# Patient Record
Sex: MALE | Race: OTHER | HISPANIC OR LATINO | URBAN - METROPOLITAN AREA
[De-identification: names, ages, dates, MRNs, and addresses within clinical notes are randomized per-mention and may not be internally consistent; named-entity substitution may affect disease eponyms.]

---

## 2019-06-01 ENCOUNTER — INPATIENT (INPATIENT)
Facility: HOSPITAL | Age: 74
LOS: 2 days | Discharge: HOME | DRG: 641 | End: 2019-06-04
Attending: INTERNAL MEDICINE | Admitting: INTERNAL MEDICINE
Payer: MEDICARE

## 2019-06-01 VITALS
HEART RATE: 42 BPM | DIASTOLIC BLOOD PRESSURE: 63 MMHG | RESPIRATION RATE: 18 BRPM | OXYGEN SATURATION: 100 % | SYSTOLIC BLOOD PRESSURE: 117 MMHG | WEIGHT: 179.9 LBS | TEMPERATURE: 98 F

## 2019-06-01 DIAGNOSIS — E87.5 HYPERKALEMIA: ICD-10-CM

## 2019-06-01 DIAGNOSIS — I10 ESSENTIAL (PRIMARY) HYPERTENSION: ICD-10-CM

## 2019-06-01 DIAGNOSIS — N18.3 CHRONIC KIDNEY DISEASE, STAGE 3 (MODERATE): ICD-10-CM

## 2019-06-01 DIAGNOSIS — R00.1 BRADYCARDIA, UNSPECIFIED: ICD-10-CM

## 2019-06-01 DIAGNOSIS — Z29.9 ENCOUNTER FOR PROPHYLACTIC MEASURES, UNSPECIFIED: ICD-10-CM

## 2019-06-01 DIAGNOSIS — E11.9 TYPE 2 DIABETES MELLITUS WITHOUT COMPLICATIONS: ICD-10-CM

## 2019-06-01 LAB
ALBUMIN SERPL ELPH-MCNC: 3.4 G/DL — LOW (ref 3.5–5)
ALP SERPL-CCNC: 74 U/L — SIGNIFICANT CHANGE UP (ref 40–120)
ALT FLD-CCNC: 23 U/L DA — SIGNIFICANT CHANGE UP (ref 10–60)
ANION GAP SERPL CALC-SCNC: 6 MMOL/L — SIGNIFICANT CHANGE UP (ref 5–17)
ANION GAP SERPL CALC-SCNC: 9 MMOL/L — SIGNIFICANT CHANGE UP (ref 5–17)
APPEARANCE UR: CLEAR — SIGNIFICANT CHANGE UP
APTT BLD: 25.7 SEC — LOW (ref 27.5–36.3)
AST SERPL-CCNC: 27 U/L — SIGNIFICANT CHANGE UP (ref 10–40)
BACTERIA # UR AUTO: ABNORMAL /HPF
BASOPHILS # BLD AUTO: 0.03 K/UL — SIGNIFICANT CHANGE UP (ref 0–0.2)
BASOPHILS NFR BLD AUTO: 0.5 % — SIGNIFICANT CHANGE UP (ref 0–2)
BILIRUB SERPL-MCNC: 0.6 MG/DL — SIGNIFICANT CHANGE UP (ref 0.2–1.2)
BILIRUB UR-MCNC: ABNORMAL
BUN SERPL-MCNC: 39 MG/DL — HIGH (ref 7–18)
BUN SERPL-MCNC: 42 MG/DL — HIGH (ref 7–18)
CALCIUM SERPL-MCNC: 8.7 MG/DL — SIGNIFICANT CHANGE UP (ref 8.4–10.5)
CALCIUM SERPL-MCNC: 9 MG/DL — SIGNIFICANT CHANGE UP (ref 8.4–10.5)
CHLORIDE SERPL-SCNC: 106 MMOL/L — SIGNIFICANT CHANGE UP (ref 96–108)
CHLORIDE SERPL-SCNC: 107 MMOL/L — SIGNIFICANT CHANGE UP (ref 96–108)
CHLORIDE UR-SCNC: 48 MMOL/L — LOW (ref 55–125)
CO2 SERPL-SCNC: 21 MMOL/L — LOW (ref 22–31)
CO2 SERPL-SCNC: 22 MMOL/L — SIGNIFICANT CHANGE UP (ref 22–31)
COLOR SPEC: YELLOW — SIGNIFICANT CHANGE UP
CREAT ?TM UR-MCNC: 240 MG/DL — SIGNIFICANT CHANGE UP
CREAT SERPL-MCNC: 2.91 MG/DL — HIGH (ref 0.5–1.3)
CREAT SERPL-MCNC: 3.1 MG/DL — HIGH (ref 0.5–1.3)
DIFF PNL FLD: NEGATIVE — SIGNIFICANT CHANGE UP
EOSINOPHIL # BLD AUTO: 0.03 K/UL — SIGNIFICANT CHANGE UP (ref 0–0.5)
EOSINOPHIL NFR BLD AUTO: 0.5 % — SIGNIFICANT CHANGE UP (ref 0–6)
EPI CELLS # UR: ABNORMAL /HPF
GLUCOSE SERPL-MCNC: 128 MG/DL — HIGH (ref 70–99)
GLUCOSE SERPL-MCNC: 233 MG/DL — HIGH (ref 70–99)
GLUCOSE UR QL: NEGATIVE — SIGNIFICANT CHANGE UP
HCT VFR BLD CALC: 30.2 % — LOW (ref 39–50)
HGB BLD-MCNC: 9.7 G/DL — LOW (ref 13–17)
IMM GRANULOCYTES NFR BLD AUTO: 0.2 % — SIGNIFICANT CHANGE UP (ref 0–1.5)
INR BLD: 1.29 RATIO — HIGH (ref 0.88–1.16)
KETONES UR-MCNC: NEGATIVE — SIGNIFICANT CHANGE UP
LACTATE SERPL-SCNC: 2.2 MMOL/L — HIGH (ref 0.7–2)
LEUKOCYTE ESTERASE UR-ACNC: ABNORMAL
LIDOCAIN IGE QN: 258 U/L — SIGNIFICANT CHANGE UP (ref 73–393)
LYMPHOCYTES # BLD AUTO: 1.02 K/UL — SIGNIFICANT CHANGE UP (ref 1–3.3)
LYMPHOCYTES # BLD AUTO: 18.2 % — SIGNIFICANT CHANGE UP (ref 13–44)
MAGNESIUM SERPL-MCNC: 2.1 MG/DL — SIGNIFICANT CHANGE UP (ref 1.6–2.6)
MCHC RBC-ENTMCNC: 32.1 GM/DL — SIGNIFICANT CHANGE UP (ref 32–36)
MCHC RBC-ENTMCNC: 32.9 PG — SIGNIFICANT CHANGE UP (ref 27–34)
MCV RBC AUTO: 102.4 FL — HIGH (ref 80–100)
MONOCYTES # BLD AUTO: 0.68 K/UL — SIGNIFICANT CHANGE UP (ref 0–0.9)
MONOCYTES NFR BLD AUTO: 12.2 % — SIGNIFICANT CHANGE UP (ref 2–14)
NEUTROPHILS # BLD AUTO: 3.82 K/UL — SIGNIFICANT CHANGE UP (ref 1.8–7.4)
NEUTROPHILS NFR BLD AUTO: 68.4 % — SIGNIFICANT CHANGE UP (ref 43–77)
NITRITE UR-MCNC: NEGATIVE — SIGNIFICANT CHANGE UP
NRBC # BLD: 0 /100 WBCS — SIGNIFICANT CHANGE UP (ref 0–0)
PH UR: 7 — SIGNIFICANT CHANGE UP (ref 5–8)
PLATELET # BLD AUTO: 184 K/UL — SIGNIFICANT CHANGE UP (ref 150–400)
POTASSIUM SERPL-MCNC: 6.3 MMOL/L — CRITICAL HIGH (ref 3.5–5.3)
POTASSIUM SERPL-MCNC: 7.4 MMOL/L — CRITICAL HIGH (ref 3.5–5.3)
POTASSIUM SERPL-SCNC: 6.3 MMOL/L — CRITICAL HIGH (ref 3.5–5.3)
POTASSIUM SERPL-SCNC: 7.4 MMOL/L — CRITICAL HIGH (ref 3.5–5.3)
POTASSIUM UR-SCNC: 117 MMOL/L — SIGNIFICANT CHANGE UP (ref 25–125)
PROT ?TM UR-MCNC: 23 MG/DL — HIGH (ref 0–12)
PROT SERPL-MCNC: 7.8 G/DL — SIGNIFICANT CHANGE UP (ref 6–8.3)
PROT UR-MCNC: 15
PROTHROM AB SERPL-ACNC: 14.4 SEC — HIGH (ref 10–12.9)
RBC # BLD: 2.95 M/UL — LOW (ref 4.2–5.8)
RBC # FLD: 14 % — SIGNIFICANT CHANGE UP (ref 10.3–14.5)
RBC CASTS # UR COMP ASSIST: SIGNIFICANT CHANGE UP /HPF (ref 0–2)
SODIUM SERPL-SCNC: 135 MMOL/L — SIGNIFICANT CHANGE UP (ref 135–145)
SODIUM SERPL-SCNC: 136 MMOL/L — SIGNIFICANT CHANGE UP (ref 135–145)
SODIUM UR-SCNC: 19 MMOL/L — LOW (ref 40–220)
SP GR SPEC: 1 — LOW (ref 1.01–1.02)
TROPONIN I SERPL-MCNC: <0.015 NG/ML — SIGNIFICANT CHANGE UP (ref 0–0.04)
UROBILINOGEN FLD QL: 1
WBC # BLD: 5.59 K/UL — SIGNIFICANT CHANGE UP (ref 3.8–10.5)
WBC # FLD AUTO: 5.59 K/UL — SIGNIFICANT CHANGE UP (ref 3.8–10.5)
WBC UR QL: ABNORMAL /HPF (ref 0–5)

## 2019-06-01 PROCEDURE — 71045 X-RAY EXAM CHEST 1 VIEW: CPT | Mod: 26

## 2019-06-01 PROCEDURE — 99291 CRITICAL CARE FIRST HOUR: CPT

## 2019-06-01 RX ORDER — SODIUM POLYSTYRENE SULFONATE 4.1 MEQ/G
30 POWDER, FOR SUSPENSION ORAL ONCE
Refills: 0 | Status: COMPLETED | OUTPATIENT
Start: 2019-06-01 | End: 2019-06-01

## 2019-06-01 RX ORDER — AMLODIPINE BESYLATE 2.5 MG/1
0 TABLET ORAL
Qty: 30 | Refills: 0 | DISCHARGE

## 2019-06-01 RX ORDER — ATORVASTATIN CALCIUM 80 MG/1
20 TABLET, FILM COATED ORAL AT BEDTIME
Refills: 0 | Status: DISCONTINUED | OUTPATIENT
Start: 2019-06-01 | End: 2019-06-04

## 2019-06-01 RX ORDER — ONDANSETRON 8 MG/1
4 TABLET, FILM COATED ORAL ONCE
Refills: 0 | Status: COMPLETED | OUTPATIENT
Start: 2019-06-01 | End: 2019-06-01

## 2019-06-01 RX ORDER — INSULIN HUMAN 100 [IU]/ML
10 INJECTION, SOLUTION SUBCUTANEOUS ONCE
Refills: 0 | Status: COMPLETED | OUTPATIENT
Start: 2019-06-01 | End: 2019-06-01

## 2019-06-01 RX ORDER — PANTOPRAZOLE SODIUM 20 MG/1
40 TABLET, DELAYED RELEASE ORAL DAILY
Refills: 0 | Status: DISCONTINUED | OUTPATIENT
Start: 2019-06-01 | End: 2019-06-04

## 2019-06-01 RX ORDER — CALCIUM GLUCONATE 100 MG/ML
1 VIAL (ML) INTRAVENOUS ONCE
Refills: 0 | Status: COMPLETED | OUTPATIENT
Start: 2019-06-01 | End: 2019-06-01

## 2019-06-01 RX ORDER — LISINOPRIL 2.5 MG/1
0 TABLET ORAL
Qty: 30 | Refills: 0 | DISCHARGE

## 2019-06-01 RX ORDER — SODIUM CHLORIDE 9 MG/ML
1000 INJECTION INTRAMUSCULAR; INTRAVENOUS; SUBCUTANEOUS ONCE
Refills: 0 | Status: COMPLETED | OUTPATIENT
Start: 2019-06-01 | End: 2019-06-01

## 2019-06-01 RX ORDER — INSULIN LISPRO 100/ML
VIAL (ML) SUBCUTANEOUS AT BEDTIME
Refills: 0 | Status: DISCONTINUED | OUTPATIENT
Start: 2019-06-01 | End: 2019-06-04

## 2019-06-01 RX ORDER — DEXTROSE 50 % IN WATER 50 %
25 SYRINGE (ML) INTRAVENOUS ONCE
Refills: 0 | Status: COMPLETED | OUTPATIENT
Start: 2019-06-01 | End: 2019-06-01

## 2019-06-01 RX ORDER — INSULIN LISPRO 100/ML
VIAL (ML) SUBCUTANEOUS
Refills: 0 | Status: DISCONTINUED | OUTPATIENT
Start: 2019-06-01 | End: 2019-06-04

## 2019-06-01 RX ORDER — CHLORHEXIDINE GLUCONATE 213 G/1000ML
1 SOLUTION TOPICAL
Refills: 0 | Status: DISCONTINUED | OUTPATIENT
Start: 2019-06-01 | End: 2019-06-04

## 2019-06-01 RX ORDER — ALLOPURINOL 300 MG
0 TABLET ORAL
Qty: 30 | Refills: 0 | DISCHARGE

## 2019-06-01 RX ORDER — FOLIC ACID 0.8 MG
1 TABLET ORAL DAILY
Refills: 0 | Status: DISCONTINUED | OUTPATIENT
Start: 2019-06-01 | End: 2019-06-04

## 2019-06-01 RX ORDER — GLUCAGON INJECTION, SOLUTION 0.5 MG/.1ML
5 INJECTION, SOLUTION SUBCUTANEOUS ONCE
Refills: 0 | Status: COMPLETED | OUTPATIENT
Start: 2019-06-01 | End: 2019-06-01

## 2019-06-01 RX ORDER — SODIUM BICARBONATE 1 MEQ/ML
50 SYRINGE (ML) INTRAVENOUS ONCE
Refills: 0 | Status: COMPLETED | OUTPATIENT
Start: 2019-06-01 | End: 2019-06-01

## 2019-06-01 RX ORDER — FUROSEMIDE 40 MG
0 TABLET ORAL
Qty: 30 | Refills: 0 | DISCHARGE

## 2019-06-01 RX ORDER — METFORMIN HYDROCHLORIDE 850 MG/1
0 TABLET ORAL
Qty: 60 | Refills: 0 | DISCHARGE

## 2019-06-01 RX ORDER — SODIUM POLYSTYRENE SULFONATE 4.1 MEQ/G
15 POWDER, FOR SUSPENSION ORAL ONCE
Refills: 0 | Status: COMPLETED | OUTPATIENT
Start: 2019-06-01 | End: 2019-06-01

## 2019-06-01 RX ORDER — HEPARIN SODIUM 5000 [USP'U]/ML
5000 INJECTION INTRAVENOUS; SUBCUTANEOUS EVERY 8 HOURS
Refills: 0 | Status: DISCONTINUED | OUTPATIENT
Start: 2019-06-01 | End: 2019-06-02

## 2019-06-01 RX ORDER — AMLODIPINE BESYLATE 2.5 MG/1
10 TABLET ORAL DAILY
Refills: 0 | Status: DISCONTINUED | OUTPATIENT
Start: 2019-06-01 | End: 2019-06-04

## 2019-06-01 RX ADMIN — HEPARIN SODIUM 5000 UNIT(S): 5000 INJECTION INTRAVENOUS; SUBCUTANEOUS at 22:34

## 2019-06-01 RX ADMIN — ATORVASTATIN CALCIUM 20 MILLIGRAM(S): 80 TABLET, FILM COATED ORAL at 22:26

## 2019-06-01 RX ADMIN — Medication 25 GRAM(S): at 22:09

## 2019-06-01 RX ADMIN — ONDANSETRON 4 MILLIGRAM(S): 8 TABLET, FILM COATED ORAL at 17:21

## 2019-06-01 RX ADMIN — INSULIN HUMAN 10 UNIT(S): 100 INJECTION, SOLUTION SUBCUTANEOUS at 22:23

## 2019-06-01 RX ADMIN — SODIUM POLYSTYRENE SULFONATE 15 GRAM(S): 4.1 POWDER, FOR SUSPENSION ORAL at 22:08

## 2019-06-01 RX ADMIN — Medication 25 GRAM(S): at 18:32

## 2019-06-01 RX ADMIN — Medication 600 GRAM(S): at 18:23

## 2019-06-01 RX ADMIN — SODIUM CHLORIDE 1000 MILLILITER(S): 9 INJECTION INTRAMUSCULAR; INTRAVENOUS; SUBCUTANEOUS at 17:21

## 2019-06-01 RX ADMIN — GLUCAGON INJECTION, SOLUTION 5 MILLIGRAM(S): 0.5 INJECTION, SOLUTION SUBCUTANEOUS at 17:38

## 2019-06-01 RX ADMIN — INSULIN HUMAN 10 UNIT(S): 100 INJECTION, SOLUTION SUBCUTANEOUS at 18:33

## 2019-06-01 RX ADMIN — SODIUM POLYSTYRENE SULFONATE 30 GRAM(S): 4.1 POWDER, FOR SUSPENSION ORAL at 18:23

## 2019-06-01 RX ADMIN — Medication 600 GRAM(S): at 22:08

## 2019-06-01 RX ADMIN — Medication 50 MILLIEQUIVALENT(S): at 18:32

## 2019-06-01 NOTE — H&P ADULT - PROBLEM SELECTOR PLAN 4
-BP currently stable, will hold home med Losartan/chlorthalidone/atenolol but will continue amlodipine.  -Monitor BP.

## 2019-06-01 NOTE — H&P ADULT - NSHPROSALLOTHERNEGRD_GEN_ALL_CORE
Group Topic:  Behavioral Activation Group    Date: April 10  Start Time:  3:00 PM  End Time:  4:00 PM    Focus: Goals  Number in attendance: 8      Attendance: Present  Patient Response: Appropriate feedback, Attentive and Interactive     Pt’s goals are to stabilize on medication, fill up pill boxes, take dog for a walk, and buy fruits/vegetables.    Diana Talavera, LPC     All other review of systems negative, except as noted in HPI

## 2019-06-01 NOTE — H&P ADULT - PROBLEM SELECTOR PLAN 2
-No peak T wave on the EKG, will repeat BMP.  -Will monitor w/ telemetry in the ICU with repeat BMP.

## 2019-06-01 NOTE — ED PROVIDER NOTE - NS_ATTENDINGSCRIBE_ED_ALL_ED
None I personally performed the service described in the documentation recorded by the scribe in my presence, and it accurately and completely records my words and actions.

## 2019-06-01 NOTE — H&P ADULT - NSHPPHYSICALEXAM_GEN_ALL_CORE
PHYSICAL EXAM:  GENERAL: NAD, well-groomed, well-developed  HEAD:  Atraumatic, Normocephalic  EYES: EOMI, PERRLA, conjunctiva and sclera clear  ENMT: No tonsillar erythema, exudates, or enlargement; Moist mucous membranes, Good dentition, No lesions  NECK: Supple, No JVD, Normal thyroid  NERVOUS SYSTEM:  Alert & Oriented X3, Good concentration; Motor Strength 5/5 B/L upper and lower extremities  CHEST/LUNG: Clear to percussion bilaterally; No rales, rhonchi, wheezing, or rubs  HEART: Regular rate and rhythm; No murmurs, rubs, or gallops  ABDOMEN: Soft, Nontender, Nondistended; Bowel sounds present  EXTREMITIES:  2+ Peripheral Pulses bilaterally, No clubbing, cyanosis, or edema  LYMPH: No lymphadenopathy noted  SKIN: No rashes or lesions    T(C): 36.4 (06-01-19 @ 19:33), Max: 36.6 (06-01-19 @ 14:50)  HR: 69 (06-01-19 @ 19:33) (42 - 69)  BP: 132/76 (06-01-19 @ 19:33) (116/73 - 132/76)  RR: 19 (06-01-19 @ 19:33) (18 - 19)  SpO2: 98% (06-01-19 @ 19:33) (98% - 100%)  Wt(kg): --  I&O's Summary

## 2019-06-01 NOTE — ED ADULT NURSE NOTE - OBJECTIVE STATEMENT
pt is here for weakness.  As per family member, weakness since night, denied chest pain or sob, denied N/V/D or fever, pt calm at this time with family member.

## 2019-06-01 NOTE — H&P ADULT - ATTENDING COMMENTS
Pt seen and examined within 15 minutes of consultation. I agree with the history, physical, assessment and plan of the MICU resident with my additions below    Symptomatic Bradyrhythmia likely from hyperkalemia with possible underlying conduction abnormalities  Hyperkalemia secondary to NELLY and dual ARB/ACE-I use  NELLY on CKD    Hemodynamically stable  Admit to ICU  Pt received calcium in ED  Insulin and D50  Kayexalate   Dopamine if needed/Trancutaneous Pacing/Atropine    30 minutes of critical care time spent evaluating and managing the patient

## 2019-06-01 NOTE — H&P ADULT - PROBLEM SELECTOR PLAN 3
-Unknown baseline, according to March discharge note from [place], Cr was 1.5 upon discharge.   -Currently Cr 3.10, likely from decreased renal perfusion related to bradycardia.  -NELLY on CKD vs worsening CKD.  -Will check renal sono and hold Colcrys, chlorthalidone, and Losartan.  -Hold metformin as EGFR is 19.  -Avoid nephrotoxins, monitor renal function. -Unknown baseline, according to March discharge note from [place], Cr was 1.5 upon discharge.   -Currently Cr 3.10, likely from decreased renal perfusion related to bradycardia.  -NELLY on CKD vs worsening CKD.  -Will check renal sono and hold Colcrys, chlorthalidone, and Losartan.  -Check urine lytes.  -Hold metformin as EGFR is 19.  -Avoid nephrotoxins, monitor renal function.

## 2019-06-01 NOTE — ED ADULT NURSE REASSESSMENT NOTE - NS ED NURSE REASSESS COMMENT FT1
received pt awake alert oriented x3 not in distress,with saline lock intact ,nmo redness,no swelling noted,hooked to cardiac monitor.with son at bedside.

## 2019-06-01 NOTE — H&P ADULT - PROBLEM SELECTOR PLAN 6
-IMPROVE VTE Individual Risk Assessment          RISK                                                          Points  [  ] Previous VTE                                                3  [  ] Thrombophilia                                             2  [  ] Lower limb paralysis                                   2        (unable to hold up >15 seconds)    [  ] Current Cancer                                            2         (within 6 months)  [x  ] Immobilization > 24 hrs                             1  [  ] ICU/CCU stay > 24 hours                           1  [x  ] Age > 60                                                       1  IMPROVE VTE Score ___2______  -Heparin subcu for DVT PPx with vendyne boots.

## 2019-06-01 NOTE — H&P ADULT - ASSESSMENT
Patient is a 73y old  Male who presents with a chief complaint of sweating and weakness. Is admitted to ICU for symptomatic bradycardia likely due to severe hyperkalemia.

## 2019-06-01 NOTE — H&P ADULT - HISTORY OF PRESENT ILLNESS
73 years old male with a significant PMH of DM, HTN, CKD3(unclear baseline Cr, in March it was around 1.5), HFrEF(50%), symptomatic bradycardia(does not have PPM), HLD, gout, and no significant PSH presented to ED as he has felt weak and been sweating profusely since this morning. Pt has significant h/o recent admission to [facility] 2 times in March and May(5/17-5/21) for symptomatic bradycardia. On first admission, patient needed transvenous temporary pacemaker which was complicated by blood stream infection, required ICU stay. After discharge from the first admission, due to med rec issue, pt has taken lisinopril and losartan both. On second admission, patient was found to have hyperkalemia and junctional bradycardia. That time pt only had temporary transcutaneous pacemaker, and bradycardia resolved ''on its own' so he was told that he did not require PPM. Pt did not follow up with any cardiologist since last year. Since the last discharge, patient has been only on Losartan. This morning, patient had profuse sweating and dizziness, and could not even walk due to exertional dyspnea. Visited ED for further evaluation.    In ED, patient was found to have bradycardia of 32-38, labs significant for K+ of 7.4(not hemolyzed), BUN/Cr  42/3.10, lactate 2.2, H/H 9.7/30.2 macrocytic. EKG junctional zaida at 39BPM with out STT change. BP stable. Pt was given hyperkalemia cocktail in ED which improved HR to 69. Was consulted to ICU for symptomatic bradycardia and hyperkalemia.

## 2019-06-01 NOTE — PATIENT PROFILE ADULT - NSPROGENANESREACTION_GEN_A_NUR
What Type Of Note Output Would You Prefer (Optional)?: Standard Output
Hpi Title: Evaluation of Skin Lesions
no previous reaction

## 2019-06-01 NOTE — ED PROVIDER NOTE - OBJECTIVE STATEMENT
72 y/o M patient with a significant PMHx of DM, HTN, low HR and no significant PSHx presents to the ED with c/o weakness since last night. Pt's son states that the patient has been having a low HR that is recurring since March. Patient's son reports that in March, the hospital gave him a temporary pacemaker, but he went back to normal and they took it off,. Pt's son states that he was recently admitted to the hospital 10 days ago for similar symptoms. Patient reports that every time he would go to the bathroom, he would began to sweat since last night. NKDA.

## 2019-06-01 NOTE — H&P ADULT - PROBLEM SELECTOR PLAN 1
-DDx includes primary arrhythmia vs electrolyte derangement. After calcium glucose, bicarb, and insulin+dextrose, HR improved to 65's.  -Will monitor w/ telemetry in the ICU with repeat BMP.  -Transcutaneous temporary pacemaker standby.  -Hold atenolol.  -Check TTE and cardio consult.

## 2019-06-01 NOTE — H&P ADULT - NSICDXPASTMEDICALHX_GEN_ALL_CORE_FT
PAST MEDICAL HISTORY:  Diabetes mellitus     DM (diabetes mellitus)     HTN (hypertension)     Hypertension

## 2019-06-02 LAB
ANION GAP SERPL CALC-SCNC: 7 MMOL/L — SIGNIFICANT CHANGE UP (ref 5–17)
ANION GAP SERPL CALC-SCNC: 8 MMOL/L — SIGNIFICANT CHANGE UP (ref 5–17)
ANION GAP SERPL CALC-SCNC: 9 MMOL/L — SIGNIFICANT CHANGE UP (ref 5–17)
BUN SERPL-MCNC: 29 MG/DL — HIGH (ref 7–18)
BUN SERPL-MCNC: 30 MG/DL — HIGH (ref 7–18)
BUN SERPL-MCNC: 37 MG/DL — HIGH (ref 7–18)
CALCIUM SERPL-MCNC: 8.8 MG/DL — SIGNIFICANT CHANGE UP (ref 8.4–10.5)
CALCIUM SERPL-MCNC: 8.9 MG/DL — SIGNIFICANT CHANGE UP (ref 8.4–10.5)
CALCIUM SERPL-MCNC: 9.1 MG/DL — SIGNIFICANT CHANGE UP (ref 8.4–10.5)
CHLORIDE SERPL-SCNC: 106 MMOL/L — SIGNIFICANT CHANGE UP (ref 96–108)
CHLORIDE SERPL-SCNC: 107 MMOL/L — SIGNIFICANT CHANGE UP (ref 96–108)
CHLORIDE SERPL-SCNC: 108 MMOL/L — SIGNIFICANT CHANGE UP (ref 96–108)
CHOLEST SERPL-MCNC: 107 MG/DL — SIGNIFICANT CHANGE UP (ref 10–199)
CK MB BLD-MCNC: 2.4 % — SIGNIFICANT CHANGE UP (ref 0–3.5)
CK MB CFR SERPL CALC: 1.1 NG/ML — SIGNIFICANT CHANGE UP (ref 0–3.6)
CK SERPL-CCNC: 46 U/L — SIGNIFICANT CHANGE UP (ref 35–232)
CO2 SERPL-SCNC: 22 MMOL/L — SIGNIFICANT CHANGE UP (ref 22–31)
CO2 SERPL-SCNC: 23 MMOL/L — SIGNIFICANT CHANGE UP (ref 22–31)
CO2 SERPL-SCNC: 24 MMOL/L — SIGNIFICANT CHANGE UP (ref 22–31)
CREAT SERPL-MCNC: 1.96 MG/DL — HIGH (ref 0.5–1.3)
CREAT SERPL-MCNC: 1.98 MG/DL — HIGH (ref 0.5–1.3)
CREAT SERPL-MCNC: 2.44 MG/DL — HIGH (ref 0.5–1.3)
GLUCOSE SERPL-MCNC: 123 MG/DL — HIGH (ref 70–99)
GLUCOSE SERPL-MCNC: 127 MG/DL — HIGH (ref 70–99)
GLUCOSE SERPL-MCNC: 96 MG/DL — SIGNIFICANT CHANGE UP (ref 70–99)
HBA1C BLD-MCNC: 6.3 % — HIGH (ref 4–5.6)
HCV AB S/CO SERPL IA: 0.19 S/CO — SIGNIFICANT CHANGE UP (ref 0–0.99)
HCV AB SERPL-IMP: SIGNIFICANT CHANGE UP
HDLC SERPL-MCNC: 47 MG/DL — SIGNIFICANT CHANGE UP
LACTATE SERPL-SCNC: 1.6 MMOL/L — SIGNIFICANT CHANGE UP (ref 0.7–2)
LIPID PNL WITH DIRECT LDL SERPL: 40 MG/DL — SIGNIFICANT CHANGE UP
MAGNESIUM SERPL-MCNC: 1.8 MG/DL — SIGNIFICANT CHANGE UP (ref 1.6–2.6)
MAGNESIUM SERPL-MCNC: 1.9 MG/DL — SIGNIFICANT CHANGE UP (ref 1.6–2.6)
OSMOLALITY UR: 488 MOSM/KG — SIGNIFICANT CHANGE UP (ref 50–1200)
PHOSPHATE SERPL-MCNC: 4.9 MG/DL — HIGH (ref 2.5–4.5)
PHOSPHATE SERPL-MCNC: 5 MG/DL — HIGH (ref 2.5–4.5)
POTASSIUM SERPL-MCNC: 4.2 MMOL/L — SIGNIFICANT CHANGE UP (ref 3.5–5.3)
POTASSIUM SERPL-MCNC: 4.2 MMOL/L — SIGNIFICANT CHANGE UP (ref 3.5–5.3)
POTASSIUM SERPL-MCNC: 4.8 MMOL/L — SIGNIFICANT CHANGE UP (ref 3.5–5.3)
POTASSIUM SERPL-SCNC: 4.2 MMOL/L — SIGNIFICANT CHANGE UP (ref 3.5–5.3)
POTASSIUM SERPL-SCNC: 4.2 MMOL/L — SIGNIFICANT CHANGE UP (ref 3.5–5.3)
POTASSIUM SERPL-SCNC: 4.8 MMOL/L — SIGNIFICANT CHANGE UP (ref 3.5–5.3)
SODIUM SERPL-SCNC: 137 MMOL/L — SIGNIFICANT CHANGE UP (ref 135–145)
SODIUM SERPL-SCNC: 138 MMOL/L — SIGNIFICANT CHANGE UP (ref 135–145)
SODIUM SERPL-SCNC: 139 MMOL/L — SIGNIFICANT CHANGE UP (ref 135–145)
TOTAL CHOLESTEROL/HDL RATIO MEASUREMENT: 2.3 RATIO — LOW (ref 3.4–9.6)
TRIGL SERPL-MCNC: 99 MG/DL — SIGNIFICANT CHANGE UP (ref 10–149)
TROPONIN I SERPL-MCNC: <0.015 NG/ML — SIGNIFICANT CHANGE UP (ref 0–0.04)

## 2019-06-02 RX ORDER — DEXTROSE 50 % IN WATER 50 %
25 SYRINGE (ML) INTRAVENOUS ONCE
Refills: 0 | Status: COMPLETED | OUTPATIENT
Start: 2019-06-02 | End: 2019-06-02

## 2019-06-02 RX ORDER — HEPARIN SODIUM 5000 [USP'U]/ML
5000 INJECTION INTRAVENOUS; SUBCUTANEOUS EVERY 12 HOURS
Refills: 0 | Status: DISCONTINUED | OUTPATIENT
Start: 2019-06-02 | End: 2019-06-04

## 2019-06-02 RX ADMIN — AMLODIPINE BESYLATE 10 MILLIGRAM(S): 2.5 TABLET ORAL at 05:43

## 2019-06-02 RX ADMIN — PANTOPRAZOLE SODIUM 40 MILLIGRAM(S): 20 TABLET, DELAYED RELEASE ORAL at 11:24

## 2019-06-02 RX ADMIN — Medication 1 MILLIGRAM(S): at 11:25

## 2019-06-02 RX ADMIN — CHLORHEXIDINE GLUCONATE 1 APPLICATION(S): 213 SOLUTION TOPICAL at 17:38

## 2019-06-02 RX ADMIN — HEPARIN SODIUM 5000 UNIT(S): 5000 INJECTION INTRAVENOUS; SUBCUTANEOUS at 05:43

## 2019-06-02 RX ADMIN — HEPARIN SODIUM 5000 UNIT(S): 5000 INJECTION INTRAVENOUS; SUBCUTANEOUS at 17:38

## 2019-06-02 RX ADMIN — ATORVASTATIN CALCIUM 20 MILLIGRAM(S): 80 TABLET, FILM COATED ORAL at 21:07

## 2019-06-02 RX ADMIN — CHLORHEXIDINE GLUCONATE 1 APPLICATION(S): 213 SOLUTION TOPICAL at 05:43

## 2019-06-02 RX ADMIN — Medication 25 GRAM(S): at 02:57

## 2019-06-02 NOTE — PROGRESS NOTE ADULT - PROBLEM SELECTOR PLAN 2
-No peak T wave on the EKG, will repeat BMP.  -Will monitor w/ telemetry in the ICU with repeat BMP. -No peak T wave on the EKG, will repeat BMP.  -monitor w/ telemetry in the ICU with repeat BMP.  K 7.4?6.3?4.8 -No peak T wave on the EKG, will repeat BMP.  -monitor w/ telemetry in the ICU with repeat BMP.  K 7.4?6.3?4.8  -will follow q6 for now

## 2019-06-02 NOTE — CONSULT NOTE ADULT - SUBJECTIVE AND OBJECTIVE BOX
CHIEF COMPLAINT:Patient is a 73y old  Male who presents with a chief complaint of Bradycardia and hyperkalemia.      HPI:  73 years old male with a significant PMH of DM, HTN, CKD3(unclear baseline Cr, in March it was around 1.5), HFrEF(50%), symptomatic bradycardia(does not have PPM), HLD, gout, and no significant PSH presented to ED as he has felt weak and been sweating profusely since this morning. Pt has significant h/o recent admission to [facility] 2 times in March and May(5/17-5/21) for symptomatic bradycardia. On first admission, patient needed transvenous temporary pacemaker which was complicated by blood stream infection, required ICU stay. After discharge from the first admission, due to med rec issue, pt has taken lisinopril and losartan both. On second admission, patient was found to have hyperkalemia and junctional bradycardia. That time pt only had temporary transcutaneous pacemaker, and bradycardia resolved ''on its own' so he was told that he did not require PPM. Pt did not follow up with any cardiologist since last year. Since the last discharge, patient has been only on Losartan. This morning, patient had profuse sweating and dizziness, and could not even walk due to exertional dyspnea. Visited ED for further evaluation.    In ED, patient was found to have bradycardia of 32-38, labs significant for K+ of 7.4(not hemolyzed), BUN/Cr  42/3.10, lactate 2.2, H/H 9.7/30.2 macrocytic. EKG junctional zaida at 39BPM with out STT change. BP stable. Pt was given hyperkalemia cocktail in ED which improved HR to 69. Was consulted to ICU for symptomatic bradycardia and hyperkalemia. (01 Jun 2019 19:25)      PAST MEDICAL & SURGICAL HISTORY:  CRI  Hypertension  HTN (hypertension)  DM (diabetes mellitus)  Gout        MEDICATIONS  (STANDING):  amLODIPine   Tablet 10 milliGRAM(s) Oral daily  atorvastatin 20 milliGRAM(s) Oral at bedtime  chlorhexidine 4% Liquid 1 Application(s) Topical two times a day  folic acid 1 milliGRAM(s) Oral daily  heparin  Injectable 5000 Unit(s) SubCutaneous every 12 hours  insulin lispro (HumaLOG) corrective regimen sliding scale   SubCutaneous three times a day before meals  insulin lispro (HumaLOG) corrective regimen sliding scale   SubCutaneous at bedtime  pantoprazole  Injectable 40 milliGRAM(s) IV Push daily    MEDICATIONS  (PRN):      FAMILY HISTORY:  No pertinent family history in first degree relatives      SOCIAL HISTORY:    [x ] Non-smoker    [x ] Alcohol-denies    Allergies    No Known Allergies    Intolerances    	    REVIEW OF SYSTEMS:  CONSTITUTIONAL: No fever, weight loss, or fatigue  EYES: No eye pain, visual disturbances, or discharge  ENT:  No difficulty hearing, tinnitus, vertigo; No sinus or throat pain  NECK: No pain or stiffness  RESPIRATORY: No cough, wheezing, chills or hemoptysis; No Shortness of Breath  CARDIOVASCULAR: No chest pain, palpitations, passing out, dizziness, or leg swelling  GASTROINTESTINAL: No abdominal or epigastric pain. No nausea, vomiting, or hematemesis; No diarrhea or constipation. No melena or hematochezia.  GENITOURINARY: No dysuria, frequency, hematuria, or incontinence  NEUROLOGICAL: No headaches, memory loss, loss of strength, numbness, or tremors  SKIN: No itching, burning, rashes, or lesions   LYMPH Nodes: No enlarged glands  ENDOCRINE: No heat or cold intolerance; No hair loss  MUSCULOSKELETAL: No joint pain or swelling; No muscle, back, or extremity pain  PSYCHIATRIC: No depression, anxiety, mood swings, or difficulty sleeping  HEME/LYMPH: No easy bruising, or bleeding gums  ALLERGY AND IMMUNOLOGIC: No hives or eczema	      PHYSICAL EXAM:  T(C): 35.6 (06-02-19 @ 04:00), Max: 36.6 (06-01-19 @ 14:50)  HR: 65 (06-02-19 @ 11:00) (42 - 74)  BP: 116/58 (06-02-19 @ 11:00) (102/63 - 137/66)  RR: 15 (06-02-19 @ 11:00) (15 - 55)  SpO2: 100% (06-02-19 @ 11:00) (92% - 100%)    I&O's Summary    01 Jun 2019 07:01  -  02 Jun 2019 07:00  --------------------------------------------------------  IN: 320 mL / OUT: 2100 mL / NET: -1780 mL    02 Jun 2019 07:01  -  02 Jun 2019 11:31  --------------------------------------------------------  IN: 0 mL / OUT: 250 mL / NET: -250 mL        Appearance: Normal	  HEENT:   Normal oral mucosa, PERRL, EOMI	  Lymphatic: No lymphadenopathy  Cardiovascular: Normal S1 S2, No JVD, No murmurs, No edema  Respiratory: Lungs clear to auscultation	  Psychiatry: A & O x 3, Mood & affect appropriate  Gastrointestinal:  Soft, Non-tender, + BS	  Skin: No rashes, No ecchymoses, No cyanosis	  Neurologic: Non-focal  Extremities: Normal range of motion, No clubbing, cyanosis or edema  Vascular: Peripheral pulses palpable 2+ bilaterally    TELEMETRY: nsr	    ECG:  	 afib with slow ventricular response  Septal infarct , age undetermined      	  LABS:	 	    CARDIAC MARKERS:  CARDIAC MARKERS ( 02 Jun 2019 06:14 )  <0.015 ng/mL / x     / 46 U/L / x     / 1.1 ng/mL  CARDIAC MARKERS ( 01 Jun 2019 19:36 )  <0.015 ng/mL / x     / x     / x     / x      CARDIAC MARKERS ( 01 Jun 2019 17:05 )  <0.015 ng/mL / x     / x     / x     / x                             9.7    5.59  )-----------( 184      ( 01 Jun 2019 17:05 )             30.2     06-02    138  |  108  |  32<H>  ----------------------------<  71  4.7   |  17<L>  |  2.25<H>    Ca    9.5      02 Jun 2019 06:14  Phos  5.6     06-02  Mg     see note     06-02    TPro  7.9  /  Alb  3.5  /  TBili  1.0  /  DBili  x   /  AST  33  /  ALT  32  /  AlkPhos  78  06-02        TSH: Thyroid Stimulating Hormone, Serum: 2.35 uU/mL (06-02 @ 06:14)

## 2019-06-02 NOTE — CONSULT NOTE ADULT - SUBJECTIVE AND OBJECTIVE BOX
PEMA VENCES  Patient is a 73y old  Male who presents with a chief complaint of Bradycardia and hyperkalemia (2019 12:17)    HPI:  73 years old male with a significant PMH of DM, HTN, CKD3(unclear baseline Cr, in March it was around 1.5), HFrEF(50%), symptomatic bradycardia(does not have PPM), HLD, gout, and no significant PSH presented to ED as he has felt weak and been sweating profusely since this morning.   He was noted to be Bradycardic and hyperkalemic. He was started on an ARB two weeks prior.  He stated that he has no CKD but chart review shows CKD.    PAST MEDICAL & SURGICAL HISTORY:  Diabetes mellitus  Hypertension  HTN (hypertension)  DM (diabetes mellitus)  No significant past surgical history    MEDICATIONS  (STANDING):  amLODIPine   Tablet 10 milliGRAM(s) Oral daily  atorvastatin 20 milliGRAM(s) Oral at bedtime  chlorhexidine 4% Liquid 1 Application(s) Topical two times a day  folic acid 1 milliGRAM(s) Oral daily  heparin  Injectable 5000 Unit(s) SubCutaneous every 12 hours  insulin lispro (HumaLOG) corrective regimen sliding scale   SubCutaneous three times a day before meals  insulin lispro (HumaLOG) corrective regimen sliding scale   SubCutaneous at bedtime  pantoprazole  Injectable 40 milliGRAM(s) IV Push daily    Allergies    No Known Allergies    Intolerances      FAMILY HISTORY:  No pertinent family history in first degree relatives      REVIEW OF SYSTEMS    General:  No fever, chills or night sweats.    Ophthalmologic: No changes in vision.  	  ENMT: No difficulty swallowing. 	    Respiratory and Thorax: Dyspnea on exertion.  	  Cardiovascular: No chest pains, tiredness or palpitations.	    Gastrointestinal: No dyspepsia, constipation or diarrhea.	    Genitourinary:	 No dysuria, hematuria or frequency.    Musculoskeletal: No joint pains or swelling. No muscle pains.    Neurological:	No weakness or numbness. No seizures.    Hematology/Lymphatics: No heat or cold intolerance.    Endocrine: No polyuria or polydipsia.	      Vital Signs Last 24 Hrs  T(C): 35.7 (2019 15:25), Max: 36.5 (2019 20:06)  T(F): 96.2 (2019 15:25), Max: 97.7 (2019 20:06)  HR: 66 (2019 15:00) (42 - 74)  BP: 120/70 (2019 15:00) (102/63 - 137/66)  BP(mean): 82 (2019 15:00) (70 - 96)  RR: 21 (2019 15:00) (15 - 55)  SpO2: 100% (2019 15:00) (92% - 100%)    PHYSICAL EXAMINATION:  Constitutional:  He appears comfortable and not distressed. Not diaphoretic.    Neck:  The thyroid is normal. Trachea is midline.     Respiratory: The lungs are clear to auscultation. No dullness and expansion is normal.    Cardiovascular: S1 and S2 are normal. No mummurs, rubs or gallops are present.    Gastrointestinal: The abdomen is soft. No tenderness is present. No masses are present. Bowel sounds are normal.    Genitourinary: The bladder is not distended. No CVA tenderness is present.    Extremities: No edema is noted. No deformities are present.    Neurological: Cognition is normal. Tone, power and sensation are normal.     Skin: No lesions are seen  or palpated.    Lymph Nodes: No lymphadenopathy is present.    Psychiatric: Mood is appropriate. No hallucinations or flight of ideas are noted.                            9.7    5.59  )-----------( 184      ( 2019 17:05 )             30.2     06-02    137  |  106  |  30<H>  ----------------------------<  123<H>  4.2   |  23  |  1.98<H>    Ca    8.9      2019 11:51  Phos  5.0     06-02  Mg     1.8     06-02    TPro  7.9  /  Alb  3.5  /  TBili  1.0  /  DBili  x   /  AST  33  /  ALT  32  /  AlkPhos  78  06-02    LIVER FUNCTIONS - ( 2019 06:14 )  Alb: 3.5 g/dL / Pro: 7.9 g/dL / ALK PHOS: 78 U/L / ALT: 32 U/L DA / AST: 33 U/L / GGT: x           Urinalysis Basic - ( 2019 18:31 )    Color: Yellow / Appearance: Clear / S.005 / pH: x  Gluc: x / Ketone: Negative  / Bili: Small / Urobili: 1   Blood: x / Protein: 15 / Nitrite: Negative   Leuk Esterase: Trace / RBC: 0-2 /HPF / WBC 6-10 /HPF   Sq Epi: x / Non Sq Epi: Occasional /HPF / Bacteria: Few /HPF    Total Protein, Random Urine (19 @ 19:36)    Total Protein, Random Urine: 23 mg/dL    Creatinine, Random Urine (19 @ 19:36)    Creatinine, Random Urine: 240: Reference Ranges have NOT been established for random urine analytes due  to variability in fluid intake and concentration. mg/dL    Sodium, Random Urine (19 @ 19:36)    Sodium, Random Urine: 19: Reference Ranges have NOT been established for random urine analytes due  to variability in fluid intake and concentration. mmol/L

## 2019-06-02 NOTE — CONSULT NOTE ADULT - SUBJECTIVE AND OBJECTIVE BOX
Patient is a 73y old  Male who presents with a chief complaint of Bradycardia and hyperkalemia (02 Jun 2019 11:30)    pt seen in icu [ x ], reg med floor [   ], bed [ x ], chair at bedside [   ], a+o x3 [ x ], lethargic [  ],  nad [ x ]    smalls [  ], ngt [  ], peg [  ], et tube [  ], cent line [  ], picc line [  ]        Allergies    No Known Allergies        Vitals    T(F): 96.8 (06-02-19 @ 11:00), Max: 97.8 (06-01-19 @ 14:50)  HR: 65 (06-02-19 @ 11:00) (42 - 74)  BP: 116/58 (06-02-19 @ 11:00) (102/63 - 137/66)  RR: 15 (06-02-19 @ 11:00) (15 - 55)  SpO2: 100% (06-02-19 @ 11:00) (92% - 100%)  Wt(kg): --  CAPILLARY BLOOD GLUCOSE      POCT Blood Glucose.: 129 mg/dL (02 Jun 2019 10:28)      Labs                          9.7    5.59  )-----------( 184      ( 01 Jun 2019 17:05 )             30.2       06-02    137  |  106  |  30<H>  ----------------------------<  123<H>  4.2   |  23  |  1.98<H>    Ca    8.9      02 Jun 2019 11:51  Phos  5.0     06-02  Mg     1.8     06-02    TPro  7.9  /  Alb  3.5  /  TBili  1.0  /  DBili  x   /  AST  33  /  ALT  32  /  AlkPhos  78  06-02      CARDIAC MARKERS ( 02 Jun 2019 06:14 )  <0.015 ng/mL / x     / 46 U/L / x     / 1.1 ng/mL  CARDIAC MARKERS ( 01 Jun 2019 19:36 )  <0.015 ng/mL / x     / x     / x     / x      CARDIAC MARKERS ( 01 Jun 2019 17:05 )  <0.015 ng/mL / x     / x     / x     / x                Radiology Results      Meds    MEDICATIONS  (STANDING):  amLODIPine   Tablet 10 milliGRAM(s) Oral daily  atorvastatin 20 milliGRAM(s) Oral at bedtime  chlorhexidine 4% Liquid 1 Application(s) Topical two times a day  folic acid 1 milliGRAM(s) Oral daily  heparin  Injectable 5000 Unit(s) SubCutaneous every 12 hours  insulin lispro (HumaLOG) corrective regimen sliding scale   SubCutaneous three times a day before meals  insulin lispro (HumaLOG) corrective regimen sliding scale   SubCutaneous at bedtime  pantoprazole  Injectable 40 milliGRAM(s) IV Push daily      MEDICATIONS  (PRN):      Physical Exam    Neuro :  no focal deficits  Respiratory: CTA B/L  CV: RRR, S1S2, no murmurs,   Abdominal: Soft, NT, ND +BS,  Extremities: No edema, + peripheral pulses    ASSESSMENT    Hyperkalemia  Diabetes mellitus  Hypertension  HTN (hypertension)  DM (diabetes mellitus)  No pertinent past medical history  No significant past surgical history      PLAN Patient is a 73y old  Male who presents with a chief complaint of Bradycardia and hyperkalemia (02 Jun 2019 11:30)    History of Present Illness:   73 years old male with a significant PMH of DM, HTN, CKD3(unclear baseline Cr, in March it was around 1.5), HFrEF(50%), symptomatic bradycardia(does not have PPM), HLD, gout, and no significant PSH presented to ED as he has felt weak and been sweating profusely since this morning. Pt has significant h/o recent admission to [facility] 2 times in March and May(5/17-5/21) for symptomatic bradycardia. On first admission, patient needed transvenous temporary pacemaker which was complicated by blood stream infection, required ICU stay. After discharge from the first admission, due to med rec issue, pt has taken lisinopril and losartan both. On second admission, patient was found to have hyperkalemia and junctional bradycardia. That time pt only had temporary transcutaneous pacemaker, and bradycardia resolved ''on its own' so he was told that he did not require PPM. Pt did not follow up with any cardiologist since last year. Since the last discharge, patient has been only on Losartan. This morning, patient had profuse sweating and dizziness, and could not even walk due to exertional dyspnea. Visited ED for further evaluation.    pt seen in icu [ x ], reg med floor [   ], bed [ x ], chair at bedside [   ], a+o x3 [ x ], lethargic [  ],  nad [ x ]          Allergies    No Known Allergies        Vitals    T(F): 96.8 (06-02-19 @ 11:00), Max: 97.8 (06-01-19 @ 14:50)  HR: 65 (06-02-19 @ 11:00) (42 - 74)  BP: 116/58 (06-02-19 @ 11:00) (102/63 - 137/66)  RR: 15 (06-02-19 @ 11:00) (15 - 55)  SpO2: 100% (06-02-19 @ 11:00) (92% - 100%)  Wt(kg): --  CAPILLARY BLOOD GLUCOSE      POCT Blood Glucose.: 129 mg/dL (02 Jun 2019 10:28)      Labs                          9.7    5.59  )-----------( 184      ( 01 Jun 2019 17:05 )             30.2       06-02    137  |  106  |  30<H>  ----------------------------<  123<H>  4.2   |  23  |  1.98<H>    Ca    8.9      02 Jun 2019 11:51  Phos  5.0     06-02  Mg     1.8     06-02    TPro  7.9  /  Alb  3.5  /  TBili  1.0  /  DBili  x   /  AST  33  /  ALT  32  /  AlkPhos  78  06-02      CARDIAC MARKERS ( 02 Jun 2019 06:14 )  <0.015 ng/mL / x     / 46 U/L / x     / 1.1 ng/mL  CARDIAC MARKERS ( 01 Jun 2019 19:36 )  <0.015 ng/mL / x     / x     / x     / x      CARDIAC MARKERS ( 01 Jun 2019 17:05 )  <0.015 ng/mL / x     / x     / x     / x                Radiology Results      Meds    MEDICATIONS  (STANDING):  amLODIPine   Tablet 10 milliGRAM(s) Oral daily  atorvastatin 20 milliGRAM(s) Oral at bedtime  chlorhexidine 4% Liquid 1 Application(s) Topical two times a day  folic acid 1 milliGRAM(s) Oral daily  heparin  Injectable 5000 Unit(s) SubCutaneous every 12 hours  insulin lispro (HumaLOG) corrective regimen sliding scale   SubCutaneous three times a day before meals  insulin lispro (HumaLOG) corrective regimen sliding scale   SubCutaneous at bedtime  pantoprazole  Injectable 40 milliGRAM(s) IV Push daily      MEDICATIONS  (PRN):      Physical Exam    Neuro :  no focal deficits  Respiratory: CTA B/L  CV: RRR, S1S2, no murmurs,   Abdominal: Soft, NT, ND +BS,  Extremities: No edema, + peripheral pulses    ASSESSMENT    bradycardia   abril on ckd   Hyperkalemia resolved   h/o Diabetes mellitus  Hypertension        PLAN    tele monitoring  cardio cons noted   f/u echo   hold bb  f/u urine lytes Avoid nephrotoxins, monitor renal function.   renal cons  f/u Ha1c  hss  cont current meds  cont as per icu team

## 2019-06-02 NOTE — PROGRESS NOTE ADULT - PROBLEM SELECTOR PLAN 1
-DDx includes primary arrhythmia vs electrolyte derangement. After calcium glucose, bicarb, and insulin+dextrose, HR improved to 65's.  -Will monitor w/ telemetry in the ICU with repeat BMP.  -Transcutaneous temporary pacemaker standby.  -Hold atenolol.  -Check TTE and cardio consult. -DDx includes primary arrhythmia vs electrolyte derangement. After calcium glucose, bicarb, and insulin+dextrose, HR improved to 65's.  -monitor w/ telemetry in the ICU with repeat BMP.  -Transcutaneous temporary pacemaker standby.  -Hold atenolol.  -Check TTE and cardio consult. -DDx includes primary arrhythmia vs electrolyte derangement. After calcium glucose, bicarb, and insulin+dextrose, HR improved to 65's.  -monitor w/ telemetry in the ICU with repeat BMP.  -Transcutaneous temporary pacemaker standby.  -Hold atenolol.  -Check TTE   -Cardio: Dr. Jorge

## 2019-06-02 NOTE — PROGRESS NOTE ADULT - SUBJECTIVE AND OBJECTIVE BOX
INTERVAL HPI/OVERNIGHT EVENTS:       Antimicrobial:    Cardiovascular:  amLODIPine   Tablet 10 milliGRAM(s) Oral daily    Pulmonary:    Hematalogic:  heparin  Injectable 5000 Unit(s) SubCutaneous every 8 hours    Other:  atorvastatin 20 milliGRAM(s) Oral at bedtime  chlorhexidine 4% Liquid 1 Application(s) Topical two times a day  folic acid 1 milliGRAM(s) Oral daily  insulin lispro (HumaLOG) corrective regimen sliding scale   SubCutaneous three times a day before meals  insulin lispro (HumaLOG) corrective regimen sliding scale   SubCutaneous at bedtime  pantoprazole  Injectable 40 milliGRAM(s) IV Push daily      Drug Dosing Weight    Weight (kg): 81.6 (2019 14:50)    CENTRAL LINE: [ ] YES [ ] NO  LOCATION:   DATE INSERTED:    LOYD: [ ] YES [ ] NO    DATE INSERTED:    A-LINE:  [ ] YES [ ] NO  LOCATION:   DATE INSERTED:    PMH/Social Hx/Fam Hx -reviewed admission note, no change since admission  PAST MEDICAL & SURGICAL HISTORY:  Diabetes mellitus  Hypertension  HTN (hypertension)  DM (diabetes mellitus)  No significant past surgical history      T(C): 35.6 (19 @ 04:00), Max: 36.6 (19 @ 14:50)  HR: 64 (19 @ 10:00)  BP: 106/63 (19 @ 10:00)  BP(mean): 73 (19 @ 10:00)  ABP: --  ABP(mean): --  RR: 16 (19 @ 10:00)  SpO2: 98% (19 @ 10:00)  Wt(kg): --           @ 07:01  -   @ 07:00  --------------------------------------------------------  IN: 320 mL / OUT: 2100 mL / NET: -1780 mL            PHYSICAL EXAM:    GENERAL: No signs of distress, comfortable  HEAD: Atraumatic, Normocephalic  EYES: EOMI, PERRLA  ENMT: No erythema, exudates, or enlargement, Moist mucous membranes  NECK: Supple, normal appearance, No JVD; [  ] central line (if applicable)  CHEST/LUNG: No chest deformity, fair bilateral air entry; No rales, rhonchi, wheezing; crackles  HEART: Regular rate and rhythm; No murmurs, rubs, or gallops;   ABDOMEN: Soft, Nontender, Nondistended; Bowel sounds present  EXTREMITIES:  + Peripheral Pulses, No clubbing, cyanosis, or edema  NERVOUS SYSTEM: awake and alert x 3, follows commands, upper and lower extremities  LYMPH: No lymphadenopathy noted  SKIN: No rashes or lesions; good turgor, warm, dry      LABS:  CBC Full  -  ( 2019 17:05 )  WBC Count : 5.59 K/uL  RBC Count : 2.95 M/uL  Hemoglobin : 9.7 g/dL  Hematocrit : 30.2 %  Platelet Count - Automated : 184 K/uL  Mean Cell Volume : 102.4 fl  Mean Cell Hemoglobin : 32.9 pg  Mean Cell Hemoglobin Concentration : 32.1 gm/dL  Auto Neutrophil # : 3.82 K/uL  Auto Lymphocyte # : 1.02 K/uL  Auto Monocyte # : 0.68 K/uL  Auto Eosinophil # : 0.03 K/uL  Auto Basophil # : 0.03 K/uL  Auto Neutrophil % : 68.4 %  Auto Lymphocyte % : 18.2 %  Auto Monocyte % : 12.2 %  Auto Eosinophil % : 0.5 %  Auto Basophil % : 0.5 %    06-    138  |  108  |  32<H>  ----------------------------<  71  4.7   |  17<L>  |  2.25<H>    Ca    9.5      2019 06:14  Phos  5.6     06-  Mg     see note     -    TPro  7.9  /  Alb  3.5  /  TBili  1.0  /  DBili  x   /  AST  33  /  ALT  32  /  AlkPhos  78  06-02    PT/INR - ( 2019 17:05 )   PT: 14.4 sec;   INR: 1.29 ratio         PTT - ( 2019 17:05 )  PTT:25.7 sec  Urinalysis Basic - ( 2019 18:31 )    Color: Yellow / Appearance: Clear / S.005 / pH: x  Gluc: x / Ketone: Negative  / Bili: Small / Urobili: 1   Blood: x / Protein: 15 / Nitrite: Negative   Leuk Esterase: Trace / RBC: 0-2 /HPF / WBC 6-10 /HPF   Sq Epi: x / Non Sq Epi: Occasional /HPF / Bacteria: Few /HPF          RADIOLOGY & ADDITIONAL STUDIES REVIEWED     CXR:< from: Xray Chest 1 View-PORTABLE IMMEDIATE (19 @ 18:13) >    EXAM:  XR CHEST PORTABLE IMMED 1V                            PROCEDURE DATE:  2019          INTERPRETATION:  AP semierect chest on 2019 at 6:10 PM. Patient   has weakness, low blood pressure, and abnormal calcium level.    Heart is magnified by technique. The lung fields and pleural surfaces are   unremarkable.    Chest is similar to April 15, 2016.    IMPRESSION: Clear lungs.                  TANISHA VELEZ M.D., ATTENDING RADIOLOGIST  This document has been electronically signed. 2019  8:50AM                  IMPRESSION:  PAST MEDICAL & SURGICAL HISTORY:  Diabetes mellitus  Hypertension  HTN (hypertension)  DM (diabetes mellitus)  No significant past surgical history   p/w          IMP: This is a 73 years old male with a significant PMH of DM, HTN, CKD3(unclear baseline Cr, in March it was around 1.5), HFrEF(50%), symptomatic bradycardia(does not have PPM), HLD, gout, and no significant PSH presented to ED as he has felt weak and been sweating profusely since this morning. Pt has significant h/o recent admission to [facility] 2 times in March and May(-) for symptomatic bradycardia. On first admission, patient needed transvenous temporary pacemaker which was complicated by blood stream infection, required ICU stay. After discharge from the first admission, due to med rec issue, pt has taken lisinopril and losartan both. On second admission, patient was found to have hyperkalemia and junctional bradycardia. That time pt only had temporary transcutaneous pacemaker, and bradycardia resolved ''on its own' so he was told that he did not require PPM. Pt did not follow up with any cardiologist since last year. Since the last discharge, patient has been only on Losartan. This morning, patient had profuse sweating and dizziness, and could not even walk due to exertional dyspnea. Visited ED for further evaluation.    In ED, patient was found to have bradycardia of 32-38, labs significant for K+ of 7.4(not hemolyzed), BUN/Cr  42/3.10, lactate 2.2, H/H 9.7/30.2 macrocytic. EKG junctional zaida at 39BPM with out STT change. BP stable. Pt was given hyperkalemia cocktail in ED which improved HR to 69. Was consulted to ICU for symptomatic bradycardia and hyperkalemia.        Plan:      CNS: no issue    PULMONARY: O2 supp    CARDIAC: monitor hemodynamic     GI: feed      RENAL: continue cocktail and  renal eval    SKIN: no issue     MUSCULOSKELETAL: boots    ID:  no issue    HEME: monitor    DVT and GI Prophylaxis    GOALS OF CARE DISCUSSION WITH PATIENT/FAMILY/PROXY/ icu team on rounds    CRITICAL CARE TIME SPENT: 37minutes

## 2019-06-02 NOTE — CONSULT NOTE ADULT - ASSESSMENT
73 years old male with a significant PMH of DM, HTN, CKD3(unclear baseline Cr, in March it was around 1.5), HFrEF(50%), symptomatic bradycardia(does not have PPM), HLD, gout, and no significant PSH presented to ED as he has felt weak and been sweating profusely since this morning, bradycardia and hyperkalemia with NELLY on CRI.  1.ICU monitoring.  2.NELLY on CRI-Renal, IVF.  3.DM-Insulin.  4.Echocardiogram.  5.PPI.  6.HTN-Norvasc.  7.Please obtain records from [facility].  8.GI and DVT prophylaxis.
1.	Acute Kidney Injury, with bland urine and low UPC. Possible due to the RAAS blockade.  2.	CKD. He has UPC < 0.2, so Diabetic nephropathy, though possible, is less likely. Chronic Tubulo-interstitial nephritis is possible.  3.	Hyperkalemia due to RAAS blockade.  4.	Bradycardia, possible due to Hyperkalemia.  5.	Gout, no flares. On Allopurinol at home.    PLAN:  1.	Repeat UA.  2.	Hold RAAS blockade.  3.	Renal SONO.  4.	Low K diet.  5.	If Hyperkalemia recurs after holding RAAS blockade, send Urine and serum osmolality, Urine Lytes and BMP simultanously for TTKG calculation.  6.	Once creatinine is at baseline, Allopurinol can be restarted.    Keenan Hargrove MD    [phone]

## 2019-06-02 NOTE — PROGRESS NOTE ADULT - SUBJECTIVE AND OBJECTIVE BOX
INTERVAL HPI/OVERNIGHT EVENTS: ***    PRESSORS: [ ] YES [ ] NO  WHICH:    ANTIBIOTICS:                  DATE STARTED:  ANTIBIOTICS:                  DATE STARTED:  ANTIBIOTICS:                  DATE STARTED:    Antimicrobial:    Cardiovascular:  amLODIPine   Tablet 10 milliGRAM(s) Oral daily    Pulmonary:    Hematalogic:  heparin  Injectable 5000 Unit(s) SubCutaneous every 8 hours    Other:  atorvastatin 20 milliGRAM(s) Oral at bedtime  chlorhexidine 4% Liquid 1 Application(s) Topical two times a day  folic acid 1 milliGRAM(s) Oral daily  insulin lispro (HumaLOG) corrective regimen sliding scale   SubCutaneous three times a day before meals  insulin lispro (HumaLOG) corrective regimen sliding scale   SubCutaneous at bedtime  pantoprazole  Injectable 40 milliGRAM(s) IV Push daily    amLODIPine   Tablet 10 milliGRAM(s) Oral daily  atorvastatin 20 milliGRAM(s) Oral at bedtime  chlorhexidine 4% Liquid 1 Application(s) Topical two times a day  folic acid 1 milliGRAM(s) Oral daily  heparin  Injectable 5000 Unit(s) SubCutaneous every 8 hours  insulin lispro (HumaLOG) corrective regimen sliding scale   SubCutaneous three times a day before meals  insulin lispro (HumaLOG) corrective regimen sliding scale   SubCutaneous at bedtime  pantoprazole  Injectable 40 milliGRAM(s) IV Push daily    Drug Dosing Weight    Weight (kg): 81.6 (2019 14:50)    CENTRAL LINE: [ ] YES [ ] NO  LOCATION:   DATE INSERTED:  REMOVE: [ ] YES [ ] NO  EXPLAIN:    LOYD: [ ] YES [ ] NO    DATE INSERTED:  REMOVE:  [ ] YES [ ] NO  EXPLAIN:    A-LINE:  [ ] YES [ ] NO  LOCATION:   DATE INSERTED:  REMOVE:  [ ] YES [ ] NO  EXPLAIN:    PMH -reviewed admission note, no change since admission  PAST MEDICAL & SURGICAL HISTORY:  Diabetes mellitus  Hypertension  HTN (hypertension)  DM (diabetes mellitus)  No significant past surgical history      ICU Vital Signs Last 24 Hrs  T(C): 35.8 (2019 23:29), Max: 36.6 (2019 14:50)  T(F): 96.5 (2019 23:29), Max: 97.8 (2019 14:50)  HR: 65 (2019 00:00) (42 - 74)  BP: 125/63 (2019 00:00) (116/73 - 137/66)  BP(mean): 79 (2019 00:00) (76 - 88)  ABP: --  ABP(mean): --  RR: 19 (2019 00:00) (18 - 24)  SpO2: 99% (2019 00:00) (96% - 100%)                    PHYSICAL EXAM:    GENERAL: [ ]NAD, [ ]well-groomed, [ ]well-developed  HEAD:  [ ]Atraumatic, [ ]Normocephalic  EYES: [ ]EOMI, [ ]PERRLA, [ ]conjunctiva and sclera clear  ENMT: [ ]No tonsillar erythema, exudates, or enlargement; [ ]Moist mucous membranes, [ ]Good dentition, [ ]No lesions  NECK: [ ]Supple, normal appearance, [ ]No JVD; [ ]Normal thyroid; [ ]Trachea midline  NERVOUS SYSTEM:  [ ]Alert & Oriented X3, [ ]Good concentration; [ ]Motor Strength 5/5 B/L upper and lower extremities; [ ]DTRs 2+ intact and symmetric  CHEST/LUNG: [ ]No chest deformity; [ ]Normal percussion bilaterally; [ ]No rales, rhonchi, wheezing   HEART: [ ]Regular rate and rhythm; [ ]No murmurs, rubs, or gallops  ABDOMEN: [ ]Soft, Nontender, Nondistended; [ ]Bowel sounds present  EXTREMITIES:  [ ]2+ Peripheral Pulses, [ ]No clubbing, cyanosis, or edema  LYMPH: [ ]No lymphadenopathy noted  SKIN: [ ]No rashes or lesions; [ ]Good capillary refill      LABS:  CBC Full  -  ( 2019 17:05 )  WBC Count : 5.59 K/uL  RBC Count : 2.95 M/uL  Hemoglobin : 9.7 g/dL  Hematocrit : 30.2 %  Platelet Count - Automated : 184 K/uL  Mean Cell Volume : 102.4 fl  Mean Cell Hemoglobin : 32.9 pg  Mean Cell Hemoglobin Concentration : 32.1 gm/dL  Auto Neutrophil # : 3.82 K/uL  Auto Lymphocyte # : 1.02 K/uL  Auto Monocyte # : 0.68 K/uL  Auto Eosinophil # : 0.03 K/uL  Auto Basophil # : 0.03 K/uL  Auto Neutrophil % : 68.4 %  Auto Lymphocyte % : 18.2 %  Auto Monocyte % : 12.2 %  Auto Eosinophil % : 0.5 %  Auto Basophil % : 0.5 %    06-01    136  |  106  |  39<H>  ----------------------------<  233<H>  6.3<HH>   |  21<L>  |  2.91<H>    Ca    8.7      2019 19:36  Phos  5.0     06-  Mg     1.9         TPro  7.8  /  Alb  3.4<L>  /  TBili  0.6  /  DBili  x   /  AST  27  /  ALT  23  /  AlkPhos  74  -    PT/INR - ( 2019 17:05 )   PT: 14.4 sec;   INR: 1.29 ratio         PTT - ( 2019 17:05 )  PTT:25.7 sec  Urinalysis Basic - ( 2019 18:31 )    Color: Yellow / Appearance: Clear / S.005 / pH: x  Gluc: x / Ketone: Negative  / Bili: Small / Urobili: 1   Blood: x / Protein: 15 / Nitrite: Negative   Leuk Esterase: Trace / RBC: 0-2 /HPF / WBC 6-10 /HPF   Sq Epi: x / Non Sq Epi: Occasional /HPF / Bacteria: Few /HPF          RADIOLOGY & ADDITIONAL STUDIES REVIEWED:  ***    [ ]GOALS OF CARE DISCUSSION WITH PATIENT/FAMILY/PROXY:    CRITICAL CARE TIME SPENT: 35 minutes INTERVAL HPI/OVERNIGHT EVENTS: No acute events reported overnight.    Pt is seen at the bedside. Pt is found resting comfortably in bed in no acute distress  K trended down to 4.8  PRESSORS: [ ] YES [X] NO  WHICH:    ANTIBIOTICS:                  DATE STARTED:  ANTIBIOTICS:                  DATE STARTED:  ANTIBIOTICS:                  DATE STARTED:    Antimicrobial:    Cardiovascular:  amLODIPine   Tablet 10 milliGRAM(s) Oral daily    Pulmonary:    Hematalogic:  heparin  Injectable 5000 Unit(s) SubCutaneous every 8 hours    Other:  atorvastatin 20 milliGRAM(s) Oral at bedtime  chlorhexidine 4% Liquid 1 Application(s) Topical two times a day  folic acid 1 milliGRAM(s) Oral daily  insulin lispro (HumaLOG) corrective regimen sliding scale   SubCutaneous three times a day before meals  insulin lispro (HumaLOG) corrective regimen sliding scale   SubCutaneous at bedtime  pantoprazole  Injectable 40 milliGRAM(s) IV Push daily    amLODIPine   Tablet 10 milliGRAM(s) Oral daily  atorvastatin 20 milliGRAM(s) Oral at bedtime  chlorhexidine 4% Liquid 1 Application(s) Topical two times a day  folic acid 1 milliGRAM(s) Oral daily  heparin  Injectable 5000 Unit(s) SubCutaneous every 8 hours  insulin lispro (HumaLOG) corrective regimen sliding scale   SubCutaneous three times a day before meals  insulin lispro (HumaLOG) corrective regimen sliding scale   SubCutaneous at bedtime  pantoprazole  Injectable 40 milliGRAM(s) IV Push daily    Drug Dosing Weight    Weight (kg): 81.6 (2019 14:50)    CENTRAL LINE: [ ] YES [X] NO  LOCATION:   DATE INSERTED:  REMOVE: [ ] YES [ ] NO  EXPLAIN:    LOYD: [ ] YES [X] NO    DATE INSERTED:  REMOVE:  [ ] YES [ ] NO  EXPLAIN:    A-LINE:  [ ] YES [X] NO  LOCATION:   DATE INSERTED:  REMOVE:  [ ] YES [ ] NO  EXPLAIN:    PMH -reviewed admission note, no change since admission  PAST MEDICAL & SURGICAL HISTORY:  Diabetes mellitus  Hypertension  HTN (hypertension)  DM (diabetes mellitus)  No significant past surgical history      ICU Vital Signs Last 24 Hrs  T(C): 35.8 (2019 23:29), Max: 36.6 (2019 14:50)  T(F): 96.5 (2019 23:29), Max: 97.8 (2019 14:50)  HR: 65 (2019 00:00) (42 - 74)  BP: 125/63 (2019 00:00) (116/73 - 137/66)  BP(mean): 79 (2019 00:00) (76 - 88)  ABP: --  ABP(mean): --  RR: 19 (2019 00:00) (18 - 24)  SpO2: 99% (2019 00:00) (96% - 100%)                    PHYSICAL EXAM:    	GENERAL: NAD, well-groomed, well-developed  	HEAD:  Atraumatic, Normocephalic  	EYES: EOMI, PERRLA, conjunctiva and sclera clear  	ENMT: No tonsillar erythema, exudates, or enlargement; Moist mucous membranes, Good dentition, No lesions  	NECK: Supple, No JVD, Normal thyroid  	NERVOUS SYSTEM:  Alert & Oriented X3, Good concentration; Motor Strength 5/5 B/L upper and lower extremities  	CHEST/LUNG: Clear to percussion bilaterally; No rales, rhonchi, wheezing, or rubs  	HEART: Regular rate and rhythm; No murmurs, rubs, or gallops  	ABDOMEN: Soft, Nontender, Nondistended; Bowel sounds present  	EXTREMITIES:  2+ Peripheral Pulses bilaterally, No clubbing, cyanosis, or edema  	LYMPH: No lymphadenopathy noted  	SKIN: No rashes or lesions      LABS:  CBC Full  -  ( 2019 17:05 )  WBC Count : 5.59 K/uL  RBC Count : 2.95 M/uL  Hemoglobin : 9.7 g/dL  Hematocrit : 30.2 %  Platelet Count - Automated : 184 K/uL  Mean Cell Volume : 102.4 fl  Mean Cell Hemoglobin : 32.9 pg  Mean Cell Hemoglobin Concentration : 32.1 gm/dL  Auto Neutrophil # : 3.82 K/uL  Auto Lymphocyte # : 1.02 K/uL  Auto Monocyte # : 0.68 K/uL  Auto Eosinophil # : 0.03 K/uL  Auto Basophil # : 0.03 K/uL  Auto Neutrophil % : 68.4 %  Auto Lymphocyte % : 18.2 %  Auto Monocyte % : 12.2 %  Auto Eosinophil % : 0.5 %  Auto Basophil % : 0.5 %        136  |  106  |  39<H>  ----------------------------<  233<H>  6.3<HH>   |  21<L>  |  2.91<H>    Ca    8.7      2019 19:36  Phos  5.0     06-  Mg     1.9     06-    TPro  7.8  /  Alb  3.4<L>  /  TBili  0.6  /  DBili  x   /  AST  27  /  ALT  23  /  AlkPhos  74  06-    PT/INR - ( 2019 17:05 )   PT: 14.4 sec;   INR: 1.29 ratio         PTT - ( 2019 17:05 )  PTT:25.7 sec  Urinalysis Basic - ( 2019 18:31 )    Color: Yellow / Appearance: Clear / S.005 / pH: x  Gluc: x / Ketone: Negative  / Bili: Small / Urobili: 1   Blood: x / Protein: 15 / Nitrite: Negative   Leuk Esterase: Trace / RBC: 0-2 /HPF / WBC 6-10 /HPF   Sq Epi: x / Non Sq Epi: Occasional /HPF / Bacteria: Few /HPF          RADIOLOGY & ADDITIONAL STUDIES REVIEWED:  ***    [ ]GOALS OF CARE DISCUSSION WITH PATIENT/FAMILY/PROXY:    CRITICAL CARE TIME SPENT: 35 minutes INTERVAL HPI/OVERNIGHT EVENTS: No acute events reported overnight.    Pt is seen at the bedside. Pt is found resting comfortably in bed in no acute distress  K trended down to 4.8.  No new events reported   PRESSORS: [ ] YES [X] NO  WHICH:      Cardiovascular:  amLODIPine   Tablet 10 milliGRAM(s) Oral daily    Pulmonary:    Hematalogic:  heparin  Injectable 5000 Unit(s) SubCutaneous every 8 hours    Other:  atorvastatin 20 milliGRAM(s) Oral at bedtime  chlorhexidine 4% Liquid 1 Application(s) Topical two times a day  folic acid 1 milliGRAM(s) Oral daily  insulin lispro (HumaLOG) corrective regimen sliding scale   SubCutaneous three times a day before meals  insulin lispro (HumaLOG) corrective regimen sliding scale   SubCutaneous at bedtime  pantoprazole  Injectable 40 milliGRAM(s) IV Push daily    amLODIPine   Tablet 10 milliGRAM(s) Oral daily  atorvastatin 20 milliGRAM(s) Oral at bedtime  chlorhexidine 4% Liquid 1 Application(s) Topical two times a day  folic acid 1 milliGRAM(s) Oral daily  heparin  Injectable 5000 Unit(s) SubCutaneous every 8 hours  insulin lispro (HumaLOG) corrective regimen sliding scale   SubCutaneous three times a day before meals  insulin lispro (HumaLOG) corrective regimen sliding scale   SubCutaneous at bedtime  pantoprazole  Injectable 40 milliGRAM(s) IV Push daily    Drug Dosing Weight    Weight (kg): 81.6 (2019 14:50)    CENTRAL LINE: [ ] YES [X] NO  LOCATION:   DATE INSERTED:  REMOVE: [ ] YES [ ] NO  EXPLAIN:    LOYD: [ ] YES [X] NO    DATE INSERTED:  REMOVE:  [ ] YES [ ] NO  EXPLAIN:    A-LINE:  [ ] YES [X] NO  LOCATION:   DATE INSERTED:  REMOVE:  [ ] YES [ ] NO  EXPLAIN:    PMH -reviewed admission note, no change since admission  PAST MEDICAL & SURGICAL HISTORY:  Diabetes mellitus  Hypertension  HTN (hypertension)  DM (diabetes mellitus)  No significant past surgical history      ICU Vital Signs Last 24 Hrs  T(C): 35.6 (2019 04:00), Max: 36.6 (2019 14:50)  T(F): 96 (2019 04:00), Max: 97.8 (2019 14:50)  HR: 64 (2019 10:00) (42 - 74)  BP: 106/63 (2019 10:00) (102/63 - 137/66)  BP(mean): 73 (2019 10:00) (70 - 96)  RR: 16 (2019 10:00) (15 - 55)  SpO2: 98% (2019 10:00) (92% - 100%)        PHYSICAL EXAM:    	GENERAL: NAD, well-groomed, well-developed  	HEAD:  Atraumatic, Normocephalic  	EYES: EOMI, PERRLA, conjunctiva and sclera clear  	ENMT: No tonsillar erythema, exudates, or enlargement; Moist mucous membranes, Good dentition, No lesions  	NECK: Supple, No JVD, Normal thyroid  	NERVOUS SYSTEM:  Alert & Oriented X3, Good concentration; Motor Strength 5/5 B/L upper and lower extremities  	CHEST/LUNG: Clear to percussion bilaterally; No rales, rhonchi, wheezing, or rubs  	HEART: Regular rate and rhythm; No murmurs, rubs, or gallops  	ABDOMEN: Soft, Nontender, Nondistended; Bowel sounds present  	EXTREMITIES:  2+ Peripheral Pulses bilaterally, No clubbing, cyanosis, or edema  	LYMPH: No lymphadenopathy noted  	SKIN: No rashes or lesions      LABS:                        9.7    5.59  )-----------( 184      ( 2019 17:05 )             30.2     06-02    138  |  108  |  32<H>  ----------------------------<  71  4.7   |  17<L>  |  2.25<H>    Ca    9.5      2019 06:14  Phos  5.6     06-02  Mg     see note         TPro  7.9  /  Alb  3.5  /  TBili  1.0  /  DBili  x   /  AST  33  /  ALT  32  /  AlkPhos  78  06-02      PT/INR - ( 2019 17:05 )   PT: 14.4 sec;   INR: 1.29 ratio         PTT - ( 2019 17:05 )  PTT:25.7 sec  Urinalysis Basic - ( 2019 18:31 )    Color: Yellow / Appearance: Clear / S.005 / pH: x  Gluc: x / Ketone: Negative  / Bili: Small / Urobili: 1   Blood: x / Protein: 15 / Nitrite: Negative   Leuk Esterase: Trace / RBC: 0-2 /HPF / WBC 6-10 /HPF   Sq Epi: x / Non Sq Epi: Occasional /HPF / Bacteria: Few /HPF          RADIOLOGY & ADDITIONAL STUDIES REVIEWED:  ***    [ ]GOALS OF CARE DISCUSSION WITH PATIENT/FAMILY/PROXY:    CRITICAL CARE TIME SPENT: 35 minutes

## 2019-06-02 NOTE — PROGRESS NOTE ADULT - PROBLEM SELECTOR PLAN 3
-Unknown baseline, according to March discharge note from [place], Cr was 1.5 upon discharge.   -Currently Cr 3.10, likely from decreased renal perfusion related to bradycardia.  -NELLY on CKD vs worsening CKD.  -Will check renal sono and hold Colcrys, chlorthalidone, and Losartan.  -Check urine lytes.  -Hold metformin as EGFR is 19.  -Avoid nephrotoxins, monitor renal function.

## 2019-06-03 LAB
ALBUMIN SERPL ELPH-MCNC: 3.2 G/DL — LOW (ref 3.5–5)
ALP SERPL-CCNC: 78 U/L — SIGNIFICANT CHANGE UP (ref 40–120)
ALT FLD-CCNC: 30 U/L DA — SIGNIFICANT CHANGE UP (ref 10–60)
ANION GAP SERPL CALC-SCNC: 9 MMOL/L — SIGNIFICANT CHANGE UP (ref 5–17)
AST SERPL-CCNC: 32 U/L — SIGNIFICANT CHANGE UP (ref 10–40)
BASOPHILS # BLD AUTO: 0.02 K/UL — SIGNIFICANT CHANGE UP (ref 0–0.2)
BASOPHILS NFR BLD AUTO: 0.6 % — SIGNIFICANT CHANGE UP (ref 0–2)
BILIRUB SERPL-MCNC: 0.8 MG/DL — SIGNIFICANT CHANGE UP (ref 0.2–1.2)
BUN SERPL-MCNC: 24 MG/DL — HIGH (ref 7–18)
CALCIUM SERPL-MCNC: 9.1 MG/DL — SIGNIFICANT CHANGE UP (ref 8.4–10.5)
CHLORIDE SERPL-SCNC: 106 MMOL/L — SIGNIFICANT CHANGE UP (ref 96–108)
CHOLEST SERPL-MCNC: 116 MG/DL — SIGNIFICANT CHANGE UP (ref 10–199)
CO2 SERPL-SCNC: 22 MMOL/L — SIGNIFICANT CHANGE UP (ref 22–31)
CREAT SERPL-MCNC: 1.64 MG/DL — HIGH (ref 0.5–1.3)
EOSINOPHIL # BLD AUTO: 0.39 K/UL — SIGNIFICANT CHANGE UP (ref 0–0.5)
EOSINOPHIL NFR BLD AUTO: 10.8 % — HIGH (ref 0–6)
GLUCOSE SERPL-MCNC: 106 MG/DL — HIGH (ref 70–99)
HBA1C BLD-MCNC: 6.2 % — HIGH (ref 4–5.6)
HCT VFR BLD CALC: 32.9 % — LOW (ref 39–50)
HDLC SERPL-MCNC: 48 MG/DL — SIGNIFICANT CHANGE UP
HGB BLD-MCNC: 11 G/DL — LOW (ref 13–17)
IMM GRANULOCYTES NFR BLD AUTO: 0 % — SIGNIFICANT CHANGE UP (ref 0–1.5)
LIPID PNL WITH DIRECT LDL SERPL: 50 MG/DL — SIGNIFICANT CHANGE UP
LYMPHOCYTES # BLD AUTO: 0.72 K/UL — LOW (ref 1–3.3)
LYMPHOCYTES # BLD AUTO: 20 % — SIGNIFICANT CHANGE UP (ref 13–44)
MCHC RBC-ENTMCNC: 33.2 PG — SIGNIFICANT CHANGE UP (ref 27–34)
MCHC RBC-ENTMCNC: 33.4 GM/DL — SIGNIFICANT CHANGE UP (ref 32–36)
MCV RBC AUTO: 99.4 FL — SIGNIFICANT CHANGE UP (ref 80–100)
MONOCYTES # BLD AUTO: 0.53 K/UL — SIGNIFICANT CHANGE UP (ref 0–0.9)
MONOCYTES NFR BLD AUTO: 14.7 % — HIGH (ref 2–14)
NEUTROPHILS # BLD AUTO: 1.94 K/UL — SIGNIFICANT CHANGE UP (ref 1.8–7.4)
NEUTROPHILS NFR BLD AUTO: 53.9 % — SIGNIFICANT CHANGE UP (ref 43–77)
NRBC # BLD: 0 /100 WBCS — SIGNIFICANT CHANGE UP (ref 0–0)
PLATELET # BLD AUTO: 197 K/UL — SIGNIFICANT CHANGE UP (ref 150–400)
POTASSIUM SERPL-MCNC: 3.8 MMOL/L — SIGNIFICANT CHANGE UP (ref 3.5–5.3)
POTASSIUM SERPL-SCNC: 3.8 MMOL/L — SIGNIFICANT CHANGE UP (ref 3.5–5.3)
PROT SERPL-MCNC: 7.6 G/DL — SIGNIFICANT CHANGE UP (ref 6–8.3)
RBC # BLD: 3.31 M/UL — LOW (ref 4.2–5.8)
RBC # FLD: 13.5 % — SIGNIFICANT CHANGE UP (ref 10.3–14.5)
SODIUM SERPL-SCNC: 137 MMOL/L — SIGNIFICANT CHANGE UP (ref 135–145)
TOTAL CHOLESTEROL/HDL RATIO MEASUREMENT: 2.4 RATIO — LOW (ref 3.4–9.6)
TRIGL SERPL-MCNC: 90 MG/DL — SIGNIFICANT CHANGE UP (ref 10–149)
WBC # BLD: 3.6 K/UL — LOW (ref 3.8–10.5)
WBC # FLD AUTO: 3.6 K/UL — LOW (ref 3.8–10.5)

## 2019-06-03 PROCEDURE — 76775 US EXAM ABDO BACK WALL LIM: CPT | Mod: 26

## 2019-06-03 PROCEDURE — 76770 US EXAM ABDO BACK WALL COMP: CPT | Mod: 26

## 2019-06-03 RX ADMIN — ATORVASTATIN CALCIUM 20 MILLIGRAM(S): 80 TABLET, FILM COATED ORAL at 21:24

## 2019-06-03 RX ADMIN — CHLORHEXIDINE GLUCONATE 1 APPLICATION(S): 213 SOLUTION TOPICAL at 17:36

## 2019-06-03 RX ADMIN — CHLORHEXIDINE GLUCONATE 1 APPLICATION(S): 213 SOLUTION TOPICAL at 05:53

## 2019-06-03 RX ADMIN — PANTOPRAZOLE SODIUM 40 MILLIGRAM(S): 20 TABLET, DELAYED RELEASE ORAL at 11:22

## 2019-06-03 RX ADMIN — HEPARIN SODIUM 5000 UNIT(S): 5000 INJECTION INTRAVENOUS; SUBCUTANEOUS at 17:36

## 2019-06-03 RX ADMIN — AMLODIPINE BESYLATE 10 MILLIGRAM(S): 2.5 TABLET ORAL at 05:53

## 2019-06-03 RX ADMIN — HEPARIN SODIUM 5000 UNIT(S): 5000 INJECTION INTRAVENOUS; SUBCUTANEOUS at 05:53

## 2019-06-03 RX ADMIN — Medication 1 MILLIGRAM(S): at 11:24

## 2019-06-03 NOTE — PHYSICAL THERAPY INITIAL EVALUATION ADULT - DISCHARGE DISPOSITION, PT EVAL
will optimize mobility and function while admitted; attempt to complete 6MWT/home/no skilled PT needs

## 2019-06-03 NOTE — PROGRESS NOTE ADULT - PROBLEM SELECTOR PLAN 3
-Unknown baseline, according to March discharge note from [place], Cr was 1.5 upon discharge.   -Significantly improved to 1.6 now  -Avoid nephrotoxic agents

## 2019-06-03 NOTE — CHART NOTE - NSCHARTNOTEFT_GEN_A_CORE
HPI:  73 years old male with a significant PMH of DM, HTN, CKD3(unclear baseline Cr, in March it was around 1.5), HFrEF(50%), symptomatic bradycardia(does not have PPM), HLD, gout, and no significant PSH presented to ED as he has felt weak and been sweating profusely since this morning. Pt has significant h/o recent admission to [facility] 2 times in March and May(5/17-5/21) for symptomatic bradycardia. On first admission, patient needed transvenous temporary pacemaker which was complicated by blood stream infection, required ICU stay. After discharge from the first admission, due to med rec issue, pt has taken lisinopril and losartan both. On second admission, patient was found to have hyperkalemia and junctional bradycardia. That time pt only had temporary transcutaneous pacemaker, and bradycardia resolved ''on its own' so he was told that he did not require PPM. Pt did not follow up with any cardiologist since last year. Since the last discharge, patient has been only on Losartan. This morning, patient had profuse sweating and dizziness, and could not even walk due to exertional dyspnea. Visited ED for further evaluation.    In ED, patient was found to have bradycardia of 32-38, labs significant for K+ of 7.4(not hemolyzed), BUN/Cr  42/3.10, lactate 2.2, H/H 9.7/30.2 macrocytic. EKG junctional zaida at 39BPM with out STT change. BP stable. Pt was given hyperkalemia cocktail in ED which improved HR to 69. Was consulted to ICU for symptomatic bradycardia and hyperkalemia. (01 Jun 2019 19:25) Pt is a 73 yr old male who was admitted to ICU for symptomatic bradycardia, hyperkalemia and NELLY on CKD. In ED, patient was found to have bradycardia of 32-38, labs significant for K+ of 7.4(not hemolyzed), BUN/Cr  42/3.10, lactate 2.2, H/H 9.7/30.2 macrocytic. EKG junctional zaida at 39BPM with out STT change. BP stable. Pt was given hyperkalemia cocktail in ED which improved HR to 69. Was consulted to ICU for symptomatic bradycardia and hyperkalemia. Pt had 2 previous episodes of symptomatic bradycardia, on his last admission he was discharged on losartan and was prescribed lisinopril by his pcp.    -->ICU COURSE:  Pt ACE/ARB were discontinued, pt remained stable during his ICU course, kidney function and hyperkalemia improved, Echo and ultrasound renal were done and were negative for any structural problem causing worsening renal function, echo showed normal EF and grade 1 diastolic dysfunction.    -->Pt is stable for downgrade as discussed with ICU Attending, Dr Hutton informed. Pt on amlodipine for BP control. Would avoid ace/arb on discharge to prevent hyperkalemia.    Follow Up:  bmp for serum potassium and renal function Pt is a 73 yr old male who was admitted to ICU for symptomatic bradycardia, hyperkalemia and NELLY on CKD. In ED, patient was found to have bradycardia of 32-38, labs significant for K+ of 7.4(not hemolyzed), BUN/Cr  42/3.10, lactate 2.2, H/H 9.7/30.2 macrocytic. EKG junctional zaida at 39BPM with out STT change. BP stable. Pt was given hyperkalemia cocktail in ED which improved HR to 69. Was consulted to ICU for symptomatic bradycardia and hyperkalemia. Pt had 2 previous episodes of symptomatic bradycardia, on his last admission he was discharged on losartan and was prescribed lisinopril by his pcp.    -->ICU COURSE:  Pt ACE/ARB were discontinued, pt remained stable during his ICU course, kidney function and hyperkalemia improved, Echo and ultrasound renal were done and were negative for any structural problem causing worsening renal function, echo showed normal EF and grade 1 diastolic dysfunction.    -->Pt is stable for downgrade as discussed with ICU Attending, Dr Hutton informed. Pt on amlodipine for BP control. Would avoid ace/arb on discharge to prevent hyperkalemia.    Follow Up:  bmp for serum potassium and renal function.

## 2019-06-03 NOTE — PROGRESS NOTE ADULT - SUBJECTIVE AND OBJECTIVE BOX
CHIEF COMPLAINT:Patient is a 73y old  Male who presents with a chief complaint of Bradycardia and hyperkalemia. Pt appears comfortable.    	  REVIEW OF SYSTEMS:  CONSTITUTIONAL: No fever, weight loss, or fatigue  EYES: No eye pain, visual disturbances, or discharge  ENT:  No difficulty hearing, tinnitus, vertigo; No sinus or throat pain  NECK: No pain or stiffness  RESPIRATORY: No cough, wheezing, chills or hemoptysis; No Shortness of Breath  CARDIOVASCULAR: No chest pain, palpitations, passing out, dizziness, or leg swelling  GASTROINTESTINAL: No abdominal or epigastric pain. No nausea, vomiting, or hematemesis; No diarrhea or constipation. No melena or hematochezia.  GENITOURINARY: No dysuria, frequency, hematuria, or incontinence  NEUROLOGICAL: No headaches, memory loss, loss of strength, numbness, or tremors  SKIN: No itching, burning, rashes, or lesions   LYMPH Nodes: No enlarged glands  ENDOCRINE: No heat or cold intolerance; No hair loss  MUSCULOSKELETAL: No joint pain or swelling; No muscle, back, or extremity pain  PSYCHIATRIC: No depression, anxiety, mood swings, or difficulty sleeping  HEME/LYMPH: No easy bruising, or bleeding gums  ALLERGY AND IMMUNOLOGIC: No hives or eczema	    PHYSICAL EXAM:  T(C): 36.3 (06-03-19 @ 08:00), Max: 36.3 (06-03-19 @ 08:00)  HR: 84 (06-03-19 @ 12:35) (65 - 84)  BP: 125/74 (06-03-19 @ 12:35) (111/67 - 141/81)  RR: 30 (06-03-19 @ 12:35) (11 - 32)  SpO2: 100% (06-03-19 @ 12:35) (95% - 100%)    I&O's Summary    02 Jun 2019 07:01  -  03 Jun 2019 07:00  --------------------------------------------------------  IN: 417 mL / OUT: 2225 mL / NET: -1808 mL    03 Jun 2019 07:01  -  03 Jun 2019 13:13  --------------------------------------------------------  IN: 410 mL / OUT: 200 mL / NET: 210 mL        Appearance: Normal	  HEENT:   Normal oral mucosa, PERRL, EOMI	  Lymphatic: No lymphadenopathy  Cardiovascular: Normal S1 S2, No JVD, No murmurs, No edema  Respiratory: Lungs clear to auscultation	  Psychiatry: A & O x 3, Mood & affect appropriate  Gastrointestinal:  Soft, Non-tender, + BS	  Skin: No rashes, No ecchymoses, No cyanosis	  Neurologic: Non-focal  Extremities: Normal range of motion, No clubbing, cyanosis or edema  Vascular: Peripheral pulses palpable 2+ bilaterally    MEDICATIONS  (STANDING):  amLODIPine   Tablet 10 milliGRAM(s) Oral daily  atorvastatin 20 milliGRAM(s) Oral at bedtime  chlorhexidine 4% Liquid 1 Application(s) Topical two times a day  folic acid 1 milliGRAM(s) Oral daily  heparin  Injectable 5000 Unit(s) SubCutaneous every 12 hours  insulin lispro (HumaLOG) corrective regimen sliding scale   SubCutaneous three times a day before meals  insulin lispro (HumaLOG) corrective regimen sliding scale   SubCutaneous at bedtime  pantoprazole  Injectable 40 milliGRAM(s) IV Push daily        	  LABS:	 	    CARDIAC MARKERS:  CARDIAC MARKERS ( 02 Jun 2019 06:14 )  <0.015 ng/mL / x     / 46 U/L / x     / 1.1 ng/mL  CARDIAC MARKERS ( 01 Jun 2019 19:36 )  <0.015 ng/mL / x     / x     / x     / x      CARDIAC MARKERS ( 01 Jun 2019 17:05 )  <0.015 ng/mL / x     / x     / x     / x                              11.0   3.60  )-----------( 197      ( 03 Jun 2019 06:12 )             32.9     06-03    137  |  106  |  24<H>  ----------------------------<  106<H>  3.8   |  22  |  1.64<H>    Ca    9.1      03 Jun 2019 06:12  Phos  5.0     06-02  Mg     1.8     06-02    TPro  7.6  /  Alb  3.2<L>  /  TBili  0.8  /  DBili  x   /  AST  32  /  ALT  30  /  AlkPhos  78  06-03      Lipid Profile: Cholesterol 116  LDL 50  HDL 48  TG 90  Cholesterol 107  LDL 40  HDL 47  TG 99  Cholesterol see note  LDL see note  HDL See Note  TG see note    HgA1c: Hemoglobin A1C, Whole Blood: 6.2 % (06-03 @ 10:21)  Hemoglobin A1C, Whole Blood: 6.3 % (06-02 @ 13:15)    TSH: Thyroid Stimulating Hormone, Serum: 2.35 uU/mL (06-02 @ 06:14)      	  OBSERVATIONS:  Mitral Valve: Normal mitral valve. Trace mitral  regurgitation.  Aortic Root: Aortic Root: 3.0 cm.    Aortic Valve: Normal trileaflet aortic valve. Mild aortic  regurgitation.  Left Atrium: LA volume index = 15 cc/m2.  Left Ventricle: Normal Left Ventricular Systolic Function,  (EF = 55%) Normal left ventricular internal dimensions and  wall thicknesses. Grade I diastolic dysfunction (Impaired  relaxation).  Right Heart: Normal right atrium. Normal right ventricular  size and systolic function (TAPSE 2.4 cm). There is mild  tricuspid regurgitation. There is mild pulmonic  regurgitation.  Pericardium/PleuraNormal pericardium with no pericardial  effusion.

## 2019-06-03 NOTE — PROGRESS NOTE ADULT - SUBJECTIVE AND OBJECTIVE BOX
Patient is a 73y old  Male who presents with a chief complaint of Bradycardia and hyperkalemia (02 Jun 2019 15:58)    pt seen in icu [  ], reg med floor [   ], bed [  ], chair at bedside [   ], a+o x3 [  ], lethargic [  ],  nad [  ]    smalls [  ], ngt [  ], peg [  ], et tube [  ], cent line [  ], picc line [  ]        Allergies    No Known Allergies        Vitals    T(F): 97.4 (06-03-19 @ 08:00), Max: 97.4 (06-03-19 @ 08:00)  HR: 77 (06-03-19 @ 08:00) (64 - 77)  BP: 118/66 (06-03-19 @ 08:00) (106/63 - 141/81)  RR: 18 (06-03-19 @ 08:00) (11 - 26)  SpO2: 95% (06-03-19 @ 08:00) (95% - 100%)  Wt(kg): --  CAPILLARY BLOOD GLUCOSE      POCT Blood Glucose.: 94 mg/dL (03 Jun 2019 05:45)      Labs                          11.0   3.60  )-----------( 197      ( 03 Jun 2019 06:12 )             32.9       06-03    137  |  106  |  24<H>  ----------------------------<  106<H>  3.8   |  22  |  1.64<H>    Ca    9.1      03 Jun 2019 06:12  Phos  5.0     06-02  Mg     1.8     06-02    TPro  7.6  /  Alb  3.2<L>  /  TBili  0.8  /  DBili  x   /  AST  32  /  ALT  30  /  AlkPhos  78  06-03    Hemoglobin A1C, Whole Blood: 6.3: Method: Immunoassay       Reference Range                4.0-5.6%       High risk (prediabetic)        5.7-6.4%       Diabetic, diagnostic             >=6.5%       ADA diabetic treatment goal       <7.0%  The Hemoglobin A1c testing is NGSP-certified.Reference ranges are based  upon the 2010 recommendations of  the American Diabetes Association.  Interpretation may vary for children  and adolescents. % (06.02.19 @ 13:15)          CARDIAC MARKERS ( 02 Jun 2019 06:14 )  <0.015 ng/mL / x     / 46 U/L / x     / 1.1 ng/mL  CARDIAC MARKERS ( 01 Jun 2019 19:36 )  <0.015 ng/mL / x     / x     / x     / x      CARDIAC MARKERS ( 01 Jun 2019 17:05 )  <0.015 ng/mL / x     / x     / x     / x                Radiology Results      Meds    MEDICATIONS  (STANDING):  amLODIPine   Tablet 10 milliGRAM(s) Oral daily  atorvastatin 20 milliGRAM(s) Oral at bedtime  chlorhexidine 4% Liquid 1 Application(s) Topical two times a day  folic acid 1 milliGRAM(s) Oral daily  heparin  Injectable 5000 Unit(s) SubCutaneous every 12 hours  insulin lispro (HumaLOG) corrective regimen sliding scale   SubCutaneous three times a day before meals  insulin lispro (HumaLOG) corrective regimen sliding scale   SubCutaneous at bedtime  pantoprazole  Injectable 40 milliGRAM(s) IV Push daily      MEDICATIONS  (PRN):      Physical Exam      Neuro :  no focal deficits  Respiratory: CTA B/L  CV: RRR, S1S2, no murmurs,   Abdominal: Soft, NT, ND +BS,  Extremities: No edema, + peripheral pulses    ASSESSMENT    bradycardia   abril on ckd   Hyperkalemia resolved   h/o Diabetes mellitus  Hypertension        PLAN    cardio f/u  f/u echo   cont norvasc  Losartan/chlorthalidone/atenolol  f/u urine lytes Avoid nephrotoxins, monitor renal function.   renal f/u   Once creatinine is at baseline, Allopurinol can be restarted  Hbga1c 6.3  metformin held 2nd to renal failure  hss  cont current meds  cont as per icu team Patient is a 73y old  Male who presents with a chief complaint of Bradycardia and hyperkalemia (02 Jun 2019 15:58)    pt seen in icu [ x ], reg med floor [   ], bed [ x ], chair at bedside [   ], a+o x3 [x ], lethargic [  ],  nad [x  ]    pt states feeling better        Allergies    No Known Allergies        Vitals    T(F): 97.4 (06-03-19 @ 08:00), Max: 97.4 (06-03-19 @ 08:00)  HR: 77 (06-03-19 @ 08:00) (64 - 77)  BP: 118/66 (06-03-19 @ 08:00) (106/63 - 141/81)  RR: 18 (06-03-19 @ 08:00) (11 - 26)  SpO2: 95% (06-03-19 @ 08:00) (95% - 100%)  Wt(kg): --  CAPILLARY BLOOD GLUCOSE      POCT Blood Glucose.: 94 mg/dL (03 Jun 2019 05:45)      Labs                          11.0   3.60  )-----------( 197      ( 03 Jun 2019 06:12 )             32.9       06-03    137  |  106  |  24<H>  ----------------------------<  106<H>  3.8   |  22  |  1.64<H>    Ca    9.1      03 Jun 2019 06:12  Phos  5.0     06-02  Mg     1.8     06-02    TPro  7.6  /  Alb  3.2<L>  /  TBili  0.8  /  DBili  x   /  AST  32  /  ALT  30  /  AlkPhos  78  06-03    Hemoglobin A1C, Whole Blood: 6.3: Method: Immunoassay       Reference Range                4.0-5.6%       High risk (prediabetic)        5.7-6.4%       Diabetic, diagnostic             >=6.5%       ADA diabetic treatment goal       <7.0%  The Hemoglobin A1c testing is NGSP-certified.Reference ranges are based  upon the 2010 recommendations of  the American Diabetes Association.  Interpretation may vary for children  and adolescents. % (06.02.19 @ 13:15)          CARDIAC MARKERS ( 02 Jun 2019 06:14 )  <0.015 ng/mL / x     / 46 U/L / x     / 1.1 ng/mL  CARDIAC MARKERS ( 01 Jun 2019 19:36 )  <0.015 ng/mL / x     / x     / x     / x      CARDIAC MARKERS ( 01 Jun 2019 17:05 )  <0.015 ng/mL / x     / x     / x     / x                Radiology Results      Meds    MEDICATIONS  (STANDING):  amLODIPine   Tablet 10 milliGRAM(s) Oral daily  atorvastatin 20 milliGRAM(s) Oral at bedtime  chlorhexidine 4% Liquid 1 Application(s) Topical two times a day  folic acid 1 milliGRAM(s) Oral daily  heparin  Injectable 5000 Unit(s) SubCutaneous every 12 hours  insulin lispro (HumaLOG) corrective regimen sliding scale   SubCutaneous three times a day before meals  insulin lispro (HumaLOG) corrective regimen sliding scale   SubCutaneous at bedtime  pantoprazole  Injectable 40 milliGRAM(s) IV Push daily      MEDICATIONS  (PRN):      Physical Exam      Neuro :  no focal deficits  Respiratory: CTA B/L  CV: RRR, S1S2, no murmurs,   Abdominal: Soft, NT, ND +BS,  Extremities: No edema, + peripheral pulses    ASSESSMENT    bradycardia possibly 2nd to hyperkalemia and betablocker  abril on ckd   Hyperkalemia resolved   h/o Diabetes mellitus  Hypertension  ckd 3        PLAN    cardio f/u  f/u echo   cont norvasc  herat rate wnl  Losartan/chlorthalidone/atenolol  f/u urine lytes Avoid nephrotoxins, monitor renal function.   renal f/u   Once creatinine is at baseline, Allopurinol can be restarted  Hbga1c 6.3  metformin held 2nd to renal failure  hss  cont current meds  cont as per icu team

## 2019-06-03 NOTE — PROGRESS NOTE ADULT - SUBJECTIVE AND OBJECTIVE BOX
INTERVAL HPI/OVERNIGHT EVENTS:       Antimicrobial:    Cardiovascular:  amLODIPine   Tablet 10 milliGRAM(s) Oral daily    Pulmonary:    Hematalogic:  heparin  Injectable 5000 Unit(s) SubCutaneous every 12 hours    Other:  atorvastatin 20 milliGRAM(s) Oral at bedtime  chlorhexidine 4% Liquid 1 Application(s) Topical two times a day  folic acid 1 milliGRAM(s) Oral daily  insulin lispro (HumaLOG) corrective regimen sliding scale   SubCutaneous three times a day before meals  insulin lispro (HumaLOG) corrective regimen sliding scale   SubCutaneous at bedtime  pantoprazole  Injectable 40 milliGRAM(s) IV Push daily      Drug Dosing Weight    Weight (kg): 81.6 (2019 14:50)    CENTRAL LINE: [ ] YES [ ] NO  LOCATION:   DATE INSERTED:    LOYD: [ ] YES [ ] NO    DATE INSERTED:    A-LINE:  [ ] YES [ ] NO  LOCATION:   DATE INSERTED:    PMH/Social Hx/Fam Hx -reviewed admission note, no change since admission  PAST MEDICAL & SURGICAL HISTORY:  Diabetes mellitus  Hypertension  HTN (hypertension)  DM (diabetes mellitus)  No significant past surgical history      T(C): 36.3 (19 @ 08:00), Max: 36.3 (19 @ 08:00)  HR: 71 (19 @ 12:00)  BP: 132/80 (19 @ 12:00)  BP(mean): 93 (19 @ 12:00)  ABP: --  ABP(mean): --  RR: 18 (19 @ 12:00)  SpO2: 100% (19 @ 12:00)  Wt(kg): --           @ 07:01  -   @ 07:00  --------------------------------------------------------  IN: 417 mL / OUT: 2225 mL / NET: -1808 mL            PHYSICAL EXAM:    GENERAL: No signs of distress, comfortable  HEAD: Atraumatic, Normocephalic  EYES: EOMI, PERRLA  ENMT: No erythema, exudates, or enlargement, Moist mucous membranes  NECK: Supple, normal appearance, No JVD; [  ] central line (if applicable)  CHEST/LUNG: No chest deformity, fair bilateral air entry; No rales, rhonchi, wheezing; crackles  HEART: Regular rate and rhythm; No murmurs, rubs, or gallops;   ABDOMEN: Soft, Nontender, Nondistended; Bowel sounds present  EXTREMITIES:  + Peripheral Pulses, No clubbing, cyanosis, or edema  NERVOUS SYSTEM: awake and alert x 3, follows commands, upper and lower extremities  LYMPH: No lymphadenopathy noted  SKIN: No rashes or lesions; good turgor, warm, dry      LABS:  CBC Full  -  ( 2019 06:12 )  WBC Count : 3.60 K/uL  RBC Count : 3.31 M/uL  Hemoglobin : 11.0 g/dL  Hematocrit : 32.9 %  Platelet Count - Automated : 197 K/uL  Mean Cell Volume : 99.4 fl  Mean Cell Hemoglobin : 33.2 pg  Mean Cell Hemoglobin Concentration : 33.4 gm/dL  Auto Neutrophil # : 1.94 K/uL  Auto Lymphocyte # : 0.72 K/uL  Auto Monocyte # : 0.53 K/uL  Auto Eosinophil # : 0.39 K/uL  Auto Basophil # : 0.02 K/uL  Auto Neutrophil % : 53.9 %  Auto Lymphocyte % : 20.0 %  Auto Monocyte % : 14.7 %  Auto Eosinophil % : 10.8 %  Auto Basophil % : 0.6 %    06-    137  |  106  |  24<H>  ----------------------------<  106<H>  3.8   |  22  |  1.64<H>    Ca    9.1      2019 06:12  Phos  5.0     06-02  Mg     1.8     06-02    TPro  7.6  /  Alb  3.2<L>  /  TBili  0.8  /  DBili  x   /  AST  32  /  ALT  30  /  AlkPhos  78  06-03    PT/INR - ( 2019 17:05 )   PT: 14.4 sec;   INR: 1.29 ratio         PTT - ( 2019 17:05 )  PTT:25.7 sec  Urinalysis Basic - ( 2019 18:31 )    Color: Yellow / Appearance: Clear / S.005 / pH: x  Gluc: x / Ketone: Negative  / Bili: Small / Urobili: 1   Blood: x / Protein: 15 / Nitrite: Negative   Leuk Esterase: Trace / RBC: 0-2 /HPF / WBC 6-10 /HPF   Sq Epi: x / Non Sq Epi: Occasional /HPF / Bacteria: Few /HPF          RADIOLOGY & ADDITIONAL STUDIES REVIEWED         IMPRESSION:  PAST MEDICAL & SURGICAL HISTORY:  Diabetes mellitus  Hypertension  HTN (hypertension)  DM (diabetes mellitus)  No significant past surgical history   p/w          IMP: This is a 73 years old male with a significant PMH of DM, HTN, CKD3(unclear baseline Cr, in March it was around 1.5), HFrEF(50%), symptomatic bradycardia(does not have PPM), HLD, gout, and no significant PSH presented to ED as he has felt weak and been sweating profusely since this morning. Pt has significant h/o recent admission to [facility] 2 times in March and May(-) for symptomatic bradycardia. On first admission, patient needed transvenous temporary pacemaker which was complicated by blood stream infection, required ICU stay. After discharge from the first admission, due to med rec issue, pt has taken lisinopril and losartan both. On second admission, patient was found to have hyperkalemia and junctional bradycardia. That time pt only had temporary transcutaneous pacemaker, and bradycardia resolved ''on its own' so he was told that he did not require PPM. Pt did not follow up with any cardiologist since last year. Since the last discharge, patient has been only on Losartan. This morning, patient had profuse sweating and dizziness, and could not even walk due to exertional dyspnea. Visited ED for further evaluation.    In ED, patient was found to have bradycardia of 32-38, labs significant for K+ of 7.4(not hemolyzed), BUN/Cr  42/3.10, lactate 2.2, H/H 9.7/30.2 macrocytic. EKG junctional zaida at 39BPM with out STT change. BP stable. Pt was given hyperkalemia cocktail in ED which improved HR to 69.   Hyperkalemia resolved and bradycardia improved        Plan:      CNS: no issue    PULMONARY: O2 supp    CARDIAC: monitor hemodynamic     GI: feed      RENAL: continue cocktail and  renal eval . No ACE-    SKIN: no issue     MUSCULOSKELETAL: boots    ID:  no issue    HEME: monitor    DVT and GI Prophylaxis    GOALS OF CARE DISCUSSION WITH PATIENT/FAMILY/PROXY/ icu team on rounds

## 2019-06-03 NOTE — PROGRESS NOTE ADULT - SUBJECTIVE AND OBJECTIVE BOX
INTERVAL HPI/OVERNIGHT EVENTS: Pt clinically improved and stable, no events reported overnight. Pt stable fo r downgrade as discussed with ICU attending    PRESSORS: [ ] YES [ x] NO  WHICH:    Antimicrobial:    Cardiovascular:  amLODIPine   Tablet 10 milliGRAM(s) Oral daily    Pulmonary:    Hematalogic:  heparin  Injectable 5000 Unit(s) SubCutaneous every 12 hours    Other:  atorvastatin 20 milliGRAM(s) Oral at bedtime  chlorhexidine 4% Liquid 1 Application(s) Topical two times a day  folic acid 1 milliGRAM(s) Oral daily  insulin lispro (HumaLOG) corrective regimen sliding scale   SubCutaneous three times a day before meals  insulin lispro (HumaLOG) corrective regimen sliding scale   SubCutaneous at bedtime  pantoprazole  Injectable 40 milliGRAM(s) IV Push daily    amLODIPine   Tablet 10 milliGRAM(s) Oral daily  atorvastatin 20 milliGRAM(s) Oral at bedtime  chlorhexidine 4% Liquid 1 Application(s) Topical two times a day  folic acid 1 milliGRAM(s) Oral daily  heparin  Injectable 5000 Unit(s) SubCutaneous every 12 hours  insulin lispro (HumaLOG) corrective regimen sliding scale   SubCutaneous three times a day before meals  insulin lispro (HumaLOG) corrective regimen sliding scale   SubCutaneous at bedtime  pantoprazole  Injectable 40 milliGRAM(s) IV Push daily    Drug Dosing Weight    Weight (kg): 81.6 (2019 14:50)    CENTRAL LINE: [ ] YES [ ] NO  LOCATION:   DATE INSERTED:  REMOVE: [ ] YES [ ] NO  EXPLAIN:    LOYD: [ ] YES [ ] NO    DATE INSERTED:  REMOVE:  [ ] YES [ ] NO  EXPLAIN:    A-LINE:  [ ] YES [ ] NO  LOCATION:   DATE INSERTED:  REMOVE:  [ ] YES [ ] NO  EXPLAIN:    PMH -reviewed admission note, no change since admission  Heart faliure: acute [ ] chronic [ ] acute or chronic [ ] diastolic [ ] systolic [ ] combied systolic and diastolic[ ]  NLELY: ATN[ ] renal medullary necrosis [ ] CKD I [ ]CKDII [ ]CKD III [ ]CKD IV [ ]CKD V [ ]Other pathological lesions [ ]  Abdominal Nutrition Status: malnutrition [ ] cachexia [ ] morbid obesity/BMI=40 [ ] Supplement ordered [___________]     ICU Vital Signs Last 24 Hrs  T(C): 36.3 (2019 08:00), Max: 36.3 (2019 08:00)  T(F): 97.4 (2019 08:00), Max: 97.4 (2019 08:00)  HR: 71 (2019 10:00) (65 - 77)  BP: 128/72 (2019 10:00) (111/67 - 141/81)  BP(mean): 85 (2019 10:00) (73 - 96)  ABP: --  ABP(mean): --  RR: 32 (2019 10:00) (11 - 32)  SpO2: 98% (2019 10:00) (95% - 100%)            - @ 07:01  -  06-03 @ 07:00  --------------------------------------------------------  IN: 417 mL / OUT: 2225 mL / NET: -1808 mL            PHYSICAL EXAM:    GENERAL: NAD, well-groomed, well-developed  HEAD:  Atraumatic, Normocephalic  EYES: EOMI, PERRLA, conjunctiva and sclera clear  ENMT: No tonsillar erythema, exudates, or enlargement; Moist mucous membranes, Good dentition, No lesions  NECK: Supple, normal appearance, No JVD; Normal thyroid; Trachea midline  NERVOUS SYSTEM:  Alert & Oriented X3, Good concentration; Motor Strength 5/5 B/L upper and lower extremities; DTRs 2+ intact and symmetric  CHEST/LUNG: No chest deformity; Normal percussion bilaterally; No rales, rhonchi, wheezing   HEART: Regular rate and rhythm; No murmurs, rubs, or gallops  ABDOMEN: Soft, Nontender, Nondistended; Bowel sounds present  EXTREMITIES:  2+ Peripheral Pulses, No clubbing, cyanosis, or edema  LYMPH: No lymphadenopathy noted  SKIN: No rashes or lesions; Good capillary refill      LABS:  CBC Full  -  ( 2019 06:12 )  WBC Count : 3.60 K/uL  RBC Count : 3.31 M/uL  Hemoglobin : 11.0 g/dL  Hematocrit : 32.9 %  Platelet Count - Automated : 197 K/uL  Mean Cell Volume : 99.4 fl  Mean Cell Hemoglobin : 33.2 pg  Mean Cell Hemoglobin Concentration : 33.4 gm/dL  Auto Neutrophil # : 1.94 K/uL  Auto Lymphocyte # : 0.72 K/uL  Auto Monocyte # : 0.53 K/uL  Auto Eosinophil # : 0.39 K/uL  Auto Basophil # : 0.02 K/uL  Auto Neutrophil % : 53.9 %  Auto Lymphocyte % : 20.0 %  Auto Monocyte % : 14.7 %  Auto Eosinophil % : 10.8 %  Auto Basophil % : 0.6 %    06    137  |  106  |  24<H>  ----------------------------<  106<H>  3.8   |  22  |  1.64<H>    Ca    9.1      2019 06:12  Phos  5.0     06-02  Mg     1.8     -    TPro  7.6  /  Alb  3.2<L>  /  TBili  0.8  /  DBili  x   /  AST  32  /  ALT  30  /  AlkPhos  78  06-03    PT/INR - ( 2019 17:05 )   PT: 14.4 sec;   INR: 1.29 ratio         PTT - ( 2019 17:05 )  PTT:25.7 sec  Urinalysis Basic - ( 2019 18:31 )    Color: Yellow / Appearance: Clear / S.005 / pH: x  Gluc: x / Ketone: Negative  / Bili: Small / Urobili: 1   Blood: x / Protein: 15 / Nitrite: Negative   Leuk Esterase: Trace / RBC: 0-2 /HPF / WBC 6-10 /HPF   Sq Epi: x / Non Sq Epi: Occasional /HPF / Bacteria: Few /HPF          RADIOLOGY & ADDITIONAL STUDIES REVIEWED:  ***    [ ]GOALS OF CARE DISCUSSION WITH PATIENT/FAMILY/PROXY:    CRITICAL CARE TIME SPENT: 35 minutes

## 2019-06-04 ENCOUNTER — TRANSCRIPTION ENCOUNTER (OUTPATIENT)
Age: 74
End: 2019-06-04

## 2019-06-04 VITALS
RESPIRATION RATE: 23 BRPM | SYSTOLIC BLOOD PRESSURE: 120 MMHG | DIASTOLIC BLOOD PRESSURE: 74 MMHG | HEART RATE: 85 BPM | OXYGEN SATURATION: 100 %

## 2019-06-04 LAB
ANION GAP SERPL CALC-SCNC: 7 MMOL/L — SIGNIFICANT CHANGE UP (ref 5–17)
BUN SERPL-MCNC: 25 MG/DL — HIGH (ref 7–18)
CALCIUM SERPL-MCNC: 9 MG/DL — SIGNIFICANT CHANGE UP (ref 8.4–10.5)
CHLORIDE SERPL-SCNC: 107 MMOL/L — SIGNIFICANT CHANGE UP (ref 96–108)
CO2 SERPL-SCNC: 22 MMOL/L — SIGNIFICANT CHANGE UP (ref 22–31)
CREAT SERPL-MCNC: 1.57 MG/DL — HIGH (ref 0.5–1.3)
GLUCOSE SERPL-MCNC: 108 MG/DL — HIGH (ref 70–99)
HCT VFR BLD CALC: 33.3 % — LOW (ref 39–50)
HGB BLD-MCNC: 11 G/DL — LOW (ref 13–17)
MAGNESIUM SERPL-MCNC: 1.6 MG/DL — SIGNIFICANT CHANGE UP (ref 1.6–2.6)
MCHC RBC-ENTMCNC: 32.6 PG — SIGNIFICANT CHANGE UP (ref 27–34)
MCHC RBC-ENTMCNC: 33 GM/DL — SIGNIFICANT CHANGE UP (ref 32–36)
MCV RBC AUTO: 98.8 FL — SIGNIFICANT CHANGE UP (ref 80–100)
NRBC # BLD: 0 /100 WBCS — SIGNIFICANT CHANGE UP (ref 0–0)
PHOSPHATE SERPL-MCNC: 4 MG/DL — SIGNIFICANT CHANGE UP (ref 2.5–4.5)
PLATELET # BLD AUTO: 221 K/UL — SIGNIFICANT CHANGE UP (ref 150–400)
POTASSIUM SERPL-MCNC: 4 MMOL/L — SIGNIFICANT CHANGE UP (ref 3.5–5.3)
POTASSIUM SERPL-SCNC: 4 MMOL/L — SIGNIFICANT CHANGE UP (ref 3.5–5.3)
RBC # BLD: 3.37 M/UL — LOW (ref 4.2–5.8)
RBC # FLD: 13.4 % — SIGNIFICANT CHANGE UP (ref 10.3–14.5)
SODIUM SERPL-SCNC: 136 MMOL/L — SIGNIFICANT CHANGE UP (ref 135–145)
WBC # BLD: 3.51 K/UL — LOW (ref 3.8–10.5)
WBC # FLD AUTO: 3.51 K/UL — LOW (ref 3.8–10.5)

## 2019-06-04 PROCEDURE — 84133 ASSAY OF URINE POTASSIUM: CPT

## 2019-06-04 PROCEDURE — 84443 ASSAY THYROID STIM HORMONE: CPT

## 2019-06-04 PROCEDURE — 84156 ASSAY OF PROTEIN URINE: CPT

## 2019-06-04 PROCEDURE — 86803 HEPATITIS C AB TEST: CPT

## 2019-06-04 PROCEDURE — 81001 URINALYSIS AUTO W/SCOPE: CPT

## 2019-06-04 PROCEDURE — 82553 CREATINE MB FRACTION: CPT

## 2019-06-04 PROCEDURE — 93005 ELECTROCARDIOGRAM TRACING: CPT

## 2019-06-04 PROCEDURE — 82962 GLUCOSE BLOOD TEST: CPT

## 2019-06-04 PROCEDURE — 83605 ASSAY OF LACTIC ACID: CPT

## 2019-06-04 PROCEDURE — 83935 ASSAY OF URINE OSMOLALITY: CPT

## 2019-06-04 PROCEDURE — 83690 ASSAY OF LIPASE: CPT

## 2019-06-04 PROCEDURE — 84300 ASSAY OF URINE SODIUM: CPT

## 2019-06-04 PROCEDURE — 80053 COMPREHEN METABOLIC PANEL: CPT

## 2019-06-04 PROCEDURE — 71045 X-RAY EXAM CHEST 1 VIEW: CPT

## 2019-06-04 PROCEDURE — 83735 ASSAY OF MAGNESIUM: CPT

## 2019-06-04 PROCEDURE — 80048 BASIC METABOLIC PNL TOTAL CA: CPT

## 2019-06-04 PROCEDURE — 96374 THER/PROPH/DIAG INJ IV PUSH: CPT

## 2019-06-04 PROCEDURE — 96375 TX/PRO/DX INJ NEW DRUG ADDON: CPT

## 2019-06-04 PROCEDURE — 78452 HT MUSCLE IMAGE SPECT MULT: CPT

## 2019-06-04 PROCEDURE — 93017 CV STRESS TEST TRACING ONLY: CPT

## 2019-06-04 PROCEDURE — 97161 PT EVAL LOW COMPLEX 20 MIN: CPT

## 2019-06-04 PROCEDURE — 96372 THER/PROPH/DIAG INJ SC/IM: CPT | Mod: XU

## 2019-06-04 PROCEDURE — 84484 ASSAY OF TROPONIN QUANT: CPT

## 2019-06-04 PROCEDURE — 80061 LIPID PANEL: CPT

## 2019-06-04 PROCEDURE — 76770 US EXAM ABDO BACK WALL COMP: CPT

## 2019-06-04 PROCEDURE — 99291 CRITICAL CARE FIRST HOUR: CPT | Mod: 25

## 2019-06-04 PROCEDURE — 85730 THROMBOPLASTIN TIME PARTIAL: CPT

## 2019-06-04 PROCEDURE — 84100 ASSAY OF PHOSPHORUS: CPT

## 2019-06-04 PROCEDURE — 76775 US EXAM ABDO BACK WALL LIM: CPT

## 2019-06-04 PROCEDURE — 82550 ASSAY OF CK (CPK): CPT

## 2019-06-04 PROCEDURE — 85027 COMPLETE CBC AUTOMATED: CPT

## 2019-06-04 PROCEDURE — 36415 COLL VENOUS BLD VENIPUNCTURE: CPT

## 2019-06-04 PROCEDURE — 93306 TTE W/DOPPLER COMPLETE: CPT

## 2019-06-04 PROCEDURE — 82570 ASSAY OF URINE CREATININE: CPT

## 2019-06-04 PROCEDURE — A9502: CPT

## 2019-06-04 PROCEDURE — 82436 ASSAY OF URINE CHLORIDE: CPT

## 2019-06-04 PROCEDURE — 85610 PROTHROMBIN TIME: CPT

## 2019-06-04 PROCEDURE — 83036 HEMOGLOBIN GLYCOSYLATED A1C: CPT

## 2019-06-04 RX ORDER — LOSARTAN POTASSIUM 100 MG/1
0 TABLET, FILM COATED ORAL
Qty: 30 | Refills: 0 | DISCHARGE

## 2019-06-04 RX ORDER — AMLODIPINE BESYLATE 2.5 MG/1
1 TABLET ORAL
Qty: 30 | Refills: 0
Start: 2019-06-04 | End: 2019-07-03

## 2019-06-04 RX ORDER — ATENOLOL AND CHLORTHALIDONE 50; 25 MG/1; MG/1
0 TABLET ORAL
Qty: 30 | Refills: 0 | DISCHARGE

## 2019-06-04 RX ORDER — SITAGLIPTIN 50 MG/1
1 TABLET, FILM COATED ORAL
Qty: 30 | Refills: 0
Start: 2019-06-04 | End: 2019-07-03

## 2019-06-04 RX ADMIN — Medication 1 MILLIGRAM(S): at 13:00

## 2019-06-04 RX ADMIN — PANTOPRAZOLE SODIUM 40 MILLIGRAM(S): 20 TABLET, DELAYED RELEASE ORAL at 12:59

## 2019-06-04 RX ADMIN — HEPARIN SODIUM 5000 UNIT(S): 5000 INJECTION INTRAVENOUS; SUBCUTANEOUS at 05:53

## 2019-06-04 RX ADMIN — AMLODIPINE BESYLATE 10 MILLIGRAM(S): 2.5 TABLET ORAL at 05:52

## 2019-06-04 RX ADMIN — CHLORHEXIDINE GLUCONATE 1 APPLICATION(S): 213 SOLUTION TOPICAL at 05:53

## 2019-06-04 NOTE — PROGRESS NOTE ADULT - PROBLEM SELECTOR PLAN 1
No further episodes of bradycardia  Will avoid bb/acr/arb   c/w amlodipine for bp control  Pt for stress test today

## 2019-06-04 NOTE — PROGRESS NOTE ADULT - REASON FOR ADMISSION
Bradycardia and hyperkalemia

## 2019-06-04 NOTE — PROGRESS NOTE ADULT - ASSESSMENT
73 years old male with a significant PMH of DM, HTN, CKD3(unclear baseline Cr, in March it was around 1.5), HFrEF(50%), symptomatic bradycardia(does not have PPM), HLD, gout, and no significant PSH presented to ED as he has felt weak and been sweating profusely since this morning, bradycardia and hyperkalemia with NELLY on CRI.  1.Transfer to floor.  2.NELLY on CRI-Renal, IVF.  3.DM-Insulin.  4.Stress test in am.  5.PPI.  6.HTN-Norvasc.  7.Please obtain records from [facility].  8.GI and DVT prophylaxis.
1.	Acute Kidney Injury, etiology? with bland urine and low UPC. Possible due to the RAAS blockade with hemodynamic changes. Renal US is normal. FENa<1  2.	CKD. He has UPC < 0.2, so Diabetic nephropathy, though possible, is less likely. Chronic Tubulo-interstitial nephritis is possible.  3.	Hyperkalemia due to RAAS blockade.  4.	Bradycardia, possible due to Hyperkalemia.  5.	Gout, no flares. On Allopurinol at home.    PLAN:  1.	Monitor renal function and electrolyte  2.	Continue to hold RAAS blockade at present  3.	Once creatinine is at baseline, Allopurinol can be restarted.
1.	NELLY, improved.  2.	CKD, stable. possible related to CTIN due to Gout and HTN as the UPC is < 0.2.  3.	Bradycardia, likely due to NELLY.  4.	HTN.  5.	Hyperkalemia.    PLAN:   1.	Follow up BMP.  2.	No RAAS blockade.  3.	No NSAIDs.  4.	Will follow with you.
73 years old male with a significant PMH of DM, HTN, CKD3(unclear baseline Cr, in March it was around 1.5), HFrEF(50%), symptomatic bradycardia(does not have PPM), HLD, gout, and no significant PSH presented to ED as he has felt weak and been sweating profusely since this morning, bradycardia and hyperkalemia with NELLY on CRI.  1.NELLY on CRI-Renal f/u.  2.DM-Insulin.  3.No ACE/ARB due to hyperkalemia.  4.Stress test today.  5.PPI.  6.HTN-Norvasc.  7.Please obtain records from [facility].  8.GI and DVT prophylaxis.
Patient is a 73y old  Male who presents with a chief complaint of sweating and weakness. Is admitted to ICU for symptomatic bradycardia likely due to severe hyperkalemia.

## 2019-06-04 NOTE — PROGRESS NOTE ADULT - SUBJECTIVE AND OBJECTIVE BOX
Patient is a 73y old  Male who presents with a chief complaint of Bradycardia and hyperkalemia (04 Jun 2019 10:31)    pt seen in icu [ x ], reg med floor [   ], bed [ x ], chair at bedside [   ], a+o x3 [x ], lethargic [  ],  nad [x  ]        Allergies    No Known Allergies        Vitals    T(F): 96.8 (06-04-19 @ 08:00), Max: 97.6 (06-03-19 @ 12:00)  HR: 76 (06-04-19 @ 08:00) (65 - 84)  BP: 125/74 (06-04-19 @ 08:00) (107/63 - 155/79)  RR: 19 (06-04-19 @ 08:00) (12 - 34)  SpO2: 100% (06-04-19 @ 08:00) (94% - 100%)  Wt(kg): --  CAPILLARY BLOOD GLUCOSE      POCT Blood Glucose.: 94 mg/dL (04 Jun 2019 05:50)      Labs                          11.0   3.51  )-----------( 221      ( 04 Jun 2019 06:22 )             33.3       06-04    136  |  107  |  25<H>  ----------------------------<  108<H>  4.0   |  22  |  1.57<H>    Ca    9.0      04 Jun 2019 06:22  Phos  4.0     06-04  Mg     1.6     06-04    TPro  7.6  /  Alb  3.2<L>  /  TBili  0.8  /  DBili  x   /  AST  32  /  ALT  30  /  AlkPhos  78  06-03      < from: Transthoracic Echocardiogram (06.02.19 @ 06:43) >  CONCLUSIONS:  1. Normal mitral valve. Trace mitral regurgitation.  2. Normal trileaflet aortic valve. Mild aortic  regurgitation.  3. Aortic Root: 3.0 cm.    4. LA volume index = 15 cc/m2.  5. Normal left ventricular internal dimensions and wall  thicknesses.  6. Normal Left Ventricular Systolic Function,  (EF = 55%)  7. Grade I diastolic dysfunction (Impaired relaxation).  8. Normal right atrium.  9. Normal right ventricular size and systolic function  (TAPSE 2.4 cm).  10. There is mild tricuspid regurgitation.  11. There is mild pulmonic regurgitation.  12. Normal pericardium with no pericardial effusion.    < end of copied text >                  Radiology Results      Meds    MEDICATIONS  (STANDING):  amLODIPine   Tablet 10 milliGRAM(s) Oral daily  atorvastatin 20 milliGRAM(s) Oral at bedtime  chlorhexidine 4% Liquid 1 Application(s) Topical two times a day  folic acid 1 milliGRAM(s) Oral daily  heparin  Injectable 5000 Unit(s) SubCutaneous every 12 hours  insulin lispro (HumaLOG) corrective regimen sliding scale   SubCutaneous three times a day before meals  insulin lispro (HumaLOG) corrective regimen sliding scale   SubCutaneous at bedtime  pantoprazole  Injectable 40 milliGRAM(s) IV Push daily      MEDICATIONS  (PRN):      Physical Exam      Neuro :  no focal deficits  Respiratory: CTA B/L  CV: RRR, S1S2, no murmurs,   Abdominal: Soft, NT, ND +BS,  Extremities: No edema, + peripheral pulses    ASSESSMENT    bradycardia possibly 2nd to hyperkalemia and betablocker  abril on ckd   Hyperkalemia due to RAAS blockade resolved   h/o Diabetes mellitus  Hypertension  CKD3(unclear baseline Cr, in March it was around 1.5)        PLAN    cardio f/u noted  echo with EF = 55%, Grade I diastolic dysfunction noted above  cont norvasc  heart rate wnl  No ACE/ARB due to hyperkalemia.  f/u Stress test today.  Avoid nephrotoxins, monitor renal function.   renal f/u   Once creatinine is at baseline, Allopurinol can be restarted  serum creatinine near baseline  Hbga1c 6.3  metformin held 2nd to renal failure  hss  cont current meds  cont as per icu team

## 2019-06-04 NOTE — DISCHARGE NOTE PROVIDER - CARE PROVIDER_API CALL
Todd Hutton)  Internal Medicine  [address] [place] [address]  Phone: [phone]  Fax: [phone]  Follow Up Time:

## 2019-06-04 NOTE — PROGRESS NOTE ADULT - SUBJECTIVE AND OBJECTIVE BOX
PEMA VENCES  73y  Patient is a 73y old  Male who presents with a chief complaint of Bradycardia and hyperkalemia (2019 11:00)    HPI:  Seen and examined. He feels better. Chart review from [facility] showed that he was admitted with K of 8 and Bradycardia and had a TPM placed. He was on Hyzaar which was held and his creatinine was reduced to 1.5 on discharge.  HEALTH ISSUES - PROBLEM Dx:  Need for prophylactic measure: Need for prophylactic measure  Diabetes mellitus: Diabetes mellitus  Hypertension: Hypertension  Stage 3 chronic kidney disease: Stage 3 chronic kidney disease  Hyperkalemia: Hyperkalemia  Junctional bradycardia: Junctional bradycardia        MEDICATIONS  (STANDING):  amLODIPine   Tablet 10 milliGRAM(s) Oral daily  atorvastatin 20 milliGRAM(s) Oral at bedtime  chlorhexidine 4% Liquid 1 Application(s) Topical two times a day  folic acid 1 milliGRAM(s) Oral daily  heparin  Injectable 5000 Unit(s) SubCutaneous every 12 hours  insulin lispro (HumaLOG) corrective regimen sliding scale   SubCutaneous three times a day before meals  insulin lispro (HumaLOG) corrective regimen sliding scale   SubCutaneous at bedtime  pantoprazole  Injectable 40 milliGRAM(s) IV Push daily    MEDICATIONS  (PRN):    Vital Signs Last 24 Hrs  T(C): 36 (2019 08:00), Max: 36.3 (2019 15:19)  T(F): 96.8 (2019 08:00), Max: 97.4 (2019 15:19)  HR: 106 (2019 12:00) (65 - 106)  BP: 144/96 (2019 12:00) (107/63 - 155/79)  BP(mean): 109 (2019 12:00) (74 - 109)  RR: 17 (2019 12:00) (12 - 34)  SpO2: 100% (2019 12:00) (94% - 100%)  Daily     Daily Weight in k.5 (2019 07:00)    PHYSICAL EXAM:  Constitutional:  He appears comfortable and not distressed. Not diaphoretic.    Neck:  The thyroid is normal. Trachea is midline.     Respiratory: The lungs are clear to auscultation. No dullness and expansion is normal.    Cardiovascular: S1 and S2 are normal. No mummurs, rubs or gallops are present.    Gastrointestinal: The abdomen is soft. No tenderness is present. No masses are present. Bowel sounds are normal.    Genitourinary: The bladder is not distended. No CVA tenderness is present.    Extremities: No edema is noted. No deformities are present.    Neurological: Cognition is normal. Tone, power and sensation are normal. Gait is steady.    Skin: No leasions are seen  or palpated.    Lymph Nodes: No lymphadenopathy is present.    Psychiatric: Mood is appropriate. No hallucinations or flight of ideas are noted.                              11.0   3.51  )-----------( 221      ( 2019 06:22 )             33.3     06-04    136  |  107  |  25<H>  ----------------------------<  108<H>  4.0   |  22  |  1.57<H>    Ca    9.0      2019 06:22  Phos  4.0     06-04  Mg     1.6     06-04    TPro  7.6  /  Alb  3.2<L>  /  TBili  0.8  /  DBili  x   /  AST  32  /  ALT  30  /  AlkPhos  78  06-03    < from: US Renal (19 @ 12:38) >  Renal ultrasound is performed without a previous examination for   comparison. The right kidney measures 10.4 cm in length and the left   kidney measures 10.6 cm in length. The renal sizes are within normal   limits bilaterally. No evidence for hydronephrosis, calculus, cyst or   solid mass in either kidney.      < end of copied text >

## 2019-06-04 NOTE — PROGRESS NOTE ADULT - SUBJECTIVE AND OBJECTIVE BOX
INTERVAL HPI/OVERNIGHT EVENTS: ***    PRESSORS: [ ] YES [ ] NO  WHICH:    ANTIBIOTICS:                  DATE STARTED:  ANTIBIOTICS:                  DATE STARTED:  ANTIBIOTICS:                  DATE STARTED:    Antimicrobial:    Cardiovascular:  amLODIPine   Tablet 10 milliGRAM(s) Oral daily    Pulmonary:    Hematalogic:  heparin  Injectable 5000 Unit(s) SubCutaneous every 12 hours    Other:  atorvastatin 20 milliGRAM(s) Oral at bedtime  chlorhexidine 4% Liquid 1 Application(s) Topical two times a day  folic acid 1 milliGRAM(s) Oral daily  insulin lispro (HumaLOG) corrective regimen sliding scale   SubCutaneous three times a day before meals  insulin lispro (HumaLOG) corrective regimen sliding scale   SubCutaneous at bedtime  pantoprazole  Injectable 40 milliGRAM(s) IV Push daily    amLODIPine   Tablet 10 milliGRAM(s) Oral daily  atorvastatin 20 milliGRAM(s) Oral at bedtime  chlorhexidine 4% Liquid 1 Application(s) Topical two times a day  folic acid 1 milliGRAM(s) Oral daily  heparin  Injectable 5000 Unit(s) SubCutaneous every 12 hours  insulin lispro (HumaLOG) corrective regimen sliding scale   SubCutaneous three times a day before meals  insulin lispro (HumaLOG) corrective regimen sliding scale   SubCutaneous at bedtime  pantoprazole  Injectable 40 milliGRAM(s) IV Push daily    Drug Dosing Weight    Weight (kg): 81.6 (01 Jun 2019 14:50)    CENTRAL LINE: [ ] YES [ ] NO  LOCATION:   DATE INSERTED:  REMOVE: [ ] YES [ ] NO  EXPLAIN:    LOYD: [ ] YES [ ] NO    DATE INSERTED:  REMOVE:  [ ] YES [ ] NO  EXPLAIN:    A-LINE:  [ ] YES [ ] NO  LOCATION:   DATE INSERTED:  REMOVE:  [ ] YES [ ] NO  EXPLAIN:    PMH -reviewed admission note, no change since admission  Heart faliure: acute [ ] chronic [ ] acute or chronic [ ] diastolic [ ] systolic [ ] combied systolic and diastolic[ ]  NELLY: ATN[ ] renal medullary necrosis [ ] CKD I [ ]CKDII [ ]CKD III [ ]CKD IV [ ]CKD V [ ]Other pathological lesions [ ]  Abdominal Nutrition Status: malnutrition [ ] cachexia [ ] morbid obesity/BMI=40 [ ] Supplement ordered [___________]     ICU Vital Signs Last 24 Hrs  T(C): 36.2 (04 Jun 2019 05:00), Max: 36.4 (03 Jun 2019 12:00)  T(F): 97.1 (04 Jun 2019 05:00), Max: 97.6 (03 Jun 2019 12:00)  HR: 77 (04 Jun 2019 06:00) (65 - 84)  BP: 136/95 (04 Jun 2019 06:00) (107/63 - 155/79)  BP(mean): 105 (04 Jun 2019 06:00) (69 - 105)  ABP: --  ABP(mean): --  RR: 32 (04 Jun 2019 06:00) (12 - 34)  SpO2: 100% (04 Jun 2019 06:00) (94% - 100%)            06-03 @ 07:01  -  06-04 @ 07:00  --------------------------------------------------------  IN: 960 mL / OUT: 1140 mL / NET: -180 mL            PHYSICAL EXAM:    GENERAL: NAD, well-groomed, well-developed  HEAD:  Atraumatic, Normocephalic  EYES: EOMI, PERRLA, conjunctiva and sclera clear  ENMT: No tonsillar erythema, exudates, or enlargement; Moist mucous membranes, Good dentition, No lesions  NECK: Supple, normal appearance, No JVD; Normal thyroid; Trachea midline  NERVOUS SYSTEM:  Alert & Oriented X3, Good concentration; Motor Strength 5/5 B/L upper and lower extremities; DTRs 2+ intact and symmetric  CHEST/LUNG: No chest deformity; Normal percussion bilaterally; No rales, rhonchi, wheezing   HEART: Regular rate and rhythm; No murmurs, rubs, or gallops  ABDOMEN: Soft, Nontender, Nondistended; Bowel sounds present  EXTREMITIES:  2+ Peripheral Pulses, No clubbing, cyanosis, or edema  LYMPH: No lymphadenopathy noted  SKIN: No rashes or lesions; Good capillary refill      LABS:  CBC Full  -  ( 04 Jun 2019 06:22 )  WBC Count : 3.51 K/uL  RBC Count : 3.37 M/uL  Hemoglobin : 11.0 g/dL  Hematocrit : 33.3 %  Platelet Count - Automated : 221 K/uL  Mean Cell Volume : 98.8 fl  Mean Cell Hemoglobin : 32.6 pg  Mean Cell Hemoglobin Concentration : 33.0 gm/dL  Auto Neutrophil # : x  Auto Lymphocyte # : x  Auto Monocyte # : x  Auto Eosinophil # : x  Auto Basophil # : x  Auto Neutrophil % : x  Auto Lymphocyte % : x  Auto Monocyte % : x  Auto Eosinophil % : x  Auto Basophil % : x    06-04    136  |  107  |  25<H>  ----------------------------<  108<H>  4.0   |  22  |  1.57<H>    Ca    9.0      04 Jun 2019 06:22  Phos  4.0     06-04  Mg     1.6     06-04    TPro  7.6  /  Alb  3.2<L>  /  TBili  0.8  /  DBili  x   /  AST  32  /  ALT  30  /  AlkPhos  78  06-03            RADIOLOGY & ADDITIONAL STUDIES REVIEWED:  ***    [ ]GOALS OF CARE DISCUSSION WITH PATIENT/FAMILY/PROXY:    CRITICAL CARE TIME SPENT: 35 minutes INTERVAL HPI/OVERNIGHT EVENTS: No overnight events reported, pt remains stable. Pt stable for downgrade as discussed with ICU attending    PRESSORS: [ ] YES [x ] NO  WHICH:    ANTIBIOTICS:                  DATE STARTED:  ANTIBIOTICS:                  DATE STARTED:  ANTIBIOTICS:                  DATE STARTED:    Antimicrobial:    Cardiovascular:  amLODIPine   Tablet 10 milliGRAM(s) Oral daily    Pulmonary:    Hematalogic:  heparin  Injectable 5000 Unit(s) SubCutaneous every 12 hours    Other:  atorvastatin 20 milliGRAM(s) Oral at bedtime  chlorhexidine 4% Liquid 1 Application(s) Topical two times a day  folic acid 1 milliGRAM(s) Oral daily  insulin lispro (HumaLOG) corrective regimen sliding scale   SubCutaneous three times a day before meals  insulin lispro (HumaLOG) corrective regimen sliding scale   SubCutaneous at bedtime  pantoprazole  Injectable 40 milliGRAM(s) IV Push daily    amLODIPine   Tablet 10 milliGRAM(s) Oral daily  atorvastatin 20 milliGRAM(s) Oral at bedtime  chlorhexidine 4% Liquid 1 Application(s) Topical two times a day  folic acid 1 milliGRAM(s) Oral daily  heparin  Injectable 5000 Unit(s) SubCutaneous every 12 hours  insulin lispro (HumaLOG) corrective regimen sliding scale   SubCutaneous three times a day before meals  insulin lispro (HumaLOG) corrective regimen sliding scale   SubCutaneous at bedtime  pantoprazole  Injectable 40 milliGRAM(s) IV Push daily    Drug Dosing Weight    Weight (kg): 81.6 (01 Jun 2019 14:50)    CENTRAL LINE: [ ] YES [ ] NO  LOCATION:   DATE INSERTED:  REMOVE: [ ] YES [ ] NO  EXPLAIN:    LOYD: [ ] YES [ ] NO    DATE INSERTED:  REMOVE:  [ ] YES [ ] NO  EXPLAIN:    A-LINE:  [ ] YES [ ] NO  LOCATION:   DATE INSERTED:  REMOVE:  [ ] YES [ ] NO  EXPLAIN:    PMH -reviewed admission note, no change since admission  Heart faliure: acute [ ] chronic [ ] acute or chronic [ ] diastolic [ ] systolic [ ] combied systolic and diastolic[ ]  NELLY: ATN[ ] renal medullary necrosis [ ] CKD I [ ]CKDII [ ]CKD III [ ]CKD IV [ ]CKD V [ ]Other pathological lesions [ ]  Abdominal Nutrition Status: malnutrition [ ] cachexia [ ] morbid obesity/BMI=40 [ ] Supplement ordered [___________]     ICU Vital Signs Last 24 Hrs  T(C): 36.2 (04 Jun 2019 05:00), Max: 36.4 (03 Jun 2019 12:00)  T(F): 97.1 (04 Jun 2019 05:00), Max: 97.6 (03 Jun 2019 12:00)  HR: 77 (04 Jun 2019 06:00) (65 - 84)  BP: 136/95 (04 Jun 2019 06:00) (107/63 - 155/79)  BP(mean): 105 (04 Jun 2019 06:00) (69 - 105)  ABP: --  ABP(mean): --  RR: 32 (04 Jun 2019 06:00) (12 - 34)  SpO2: 100% (04 Jun 2019 06:00) (94% - 100%)            06-03 @ 07:01  -  06-04 @ 07:00  --------------------------------------------------------  IN: 960 mL / OUT: 1140 mL / NET: -180 mL            PHYSICAL EXAM:    GENERAL: NAD, well-groomed, well-developed  HEAD:  Atraumatic, Normocephalic  EYES: EOMI, PERRLA, conjunctiva and sclera clear  ENMT: No tonsillar erythema, exudates, or enlargement; Moist mucous membranes, Good dentition, No lesions  NECK: Supple, normal appearance, No JVD; Normal thyroid; Trachea midline  NERVOUS SYSTEM:  Alert & Oriented X3, Good concentration; Motor Strength 5/5 B/L upper and lower extremities; DTRs 2+ intact and symmetric  CHEST/LUNG: No chest deformity; Normal percussion bilaterally; No rales, rhonchi, wheezing   HEART: Regular rate and rhythm; No murmurs, rubs, or gallops  ABDOMEN: Soft, Nontender, Nondistended; Bowel sounds present  EXTREMITIES:  2+ Peripheral Pulses, No clubbing, cyanosis, or edema  LYMPH: No lymphadenopathy noted  SKIN: No rashes or lesions; Good capillary refill      LABS:  CBC Full  -  ( 04 Jun 2019 06:22 )  WBC Count : 3.51 K/uL  RBC Count : 3.37 M/uL  Hemoglobin : 11.0 g/dL  Hematocrit : 33.3 %  Platelet Count - Automated : 221 K/uL  Mean Cell Volume : 98.8 fl  Mean Cell Hemoglobin : 32.6 pg  Mean Cell Hemoglobin Concentration : 33.0 gm/dL  Auto Neutrophil # : x  Auto Lymphocyte # : x  Auto Monocyte # : x  Auto Eosinophil # : x  Auto Basophil # : x  Auto Neutrophil % : x  Auto Lymphocyte % : x  Auto Monocyte % : x  Auto Eosinophil % : x  Auto Basophil % : x    06-04    136  |  107  |  25<H>  ----------------------------<  108<H>  4.0   |  22  |  1.57<H>    Ca    9.0      04 Jun 2019 06:22  Phos  4.0     06-04  Mg     1.6     06-04    TPro  7.6  /  Alb  3.2<L>  /  TBili  0.8  /  DBili  x   /  AST  32  /  ALT  30  /  AlkPhos  78  06-03            RADIOLOGY & ADDITIONAL STUDIES REVIEWED:  ***    [ ]GOALS OF CARE DISCUSSION WITH PATIENT/FAMILY/PROXY:    CRITICAL CARE TIME SPENT: 35 minutes

## 2019-06-04 NOTE — PROGRESS NOTE ADULT - SUBJECTIVE AND OBJECTIVE BOX
INTERVAL HPI/OVERNIGHT EVENTS:       Antimicrobial:    Cardiovascular:  amLODIPine   Tablet 10 milliGRAM(s) Oral daily    Pulmonary:    Hematalogic:  heparin  Injectable 5000 Unit(s) SubCutaneous every 12 hours    Other:  atorvastatin 20 milliGRAM(s) Oral at bedtime  chlorhexidine 4% Liquid 1 Application(s) Topical two times a day  folic acid 1 milliGRAM(s) Oral daily  insulin lispro (HumaLOG) corrective regimen sliding scale   SubCutaneous three times a day before meals  insulin lispro (HumaLOG) corrective regimen sliding scale   SubCutaneous at bedtime  pantoprazole  Injectable 40 milliGRAM(s) IV Push daily      Drug Dosing Weight    Weight (kg): 81.6 (01 Jun 2019 14:50)    CENTRAL LINE: [ ] YES [ ] NO  LOCATION:   DATE INSERTED:    LOYD: [ ] YES [ ] NO    DATE INSERTED:    A-LINE:  [ ] YES [ ] NO  LOCATION:   DATE INSERTED:    PMH/Social Hx/Fam Hx -reviewed admission note, no change since admission  PAST MEDICAL & SURGICAL HISTORY:  Diabetes mellitus  Hypertension  HTN (hypertension)  DM (diabetes mellitus)  No significant past surgical history      T(C): 36 (06-04-19 @ 08:00), Max: 36.4 (06-03-19 @ 12:00)  HR: 76 (06-04-19 @ 08:00)  BP: 125/74 (06-04-19 @ 08:00)  BP(mean): 86 (06-04-19 @ 08:00)  ABP: --  ABP(mean): --  RR: 19 (06-04-19 @ 08:00)  SpO2: 100% (06-04-19 @ 08:00)  Wt(kg): --          06-03 @ 07:01  -  06-04 @ 07:00  --------------------------------------------------------  IN: 960 mL / OUT: 1290 mL / NET: -330 mL            PHYSICAL EXAM:    GENERAL: No signs of distress, comfortable  HEAD: Atraumatic, Normocephalic  EYES: EOMI, PERRLA  ENMT: No erythema, exudates, or enlargement, Moist mucous membranes  NECK: Supple, normal appearance, No JVD; [  ] central line (if applicable)  CHEST/LUNG: No chest deformity, fair bilateral air entry; No rales, rhonchi, wheezing; crackles  HEART: Regular rate and rhythm; No murmurs, rubs, or gallops;   ABDOMEN: Soft, Nontender, Nondistended; Bowel sounds present  EXTREMITIES:  + Peripheral Pulses, No clubbing, cyanosis, or edema  NERVOUS SYSTEM: awake and alert x 3, follows commands, upper and lower extremities  LYMPH: No lymphadenopathy noted  SKIN: No rashes or lesions; good turgor, warm, dry      LABS:  CBC Full  -  ( 04 Jun 2019 06:22 )  WBC Count : 3.51 K/uL  RBC Count : 3.37 M/uL  Hemoglobin : 11.0 g/dL  Hematocrit : 33.3 %  Platelet Count - Automated : 221 K/uL  Mean Cell Volume : 98.8 fl  Mean Cell Hemoglobin : 32.6 pg  Mean Cell Hemoglobin Concentration : 33.0 gm/dL  Auto Neutrophil # : x  Auto Lymphocyte # : x  Auto Monocyte # : x  Auto Eosinophil # : x  Auto Basophil # : x  Auto Neutrophil % : x  Auto Lymphocyte % : x  Auto Monocyte % : x  Auto Eosinophil % : x  Auto Basophil % : x    06-04    136  |  107  |  25<H>  ----------------------------<  108<H>  4.0   |  22  |  1.57<H>    Ca    9.0      04 Jun 2019 06:22  Phos  4.0     06-04  Mg     1.6     06-04    TPro  7.6  /  Alb  3.2<L>  /  TBili  0.8  /  DBili  x   /  AST  32  /  ALT  30  /  AlkPhos  78  06-03            RADIOLOGY & ADDITIONAL STUDIES REVIEWED         IMPRESSION:  PAST MEDICAL & SURGICAL HISTORY:  Diabetes mellitus  Hypertension  HTN (hypertension)  DM (diabetes mellitus)  No significant past surgical history   p/w       IMP: This is a 73 years old male with a significant PMH of DM, HTN, CKD3(unclear baseline Cr, in March it was around 1.5), HFrEF(50%), symptomatic bradycardia(does not have PPM), HLD, gout, and no significant PSH presented to ED as he has felt weak and been sweating profusely since this morning. Pt has significant h/o recent admission to [facility] 2 times in March and May(5/17-5/21) for symptomatic bradycardia. On first admission, patient needed transvenous temporary pacemaker which was complicated by blood stream infection, required ICU stay. After discharge from the first admission, due to med rec issue, pt has taken lisinopril and losartan both. On second admission, patient was found to have hyperkalemia and junctional bradycardia. That time pt only had temporary transcutaneous pacemaker, and bradycardia resolved ''on its own' so he was told that he did not require PPM. Pt did not follow up with any cardiologist since last year. Since the last discharge, patient has been only on Losartan. This morning, patient had profuse sweating and dizziness, and could not even walk due to exertional dyspnea. Visited ED for further evaluation.    In ED, patient was found to have bradycardia of 32-38, labs significant for K+ of 7.4(not hemolyzed), BUN/Cr  42/3.10, lactate 2.2, H/H 9.7/30.2 macrocytic. EKG junctional zaida at 39BPM with out STT change. BP stable. Pt was given hyperkalemia cocktail in ED which improved HR to 69.   Hyperkalemia resolved and bradycardia improved. D/c home pending results of stress test. Cards and renal f/u noted        Plan:      CNS: no issue    PULMONARY: O2 supp    CARDIAC: monitor hemodynamic  f/u stress test    GI: feed      RENAL: continue cocktail and  renal eval . No ACE-, ARB    SKIN: no issue     MUSCULOSKELETAL: boots    ID:  no issue    HEME: monitor    DVT and GI Prophylaxis    GOALS OF CARE DISCUSSION WITH PATIENT/FAMILY/PROXY/ icu team on rounds/ dr pineda

## 2019-06-04 NOTE — PROGRESS NOTE ADULT - SUBJECTIVE AND OBJECTIVE BOX
CHIEF COMPLAINT: Patient is a 73y old  Male who presents with a chief complaint of Bradycardia and hyperkalemia. Pt appears comfortable.    	  REVIEW OF SYSTEMS:  CONSTITUTIONAL: No fever, weight loss, or fatigue  EYES: No eye pain, visual disturbances, or discharge  ENT:  No difficulty hearing, tinnitus, vertigo; No sinus or throat pain  NECK: No pain or stiffness  RESPIRATORY: No cough, wheezing, chills or hemoptysis; No Shortness of Breath  CARDIOVASCULAR: No chest pain, palpitations, passing out, dizziness, or leg swelling  GASTROINTESTINAL: No abdominal or epigastric pain. No nausea, vomiting, or hematemesis; No diarrhea or constipation. No melena or hematochezia.  GENITOURINARY: No dysuria, frequency, hematuria, or incontinence  NEUROLOGICAL: No headaches, memory loss, loss of strength, numbness, or tremors  SKIN: No itching, burning, rashes, or lesions   LYMPH Nodes: No enlarged glands  ENDOCRINE: No heat or cold intolerance; No hair loss  MUSCULOSKELETAL: No joint pain or swelling; No muscle, back, or extremity pain  PSYCHIATRIC: No depression, anxiety, mood swings, or difficulty sleeping  HEME/LYMPH: No easy bruising, or bleeding gums  ALLERGY AND IMMUNOLOGIC: No hives or eczema	      PHYSICAL EXAM:  T(C): 36 (06-04-19 @ 08:00), Max: 36.4 (06-03-19 @ 12:00)  HR: 76 (06-04-19 @ 08:00) (65 - 84)  BP: 125/74 (06-04-19 @ 08:00) (107/63 - 155/79)  RR: 19 (06-04-19 @ 08:00) (12 - 34)  SpO2: 100% (06-04-19 @ 08:00) (94% - 100%)    I&O's Summary    03 Jun 2019 07:01  -  04 Jun 2019 07:00  --------------------------------------------------------  IN: 960 mL / OUT: 1290 mL / NET: -330 mL        Appearance: Normal	  HEENT:   Normal oral mucosa, PERRL, EOMI	  Lymphatic: No lymphadenopathy  Cardiovascular: Normal S1 S2, No JVD, No murmurs, No edema  Respiratory: Lungs clear to auscultation	  Psychiatry: A & O x 3, Mood & affect appropriate  Gastrointestinal:  Soft, Non-tender, + BS	  Skin: No rashes, No ecchymoses, No cyanosis	  Neurologic: Non-focal  Extremities: Normal range of motion, No clubbing, cyanosis or edema  Vascular: Peripheral pulses palpable 2+ bilaterally    MEDICATIONS  (STANDING):  amLODIPine   Tablet 10 milliGRAM(s) Oral daily  atorvastatin 20 milliGRAM(s) Oral at bedtime  chlorhexidine 4% Liquid 1 Application(s) Topical two times a day  folic acid 1 milliGRAM(s) Oral daily  heparin  Injectable 5000 Unit(s) SubCutaneous every 12 hours  insulin lispro (HumaLOG) corrective regimen sliding scale   SubCutaneous three times a day before meals  insulin lispro (HumaLOG) corrective regimen sliding scale   SubCutaneous at bedtime  pantoprazole  Injectable 40 milliGRAM(s) IV Push daily      	  LABS:	 	                       11.0   3.51  )-----------( 221      ( 04 Jun 2019 06:22 )             33.3     06-04    136  |  107  |  25<H>  ----------------------------<  108<H>  4.0   |  22  |  1.57<H>    Ca    9.0      04 Jun 2019 06:22  Phos  4.0     06-04  Mg     1.6     06-04    TPro  7.6  /  Alb  3.2<L>  /  TBili  0.8  /  DBili  x   /  AST  32  /  ALT  30  /  AlkPhos  78  06-03       Lipid Profile: Cholesterol 116  LDL 50  HDL 48  TG 90  Cholesterol 107  LDL 40  HDL 47  TG 99    HgA1c: Hemoglobin A1C, Whole Blood: 6.2 % (06-03 @ 10:21)  Hemoglobin A1C, Whole Blood: 6.3 % (06-02 @ 13:15)    TSH: Thyroid Stimulating Hormone, Serum: 2.35 uU/mL (06-02 @ 06:14)      	EXAM:  US KIDNEY(S)                            PROCEDURE DATE:  06/03/2019          INTERPRETATION:  Renal ultrasound    Indication: Acute renal failure.    Renal ultrasound is performed without a previous examination for   comparison. The right kidney measures 10.4 cm in length and the left   kidney measures 10.6 cm in length. The renal sizes are within normal   limits bilaterally. No evidence for hydronephrosis, calculus, cyst or   solid mass in either kidney.    The IVC, aorta and urinary bladder appear grossly unremarkable.    Impression: Unremarkable renal ultrasound.

## 2019-06-04 NOTE — PROGRESS NOTE ADULT - PROBLEM SELECTOR PLAN 3
-Unknown baseline, according to March discharge note from [facility], Cr was 1.5 upon discharge.   -Significantly improved to 1.5 now  -Avoid nephrotoxic agents

## 2019-06-04 NOTE — PROGRESS NOTE ADULT - PROVIDER SPECIALTY LIST ADULT
Cardiology
Critical Care
Internal Medicine
Nephrology
Nephrology
Critical Care
Cardiology

## 2019-06-04 NOTE — DISCHARGE NOTE PROVIDER - HOSPITAL COURSE
HPI:    73 years old male with a significant PMH of DM, HTN, CKD3(unclear baseline Cr, in March it was around 1.5), HFrEF(50%), symptomatic bradycardia(does not have PPM), HLD, gout, and no significant PSH presented to ED as he has felt weak and been sweating profusely since this morning. Pt has significant h/o recent admission to [facility] 2 times in March and May(5/17-5/21) for symptomatic bradycardia. On first admission, patient needed transvenous temporary pacemaker which was complicated by blood stream infection, required ICU stay. After discharge from the first admission, due to med rec issue, pt has taken lisinopril and losartan both. On second admission, patient was found to have hyperkalemia and junctional bradycardia. That time pt only had temporary transcutaneous pacemaker, and bradycardia resolved ''on its own' so he was told that he did not require PPM. Pt did not follow up with any cardiologist since last year. Since the last discharge, patient has been only on Losartan. This morning, patient had profuse sweating and dizziness, and could not even walk due to exertional dyspnea. Visited ED for further evaluation.        In ED, patient was found to have bradycardia of 32-38, labs significant for K+ of 7.4(not hemolyzed), BUN/Cr  42/3.10, lactate 2.2, H/H 9.7/30.2 macrocytic. EKG junctional zaida at 39BPM with out STT change. BP stable. Pt was given hyperkalemia cocktail in ED which improved HR to 69. Was consulted to ICU for symptomatic bradycardia and hyperkalemia. (01 Jun 2019 19:25) Pt is a 73 years old male with a significant PMH of DM, HTN, CKD3(unclear baseline Cr, in March it was around 1.5), HFrEF(50%), symptomatic bradycardia(does not have PPM), HLD, gout, and no significant PSH presented to ED as he has felt weak and been sweating profusely since this morning. Pt has significant h/o recent admission to [facility] 2 times in March and May(5/17-5/21) for symptomatic bradycardia. On first admission, patient needed transvenous temporary pacemaker which was complicated by blood stream infection, required ICU stay. After discharge from the first admission, due to med rec issue, pt has taken lisinopril and losartan both. On second admission, patient was found to have hyperkalemia and junctional bradycardia. That time pt only had temporary transcutaneous pacemaker, and bradycardia resolved ''on its own' so he was told that he did not require PPM. Pt did not follow up with any cardiologist since last year. Since the last discharge, patient has been only on Losartan. In ED, patient was found to have bradycardia of 32-38, labs significant for K+ of 7.4(not hemolyzed), BUN/Cr  42/3.10, lactate 2.2, H/H 9.7/30.2 macrocytic. EKG junctional zaida at 39BPM with out STT change. BP stable. Pt was given hyperkalemia cocktail in ED which improved HR to 69. Was consulted to ICU for symptomatic bradycardia and hyperkalemia and pt was admitted to ICU, Pt ace/arb were discontinued and pt renal function and potasium continued to improve. Pt underwent renal ultrasound that was negative for any structural abnormality, stress test was done as well that was negative for ischemia. Pt echo was done that showed normal ejection fraction. Pt is being discharged home discussed with attending onc ase Pt is a 73 years old male with a significant PMH of DM, HTN, CKD3(unclear baseline Cr, in March it was around 1.5), HFrEF(50%), symptomatic bradycardia(does not have PPM), HLD, gout, and no significant PSH presented to ED as he has felt weak and been sweating profusely since this morning. Pt has significant h/o recent admission to [facility] 2 times in March and May(5/17-5/21) for symptomatic bradycardia. On first admission, patient needed transvenous temporary pacemaker which was complicated by blood stream infection, required ICU stay. After discharge from the first admission, due to med rec issue, pt has taken lisinopril and losartan both. On second admission, patient was found to have hyperkalemia and junctional bradycardia. That time pt only had temporary transcutaneous pacemaker, and bradycardia resolved ''on its own' so he was told that he did not require PPM. Pt did not follow up with any cardiologist since last year. Since the last discharge, patient has been only on Losartan. In ED, patient was found to have bradycardia of 32-38, labs significant for K+ of 7.4(not hemolyzed), BUN/Cr  42/3.10, lactate 2.2, H/H 9.7/30.2 macrocytic. EKG junctional zaida at 39BPM with out STT change. BP stable. Pt was given hyperkalemia cocktail in ED which improved HR to 69. Was consulted to ICU for symptomatic bradycardia and hyperkalemia and pt was admitted to ICU, Pt ace/arb were discontinued and pt renal function and potasium continued to improve. Pt underwent renal ultrasound that was negative for any structural abnormality, stress test was done as well that was negative for ischemia. Pt echo was done that showed normal ejection fraction. Pt is being discharged home discussed with attending onc ase    for a full account of hospital course please refer to actual medical records for this is a brief summery

## 2019-06-04 NOTE — DISCHARGE NOTE NURSING/CASE MANAGEMENT/SOCIAL WORK - NSDCDPATPORTLINK_GEN_ALL_CORE
You can access the [facility] Portal, offered by [facility], by registering with the following website: [email or web address]

## 2019-06-04 NOTE — DISCHARGE NOTE PROVIDER - NSDCCPCAREPLAN_GEN_ALL_CORE_FT
PRINCIPAL DISCHARGE DIAGNOSIS  Diagnosis: Hyperkalemia  Assessment and Plan of Treatment:       SECONDARY DISCHARGE DIAGNOSES  Diagnosis: Diabetes mellitus  Assessment and Plan of Treatment: Diabetes mellitus    Diagnosis: Stage 3 chronic kidney disease  Assessment and Plan of Treatment: Stage 3 chronic kidney disease    Diagnosis: Hypertension  Assessment and Plan of Treatment: Hypertension PRINCIPAL DISCHARGE DIAGNOSIS  Diagnosis: Hyperkalemia  Assessment and Plan of Treatment: You came in with very high potassium levels, likely due to multiple BP medication, you also had decreased renal function on your admission. You were given treatment for your high potassium , your medications that can cause high potassium were discontinued. Your levels are in normal range now, you are not advised to take losartan/lisinopril any more and only take amlodipine for your bloos pressure. Please follow up with your pcp in 1 week of discharge for repeat blood tests      SECONDARY DISCHARGE DIAGNOSES  Diagnosis: Gout  Assessment and Plan of Treatment: You can continue alupurinol. Take colchine no more than twice weekly    Diagnosis: Symptomatic bradycardia  Assessment and Plan of Treatment: You came in with very low heart rate that improved with improved potasisum levels. You are advised not to take atenolol any more that can also lead to low heart rate.    Diagnosis: Diabetes mellitus  Assessment and Plan of Treatment: You had decreased renal function which is now improving, your blood sugar control is acceptable. For now we are changing your diabetic medicaton from metformin to Javunia 25mg once daily. Please contiue to monitor your blood glucose levels at home and follow up with your pcp in 1 week of discharge    Diagnosis: Stage 3 chronic kidney disease  Assessment and Plan of Treatment: Please avoid nephrotoxic medication at home including Nsaids thta can worsen kidney funciton. YOu ultrasound of kidney was negative for any structural defects. Please follow up with your pcp to get repeat renal function testing done to make sure its going in right direction. You creatinine on admission was around 3, now it has improved to about 1.5    Diagnosis: Hypertension  Assessment and Plan of Treatment: Please continue with amlodipine for now. You are advised not to take atenolol, losartan, lisinopril as they can all lead to high potassium levels and slow heart rate

## 2019-06-04 NOTE — PROGRESS NOTE ADULT - ATTENDING COMMENTS
Please refer to my note from today.

## 2021-06-02 ENCOUNTER — INPATIENT (INPATIENT)
Facility: HOSPITAL | Age: 76
LOS: 7 days | Discharge: ANOTHER HOSPITAL | DRG: 291 | End: 2021-06-10
Attending: INTERNAL MEDICINE | Admitting: INTERNAL MEDICINE
Payer: MEDICARE

## 2021-06-02 VITALS
TEMPERATURE: 97 F | SYSTOLIC BLOOD PRESSURE: 139 MMHG | OXYGEN SATURATION: 95 % | DIASTOLIC BLOOD PRESSURE: 74 MMHG | HEART RATE: 63 BPM | HEIGHT: 63 IN | RESPIRATION RATE: 22 BRPM | WEIGHT: 182.98 LBS

## 2021-06-02 DIAGNOSIS — R06.00 DYSPNEA, UNSPECIFIED: ICD-10-CM

## 2021-06-02 DIAGNOSIS — R79.89 OTHER SPECIFIED ABNORMAL FINDINGS OF BLOOD CHEMISTRY: ICD-10-CM

## 2021-06-02 DIAGNOSIS — D64.9 ANEMIA, UNSPECIFIED: ICD-10-CM

## 2021-06-02 DIAGNOSIS — I10 ESSENTIAL (PRIMARY) HYPERTENSION: ICD-10-CM

## 2021-06-02 DIAGNOSIS — Z71.89 OTHER SPECIFIED COUNSELING: ICD-10-CM

## 2021-06-02 DIAGNOSIS — Z29.9 ENCOUNTER FOR PROPHYLACTIC MEASURES, UNSPECIFIED: ICD-10-CM

## 2021-06-02 DIAGNOSIS — E11.9 TYPE 2 DIABETES MELLITUS WITHOUT COMPLICATIONS: ICD-10-CM

## 2021-06-02 DIAGNOSIS — R77.8 OTHER SPECIFIED ABNORMALITIES OF PLASMA PROTEINS: ICD-10-CM

## 2021-06-02 DIAGNOSIS — M10.9 GOUT, UNSPECIFIED: ICD-10-CM

## 2021-06-02 DIAGNOSIS — N18.9 CHRONIC KIDNEY DISEASE, UNSPECIFIED: ICD-10-CM

## 2021-06-02 LAB
ALBUMIN SERPL ELPH-MCNC: 2.9 G/DL — LOW (ref 3.5–5)
ALP SERPL-CCNC: 106 U/L — SIGNIFICANT CHANGE UP (ref 40–120)
ALT FLD-CCNC: 43 U/L DA — SIGNIFICANT CHANGE UP (ref 10–60)
ANION GAP SERPL CALC-SCNC: 5 MMOL/L — SIGNIFICANT CHANGE UP (ref 5–17)
APTT BLD: 132.1 SEC — CRITICAL HIGH (ref 27.5–35.5)
AST SERPL-CCNC: 100 U/L — HIGH (ref 10–40)
BASOPHILS # BLD AUTO: 0.02 K/UL — SIGNIFICANT CHANGE UP (ref 0–0.2)
BASOPHILS NFR BLD AUTO: 0.6 % — SIGNIFICANT CHANGE UP (ref 0–2)
BILIRUB SERPL-MCNC: 0.5 MG/DL — SIGNIFICANT CHANGE UP (ref 0.2–1.2)
BUN SERPL-MCNC: 27 MG/DL — HIGH (ref 7–18)
CALCIUM SERPL-MCNC: 9.3 MG/DL — SIGNIFICANT CHANGE UP (ref 8.4–10.5)
CHLORIDE SERPL-SCNC: 108 MMOL/L — SIGNIFICANT CHANGE UP (ref 96–108)
CO2 SERPL-SCNC: 23 MMOL/L — SIGNIFICANT CHANGE UP (ref 22–31)
CREAT ?TM UR-MCNC: <13 MG/DL — SIGNIFICANT CHANGE UP
CREAT SERPL-MCNC: 2.06 MG/DL — HIGH (ref 0.5–1.3)
D DIMER BLD IA.RAPID-MCNC: 1119 NG/ML DDU — HIGH
EOSINOPHIL # BLD AUTO: 0.41 K/UL — SIGNIFICANT CHANGE UP (ref 0–0.5)
EOSINOPHIL NFR BLD AUTO: 12.7 % — HIGH (ref 0–6)
GLUCOSE BLDC GLUCOMTR-MCNC: 87 MG/DL — SIGNIFICANT CHANGE UP (ref 70–99)
GLUCOSE SERPL-MCNC: 105 MG/DL — HIGH (ref 70–99)
HCT VFR BLD CALC: 37.1 % — LOW (ref 39–50)
HCT VFR BLD CALC: 39 % — SIGNIFICANT CHANGE UP (ref 39–50)
HGB BLD-MCNC: 12.7 G/DL — LOW (ref 13–17)
HGB BLD-MCNC: 13.3 G/DL — SIGNIFICANT CHANGE UP (ref 13–17)
IMM GRANULOCYTES NFR BLD AUTO: 0 % — SIGNIFICANT CHANGE UP (ref 0–1.5)
LYMPHOCYTES # BLD AUTO: 0.37 K/UL — LOW (ref 1–3.3)
LYMPHOCYTES # BLD AUTO: 11.5 % — LOW (ref 13–44)
MAGNESIUM SERPL-MCNC: 2.1 MG/DL — SIGNIFICANT CHANGE UP (ref 1.6–2.6)
MCHC RBC-ENTMCNC: 33.2 PG — SIGNIFICANT CHANGE UP (ref 27–34)
MCHC RBC-ENTMCNC: 33.4 PG — SIGNIFICANT CHANGE UP (ref 27–34)
MCHC RBC-ENTMCNC: 34.1 GM/DL — SIGNIFICANT CHANGE UP (ref 32–36)
MCHC RBC-ENTMCNC: 34.2 GM/DL — SIGNIFICANT CHANGE UP (ref 32–36)
MCV RBC AUTO: 97.3 FL — SIGNIFICANT CHANGE UP (ref 80–100)
MCV RBC AUTO: 97.6 FL — SIGNIFICANT CHANGE UP (ref 80–100)
MONOCYTES # BLD AUTO: 0.5 K/UL — SIGNIFICANT CHANGE UP (ref 0–0.9)
MONOCYTES NFR BLD AUTO: 15.5 % — HIGH (ref 2–14)
NEUTROPHILS # BLD AUTO: 1.93 K/UL — SIGNIFICANT CHANGE UP (ref 1.8–7.4)
NEUTROPHILS NFR BLD AUTO: 59.7 % — SIGNIFICANT CHANGE UP (ref 43–77)
NRBC # BLD: 0 /100 WBCS — SIGNIFICANT CHANGE UP (ref 0–0)
NRBC # BLD: 0 /100 WBCS — SIGNIFICANT CHANGE UP (ref 0–0)
NT-PROBNP SERPL-SCNC: 4037 PG/ML — HIGH (ref 0–450)
PLATELET # BLD AUTO: 152 K/UL — SIGNIFICANT CHANGE UP (ref 150–400)
PLATELET # BLD AUTO: 160 K/UL — SIGNIFICANT CHANGE UP (ref 150–400)
POTASSIUM SERPL-MCNC: 4.6 MMOL/L — SIGNIFICANT CHANGE UP (ref 3.5–5.3)
POTASSIUM SERPL-SCNC: 4.6 MMOL/L — SIGNIFICANT CHANGE UP (ref 3.5–5.3)
PROT SERPL-MCNC: 7.6 G/DL — SIGNIFICANT CHANGE UP (ref 6–8.3)
RBC # BLD: 3.8 M/UL — LOW (ref 4.2–5.8)
RBC # BLD: 4.01 M/UL — LOW (ref 4.2–5.8)
RBC # FLD: 14.2 % — SIGNIFICANT CHANGE UP (ref 10.3–14.5)
RBC # FLD: 14.3 % — SIGNIFICANT CHANGE UP (ref 10.3–14.5)
SARS-COV-2 RNA SPEC QL NAA+PROBE: SIGNIFICANT CHANGE UP
SODIUM SERPL-SCNC: 136 MMOL/L — SIGNIFICANT CHANGE UP (ref 135–145)
SODIUM UR-SCNC: 125 MMOL/L — SIGNIFICANT CHANGE UP
TROPONIN I SERPL-MCNC: 0.04 NG/ML — SIGNIFICANT CHANGE UP (ref 0–0.04)
TROPONIN I SERPL-MCNC: 0.04 NG/ML — SIGNIFICANT CHANGE UP (ref 0–0.04)
WBC # BLD: 3.23 K/UL — LOW (ref 3.8–10.5)
WBC # BLD: 3.41 K/UL — LOW (ref 3.8–10.5)
WBC # FLD AUTO: 3.23 K/UL — LOW (ref 3.8–10.5)
WBC # FLD AUTO: 3.41 K/UL — LOW (ref 3.8–10.5)

## 2021-06-02 PROCEDURE — 99284 EMERGENCY DEPT VISIT MOD MDM: CPT

## 2021-06-02 PROCEDURE — 93970 EXTREMITY STUDY: CPT | Mod: 26

## 2021-06-02 PROCEDURE — 71046 X-RAY EXAM CHEST 2 VIEWS: CPT | Mod: 26

## 2021-06-02 RX ORDER — ROSUVASTATIN CALCIUM 5 MG/1
0 TABLET ORAL
Qty: 30 | Refills: 0 | DISCHARGE

## 2021-06-02 RX ORDER — ALLOPURINOL 300 MG
1 TABLET ORAL
Qty: 0 | Refills: 0 | DISCHARGE

## 2021-06-02 RX ORDER — COLCHICINE 0.6 MG
0 TABLET ORAL
Qty: 30 | Refills: 0 | DISCHARGE

## 2021-06-02 RX ORDER — ACETAMINOPHEN 500 MG
650 TABLET ORAL EVERY 6 HOURS
Refills: 0 | Status: DISCONTINUED | OUTPATIENT
Start: 2021-06-02 | End: 2021-06-10

## 2021-06-02 RX ORDER — FUROSEMIDE 40 MG
80 TABLET ORAL ONCE
Refills: 0 | Status: COMPLETED | OUTPATIENT
Start: 2021-06-02 | End: 2021-06-02

## 2021-06-02 RX ORDER — SENNA PLUS 8.6 MG/1
2 TABLET ORAL AT BEDTIME
Refills: 0 | Status: DISCONTINUED | OUTPATIENT
Start: 2021-06-02 | End: 2021-06-10

## 2021-06-02 RX ORDER — DORZOLAMIDE HYDROCHLORIDE TIMOLOL MALEATE 20; 5 MG/ML; MG/ML
0 SOLUTION/ DROPS OPHTHALMIC
Qty: 10 | Refills: 0 | DISCHARGE

## 2021-06-02 RX ORDER — BRIMONIDINE TARTRATE, TIMOLOL MALEATE 2; 5 MG/ML; MG/ML
1 SOLUTION/ DROPS OPHTHALMIC
Qty: 0 | Refills: 0 | DISCHARGE

## 2021-06-02 RX ORDER — ALLOPURINOL 300 MG
300 TABLET ORAL AT BEDTIME
Refills: 0 | Status: DISCONTINUED | OUTPATIENT
Start: 2021-06-02 | End: 2021-06-03

## 2021-06-02 RX ORDER — HEPARIN SODIUM 5000 [USP'U]/ML
3000 INJECTION INTRAVENOUS; SUBCUTANEOUS EVERY 6 HOURS
Refills: 0 | Status: DISCONTINUED | OUTPATIENT
Start: 2021-06-02 | End: 2021-06-04

## 2021-06-02 RX ORDER — HEPARIN SODIUM 5000 [USP'U]/ML
6500 INJECTION INTRAVENOUS; SUBCUTANEOUS EVERY 6 HOURS
Refills: 0 | Status: DISCONTINUED | OUTPATIENT
Start: 2021-06-02 | End: 2021-06-04

## 2021-06-02 RX ORDER — ATORVASTATIN CALCIUM 80 MG/1
20 TABLET, FILM COATED ORAL AT BEDTIME
Refills: 0 | Status: DISCONTINUED | OUTPATIENT
Start: 2021-06-02 | End: 2021-06-10

## 2021-06-02 RX ORDER — INSULIN LISPRO 100/ML
VIAL (ML) SUBCUTANEOUS
Refills: 0 | Status: DISCONTINUED | OUTPATIENT
Start: 2021-06-02 | End: 2021-06-10

## 2021-06-02 RX ORDER — COLCHICINE 0.6 MG
0.6 TABLET ORAL DAILY
Refills: 0 | Status: DISCONTINUED | OUTPATIENT
Start: 2021-06-02 | End: 2021-06-10

## 2021-06-02 RX ORDER — AMLODIPINE BESYLATE 2.5 MG/1
10 TABLET ORAL DAILY
Refills: 0 | Status: DISCONTINUED | OUTPATIENT
Start: 2021-06-02 | End: 2021-06-04

## 2021-06-02 RX ORDER — HEPARIN SODIUM 5000 [USP'U]/ML
INJECTION INTRAVENOUS; SUBCUTANEOUS
Qty: 25000 | Refills: 0 | Status: DISCONTINUED | OUTPATIENT
Start: 2021-06-02 | End: 2021-06-04

## 2021-06-02 RX ORDER — ONDANSETRON 8 MG/1
4 TABLET, FILM COATED ORAL EVERY 6 HOURS
Refills: 0 | Status: DISCONTINUED | OUTPATIENT
Start: 2021-06-02 | End: 2021-06-10

## 2021-06-02 RX ORDER — FUROSEMIDE 40 MG
40 TABLET ORAL DAILY
Refills: 0 | Status: DISCONTINUED | OUTPATIENT
Start: 2021-06-02 | End: 2021-06-06

## 2021-06-02 RX ORDER — POLYETHYLENE GLYCOL 3350 17 G/17G
17 POWDER, FOR SOLUTION ORAL DAILY
Refills: 0 | Status: DISCONTINUED | OUTPATIENT
Start: 2021-06-02 | End: 2021-06-10

## 2021-06-02 RX ORDER — FOLIC ACID 0.8 MG
1 TABLET ORAL DAILY
Refills: 0 | Status: DISCONTINUED | OUTPATIENT
Start: 2021-06-02 | End: 2021-06-10

## 2021-06-02 RX ORDER — HEPARIN SODIUM 5000 [USP'U]/ML
6500 INJECTION INTRAVENOUS; SUBCUTANEOUS ONCE
Refills: 0 | Status: COMPLETED | OUTPATIENT
Start: 2021-06-02 | End: 2021-06-02

## 2021-06-02 RX ADMIN — HEPARIN SODIUM 5000 UNIT(S): 5000 INJECTION INTRAVENOUS; SUBCUTANEOUS at 17:17

## 2021-06-02 RX ADMIN — HEPARIN SODIUM 1500 UNIT(S)/HR: 5000 INJECTION INTRAVENOUS; SUBCUTANEOUS at 17:17

## 2021-06-02 RX ADMIN — Medication 300 MILLIGRAM(S): at 22:08

## 2021-06-02 RX ADMIN — Medication 80 MILLIGRAM(S): at 15:04

## 2021-06-02 RX ADMIN — HEPARIN SODIUM 0 UNIT(S)/HR: 5000 INJECTION INTRAVENOUS; SUBCUTANEOUS at 23:42

## 2021-06-02 RX ADMIN — SENNA PLUS 2 TABLET(S): 8.6 TABLET ORAL at 22:08

## 2021-06-02 RX ADMIN — ATORVASTATIN CALCIUM 20 MILLIGRAM(S): 80 TABLET, FILM COATED ORAL at 22:08

## 2021-06-02 NOTE — H&P ADULT - HISTORY OF PRESENT ILLNESS
Patient is a 75 year old male from home AAO x3, with PMH of DM, HTN, CKD, HLD, gout and glaucoma, who presented to the ED due to worsening SOB.  Patient is a 75 year old male from home AAO x3, with PMH of DM, HTN, CKD, HLD, gout and glaucoma, who presented to the ED due to worsening SOB. Patient states that for the past 3 weeks, he gets short of breath mainly on exertion. Son, Bruce at bedside assisting with providing history. Son states that the patient went on a walk yesterday and had to stop about 3 times due to shortness of breath. Patient also having swollen legs. Patient and son deny knowing of any history of CHF. Denies being on any diuretics. Patient sleeps with only one pillow at night.

## 2021-06-02 NOTE — ED PROVIDER NOTE - CLINICAL SUMMARY MEDICAL DECISION MAKING FREE TEXT BOX
75 yr old male with hx of DM, HTN, CKD3(unclear baseline Cr, in March it was around 1.5), HFrEF(50%), symptomatic bradycardia(does not have PPM), HLD, gout, and no significant PSH presented to ED sob with exertion, right sided chest pain, leg weakness and fatigue x 1 day. no fever, Pfizer 2nd dose mid april. no sick contact, no syncope, no n/v.    likely CHF exacerbation r/o pna vs acs vs renal impairment. labs, cxr, ekg, cardiac monitor, covid, lasix, admit

## 2021-06-02 NOTE — H&P ADULT - ATTENDING COMMENTS
Patient is a 75 year old male from home AAO x3, with PMH of DM, HTN, CKD, HLD, gout and glaucoma, who presented to the ED due to worsening SOB. Patient states that for the past 3 weeks, he gets short of breath mainly on exertion. Son, Bruce at bedside assisting with providing history. Son states that the patient went on a walk yesterday and had to stop about 3 times due to shortness of breath. Patient also having swollen legs. Patient and son deny knowing of any history of CHF. Denies being on any diuretics. Patient sleeps with only one pillow at night.       assessment   --- worsening sob with activity, r/o chf exacerb, r/o acs, r/o pe, anemia h/o DM, HTN, CKD, HLD, gout, glaucoma    plan  --  adm to tele, acs protocol, coreg, aspirin, statin, lasix, bronchodilators prn, lispro ss, heparin drip as per hospital normogram, cont preadmit home meds, gi prophylaxis  cbc, bmp, mg, phos, lipid, tsh, ce q8 x3, hgba1c, stool occult blood, anemia panel    vq scan  echo    cardio cons  pulm cons Patient is a 75 year old male from home AAO x3, with PMH of DM, HTN, CKD, HLD, gout and glaucoma, who presented to the ED due to worsening SOB. Patient states that for the past 3 weeks, he gets short of breath mainly on exertion. Son, Bruce at bedside assisting with providing history. Son states that the patient went on a walk yesterday and had to stop about 3 times due to shortness of breath. Patient also having swollen legs. Patient and son deny knowing of any history of CHF. Denies being on any diuretics. Patient sleeps with only one pillow at night.       assessment   --- worsening sob with activity, r/o chf exacerb, r/o acs, r/o pe, pulm edema, anemia h/o DM, HTN, CKD, HLD, gout, glaucoma    plan  --  adm to tele, acs protocol, coreg, aspirin, statin, lasix, bronchodilators prn, lispro ss    vq scan  echo    cardio cons  pulm cons

## 2021-06-02 NOTE — H&P ADULT - PROBLEM SELECTOR PLAN 7
patient on januvia at home  will start SSI and hold PO meds while in hospital  f/u A1c  monitor BS and adjust insulin as needed

## 2021-06-02 NOTE — H&P ADULT - PROBLEM SELECTOR PLAN 3
noted to have mild elevation in troponin at 0.043  likely due to demand ischemia   will trend trops  monitor on tele for now

## 2021-06-02 NOTE — H&P ADULT - ASSESSMENT
Patient is a 75 year old male from home AAO x3, with PMH of DM, HTN, CKD, HLD, gout and glaucoma, who presented to the ED due to worsening SOB.  Patient is a 75 year old male from home AAO x3, with PMH of DM, HTN, CKD, HLD, gout and glaucoma, who presented to the ED due to worsening SOB.     Hgb of 12.7. Creatinine of 2.06. D-dimer of 1119. Troponin of 0.043. COVID negative. EKG showing LBBB. CXR showing new small left pleural effusion. New mild opacities right lung base. US doppler of BLE negative for DVT. Given dose of lasix 80mg IV in ED.

## 2021-06-02 NOTE — H&P ADULT - PROBLEM SELECTOR PLAN 5
PT  CAN  SCHEDULE PHYSICAl FOR NEXt week or  Following  Week I amd in the office only  Twice a week, I  Spoke to pt she  Feels  Well  And recently  Was tested  negative noted to have creatinine of 2.06   baseline appears to be closer to 1.5-1.6 based on prior labs   avoid fluids due to concern for CHF   f/u urine lytes  monitor BMP daily

## 2021-06-02 NOTE — ED PROVIDER NOTE - PROGRESS NOTE DETAILS
Melissa: s/o to Dr Keene to f/u labs and cxr then admit to dr pineda for presume CHF exacerbation patient signed out to me by Dr. Torres pending labs. d-dimer elevated. spoke with Dr. Hutton and agree to start anticoagulation as patient cannot get CTA 2/2 elevated Cr. will get heparin after INR results. Will Keene

## 2021-06-02 NOTE — ED PROVIDER NOTE - OBJECTIVE STATEMENT
75 yr old male with hx of DM, HTN, CKD3(unclear baseline Cr, in March it was around 1.5), HFrEF(50%), symptomatic bradycardia(does not have PPM), HLD, gout, and no significant PSH presented to ED sob with exertion, right sided chest pain, leg weakness and fatigue x 1 day. no fever, Pfizer 2nd dose mid april. no sick contact, no syncope, no n/v.

## 2021-06-02 NOTE — H&P ADULT - PROBLEM SELECTOR PLAN 4
noted to have Hgb of 12.9 on admission   no signs of bleeding noted   Hgb is higher compared to prior labs  f/u anemia panel  monitor CBC daily

## 2021-06-02 NOTE — ED PROVIDER NOTE - CONSTITUTIONAL, MLM
awake, alert, oriented to person, place, time/situation and in mild apparent distress. sob normal...

## 2021-06-02 NOTE — H&P ADULT - PROBLEM SELECTOR PLAN 1
patient presented to the ED due to worsening SOB mainly on exertion   likely secondary to fluid overload from CHF vs PE   CXR showing small left pleural effusion and mild opacities in right lung base   given dose of lasix 80mg IV in ED   d-dimer of 1119   unable to do CTA chest due to CKD   empirically start heparin drip  continue lasix IV daily   strict I/O; daily weight; fluid restrictions  monitor on tele for now  cardio, Dr. Jorge consulted   f/u ECHO  f/u V/Q scan to r/o PE

## 2021-06-02 NOTE — ED ADULT NURSE NOTE - NSIMPLEMENTINTERV_GEN_ALL_ED
Implemented All Fall Risk Interventions:  [place] to call system. Call bell, personal items and telephone within reach. Instruct patient to call for assistance. Room bathroom lighting operational. Non-slip footwear when patient is off stretcher. Physically safe environment: no spills, clutter or unnecessary equipment. Stretcher in lowest position, wheels locked, appropriate side rails in place. Provide visual cue, wrist band, yellow gown, etc. Monitor gait and stability. Monitor for mental status changes and reorient to person, place, and time. Review medications for side effects contributing to fall risk. Reinforce activity limits and safety measures with patient and family.

## 2021-06-02 NOTE — PHARMACOTHERAPY INTERVENTION NOTE - COMMENTS
Patient's home pharmacy ([facility], [place] [phone]) was contacted and the Outside Medication Record was updated based on the pharmacy prescription history.

## 2021-06-02 NOTE — H&P ADULT - PROBLEM SELECTOR PLAN 2
noted to have elevated d-dimer of 1119  concern for PE  unable to do CTA chest due to CKD   US doppler negative for DVT   will start heparin drip for now  f/u V/Q scan

## 2021-06-03 LAB
24R-OH-CALCIDIOL SERPL-MCNC: 9 NG/ML — LOW (ref 30–80)
A1C WITH ESTIMATED AVERAGE GLUCOSE RESULT: 5.3 % — SIGNIFICANT CHANGE UP (ref 4–5.6)
ANION GAP SERPL CALC-SCNC: 14 MMOL/L — SIGNIFICANT CHANGE UP (ref 5–17)
APTT BLD: 54.9 SEC — HIGH (ref 27.5–35.5)
APTT BLD: 58.3 SEC — HIGH (ref 27.5–35.5)
BUN SERPL-MCNC: 25 MG/DL — HIGH (ref 7–18)
CALCIUM SERPL-MCNC: 9.2 MG/DL — SIGNIFICANT CHANGE UP (ref 8.4–10.5)
CHLORIDE SERPL-SCNC: 104 MMOL/L — SIGNIFICANT CHANGE UP (ref 96–108)
CHOLEST SERPL-MCNC: 162 MG/DL — SIGNIFICANT CHANGE UP
CO2 SERPL-SCNC: 20 MMOL/L — LOW (ref 22–31)
COVID-19 SPIKE DOMAIN AB INTERP: POSITIVE
COVID-19 SPIKE DOMAIN ANTIBODY RESULT: >250 U/ML — HIGH
CREAT SERPL-MCNC: 2.07 MG/DL — HIGH (ref 0.5–1.3)
ESTIMATED AVERAGE GLUCOSE: 105 MG/DL — SIGNIFICANT CHANGE UP (ref 68–114)
FERRITIN SERPL-MCNC: 70 NG/ML — SIGNIFICANT CHANGE UP (ref 30–400)
FOLATE SERPL-MCNC: >20 NG/ML — SIGNIFICANT CHANGE UP
GLUCOSE BLDC GLUCOMTR-MCNC: 109 MG/DL — HIGH (ref 70–99)
GLUCOSE BLDC GLUCOMTR-MCNC: 116 MG/DL — HIGH (ref 70–99)
GLUCOSE BLDC GLUCOMTR-MCNC: 78 MG/DL — SIGNIFICANT CHANGE UP (ref 70–99)
GLUCOSE SERPL-MCNC: 75 MG/DL — SIGNIFICANT CHANGE UP (ref 70–99)
HCT VFR BLD CALC: 40.7 % — SIGNIFICANT CHANGE UP (ref 39–50)
HDLC SERPL-MCNC: 114 MG/DL — SIGNIFICANT CHANGE UP
HGB BLD-MCNC: 13.8 G/DL — SIGNIFICANT CHANGE UP (ref 13–17)
INR BLD: 1.2 RATIO — HIGH (ref 0.88–1.16)
IRON SATN MFR SERPL: 164 UG/DL — SIGNIFICANT CHANGE UP (ref 65–170)
IRON SATN MFR SERPL: 49 % — SIGNIFICANT CHANGE UP (ref 20–55)
LIPID PNL WITH DIRECT LDL SERPL: 35 MG/DL — SIGNIFICANT CHANGE UP
MAGNESIUM SERPL-MCNC: 2 MG/DL — SIGNIFICANT CHANGE UP (ref 1.6–2.6)
MCHC RBC-ENTMCNC: 32.9 PG — SIGNIFICANT CHANGE UP (ref 27–34)
MCHC RBC-ENTMCNC: 33.9 GM/DL — SIGNIFICANT CHANGE UP (ref 32–36)
MCV RBC AUTO: 96.9 FL — SIGNIFICANT CHANGE UP (ref 80–100)
NON HDL CHOLESTEROL: 48 MG/DL — SIGNIFICANT CHANGE UP
NRBC # BLD: 0 /100 WBCS — SIGNIFICANT CHANGE UP (ref 0–0)
PHOSPHATE SERPL-MCNC: 4 MG/DL — SIGNIFICANT CHANGE UP (ref 2.5–4.5)
PLATELET # BLD AUTO: 157 K/UL — SIGNIFICANT CHANGE UP (ref 150–400)
POTASSIUM SERPL-MCNC: 3.9 MMOL/L — SIGNIFICANT CHANGE UP (ref 3.5–5.3)
POTASSIUM SERPL-SCNC: 3.9 MMOL/L — SIGNIFICANT CHANGE UP (ref 3.5–5.3)
PROCALCITONIN SERPL-MCNC: 0.3 NG/ML — HIGH (ref 0.02–0.1)
PROTHROM AB SERPL-ACNC: 14.2 SEC — HIGH (ref 10.6–13.6)
RBC # BLD: 4.2 M/UL — SIGNIFICANT CHANGE UP (ref 4.2–5.8)
RBC # FLD: 14.2 % — SIGNIFICANT CHANGE UP (ref 10.3–14.5)
SARS-COV-2 IGG+IGM SERPL QL IA: >250 U/ML — HIGH
SARS-COV-2 IGG+IGM SERPL QL IA: POSITIVE
SODIUM SERPL-SCNC: 138 MMOL/L — SIGNIFICANT CHANGE UP (ref 135–145)
TIBC SERPL-MCNC: 334 UG/DL — SIGNIFICANT CHANGE UP (ref 250–450)
TRIGL SERPL-MCNC: 67 MG/DL — SIGNIFICANT CHANGE UP
TROPONIN I SERPL-MCNC: 0.04 NG/ML — SIGNIFICANT CHANGE UP (ref 0–0.04)
UIBC SERPL-MCNC: 170 UG/DL — SIGNIFICANT CHANGE UP (ref 110–370)
VIT B12 SERPL-MCNC: 296 PG/ML — SIGNIFICANT CHANGE UP (ref 232–1245)
WBC # BLD: 2.84 K/UL — LOW (ref 3.8–10.5)
WBC # FLD AUTO: 2.84 K/UL — LOW (ref 3.8–10.5)

## 2021-06-03 PROCEDURE — 93306 TTE W/DOPPLER COMPLETE: CPT | Mod: 26

## 2021-06-03 PROCEDURE — 78580 LUNG PERFUSION IMAGING: CPT | Mod: 26

## 2021-06-03 RX ORDER — ERGOCALCIFEROL 1.25 MG/1
50000 CAPSULE ORAL
Refills: 0 | Status: DISCONTINUED | OUTPATIENT
Start: 2021-06-03 | End: 2021-06-10

## 2021-06-03 RX ORDER — ALLOPURINOL 300 MG
100 TABLET ORAL DAILY
Refills: 0 | Status: DISCONTINUED | OUTPATIENT
Start: 2021-06-03 | End: 2021-06-10

## 2021-06-03 RX ORDER — TIMOLOL 0.5 %
1 DROPS OPHTHALMIC (EYE)
Refills: 0 | Status: DISCONTINUED | OUTPATIENT
Start: 2021-06-03 | End: 2021-06-10

## 2021-06-03 RX ADMIN — HEPARIN SODIUM 1200 UNIT(S)/HR: 5000 INJECTION INTRAVENOUS; SUBCUTANEOUS at 08:10

## 2021-06-03 RX ADMIN — Medication 40 MILLIGRAM(S): at 05:45

## 2021-06-03 RX ADMIN — ATORVASTATIN CALCIUM 20 MILLIGRAM(S): 80 TABLET, FILM COATED ORAL at 21:09

## 2021-06-03 RX ADMIN — AMLODIPINE BESYLATE 10 MILLIGRAM(S): 2.5 TABLET ORAL at 05:45

## 2021-06-03 RX ADMIN — HEPARIN SODIUM 1400 UNIT(S)/HR: 5000 INJECTION INTRAVENOUS; SUBCUTANEOUS at 19:03

## 2021-06-03 RX ADMIN — Medication 0.6 MILLIGRAM(S): at 12:44

## 2021-06-03 RX ADMIN — Medication 1 MILLIGRAM(S): at 12:44

## 2021-06-03 RX ADMIN — Medication 100 MILLIGRAM(S): at 12:44

## 2021-06-03 RX ADMIN — HEPARIN SODIUM 1200 UNIT(S)/HR: 5000 INJECTION INTRAVENOUS; SUBCUTANEOUS at 00:52

## 2021-06-03 RX ADMIN — SENNA PLUS 2 TABLET(S): 8.6 TABLET ORAL at 21:09

## 2021-06-03 NOTE — PROGRESS NOTE ADULT - SUBJECTIVE AND OBJECTIVE BOX
PGY-1 Progress Note discussed with attending    PAGER #: [[phone]] TILL 5:00 PM  PLEASE CONTACT ON CALL TEAM:  - On Call Team (Please refer to Darrick) FROM 5:00 PM - 8:30PM  - Nightfloat Team FROM 8:30 -7:30 AM    CHIEF COMPLAINT & BRIEF HOSPITAL COURSE:    INTERVAL HPI/OVERNIGHT EVENTS:   MEDICATIONS  (STANDING):  allopurinol 100 milliGRAM(s) Oral daily  amLODIPine   Tablet 10 milliGRAM(s) Oral daily  atorvastatin 20 milliGRAM(s) Oral at bedtime  colchicine 0.6 milliGRAM(s) Oral daily  folic acid 1 milliGRAM(s) Oral daily  furosemide   Injectable 40 milliGRAM(s) IV Push daily  heparin  Infusion.  Unit(s)/Hr (15 mL/Hr) IV Continuous <Continuous>  insulin lispro (ADMELOG) corrective regimen sliding scale   SubCutaneous Before meals and at bedtime  senna 2 Tablet(s) Oral at bedtime    MEDICATIONS  (PRN):  acetaminophen   Tablet .. 650 milliGRAM(s) Oral every 6 hours PRN Temp greater or equal to 38C (100.4F), Moderate Pain (4 - 6)  heparin   Injectable 6500 Unit(s) IV Push every 6 hours PRN For aPTT less than 40  heparin   Injectable 3000 Unit(s) IV Push every 6 hours PRN For aPTT between 40 - 57  ondansetron Injectable 4 milliGRAM(s) IV Push every 6 hours PRN Nausea and/or Vomiting  polyethylene glycol 3350 17 Gram(s) Oral daily PRN Constipation      REVIEW OF SYSTEMS:  CONSTITUTIONAL: No fever, weight loss, or fatigue  RESPIRATORY: No cough, wheezing, chills or hemoptysis; No shortness of breath  CARDIOVASCULAR: No chest pain, palpitations, dizziness, or leg swelling  GASTROINTESTINAL: No abdominal pain. No nausea, vomiting, or hematemesis; No diarrhea or constipation. No melena or hematochezia.  GENITOURINARY: No dysuria or hematuria, urinary frequency  NEUROLOGICAL: No headaches, memory loss, loss of strength, numbness, or tremors  SKIN: No itching, burning, rashes, or lesions     Vital Signs Last 24 Hrs  T(C): 36.8 (03 Jun 2021 15:36), Max: 36.8 (03 Jun 2021 13:51)  T(F): 98.3 (03 Jun 2021 15:36), Max: 98.3 (03 Jun 2021 13:51)  HR: 64 (03 Jun 2021 15:36) (64 - 84)  BP: 125/70 (03 Jun 2021 15:36) (124/76 - 153/85)  BP(mean): --  RR: 17 (03 Jun 2021 15:36) (17 - 19)  SpO2: 98% (03 Jun 2021 15:36) (94% - 100%)    PHYSICAL EXAMINATION:  GENERAL: NAD, well built  HEAD:  Atraumatic, Normocephalic  EYES:  conjunctiva and sclera clear  NECK: Supple, No JVD, Normal thyroid  CHEST/LUNG: Clear to auscultation. Clear to percussion bilaterally; No rales, rhonchi, wheezing, or rubs  HEART: Regular rate and rhythm; No murmurs, rubs, or gallops  ABDOMEN: Soft, Nontender, Nondistended; Bowel sounds present  NERVOUS SYSTEM:  Alert & Oriented X3,    EXTREMITIES:  2+ Peripheral Pulses, No clubbing, cyanosis, or edema  SKIN: warm dry                          13.8   2.84  )-----------( 157      ( 03 Jun 2021 07:14 )             40.7     06-03    138  |  104  |  25<H>  ----------------------------<  75  3.9   |  20<L>  |  2.07<H>    Ca    9.2      03 Jun 2021 07:14  Phos  4.0     06-03  Mg     2.0     06-03    TPro  7.6  /  Alb  2.9<L>  /  TBili  0.5  /  DBili  x   /  AST  100<H>  /  ALT  43  /  AlkPhos  106  06-02    LIVER FUNCTIONS - ( 02 Jun 2021 15:01 )  Alb: 2.9 g/dL / Pro: 7.6 g/dL / ALK PHOS: 106 U/L / ALT: 43 U/L DA / AST: 100 U/L / GGT: x           CARDIAC MARKERS ( 03 Jun 2021 07:14 )  0.041 ng/mL / x     / x     / x     / x      CARDIAC MARKERS ( 02 Jun 2021 23:05 )  0.042 ng/mL / x     / x     / x     / x      CARDIAC MARKERS ( 02 Jun 2021 15:01 )  0.043 ng/mL / x     / x     / x     / x          PT/INR - ( 03 Jun 2021 07:14 )   PT: 14.2 sec;   INR: 1.20 ratio         PTT - ( 03 Jun 2021 07:14 )  PTT:58.3 sec    CAPILLARY BLOOD GLUCOSE      RADIOLOGY & ADDITIONAL TESTS:                   PGY-1 Progress Note discussed with attending    PAGER #: [[phone]] TILL 5:00 PM  PLEASE CONTACT ON CALL TEAM:  - On Call Team (Please refer to Darrick) FROM 5:00 PM - 8:30PM  - Nightfloat Team FROM 8:30 -7:30 AM    INTERVAL HPI/OVERNIGHT EVENTS: No acute overnight events, pt comfortable using nasal cannula. SOB likely secondary to CHF, await echo report. Low probability of PE according to V/Q scan    MEDICATIONS  (STANDING):  allopurinol 100 milliGRAM(s) Oral daily  amLODIPine   Tablet 10 milliGRAM(s) Oral daily  atorvastatin 20 milliGRAM(s) Oral at bedtime  colchicine 0.6 milliGRAM(s) Oral daily  folic acid 1 milliGRAM(s) Oral daily  furosemide   Injectable 40 milliGRAM(s) IV Push daily  heparin  Infusion.  Unit(s)/Hr (15 mL/Hr) IV Continuous <Continuous>  insulin lispro (ADMELOG) corrective regimen sliding scale   SubCutaneous Before meals and at bedtime  senna 2 Tablet(s) Oral at bedtime    MEDICATIONS  (PRN):  acetaminophen   Tablet .. 650 milliGRAM(s) Oral every 6 hours PRN Temp greater or equal to 38C (100.4F), Moderate Pain (4 - 6)  heparin   Injectable 6500 Unit(s) IV Push every 6 hours PRN For aPTT less than 40  heparin   Injectable 3000 Unit(s) IV Push every 6 hours PRN For aPTT between 40 - 57  ondansetron Injectable 4 milliGRAM(s) IV Push every 6 hours PRN Nausea and/or Vomiting  polyethylene glycol 3350 17 Gram(s) Oral daily PRN Constipation      REVIEW OF SYSTEMS:  CONSTITUTIONAL: No fever, weight loss, or fatigue  RESPIRATORY: No cough, wheezing, chills or hemoptysis; + shortness of breath  CARDIOVASCULAR: No chest pain, palpitations, dizziness, or leg swelling  GASTROINTESTINAL: No abdominal pain. No nausea, vomiting, or hematemesis; No diarrhea or constipation. No melena or hematochezia.  GENITOURINARY: No dysuria or hematuria, urinary frequency  NEUROLOGICAL: No headaches, memory loss, loss of strength, numbness, or tremors  SKIN: No itching, burning, rashes, or lesions     Vital Signs Last 24 Hrs  T(C): 36.8 (03 Jun 2021 15:36), Max: 36.8 (03 Jun 2021 13:51)  T(F): 98.3 (03 Jun 2021 15:36), Max: 98.3 (03 Jun 2021 13:51)  HR: 64 (03 Jun 2021 15:36) (64 - 84)  BP: 125/70 (03 Jun 2021 15:36) (124/76 - 153/85)  BP(mean): --  RR: 17 (03 Jun 2021 15:36) (17 - 19)  SpO2: 98% (03 Jun 2021 15:36) (94% - 100%)    PHYSICAL EXAMINATION:  GENERAL: Elderly man, sitting upright using nasal cannula  HEAD:  Atraumatic, Normocephalic  EYES:  conjunctiva and sclera clear  NECK: Supple, No JVD, Normal thyroid  CHEST/LUNG: basilar crackles  HEART: Regular rate and rhythm; No murmurs, rubs, or gallops  ABDOMEN: Soft, Nontender, Nondistended; Bowel sounds present  NERVOUS SYSTEM:  Alert & Oriented X3,    EXTREMITIES:  2+ Peripheral Pulses, No clubbing, cyanosis, or edema  SKIN: warm dry                          13.8   2.84  )-----------( 157      ( 03 Jun 2021 07:14 )             40.7     06-03    138  |  104  |  25<H>  ----------------------------<  75  3.9   |  20<L>  |  2.07<H>    Ca    9.2      03 Jun 2021 07:14  Phos  4.0     06-03  Mg     2.0     06-03    TPro  7.6  /  Alb  2.9<L>  /  TBili  0.5  /  DBili  x   /  AST  100<H>  /  ALT  43  /  AlkPhos  106  06-02    LIVER FUNCTIONS - ( 02 Jun 2021 15:01 )  Alb: 2.9 g/dL / Pro: 7.6 g/dL / ALK PHOS: 106 U/L / ALT: 43 U/L DA / AST: 100 U/L / GGT: x           CARDIAC MARKERS ( 03 Jun 2021 07:14 )  0.041 ng/mL / x     / x     / x     / x      CARDIAC MARKERS ( 02 Jun 2021 23:05 )  0.042 ng/mL / x     / x     / x     / x      CARDIAC MARKERS ( 02 Jun 2021 15:01 )  0.043 ng/mL / x     / x     / x     / x          PT/INR - ( 03 Jun 2021 07:14 )   PT: 14.2 sec;   INR: 1.20 ratio         PTT - ( 03 Jun 2021 07:14 )  PTT:58.3 sec    CAPILLARY BLOOD GLUCOSE      RADIOLOGY & ADDITIONAL TESTS:

## 2021-06-03 NOTE — PROGRESS NOTE ADULT - SUBJECTIVE AND OBJECTIVE BOX
Patient is a 75y old  Male who presents with a chief complaint of worsening SOB (02 Jun 2021 17:39)    pt seen in icu [  ], reg med floor [   ], bed [  ], chair at bedside [   ], a+o x3 [  ], lethargic [  ],  nad [  ]    smalls [  ], ngt [  ], peg [  ], et tube [  ], cent line [  ], picc line [  ]        Allergies    No Known Allergies        Vitals    T(F): 97.3 (06-03-21 @ 04:41), Max: 97.4 (06-02-21 @ 14:11)  HR: 71 (06-03-21 @ 04:41) (63 - 84)  BP: 131/84 (06-03-21 @ 04:41) (131/84 - 153/85)  RR: 18 (06-03-21 @ 04:41) (18 - 22)  SpO2: 100% (06-03-21 @ 04:41) (94% - 100%)  Wt(kg): --  CAPILLARY BLOOD GLUCOSE      POCT Blood Glucose.: 87 mg/dL (02 Jun 2021 21:23)      Labs                          13.3   3.41  )-----------( 160      ( 02 Jun 2021 23:03 )             39.0       06-02    136  |  108  |  27<H>  ----------------------------<  105<H>  4.6   |  23  |  2.06<H>    Ca    9.3      02 Jun 2021 15:01  Mg     2.1     06-02    TPro  7.6  /  Alb  2.9<L>  /  TBili  0.5  /  DBili  x   /  AST  100<H>  /  ALT  43  /  AlkPhos  106  06-02      CARDIAC MARKERS ( 02 Jun 2021 23:05 )  0.042 ng/mL / x     / x     / x     / x      CARDIAC MARKERS ( 02 Jun 2021 15:01 )  0.043 ng/mL / x     / x     / x     / x                Radiology Results      Meds    MEDICATIONS  (STANDING):  allopurinol 300 milliGRAM(s) Oral at bedtime  amLODIPine   Tablet 10 milliGRAM(s) Oral daily  atorvastatin 20 milliGRAM(s) Oral at bedtime  colchicine 0.6 milliGRAM(s) Oral daily  folic acid 1 milliGRAM(s) Oral daily  furosemide   Injectable 40 milliGRAM(s) IV Push daily  heparin  Infusion.  Unit(s)/Hr (15 mL/Hr) IV Continuous <Continuous>  insulin lispro (ADMELOG) corrective regimen sliding scale   SubCutaneous Before meals and at bedtime  senna 2 Tablet(s) Oral at bedtime      MEDICATIONS  (PRN):  acetaminophen   Tablet .. 650 milliGRAM(s) Oral every 6 hours PRN Temp greater or equal to 38C (100.4F), Moderate Pain (4 - 6)  heparin   Injectable 6500 Unit(s) IV Push every 6 hours PRN For aPTT less than 40  heparin   Injectable 3000 Unit(s) IV Push every 6 hours PRN For aPTT between 40 - 57  ondansetron Injectable 4 milliGRAM(s) IV Push every 6 hours PRN Nausea and/or Vomiting  polyethylene glycol 3350 17 Gram(s) Oral daily PRN Constipation      Physical Exam    Neuro :  no focal deficits  Respiratory: CTA B/L  CV: RRR, S1S2, no murmurs,   Abdominal: Soft, NT, ND +BS,  Extremities: No edema, + peripheral pulses    ASSESSMENT    Dyspnea    No pertinent past medical history    DM (diabetes mellitus)    HTN (hypertension)    Hypertension    Diabetes mellitus    Chronic kidney disease    HLD (hyperlipidemia)    Glaucoma    Gout    No significant past surgical history        PLAN     Patient is a 75y old  Male who presents with a chief complaint of worsening SOB (02 Jun 2021 17:39)    pt seen in tele [x  ], reg med floor [   ], bed [ x ], chair at bedside [   ], a+o x3 [ x ], lethargic [  ],  nad [  x]      Allergies    No Known Allergies        Vitals    T(F): 97.3 (06-03-21 @ 04:41), Max: 97.4 (06-02-21 @ 14:11)  HR: 71 (06-03-21 @ 04:41) (63 - 84)  BP: 131/84 (06-03-21 @ 04:41) (131/84 - 153/85)  RR: 18 (06-03-21 @ 04:41) (18 - 22)  SpO2: 100% (06-03-21 @ 04:41) (94% - 100%)  Wt(kg): --  CAPILLARY BLOOD GLUCOSE      POCT Blood Glucose.: 87 mg/dL (02 Jun 2021 21:23)      Labs                          13.3   3.41  )-----------( 160      ( 02 Jun 2021 23:03 )             39.0       06-02    136  |  108  |  27<H>  ----------------------------<  105<H>  4.6   |  23  |  2.06<H>    Ca    9.3      02 Jun 2021 15:01  Mg     2.1     06-02    TPro  7.6  /  Alb  2.9<L>  /  TBili  0.5  /  DBili  x   /  AST  100<H>  /  ALT  43  /  AlkPhos  106  06-02      CARDIAC MARKERS ( 02 Jun 2021 23:05 )  0.042 ng/mL / x     / x     / x     / x      CARDIAC MARKERS ( 02 Jun 2021 15:01 )  0.043 ng/mL / x     / x     / x     / x                Radiology Results      Meds    MEDICATIONS  (STANDING):  allopurinol 300 milliGRAM(s) Oral at bedtime  amLODIPine   Tablet 10 milliGRAM(s) Oral daily  atorvastatin 20 milliGRAM(s) Oral at bedtime  colchicine 0.6 milliGRAM(s) Oral daily  folic acid 1 milliGRAM(s) Oral daily  furosemide   Injectable 40 milliGRAM(s) IV Push daily  heparin  Infusion.  Unit(s)/Hr (15 mL/Hr) IV Continuous <Continuous>  insulin lispro (ADMELOG) corrective regimen sliding scale   SubCutaneous Before meals and at bedtime  senna 2 Tablet(s) Oral at bedtime      MEDICATIONS  (PRN):  acetaminophen   Tablet .. 650 milliGRAM(s) Oral every 6 hours PRN Temp greater or equal to 38C (100.4F), Moderate Pain (4 - 6)  heparin   Injectable 6500 Unit(s) IV Push every 6 hours PRN For aPTT less than 40  heparin   Injectable 3000 Unit(s) IV Push every 6 hours PRN For aPTT between 40 - 57  ondansetron Injectable 4 milliGRAM(s) IV Push every 6 hours PRN Nausea and/or Vomiting  polyethylene glycol 3350 17 Gram(s) Oral daily PRN Constipation      Physical Exam    Neuro :  no focal deficits  Respiratory: CTA B/L  CV: RRR, S1S2, no murmurs,   Abdominal: Soft, NT, ND +BS,  Extremities: No edema, + peripheral pulses    ASSESSMENT    worsening sob with activity,   r/o chf exacerb,   r/o acs,   r/o pe,   pulm edema,   anemia   h/o DM,   HTN,   CKD 3,   HLD,   gout,   glaucoma   HFrEF    PLAN      cont tele,   acs protocol,   coreg,   aspirin, statin,   lasix,   cardio cons   ce x 2 noted above  f/u echo  f/u vq scan  cardio cons   bronchodilators prn,   f/u [id number]  cont lispro ss

## 2021-06-03 NOTE — PROGRESS NOTE ADULT - ASSESSMENT
Patient is a 75 year old male from home AAO x3, with PMH of DM, HTN, CKD, HLD, Gout and Glaucoma, who presented to the ED due to worsening SOB, likely secondary to CHF

## 2021-06-03 NOTE — CONSULT NOTE ADULT - SUBJECTIVE AND OBJECTIVE BOX
PULMONARY CONSULT NOTE      PEMA VENCES  [id number]    Patient is a 75y old  Male who presents with a chief complaint of worsening SOB (03 Jun 2021 09:33)    History of Present Illness:  Reason for Admission: worsening SOB  History of Present Illness:   Patient is a 75 year old male from home AAO x3, with PMH of DM, HTN, CKD, HLD, gout and glaucoma, who presented to the ED due to worsening SOB. Patient states that for the past 3 weeks, he gets short of breath mainly on exertion. Son, Bruce at bedside assisting with providing history. Son states that the patient went on a walk yesterday and had to stop about 3 times due to shortness of breath. Patient also having swollen legs. Patient and son deny knowing of any history of CHF. Denies being on any diuretics. Patient sleeps with only one pillow at night.     Review of Systems:      HISTORY OF PRESENT ILLNESS: As above. Awake, alert, comfortable in bed in NAD    MEDICATIONS  (STANDING):  allopurinol 100 milliGRAM(s) Oral daily  amLODIPine   Tablet 10 milliGRAM(s) Oral daily  atorvastatin 20 milliGRAM(s) Oral at bedtime  colchicine 0.6 milliGRAM(s) Oral daily  folic acid 1 milliGRAM(s) Oral daily  furosemide   Injectable 40 milliGRAM(s) IV Push daily  heparin  Infusion.  Unit(s)/Hr (15 mL/Hr) IV Continuous <Continuous>  insulin lispro (ADMELOG) corrective regimen sliding scale   SubCutaneous Before meals and at bedtime  senna 2 Tablet(s) Oral at bedtime      MEDICATIONS  (PRN):  acetaminophen   Tablet .. 650 milliGRAM(s) Oral every 6 hours PRN Temp greater or equal to 38C (100.4F), Moderate Pain (4 - 6)  heparin   Injectable 6500 Unit(s) IV Push every 6 hours PRN For aPTT less than 40  heparin   Injectable 3000 Unit(s) IV Push every 6 hours PRN For aPTT between 40 - 57  ondansetron Injectable 4 milliGRAM(s) IV Push every 6 hours PRN Nausea and/or Vomiting  polyethylene glycol 3350 17 Gram(s) Oral daily PRN Constipation      Allergies    No Known Allergies    Intolerances        PAST MEDICAL & SURGICAL HISTORY:  DM (diabetes mellitus)    HTN (hypertension)    Chronic kidney disease    HLD (hyperlipidemia)    Glaucoma    Gout    No significant past surgical history        FAMILY HISTORY:      SOCIAL HISTORY  Smoking History:     REVIEW OF SYSTEMS:    CONSTITUTIONAL:  No fevers, chills, sweats    HEENT:  Eyes:  No diplopia or blurred vision. ENT:  No earache, sore throat or runny nose.    CARDIOVASCULAR:  No pressure, squeezing, tightness, or heaviness about the chest; no palpitations.    RESPIRATORY:  Per HPI    GASTROINTESTINAL:  No abdominal pain, nausea, vomiting or diarrhea.    GENITOURINARY:  No dysuria, frequency or urgency.    NEUROLOGIC:  No paresthesias, fasciculations, seizures or weakness.    PSYCHIATRIC:  No disorder of thought or mood.    Vital Signs Last 24 Hrs  T(C): 36.7 (03 Jun 2021 07:53), Max: 36.7 (03 Jun 2021 07:53)  T(F): 98.1 (03 Jun 2021 07:53), Max: 98.1 (03 Jun 2021 07:53)  HR: 67 (03 Jun 2021 07:53) (63 - 84)  BP: 124/76 (03 Jun 2021 07:53) (124/76 - 153/85)  BP(mean): --  RR: 18 (03 Jun 2021 07:53) (18 - 22)  SpO2: 99% (03 Jun 2021 07:53) (94% - 100%)  I&O's Detail    02 Jun 2021 07:01  -  03 Jun 2021 07:00  --------------------------------------------------------  IN:    Heparin Infusion: 126 mL    Oral Fluid: 250 mL  Total IN: 376 mL    OUT:    Voided (mL): 1900 mL  Total OUT: 1900 mL    Total NET: -1524 mL          PHYSICAL EXAMINATION:    GENERAL: The patient is a well-developed, well-nourished _____in no apparent distress.     HEENT: Head is normocephalic and atraumatic. Extraocular muscles are intact. Mucous membranes are moist.     NECK: Supple.     LUNGS: Clear to auscultation without wheezing, rales, or rhonchi. Respirations unlabored    HEART: Regular rate and rhythm without murmur.    ABDOMEN: Soft, nontender, and nondistended.  No hepatosplenomegaly is noted.    EXTREMITIES: Without any cyanosis, clubbing, rash, lesions or edema.    NEUROLOGIC: Grossly intact.      LABS:                        13.8   2.84  )-----------( 157      ( 03 Jun 2021 07:14 )             40.7     06-03    138  |  104  |  25<H>  ----------------------------<  75  3.9   |  20<L>  |  2.07<H>    Ca    9.2      03 Jun 2021 07:14  Phos  4.0     06-03  Mg     2.0     06-03    TPro  7.6  /  Alb  2.9<L>  /  TBili  0.5  /  DBili  x   /  AST  100<H>  /  ALT  43  /  AlkPhos  106  06-02    PT/INR - ( 03 Jun 2021 07:14 )   PT: 14.2 sec;   INR: 1.20 ratio         PTT - ( 03 Jun 2021 07:14 )  PTT:58.3 sec      CARDIAC MARKERS ( 03 Jun 2021 07:14 )  0.041 ng/mL / x     / x     / x     / x      CARDIAC MARKERS ( 02 Jun 2021 23:05 )  0.042 ng/mL / x     / x     / x     / x      CARDIAC MARKERS ( 02 Jun 2021 15:01 )  0.043 ng/mL / x     / x     / x     / x          D-Dimer Assay, Quantitative: 1119 ng/mL DDU (06-02-21 @ 15:01)    Serum Pro-Brain Natriuretic Peptide: 4037 pg/mL (06-02-21 @ 15:01)          MICROBIOLOGY:    RADIOLOGY & ADDITIONAL STUDIES:    CXR:  < from: Xray Chest 2 Views PA/Lat (06.02.21 @ 15:26) >  FINDINGS/  IMPRESSION:  New smallleft pleural effusion. New mild opacities right lung base. Follow-up recommended.    Heart size within normal limits.      < end of copied text >    Ct scan chest:    ekg;    echo:

## 2021-06-03 NOTE — CONSULT NOTE ADULT - SUBJECTIVE AND OBJECTIVE BOX
CHIEF COMPLAINT:Patient is a 75y old  Male who presents with a chief complaint of worsening SOB .      HPI:  Patient is a 75 year old male from home AAO x3, with PMH of DM, HTN, CKD, HLD, gout and glaucoma, who presented to the ED due to worsening SOB. Patient states that for the past 3 weeks, he gets short of breath mainly on exertion. Son, Bruce at bedside assisting with providing history. Son states that the patient went on a walk yesterday and had to stop about 3 times due to shortness of breath. Patient also having swollen legs. Patient and son deny knowing of any history of CHF. Denies being on any diuretics. Patient sleeps with only one pillow at night.  (02 Jun 2021 17:39)      PAST MEDICAL & SURGICAL HISTORY:  DM (diabetes mellitus)    HTN (hypertension)    Chronic kidney disease    HLD (hyperlipidemia)    Glaucoma    Gout    No significant past surgical history        MEDICATIONS  (STANDING):  allopurinol 300 milliGRAM(s) Oral at bedtime  amLODIPine   Tablet 10 milliGRAM(s) Oral daily  atorvastatin 20 milliGRAM(s) Oral at bedtime  colchicine 0.6 milliGRAM(s) Oral daily  folic acid 1 milliGRAM(s) Oral daily  furosemide   Injectable 40 milliGRAM(s) IV Push daily  heparin  Infusion.  Unit(s)/Hr (15 mL/Hr) IV Continuous <Continuous>  insulin lispro (ADMELOG) corrective regimen sliding scale   SubCutaneous Before meals and at bedtime  senna 2 Tablet(s) Oral at bedtime    MEDICATIONS  (PRN):  acetaminophen   Tablet .. 650 milliGRAM(s) Oral every 6 hours PRN Temp greater or equal to 38C (100.4F), Moderate Pain (4 - 6)  heparin   Injectable 6500 Unit(s) IV Push every 6 hours PRN For aPTT less than 40  heparin   Injectable 3000 Unit(s) IV Push every 6 hours PRN For aPTT between 40 - 57  ondansetron Injectable 4 milliGRAM(s) IV Push every 6 hours PRN Nausea and/or Vomiting  polyethylene glycol 3350 17 Gram(s) Oral daily PRN Constipation      FAMILY HISTORY:No hx of CAD      SOCIAL HISTORY:    [x ] Non-smoker    [x ] Alcohol-denies    Allergies    No Known Allergies    Intolerances    	    REVIEW OF SYSTEMS:  CONSTITUTIONAL: No fever, weight loss, or fatigue  EYES: No eye pain, visual disturbances, or discharge  ENT:  No difficulty hearing, tinnitus, vertigo; No sinus or throat pain  NECK: No pain or stiffness  RESPIRATORY: No cough, wheezing, chills or hemoptysis; + Shortness of Breath  CARDIOVASCULAR: No chest pain, palpitations, passing out, dizziness, or leg swelling  GASTROINTESTINAL: No abdominal or epigastric pain. No nausea, vomiting, or hematemesis; No diarrhea or constipation. No melena or hematochezia.  GENITOURINARY: No dysuria, frequency, hematuria, or incontinence  NEUROLOGICAL: No headaches, memory loss, loss of strength, numbness, or tremors  SKIN: No itching, burning, rashes, or lesions   LYMPH Nodes: No enlarged glands  ENDOCRINE: No heat or cold intolerance; No hair loss  MUSCULOSKELETAL: No joint pain or swelling; No muscle, back, or extremity pain  PSYCHIATRIC: No depression, anxiety, mood swings, or difficulty sleeping  HEME/LYMPH: No easy bruising, or bleeding gums  ALLERGY AND IMMUNOLOGIC: No hives or eczema	      PHYSICAL EXAM:  T(C): 36.7 (06-03-21 @ 07:53), Max: 36.7 (06-03-21 @ 07:53)  HR: 67 (06-03-21 @ 07:53) (63 - 84)  BP: 124/76 (06-03-21 @ 07:53) (124/76 - 153/85)  RR: 18 (06-03-21 @ 07:53) (18 - 22)  SpO2: 99% (06-03-21 @ 07:53) (94% - 100%)  Wt(kg): --  I&O's Summary    02 Jun 2021 07:01  -  03 Jun 2021 07:00  --------------------------------------------------------  IN: 376 mL / OUT: 1900 mL / NET: -1524 mL        Appearance: Normal	  HEENT:   Normal oral mucosa, PERRL, EOMI	  Lymphatic: No lymphadenopathy  Cardiovascular: Normal S1 S2, No JVD, No murmurs, No edema  Respiratory:Dec BS LT base	  Psychiatry: A & O x 3, Mood & affect appropriate  Gastrointestinal:  Soft, Non-tender, + BS	  Skin: No rashes, No ecchymoses, No cyanosis	  Neurologic: Non-focal  Extremities: Normal range of motion, No clubbing, cyanosis or edema  Vascular: Peripheral pulses palpable 2+ bilaterally    	    ECG: nsr with lbbb 	    LABS:	 	      CARDIAC MARKERS ( 03 Jun 2021 07:14 )  0.041 ng/mL / x     / x     / x     / x      CARDIAC MARKERS ( 02 Jun 2021 23:05 )  0.042 ng/mL / x     / x     / x     / x      CARDIAC MARKERS ( 02 Jun 2021 15:01 )  0.043 ng/mL / x     / x     / x     / x                             13.8   2.84  )-----------( 157      ( 03 Jun 2021 07:14 )             40.7     06-03    138  |  104  |  25<H>  ----------------------------<  75  3.9   |  20<L>  |  2.07<H>    Ca    9.2      03 Jun 2021 07:14  Phos  4.0     06-03  Mg     2.0     06-03    TPro  7.6  /  Alb  2.9<L>  /  TBili  0.5  /  DBili  x   /  AST  100<H>  /  ALT  43  /  AlkPhos  106  06-02    proBNP: Serum Pro-Brain Natriuretic Peptide: 4037 pg/mL (06-02 @ 15:01)    Lipid Profile: Cholesterol 162    TG 67    ad< from: US Duplex Venous Lower Ext Complete, Bilateral (06.02.21 @ 16:54) >  EXAM:  US DPLX LWR EXT VEINS COMPL BI                            PROCEDURE DATE:  06/02/2021          INTERPRETATION:  CLINICAL INFORMATION: Bilateral lower extremity swelling, shortness of breath. Evaluate for lower extremity DVT.    COMPARISON: None available.    TECHNIQUE: Duplex sonography of the BILATERAL LOWER extremity veins with color and spectral Doppler, with and without compression.    FINDINGS:    RIGHT:  Normal compressibility of the RIGHT common femoral, femoral and popliteal veins.  Doppler examination shows normal spontaneous and phasic flow.  No RIGHT calf vein thrombosis is detected.    LEFT:  Normal compressibility of the LEFT common femoral, femoral and popliteal veins.  Doppler examination shows normal spontaneous and phasic flow.  No LEFT calf vein thrombosis is detected.    IMPRESSION:  No evidence of deep venous thrombosis in either lower extremity.                OSCAR SRIVASTAVA MD; Attending Radiologist  This document has been electronically signed. Jun 2 2021  4:58PM      EXAM:  XR CHEST PA LAT 2V                            PROCEDURE DATE:  06/02/2021          INTERPRETATION:  CLINICAL STATEMENT: Chest pain.    TECHNIQUE: PA and lateral views of the chest.    COMPARISON: 6/1/2019    FINDINGS/  IMPRESSION:  New smallleft pleural effusion. New mild opacities right lung base. Follow-up recommended.    Heart size within normal limits.              DOE DEWITT MD; Attending Radiologist  This document has been electronically signed. Jun 2 2021  5:32PM        < from: Transthoracic Echocardiogram (06.02.19 @ 06:43) >  OBSERVATIONS:  Mitral Valve: Normal mitral valve. Trace mitral  regurgitation.  Aortic Root: Aortic Root: 3.0 cm.    Aortic Valve: Normal trileaflet aortic valve. Mild aortic  regurgitation.  Left Atrium: LA volume index = 15 cc/m2.  Left Ventricle: Normal Left Ventricular Systolic Function,  (EF = 55%) Normal left ventricular internal dimensions and  wall thicknesses. Grade I diastolic dysfunction (Impaired  relaxation).  Right Heart: Normal right atrium. Normal right ventricular  size and systolic function (TAPSE 2.4 cm). There is mild  tricuspid regurgitation. There is mild pulmonic  regurgitation.  Pericardium/PleuraNormal pericardium with no pericardial  effusion.    < from: Nuclear Stress Test-Pharmacologic (06.04.19 @ 06:56) >  IMPRESSIONS:Normal Study  * Negative ECG evidence of ischemia after IV of Lexiscan.  * Review of raw data shows: The study is of good technical  quality.  * The left ventricle was normal in size. Normal myocardial  perfusion scan, with no evidence of infarction or  inducible ischemia.  * Post-stress resting myocardial perfusion gated SPECT  imaging was performed (LVEF > 70%)      PT/INR - ( 03 Jun 2021 07:14 )   PT: 14.2 sec;   INR: 1.20 ratio         PTT - ( 03 Jun 2021 07:14 )  PTT:58.3 sec

## 2021-06-04 DIAGNOSIS — I27.20 PULMONARY HYPERTENSION, UNSPECIFIED: ICD-10-CM

## 2021-06-04 LAB
ANION GAP SERPL CALC-SCNC: 10 MMOL/L — SIGNIFICANT CHANGE UP (ref 5–17)
APTT BLD: 73.6 SEC — HIGH (ref 27.5–35.5)
APTT BLD: 83.8 SEC — HIGH (ref 27.5–35.5)
BUN SERPL-MCNC: 29 MG/DL — HIGH (ref 7–18)
CALCIUM SERPL-MCNC: 8.6 MG/DL — SIGNIFICANT CHANGE UP (ref 8.4–10.5)
CHLORIDE SERPL-SCNC: 105 MMOL/L — SIGNIFICANT CHANGE UP (ref 96–108)
CO2 SERPL-SCNC: 23 MMOL/L — SIGNIFICANT CHANGE UP (ref 22–31)
CREAT SERPL-MCNC: 2.37 MG/DL — HIGH (ref 0.5–1.3)
GLUCOSE BLDC GLUCOMTR-MCNC: 110 MG/DL — HIGH (ref 70–99)
GLUCOSE BLDC GLUCOMTR-MCNC: 167 MG/DL — HIGH (ref 70–99)
GLUCOSE BLDC GLUCOMTR-MCNC: 86 MG/DL — SIGNIFICANT CHANGE UP (ref 70–99)
GLUCOSE BLDC GLUCOMTR-MCNC: 98 MG/DL — SIGNIFICANT CHANGE UP (ref 70–99)
GLUCOSE SERPL-MCNC: 96 MG/DL — SIGNIFICANT CHANGE UP (ref 70–99)
HCT VFR BLD CALC: 35.5 % — LOW (ref 39–50)
HGB BLD-MCNC: 11.9 G/DL — LOW (ref 13–17)
MCHC RBC-ENTMCNC: 32.7 PG — SIGNIFICANT CHANGE UP (ref 27–34)
MCHC RBC-ENTMCNC: 33.5 GM/DL — SIGNIFICANT CHANGE UP (ref 32–36)
MCV RBC AUTO: 97.5 FL — SIGNIFICANT CHANGE UP (ref 80–100)
NRBC # BLD: 0 /100 WBCS — SIGNIFICANT CHANGE UP (ref 0–0)
PLATELET # BLD AUTO: 134 K/UL — LOW (ref 150–400)
POTASSIUM SERPL-MCNC: 3.6 MMOL/L — SIGNIFICANT CHANGE UP (ref 3.5–5.3)
POTASSIUM SERPL-SCNC: 3.6 MMOL/L — SIGNIFICANT CHANGE UP (ref 3.5–5.3)
PROT ?TM UR-MCNC: 79 MG/DL — HIGH (ref 0–12)
RBC # BLD: 3.64 M/UL — LOW (ref 4.2–5.8)
RBC # FLD: 14.5 % — SIGNIFICANT CHANGE UP (ref 10.3–14.5)
SODIUM SERPL-SCNC: 138 MMOL/L — SIGNIFICANT CHANGE UP (ref 135–145)
WBC # BLD: 2.64 K/UL — LOW (ref 3.8–10.5)
WBC # FLD AUTO: 2.64 K/UL — LOW (ref 3.8–10.5)

## 2021-06-04 RX ORDER — PANTOPRAZOLE SODIUM 20 MG/1
40 TABLET, DELAYED RELEASE ORAL
Refills: 0 | Status: DISCONTINUED | OUTPATIENT
Start: 2021-06-04 | End: 2021-06-10

## 2021-06-04 RX ORDER — ASPIRIN/CALCIUM CARB/MAGNESIUM 324 MG
81 TABLET ORAL DAILY
Refills: 0 | Status: DISCONTINUED | OUTPATIENT
Start: 2021-06-04 | End: 2021-06-10

## 2021-06-04 RX ORDER — HYDRALAZINE HCL 50 MG
10 TABLET ORAL THREE TIMES A DAY
Refills: 0 | Status: DISCONTINUED | OUTPATIENT
Start: 2021-06-04 | End: 2021-06-10

## 2021-06-04 RX ORDER — ISOSORBIDE DINITRATE 5 MG/1
10 TABLET ORAL THREE TIMES A DAY
Refills: 0 | Status: DISCONTINUED | OUTPATIENT
Start: 2021-06-04 | End: 2021-06-10

## 2021-06-04 RX ORDER — HEPARIN SODIUM 5000 [USP'U]/ML
5000 INJECTION INTRAVENOUS; SUBCUTANEOUS EVERY 12 HOURS
Refills: 0 | Status: DISCONTINUED | OUTPATIENT
Start: 2021-06-04 | End: 2021-06-10

## 2021-06-04 RX ORDER — CARVEDILOL PHOSPHATE 80 MG/1
3.12 CAPSULE, EXTENDED RELEASE ORAL EVERY 12 HOURS
Refills: 0 | Status: DISCONTINUED | OUTPATIENT
Start: 2021-06-04 | End: 2021-06-06

## 2021-06-04 RX ORDER — PREGABALIN 225 MG/1
1000 CAPSULE ORAL DAILY
Refills: 0 | Status: DISCONTINUED | OUTPATIENT
Start: 2021-06-04 | End: 2021-06-10

## 2021-06-04 RX ADMIN — Medication 40 MILLIGRAM(S): at 05:20

## 2021-06-04 RX ADMIN — Medication 1 DROP(S): at 05:20

## 2021-06-04 RX ADMIN — Medication 1: at 21:51

## 2021-06-04 RX ADMIN — Medication 100 MILLIGRAM(S): at 12:26

## 2021-06-04 RX ADMIN — ERGOCALCIFEROL 50000 UNIT(S): 1.25 CAPSULE ORAL at 12:27

## 2021-06-04 RX ADMIN — ISOSORBIDE DINITRATE 10 MILLIGRAM(S): 5 TABLET ORAL at 12:26

## 2021-06-04 RX ADMIN — Medication 10 MILLIGRAM(S): at 16:52

## 2021-06-04 RX ADMIN — Medication 1 MILLIGRAM(S): at 12:26

## 2021-06-04 RX ADMIN — ATORVASTATIN CALCIUM 20 MILLIGRAM(S): 80 TABLET, FILM COATED ORAL at 21:19

## 2021-06-04 RX ADMIN — Medication 10 MILLIGRAM(S): at 21:19

## 2021-06-04 RX ADMIN — PREGABALIN 1000 MICROGRAM(S): 225 CAPSULE ORAL at 12:27

## 2021-06-04 RX ADMIN — Medication 81 MILLIGRAM(S): at 12:26

## 2021-06-04 RX ADMIN — Medication 1 DROP(S): at 20:41

## 2021-06-04 RX ADMIN — HEPARIN SODIUM 5000 UNIT(S): 5000 INJECTION INTRAVENOUS; SUBCUTANEOUS at 17:31

## 2021-06-04 RX ADMIN — CARVEDILOL PHOSPHATE 3.12 MILLIGRAM(S): 80 CAPSULE, EXTENDED RELEASE ORAL at 17:30

## 2021-06-04 RX ADMIN — HEPARIN SODIUM 1400 UNIT(S)/HR: 5000 INJECTION INTRAVENOUS; SUBCUTANEOUS at 01:29

## 2021-06-04 RX ADMIN — Medication 0.6 MILLIGRAM(S): at 12:26

## 2021-06-04 RX ADMIN — ISOSORBIDE DINITRATE 10 MILLIGRAM(S): 5 TABLET ORAL at 16:53

## 2021-06-04 RX ADMIN — SENNA PLUS 2 TABLET(S): 8.6 TABLET ORAL at 21:19

## 2021-06-04 RX ADMIN — AMLODIPINE BESYLATE 10 MILLIGRAM(S): 2.5 TABLET ORAL at 05:20

## 2021-06-04 NOTE — PHYSICAL THERAPY INITIAL EVALUATION ADULT - DISCHARGE DISPOSITION, PT EVAL
Home with home PT pending improvement in functional status to baseline -- indep and safe/home w/ assist/home w/ home PT

## 2021-06-04 NOTE — PROGRESS NOTE ADULT - SUBJECTIVE AND OBJECTIVE BOX
Patient is a 75y old  Male who presents with a chief complaint of worsening SOB (03 Jun 2021 14:08)      pt seen in tele [x  ], reg med floor [   ], bed [ x ], chair at bedside [   ], a+o x3 [ x ], lethargic [  ],  nad [  x]      Allergies    No Known Allergies        Vitals    T(F): 97.9 (06-04-21 @ 04:44), Max: 98.5 (06-03-21 @ 19:15)  HR: 77 (06-04-21 @ 04:44) (64 - 77)  BP: 141/78 (06-04-21 @ 04:44) (124/76 - 141/78)  RR: 18 (06-04-21 @ 04:44) (17 - 18)  SpO2: 100% (06-04-21 @ 04:44) (96% - 100%)  Wt(kg): --  CAPILLARY BLOOD GLUCOSE      POCT Blood Glucose.: 116 mg/dL (03 Jun 2021 21:13)      Labs                          13.8   2.84  )-----------( 157      ( 03 Jun 2021 07:14 )             40.7       06-03    138  |  104  |  25<H>  ----------------------------<  75  3.9   |  20<L>  |  2.07<H>    Ca    9.2      03 Jun 2021 07:14  Phos  4.0     06-03  Mg     2.0     06-03    TPro  7.6  /  Alb  2.9<L>  /  TBili  0.5  /  DBili  x   /  AST  100<H>  /  ALT  43  /  AlkPhos  106  06-02      CARDIAC MARKERS ( 03 Jun 2021 07:14 )  0.041 ng/mL / x     / x     / x     / x      CARDIAC MARKERS ( 02 Jun 2021 23:05 )  0.042 ng/mL / x     / x     / x     / x      CARDIAC MARKERS ( 02 Jun 2021 15:01 )  0.043 ng/mL / x     / x     / x     / x                Radiology Results      Meds    MEDICATIONS  (STANDING):  allopurinol 100 milliGRAM(s) Oral daily  amLODIPine   Tablet 10 milliGRAM(s) Oral daily  atorvastatin 20 milliGRAM(s) Oral at bedtime  colchicine 0.6 milliGRAM(s) Oral daily  ergocalciferol 25686 Unit(s) Oral <User Schedule>  folic acid 1 milliGRAM(s) Oral daily  furosemide   Injectable 40 milliGRAM(s) IV Push daily  heparin  Infusion.  Unit(s)/Hr (15 mL/Hr) IV Continuous <Continuous>  insulin lispro (ADMELOG) corrective regimen sliding scale   SubCutaneous Before meals and at bedtime  senna 2 Tablet(s) Oral at bedtime  timolol 0.5% Solution 1 Drop(s) Both EYES two times a day      MEDICATIONS  (PRN):  acetaminophen   Tablet .. 650 milliGRAM(s) Oral every 6 hours PRN Temp greater or equal to 38C (100.4F), Moderate Pain (4 - 6)  heparin   Injectable 6500 Unit(s) IV Push every 6 hours PRN For aPTT less than 40  heparin   Injectable 3000 Unit(s) IV Push every 6 hours PRN For aPTT between 40 - 57  ondansetron Injectable 4 milliGRAM(s) IV Push every 6 hours PRN Nausea and/or Vomiting  polyethylene glycol 3350 17 Gram(s) Oral daily PRN Constipation      Physical Exam    Neuro :  no focal deficits  Respiratory: CTA B/L  CV: RRR, S1S2, no murmurs,   Abdominal: Soft, NT, ND +BS,  Extremities: No edema, + peripheral pulses    ASSESSMENT    worsening sob with activity,   r/o chf exacerb,   r/o acs,   r/o pe,   pulm edema,   anemia   h/o DM,   HTN,   CKD 3,   HLD,   gout,   glaucoma   HFrEF    PLAN      cont tele,   acs protocol,   coreg,   aspirin, statin,   lasix,   cardio cons   ce x 2 noted above  f/u echo  f/u vq scan  cardio cons   bronchodilators prn,   f/u hgba1c  cont lispro ss         Patient is a 75y old  Male who presents with a chief complaint of worsening SOB (03 Jun 2021 14:08)      pt seen in tele [x  ], reg med floor [   ], bed [ x ], chair at bedside [   ], a+o x3 [ x ], lethargic [  ],  nad [  x]      Allergies    No Known Allergies        Vitals    T(F): 97.9 (06-04-21 @ 04:44), Max: 98.5 (06-03-21 @ 19:15)  HR: 77 (06-04-21 @ 04:44) (64 - 77)  BP: 141/78 (06-04-21 @ 04:44) (124/76 - 141/78)  RR: 18 (06-04-21 @ 04:44) (17 - 18)  SpO2: 100% (06-04-21 @ 04:44) (96% - 100%)  Wt(kg): --  CAPILLARY BLOOD GLUCOSE      POCT Blood Glucose.: 116 mg/dL (03 Jun 2021 21:13)      Labs                          13.8   2.84  )-----------( 157      ( 03 Jun 2021 07:14 )             40.7       06-03    138  |  104  |  25<H>  ----------------------------<  75  3.9   |  20<L>  |  2.07<H>    Ca    9.2      03 Jun 2021 07:14  Phos  4.0     06-03  Mg     2.0     06-03    TPro  7.6  /  Alb  2.9<L>  /  TBili  0.5  /  DBili  x   /  AST  100<H>  /  ALT  43  /  AlkPhos  106  06-02     A1C with Estimated Average Glucose (06.03.21 @ 10:32)   A1C with Estimated Average Glucose Result: 5.3:      CARDIAC MARKERS ( 03 Jun 2021 07:14 )  0.041 ng/mL / x     / x     / x     / x      CARDIAC MARKERS ( 02 Jun 2021 23:05 )  0.042 ng/mL / x     / x     / x     / x      CARDIAC MARKERS ( 02 Jun 2021 15:01 )  0.043 ng/mL / x     / x     / x     / x        < from: Transthoracic Echocardiogram (06.03.21 @ 07:06) >  CONCLUSIONS:  1. Normal mitral valve. Mild to moderate mitral  regurgitation.  2. Calcified trileaflet aortic valve with normal opening.  No aortic stenosis. Mild to moderate aortic regurgitation.  3. Normal aortic root.  4. Normal left atrium.  5. Normalleft ventricular internal dimensions and wall  thicknesses.  6. Mild to moderate global left ventricular systolic  dysfunction (EF 40-45% by visual estimation). Paradoxical  septal motion is seen, consistent with conduction delay.  Not all LV wall segments were seen.  7. Grade II diastolic dysfunction.  8. Off axis images preclude accurate assessment of right  ventricular size. Normal RV systolic function (TAPSE 2.5  cm).  9. RV systolic pressure is mildly increased at  36 mm Hg.  10. Normal tricuspid valve. Trace tricuspid regurgitation.  11. Normal pulmonic valve. Trace pulmonic insufficiency is  noted.  12. No pericardial effusion.  13. Left pleural effusion.      < end of copied text >        Radiology Results    < from: NM Pulmonary Perfusion Scan (06.03.21 @ 12:43) >  IMPRESSION: Very low probability of pulmonary embolus.    < end of copied text >        Meds    MEDICATIONS  (STANDING):  allopurinol 100 milliGRAM(s) Oral daily  amLODIPine   Tablet 10 milliGRAM(s) Oral daily  atorvastatin 20 milliGRAM(s) Oral at bedtime  colchicine 0.6 milliGRAM(s) Oral daily  ergocalciferol 49748 Unit(s) Oral <User Schedule>  folic acid 1 milliGRAM(s) Oral daily  furosemide   Injectable 40 milliGRAM(s) IV Push daily  heparin  Infusion.  Unit(s)/Hr (15 mL/Hr) IV Continuous <Continuous>  insulin lispro (ADMELOG) corrective regimen sliding scale   SubCutaneous Before meals and at bedtime  senna 2 Tablet(s) Oral at bedtime  timolol 0.5% Solution 1 Drop(s) Both EYES two times a day      MEDICATIONS  (PRN):  acetaminophen   Tablet .. 650 milliGRAM(s) Oral every 6 hours PRN Temp greater or equal to 38C (100.4F), Moderate Pain (4 - 6)  heparin   Injectable 6500 Unit(s) IV Push every 6 hours PRN For aPTT less than 40  heparin   Injectable 3000 Unit(s) IV Push every 6 hours PRN For aPTT between 40 - 57  ondansetron Injectable 4 milliGRAM(s) IV Push every 6 hours PRN Nausea and/or Vomiting  polyethylene glycol 3350 17 Gram(s) Oral daily PRN Constipation      Physical Exam    Neuro :  no focal deficits  Respiratory: CTA B/L  CV: RRR, S1S2, no murmurs,   Abdominal: Soft, NT, ND +BS,  Extremities: No edema, + peripheral pulses    ASSESSMENT    worsening sob with activity,   r/o chf exacerb,   r/o acs,   r/o pe,   pulm edema,   anemia   h/o DM,   HTN,   CKD 3,   HLD,   gout,   glaucoma   HFrEF    PLAN      cont tele,   acs protocol,   coreg,   aspirin, statin,   lasix,   cardio f/u   ce x 3 noted above  echo with EF 40-45%. Paradoxical septal motion is seen, consistent with conduction delay. Grade II diastolic dysfunction. RV systolic pressure is mildly increased at  36 mm Hg.  No pericardial effusion. Left pleural effusion noted above.  vq scan with low prob pe noted above  d/c hep drip  cont hep sq  bronchodilators prn,   hgba1c 5.3 noted above  cont lispro ss  cont current meds       Patient is a 75y old  Male who presents with a chief complaint of worsening SOB (03 Jun 2021 14:08)      pt seen in tele [x  ], reg med floor [   ], bed [ x ], chair at bedside [   ], a+o x3 [ x ], lethargic [  ],  nad [  x]      Allergies    No Known Allergies        Vitals    T(F): 97.9 (06-04-21 @ 04:44), Max: 98.5 (06-03-21 @ 19:15)  HR: 77 (06-04-21 @ 04:44) (64 - 77)  BP: 141/78 (06-04-21 @ 04:44) (124/76 - 141/78)  RR: 18 (06-04-21 @ 04:44) (17 - 18)  SpO2: 100% (06-04-21 @ 04:44) (96% - 100%)  Wt(kg): --  CAPILLARY BLOOD GLUCOSE      POCT Blood Glucose.: 116 mg/dL (03 Jun 2021 21:13)      Labs                          13.8   2.84  )-----------( 157      ( 03 Jun 2021 07:14 )             40.7       06-03    138  |  104  |  25<H>  ----------------------------<  75  3.9   |  20<L>  |  2.07<H>    Ca    9.2      03 Jun 2021 07:14  Phos  4.0     06-03  Mg     2.0     06-03    TPro  7.6  /  Alb  2.9<L>  /  TBili  0.5  /  DBili  x   /  AST  100<H>  /  ALT  43  /  AlkPhos  106  06-02     A1C with Estimated Average Glucose (06.03.21 @ 10:32)   A1C with Estimated Average Glucose Result: 5.3:      CARDIAC MARKERS ( 03 Jun 2021 07:14 )  0.041 ng/mL / x     / x     / x     / x      CARDIAC MARKERS ( 02 Jun 2021 23:05 )  0.042 ng/mL / x     / x     / x     / x      CARDIAC MARKERS ( 02 Jun 2021 15:01 )  0.043 ng/mL / x     / x     / x     / x        < from: Transthoracic Echocardiogram (06.03.21 @ 07:06) >  CONCLUSIONS:  1. Normal mitral valve. Mild to moderate mitral  regurgitation.  2. Calcified trileaflet aortic valve with normal opening.  No aortic stenosis. Mild to moderate aortic regurgitation.  3. Normal aortic root.  4. Normal left atrium.  5. Normalleft ventricular internal dimensions and wall  thicknesses.  6. Mild to moderate global left ventricular systolic  dysfunction (EF 40-45% by visual estimation). Paradoxical  septal motion is seen, consistent with conduction delay.  Not all LV wall segments were seen.  7. Grade II diastolic dysfunction.  8. Off axis images preclude accurate assessment of right  ventricular size. Normal RV systolic function (TAPSE 2.5  cm).  9. RV systolic pressure is mildly increased at  36 mm Hg.  10. Normal tricuspid valve. Trace tricuspid regurgitation.  11. Normal pulmonic valve. Trace pulmonic insufficiency is  noted.  12. No pericardial effusion.  13. Left pleural effusion.      < end of copied text >        Radiology Results    < from: NM Pulmonary Perfusion Scan (06.03.21 @ 12:43) >  IMPRESSION: Very low probability of pulmonary embolus.    < end of copied text >        Meds    MEDICATIONS  (STANDING):  allopurinol 100 milliGRAM(s) Oral daily  amLODIPine   Tablet 10 milliGRAM(s) Oral daily  atorvastatin 20 milliGRAM(s) Oral at bedtime  colchicine 0.6 milliGRAM(s) Oral daily  ergocalciferol 96853 Unit(s) Oral <User Schedule>  folic acid 1 milliGRAM(s) Oral daily  furosemide   Injectable 40 milliGRAM(s) IV Push daily  heparin  Infusion.  Unit(s)/Hr (15 mL/Hr) IV Continuous <Continuous>  insulin lispro (ADMELOG) corrective regimen sliding scale   SubCutaneous Before meals and at bedtime  senna 2 Tablet(s) Oral at bedtime  timolol 0.5% Solution 1 Drop(s) Both EYES two times a day      MEDICATIONS  (PRN):  acetaminophen   Tablet .. 650 milliGRAM(s) Oral every 6 hours PRN Temp greater or equal to 38C (100.4F), Moderate Pain (4 - 6)  heparin   Injectable 6500 Unit(s) IV Push every 6 hours PRN For aPTT less than 40  heparin   Injectable 3000 Unit(s) IV Push every 6 hours PRN For aPTT between 40 - 57  ondansetron Injectable 4 milliGRAM(s) IV Push every 6 hours PRN Nausea and/or Vomiting  polyethylene glycol 3350 17 Gram(s) Oral daily PRN Constipation      Physical Exam    Neuro :  no focal deficits  Respiratory: CTA B/L  CV: RRR, S1S2, no murmurs,   Abdominal: Soft, NT, ND +BS,  Extremities: No edema, + peripheral pulses    ASSESSMENT    worsening sob with activity,   r/o chf exacerb,   r/o acs,   r/o pe,   pulm edema,   anemia   h/o DM,   HTN,   CKD 3,   HLD,   gout,   glaucoma   HFrEF    PLAN      cont tele,   acs protocol,   coreg,   aspirin, statin,   lasix,   cardio f/u   ce x 3 noted above  echo with EF 40-45%. Paradoxical septal motion is seen, consistent with conduction delay. Grade II diastolic dysfunction. RV systolic pressure is mildly increased at  36 mm Hg.  No pericardial effusion. Left pleural effusion noted above.  vq scan with low prob pe noted above  d/c hep drip  cont hep sq  bronchodilators prn,   hgba1c 5.3 noted above  cont lispro ss   renal cons  cont current meds

## 2021-06-04 NOTE — PHYSICAL THERAPY INITIAL EVALUATION ADULT - DIAGNOSIS, PT EVAL
PT evaluation demonstrates no focal deficits but general weakness bilat LE > bilat UE and decreased balance with subsequent decreased functional status

## 2021-06-04 NOTE — PROGRESS NOTE ADULT - ASSESSMENT
75 year old male from home AAO x3, with PMH of DM, HTN, CKD, HLD, gout and glaucoma, who presented to the ED due to worsening SOB,acute sytolic HF.  1.Tele monitoring.  2.D/C heparin drip.  3.IV lasix.  4.CRI-Renal eval,check SPEP.  5.DM-Insulin.  6.HTN and CHF-add low dose coreg,isordil and hydralazine,d/c norvasc  7.GI and DVT prophylaxis.  8.Stress test when stable.

## 2021-06-04 NOTE — PROGRESS NOTE ADULT - SUBJECTIVE AND OBJECTIVE BOX
Patient is a 75y old  Male who presents with a chief complaint of worsening SOB (04 Jun 2021 10:29)  patient is awake, alert, laying in bed in NAD  INTERVAL HPI/OVERNIGHT EVENTS:      VITAL SIGNS:  T(F): 97.9 (06-04-21 @ 07:14)  HR: 64 (06-04-21 @ 07:14)  BP: 131/72 (06-04-21 @ 07:14)  RR: 18 (06-04-21 @ 07:14)  SpO2: 100% (06-04-21 @ 07:14)  Wt(kg): --  I&O's Detail    03 Jun 2021 07:01  -  04 Jun 2021 07:00  --------------------------------------------------------  IN:    Oral Fluid: 50 mL  Total IN: 50 mL    OUT:  Total OUT: 0 mL    Total NET: 50 mL              REVIEW OF SYSTEMS:    CONSTITUTIONAL:  No fevers, chills, sweats    HEENT:  Eyes:  No diplopia or blurred vision. ENT:  No earache, sore throat or runny nose.    CARDIOVASCULAR:  No pressure, squeezing, tightness, or heaviness about the chest; no palpitations.    RESPIRATORY:  Per HPI    GASTROINTESTINAL:  No abdominal pain, nausea, vomiting or diarrhea.    GENITOURINARY:  No dysuria, frequency or urgency.    NEUROLOGIC:  No paresthesias, fasciculations, seizures or weakness.    PSYCHIATRIC:  No disorder of thought or mood.      PHYSICAL EXAM:    Constitutional: Well developed and nourished  Eyes:Perrla  ENMT: normal  Neck:supple  Respiratory: good air entry  Cardiovascular: S1 S2 regular  Gastrointestinal: Soft, Non tender  Extremities: No edema  Vascular:normal  Neurological:Awake, alert,Ox3  Musculoskeletal:Normal      MEDICATIONS  (STANDING):  allopurinol 100 milliGRAM(s) Oral daily  aspirin  chewable 81 milliGRAM(s) Oral daily  atorvastatin 20 milliGRAM(s) Oral at bedtime  carvedilol 3.125 milliGRAM(s) Oral every 12 hours  colchicine 0.6 milliGRAM(s) Oral daily  cyanocobalamin 1000 MICROGram(s) Oral daily  ergocalciferol 54356 Unit(s) Oral <User Schedule>  folic acid 1 milliGRAM(s) Oral daily  furosemide   Injectable 40 milliGRAM(s) IV Push daily  heparin   Injectable 5000 Unit(s) SubCutaneous every 12 hours  hydrALAZINE 10 milliGRAM(s) Oral three times a day  insulin lispro (ADMELOG) corrective regimen sliding scale   SubCutaneous Before meals and at bedtime  isosorbide   dinitrate Tablet (ISORDIL) 10 milliGRAM(s) Oral three times a day  pantoprazole    Tablet 40 milliGRAM(s) Oral before breakfast  senna 2 Tablet(s) Oral at bedtime  timolol 0.5% Solution 1 Drop(s) Both EYES two times a day    MEDICATIONS  (PRN):  acetaminophen   Tablet .. 650 milliGRAM(s) Oral every 6 hours PRN Temp greater or equal to 38C (100.4F), Moderate Pain (4 - 6)  ondansetron Injectable 4 milliGRAM(s) IV Push every 6 hours PRN Nausea and/or Vomiting  polyethylene glycol 3350 17 Gram(s) Oral daily PRN Constipation      Allergies    No Known Allergies    Intolerances        LABS:                        11.9   2.64  )-----------( 134      ( 04 Jun 2021 07:47 )             35.5     06-04    138  |  105  |  29<H>  ----------------------------<  96  3.6   |  23  |  2.37<H>    Ca    8.6      04 Jun 2021 07:47  Phos  4.0     06-03  Mg     2.0     06-03    TPro  7.6  /  Alb  2.9<L>  /  TBili  0.5  /  DBili  x   /  AST  100<H>  /  ALT  43  /  AlkPhos  106  06-02    PT/INR - ( 03 Jun 2021 07:14 )   PT: 14.2 sec;   INR: 1.20 ratio         PTT - ( 04 Jun 2021 07:47 )  PTT:83.8 sec      CARDIAC MARKERS ( 03 Jun 2021 07:14 )  0.041 ng/mL / x     / x     / x     / x      CARDIAC MARKERS ( 02 Jun 2021 23:05 )  0.042 ng/mL / x     / x     / x     / x      CARDIAC MARKERS ( 02 Jun 2021 15:01 )  0.043 ng/mL / x     / x     / x     / x          CAPILLARY BLOOD GLUCOSE      POCT Blood Glucose.: 86 mg/dL (04 Jun 2021 07:52)  POCT Blood Glucose.: 116 mg/dL (03 Jun 2021 21:13)  POCT Blood Glucose.: 109 mg/dL (03 Jun 2021 16:31)    pro-bnp 4037 06-02 @ 15:01     d-dimer 1119  06-02 @ 15:01      RADIOLOGY & ADDITIONAL TESTS:    CXR:  < from: Xray Chest 2 Views PA/Lat (06.02.21 @ 15:26) >  FINDINGS/  IMPRESSION:  New smallleft pleural effusion. New mild opacities right lung base. Follow-up recommended.    Heart size within normal limits.    < end of copied text >    Ct scan chest:    ekg;    echo:< from: Transthoracic Echocardiogram (06.03.21 @ 07:06) >  CONCLUSIONS:  1. Normal mitral valve. Mild to moderate mitral  regurgitation.  2. Calcified trileaflet aortic valve with normal opening.  No aortic stenosis. Mild to moderate aortic regurgitation.  3. Normal aortic root.  4. Normal left atrium.  5. Normalleft ventricular internal dimensions and wall  thicknesses.  6. Mild to moderate global left ventricular systolic  dysfunction (EF 40-45% by visual estimation). Paradoxical  septal motion is seen, consistent with conduction delay.  Not all LV wall segments were seen.  7. Grade II diastolic dysfunction.  8. Off axis images preclude accurate assessment of right  ventricular size. Normal RV systolic function (TAPSE 2.5  cm).  9. RV systolic pressure is mildly increased at  36 mm Hg.  10. Normal tricuspid valve. Trace tricuspid regurgitation.  11. Normal pulmonic valve. Trace pulmonic insufficiency is  noted.  12. No pericardial effusion.  13. Left pleural effusion.      < end of copied text >

## 2021-06-04 NOTE — PHYSICAL THERAPY INITIAL EVALUATION ADULT - LIGHT TOUCH SENSATION, RUE, REHAB EVAL
Post-Care Instructions: I reviewed with the patient in detail post-care instructions. Patient is to wear sunprotection, and avoid picking at any of the treated lesions. Pt may apply Vaseline to crusted or scabbing areas. Detail Level: Detailed Render Note In Bullet Format When Appropriate: No Consent: The patient's consent was obtained including but not limited to risks of crusting, scabbing, blistering, scarring, darker or lighter pigmentary change, recurrence, incomplete removal and infection. Duration Of Freeze Thaw-Cycle (Seconds): 0 within normal limits

## 2021-06-04 NOTE — CONSULT NOTE ADULT - SUBJECTIVE AND OBJECTIVE BOX
Chief complain/HPI  75 years old with history of CKD admitted for SOB  Creatinine 2.07-2.37.            PAST MEDICAL & SURGICAL HISTORY:  DM (diabetes mellitus)    HTN (hypertension)    Chronic kidney disease    HLD (hyperlipidemia)    Glaucoma    Gout    No significant past surgical history        Home Medications Reviewed    Hospital Medications:   MEDICATIONS  (STANDING):  allopurinol 100 milliGRAM(s) Oral daily  aspirin  chewable 81 milliGRAM(s) Oral daily  atorvastatin 20 milliGRAM(s) Oral at bedtime  carvedilol 3.125 milliGRAM(s) Oral every 12 hours  colchicine 0.6 milliGRAM(s) Oral daily  cyanocobalamin 1000 MICROGram(s) Oral daily  ergocalciferol 52755 Unit(s) Oral <User Schedule>  folic acid 1 milliGRAM(s) Oral daily  furosemide   Injectable 40 milliGRAM(s) IV Push daily  heparin   Injectable 5000 Unit(s) SubCutaneous every 12 hours  hydrALAZINE 10 milliGRAM(s) Oral three times a day  insulin lispro (ADMELOG) corrective regimen sliding scale   SubCutaneous Before meals and at bedtime  isosorbide   dinitrate Tablet (ISORDIL) 10 milliGRAM(s) Oral three times a day  pantoprazole    Tablet 40 milliGRAM(s) Oral before breakfast  senna 2 Tablet(s) Oral at bedtime  timolol 0.5% Solution 1 Drop(s) Both EYES two times a day    MEDICATIONS  (PRN):  acetaminophen   Tablet .. 650 milliGRAM(s) Oral every 6 hours PRN Temp greater or equal to 38C (100.4F), Moderate Pain (4 - 6)  ondansetron Injectable 4 milliGRAM(s) IV Push every 6 hours PRN Nausea and/or Vomiting  polyethylene glycol 3350 17 Gram(s) Oral daily PRN Constipation      Allergies    No Known Allergies    Intolerances                              11.9   2.64  )-----------( 134      ( 04 Jun 2021 07:47 )             35.5     06-04    138  |  105  |  29<H>  ----------------------------<  96  3.6   |  23  |  2.37<H>    Ca    8.6      04 Jun 2021 07:47  Phos  4.0     06-03  Mg     2.0     06-03    TPro  7.6  /  Alb  2.9<L>  /  TBili  0.5  /  DBili  x   /  AST  100<H>  /  ALT  43  /  AlkPhos  106  06-02    PT/INR - ( 03 Jun 2021 07:14 )   PT: 14.2 sec;   INR: 1.20 ratio         PTT - ( 04 Jun 2021 07:47 )  PTT:83.8 sec    Sodium, Random Urine: 125 mmol/L (06-02 @ 19:20)  Creatinine, Random Urine: <13 mg/dL (06-02 @ 19:20)        RADIOLOGY & ADDITIONAL STUDIES:  < from: Xray Chest 2 Views PA/Lat (06.02.21 @ 15:26) >  IMPRESSION:  New smallleft pleural effusion. New mild opacities right lung base. Follow-up recommended.    Heart size within normal limits.    < end of copied text >    SOCIAL HISTORY: Denies ETOh,Smoking,     FAMILY HISTORY:      REVIEW OF SYSTEMS:  CONSTITUTIONAL: No malaise, No fatigue, No fevers or chills, well developed, no diaphoresis  EYES/ENT: No visual changes;  No vertigo or throat pain   NECK: No pain or stiffness  RESPIRATORY: No cough, wheezing, hemoptysis; No shortness of breath  CARDIOVASCULAR: No chest pain or palpitations. No edema  GASTROINTESTINAL: No abdominal or epigastric pain. No nausea, vomiting, or hematemesis; No diarrhea or constipation. No melena or hematochezia.  GENITOURINARY: No dysuria, frequency, foamy urine, urinary urgency, incontinence or hematuria  NEUROLOGICAL: No numbness or weakness, No tremor  SKIN: No itching, burning, rashes, or lesions   VASCULAR: No claudication  Musculoskeletal: no arthralgia, no myalgia  All other review of systems is negative unless indicated above.    VITALS:  Vital Signs Last 24 Hrs  T(C): 36.6 (04 Jun 2021 07:14), Max: 36.9 (03 Jun 2021 19:15)  T(F): 97.9 (04 Jun 2021 07:14), Max: 98.5 (03 Jun 2021 19:15)  HR: 64 (04 Jun 2021 07:14) (64 - 77)  BP: 131/72 (04 Jun 2021 07:14) (125/70 - 141/78)  BP(mean): --  RR: 18 (04 Jun 2021 07:14) (17 - 18)  SpO2: 100% (04 Jun 2021 07:14) (96% - 100%)    06-03 @ 07:01  -  06-04 @ 07:00  --------------------------------------------------------  IN: 50 mL / OUT: 0 mL / NET: 50 mL          PHYSICAL EXAM:  Constitutional: NAD  HEENT: anicteric sclera, oropharynx clear, MMM  Neck: No JVD  Respiratory: good air entrance B/L, no wheezes, rales or rhonchi  Cardiovascular: S1, S2, RRR, no pericardial rub, no murmur  Gastrointestinal: BS+, soft, no tenderness, no distension, no bruit  Pelvis: bladder non-distended, no CVA tenderness  Extremities: No cyanosis or clubbing. No peripheral edema  Neurological: A/O x 3, no focal deficits  Psychiatric: Normal mood, normal affect  : No CVA tenderness. No smalls.   Skin: No rashes  Vascular: all pulses present  Access:                     Chief complain/HPI  75 years old with history of CKD admitted for SOB. Patient was  treated with diuretics and sob improved. He said that SOB started several days ago  He is not aware of renal disease, 2 years ago creatinine was 1.64. hISTORY OF DIABETIC RETINOPATHY BUT NO NEUROPATHY  2019 US < from: US Renal (06.03.19 @ 12:38) >  Renal ultrasound is performed without a previous examination for   comparison. The right kidney measures 10.4 cm in length and the left   kidney measures 10.6 cm in length. The renal sizes are within normal   limits bilaterally. No evidence for hydronephrosis, calculus, cyst or   solid mass in either kidney.    The IVC, aorta and urinary bladder appear grossly unremarkable.    Impression: Unremarkable renal ultrasound.      < end of copied text >          Derived Variables:  LVMI: 115 g/m2  RWT: 0.36  Ejection Fraction Visual Estimate: 40-45 %  Peak Velocity (m/sec): AoV=2.2  ------------------------------------------------------------------------  OBSERVATIONS:  Mitral Valve: Normal mitral valve. Mild to moderate mitral  regurgitation.  Aortic Root: Normal aortic root.  Aortic Valve: Calcified trileaflet aortic valve with normal  opening. No aortic stenosis. Mild to moderate aortic  regurgitation.  Left Atrium: Normal left atrium.  LA volume index = 25  cc/m2.  Left Ventricle: Mild to moderate global left ventricular  systolic dysfunction (EF 40-45% by visual estimation).  Paradoxical septal motion is seen, consistent with  conduction delay. Not all LV wall segments were seen.  Normal left ventricular internal dimensions and wall  thicknesses. A false tendon is noted. This is a normal  variant.  Grade II diastolic dysfunction.  Right Heart: Right atrium not well visualized. Off axis  images preclude accurate assessment of right ventricular  size. Normal RV systolic function (TAPSE 2.5 cm). Normal  tricuspid valve. Trace tricuspid regurgitation. Normal  pulmonic valve. Trace pulmonic insufficiency is noted.  Pericardium/PleuraNo pericardial effusion. Left pleural  effusion.  Hemodynamic: RA Pressure is 8 mm Hg. RV systolic pressure  is mildly increased at  36 mm Hg.      INTERPRETATION:  CLINICAL STATEMENT: Chest pain.    TECHNIQUE: PA and lateral views of the chest.    COMPARISON: 6/1/2019    FINDINGS/  IMPRESSION:  New smallleft pleural effusion. New mild opacities right lung base. Follow-up recommended.    Heart size within normal limits.      Creatinine 2.07-2.37.    Comprehensive Metabolic Panel (06.02.21 @ 15:01)   Sodium, Serum: 136 mmol/L   Potassium, Serum: 4.6 mmol/L   Chloride, Serum: 108 mmol/L   Carbon Dioxide, Serum: 23 mmol/L   Anion Gap, Serum: 5 mmol/L   Blood Urea Nitrogen, Serum: 27 mg/dL   Creatinine, Serum: 2.06 mg/dL   Glucose, Serum: 105 mg/dL   Calcium, Total Serum: 9.3 mg/dL   Protein Total, Serum: 7.6 g/dL   Albumin, Serum: 2.9 g/dL   Bilirubin Total, Serum: 0.5 mg/dL   Alkaline Phosphatase, Serum: 106 U/L   Aspartate Aminotransferase (AST/SGOT): 100 U/L   Alanine Aminotransferase (ALT/SGPT): 43 U/L DA   eGFR if Non : 31: Interpretative comment   The units for eGFR are mL/min/1.73M2 (normalized body surface area). The   eGFR is calculated from a serum creatinine using the CKD-EPI equation. Comprehensive Metabolic Panel (06.02.21 @ 15:01)   Sodium, Serum: 136 mmol/L   Potassium, Serum: 4.6 mmol/L   Chloride, Serum: 108 mmol/L   Carbon Dioxide, Serum: 23 mmol/L   Anion Gap, Serum: 5 mmol/L   Blood Urea Nitrogen, Serum: 27 mg/dL   Creatinine, Serum: 2.06 mg/dL   Glucose, Serum: 105 mg/dL   Calcium, Total Serum: 9.3 mg/dL   Protein Total, Serum: 7.6 g/dL   Albumin, Serum: 2.9 g/dL   Bilirubin Total, Serum: 0.5 mg/dL   Alkaline Phosphatase, Serum: 106 U/L   Aspartate Aminotransferase (AST/SGOT): 100 U/L   Alanine Aminotransferase (ALT/SGPT): 43 U/L DA   eGFR if Non : 31: Interpretative comment   The units for eGFR are mL/min/1.73M2 (normalized body surface area). The   eGFR is calculated from a serum creatinine using the CKD-EPI equation.               PAST MEDICAL & SURGICAL HISTORY:  DM (diabetes mellitus)    HTN (hypertension)    Chronic kidney disease    HLD (hyperlipidemia)    Glaucoma    Gout    No significant past surgical history        Home Medications Reviewed    Hospital Medications:   MEDICATIONS  (STANDING):  allopurinol 100 milliGRAM(s) Oral daily  aspirin  chewable 81 milliGRAM(s) Oral daily  atorvastatin 20 milliGRAM(s) Oral at bedtime  carvedilol 3.125 milliGRAM(s) Oral every 12 hours  colchicine 0.6 milliGRAM(s) Oral daily  cyanocobalamin 1000 MICROGram(s) Oral daily  ergocalciferol 96360 Unit(s) Oral <User Schedule>  folic acid 1 milliGRAM(s) Oral daily  furosemide   Injectable 40 milliGRAM(s) IV Push daily  heparin   Injectable 5000 Unit(s) SubCutaneous every 12 hours  hydrALAZINE 10 milliGRAM(s) Oral three times a day  insulin lispro (ADMELOG) corrective regimen sliding scale   SubCutaneous Before meals and at bedtime  isosorbide   dinitrate Tablet (ISORDIL) 10 milliGRAM(s) Oral three times a day  pantoprazole    Tablet 40 milliGRAM(s) Oral before breakfast  senna 2 Tablet(s) Oral at bedtime  timolol 0.5% Solution 1 Drop(s) Both EYES two times a day    MEDICATIONS  (PRN):  acetaminophen   Tablet .. 650 milliGRAM(s) Oral every 6 hours PRN Temp greater or equal to 38C (100.4F), Moderate Pain (4 - 6)  ondansetron Injectable 4 milliGRAM(s) IV Push every 6 hours PRN Nausea and/or Vomiting  polyethylene glycol 3350 17 Gram(s) Oral daily PRN Constipation      Allergies    No Known Allergies    Intolerances                              11.9   2.64  )-----------( 134      ( 04 Jun 2021 07:47 )             35.5     06-04    138  |  105  |  29<H>  ----------------------------<  96  3.6   |  23  |  2.37<H>    Ca    8.6      04 Jun 2021 07:47  Phos  4.0     06-03  Mg     2.0     06-03    TPro  7.6  /  Alb  2.9<L>  /  TBili  0.5  /  DBili  x   /  AST  100<H>  /  ALT  43  /  AlkPhos  106  06-02    PT/INR - ( 03 Jun 2021 07:14 )   PT: 14.2 sec;   INR: 1.20 ratio         PTT - ( 04 Jun 2021 07:47 )  PTT:83.8 sec    Sodium, Random Urine: 125 mmol/L (06-02 @ 19:20)  Creatinine, Random Urine: <13 mg/dL (06-02 @ 19:20)  Total Protein, Random Urine: 79 mg/dL (06.04.21 @ 16:16)   Total Protein, Random Urine: 23 mg/dL (06.01.19 @ 19:36)       RADIOLOGY & ADDITIONAL STUDIES:  < from: Xray Chest 2 Views PA/Lat (06.02.21 @ 15:26) >  IMPRESSION:  New smallleft pleural effusion. New mild opacities right lung base. Follow-up recommended.    Heart size within normal limits.    < end of copied text >    SOCIAL HISTORY: Denies ETOh,Smoking,     FAMILY HISTORY:      REVIEW OF SYSTEMS:  CONSTITUTIONAL: No malaise, No fatigue, No fevers or chills.  EYES/ENT: No visual changes;  No vertigo or throat pain   NECK: No pain or stiffness  RESPIRATORY: No cough, wheezing, hemoptysis; No shortness of breath  CARDIOVASCULAR: No chest pain or palpitations. No edema  GASTROINTESTINAL: No abdominal or epigastric pain. No nausea, vomiting, or hematemesis; No diarrhea or constipation. No melena or hematochezia.  GENITOURINARY: No dysuria, frequency,  incontinence or hematuria      VITALS:  Vital Signs Last 24 Hrs  T(C): 36.6 (04 Jun 2021 07:14), Max: 36.9 (03 Jun 2021 19:15)  T(F): 97.9 (04 Jun 2021 07:14), Max: 98.5 (03 Jun 2021 19:15)  HR: 64 (04 Jun 2021 07:14) (64 - 77)  BP: 131/72 (04 Jun 2021 07:14) (125/70 - 141/78)  BP(mean): --  RR: 18 (04 Jun 2021 07:14) (17 - 18)  SpO2: 100% (04 Jun 2021 07:14) (96% - 100%)    06-03 @ 07:01  -  06-04 @ 07:00  --------------------------------------------------------  IN: 50 mL / OUT: 0 mL / NET: 50 mL          PHYSICAL EXAM:  Constitutional: NAD    Neck: No JVD, no carotids bruit.  Respiratory: good air entrance B/L, no wheezes, rales or rhonchi  Cardiovascular: S1, S2, RRR, no pericardial rub, no murmur  Gastrointestinal: BS+, soft, no tenderness, no distension, no bruit  Pelvis: bladder non-distended, no CVA tenderness  Extremities: No cyanosis or clubbing. No peripheral edema  Neurological: A/O x 3, no focal deficits

## 2021-06-04 NOTE — PHYSICAL THERAPY INITIAL EVALUATION ADULT - GAIT DEVIATIONS NOTED, PT EVAL
decreased alexandru/increased time in double stance/decreased step length/decreased weight-shifting ability

## 2021-06-04 NOTE — PROGRESS NOTE ADULT - SUBJECTIVE AND OBJECTIVE BOX
CHIEF COMPLAINT:Patient is a 75y old  Male who presents with a chief complaint of worsening SOB.Pt feels less short of breath.    	  REVIEW OF SYSTEMS:  CONSTITUTIONAL: No fever, weight loss, or fatigue  EYES: No eye pain, visual disturbances, or discharge  ENT:  No difficulty hearing, tinnitus, vertigo; No sinus or throat pain  NECK: No pain or stiffness  RESPIRATORY: No cough, wheezing, chills or hemoptysis; + Shortness of Breath  CARDIOVASCULAR: No chest pain, palpitations, passing out, dizziness, or leg swelling  GASTROINTESTINAL: No abdominal or epigastric pain. No nausea, vomiting, or hematemesis; No diarrhea or constipation. No melena or hematochezia.  GENITOURINARY: No dysuria, frequency, hematuria, or incontinence  NEUROLOGICAL: No headaches, memory loss, loss of strength, numbness, or tremors  SKIN: No itching, burning, rashes, or lesions   LYMPH Nodes: No enlarged glands  ENDOCRINE: No heat or cold intolerance; No hair loss  MUSCULOSKELETAL: No joint pain or swelling; No muscle, back, or extremity pain  PSYCHIATRIC: No depression, anxiety, mood swings, or difficulty sleeping  HEME/LYMPH: No easy bruising, or bleeding gums  ALLERGY AND IMMUNOLOGIC: No hives or eczema	        PHYSICAL EXAM:  T(C): 36.6 (06-04-21 @ 07:14), Max: 36.9 (06-03-21 @ 19:15)  HR: 64 (06-04-21 @ 07:14) (64 - 77)  BP: 131/72 (06-04-21 @ 07:14) (125/70 - 141/78)  RR: 18 (06-04-21 @ 07:14) (17 - 18)  SpO2: 100% (06-04-21 @ 07:14) (96% - 100%)  Wt(kg): --  I&O's Summary    03 Jun 2021 07:01  -  04 Jun 2021 07:00  --------------------------------------------------------  IN: 50 mL / OUT: 0 mL / NET: 50 mL        Appearance: Normal	  HEENT:   Normal oral mucosa, PERRL, EOMI	  Lymphatic: No lymphadenopathy  Cardiovascular: Normal S1 S2, No JVD, No murmurs, No edema  Respiratory: Dec BS at bases  Psychiatry: A & O x 3, Mood & affect appropriate  Gastrointestinal:  Soft, Non-tender, + BS	  Skin: No rashes, No ecchymoses, No cyanosis	  Neurologic: Non-focal  Extremities: Normal range of motion, No clubbing, cyanosis or edema  Vascular: Peripheral pulses palpable 2+ bilaterally    MEDICATIONS  (STANDING):  allopurinol 100 milliGRAM(s) Oral daily  aspirin  chewable 81 milliGRAM(s) Oral daily  atorvastatin 20 milliGRAM(s) Oral at bedtime  carvedilol 3.125 milliGRAM(s) Oral every 12 hours  colchicine 0.6 milliGRAM(s) Oral daily  cyanocobalamin 1000 MICROGram(s) Oral daily  ergocalciferol 85624 Unit(s) Oral <User Schedule>  folic acid 1 milliGRAM(s) Oral daily  furosemide   Injectable 40 milliGRAM(s) IV Push daily  hydrALAZINE 10 milliGRAM(s) Oral three times a day  insulin lispro (ADMELOG) corrective regimen sliding scale   SubCutaneous Before meals and at bedtime  isosorbide   dinitrate Tablet (ISORDIL) 10 milliGRAM(s) Oral three times a day  pantoprazole    Tablet 40 milliGRAM(s) Oral before breakfast  senna 2 Tablet(s) Oral at bedtime  timolol 0.5% Solution 1 Drop(s) Both EYES two times a day      TELEMETRY: 	nsr,pvc's    	  	  LABS:	 	    CARDIAC MARKERS ( 03 Jun 2021 07:14 )  0.041 ng/mL / x     / x     / x     / x      CARDIAC MARKERS ( 02 Jun 2021 23:05 )  0.042 ng/mL / x     / x     / x     / x      CARDIAC MARKERS ( 02 Jun 2021 15:01 )  0.043 ng/mL / x     / x     / x     / x                                    11.9   2.64  )-----------( 134      ( 04 Jun 2021 07:47 )             35.5     06-04    138  |  105  |  29<H>  ----------------------------<  96  3.6   |  23  |  2.37<H>    Ca    8.6      04 Jun 2021 07:47  Phos  4.0     06-03  Mg     2.0     06-03    TPro  7.6  /  Alb  2.9<L>  /  TBili  0.5  /  DBili  x   /  AST  100<H>  /  ALT  43  /  AlkPhos  106  06-02    proBNP: Serum Pro-Brain Natriuretic Peptide: 4037 pg/mL (06-02 @ 15:01)    Lipid Profile: Cholesterol 162  LDL --    TG 67      	    Transthoracic Echocardiogram (06.03.21 @ 07:06) >  OBSERVATIONS:  Mitral Valve: Normal mitral valve. Mild to moderate mitral  regurgitation.  Aortic Root: Normal aortic root.  Aortic Valve: Calcified trileaflet aortic valve with normal  opening. No aortic stenosis. Mild to moderate aortic  regurgitation.  Left Atrium: Normal left atrium.  LA volume index = 25  cc/m2.  Left Ventricle: Mild to moderate global left ventricular  systolic dysfunction (EF 40-45% by visual estimation).  Paradoxical septal motion is seen, consistent with  conduction delay. Not all LV wall segments were seen.  Normal left ventricular internal dimensions and wall  thicknesses. A false tendon is noted. This is a normal  variant.  Grade II diastolic dysfunction.  Right Heart: Right atrium not well visualized. Off axis  images preclude accurate assessment of right ventricular  size. Normal RV systolic function (TAPSE 2.5 cm). Normal  tricuspid valve. Trace tricuspid regurgitation. Normal  pulmonic valve. Trace pulmonic insufficiency is noted.  Pericardium/PleuraNo pericardial effusion. Left pleural  effusion.  Hemodynamic: RA Pressure is 8 mm Hg. RV systolic pressure  is mildly increased at  36 mm Hg.        doppler-no dvt.      EXAM:  NM PULM PERFUSION IMG                            PROCEDURE DATE:  06/03/2021          INTERPRETATION:  CLINICAL INFORMATION: 75 year old male with chronic kidney disease, hypertension, with dyspnea; referred to evaluate for pulmonary embolism.    RADIOPHARMACEUTICAL:  3.1 mCi Tc-99m-MAA, I.V.    TECHNIQUE:  Perfusion images of the lungs were obtained following administration of Tc-99m-MAA. Images were obtained in the anterior, posterior, both lateral, and all 4 oblique projections. The study was interpreted in conjunction with a chest radiograph of 6/2/2021.    COMPARISON: No prior lung perfusion scan.    FINDINGS: There is heterogeneous distribution of radiopharmaceutical in both lungs. There are no segmental perfusion defects in either lung.    IMPRESSION: Very low probability of pulmonary embolus.

## 2021-06-04 NOTE — PROGRESS NOTE ADULT - SUBJECTIVE AND OBJECTIVE BOX
PGY-1 Progress Note discussed with attending    PAGER #: [phone]] TILL 5:00 PM  PLEASE CONTACT ON CALL TEAM:  - On Call Team (Please refer to Darrick) FROM 5:00 PM - 8:30PM  - Nightfloat Team FROM 8:30 -7:30 AM      INTERVAL HPI/OVERNIGHT EVENTS: No acute overnight events, pt's Echo shows reduced EF 40-45%, pt planned for Stress test when stable    MEDICATIONS  (STANDING):  allopurinol 100 milliGRAM(s) Oral daily  aspirin  chewable 81 milliGRAM(s) Oral daily  atorvastatin 20 milliGRAM(s) Oral at bedtime  carvedilol 3.125 milliGRAM(s) Oral every 12 hours  colchicine 0.6 milliGRAM(s) Oral daily  cyanocobalamin 1000 MICROGram(s) Oral daily  ergocalciferol 94403 Unit(s) Oral <User Schedule>  folic acid 1 milliGRAM(s) Oral daily  furosemide   Injectable 40 milliGRAM(s) IV Push daily  heparin   Injectable 5000 Unit(s) SubCutaneous every 12 hours  hydrALAZINE 10 milliGRAM(s) Oral three times a day  insulin lispro (ADMELOG) corrective regimen sliding scale   SubCutaneous Before meals and at bedtime  isosorbide   dinitrate Tablet (ISORDIL) 10 milliGRAM(s) Oral three times a day  pantoprazole    Tablet 40 milliGRAM(s) Oral before breakfast  senna 2 Tablet(s) Oral at bedtime  timolol 0.5% Solution 1 Drop(s) Both EYES two times a day    MEDICATIONS  (PRN):  acetaminophen   Tablet .. 650 milliGRAM(s) Oral every 6 hours PRN Temp greater or equal to 38C (100.4F), Moderate Pain (4 - 6)  ondansetron Injectable 4 milliGRAM(s) IV Push every 6 hours PRN Nausea and/or Vomiting  polyethylene glycol 3350 17 Gram(s) Oral daily PRN Constipation      REVIEW OF SYSTEMS:  CONSTITUTIONAL: No fever, weight loss, or fatigue  RESPIRATORY: No cough, wheezing, chills or hemoptysis; +shortness of breath  CARDIOVASCULAR: No chest pain, palpitations, dizziness, or +leg swelling  GASTROINTESTINAL: No abdominal pain. No nausea, vomiting, or hematemesis; No diarrhea or constipation. No melena or hematochezia.  GENITOURINARY: No dysuria or hematuria, urinary frequency  NEUROLOGICAL: No headaches, memory loss, loss of strength, numbness, or tremors  SKIN: No itching, burning, rashes, or lesions     Vital Signs Last 24 Hrs  T(C): 36.8 (04 Jun 2021 16:05), Max: 36.9 (03 Jun 2021 19:15)  T(F): 98.2 (04 Jun 2021 16:05), Max: 98.5 (03 Jun 2021 19:15)  HR: 65 (04 Jun 2021 16:05) (64 - 77)  BP: 116/70 (04 Jun 2021 16:05) (116/70 - 141/78)  BP(mean): --  RR: 18 (04 Jun 2021 16:05) (17 - 18)  SpO2: 100% (04 Jun 2021 16:05) (100% - 100%)    PHYSICAL EXAMINATION:  GENERAL: Elderly male patient resting in bed  HEAD:  Atraumatic, Normocephalic  EYES:  conjunctiva and sclera clear  NECK: Supple, No JVD, Normal thyroid  CHEST/LUNG: bilateral crackles  HEART: Regular rate and rhythm; No murmurs, rubs, or gallops  ABDOMEN: Soft, Nontender, Nondistended; Bowel sounds present  NERVOUS SYSTEM:  Alert & Oriented X3,    EXTREMITIES:  2+ Peripheral Pulses, No clubbing, cyanosis, or edema  SKIN: warm dry                          11.9   2.64  )-----------( 134      ( 04 Jun 2021 07:47 )             35.5     06-04    138  |  105  |  29<H>  ----------------------------<  96  3.6   |  23  |  2.37<H>    Ca    8.6      04 Jun 2021 07:47  Phos  4.0     06-03  Mg     2.0     06-03        CARDIAC MARKERS ( 03 Jun 2021 07:14 )  0.041 ng/mL / x     / x     / x     / x      CARDIAC MARKERS ( 02 Jun 2021 23:05 )  0.042 ng/mL / x     / x     / x     / x          PT/INR - ( 03 Jun 2021 07:14 )   PT: 14.2 sec;   INR: 1.20 ratio         PTT - ( 04 Jun 2021 07:47 )  PTT:83.8 sec    CAPILLARY BLOOD GLUCOSE      RADIOLOGY & ADDITIONAL TESTS:

## 2021-06-05 LAB
ANION GAP SERPL CALC-SCNC: 11 MMOL/L — SIGNIFICANT CHANGE UP (ref 5–17)
APPEARANCE UR: CLEAR — SIGNIFICANT CHANGE UP
BILIRUB UR-MCNC: NEGATIVE — SIGNIFICANT CHANGE UP
BUN SERPL-MCNC: 32 MG/DL — HIGH (ref 7–18)
CALCIUM SERPL-MCNC: 8.7 MG/DL — SIGNIFICANT CHANGE UP (ref 8.4–10.5)
CHLORIDE SERPL-SCNC: 105 MMOL/L — SIGNIFICANT CHANGE UP (ref 96–108)
CO2 SERPL-SCNC: 23 MMOL/L — SIGNIFICANT CHANGE UP (ref 22–31)
COLOR SPEC: YELLOW — SIGNIFICANT CHANGE UP
CREAT ?TM UR-MCNC: 23 MG/DL — SIGNIFICANT CHANGE UP
CREAT SERPL-MCNC: 2.46 MG/DL — HIGH (ref 0.5–1.3)
DIFF PNL FLD: ABNORMAL
EPI CELLS # UR: SIGNIFICANT CHANGE UP /HPF
GLUCOSE BLDC GLUCOMTR-MCNC: 117 MG/DL — HIGH (ref 70–99)
GLUCOSE BLDC GLUCOMTR-MCNC: 128 MG/DL — HIGH (ref 70–99)
GLUCOSE BLDC GLUCOMTR-MCNC: 84 MG/DL — SIGNIFICANT CHANGE UP (ref 70–99)
GLUCOSE BLDC GLUCOMTR-MCNC: 86 MG/DL — SIGNIFICANT CHANGE UP (ref 70–99)
GLUCOSE SERPL-MCNC: 85 MG/DL — SIGNIFICANT CHANGE UP (ref 70–99)
GLUCOSE UR QL: NEGATIVE — SIGNIFICANT CHANGE UP
HCT VFR BLD CALC: 34.3 % — LOW (ref 39–50)
HGB BLD-MCNC: 11.5 G/DL — LOW (ref 13–17)
KETONES UR-MCNC: NEGATIVE — SIGNIFICANT CHANGE UP
LEUKOCYTE ESTERASE UR-ACNC: NEGATIVE — SIGNIFICANT CHANGE UP
MCHC RBC-ENTMCNC: 33 PG — SIGNIFICANT CHANGE UP (ref 27–34)
MCHC RBC-ENTMCNC: 33.5 GM/DL — SIGNIFICANT CHANGE UP (ref 32–36)
MCV RBC AUTO: 98.3 FL — SIGNIFICANT CHANGE UP (ref 80–100)
MICROALBUMIN UR-MCNC: 22.8 MG/DL — SIGNIFICANT CHANGE UP
MICROALBUMIN/CREAT UR-RTO: 987 MG/G — HIGH (ref 0–30)
NITRITE UR-MCNC: NEGATIVE — SIGNIFICANT CHANGE UP
NRBC # BLD: 0 /100 WBCS — SIGNIFICANT CHANGE UP (ref 0–0)
PH UR: 6 — SIGNIFICANT CHANGE UP (ref 5–8)
PLATELET # BLD AUTO: 126 K/UL — LOW (ref 150–400)
POTASSIUM SERPL-MCNC: 4 MMOL/L — SIGNIFICANT CHANGE UP (ref 3.5–5.3)
POTASSIUM SERPL-SCNC: 4 MMOL/L — SIGNIFICANT CHANGE UP (ref 3.5–5.3)
PROT UR-MCNC: 30 MG/DL
RBC # BLD: 3.49 M/UL — LOW (ref 4.2–5.8)
RBC # FLD: 14.8 % — HIGH (ref 10.3–14.5)
RBC CASTS # UR COMP ASSIST: SIGNIFICANT CHANGE UP /HPF (ref 0–2)
SODIUM SERPL-SCNC: 139 MMOL/L — SIGNIFICANT CHANGE UP (ref 135–145)
SP GR SPEC: 1 — LOW (ref 1.01–1.02)
UROBILINOGEN FLD QL: NEGATIVE — SIGNIFICANT CHANGE UP
WBC # BLD: 2.35 K/UL — LOW (ref 3.8–10.5)
WBC # FLD AUTO: 2.35 K/UL — LOW (ref 3.8–10.5)
WBC UR QL: SIGNIFICANT CHANGE UP /HPF (ref 0–5)

## 2021-06-05 RX ORDER — MAGNESIUM SULFATE 500 MG/ML
1 VIAL (ML) INJECTION ONCE
Refills: 0 | Status: COMPLETED | OUTPATIENT
Start: 2021-06-05 | End: 2021-06-05

## 2021-06-05 RX ADMIN — HEPARIN SODIUM 5000 UNIT(S): 5000 INJECTION INTRAVENOUS; SUBCUTANEOUS at 06:17

## 2021-06-05 RX ADMIN — HEPARIN SODIUM 5000 UNIT(S): 5000 INJECTION INTRAVENOUS; SUBCUTANEOUS at 17:51

## 2021-06-05 RX ADMIN — Medication 10 MILLIGRAM(S): at 22:06

## 2021-06-05 RX ADMIN — SENNA PLUS 2 TABLET(S): 8.6 TABLET ORAL at 22:06

## 2021-06-05 RX ADMIN — Medication 1 DROP(S): at 06:17

## 2021-06-05 RX ADMIN — Medication 10 MILLIGRAM(S): at 06:17

## 2021-06-05 RX ADMIN — ATORVASTATIN CALCIUM 20 MILLIGRAM(S): 80 TABLET, FILM COATED ORAL at 22:06

## 2021-06-05 RX ADMIN — Medication 0.6 MILLIGRAM(S): at 12:27

## 2021-06-05 RX ADMIN — ISOSORBIDE DINITRATE 10 MILLIGRAM(S): 5 TABLET ORAL at 17:50

## 2021-06-05 RX ADMIN — Medication 40 MILLIGRAM(S): at 06:17

## 2021-06-05 RX ADMIN — CARVEDILOL PHOSPHATE 3.12 MILLIGRAM(S): 80 CAPSULE, EXTENDED RELEASE ORAL at 17:50

## 2021-06-05 RX ADMIN — ISOSORBIDE DINITRATE 10 MILLIGRAM(S): 5 TABLET ORAL at 06:17

## 2021-06-05 RX ADMIN — PREGABALIN 1000 MICROGRAM(S): 225 CAPSULE ORAL at 12:28

## 2021-06-05 RX ADMIN — Medication 1 MILLIGRAM(S): at 12:27

## 2021-06-05 RX ADMIN — Medication 100 GRAM(S): at 14:15

## 2021-06-05 RX ADMIN — Medication 1 DROP(S): at 17:51

## 2021-06-05 RX ADMIN — PANTOPRAZOLE SODIUM 40 MILLIGRAM(S): 20 TABLET, DELAYED RELEASE ORAL at 06:18

## 2021-06-05 RX ADMIN — Medication 100 MILLIGRAM(S): at 12:27

## 2021-06-05 RX ADMIN — Medication 10 MILLIGRAM(S): at 14:15

## 2021-06-05 RX ADMIN — Medication 81 MILLIGRAM(S): at 12:27

## 2021-06-05 RX ADMIN — CARVEDILOL PHOSPHATE 3.12 MILLIGRAM(S): 80 CAPSULE, EXTENDED RELEASE ORAL at 06:17

## 2021-06-05 RX ADMIN — ISOSORBIDE DINITRATE 10 MILLIGRAM(S): 5 TABLET ORAL at 12:27

## 2021-06-05 NOTE — PROGRESS NOTE ADULT - ASSESSMENT
75 year old male from home AAO x3, with PMH of DM, HTN, CKD, HLD, gout and glaucoma, who presented to the ED due to worsening SOB,acute sytolic HF,NSVT.  1.Tele monitoring.  2.Stress test-r/o ischemia.  3.IV lasix.  4.CRI-Renal f/u,check SPEP.  5.DM-Insulin.  6.HTN and CHF-add low dose coreg,isordil and hydralazine.  7.GI and DVT prophylaxis.

## 2021-06-05 NOTE — PROGRESS NOTE ADULT - SUBJECTIVE AND OBJECTIVE BOX
Patient is a 75y old  Male who presents with a chief complaint of worsening SOB (2021 10:07)  Patient is awake, alert, comfortable in bed in NAD    INTERVAL HPI/OVERNIGHT EVENTS:      VITAL SIGNS:  T(F): 98.1 (21 @ 07:31)  HR: 68 (21 @ 07:31)  BP: 117/65 (21 @ 07:31)  RR: 17 (21 @ 07:31)  SpO2: 96% (21 @ 07:31)  Wt(kg): --  I&O's Detail    2021 07:01  -  2021 07:00  --------------------------------------------------------  IN:  Total IN: 0 mL    OUT:    Stool (mL): 400 mL    Voided (mL): 200 mL  Total OUT: 600 mL    Total NET: -600 mL              REVIEW OF SYSTEMS:    CONSTITUTIONAL:  No fevers, chills, sweats    HEENT:  Eyes:  No diplopia or blurred vision. ENT:  No earache, sore throat or runny nose.    CARDIOVASCULAR:  No pressure, squeezing, tightness, or heaviness about the chest; no palpitations.    RESPIRATORY:  Per HPI    GASTROINTESTINAL:  No abdominal pain, nausea, vomiting or diarrhea.    GENITOURINARY:  No dysuria, frequency or urgency.    NEUROLOGIC:  No paresthesias, fasciculations, seizures or weakness.    PSYCHIATRIC:  No disorder of thought or mood.      PHYSICAL EXAM:    Constitutional: Well developed and nourished  Eyes:Perrla  ENMT: normal  Neck:supple  Respiratory: good air entry  Cardiovascular: S1 S2 regular  Gastrointestinal: Soft, Non tender  Extremities: No edema  Vascular:normal  Neurological:Awake, alert,Ox3  Musculoskeletal:Normal      MEDICATIONS  (STANDING):  allopurinol 100 milliGRAM(s) Oral daily  aspirin  chewable 81 milliGRAM(s) Oral daily  atorvastatin 20 milliGRAM(s) Oral at bedtime  carvedilol 3.125 milliGRAM(s) Oral every 12 hours  colchicine 0.6 milliGRAM(s) Oral daily  cyanocobalamin 1000 MICROGram(s) Oral daily  ergocalciferol 48284 Unit(s) Oral <User Schedule>  folic acid 1 milliGRAM(s) Oral daily  furosemide   Injectable 40 milliGRAM(s) IV Push daily  heparin   Injectable 5000 Unit(s) SubCutaneous every 12 hours  hydrALAZINE 10 milliGRAM(s) Oral three times a day  insulin lispro (ADMELOG) corrective regimen sliding scale   SubCutaneous Before meals and at bedtime  isosorbide   dinitrate Tablet (ISORDIL) 10 milliGRAM(s) Oral three times a day  magnesium sulfate  IVPB 1 Gram(s) IV Intermittent once  pantoprazole    Tablet 40 milliGRAM(s) Oral before breakfast  senna 2 Tablet(s) Oral at bedtime  timolol 0.5% Solution 1 Drop(s) Both EYES two times a day    MEDICATIONS  (PRN):  acetaminophen   Tablet .. 650 milliGRAM(s) Oral every 6 hours PRN Temp greater or equal to 38C (100.4F), Moderate Pain (4 - 6)  ondansetron Injectable 4 milliGRAM(s) IV Push every 6 hours PRN Nausea and/or Vomiting  polyethylene glycol 3350 17 Gram(s) Oral daily PRN Constipation      Allergies    No Known Allergies    Intolerances        LABS:                        11.5   2.35  )-----------( 126      ( 2021 07:32 )             34.3     06-05    139  |  105  |  32<H>  ----------------------------<  85  4.0   |  23  |  2.46<H>    Ca    8.7      2021 07:32      PTT - ( 2021 07:47 )  PTT:83.8 sec  Urinalysis Basic - ( 2021 09:24 )    Color: Yellow / Appearance: Clear / S.005 / pH: x  Gluc: x / Ketone: Negative  / Bili: Negative / Urobili: Negative   Blood: x / Protein: 30 mg/dL / Nitrite: Negative   Leuk Esterase: Negative / RBC: 0-2 /HPF / WBC 0-2 /HPF   Sq Epi: x / Non Sq Epi: Few /HPF / Bacteria: x            CAPILLARY BLOOD GLUCOSE      POCT Blood Glucose.: 84 mg/dL (2021 07:41)  POCT Blood Glucose.: 167 mg/dL (2021 21:32)  POCT Blood Glucose.: 110 mg/dL (2021 16:46)  POCT Blood Glucose.: 98 mg/dL (2021 11:31)    pro-bnp 4037  @ 15:01     d-dimer 1119   @ 15:01      RADIOLOGY & ADDITIONAL TESTS:    CXR:    Ct scan chest:  < from: Nuclear Stress Test-Pharmacologic (19 @ 06:56) >  IMPRESSIONS:Normal Study  * Negative ECG evidence of ischemia after IV of Lexiscan.  * Review of raw data shows: The study is of good technical  quality.  * The left ventricle was normal in size. Normal myocardial  perfusion scan, with no evidence of infarction or  inducible ischemia.  * Post-stress resting myocardial perfusion gated SPECT  imaging was performed (LVEF > 70%)      < end of copied text >    ekg;    echo:

## 2021-06-05 NOTE — PROGRESS NOTE ADULT - SUBJECTIVE AND OBJECTIVE BOX
CHIEF COMPLAINT:Patient is a 75y old  Male who presents with a chief complaint of worsening SOB.Pt reports feeling better.    	  REVIEW OF SYSTEMS:  CONSTITUTIONAL: No fever, weight loss, or fatigue  EYES: No eye pain, visual disturbances, or discharge  ENT:  No difficulty hearing, tinnitus, vertigo; No sinus or throat pain  NECK: No pain or stiffness  RESPIRATORY: No cough, wheezing, chills or hemoptysis; No Shortness of Breath  CARDIOVASCULAR: No chest pain, palpitations, passing out, dizziness, or leg swelling  GASTROINTESTINAL: No abdominal or epigastric pain. No nausea, vomiting, or hematemesis; No diarrhea or constipation. No melena or hematochezia.  GENITOURINARY: No dysuria, frequency, hematuria, or incontinence  NEUROLOGICAL: No headaches, memory loss, loss of strength, numbness, or tremors  SKIN: No itching, burning, rashes, or lesions   LYMPH Nodes: No enlarged glands  ENDOCRINE: No heat or cold intolerance; No hair loss  MUSCULOSKELETAL: No joint pain or swelling; No muscle, back, or extremity pain  PSYCHIATRIC: No depression, anxiety, mood swings, or difficulty sleeping  HEME/LYMPH: No easy bruising, or bleeding gums  ALLERGY AND IMMUNOLOGIC: No hives or eczema	    PHYSICAL EXAM:  T(C): 36.7 (06-05-21 @ 07:31), Max: 36.8 (06-04-21 @ 16:05)  HR: 68 (06-05-21 @ 07:31) (64 - 79)  BP: 117/65 (06-05-21 @ 07:31) (116/70 - 135/83)  RR: 17 (06-05-21 @ 07:31) (17 - 19)  SpO2: 96% (06-05-21 @ 07:31) (96% - 100%)  Wt(kg): --  I&O's Summary    04 Jun 2021 07:01  -  05 Jun 2021 07:00  --------------------------------------------------------  IN: 0 mL / OUT: 600 mL / NET: -600 mL    TELE-nsr,NSVT,pvc's    Appearance: Normal	  HEENT:   Normal oral mucosa, PERRL, EOMI	  Lymphatic: No lymphadenopathy  Cardiovascular: Normal S1 S2, No JVD, No murmurs, No edema  Respiratory: Dec BS at bases	  Psychiatry: A & O x 3, Mood & affect appropriate  Gastrointestinal:  Soft, Non-tender, + BS	  Skin: No rashes, No ecchymoses, No cyanosis	  Neurologic: Non-focal  Extremities: Normal range of motion, No clubbing, cyanosis or edema  Vascular: Peripheral pulses palpable 2+ bilaterally    MEDICATIONS  (STANDING):  allopurinol 100 milliGRAM(s) Oral daily  aspirin  chewable 81 milliGRAM(s) Oral daily  atorvastatin 20 milliGRAM(s) Oral at bedtime  carvedilol 3.125 milliGRAM(s) Oral every 12 hours  colchicine 0.6 milliGRAM(s) Oral daily  cyanocobalamin 1000 MICROGram(s) Oral daily  ergocalciferol 68331 Unit(s) Oral <User Schedule>  folic acid 1 milliGRAM(s) Oral daily  furosemide   Injectable 40 milliGRAM(s) IV Push daily  heparin   Injectable 5000 Unit(s) SubCutaneous every 12 hours  hydrALAZINE 10 milliGRAM(s) Oral three times a day  insulin lispro (ADMELOG) corrective regimen sliding scale   SubCutaneous Before meals and at bedtime  isosorbide   dinitrate Tablet (ISORDIL) 10 milliGRAM(s) Oral three times a day  pantoprazole    Tablet 40 milliGRAM(s) Oral before breakfast  senna 2 Tablet(s) Oral at bedtime  timolol 0.5% Solution 1 Drop(s) Both EYES two times a day        	  LABS:	 	                          11.5   2.35  )-----------( 126      ( 05 Jun 2021 07:32 )             34.3     06-05    139  |  105  |  32<H>  ----------------------------<  85  4.0   |  23  |  2.46<H>    Ca    8.7      05 Jun 2021 07:32      proBNP: Serum Pro-Brain Natriuretic Peptide: 4037 pg/mL (06-02 @ 15:01)    Lipid Profile: Cholesterol 162  LDL --    TG 67

## 2021-06-05 NOTE — PROGRESS NOTE ADULT - SUBJECTIVE AND OBJECTIVE BOX
Patient is a 75y old  Male who presents with a chief complaint of worsening SOB (04 Jun 2021 16:40)    pt seen in tele [x  ], reg med floor [   ], bed [ x ], chair at bedside [   ], a+o x3 [ x ], lethargic [  ],    nad [  x]      Allergies    No Known Allergies        Vitals    T(F): 98.1 (06-05-21 @ 04:53), Max: 98.2 (06-04-21 @ 16:05)  HR: 73 (06-05-21 @ 04:53) (64 - 79)  BP: 135/83 (06-05-21 @ 04:53) (116/70 - 135/83)  RR: 18 (06-05-21 @ 04:53) (18 - 19)  SpO2: 97% (06-05-21 @ 04:53) (97% - 100%)  Wt(kg): --  CAPILLARY BLOOD GLUCOSE      POCT Blood Glucose.: 167 mg/dL (04 Jun 2021 21:32)      Labs                          11.9   2.64  )-----------( 134      ( 04 Jun 2021 07:47 )             35.5       06-04    138  |  105  |  29<H>  ----------------------------<  96  3.6   |  23  |  2.37<H>    Ca    8.6      04 Jun 2021 07:47  Phos  4.0     06-03  Mg     2.0     06-03        CARDIAC MARKERS ( 03 Jun 2021 07:14 )  0.041 ng/mL / x     / x     / x     / x                Radiology Results      Meds    MEDICATIONS  (STANDING):  allopurinol 100 milliGRAM(s) Oral daily  aspirin  chewable 81 milliGRAM(s) Oral daily  atorvastatin 20 milliGRAM(s) Oral at bedtime  carvedilol 3.125 milliGRAM(s) Oral every 12 hours  colchicine 0.6 milliGRAM(s) Oral daily  cyanocobalamin 1000 MICROGram(s) Oral daily  ergocalciferol 97013 Unit(s) Oral <User Schedule>  folic acid 1 milliGRAM(s) Oral daily  furosemide   Injectable 40 milliGRAM(s) IV Push daily  heparin   Injectable 5000 Unit(s) SubCutaneous every 12 hours  hydrALAZINE 10 milliGRAM(s) Oral three times a day  insulin lispro (ADMELOG) corrective regimen sliding scale   SubCutaneous Before meals and at bedtime  isosorbide   dinitrate Tablet (ISORDIL) 10 milliGRAM(s) Oral three times a day  pantoprazole    Tablet 40 milliGRAM(s) Oral before breakfast  senna 2 Tablet(s) Oral at bedtime  timolol 0.5% Solution 1 Drop(s) Both EYES two times a day      MEDICATIONS  (PRN):  acetaminophen   Tablet .. 650 milliGRAM(s) Oral every 6 hours PRN Temp greater or equal to 38C (100.4F), Moderate Pain (4 - 6)  ondansetron Injectable 4 milliGRAM(s) IV Push every 6 hours PRN Nausea and/or Vomiting  polyethylene glycol 3350 17 Gram(s) Oral daily PRN Constipation      Physical Exam    Neuro :  no focal deficits  Respiratory: CTA B/L  CV: RRR, S1S2, no murmurs,   Abdominal: Soft, NT, ND +BS,  Extremities: No edema, + peripheral pulses    ASSESSMENT    worsening sob with activity,   r/o chf exacerb,   r/o acs,   r/o pe,   pulm edema,   anemia   h/o DM,   HTN,   CKD 3,   HLD,   gout,   glaucoma   HFrEF    PLAN      cont tele,   acs protocol,   coreg,   aspirin, statin,   lasix,   cardio f/u   ce x 3 noted above  echo with EF 40-45%. Paradoxical septal motion is seen, consistent with conduction delay. Grade II diastolic dysfunction. RV systolic pressure is mildly increased at  36 mm Hg.  No pericardial effusion. Left pleural effusion noted above.  vq scan with low prob pe noted above  d/c hep drip  cont hep sq  bronchodilators prn,   hgba1c 5.3 noted above  cont lispro ss   renal cons  cont current meds     Patient is a 75y old  Male who presents with a chief complaint of worsening SOB (04 Jun 2021 16:40)    pt seen in tele [x  ], reg med floor [   ], bed [ x ], chair at bedside [   ], a+o x3 [ x ], lethargic [  ],    nad [  x]      Allergies    No Known Allergies        Vitals    T(F): 98.1 (06-05-21 @ 04:53), Max: 98.2 (06-04-21 @ 16:05)  HR: 73 (06-05-21 @ 04:53) (64 - 79)  BP: 135/83 (06-05-21 @ 04:53) (116/70 - 135/83)  RR: 18 (06-05-21 @ 04:53) (18 - 19)  SpO2: 97% (06-05-21 @ 04:53) (97% - 100%)  Wt(kg): --  CAPILLARY BLOOD GLUCOSE      POCT Blood Glucose.: 167 mg/dL (04 Jun 2021 21:32)      Labs                          11.9   2.64  )-----------( 134      ( 04 Jun 2021 07:47 )             35.5       06-04    138  |  105  |  29<H>  ----------------------------<  96  3.6   |  23  |  2.37<H>    Ca    8.6      04 Jun 2021 07:47  Phos  4.0     06-03  Mg     2.0     06-03        CARDIAC MARKERS ( 03 Jun 2021 07:14 )  0.041 ng/mL / x     / x     / x     / x                Radiology Results      Meds    MEDICATIONS  (STANDING):  allopurinol 100 milliGRAM(s) Oral daily  aspirin  chewable 81 milliGRAM(s) Oral daily  atorvastatin 20 milliGRAM(s) Oral at bedtime  carvedilol 3.125 milliGRAM(s) Oral every 12 hours  colchicine 0.6 milliGRAM(s) Oral daily  cyanocobalamin 1000 MICROGram(s) Oral daily  ergocalciferol 61726 Unit(s) Oral <User Schedule>  folic acid 1 milliGRAM(s) Oral daily  furosemide   Injectable 40 milliGRAM(s) IV Push daily  heparin   Injectable 5000 Unit(s) SubCutaneous every 12 hours  hydrALAZINE 10 milliGRAM(s) Oral three times a day  insulin lispro (ADMELOG) corrective regimen sliding scale   SubCutaneous Before meals and at bedtime  isosorbide   dinitrate Tablet (ISORDIL) 10 milliGRAM(s) Oral three times a day  pantoprazole    Tablet 40 milliGRAM(s) Oral before breakfast  senna 2 Tablet(s) Oral at bedtime  timolol 0.5% Solution 1 Drop(s) Both EYES two times a day      MEDICATIONS  (PRN):  acetaminophen   Tablet .. 650 milliGRAM(s) Oral every 6 hours PRN Temp greater or equal to 38C (100.4F), Moderate Pain (4 - 6)  ondansetron Injectable 4 milliGRAM(s) IV Push every 6 hours PRN Nausea and/or Vomiting  polyethylene glycol 3350 17 Gram(s) Oral daily PRN Constipation      Physical Exam    Neuro :  no focal deficits  Respiratory: CTA B/L  CV: RRR, S1S2, no murmurs,   Abdominal: Soft, NT, ND +BS,  Extremities: No edema, + peripheral pulses    ASSESSMENT    worsening sob with activity,   r/o chf exacerb,   r/o acs,   r/o pe,   pulm edema,   anemia   h/o DM,   HTN,   CKD 3,   HLD,   gout,   glaucoma   HFrEF    PLAN      cont tele,   acs protocol,   coreg,   aspirin, statin,   lasix,   cardio f/u   ce x 3 noted above  echo with EF 40-45%. Paradoxical septal motion is seen, consistent with conduction delay. Grade II diastolic dysfunction. RV systolic pressure is mildly increased at  36 mm Hg.  No pericardial effusion. Left pleural effusion noted   isordil and hydralazine added,  norvasc d/c'd  Stress test when stable.  vq scan with low prob pe noted    hep drip d/c'd  cont hep sq  bronchodilators prn,   hgba1c 5.3 noted above  cont lispro ss   renal f/u   Obtain UA and repeat protein, microalbumin creatinine ratio  Renal US  Uric acid level  cont current meds

## 2021-06-06 LAB
ANION GAP SERPL CALC-SCNC: 5 MMOL/L — SIGNIFICANT CHANGE UP (ref 5–17)
BUN SERPL-MCNC: 36 MG/DL — HIGH (ref 7–18)
CALCIUM SERPL-MCNC: 9.1 MG/DL — SIGNIFICANT CHANGE UP (ref 8.4–10.5)
CHLORIDE SERPL-SCNC: 103 MMOL/L — SIGNIFICANT CHANGE UP (ref 96–108)
CO2 SERPL-SCNC: 28 MMOL/L — SIGNIFICANT CHANGE UP (ref 22–31)
CREAT SERPL-MCNC: 2.58 MG/DL — HIGH (ref 0.5–1.3)
GLUCOSE BLDC GLUCOMTR-MCNC: 113 MG/DL — HIGH (ref 70–99)
GLUCOSE BLDC GLUCOMTR-MCNC: 116 MG/DL — HIGH (ref 70–99)
GLUCOSE BLDC GLUCOMTR-MCNC: 139 MG/DL — HIGH (ref 70–99)
GLUCOSE BLDC GLUCOMTR-MCNC: 97 MG/DL — SIGNIFICANT CHANGE UP (ref 70–99)
GLUCOSE SERPL-MCNC: 105 MG/DL — HIGH (ref 70–99)
HCT VFR BLD CALC: 35 % — LOW (ref 39–50)
HGB BLD-MCNC: 11.8 G/DL — LOW (ref 13–17)
MAGNESIUM SERPL-MCNC: 2.2 MG/DL — SIGNIFICANT CHANGE UP (ref 1.6–2.6)
MCHC RBC-ENTMCNC: 33.1 PG — SIGNIFICANT CHANGE UP (ref 27–34)
MCHC RBC-ENTMCNC: 33.7 GM/DL — SIGNIFICANT CHANGE UP (ref 32–36)
MCV RBC AUTO: 98 FL — SIGNIFICANT CHANGE UP (ref 80–100)
NRBC # BLD: 0 /100 WBCS — SIGNIFICANT CHANGE UP (ref 0–0)
PLATELET # BLD AUTO: 139 K/UL — LOW (ref 150–400)
POTASSIUM SERPL-MCNC: 4.1 MMOL/L — SIGNIFICANT CHANGE UP (ref 3.5–5.3)
POTASSIUM SERPL-SCNC: 4.1 MMOL/L — SIGNIFICANT CHANGE UP (ref 3.5–5.3)
RBC # BLD: 3.57 M/UL — LOW (ref 4.2–5.8)
RBC # FLD: 14.6 % — HIGH (ref 10.3–14.5)
SODIUM SERPL-SCNC: 136 MMOL/L — SIGNIFICANT CHANGE UP (ref 135–145)
URATE SERPL-MCNC: 4.3 MG/DL — SIGNIFICANT CHANGE UP (ref 3.4–8.8)
WBC # BLD: 2.53 K/UL — LOW (ref 3.8–10.5)
WBC # FLD AUTO: 2.53 K/UL — LOW (ref 3.8–10.5)

## 2021-06-06 RX ORDER — CARVEDILOL PHOSPHATE 80 MG/1
6.25 CAPSULE, EXTENDED RELEASE ORAL EVERY 12 HOURS
Refills: 0 | Status: DISCONTINUED | OUTPATIENT
Start: 2021-06-06 | End: 2021-06-10

## 2021-06-06 RX ADMIN — ATORVASTATIN CALCIUM 20 MILLIGRAM(S): 80 TABLET, FILM COATED ORAL at 21:59

## 2021-06-06 RX ADMIN — Medication 1 DROP(S): at 05:28

## 2021-06-06 RX ADMIN — Medication 1 DROP(S): at 17:50

## 2021-06-06 RX ADMIN — HEPARIN SODIUM 5000 UNIT(S): 5000 INJECTION INTRAVENOUS; SUBCUTANEOUS at 05:29

## 2021-06-06 RX ADMIN — Medication 40 MILLIGRAM(S): at 05:28

## 2021-06-06 RX ADMIN — ISOSORBIDE DINITRATE 10 MILLIGRAM(S): 5 TABLET ORAL at 05:28

## 2021-06-06 RX ADMIN — Medication 10 MILLIGRAM(S): at 16:54

## 2021-06-06 RX ADMIN — Medication 10 MILLIGRAM(S): at 05:28

## 2021-06-06 RX ADMIN — CARVEDILOL PHOSPHATE 6.25 MILLIGRAM(S): 80 CAPSULE, EXTENDED RELEASE ORAL at 17:50

## 2021-06-06 RX ADMIN — ISOSORBIDE DINITRATE 10 MILLIGRAM(S): 5 TABLET ORAL at 12:01

## 2021-06-06 RX ADMIN — SENNA PLUS 2 TABLET(S): 8.6 TABLET ORAL at 21:59

## 2021-06-06 RX ADMIN — PREGABALIN 1000 MICROGRAM(S): 225 CAPSULE ORAL at 12:01

## 2021-06-06 RX ADMIN — Medication 100 MILLIGRAM(S): at 12:01

## 2021-06-06 RX ADMIN — CARVEDILOL PHOSPHATE 3.12 MILLIGRAM(S): 80 CAPSULE, EXTENDED RELEASE ORAL at 05:28

## 2021-06-06 RX ADMIN — ISOSORBIDE DINITRATE 10 MILLIGRAM(S): 5 TABLET ORAL at 16:55

## 2021-06-06 RX ADMIN — Medication 10 MILLIGRAM(S): at 21:59

## 2021-06-06 RX ADMIN — Medication 81 MILLIGRAM(S): at 12:01

## 2021-06-06 RX ADMIN — PANTOPRAZOLE SODIUM 40 MILLIGRAM(S): 20 TABLET, DELAYED RELEASE ORAL at 05:28

## 2021-06-06 RX ADMIN — Medication 1 MILLIGRAM(S): at 12:01

## 2021-06-06 RX ADMIN — HEPARIN SODIUM 5000 UNIT(S): 5000 INJECTION INTRAVENOUS; SUBCUTANEOUS at 17:50

## 2021-06-06 RX ADMIN — Medication 0.6 MILLIGRAM(S): at 12:01

## 2021-06-06 NOTE — PROGRESS NOTE ADULT - SUBJECTIVE AND OBJECTIVE BOX
CHIEF COMPLAINT:Patient is a 75y old  Male who presents with a chief complaint of worsening SOB.Pt appears comfortable.    	  REVIEW OF SYSTEMS:  CONSTITUTIONAL: No fever, weight loss, or fatigue  EYES: No eye pain, visual disturbances, or discharge  ENT:  No difficulty hearing, tinnitus, vertigo; No sinus or throat pain  NECK: No pain or stiffness  RESPIRATORY: No cough, wheezing, chills or hemoptysis; No Shortness of Breath  CARDIOVASCULAR: No chest pain, palpitations, passing out, dizziness, or leg swelling  GASTROINTESTINAL: No abdominal or epigastric pain. No nausea, vomiting, or hematemesis; No diarrhea or constipation. No melena or hematochezia.  GENITOURINARY: No dysuria, frequency, hematuria, or incontinence  NEUROLOGICAL: No headaches, memory loss, loss of strength, numbness, or tremors  SKIN: No itching, burning, rashes, or lesions   LYMPH Nodes: No enlarged glands  ENDOCRINE: No heat or cold intolerance; No hair loss  MUSCULOSKELETAL: No joint pain or swelling; No muscle, back, or extremity pain  PSYCHIATRIC: No depression, anxiety, mood swings, or difficulty sleeping  HEME/LYMPH: No easy bruising, or bleeding gums  ALLERGY AND IMMUNOLOGIC: No hives or eczema	      PHYSICAL EXAM:  T(C): 36.8 (06-06-21 @ 07:12), Max: 37.3 (06-05-21 @ 23:20)  HR: 63 (06-06-21 @ 07:12) (63 - 69)  BP: 120/71 (06-06-21 @ 07:12) (119/77 - 134/77)  RR: 18 (06-06-21 @ 07:12) (16 - 18)  SpO2: 96% (06-06-21 @ 07:12) (96% - 100%)  Wt(kg): --  I&O's Summary    05 Jun 2021 07:01  -  06 Jun 2021 07:00  --------------------------------------------------------  IN: 100 mL / OUT: 600 mL / NET: -500 mL    06 Jun 2021 07:01  -  06 Jun 2021 10:23  --------------------------------------------------------  IN: 0 mL / OUT: 400 mL / NET: -400 mL        Appearance: Normal	  HEENT:   Normal oral mucosa, PERRL, EOMI	  Lymphatic: No lymphadenopathy  Cardiovascular: Normal S1 S2, No JVD, No murmurs, No edema  Respiratory: Dec BS at bases  Psychiatry: A & O x 3, Mood & affect appropriate  Gastrointestinal:  Soft, Non-tender, + BS	  Skin: No rashes, No ecchymoses, No cyanosis	  Neurologic: Non-focal  Extremities: Normal range of motion, No clubbing, cyanosis or edema  Vascular: Peripheral pulses palpable 2+ bilaterally    MEDICATIONS  (STANDING):  allopurinol 100 milliGRAM(s) Oral daily  aspirin  chewable 81 milliGRAM(s) Oral daily  atorvastatin 20 milliGRAM(s) Oral at bedtime  carvedilol 6.25 milliGRAM(s) Oral every 12 hours  colchicine 0.6 milliGRAM(s) Oral daily  cyanocobalamin 1000 MICROGram(s) Oral daily  ergocalciferol 75867 Unit(s) Oral <User Schedule>  folic acid 1 milliGRAM(s) Oral daily  heparin   Injectable 5000 Unit(s) SubCutaneous every 12 hours  hydrALAZINE 10 milliGRAM(s) Oral three times a day  insulin lispro (ADMELOG) corrective regimen sliding scale   SubCutaneous Before meals and at bedtime  isosorbide   dinitrate Tablet (ISORDIL) 10 milliGRAM(s) Oral three times a day  pantoprazole    Tablet 40 milliGRAM(s) Oral before breakfast  senna 2 Tablet(s) Oral at bedtime  timolol 0.5% Solution 1 Drop(s) Both EYES two times a day      TELEMETRY: 	nsr    	  	  LABS:	 	                              11.8   2.53  )-----------( 139      ( 06 Jun 2021 07:38 )             35.0     06-06    136  |  103  |  36<H>  ----------------------------<  105<H>  4.1   |  28  |  2.58<H>    Ca    9.1      06 Jun 2021 07:38  Mg     2.2     06-06      proBNP: Serum Pro-Brain Natriuretic Peptide: 4037 pg/mL (06-02 @ 15:01)    Lipid Profile: Cholesterol 162  LDL --    TG 67

## 2021-06-06 NOTE — PROGRESS NOTE ADULT - SUBJECTIVE AND OBJECTIVE BOX
Problem List:  CKD stage 3 DKD  Diabetic retinoapthy  Hypertension  CHF on admission  HFrEF 40-45%  Left Ventricle: Mild to moderate global left ventricular  systolic dysfunction (EF 40-45% by visual estimation).  Paradoxical septal motion is seen, consistent with  conduction delay. Not all LV wall segments were seen.  Normal left ventricular internal dimensions and wall  thicknesses. A false tendon is noted. This is a normal  variant.  Grade II diastolic dysfunction.  Right Heart: Right atrium not well visualized. Off axis  images preclude accurate assessment of right ventricular  size. Normal RV systolic function (TAPSE 2.5 cm). Normal  tricuspid valve. Trace tricuspid regurgitation. Normal  pulmonic valve. Trace pulmonic insufficiency is noted.  Pericardium/PleuraNo pericardial effusion. Left pleural  effusion.  Hemodynamic: RA Pressure is 8 mm Hg. RV systolic pressure  is mildly increased at  36 mm Hg.  ------------------------------------------------------------------------      PAST MEDICAL & SURGICAL HISTORY:  DM (diabetes mellitus)    HTN (hypertension)    Chronic kidney disease    HLD (hyperlipidemia)    Glaucoma    Gout    No significant past surgical history        No Known Allergies      MEDICATIONS  (STANDING):  allopurinol 100 milliGRAM(s) Oral daily  aspirin  chewable 81 milliGRAM(s) Oral daily  atorvastatin 20 milliGRAM(s) Oral at bedtime  carvedilol 3.125 milliGRAM(s) Oral every 12 hours  colchicine 0.6 milliGRAM(s) Oral daily  cyanocobalamin 1000 MICROGram(s) Oral daily  ergocalciferol 21999 Unit(s) Oral <User Schedule>  folic acid 1 milliGRAM(s) Oral daily  furosemide   Injectable 40 milliGRAM(s) IV Push daily  heparin   Injectable 5000 Unit(s) SubCutaneous every 12 hours  hydrALAZINE 10 milliGRAM(s) Oral three times a day  insulin lispro (ADMELOG) corrective regimen sliding scale   SubCutaneous Before meals and at bedtime  isosorbide   dinitrate Tablet (ISORDIL) 10 milliGRAM(s) Oral three times a day  pantoprazole    Tablet 40 milliGRAM(s) Oral before breakfast  senna 2 Tablet(s) Oral at bedtime  timolol 0.5% Solution 1 Drop(s) Both EYES two times a day    MEDICATIONS  (PRN):  acetaminophen   Tablet .. 650 milliGRAM(s) Oral every 6 hours PRN Temp greater or equal to 38C (100.4F), Moderate Pain (4 - 6)  ondansetron Injectable 4 milliGRAM(s) IV Push every 6 hours PRN Nausea and/or Vomiting  polyethylene glycol 3350 17 Gram(s) Oral daily PRN Constipation                            11.8   2.53  )-----------( 139      ( 06 Jun 2021 07:38 )             35.0     06-06    136  |  103  |  36<H>  ----------------------------<  105<H>  4.1   |  28  |  2.58<H>    Ca    9.1      06 Jun 2021 07:38  Mg     2.2     06-06          Creatinine, Random Urine: 23 mg/dL (06-05 @ 15:36)  Sodium, Random Urine: 125 mmol/L (06-02 @ 19:20)  Creatinine, Random Urine: <13 mg/dL (06-02 @ 19:20)      REVIEW OF SYSTEMS:Feels well s  RESPIRATORY: No cough, wheezing, hemoptysis; No shortness of breath  CARDIOVASCULAR: No chest pain or palpitations. No Edema  GASTROINTESTINAL: No abdominal or epigastric pain. No nausea, vomiting. No diarrhea or constipation. No melena.  GENITOURINARY: No dysuria, frequency, foamy urine, urinary urgency, incontinence or hematuria  NEUROLOGICAL: No numbness or weakness, no tremor , no dizziness.   Muscle skeletal : no joint pain and no swelling of joints and limbs.  SKIN: No itching, burning, rashes.        VITALS:  T(F): 98.2 (06-06-21 @ 07:12), Max: 99.2 (06-05-21 @ 23:20)  HR: 63 (06-06-21 @ 07:12)  BP: 120/71 (06-06-21 @ 07:12)  RR: 18 (06-06-21 @ 07:12)  SpO2: 96% (06-06-21 @ 07:12)  Wt(kg): --    06-05 @ 07:01  -  06-06 @ 07:00  --------------------------------------------------------  IN: 100 mL / OUT: 600 mL / NET: -500 mL        PHYSICAL EXAM:  Constitutional: well developed, no diaphoresis, no distress.  Neck: No JVD, no carotid bruit.  Respiratory: Good air entrance B/L, no wheezes, rales or rhonchi  Cardiovascular: S1, S2, RRR, no pericardial rub, no murmur  Abdomen: BS+, soft, no tenderness, no bruit  Pelvis: bladder nondistended  Extremities: No cyanosis or clubbing. No peripheral edema.   Pulses: All present  Neurological: A/O x 3, no focal deficits

## 2021-06-06 NOTE — PROGRESS NOTE ADULT - ASSESSMENT
CKD -STAGE 3a IN 2019 - DKD  Decreased EGFR from 36 to 27 since admission , most likely side effect of diuretics  Proteinuria ratio 1.2 with albuminuria ratio 0.958.    CHF improved  BP stable    Diuretics as per cardiology, if agree to change to PO  Follow up stress test r/o cardiac ischemia.    Avoid NSAID and ACE I at this time.    Follow US

## 2021-06-06 NOTE — PROGRESS NOTE ADULT - ASSESSMENT
75 year old male from home AAO x3, with PMH of DM, HTN, CKD, HLD, gout and glaucoma, who presented to the ED due to worsening SOB,acute sytolic HF,NSVT.  1.Tele monitoring.  2.Stress test-r/o ischemia.  3.Change IV lasix to demadex 10mg qd.  4.CRI-Renal f/u,check SPEP.  5.DM-Insulin.  6.HTN and CHF-inc coreg 6.25mg bid,cont isordil and hydralazine.  7.GI and DVT prophylaxis.

## 2021-06-06 NOTE — PROGRESS NOTE ADULT - SUBJECTIVE AND OBJECTIVE BOX
Patient is a 75y old  Male who presents with a chief complaint of worsening SOB (05 Jun 2021 11:14)    pt seen in tele [x  ], reg med floor [   ], bed [ x ], chair at bedside [   ], a+o x3 [ x ], lethargic [  ],    nad [  x]        Allergies    No Known Allergies        Vitals    T(F): 98.2 (06-06-21 @ 05:06), Max: 99.2 (06-05-21 @ 23:20)  HR: 64 (06-06-21 @ 05:06) (64 - 69)  BP: 134/77 (06-06-21 @ 05:06) (117/65 - 134/77)  RR: 16 (06-06-21 @ 05:06) (16 - 18)  SpO2: 96% (06-06-21 @ 05:06) (96% - 100%)  Wt(kg): --  CAPILLARY BLOOD GLUCOSE      POCT Blood Glucose.: 128 mg/dL (05 Jun 2021 21:03)      Labs                          11.5   2.35  )-----------( 126      ( 05 Jun 2021 07:32 )             34.3       06-05    139  |  105  |  32<H>  ----------------------------<  85  4.0   |  23  |  2.46<H>    Ca    8.7      05 Jun 2021 07:32                  Radiology Results      Meds    MEDICATIONS  (STANDING):  allopurinol 100 milliGRAM(s) Oral daily  aspirin  chewable 81 milliGRAM(s) Oral daily  atorvastatin 20 milliGRAM(s) Oral at bedtime  carvedilol 3.125 milliGRAM(s) Oral every 12 hours  colchicine 0.6 milliGRAM(s) Oral daily  cyanocobalamin 1000 MICROGram(s) Oral daily  ergocalciferol 37337 Unit(s) Oral <User Schedule>  folic acid 1 milliGRAM(s) Oral daily  furosemide   Injectable 40 milliGRAM(s) IV Push daily  heparin   Injectable 5000 Unit(s) SubCutaneous every 12 hours  hydrALAZINE 10 milliGRAM(s) Oral three times a day  insulin lispro (ADMELOG) corrective regimen sliding scale   SubCutaneous Before meals and at bedtime  isosorbide   dinitrate Tablet (ISORDIL) 10 milliGRAM(s) Oral three times a day  pantoprazole    Tablet 40 milliGRAM(s) Oral before breakfast  senna 2 Tablet(s) Oral at bedtime  timolol 0.5% Solution 1 Drop(s) Both EYES two times a day      MEDICATIONS  (PRN):  acetaminophen   Tablet .. 650 milliGRAM(s) Oral every 6 hours PRN Temp greater or equal to 38C (100.4F), Moderate Pain (4 - 6)  ondansetron Injectable 4 milliGRAM(s) IV Push every 6 hours PRN Nausea and/or Vomiting  polyethylene glycol 3350 17 Gram(s) Oral daily PRN Constipation      Physical Exam    Neuro :  no focal deficits  Respiratory: CTA B/L  CV: RRR, S1S2, no murmurs,   Abdominal: Soft, NT, ND +BS,  Extremities: No edema, + peripheral pulses    ASSESSMENT    worsening sob with activity,   r/o chf exacerb,   r/o acs,   r/o pe,   pulm edema,   anemia   h/o DM,   HTN,   CKD 3,   HLD,   gout,   glaucoma   HFrEF    PLAN      cont tele,   acs protocol,   coreg,   aspirin, statin,   lasix,   cardio f/u   ce x 3 noted above  echo with EF 40-45%. Paradoxical septal motion is seen, consistent with conduction delay. Grade II diastolic dysfunction. RV systolic pressure is mildly increased at  36 mm Hg.  No pericardial effusion. Left pleural effusion noted   isordil and hydralazine added,  norvasc d/c'd  Stress test when stable.  vq scan with low prob pe noted    hep drip d/c'd  cont hep sq  bronchodilators prn,   hgba1c 5.3 noted above  cont lispro ss   renal f/u   Obtain UA and repeat protein, microalbumin creatinine ratio  Renal US  Uric acid level  cont current meds         Patient is a 75y old  Male who presents with a chief complaint of worsening SOB (05 Jun 2021 11:14)    pt seen in tele [x  ], reg med floor [   ], bed [ x ], chair at bedside [   ], a+o x3 [ x ], lethargic [  ],    nad [  x]        Allergies    No Known Allergies        Vitals    T(F): 98.2 (06-06-21 @ 05:06), Max: 99.2 (06-05-21 @ 23:20)  HR: 64 (06-06-21 @ 05:06) (64 - 69)  BP: 134/77 (06-06-21 @ 05:06) (117/65 - 134/77)  RR: 16 (06-06-21 @ 05:06) (16 - 18)  SpO2: 96% (06-06-21 @ 05:06) (96% - 100%)  Wt(kg): --  CAPILLARY BLOOD GLUCOSE      POCT Blood Glucose.: 128 mg/dL (05 Jun 2021 21:03)      Labs                          11.5   2.35  )-----------( 126      ( 05 Jun 2021 07:32 )             34.3       06-05    139  |  105  |  32<H>  ----------------------------<  85  4.0   |  23  |  2.46<H>    Ca    8.7      05 Jun 2021 07:32                  Radiology Results      Meds    MEDICATIONS  (STANDING):  allopurinol 100 milliGRAM(s) Oral daily  aspirin  chewable 81 milliGRAM(s) Oral daily  atorvastatin 20 milliGRAM(s) Oral at bedtime  carvedilol 3.125 milliGRAM(s) Oral every 12 hours  colchicine 0.6 milliGRAM(s) Oral daily  cyanocobalamin 1000 MICROGram(s) Oral daily  ergocalciferol 44817 Unit(s) Oral <User Schedule>  folic acid 1 milliGRAM(s) Oral daily  furosemide   Injectable 40 milliGRAM(s) IV Push daily  heparin   Injectable 5000 Unit(s) SubCutaneous every 12 hours  hydrALAZINE 10 milliGRAM(s) Oral three times a day  insulin lispro (ADMELOG) corrective regimen sliding scale   SubCutaneous Before meals and at bedtime  isosorbide   dinitrate Tablet (ISORDIL) 10 milliGRAM(s) Oral three times a day  pantoprazole    Tablet 40 milliGRAM(s) Oral before breakfast  senna 2 Tablet(s) Oral at bedtime  timolol 0.5% Solution 1 Drop(s) Both EYES two times a day      MEDICATIONS  (PRN):  acetaminophen   Tablet .. 650 milliGRAM(s) Oral every 6 hours PRN Temp greater or equal to 38C (100.4F), Moderate Pain (4 - 6)  ondansetron Injectable 4 milliGRAM(s) IV Push every 6 hours PRN Nausea and/or Vomiting  polyethylene glycol 3350 17 Gram(s) Oral daily PRN Constipation      Physical Exam    Neuro :  no focal deficits  Respiratory: CTA B/L  CV: RRR, S1S2, no murmurs,   Abdominal: Soft, NT, ND +BS,  Extremities: No edema, + peripheral pulses    ASSESSMENT    worsening sob with activity,   r/o chf exacerb,   r/o acs,   r/o pe,   pulm edema,   anemia   h/o DM,   HTN,   CKD 3,   HLD,   gout,   glaucoma   HFrEF    PLAN      cont tele,   acs protocol,   coreg,   aspirin, statin,   lasix,   cardio f/u   ce x 3 noted above  echo with EF 40-45%. Paradoxical septal motion is seen, consistent with conduction delay. Grade II diastolic dysfunction. RV systolic pressure is mildly increased at  36 mm Hg.  No pericardial effusion. Left pleural effusion noted   isordil and hydralazine added,  norvasc d/c'd  f/u Stress test in am  vq scan with low prob pe noted    hep drip d/c'd  cont hep sq   pulm f/u  bronchodilators prn,   pft outpt  hgba1c 5.3 noted    cont lispro ss   renal f/u   Obtain UA and repeat protein, microalbumin creatinine ratio  Renal US  Uric acid level  cont current meds

## 2021-06-06 NOTE — PROGRESS NOTE ADULT - SUBJECTIVE AND OBJECTIVE BOX
Pt is awake, alert, lying in bed in NAD. Pending Stress test.     INTERVAL HPI/OVERNIGHT EVENTS:      VITAL SIGNS:  T(F): 98.2 (21 @ 07:12)  HR: 63 (21 @ 07:12)  BP: 120/71 (21 @ 07:12)  RR: 18 (21 @ 07:12)  SpO2: 96% (21 @ 07:12)  Wt(kg): --  I&O's Detail    2021 07:  -  2021 07:00  --------------------------------------------------------  IN:    IV PiggyBack: 100 mL  Total IN: 100 mL    OUT:    Voided (mL): 600 mL  Total OUT: 600 mL    Total NET: -500 mL      2021 07:01  -  2021 10:55  --------------------------------------------------------  IN:  Total IN: 0 mL    OUT:    Voided (mL): 400 mL  Total OUT: 400 mL    Total NET: -400 mL              REVIEW OF SYSTEMS:    CONSTITUTIONAL:  No fevers, chills, sweats    HEENT:  Eyes:  No diplopia or blurred vision. ENT:  No earache, sore throat or runny nose.    CARDIOVASCULAR:  No pressure, squeezing, tightness, or heaviness about the chest; no palpitations.    RESPIRATORY:  Per HPI    GASTROINTESTINAL:  No abdominal pain, nausea, vomiting or diarrhea.    GENITOURINARY:  No dysuria, frequency or urgency.    NEUROLOGIC:  No paresthesias, fasciculations, seizures or weakness.    PSYCHIATRIC:  No disorder of thought or mood.      PHYSICAL EXAM:    Constitutional: Well developed and nourished  Eyes:Perrla  ENMT: normal  Neck:supple  Respiratory: good air entry  Cardiovascular: S1 S2 regular  Gastrointestinal: Soft, Non tender  Extremities: No edema  Vascular:normal  Neurological:Awake, alert,Ox3  Musculoskeletal:Normal      MEDICATIONS  (STANDING):  allopurinol 100 milliGRAM(s) Oral daily  aspirin  chewable 81 milliGRAM(s) Oral daily  atorvastatin 20 milliGRAM(s) Oral at bedtime  carvedilol 6.25 milliGRAM(s) Oral every 12 hours  colchicine 0.6 milliGRAM(s) Oral daily  cyanocobalamin 1000 MICROGram(s) Oral daily  ergocalciferol 83382 Unit(s) Oral <User Schedule>  folic acid 1 milliGRAM(s) Oral daily  heparin   Injectable 5000 Unit(s) SubCutaneous every 12 hours  hydrALAZINE 10 milliGRAM(s) Oral three times a day  insulin lispro (ADMELOG) corrective regimen sliding scale   SubCutaneous Before meals and at bedtime  isosorbide   dinitrate Tablet (ISORDIL) 10 milliGRAM(s) Oral three times a day  pantoprazole    Tablet 40 milliGRAM(s) Oral before breakfast  senna 2 Tablet(s) Oral at bedtime  timolol 0.5% Solution 1 Drop(s) Both EYES two times a day    MEDICATIONS  (PRN):  acetaminophen   Tablet .. 650 milliGRAM(s) Oral every 6 hours PRN Temp greater or equal to 38C (100.4F), Moderate Pain (4 - 6)  ondansetron Injectable 4 milliGRAM(s) IV Push every 6 hours PRN Nausea and/or Vomiting  polyethylene glycol 3350 17 Gram(s) Oral daily PRN Constipation      Allergies    No Known Allergies    Intolerances        LABS:                        11.8   2.53  )-----------( 139      ( 2021 07:38 )             35.0     06-06    136  |  103  |  36<H>  ----------------------------<  105<H>  4.1   |  28  |  2.58<H>    Ca    9.1      2021 07:38  Mg     2.2     06-        Urinalysis Basic - ( 2021 09:24 )    Color: Yellow / Appearance: Clear / S.005 / pH: x  Gluc: x / Ketone: Negative  / Bili: Negative / Urobili: Negative   Blood: x / Protein: 30 mg/dL / Nitrite: Negative   Leuk Esterase: Negative / RBC: 0-2 /HPF / WBC 0-2 /HPF   Sq Epi: x / Non Sq Epi: Few /HPF / Bacteria: x            CAPILLARY BLOOD GLUCOSE      POCT Blood Glucose.: 97 mg/dL (2021 07:45)  POCT Blood Glucose.: 128 mg/dL (2021 21:03)  POCT Blood Glucose.: 117 mg/dL (2021 16:49)  POCT Blood Glucose.: 86 mg/dL (2021 11:39)    pro-bnp 4037 - @ 15:01     d-dimer 1119  - @ 15:01      RADIOLOGY & ADDITIONAL TESTS:    CXR:    Ct scan chest:    ekg;    echo:

## 2021-06-06 NOTE — PROGRESS NOTE ADULT - SUBJECTIVE AND OBJECTIVE BOX
PGY-1 Progress Note discussed with attending    PAGER #: [[phone]] TILL 5:00 PM  PLEASE CONTACT ON CALL TEAM:  - On Call Team (Please refer to Darrick) FROM 5:00 PM - 8:30PM  - Nightfloat Team FROM 8:30 -7:30 AM    INTERVAL HPI/OVERNIGHT EVENTS: No acute overnight events, pt scheduled for Stress test and USG Renal (creatinine uptrending)     MEDICATIONS  (STANDING):  allopurinol 100 milliGRAM(s) Oral daily  aspirin  chewable 81 milliGRAM(s) Oral daily  atorvastatin 20 milliGRAM(s) Oral at bedtime  carvedilol 3.125 milliGRAM(s) Oral every 12 hours  colchicine 0.6 milliGRAM(s) Oral daily  cyanocobalamin 1000 MICROGram(s) Oral daily  ergocalciferol 97364 Unit(s) Oral <User Schedule>  folic acid 1 milliGRAM(s) Oral daily  furosemide   Injectable 40 milliGRAM(s) IV Push daily  heparin   Injectable 5000 Unit(s) SubCutaneous every 12 hours  hydrALAZINE 10 milliGRAM(s) Oral three times a day  insulin lispro (ADMELOG) corrective regimen sliding scale   SubCutaneous Before meals and at bedtime  isosorbide   dinitrate Tablet (ISORDIL) 10 milliGRAM(s) Oral three times a day  pantoprazole    Tablet 40 milliGRAM(s) Oral before breakfast  senna 2 Tablet(s) Oral at bedtime  timolol 0.5% Solution 1 Drop(s) Both EYES two times a day    MEDICATIONS  (PRN):  acetaminophen   Tablet .. 650 milliGRAM(s) Oral every 6 hours PRN Temp greater or equal to 38C (100.4F), Moderate Pain (4 - 6)  ondansetron Injectable 4 milliGRAM(s) IV Push every 6 hours PRN Nausea and/or Vomiting  polyethylene glycol 3350 17 Gram(s) Oral daily PRN Constipation      REVIEW OF SYSTEMS:  CONSTITUTIONAL: No fever, weight loss, or fatigue  RESPIRATORY: No cough, wheezing, chills or hemoptysis; No shortness of breath  CARDIOVASCULAR: No chest pain, palpitations, dizziness, or leg swelling  GASTROINTESTINAL: No abdominal pain. No nausea, vomiting, or hematemesis; No diarrhea or constipation. No melena or hematochezia.  GENITOURINARY: No dysuria or hematuria, urinary frequency  NEUROLOGICAL: No headaches, memory loss, loss of strength, numbness, or tremors  SKIN: No itching, burning, rashes, or lesions     Vital Signs Last 24 Hrs  T(C): 36.8 (06 Jun 2021 07:12), Max: 37.3 (05 Jun 2021 23:20)  T(F): 98.2 (06 Jun 2021 07:12), Max: 99.2 (05 Jun 2021 23:20)  HR: 63 (06 Jun 2021 07:12) (63 - 69)  BP: 120/71 (06 Jun 2021 07:12) (119/77 - 134/77)  BP(mean): --  RR: 18 (06 Jun 2021 07:12) (16 - 18)  SpO2: 96% (06 Jun 2021 07:12) (96% - 100%)    PHYSICAL EXAMINATION:  GENERAL: Pt appears comfortable, saturating well on RA  HEAD:  Atraumatic, Normocephalic  EYES:  conjunctiva and sclera clear  NECK: Supple, No JVD, Normal thyroid  CHEST/LUNG: Clear to auscultation. Clear to percussion bilaterally; No rales, rhonchi, wheezing, or rubs  HEART: Regular rate and rhythm; No murmurs, rubs, or gallops  ABDOMEN: Soft, Nontender, Nondistended; Bowel sounds present  NERVOUS SYSTEM:  Alert & Oriented X3,    EXTREMITIES:  2+ Peripheral Pulses, No clubbing, cyanosis, or edema  SKIN: warm dry                          11.8   2.53  )-----------( 139      ( 06 Jun 2021 07:38 )             35.0     06-06    136  |  103  |  36<H>  ----------------------------<  105<H>  4.1   |  28  |  2.58<H>    Ca    9.1      06 Jun 2021 07:38  Mg     2.2     06-06                CAPILLARY BLOOD GLUCOSE      RADIOLOGY & ADDITIONAL TESTS:

## 2021-06-07 LAB
% ALBUMIN: 43.3 % — SIGNIFICANT CHANGE UP
% ALPHA 1: 5 % — SIGNIFICANT CHANGE UP
% ALPHA 2: 14.4 % — SIGNIFICANT CHANGE UP
% BETA: 14.7 % — SIGNIFICANT CHANGE UP
% GAMMA: 22.6 % — SIGNIFICANT CHANGE UP
% M SPIKE: 7.7 % — SIGNIFICANT CHANGE UP
ALBUMIN SERPL ELPH-MCNC: 2.6 G/DL — LOW (ref 3.6–5.5)
ALBUMIN/GLOB SERPL ELPH: 0.7 RATIO — SIGNIFICANT CHANGE UP
ALPHA1 GLOB SERPL ELPH-MCNC: 0.3 G/DL — SIGNIFICANT CHANGE UP (ref 0.1–0.4)
ALPHA2 GLOB SERPL ELPH-MCNC: 0.9 G/DL — SIGNIFICANT CHANGE UP (ref 0.5–1)
ANION GAP SERPL CALC-SCNC: 12 MMOL/L — SIGNIFICANT CHANGE UP (ref 5–17)
B-GLOBULIN SERPL ELPH-MCNC: 0.9 G/DL — SIGNIFICANT CHANGE UP (ref 0.5–1)
BUN SERPL-MCNC: 40 MG/DL — HIGH (ref 7–18)
CALCIUM SERPL-MCNC: 9.2 MG/DL — SIGNIFICANT CHANGE UP (ref 8.4–10.5)
CHLORIDE SERPL-SCNC: 103 MMOL/L — SIGNIFICANT CHANGE UP (ref 96–108)
CO2 SERPL-SCNC: 24 MMOL/L — SIGNIFICANT CHANGE UP (ref 22–31)
CREAT SERPL-MCNC: 2.84 MG/DL — HIGH (ref 0.5–1.3)
GAMMA GLOBULIN: 1.4 G/DL — SIGNIFICANT CHANGE UP (ref 0.6–1.6)
GLUCOSE BLDC GLUCOMTR-MCNC: 113 MG/DL — HIGH (ref 70–99)
GLUCOSE BLDC GLUCOMTR-MCNC: 115 MG/DL — HIGH (ref 70–99)
GLUCOSE BLDC GLUCOMTR-MCNC: 132 MG/DL — HIGH (ref 70–99)
GLUCOSE BLDC GLUCOMTR-MCNC: 134 MG/DL — HIGH (ref 70–99)
GLUCOSE SERPL-MCNC: 106 MG/DL — HIGH (ref 70–99)
HCT VFR BLD CALC: 34 % — LOW (ref 39–50)
HGB BLD-MCNC: 11.3 G/DL — LOW (ref 13–17)
M-SPIKE: 0.5 G/DL — HIGH (ref 0–0)
MCHC RBC-ENTMCNC: 32.7 PG — SIGNIFICANT CHANGE UP (ref 27–34)
MCHC RBC-ENTMCNC: 33.2 GM/DL — SIGNIFICANT CHANGE UP (ref 32–36)
MCV RBC AUTO: 98.3 FL — SIGNIFICANT CHANGE UP (ref 80–100)
NRBC # BLD: 0 /100 WBCS — SIGNIFICANT CHANGE UP (ref 0–0)
PLATELET # BLD AUTO: 139 K/UL — LOW (ref 150–400)
POTASSIUM SERPL-MCNC: 4.2 MMOL/L — SIGNIFICANT CHANGE UP (ref 3.5–5.3)
POTASSIUM SERPL-SCNC: 4.2 MMOL/L — SIGNIFICANT CHANGE UP (ref 3.5–5.3)
PROT PATTERN SERPL ELPH-IMP: SIGNIFICANT CHANGE UP
PROT SERPL-MCNC: 6.1 G/DL — SIGNIFICANT CHANGE UP (ref 6–8.3)
PROT SERPL-MCNC: 6.1 G/DL — SIGNIFICANT CHANGE UP (ref 6–8.3)
RBC # BLD: 3.46 M/UL — LOW (ref 4.2–5.8)
RBC # FLD: 14.7 % — HIGH (ref 10.3–14.5)
SODIUM SERPL-SCNC: 139 MMOL/L — SIGNIFICANT CHANGE UP (ref 135–145)
WBC # BLD: 2.64 K/UL — LOW (ref 3.8–10.5)
WBC # FLD AUTO: 2.64 K/UL — LOW (ref 3.8–10.5)

## 2021-06-07 PROCEDURE — 76775 US EXAM ABDO BACK WALL LIM: CPT | Mod: 26

## 2021-06-07 RX ADMIN — Medication 1 DROP(S): at 05:41

## 2021-06-07 RX ADMIN — ISOSORBIDE DINITRATE 10 MILLIGRAM(S): 5 TABLET ORAL at 05:40

## 2021-06-07 RX ADMIN — Medication 10 MILLIGRAM(S): at 05:41

## 2021-06-07 RX ADMIN — HEPARIN SODIUM 5000 UNIT(S): 5000 INJECTION INTRAVENOUS; SUBCUTANEOUS at 17:37

## 2021-06-07 RX ADMIN — Medication 81 MILLIGRAM(S): at 12:11

## 2021-06-07 RX ADMIN — HEPARIN SODIUM 5000 UNIT(S): 5000 INJECTION INTRAVENOUS; SUBCUTANEOUS at 05:40

## 2021-06-07 RX ADMIN — ATORVASTATIN CALCIUM 20 MILLIGRAM(S): 80 TABLET, FILM COATED ORAL at 22:24

## 2021-06-07 RX ADMIN — PANTOPRAZOLE SODIUM 40 MILLIGRAM(S): 20 TABLET, DELAYED RELEASE ORAL at 05:48

## 2021-06-07 RX ADMIN — Medication 0.6 MILLIGRAM(S): at 12:11

## 2021-06-07 RX ADMIN — CARVEDILOL PHOSPHATE 6.25 MILLIGRAM(S): 80 CAPSULE, EXTENDED RELEASE ORAL at 17:37

## 2021-06-07 RX ADMIN — Medication 1 DROP(S): at 17:37

## 2021-06-07 RX ADMIN — PREGABALIN 1000 MICROGRAM(S): 225 CAPSULE ORAL at 12:11

## 2021-06-07 RX ADMIN — SENNA PLUS 2 TABLET(S): 8.6 TABLET ORAL at 22:23

## 2021-06-07 RX ADMIN — ISOSORBIDE DINITRATE 10 MILLIGRAM(S): 5 TABLET ORAL at 17:36

## 2021-06-07 RX ADMIN — CARVEDILOL PHOSPHATE 6.25 MILLIGRAM(S): 80 CAPSULE, EXTENDED RELEASE ORAL at 05:40

## 2021-06-07 RX ADMIN — Medication 1 MILLIGRAM(S): at 12:11

## 2021-06-07 RX ADMIN — Medication 10 MILLIGRAM(S): at 14:53

## 2021-06-07 RX ADMIN — Medication 10 MILLIGRAM(S): at 05:40

## 2021-06-07 RX ADMIN — Medication 10 MILLIGRAM(S): at 22:23

## 2021-06-07 RX ADMIN — Medication 100 MILLIGRAM(S): at 12:11

## 2021-06-07 RX ADMIN — ISOSORBIDE DINITRATE 10 MILLIGRAM(S): 5 TABLET ORAL at 12:11

## 2021-06-07 NOTE — PROGRESS NOTE ADULT - SUBJECTIVE AND OBJECTIVE BOX
PGY-1 Progress Note discussed with attending    PAGER #: [[phone]] TILL 5:00 PM  PLEASE CONTACT ON CALL TEAM:  - On Call Team (Please refer to Darrick) FROM 5:00 PM - 8:30PM  - Nightfloat Team FROM 8:30 -7:30 AM    CHIEF COMPLAINT & BRIEF HOSPITAL COURSE:     INTERVAL HPI/OVERNIGHT EVENTS:     MEDICATIONS  (STANDING):  allopurinol 100 milliGRAM(s) Oral daily  aspirin  chewable 81 milliGRAM(s) Oral daily  atorvastatin 20 milliGRAM(s) Oral at bedtime  carvedilol 6.25 milliGRAM(s) Oral every 12 hours  colchicine 0.6 milliGRAM(s) Oral daily  cyanocobalamin 1000 MICROGram(s) Oral daily  ergocalciferol 99759 Unit(s) Oral <User Schedule>  folic acid 1 milliGRAM(s) Oral daily  heparin   Injectable 5000 Unit(s) SubCutaneous every 12 hours  hydrALAZINE 10 milliGRAM(s) Oral three times a day  insulin lispro (ADMELOG) corrective regimen sliding scale   SubCutaneous Before meals and at bedtime  isosorbide   dinitrate Tablet (ISORDIL) 10 milliGRAM(s) Oral three times a day  pantoprazole    Tablet 40 milliGRAM(s) Oral before breakfast  senna 2 Tablet(s) Oral at bedtime  timolol 0.5% Solution 1 Drop(s) Both EYES two times a day  torsemide 10 milliGRAM(s) Oral daily    MEDICATIONS  (PRN):  acetaminophen   Tablet .. 650 milliGRAM(s) Oral every 6 hours PRN Temp greater or equal to 38C (100.4F), Moderate Pain (4 - 6)  ondansetron Injectable 4 milliGRAM(s) IV Push every 6 hours PRN Nausea and/or Vomiting  polyethylene glycol 3350 17 Gram(s) Oral daily PRN Constipation      REVIEW OF SYSTEMS:  CONSTITUTIONAL: No fever, weight loss, or fatigue  RESPIRATORY: No cough, wheezing, chills or hemoptysis; No shortness of breath  CARDIOVASCULAR: No chest pain, palpitations, dizziness, or leg swelling  GASTROINTESTINAL: No abdominal pain. No nausea, vomiting, or hematemesis; No diarrhea or constipation. No melena or hematochezia.  GENITOURINARY: No dysuria or hematuria, urinary frequency  NEUROLOGICAL: No headaches, memory loss, loss of strength, numbness, or tremors  SKIN: No itching, burning, rashes, or lesions     Vital Signs Last 24 Hrs  T(C): 36.3 (07 Jun 2021 11:18), Max: 37.3 (07 Jun 2021 05:08)  T(F): 97.4 (07 Jun 2021 11:18), Max: 99.1 (07 Jun 2021 05:08)  HR: 69 (07 Jun 2021 11:18) (63 - 71)  BP: 139/80 (07 Jun 2021 11:18) (108/75 - 139/80)  BP(mean): 79 (07 Jun 2021 05:08) (79 - 79)  RR: 16 (07 Jun 2021 11:18) (16 - 17)  SpO2: 100% (07 Jun 2021 11:18) (97% - 100%)    PHYSICAL EXAMINATION:  GENERAL: NAD, well built  HEAD:  Atraumatic, Normocephalic  EYES:  conjunctiva and sclera clear  NECK: Supple, No JVD, Normal thyroid  CHEST/LUNG: Clear to auscultation. Clear to percussion bilaterally; No rales, rhonchi, wheezing, or rubs  HEART: Regular rate and rhythm; No murmurs, rubs, or gallops  ABDOMEN: Soft, Nontender, Nondistended; Bowel sounds present  NERVOUS SYSTEM:  Alert & Oriented X3,    EXTREMITIES:  2+ Peripheral Pulses, No clubbing, cyanosis, or edema  SKIN: warm dry                          11.3   2.64  )-----------( 139      ( 07 Jun 2021 06:16 )             34.0     06-07    139  |  103  |  40<H>  ----------------------------<  106<H>  4.2   |  24  |  2.84<H>    Ca    9.2      07 Jun 2021 06:16  Mg     2.2     06-06                CAPILLARY BLOOD GLUCOSE      RADIOLOGY & ADDITIONAL TESTS:                   PGY-1 Progress Note discussed with attending    PAGER #: [[phone]] TILL 5:00 PM  PLEASE CONTACT ON CALL TEAM:  - On Call Team (Please refer to Darrick) FROM 5:00 PM - 8:30PM  - Nightfloat Team FROM 8:30 -7:30 AM    INTERVAL HPI/OVERNIGHT EVENTS: Pt planned for cath after renal function stabilizes due to high suspicion of multivessel disease    MEDICATIONS  (STANDING):  allopurinol 100 milliGRAM(s) Oral daily  aspirin  chewable 81 milliGRAM(s) Oral daily  atorvastatin 20 milliGRAM(s) Oral at bedtime  carvedilol 6.25 milliGRAM(s) Oral every 12 hours  colchicine 0.6 milliGRAM(s) Oral daily  cyanocobalamin 1000 MICROGram(s) Oral daily  ergocalciferol 55389 Unit(s) Oral <User Schedule>  folic acid 1 milliGRAM(s) Oral daily  heparin   Injectable 5000 Unit(s) SubCutaneous every 12 hours  hydrALAZINE 10 milliGRAM(s) Oral three times a day  insulin lispro (ADMELOG) corrective regimen sliding scale   SubCutaneous Before meals and at bedtime  isosorbide   dinitrate Tablet (ISORDIL) 10 milliGRAM(s) Oral three times a day  pantoprazole    Tablet 40 milliGRAM(s) Oral before breakfast  senna 2 Tablet(s) Oral at bedtime  timolol 0.5% Solution 1 Drop(s) Both EYES two times a day  torsemide 10 milliGRAM(s) Oral daily    MEDICATIONS  (PRN):  acetaminophen   Tablet .. 650 milliGRAM(s) Oral every 6 hours PRN Temp greater or equal to 38C (100.4F), Moderate Pain (4 - 6)  ondansetron Injectable 4 milliGRAM(s) IV Push every 6 hours PRN Nausea and/or Vomiting  polyethylene glycol 3350 17 Gram(s) Oral daily PRN Constipation      REVIEW OF SYSTEMS:  CONSTITUTIONAL: No fever, weight loss, or fatigue  RESPIRATORY: No cough, wheezing, chills or hemoptysis; No shortness of breath  CARDIOVASCULAR: No chest pain, palpitations, dizziness, or leg swelling  GASTROINTESTINAL: No abdominal pain. No nausea, vomiting, or hematemesis; No diarrhea or constipation. No melena or hematochezia.  GENITOURINARY: No dysuria or hematuria, urinary frequency  NEUROLOGICAL: No headaches, memory loss, loss of strength, numbness, or tremors  SKIN: No itching, burning, rashes, or lesions     Vital Signs Last 24 Hrs  T(C): 36.3 (07 Jun 2021 11:18), Max: 37.3 (07 Jun 2021 05:08)  T(F): 97.4 (07 Jun 2021 11:18), Max: 99.1 (07 Jun 2021 05:08)  HR: 69 (07 Jun 2021 11:18) (63 - 71)  BP: 139/80 (07 Jun 2021 11:18) (108/75 - 139/80)  BP(mean): 79 (07 Jun 2021 05:08) (79 - 79)  RR: 16 (07 Jun 2021 11:18) (16 - 17)  SpO2: 100% (07 Jun 2021 11:18) (97% - 100%)    PHYSICAL EXAMINATION:  GENERAL: NAD, well built  HEAD:  Atraumatic, Normocephalic  EYES:  conjunctiva and sclera clear  NECK: Supple, No JVD, Normal thyroid  CHEST/LUNG: Clear to auscultation. Clear to percussion bilaterally; No rales, rhonchi, wheezing, or rubs  HEART: Regular rate and rhythm; No murmurs, rubs, or gallops  ABDOMEN: Soft, Nontender, Nondistended; Bowel sounds present  NERVOUS SYSTEM:  Alert & Oriented X3,    EXTREMITIES:  2+ Peripheral Pulses, No clubbing, cyanosis, or edema  SKIN: warm dry                          11.3   2.64  )-----------( 139      ( 07 Jun 2021 06:16 )             34.0     06-07    139  |  103  |  40<H>  ----------------------------<  106<H>  4.2   |  24  |  2.84<H>    Ca    9.2      07 Jun 2021 06:16  Mg     2.2     06-06                CAPILLARY BLOOD GLUCOSE      RADIOLOGY & ADDITIONAL TESTS:

## 2021-06-07 NOTE — PROGRESS NOTE ADULT - ASSESSMENT
75 year old male from home AAO x3, with PMH of DM, HTN, CKD, HLD, gout and glaucoma, who presented to the ED due to worsening SOB,acute sytolic HF,NSVT.  1.Tele monitoring.  2.Stress test-r/o ischemia.  3.Acute systolic HF-cont demadex 10mg qd.  4.CRI-Renal f/u,check SPEP.  5.DM-Insulin.  6.HTN and CHF-inc coreg 6.25mg bid,cont isordil and hydralazine.  7.GI and DVT prophylaxis.

## 2021-06-07 NOTE — PROGRESS NOTE ADULT - SUBJECTIVE AND OBJECTIVE BOX
Patient is a 75y old  Male who presents with a chief complaint of worsening SOB (07 Jun 2021 08:34)  Awake, alert, comfortable out of bed in NAD. Having stress test    INTERVAL HPI/OVERNIGHT EVENTS:      VITAL SIGNS:  T(F): 98.7 (06-07-21 @ 07:16)  HR: 63 (06-07-21 @ 07:16)  BP: 111/65 (06-07-21 @ 07:16)  RR: 17 (06-07-21 @ 07:16)  SpO2: 97% (06-07-21 @ 07:16)  Wt(kg): --  I&O's Detail    06 Jun 2021 07:01  -  07 Jun 2021 07:00  --------------------------------------------------------  IN:  Total IN: 0 mL    OUT:    Voided (mL): 825 mL  Total OUT: 825 mL    Total NET: -825 mL      07 Jun 2021 07:01  -  07 Jun 2021 10:39  --------------------------------------------------------  IN:  Total IN: 0 mL    OUT:    Voided (mL): 200 mL  Total OUT: 200 mL    Total NET: -200 mL              REVIEW OF SYSTEMS:    CONSTITUTIONAL:  No fevers, chills, sweats    HEENT:  Eyes:  No diplopia or blurred vision. ENT:  No earache, sore throat or runny nose.    CARDIOVASCULAR:  No pressure, squeezing, tightness, or heaviness about the chest; no palpitations.    RESPIRATORY:  Per HPI    GASTROINTESTINAL:  No abdominal pain, nausea, vomiting or diarrhea.    GENITOURINARY:  No dysuria, frequency or urgency.    NEUROLOGIC:  No paresthesias, fasciculations, seizures or weakness.    PSYCHIATRIC:  No disorder of thought or mood.      PHYSICAL EXAM:    Constitutional: Well developed and nourished  Eyes:Perrla  ENMT: normal  Neck:supple  Respiratory: good air entry  Cardiovascular: S1 S2 regular  Gastrointestinal: Soft, Non tender  Extremities: No edema  Vascular:normal  Neurological:Awake, alert,Ox3  Musculoskeletal:Normal      MEDICATIONS  (STANDING):  allopurinol 100 milliGRAM(s) Oral daily  aspirin  chewable 81 milliGRAM(s) Oral daily  atorvastatin 20 milliGRAM(s) Oral at bedtime  carvedilol 6.25 milliGRAM(s) Oral every 12 hours  colchicine 0.6 milliGRAM(s) Oral daily  cyanocobalamin 1000 MICROGram(s) Oral daily  ergocalciferol 43178 Unit(s) Oral <User Schedule>  folic acid 1 milliGRAM(s) Oral daily  heparin   Injectable 5000 Unit(s) SubCutaneous every 12 hours  hydrALAZINE 10 milliGRAM(s) Oral three times a day  insulin lispro (ADMELOG) corrective regimen sliding scale   SubCutaneous Before meals and at bedtime  isosorbide   dinitrate Tablet (ISORDIL) 10 milliGRAM(s) Oral three times a day  pantoprazole    Tablet 40 milliGRAM(s) Oral before breakfast  senna 2 Tablet(s) Oral at bedtime  timolol 0.5% Solution 1 Drop(s) Both EYES two times a day  torsemide 10 milliGRAM(s) Oral daily    MEDICATIONS  (PRN):  acetaminophen   Tablet .. 650 milliGRAM(s) Oral every 6 hours PRN Temp greater or equal to 38C (100.4F), Moderate Pain (4 - 6)  ondansetron Injectable 4 milliGRAM(s) IV Push every 6 hours PRN Nausea and/or Vomiting  polyethylene glycol 3350 17 Gram(s) Oral daily PRN Constipation      Allergies    No Known Allergies    Intolerances        LABS:                        11.3   2.64  )-----------( 139      ( 07 Jun 2021 06:16 )             34.0     06-07    139  |  103  |  40<H>  ----------------------------<  106<H>  4.2   |  24  |  2.84<H>    Ca    9.2      07 Jun 2021 06:16  Mg     2.2     06-06                CAPILLARY BLOOD GLUCOSE      POCT Blood Glucose.: 115 mg/dL (07 Jun 2021 07:39)  POCT Blood Glucose.: 116 mg/dL (06 Jun 2021 21:10)  POCT Blood Glucose.: 113 mg/dL (06 Jun 2021 16:47)  POCT Blood Glucose.: 139 mg/dL (06 Jun 2021 11:47)    pro-bnp 4037 06-02 @ 15:01     d-dimer 1119  06-02 @ 15:01      RADIOLOGY & ADDITIONAL TESTS:    CXR:    Ct scan chest:    ekg;    echo:

## 2021-06-07 NOTE — PROGRESS NOTE ADULT - SUBJECTIVE AND OBJECTIVE BOX
CHIEF COMPLAINT:Patient is a 75y old  Male who presents with a chief complaint of worsening SOB .Pt appears comforatbel    	  REVIEW OF SYSTEMS:  CONSTITUTIONAL: No fever, weight loss, or fatigue  EYES: No eye pain, visual disturbances, or discharge  ENT:  No difficulty hearing, tinnitus, vertigo; No sinus or throat pain  NECK: No pain or stiffness  RESPIRATORY: No cough, wheezing, chills or hemoptysis; No Shortness of Breath  CARDIOVASCULAR: No chest pain, palpitations, passing out, dizziness, or leg swelling  GASTROINTESTINAL: No abdominal or epigastric pain. No nausea, vomiting, or hematemesis; No diarrhea or constipation. No melena or hematochezia.  GENITOURINARY: No dysuria, frequency, hematuria, or incontinence  NEUROLOGICAL: No headaches, memory loss, loss of strength, numbness, or tremors  SKIN: No itching, burning, rashes, or lesions   LYMPH Nodes: No enlarged glands  ENDOCRINE: No heat or cold intolerance; No hair loss  MUSCULOSKELETAL: No joint pain or swelling; No muscle, back, or extremity pain  PSYCHIATRIC: No depression, anxiety, mood swings, or difficulty sleeping  HEME/LYMPH: No easy bruising, or bleeding gums  ALLERGY AND IMMUNOLOGIC: No hives or eczema	      PHYSICAL EXAM:  T(C): 37.1 (06-07-21 @ 07:16), Max: 37.3 (06-07-21 @ 05:08)  HR: 63 (06-07-21 @ 07:16) (63 - 71)  BP: 111/65 (06-07-21 @ 07:16) (108/75 - 134/-)  RR: 17 (06-07-21 @ 07:16) (17 - 17)  SpO2: 97% (06-07-21 @ 07:16) (97% - 100%)  Wt(kg): --  I&O's Summary    06 Jun 2021 07:01  -  07 Jun 2021 07:00  --------------------------------------------------------  IN: 0 mL / OUT: 825 mL / NET: -825 mL        Appearance: Normal	  HEENT:   Normal oral mucosa, PERRL, EOMI	  Lymphatic: No lymphadenopathy  Cardiovascular: Normal S1 S2, No JVD, No murmurs, No edema  Respiratory: Dec BS at bases  Psychiatry: A & O x 3, Mood & affect appropriate  Gastrointestinal:  Soft, Non-tender, + BS	  Skin: No rashes, No ecchymoses, No cyanosis	  Neurologic: Non-focal  Extremities: Normal range of motion, No clubbing, cyanosis or edema  Vascular: Peripheral pulses palpable 2+ bilaterally    MEDICATIONS  (STANDING):  allopurinol 100 milliGRAM(s) Oral daily  aspirin  chewable 81 milliGRAM(s) Oral daily  atorvastatin 20 milliGRAM(s) Oral at bedtime  carvedilol 6.25 milliGRAM(s) Oral every 12 hours  colchicine 0.6 milliGRAM(s) Oral daily  cyanocobalamin 1000 MICROGram(s) Oral daily  ergocalciferol 69514 Unit(s) Oral <User Schedule>  folic acid 1 milliGRAM(s) Oral daily  heparin   Injectable 5000 Unit(s) SubCutaneous every 12 hours  hydrALAZINE 10 milliGRAM(s) Oral three times a day  insulin lispro (ADMELOG) corrective regimen sliding scale   SubCutaneous Before meals and at bedtime  isosorbide   dinitrate Tablet (ISORDIL) 10 milliGRAM(s) Oral three times a day  pantoprazole    Tablet 40 milliGRAM(s) Oral before breakfast  senna 2 Tablet(s) Oral at bedtime  timolol 0.5% Solution 1 Drop(s) Both EYES two times a day  torsemide 10 milliGRAM(s) Oral daily      TELEMETRY: 	nsr,[facility]'s    	  	  LABS:	 	                        11.3   2.64  )-----------( 139      ( 07 Jun 2021 06:16 )             34.0     06-07    139  |  103  |  40<H>  ----------------------------<  106<H>  4.2   |  24  |  2.84<H>    Ca    9.2      07 Jun 2021 06:16  Mg     2.2     06-06      proBNP: Serum Pro-Brain Natriuretic Peptide: 4037 pg/mL (06-02 @ 15:01)    Lipid Profile: Cholesterol 162  LDL --    TG 67

## 2021-06-07 NOTE — PROGRESS NOTE ADULT - SUBJECTIVE AND OBJECTIVE BOX
Patient is a 75y old  Male who presents with a chief complaint of worsening SOB (06 Jun 2021 10:54)    pt seen in tele [x  ], reg med floor [   ], bed [ x ], chair at bedside [   ], a+o x3 [ x ], lethargic [  ],    nad [  x]        Allergies    No Known Allergies        Vitals    T(F): 99.1 (06-07-21 @ 05:08), Max: 99.1 (06-07-21 @ 05:08)  HR: 71 (06-07-21 @ 05:08) (63 - 71)  BP: 134/- (06-07-21 @ 05:08) (108/75 - 134/-)  RR: 17 (06-07-21 @ 05:08) (17 - 18)  SpO2: 98% (06-07-21 @ 05:08) (96% - 100%)  Wt(kg): --  CAPILLARY BLOOD GLUCOSE      POCT Blood Glucose.: 116 mg/dL (06 Jun 2021 21:10)      Labs                          11.3   2.64  )-----------( 139      ( 07 Jun 2021 06:16 )             34.0       06-06    136  |  103  |  36<H>  ----------------------------<  105<H>  4.1   |  28  |  2.58<H>    Ca    9.1      06 Jun 2021 07:38  Mg     2.2     06-06                  Radiology Results      Meds    MEDICATIONS  (STANDING):  allopurinol 100 milliGRAM(s) Oral daily  aspirin  chewable 81 milliGRAM(s) Oral daily  atorvastatin 20 milliGRAM(s) Oral at bedtime  carvedilol 6.25 milliGRAM(s) Oral every 12 hours  colchicine 0.6 milliGRAM(s) Oral daily  cyanocobalamin 1000 MICROGram(s) Oral daily  ergocalciferol 03779 Unit(s) Oral <User Schedule>  folic acid 1 milliGRAM(s) Oral daily  heparin   Injectable 5000 Unit(s) SubCutaneous every 12 hours  hydrALAZINE 10 milliGRAM(s) Oral three times a day  insulin lispro (ADMELOG) corrective regimen sliding scale   SubCutaneous Before meals and at bedtime  isosorbide   dinitrate Tablet (ISORDIL) 10 milliGRAM(s) Oral three times a day  pantoprazole    Tablet 40 milliGRAM(s) Oral before breakfast  senna 2 Tablet(s) Oral at bedtime  timolol 0.5% Solution 1 Drop(s) Both EYES two times a day  torsemide 10 milliGRAM(s) Oral daily      MEDICATIONS  (PRN):  acetaminophen   Tablet .. 650 milliGRAM(s) Oral every 6 hours PRN Temp greater or equal to 38C (100.4F), Moderate Pain (4 - 6)  ondansetron Injectable 4 milliGRAM(s) IV Push every 6 hours PRN Nausea and/or Vomiting  polyethylene glycol 3350 17 Gram(s) Oral daily PRN Constipation      Physical Exam    Neuro :  no focal deficits  Respiratory: CTA B/L  CV: RRR, S1S2, no murmurs,   Abdominal: Soft, NT, ND +BS,  Extremities: No edema, + peripheral pulses    ASSESSMENT    worsening sob with activity,   r/o chf exacerb,   r/o acs,   r/o pe,   pulm edema,   anemia   h/o DM,   HTN,   CKD 3,   HLD,   gout,   glaucoma   HFrEF    PLAN      cont tele,   acs protocol,   coreg,   aspirin, statin,   lasix,   cardio f/u   ce x 3 noted above  echo with EF 40-45%. Paradoxical septal motion is seen, consistent with conduction delay. Grade II diastolic dysfunction. RV systolic pressure is mildly increased at  36 mm Hg.  No pericardial effusion. Left pleural effusion noted   isordil and hydralazine added,  norvasc d/c'd  f/u Stress test in am  vq scan with low prob pe noted    hep drip d/c'd  cont hep sq   pulm f/u  bronchodilators prn,   pft outpt  hgba1c 5.3 noted    cont lispro ss   renal f/u   Obtain UA and repeat protein, microalbumin creatinine ratio  Renal US  Uric acid level  cont current meds             Patient is a 75y old  Male who presents with a chief complaint of worsening SOB (06 Jun 2021 10:54)    pt seen in tele [x  ], reg med floor [   ], bed [ x ], chair at bedside [   ], a+o x3 [ x ], lethargic [  ],    nad [  x]        Allergies    No Known Allergies        Vitals    T(F): 99.1 (06-07-21 @ 05:08), Max: 99.1 (06-07-21 @ 05:08)  HR: 71 (06-07-21 @ 05:08) (63 - 71)  BP: 134/- (06-07-21 @ 05:08) (108/75 - 134/-)  RR: 17 (06-07-21 @ 05:08) (17 - 18)  SpO2: 98% (06-07-21 @ 05:08) (96% - 100%)  Wt(kg): --  CAPILLARY BLOOD GLUCOSE      POCT Blood Glucose.: 116 mg/dL (06 Jun 2021 21:10)      Labs                          11.3   2.64  )-----------( 139      ( 07 Jun 2021 06:16 )             34.0       06-06    136  |  103  |  36<H>  ----------------------------<  105<H>  4.1   |  28  |  2.58<H>    Ca    9.1      06 Jun 2021 07:38  Mg     2.2     06-06        Radiology Results      < from: US Renal (06.03.19 @ 12:38) >  Renal ultrasound is performed without a previous examination for   comparison. The right kidney measures 10.4 cm in length and the left   kidney measures 10.6 cm in length. The renal sizes are within normal   limits bilaterally. No evidence for hydronephrosis, calculus, cyst or   solid mass in either kidney.    The IVC, aorta and urinary bladder appear grossly unremarkable.    Impression: Unremarkable renal ultrasound.    < end of copied text >      Meds    MEDICATIONS  (STANDING):  allopurinol 100 milliGRAM(s) Oral daily  aspirin  chewable 81 milliGRAM(s) Oral daily  atorvastatin 20 milliGRAM(s) Oral at bedtime  carvedilol 6.25 milliGRAM(s) Oral every 12 hours  colchicine 0.6 milliGRAM(s) Oral daily  cyanocobalamin 1000 MICROGram(s) Oral daily  ergocalciferol 36610 Unit(s) Oral <User Schedule>  folic acid 1 milliGRAM(s) Oral daily  heparin   Injectable 5000 Unit(s) SubCutaneous every 12 hours  hydrALAZINE 10 milliGRAM(s) Oral three times a day  insulin lispro (ADMELOG) corrective regimen sliding scale   SubCutaneous Before meals and at bedtime  isosorbide   dinitrate Tablet (ISORDIL) 10 milliGRAM(s) Oral three times a day  pantoprazole    Tablet 40 milliGRAM(s) Oral before breakfast  senna 2 Tablet(s) Oral at bedtime  timolol 0.5% Solution 1 Drop(s) Both EYES two times a day  torsemide 10 milliGRAM(s) Oral daily      MEDICATIONS  (PRN):  acetaminophen   Tablet .. 650 milliGRAM(s) Oral every 6 hours PRN Temp greater or equal to 38C (100.4F), Moderate Pain (4 - 6)  ondansetron Injectable 4 milliGRAM(s) IV Push every 6 hours PRN Nausea and/or Vomiting  polyethylene glycol 3350 17 Gram(s) Oral daily PRN Constipation      Physical Exam    Neuro :  no focal deficits  Respiratory: CTA B/L  CV: RRR, S1S2, no murmurs,   Abdominal: Soft, NT, ND +BS,  Extremities: No edema, + peripheral pulses    ASSESSMENT    worsening sob with activity,   r/o chf exacerb,   r/o acs,   pulm edema,   anemia   h/o DM,   HTN,   CKD 3,   HLD,   gout,   glaucoma   HFrEF    PLAN      cont tele,   acs protocol,   coreg,   aspirin, statin,   lasix changed to torsemide,   cardio f/u   ce x 3 noted above  echo with EF 40-45%. Paradoxical septal motion is seen, consistent with conduction delay. Grade II diastolic dysfunction. RV systolic pressure is mildly increased at  36 mm Hg.  No pericardial effusion. Left pleural effusion noted   isordil and hydralazine added,  norvasc d/c'd  f/u Stress test    vq scan with low prob pe noted    hep drip d/c'd  cont hep sq   pulm f/u  bronchodilators prn,   pft outpt  hgba1c 5.3 noted    cont lispro ss   renal f/u   Decreased EGFR from 36 to 27 since admission , most likely side effect of diuretics  Proteinuria ratio 1.2 with albuminuria ratio 0.958.  Renal US with Unremarkable renal ultrasound noted above.  Uric acid level  cont current meds

## 2021-06-08 LAB
ANION GAP SERPL CALC-SCNC: 10 MMOL/L — SIGNIFICANT CHANGE UP (ref 5–17)
BUN SERPL-MCNC: 49 MG/DL — HIGH (ref 7–18)
CALCIUM SERPL-MCNC: 9.2 MG/DL — SIGNIFICANT CHANGE UP (ref 8.4–10.5)
CHLORIDE SERPL-SCNC: 105 MMOL/L — SIGNIFICANT CHANGE UP (ref 96–108)
CO2 SERPL-SCNC: 23 MMOL/L — SIGNIFICANT CHANGE UP (ref 22–31)
CREAT SERPL-MCNC: 2.64 MG/DL — HIGH (ref 0.5–1.3)
GLUCOSE BLDC GLUCOMTR-MCNC: 110 MG/DL — HIGH (ref 70–99)
GLUCOSE BLDC GLUCOMTR-MCNC: 116 MG/DL — HIGH (ref 70–99)
GLUCOSE BLDC GLUCOMTR-MCNC: 120 MG/DL — HIGH (ref 70–99)
GLUCOSE BLDC GLUCOMTR-MCNC: 127 MG/DL — HIGH (ref 70–99)
GLUCOSE SERPL-MCNC: 114 MG/DL — HIGH (ref 70–99)
HCT VFR BLD CALC: 33.5 % — LOW (ref 39–50)
HGB BLD-MCNC: 11.3 G/DL — LOW (ref 13–17)
MCHC RBC-ENTMCNC: 33.5 PG — SIGNIFICANT CHANGE UP (ref 27–34)
MCHC RBC-ENTMCNC: 33.7 GM/DL — SIGNIFICANT CHANGE UP (ref 32–36)
MCV RBC AUTO: 99.4 FL — SIGNIFICANT CHANGE UP (ref 80–100)
NRBC # BLD: 0 /100 WBCS — SIGNIFICANT CHANGE UP (ref 0–0)
PLATELET # BLD AUTO: 126 K/UL — LOW (ref 150–400)
POTASSIUM SERPL-MCNC: 4.1 MMOL/L — SIGNIFICANT CHANGE UP (ref 3.5–5.3)
POTASSIUM SERPL-SCNC: 4.1 MMOL/L — SIGNIFICANT CHANGE UP (ref 3.5–5.3)
RBC # BLD: 3.37 M/UL — LOW (ref 4.2–5.8)
RBC # FLD: 15 % — HIGH (ref 10.3–14.5)
SODIUM SERPL-SCNC: 138 MMOL/L — SIGNIFICANT CHANGE UP (ref 135–145)
WBC # BLD: 2.36 K/UL — LOW (ref 3.8–10.5)
WBC # FLD AUTO: 2.36 K/UL — LOW (ref 3.8–10.5)

## 2021-06-08 PROCEDURE — 71045 X-RAY EXAM CHEST 1 VIEW: CPT | Mod: 26

## 2021-06-08 RX ADMIN — HEPARIN SODIUM 5000 UNIT(S): 5000 INJECTION INTRAVENOUS; SUBCUTANEOUS at 17:09

## 2021-06-08 RX ADMIN — PANTOPRAZOLE SODIUM 40 MILLIGRAM(S): 20 TABLET, DELAYED RELEASE ORAL at 06:48

## 2021-06-08 RX ADMIN — Medication 10 MILLIGRAM(S): at 05:41

## 2021-06-08 RX ADMIN — PREGABALIN 1000 MICROGRAM(S): 225 CAPSULE ORAL at 11:35

## 2021-06-08 RX ADMIN — Medication 1 DROP(S): at 17:09

## 2021-06-08 RX ADMIN — CARVEDILOL PHOSPHATE 6.25 MILLIGRAM(S): 80 CAPSULE, EXTENDED RELEASE ORAL at 05:41

## 2021-06-08 RX ADMIN — Medication 10 MILLIGRAM(S): at 13:04

## 2021-06-08 RX ADMIN — ISOSORBIDE DINITRATE 10 MILLIGRAM(S): 5 TABLET ORAL at 11:35

## 2021-06-08 RX ADMIN — ATORVASTATIN CALCIUM 20 MILLIGRAM(S): 80 TABLET, FILM COATED ORAL at 21:21

## 2021-06-08 RX ADMIN — Medication 0.6 MILLIGRAM(S): at 11:35

## 2021-06-08 RX ADMIN — HEPARIN SODIUM 5000 UNIT(S): 5000 INJECTION INTRAVENOUS; SUBCUTANEOUS at 05:41

## 2021-06-08 RX ADMIN — Medication 81 MILLIGRAM(S): at 11:35

## 2021-06-08 RX ADMIN — Medication 1 DROP(S): at 05:41

## 2021-06-08 RX ADMIN — CARVEDILOL PHOSPHATE 6.25 MILLIGRAM(S): 80 CAPSULE, EXTENDED RELEASE ORAL at 18:10

## 2021-06-08 RX ADMIN — Medication 10 MILLIGRAM(S): at 21:21

## 2021-06-08 RX ADMIN — ISOSORBIDE DINITRATE 10 MILLIGRAM(S): 5 TABLET ORAL at 17:07

## 2021-06-08 RX ADMIN — ISOSORBIDE DINITRATE 10 MILLIGRAM(S): 5 TABLET ORAL at 05:41

## 2021-06-08 RX ADMIN — Medication 1 MILLIGRAM(S): at 11:35

## 2021-06-08 RX ADMIN — SENNA PLUS 2 TABLET(S): 8.6 TABLET ORAL at 21:21

## 2021-06-08 RX ADMIN — Medication 100 MILLIGRAM(S): at 11:35

## 2021-06-08 NOTE — PROGRESS NOTE ADULT - SUBJECTIVE AND OBJECTIVE BOX
Problem List:  CKD, DKD proteinuria  NELLY and CHF on admission  Reduced EF 31%.    PAST MEDICAL & SURGICAL HISTORY:  DM (diabetes mellitus)    HTN (hypertension)    Chronic kidney disease    HLD (hyperlipidemia)    Glaucoma    Gout    No significant past surgical history        No Known Allergies      MEDICATIONS  (STANDING):  allopurinol 100 milliGRAM(s) Oral daily  aspirin  chewable 81 milliGRAM(s) Oral daily  atorvastatin 20 milliGRAM(s) Oral at bedtime  carvedilol 6.25 milliGRAM(s) Oral every 12 hours  colchicine 0.6 milliGRAM(s) Oral daily  cyanocobalamin 1000 MICROGram(s) Oral daily  ergocalciferol 93171 Unit(s) Oral <User Schedule>  folic acid 1 milliGRAM(s) Oral daily  heparin   Injectable 5000 Unit(s) SubCutaneous every 12 hours  hydrALAZINE 10 milliGRAM(s) Oral three times a day  insulin lispro (ADMELOG) corrective regimen sliding scale   SubCutaneous Before meals and at bedtime  isosorbide   dinitrate Tablet (ISORDIL) 10 milliGRAM(s) Oral three times a day  pantoprazole    Tablet 40 milliGRAM(s) Oral before breakfast  senna 2 Tablet(s) Oral at bedtime  timolol 0.5% Solution 1 Drop(s) Both EYES two times a day    MEDICATIONS  (PRN):  acetaminophen   Tablet .. 650 milliGRAM(s) Oral every 6 hours PRN Temp greater or equal to 38C (100.4F), Moderate Pain (4 - 6)  ondansetron Injectable 4 milliGRAM(s) IV Push every 6 hours PRN Nausea and/or Vomiting  polyethylene glycol 3350 17 Gram(s) Oral daily PRN Constipation                            11.3   2.36  )-----------( 126      ( 08 Jun 2021 07:54 )             33.5     06-08    138  |  105  |  49<H>  ----------------------------<  114<H>  4.1   |  23  |  2.64<H>    Ca    9.2      08 Jun 2021 09:31    NTERPRETATION:  CLINICAL INFORMATION: Chronic renal insufficiency.    COMPARISON: 6/3/2019.    TECHNIQUE: Sonography of the kidneys and bladder.    FINDINGS: Increased echogenicity of the bilateral renal parenchyma suggests chronic medical renal disease.    Right kidney: 9.5 cm. No renal mass, hydronephrosis or calculi.    Left kidney: 10.2 cm. No hydronephrosis or calculi. A 1.4 cm cyst is noted within the lower pole aspect of the left kidney.    Urinary bladder: Minimally distended, precluding assessment.    IMPRESSION: No hydronephrosis. Increased echogenicity of the bilateral renal parenchyma suggests chronic medical renal disease.      IMPRESSIONS:Abnormal Study  * Non-diagnostic EKG portion of Lexiscan stress test.  * Review of raw data shows: Diaphragmatic artifact.  * The left ventricle was mildly dilated LV at baseline.  There are small, severe defects in distal inferior &  inferoapical walls that are fixed consistent with  diaphragmatic attenuation artifact.  * Dilated LV with global hypokinesis EF=31%.      Creatinine, Random Urine: 23 mg/dL (06-05 @ 15:36)  Sodium, Random Urine: 125 mmol/L (06-02 @ 19:20)  Creatinine, Random Urine: <13 mg/dL (06-02 @ 19:20)      REVIEW OF SYSTEMS:  General: no fever no chills, no weight loss.    RESPIRATORY: No cough, wheezing, hemoptysis; No shortness of breath  CARDIOVASCULAR: No chest pain or palpitations. No Edema  GASTROINTESTINAL: No abdominal or epigastric pain. No nausea, vomiting. No diarrhea or constipation. No melena.  GENITOURINARY: No dysuria, frequency, foamy urine, urinary urgency, incontinence or hematuria          VITALS:  T(F): 97.2 (06-08-21 @ 11:12), Max: 98.5 (06-08-21 @ 05:09)  HR: 65 (06-08-21 @ 11:12)  BP: 120/68 (06-08-21 @ 11:12)  RR: 17 (06-08-21 @ 11:12)  SpO2: 97% (06-08-21 @ 11:12)  Wt(kg): --    06-07 @ 07:01  -  06-08 @ 07:00  --------------------------------------------------------  IN: 0 mL / OUT: 200 mL / NET: -200 mL    06-08 @ 07:01 - 06-08 @ 15:39  --------------------------------------------------------  IN: 0 mL / OUT: 250 mL / NET: -250 mL        PHYSICAL EXAM:  Constitutional: well developed, no diaphoresis, no distress.  Neck: No JVD, no carotid bruit.  Respiratory:  air entrance B/L, no wheezes, rales or rhonchi  Cardiovascular: S1, S2, RRR, no pericardial rub, no murmur  Abdomen: BS+, soft, no tenderness, no bruit  Pelvis: bladder nondistended  Extremities: No cyanosis or clubbing. No peripheral edema.

## 2021-06-08 NOTE — PROGRESS NOTE ADULT - SUBJECTIVE AND OBJECTIVE BOX
Patient is a 75y old  Male who presents with a chief complaint of worsening SOB (08 Jun 2021 08:53)  Awake, alert, comfortable in bed in NAD    INTERVAL HPI/OVERNIGHT EVENTS:      VITAL SIGNS:  T(F): 97.6 (06-08-21 @ 07:48)  HR: 59 (06-08-21 @ 07:48)  BP: 117/67 (06-08-21 @ 07:48)  RR: 17 (06-08-21 @ 07:48)  SpO2: 98% (06-08-21 @ 07:48)  Wt(kg): --  I&O's Detail    07 Jun 2021 07:01  -  08 Jun 2021 07:00  --------------------------------------------------------  IN:  Total IN: 0 mL    OUT:    Voided (mL): 200 mL  Total OUT: 200 mL    Total NET: -200 mL              REVIEW OF SYSTEMS:    CONSTITUTIONAL:  No fevers, chills, sweats    HEENT:  Eyes:  No diplopia or blurred vision. ENT:  No earache, sore throat or runny nose.    CARDIOVASCULAR:  No pressure, squeezing, tightness, or heaviness about the chest; no palpitations.    RESPIRATORY:  Per HPI    GASTROINTESTINAL:  No abdominal pain, nausea, vomiting or diarrhea.    GENITOURINARY:  No dysuria, frequency or urgency.    NEUROLOGIC:  No paresthesias, fasciculations, seizures or weakness.    PSYCHIATRIC:  No disorder of thought or mood.      PHYSICAL EXAM:    Constitutional: Well developed and nourished  Eyes:Perrla  ENMT: normal  Neck:supple  Respiratory: good air entry  Cardiovascular: S1 S2 regular  Gastrointestinal: Soft, Non tender  Extremities: No edema  Vascular:normal  Neurological:Awake, alert,Ox3  Musculoskeletal:Normal      MEDICATIONS  (STANDING):  allopurinol 100 milliGRAM(s) Oral daily  aspirin  chewable 81 milliGRAM(s) Oral daily  atorvastatin 20 milliGRAM(s) Oral at bedtime  carvedilol 6.25 milliGRAM(s) Oral every 12 hours  colchicine 0.6 milliGRAM(s) Oral daily  cyanocobalamin 1000 MICROGram(s) Oral daily  ergocalciferol 68649 Unit(s) Oral <User Schedule>  folic acid 1 milliGRAM(s) Oral daily  heparin   Injectable 5000 Unit(s) SubCutaneous every 12 hours  hydrALAZINE 10 milliGRAM(s) Oral three times a day  insulin lispro (ADMELOG) corrective regimen sliding scale   SubCutaneous Before meals and at bedtime  isosorbide   dinitrate Tablet (ISORDIL) 10 milliGRAM(s) Oral three times a day  pantoprazole    Tablet 40 milliGRAM(s) Oral before breakfast  senna 2 Tablet(s) Oral at bedtime  timolol 0.5% Solution 1 Drop(s) Both EYES two times a day    MEDICATIONS  (PRN):  acetaminophen   Tablet .. 650 milliGRAM(s) Oral every 6 hours PRN Temp greater or equal to 38C (100.4F), Moderate Pain (4 - 6)  ondansetron Injectable 4 milliGRAM(s) IV Push every 6 hours PRN Nausea and/or Vomiting  polyethylene glycol 3350 17 Gram(s) Oral daily PRN Constipation      Allergies    No Known Allergies    Intolerances        LABS:                        11.3   2.36  )-----------( 126      ( 08 Jun 2021 07:54 )             33.5     06-08    138  |  105  |  49<H>  ----------------------------<  114<H>  4.1   |  23  |  2.64<H>    Ca    9.2      08 Jun 2021 09:31                CAPILLARY BLOOD GLUCOSE      POCT Blood Glucose.: 110 mg/dL (08 Jun 2021 07:36)  POCT Blood Glucose.: 113 mg/dL (07 Jun 2021 21:09)  POCT Blood Glucose.: 134 mg/dL (07 Jun 2021 16:43)  POCT Blood Glucose.: 132 mg/dL (07 Jun 2021 11:39)    pro-bnp 4037 06-02 @ 15:01     d-dimer 1119  06-02 @ 15:01      RADIOLOGY & ADDITIONAL TESTS:    CXR:    Ct scan chest:    ekg;  < from: Nuclear Stress Test-Pharmacologic (06.07.21 @ 07:32) >    IMPRESSIONS:Abnormal Study  * Non-diagnostic EKG portion of Lexiscan stress test.  * Review of raw data shows: Diaphragmatic artifact.  * The left ventricle was mildly dilated LV at baseline.  There are small, severe defects in distal inferior &  inferoapical walls that are fixed consistent with  diaphragmatic attenuation artifact.  * Dilated LV with global hypokinesis EF=31%.      < end of copied text >    echo:

## 2021-06-08 NOTE — PROGRESS NOTE ADULT - SUBJECTIVE AND OBJECTIVE BOX
Patient is a 75y old  Male who presents with a chief complaint of worsening SOB (07 Jun 2021 15:13)    pt seen in tele [x  ], reg med floor [   ], bed [ x ], chair at bedside [   ], a+o x3 [ x ], lethargic [  ],    nad [  x]      Allergies    No Known Allergies        Vitals    T(F): 98.5 (06-08-21 @ 05:09), Max: 98.7 (06-07-21 @ 07:16)  HR: 64 (06-08-21 @ 05:09) (63 - 72)  BP: 136/73 (06-08-21 @ 05:09) (111/65 - 141/84)  RR: 17 (06-08-21 @ 05:09) (16 - 17)  SpO2: 96% (06-08-21 @ 05:09) (96% - 100%)  Wt(kg): --  CAPILLARY BLOOD GLUCOSE      POCT Blood Glucose.: 113 mg/dL (07 Jun 2021 21:09)      Labs                          11.3   2.64  )-----------( 139      ( 07 Jun 2021 06:16 )             34.0       06-07    139  |  103  |  40<H>  ----------------------------<  106<H>  4.2   |  24  |  2.84<H>    Ca    9.2      07 Jun 2021 06:16  Mg     2.2     06-06                  Radiology Results      Meds    MEDICATIONS  (STANDING):  allopurinol 100 milliGRAM(s) Oral daily  aspirin  chewable 81 milliGRAM(s) Oral daily  atorvastatin 20 milliGRAM(s) Oral at bedtime  carvedilol 6.25 milliGRAM(s) Oral every 12 hours  colchicine 0.6 milliGRAM(s) Oral daily  cyanocobalamin 1000 MICROGram(s) Oral daily  ergocalciferol 95804 Unit(s) Oral <User Schedule>  folic acid 1 milliGRAM(s) Oral daily  heparin   Injectable 5000 Unit(s) SubCutaneous every 12 hours  hydrALAZINE 10 milliGRAM(s) Oral three times a day  insulin lispro (ADMELOG) corrective regimen sliding scale   SubCutaneous Before meals and at bedtime  isosorbide   dinitrate Tablet (ISORDIL) 10 milliGRAM(s) Oral three times a day  pantoprazole    Tablet 40 milliGRAM(s) Oral before breakfast  senna 2 Tablet(s) Oral at bedtime  timolol 0.5% Solution 1 Drop(s) Both EYES two times a day  torsemide 10 milliGRAM(s) Oral daily      MEDICATIONS  (PRN):  acetaminophen   Tablet .. 650 milliGRAM(s) Oral every 6 hours PRN Temp greater or equal to 38C (100.4F), Moderate Pain (4 - 6)  ondansetron Injectable 4 milliGRAM(s) IV Push every 6 hours PRN Nausea and/or Vomiting  polyethylene glycol 3350 17 Gram(s) Oral daily PRN Constipation      Physical Exam    Neuro :  no focal deficits  Respiratory: CTA B/L  CV: RRR, S1S2, no murmurs,   Abdominal: Soft, NT, ND +BS,  Extremities: No edema, + peripheral pulses    ASSESSMENT    worsening sob with activity,   r/o chf exacerb,   r/o acs,   pulm edema,   anemia   h/o DM,   HTN,   CKD 3,   HLD,   gout,   glaucoma   HFrEF    PLAN      cont tele,   acs protocol,   coreg,   aspirin, statin,   lasix changed to torsemide,   cardio f/u   ce x 3 noted above  echo with EF 40-45%. Paradoxical septal motion is seen, consistent with conduction delay. Grade II diastolic dysfunction. RV systolic pressure is mildly increased at  36 mm Hg.  No pericardial effusion. Left pleural effusion noted   isordil and hydralazine added,  norvasc d/c'd  f/u Stress test    vq scan with low prob pe noted    hep drip d/c'd  cont hep sq   pulm f/u  bronchodilators prn,   pft outpt  hgba1c 5.3 noted    cont lispro ss   renal f/u   Decreased EGFR from 36 to 27 since admission , most likely side effect of diuretics  Proteinuria ratio 1.2 with albuminuria ratio 0.958.  Renal US with Unremarkable renal ultrasound noted above.  Uric acid level  cont current meds         Patient is a 75y old  Male who presents with a chief complaint of worsening SOB (07 Jun 2021 15:13)    pt seen in tele [x  ], reg med floor [   ], bed [ x ], chair at bedside [   ], a+o x3 [ x ], lethargic [  ],    nad [  x]      Allergies    No Known Allergies        Vitals    T(F): 98.5 (06-08-21 @ 05:09), Max: 98.7 (06-07-21 @ 07:16)  HR: 64 (06-08-21 @ 05:09) (63 - 72)  BP: 136/73 (06-08-21 @ 05:09) (111/65 - 141/84)  RR: 17 (06-08-21 @ 05:09) (16 - 17)  SpO2: 96% (06-08-21 @ 05:09) (96% - 100%)  Wt(kg): --  CAPILLARY BLOOD GLUCOSE      POCT Blood Glucose.: 113 mg/dL (07 Jun 2021 21:09)      Labs                          11.3   2.64  )-----------( 139      ( 07 Jun 2021 06:16 )             34.0       06-07    139  |  103  |  40<H>  ----------------------------<  106<H>  4.2   |  24  |  2.84<H>    Ca    9.2      07 Jun 2021 06:16  Mg     2.2     06-06            < from: Nuclear Stress Test-Pharmacologic (06.07.21 @ 07:32) >  IMPRESSIONS:Abnormal Study  * Non-diagnostic EKG portion of Lexiscan stress test.  * Review of raw data shows: Diaphragmatic artifact.  * The left ventricle was mildly dilated LV at baseline.  There are small, severe defects in distal inferior &  inferoapical walls that are fixed consistent with  diaphragmatic attenuation artifact.  * Dilated LV with global hypokinesis EF=31%.    < end of copied text >          Radiology Results      Meds    MEDICATIONS  (STANDING):  allopurinol 100 milliGRAM(s) Oral daily  aspirin  chewable 81 milliGRAM(s) Oral daily  atorvastatin 20 milliGRAM(s) Oral at bedtime  carvedilol 6.25 milliGRAM(s) Oral every 12 hours  colchicine 0.6 milliGRAM(s) Oral daily  cyanocobalamin 1000 MICROGram(s) Oral daily  ergocalciferol 88174 Unit(s) Oral <User Schedule>  folic acid 1 milliGRAM(s) Oral daily  heparin   Injectable 5000 Unit(s) SubCutaneous every 12 hours  hydrALAZINE 10 milliGRAM(s) Oral three times a day  insulin lispro (ADMELOG) corrective regimen sliding scale   SubCutaneous Before meals and at bedtime  isosorbide   dinitrate Tablet (ISORDIL) 10 milliGRAM(s) Oral three times a day  pantoprazole    Tablet 40 milliGRAM(s) Oral before breakfast  senna 2 Tablet(s) Oral at bedtime  timolol 0.5% Solution 1 Drop(s) Both EYES two times a day  torsemide 10 milliGRAM(s) Oral daily      MEDICATIONS  (PRN):  acetaminophen   Tablet .. 650 milliGRAM(s) Oral every 6 hours PRN Temp greater or equal to 38C (100.4F), Moderate Pain (4 - 6)  ondansetron Injectable 4 milliGRAM(s) IV Push every 6 hours PRN Nausea and/or Vomiting  polyethylene glycol 3350 17 Gram(s) Oral daily PRN Constipation      Physical Exam    Neuro :  no focal deficits  Respiratory: CTA B/L  CV: RRR, S1S2, no murmurs,   Abdominal: Soft, NT, ND +BS,  Extremities: No edema, + peripheral pulses    ASSESSMENT    worsening sob with activity,   r/o chf exacerb,   r/o acs,   pulm edema,   anemia   h/o DM,   HTN,   CKD 3,   HLD,   gout,   glaucoma   HFrEF    PLAN      cont tele,   acs protocol,   coreg,   aspirin, statin,   lasix changed to torsemide,   torsemide d/c'd 2nd to incr serum creat  cardio f/u   ce x 3 noted above  echo with EF 40-45%. Paradoxical septal motion is seen, consistent with conduction delay. Grade II diastolic dysfunction. RV systolic pressure is mildly increased at  36 mm Hg.  No pericardial effusion. Left pleural effusion noted   cont isordil and hydralazine    norvasc d/c'd  Stress test with Abnormal Study,  Dilated LV with global hypokinesis EF=31%. noted above  pt for cath when cr stable.  vq scan with low prob pe noted    hep drip d/c'd  cont hep sq   pulm f/u  bronchodilators prn,   pft outpt  hgba1c 5.3 noted    cont lispro ss   renal f/u   Decreased EGFR from 36 to 27 since admission , most likely side effect of diuretics  Proteinuria ratio 1.2 with albuminuria ratio 0.958.  Renal US with Unremarkable renal ultrasound noted above.  Uric acid level  cont current meds

## 2021-06-08 NOTE — PROGRESS NOTE ADULT - SUBJECTIVE AND OBJECTIVE BOX
PGY-1 Progress Note discussed with attending    PAGER #: [[phone]] TILL 5:00 PM  PLEASE CONTACT ON CALL TEAM:  - On Call Team (Please refer to Darrick) FROM 5:00 PM - 8:30PM  - Nightfloat Team FROM 8:30 -7:30 AM    CHIEF COMPLAINT & BRIEF HOSPITAL COURSE: Patient is a 75 year old male from home AAO x3, with PMH of DM, HTN, CKD, HLD, gout and glaucoma, who presented to the ED due to worsening SOB, and swelling of the legs for which     INTERVAL HPI/OVERNIGHT EVENTS:     MEDICATIONS  (STANDING):  allopurinol 100 milliGRAM(s) Oral daily  aspirin  chewable 81 milliGRAM(s) Oral daily  atorvastatin 20 milliGRAM(s) Oral at bedtime  carvedilol 6.25 milliGRAM(s) Oral every 12 hours  colchicine 0.6 milliGRAM(s) Oral daily  cyanocobalamin 1000 MICROGram(s) Oral daily  ergocalciferol 75266 Unit(s) Oral <User Schedule>  folic acid 1 milliGRAM(s) Oral daily  heparin   Injectable 5000 Unit(s) SubCutaneous every 12 hours  hydrALAZINE 10 milliGRAM(s) Oral three times a day  insulin lispro (ADMELOG) corrective regimen sliding scale   SubCutaneous Before meals and at bedtime  isosorbide   dinitrate Tablet (ISORDIL) 10 milliGRAM(s) Oral three times a day  pantoprazole    Tablet 40 milliGRAM(s) Oral before breakfast  senna 2 Tablet(s) Oral at bedtime  timolol 0.5% Solution 1 Drop(s) Both EYES two times a day    MEDICATIONS  (PRN):  acetaminophen   Tablet .. 650 milliGRAM(s) Oral every 6 hours PRN Temp greater or equal to 38C (100.4F), Moderate Pain (4 - 6)  ondansetron Injectable 4 milliGRAM(s) IV Push every 6 hours PRN Nausea and/or Vomiting  polyethylene glycol 3350 17 Gram(s) Oral daily PRN Constipation      REVIEW OF SYSTEMS:  CONSTITUTIONAL: No fever, weight loss, or fatigue  RESPIRATORY: No cough, wheezing, chills or hemoptysis; No shortness of breath  CARDIOVASCULAR: No chest pain, palpitations, dizziness, or leg swelling  GASTROINTESTINAL: No abdominal pain. No nausea, vomiting, or hematemesis; No diarrhea or constipation. No melena or hematochezia.  GENITOURINARY: No dysuria or hematuria, urinary frequency  NEUROLOGICAL: No headaches, memory loss, loss of strength, numbness, or tremors  SKIN: No itching, burning, rashes, or lesions     Vital Signs Last 24 Hrs  T(C): 36.2 (08 Jun 2021 11:12), Max: 36.9 (08 Jun 2021 05:09)  T(F): 97.2 (08 Jun 2021 11:12), Max: 98.5 (08 Jun 2021 05:09)  HR: 65 (08 Jun 2021 11:12) (59 - 72)  BP: 120/68 (08 Jun 2021 11:12) (117/67 - 141/84)  BP(mean): --  RR: 17 (08 Jun 2021 11:12) (17 - 17)  SpO2: 97% (08 Jun 2021 11:12) (96% - 99%)    PHYSICAL EXAMINATION:  GENERAL: NAD, well built  HEAD:  Atraumatic, Normocephalic  EYES:  conjunctiva and sclera clear  NECK: Supple, No JVD, Normal thyroid  CHEST/LUNG: Clear to auscultation. Clear to percussion bilaterally; No rales, rhonchi, wheezing, or rubs  HEART: Regular rate and rhythm; No murmurs, rubs, or gallops  ABDOMEN: Soft, Nontender, Nondistended; Bowel sounds present  NERVOUS SYSTEM:  Alert & Oriented X3,    EXTREMITIES:  2+ Peripheral Pulses, No clubbing, cyanosis, or edema  SKIN: warm dry                          11.3   2.36  )-----------( 126      ( 08 Jun 2021 07:54 )             33.5     06-08    138  |  105  |  49<H>  ----------------------------<  114<H>  4.1   |  23  |  2.64<H>    Ca    9.2      08 Jun 2021 09:31                CAPILLARY BLOOD GLUCOSE      RADIOLOGY & ADDITIONAL TESTS:                   PGY-1 Progress Note discussed with attending    PAGER #: [[phone]] TILL 5:00 PM  PLEASE CONTACT ON CALL TEAM:  - On Call Team (Please refer to Darrick) FROM 5:00 PM - 8:30PM  - Nightfloat Team FROM 8:30 -7:30 AM    CHIEF COMPLAINT & BRIEF HOSPITAL COURSE: Patient is a 75 year old male from home AAO x3, with PMH of DM, HTN, CKD, HLD, gout and glaucoma, who presented to the ED due to worsening SOB, and swelling of the legs for which cardiological evaluation was done- Echocardiogram showed HFreEF started on re-modeling agents, Nuclear stress test shows high suspicion of multivessel disease- pt will require cath likely Thursday or Friday once renal function stabilizes.     INTERVAL HPI/OVERNIGHT EVENTS: No acute overnight events, planned for Cath likely on Thursday or Friday once renal function stabilizes. Demadex d/c'ed, continuing on Coreg 6.25mg BID, continue Isordil and Hydralazine    MEDICATIONS  (STANDING):  allopurinol 100 milliGRAM(s) Oral daily  aspirin  chewable 81 milliGRAM(s) Oral daily  atorvastatin 20 milliGRAM(s) Oral at bedtime  carvedilol 6.25 milliGRAM(s) Oral every 12 hours  colchicine 0.6 milliGRAM(s) Oral daily  cyanocobalamin 1000 MICROGram(s) Oral daily  ergocalciferol 27007 Unit(s) Oral <User Schedule>  folic acid 1 milliGRAM(s) Oral daily  heparin   Injectable 5000 Unit(s) SubCutaneous every 12 hours  hydrALAZINE 10 milliGRAM(s) Oral three times a day  insulin lispro (ADMELOG) corrective regimen sliding scale   SubCutaneous Before meals and at bedtime  isosorbide   dinitrate Tablet (ISORDIL) 10 milliGRAM(s) Oral three times a day  pantoprazole    Tablet 40 milliGRAM(s) Oral before breakfast  senna 2 Tablet(s) Oral at bedtime  timolol 0.5% Solution 1 Drop(s) Both EYES two times a day    MEDICATIONS  (PRN):  acetaminophen   Tablet .. 650 milliGRAM(s) Oral every 6 hours PRN Temp greater or equal to 38C (100.4F), Moderate Pain (4 - 6)  ondansetron Injectable 4 milliGRAM(s) IV Push every 6 hours PRN Nausea and/or Vomiting  polyethylene glycol 3350 17 Gram(s) Oral daily PRN Constipation      REVIEW OF SYSTEMS:  CONSTITUTIONAL: No fever, weight loss, or fatigue  RESPIRATORY: No cough, wheezing, chills or hemoptysis; No shortness of breath  CARDIOVASCULAR: No chest pain, palpitations, dizziness, or leg swelling  GASTROINTESTINAL: No abdominal pain. No nausea, vomiting, or hematemesis; No diarrhea or constipation. No melena or hematochezia.  GENITOURINARY: No dysuria or hematuria, urinary frequency  NEUROLOGICAL: No headaches, memory loss, loss of strength, numbness, or tremors  SKIN: No itching, burning, rashes, or lesions     Vital Signs Last 24 Hrs  T(C): 36.2 (08 Jun 2021 11:12), Max: 36.9 (08 Jun 2021 05:09)  T(F): 97.2 (08 Jun 2021 11:12), Max: 98.5 (08 Jun 2021 05:09)  HR: 65 (08 Jun 2021 11:12) (59 - 72)  BP: 120/68 (08 Jun 2021 11:12) (117/67 - 141/84)  BP(mean): --  RR: 17 (08 Jun 2021 11:12) (17 - 17)  SpO2: 97% (08 Jun 2021 11:12) (96% - 99%)    PHYSICAL EXAMINATION:  GENERAL: NAD, well built  HEAD:  Atraumatic, Normocephalic  EYES:  conjunctiva and sclera clear  NECK: Supple, No JVD, Normal thyroid  CHEST/LUNG: Clear to auscultation. Clear to percussion bilaterally; No rales, rhonchi, wheezing, or rubs  HEART: Regular rate and rhythm; No murmurs, rubs, or gallops  ABDOMEN: Soft, Nontender, Nondistended; Bowel sounds present  NERVOUS SYSTEM:  Alert & Oriented X3,    EXTREMITIES:  2+ Peripheral Pulses, No clubbing, cyanosis, or edema  SKIN: warm dry                          11.3   2.36  )-----------( 126      ( 08 Jun 2021 07:54 )             33.5     06-08    138  |  105  |  49<H>  ----------------------------<  114<H>  4.1   |  23  |  2.64<H>    Ca    9.2      08 Jun 2021 09:31                CAPILLARY BLOOD GLUCOSE      RADIOLOGY & ADDITIONAL TESTS:

## 2021-06-08 NOTE — PROGRESS NOTE ADULT - SUBJECTIVE AND OBJECTIVE BOX
CHIEF COMPLAINT:Patient is a 75y old  Male who presents with a chief complaint of worsening SOB.Pt appears comfortable.    	  REVIEW OF SYSTEMS:  CONSTITUTIONAL: No fever, weight loss, or fatigue  EYES: No eye pain, visual disturbances, or discharge  ENT:  No difficulty hearing, tinnitus, vertigo; No sinus or throat pain  NECK: No pain or stiffness  RESPIRATORY: No cough, wheezing, chills or hemoptysis; No Shortness of Breath  CARDIOVASCULAR: No chest pain, palpitations, passing out, dizziness, or leg swelling  GASTROINTESTINAL: No abdominal or epigastric pain. No nausea, vomiting, or hematemesis; No diarrhea or constipation. No melena or hematochezia.  GENITOURINARY: No dysuria, frequency, hematuria, or incontinence  NEUROLOGICAL: No headaches, memory loss, loss of strength, numbness, or tremors  SKIN: No itching, burning, rashes, or lesions   LYMPH Nodes: No enlarged glands  ENDOCRINE: No heat or cold intolerance; No hair loss  MUSCULOSKELETAL: No joint pain or swelling; No muscle, back, or extremity pain  PSYCHIATRIC: No depression, anxiety, mood swings, or difficulty sleeping  HEME/LYMPH: No easy bruising, or bleeding gums  ALLERGY AND IMMUNOLOGIC: No hives or eczema	        PHYSICAL EXAM:  T(C): 36.4 (06-08-21 @ 07:48), Max: 36.9 (06-08-21 @ 05:09)  HR: 59 (06-08-21 @ 07:48) (59 - 72)  BP: 117/67 (06-08-21 @ 07:48) (117/67 - 141/84)  RR: 17 (06-08-21 @ 07:48) (16 - 17)  SpO2: 98% (06-08-21 @ 07:48) (96% - 100%)  Wt(kg): --  I&O's Summary    07 Jun 2021 07:01  -  08 Jun 2021 07:00  --------------------------------------------------------  IN: 0 mL / OUT: 200 mL / NET: -200 mL        Appearance: Normal	  HEENT:   Normal oral mucosa, PERRL, EOMI	  Lymphatic: No lymphadenopathy  Cardiovascular: Normal S1 S2, No JVD, No murmurs, No edema  Respiratory: Dec BS at bases  Psychiatry: A & O x 3, Mood & affect appropriate  Gastrointestinal:  Soft, Non-tender, + BS	  Skin: No rashes, No ecchymoses, No cyanosis	  Neurologic: Non-focal  Extremities: Normal range of motion, No clubbing, cyanosis or edema  Vascular: Peripheral pulses palpable 2+ bilaterally    MEDICATIONS  (STANDING):  allopurinol 100 milliGRAM(s) Oral daily  aspirin  chewable 81 milliGRAM(s) Oral daily  atorvastatin 20 milliGRAM(s) Oral at bedtime  carvedilol 6.25 milliGRAM(s) Oral every 12 hours  colchicine 0.6 milliGRAM(s) Oral daily  cyanocobalamin 1000 MICROGram(s) Oral daily  ergocalciferol 82280 Unit(s) Oral <User Schedule>  folic acid 1 milliGRAM(s) Oral daily  heparin   Injectable 5000 Unit(s) SubCutaneous every 12 hours  hydrALAZINE 10 milliGRAM(s) Oral three times a day  insulin lispro (ADMELOG) corrective regimen sliding scale   SubCutaneous Before meals and at bedtime  isosorbide   dinitrate Tablet (ISORDIL) 10 milliGRAM(s) Oral three times a day  pantoprazole    Tablet 40 milliGRAM(s) Oral before breakfast  senna 2 Tablet(s) Oral at bedtime  timolol 0.5% Solution 1 Drop(s) Both EYES two times a day      TELEMETRY: nsr,[facility]'s	    	  	  LABS:	 	                       11.3   2.36  )-----------( 126      ( 08 Jun 2021 07:54 )             33.5     06-07    139  |  103  |  40<H>  ----------------------------<  106<H>  4.2   |  24  |  2.84<H>    Ca    9.2      07 Jun 2021 06:16      proBNP: Serum Pro-Brain Natriuretic Peptide: 4037 pg/mL (06-02 @ 15:01)    Lipid Profile: Cholesterol 162    TG 67    sres< from: Nuclear Stress Test-Pharmacologic (06.07.21 @ 07:32) >  IMPRESSIONS:Abnormal Study  * Non-diagnostic EKG portion of Lexiscan stress test.  * Review of raw data shows: Diaphragmatic artifact.  * The left ventricle was mildly dilated LV at baseline.  There are small, severe defects in distal inferior &  inferoapical walls that are fixed consistent with  diaphragmatic attenuation artifact.  * Dilated LV with global hypokinesis EF=31%.    ------------------------------------------------------------------------      ------------------------------------------------------------------------    Confirmed on  6/7/2021 - 11:39:27 at [place] by  Yaritza Jorge MD

## 2021-06-08 NOTE — PROGRESS NOTE ADULT - ASSESSMENT
75 year old male from home AAO x3, with PMH of DM, HTN, CKD, HLD, gout and glaucoma, who presented to the ED due to worsening SOB,acute sytolic HF,NSVT.  1.Tele monitoring.  2.Stress test may be false neg, pt in 2019-nl fx now severe LV dysfunction, maybe multivessel disease,cath when cr stable.  3.Acute systolic HF-d/c demadex 10mg qd.  4.CRI-Renal f/u,check SPEP.  5.DM-Insulin.  6.HTN and CHF- coreg 6.25mg bid,cont isordil and hydralazine.  7.GI and DVT prophylaxis.  8.CXR ordered.

## 2021-06-08 NOTE — PROGRESS NOTE ADULT - ASSESSMENT
DKD and possible hypertension  Deterioration in renal function in the last few years  Reduced EF , in need for cardiac angiogram , as per cardiology    NELLY due to diuretics. Admission creatinine 2.06 , urea 27  Hold diuretics, continue fluid restriction.  Follow BUN/Creatinine .    Xr chest improved CHF and pleural effusion.

## 2021-06-09 LAB
ANION GAP SERPL CALC-SCNC: 10 MMOL/L — SIGNIFICANT CHANGE UP (ref 5–17)
BUN SERPL-MCNC: 57 MG/DL — HIGH (ref 7–18)
CALCIUM SERPL-MCNC: 9.5 MG/DL — SIGNIFICANT CHANGE UP (ref 8.4–10.5)
CHLORIDE SERPL-SCNC: 104 MMOL/L — SIGNIFICANT CHANGE UP (ref 96–108)
CO2 SERPL-SCNC: 24 MMOL/L — SIGNIFICANT CHANGE UP (ref 22–31)
CREAT SERPL-MCNC: 2.55 MG/DL — HIGH (ref 0.5–1.3)
GLUCOSE BLDC GLUCOMTR-MCNC: 116 MG/DL — HIGH (ref 70–99)
GLUCOSE BLDC GLUCOMTR-MCNC: 124 MG/DL — HIGH (ref 70–99)
GLUCOSE BLDC GLUCOMTR-MCNC: 135 MG/DL — HIGH (ref 70–99)
GLUCOSE SERPL-MCNC: 106 MG/DL — HIGH (ref 70–99)
HCT VFR BLD CALC: 34.5 % — LOW (ref 39–50)
HGB BLD-MCNC: 11.4 G/DL — LOW (ref 13–17)
M PROTEIN 24H UR ELPH-MRATE: PRESENT
MAGNESIUM SERPL-MCNC: 2.2 MG/DL — SIGNIFICANT CHANGE UP (ref 1.6–2.6)
MCHC RBC-ENTMCNC: 32.9 PG — SIGNIFICANT CHANGE UP (ref 27–34)
MCHC RBC-ENTMCNC: 33 GM/DL — SIGNIFICANT CHANGE UP (ref 32–36)
MCV RBC AUTO: 99.4 FL — SIGNIFICANT CHANGE UP (ref 80–100)
NRBC # BLD: 0 /100 WBCS — SIGNIFICANT CHANGE UP (ref 0–0)
PHOSPHATE SERPL-MCNC: 4 MG/DL — SIGNIFICANT CHANGE UP (ref 2.5–4.5)
PLATELET # BLD AUTO: 138 K/UL — LOW (ref 150–400)
POTASSIUM SERPL-MCNC: 4.2 MMOL/L — SIGNIFICANT CHANGE UP (ref 3.5–5.3)
POTASSIUM SERPL-SCNC: 4.2 MMOL/L — SIGNIFICANT CHANGE UP (ref 3.5–5.3)
RBC # BLD: 3.47 M/UL — LOW (ref 4.2–5.8)
RBC # FLD: 14.8 % — HIGH (ref 10.3–14.5)
SODIUM SERPL-SCNC: 138 MMOL/L — SIGNIFICANT CHANGE UP (ref 135–145)
WBC # BLD: 2.43 K/UL — LOW (ref 3.8–10.5)
WBC # FLD AUTO: 2.43 K/UL — LOW (ref 3.8–10.5)

## 2021-06-09 RX ORDER — ACETYLCYSTEINE 200 MG/ML
1200 VIAL (ML) MISCELLANEOUS
Refills: 0 | Status: DISCONTINUED | OUTPATIENT
Start: 2021-06-09 | End: 2021-06-10

## 2021-06-09 RX ADMIN — Medication 10 MILLIGRAM(S): at 22:02

## 2021-06-09 RX ADMIN — PANTOPRAZOLE SODIUM 40 MILLIGRAM(S): 20 TABLET, DELAYED RELEASE ORAL at 05:47

## 2021-06-09 RX ADMIN — Medication 81 MILLIGRAM(S): at 11:32

## 2021-06-09 RX ADMIN — ISOSORBIDE DINITRATE 10 MILLIGRAM(S): 5 TABLET ORAL at 05:47

## 2021-06-09 RX ADMIN — PREGABALIN 1000 MICROGRAM(S): 225 CAPSULE ORAL at 11:32

## 2021-06-09 RX ADMIN — Medication 10 MILLIGRAM(S): at 05:47

## 2021-06-09 RX ADMIN — Medication 0.6 MILLIGRAM(S): at 11:32

## 2021-06-09 RX ADMIN — HEPARIN SODIUM 5000 UNIT(S): 5000 INJECTION INTRAVENOUS; SUBCUTANEOUS at 05:46

## 2021-06-09 RX ADMIN — Medication 10 MILLIGRAM(S): at 14:30

## 2021-06-09 RX ADMIN — ISOSORBIDE DINITRATE 10 MILLIGRAM(S): 5 TABLET ORAL at 17:47

## 2021-06-09 RX ADMIN — CARVEDILOL PHOSPHATE 6.25 MILLIGRAM(S): 80 CAPSULE, EXTENDED RELEASE ORAL at 05:47

## 2021-06-09 RX ADMIN — SENNA PLUS 2 TABLET(S): 8.6 TABLET ORAL at 22:03

## 2021-06-09 RX ADMIN — Medication 1200 MILLIGRAM(S): at 15:14

## 2021-06-09 RX ADMIN — Medication 1 DROP(S): at 05:48

## 2021-06-09 RX ADMIN — HEPARIN SODIUM 5000 UNIT(S): 5000 INJECTION INTRAVENOUS; SUBCUTANEOUS at 17:48

## 2021-06-09 RX ADMIN — Medication 100 MILLIGRAM(S): at 11:32

## 2021-06-09 RX ADMIN — Medication 1 DROP(S): at 17:48

## 2021-06-09 RX ADMIN — ISOSORBIDE DINITRATE 10 MILLIGRAM(S): 5 TABLET ORAL at 11:33

## 2021-06-09 RX ADMIN — CARVEDILOL PHOSPHATE 6.25 MILLIGRAM(S): 80 CAPSULE, EXTENDED RELEASE ORAL at 17:47

## 2021-06-09 RX ADMIN — ATORVASTATIN CALCIUM 20 MILLIGRAM(S): 80 TABLET, FILM COATED ORAL at 22:02

## 2021-06-09 RX ADMIN — Medication 1 MILLIGRAM(S): at 11:33

## 2021-06-09 NOTE — PROGRESS NOTE ADULT - PROBLEM SELECTOR PLAN 10
discussed GOC with patient and son, Bruce at bedside  patient is FULL CODE

## 2021-06-09 NOTE — PROGRESS NOTE ADULT - PROBLEM SELECTOR PLAN 9
Heparin SQ for DVT ppx
heparin drip for AC

## 2021-06-09 NOTE — DIETITIAN INITIAL EVALUATION ADULT. - OTHER INFO
Pt lives home PTA, alert, oriented, vision impaired, speaks [place]iny[place] and fluent English, able to communicate well; appetite good, denied recent wt changes, denied GI distress, chewing or swallowing problem at present, no specific food choices reported, tolerating 90% of meals at time per flow sheet; pending cardiac catheterization per team, pt not a good candidate for diet education at present

## 2021-06-09 NOTE — PROGRESS NOTE ADULT - ASSESSMENT
DKD and possible hypertension, CKD stage 3 B on admission  NELLY cardiorenal syndrome  CHF, HFrEF 31% await cardiac act tomorrow.  Off diuretics at present , xr chest CHF improved.    Follow up with cardiac act in am  No iv fluids due to cardiomyopathy.  Follow need for diuretics after cardiac cat

## 2021-06-09 NOTE — PROGRESS NOTE ADULT - SUBJECTIVE AND OBJECTIVE BOX
CHIEF COMPLAINT:Patient is a 75y old  Male who presents with a chief complaint of worsening SOB .Pt appears comfortab;e.    	  REVIEW OF SYSTEMS:  CONSTITUTIONAL: No fever, weight loss, or fatigue  EYES: No eye pain, visual disturbances, or discharge  ENT:  No difficulty hearing, tinnitus, vertigo; No sinus or throat pain  NECK: No pain or stiffness  RESPIRATORY: No cough, wheezing, chills or hemoptysis; No Shortness of Breath  CARDIOVASCULAR: No chest pain, palpitations, passing out, dizziness, or leg swelling  GASTROINTESTINAL: No abdominal or epigastric pain. No nausea, vomiting, or hematemesis; No diarrhea or constipation. No melena or hematochezia.  GENITOURINARY: No dysuria, frequency, hematuria, or incontinence  NEUROLOGICAL: No headaches, memory loss, loss of strength, numbness, or tremors  SKIN: No itching, burning, rashes, or lesions   LYMPH Nodes: No enlarged glands  ENDOCRINE: No heat or cold intolerance; No hair loss  MUSCULOSKELETAL: No joint pain or swelling; No muscle, back, or extremity pain  PSYCHIATRIC: No depression, anxiety, mood swings, or difficulty sleeping  HEME/LYMPH: No easy bruising, or bleeding gums  ALLERGY AND IMMUNOLOGIC: No hives or eczema	        PHYSICAL EXAM:  T(C): 36.8 (06-09-21 @ 07:22), Max: 36.8 (06-09-21 @ 07:22)  HR: 65 (06-09-21 @ 07:22) (61 - 68)  BP: 111/67 (06-09-21 @ 07:22) (111/67 - 140/79)  RR: 17 (06-09-21 @ 07:22) (17 - 19)  SpO2: 98% (06-09-21 @ 07:22) (97% - 99%)  Wt(kg): --  I&O's Summary    08 Jun 2021 07:01  -  09 Jun 2021 07:00  --------------------------------------------------------  IN: 50 mL / OUT: 1150 mL / NET: -1100 mL        Appearance: Normal	  HEENT:   Normal oral mucosa, PERRL, EOMI	  Lymphatic: No lymphadenopathy  Cardiovascular: Normal S1 S2, No JVD, No murmurs, No edema  Respiratory: Lungs clear to auscultation	  Psychiatry: A & O x 3, Mood & affect appropriate  Gastrointestinal:  Soft, Non-tender, + BS	  Skin: No rashes, No ecchymoses, No cyanosis	  Neurologic: Non-focal  Extremities: Normal range of motion, No clubbing, cyanosis or edema  Vascular: Peripheral pulses palpable 2+ bilaterally    MEDICATIONS  (STANDING):  allopurinol 100 milliGRAM(s) Oral daily  aspirin  chewable 81 milliGRAM(s) Oral daily  atorvastatin 20 milliGRAM(s) Oral at bedtime  carvedilol 6.25 milliGRAM(s) Oral every 12 hours  colchicine 0.6 milliGRAM(s) Oral daily  cyanocobalamin 1000 MICROGram(s) Oral daily  ergocalciferol 00572 Unit(s) Oral <User Schedule>  folic acid 1 milliGRAM(s) Oral daily  heparin   Injectable 5000 Unit(s) SubCutaneous every 12 hours  hydrALAZINE 10 milliGRAM(s) Oral three times a day  insulin lispro (ADMELOG) corrective regimen sliding scale   SubCutaneous Before meals and at bedtime  isosorbide   dinitrate Tablet (ISORDIL) 10 milliGRAM(s) Oral three times a day  pantoprazole    Tablet 40 milliGRAM(s) Oral before breakfast  senna 2 Tablet(s) Oral at bedtime  timolol 0.5% Solution 1 Drop(s) Both EYES two times a day      [facility]	    	  	  LABS:	 	                        11.4   2.43  )-----------( 138      ( 09 Jun 2021 08:38 )             34.5     06-08    138  |  105  |  49<H>  ----------------------------<  114<H>  4.1   |  23  |  2.64<H>    Ca    9.2      08 Jun 2021 09:31      proBNP: Serum Pro-Brain Natriuretic Peptide: 4037 pg/mL (06-02 @ 15:01)    Lipid Profile: Cholesterol 162    TG 67        	  r< from: Xray Chest 1 View- PORTABLE-Routine (Xray Chest 1 View- PORTABLE-Routine .) (06.08.21 @ 09:35) >  EXAM:  XR CHEST PORTABLE ROUTINE 1V                            PROCEDURE DATE:  06/08/2021          INTERPRETATION:  INDICATION: chf    PRIORS: 6/2/2021.    VIEWS: Portable AP radiography of the chest performed.    FINDINGS: Since prior evaluation there is interval decrease in bilateral lung parenchymal airspace opacities with residual bilateral pleural effusion suggesting interval improvement in CHF. Heart size appears within normal limits. No superior mediastinal widening identified. There isno evidence for pneumothorax. No mediastinal shift. No acute osseous fractures. Degenerative changes thoracic spine.    IMPRESSION: Decrease in CHF.

## 2021-06-09 NOTE — DIETITIAN INITIAL EVALUATION ADULT. - PERTINENT LABORATORY DATA
06-09 Na138 mmol/L Glu 106 mg/dL<H> K+ 4.2 mmol/L Cr  2.55 mg/dL<H> BUN 57 mg/dL<H>   06-09 Phos 4.0 mg/dL   06-04 Alb 2.6 g/dL<L>       06-03 Chol 162 mg/dL LDL --     mg/dL Trig 67 mg/dL  06-03-21 @ 10:32 HgbA1C 5.3 [4.0 - 5.6]

## 2021-06-09 NOTE — PROGRESS NOTE ADULT - ASSESSMENT
75 year old male from home AAO x3, with PMH of DM, HTN, CKD, HLD, gout and glaucoma, who presented to the ED due to worsening SOB,acute sytolic HF,NSVT.  1.Tele monitoring.  2.Stress test may be false neg, pt in 2019-nl fx now severe LV dysfunction, maybe multivessel disease,cath when cr stable.  3.Acute systolic HF-off demadex 10mg qd for cath.  4.CRI-Renal f/u,check SPEP.  5.DM-Insulin.  6.HTN and CHF- coreg 6.25mg bid,cont isordil and hydralazine.  7.GI and DVT prophylaxis.

## 2021-06-09 NOTE — PROGRESS NOTE ADULT - SUBJECTIVE AND OBJECTIVE BOX
PGY-1 Progress Note discussed with attending    PAGER #: [[phone]] TILL 5:00 PM  PLEASE CONTACT ON CALL TEAM:  - On Call Team (Please refer to Darrick) FROM 5:00 PM - 8:30PM  - Nightfloat Team FROM 8:30 -7:30 AM    CHIEF COMPLAINT & BRIEF HOSPITAL COURSE: Patient is a 75 year old male from home AAO x3, with PMH of DM, HTN, CKD, HLD, gout and glaucoma, who presented to the ED due to worsening SOB, and swelling of the legs for which cardiological evaluation was done- Echocardiogram showed HFreEF started on re-modeling agents, Nuclear stress test shows high suspicion of multivessel disease- pt will require cath likely Thursday or Friday once renal function stabilizes.    INTERVAL HPI/OVERNIGHT EVENTS: No acute overnight events, creatinine improving, started on Mucomyst x 2 days    MEDICATIONS  (STANDING):  acetylcysteine  Oral Solution 1200 milliGRAM(s) Oral two times a day  allopurinol 100 milliGRAM(s) Oral daily  aspirin  chewable 81 milliGRAM(s) Oral daily  atorvastatin 20 milliGRAM(s) Oral at bedtime  carvedilol 6.25 milliGRAM(s) Oral every 12 hours  colchicine 0.6 milliGRAM(s) Oral daily  cyanocobalamin 1000 MICROGram(s) Oral daily  ergocalciferol 83137 Unit(s) Oral <User Schedule>  folic acid 1 milliGRAM(s) Oral daily  heparin   Injectable 5000 Unit(s) SubCutaneous every 12 hours  hydrALAZINE 10 milliGRAM(s) Oral three times a day  insulin lispro (ADMELOG) corrective regimen sliding scale   SubCutaneous Before meals and at bedtime  isosorbide   dinitrate Tablet (ISORDIL) 10 milliGRAM(s) Oral three times a day  pantoprazole    Tablet 40 milliGRAM(s) Oral before breakfast  senna 2 Tablet(s) Oral at bedtime  timolol 0.5% Solution 1 Drop(s) Both EYES two times a day    MEDICATIONS  (PRN):  acetaminophen   Tablet .. 650 milliGRAM(s) Oral every 6 hours PRN Temp greater or equal to 38C (100.4F), Moderate Pain (4 - 6)  ondansetron Injectable 4 milliGRAM(s) IV Push every 6 hours PRN Nausea and/or Vomiting  polyethylene glycol 3350 17 Gram(s) Oral daily PRN Constipation      REVIEW OF SYSTEMS:  CONSTITUTIONAL: No fever, weight loss, or fatigue  RESPIRATORY: No cough, wheezing, chills or hemoptysis; No shortness of breath  CARDIOVASCULAR: No chest pain, palpitations, dizziness, or leg swelling  GASTROINTESTINAL: No abdominal pain. No nausea, vomiting, or hematemesis; No diarrhea or constipation. No melena or hematochezia.  GENITOURINARY: No dysuria or hematuria, urinary frequency  NEUROLOGICAL: No headaches, memory loss, loss of strength, numbness, or tremors  SKIN: No itching, burning, rashes, or lesions     Vital Signs Last 24 Hrs  T(C): 36.9 (09 Jun 2021 11:26), Max: 36.9 (09 Jun 2021 11:26)  T(F): 98.4 (09 Jun 2021 11:26), Max: 98.4 (09 Jun 2021 11:26)  HR: 61 (09 Jun 2021 11:26) (61 - 68)  BP: 142/87 (09 Jun 2021 11:26) (111/67 - 142/87)  BP(mean): --  RR: 17 (09 Jun 2021 11:26) (17 - 19)  SpO2: 98% (09 Jun 2021 11:26) (98% - 99%)    PHYSICAL EXAMINATION:  GENERAL: NAD, well built  HEAD:  Atraumatic, Normocephalic  EYES:  conjunctiva and sclera clear  NECK: Supple, No JVD, Normal thyroid  CHEST/LUNG: Clear to auscultation. Clear to percussion bilaterally; No rales, rhonchi, wheezing, or rubs  HEART: Regular rate and rhythm; No murmurs, rubs, or gallops  ABDOMEN: Soft, Nontender, Nondistended; Bowel sounds present  NERVOUS SYSTEM:  Alert & Oriented X3,    EXTREMITIES:  2+ Peripheral Pulses, No clubbing, cyanosis, or edema  SKIN: warm dry                          11.4   2.43  )-----------( 138      ( 09 Jun 2021 08:38 )             34.5     06-09    138  |  104  |  57<H>  ----------------------------<  106<H>  4.2   |  24  |  2.55<H>    Ca    9.5      09 Jun 2021 08:38  Phos  4.0     06-09  Mg     2.2     06-09                CAPILLARY BLOOD GLUCOSE      RADIOLOGY & ADDITIONAL TESTS:

## 2021-06-09 NOTE — DIETITIAN INITIAL EVALUATION ADULT. - PERTINENT MEDS FT
MEDICATIONS  (STANDING):  acetylcysteine  Oral Solution 1200 milliGRAM(s) Oral two times a day  allopurinol 100 milliGRAM(s) Oral daily  aspirin  chewable 81 milliGRAM(s) Oral daily  atorvastatin 20 milliGRAM(s) Oral at bedtime  carvedilol 6.25 milliGRAM(s) Oral every 12 hours  colchicine 0.6 milliGRAM(s) Oral daily  cyanocobalamin 1000 MICROGram(s) Oral daily  ergocalciferol 72074 Unit(s) Oral <User Schedule>  folic acid 1 milliGRAM(s) Oral daily  heparin   Injectable 5000 Unit(s) SubCutaneous every 12 hours  hydrALAZINE 10 milliGRAM(s) Oral three times a day  insulin lispro (ADMELOG) corrective regimen sliding scale   SubCutaneous Before meals and at bedtime  isosorbide   dinitrate Tablet (ISORDIL) 10 milliGRAM(s) Oral three times a day  pantoprazole    Tablet 40 milliGRAM(s) Oral before breakfast  senna 2 Tablet(s) Oral at bedtime  timolol 0.5% Solution 1 Drop(s) Both EYES two times a day

## 2021-06-09 NOTE — PROGRESS NOTE ADULT - SUBJECTIVE AND OBJECTIVE BOX
Problem List:  CKD stage 3  NELLY - cardiorenal syndrome      PAST MEDICAL & SURGICAL HISTORY:  DM (diabetes mellitus)    HTN (hypertension)    Chronic kidney disease    HLD (hyperlipidemia)    Glaucoma    Gout    No significant past surgical history        No Known Allergies      MEDICATIONS  (STANDING):  acetylcysteine  Oral Solution 1200 milliGRAM(s) Oral two times a day  allopurinol 100 milliGRAM(s) Oral daily  aspirin  chewable 81 milliGRAM(s) Oral daily  atorvastatin 20 milliGRAM(s) Oral at bedtime  carvedilol 6.25 milliGRAM(s) Oral every 12 hours  colchicine 0.6 milliGRAM(s) Oral daily  cyanocobalamin 1000 MICROGram(s) Oral daily  ergocalciferol 45495 Unit(s) Oral <User Schedule>  folic acid 1 milliGRAM(s) Oral daily  heparin   Injectable 5000 Unit(s) SubCutaneous every 12 hours  hydrALAZINE 10 milliGRAM(s) Oral three times a day  insulin lispro (ADMELOG) corrective regimen sliding scale   SubCutaneous Before meals and at bedtime  isosorbide   dinitrate Tablet (ISORDIL) 10 milliGRAM(s) Oral three times a day  pantoprazole    Tablet 40 milliGRAM(s) Oral before breakfast  senna 2 Tablet(s) Oral at bedtime  timolol 0.5% Solution 1 Drop(s) Both EYES two times a day    MEDICATIONS  (PRN):  acetaminophen   Tablet .. 650 milliGRAM(s) Oral every 6 hours PRN Temp greater or equal to 38C (100.4F), Moderate Pain (4 - 6)  ondansetron Injectable 4 milliGRAM(s) IV Push every 6 hours PRN Nausea and/or Vomiting  polyethylene glycol 3350 17 Gram(s) Oral daily PRN Constipation    max creat 2.84                        11.4   2.43  )-----------( 138      ( 09 Jun 2021 08:38 )             34.5     06-09    138  |  104  |  57<H>  ----------------------------<  106<H>  4.2   |  24  |  2.55<H>    Ca    9.5      09 Jun 2021 08:38  Phos  4.0     06-09  Mg     2.2     06-09          Creatinine, Random Urine: 23 mg/dL (06-05 @ 15:36)  Sodium, Random Urine: 125 mmol/L (06-02 @ 19:20)  Creatinine, Random Urine: <13 mg/dL (06-02 @ 19:20)      REVIEW OF SYSTEMS:  General: no fever no chills, no weight loss.    RESPIRATORY: No cough, wheezing, hemoptysis; No shortness of breath  CARDIOVASCULAR: No chest pain or palpitations. No Edema  GASTROINTESTINAL: No abdominal or epigastric pain. No nausea, vomiting. No diarrhea or constipation. No melena.  GENITOURINARY: No dysuria, frequency, foamy urine, urinary urgency, incontinence or hematuria  NEUROLOGICAL: No numbness or weakness, no tremor , no dizziness.           VITALS:  T(F): 97.4 (06-09-21 @ 15:33), Max: 98.4 (06-09-21 @ 11:26)  HR: 57 (06-09-21 @ 15:33)  BP: 123/69 (06-09-21 @ 15:33)  RR: 18 (06-09-21 @ 15:33)  SpO2: 98% (06-09-21 @ 15:33)  Wt(kg): --    06-08 @ 07:01  -  06-09 @ 07:00  --------------------------------------------------------  IN: 50 mL / OUT: 1150 mL / NET: -1100 mL        PHYSICAL EXAM:  Constitutional: well developed, no diaphoresis, no distress.  Neck: No JVD, no carotid bruit, supple, no adenopathy  Respiratory: Good air entrance B/L, no wheezes, rales or rhonchi  Cardiovascular: S1, S2, RRR, no pericardial rub, no murmur  Abdomen: BS+, soft, no tenderness, no bruit  Pelvis: bladder nondistended  Extremities: No cyanosis or clubbing. No peripheral edema.

## 2021-06-09 NOTE — PROGRESS NOTE ADULT - SUBJECTIVE AND OBJECTIVE BOX
Patient is a 75y old  Male who presents with a chief complaint of worsening SOB (09 Jun 2021 08:42)  Awake, alert, comfortable in bed in NAD. No cough or sob    INTERVAL HPI/OVERNIGHT EVENTS:      VITAL SIGNS:  T(F): 98.2 (06-09-21 @ 07:22)  HR: 65 (06-09-21 @ 07:22)  BP: 111/67 (06-09-21 @ 07:22)  RR: 17 (06-09-21 @ 07:22)  SpO2: 98% (06-09-21 @ 07:22)  Wt(kg): --  I&O's Detail    08 Jun 2021 07:01  -  09 Jun 2021 07:00  --------------------------------------------------------  IN:    Oral Fluid: 50 mL  Total IN: 50 mL    OUT:    Stool (mL): 700 mL    Voided (mL): 450 mL  Total OUT: 1150 mL    Total NET: -1100 mL              REVIEW OF SYSTEMS:    CONSTITUTIONAL:  No fevers, chills, sweats    HEENT:  Eyes:  No diplopia or blurred vision. ENT:  No earache, sore throat or runny nose.    CARDIOVASCULAR:  No pressure, squeezing, tightness, or heaviness about the chest; no palpitations.    RESPIRATORY:  Per HPI    GASTROINTESTINAL:  No abdominal pain, nausea, vomiting or diarrhea.    GENITOURINARY:  No dysuria, frequency or urgency.    NEUROLOGIC:  No paresthesias, fasciculations, seizures or weakness.    PSYCHIATRIC:  No disorder of thought or mood.      PHYSICAL EXAM:    Constitutional: Well developed and nourished  Eyes:Perrla  ENMT: normal  Neck:supple  Respiratory: good air entry  Cardiovascular: S1 S2 regular  Gastrointestinal: Soft, Non tender  Extremities: No edema  Vascular:normal  Neurological:Awake, alert,Ox3  Musculoskeletal:Normal      MEDICATIONS  (STANDING):  allopurinol 100 milliGRAM(s) Oral daily  aspirin  chewable 81 milliGRAM(s) Oral daily  atorvastatin 20 milliGRAM(s) Oral at bedtime  carvedilol 6.25 milliGRAM(s) Oral every 12 hours  colchicine 0.6 milliGRAM(s) Oral daily  cyanocobalamin 1000 MICROGram(s) Oral daily  ergocalciferol 84936 Unit(s) Oral <User Schedule>  folic acid 1 milliGRAM(s) Oral daily  heparin   Injectable 5000 Unit(s) SubCutaneous every 12 hours  hydrALAZINE 10 milliGRAM(s) Oral three times a day  insulin lispro (ADMELOG) corrective regimen sliding scale   SubCutaneous Before meals and at bedtime  isosorbide   dinitrate Tablet (ISORDIL) 10 milliGRAM(s) Oral three times a day  pantoprazole    Tablet 40 milliGRAM(s) Oral before breakfast  senna 2 Tablet(s) Oral at bedtime  timolol 0.5% Solution 1 Drop(s) Both EYES two times a day    MEDICATIONS  (PRN):  acetaminophen   Tablet .. 650 milliGRAM(s) Oral every 6 hours PRN Temp greater or equal to 38C (100.4F), Moderate Pain (4 - 6)  ondansetron Injectable 4 milliGRAM(s) IV Push every 6 hours PRN Nausea and/or Vomiting  polyethylene glycol 3350 17 Gram(s) Oral daily PRN Constipation      Allergies    No Known Allergies    Intolerances        LABS:                        11.4   2.43  )-----------( 138      ( 09 Jun 2021 08:38 )             34.5     06-09    138  |  104  |  57<H>  ----------------------------<  106<H>  4.2   |  24  |  2.55<H>    Ca    9.5      09 Jun 2021 08:38  Phos  4.0     06-09  Mg     2.2     06-09                CAPILLARY BLOOD GLUCOSE      POCT Blood Glucose.: 116 mg/dL (09 Jun 2021 07:46)  POCT Blood Glucose.: 116 mg/dL (08 Jun 2021 20:57)  POCT Blood Glucose.: 120 mg/dL (08 Jun 2021 16:42)  POCT Blood Glucose.: 127 mg/dL (08 Jun 2021 11:37)    pro-bnp 4037 06-02 @ 15:01     d-dimer 1119  06-02 @ 15:01      RADIOLOGY & ADDITIONAL TESTS:    CXR:    Ct scan chest:    ekg;    echo:

## 2021-06-10 ENCOUNTER — TRANSCRIPTION ENCOUNTER (OUTPATIENT)
Age: 76
End: 2021-06-10

## 2021-06-10 ENCOUNTER — INPATIENT (INPATIENT)
Facility: HOSPITAL | Age: 76
LOS: 3 days | Discharge: HOME | DRG: 286 | End: 2021-06-14
Attending: INTERNAL MEDICINE | Admitting: INTERNAL MEDICINE
Payer: COMMERCIAL

## 2021-06-10 VITALS
SYSTOLIC BLOOD PRESSURE: 128 MMHG | HEART RATE: 62 BPM | DIASTOLIC BLOOD PRESSURE: 87 MMHG | RESPIRATION RATE: 16 BRPM | TEMPERATURE: 98 F | OXYGEN SATURATION: 99 %

## 2021-06-10 VITALS
OXYGEN SATURATION: 98 % | HEIGHT: 63 IN | TEMPERATURE: 98 F | DIASTOLIC BLOOD PRESSURE: 68 MMHG | RESPIRATION RATE: 18 BRPM | HEART RATE: 65 BPM | SYSTOLIC BLOOD PRESSURE: 120 MMHG | WEIGHT: 179.9 LBS

## 2021-06-10 DIAGNOSIS — I50.33 ACUTE ON CHRONIC DIASTOLIC (CONGESTIVE) HEART FAILURE: ICD-10-CM

## 2021-06-10 PROBLEM — H40.9 UNSPECIFIED GLAUCOMA: Chronic | Status: ACTIVE | Noted: 2021-06-02

## 2021-06-10 PROBLEM — M10.9 GOUT, UNSPECIFIED: Chronic | Status: ACTIVE | Noted: 2021-06-02

## 2021-06-10 PROBLEM — E78.5 HYPERLIPIDEMIA, UNSPECIFIED: Chronic | Status: ACTIVE | Noted: 2021-06-02

## 2021-06-10 PROBLEM — N18.9 CHRONIC KIDNEY DISEASE, UNSPECIFIED: Chronic | Status: ACTIVE | Noted: 2021-06-02

## 2021-06-10 LAB
ANION GAP SERPL CALC-SCNC: 6 MMOL/L — SIGNIFICANT CHANGE UP (ref 5–17)
BUN SERPL-MCNC: 59 MG/DL — HIGH (ref 7–18)
CALCIUM SERPL-MCNC: 9.4 MG/DL — SIGNIFICANT CHANGE UP (ref 8.4–10.5)
CHLORIDE SERPL-SCNC: 104 MMOL/L — SIGNIFICANT CHANGE UP (ref 96–108)
CO2 SERPL-SCNC: 27 MMOL/L — SIGNIFICANT CHANGE UP (ref 22–31)
CREAT SERPL-MCNC: 2.59 MG/DL — HIGH (ref 0.5–1.3)
GLUCOSE BLDC GLUCOMTR-MCNC: 102 MG/DL — HIGH (ref 70–99)
GLUCOSE BLDC GLUCOMTR-MCNC: 108 MG/DL — HIGH (ref 70–99)
GLUCOSE BLDC GLUCOMTR-MCNC: 110 MG/DL — HIGH (ref 70–99)
GLUCOSE BLDC GLUCOMTR-MCNC: 152 MG/DL — HIGH (ref 70–99)
GLUCOSE SERPL-MCNC: 99 MG/DL — SIGNIFICANT CHANGE UP (ref 70–99)
HCT VFR BLD CALC: 34 % — LOW (ref 39–50)
HGB BLD-MCNC: 11.3 G/DL — LOW (ref 13–17)
INTERPRETATION SERPL IFE-IMP: SIGNIFICANT CHANGE UP
MAGNESIUM SERPL-MCNC: 2.2 MG/DL — SIGNIFICANT CHANGE UP (ref 1.6–2.6)
MCHC RBC-ENTMCNC: 33.2 GM/DL — SIGNIFICANT CHANGE UP (ref 32–36)
MCHC RBC-ENTMCNC: 33.2 PG — SIGNIFICANT CHANGE UP (ref 27–34)
MCV RBC AUTO: 100 FL — SIGNIFICANT CHANGE UP (ref 80–100)
NRBC # BLD: 0 /100 WBCS — SIGNIFICANT CHANGE UP (ref 0–0)
PHOSPHATE SERPL-MCNC: 4 MG/DL — SIGNIFICANT CHANGE UP (ref 2.5–4.5)
PLATELET # BLD AUTO: 130 K/UL — LOW (ref 150–400)
POTASSIUM SERPL-MCNC: 4.1 MMOL/L — SIGNIFICANT CHANGE UP (ref 3.5–5.3)
POTASSIUM SERPL-SCNC: 4.1 MMOL/L — SIGNIFICANT CHANGE UP (ref 3.5–5.3)
RBC # BLD: 3.4 M/UL — LOW (ref 4.2–5.8)
RBC # FLD: 14.8 % — HIGH (ref 10.3–14.5)
SODIUM SERPL-SCNC: 137 MMOL/L — SIGNIFICANT CHANGE UP (ref 135–145)
WBC # BLD: 2.73 K/UL — LOW (ref 3.8–10.5)
WBC # FLD AUTO: 2.73 K/UL — LOW (ref 3.8–10.5)

## 2021-06-10 PROCEDURE — 97162 PT EVAL MOD COMPLEX 30 MIN: CPT

## 2021-06-10 PROCEDURE — 93010 ELECTROCARDIOGRAM REPORT: CPT

## 2021-06-10 PROCEDURE — 78580 LUNG PERFUSION IMAGING: CPT

## 2021-06-10 PROCEDURE — 84300 ASSAY OF URINE SODIUM: CPT

## 2021-06-10 PROCEDURE — 84550 ASSAY OF BLOOD/URIC ACID: CPT

## 2021-06-10 PROCEDURE — 85379 FIBRIN DEGRADATION QUANT: CPT

## 2021-06-10 PROCEDURE — 83036 HEMOGLOBIN GLYCOSYLATED A1C: CPT

## 2021-06-10 PROCEDURE — 85027 COMPLETE CBC AUTOMATED: CPT

## 2021-06-10 PROCEDURE — 86769 SARS-COV-2 COVID-19 ANTIBODY: CPT

## 2021-06-10 PROCEDURE — 93017 CV STRESS TEST TRACING ONLY: CPT

## 2021-06-10 PROCEDURE — 82746 ASSAY OF FOLIC ACID SERUM: CPT

## 2021-06-10 PROCEDURE — 93571 IV DOP VEL&/PRESS C FLO 1ST: CPT | Mod: 26,LD

## 2021-06-10 PROCEDURE — 82728 ASSAY OF FERRITIN: CPT

## 2021-06-10 PROCEDURE — 84156 ASSAY OF PROTEIN URINE: CPT

## 2021-06-10 PROCEDURE — 83735 ASSAY OF MAGNESIUM: CPT

## 2021-06-10 PROCEDURE — 82570 ASSAY OF URINE CREATININE: CPT

## 2021-06-10 PROCEDURE — 83540 ASSAY OF IRON: CPT

## 2021-06-10 PROCEDURE — 84100 ASSAY OF PHOSPHORUS: CPT

## 2021-06-10 PROCEDURE — 93460 R&L HRT ART/VENTRICLE ANGIO: CPT | Mod: 26

## 2021-06-10 PROCEDURE — 84155 ASSAY OF PROTEIN SERUM: CPT

## 2021-06-10 PROCEDURE — 96374 THER/PROPH/DIAG INJ IV PUSH: CPT

## 2021-06-10 PROCEDURE — 93005 ELECTROCARDIOGRAM TRACING: CPT

## 2021-06-10 PROCEDURE — 81001 URINALYSIS AUTO W/SCOPE: CPT

## 2021-06-10 PROCEDURE — 83880 ASSAY OF NATRIURETIC PEPTIDE: CPT

## 2021-06-10 PROCEDURE — 83521 IG LIGHT CHAINS FREE EACH: CPT

## 2021-06-10 PROCEDURE — 82962 GLUCOSE BLOOD TEST: CPT

## 2021-06-10 PROCEDURE — 80048 BASIC METABOLIC PNL TOTAL CA: CPT

## 2021-06-10 PROCEDURE — 87635 SARS-COV-2 COVID-19 AMP PRB: CPT

## 2021-06-10 PROCEDURE — 86325 OTHER IMMUNOELECTROPHORESIS: CPT

## 2021-06-10 PROCEDURE — 99223 1ST HOSP IP/OBS HIGH 75: CPT

## 2021-06-10 PROCEDURE — 85025 COMPLETE CBC W/AUTO DIFF WBC: CPT

## 2021-06-10 PROCEDURE — 76775 US EXAM ABDO BACK WALL LIM: CPT

## 2021-06-10 PROCEDURE — 85730 THROMBOPLASTIN TIME PARTIAL: CPT

## 2021-06-10 PROCEDURE — 93306 TTE W/DOPPLER COMPLETE: CPT

## 2021-06-10 PROCEDURE — 86334 IMMUNOFIX E-PHORESIS SERUM: CPT

## 2021-06-10 PROCEDURE — 93970 EXTREMITY STUDY: CPT

## 2021-06-10 PROCEDURE — 84145 PROCALCITONIN (PCT): CPT

## 2021-06-10 PROCEDURE — 71045 X-RAY EXAM CHEST 1 VIEW: CPT

## 2021-06-10 PROCEDURE — 84484 ASSAY OF TROPONIN QUANT: CPT

## 2021-06-10 PROCEDURE — 84165 PROTEIN E-PHORESIS SERUM: CPT

## 2021-06-10 PROCEDURE — 80061 LIPID PANEL: CPT

## 2021-06-10 PROCEDURE — 82306 VITAMIN D 25 HYDROXY: CPT

## 2021-06-10 PROCEDURE — 80053 COMPREHEN METABOLIC PANEL: CPT

## 2021-06-10 PROCEDURE — 71046 X-RAY EXAM CHEST 2 VIEWS: CPT

## 2021-06-10 PROCEDURE — 83550 IRON BINDING TEST: CPT

## 2021-06-10 PROCEDURE — 85610 PROTHROMBIN TIME: CPT

## 2021-06-10 PROCEDURE — A9540: CPT

## 2021-06-10 PROCEDURE — 82043 UR ALBUMIN QUANTITATIVE: CPT

## 2021-06-10 PROCEDURE — 78452 HT MUSCLE IMAGE SPECT MULT: CPT

## 2021-06-10 PROCEDURE — 36415 COLL VENOUS BLD VENIPUNCTURE: CPT

## 2021-06-10 PROCEDURE — 99285 EMERGENCY DEPT VISIT HI MDM: CPT | Mod: 25

## 2021-06-10 PROCEDURE — 82607 VITAMIN B-12: CPT

## 2021-06-10 PROCEDURE — A9502: CPT

## 2021-06-10 PROCEDURE — 97116 GAIT TRAINING THERAPY: CPT

## 2021-06-10 RX ORDER — INSULIN LISPRO 100/ML
0 VIAL (ML) SUBCUTANEOUS
Qty: 0 | Refills: 0 | DISCHARGE
Start: 2021-06-10

## 2021-06-10 RX ORDER — ROSUVASTATIN CALCIUM 5 MG/1
1 TABLET ORAL
Qty: 0 | Refills: 0 | DISCHARGE

## 2021-06-10 RX ORDER — TIMOLOL 0.5 %
1 DROPS OPHTHALMIC (EYE)
Refills: 0 | Status: DISCONTINUED | OUTPATIENT
Start: 2021-06-10 | End: 2021-06-14

## 2021-06-10 RX ORDER — TIMOLOL 0.5 %
1 DROPS OPHTHALMIC (EYE)
Qty: 0 | Refills: 0 | DISCHARGE
Start: 2021-06-10

## 2021-06-10 RX ORDER — COLCHICINE 0.6 MG
0.6 TABLET ORAL DAILY
Refills: 0 | Status: DISCONTINUED | OUTPATIENT
Start: 2021-06-10 | End: 2021-06-14

## 2021-06-10 RX ORDER — ALLOPURINOL 300 MG
1 TABLET ORAL
Qty: 0 | Refills: 0 | DISCHARGE

## 2021-06-10 RX ORDER — ATORVASTATIN CALCIUM 80 MG/1
20 TABLET, FILM COATED ORAL AT BEDTIME
Refills: 0 | Status: DISCONTINUED | OUTPATIENT
Start: 2021-06-10 | End: 2021-06-14

## 2021-06-10 RX ORDER — ACETAMINOPHEN 500 MG
2 TABLET ORAL
Qty: 0 | Refills: 0 | DISCHARGE
Start: 2021-06-10

## 2021-06-10 RX ORDER — CARVEDILOL PHOSPHATE 80 MG/1
1 CAPSULE, EXTENDED RELEASE ORAL
Qty: 0 | Refills: 0 | DISCHARGE
Start: 2021-06-10

## 2021-06-10 RX ORDER — FOLIC ACID 0.8 MG
1 TABLET ORAL DAILY
Refills: 0 | Status: DISCONTINUED | OUTPATIENT
Start: 2021-06-10 | End: 2021-06-14

## 2021-06-10 RX ORDER — ONDANSETRON 8 MG/1
0 TABLET, FILM COATED ORAL
Qty: 0 | Refills: 0 | DISCHARGE
Start: 2021-06-10

## 2021-06-10 RX ORDER — PANTOPRAZOLE SODIUM 20 MG/1
40 TABLET, DELAYED RELEASE ORAL
Refills: 0 | Status: DISCONTINUED | OUTPATIENT
Start: 2021-06-10 | End: 2021-06-14

## 2021-06-10 RX ORDER — SENNA PLUS 8.6 MG/1
2 TABLET ORAL
Qty: 0 | Refills: 0 | DISCHARGE
Start: 2021-06-10

## 2021-06-10 RX ORDER — SENNA PLUS 8.6 MG/1
2 TABLET ORAL AT BEDTIME
Refills: 0 | Status: DISCONTINUED | OUTPATIENT
Start: 2021-06-10 | End: 2021-06-14

## 2021-06-10 RX ORDER — PANTOPRAZOLE SODIUM 20 MG/1
1 TABLET, DELAYED RELEASE ORAL
Qty: 30 | Refills: 0
Start: 2021-06-10 | End: 2021-07-09

## 2021-06-10 RX ORDER — POLYETHYLENE GLYCOL 3350 17 G/17G
17 POWDER, FOR SOLUTION ORAL
Qty: 0 | Refills: 0 | DISCHARGE
Start: 2021-06-10

## 2021-06-10 RX ORDER — HEPARIN SODIUM 5000 [USP'U]/ML
0 INJECTION INTRAVENOUS; SUBCUTANEOUS
Qty: 0 | Refills: 0 | DISCHARGE
Start: 2021-06-10

## 2021-06-10 RX ORDER — PANTOPRAZOLE SODIUM 20 MG/1
1 TABLET, DELAYED RELEASE ORAL
Qty: 0 | Refills: 0 | DISCHARGE
Start: 2021-06-10

## 2021-06-10 RX ORDER — ISOSORBIDE DINITRATE 5 MG/1
10 TABLET ORAL THREE TIMES A DAY
Refills: 0 | Status: DISCONTINUED | OUTPATIENT
Start: 2021-06-10 | End: 2021-06-14

## 2021-06-10 RX ORDER — HYDRALAZINE HCL 50 MG
10 TABLET ORAL THREE TIMES A DAY
Refills: 0 | Status: DISCONTINUED | OUTPATIENT
Start: 2021-06-10 | End: 2021-06-14

## 2021-06-10 RX ORDER — ACETYLCYSTEINE 200 MG/ML
0 VIAL (ML) MISCELLANEOUS
Qty: 0 | Refills: 0 | DISCHARGE
Start: 2021-06-10

## 2021-06-10 RX ORDER — ASPIRIN/CALCIUM CARB/MAGNESIUM 324 MG
1 TABLET ORAL
Qty: 0 | Refills: 0 | DISCHARGE
Start: 2021-06-10

## 2021-06-10 RX ORDER — HYDRALAZINE HCL 50 MG
1 TABLET ORAL
Qty: 0 | Refills: 0 | DISCHARGE
Start: 2021-06-10

## 2021-06-10 RX ORDER — ASPIRIN/CALCIUM CARB/MAGNESIUM 324 MG
81 TABLET ORAL DAILY
Refills: 0 | Status: DISCONTINUED | OUTPATIENT
Start: 2021-06-10 | End: 2021-06-14

## 2021-06-10 RX ORDER — ATORVASTATIN CALCIUM 80 MG/1
1 TABLET, FILM COATED ORAL
Qty: 0 | Refills: 0 | DISCHARGE
Start: 2021-06-10

## 2021-06-10 RX ORDER — ACETYLCYSTEINE 200 MG/ML
2 VIAL (ML) MISCELLANEOUS
Qty: 0 | Refills: 0 | DISCHARGE

## 2021-06-10 RX ORDER — PREGABALIN 225 MG/1
1 CAPSULE ORAL
Qty: 0 | Refills: 0 | DISCHARGE
Start: 2021-06-10

## 2021-06-10 RX ORDER — PREGABALIN 225 MG/1
1000 CAPSULE ORAL DAILY
Refills: 0 | Status: DISCONTINUED | OUTPATIENT
Start: 2021-06-10 | End: 2021-06-14

## 2021-06-10 RX ORDER — ISOSORBIDE DINITRATE 5 MG/1
1 TABLET ORAL
Qty: 0 | Refills: 0 | DISCHARGE
Start: 2021-06-10

## 2021-06-10 RX ORDER — CARVEDILOL PHOSPHATE 80 MG/1
6.25 CAPSULE, EXTENDED RELEASE ORAL EVERY 12 HOURS
Refills: 0 | Status: DISCONTINUED | OUTPATIENT
Start: 2021-06-10 | End: 2021-06-14

## 2021-06-10 RX ADMIN — Medication 10 MILLIGRAM(S): at 21:22

## 2021-06-10 RX ADMIN — Medication 100 MILLIGRAM(S): at 11:19

## 2021-06-10 RX ADMIN — Medication 1200 MILLIGRAM(S): at 06:12

## 2021-06-10 RX ADMIN — CARVEDILOL PHOSPHATE 6.25 MILLIGRAM(S): 80 CAPSULE, EXTENDED RELEASE ORAL at 19:02

## 2021-06-10 RX ADMIN — PANTOPRAZOLE SODIUM 40 MILLIGRAM(S): 20 TABLET, DELAYED RELEASE ORAL at 05:49

## 2021-06-10 RX ADMIN — ATORVASTATIN CALCIUM 20 MILLIGRAM(S): 80 TABLET, FILM COATED ORAL at 21:22

## 2021-06-10 RX ADMIN — Medication 1 DROP(S): at 19:02

## 2021-06-10 RX ADMIN — Medication 10 MILLIGRAM(S): at 05:49

## 2021-06-10 RX ADMIN — ISOSORBIDE DINITRATE 10 MILLIGRAM(S): 5 TABLET ORAL at 19:02

## 2021-06-10 RX ADMIN — HEPARIN SODIUM 5000 UNIT(S): 5000 INJECTION INTRAVENOUS; SUBCUTANEOUS at 05:49

## 2021-06-10 RX ADMIN — CARVEDILOL PHOSPHATE 6.25 MILLIGRAM(S): 80 CAPSULE, EXTENDED RELEASE ORAL at 05:49

## 2021-06-10 RX ADMIN — Medication 1 MILLIGRAM(S): at 11:19

## 2021-06-10 RX ADMIN — ISOSORBIDE DINITRATE 10 MILLIGRAM(S): 5 TABLET ORAL at 05:49

## 2021-06-10 RX ADMIN — Medication 1 DROP(S): at 05:49

## 2021-06-10 RX ADMIN — Medication 81 MILLIGRAM(S): at 11:19

## 2021-06-10 RX ADMIN — SENNA PLUS 2 TABLET(S): 8.6 TABLET ORAL at 21:21

## 2021-06-10 RX ADMIN — Medication 0.6 MILLIGRAM(S): at 11:19

## 2021-06-10 RX ADMIN — PREGABALIN 1000 MICROGRAM(S): 225 CAPSULE ORAL at 11:19

## 2021-06-10 RX ADMIN — ISOSORBIDE DINITRATE 10 MILLIGRAM(S): 5 TABLET ORAL at 11:19

## 2021-06-10 NOTE — PROGRESS NOTE ADULT - PROBLEM SELECTOR PLAN 5
monitor BP  cont meds
noted to have creatinine of 2.06   baseline appears to be closer to 1.5-1.6 based on prior labs   avoid fluids due to concern for CHF   f/u urine lytes  monitor BMP daily  Dr. Art following
monitor BP  cont meds
noted to have creatinine of 2.06   baseline appears to be closer to 1.5-1.6 based on prior labs   avoid fluids due to concern for CH  monitor BMP daily  Dr. Art following
monitor BP  cont meds
monitor BP  cont meds
noted to have creatinine of 2.06   baseline appears to be closer to 1.5-1.6 based on prior labs   avoid fluids due to concern for CHF   f/u urine lytes  monitor BMP daily  Dr. Art following
monitor BP  cont meds
noted to have creatinine of 2.06   baseline appears to be closer to 1.5-1.6 based on prior labs   avoid fluids due to concern for CHF   f/u urine lytes  monitor BMP daily
noted to have creatinine of 2.06   baseline appears to be closer to 1.5-1.6 based on prior labs   avoid fluids due to concern for CH  monitor BMP daily  Dr. Art following
noted to have creatinine of 2.06   baseline appears to be closer to 1.5-1.6 based on prior labs   avoid fluids due to concern for CHF   f/u urine lytes  monitor BMP daily  Dr. Art following

## 2021-06-10 NOTE — PROGRESS NOTE ADULT - PROBLEM SELECTOR PROBLEM 4
Chronic kidney disease
Chronic kidney disease
Anemia
Chronic kidney disease
Chronic kidney disease
Anemia
Chronic kidney disease
Chronic kidney disease
Anemia
Chronic kidney disease
Anemia

## 2021-06-10 NOTE — PROGRESS NOTE ADULT - PROBLEM SELECTOR PLAN 7
cont meds  monitor UA level
patient on januvia at home  will start SSI and hold PO meds while in hospital  A1c 5.3  monitor BS and adjust insulin as needed
cont meds  monitor UA level
patient on januvia at home  will start SSI and hold PO meds while in hospital  A1c 5.3  monitor BS and adjust insulin as needed
cont meds  monitor UA level
patient on januvia at home  will start SSI and hold PO meds while in hospital  A1c 5.3  monitor BS and adjust insulin as needed

## 2021-06-10 NOTE — DISCHARGE NOTE PROVIDER - NSDCCPCAREPLAN_GEN_ALL_CORE_FT
PRINCIPAL DISCHARGE DIAGNOSIS  Diagnosis: Chest pain  Assessment and Plan of Treatment: No heavy lifting, strenuous activity, bending, straining, or unnecessary stair climbing for 2 weeks. No driving for 2 days. You may shower 24 hours following the procedure but avoid baths/swimming for 1 week. Check your groin site for bleeding and/or swelling daily following procedure and call your doctor immediately if it occurs or if you experience increased pain at the site. Follow up with your cardiologist in 1-2 weeks. You may call [facility] if you have any questions/concerns regarding your procedure [phone].      SECONDARY DISCHARGE DIAGNOSES  Diagnosis: HTN (hypertension)  Assessment and Plan of Treatment: Continue with your blood pressure medications; eat a heart healthy diet with low salt diet; exercise regularly (consult with your physician or cardiologist first); maintain a heart healthy weight; if you smoke - quit (A resource to help you stop smoking is the [facility] for Tobacco Control – phone number [phone].); include healthy ways to manage stress. Continue to follow with your primary care physician or cardiologist.    Diagnosis: HLD (hyperlipidemia)  Assessment and Plan of Treatment: Continue with your cholesterol medications. Eat a heart healthy diet that is low in saturated fats and salt, and includes whole grains, fruits, vegetables and lean protein; exercise regularly (consult with your physician or cardiologist first); maintain a heart healthy weight; if you smoke - quit (A resource to help you stop smoking is the [facility] for Tobacco Control – phone number [phone].). Continue to follow with your primary physician or cardiologist.     PRINCIPAL DISCHARGE DIAGNOSIS  Diagnosis: Acute on chronic systolic congestive heart failure  Assessment and Plan of Treatment: Weigh yourself daily.  If you gain 3lbs in 3 days, or 5lbs in a week call your Health Care Provider.  Do not eat or drink foods containing more than 2000mg of salt (sodium) in your diet every day.  Call your Health Care Provider if you have any swelling or increased swelling in your feet, ankles, and/or stomach.  Take all of your medication as directed.  If you become dizzy call your Health Care Provider.  Follow up with Dr. Cui to schedule Cardiac MRI as outpatient ***         SECONDARY DISCHARGE DIAGNOSES  Diagnosis: HTN (hypertension)  Assessment and Plan of Treatment: Continue with your blood pressure medications; eat a heart healthy diet with low salt diet; exercise regularly (consult with your physician or cardiologist first); maintain a heart healthy weight; if you smoke - quit (A resource to help you stop smoking is the [facility] [employer] [facility] – phone number [phone].); include healthy ways to manage stress. Continue to follow with your primary care physician or cardiologist.    Diagnosis: HLD (hyperlipidemia)  Assessment and Plan of Treatment: Continue with your cholesterol medications. Eat a heart healthy diet that is low in saturated fats and salt, and includes whole grains, fruits, vegetables and lean protein; exercise regularly (consult with your physician or cardiologist first); maintain a heart healthy weight; if you smoke - quit (A resource to help you stop smoking is the [facility] [employer] for Tobacco Control – phone number [phone].). Continue to follow with your primary physician or cardiologist.    Diagnosis: CAD (coronary artery disease)  Assessment and Plan of Treatment: You underwent cardiac catheterization. Monitor site of procedure for any redness, swelling, bleeding and notify your doctor. no heavy lifting over 5 lbs for 1 week, no strenuous activity for 3 weeks, if area becomes red looks bruised or swollen call MD or come to emergency department.  Monitor site of procedure and notify your doctor for any redness/swelling/drainage.  You may shower but no baths or swimming for one week or until skin is healed.      Diagnosis: Anemia  Assessment and Plan of Treatment: Follow up with PMD for management and repeat blood work     PRINCIPAL DISCHARGE DIAGNOSIS  Diagnosis: Acute on chronic systolic congestive heart failure  Assessment and Plan of Treatment: Weigh yourself daily.  If you gain 3lbs in 3 days, or 5lbs in a week call your Health Care Provider.  Do not eat or drink foods containing more than 2000mg of salt (sodium) in your diet every day.  Call your Health Care Provider if you have any swelling or increased swelling in your feet, ankles, and/or stomach.  Take all of your medication as directed.  If you become dizzy call your Health Care Provider.  Follow up with Dr. Cui to schedule Cardiac MRI as outpatient ***         SECONDARY DISCHARGE DIAGNOSES  Diagnosis: HTN (hypertension)  Assessment and Plan of Treatment: Continue with your blood pressure medications; eat a heart healthy diet with low salt diet; exercise regularly (consult with your physician or cardiologist first); maintain a heart healthy weight; if you smoke - quit (A resource to help you stop smoking is the [facility] [employer] [facility] – phone number [phone].); include healthy ways to manage stress. Continue to follow with your primary care physician or cardiologist.    Diagnosis: CKD (chronic kidney disease)  Assessment and Plan of Treatment: Avoid taking (NSAIDs) - (ex: Ibuprofen, Advil, Celebrex, Naprosyn)  Avoid taking any nephrotoxic agents (can harm kidneys) - Intravenous contrast for diagnostic testing, combination cold medications.  Have all medications adjusted for your renal function by your Health Care Provider.  Blood pressure control is important.  Take all medication as prescribed.  Follow up with Dr. Dolan this week for repeat Lab work to check kidneys ** you must call to make an appointment **       Diagnosis: HLD (hyperlipidemia)  Assessment and Plan of Treatment: Continue with your cholesterol medications. Eat a heart healthy diet that is low in saturated fats and salt, and includes whole grains, fruits, vegetables and lean protein; exercise regularly (consult with your physician or cardiologist first); maintain a heart healthy weight; if you smoke - quit (A resource to help you stop smoking is the [facility] [employer] for Tobacco Control – phone number [phone].). Continue to follow with your primary physician or cardiologist.    Diagnosis: CAD (coronary artery disease)  Assessment and Plan of Treatment: You underwent cardiac catheterization. Monitor site of procedure for any redness, swelling, bleeding and notify your doctor. no heavy lifting over 5 lbs for 1 week, no strenuous activity for 3 weeks, if area becomes red looks bruised or swollen call MD or come to emergency department.  Monitor site of procedure and notify your doctor for any redness/swelling/drainage.  You may shower but no baths or swimming for one week or until skin is healed.      Diagnosis: Anemia  Assessment and Plan of Treatment: Follow up with PMD for management and repeat blood work     PRINCIPAL DISCHARGE DIAGNOSIS  Diagnosis: Acute on chronic systolic congestive heart failure  Assessment and Plan of Treatment: Weigh yourself daily.  If you gain 3lbs in 3 days, or 5lbs in a week call your Health Care Provider.  Do not eat or drink foods containing more than 2000mg of salt (sodium) in your diet every day.  Call your Health Care Provider if you have any swelling or increased swelling in your feet, ankles, and/or stomach.  Take all of your medication as directed.  If you become dizzy call your Health Care Provider.  Follow up with Dr. Cui to review results of Cardiac MRI  *** make an appointment to be seen within 1 week         SECONDARY DISCHARGE DIAGNOSES  Diagnosis: HTN (hypertension)  Assessment and Plan of Treatment: Continue with your blood pressure medications; eat a heart healthy diet with low salt diet; exercise regularly (consult with your physician or cardiologist first); maintain a heart healthy weight; if you smoke - quit (A resource to help you stop smoking is the [employer] – phone number [phone].); include healthy ways to manage stress. Continue to follow with your primary care physician or cardiologist.    Diagnosis: CKD (chronic kidney disease)  Assessment and Plan of Treatment: Avoid taking (NSAIDs) - (ex: Ibuprofen, Advil, Celebrex, Naprosyn)  Avoid taking any nephrotoxic agents (can harm kidneys) - Intravenous contrast for diagnostic testing, combination cold medications.  Have all medications adjusted for your renal function by your Health Care Provider.  Blood pressure control is important.  Take all medication as prescribed.  Follow up with Dr. Art this week for repeat Lab work to check kidneys ** you must call to make an appointment **       Diagnosis: HLD (hyperlipidemia)  Assessment and Plan of Treatment: Continue with your cholesterol medications. Eat a heart healthy diet that is low in saturated fats and salt, and includes whole grains, fruits, vegetables and lean protein; exercise regularly (consult with your physician or cardiologist first); maintain a heart healthy weight; if you smoke - quit (A resource to help you stop smoking is the [facility] [employer] for Tobacco Control – phone number [phone].). Continue to follow with your primary physician or cardiologist.    Diagnosis: CAD (coronary artery disease)  Assessment and Plan of Treatment: You underwent cardiac catheterization. Monitor site of procedure for any redness, swelling, bleeding and notify your doctor. no heavy lifting over 5 lbs for 1 week, no strenuous activity for 3 weeks, if area becomes red looks bruised or swollen call MD or come to emergency department.  Monitor site of procedure and notify your doctor for any redness/swelling/drainage.  You may shower but no baths or swimming for one week or until skin is healed.      Diagnosis: Anemia  Assessment and Plan of Treatment: Follow up with PMD for management and repeat blood work

## 2021-06-10 NOTE — PROGRESS NOTE ADULT - ASSESSMENT
75 year old male from home AAO x3, with PMH of DM, HTN, CKD, HLD, gout and glaucoma, who presented to the ED due to worsening SOB,acute sytolic HF,NSVT.  1.Tele monitoring.  2.Stress test may be false neg, pt in 2019-nl fx now severe LV dysfunction, maybe multivessel disease,Cath today.  3.Acute systolic HF-off demadex 10mg qd for cath.  4.CRI-Renal f/u,+SPEP-Heme eval.  5.DM-Insulin.  6.HTN and CHF- coreg 6.25mg bid,cont isordil and hydralazine.  7.GI and DVT prophylaxis.     75 year old male from home AAO x3, with PMH of DM, HTN, CKD, HLD, gout and glaucoma, who presented to the ED due to worsening SOB,acute sytolic HF,NSVT.  1.Tele monitoring.  2.Stress test may be false neg, pt in 2019-nl fx now severe LV dysfunction, maybe multivessel disease,Cath today.  3.Acute systolic HF-off demadex 10mg qd for cath.  4.CRI-Renal f/u,+SPEP and bence ventura in urine-Heme eval.  5.DM-Insulin.  6.HTN and CHF- coreg 6.25mg bid,cont isordil and hydralazine.  7.GI and DVT prophylaxis.

## 2021-06-10 NOTE — CONSULT NOTE ADULT - SUBJECTIVE AND OBJECTIVE BOX
Patient is a 75y old  Male who presents with a chief complaint of worsening SOB (10 Matthew 2021 08:49)      HPI:  Patient is a 75 year old male from home AAO x3, with PMH of DM, HTN, CKD, HLD, gout and glaucoma, who presented to the ED due to worsening SOB. Patient states that for the past 3 weeks, he gets short of breath mainly on exertion. Son, Bruce at bedside assisting with providing history. Son states that the patient went on a walk yesterday and had to stop about 3 times due to shortness of breath. Patient also having swollen legs. Patient and son deny knowing of any history of CHF. Denies being on any diuretics. Patient sleeps with only one pillow at night.  (02 Jun 2021 17:39)Echo showed reduction of EF.  SPEP showed m spike.       ROS:  Negative except for:    PAST MEDICAL & SURGICAL HISTORY:  DM (diabetes mellitus)    HTN (hypertension)    Chronic kidney disease    HLD (hyperlipidemia)    Glaucoma    Gout    No significant past surgical history        SOCIAL HISTORY:    FAMILY HISTORY:      MEDICATIONS  (STANDING):  acetylcysteine  Oral Solution 1200 milliGRAM(s) Oral two times a day  allopurinol 100 milliGRAM(s) Oral daily  aspirin  chewable 81 milliGRAM(s) Oral daily  atorvastatin 20 milliGRAM(s) Oral at bedtime  carvedilol 6.25 milliGRAM(s) Oral every 12 hours  colchicine 0.6 milliGRAM(s) Oral daily  cyanocobalamin 1000 MICROGram(s) Oral daily  ergocalciferol 25520 Unit(s) Oral <User Schedule>  folic acid 1 milliGRAM(s) Oral daily  heparin   Injectable 5000 Unit(s) SubCutaneous every 12 hours  hydrALAZINE 10 milliGRAM(s) Oral three times a day  insulin lispro (ADMELOG) corrective regimen sliding scale   SubCutaneous Before meals and at bedtime  isosorbide   dinitrate Tablet (ISORDIL) 10 milliGRAM(s) Oral three times a day  pantoprazole    Tablet 40 milliGRAM(s) Oral before breakfast  senna 2 Tablet(s) Oral at bedtime  timolol 0.5% Solution 1 Drop(s) Both EYES two times a day    MEDICATIONS  (PRN):  acetaminophen   Tablet .. 650 milliGRAM(s) Oral every 6 hours PRN Temp greater or equal to 38C (100.4F), Moderate Pain (4 - 6)  ondansetron Injectable 4 milliGRAM(s) IV Push every 6 hours PRN Nausea and/or Vomiting  polyethylene glycol 3350 17 Gram(s) Oral daily PRN Constipation      Allergies    No Known Allergies    Intolerances        Vital Signs Last 24 Hrs  T(C): 36.6 (10 Matthew 2021 07:38), Max: 36.9 (09 Jun 2021 11:26)  T(F): 97.9 (10 Matthew 2021 07:38), Max: 98.4 (09 Jun 2021 11:26)  HR: 62 (10 Matthew 2021 07:38) (57 - 65)  BP: 129/77 (10 Matthew 2021 07:38) (123/69 - 142/87)  BP(mean): --  RR: 16 (10 Matthew 2021 07:38) (16 - 18)  SpO2: 97% (10 Matthew 2021 07:38) (96% - 100%)    PHYSICAL EXAM  General: adult in NAD  HEENT: clear oropharynx, anicteric sclera, pink conjunctiva  Neck: supple  CV: normal S1/S2 with no murmur rubs or gallops  Lungs: positive air movement b/l ant lungs,clear to auscultation, no wheezes, no rales  Abdomen: soft non-tender non-distended, no hepatosplenomegaly  Ext: no clubbing cyanosis or edema  Skin: no rashes and no petechiae  Neuro: alert and oriented X 4, no focal deficits      LABS:                          11.3   2.73  )-----------( 130      ( 10 Matthew 2021 07:28 )             34.0         Mean Cell Volume : 100.0 fl  Mean Cell Hemoglobin : 33.2 pg  Mean Cell Hemoglobin Concentration : 33.2 gm/dL  Auto Neutrophil # : x  Auto Lymphocyte # : x  Auto Monocyte # : x  Auto Eosinophil # : x  Auto Basophil # : x  Auto Neutrophil % : x  Auto Lymphocyte % : x  Auto Monocyte % : x  Auto Eosinophil % : x  Auto Basophil % : x      Serial CBC's  06-10 @ 07:28  Hct-34.0 / Hgb-11.3 / Plat-130 / RBC-3.40 / WBC-2.73  Serial CBC's  06-09 @ 08:38  Hct-34.5 / Hgb-11.4 / Plat-138 / RBC-3.47 / WBC-2.43  Serial CBC's  06-08 @ 07:54  Hct-33.5 / Hgb-11.3 / Plat-126 / RBC-3.37 / WBC-2.36  Serial CBC's  06-07 @ 06:16  Hct-34.0 / Hgb-11.3 / Plat-139 / RBC-3.46 / WBC-2.64      06-10    137  |  104  |  59<H>  ----------------------------<  99  4.1   |  27  |  2.59<H>    Ca    9.4      10 Matthew 2021 07:28  Phos  4.0     06-10  Mg     2.2     06-10            Ferritin, Serum: 70 ng/mL (06-03 @ 10:26)  Folate, Serum: >20.0 ng/mL (06-03 @ 10:26)  Vitamin B12, Serum: 296 pg/mL (06-03 @ 10:26)              BLOOD SMEAR INTERPRETATION:       RADIOLOGY & ADDITIONAL STUDIES:  < from: Transthoracic Echocardiogram (06.03.21 @ 07:06) >  CONCLUSIONS:  1. Normal mitral valve. Mild to moderate mitral  regurgitation.  2. Calcified trileaflet aortic valve with normal opening.  No aortic stenosis. Mild to moderate aortic regurgitation.  3. Normal aortic root.  4. Normal left atrium.  5. Normalleft ventricular internal dimensions and wall  thicknesses.  6. Mild to moderate global left ventricular systolic  dysfunction (EF 40-45% by visual estimation). Paradoxical  septal motion is seen, consistent with conduction delay.  Not all LV wall segments were seen.  7. Grade II diastolic dysfunction.  8. Off axis images preclude accurate assessment of right  ventricular size. Normal RV systolic function (TAPSE 2.5  cm).  9. RV systolic pressure is mildly increased at  36 mm Hg.  10. Normal tricuspid valve. Trace tricuspid regurgitation.  11. Normal pulmonic valve. Trace pulmonic insufficiency is  noted.  12. No pericardial effusion.  13. Left pleural effusion.    *** Compared with echocardiogram report of 6/2/2019, there  has been a decrease in LV systolic function.    < end of copied text >  < from: Xray Chest 1 View-PORTABLE IMMEDIATE (Xray Chest 1 View-PORTABLE IMMEDIATE .) (06.01.19 @ 18:13) >  INTERPRETATION:  AP semierect chest on June 1, 2019 at 6:10 PM. Patient   has weakness, low blood pressure, and abnormal calcium level.    Heart is magnified by technique. The lung fields and pleural surfaces are   unremarkable.    Chest is similar to April 15, 2016.    IMPRESSION: Clear lungs.    < end of copied text >

## 2021-06-10 NOTE — H&P CARDIOLOGY - PMH
Chronic kidney disease    DM (diabetes mellitus)    Glaucoma    Gout    HLD (hyperlipidemia)    HTN (hypertension)

## 2021-06-10 NOTE — PROGRESS NOTE ADULT - PROBLEM SELECTOR PROBLEM 3
Pulmonary HTN
Elevated troponin
Elevated troponin
Pulmonary HTN
Elevated troponin

## 2021-06-10 NOTE — PROGRESS NOTE ADULT - PROBLEM SELECTOR PROBLEM 2
[FreeTextEntry1] : 8 year old female with Vaginitis\par \par Remember to wipe front to back and to discard the toilet paper with each each use.\par May use sitz baths once a day to alleviate the redness.\par Use nystatin and Bactroban to the affected area 3 times a day x7 days. \par Follow up if symptoms worsen and do not go away\par \par UA showed trace blood and will send out urine culture
Elevated troponin
Elevated d-dimer
Elevated troponin
Elevated d-dimer
Elevated troponin
Elevated d-dimer
Elevated troponin
Elevated troponin
Elevated d-dimer

## 2021-06-10 NOTE — PROGRESS NOTE ADULT - PROBLEM SELECTOR PLAN 6
Accuchecks with reg insulin coverage  HBA1C
continue home med of amlodipine with HOLD parameters  monitor BP and adjust meds as needed
Accuchecks with reg insulin coverage  HBA1C
continue home med of amlodipine with HOLD parameters  monitor BP and adjust meds as needed
Accuchecks with reg insulin coverage  HBA1C
Accuchecks with reg insulin coverage  HBA1C
D/c'ed amlodipine, c/w Coreg, Isordil, Hydralazine  monitor BP and adjust meds as needed
continue home med of amlodipine with HOLD parameters  monitor BP and adjust meds as needed
Accuchecks with reg insulin coverage  HBA1C
continue home med of amlodipine with HOLD parameters  monitor BP and adjust meds as needed
D/c'ed amlodipine, c/w Coreg, Isordil, Hydralazine  monitor BP and adjust meds as needed

## 2021-06-10 NOTE — PROGRESS NOTE ADULT - SUBJECTIVE AND OBJECTIVE BOX
Patient is a 75y old  Male who presents with a chief complaint of worsening SOB (09 Jun 2021 16:31)    pt seen in tele [x  ], reg med floor [   ], bed [ x ], chair at bedside [   ], a+o x3 [ x ], lethargic [  ],    nad [  x]      Allergies    No Known Allergies        Vitals    T(F): 97.5 (06-10-21 @ 04:33), Max: 98.4 (06-09-21 @ 11:26)  HR: 65 (06-10-21 @ 04:33) (57 - 65)  BP: 138/76 (06-10-21 @ 04:33) (111/67 - 142/87)  RR: 18 (06-10-21 @ 04:33) (17 - 18)  SpO2: 96% (06-10-21 @ 04:33) (96% - 100%)  Wt(kg): --  CAPILLARY BLOOD GLUCOSE      POCT Blood Glucose.: 135 mg/dL (09 Jun 2021 22:30)      Labs                          11.4   2.43  )-----------( 138      ( 09 Jun 2021 08:38 )             34.5       06-09    138  |  104  |  57<H>  ----------------------------<  106<H>  4.2   |  24  |  2.55<H>    Ca    9.5      09 Jun 2021 08:38  Phos  4.0     06-09  Mg     2.2     06-09                  Radiology Results      Meds    MEDICATIONS  (STANDING):  acetylcysteine  Oral Solution 1200 milliGRAM(s) Oral two times a day  allopurinol 100 milliGRAM(s) Oral daily  aspirin  chewable 81 milliGRAM(s) Oral daily  atorvastatin 20 milliGRAM(s) Oral at bedtime  carvedilol 6.25 milliGRAM(s) Oral every 12 hours  colchicine 0.6 milliGRAM(s) Oral daily  cyanocobalamin 1000 MICROGram(s) Oral daily  ergocalciferol 16757 Unit(s) Oral <User Schedule>  folic acid 1 milliGRAM(s) Oral daily  heparin   Injectable 5000 Unit(s) SubCutaneous every 12 hours  hydrALAZINE 10 milliGRAM(s) Oral three times a day  insulin lispro (ADMELOG) corrective regimen sliding scale   SubCutaneous Before meals and at bedtime  isosorbide   dinitrate Tablet (ISORDIL) 10 milliGRAM(s) Oral three times a day  pantoprazole    Tablet 40 milliGRAM(s) Oral before breakfast  senna 2 Tablet(s) Oral at bedtime  timolol 0.5% Solution 1 Drop(s) Both EYES two times a day      MEDICATIONS  (PRN):  acetaminophen   Tablet .. 650 milliGRAM(s) Oral every 6 hours PRN Temp greater or equal to 38C (100.4F), Moderate Pain (4 - 6)  ondansetron Injectable 4 milliGRAM(s) IV Push every 6 hours PRN Nausea and/or Vomiting  polyethylene glycol 3350 17 Gram(s) Oral daily PRN Constipation      Physical Exam    Neuro :  no focal deficits  Respiratory: CTA B/L  CV: RRR, S1S2, no murmurs,   Abdominal: Soft, NT, ND +BS,  Extremities: No edema, + peripheral pulses    ASSESSMENT    worsening sob with activity,   systolic chf exacerb,   acs r/o,   pulm edema,   anemia   h/o DM,   HTN,   CKD 3,   HLD,   gout,   glaucoma   HFrEF    PLAN      cont tele,   acs protocol,   coreg,   aspirin, statin,   cont to hold diuretics  cardio f/u   ce x 3 noted above  echo with EF 40-45%. Paradoxical septal motion is seen, consistent with conduction delay. Grade II diastolic dysfunction. RV systolic pressure is mildly increased at  36 mm Hg.  No pericardial effusion. Left pleural effusion noted   cont isordil and hydralazine    norvasc d/c'd  Stress test with Abnormal Study,  Dilated LV with global hypokinesis EF=31%. noted   pt for cath when cr stable.  vq scan with low prob pe noted    hep drip d/c'd  cont hep sq   pulm f/u  bronchodilators prn,   pft outpt  hgba1c 5.3 noted    cont lispro ss   renal f/u   Decreased EGFR from 36 to 27 since admission , most likely side effect of diuretics  Proteinuria ratio 1.2 with albuminuria ratio 0.958.  Renal US with Unremarkable renal ultrasound noted    Hold diuretics, continue fluid restriction.  cont current meds               Patient is a 75y old  Male who presents with a chief complaint of worsening SOB (09 Jun 2021 16:31)    pt seen in tele [x  ], reg med floor [   ], bed [ x ], chair at bedside [   ], a+o x3 [ x ], lethargic [  ],    nad [  x]      Allergies    No Known Allergies        Vitals    T(F): 97.5 (06-10-21 @ 04:33), Max: 98.4 (06-09-21 @ 11:26)  HR: 65 (06-10-21 @ 04:33) (57 - 65)  BP: 138/76 (06-10-21 @ 04:33) (111/67 - 142/87)  RR: 18 (06-10-21 @ 04:33) (17 - 18)  SpO2: 96% (06-10-21 @ 04:33) (96% - 100%)  Wt(kg): --  CAPILLARY BLOOD GLUCOSE      POCT Blood Glucose.: 135 mg/dL (09 Jun 2021 22:30)      Labs                          11.4   2.43  )-----------( 138      ( 09 Jun 2021 08:38 )             34.5       06-09    138  |  104  |  57<H>  ----------------------------<  106<H>  4.2   |  24  |  2.55<H>    Ca    9.5      09 Jun 2021 08:38  Phos  4.0     06-09  Mg     2.2     06-09      Protein Electrophoresis, Serum (06.04.21 @ 16:24)   Protein Total, Serum: 6.1 g/dL   Total Protein, Serum: 6.1 g/dL   Albumin, Serum: 2.6 g/dL   Alpha 1: 0.3 g/dL   Alpha 2: 0.9 g/dL   Beta Globulin: 0.9 g/dL   Gamma Globulin: 1.4 g/dL   M-Brigido: 0.5 g/dL   % Albumin: 43.3 %   % Alpha 1: 5.0 %   % Alpha 2: 14.4 %   % Beta: 14.7 %   % Gamma: 22.6 %   % M Brigido: 7.7 %   Albumin/Globulin Ratio: 0.7 Ratio   Serum Protein Electrophoresis Interp: Gamma Migrating Paraprotein Identified             Radiology Results      Meds    MEDICATIONS  (STANDING):  acetylcysteine  Oral Solution 1200 milliGRAM(s) Oral two times a day  allopurinol 100 milliGRAM(s) Oral daily  aspirin  chewable 81 milliGRAM(s) Oral daily  atorvastatin 20 milliGRAM(s) Oral at bedtime  carvedilol 6.25 milliGRAM(s) Oral every 12 hours  colchicine 0.6 milliGRAM(s) Oral daily  cyanocobalamin 1000 MICROGram(s) Oral daily  ergocalciferol 40841 Unit(s) Oral <User Schedule>  folic acid 1 milliGRAM(s) Oral daily  heparin   Injectable 5000 Unit(s) SubCutaneous every 12 hours  hydrALAZINE 10 milliGRAM(s) Oral three times a day  insulin lispro (ADMELOG) corrective regimen sliding scale   SubCutaneous Before meals and at bedtime  isosorbide   dinitrate Tablet (ISORDIL) 10 milliGRAM(s) Oral three times a day  pantoprazole    Tablet 40 milliGRAM(s) Oral before breakfast  senna 2 Tablet(s) Oral at bedtime  timolol 0.5% Solution 1 Drop(s) Both EYES two times a day      MEDICATIONS  (PRN):  acetaminophen   Tablet .. 650 milliGRAM(s) Oral every 6 hours PRN Temp greater or equal to 38C (100.4F), Moderate Pain (4 - 6)  ondansetron Injectable 4 milliGRAM(s) IV Push every 6 hours PRN Nausea and/or Vomiting  polyethylene glycol 3350 17 Gram(s) Oral daily PRN Constipation      Physical Exam    Neuro :  no focal deficits  Respiratory: CTA B/L  CV: RRR, S1S2, no murmurs,   Abdominal: Soft, NT, ND +BS,  Extremities: No edema, + peripheral pulses    ASSESSMENT    worsening sob with activity,   systolic chf exacerb,   acs r/o,   pulm edema,   anemia   h/o DM,   HTN,   CKD 3,   HLD,   gout,   glaucoma   HFrEF    PLAN      cont tele,   acs protocol,   coreg,   aspirin, statin,   cont to hold diuretics  cardio f/u   ce x 3 noted above  echo with EF 40-45%. Paradoxical septal motion is seen, consistent with conduction delay. Grade II diastolic dysfunction. RV systolic pressure is mildly increased at  36 mm Hg.  No pericardial effusion. Left pleural effusion noted   cont isordil and hydralazine    norvasc d/c'd  Stress test with Abnormal Study,  Dilated LV with global hypokinesis EF=31%. noted   pt for cath today   vq scan with low prob pe noted    hep drip d/c'd  cont hep sq   pulm f/u  bronchodilators prn,   pft outpt  hgba1c 5.3 noted    cont lispro ss   renal f/u   Decreased EGFR from 36 to 27 since admission , most likely side effect of diuretics  Proteinuria ratio 1.2 with albuminuria ratio 0.958.  Renal US with Unremarkable renal ultrasound noted    Hold diuretics, continue fluid restriction.   spep with m spike noted above  heme-onc cons  cont current meds

## 2021-06-10 NOTE — DISCHARGE NOTE PROVIDER - NSDCMRMEDTOKEN_GEN_ALL_CORE_FT
allopurinol 300 mg oral tablet: 1 tab(s) orally once a day (at bedtime)  amLODIPine 10 mg oral tablet: 1 tab(s) orally once a day  colchicine 0.6 mg oral tablet: 1 tab(s) orally once a day  Combigan 0.2%-0.5% ophthalmic solution: 1 drop(s) to each affected eye 2 times a day  folic acid 1 mg oral tablet: 1 tab(s) orally once a day  Januvia 25 mg oral tablet: 1 tab(s) orally once a day   rosuvastatin 5 mg oral tablet: 1 tab(s) orally once a day   acetaminophen 325 mg oral tablet: 2 tab(s) orally every 6 hours, As needed, Temp greater or equal to 38C (100.4F), Moderate Pain (4 - 6)  acetylcysteine:   allopurinol 300 mg oral tablet: 1 tab(s) orally once a day (at bedtime)  aspirin 81 mg oral tablet, chewable: 1 tab(s) orally once a day  atorvastatin 20 mg oral tablet: 1 tab(s) orally once a day (at bedtime)  carvedilol 6.25 mg oral tablet: 1 tab(s) orally every 12 hours  colchicine 0.6 mg oral tablet: 1 tab(s) orally once a day  Combigan 0.2%-0.5% ophthalmic solution: 1 drop(s) to each affected eye 2 times a day  cyanocobalamin 1000 mcg oral tablet: 1 tab(s) orally once a day  folic acid 1 mg oral tablet: 1 tab(s) orally once a day  heparin:   hydrALAZINE 10 mg oral tablet: 1 tab(s) orally 3 times a day  insulin lispro 100 units/mL injectable solution:  injectable   isosorbide dinitrate 10 mg oral tablet: 1 tab(s) orally 3 times a day  ondansetron 2 mg/mL injectable solution:  injectable   pantoprazole 40 mg oral delayed release tablet: 1 tab(s) orally once a day (before a meal)  polyethylene glycol 3350 oral powder for reconstitution: 17 gram(s) orally once a day, As needed, Constipation  senna oral tablet: 2 tab(s) orally once a day (at bedtime)  timolol maleate 0.5% ophthalmic solution: 1 drop(s) to each affected eye 2 times a day

## 2021-06-10 NOTE — PROGRESS NOTE ADULT - PROBLEM SELECTOR PLAN 1
likely secondary to CHF  R/o underlying bronchospasm  lasix changed to demadex.   Bronchodilators prn  Pfts as OP
patient presented to the ED due to worsening SOB mainly on exertion   likely secondary to fluid overload from CHF   CXR showing small left pleural effusion and mild opacities in right lung base   d-dimer of 1119, V/Q scan shows low probability of PE  D/c heparin drip, heparin SQ for DVT ppx  C/w Lasix IV daily due to suspicion of CHF  F/u Echo  Dr. Jorge following
patient presented to the ED due to worsening SOB mainly on exertion   likely secondary to fluid overload from CHF   CXR showing small left pleural effusion and mild opacities in right lung base   d-dimer of 1119, V/Q scan shows low probability of PE  D/c heparin drip, heparin SQ for DVT ppx  NST high suspicion for multivessel disease, planned for cath after renal function stabilizes  D/c'ed on Lasix/Demadex, continue with Coreg, Isordil, and Hydralazine  Dr. Jorge following
likely secondary to CHF  R/o underlying bronchospasm  lasix changed to demadex.   Bronchodilators prn  Pfts as OP
likely secondary to CHF  R/o underlying bronchospasm  Demadex DC by cardio  Bronchodilators prn  Pfts as OP
likely secondary to CHF  R/o underlying bronchospasm  lasix  Bronchdilators prn  Pfts as OP
likely secondary to CHF  R/o underlying bronchospasm  Demadex DC by cardio  Bronchodilators prn  Pfts as OP
likely secondary to CHF  R/o underlying bronchospasm  Demadex DC by cardio  Bronchodilators prn  Pfts as OP
patient presented to the ED due to worsening SOB mainly on exertion   likely secondary to fluid overload from CHF   CXR showing small left pleural effusion and mild opacities in right lung base   d-dimer of 1119, V/Q scan shows low probability of PE  D/c heparin drip, heparin SQ for DVT ppx  C/w Lasix IV daily due to suspicion of CHF  F/u Echo  Dr. Jorge following
likely secondary to CHF  R/o underlying bronchospasm  lasix  Bronchodilators prn  Pfts as OP
patient presented to the ED due to worsening SOB mainly on exertion   likely secondary to fluid overload from CHF   CXR showing small left pleural effusion and mild opacities in right lung base   d-dimer of 1119, V/Q scan shows low probability of PE  D/c heparin drip, heparin SQ for DVT ppx  C/w Lasix IV daily due to suspicion of CHF  NST high suspicion for multivessel disease, planned for cath after renal function stabilizes  Dr. Jorge following
patient presented to the ED due to worsening SOB mainly on exertion   likely secondary to fluid overload from CHF   CXR showing small left pleural effusion and mild opacities in right lung base   d-dimer of 1119, V/Q scan shows low probability of PE  D/c heparin drip, heparin SQ for DVT ppx  NST high suspicion for multivessel disease, planned for cath after renal function stabilizes (improving), now on Mucomyst x 2 days  D/c'ed on Lasix/Demadex, continue with Coreg, Isordil, and Hydralazine  Dr. Jorge following
patient presented to the ED due to worsening SOB mainly on exertion   likely secondary to fluid overload from CHF   CXR showing small left pleural effusion and mild opacities in right lung base   d-dimer of 1119, V/Q scan shows low probability of PE  C/w heparin drip for AC, no lovenox due to CKD  C/w Lasix IV daily due to suspicion of CHF  F/u Echo  Dr. Jorge following

## 2021-06-10 NOTE — DISCHARGE NOTE PROVIDER - NSDCCPCAREPLAN_GEN_ALL_CORE_FT
PRINCIPAL DISCHARGE DIAGNOSIS  Diagnosis: Acute systolic congestive heart failure  Assessment and Plan of Treatment:       SECONDARY DISCHARGE DIAGNOSES  Diagnosis: Chronic kidney disease  Assessment and Plan of Treatment:     Diagnosis: HTN (hypertension)  Assessment and Plan of Treatment:     Diagnosis: DM (diabetes mellitus)  Assessment and Plan of Treatment:     Diagnosis: Elevated d-dimer  Assessment and Plan of Treatment:      PRINCIPAL DISCHARGE DIAGNOSIS  Diagnosis: Acute systolic congestive heart failure  Assessment and Plan of Treatment: You came to the hospital with difficulty breathing and lower extremity swelling and diagnosed to have Heart Failure. Echocardiogram (ultrasound of the heart) showed reduced heart pump function. Nuclear stress test which evaluates the blood supply to the heart was suspicious for multivessel disease in the heart hence necessitating an Angiogram procedure. Congestive heart failure is a chronic condition in which the heart has trouble pumping blood. In some cases of heart failure, fluid may back up into your lungs or you may have swelling (edema) in your lower legs. There are many causes of heart failure including high blood pressure,. Symptoms include shortness of breath with activity or when lying flat, cough, swelling of the legs, fatigue, or increased urination during the night.   Treatment is aimed at managing the symptoms of heart failure and may include lifestyle changes, medications, or surgical procedures. Take medicines only as directed by your health care provider and do not stop unless instructed to do so. Eat heart-healthy foods with low or no trans/saturated fats, cholesterol and salt. Weigh yourself every day for early recognition of fluid accumulation.  You are being transferred to [facility] for further management.      SECONDARY DISCHARGE DIAGNOSES  Diagnosis: Chronic kidney disease  Assessment and Plan of Treatment: You have history of Chronic kidney disease, and your baseline creatinine appears to be around 2. You were being given MUCOMYST in order to reduce the risk of kidney damage while you are being scheduled for an Angiogram.    Diagnosis: HTN (hypertension)  Assessment and Plan of Treatment: You have history of elevated blood pressure levels, for which you are advised to follow a healthy diet including a low salt diet, and exercise as tolerated. You are recommended to continue on medications as per your medication reconciliation (DISCONTINUE Amlodipine, Continue with COREG, ISORDIL, HYDRALAZINE) upon transfer to [facility].      Diagnosis: DM (diabetes mellitus)  Assessment and Plan of Treatment: You have history of Diabetes, however, your Hemoglobin A1c which measures how well-controlled your blood sugars are over the last 3 months was 5.3. This reflects very well-controlled blood sugar levels OFF medications.    Diagnosis: Elevated d-dimer  Assessment and Plan of Treatment: You were found to have elevated D-dimer levels, which necessitated further evaluation with Ventilation Perfusion Scan which showed low probability of the clot in the lungs.     PRINCIPAL DISCHARGE DIAGNOSIS  Diagnosis: Acute systolic congestive heart failure  Assessment and Plan of Treatment: You came to the hospital with difficulty breathing and lower extremity swelling and diagnosed to have Heart Failure. Echocardiogram (ultrasound of the heart) showed reduced heart pump function. Nuclear stress test which evaluates the blood supply to the heart was suspicious for multivessel disease in the heart hence necessitating an Angiogram procedure. Congestive heart failure is a chronic condition in which the heart has trouble pumping blood. In some cases of heart failure, fluid may back up into your lungs or you may have swelling (edema) in your lower legs. There are many causes of heart failure including high blood pressure,. Symptoms include shortness of breath with activity or when lying flat, cough, swelling of the legs, fatigue, or increased urination during the night.   Treatment is aimed at managing the symptoms of heart failure and may include lifestyle changes, medications, or surgical procedures. Take medicines only as directed by your health care provider and do not stop unless instructed to do so. Eat heart-healthy foods with low or no trans/saturated fats, cholesterol and salt. Weigh yourself every day for early recognition of fluid accumulation.  You are being transferred to [facility] for further management.      SECONDARY DISCHARGE DIAGNOSES  Diagnosis: Chronic kidney disease  Assessment and Plan of Treatment: You have history of Chronic kidney disease, and your baseline creatinine appears to be around 2. You were being given MUCOMYST in order to reduce the risk of kidney damage while you are being scheduled for an Angiogram.    Diagnosis: HTN (hypertension)  Assessment and Plan of Treatment: You have history of elevated blood pressure levels, for which you are advised to follow a healthy diet including a low salt diet, and exercise as tolerated. You are recommended to continue on medications as per your medication reconciliation (DISCONTINUE Amlodipine, Continue with COREG, ISORDIL, HYDRALAZINE) upon transfer to [facility].      Diagnosis: DM (diabetes mellitus)  Assessment and Plan of Treatment: You have history of Diabetes, however, your Hemoglobin A1c which measures how well-controlled your blood sugars are over the last 3 months was 5.3. This reflects very well-controlled blood sugar levels OFF medications.    Diagnosis: Elevated d-dimer  Assessment and Plan of Treatment: You were found to have elevated D-dimer levels, which necessitated further evaluation with Ventilation Perfusion Scan which showed low probability of the clot in the lungs.    Diagnosis: Bence Rios protein  Assessment and Plan of Treatment: Your urine sample was positive for Bence Rios protein, for which you should undergo further evaluation with a Hematology specialist, Dr. Nathan. You are recommended to follow up outpatient with Dr. Nathan upon discharge.

## 2021-06-10 NOTE — DISCHARGE NOTE PROVIDER - CARE PROVIDER_API CALL
[facility]  INTERNAL MEDICINE  [address] [place] [address]  Phone: [phone]  Fax: [phone]  Follow Up Time:    Yaritza Jorge  INTERNAL MEDICINE  [address] [place] [address]  Phone: [phone]  Fax: [phone]  Follow Up Time:     Mili Nathan  INTERNAL MEDICINE  [address] [place] [address]  Phone: [phone]  Fax: [phone]  Follow Up Time:

## 2021-06-10 NOTE — PROGRESS NOTE ADULT - SUBJECTIVE AND OBJECTIVE BOX
CHIEF COMPLAINT:Patient is a 75y old  Male who presents with a chief complaint of worsening SOB.Pt appears comfortable.    	  REVIEW OF SYSTEMS:  CONSTITUTIONAL: No fever, weight loss, or fatigue  EYES: No eye pain, visual disturbances, or discharge  ENT:  No difficulty hearing, tinnitus, vertigo; No sinus or throat pain  NECK: No pain or stiffness  RESPIRATORY: No cough, wheezing, chills or hemoptysis; No Shortness of Breath  CARDIOVASCULAR: No chest pain, palpitations, passing out, dizziness, or leg swelling  GASTROINTESTINAL: No abdominal or epigastric pain. No nausea, vomiting, or hematemesis; No diarrhea or constipation. No melena or hematochezia.  GENITOURINARY: No dysuria, frequency, hematuria, or incontinence  NEUROLOGICAL: No headaches, memory loss, loss of strength, numbness, or tremors  SKIN: No itching, burning, rashes, or lesions   LYMPH Nodes: No enlarged glands  ENDOCRINE: No heat or cold intolerance; No hair loss  MUSCULOSKELETAL: No joint pain or swelling; No muscle, back, or extremity pain  PSYCHIATRIC: No depression, anxiety, mood swings, or difficulty sleeping  HEME/LYMPH: No easy bruising, or bleeding gums  ALLERGY AND IMMUNOLOGIC: No hives or eczema	        PHYSICAL EXAM:  T(C): 36.6 (06-10-21 @ 07:38), Max: 36.9 (06-09-21 @ 11:26)  HR: 62 (06-10-21 @ 07:38) (57 - 65)  BP: 129/77 (06-10-21 @ 07:38) (123/69 - 142/87)  RR: 16 (06-10-21 @ 07:38) (16 - 18)  SpO2: 97% (06-10-21 @ 07:38) (96% - 100%)  Wt(kg): --  I&O's Summary    09 Jun 2021 07:01  -  10 Matthew 2021 07:00  --------------------------------------------------------  IN: 0 mL / OUT: 800 mL / NET: -800 mL        Appearance: Normal	  HEENT:   Normal oral mucosa, PERRL, EOMI	  Lymphatic: No lymphadenopathy  Cardiovascular: Normal S1 S2, No JVD, No murmurs, No edema  Respiratory: Dec BS at bases  Psychiatry: A & O x 3, Mood & affect appropriate  Gastrointestinal:  Soft, Non-tender, + BS	  Skin: No rashes, No ecchymoses, No cyanosis	  Neurologic: Non-focal  Extremities: Normal range of motion, No clubbing, cyanosis or edema  Vascular: Peripheral pulses palpable 2+ bilaterally    MEDICATIONS  (STANDING):  acetylcysteine  Oral Solution 1200 milliGRAM(s) Oral two times a day  allopurinol 100 milliGRAM(s) Oral daily  aspirin  chewable 81 milliGRAM(s) Oral daily  atorvastatin 20 milliGRAM(s) Oral at bedtime  carvedilol 6.25 milliGRAM(s) Oral every 12 hours  colchicine 0.6 milliGRAM(s) Oral daily  cyanocobalamin 1000 MICROGram(s) Oral daily  ergocalciferol 35703 Unit(s) Oral <User Schedule>  folic acid 1 milliGRAM(s) Oral daily  heparin   Injectable 5000 Unit(s) SubCutaneous every 12 hours  hydrALAZINE 10 milliGRAM(s) Oral three times a day  insulin lispro (ADMELOG) corrective regimen sliding scale   SubCutaneous Before meals and at bedtime  isosorbide   dinitrate Tablet (ISORDIL) 10 milliGRAM(s) Oral three times a day  pantoprazole    Tablet 40 milliGRAM(s) Oral before breakfast  senna 2 Tablet(s) Oral at bedtime  timolol 0.5% Solution 1 Drop(s) Both EYES two times a day      TELEMETRY: 	    ECG:  	  RADIOLOGY:  OTHER: 	  	  LABS:	 	    CARDIAC MARKERS:                                11.3   2.73  )-----------( 130      ( 10 Matthew 2021 07:28 )             34.0     06-10    137  |  104  |  59<H>  ----------------------------<  99  4.1   |  27  |  2.59<H>    Ca    9.4      10 Matthew 2021 07:28  Phos  4.0     06-10  Mg     2.2     06-10      proBNP: Serum Pro-Brain Natriuretic Peptide: 4037 pg/mL (06-02 @ 15:01)    Lipid Profile: Cholesterol 162    TG 67        	  Protein Electrophoresis, Serum (06.04.21 @ 16:24)   Protein Total, Serum: 6.1 g/dL   Total Protein, Serum: 6.1 g/dL   Albumin, Serum: 2.6 g/dL   Alpha 1: 0.3 g/dL   Alpha 2: 0.9 g/dL   Beta Globulin: 0.9 g/dL   Gamma Globulin: 1.4 g/dL   M-Brigido: 0.5 g/dL   % Albumin: 43.3 %   % Alpha 1: 5.0 %   % Alpha 2: 14.4 %   % Beta: 14.7 %   % Gamma: 22.6 %   % M Brigido: 7.7 %   Albumin/Globulin Ratio: 0.7 Ratio   Serum Protein Electrophoresis Interp: Gamma Migrating Paraprotein Identified          CHIEF COMPLAINT:Patient is a 75y old  Male who presents with a chief complaint of worsening SOB.Pt appears comfortable.    	  REVIEW OF SYSTEMS:  CONSTITUTIONAL: No fever, weight loss, or fatigue  EYES: No eye pain, visual disturbances, or discharge  ENT:  No difficulty hearing, tinnitus, vertigo; No sinus or throat pain  NECK: No pain or stiffness  RESPIRATORY: No cough, wheezing, chills or hemoptysis; No Shortness of Breath  CARDIOVASCULAR: No chest pain, palpitations, passing out, dizziness, or leg swelling  GASTROINTESTINAL: No abdominal or epigastric pain. No nausea, vomiting, or hematemesis; No diarrhea or constipation. No melena or hematochezia.  GENITOURINARY: No dysuria, frequency, hematuria, or incontinence  NEUROLOGICAL: No headaches, memory loss, loss of strength, numbness, or tremors  SKIN: No itching, burning, rashes, or lesions   LYMPH Nodes: No enlarged glands  ENDOCRINE: No heat or cold intolerance; No hair loss  MUSCULOSKELETAL: No joint pain or swelling; No muscle, back, or extremity pain  PSYCHIATRIC: No depression, anxiety, mood swings, or difficulty sleeping  HEME/LYMPH: No easy bruising, or bleeding gums  ALLERGY AND IMMUNOLOGIC: No hives or eczema	        PHYSICAL EXAM:  T(C): 36.6 (06-10-21 @ 07:38), Max: 36.9 (06-09-21 @ 11:26)  HR: 62 (06-10-21 @ 07:38) (57 - 65)  BP: 129/77 (06-10-21 @ 07:38) (123/69 - 142/87)  RR: 16 (06-10-21 @ 07:38) (16 - 18)  SpO2: 97% (06-10-21 @ 07:38) (96% - 100%)  Wt(kg): --  I&O's Summary    09 Jun 2021 07:01  -  10 Matthew 2021 07:00  --------------------------------------------------------  IN: 0 mL / OUT: 800 mL / NET: -800 mL        Appearance: Normal	  HEENT:   Normal oral mucosa, PERRL, EOMI	  Lymphatic: No lymphadenopathy  Cardiovascular: Normal S1 S2, No JVD, No murmurs, No edema  Respiratory: Dec BS at bases  Psychiatry: A & O x 3, Mood & affect appropriate  Gastrointestinal:  Soft, Non-tender, + BS	  Skin: No rashes, No ecchymoses, No cyanosis	  Neurologic: Non-focal  Extremities: Normal range of motion, No clubbing, cyanosis or edema  Vascular: Peripheral pulses palpable 2+ bilaterally    MEDICATIONS  (STANDING):  acetylcysteine  Oral Solution 1200 milliGRAM(s) Oral two times a day  allopurinol 100 milliGRAM(s) Oral daily  aspirin  chewable 81 milliGRAM(s) Oral daily  atorvastatin 20 milliGRAM(s) Oral at bedtime  carvedilol 6.25 milliGRAM(s) Oral every 12 hours  colchicine 0.6 milliGRAM(s) Oral daily  cyanocobalamin 1000 MICROGram(s) Oral daily  ergocalciferol 56246 Unit(s) Oral <User Schedule>  folic acid 1 milliGRAM(s) Oral daily  heparin   Injectable 5000 Unit(s) SubCutaneous every 12 hours  hydrALAZINE 10 milliGRAM(s) Oral three times a day  insulin lispro (ADMELOG) corrective regimen sliding scale   SubCutaneous Before meals and at bedtime  isosorbide   dinitrate Tablet (ISORDIL) 10 milliGRAM(s) Oral three times a day  pantoprazole    Tablet 40 milliGRAM(s) Oral before breakfast  senna 2 Tablet(s) Oral at bedtime  timolol 0.5% Solution 1 Drop(s) Both EYES two times a day      TELEMETRY: 	    ECG:  	  RADIOLOGY:  OTHER: 	  	  LABS:	 	    CARDIAC MARKERS:                                11.3   2.73  )-----------( 130      ( 10 Matthew 2021 07:28 )             34.0     06-10    137  |  104  |  59<H>  ----------------------------<  99  4.1   |  27  |  2.59<H>    Ca    9.4      10 Matthew 2021 07:28  Phos  4.0     06-10  Mg     2.2     06-10      proBNP: Serum Pro-Brain Natriuretic Peptide: 4037 pg/mL (06-02 @ 15:01)    Lipid Profile: Cholesterol 162    TG 67        	  Protein Electrophoresis, Serum (06.04.21 @ 16:24)   Protein Total, Serum: 6.1 g/dL   Total Protein, Serum: 6.1 g/dL   Albumin, Serum: 2.6 g/dL   Alpha 1: 0.3 g/dL   Alpha 2: 0.9 g/dL   Beta Globulin: 0.9 g/dL   Gamma Globulin: 1.4 g/dL   M-Brigido: 0.5 g/dL   % Albumin: 43.3 %   % Alpha 1: 5.0 %   % Alpha 2: 14.4 %   % Beta: 14.7 %   % Gamma: 22.6 %   % M Brigido: 7.7 %   Albumin/Globulin Ratio: 0.7 Ratio   Serum Protein Electrophoresis Interp: Gamma Migrating Paraprotein Identified     Bence Rios/Protein, Urine: Present: Bence Rios protein Kappa type (06.04.21 @ 16:16)

## 2021-06-10 NOTE — DISCHARGE NOTE PROVIDER - NSDCCPTREATMENT_GEN_ALL_CORE_FT
PRINCIPAL PROCEDURE  Procedure: Left heart cardiac cath  Findings and Treatment: diagnostic, no intervention

## 2021-06-10 NOTE — PROGRESS NOTE ADULT - PROBLEM SELECTOR PROBLEM 5
HTN (hypertension)
Chronic kidney disease
HTN (hypertension)
Chronic kidney disease
Chronic kidney disease
HTN (hypertension)
Chronic kidney disease

## 2021-06-10 NOTE — DISCHARGE NOTE PROVIDER - NSDCMRMEDTOKEN_GEN_ALL_CORE_FT
allopurinol 300 mg oral tablet: 1 tab(s) orally once a day (at bedtime)  home  carvedilol 6.25 mg oral tablet: 1 tab(s) orally every 12 hours  hosp  colchicine 0.6 mg oral tablet: 1 tab(s) orally once a day  hosp/home  Combigan 0.2%-0.5% ophthalmic solution: 1 drop(s) to each affected eye 2 times a day  home  Crestor 5 mg oral tablet: 1 tab(s) orally once a day home  cyanocobalamin 1000 mcg oral tablet: 1 tab(s) orally once a day  hosp  folic acid 1 mg oral tablet: 1 tab(s) orally once a day  home/hosp  hydrALAZINE 10 mg oral tablet: 1 tab(s) orally 3 times a day  hosp  isosorbide dinitrate 10 mg oral tablet: 1 tab(s) orally 3 times a day  hosp  Januvia 25 mg oral tablet: 1 tab(s) orally once a day  home  Norvasc 10 mg oral tablet: 1 tab(s) orally once a day  home  pantoprazole 40 mg oral delayed release tablet: 1 tab(s) orally once a day  timolol maleate 0.5% ophthalmic solution: 1 drop(s) to each affected eye 2 times a day  hosp   allopurinol 300 mg oral tablet: 1 tab(s) orally once a day (at bedtime)  home  carvedilol 6.25 mg oral tablet: 1 tab(s) orally every 12 hours  hosp  colchicine 0.6 mg oral tablet: 1 tab(s) orally once a day  hosp/home  Combigan 0.2%-0.5% ophthalmic solution: 1 drop(s) to each affected eye 2 times a day  home  Crestor 5 mg oral tablet: 1 tab(s) orally once a day home  cyanocobalamin 1000 mcg oral tablet: 1 tab(s) orally once a day  folic acid 1 mg oral tablet: 1 tab(s) orally once a day  home/hosp  hydrALAZINE 10 mg oral tablet: 1 tab(s) orally 3 times a day  hosp  isosorbide dinitrate 10 mg oral tablet: 1 tab(s) orally 3 times a day  hosp  Januvia 25 mg oral tablet: 1 tab(s) orally once a day  home  Lasix 20 mg oral tablet: 1 tab(s) orally once a day   Norvasc 10 mg oral tablet: 1 tab(s) orally once a day  home  pantoprazole 40 mg oral delayed release tablet: 1 tab(s) orally once a day  Senna 8.6 mg oral tablet: 1 tab(s) orally once a day (at bedtime)  timolol maleate 0.5% ophthalmic solution: 1 drop(s) to each affected eye 2 times a day  hosp   allopurinol 300 mg oral tablet: 1 tab(s) orally once a day (at bedtime)  home  carvedilol 6.25 mg oral tablet: 1 tab(s) orally every 12 hours  hosp  colchicine 0.6 mg oral tablet: 1 tab(s) orally once a day  hosp/home  Combigan 0.2%-0.5% ophthalmic solution: 1 drop(s) to each affected eye 2 times a day  home  Crestor 5 mg oral tablet: 1 tab(s) orally once a day home  cyanocobalamin 1000 mcg oral tablet: 1 tab(s) orally once a day  folic acid 1 mg oral tablet: 1 tab(s) orally once a day  home/hosp  hydrALAZINE 10 mg oral tablet: 1 tab(s) orally 3 times a day  hosp  isosorbide dinitrate 10 mg oral tablet: 1 tab(s) orally 3 times a day  hosp  Januvia 25 mg oral tablet: 1 tab(s) orally once a day  home  Lasix 20 mg oral tablet: 1 tab(s) orally once a day   pantoprazole 40 mg oral delayed release tablet: 1 tab(s) orally once a day  Senna 8.6 mg oral tablet: 1 tab(s) orally once a day (at bedtime)  timolol maleate 0.5% ophthalmic solution: 1 drop(s) to each affected eye 2 times a day  hosp   allopurinol 300 mg oral tablet: 1 tab(s) orally once a day (at bedtime)  home  Aspirin Low Dose 81 mg oral tablet, chewable: 1 tab(s) orally once a day  carvedilol 6.25 mg oral tablet: 1 tab(s) orally every 12 hours  hosp  colchicine 0.6 mg oral tablet: 1 tab(s) orally once a day  hosp/home  Combigan 0.2%-0.5% ophthalmic solution: 1 drop(s) to each affected eye 2 times a day  home  Crestor 5 mg oral tablet: 1 tab(s) orally once a day home  cyanocobalamin 1000 mcg oral tablet: 1 tab(s) orally once a day  folic acid 1 mg oral tablet: 1 tab(s) orally once a day  home/hosp  hydrALAZINE 10 mg oral tablet: 1 tab(s) orally 3 times a day  hosp  isosorbide dinitrate 10 mg oral tablet: 1 tab(s) orally 3 times a day  hosp  Januvia 25 mg oral tablet: 1 tab(s) orally once a day  home  Lasix 20 mg oral tablet: 1 tab(s) orally once a day   pantoprazole 40 mg oral delayed release tablet: 1 tab(s) orally once a day  Senna 8.6 mg oral tablet: 1 tab(s) orally once a day (at bedtime)  timolol maleate 0.5% ophthalmic solution: 1 drop(s) to each affected eye 2 times a day  hosp

## 2021-06-10 NOTE — CONSULT NOTE ADULT - SUBJECTIVE AND OBJECTIVE BOX
PATIENT SEEN AND EXAMINED ON :-06/10/2021  DATE OF SERVICE:    06/10/2021         Interim events noted,Labs ,Radiological studies and Cardiology tests reviewed .    History of Present Illness      75 year old [place] speaking male (translaor ID [id number]) PMH of DM (a1C 5.3 Jun 2021), HTN, CKD III (baseline 1.5), HFrEF (40-45% june 2021), HLD, gout and glaucoma with left eye blindness presented to [facility] ED on 6/2 c/o exertional SOB 3 weeks PTA, found in, acute sytolic HF with runs of NSVT. CXR showing small left pleural effusion and mild opacities in right lung base- treated with IV lasix. Seen by renal- Renal US: Unremarkable renal ultrasound, now holding diuretic with fluid restrictions  Negative VQ scan. Trop x3: negative.  ECG: bradycardia with new LBBB. TTE with EF 40-45%. Nuclear stress test was completed on 6/7/21 with abnormal results (see Below). Transferred to [facility] for cardiac angiogram.  OF note: Heme/ onc following patient at [facility]. M spike, 0.5 gm      NUCLEAR FINDINGS:  Review of raw data shows: Diaphragmatic artifact.  The left ventricle was mildly dilated LV at baseline.  There are small, severe defects in distal inferior &  inferoapical walls that are fixed consistent with  diaphragmatic attenuation artifact.              Past Medical History:  Chronic kidney disease    DM (diabetes mellitus)    Glaucoma    Gout    HLD (hyperlipidemia)    HTN (hypertension).    Past Surgical History:  No significant past surgical history.      Tobacco Usage:  · Tobacco Usage: Former smoker        Home Medications:   * Patient Currently Takes Medications as of 10-Matthew-2021 13:43 documented in Structured Notes  · 	cyanocobalamin 1000 mcg oral tablet: Last Dose Taken:  , 1 tab(s) orally once a day  	hosp  · 	hydrALAZINE 10 mg oral tablet: Last Dose Taken:  , 1 tab(s) orally 3 times a day  	hosp  · 	pantoprazole 40 mg oral delayed release tablet: Last Dose Taken:  , 1 tab(s) orally once a day (before a meal)  	hosp  · 	timolol maleate 0.5% ophthalmic solution: Last Dose Taken:  , 1 drop(s) to each affected eye 2 times a day  	hosp  · 	senna oral tablet: Last Dose Taken:  , 2 tab(s) orally once a day (at bedtime)  	hosp  · 	carvedilol 6.25 mg oral tablet: Last Dose Taken:  , 1 tab(s) orally every 12 hours  	hosp  · 	aspirin 81 mg oral tablet, chewable: Last Dose Taken:  , 1 tab(s) orally once a day  	hosp  · 	atorvastatin 20 mg oral tablet: Last Dose Taken:  , 1 tab(s) orally once a day (at bedtime)  	hosp  · 	isosorbide dinitrate 10 mg oral tablet: Last Dose Taken:  , 1 tab(s) orally 3 times a day  	hosp  · 	colchicine 0.6 mg oral tablet: Last Dose Taken:  , 1 tab(s) orally once a day  	hosp/home  · 	Combigan 0.2%-0.5% ophthalmic solution: Last Dose Taken:  , 1 drop(s) to each affected eye 2 times a day  	home  · 	allopurinol 300 mg oral tablet: Last Dose Taken:  , 1 tab(s) orally once a day (at bedtime)  	home  · 	folic acid 1 mg oral tablet: Last Dose Taken:  , 1 tab(s) orally once a day  	home/hosp  · 	allopurinol 100 mg oral tablet: Last Dose Taken:  , 1 tab(s) orally 2 times a day  	hosp  · 	acetylcysteine 600 mg oral capsule: Last Dose Taken:  , 2 cap(s) orally 2 times a day  	needs to more dose  	hosp  · 	Crestor 5 mg oral tablet: Last Dose Taken:  , 1 tab(s) orally once a day home  · 	Januvia 25 mg oral tablet: 1 tab(s) orally once a day  	home  · 	Norvasc 10 mg oral tablet: 1 tab(s) orally once a day  	home    Review of Systems:   Respiratory / Cardiology / Neurology:  · Respiratory and Thorax  negative    · Cardiovascular  negative    · Neurological  negative      Vital Signs/Physical Exam:   Height/Weight:  ·    used    · Weight (kg)  81.6 kg    · Weight  (lbs)  179.8 Pound(s)    · Height in feet  5 Feet    · Height (in)  3 Inch(s)    · Height (cm)  160.02 Centimeter(s)    · BMI (kg/m2)  31.86    · BSA (m2)  1.84 Meter Squared      T/HR/RR/BP:  · Temp (F)  98.1 Degrees F    · Temp (C)  36.7 Degrees C    · Heart Rate  60 /min    · Respiration Rate (breaths/min)  12 /min    · BP Systolic  120 mm Hg    · BP Diastolic  68 mm Hg    · Blood Pressure - Method  electronic    · BP Noninvasive Mean  85 mm Hg    · SpO2 (%)  98 %      PHYSICAL EXAM:  Appearance: Normal	  HEENT:   Normal oral mucosa, PERRL, EOMI	  Lymphatic: No lymphadenopathy  Cardiovascular: Normal S1 S2, No JVD, No murmurs, No edema  Respiratory: Dec BS at bases  Psychiatry: A & O x 3, Mood & affect appropriate  Gastrointestinal:  Soft, Non-tender, + BS	  Skin: No rashes, No ecchymoses, No cyanosis	  Neurologic: Non-focal  Extremities: Normal range of motion, No clubbing, cyanosis or edema  Vascular: Peripheral pulses palpable 2+ bilaterally      LABS:   11.3   2.73  )-----------( 130      ( 10 Matthew 2021 07:28 )             34.0     06-10    137  |  104  |  59<H>  ----------------------------<  99  4.1   |  27  |  2.59<H>    Ca    9.4      10 Matthew 2021 07:28  Phos  4.0     06-10  Mg     2.2     06-10      proBNP: Serum Pro-Brain Natriuretic Peptide: 4037 pg/mL (06-02 @ 15:01)    Lipid Profile: Cholesterol 162    TG 67        	  Protein Electrophoresis, Serum (06.04.21 @ 16:24)   Protein Total, Serum: 6.1 g/dL   Total Protein, Serum: 6.1 g/dL   Albumin, Serum: 2.6 g/dL   Alpha 1: 0.3 g/dL   Alpha 2: 0.9 g/dL   Beta Globulin: 0.9 g/dL   Gamma Globulin: 1.4 g/dL   M-Brigido: 0.5 g/dL   % Albumin: 43.3 %   % Alpha 1: 5.0 %   % Alpha 2: 14.4 %   % Beta: 14.7 %   % Gamma: 22.6 %   · Assessment      Patient is a 75 year old male from home AAO x3, with PMH of DM, HTN, CKD, HLD, Gout and Glaucoma, who presented to the ED due to worsening SOB, likely secondary to CHF    Problem/Plan - 1:  ·  Problem: Dyspnea on exertion.  Plan: patient presented to the ED due to worsening SOB mainly on exertion   likely secondary to fluid overload from CHF   CXR showing small left pleural effusion and mild opacities in right lung base   d-dimer of 1119, V/Q scan shows low probability of PE  D/c heparin drip, heparin SQ for DVT ppx  NST high suspicion for multivessel disease, planned for cath after today Dr Low  D/c'ed on Lasix/Demadex, continue with Coreg, Isordil, and Hydralazine       Problem/Plan - 2:  ·  Problem: Elevated d-dimer.  Plan: noted to have elevated d-dimer of 1119  unable to do CTA chest due to CKD   US doppler negative for DVT   V/Q low probability of pulm embolus  D/c heparin drip, heparin SQ for DVT ppx.     Problem/Plan - 3:  ·  Problem: Elevated troponin.  Plan: noted to have mild elevation in troponin at 0.043  likely due to demand ischemia   borderline trops, downtrended    Problem/Plan - 4:  ·  Problem: Anemia.  Plan: noted to have Hgb of 12.9 on admission   no signs of bleeding noted   Hgb is higher compared to prior labs  Iron panel normal  monitor CBC daily.     Problem/Plan - 5:  ·  Problem: Chronic kidney disease.  Plan: noted to have creatinine of 2.06   baseline appears to be closer to 1.5-1.6 based on prior labs   avoid fluids due to concern for CH  monitor BMP daily       Problem/Plan - 6:  Problem: HTN (hypertension). Plan: D/c'ed amlodipine, c/w Coreg, Isordil, Hydralazine  monitor BP and adjust meds as needed.    Problem/Plan - 7:  ·  Problem: DM (diabetes mellitus).  Plan: patient on januvia at home  will start SSI and hold PO meds while in hospital  A1c 5.3  monitor BS and adjust insulin as needed.     Problem/Plan - 8:  ·  Problem: Gout.  Plan: continue home med of allopurinol and colchicine.     Problem/Plan - 9:  ·  Problem: Need for prophylactic measure.  Plan: Heparin SQ for DVT ppx.     Problem/Plan - 10:  Problem: Goals of care, counseling/discussion. Plan; discussed GOC with patient and son, Bruce at bedside  patient is FULL CODE.

## 2021-06-10 NOTE — DISCHARGE NOTE PROVIDER - HOSPITAL COURSE
HPI:  75 year old Maltese speaking male (translaor ID [id number]) PMH of DM (a1C 5.3 Jun 2021), HTN, CKD III (baseline 1.5), HFrEF (40-45% june 2021), HLD, gout and glaucoma with left eye blindness presented to [facility] ED on 6/2 c/o exertional SOB 3 weeks PTA, found in, acute sytolic HF with runs of NSVT. CXR showing small left pleural effusion and mild opacities in right lung base- treated with IV lasix. Seen by renal- Renal US: Unremarkable renal ultrasound, now holding diuretic with fluid restrictions  Negative VQ scan. Trop x3: negative.  ECG: bradycardia with new LBBB. TTE with EF 40-45%. Nuclear stress test was completed on 6/7/21 with abnormal results (see Below). Transferred to [facility] for cardiac angiogram for further ischemic evaluation. Upon arrival patient appears comfortable denies any reportable symptom.     OF note: Heme/ onc following patient at [facility]. M spike, 0.5 gm    6/10 diagnostic cardiac cath, medical management. Right groin site without swelling, bleeding. HPI:  75 year old Belarusian speaking male (translaor ID [id number]) PMH of DM (a1C 5.3 Jun 2021), HTN, CKD III (baseline 1.5), HFrEF (40-45% june 2021), HLD, gout and glaucoma with left eye blindness presented to [facility] ED on 6/2 c/o exertional SOB 3 weeks PTA, found in, acute sytolic HF with runs of NSVT. CXR showing small left pleural effusion and mild opacities in right lung base- treated with IV lasix. Seen by renal- Renal US: Unremarkable renal ultrasound, now holding diuretic with fluid restrictions  Negative VQ scan. Trop x3: negative.  ECG: bradycardia with new LBBB. TTE with EF 40-45%. Nuclear stress test was completed on 6/7/21 with abnormal results (see Below). Transferred to [facility] for cardiac angiogram for further ischemic evaluation. Upon arrival patient appears comfortable denies any reportable symptom.     OF note: Heme/onc following patient at [facility]. M spike, 0.5 gm    6/10 diagnostic cardiac cath, medical management. Right groin site without swelling, bleeding. HPI:  75 year old Romansh speaking male (translaor ID [id number]) PMH of DM (a1C 5.3 Jun 2021), HTN, CKD III (baseline 1.5), HFrEF (40-45% june 2021), HLD, gout and glaucoma with left eye blindness presented to [facility] ED on 6/2 c/o exertional SOB 3 weeks PTA, found in, acute sytolic HF with runs of NSVT. CXR showing small left pleural effusion and mild opacities in right lung base- treated with IV lasix. Seen by renal- Renal US: Unremarkable renal ultrasound, now holding diuretic with fluid restrictions  Negative VQ scan. Trop x3: negative.  ECG: bradycardia with new LBBB. TTE with EF 40-45%. Nuclear stress test was completed on 6/7/21 with abnormal results (see Below). Transferred to [facility] for cardiac angiogram for further ischemic evaluation. Upon arrival patient appears comfortable denies any reportable symptom.     OF note: Heme/onc following patient at [facility]. M spike, 0.5 gm    6/10 diagnostic cardiac cath, medical management. Right groin site without swelling, bleeding.  Outpt cardiac MRI  Discharge home with 20mg of lasix daily HPI:  75 year old Thai speaking male (translaor ID [id number]) PMH of DM (a1C 5.3 Jun 2021), HTN, CKD III (baseline 1.5), HFrEF (40-45% june 2021), HLD, gout and glaucoma with left eye blindness [facility] c/o exertional SOB 3 weeks PTA, found in, acute sytolic HF with runs of NSVT. CXR showing small left pleural effusion and mild opacities in right lung base- treated with IV lasix. Seen by renal- Renal US: Unremarkable renal ultrasound, now holding diuretic with fluid restrictions  Negative VQ scan. Trop x3: negative.  ECG: bradycardia with new LBBB. TTE with EF 40-45%. Nuclear stress test was completed on 6/7/21 with abnormal results (see Below). Transferred to [facility] for cardiac angiogram for further ischemic evaluation. Upon arrival patient appears comfortable denies any reportable symptom.     OF note: [facility] M spike, 0.5 gm  cardiac MRI- follow up with Dr. Cui for results   CKD- follow up with Dr. Art this week for repeat labs   Discharge home with 20mg of lasix daily

## 2021-06-10 NOTE — DISCHARGE NOTE NURSING/CASE MANAGEMENT/SOCIAL WORK - PATIENT PORTAL LINK FT
You can access the FollowMyHealth Patient Portal offered by [facility] by registering at the following website: [email or web address]. By joining [employer]’s FollowMyHealth portal, you will also be able to view your health information using other applications (apps) compatible with our system.

## 2021-06-10 NOTE — DISCHARGE NOTE PROVIDER - PROVIDER TOKENS
PROVIDER:[TOKEN:[[id number]]] PROVIDER:[TOKEN:[[id number]]],PROVIDER:[TOKEN:[[id number]]]

## 2021-06-10 NOTE — PROGRESS NOTE ADULT - SUBJECTIVE AND OBJECTIVE BOX
Pt is awake, alert, lying in bed in NAD. For Cath today.     INTERVAL HPI/OVERNIGHT EVENTS:      VITAL SIGNS:  T(F): 97.9 (06-10-21 @ 07:38)  HR: 62 (06-10-21 @ 07:38)  BP: 129/77 (06-10-21 @ 07:38)  RR: 16 (06-10-21 @ 07:38)  SpO2: 97% (06-10-21 @ 07:38)  Wt(kg): --  I&O's Detail    09 Jun 2021 07:01  -  10 Matthew 2021 07:00  --------------------------------------------------------  IN:  Total IN: 0 mL    OUT:    Voided (mL): 800 mL  Total OUT: 800 mL    Total NET: -800 mL              REVIEW OF SYSTEMS:    CONSTITUTIONAL:  No fevers, chills, sweats    HEENT:  Eyes:  No diplopia or blurred vision. ENT:  No earache, sore throat or runny nose.    CARDIOVASCULAR:  No pressure, squeezing, tightness, or heaviness about the chest; no palpitations.    RESPIRATORY:  Per HPI    GASTROINTESTINAL:  No abdominal pain, nausea, vomiting or diarrhea.    GENITOURINARY:  No dysuria, frequency or urgency.    NEUROLOGIC:  No paresthesias, fasciculations, seizures or weakness.    PSYCHIATRIC:  No disorder of thought or mood.      PHYSICAL EXAM:    Constitutional: Well developed and nourished  Eyes:Perrla  ENMT: normal  Neck:supple  Respiratory: good air entry  Cardiovascular: S1 S2 regular  Gastrointestinal: Soft, Non tender  Extremities: No edema  Vascular:normal  Neurological:Awake, alert,Ox3  Musculoskeletal:Normal      MEDICATIONS  (STANDING):  acetylcysteine  Oral Solution 1200 milliGRAM(s) Oral two times a day  allopurinol 100 milliGRAM(s) Oral daily  aspirin  chewable 81 milliGRAM(s) Oral daily  atorvastatin 20 milliGRAM(s) Oral at bedtime  carvedilol 6.25 milliGRAM(s) Oral every 12 hours  colchicine 0.6 milliGRAM(s) Oral daily  cyanocobalamin 1000 MICROGram(s) Oral daily  ergocalciferol 82666 Unit(s) Oral <User Schedule>  folic acid 1 milliGRAM(s) Oral daily  heparin   Injectable 5000 Unit(s) SubCutaneous every 12 hours  hydrALAZINE 10 milliGRAM(s) Oral three times a day  insulin lispro (ADMELOG) corrective regimen sliding scale   SubCutaneous Before meals and at bedtime  isosorbide   dinitrate Tablet (ISORDIL) 10 milliGRAM(s) Oral three times a day  pantoprazole    Tablet 40 milliGRAM(s) Oral before breakfast  senna 2 Tablet(s) Oral at bedtime  timolol 0.5% Solution 1 Drop(s) Both EYES two times a day    MEDICATIONS  (PRN):  acetaminophen   Tablet .. 650 milliGRAM(s) Oral every 6 hours PRN Temp greater or equal to 38C (100.4F), Moderate Pain (4 - 6)  ondansetron Injectable 4 milliGRAM(s) IV Push every 6 hours PRN Nausea and/or Vomiting  polyethylene glycol 3350 17 Gram(s) Oral daily PRN Constipation      Allergies    No Known Allergies    Intolerances        LABS:                        11.3   2.73  )-----------( 130      ( 10 Matthew 2021 07:28 )             34.0     06-10    137  |  104  |  59<H>  ----------------------------<  99  4.1   |  27  |  2.59<H>    Ca    9.4      10 Matthew 2021 07:28  Phos  4.0     06-10  Mg     2.2     06-10        Bence Rios/Protein, Urine (06.04.21 @ 16:16)   Bence Rios/Protein, Urine: Present: Bence Rios protein Kappa type Protein Electrophoresis, Serum (06.04.21 @ 16:24)   Protein Total, Serum: 6.1 g/dL   Total Protein, Serum: 6.1 g/dL   Albumin, Serum: 2.6 g/dL   Alpha 1: 0.3 g/dL   Alpha 2: 0.9 g/dL   Beta Globulin: 0.9 g/dL   Gamma Globulin: 1.4 g/dL   M-Brigido: 0.5 g/dL   % Albumin: 43.3 %   % Alpha 1: 5.0 %   % Alpha 2: 14.4 %   % Beta: 14.7 %   % Gamma: 22.6 %   % M Brigido: 7.7 %   Albumin/Globulin Ratio: 0.7 Ratio   Serum Protein Electrophoresis Interp: Gamma Migrating Paraprotein Identified         CAPILLARY BLOOD GLUCOSE      POCT Blood Glucose.: 102 mg/dL (10 Matthew 2021 07:45)  POCT Blood Glucose.: 135 mg/dL (09 Jun 2021 22:30)  POCT Blood Glucose.: 124 mg/dL (09 Jun 2021 11:37)        RADIOLOGY & ADDITIONAL TESTS:    CXR:    Ct scan chest:    ekg;    echo:

## 2021-06-10 NOTE — CONSULT NOTE ADULT - TIME BILLING
- Review of records, telemetry, vital signs and daily labs.   - General and cardiovascular physical examination.  - Generation of cardiovascular treatment plan.  - Coordination of care.      Patient was seen and examined by me on 06/10/2021,interim events noted,labs and radiology studies reviewed.  Vamsi Cui MD,FACC.  [address] [place], [place] [id number].  [phone]

## 2021-06-10 NOTE — PROGRESS NOTE ADULT - REASON FOR ADMISSION
worsening SOB

## 2021-06-10 NOTE — DISCHARGE NOTE PROVIDER - CARE PROVIDER_API CALL
Kareem Jorgeideh  INTERNAL MEDICINE  [address] [place] [address]  Phone: [phone]  Fax: [phone]  Follow Up Time: 2 weeks   Yaritza Jorge  INTERNAL MEDICINE  [address] [place] [address]  Phone: [phone]  Fax: [phone]  Follow Up Time: 2 weeks    Vamsi Cui)  Cardiology  [address] [place] [address]  Phone: [phone]  Fax: [phone]  Follow Up Time:    Yaritza Jorge  INTERNAL MEDICINE  [address] [place] [address]  Phone: [phone]  Fax: [phone]  Follow Up Time: 2 weeks    Vamsi Cui)  Cardiology  [address] [place] [address]  Phone: [phone]  Fax: [phone]  Follow Up Time:     Fabian Art  INTERNAL MEDICINE  [address] [place] [address]  Phone: [phone]  Fax: [phone]  Follow Up Time:

## 2021-06-10 NOTE — PROGRESS NOTE ADULT - PROBLEM SELECTOR PLAN 4
Avoid nephrotoxic drugs  monitor BMP  Renal follow up
Avoid nephrotoxic drugs  Gentle hydration  + BJ protein; SPEP noted.   monitor BMP  Renal follow up  Heme/onc F/U
Avoid nephrotoxic drugs  monitor BMP  Renal follow up
noted to have Hgb of 12.9 on admission   no signs of bleeding noted   Hgb is higher compared to prior labs  Iron panel normal  monitor CBC daily
noted to have Hgb of 12.9 on admission   no signs of bleeding noted   Hgb is higher compared to prior labs  Iron panel normal  monitor CBC daily
Avoid nephrotoxic drugs  Gentle hydration  monitor BMP  Renal follow up
noted to have Hgb of 12.9 on admission   no signs of bleeding noted   Hgb is higher compared to prior labs  Iron panel normal  monitor CBC daily
Avoid nephrotoxic drugs  monitor BMP  Renal follow up
noted to have Hgb of 12.9 on admission   no signs of bleeding noted   Hgb is higher compared to prior labs  Iron panel normal  monitor CBC daily

## 2021-06-10 NOTE — DISCHARGE NOTE PROVIDER - HOSPITAL COURSE
Patient is a 75 year old male from home AAO x3, with PMH of DM, HTN, CKD, HLD, gout and glaucoma, who presented to the ED due to worsening SOB, and swelling of the legs for which cardiological evaluation was done- Echocardiogram showed HFreEF EF 40-45%. Paradoxical septal motion is seen, consistent with conduction delay. Pt was started on re-modeling agents, Nuclear stress test shows high suspicion of multivessel disease- pt being transferred to [facility] for cardiac cath.

## 2021-06-10 NOTE — PROGRESS NOTE ADULT - NSICDXPILOT_GEN_ALL_CORE
[place]

## 2021-06-10 NOTE — PROGRESS NOTE ADULT - PROBLEM SELECTOR PROBLEM 7
DM (diabetes mellitus)
Gout
Gout
DM (diabetes mellitus)
DM (diabetes mellitus)
Gout
DM (diabetes mellitus)
DM (diabetes mellitus)
Gout
DM (diabetes mellitus)

## 2021-06-10 NOTE — PROGRESS NOTE ADULT - PROBLEM SELECTOR PLAN 3
noted to have mild elevation in troponin at 0.043  likely due to demand ischemia   borderline trops, downtrended  Dr. Jorge consulted
oxygen supp  CCB  Bronchodilators
noted to have mild elevation in troponin at 0.043  likely due to demand ischemia   borderline trops, downtrended  Plan for stress test when patient is more stable  Dr. Jorge consulted
oxygen supp  CCB  Bronchodilators
noted to have mild elevation in troponin at 0.043  likely due to demand ischemia   borderline trops, downtrended  Plan for stress test when patient is more stable  Dr. Jorge consulted
oxygen supp  CCB  Bronchodilators
oxygen supp  CCB  Bronchodilators
noted to have mild elevation in troponin at 0.043  likely due to demand ischemia   borderline trops, downtrended  Plan for stress test when patient is more stable  Dr. Jorge consulted
oxygen supp  CCB  Bronchodilators
noted to have mild elevation in troponin at 0.043  likely due to demand ischemia   borderline trops, downtrended  Plan for stress test when patient is more stable  Dr. Jorge consulted
noted to have mild elevation in troponin at 0.043  likely due to demand ischemia   borderline trops, downtrended  Plan for stress test when patient is more stable  Dr. Jorge consulted

## 2021-06-10 NOTE — DISCHARGE NOTE PROVIDER - NSDCPNSUBOBJ_GEN_ALL_CORE
Patient is a 75y old  Male who presents with a chief complaint of Abnormal stress test (10 Matthew 2021 19:37)          Allergies    No Known Allergies    Intolerances        Medications:  aspirin  chewable 81 milliGRAM(s) Oral daily  atorvastatin 20 milliGRAM(s) Oral at bedtime  carvedilol 6.25 milliGRAM(s) Oral every 12 hours  colchicine 0.6 milliGRAM(s) Oral daily  cyanocobalamin 1000 MICROGram(s) Oral daily  folic acid 1 milliGRAM(s) Oral daily  hydrALAZINE 10 milliGRAM(s) Oral three times a day  isosorbide   dinitrate Tablet (ISORDIL) 10 milliGRAM(s) Oral three times a day  pantoprazole    Tablet 40 milliGRAM(s) Oral before breakfast  senna 2 Tablet(s) Oral at bedtime  timolol 0.5% Solution 1 Drop(s) Both EYES two times a day      Vitals:  T(C): 36.7 (06-10-21 @ 12:47), Max: 36.7 (06-10-21 @ 12:19)  HR: 56 (06-10-21 @ 21:25) (56 - 65)  BP: 120/73 (06-10-21 @ 21:25) (118/72 - 156/84)  BP(mean): 95 (06-10-21 @ 18:55) (85 - 102)  RR: 17 (06-10-21 @ 21:25) (12 - 18)  SpO2: 99% (06-10-21 @ 21:25) (93% - 99%)  Wt(kg): --  Daily Height in cm: 160.02 (10 Matthew 2021 12:47)    Daily Weight in k.6 (10 Matthew 2021 12:47)  I&O's Summary        Physical Exam:  Appearance: Normal  Eyes: PERRL, EOMI  HENT: Normal oral [place], NC/AT  Cardiovascular: S1S2, RRR, No M/R/G, no JVD, No Lower extremity edema  Procedural Access Site: No hematoma, Non-tender to palpation, 2+ pulse, No bruit, No Ecchymosis  Respiratory: Clear to auscultation bilaterally  Gastrointestinal: Soft, Non tender, Normal Bowel Sounds  Musculoskeletal: No clubbing, No joint deformity   Neurologic: Non-focal  Lymphatic: No lymphadenopathy  Psychiatry: AAOx3, Mood & affect appropriate  Skin: No rashes, No ecchymoses, No cyanosis    06-10    137  |  104  |  59<H>  ----------------------------<  99  4.1   |  27  |  2.59<H>    Ca    9.4      10 Matthew 2021 07:28  Phos  4.0     06-10  Mg     2.2     0610              Lipid panel   Hgb A1c                         11.6   2.63  )-----------( 143      ( 2021 01:53 )             35.8         ECG: SR 1st degree 65 bpm    Cath: diagnostic, no intervention    HTN: Continue antihypertensive medications with hold parameters     HLD: continue statin, confirm lipid panel results     Imaging:    Interpretation of Telemetry:

## 2021-06-10 NOTE — PROGRESS NOTE ADULT - PROBLEM SELECTOR PROBLEM 1
Dyspnea on exertion

## 2021-06-10 NOTE — DISCHARGE NOTE PROVIDER - PROVIDER TOKENS
PROVIDER:[TOKEN:[[id number]],FOLLOWUP:[2 weeks]] PROVIDER:[TOKEN:[[id number]],FOLLOWUP:[2 weeks]],PROVIDER:[TOKEN:[[id number]]] PROVIDER:[TOKEN:[[id number]],FOLLOWUP:[2 weeks]],PROVIDER:[TOKEN:[[id number]]],PROVIDER:[TOKEN:[[id number]]]

## 2021-06-10 NOTE — PROGRESS NOTE ADULT - PROBLEM SELECTOR PLAN 2
ACS ruled out  ASA , BB, Statin  Cardio follow up  Tele monitoring
ACS ruled out  ASA , BB, Statin  Cardio follow up  Tele monitoring  Stress test noted  May need cath as per cardio
ACS ruled out  ASA , BB, Statin  Cardio follow up  Tele monitoring  Stress test.
ACS ruled out  ASA , BB, Statin  Cardio follow up  Tele monitoring  Stress test noted  May need cath as per cardio
noted to have elevated d-dimer of 1119  unable to do CTA chest due to CKD   US doppler negative for DVT   V/Q low probability of pulm embolus  D/c heparin drip, heparin SQ for DVT ppx
ACS ruled out  ASA , BB, Statin  Cardio follow up  Tele monitoring  Stress test noted  Cardiac cath today.
ACS ruled out  ASA , BB, Statin  Cardio follow up  Tele monitoring  Stress test.
noted to have elevated d-dimer of 1119  unable to do CTA chest due to CKD   US doppler negative for DVT   V/Q low probability of pulm embolus  D/c heparin drip, heparin SQ for DVT ppx
R/o ACS protocol  ASA , BB, Statin  Cardio follow up  Tele monitoring
noted to have elevated d-dimer of 1119  unable to do CTA chest due to CKD   US doppler negative for DVT   V/Q low probability of pulm embolus
noted to have elevated d-dimer of 1119  unable to do CTA chest due to CKD   US doppler negative for DVT   V/Q low probability of pulm embolus  D/c heparin drip, heparin SQ for DVT ppx

## 2021-06-10 NOTE — PROGRESS NOTE ADULT - PROVIDER SPECIALTY LIST ADULT
Cardiology
Internal Medicine
Pulmonology
Cardiology
Internal Medicine
Cardiology
Internal Medicine
Cardiology
Internal Medicine
Internal Medicine
Nephrology
Cardiology
Pulmonology
Internal Medicine
Internal Medicine
Pulmonology
Pulmonology

## 2021-06-10 NOTE — H&P CARDIOLOGY - BSA (M2)
Patient received cytarabine 1780mg ivpb in sodium chloride 250ml infuse over 6 hours at 41.7.  Chemo checked by 2 certified nurses. chmo precautions maintained.   No signs/symptoms of reaction noted.   1.84

## 2021-06-11 LAB
ANION GAP SERPL CALC-SCNC: 13 MMOL/L — SIGNIFICANT CHANGE UP (ref 5–17)
BUN SERPL-MCNC: 57 MG/DL — HIGH (ref 7–23)
CALCIUM SERPL-MCNC: 10 MG/DL — SIGNIFICANT CHANGE UP (ref 8.4–10.5)
CHLORIDE SERPL-SCNC: 103 MMOL/L — SIGNIFICANT CHANGE UP (ref 96–108)
CO2 SERPL-SCNC: 20 MMOL/L — LOW (ref 22–31)
CREAT SERPL-MCNC: 2.73 MG/DL — HIGH (ref 0.5–1.3)
GLUCOSE BLDC GLUCOMTR-MCNC: 108 MG/DL — HIGH (ref 70–99)
GLUCOSE BLDC GLUCOMTR-MCNC: 110 MG/DL — HIGH (ref 70–99)
GLUCOSE BLDC GLUCOMTR-MCNC: 120 MG/DL — HIGH (ref 70–99)
GLUCOSE BLDC GLUCOMTR-MCNC: 132 MG/DL — HIGH (ref 70–99)
GLUCOSE SERPL-MCNC: 125 MG/DL — HIGH (ref 70–99)
HCT VFR BLD CALC: 35.8 % — LOW (ref 39–50)
HGB BLD-MCNC: 11.6 G/DL — LOW (ref 13–17)
KAPPA LC SER QL IFE: 18.69 MG/DL — HIGH (ref 0.33–1.94)
KAPPA/LAMBDA FREE LIGHT CHAIN RATIO, SERUM: 2.58 RATIO — HIGH (ref 0.26–1.65)
LAMBDA LC SER QL IFE: 7.25 MG/DL — HIGH (ref 0.57–2.63)
MCHC RBC-ENTMCNC: 32.4 GM/DL — SIGNIFICANT CHANGE UP (ref 32–36)
MCHC RBC-ENTMCNC: 32.6 PG — SIGNIFICANT CHANGE UP (ref 27–34)
MCV RBC AUTO: 100.6 FL — HIGH (ref 80–100)
NRBC # BLD: 0 /100 WBCS — SIGNIFICANT CHANGE UP (ref 0–0)
PLATELET # BLD AUTO: 143 K/UL — LOW (ref 150–400)
POTASSIUM SERPL-MCNC: 4.9 MMOL/L — SIGNIFICANT CHANGE UP (ref 3.5–5.3)
POTASSIUM SERPL-SCNC: 4.9 MMOL/L — SIGNIFICANT CHANGE UP (ref 3.5–5.3)
RBC # BLD: 3.56 M/UL — LOW (ref 4.2–5.8)
RBC # FLD: 14.9 % — HIGH (ref 10.3–14.5)
SODIUM SERPL-SCNC: 136 MMOL/L — SIGNIFICANT CHANGE UP (ref 135–145)
WBC # BLD: 2.63 K/UL — LOW (ref 3.8–10.5)
WBC # FLD AUTO: 2.63 K/UL — LOW (ref 3.8–10.5)

## 2021-06-11 RX ORDER — DEXTROSE 50 % IN WATER 50 %
25 SYRINGE (ML) INTRAVENOUS ONCE
Refills: 0 | Status: DISCONTINUED | OUTPATIENT
Start: 2021-06-11 | End: 2021-06-14

## 2021-06-11 RX ORDER — GLUCAGON INJECTION, SOLUTION 0.5 MG/.1ML
1 INJECTION, SOLUTION SUBCUTANEOUS ONCE
Refills: 0 | Status: DISCONTINUED | OUTPATIENT
Start: 2021-06-11 | End: 2021-06-14

## 2021-06-11 RX ORDER — INSULIN LISPRO 100/ML
VIAL (ML) SUBCUTANEOUS AT BEDTIME
Refills: 0 | Status: DISCONTINUED | OUTPATIENT
Start: 2021-06-11 | End: 2021-06-14

## 2021-06-11 RX ORDER — DEXTROSE 50 % IN WATER 50 %
12.5 SYRINGE (ML) INTRAVENOUS ONCE
Refills: 0 | Status: DISCONTINUED | OUTPATIENT
Start: 2021-06-11 | End: 2021-06-14

## 2021-06-11 RX ORDER — INSULIN LISPRO 100/ML
VIAL (ML) SUBCUTANEOUS
Refills: 0 | Status: DISCONTINUED | OUTPATIENT
Start: 2021-06-11 | End: 2021-06-14

## 2021-06-11 RX ORDER — SODIUM CHLORIDE 9 MG/ML
1000 INJECTION, SOLUTION INTRAVENOUS
Refills: 0 | Status: DISCONTINUED | OUTPATIENT
Start: 2021-06-11 | End: 2021-06-14

## 2021-06-11 RX ORDER — DEXTROSE 50 % IN WATER 50 %
15 SYRINGE (ML) INTRAVENOUS ONCE
Refills: 0 | Status: DISCONTINUED | OUTPATIENT
Start: 2021-06-11 | End: 2021-06-14

## 2021-06-11 RX ADMIN — Medication 81 MILLIGRAM(S): at 05:49

## 2021-06-11 RX ADMIN — Medication 1 MILLIGRAM(S): at 11:49

## 2021-06-11 RX ADMIN — Medication 0.6 MILLIGRAM(S): at 05:49

## 2021-06-11 RX ADMIN — CARVEDILOL PHOSPHATE 6.25 MILLIGRAM(S): 80 CAPSULE, EXTENDED RELEASE ORAL at 05:50

## 2021-06-11 RX ADMIN — ISOSORBIDE DINITRATE 10 MILLIGRAM(S): 5 TABLET ORAL at 11:49

## 2021-06-11 RX ADMIN — CARVEDILOL PHOSPHATE 6.25 MILLIGRAM(S): 80 CAPSULE, EXTENDED RELEASE ORAL at 18:47

## 2021-06-11 RX ADMIN — ATORVASTATIN CALCIUM 20 MILLIGRAM(S): 80 TABLET, FILM COATED ORAL at 21:44

## 2021-06-11 RX ADMIN — Medication 10 MILLIGRAM(S): at 21:44

## 2021-06-11 RX ADMIN — PANTOPRAZOLE SODIUM 40 MILLIGRAM(S): 20 TABLET, DELAYED RELEASE ORAL at 05:50

## 2021-06-11 RX ADMIN — ISOSORBIDE DINITRATE 10 MILLIGRAM(S): 5 TABLET ORAL at 05:50

## 2021-06-11 RX ADMIN — ISOSORBIDE DINITRATE 10 MILLIGRAM(S): 5 TABLET ORAL at 21:44

## 2021-06-11 RX ADMIN — PREGABALIN 1000 MICROGRAM(S): 225 CAPSULE ORAL at 11:48

## 2021-06-11 RX ADMIN — Medication 1 DROP(S): at 18:47

## 2021-06-11 RX ADMIN — Medication 10 MILLIGRAM(S): at 05:50

## 2021-06-11 RX ADMIN — Medication 1 DROP(S): at 06:40

## 2021-06-11 RX ADMIN — Medication 10 MILLIGRAM(S): at 13:28

## 2021-06-11 RX ADMIN — SENNA PLUS 2 TABLET(S): 8.6 TABLET ORAL at 21:44

## 2021-06-11 NOTE — PROGRESS NOTE ADULT - ASSESSMENT
75 year old male from home AAO x3, with PMH of T2DM, HTN, CKD, HLD, Gout and Glaucoma, who presented to the ED due to worsening SOB, likely secondary to CHF    Problem/Plan - 1:  ·  Problem:Acute Systolic Heart Failure .  Plan: patient presented to the ED due to worsening SOB mainly on exertion   likely secondary to fluid overload from CHF   CXR showing small left pleural effusion and mild opacities in right lung base   d-dimer of 1119, V/Q scan shows low probability of PE  NST high suspicion for multivessel disease,   Cath EF 31% out of proportion to extent of CAD  MRI Cardiac-r/o Infiltrative Cardiomyopathy        Problem/Plan - 4:  ·  Problem: Anemia.  Plan: noted to have Hgb of 12.9 on admission   no signs of bleeding noted   Hgb is higher compared to prior labs  Iron panel normal  monitor CBC daily.     Problem/Plan - 5:  ·  Problem: Chronic kidney disease.  Plan: noted to have creatinine of 2.06   baseline appears to be closer to 1.5-1.6 based on prior labs  Trend creatinine     Problem/Plan - 6:  Problem: HTN (hypertension). Plan: D/c'ed amlodipine, c/w Coreg, Isordil, Hydralazine  monitor BP and adjust meds as needed.    Problem/Plan - 7:  ·  Problem: DM (diabetes mellitus).  Plan: patient on Januvia at home  will start SSI and hold PO meds while in hospital  A1c 5.3  monitor BS and adjust insulin as needed.     Problem/Plan - 8:  ·  Problem: Gout.  Plan: continue home med of allopurinol and colchicine.     Problem/Plan - 9:  ·  Problem: Need for prophylactic measure.  Plan: Heparin SQ for DVT ppx.     Problem/Plan - 10:  Problem: Goals of care, counseling/discussion. Plan; discussed GOC with patient and sonBruce at bedside  patient is FULL CODE.

## 2021-06-12 LAB
ALBUMIN SERPL ELPH-MCNC: 3.6 G/DL — SIGNIFICANT CHANGE UP (ref 3.3–5)
ALP SERPL-CCNC: 86 U/L — SIGNIFICANT CHANGE UP (ref 40–120)
ALT FLD-CCNC: 41 U/L — SIGNIFICANT CHANGE UP (ref 10–45)
ANION GAP SERPL CALC-SCNC: 13 MMOL/L — SIGNIFICANT CHANGE UP (ref 5–17)
AST SERPL-CCNC: 45 U/L — HIGH (ref 10–40)
BILIRUB SERPL-MCNC: 0.5 MG/DL — SIGNIFICANT CHANGE UP (ref 0.2–1.2)
BUN SERPL-MCNC: 62 MG/DL — HIGH (ref 7–23)
CALCIUM SERPL-MCNC: 9.8 MG/DL — SIGNIFICANT CHANGE UP (ref 8.4–10.5)
CHLORIDE SERPL-SCNC: 102 MMOL/L — SIGNIFICANT CHANGE UP (ref 96–108)
CO2 SERPL-SCNC: 19 MMOL/L — LOW (ref 22–31)
CREAT SERPL-MCNC: 2.63 MG/DL — HIGH (ref 0.5–1.3)
GLUCOSE BLDC GLUCOMTR-MCNC: 101 MG/DL — HIGH (ref 70–99)
GLUCOSE BLDC GLUCOMTR-MCNC: 119 MG/DL — HIGH (ref 70–99)
GLUCOSE BLDC GLUCOMTR-MCNC: 122 MG/DL — HIGH (ref 70–99)
GLUCOSE BLDC GLUCOMTR-MCNC: 133 MG/DL — HIGH (ref 70–99)
GLUCOSE SERPL-MCNC: 150 MG/DL — HIGH (ref 70–99)
HCT VFR BLD CALC: 33.8 % — LOW (ref 39–50)
HGB BLD-MCNC: 11.1 G/DL — LOW (ref 13–17)
MCHC RBC-ENTMCNC: 32.8 GM/DL — SIGNIFICANT CHANGE UP (ref 32–36)
MCHC RBC-ENTMCNC: 32.9 PG — SIGNIFICANT CHANGE UP (ref 27–34)
MCV RBC AUTO: 100.3 FL — HIGH (ref 80–100)
NRBC # BLD: 0 /100 WBCS — SIGNIFICANT CHANGE UP (ref 0–0)
PLATELET # BLD AUTO: 142 K/UL — LOW (ref 150–400)
POTASSIUM SERPL-MCNC: 4.4 MMOL/L — SIGNIFICANT CHANGE UP (ref 3.5–5.3)
POTASSIUM SERPL-SCNC: 4.4 MMOL/L — SIGNIFICANT CHANGE UP (ref 3.5–5.3)
PROT SERPL-MCNC: 7.5 G/DL — SIGNIFICANT CHANGE UP (ref 6–8.3)
RBC # BLD: 3.37 M/UL — LOW (ref 4.2–5.8)
RBC # FLD: 14.8 % — HIGH (ref 10.3–14.5)
SODIUM SERPL-SCNC: 134 MMOL/L — LOW (ref 135–145)
WBC # BLD: 2.94 K/UL — LOW (ref 3.8–10.5)
WBC # FLD AUTO: 2.94 K/UL — LOW (ref 3.8–10.5)

## 2021-06-12 RX ADMIN — ISOSORBIDE DINITRATE 10 MILLIGRAM(S): 5 TABLET ORAL at 21:31

## 2021-06-12 RX ADMIN — CARVEDILOL PHOSPHATE 6.25 MILLIGRAM(S): 80 CAPSULE, EXTENDED RELEASE ORAL at 17:23

## 2021-06-12 RX ADMIN — SENNA PLUS 2 TABLET(S): 8.6 TABLET ORAL at 21:31

## 2021-06-12 RX ADMIN — PREGABALIN 1000 MICROGRAM(S): 225 CAPSULE ORAL at 12:34

## 2021-06-12 RX ADMIN — Medication 10 MILLIGRAM(S): at 13:59

## 2021-06-12 RX ADMIN — CARVEDILOL PHOSPHATE 6.25 MILLIGRAM(S): 80 CAPSULE, EXTENDED RELEASE ORAL at 05:47

## 2021-06-12 RX ADMIN — Medication 81 MILLIGRAM(S): at 12:33

## 2021-06-12 RX ADMIN — ISOSORBIDE DINITRATE 10 MILLIGRAM(S): 5 TABLET ORAL at 12:34

## 2021-06-12 RX ADMIN — Medication 1 DROP(S): at 06:45

## 2021-06-12 RX ADMIN — Medication 1 DROP(S): at 17:23

## 2021-06-12 RX ADMIN — PANTOPRAZOLE SODIUM 40 MILLIGRAM(S): 20 TABLET, DELAYED RELEASE ORAL at 05:47

## 2021-06-12 RX ADMIN — ISOSORBIDE DINITRATE 10 MILLIGRAM(S): 5 TABLET ORAL at 05:47

## 2021-06-12 RX ADMIN — ATORVASTATIN CALCIUM 20 MILLIGRAM(S): 80 TABLET, FILM COATED ORAL at 21:31

## 2021-06-12 RX ADMIN — Medication 0.6 MILLIGRAM(S): at 12:34

## 2021-06-12 RX ADMIN — Medication 1 MILLIGRAM(S): at 12:33

## 2021-06-12 RX ADMIN — Medication 10 MILLIGRAM(S): at 21:31

## 2021-06-12 RX ADMIN — Medication 10 MILLIGRAM(S): at 05:47

## 2021-06-13 LAB
ANION GAP SERPL CALC-SCNC: 15 MMOL/L — SIGNIFICANT CHANGE UP (ref 5–17)
BUN SERPL-MCNC: 66 MG/DL — HIGH (ref 7–23)
CALCIUM SERPL-MCNC: 10.4 MG/DL — SIGNIFICANT CHANGE UP (ref 8.4–10.5)
CHLORIDE SERPL-SCNC: 105 MMOL/L — SIGNIFICANT CHANGE UP (ref 96–108)
CO2 SERPL-SCNC: 17 MMOL/L — LOW (ref 22–31)
CREAT SERPL-MCNC: 2.93 MG/DL — HIGH (ref 0.5–1.3)
GLUCOSE BLDC GLUCOMTR-MCNC: 105 MG/DL — HIGH (ref 70–99)
GLUCOSE BLDC GLUCOMTR-MCNC: 109 MG/DL — HIGH (ref 70–99)
GLUCOSE BLDC GLUCOMTR-MCNC: 148 MG/DL — HIGH (ref 70–99)
GLUCOSE BLDC GLUCOMTR-MCNC: 152 MG/DL — HIGH (ref 70–99)
GLUCOSE SERPL-MCNC: 110 MG/DL — HIGH (ref 70–99)
POTASSIUM SERPL-MCNC: 4.6 MMOL/L — SIGNIFICANT CHANGE UP (ref 3.5–5.3)
POTASSIUM SERPL-SCNC: 4.6 MMOL/L — SIGNIFICANT CHANGE UP (ref 3.5–5.3)
SODIUM SERPL-SCNC: 137 MMOL/L — SIGNIFICANT CHANGE UP (ref 135–145)

## 2021-06-13 RX ORDER — FUROSEMIDE 40 MG
1 TABLET ORAL
Qty: 30 | Refills: 0
Start: 2021-06-13 | End: 2021-07-12

## 2021-06-13 RX ORDER — FUROSEMIDE 40 MG
1 TABLET ORAL
Qty: 30 | Refills: 0
Start: 2021-06-13 | End: 2021-08-04

## 2021-06-13 RX ORDER — PREGABALIN 225 MG/1
1 CAPSULE ORAL
Qty: 30 | Refills: 0
Start: 2021-06-13 | End: 2021-07-12

## 2021-06-13 RX ADMIN — SENNA PLUS 2 TABLET(S): 8.6 TABLET ORAL at 22:00

## 2021-06-13 RX ADMIN — Medication 81 MILLIGRAM(S): at 12:00

## 2021-06-13 RX ADMIN — ISOSORBIDE DINITRATE 10 MILLIGRAM(S): 5 TABLET ORAL at 05:12

## 2021-06-13 RX ADMIN — Medication 10 MILLIGRAM(S): at 05:12

## 2021-06-13 RX ADMIN — Medication 0.6 MILLIGRAM(S): at 12:00

## 2021-06-13 RX ADMIN — CARVEDILOL PHOSPHATE 6.25 MILLIGRAM(S): 80 CAPSULE, EXTENDED RELEASE ORAL at 17:06

## 2021-06-13 RX ADMIN — CARVEDILOL PHOSPHATE 6.25 MILLIGRAM(S): 80 CAPSULE, EXTENDED RELEASE ORAL at 05:12

## 2021-06-13 RX ADMIN — Medication 10 MILLIGRAM(S): at 22:00

## 2021-06-13 RX ADMIN — PREGABALIN 1000 MICROGRAM(S): 225 CAPSULE ORAL at 12:01

## 2021-06-13 RX ADMIN — PANTOPRAZOLE SODIUM 40 MILLIGRAM(S): 20 TABLET, DELAYED RELEASE ORAL at 05:12

## 2021-06-13 RX ADMIN — ISOSORBIDE DINITRATE 10 MILLIGRAM(S): 5 TABLET ORAL at 22:00

## 2021-06-13 RX ADMIN — ISOSORBIDE DINITRATE 10 MILLIGRAM(S): 5 TABLET ORAL at 12:00

## 2021-06-13 RX ADMIN — Medication 1 MILLIGRAM(S): at 12:00

## 2021-06-13 RX ADMIN — ATORVASTATIN CALCIUM 20 MILLIGRAM(S): 80 TABLET, FILM COATED ORAL at 21:59

## 2021-06-13 RX ADMIN — Medication 10 MILLIGRAM(S): at 13:06

## 2021-06-13 RX ADMIN — Medication 1 DROP(S): at 05:12

## 2021-06-13 RX ADMIN — Medication 1 DROP(S): at 17:08

## 2021-06-14 ENCOUNTER — TRANSCRIPTION ENCOUNTER (OUTPATIENT)
Age: 76
End: 2021-06-14

## 2021-06-14 VITALS
DIASTOLIC BLOOD PRESSURE: 74 MMHG | TEMPERATURE: 98 F | HEART RATE: 72 BPM | OXYGEN SATURATION: 99 % | SYSTOLIC BLOOD PRESSURE: 140 MMHG | RESPIRATION RATE: 18 BRPM

## 2021-06-14 LAB
GLUCOSE BLDC GLUCOMTR-MCNC: 114 MG/DL — HIGH (ref 70–99)
GLUCOSE BLDC GLUCOMTR-MCNC: 121 MG/DL — HIGH (ref 70–99)

## 2021-06-14 PROCEDURE — 93005 ELECTROCARDIOGRAM TRACING: CPT

## 2021-06-14 PROCEDURE — 85027 COMPLETE CBC AUTOMATED: CPT

## 2021-06-14 PROCEDURE — 93571 IV DOP VEL&/PRESS C FLO 1ST: CPT | Mod: LD

## 2021-06-14 PROCEDURE — A9585: CPT

## 2021-06-14 PROCEDURE — 80053 COMPREHEN METABOLIC PANEL: CPT

## 2021-06-14 PROCEDURE — 80048 BASIC METABOLIC PNL TOTAL CA: CPT

## 2021-06-14 PROCEDURE — 93460 R&L HRT ART/VENTRICLE ANGIO: CPT

## 2021-06-14 PROCEDURE — 82962 GLUCOSE BLOOD TEST: CPT

## 2021-06-14 PROCEDURE — 75561 CARDIAC MRI FOR MORPH W/DYE: CPT | Mod: 26

## 2021-06-14 PROCEDURE — C1887: CPT

## 2021-06-14 PROCEDURE — C1769: CPT

## 2021-06-14 PROCEDURE — 75561 CARDIAC MRI FOR MORPH W/DYE: CPT

## 2021-06-14 PROCEDURE — C1894: CPT

## 2021-06-14 RX ORDER — ASPIRIN/CALCIUM CARB/MAGNESIUM 324 MG
1 TABLET ORAL
Qty: 30 | Refills: 0
Start: 2021-06-14 | End: 2021-07-13

## 2021-06-14 RX ORDER — AMLODIPINE BESYLATE 2.5 MG/1
1 TABLET ORAL
Qty: 0 | Refills: 0 | DISCHARGE

## 2021-06-14 RX ADMIN — Medication 0.6 MILLIGRAM(S): at 11:37

## 2021-06-14 RX ADMIN — ISOSORBIDE DINITRATE 10 MILLIGRAM(S): 5 TABLET ORAL at 05:11

## 2021-06-14 RX ADMIN — PREGABALIN 1000 MICROGRAM(S): 225 CAPSULE ORAL at 11:37

## 2021-06-14 RX ADMIN — Medication 10 MILLIGRAM(S): at 05:11

## 2021-06-14 RX ADMIN — Medication 1 DROP(S): at 05:11

## 2021-06-14 RX ADMIN — Medication 1 MILLIGRAM(S): at 11:37

## 2021-06-14 RX ADMIN — CARVEDILOL PHOSPHATE 6.25 MILLIGRAM(S): 80 CAPSULE, EXTENDED RELEASE ORAL at 05:11

## 2021-06-14 RX ADMIN — PANTOPRAZOLE SODIUM 40 MILLIGRAM(S): 20 TABLET, DELAYED RELEASE ORAL at 05:11

## 2021-06-14 RX ADMIN — Medication 81 MILLIGRAM(S): at 11:36

## 2021-06-14 RX ADMIN — ISOSORBIDE DINITRATE 10 MILLIGRAM(S): 5 TABLET ORAL at 11:36

## 2021-06-14 NOTE — PROGRESS NOTE ADULT - SUBJECTIVE AND OBJECTIVE BOX
DATE OF SERVICE:  06/11/2021  Patient was seen and examined on  06/11/2021   .Interim events noted.Consultant notes ,Labs,Telemetry reviewed by me    PRESENTING CC:Dyspnea    HPI and HOSPITAL COURSE: HPI:  75 year old Malay speaking male (translaor ID [id number]) PMH of T2DM (a1C 5.3 Jun 2021), HTN, CKD III (baseline 1.5), HFrEF (40-45% june 2021), HLD, gout and glaucoma with left eye blindness presented to [facility] ED on 6/2 c/o exertional SOB 3 weeks PTA, found in, acute sytolic HF with runs of NSVT. CXR showing small left pleural effusion and mild opacities in right lung base- treated with IV lasix. Seen by renal- Renal US: Unremarkable renal ultrasound, now holding diuretic with fluid restrictions  Negative VQ scan. Trop x3: negative.  ECG: bradycardia with new LBBB. TTE with EF 40-45%. Nuclear stress test was completed on 6/7/21 with abnormal results (see Below). Transferred to [facility] for cardiac angiogram for further ischemic evaluation. OF note: Heme/ onc following patient at [facility]. M spike, 0.5 gm      NUCLEAR FINDINGS:  Review of raw data shows: Diaphragmatic artifact.  The left ventricle was mildly dilated LV at baseline.  There are small, severe defects in distal inferior & inferoapical walls that are fixed consistent with diaphragmatic attenuation artifact.      INTERIM EVENTS:Awake alert had cardiac cath-Severe stenosis of small to medium size D-1,Moderate stenosis proximal LAD, IFR negative,LVEF 31% is out of proportion to the CAD.LVEDP-12      PMH -reviewed admission note, no change since admission  Heart Failure: Acute [x ] Chronic [ ] Acute on Chronic [ ] Diastolic [ ] Systolic [x ] Combined Systolic and Diastolic[ ]  NELLY[ ]  ATN[ ]  CKD I [ ] CKDII [ ] CKD III [x ] CKD IV [ ] CKD V [ ] ESRD[ ]  HTN[x ] CVA[ ] DM[x ] COPD[ ] COVID[ ] AF[ ]  PPM[ ] ICD[ ]    MEDICATIONS  (STANDING):  aspirin  chewable 81 milliGRAM(s) Oral daily  atorvastatin 20 milliGRAM(s) Oral at bedtime  carvedilol 6.25 milliGRAM(s) Oral every 12 hours  colchicine 0.6 milliGRAM(s) Oral daily  cyanocobalamin 1000 MICROGram(s) Oral daily  folic acid 1 milliGRAM(s) Oral daily  hydrALAZINE 10 milliGRAM(s) Oral three times a day  isosorbide   dinitrate Tablet (ISORDIL) 10 milliGRAM(s) Oral three times a day  pantoprazole    Tablet 40 milliGRAM(s) Oral before breakfast  senna 2 Tablet(s) Oral at bedtime  timolol 0.5% Solution 1 Drop(s) Both EYES two times a day              REVIEW OF SYSTEMS:  Constitutional: [ ] fever, [ ]weight loss,  [x ]fatigue  Eyes: [ ] visual changes[x]Left eye blind  Respiratory: [ ]shortness of breath;  [ ] cough, [ ]wheezing, [ ]chills, [ ]hemoptysis  Cardiovascular: [ ] chest pain, [ ]palpitations, [ ]dizziness,  [ ]leg swelling[ ]orthopnea[ ]PND  Gastrointestinal: [ ] abdominal pain, [ ]nausea, [ ]vomiting,  [ ]diarrhea [ ]Constipation [ ]Melena  Genitourinary: [ ] dysuria, [ ] hematuria [ ]Morales  Neurologic: [ ] headaches [ ] tremors[ ]weakness [ ]Paralysis Right[ ] Left[ ]  Skin: [ ] itching, [ ]burning, [ ] rashes  Endocrine: [ ] heat or cold intolerance  Musculoskeletal: [ ] joint pain or swelling; [ ] muscle, back, or extremity pain  Psychiatric: [ ] depression, [ ]anxiety, [ ]mood swings, or [ ]difficulty sleeping  Hematologic: [ ] easy bruising, [ ] bleeding gums    [x] All remaining systems negative except as per above.   [ ]Unable to obtain.    Vital Signs Last 24 Hrs  T(C): 36.7 (11 Jun 2021 04:42), Max: 36.7 (10 Matthew 2021 12:19)  T(F): 98 (11 Jun 2021 04:42), Max: 98.1 (10 Matthew 2021 12:19)  HR: 58 (11 Jun 2021 04:42) (56 - 65)  BP: 134/80 (11 Jun 2021 04:42) (118/72 - 156/84)  BP(mean): 95 (10 Matthew 2021 18:55) (85 - 102)  RR: 17 (11 Jun 2021 04:42) (12 - 18)  SpO2: 97% (11 Jun 2021 04:42) (93% - 99%)  I&O's Summary    10 Matthew 2021 07:01  -  11 Jun 2021 06:32  --------------------------------------------------------  IN: 0 mL / OUT: 400 mL / NET: -400 mL        PHYSICAL EXAM:  General: No acute distress BMI-25  HEENT:L eye blindNeck: Supple, [ ] JVD  Lungs: Equal air entry bilaterally; [ ] rales [ ] wheezing [ ] rhonchi  Heart: Regular rate and rhythm; [x ] murmur  2/6 [x ] systolic [ ] diastolic [ ] radiation[ ] rubs [ ]  gallops  Abdomen: Nontender, bowel sounds present  Extremities: No clubbing, cyanosis, [ ] edema [ ]Pulses  equal and intact  Nervous system:  Alert & Oriented X3, no focal deficits  Psychiatric: Normal affect  Skin: No rashes or lesions    LABS:  06-11    136  |  103  |  57<H>  ----------------------------<  125<H>  4.9   |  20<L>  |  2.73<H>    Ca    10.0      11 Jun 2021 01:53  Phos  4.0     06-10  Mg     2.2     06-10      Creatinine Trend: 2.73<--, 2.59<--, 2.55<--, 2.64<--, 2.84<--, 2.58<--                        11.6   2.63  )-----------( 143      ( 11 Jun 2021 01:53 )             35.8                 RADIOLOGY:    ECG [my interpretation]:    TELEMETRY:Reviewed monitor tracings-    ECHO:Study Date: 6/3/2021  CONCLUSIONS:  1. Normal mitral valve. Mild to moderate mitral regurgitation.  2. Calcified trileaflet aortic valve with normal opening.  No aortic stenosis. Mild to moderate aortic regurgitation.  3. Normal aortic root.  4. Normal left atrium.  5. Normalleft ventricular internal dimensions and wall thicknesses.  6. Mild to moderate global left ventricular systolic dysfunction (EF 40-45% by visual estimation). Paradoxical septal motion is seen, consistent with conduction delay.  Not all LV wall segments were seen.  7. Grade II diastolic dysfunction.  8. Off axis images preclude accurate assessment of right ventricular size. Normal RV systolic function (TAPSE 2.5 cm).  9. RV systolic pressure is mildly increased at  36 mm Hg.  10. Normal tricuspid valve. Trace tricuspid regurgitation.  11. Normal pulmonic valve. Trace pulmonic insufficiency is noted.  12. No pericardial effusion.  13. Left pleural effusion.    *** Compared with echocardiogram report of 6/2/2019, there has been a decrease in LV systolic function.      CATHETERIZATION:  Study date: 06/10/2021  DIAGNOSTIC IMPRESSIONS: Severe stenosis of small to medium size D-1  Moderate stenosis proximal LAD, IFR negative  LVEF 31% is out of proportion to the CAD    DIAGNOSTIC RECOMMENDATIONS: Medical therapy for CAD  Optimize HF therapy  PCI to D-1 only if patient has angina        IMPRESSION AND PLAN:      
DATE OF SERVICE:  06/12/2021  Patient was seen and examined on 06/12/2021    .Interim events noted.Consultant notes ,Labs,Telemetry reviewed by me    PRESENTING CC:Dyspnea    HPI and HOSPITAL COURSE: HPI:  75 year old Yi speaking male (translaor ID [id number]) PMH of T2DM (a1C 5.3 Jun 2021), HTN, CKD III (baseline 1.5), HFrEF (40-45% june 2021), HLD, gout and glaucoma with left eye blindness presented to [facility] ED on 6/2 c/o exertional SOB 3 weeks PTA, found in, acute sytolic HF with runs of NSVT. CXR showing small left pleural effusion and mild opacities in right lung base- treated with IV lasix. Seen by renal- Renal US: Unremarkable renal ultrasound, now holding diuretic with fluid restrictions  Negative VQ scan. Trop x3: negative.  ECG: bradycardia with new LBBB. TTE with EF 40-45%. Nuclear stress test was completed on 6/7/21 with abnormal results (see Below). Transferred to [facility] for cardiac angiogram       OF note: Heme/ onc following patient at [facility]. M spike, 0.5 gm      INTERIM EVENTS:      PMH -reviewed admission note, no change since admission  Heart Failure: Acute [ ] Chronic [ ] Acute on Chronic [x ] Diastolic [ ] Systolic [ ] Combined Systolic and Diastolic[x ]  NELLY[ ]  ATN[ ]  CKD I [ ] CKDII [ ] CKD III [x ] CKD IV [ ] CKD V [ ] ESRD[ ]  HTN[x ] CVA[ ] DM[x ] COPD[ ] COVID[ ] AF[ ]  PPM[ ] ICD[ ]    MEDICATIONS  (STANDING):  aspirin  chewable 81 milliGRAM(s) Oral daily  atorvastatin 20 milliGRAM(s) Oral at bedtime  carvedilol 6.25 milliGRAM(s) Oral every 12 hours  colchicine 0.6 milliGRAM(s) Oral daily  cyanocobalamin 1000 MICROGram(s) Oral daily  folic acid 1 milliGRAM(s) Oral daily  glucagon  Injectable 1 milliGRAM(s) IntraMuscular once  hydrALAZINE 10 milliGRAM(s) Oral three times a day  insulin lispro (ADMELOG) corrective regimen sliding scale   SubCutaneous three times a day before meals  insulin lispro (ADMELOG) corrective regimen sliding scale   SubCutaneous at bedtime  isosorbide   dinitrate Tablet (ISORDIL) 10 milliGRAM(s) Oral three times a day  pantoprazole    Tablet 40 milliGRAM(s) Oral before breakfast  senna 2 Tablet(s) Oral at bedtime  timolol 0.5% Solution 1 Drop(s) Both EYES two times a day    REVIEW OF SYSTEMS:  Constitutional: [ ] fever, [ ]weight loss,  [ ]fatigue  Eyes: [ ] visual changes  Respiratory: [ ]shortness of breath;  [ ] cough, [ ]wheezing, [ ]chills, [ ]hemoptysis  Cardiovascular: [ ] chest pain, [ ]palpitations, [ ]dizziness,  [ ]leg swelling[ ]orthopnea[ ]PND  Gastrointestinal: [ ] abdominal pain, [ ]nausea, [ ]vomiting,  [ ]diarrhea [ ]Constipation [ ]Melena  Genitourinary: [ ] dysuria, [ ] hematuria [ ]Morales  Neurologic: [ ] headaches [ ] tremors[ ]weakness [ ]Paralysis Right[ ] Left[ ]  Skin: [ ] itching, [ ]burning, [ ] rashes  Endocrine: [ ] heat or cold intolerance  Musculoskeletal: [ ] joint pain or swelling; [ ] muscle, back, or extremity pain  Psychiatric: [ ] depression, [ ]anxiety, [ ]mood swings, or [ ]difficulty sleeping  Hematologic: [ ] easy bruising, [ ] bleeding gums    [x] All remaining systems negative except as per above.   [ ]Unable to obtain.    Vital Signs Last 24 Hrs  T(C): 36.8 (12 Jun 2021 04:54), Max: 36.8 (11 Jun 2021 19:58)  T(F): 98.3 (12 Jun 2021 04:54), Max: 98.3 (11 Jun 2021 19:58)  HR: 64 (12 Jun 2021 04:54) (60 - 73)  BP: 148/78 (12 Jun 2021 04:54) (113/72 - 153/83)  BP(mean): 81 (11 Jun 2021 13:30) (81 - 95)  RR: 18 (12 Jun 2021 04:54) (18 - 18)  SpO2: 98% (12 Jun 2021 04:54) (97% - 99%)  I&O's Summary    11 Jun 2021 07:01  -  12 Jun 2021 07:00  --------------------------------------------------------  IN: 240 mL / OUT: 500 mL / NET: -260 mL    12 Jun 2021 07:01  -  12 Jun 2021 09:18  --------------------------------------------------------  IN: 290 mL / OUT: 0 mL / NET: 290 mL        PHYSICAL EXAM:  General: No acute distress BMI-26  HEENT: EOMI, PERRL  Neck: Supple, [ ] JVD  Lungs: Equal air entry bilaterally; [ ] rales [ ] wheezing [ ] rhonchi  Heart: Regular rate and rhythm; [x ] murmur   2/6 [x] systolic [ ] diastolic [ ] radiation[ ] rubs [ ]  gallops  Abdomen: Nontender, bowel sounds present  Extremities: No clubbing, cyanosis, [ ] edema [ ]Pulses  equal and intact  Nervous system:  Alert & Oriented X3, no focal deficits  Psychiatric: Normal affect  Skin: No rashes or lesions    LABS:  06-11    136  |  103  |  57<H>  ----------------------------<  125<H>  4.9   |  20<L>  |  2.73<H>    Ca    10.0      11 Jun 2021 01:53      Creatinine Trend: 2.73<--, 2.59<--, 2.55<--, 2.64<--, 2.84<--, 2.58<--                        11.6   2.63  )-----------( 143      ( 11 Jun 2021 01:53 )             35.8             ECHO:Study Date: 6/3/2021  CONCLUSIONS:  1. Normal mitral valve. Mild to moderate mitral regurgitation.  2. Calcified trileaflet aortic valve with normal opening.  No aortic stenosis. Mild to moderate aortic regurgitation.  3. Normal aortic root.  4. Normal left atrium.  5. Normalleft ventricular internal dimensions and wall thicknesses.  6. Mild to moderate global left ventricular systolic dysfunction (EF 40-45% by visual estimation). Paradoxical septal motion is seen, consistent with conduction delay.  Not all LV wall segments were seen.  7. Grade II diastolic dysfunction.  8. Off axis images preclude accurate assessment of right ventricular size. Normal RV systolic function (TAPSE 2.5 cm).  9. RV systolic pressure is mildly increased at  36 mm Hg.  10. Normal tricuspid valve. Trace tricuspid regurgitation.  11. Normal pulmonic valve. Trace pulmonic insufficiency is noted.  12. No pericardial effusion.  13. Left pleural effusion.    *** Compared with echocardiogram report of 6/2/2019, there has been a decrease in LV systolic function.    CATHETERIZATION:  Study date: 06/10/2021  DIAGNOSTIC IMPRESSIONS: Severe stenosis of small to medium size D-1  Moderate stenosis proximal LAD, IFR negative  LVEF 31% is out of proportion to the CAD    DIAGNOSTIC RECOMMENDATIONS: Medical therapy for CAD  Optimize HF therapy  PCI to D-1 only if patient has angina      IMPRESSION AND PLAN:  Problem/Plan - 1:  ·  Problem:Acute Systolic Heart Failure .  Plan: patient presented to the ED due to worsening SOB mainly on exertion   likely secondary to fluid overload from CHF   CXR showing small left pleural effusion and mild opacities in right lung base   d-dimer of 1119, V/Q scan shows low probability of PE  NST high suspicion for multivessel disease,   Cath EF 31% out of proportion to extent of CAD  MRI Cardiac-r/o Infiltrative Cardiomyopathy        Problem/Plan - 4:  ·  Problem: Anemia.  Plan: noted to have Hgb of 12.9 on admission   no signs of bleeding noted   Hgb is higher compared to prior labs  Iron panel normal  monitor CBC daily.     Problem/Plan - 5:  ·  Problem:  Acute on Chronic kidney disease.  Plan: noted to have creatinine of 2.06   baseline appears to be closer to 1.5-1.6 based on prior labs  Trend creatinine     Problem/Plan - 6:  Problem: HTN (hypertension). Plan: D/c'ed amlodipine, c/w Coreg, Isordil, Hydralazine  monitor BP and adjust meds as needed.        Problem/Plan - 7:  ·  Problem: DM (diabetes mellitus).  Plan: patient on Januvia at home  will start SSI and hold PO meds while in hospital  A1c 5.3  monitor BS and adjust insulin as needed.     Problem/Plan - 8:  ·  Problem: Gout.  Plan: continue home med of allopurinol and colchicine.     Problem/Plan - 9:  ·  Problem: Need for prophylactic measure.  Plan: Heparin SQ for DVT ppx.     Problem/Plan - 10:  Problem: Goals of care, counse    
DATE OF SERVICE:06/13/2021    Patient was seen and examined on 06/13/2021  .Interim events noted.Consultant notes ,Labs,Telemetry reviewed by me    PRESENTING CC:Dyspnea    HPI and HOSPITAL COURSE: HPI:  75 year old Frisian speaking male  PMH of T2DM (a1C 5.3 Jun 2021), HTN, CKD III (baseline 1.5), HFrEF (40-45% june 2021), HLD, gout and glaucoma with left eye blindness presented to [facility] ED on 6/2 c/o exertional SOB 3 weeks PTA, found in, acute sytolic HF with runs of NSVT. CXR showing small left pleural effusion and mild opacities in right lung base- treated with IV lasix. Seen by renal- Renal US: Unremarkable renal ultrasound, now holding diuretic with fluid restrictions  Negative VQ scan. Trop x3: negative.  ECG: bradycardia with new LBBB. TTE with EF 40-45%. Nuclear stress test was completed on 6/7/21 with abnormal results (see Below). Transferred to [facility] for cardiac angiogram for further ischemic evaluation.  Had cardiac cath-Severe stenosis of small to medium size D-1,Moderate stenosis proximal LAD, IFR negative,LVEF 31% is out of proportion to the CAD.LVEDP-12      INTERIM EVENTS:Awake alert no dyspnea no overnight events awaiting Cardiac MRI      PMH -reviewed admission note, no change since admission  Heart Failure: Acute [x ] Chronic [ ] Acute on Chronic [ ] Diastolic [ ] Systolic [ ] Combined Systolic and Diastolic[x ]  NELLY[ ]  ATN[ ]  CKD I [ ] CKDII [ ] CKD III [x ] CKD IV [ ] CKD V [ ] ESRD[ ]  HTN[x ] CVA[ ] DM[x ] COPD[ ] COVID[ ] AF[ ]  PPM[ ] ICD[ ]    MEDICATIONS  (STANDING):  aspirin  chewable 81 milliGRAM(s) Oral daily  atorvastatin 20 milliGRAM(s) Oral at bedtime  carvedilol 6.25 milliGRAM(s) Oral every 12 hours  colchicine 0.6 milliGRAM(s) Oral daily  cyanocobalamin 1000 MICROGram(s) Oral daily  folic acid 1 milliGRAM(s) Oral daily  glucagon  Injectable 1 milliGRAM(s) IntraMuscular once  hydrALAZINE 10 milliGRAM(s) Oral three times a day  insulin lispro (ADMELOG) corrective regimen sliding scale   SubCutaneous three times a day before meals  insulin lispro (ADMELOG) corrective regimen sliding scale   SubCutaneous at bedtime  isosorbide   dinitrate Tablet (ISORDIL) 10 milliGRAM(s) Oral three times a day  pantoprazole    Tablet 40 milliGRAM(s) Oral before breakfast  senna 2 Tablet(s) Oral at bedtime  timolol 0.5% Solution 1 Drop(s) Both EYES two times a day              REVIEW OF SYSTEMS:  Constitutional: [ ] fever, [ ]weight loss,  [ ]fatigue  Eyes: [ ] visual changes  Respiratory: [ ]shortness of breath;  [ ] cough, [ ]wheezing, [ ]chills, [ ]hemoptysis  Cardiovascular: [ ] chest pain, [ ]palpitations, [ ]dizziness,  [ ]leg swelling[ ]orthopnea[ ]PND  Gastrointestinal: [ ] abdominal pain, [ ]nausea, [ ]vomiting,  [ ]diarrhea [ ]Constipation [ ]Melena  Genitourinary: [ ] dysuria, [ ] hematuria [ ]Morales  Neurologic: [ ] headaches [ ] tremors[ ]weakness [ ]Paralysis Right[ ] Left[ ]  Skin: [ ] itching, [ ]burning, [ ] rashes  Endocrine: [ ] heat or cold intolerance  Musculoskeletal: [ ] joint pain or swelling; [ ] muscle, back, or extremity pain  Psychiatric: [ ] depression, [ ]anxiety, [ ]mood swings, or [ ]difficulty sleeping  Hematologic: [ ] easy bruising, [ ] bleeding gums    [x] All remaining systems negative except as per above.   [ ]Unable to obtain.    Vital Signs Last 24 Hrs  T(C): 36.9 (13 Jun 2021 04:52), Max: 36.9 (13 Jun 2021 04:52)  T(F): 98.4 (13 Jun 2021 04:52), Max: 98.4 (13 Jun 2021 04:52)  HR: 65 (13 Jun 2021 04:52) (59 - 69)  BP: 144/81 (13 Jun 2021 04:52) (119/69 - 144/81)  RR: 18 (13 Jun 2021 04:52) (18 - 18)  SpO2: 96% (13 Jun 2021 04:52) (96% - 98%)  I&O's Summary    12 Jun 2021 07:01  -  13 Jun 2021 07:00  --------------------------------------------------------  IN: 900 mL / OUT: 550 mL / NET: 350 mL        PHYSICAL EXAM:  General: No acute distress BMI-26  HEENT: EOMI, PERRL  Neck: Supple, [ ] JVD  Lungs: Equal air entry bilaterally; [ ] rales [ ] wheezing [ ] rhonchi  Heart: Regular rate and rhythm; [x ] murmur   2/6 [x ] systolic [ ] diastolic [ ] radiation[ ] rubs [ ]  gallops  Abdomen: Nontender, bowel sounds present  Extremities: No clubbing, cyanosis, [ ] edema [ ]Pulses  equal and intact  Nervous system:  Alert & Oriented X3, no focal deficits  Psychiatric: Normal affect  Skin: No rashes or lesions    LABS:  06-12    134<L>  |  102  |  62<H>  ----------------------------<  150<H>  4.4   |  19<L>  |  2.63<H>    Ca    9.8      12 Jun 2021 10:31    TPro  7.5  /  Alb  3.6  /  TBili  0.5  /  DBili  x   /  AST  45<H>  /  ALT  41  /  AlkPhos  86  06-12    Creatinine Trend: 2.63<--, 2.73<--, 2.59<--, 2.55<--, 2.64<--, 2.84<--                        11.1   2.94  )-----------( 142      ( 12 Jun 2021 10:31 )             33.8         ECHO:Study Date: 6/3/2021  CONCLUSIONS:  1. Normal mitral valve. Mild to moderate mitral regurgitation.  2. Calcified trileaflet aortic valve with normal opening.  No aortic stenosis. Mild to moderate aortic regurgitation.  3. Normal aortic root.  4. Normal left atrium.  5. Normalleft ventricular internal dimensions and wall thicknesses.  6. Mild to moderate global left ventricular systolic dysfunction (EF 40-45% by visual estimation). Paradoxical septal motion is seen, consistent with conduction delay.  Not all LV wall segments were seen.  7. Grade II diastolic dysfunction.  8. Off axis images preclude accurate assessment of right ventricular size. Normal RV systolic function (TAPSE 2.5 cm).  9. RV systolic pressure is mildly increased at  36 mm Hg.  10. Normal tricuspid valve. Trace tricuspid regurgitation.  11. Normal pulmonic valve. Trace pulmonic insufficiency is noted.  12. No pericardial effusion.  13. Left pleural effusion.    *** Compared with echocardiogram report of 6/2/2019, there has been a decrease in LV systolic function.      CATHETERIZATION:  Study date: 06/10/2021  DIAGNOSTIC IMPRESSIONS: Severe stenosis of small to medium size D-1  Moderate stenosis proximal LAD, IFR negative  LVEF 31% is out of proportion to the CAD    DIAGNOSTIC RECOMMENDATIONS: Medical therapy for CAD  Optimize HF therapy  PCI to D-1 only if patient has angina      IMPRESSION AND PLAN:  Problem/Plan - 1:  ·  Problem:Acute Systolic Heart Failure .  Plan: patient presented to the ED due to worsening SOB mainly on exertion   likely secondary to fluid overload from CHF   CXR showing small left pleural effusion and mild opacities in right lung base   d-dimer of 1119, V/Q scan shows low probability of PE  NST high suspicion for multivessel disease,   Cath EF 31% out of proportion to extent of CAD  MRI Cardiac-r/o Infiltrative Cardiomyopathy        Problem/Plan - 4:  ·  Problem: Anemia.  Plan: noted to have Hgb of 12.9 on admission   no signs of bleeding noted   Hgb is higher compared to prior labs  Iron panel normal  monitor CBC daily.     Problem/Plan - 5:  ·  Problem:  Acute on Chronic kidney disease.  Plan: noted to have creatinine of 2.06   baseline appears to be closer to 1.5-1.6 based on prior labs  Trend creatinine   Creatinine at around baseline and stable      Problem/Plan - 6:  Problem: HTN (hypertension). Plan: D/c'ed amlodipine, c/w Coreg, Isordil, Hydralazine  monitor BP and adjust meds as needed.        Problem/Plan - 7:  ·  Problem: DM (diabetes mellitus).  Plan: patient on Januvia at home  will start SSI and hold PO meds while in hospital  A1c 5.3  monitor BS and adjust insulin as needed.     Problem/Plan - 8:  ·  Problem: Gout.  Plan: continue home med of allopurinol and colchicine.     Problem/Plan - 9:  ·  Problem: Need for prophylactic measure.  Plan: Heparin SQ for DVT ppx.     Problem/Plan - 10:  Problem: Goals of care, counselled        
DATE OF SERVICE:  06/14/2021  Patient was seen and examined on  06/14/2021   .Interim events noted.Consultant notes ,Labs,Telemetry reviewed by me    PRESENTING CC:Dyspnea    HPI and HOSPITAL COURSE: 75 year old Kyrgyz speaking male  PMH of T2DM (a1C 5.3 Jun 2021), HTN, CKD III (baseline 1.5), HFrEF (40-45% june 2021), HLD, gout and glaucoma with left eye blindness presented to [facility] ED on 6/2 c/o exertional SOB 3 weeks PTA, found in, acute sytolic HF with runs of NSVT. CXR showing small left pleural effusion and mild opacities in right lung base- treated with IV lasix. Seen by renal- Renal US: Unremarkable renal ultrasound, now holding diuretic with fluid restrictions  Negative VQ scan. Trop x3: negative.  ECG: bradycardia with new LBBB. TTE with EF 40-45%. Nuclear stress test was completed on 6/7/21 with abnormal results (see Below). Transferred to [facility] for cardiac angiogram for further ischemic evaluation.    INTERIM EVENTS:Had cardiac cath-Severe stenosis of small to medium size D-1,Moderate stenosis proximal LAD, IFR negative,LVEF 31% is out of proportion to the CAD.LVEDP-12  Dyspnea has improved MRI-Cardiac-The LV is dilated with decreased systolic function; LVEF: 29%..Linear mid wall Late Gadolinium Enhancement within basilar interventricular septum suggestive of idiopathic dilated cardiomyopathy. No evidence of infiltrative cardiomyopathy.      [facility] -reviewed admission note, no change since admission  Heart Failure: Acute [x ] Chronic [ ] Acute on Chronic [ ] Diastolic [ ] Systolic [ ] Combined Systolic and Diastolic[x ]  NELLY[ ]  ATN[ ]  CKD I [ ] CKDII [ ] CKD III [x ] CKD IV [ ] CKD V [ ] ESRD[ ]  HTN[x ] CVA[ ] DM[x ] COPD[ ] COVID[ ] AF[ ]  PPM[ ] ICD[ ]    MEDICATIONS  (STANDING):  aspirin  chewable 81 milliGRAM(s) Oral daily  atorvastatin 20 milliGRAM(s) Oral at bedtime  carvedilol 6.25 milliGRAM(s) Oral every 12 hours  colchicine 0.6 milliGRAM(s) Oral daily  cyanocobalamin 1000 MICROGram(s) Oral daily  folic acid 1 milliGRAM(s) Oral daily  glucagon  Injectable 1 milliGRAM(s) IntraMuscular once  hydrALAZINE 10 milliGRAM(s) Oral three times a day  insulin lispro (ADMELOG) corrective regimen sliding scale   SubCutaneous three times a day before meals  insulin lispro (ADMELOG) corrective regimen sliding scale   SubCutaneous at bedtime  isosorbide   dinitrate Tablet (ISORDIL) 10 milliGRAM(s) Oral three times a day  pantoprazole    Tablet 40 milliGRAM(s) Oral before breakfast  senna 2 Tablet(s) Oral at bedtime  timolol 0.5% Solution 1 Drop(s) Both EYES two times a day            REVIEW OF SYSTEMS:  Constitutional: [ ] fever, [ ]weight loss,  [x ]fatigue  Eyes: [ ] visual changes  Respiratory: [ ]shortness of breath;  [ ] cough, [ ]wheezing, [ ]chills, [ ]hemoptysis  Cardiovascular: [ ] chest pain, [ ]palpitations, [ ]dizziness,  [ ]leg swelling[ ]orthopnea[ ]PND  Gastrointestinal: [ ] abdominal pain, [ ]nausea, [ ]vomiting,  [ ]diarrhea [ ]Constipation [ ]Melena  Genitourinary: [ ] dysuria, [ ] hematuria [ ]Morales  Neurologic: [ ] headaches [ ] tremors[ ]weakness [ ]Paralysis Right[ ] Left[ ]  Skin: [ ] itching, [ ]burning, [ ] rashes  Endocrine: [ ] heat or cold intolerance  Musculoskeletal: [ ] joint pain or swelling; [ ] muscle, back, or extremity pain  Psychiatric: [ ] depression, [ ]anxiety, [ ]mood swings, or [ ]difficulty sleeping  Hematologic: [ ] easy bruising, [ ] bleeding gums    [x] All remaining systems negative except as per above.   [ ]Unable to obtain.    Vital Signs Last 24 Hrs  T(C): 36.4 (14 Jun 2021 12:12), Max: 36.4 (14 Jun 2021 12:12)  T(F): 97.6 (14 Jun 2021 12:12), Max: 97.6 (14 Jun 2021 12:12)  HR: 72 (14 Jun 2021 12:12) (68 - 72)  BP: 140/74 (14 Jun 2021 12:12) (127/65 - 140/74)  RR: 18 (14 Jun 2021 12:12) (16 - 18)  SpO2: 99% (14 Jun 2021 12:12) (96% - 99%)  I&O's Summary    13 Jun 2021 07:01  -  14 Jun 2021 07:00  --------------------------------------------------------  IN: 850 mL / OUT: 1000 mL / NET: -150 mL    14 Jun 2021 07:01  -  15 Jun 2021 06:46  --------------------------------------------------------  IN: 240 mL / OUT: 0 mL / NET: 240 mL        PHYSICAL EXAM:  General: No acute distress BMI-26  HEENT: EOMI, PERRL  Neck: Supple, [ ] JVD  Lungs: Equal air entry bilaterally; [ ] rales [ ] wheezing [ ] rhonchi  Heart: Regular rate and rhythm; [x ] murmur   2/6 [ x] systolic [ ] diastolic [ ] radiation[ ] rubs [ ]  gallops  Abdomen: Nontender, bowel sounds present  Extremities: No clubbing, cyanosis, [ ] edema [ ]Pulses  equal and intact  Nervous system:  Alert & Oriented X3, no focal deficits  Psychiatric: Normal affect  Skin: No rashes or lesions    LABS:  06-13    137  |  105  |  66<H>  ----------------------------<  110<H>  4.6   |  17<L>  |  2.93<H>    Ca    10.4      13 Jun 2021 06:51      Creatinine Trend: 2.93<--, 2.63<--, 2.73<--, 2.59<--, 2.55<--, 2.64<--      ECHO:Study Date: 6/3/2021  CONCLUSIONS:  1. Normal mitral valve. Mild to moderate mitral regurgitation.  2. Calcified trileaflet aortic valve with normal opening.  No aortic stenosis. Mild to moderate aortic regurgitation.  3. Normal aortic root.  4. Normal left atrium.  5. Normalleft ventricular internal dimensions and wall thicknesses.  6. Mild to moderate global left ventricular systolic dysfunction (EF 40-45% by visual estimation). Paradoxical septal motion is seen, consistent with conduction delay.  Not all LV wall segments were seen.  7. Grade II diastolic dysfunction.  8. Off axis images preclude accurate assessment of right ventricular size. Normal RV systolic function (TAPSE 2.5 cm).  9. RV systolic pressure is mildly increased at  36 mm Hg.  10. Normal tricuspid valve. Trace tricuspid regurgitation.  11. Normal pulmonic valve. Trace pulmonic insufficiency is noted.  12. No pericardial effusion.  13. Left pleural effusion.    *** Compared with echocardiogram report of 6/2/2019, there has been a decrease in LV systolic function.      CATHETERIZATION:  Study date: 06/10/2021  DIAGNOSTIC IMPRESSIONS: Severe stenosis of small to medium size D-1  Moderate stenosis proximal LAD, IFR negative  LVEF 31% is out of proportion to the CAD    DIAGNOSTIC RECOMMENDATIONS: Medical therapy for CAD  Optimize HF therapy  PCI to D-1 only if patient has angina      IMPRESSION AND PLAN:  Problem/Plan - 1:  ·  Problem:Acute Systolic Heart Failure .  Plan: patient presented to the ED due to worsening SOB mainly on exertion   likely secondary to fluid overload from CHF   CXR showing small left pleural effusion and mild opacities in right lung base   d-dimer of 1119, V/Q scan shows low probability of PE  NST high suspicion for multivessel disease,   Cath EF 31% out of proportion to extent of CAD  MRI Cardiac-No evidence for  Infiltrative Cardiomyopathy  Discharge on HF meds  No ACSE/ARB 2/2 CKD        Problem/Plan - 4:  ·  Problem: Anemia.  Plan: noted to have Hgb of 12.9 on admission   no signs of bleeding noted   Hgb is higher compared to prior labs  Iron panel normal  monitor CBC daily.     Problem/Plan - 5:  ·  Problem:  Acute on Chronic kidney disease.  Plan: noted to have creatinine of 2.06   baseline appears to be closer to 1.5-1.6 based on prior labs  Trend creatinine   Creatinine at around baseline and stable will follow as outpatient      Problem/Plan - 6:  Problem: HTN (hypertension). Plan: D/c'ed amlodipine, c/w Coreg, Isordil, Hydralazine  monitor BP and adjust meds as needed.        Problem/Plan - 7:  ·  Problem: DM (diabetes mellitus).  Plan: patient on Januvia at home  will start SSI and hold PO meds while in hospital  A1c 5.3  monitor BS and adjust insulin as needed.     Problem/Plan - 8:  ·  Problem: Gout.  Plan: continue home med of allopurinol and colchicine.     Problem/Plan - 9:  ·  Problem: Need for prophylactic measure.  Plan: Heparin SQ for DVT ppx.     Problem/Plan - 10:  Problem: Goals of care, counselled

## 2021-06-14 NOTE — DISCHARGE NOTE NURSING/CASE MANAGEMENT/SOCIAL WORK - PATIENT PORTAL LINK FT
You can access the FollowMyHealth Patient Portal offered by [facility] by registering at the following website: [email or web address]. By joining [employer]’s FollowMyHealth portal, you will also be able to view your health information using other applications (apps) compatible with our system.

## 2021-06-14 NOTE — CHART NOTE - NSCHARTNOTEFT_GEN_A_CORE
Pt status post cardiac MRI. Reviewed results regarding reported EF 29% with Dr. Cui. Pt to be discharged, follow up with Dr. Cui in the office this week. Continue current medications. Pt without infiltrative CM. Noted CKD, reviewed with Dr. Cui. NO need for repeat lab work. Pt to go home today , follow up with Dr. Art in the office for management of CKD. Discussed with patient and son. Discharge home.

## 2021-06-14 NOTE — PHYSICAL THERAPY INITIAL EVALUATION ADULT - PERTINENT HX OF CURRENT PROBLEM, REHAB EVAL
75 year old male from home AAO x3, with PMH of T2DM, HTN, CKD, HLD, Gout and Glaucoma, who presented to the ED due to worsening SOB, likely secondary to CHF Dx: Acute Systolic HF, Anemia, Chronic Kidney Disease, HTN, DM, Gout,

## 2021-06-14 NOTE — PROGRESS NOTE ADULT - TIME BILLING
- Review of records, telemetry, vital signs and daily labs.   - General and cardiovascular physical examination.  - Generation of cardiovascular treatment plan.  - Coordination of care.      Patient was seen and examined by me on 06/14/2021,interim events noted,labs and radiology studies reviewed.  Vamsi Cui MD,FACC.  [address] [place], [place] [id number].  [phone]
- Review of records, telemetry, vital signs and daily labs.   - General and cardiovascular physical examination.  - Generation of cardiovascular treatment plan.  - Coordination of care.      Patient was seen and examined by me on 06/11/2021,interim events noted,labs and radiology studies reviewed.  Vamsi Cui MD,FACC.  [address] [place], [place] [id number].  [phone]
- Review of records, telemetry, vital signs and daily labs.   - General and cardiovascular physical examination.  - Generation of cardiovascular treatment plan.  - Coordination of care.      Patient was seen and examined by me on 06/12/2021,interim events noted,labs and radiology studies reviewed.  Vamsi Cui MD,FACC.  [address] [place], [place] [id number].  [phone]
- Review of records, telemetry, vital signs and daily labs.   - General and cardiovascular physical examination.  - Generation of cardiovascular treatment plan.  - Coordination of care.      Patient was seen and examined by me on 06/13/2021,interim events noted,labs and radiology studies reviewed.  Vamsi Cui MD,FACC.  [address] [place], [place] [id number].  [phone]

## 2021-06-15 NOTE — ED ADULT TRIAGE NOTE - WEIGHT IN KG
83
You can access the FollowMyHealth Patient Portal offered by [facility] by registering at the following website: [email or web address]. By joining [employer]’s FollowMyHealth portal, you will also be able to view your health information using other applications (apps) compatible with our system.

## 2021-07-06 RX ORDER — CARVEDILOL PHOSPHATE 80 MG/1
1 CAPSULE, EXTENDED RELEASE ORAL
Qty: 60 | Refills: 5
Start: 2021-07-06 | End: 2022-01-01

## 2021-07-06 RX ORDER — FUROSEMIDE 40 MG
1 TABLET ORAL
Qty: 30 | Refills: 5
Start: 2021-07-06 | End: 2022-01-01

## 2021-07-06 RX ORDER — ROSUVASTATIN CALCIUM 5 MG/1
1 TABLET ORAL
Qty: 0 | Refills: 0 | DISCHARGE

## 2021-08-30 NOTE — ED PROVIDER NOTE - NEUROLOGICAL, MLM
Hospitalist Progress Note    NAME: Sriram Rater   :  1969   MRN:  [id number]       Assessment / Plan:  Acute hypoxic respiratory failure, secondary to COVID-19 PNA POA   Continue droplet plus isolation and breathing treatments. Continue Decadron 6 mg daily  Patient is outside window for Actemra as per hospital criteria  Continue all other supportive cares including oxygen. Chest x-ray showed bilateral interstitial airspace infiltrates which is consistent with Covid pneumonia. Continue Mucinex  Cont Vitamin C   Continue zinc  Pepcid 20 mg daily. Currently on 2 L oxygen via nasal cannula not in respiratory distress. Patient reported that she does not feel comfortable going home today as she said she does not have any help and she would like her tomorrow  Oxygen challenge test, discussed with  to discharge on home oxygen    Hypokalemia  corrected      Code Status: Full  Surrogate Decision Maker: Spouse  DVT Prophylaxis: Lovenox  GI Prophylaxis: not indicated  Baseline: Independent    Anticipated discharge tomorrow  Clinical patient, patient seen for consult oncology disease and apnea but home and she will be discharged with oxygen      Body mass index is 36.28 kg/m².: 30.0 - 39.9 Obese         Subjective:     Patient was seen and examined. No acute events overnight. Discussed with RN overnight events. All patient's questions were answered. \"doing well but I am really very concerning going on oxygen\"    Review of Systems:  Symptom Y/N Comments  Symptom Y/N Comments   Fever/Chills n   Chest Pain n    Poor Appetite    Edema     Cough n   Abdominal Pain n    Sputum    Joint Pain     SOB/PATEL n   Pruritis/Rash     Nausea/vomit n   Tolerating PT/OT     Diarrhea    Tolerating Diet y    Constipation    Other       Could NOT obtain due to:              Objective:     VITALS:   Last 24hrs VS reviewed since prior progress note.  Most recent are:  Patient Vitals for the past 24 hrs: Temp Pulse Resp BP SpO2   08/30/21 1208     91 %   08/30/21 1051 98.3 °F (36.8 °C) 76 20 106/61 95 %   08/30/21 0830     93 %   08/30/21 0806 97.7 °F (36.5 °C) 70 20 112/69 93 %   08/30/21 0130 98 °F (36.7 °C) 67 20 123/69 93 %   08/30/21 0000  60 21 (!) 143/75 96 %   08/29/21 2200  73 27 120/69 96 %   08/29/21 2044 98.4 °F (36.9 °C) 80 18 108/65 92 %   08/29/21 1924 98.3 °F (36.8 °C) 88 24 115/61 93 %   08/29/21 1720  84 13  93 %   08/29/21 1654  87 22 113/67 92 %   08/29/21 1518     93 %   08/29/21 1400  76 27 102/70 91 %       Intake/Output Summary (Last 24 hours) at 8/30/2021 1327  Last data filed at 8/30/2021 1052  Gross per 24 hour   Intake    Output 425 ml   Net -425 ml        PHYSICAL EXAM:  General: WD, WN. Alert, cooperative, no acute distress    EENT:  EOMI. Anicteric sclerae. MMM  Resp:  Decreased air entry in the lower lung fields bilateral posterior. No wheezing no crackles CV:  Regular  rhythm,  No edema  GI:  Soft, Non distended, Non tender.  +Bowel sounds  Neurologic:  Alert and oriented X 3, normal speech,   Psych:   Good insight. Not anxious nor agitated  Skin:  No rashes. No jaundice    Reviewed most current lab test results and cultures  YES  Reviewed most current radiology test results   YES  Review and summation of old records today    NO  Reviewed patient's current orders and MAR    YES  PMH/SH reviewed - no change compared to H&P  ________________________________________________________________________  Care Plan discussed with:    Comments   Patient y    Family      RN y    Care Manager     Consultant                        Multidiciplinary team rounds were held today with , nursing, pharmacist and clinical coordinator. Patient's plan of care was discussed; medications were reviewed and discharge planning was addressed.      ________________________________________________________________________  Total NON critical care TIME: 35   Minutes    Total CRITICAL CARE TIME Spent:   Minutes non procedure based      Comments   >50% of visit spent in counseling and coordination of care     ________________________________________________________________________  Viji Brower MD     Procedures: see electronic medical records for all procedures/Xrays and details which were not copied into this note but were reviewed prior to creation of Plan. LABS:  I reviewed today's most current labs and imaging studies.   Pertinent labs include:  Recent Labs     08/30/21  0449 08/28/21  0314   WBC 9.3 4.6   HGB 11.5 11.6   HCT 35.5 35.1    245     Recent Labs     08/30/21 0449 08/28/21  0314    137   K 4.0 4.0    105   CO2 27 24   GLU 95 131*   BUN 15 9   CREA 0.68 0.62   CA 8.9 8.2*   MG  --  2.0   PHOS  --  1.7*   ALB 2.6* 2.6*   TBILI 0.4 0.4   ALT 39 27       Signed: Viji Brower MD Alert and oriented, no focal deficits, no motor or sensory deficits.

## 2021-08-31 NOTE — ED ADULT TRIAGE NOTE - MODE OF ARRIVAL
[id number] [facility] [address] [id number]  Dept: [phone]  Loc: [phone]     Dawson Graham is a 29 y.o. male who presents today for:  Chief Complaint   Patient presents with    1 Month Follow-Up     chest pain - off and on    Referral - General     Hard of hearing    Other     night terrors       Goals    None         HPI:     HPI  29-year-old male here for follow-up on chest pain. Plans for spinal fusion in November at [facility]. V's. Feels good about everything that is going to happen. He is also trying to get permanent handicap tag as well as disability. Chest pain  Family history of early CAD  - Still having the sharp, radiating chest pain, but is less often than previously. He is still waking up at night with it. States he wakes up sweating, drenched and has some chest pain, dyspnea occasionally. Episodes last 15-45 minutes. Also has some chest pain when playing with the kids. Still needs stress test, but unable to do the exercise stress due to back pain and upcoming surgery. He did not see cardiology yet.      Tobacco use disorder  - Refilled Chantix, but they did not have it at the pharmacy. He started vaping and has now made a pack of cigarettes last 4-5 days (previously was 1/2 PPD). Open to hypnotherapy if continues to have trouble obtaining medications.      Generalized anxiety disorder / Borderline personality disorder / Recurrent major depressive disorder / Bipolar disorder / PTSD  - Follows with Dr. Logan Christianson. Having bad night terrors - worse than normal. Currently on Prazosin and Trazodone - but does not feel like they are as effective. Anxiety is getting pretty bad - Lexapro was just adjusted. Has appointment with Dr. Lowell Hidalgo 9/16/21. Wife thinks he's been doing pretty well with the depression, because he's been on a high the last two weeks.  He gets really into making TikToks and then focuses on that over other things and can sometimes make rash decisions. Hearing   Left ear issues, since he was 16 - did previously have frequent ear infections and had tubes. He does not feel like the ear is plugged or sound is muffled, but just diminished. Does endorses some tinnitus and occasional pain. His right ear is very sensitive to sound and he gets easily upset by it.        Current Outpatient Medications   Medication Sig Dispense Refill    escitalopram (LEXAPRO) 20 MG tablet Take 1.5 tablets by mouth daily 45 tablet 2    traZODone (DESYREL) 150 MG tablet Take 1 tablet by mouth nightly 30 tablet 2    prazosin (MINIPRESS) 2 MG capsule Take 1 capsule by mouth nightly Along with 1 mg cap for total dose 3 mg at night 30 capsule 2    lamoTRIgine (LAMICTAL) 200 MG tablet Take 1 tablet by mouth 2 times daily 60 tablet 2    prazosin (MINIPRESS) 1 MG capsule Take 1 capsule by mouth nightly Along with 2 mg cap for total dose 3 mg at night. 30 capsule 2    rOPINIRole (REQUIP) 1 MG tablet Take 1 tablet by mouth nightly 90 tablet 3    montelukast (SINGULAIR) 10 MG tablet Take 1 tablet by mouth daily 30 tablet 3    loratadine (CLARITIN) 10 MG tablet Take 1 tablet by mouth daily 30 tablet 3    azelastine (OPTIVAR) 0.05 % ophthalmic solution Place 1 drop into both eyes 2 times daily as needed      fluticasone (FLONASE) 50 MCG/ACT nasal spray 2 sprays by Each Nostril route daily Start with one spray daily for one week and increase to two sprays daily as needed 1 Bottle 3    fluticasone-salmeterol (ADVAIR DISKUS) 100-50 MCG/DOSE diskus inhaler Inhale 1 puff into the lungs every 12 hours 60 each 3    albuterol sulfate HFA (VENTOLIN HFA) 108 (90 Base) MCG/ACT inhaler Inhale 2 puffs into the lungs every 6 hours as needed for Wheezing 1 Inhaler 0    pantoprazole (PROTONIX) 20 MG tablet Take 1 tablet by mouth daily 30 tablet 3     No current facility-administered medications for this visit.           Food Insecurity: No Food Insecurity    Worried About Running Out of Food in the Last Year: Never true    Ran Out of Food in the Last Year: Never true       Health Maintenance   Topic Date Due    Hepatitis C screen  Never done    COVID-19 Vaccine (1) Never done    HIV screen  Never done    DTaP/Tdap/Td vaccine (1 - Tdap) Never done    Pneumococcal 0-64 years Vaccine (1 of 2 - PPSV23) 02/04/2022 (Originally 5/5/1995)    Flu vaccine (1) 09/01/2021    Hepatitis A vaccine  Aged Out    Hepatitis B vaccine  Aged Out    Hib vaccine  Aged Out    Meningococcal (ACWY) vaccine  Aged Out    Varicella vaccine  Discontinued       ROS:      Review of Systems   Constitutional: Positive for diaphoresis. Negative for chills, fatigue and fever. HENT: Positive for ear pain, hearing loss and tinnitus. Negative for congestion and rhinorrhea. Left ear   Eyes: Negative for visual disturbance. Respiratory: Positive for shortness of breath. Negative for cough. Cardiovascular: Positive for chest pain. Negative for palpitations. Gastrointestinal: Negative for abdominal pain, constipation, diarrhea, nausea and vomiting. Endocrine: Positive for heat intolerance. Genitourinary: Negative for dysuria. Musculoskeletal: Positive for back pain. Negative for arthralgias. Chronic back pain   Skin: Negative for rash. Neurological: Negative for weakness, light-headedness and headaches. Psychiatric/Behavioral: Positive for dysphoric mood and sleep disturbance. Negative for suicidal ideas. The patient is nervous/anxious. Objective:     Vitals:    08/31/21 1048   BP: 136/80   Site: Left Upper Arm   Position: Sitting   Cuff Size: Medium Adult   Pulse: 78   Resp: 16   Temp: 97.2 °F (36.2 °C)   TempSrc: Skin   SpO2: 98%   Weight: 223 lb 9.6 oz (101.4 kg)   Height: 5' 10\" (1.778 m)       Body mass index is 32.08 kg/m².     Wt Readings from Last 3 Encounters:   08/31/21 223 lb 9.6 oz (101.4 kg)   07/28/21 222 lb (100.7 kg)   07/27/21 222 lb (100.7 kg)     BP Readings from Last 3 Encounters:   08/31/21 136/80   07/28/21 126/83   07/27/21 100/64       Physical Exam  Vitals reviewed. Constitutional:       General: He is not in acute distress. Appearance: Normal appearance. HENT:      Head: Normocephalic and atraumatic. Right Ear: External ear normal.      Left Ear: External ear normal.      Nose: Nose normal.      Mouth/Throat:      Mouth: Mucous membranes are moist.   Eyes:      Conjunctiva/sclera: Conjunctivae normal.   Cardiovascular:      Rate and Rhythm: Normal rate and regular rhythm. Pulses: Normal pulses. Heart sounds: Normal heart sounds. No murmur heard. Pulmonary:      Effort: Pulmonary effort is normal. No respiratory distress. Breath sounds: Normal breath sounds. Abdominal:      General: Abdomen is flat. Bowel sounds are normal. There is no distension. Palpations: Abdomen is soft. Tenderness: There is no abdominal tenderness. Musculoskeletal:         General: Normal range of motion. Cervical back: Normal range of motion. Right lower leg: No edema. Left lower leg: No edema. Skin:     General: Skin is warm and dry. Capillary Refill: Capillary refill takes less than 2 seconds. Neurological:      General: No focal deficit present. Mental Status: He is alert and oriented to person, place, and time. Psychiatric:         Mood and Affect: Mood normal. Affect is flat. Speech: Speech normal.         Behavior: Behavior normal.         Thought Content: Thought content normal.         Cognition and Memory: Cognition normal.         Judgment: Judgment normal.         Assessment / Plan:     1. Chest pain, unspecified type  - Still continues to have the chest pain. Did not see cardiology. Encouraged to re-establish with cardiology so he can get stress testing. EKG done in clinic.   - TSH; Future  - T4, Free; Future  - EKG 12 Lead    2.  Mixed hyperlipidemia  - Lipid at 1:14 PM Walk in [employer]

## 2021-11-29 NOTE — PHYSICAL THERAPY INITIAL EVALUATION ADULT - THERAPY FREQUENCY, PT EVAL
Writer updated MD r/t  increased in SOB without activity, notable increased edema to BUE/BLE, weight increase 7# since yesterday, and  crackles heard through all fields. Patient encouraged to deep breathe and cough. MD also notified that RT placed CPAP setting at 10 d/t increased work of breathing. New orders received to discontinue IVF and to give Lasix now. Patient updated on new orders and agreed.    3-5x/week

## 2022-01-28 NOTE — PROGRESS NOTE ADULT - PROBLEM SELECTOR PROBLEM 1
Detail Level: Detailed Add 96315 Cpt? (Important Note: In 2017 The Use Of 10797 Is Being Tracked By Cms To Determine Future Global Period Reimbursement For Global Periods): yes Junctional bradycardia

## 2022-05-10 ENCOUNTER — INPATIENT (INPATIENT)
Facility: HOSPITAL | Age: 77
LOS: 13 days | Discharge: NURSING, REHAB OR LONG-TERM CARE | DRG: 896 | End: 2022-05-24
Attending: INTERNAL MEDICINE | Admitting: INTERNAL MEDICINE
Payer: MEDICARE

## 2022-05-10 VITALS
RESPIRATION RATE: 18 BRPM | HEIGHT: 63 IN | SYSTOLIC BLOOD PRESSURE: 131 MMHG | OXYGEN SATURATION: 98 % | TEMPERATURE: 98 F | DIASTOLIC BLOOD PRESSURE: 74 MMHG | HEART RATE: 48 BPM | WEIGHT: 200.62 LBS

## 2022-05-10 DIAGNOSIS — G93.40 ENCEPHALOPATHY, UNSPECIFIED: ICD-10-CM

## 2022-05-10 DIAGNOSIS — E78.5 HYPERLIPIDEMIA, UNSPECIFIED: ICD-10-CM

## 2022-05-10 DIAGNOSIS — N18.9 CHRONIC KIDNEY DISEASE, UNSPECIFIED: ICD-10-CM

## 2022-05-10 DIAGNOSIS — F10.239 ALCOHOL DEPENDENCE WITH WITHDRAWAL, UNSPECIFIED: ICD-10-CM

## 2022-05-10 DIAGNOSIS — L03.90 CELLULITIS, UNSPECIFIED: ICD-10-CM

## 2022-05-10 DIAGNOSIS — Z29.9 ENCOUNTER FOR PROPHYLACTIC MEASURES, UNSPECIFIED: ICD-10-CM

## 2022-05-10 DIAGNOSIS — E11.9 TYPE 2 DIABETES MELLITUS WITHOUT COMPLICATIONS: ICD-10-CM

## 2022-05-10 DIAGNOSIS — I10 ESSENTIAL (PRIMARY) HYPERTENSION: ICD-10-CM

## 2022-05-10 LAB
ALBUMIN SERPL ELPH-MCNC: 3 G/DL — LOW (ref 3.5–5)
ALP SERPL-CCNC: 82 U/L — SIGNIFICANT CHANGE UP (ref 40–120)
ALT FLD-CCNC: 60 U/L DA — SIGNIFICANT CHANGE UP (ref 10–60)
ANION GAP SERPL CALC-SCNC: 2 MMOL/L — LOW (ref 5–17)
ANION GAP SERPL CALC-SCNC: 7 MMOL/L — SIGNIFICANT CHANGE UP (ref 5–17)
AST SERPL-CCNC: 121 U/L — HIGH (ref 10–40)
BASOPHILS # BLD AUTO: 0.01 K/UL — SIGNIFICANT CHANGE UP (ref 0–0.2)
BASOPHILS NFR BLD AUTO: 0.3 % — SIGNIFICANT CHANGE UP (ref 0–2)
BILIRUB SERPL-MCNC: 0.4 MG/DL — SIGNIFICANT CHANGE UP (ref 0.2–1.2)
BUN SERPL-MCNC: 64 MG/DL — HIGH (ref 7–18)
BUN SERPL-MCNC: 65 MG/DL — HIGH (ref 7–18)
CALCIUM SERPL-MCNC: 9.5 MG/DL — SIGNIFICANT CHANGE UP (ref 8.4–10.5)
CALCIUM SERPL-MCNC: 9.6 MG/DL — SIGNIFICANT CHANGE UP (ref 8.4–10.5)
CHLORIDE SERPL-SCNC: 106 MMOL/L — SIGNIFICANT CHANGE UP (ref 96–108)
CHLORIDE SERPL-SCNC: 107 MMOL/L — SIGNIFICANT CHANGE UP (ref 96–108)
CO2 SERPL-SCNC: 25 MMOL/L — SIGNIFICANT CHANGE UP (ref 22–31)
CO2 SERPL-SCNC: 29 MMOL/L — SIGNIFICANT CHANGE UP (ref 22–31)
CREAT SERPL-MCNC: 3.3 MG/DL — HIGH (ref 0.5–1.3)
CREAT SERPL-MCNC: 3.31 MG/DL — HIGH (ref 0.5–1.3)
EGFR: 19 ML/MIN/1.73M2 — LOW
EGFR: 19 ML/MIN/1.73M2 — LOW
EOSINOPHIL # BLD AUTO: 0.57 K/UL — HIGH (ref 0–0.5)
EOSINOPHIL NFR BLD AUTO: 15.3 % — HIGH (ref 0–6)
ETHANOL SERPL-MCNC: <3 MG/DL — SIGNIFICANT CHANGE UP (ref 0–10)
GLUCOSE BLDC GLUCOMTR-MCNC: 104 MG/DL — HIGH (ref 70–99)
GLUCOSE BLDC GLUCOMTR-MCNC: 113 MG/DL — HIGH (ref 70–99)
GLUCOSE BLDC GLUCOMTR-MCNC: 226 MG/DL — HIGH (ref 70–99)
GLUCOSE BLDC GLUCOMTR-MCNC: 54 MG/DL — CRITICAL LOW (ref 70–99)
GLUCOSE BLDC GLUCOMTR-MCNC: 62 MG/DL — LOW (ref 70–99)
GLUCOSE SERPL-MCNC: 107 MG/DL — HIGH (ref 70–99)
GLUCOSE SERPL-MCNC: 97 MG/DL — SIGNIFICANT CHANGE UP (ref 70–99)
HCT VFR BLD CALC: 30.8 % — LOW (ref 39–50)
HGB BLD-MCNC: 10.3 G/DL — LOW (ref 13–17)
IMM GRANULOCYTES NFR BLD AUTO: 0.5 % — SIGNIFICANT CHANGE UP (ref 0–1.5)
LYMPHOCYTES # BLD AUTO: 0.17 K/UL — LOW (ref 1–3.3)
LYMPHOCYTES # BLD AUTO: 4.6 % — LOW (ref 13–44)
MCHC RBC-ENTMCNC: 33.4 GM/DL — SIGNIFICANT CHANGE UP (ref 32–36)
MCHC RBC-ENTMCNC: 33.9 PG — SIGNIFICANT CHANGE UP (ref 27–34)
MCV RBC AUTO: 101.3 FL — HIGH (ref 80–100)
MONOCYTES # BLD AUTO: 0.46 K/UL — SIGNIFICANT CHANGE UP (ref 0–0.9)
MONOCYTES NFR BLD AUTO: 12.3 % — SIGNIFICANT CHANGE UP (ref 2–14)
NEUTROPHILS # BLD AUTO: 2.5 K/UL — SIGNIFICANT CHANGE UP (ref 1.8–7.4)
NEUTROPHILS NFR BLD AUTO: 67 % — SIGNIFICANT CHANGE UP (ref 43–77)
NRBC # BLD: 0 /100 WBCS — SIGNIFICANT CHANGE UP (ref 0–0)
NT-PROBNP SERPL-SCNC: 1088 PG/ML — HIGH (ref 0–450)
PLATELET # BLD AUTO: 107 K/UL — LOW (ref 150–400)
POTASSIUM SERPL-MCNC: 5.2 MMOL/L — SIGNIFICANT CHANGE UP (ref 3.5–5.3)
POTASSIUM SERPL-MCNC: 5.8 MMOL/L — HIGH (ref 3.5–5.3)
POTASSIUM SERPL-SCNC: 5.2 MMOL/L — SIGNIFICANT CHANGE UP (ref 3.5–5.3)
POTASSIUM SERPL-SCNC: 5.8 MMOL/L — HIGH (ref 3.5–5.3)
PROT SERPL-MCNC: 7.1 G/DL — SIGNIFICANT CHANGE UP (ref 6–8.3)
RBC # BLD: 3.04 M/UL — LOW (ref 4.2–5.8)
RBC # FLD: 13.8 % — SIGNIFICANT CHANGE UP (ref 10.3–14.5)
SARS-COV-2 RNA SPEC QL NAA+PROBE: SIGNIFICANT CHANGE UP
SODIUM SERPL-SCNC: 138 MMOL/L — SIGNIFICANT CHANGE UP (ref 135–145)
SODIUM SERPL-SCNC: 138 MMOL/L — SIGNIFICANT CHANGE UP (ref 135–145)
TROPONIN I, HIGH SENSITIVITY RESULT: 25.8 NG/L — SIGNIFICANT CHANGE UP
WBC # BLD: 3.73 K/UL — LOW (ref 3.8–10.5)
WBC # FLD AUTO: 3.73 K/UL — LOW (ref 3.8–10.5)

## 2022-05-10 PROCEDURE — 71045 X-RAY EXAM CHEST 1 VIEW: CPT | Mod: 26

## 2022-05-10 PROCEDURE — 70450 CT HEAD/BRAIN W/O DYE: CPT | Mod: 26

## 2022-05-10 PROCEDURE — 99285 EMERGENCY DEPT VISIT HI MDM: CPT

## 2022-05-10 RX ORDER — GLUCAGON INJECTION, SOLUTION 0.5 MG/.1ML
1 INJECTION, SOLUTION SUBCUTANEOUS ONCE
Refills: 0 | Status: DISCONTINUED | OUTPATIENT
Start: 2022-05-10 | End: 2022-05-24

## 2022-05-10 RX ORDER — ALLOPURINOL 300 MG
1 TABLET ORAL
Qty: 0 | Refills: 0 | DISCHARGE

## 2022-05-10 RX ORDER — SENNA PLUS 8.6 MG/1
1 TABLET ORAL
Qty: 0 | Refills: 0 | DISCHARGE

## 2022-05-10 RX ORDER — HEPARIN SODIUM 5000 [USP'U]/ML
5000 INJECTION INTRAVENOUS; SUBCUTANEOUS EVERY 8 HOURS
Refills: 0 | Status: DISCONTINUED | OUTPATIENT
Start: 2022-05-10 | End: 2022-05-16

## 2022-05-10 RX ORDER — DEXTROSE 50 % IN WATER 50 %
12.5 SYRINGE (ML) INTRAVENOUS ONCE
Refills: 0 | Status: DISCONTINUED | OUTPATIENT
Start: 2022-05-10 | End: 2022-05-24

## 2022-05-10 RX ORDER — THIAMINE MONONITRATE (VIT B1) 100 MG
100 TABLET ORAL DAILY
Refills: 0 | Status: COMPLETED | OUTPATIENT
Start: 2022-05-11 | End: 2022-05-15

## 2022-05-10 RX ORDER — TIMOLOL 0.5 %
1 DROPS OPHTHALMIC (EYE)
Refills: 0 | Status: DISCONTINUED | OUTPATIENT
Start: 2022-05-10 | End: 2022-05-24

## 2022-05-10 RX ORDER — ERGOCALCIFEROL 1.25 MG/1
50000 CAPSULE ORAL
Refills: 0 | Status: DISCONTINUED | OUTPATIENT
Start: 2022-05-10 | End: 2022-05-24

## 2022-05-10 RX ORDER — DEXTROSE 50 % IN WATER 50 %
25 SYRINGE (ML) INTRAVENOUS ONCE
Refills: 0 | Status: DISCONTINUED | OUTPATIENT
Start: 2022-05-10 | End: 2022-05-24

## 2022-05-10 RX ORDER — THIAMINE MONONITRATE (VIT B1) 100 MG
500 TABLET ORAL ONCE
Refills: 0 | Status: COMPLETED | OUTPATIENT
Start: 2022-05-10 | End: 2022-05-10

## 2022-05-10 RX ORDER — BRIMONIDINE TARTRATE 2 MG/MG
1 SOLUTION/ DROPS OPHTHALMIC DAILY
Refills: 0 | Status: DISCONTINUED | OUTPATIENT
Start: 2022-05-10 | End: 2022-05-24

## 2022-05-10 RX ORDER — COLCHICINE 0.6 MG
1 TABLET ORAL
Qty: 0 | Refills: 0 | DISCHARGE

## 2022-05-10 RX ORDER — INSULIN LISPRO 100/ML
VIAL (ML) SUBCUTANEOUS
Refills: 0 | Status: DISCONTINUED | OUTPATIENT
Start: 2022-05-10 | End: 2022-05-24

## 2022-05-10 RX ORDER — FOLIC ACID 0.8 MG
1 TABLET ORAL DAILY
Refills: 0 | Status: DISCONTINUED | OUTPATIENT
Start: 2022-05-10 | End: 2022-05-24

## 2022-05-10 RX ORDER — DEXTROSE 50 % IN WATER 50 %
50 SYRINGE (ML) INTRAVENOUS ONCE
Refills: 0 | Status: COMPLETED | OUTPATIENT
Start: 2022-05-10 | End: 2022-05-10

## 2022-05-10 RX ORDER — SODIUM CHLORIDE 9 MG/ML
1000 INJECTION, SOLUTION INTRAVENOUS
Refills: 0 | Status: DISCONTINUED | OUTPATIENT
Start: 2022-05-10 | End: 2022-05-24

## 2022-05-10 RX ORDER — SODIUM CHLORIDE 9 MG/ML
1000 INJECTION, SOLUTION INTRAVENOUS
Refills: 0 | Status: DISCONTINUED | OUTPATIENT
Start: 2022-05-10 | End: 2022-05-19

## 2022-05-10 RX ORDER — FUROSEMIDE 40 MG
20 TABLET ORAL DAILY
Refills: 0 | Status: DISCONTINUED | OUTPATIENT
Start: 2022-05-10 | End: 2022-05-13

## 2022-05-10 RX ORDER — SODIUM ZIRCONIUM CYCLOSILICATE 10 G/10G
10 POWDER, FOR SUSPENSION ORAL ONCE
Refills: 0 | Status: COMPLETED | OUTPATIENT
Start: 2022-05-10 | End: 2022-05-10

## 2022-05-10 RX ORDER — DEXTROSE 50 % IN WATER 50 %
15 SYRINGE (ML) INTRAVENOUS ONCE
Refills: 0 | Status: DISCONTINUED | OUTPATIENT
Start: 2022-05-10 | End: 2022-05-24

## 2022-05-10 RX ORDER — INSULIN HUMAN 100 [IU]/ML
5 INJECTION, SOLUTION SUBCUTANEOUS ONCE
Refills: 0 | Status: COMPLETED | OUTPATIENT
Start: 2022-05-10 | End: 2022-05-10

## 2022-05-10 RX ADMIN — Medication 2 MILLIGRAM(S): at 15:03

## 2022-05-10 RX ADMIN — Medication 1 DROP(S): at 19:41

## 2022-05-10 RX ADMIN — Medication 50 MILLILITER(S): at 13:03

## 2022-05-10 RX ADMIN — Medication 105 MILLIGRAM(S): at 15:02

## 2022-05-10 RX ADMIN — INSULIN HUMAN 5 UNIT(S): 100 INJECTION, SOLUTION SUBCUTANEOUS at 11:19

## 2022-05-10 RX ADMIN — Medication 110 MILLIGRAM(S): at 13:30

## 2022-05-10 RX ADMIN — HEPARIN SODIUM 5000 UNIT(S): 5000 INJECTION INTRAVENOUS; SUBCUTANEOUS at 13:32

## 2022-05-10 RX ADMIN — Medication 2 MILLIGRAM(S): at 21:39

## 2022-05-10 RX ADMIN — SODIUM ZIRCONIUM CYCLOSILICATE 10 GRAM(S): 10 POWDER, FOR SUSPENSION ORAL at 12:44

## 2022-05-10 RX ADMIN — Medication 50 MILLILITER(S): at 11:18

## 2022-05-10 RX ADMIN — HEPARIN SODIUM 5000 UNIT(S): 5000 INJECTION INTRAVENOUS; SUBCUTANEOUS at 21:39

## 2022-05-10 RX ADMIN — Medication 2 MILLIGRAM(S): at 10:12

## 2022-05-10 RX ADMIN — Medication 2 MILLIGRAM(S): at 19:53

## 2022-05-10 NOTE — H&P ADULT - ATTENDING COMMENTS
pt seen in bed, vitals stable except for , physical exam reveals lungs cta b/l, heart s1s2, abd soft nd nt bs+, ext no edema. labs and diagnostic test result reviewed.    assessment  --  , h/o     plan  --  admit to , cont preadmit home meds, gi and dvt prophylaxis  cbc, bmp, mg, phos, lipids, tsh, bld cx, ua, ucx, 76yoM, AAOx3, ambulates independently, leaves alone at home, w/ PMH of HLD, DM, CKD, presents to the ED with weakness and tremulousness. Patient is currently a very poor historian. Patient's son, Mr. Dre Ojeda, contacted, but he is unable to provide much information. He request to call, his brother, Mr. Bruce Ojeda ([phone]), but he is unable to be reached over the phone. Thus, most of the information obtained from the ED chart review. According to the ED attending, patient is being admitted for alcohol withdrawal. Patient admits to drinking 2 glasses of Whisky at night, and last drink was last night. He denies fever, chill, n/v, chest pain, SOB, dizziness, vision changes, abdominal pain, urinary or bowel movement changes.    assessment  --  etoh withdrawal, metabolic encephalopathy, right le cellulitis, psoriasis, acute on ckd, anemia, h/o HLD, DM, CKD    plan  --  admit to med, librium protocol, ativan as per ciwa protocol, doxycycline, hold norvasc 2nd to lower extrem edema, cont preadmit home meds, gi and dvt prophylaxis    cbc, bmp, mg, phos, lipids, tsh, ammonia, iron studies, stool occult blood, bld cx, ua, ucx,      f/u echo    renal cons  gi cons  wound care

## 2022-05-10 NOTE — CONSULT NOTE ADULT - ASSESSMENT
1. Anemia (etiology multifactorial)  2. No evidence of acute GI bleeding  3. R/o chronic GI bleeding  4. ETOH abuse  5. Alcohol withdrawal    Suggestions:    1. Monitor H/H  2. Transfuse PRBC as needed  3. Protonix 40mg po daily  4. Avoid NSAID  5. Check stool for occult blood  6. Withdrawal precaution  7. EGD and colonoscopy  8. DVT prophylaxis

## 2022-05-10 NOTE — PATIENT PROFILE ADULT - FALL HARM RISK - HARM RISK INTERVENTIONS
Assistance with ambulation/Assistance OOB with selected safe patient handling equipment/Communicate Risk of Fall with Harm to all staff/Discuss with provider need for PT consult/Monitor for mental status changes/Monitor gait and stability/Provide patient with walking aids - walker, cane, crutches/Reinforce activity limits and safety measures with patient and family/Tailored Fall Risk Interventions/Toileting schedule using arm’s reach rule for commode and bathroom/Use of alarms - bed, chair and/or voice tab/Visual Cue: Yellow wristband and red socks/Bed in lowest position, wheels locked, appropriate side rails in place/Call bell, personal items and telephone in reach/Instruct patient to call for assistance before getting out of bed or chair/Non-slip footwear when patient is out of bed/[place] to call system/Physically safe environment - no spills, clutter or unnecessary equipment/Purposeful Proactive Rounding/Room/bathroom lighting operational, light cord in reach

## 2022-05-10 NOTE — H&P ADULT - PROBLEM SELECTOR PLAN 3
- h/o HLD, on rosuvastatin  - c/w - h/o HLD, on rosuvastatin  - c/w rosuvastatin - h/o HTN, on labetalol and amlodipine at home  - will hold both meds given bradycardia  - monitor BP for now  - resume anti-BP meds as indicated

## 2022-05-10 NOTE — ED ADULT NURSE NOTE - NSIMPLEMENTINTERV_GEN_ALL_ED
Implemented All Fall with Harm Risk Interventions:  [place] to call system. Call bell, personal items and telephone within reach. Instruct patient to call for assistance. Room bathroom lighting operational. Non-slip footwear when patient is off stretcher. Physically safe environment: no spills, clutter or unnecessary equipment. Stretcher in lowest position, wheels locked, appropriate side rails in place. Provide visual cue, wrist band, yellow gown, etc. Monitor gait and stability. Monitor for mental status changes and reorient to person, place, and time. Review medications for side effects contributing to fall risk. Reinforce activity limits and safety measures with patient and family. Provide visual clues: red socks.

## 2022-05-10 NOTE — CONSULT NOTE ADULT - SUBJECTIVE AND OBJECTIVE BOX
[  ] STAT REQUEST              [ X ] ROUTINE REQUEST    Patient is a 76 year old male with ETOH abuse and anemia. GI consulted to evaluate.    HPI:  Patient is a 76 year old male AAOx3, ambulates independently, leaves alone at home, with past medical history significant for HLD, DM, CKD, admitted with alcohol withdrowal found to have anemia. Patient admits to drinking 2 glasses of Whisky at night, and last drink was last night. He also c/o intermittent burning epigastric radiating to his chest associated with dyspepsia. Patient denies nausea, vomiting, hematemesis, hematochezia, melena, fever, chills, chest pain, SOB, cough, hematuria, dysuria or diarrhea.         PAIN MANAGEMENT:  Pain Scale:                2-3 /10  Pain Location:  Epigastric abdominal pain      Prior Colonoscopy:  No prior colonoscopy      PAST MEDICAL HISTORY  DM (diabetes mellitus)  HTN (hypertension)   Chronic kidney disease  HLD (hyperlipidemia)  Glaucoma  Gout        PAST SURGICAL HISTORY  No significant past surgical history        Allergies    No Known Allergies    Intolerances  None      HOME MEDICATIONS    MEDICATIONS  (STANDING):  brimonidine 0.2% Ophthalmic Solution 1 Drop(s) Both EYES daily  dextrose 5%. 1000 milliLiter(s) (50 mL/Hr) IV Continuous <Continuous>  dextrose 5%. 1000 milliLiter(s) (100 mL/Hr) IV Continuous <Continuous>  dextrose 50% Injectable 25 Gram(s) IV Push once  dextrose 50% Injectable 12.5 Gram(s) IV Push once  dextrose 50% Injectable 25 Gram(s) IV Push once  doxycycline IVPB      ergocalciferol 93611 Unit(s) Oral <User Schedule>  folic acid 1 milliGRAM(s) Oral daily  furosemide    Tablet 20 milliGRAM(s) Oral daily  glucagon  Injectable 1 milliGRAM(s) IntraMuscular once  heparin   Injectable 5000 Unit(s) SubCutaneous every 8 hours  insulin lispro (ADMELOG) corrective regimen sliding scale   SubCutaneous Before meals and at bedtime  LORazepam   Injectable 2 milliGRAM(s) IV Push every 4 hours  LORazepam   Injectable   IV Push   multivitamin 1 Tablet(s) Oral daily  timolol 0.5% Solution 1 Drop(s) Both EYES two times a day    MEDICATIONS  (PRN):  dextrose Oral Gel 15 Gram(s) Oral once PRN Blood Glucose LESS THAN 70 milliGRAM(s)/deciliter  LORazepam   Injectable 2 milliGRAM(s) IV Push every 2 hours PRN Symptom-triggered: 2 point increase in CIWA -Ar score and a total score of 7 or LESS  LORazepam   Injectable 2 milliGRAM(s) IV Push every 1 hour PRN Symptom-triggered: each CIWA -Ar score 8 or GREATER      SOCIAL HISTORY  Advanced Directives:       [ X ] Full Code       [  ] DNR  Marital Status:         [  ] M      [ X ] S      [  ] D       [  ] W  Children:       [ X ] Yes      [  ] No  Occupation:        [  ] Employed       [ X ] Unemployed       [  ] Retired  Diet:       [ X ] Regular       [  ] PEG feeding          [  ] NG tube feeding  Drug Use:           [ X ] Patient denied          [  ] Yes  Alcohol:           [X  ] No             [  ] Yes (socially)         [  ] Yes (chronic)  Tobacco:           [  ] Yes           [ X ] No      FAMILY HISTORY  [X  ] Heart Disease            [ X ] Diabetes             [ X ] HTN             [  ] Colon Cancer             [  ] Stomach Cancer              [  ] Pancreatic Cancer      VITAL SIGNS  Vital Signs Last 24 Hrs  T(C): 36.9 (10 May 2022 12:13), Max: 36.9 (10 May 2022 12:13)  T(F): 98.4 (10 May 2022 12:13), Max: 98.4 (10 May 2022 12:13)  HR: 57 (10 May 2022 12:13) (48 - 57)  BP: 140/74 (10 May 2022 12:13) (131/74 - 140/74)   RR: 18 (10 May 2022 12:13) (18 - 18)  SpO2: 100% (10 May 2022 12:13) (98% - 100%)   Daily Height in cm: 160.02 (10 May 2022 08:27)            CBC Full  -  ( 10 May 2022 09:36 )  WBC Count : 3.73 K/uL  RBC Count : 3.04 M/uL  Hemoglobin : 10.3 g/dL  Hematocrit : 30.8 %  Platelet Count - Automated : 107 K/uL  Mean Cell Volume : 101.3 fl  Mean Cell Hemoglobin : 33.9 pg  Mean Cell Hemoglobin Concentration : 33.4 gm/dL  Auto Neutrophil # : 2.50 K/uL  Auto Lymphocyte # : 0.17 K/uL  Auto Monocyte # : 0.46 K/uL  Auto Eosinophil # : 0.57 K/uL  Auto Basophil # : 0.01 K/uL  Auto Neutrophil % : 67.0 %  Auto Lymphocyte % : 4.6 %  Auto Monocyte % : 12.3 %  Auto Eosinophil % : 15.3 %  Auto Basophil % : 0.3 %      05-10    138  |  106  |  64<H>  ----------------------------<  107<H>  5.8<H>   |  25  |  3.30<H>    Ca    9.6      10 May 2022 09:36    TPro  7.1  /  Alb  3.0<L>  /  TBili  0.4  /  DBili  x   /  AST  121<H>  /  ALT  60  /  AlkPhos  82  05-10     Iron with Total Binding Capacity (06.03.21 @ 07:14)   Iron - Total Binding Capacity.: 334 ug/dL   % Saturation, Iron: 49 %   Iron Total, Serum: 164 ug/dL   Unsaturated Iron Binding Capacity: 170 ug/dL     Urinalysis + Microscopic Examination (06.05.21 @ 09:24)   Urine Appearance: Clear   Urobilinogen: Negative   Specific Gravity: 1.005   Protein, Urine: 30 mg/dL   pH Urine: 6.0   Leukocyte Esterase Concentration: Negative   Nitrite: Negative   Ketone - Urine: Negative   Bilirubin: Negative   Color: Yellow   Glucose Qualitative, Urine: Negative   Blood, Urine: Trace   Red Blood Cell - Urine: 0-2 /HPF   White Blood Cell - Urine: 0-2 /HPF   Epithelial Cells: Few /HPF      ECG  Ventricular Rate 48 BPM    Atrial Rate 48 BPM    P-R Interval 248 ms    QRS Duration 126 ms    Q-T Interval 516 ms    QTC Calculation(Bazett) 460 ms    P Axis 47 degrees    R Axis -28 degrees    T Axis 80 degrees    Diagnosis Line Sinus bradycardiawith 1st degree A-V block  Left bundle branch block  Abnormal ECG         RADIOLOGY/IMAGING                  ACC: [id number] EXAM:  CT BRAIN                          PROCEDURE DATE:  05/10/2022          INTERPRETATION:  CT head without IV contrast    CLINICAL INFORMATION: tremors, alcohol dependence    TECHNIQUE: Contiguous axial 5 mm sections were obtainedthrough the head.   Sagittal and coronal 2-D reformatted images were also obtained.   This   scan was performed using automatic exposure control (radiation dose   reduction software) to obtain a diagnostic image quality scan with   patient dose as low as reasonably achievable.    FINDINGS:   CT dated 04/20/2016 available for review.    The brain demonstrates moderate periventricular white matter ischemia.     No acute cerebral cortical infarct is seen.  No intracranial hemorrhage   is found.  No mass effect is found in the brain.    The ventricles, sulci and basal cisterns show moderate bitemporal lobe   and bifrontal lobe atrophy.    The orbits are unremarkable.  The paranasal sinuses are significant for   mucosal thickening in the RIGHT maxillary and RIGHT ethmoid and frontal   and RIGHT frontal sinuses.  The nasal cavity appears intact.  The   nasopharynx is symmetric.  The central skull base, petrous temporal bones   and calvarium remain intact.      IMPRESSION:   Moderate periventricular white matter ischemia. Moderate   bitemporal lobe and bifrontal lobe atrophy.   Mucosal thickening in the   RIGHT maxillary and RIGHT ethmoid and frontal and RIGHT frontal sinuses.        ACC: [id number] EXAM:  XR CHEST PORTABLE URGENT 1V                          PROCEDURE DATE:  05/10/2022          INTERPRETATION:  AP erect chest on May 10, 2022 at 9:11 AM. Patient has   chest pain.    Heart magnified by technique.    On June 8, 2021there were mild basilar effusions with adjacent   atelectases.    Presently lungs are clear.    IMPRESSION: Clear lungs at this time.

## 2022-05-10 NOTE — ED PROVIDER NOTE - CONSTITUTIONAL, MLM
normal... Coarse tremulousness. Well appearing, awake, alert, oriented to person, place, time/situation and in no apparent distress.

## 2022-05-10 NOTE — ED PROVIDER NOTE - NSICDXPASTMEDICALHX_GEN_ALL_CORE_FT
PAST MEDICAL HISTORY:  Chronic kidney disease     DM (diabetes mellitus)     Glaucoma     Gout     HLD (hyperlipidemia)     HTN (hypertension)

## 2022-05-10 NOTE — H&P ADULT - PROBLEM SELECTOR PLAN 5
- h/o CKD, on lasix  - ddx: CKD progression vs NELLY on CKD  - Cr 3.3 (last Cr 2.9 in 06/2021)  - K 5.8  - EKG sinus zaida w/ 1st degree block, LBBB (known since June2021)  - s/p hyperkalemia cocktail  - c/w lasix - h/o HLD, on rosuvastatin  - c/w rosuvastatin

## 2022-05-10 NOTE — ED PROVIDER NOTE - OBJECTIVE STATEMENT
Patient declined  and requested translation be done by his son.   76 year old male with past medical history of hyperlipidemia, diabetes mellitus, and CKD brought to the ED by his son for weakness, fatigue, and tremulousness. Per the patient's son, he drinks scotch daily a few drinks per day, but states that he has never experienced withdrawal. The patient denies any falls, injuries, recent hospitalizations, and ay additional symptoms including chest pain and shortness of breath. NKDA.

## 2022-05-10 NOTE — H&P ADULT - PROBLEM SELECTOR PLAN 1
- p/w generalized weakness and tremulousness  - last drink last night, unable to confirm the exact timing; reports 2 glasses of Whisky, but likely more according to son  - MANOJ 6 according to ED attending, MANOJ 4 on PE  - s/p ativan 2mg in ED  - c/w CIWA protocol, thiamine, FA, MV  - CIWA protocol  - f/u [facility]

## 2022-05-10 NOTE — H&P ADULT - HISTORY OF PRESENT ILLNESS
Patient is a 76yoM, AAOx3, ambulates independently, leaves alone at home, w/ PMH of HLD, DM, CKD, presents to the ED with weakness and tremulousness. Patient is currently a very poor historian. Patient's son, Mr. Dre Ojeda, contacted, but he is unable to provide much information. He request to call, his brother, Mr. Bruce Ojeda ([phone]), but he is unable to be reached over the phone. Thus, most of the information obtained from the ED chart review. According to the ED attending, patient is being admitted for alcohol withdrawal. Patient admits to drinking 2 glasses of Whisky at night, and last drink was last night. He denies fever, chill, n/v, chest pain, SOB, dizziness, vision changes, abdominal pain, urinary or bowel movement changes.

## 2022-05-10 NOTE — H&P ADULT - ASSESSMENT
Patient is a 76yoM, AAOx3, ambulates independently, leaves alone at home, w/ PMH of HLD, DM, CKD, presents to the ED with weakness and tremulousness. Admitted for alcohol withdrawal Patient is a 76yoM, AAOx3, ambulates independently, leaves alone at home, w/ PMH of HLD, DM, CKD, presents to the ED with weakness and tremulousness. Admitted for alcohol withdrawal.    Primary team to obtain a collateral information via Son, Mr. Bruce Ojeda if his reachable.

## 2022-05-10 NOTE — H&P ADULT - PROBLEM SELECTOR PLAN 7
- h/o CKD, on lasix  - ddx: CKD progression vs NELLY on CKD  - Cr 3.3 (last Cr 2.9 in 06/2021)  - K 5.8  - EKG sinus zaida w/ 1st degree block, LBBB (known since June2021)  - s/p hyperkalemia cocktail  - c/w lasix

## 2022-05-10 NOTE — PATIENT PROFILE ADULT - VISION (WITH CORRECTIVE LENSES IF THE PATIENT USUALLY WEARS THEM):
Severely impaired: cannot locate objects without hearing or touching them or patient nonresponsive. blind in the left eye. Right eye also impaired/Severely impaired: cannot locate objects without hearing or touching them or patient nonresponsive.

## 2022-05-10 NOTE — PATIENT PROFILE ADULT - NSPROPTRIGHTREPNAME_GEN_A__NUR
Bruce Madera Bruce Madera and Dre Ojeda. For phone calls, mark call Dre as Bruce has difficulty  hearing

## 2022-05-10 NOTE — ED ADULT NURSE NOTE - OBJECTIVE STATEMENT
pt is here for weakness.  As per family member, weakness in both legs and tremors, drinking alcohol almost everyday, rash and itching to all his body, denies chest pain or palpitation,

## 2022-05-10 NOTE — CONSULT NOTE ADULT - NEGATIVE ENMT SYMPTOMS
no hearing difficulty/no ear pain/no tinnitus/no vertigo/no nasal discharge/no gum bleeding/no dry mouth/no throat pain/no dysphagia

## 2022-05-10 NOTE — H&P ADULT - NEUROLOGICAL DETAILS
responds to pain/responds to verbal commands/sensation intact/cranial nerves intact/normal strength/disoriented

## 2022-05-11 LAB
A1C WITH ESTIMATED AVERAGE GLUCOSE RESULT: 6 % — HIGH (ref 4–5.6)
ALBUMIN SERPL ELPH-MCNC: 2.7 G/DL — LOW (ref 3.5–5)
ALP SERPL-CCNC: 89 U/L — SIGNIFICANT CHANGE UP (ref 40–120)
ALT FLD-CCNC: 66 U/L DA — HIGH (ref 10–60)
ANION GAP SERPL CALC-SCNC: 8 MMOL/L — SIGNIFICANT CHANGE UP (ref 5–17)
AST SERPL-CCNC: 132 U/L — HIGH (ref 10–40)
BASOPHILS # BLD AUTO: 0.01 K/UL — SIGNIFICANT CHANGE UP (ref 0–0.2)
BASOPHILS NFR BLD AUTO: 0.4 % — SIGNIFICANT CHANGE UP (ref 0–2)
BILIRUB SERPL-MCNC: 0.3 MG/DL — SIGNIFICANT CHANGE UP (ref 0.2–1.2)
BUN SERPL-MCNC: 65 MG/DL — HIGH (ref 7–18)
CALCIUM SERPL-MCNC: 9.5 MG/DL — SIGNIFICANT CHANGE UP (ref 8.4–10.5)
CHLORIDE SERPL-SCNC: 107 MMOL/L — SIGNIFICANT CHANGE UP (ref 96–108)
CO2 SERPL-SCNC: 24 MMOL/L — SIGNIFICANT CHANGE UP (ref 22–31)
CREAT SERPL-MCNC: 3.19 MG/DL — HIGH (ref 0.5–1.3)
EGFR: 20 ML/MIN/1.73M2 — LOW
EOSINOPHIL # BLD AUTO: 0.65 K/UL — HIGH (ref 0–0.5)
EOSINOPHIL NFR BLD AUTO: 23.9 % — HIGH (ref 0–6)
ESTIMATED AVERAGE GLUCOSE: 126 MG/DL — HIGH (ref 68–114)
GLUCOSE BLDC GLUCOMTR-MCNC: 106 MG/DL — HIGH (ref 70–99)
GLUCOSE BLDC GLUCOMTR-MCNC: 110 MG/DL — HIGH (ref 70–99)
GLUCOSE BLDC GLUCOMTR-MCNC: 132 MG/DL — HIGH (ref 70–99)
GLUCOSE BLDC GLUCOMTR-MCNC: 149 MG/DL — HIGH (ref 70–99)
GLUCOSE SERPL-MCNC: 117 MG/DL — HIGH (ref 70–99)
HCT VFR BLD CALC: 29.2 % — LOW (ref 39–50)
HGB BLD-MCNC: 9.9 G/DL — LOW (ref 13–17)
IMM GRANULOCYTES NFR BLD AUTO: 0.7 % — SIGNIFICANT CHANGE UP (ref 0–1.5)
LYMPHOCYTES # BLD AUTO: 0.15 K/UL — LOW (ref 1–3.3)
LYMPHOCYTES # BLD AUTO: 5.5 % — LOW (ref 13–44)
MAGNESIUM SERPL-MCNC: 2.1 MG/DL — SIGNIFICANT CHANGE UP (ref 1.6–2.6)
MCHC RBC-ENTMCNC: 33.9 GM/DL — SIGNIFICANT CHANGE UP (ref 32–36)
MCHC RBC-ENTMCNC: 34 PG — SIGNIFICANT CHANGE UP (ref 27–34)
MCV RBC AUTO: 100.3 FL — HIGH (ref 80–100)
MONOCYTES # BLD AUTO: 0.41 K/UL — SIGNIFICANT CHANGE UP (ref 0–0.9)
MONOCYTES NFR BLD AUTO: 15.1 % — HIGH (ref 2–14)
MRSA PCR RESULT.: SIGNIFICANT CHANGE UP
NEUTROPHILS # BLD AUTO: 1.48 K/UL — LOW (ref 1.8–7.4)
NEUTROPHILS NFR BLD AUTO: 54.4 % — SIGNIFICANT CHANGE UP (ref 43–77)
NRBC # BLD: 0 /100 WBCS — SIGNIFICANT CHANGE UP (ref 0–0)
PHOSPHATE SERPL-MCNC: 4 MG/DL — SIGNIFICANT CHANGE UP (ref 2.5–4.5)
PLATELET # BLD AUTO: 99 K/UL — LOW (ref 150–400)
POTASSIUM SERPL-MCNC: 5.3 MMOL/L — SIGNIFICANT CHANGE UP (ref 3.5–5.3)
POTASSIUM SERPL-SCNC: 5.3 MMOL/L — SIGNIFICANT CHANGE UP (ref 3.5–5.3)
PROT SERPL-MCNC: 6.8 G/DL — SIGNIFICANT CHANGE UP (ref 6–8.3)
RBC # BLD: 2.91 M/UL — LOW (ref 4.2–5.8)
RBC # FLD: 13.9 % — SIGNIFICANT CHANGE UP (ref 10.3–14.5)
S AUREUS DNA NOSE QL NAA+PROBE: DETECTED
SODIUM SERPL-SCNC: 139 MMOL/L — SIGNIFICANT CHANGE UP (ref 135–145)
WBC # BLD: 2.72 K/UL — LOW (ref 3.8–10.5)
WBC # FLD AUTO: 2.72 K/UL — LOW (ref 3.8–10.5)

## 2022-05-11 RX ADMIN — BRIMONIDINE TARTRATE 1 DROP(S): 2 SOLUTION/ DROPS OPHTHALMIC at 12:15

## 2022-05-11 RX ADMIN — Medication 1 MILLIGRAM(S): at 12:06

## 2022-05-11 RX ADMIN — Medication 1.5 MILLIGRAM(S): at 10:07

## 2022-05-11 RX ADMIN — HEPARIN SODIUM 5000 UNIT(S): 5000 INJECTION INTRAVENOUS; SUBCUTANEOUS at 05:31

## 2022-05-11 RX ADMIN — HEPARIN SODIUM 5000 UNIT(S): 5000 INJECTION INTRAVENOUS; SUBCUTANEOUS at 14:00

## 2022-05-11 RX ADMIN — Medication 110 MILLIGRAM(S): at 17:48

## 2022-05-11 RX ADMIN — Medication 1.5 MILLIGRAM(S): at 21:06

## 2022-05-11 RX ADMIN — Medication 1.5 MILLIGRAM(S): at 14:00

## 2022-05-11 RX ADMIN — Medication 1 TABLET(S): at 12:06

## 2022-05-11 RX ADMIN — Medication 2 MILLIGRAM(S): at 01:12

## 2022-05-11 RX ADMIN — Medication 1 DROP(S): at 17:49

## 2022-05-11 RX ADMIN — Medication 2 MILLIGRAM(S): at 05:31

## 2022-05-11 RX ADMIN — Medication 1 DROP(S): at 05:23

## 2022-05-11 RX ADMIN — Medication 1.5 MILLIGRAM(S): at 17:48

## 2022-05-11 RX ADMIN — Medication 20 MILLIGRAM(S): at 05:23

## 2022-05-11 RX ADMIN — Medication 110 MILLIGRAM(S): at 05:23

## 2022-05-11 RX ADMIN — Medication 100 MILLIGRAM(S): at 12:06

## 2022-05-11 RX ADMIN — Medication 2 MILLIGRAM(S): at 12:12

## 2022-05-11 RX ADMIN — HEPARIN SODIUM 5000 UNIT(S): 5000 INJECTION INTRAVENOUS; SUBCUTANEOUS at 21:09

## 2022-05-11 NOTE — PROGRESS NOTE ADULT - ASSESSMENT
Patient is a 76yoM, AAOx3, ambulates independently, leaves alone at home, w/ PMH of HLD, DM, CKD, presents to the ED with weakness and tremulousness. Admitted for alcohol withdrawal.     Case discussed w/ pt's son Dre Ojeda at bedside who reports that pt lives alone and has been having about two drinks per day.  No prior Etoh related hospitalization as per son.  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  Plan of care discussed w/ . Dre Ojeda

## 2022-05-11 NOTE — CONSULT NOTE ADULT - ASSESSMENT
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  Hyperkalemia.  HF rEF 40-45%.    Continue ETOG intoxication protocol  Follow MS  Off IV hydration.  Follow 2 gm K diet  Follow daily renal function magnesium and po4.

## 2022-05-11 NOTE — PROGRESS NOTE ADULT - SUBJECTIVE AND OBJECTIVE BOX
[   ] ICU                                          [   ] CCU                                      [ X  ] Medical Floor    Patient is a 76 year old male with ETOH abuse and anemia. GI consulted to evaluate.    HPI:  Patient is a 76 year old male AAOx3, ambulates independently, leaves alone at home, with past medical history significant for HLD, DM, CKD, admitted with alcohol withdrowal found to have anemia. Patient admits to drinking 2 glasses of Whisky at night, and last drink was last night. He also c/o intermittent burning epigastric radiating to his chest associated with dyspepsia. Patient denies nausea, vomiting, hematemesis, hematochezia, melena, fever, chills, chest pain, SOB, cough, hematuria, dysuria or diarrhea.         PAIN MANAGEMENT:  Pain Scale:                2-3 /10  Pain Location:  Epigastric abdominal pain      Prior Colonoscopy:  No prior colonoscopy      PAST MEDICAL HISTORY  DM (diabetes mellitus)  HTN (hypertension)   Chronic kidney disease  HLD (hyperlipidemia)  Glaucoma  Gout        PAST SURGICAL HISTORY  No significant past surgical history        Allergies    No Known Allergies    Intolerances  None      HOME MEDICATIONS    MEDICATIONS  (STANDING):  brimonidine 0.2% Ophthalmic Solution 1 Drop(s) Both EYES daily  dextrose 5%. 1000 milliLiter(s) (50 mL/Hr) IV Continuous <Continuous>  dextrose 5%. 1000 milliLiter(s) (100 mL/Hr) IV Continuous <Continuous>  dextrose 50% Injectable 25 Gram(s) IV Push once  dextrose 50% Injectable 12.5 Gram(s) IV Push once  dextrose 50% Injectable 25 Gram(s) IV Push once  doxycycline IVPB      ergocalciferol 14067 Unit(s) Oral <User Schedule>  folic acid 1 milliGRAM(s) Oral daily  furosemide    Tablet 20 milliGRAM(s) Oral daily  glucagon  Injectable 1 milliGRAM(s) IntraMuscular once  heparin   Injectable 5000 Unit(s) SubCutaneous every 8 hours  insulin lispro (ADMELOG) corrective regimen sliding scale   SubCutaneous Before meals and at bedtime  LORazepam   Injectable 2 milliGRAM(s) IV Push every 4 hours  LORazepam   Injectable   IV Push   multivitamin 1 Tablet(s) Oral daily  timolol 0.5% Solution 1 Drop(s) Both EYES two times a day    MEDICATIONS  (PRN):  dextrose Oral Gel 15 Gram(s) Oral once PRN Blood Glucose LESS THAN 70 milliGRAM(s)/deciliter  LORazepam   Injectable 2 milliGRAM(s) IV Push every 2 hours PRN Symptom-triggered: 2 point increase in CIWA -Ar score and a total score of 7 or LESS  LORazepam   Injectable 2 milliGRAM(s) IV Push every 1 hour PRN Symptom-triggered: each CIWA -Ar score 8 or GREATER      SOCIAL HISTORY  Advanced Directives:       [ X ] Full Code       [  ] DNR  Marital Status:         [  ] M      [ X ] S      [  ] D       [  ] W  Children:       [ X ] Yes      [  ] No  Occupation:        [  ] Employed       [ X ] Unemployed       [  ] Retired  Diet:       [ X ] Regular       [  ] PEG feeding          [  ] NG tube feeding  Drug Use:           [ X ] Patient denied          [  ] Yes  Alcohol:           [X  ] No             [  ] Yes (socially)         [  ] Yes (chronic)  Tobacco:           [  ] Yes           [ X ] No      FAMILY HISTORY  [X  ] Heart Disease            [ X ] Diabetes             [ X ] HTN             [  ] Colon Cancer             [  ] Stomach Cancer              [  ] Pancreatic Cancer      VITALS  Vital Signs Last 24 Hrs  T(C): 36.4 (11 May 2022 11:59), Max: 36.4 (10 May 2022 20:40)  T(F): 97.6 (11 May 2022 11:59), Max: 97.6 (11 May 2022 05:14)  HR: 58 (11 May 2022 11:59) (52 - 64)  BP: 131/58 (11 May 2022 11:59) (130/81 - 162/82)   RR: 14 (11 May 2022 11:59) (14 - 18)  SpO2: 100% (11 May 2022 11:59) (99% - 100%)       MEDICATIONS  (STANDING):  brimonidine 0.2% Ophthalmic Solution 1 Drop(s) Both EYES daily  dextrose 5%. 1000 milliLiter(s) (50 mL/Hr) IV Continuous <Continuous>  dextrose 5%. 1000 milliLiter(s) (100 mL/Hr) IV Continuous <Continuous>  dextrose 50% Injectable 25 Gram(s) IV Push once  dextrose 50% Injectable 12.5 Gram(s) IV Push once  dextrose 50% Injectable 25 Gram(s) IV Push once  doxycycline IVPB 100 milliGRAM(s) IV Intermittent every 12 hours  doxycycline IVPB      ergocalciferol 45584 Unit(s) Oral <User Schedule>  folic acid 1 milliGRAM(s) Oral daily  furosemide    Tablet 20 milliGRAM(s) Oral daily  glucagon  Injectable 1 milliGRAM(s) IntraMuscular once  heparin   Injectable 5000 Unit(s) SubCutaneous every 8 hours  insulin lispro (ADMELOG) corrective regimen sliding scale   SubCutaneous Before meals and at bedtime  LORazepam   Injectable 1.5 milliGRAM(s) IV Push every 4 hours  LORazepam   Injectable   IV Push   multivitamin 1 Tablet(s) Oral daily  thiamine 100 milliGRAM(s) Oral daily  timolol 0.5% Solution 1 Drop(s) Both EYES two times a day    MEDICATIONS  (PRN):  dextrose Oral Gel 15 Gram(s) Oral once PRN Blood Glucose LESS THAN 70 milliGRAM(s)/deciliter  LORazepam   Injectable 2 milliGRAM(s) IV Push every 2 hours PRN Symptom-triggered: 2 point increase in CIWA -Ar score and a total score of 7 or LESS  LORazepam   Injectable 2 milliGRAM(s) IV Push every 1 hour PRN Symptom-triggered: each CIWA -Ar score 8 or GREATER                            9.9    2.72  )-----------( 99       ( 11 May 2022 08:08 )             29.2       05-11    139  |  107  |  65<H>  ----------------------------<  117<H>  5.3   |  24  |  3.19<H>    Ca    9.5      11 May 2022 08:08  Phos  4.0     05-11  Mg     2.1     05-11    TPro  6.8  /  Alb  2.7<L>  /  TBili  0.3  /  DBili  x   /  AST  132<H>  /  ALT  66<H>  /  AlkPhos  89  05-11

## 2022-05-11 NOTE — PROGRESS NOTE ADULT - SUBJECTIVE AND OBJECTIVE BOX
NP Note discussed with Primary Attending    Patient is a 76y old  Male who presents with a chief complaint of alcohol withdrawal (11 May 2022 16:55)      INTERVAL HPI/OVERNIGHT EVENTS: no new complaints    MEDICATIONS  (STANDING):  brimonidine 0.2% Ophthalmic Solution 1 Drop(s) Both EYES daily  dextrose 5%. 1000 milliLiter(s) (50 mL/Hr) IV Continuous <Continuous>  dextrose 5%. 1000 milliLiter(s) (100 mL/Hr) IV Continuous <Continuous>  dextrose 50% Injectable 25 Gram(s) IV Push once  dextrose 50% Injectable 12.5 Gram(s) IV Push once  dextrose 50% Injectable 25 Gram(s) IV Push once  doxycycline IVPB 100 milliGRAM(s) IV Intermittent every 12 hours  doxycycline IVPB      ergocalciferol 57965 Unit(s) Oral <User Schedule>  folic acid 1 milliGRAM(s) Oral daily  furosemide    Tablet 20 milliGRAM(s) Oral daily  glucagon  Injectable 1 milliGRAM(s) IntraMuscular once  heparin   Injectable 5000 Unit(s) SubCutaneous every 8 hours  insulin lispro (ADMELOG) corrective regimen sliding scale   SubCutaneous Before meals and at bedtime  LORazepam   Injectable 1.5 milliGRAM(s) IV Push every 4 hours  LORazepam   Injectable   IV Push   multivitamin 1 Tablet(s) Oral daily  thiamine 100 milliGRAM(s) Oral daily  timolol 0.5% Solution 1 Drop(s) Both EYES two times a day    MEDICATIONS  (PRN):  dextrose Oral Gel 15 Gram(s) Oral once PRN Blood Glucose LESS THAN 70 milliGRAM(s)/deciliter  LORazepam   Injectable 2 milliGRAM(s) IV Push every 2 hours PRN Symptom-triggered: 2 point increase in CIWA -Ar score and a total score of 7 or LESS  LORazepam   Injectable 2 milliGRAM(s) IV Push every 1 hour PRN Symptom-triggered: each CIWA -Ar score 8 or GREATER      __________________________________________________  REVIEW OF SYSTEMS:    CONSTITUTIONAL: No fever,   EYES: no acute visual disturbances  NECK: No pain or stiffness  RESPIRATORY: No cough; No shortness of breath  CARDIOVASCULAR: No chest pain, no palpitations  GASTROINTESTINAL: No pain. No nausea or vomiting; No diarrhea   NEUROLOGICAL: No headache or numbness, no tremors  MUSCULOSKELETAL: No joint pain, no muscle pain  GENITOURINARY: no dysuria, no frequency, no hesitancy  PSYCHIATRY: no depression , no anxiety  ALL OTHER  ROS negative        Vital Signs Last 24 Hrs  T(C): 36.4 (11 May 2022 11:59), Max: 36.4 (10 May 2022 20:40)  T(F): 97.6 (11 May 2022 11:59), Max: 97.6 (11 May 2022 05:14)  HR: 58 (11 May 2022 11:59) (52 - 64)  BP: 131/58 (11 May 2022 11:59) (130/81 - 162/82)  BP(mean): --  RR: 14 (11 May 2022 11:59) (14 - 18)  SpO2: 100% (11 May 2022 11:59) (99% - 100%)    ________________________________________________  PHYSICAL EXAM:  GENERAL: NAD  HEENT: Normocephalic;  conjunctivae and sclerae clear; moist mucous membranes;   NECK : supple  CHEST/LUNG: Clear to auscultation bilaterally with good air entry   HEART: S1 S2  regular; no murmurs, gallops or rubs  ABDOMEN: Soft, Nontender, Nondistended; Bowel sounds present  EXTREMITIES: no cyanosis; no edema; no calf tenderness  SKIN: warm and dry; no rash  NERVOUS SYSTEM:  Awake and alert; Oriented  to place, person and time ; no new deficits    _________________________________________________  LABS:                        9.9    2.72  )-----------( 99       ( 11 May 2022 08:08 )             29.2     05-11    139  |  107  |  65<H>  ----------------------------<  117<H>  5.3   |  24  |  3.19<H>    Ca    9.5      11 May 2022 08:08  Phos  4.0     05-11  Mg     2.1     05-11    TPro  6.8  /  Alb  2.7<L>  /  TBili  0.3  /  DBili  x   /  AST  132<H>  /  ALT  66<H>  /  AlkPhos  89  05-11        CAPILLARY BLOOD GLUCOSE      POCT Blood Glucose.: 106 mg/dL (11 May 2022 17:01)  POCT Blood Glucose.: 149 mg/dL (11 May 2022 11:30)  POCT Blood Glucose.: 110 mg/dL (11 May 2022 08:32)  POCT Blood Glucose.: 104 mg/dL (10 May 2022 20:47)        RADIOLOGY & ADDITIONAL TESTS:    Imaging  Reviewed:  YES/NO    Consultant(s) Notes Reviewed:   YES/ No      Plan of care was discussed with patient and /or primary care giver; all questions and concerns were addressed  NP Note discussed with Primary Attending    Patient is a 76y old  Male who presents with a chief complaint of alcohol withdrawal (11 May 2022 09:30AM)      INTERVAL HPI/OVERNIGHT EVENTS: Notable tremors on exam. Denies acute complaints.      MEDICATIONS  (STANDING):  brimonidine 0.2% Ophthalmic Solution 1 Drop(s) Both EYES daily  dextrose 5%. 1000 milliLiter(s) (50 mL/Hr) IV Continuous <Continuous>  dextrose 5%. 1000 milliLiter(s) (100 mL/Hr) IV Continuous <Continuous>  dextrose 50% Injectable 25 Gram(s) IV Push once  dextrose 50% Injectable 12.5 Gram(s) IV Push once  dextrose 50% Injectable 25 Gram(s) IV Push once  doxycycline IVPB 100 milliGRAM(s) IV Intermittent every 12 hours  doxycycline IVPB      ergocalciferol 86854 Unit(s) Oral <User Schedule>  folic acid 1 milliGRAM(s) Oral daily  furosemide    Tablet 20 milliGRAM(s) Oral daily  glucagon  Injectable 1 milliGRAM(s) IntraMuscular once  heparin   Injectable 5000 Unit(s) SubCutaneous every 8 hours  insulin lispro (ADMELOG) corrective regimen sliding scale   SubCutaneous Before meals and at bedtime  LORazepam   Injectable 1.5 milliGRAM(s) IV Push every 4 hours  LORazepam   Injectable   IV Push   multivitamin 1 Tablet(s) Oral daily  thiamine 100 milliGRAM(s) Oral daily  timolol 0.5% Solution 1 Drop(s) Both EYES two times a day    MEDICATIONS  (PRN):  dextrose Oral Gel 15 Gram(s) Oral once PRN Blood Glucose LESS THAN 70 milliGRAM(s)/deciliter  LORazepam   Injectable 2 milliGRAM(s) IV Push every 2 hours PRN Symptom-triggered: 2 point increase in CIWA -Ar score and a total score of 7 or LESS  LORazepam   Injectable 2 milliGRAM(s) IV Push every 1 hour PRN Symptom-triggered: each CIWA -Ar score 8 or GREATER      __________________________________________________  REVIEW OF SYSTEMS:    CONSTITUTIONAL: No fever,   EYES: no acute visual disturbances  NECK: No pain or stiffness  RESPIRATORY: No cough; No shortness of breath  CARDIOVASCULAR: No chest pain, no palpitations  GASTROINTESTINAL: No pain. No nausea or vomiting; No diarrhea   NEUROLOGICAL: No headache or numbness, no tremors  MUSCULOSKELETAL: No joint pain, no muscle pain  GENITOURINARY: no dysuria, no frequency, no hesitancy  PSYCHIATRY: no depression , no anxiety  ALL OTHER  ROS negative        Vital Signs Last 24 Hrs  T(C): 36.4 (11 May 2022 11:59), Max: 36.4 (10 May 2022 20:40)  T(F): 97.6 (11 May 2022 11:59), Max: 97.6 (11 May 2022 05:14)  HR: 58 (11 May 2022 11:59) (52 - 64)  BP: 131/58 (11 May 2022 11:59) (130/81 - 162/82)  BP(mean): --  RR: 14 (11 May 2022 11:59) (14 - 18)  SpO2: 100% (11 May 2022 11:59) (99% - 100%)    ________________________________________________  PHYSICAL EXAM:  GENERAL: NAD  HEENT: Normocephalic;  conjunctivae and sclerae clear; moist mucous membranes;   NECK : supple  CHEST/LUNG: Clear to auscultation bilaterally with good air entry   HEART: S1 S2  regular; no murmurs, gallops or rubs  ABDOMEN: Soft, Nontender, Nondistended; Bowel sounds present  EXTREMITIES: no cyanosis; no edema; no calf tenderness  SKIN: + skin changes consistent w/ psoriasis, b/l lower extremities and rt elbow mild erythematous w/o tend, rt elbow w/ FROM, warm and dry; no rash  NERVOUS SYSTEM:  Awake and alert; Oriented  to place, person and time ; no new deficits, able to answer all questions in [place].    _________________________________________________  LABS:                        9.9    2.72  )-----------( 99       ( 11 May 2022 08:08 )             29.2     05-11    139  |  107  |  65<H>  ----------------------------<  117<H>  5.3   |  24  |  3.19<H>    Ca    9.5      11 May 2022 08:08  Phos  4.0     05-11  Mg     2.1     05-11    TPro  6.8  /  Alb  2.7<L>  /  TBili  0.3  /  DBili  x   /  AST  132<H>  /  ALT  66<H>  /  AlkPhos  89  05-11        CAPILLARY BLOOD GLUCOSE      POCT Blood Glucose.: 106 mg/dL (11 May 2022 17:01)  POCT Blood Glucose.: 149 mg/dL (11 May 2022 11:30)  POCT Blood Glucose.: 110 mg/dL (11 May 2022 08:32)  POCT Blood Glucose.: 104 mg/dL (10 May 2022 20:47)        RADIOLOGY & ADDITIONAL TESTS:    Imaging  Reviewed:  YES/NO    Consultant(s) Notes Reviewed:   YES/ No      Plan of care was discussed with patient and /or primary care giver; all questions and concerns were addressed

## 2022-05-11 NOTE — CONSULT NOTE ADULT - SUBJECTIVE AND OBJECTIVE BOX
Chief complain/HPI  History of CKD/DKD stage 4  Patient was admitted for ETOG intoxication.      PAST MEDICAL & SURGICAL HISTORY:  DM (diabetes mellitus)      HTN (hypertension)      Chronic kidney disease      HLD (hyperlipidemia)      Glaucoma      Gout      No significant past surgical history          Home Medications Reviewed  Home Medications:   * Patient Currently Takes Medications as of 10-May-2022 12:24 documented in Structured Notes  · 	furosemide 20 mg oral tablet: Last Dose Taken:  , 1 tab(s) orally once a day   · 	timolol maleate 0.5% ophthalmic solution: Last Dose Taken:  , 1 drop(s) to each affected eye 2 times a day  	hosp  · 	Combigan 0.2%-0.5% ophthalmic solution: Last Dose Taken:  , 1 drop(s) to each affected eye 2 times a day  	home  · 	folic acid 1 mg oral tablet: Last Dose Taken:  , 1 tab(s) orally once a day  	home/hosp  · 	Januvia 25 mg oral tablet: Last Dose Taken:  , 1 tab(s) orally once a day  	home  · 	allopurinol 100 mg oral tablet: Last Dose Taken:  , 1 tab(s) orally 2 times a day  · 	amLODIPine 5 mg oral tablet: Last Dose Taken:  , 1 tab(s) orally once a day  · 	Vitamin D3 1250 mcg (50,000 intl units) oral capsule: Last Dose Taken:  , 1 cap(s) orally once a week  · 	labetalol 100 mg oral tablet: Last Dose Taken:  , 1 tab(s) orally 2 times a day  Hospital Medications:   MEDICATIONS  (STANDING):  brimonidine 0.2% Ophthalmic Solution 1 Drop(s) Both EYES daily  dextrose 5%. 1000 milliLiter(s) (50 mL/Hr) IV Continuous <Continuous>  dextrose 5%. 1000 milliLiter(s) (100 mL/Hr) IV Continuous <Continuous>  dextrose 50% Injectable 25 Gram(s) IV Push once  dextrose 50% Injectable 12.5 Gram(s) IV Push once  dextrose 50% Injectable 25 Gram(s) IV Push once  doxycycline IVPB 100 milliGRAM(s) IV Intermittent every 12 hours  doxycycline IVPB      ergocalciferol 63136 Unit(s) Oral <User Schedule>  folic acid 1 milliGRAM(s) Oral daily  furosemide    Tablet 20 milliGRAM(s) Oral daily  glucagon  Injectable 1 milliGRAM(s) IntraMuscular once  heparin   Injectable 5000 Unit(s) SubCutaneous every 8 hours  insulin lispro (ADMELOG) corrective regimen sliding scale   SubCutaneous Before meals and at bedtime  LORazepam   Injectable 1.5 milliGRAM(s) IV Push every 4 hours  LORazepam   Injectable   IV Push   multivitamin 1 Tablet(s) Oral daily  thiamine 100 milliGRAM(s) Oral daily  timolol 0.5% Solution 1 Drop(s) Both EYES two times a day    MEDICATIONS  (PRN):  dextrose Oral Gel 15 Gram(s) Oral once PRN Blood Glucose LESS THAN 70 milliGRAM(s)/deciliter  LORazepam   Injectable 2 milliGRAM(s) IV Push every 2 hours PRN Symptom-triggered: 2 point increase in CIWA -Ar score and a total score of 7 or LESS  LORazepam   Injectable 2 milliGRAM(s) IV Push every 1 hour PRN Symptom-triggered: each CIWA -Ar score 8 or GREATER      Allergies    No Known Allergies    Intolerances      < from: Xray Chest 1 View- PORTABLE-Urgent (05.10.22 @ 09:20) >  INTERPRETATION:  AP erect chest on May 10, 2022 at 9:11 AM. Patient has   chest pain.    Heart magnified by technique.    On June 8, 2021there were mild basilar effusions with adjacent   atelectases.    Presently lungs are clear.    IMPRESSION: Clear lungs at this time.    --- End of Report ---    < end of copied text >      Comprehensive Metabolic Panel (05.10.22 @ 09:36)   Sodium, Serum: 138 mmol/L   Potassium, Serum: 5.8 mmol/L   Chloride, Serum: 106 mmol/L   Carbon Dioxide, Serum: 25 mmol/L   Anion Gap, Serum: 7 mmol/L   Blood Urea Nitrogen, Serum: 64 mg/dL   Creatinine, Serum: 3.30 mg/dL   Glucose, Serum: 107 mg/dL   Calcium, Total Serum: 9.6 mg/dL   Protein Total, Serum: 7.1 g/dL   Albumin, Serum: 3.0 g/dL   Bilirubin Total, Serum: 0.4 mg/dL   Alkaline Phosphatase, Serum: 82 U/L   Aspartate Aminotransferase (AST/SGOT): 121 U/L   Alanine Aminotransferase (ALT/SGPT): 60 U/L DA   eGFR: 19: The estimated glomerular filtration rate (eGFR) is calculated using the   2021 CKD-EPI creatinine equation, which does not have a coefficient for   race and is validated in individuals 18 years of age and older (N Engl J   Med 2021; 385:5045-8967). Creatinine-based eGFR may be inaccurate in   various situations including but not limited to extremes of muscle mass,   altered dietary protein intake, or medications that affect renal tubular   creatinine secretion. mL/min/1.73m2                         9.9    2.72  )-----------( 99       ( 11 May 2022 08:08 )             29.2     05-11    139  |  107  |  65<H>  ----------------------------<  117<H>  5.3   |  24  |  3.19<H>    Ca    9.5      11 May 2022 08:08  Phos  4.0     05-11  Mg     2.1     05-11    TPro  6.8  /  Alb  2.7<L>  /  TBili  0.3  /  DBili  x   /  AST  132<H>  /  ALT  66<H>  /  AlkPhos  89  05-11      1. Normal mitral valve. Mild to moderate mitral  regurgitation.  2. Calcified trileaflet aortic valve with normal opening.  No aortic stenosis. Mild to moderate aortic regurgitation.  3. Normal aortic root.  4. Normal left atrium.  5. Normalleft ventricular internal dimensions and wall  thicknesses.  6. Mild to moderate global left ventricular systolic  dysfunction (EF 40-45% by visual estimation). Paradoxical  septal motion is seen, consistent with conduction delay.  Not all LV wall segments were seen.  7. Grade II diastolic dysfunction.  8. Off axis images preclude accurate assessment of right  ventricular size. Normal RV systolic function (TAPSE 2.5  cm).  9. RV systolic pressure is mildly increased at  36 mm Hg.  10. Normal tricuspid valve. Trace tricuspid regurgitation.  11. Normal pulmonic valve. Trace pulmonic insufficiency is  noted.  12. No pericardial effusion.  13. Left pleural effusion.    *** Compared with echocardiogram report of 6/2/2019, there  has been a decrease in LV systolic function.            SOCIAL HISTORY: Denies ETOh,Smoking,     FAMILY HISTORY:      REVIEW OF SYSTEMS:  patient is confused    VITALS:  Vital Signs Last 24 Hrs  T(C): 36.4 (11 May 2022 11:59), Max: 36.4 (10 May 2022 16:11)  T(F): 97.6 (11 May 2022 11:59), Max: 97.6 (11 May 2022 05:14)  HR: 58 (11 May 2022 11:59) (50 - 64)  BP: 131/58 (11 May 2022 11:59) (130/81 - 162/82)  BP(mean): --  RR: 14 (11 May 2022 11:59) (14 - 18)  SpO2: 100% (11 May 2022 11:59) (99% - 100%)        PHYSICAL EXAM:  Constitutional: NAD  HEENT: anicteric sclera, oropharynx clear, MMM  Neck: No JVD  Respiratory: good air entrance B/L, no wheezes, rales or rhonchi  Cardiovascular: S1, S2, RRR, no pericardial rub, no murmur  Gastrointestinal: BS+, soft, no tenderness, no distension, no bruit  Pelvis: bladder non-distended, no CVA tenderness  Extremities: No cyanosis or clubbing. No peripheral edema  Neurological: confused, not answer to questions  Tremor of upper extremities and labial area.  Trace lower extremities edema.

## 2022-05-11 NOTE — PROGRESS NOTE ADULT - PROBLEM SELECTOR PLAN 3
- h/o HTN, on labetalol and amlodipine at home  - continue to hold both meds given bradycardia  - monitor BP for now  - resume anti-BP meds as indicated

## 2022-05-11 NOTE — PROGRESS NOTE ADULT - PROBLEM SELECTOR PLAN 2
- Pt is A&O x three but unable to provide details, reports feeling fine at time of eval  - likely due to ativan  - CTH report noted, no acute hemorrhage

## 2022-05-11 NOTE — PROGRESS NOTE ADULT - PROBLEM SELECTOR PLAN 1
- p/w generalized weakness and tremulousness  - last drink last night, unable to confirm the exact timing; reports 2 glasses of Whisky, but likely more according to son  - MANOJ 6 according to ED attending, MANOJ 4 on PE  - s/p ativan 2mg in ED  - c/w CIWA protocol, thiamine, FA, MV  - CIWA protocol  - CTH- periventricular white matter ischemia, bitemporal and bifrontal atrophy

## 2022-05-12 LAB
ALBUMIN SERPL ELPH-MCNC: 2.6 G/DL — LOW (ref 3.5–5)
ALP SERPL-CCNC: 84 U/L — SIGNIFICANT CHANGE UP (ref 40–120)
ALT FLD-CCNC: 61 U/L DA — HIGH (ref 10–60)
ANION GAP SERPL CALC-SCNC: 5 MMOL/L — SIGNIFICANT CHANGE UP (ref 5–17)
APPEARANCE UR: CLEAR — SIGNIFICANT CHANGE UP
AST SERPL-CCNC: 98 U/L — HIGH (ref 10–40)
BACTERIA # UR AUTO: NEGATIVE /HPF — SIGNIFICANT CHANGE UP
BILIRUB SERPL-MCNC: 0.3 MG/DL — SIGNIFICANT CHANGE UP (ref 0.2–1.2)
BILIRUB UR-MCNC: NEGATIVE — SIGNIFICANT CHANGE UP
BUN SERPL-MCNC: 67 MG/DL — HIGH (ref 7–18)
CALCIUM SERPL-MCNC: 9.2 MG/DL — SIGNIFICANT CHANGE UP (ref 8.4–10.5)
CHLORIDE SERPL-SCNC: 111 MMOL/L — HIGH (ref 96–108)
CO2 SERPL-SCNC: 25 MMOL/L — SIGNIFICANT CHANGE UP (ref 22–31)
COLOR SPEC: YELLOW — SIGNIFICANT CHANGE UP
CREAT SERPL-MCNC: 3.37 MG/DL — HIGH (ref 0.5–1.3)
DIFF PNL FLD: NEGATIVE — SIGNIFICANT CHANGE UP
EGFR: 18 ML/MIN/1.73M2 — LOW
EPI CELLS # UR: ABNORMAL /HPF
GLUCOSE BLDC GLUCOMTR-MCNC: 103 MG/DL — HIGH (ref 70–99)
GLUCOSE BLDC GLUCOMTR-MCNC: 123 MG/DL — HIGH (ref 70–99)
GLUCOSE BLDC GLUCOMTR-MCNC: 90 MG/DL — SIGNIFICANT CHANGE UP (ref 70–99)
GLUCOSE SERPL-MCNC: 109 MG/DL — HIGH (ref 70–99)
GLUCOSE UR QL: NEGATIVE — SIGNIFICANT CHANGE UP
HCT VFR BLD CALC: 30.4 % — LOW (ref 39–50)
HGB BLD-MCNC: 10.4 G/DL — LOW (ref 13–17)
KETONES UR-MCNC: NEGATIVE — SIGNIFICANT CHANGE UP
LEUKOCYTE ESTERASE UR-ACNC: NEGATIVE — SIGNIFICANT CHANGE UP
MAGNESIUM SERPL-MCNC: 1.9 MG/DL — SIGNIFICANT CHANGE UP (ref 1.6–2.6)
MCHC RBC-ENTMCNC: 34.2 GM/DL — SIGNIFICANT CHANGE UP (ref 32–36)
MCHC RBC-ENTMCNC: 34.2 PG — HIGH (ref 27–34)
MCV RBC AUTO: 100 FL — SIGNIFICANT CHANGE UP (ref 80–100)
NITRITE UR-MCNC: NEGATIVE — SIGNIFICANT CHANGE UP
NRBC # BLD: 0 /100 WBCS — SIGNIFICANT CHANGE UP (ref 0–0)
PH UR: 6 — SIGNIFICANT CHANGE UP (ref 5–8)
PHOSPHATE SERPL-MCNC: 3.6 MG/DL — SIGNIFICANT CHANGE UP (ref 2.5–4.5)
PLATELET # BLD AUTO: 89 K/UL — LOW (ref 150–400)
POTASSIUM SERPL-MCNC: 5.1 MMOL/L — SIGNIFICANT CHANGE UP (ref 3.5–5.3)
POTASSIUM SERPL-SCNC: 5.1 MMOL/L — SIGNIFICANT CHANGE UP (ref 3.5–5.3)
PROT SERPL-MCNC: 6.9 G/DL — SIGNIFICANT CHANGE UP (ref 6–8.3)
PROT UR-MCNC: 100
RBC # BLD: 3.04 M/UL — LOW (ref 4.2–5.8)
RBC # FLD: 14.2 % — SIGNIFICANT CHANGE UP (ref 10.3–14.5)
RBC CASTS # UR COMP ASSIST: NEGATIVE /HPF — SIGNIFICANT CHANGE UP (ref 0–2)
SODIUM SERPL-SCNC: 141 MMOL/L — SIGNIFICANT CHANGE UP (ref 135–145)
SP GR SPEC: 1.01 — SIGNIFICANT CHANGE UP (ref 1.01–1.02)
UROBILINOGEN FLD QL: NEGATIVE — SIGNIFICANT CHANGE UP
WBC # BLD: 3.1 K/UL — LOW (ref 3.8–10.5)
WBC # FLD AUTO: 3.1 K/UL — LOW (ref 3.8–10.5)
WBC UR QL: SIGNIFICANT CHANGE UP /HPF (ref 0–5)

## 2022-05-12 PROCEDURE — 99221 1ST HOSP IP/OBS SF/LOW 40: CPT

## 2022-05-12 RX ORDER — CHLORHEXIDINE GLUCONATE 213 G/1000ML
1 SOLUTION TOPICAL
Refills: 0 | Status: DISCONTINUED | OUTPATIENT
Start: 2022-05-12 | End: 2022-05-24

## 2022-05-12 RX ORDER — MUPIROCIN 20 MG/G
1 OINTMENT TOPICAL
Refills: 0 | Status: COMPLETED | OUTPATIENT
Start: 2022-05-12 | End: 2022-05-17

## 2022-05-12 RX ORDER — SODIUM CHLORIDE 9 MG/ML
1000 INJECTION INTRAMUSCULAR; INTRAVENOUS; SUBCUTANEOUS
Refills: 0 | Status: DISCONTINUED | OUTPATIENT
Start: 2022-05-12 | End: 2022-05-12

## 2022-05-12 RX ORDER — SODIUM CHLORIDE 9 MG/ML
1000 INJECTION INTRAMUSCULAR; INTRAVENOUS; SUBCUTANEOUS
Refills: 0 | Status: DISCONTINUED | OUTPATIENT
Start: 2022-05-12 | End: 2022-05-13

## 2022-05-12 RX ADMIN — Medication 1.5 MILLIGRAM(S): at 02:11

## 2022-05-12 RX ADMIN — BRIMONIDINE TARTRATE 1 DROP(S): 2 SOLUTION/ DROPS OPHTHALMIC at 12:10

## 2022-05-12 RX ADMIN — HEPARIN SODIUM 5000 UNIT(S): 5000 INJECTION INTRAVENOUS; SUBCUTANEOUS at 16:53

## 2022-05-12 RX ADMIN — HEPARIN SODIUM 5000 UNIT(S): 5000 INJECTION INTRAVENOUS; SUBCUTANEOUS at 21:50

## 2022-05-12 RX ADMIN — Medication 1 DROP(S): at 16:53

## 2022-05-12 RX ADMIN — Medication 1 TABLET(S): at 12:10

## 2022-05-12 RX ADMIN — Medication 1 MILLIGRAM(S): at 16:56

## 2022-05-12 RX ADMIN — Medication 110 MILLIGRAM(S): at 16:53

## 2022-05-12 RX ADMIN — HEPARIN SODIUM 5000 UNIT(S): 5000 INJECTION INTRAVENOUS; SUBCUTANEOUS at 05:34

## 2022-05-12 RX ADMIN — Medication 1.5 MILLIGRAM(S): at 05:33

## 2022-05-12 RX ADMIN — Medication 1 MILLIGRAM(S): at 09:31

## 2022-05-12 RX ADMIN — SODIUM CHLORIDE 80 MILLILITER(S): 9 INJECTION INTRAMUSCULAR; INTRAVENOUS; SUBCUTANEOUS at 23:03

## 2022-05-12 RX ADMIN — SODIUM CHLORIDE 80 MILLILITER(S): 9 INJECTION INTRAMUSCULAR; INTRAVENOUS; SUBCUTANEOUS at 10:20

## 2022-05-12 RX ADMIN — CHLORHEXIDINE GLUCONATE 1 APPLICATION(S): 213 SOLUTION TOPICAL at 09:27

## 2022-05-12 RX ADMIN — MUPIROCIN 1 APPLICATION(S): 20 OINTMENT TOPICAL at 16:57

## 2022-05-12 RX ADMIN — Medication 100 MILLIGRAM(S): at 12:10

## 2022-05-12 RX ADMIN — SODIUM CHLORIDE 70 MILLILITER(S): 9 INJECTION INTRAMUSCULAR; INTRAVENOUS; SUBCUTANEOUS at 09:27

## 2022-05-12 RX ADMIN — Medication 110 MILLIGRAM(S): at 05:28

## 2022-05-12 RX ADMIN — Medication 20 MILLIGRAM(S): at 05:34

## 2022-05-12 RX ADMIN — Medication 1 MILLIGRAM(S): at 21:49

## 2022-05-12 RX ADMIN — Medication 1 MILLIGRAM(S): at 12:10

## 2022-05-12 RX ADMIN — Medication 1 DROP(S): at 05:28

## 2022-05-12 RX ADMIN — Medication 2 MILLIGRAM(S): at 20:17

## 2022-05-12 RX ADMIN — Medication 1 APPLICATION(S): at 18:29

## 2022-05-12 NOTE — PROGRESS NOTE ADULT - ASSESSMENT
1. Anemia  2. No evidence of acute GI bleeding  3. R/o chronic GI bleeding  4. ETOH abuse  5. Thrombocytopenia  6. Alcoholic liver disease  7. Alcohol withdrawal    Suggestions:    1. Monitor H/H  2. Transfuse PRBC as needed  3. Protonix 40mg po daily  4. Avoid NSAID  5. Check stool for occult blood  6. Withdrawal precaution  7. EGD and colonoscopy  8. Follow up LFT's  9. Follow up platelets  10. DVT prophylaxis

## 2022-05-12 NOTE — PROGRESS NOTE ADULT - ASSESSMENT
Patient is a 76yoM, AAOx3, ambulates independently, leaves alone at home, w/ PMH of HLD, DM, CKD, presents to the ED with weakness and tremulousness. Admitted for alcohol withdrawal.     Case discussed w/ pt's son Dre Ojeda at bedside who reports that pt lives alone and he drinks about 50 years every night and now has been having about two drinks per day.  No prior Etoh related hospitalization as per son.  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 son reports pt is more awake today, no behavioral issues noted, continue taper dose of ativan. Neuro consulted for b; upper arm tremor

## 2022-05-12 NOTE — PROGRESS NOTE ADULT - SUBJECTIVE AND OBJECTIVE BOX
NP Note discussed with  Primary Attending    Patient is a 76y old  Male who presents with a chief complaint of alcohol withdrawal (12 May 2022 15:08)      INTERVAL HPI/OVERNIGHT EVENTS: no new complaints    MEDICATIONS  (STANDING):  brimonidine 0.2% Ophthalmic Solution 1 Drop(s) Both EYES daily  chlorhexidine 2% Cloths 1 Application(s) Topical <User Schedule>  clobetasol 0.05% Ointment 1 Application(s) Topical two times a day  dextrose 5%. 1000 milliLiter(s) (50 mL/Hr) IV Continuous <Continuous>  dextrose 5%. 1000 milliLiter(s) (100 mL/Hr) IV Continuous <Continuous>  dextrose 50% Injectable 25 Gram(s) IV Push once  dextrose 50% Injectable 12.5 Gram(s) IV Push once  dextrose 50% Injectable 25 Gram(s) IV Push once  doxycycline IVPB 100 milliGRAM(s) IV Intermittent every 12 hours  doxycycline IVPB      ergocalciferol 38878 Unit(s) Oral <User Schedule>  folic acid 1 milliGRAM(s) Oral daily  furosemide    Tablet 20 milliGRAM(s) Oral daily  glucagon  Injectable 1 milliGRAM(s) IntraMuscular once  heparin   Injectable 5000 Unit(s) SubCutaneous every 8 hours  insulin lispro (ADMELOG) corrective regimen sliding scale   SubCutaneous Before meals and at bedtime  LORazepam   Injectable 1 milliGRAM(s) IV Push every 4 hours  LORazepam   Injectable   IV Push   multivitamin 1 Tablet(s) Oral daily  mupirocin 2% Ointment 1 Application(s) Both Nostrils two times a day  Nephro-marcella 1 Tablet(s) Oral daily  sodium chloride 0.9%. 1000 milliLiter(s) (80 mL/Hr) IV Continuous <Continuous>  thiamine 100 milliGRAM(s) Oral daily  timolol 0.5% Solution 1 Drop(s) Both EYES two times a day    MEDICATIONS  (PRN):  dextrose Oral Gel 15 Gram(s) Oral once PRN Blood Glucose LESS THAN 70 milliGRAM(s)/deciliter  LORazepam   Injectable 2 milliGRAM(s) IV Push every 2 hours PRN Symptom-triggered: 2 point increase in CIWA -Ar score and a total score of 7 or LESS  LORazepam   Injectable 2 milliGRAM(s) IV Push every 1 hour PRN Symptom-triggered: each CIWA -Ar score 8 or GREATER      __________________________________________________  REVIEW OF SYSTEMS:    CONSTITUTIONAL: No fever,   EYES: no acute visual disturbances  NECK: No pain or stiffness  RESPIRATORY: No cough; No shortness of breath  CARDIOVASCULAR: No chest pain, no palpitations  GASTROINTESTINAL: No pain. No nausea or vomiting; No diarrhea   NEUROLOGICAL: No headache or numbness, no tremors  MUSCULOSKELETAL: No joint pain, no muscle pain  GENITOURINARY: no dysuria, no frequency, no hesitancy  PSYCHIATRY: no depression , no anxiety  ALL OTHER  ROS negative        Vital Signs Last 24 Hrs  T(C): 36.3 (12 May 2022 12:22), Max: 36.3 (11 May 2022 20:40)  T(F): 97.4 (12 May 2022 12:22), Max: 97.4 (12 May 2022 12:22)  HR: 86 (12 May 2022 12:22) (62 - 86)  BP: 145/69 (12 May 2022 12:22) (127/58 - 145/69)  BP(mean): --  RR: 16 (12 May 2022 12:22) (16 - 20)  SpO2: 100% (12 May 2022 12:22) (94% - 100%)    ________________________________________________  PHYSICAL EXAM:  GENERAL: NAD  HEENT: Normocephalic;  conjunctivae and sclerae clear; moist mucous membranes;   NECK : supple  CHEST/LUNG: Clear to auscultation bilaterally with good air entry   HEART: S1 S2  regular; no murmurs, gallops or rubs  ABDOMEN: Soft, Nontender, Nondistended; Bowel sounds present  EXTREMITIES: no cyanosis; no edema; no calf tenderness + tremor   SKIN: warm and dry; no rash  NERVOUS SYSTEM:  Awake and alert; Oriented  to place, person  ; no new deficits    _________________________________________________  LABS:                        10.4   3.10  )-----------( 89       ( 12 May 2022 07:57 )             30.4         141  |  111<H>  |  67<H>  ----------------------------<  109<H>  5.1   |  25  |  3.37<H>    Ca    9.2      12 May 2022 07:57  Phos  3.6       Mg     1.9         TPro  6.9  /  Alb  2.6<L>  /  TBili  0.3  /  DBili  x   /  AST  98<H>  /  ALT  61<H>  /  AlkPhos  84        Urinalysis Basic - ( 12 May 2022 12:13 )    Color: Yellow / Appearance: Clear / S.010 / pH: x  Gluc: x / Ketone: Negative  / Bili: Negative / Urobili: Negative   Blood: x / Protein: 100 / Nitrite: Negative   Leuk Esterase: Negative / RBC: Negative /HPF / WBC 0-2 /HPF   Sq Epi: x / Non Sq Epi: Occasional /HPF / Bacteria: Negative /HPF      CAPILLARY BLOOD GLUCOSE      POCT Blood Glucose.: 90 mg/dL (12 May 2022 11:36)  POCT Blood Glucose.: 103 mg/dL (12 May 2022 07:45)  POCT Blood Glucose.: 132 mg/dL (11 May 2022 21:53)  POCT Blood Glucose.: 106 mg/dL (11 May 2022 17:01)        RADIOLOGY & ADDITIONAL TESTS:  < from: CT Head No Cont (05.10.22 @ 14:32) >    ACC: [id number] EXAM:  CT BRAIN                          PROCEDURE DATE:  05/10/2022          INTERPRETATION:  CT head without IV contrast    CLINICAL INFORMATION: tremors, alcohol dependence    TECHNIQUE: Contiguous axial 5 mm sections were obtainedthrough the head.   Sagittal and coronal 2-D reformatted images were also obtained.   This   scan was performed using automatic exposure control (radiation dose   reduction software) to obtain a diagnostic image quality scan with   patient dose as low as reasonably achievable.    FINDINGS:   CT dated 2016 available for review.    The brain demonstrates moderate periventricular white matter ischemia.     No acute cerebral cortical infarct is seen.  No intracranial hemorrhage   is found.  No mass effect is found in the brain.    The ventricles, sulci and basal cisterns show moderate bitemporal lobe   and bifrontal lobe atrophy.    The orbits are unremarkable.  The paranasal sinuses are significant for   mucosal thickening in the RIGHT maxillary and RIGHT ethmoid and frontal   and RIGHT frontal sinuses.  The nasal cavity appears intact.  The   nasopharynx is symmetric.  The central skull base, petrous temporal bones   and calvarium remain intact.      IMPRESSION:   Moderate periventricular white matter ischemia. Moderate   bitemporal lobe and bifrontal lobe atrophy.   Mucosal thickening in the   RIGHT maxillary and RIGHT ethmoid and frontal and RIGHT frontal sinuses.    --- End of Report ---            KAMILLE CAMPBELL MD; Attending Radiologist  This document has been electronically signed. May 10 2022  2:42PM    < end of copied text >  < from: Xray Chest 1 View- PORTABLE-Urgent (05.10.22 @ 09:20) >    ACC: [id number] EXAM:  XR CHEST PORTABLE URGENT 1V                          PROCEDURE DATE:  05/10/2022          INTERPRETATION:  AP erect chest on May 10, 2022 at 9:11 AM. Patient has   chest pain.    Heart magnified by technique.    On 2021there were mild basilar effusions with adjacent   atelectases.    Presently lungs are clear.    IMPRESSION: Clear lungs at this time.    --- End of Report ---            TANISHA VELEZ MD; Attending Radiologist  This document has been electronically signed. May 10 2022  9:47AM    < end of copied text >    Imaging Personally Reviewed:  YES    Consultant(s) Notes Reviewed:   YES    Care Discussed with Consultants : neuro, GI nephro     Plan of care was discussed with patient and /or primary care giver; all questions and concerns were addressed and care was aligned with patient's wishes.

## 2022-05-12 NOTE — PROGRESS NOTE ADULT - SUBJECTIVE AND OBJECTIVE BOX
`Patient is a 76y old  Male who presents with a chief complaint of alcohol withdrawal (12 May 2022 09:08)    pt seen in icu [  ], reg med floor [ x  ], bed [ x ], chair at bedside [   ], a+o x3 [x  ], lethargic [  ],  nad [ x ]        Allergies    No Known Allergies        Vitals    T(F): 95 (05-12-22 @ 09:50), Max: 97.7 (05-11-22 @ 12:13)  HR: 78 (05-12-22 @ 09:50) (56 - 78)  BP: 127/58 (05-12-22 @ 09:50) (127/58 - 142/74)  RR: 16 (05-12-22 @ 09:50) (14 - 20)  SpO2: 94% (05-12-22 @ 09:50) (94% - 100%)  Wt(kg): --  CAPILLARY BLOOD GLUCOSE      POCT Blood Glucose.: 103 mg/dL (12 May 2022 07:45)      Labs                          10.4   3.10  )-----------( 89       ( 12 May 2022 07:57 )             30.4       05-12    141  |  111<H>  |  67<H>  ----------------------------<  109<H>  5.1   |  25  |  3.37<H>    Ca    9.2      12 May 2022 07:57  Phos  3.6     05-12  Mg     1.9     05-12    TPro  6.9  /  Alb  2.6<L>  /  TBili  0.3  /  DBili  x   /  AST  98<H>  /  ALT  61<H>  /  AlkPhos  84  05-12          Troponin I, High Sensitivity Result: 25.8 ng/L (05-10-22 @ 09:36)    < from: Transthoracic Echocardiogram (06.03.21 @ 07:06) >  CONCLUSIONS:  1. Normal mitral valve. Mild to moderate mitral  regurgitation.  2. Calcified trileaflet aortic valve with normal opening.  No aortic stenosis. Mild to moderate aortic regurgitation.  3. Normal aortic root.  4. Normal left atrium.  5. Normalleft ventricular internal dimensions and wall  thicknesses.  6. Mild to moderate global left ventricular systolic  dysfunction (EF 40-45% by visual estimation). Paradoxical  septal motion is seen, consistent with conduction delay.  Not all LV wall segments were seen.  7. Grade II diastolic dysfunction.  8. Off axis images preclude accurate assessment of right  ventricular size. Normal RV systolic function (TAPSE 2.5  cm).  9. RV systolic pressure is mildly increased at  36 mm Hg.  10. Normal tricuspid valve. Trace tricuspid regurgitation.  11. Normal pulmonic valve. Trace pulmonic insufficiency is  noted.  12. No pericardial effusion.  13. Left pleural effusion    < end of copied text >      Radiology Results        Meds    MEDICATIONS  (STANDING):  brimonidine 0.2% Ophthalmic Solution 1 Drop(s) Both EYES daily  chlorhexidine 2% Cloths 1 Application(s) Topical <User Schedule>  clobetasol 0.05% Ointment 1 Application(s) Topical two times a day  dextrose 5%. 1000 milliLiter(s) (50 mL/Hr) IV Continuous <Continuous>  dextrose 5%. 1000 milliLiter(s) (100 mL/Hr) IV Continuous <Continuous>  dextrose 50% Injectable 25 Gram(s) IV Push once  dextrose 50% Injectable 12.5 Gram(s) IV Push once  dextrose 50% Injectable 25 Gram(s) IV Push once  doxycycline IVPB 100 milliGRAM(s) IV Intermittent every 12 hours  doxycycline IVPB      ergocalciferol 00560 Unit(s) Oral <User Schedule>  folic acid 1 milliGRAM(s) Oral daily  furosemide    Tablet 20 milliGRAM(s) Oral daily  glucagon  Injectable 1 milliGRAM(s) IntraMuscular once  heparin   Injectable 5000 Unit(s) SubCutaneous every 8 hours  insulin lispro (ADMELOG) corrective regimen sliding scale   SubCutaneous Before meals and at bedtime  LORazepam   Injectable 1 milliGRAM(s) IV Push every 4 hours  LORazepam   Injectable   IV Push   multivitamin 1 Tablet(s) Oral daily  mupirocin 2% Ointment 1 Application(s) Both Nostrils two times a day  Nephro-marcella 1 Tablet(s) Oral daily  sodium chloride 0.9%. 1000 milliLiter(s) (80 mL/Hr) IV Continuous <Continuous>  thiamine 100 milliGRAM(s) Oral daily  timolol 0.5% Solution 1 Drop(s) Both EYES two times a day      MEDICATIONS  (PRN):  dextrose Oral Gel 15 Gram(s) Oral once PRN Blood Glucose LESS THAN 70 milliGRAM(s)/deciliter  LORazepam   Injectable 2 milliGRAM(s) IV Push every 2 hours PRN Symptom-triggered: 2 point increase in CIWA -Ar score and a total score of 7 or LESS  LORazepam   Injectable 2 milliGRAM(s) IV Push every 1 hour PRN Symptom-triggered: each CIWA -Ar score 8 or GREATER      Physical Exam    Neuro :  no focal deficits  Respiratory: CTA B/L  CV: RRR, S1S2, no murmurs,   Abdominal: Soft, NT, ND +BS,  Extremities: No edema, + peripheral pulses, rifgt distal leg erythema and excoriation resolved   skin: diffused scaly rash       ASSESSMENT    etoh withdrawal,   metabolic encephalopathy,   right le cellulitis,   psoriasis,   acute on ckd,   anemia,   r/o parkinsons disease  h/o HLD,   DM,   chronic HF rEF  CKD  Alcohol dependence with withdrawal  Glaucoma  Gout    PLAN    ativan as per ciwa protocol,   multivitamin, folate, thiamine   f/u ammonia lev   bld cx,   doxycycline to complete 7 days,   hold norvasc 2nd to lower extrem edema,   f/u echo   wound care eval   f/u stool occult blood  gi f/u   No evidence of acute GI bleeding  Monitor H/H  transfuse PRBC as needed  Protonix 40mg po daily  Avoid NSAID  Check stool for occult blood  f/u EGD and colonoscopy   f/u abd us   renal f/u  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  Deterioration in renal function, possible chronic versus acute due to dehydration  d/c lasix   Suggest IV fluid 1/2 NS rate 80 ml per hour next 24 hours  Agree with Vitamin D start Nephro vitamin. Follow PTH level  Follow 2 gm K diet  Follow daily renal function magnesium and po4.  lispro ss   neuro cons to eval for parkinsons vs intentional tremors   add clobetasol 0.05% bid for psoriasis  cont current meds

## 2022-05-12 NOTE — CHART NOTE - NSCHARTNOTEFT_GEN_A_CORE
Please see the immediately preceding Neurology Consult Note.      Somehow a search for "Neurology" does not bring that note up.

## 2022-05-12 NOTE — CONSULT NOTE ADULT - ASSESSMENT
Mild distal symmetric bilateral UE tremulousness in the setting of EtOH withdrawal and multiple acute medical problems as noted above.      No exam findings diagnostic for an extrapyramidal disorder.    In any case, Dx and consideration of potential management of any (possible suspected) extrapyramidal disorder cannot be made in an acute setting such as this one.  When the Pt has FULLY recovered from his acute medical problems, if there is a desire for neurologic evaluation, then please schedule a routine out-Pt visit.                                                           IMPORTANT  -  PLEASE NOTE:                              I am a neurohospitalist. I do not see patients outside of the hospital.        Patients requiring neurological follow-up after discharge may contact one of the following offices.     [facility]  [address] [place] [address].  [place] [address]  [phone]    [facility]  [address].  [place], [place]  [phone]      Jostin Gotti M.D.   - Department of Neurology  [employer] at [facility]   Mild distal symmetric bilateral UE tremulousness in the setting of EtOH withdrawal and multiple acute medical problems as noted above.      No exam findings diagnostic for an extrapyramidal disorder.    In any case, Dx and consideration of potential management of any (possible suspected) extrapyramidal disorder cannot be made in an acute setting such as this one.  When the Pt has FULLY recovered from his acute medical problems, if there is a desire for neurologic evaluation, then please schedule a routine out-Pt visit.                                                           IMPORTANT  -  PLEASE NOTE:                              I am a neurohospitalist. I do not see patients outside of the hospital.        Patients requiring neurological follow-up after discharge may contact one of the following offices.     [facility]  [address] [place] [address].  [place] [address]  [phone]    [facility]  [address].  [place], [place]  [phone]      Jostin Gotti M.D.   - Department of Neurology  [employer] at [facility]   Mild distal symmetric bilateral UE tremulousness in the setting of EtOH withdrawal and multiple acute medical problems as noted above.      No exam findings diagnostic for an extrapyramidal disorder.    In any case, Dx and consideration of potential management of any (possible suspected) extrapyramidal disorder cannot be made in an acute setting such as this one.  When the Pt has FULLY recovered from his acute medical problems, if there is a desire for neurologic evaluation, then please schedule a routine out-Pt visit.                                                           IMPORTANT  -  PLEASE NOTE:                              I am a neurohospitalist. I do not see patients outside of the hospital.        Patients requiring neurological follow-up after discharge may contact one of the following offices.     [facility]  [address] [place] [address].  [place] [address]  [phone]    [facility]  [address].  [place], [place]  [phone]      Jostin Gotti M.D.   - Department of Neurology  [employer] at [facility]   Mild distal symmetric bilateral UE tremulousness in the setting of, and entirely consistent with, EtOH withdrawal, chronic EtOH abuse, and multiple acute medical problems as noted above.      No exam findings diagnostic for an extrapyramidal disorder.    In any case, Dx and consideration of potential management of any (possible suspected) extrapyramidal disorder cannot be made in an acute setting such as this one.  When the Pt has FULLY recovered from his acute medical problems, if there is a desire for neurologic evaluation, then please schedule a routine out-Pt visit.                                                           IMPORTANT  -  PLEASE NOTE:                              I am a neurohospitalist. I do not see patients outside of the hospital.        Patients requiring neurological follow-up after discharge may contact one of the following offices.     [facility]  [address] [place] [address]  [phone]    [place] [address].  [place], [place]  [phone]      Jostin Gotti M.D.   - Department of Neurology  [employer] at [facility]   Mild distal symmetric bilateral UE tremulousness in the setting of, and entirely consistent with, EtOH withdrawal, chronic EtOH abuse, and multiple acute medical problems as noted above.      No exam findings diagnostic for an extrapyramidal disorder.    In any case, Dx and consideration of potential management of any (possible suspected) extrapyramidal disorder cannot be made in an acute setting such as this one.  When the Pt has FULLY recovered from his acute medical problems, if there is a desire for neurologic evaluation, then please schedule a routine out-Pt visit.                                                           IMPORTANT  -  PLEASE NOTE:                              I am a neurohospitalist. I do not see patients outside of the hospital.        Patients requiring neurological follow-up after discharge may contact one of the following offices.     [facility]  [address] [place] [address]  [phone]    [place] [address].  [place], [place]  [phone]      Jostin Gotti M.D.   - Department of Neurology  [employer] at \[facility]\""/[facility]   Mild distal symmetric bilateral UE tremulousness in the setting of, and entirely consistent with, EtOH withdrawal, chronic EtOH abuse, and multiple acute medical problems as noted above.      No exam findings diagnostic for an extrapyramidal disorder.    In any case, Dx and consideration of potential management of any (possible suspected) extrapyramidal disorder cannot be made in an acute setting such as this one.  When the Pt has FULLY recovered from his acute medical problems, if there is a desire for neurologic evaluation, then please schedule a routine out-Pt visit.                                                           IMPORTANT  -  PLEASE NOTE:                              I am a neurohospitalist. I do not see patients outside of the hospital.        Patients requiring neurological follow-up after discharge may contact one of the following offices.     [facility]  [address] [place] [address]  [phone]    [place] [address].  [place], [place]  [phone]      Jostin Gotti M.D.   - Department of Neurology  [employer] at [facility]

## 2022-05-12 NOTE — PROGRESS NOTE ADULT - PROBLEM SELECTOR PLAN 1
p/w generalized weakness and tremulousness  reports 2 glasses of Whisky, but likely more according to son  KISHOREWA 6 according to ED attending, MANOJ 4 on PE  s/p ativan 2mg in ED  c/w CIWA protocol, thiamine, FA, MV  CTH- periventricular white matter ischemia, bitemporal and bifrontal atrophy  more alert and awake, follows commands

## 2022-05-12 NOTE — PROGRESS NOTE ADULT - ASSESSMENT
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  Deterioration in renal function, possible chronic versus acute due to dehydration  Diffuse Psoriasis lesions .  No CHF.    Suggest IV fluid 1/2 NS rate 80 ml per hour next 24 hours  Agree with Vitamin D start Nephro vitamin. Follow PTH level.   Follow US of Abdomen.

## 2022-05-12 NOTE — PROGRESS NOTE ADULT - SUBJECTIVE AND OBJECTIVE BOX
[   ] ICU                                          [   ] CCU                                      [ X  ] Medical Floor    Patient is a 76 year old male with ETOH abuse and anemia. GI consulted to evaluate.     Patient is a 76 year old male AAOx3, ambulates independently, leaves alone at home, with past medical history significant for HLD, DM, CKD, admitted with alcohol withdrowal found to have anemia. Patient admits to drinking 2 glasses of Whisky at night, and last drink was last night. He also c/o intermittent burning epigastric radiating to his chest associated with dyspepsia. Patient denies nausea, vomiting, hematemesis, hematochezia, melena, fever, chills, chest pain, SOB, cough, hematuria, dysuria or diarrhea.       Patient appears comfortable. No new complaints reported, No abdominal pain, N/V, hematemesis, hematochezia, melena, fever, chills, chest pain, SOB, cough or diarrhea reported.      PAIN MANAGEMENT:  Pain Scale:                2-3 /10  Pain Location:  Epigastric abdominal pain      Prior Colonoscopy:  No prior colonoscopy      PAST MEDICAL HISTORY  DM (diabetes mellitus)  HTN (hypertension)   Chronic kidney disease  HLD (hyperlipidemia)  Glaucoma  Gout        PAST SURGICAL HISTORY  No significant past surgical history        Allergies    No Known Allergies    Intolerances  None      HOME MEDICATIONS    MEDICATIONS  (STANDING):  brimonidine 0.2% Ophthalmic Solution 1 Drop(s) Both EYES daily  dextrose 5%. 1000 milliLiter(s) (50 mL/Hr) IV Continuous <Continuous>  dextrose 5%. 1000 milliLiter(s) (100 mL/Hr) IV Continuous <Continuous>  dextrose 50% Injectable 25 Gram(s) IV Push once  dextrose 50% Injectable 12.5 Gram(s) IV Push once  dextrose 50% Injectable 25 Gram(s) IV Push once  doxycycline IVPB      ergocalciferol 90918 Unit(s) Oral <User Schedule>  folic acid 1 milliGRAM(s) Oral daily  furosemide    Tablet 20 milliGRAM(s) Oral daily  glucagon  Injectable 1 milliGRAM(s) IntraMuscular once  heparin   Injectable 5000 Unit(s) SubCutaneous every 8 hours  insulin lispro (ADMELOG) corrective regimen sliding scale   SubCutaneous Before meals and at bedtime  LORazepam   Injectable 2 milliGRAM(s) IV Push every 4 hours  LORazepam   Injectable   IV Push   multivitamin 1 Tablet(s) Oral daily  timolol 0.5% Solution 1 Drop(s) Both EYES two times a day    MEDICATIONS  (PRN):  dextrose Oral Gel 15 Gram(s) Oral once PRN Blood Glucose LESS THAN 70 milliGRAM(s)/deciliter  LORazepam   Injectable 2 milliGRAM(s) IV Push every 2 hours PRN Symptom-triggered: 2 point increase in CIWA -Ar score and a total score of 7 or LESS  LORazepam   Injectable 2 milliGRAM(s) IV Push every 1 hour PRN Symptom-triggered: each CIWA -Ar score 8 or GREATER      SOCIAL HISTORY  Advanced Directives:       [ X ] Full Code       [  ] DNR  Marital Status:         [  ] M      [ X ] S      [  ] D       [  ] W  Children:       [ X ] Yes      [  ] No  Occupation:        [  ] Employed       [ X ] Unemployed       [  ] Retired  Diet:       [ X ] Regular       [  ] PEG feeding          [  ] NG tube feeding  Drug Use:           [ X ] Patient denied          [  ] Yes  Alcohol:           [X  ] No             [  ] Yes (socially)         [  ] Yes (chronic)  Tobacco:           [  ] Yes           [ X ] No      FAMILY HISTORY  [X  ] Heart Disease            [ X ] Diabetes             [ X ] HTN             [  ] Colon Cancer             [  ] Stomach Cancer              [  ] Pancreatic Cancer      VITALS  Vital Signs Last 24 Hrs  T(C): 36.3 (12 May 2022 12:22), Max: 36.3 (11 May 2022 20:40)  T(F): 97.4 (12 May 2022 12:22), Max: 97.4 (12 May 2022 12:22)  HR: 86 (12 May 2022 12:22) (62 - 86)  BP: 145/69 (12 May 2022 12:22) (127/58 - 145/69)   RR: 16 (12 May 2022 12:22) (16 - 20)  SpO2: 100% (12 May 2022 12:22) (94% - 100%)       MEDICATIONS  (STANDING):  brimonidine 0.2% Ophthalmic Solution 1 Drop(s) Both EYES daily  chlorhexidine 2% Cloths 1 Application(s) Topical <User Schedule>  clobetasol 0.05% Ointment 1 Application(s) Topical two times a day  dextrose 5%. 1000 milliLiter(s) (50 mL/Hr) IV Continuous <Continuous>  dextrose 5%. 1000 milliLiter(s) (100 mL/Hr) IV Continuous <Continuous>  dextrose 50% Injectable 25 Gram(s) IV Push once  dextrose 50% Injectable 12.5 Gram(s) IV Push once  dextrose 50% Injectable 25 Gram(s) IV Push once  doxycycline IVPB 100 milliGRAM(s) IV Intermittent every 12 hours  doxycycline IVPB      ergocalciferol [id number] Unit(s) Oral <User Schedule>  folic acid 1 milliGRAM(s) Oral daily  furosemide    Tablet 20 milliGRAM(s) Oral daily  glucagon  Injectable 1 milliGRAM(s) IntraMuscular once  heparin   Injectable 5000 Unit(s) SubCutaneous every 8 hours  insulin lispro (ADMELOG) corrective regimen sliding scale   SubCutaneous Before meals and at bedtime  LORazepam   Injectable 1 milliGRAM(s) IV Push every 4 hours  LORazepam   Injectable   IV Push   multivitamin 1 Tablet(s) Oral daily  mupirocin 2% Ointment 1 Application(s) Both Nostrils two times a day  Nephro-marcella 1 Tablet(s) Oral daily  sodium chloride 0.9%. 1000 milliLiter(s) (80 mL/Hr) IV Continuous <Continuous>  thiamine 100 milliGRAM(s) Oral daily  timolol 0.5% Solution 1 Drop(s) Both EYES two times a day    MEDICATIONS  (PRN):  dextrose Oral Gel 15 Gram(s) Oral once PRN Blood Glucose LESS THAN 70 milliGRAM(s)/deciliter  LORazepam   Injectable 2 milliGRAM(s) IV Push every 2 hours PRN Symptom-triggered: 2 point increase in CIWA -Ar score and a total score of 7 or LESS  LORazepam   Injectable 2 milliGRAM(s) IV Push every 1 hour PRN Symptom-triggered: each CIWA -Ar score 8 or GREATER                            10.4   3.10  )-----------( 89       ( 12 May 2022 07:57 )             30.4       05-12    141  |  111<H>  |  67<H>  ----------------------------<  109<H>  5.1   |  25  |  3.37<H>    Ca    9.2      12 May 2022 07:57  Phos  3.6     05-12  Mg     1.9     05-12    TPro  6.9  /  Alb  2.6<L>  /  TBili  0.3  /  DBili  x   /  AST  98<H>  /  ALT  61<H>  /  AlkPhos  84  05-12

## 2022-05-12 NOTE — PROGRESS NOTE ADULT - PROBLEM SELECTOR PLAN 3
h/o HTN, on labetalol and amlodipine at home  continue to hold both meds given bradycardia  monitor BP for now  resume anti-BP meds as indicated

## 2022-05-12 NOTE — PROGRESS NOTE ADULT - PROBLEM SELECTOR PLAN 7
h/o CKD, on lasix  ddx: CKD progression vs NELLY on CKD  Cr 3.3 (last Cr 2.9 in 06/2021)  EKG sinus zaida w/ 1st degree block, LBBB (known since June2021)  c/w lasix

## 2022-05-12 NOTE — PROGRESS NOTE ADULT - PROBLEM SELECTOR PLAN 2
Pt is A&O x2-3, reports feeling fine at time of eval  likely due to ativan  CTH report noted, no acute hemorrhage

## 2022-05-12 NOTE — PROGRESS NOTE ADULT - SUBJECTIVE AND OBJECTIVE BOX
Problem List:  History of CKD/DKD stage 4  Patient was admitted for ETOH intoxication and confusion.  ETOH withdrawal    Home Medications:   * Patient Currently Takes Medications as of 10-May-2022 12:24 documented in Structured Notes  · 	furosemide 20 mg oral tablet: Last Dose Taken:  , 1 tab(s) orally once a day   · 	timolol maleate 0.5% ophthalmic solution: Last Dose Taken:  , 1 drop(s) to each affected eye 2 times a day  	hosp  · 	Combigan 0.2%-0.5% ophthalmic solution: Last Dose Taken:  , 1 drop(s) to each affected eye 2 times a day  	home  · 	folic acid 1 mg oral tablet: Last Dose Taken:  , 1 tab(s) orally once a day  	home/hosp  · 	Januvia 25 mg oral tablet: Last Dose Taken:  , 1 tab(s) orally once a day  	home  · 	allopurinol 100 mg oral tablet: Last Dose Taken:  , 1 tab(s) orally 2 times a day  · 	amLODIPine 5 mg oral tablet: Last Dose Taken:  , 1 tab(s) orally once a day  · 	Vitamin D3 1250 mcg (50,000 intl units) oral capsule: Last Dose Taken:  , 1 cap(s) orally once a week  labetalol 100 mg oral tablet: Last Dose Taken:  , 1 tab(s) orally 2 times a day    PAST MEDICAL & SURGICAL HISTORY:  DM (diabetes mellitus)      HTN (hypertension)      Chronic kidney disease      HLD (hyperlipidemia)      Glaucoma      Gout      No significant past surgical history          No Known Allergies      MEDICATIONS  (STANDING):  brimonidine 0.2% Ophthalmic Solution 1 Drop(s) Both EYES daily  chlorhexidine 2% Cloths 1 Application(s) Topical <User Schedule>  dextrose 5%. 1000 milliLiter(s) (50 mL/Hr) IV Continuous <Continuous>  dextrose 5%. 1000 milliLiter(s) (100 mL/Hr) IV Continuous <Continuous>  dextrose 50% Injectable 25 Gram(s) IV Push once  dextrose 50% Injectable 12.5 Gram(s) IV Push once  dextrose 50% Injectable 25 Gram(s) IV Push once  doxycycline IVPB 100 milliGRAM(s) IV Intermittent every 12 hours  doxycycline IVPB      ergocalciferol 26953 Unit(s) Oral <User Schedule>  folic acid 1 milliGRAM(s) Oral daily  furosemide    Tablet 20 milliGRAM(s) Oral daily  glucagon  Injectable 1 milliGRAM(s) IntraMuscular once  heparin   Injectable 5000 Unit(s) SubCutaneous every 8 hours  insulin lispro (ADMELOG) corrective regimen sliding scale   SubCutaneous Before meals and at bedtime  LORazepam   Injectable 1 milliGRAM(s) IV Push every 4 hours  LORazepam   Injectable   IV Push   multivitamin 1 Tablet(s) Oral daily  mupirocin 2% Ointment 1 Application(s) Both Nostrils two times a day  sodium chloride 0.9%. 1000 milliLiter(s) (70 mL/Hr) IV Continuous <Continuous>  thiamine 100 milliGRAM(s) Oral daily  timolol 0.5% Solution 1 Drop(s) Both EYES two times a day    MEDICATIONS  (PRN):  dextrose Oral Gel 15 Gram(s) Oral once PRN Blood Glucose LESS THAN 70 milliGRAM(s)/deciliter  LORazepam   Injectable 2 milliGRAM(s) IV Push every 2 hours PRN Symptom-triggered: 2 point increase in CIWA -Ar score and a total score of 7 or LESS  LORazepam   Injectable 2 milliGRAM(s) IV Push every 1 hour PRN Symptom-triggered: each CIWA -Ar score 8 or GREATER                            10.4   3.10  )-----------( x        ( 12 May 2022 07:57 )             30.4     05-12    141  |  111<H>  |  67<H>  ----------------------------<  109<H>  5.1   |  25  |  3.37<H>    Ca    9.2      12 May 2022 07:57  Phos  3.6     05-12  Mg     1.9     05-12    TPro  6.9  /  Alb  2.6<L>  /  TBili  0.3  /  DBili  x   /  AST  98<H>  /  ALT  61<H>  /  AlkPhos  84  05-12    Urine not available  Vitamin D 9.0     < from: US Renal (06.07.21 @ 12:03) >  INTERPRETATION:  CLINICAL INFORMATION: Chronic renal insufficiency.    COMPARISON: 6/3/2019.    TECHNIQUE: Sonography of the kidneys and bladder.    FINDINGS: Increased echogenicity of the bilateral renal parenchyma suggests chronic medical renal disease.    Right kidney: 9.5 cm. No renal mass, hydronephrosis or calculi.    Left kidney: 10.2 cm. No hydronephrosis or calculi. A 1.4 cm cyst is noted within the lower pole aspect of the left kidney.    Urinary bladder: Minimally distended, precluding assessment.    IMPRESSION: No hydronephrosis. Increased echogenicity of the bilateral renal parenchyma suggests chronic medical renal disease.      < end of copied text >      REVIEW OF SYSTEMS:  confused        VITALS:  T(F): 94.3 (05-12-22 @ 05:20), Max: 97.7 (05-11-22 @ 12:13)  HR: 71 (05-12-22 @ 05:20)  BP: 142/74 (05-12-22 @ 05:20)  RR: 18 (05-12-22 @ 05:20)  SpO2: 100% (05-12-22 @ 05:20)  Wt(kg): --      PHYSICAL EXAM:  Constitutional: well developed, no diaphoresis, no distress.  Neck: No JVD, no carotid bruit, supple, no adenopathy  Respiratory:  air entrance B/L, no wheezes, rales or rhonchi  Cardiovascular: S1, S2, RRR, no pericardial rub, no murmur  Abdomen: BS+, soft, no tenderness, no bruit  Pelvis: bladder nondistended  Extremities: No cyanosis or clubbing. No peripheral edema.   Confused   Tremor of upper extremities  Skin : patches of skin areas with scaly dark  hyperpigmented areas.       alert and awake/follows commands

## 2022-05-13 DIAGNOSIS — J69.0 PNEUMONITIS DUE TO INHALATION OF FOOD AND VOMIT: ICD-10-CM

## 2022-05-13 LAB
ALBUMIN SERPL ELPH-MCNC: 2.6 G/DL — LOW (ref 3.5–5)
ALP SERPL-CCNC: 110 U/L — SIGNIFICANT CHANGE UP (ref 40–120)
ALT FLD-CCNC: 62 U/L DA — HIGH (ref 10–60)
ANION GAP SERPL CALC-SCNC: 8 MMOL/L — SIGNIFICANT CHANGE UP (ref 5–17)
AST SERPL-CCNC: 99 U/L — HIGH (ref 10–40)
BILIRUB SERPL-MCNC: 0.4 MG/DL — SIGNIFICANT CHANGE UP (ref 0.2–1.2)
BUN SERPL-MCNC: 61 MG/DL — HIGH (ref 7–18)
CALCIUM SERPL-MCNC: 9.5 MG/DL — SIGNIFICANT CHANGE UP (ref 8.4–10.5)
CALCIUM SERPL-MCNC: 9.7 MG/DL — SIGNIFICANT CHANGE UP (ref 8.4–10.5)
CHLORIDE SERPL-SCNC: 114 MMOL/L — HIGH (ref 96–108)
CO2 SERPL-SCNC: 21 MMOL/L — LOW (ref 22–31)
CREAT SERPL-MCNC: 3.19 MG/DL — HIGH (ref 0.5–1.3)
EGFR: 20 ML/MIN/1.73M2 — LOW
GLUCOSE BLDC GLUCOMTR-MCNC: 111 MG/DL — HIGH (ref 70–99)
GLUCOSE BLDC GLUCOMTR-MCNC: 113 MG/DL — HIGH (ref 70–99)
GLUCOSE BLDC GLUCOMTR-MCNC: 125 MG/DL — HIGH (ref 70–99)
GLUCOSE BLDC GLUCOMTR-MCNC: 130 MG/DL — HIGH (ref 70–99)
GLUCOSE SERPL-MCNC: 110 MG/DL — HIGH (ref 70–99)
HAV IGM SER-ACNC: SIGNIFICANT CHANGE UP
HBV CORE IGM SER-ACNC: SIGNIFICANT CHANGE UP
HBV SURFACE AG SER-ACNC: SIGNIFICANT CHANGE UP
HCT VFR BLD CALC: 31.1 % — LOW (ref 39–50)
HCV AB S/CO SERPL IA: 0.23 S/CO — SIGNIFICANT CHANGE UP (ref 0–0.99)
HCV AB SERPL-IMP: SIGNIFICANT CHANGE UP
HGB BLD-MCNC: 10.5 G/DL — LOW (ref 13–17)
MCHC RBC-ENTMCNC: 33.8 GM/DL — SIGNIFICANT CHANGE UP (ref 32–36)
MCHC RBC-ENTMCNC: 34.2 PG — HIGH (ref 27–34)
MCV RBC AUTO: 101.3 FL — HIGH (ref 80–100)
NRBC # BLD: 0 /100 WBCS — SIGNIFICANT CHANGE UP (ref 0–0)
PLATELET # BLD AUTO: 101 K/UL — LOW (ref 150–400)
POTASSIUM SERPL-MCNC: 4.9 MMOL/L — SIGNIFICANT CHANGE UP (ref 3.5–5.3)
POTASSIUM SERPL-SCNC: 4.9 MMOL/L — SIGNIFICANT CHANGE UP (ref 3.5–5.3)
PROT SERPL-MCNC: 7.2 G/DL — SIGNIFICANT CHANGE UP (ref 6–8.3)
PTH-INTACT FLD-MCNC: 123 PG/ML — HIGH (ref 15–65)
RBC # BLD: 3.07 M/UL — LOW (ref 4.2–5.8)
RBC # FLD: 14.7 % — HIGH (ref 10.3–14.5)
SODIUM SERPL-SCNC: 143 MMOL/L — SIGNIFICANT CHANGE UP (ref 135–145)
WBC # BLD: 3.55 K/UL — LOW (ref 3.8–10.5)
WBC # FLD AUTO: 3.55 K/UL — LOW (ref 3.8–10.5)

## 2022-05-13 PROCEDURE — 76700 US EXAM ABDOM COMPLETE: CPT | Mod: 26

## 2022-05-13 PROCEDURE — 71045 X-RAY EXAM CHEST 1 VIEW: CPT | Mod: 26

## 2022-05-13 RX ORDER — FUROSEMIDE 40 MG
40 TABLET ORAL ONCE
Refills: 0 | Status: COMPLETED | OUTPATIENT
Start: 2022-05-13 | End: 2022-05-13

## 2022-05-13 RX ORDER — SODIUM CHLORIDE 9 MG/ML
1000 INJECTION, SOLUTION INTRAVENOUS
Refills: 0 | Status: DISCONTINUED | OUTPATIENT
Start: 2022-05-13 | End: 2022-05-16

## 2022-05-13 RX ORDER — METRONIDAZOLE 500 MG
TABLET ORAL
Refills: 0 | Status: DISCONTINUED | OUTPATIENT
Start: 2022-05-13 | End: 2022-05-21

## 2022-05-13 RX ORDER — METRONIDAZOLE 500 MG
500 TABLET ORAL ONCE
Refills: 0 | Status: COMPLETED | OUTPATIENT
Start: 2022-05-13 | End: 2022-05-13

## 2022-05-13 RX ORDER — CEFEPIME 1 G/1
INJECTION, POWDER, FOR SOLUTION INTRAMUSCULAR; INTRAVENOUS
Refills: 0 | Status: DISCONTINUED | OUTPATIENT
Start: 2022-05-13 | End: 2022-05-13

## 2022-05-13 RX ORDER — CEFEPIME 1 G/1
1000 INJECTION, POWDER, FOR SOLUTION INTRAMUSCULAR; INTRAVENOUS EVERY 24 HOURS
Refills: 0 | Status: DISCONTINUED | OUTPATIENT
Start: 2022-05-13 | End: 2022-05-21

## 2022-05-13 RX ORDER — METRONIDAZOLE 500 MG
500 TABLET ORAL EVERY 8 HOURS
Refills: 0 | Status: DISCONTINUED | OUTPATIENT
Start: 2022-05-14 | End: 2022-05-21

## 2022-05-13 RX ORDER — ACETAMINOPHEN 500 MG
1000 TABLET ORAL ONCE
Refills: 0 | Status: DISCONTINUED | OUTPATIENT
Start: 2022-05-13 | End: 2022-05-13

## 2022-05-13 RX ORDER — CEFEPIME 1 G/1
1000 INJECTION, POWDER, FOR SOLUTION INTRAMUSCULAR; INTRAVENOUS ONCE
Refills: 0 | Status: COMPLETED | OUTPATIENT
Start: 2022-05-13 | End: 2022-05-13

## 2022-05-13 RX ADMIN — HEPARIN SODIUM 5000 UNIT(S): 5000 INJECTION INTRAVENOUS; SUBCUTANEOUS at 15:15

## 2022-05-13 RX ADMIN — CEFEPIME 100 MILLIGRAM(S): 1 INJECTION, POWDER, FOR SOLUTION INTRAMUSCULAR; INTRAVENOUS at 18:33

## 2022-05-13 RX ADMIN — MUPIROCIN 1 APPLICATION(S): 20 OINTMENT TOPICAL at 18:01

## 2022-05-13 RX ADMIN — Medication 100 MILLIGRAM(S): at 20:25

## 2022-05-13 RX ADMIN — Medication 20 MILLIGRAM(S): at 06:05

## 2022-05-13 RX ADMIN — Medication 1 DROP(S): at 06:06

## 2022-05-13 RX ADMIN — SODIUM CHLORIDE 80 MILLILITER(S): 9 INJECTION INTRAMUSCULAR; INTRAVENOUS; SUBCUTANEOUS at 06:08

## 2022-05-13 RX ADMIN — Medication 0.5 MILLIGRAM(S): at 22:06

## 2022-05-13 RX ADMIN — Medication 110 MILLIGRAM(S): at 18:02

## 2022-05-13 RX ADMIN — Medication 110 MILLIGRAM(S): at 06:04

## 2022-05-13 RX ADMIN — SODIUM CHLORIDE 80 MILLILITER(S): 9 INJECTION, SOLUTION INTRAVENOUS at 12:17

## 2022-05-13 RX ADMIN — Medication 2 MILLIGRAM(S): at 13:53

## 2022-05-13 RX ADMIN — CHLORHEXIDINE GLUCONATE 1 APPLICATION(S): 213 SOLUTION TOPICAL at 06:05

## 2022-05-13 RX ADMIN — Medication 0.5 MILLIGRAM(S): at 10:09

## 2022-05-13 RX ADMIN — Medication 1 MILLIGRAM(S): at 06:06

## 2022-05-13 RX ADMIN — Medication 1 APPLICATION(S): at 18:02

## 2022-05-13 RX ADMIN — HEPARIN SODIUM 5000 UNIT(S): 5000 INJECTION INTRAVENOUS; SUBCUTANEOUS at 06:05

## 2022-05-13 RX ADMIN — Medication 1 MILLIGRAM(S): at 10:12

## 2022-05-13 RX ADMIN — Medication 1 DROP(S): at 18:02

## 2022-05-13 RX ADMIN — BRIMONIDINE TARTRATE 1 DROP(S): 2 SOLUTION/ DROPS OPHTHALMIC at 10:11

## 2022-05-13 RX ADMIN — Medication 1 TABLET(S): at 10:12

## 2022-05-13 RX ADMIN — Medication 1 APPLICATION(S): at 06:08

## 2022-05-13 RX ADMIN — Medication 1 TABLET(S): at 10:11

## 2022-05-13 RX ADMIN — SODIUM CHLORIDE 80 MILLILITER(S): 9 INJECTION, SOLUTION INTRAVENOUS at 18:03

## 2022-05-13 RX ADMIN — MUPIROCIN 1 APPLICATION(S): 20 OINTMENT TOPICAL at 06:06

## 2022-05-13 RX ADMIN — Medication 1 MILLIGRAM(S): at 01:17

## 2022-05-13 RX ADMIN — HEPARIN SODIUM 5000 UNIT(S): 5000 INJECTION INTRAVENOUS; SUBCUTANEOUS at 22:38

## 2022-05-13 RX ADMIN — Medication 100 MILLIGRAM(S): at 10:11

## 2022-05-13 RX ADMIN — Medication 40 MILLIGRAM(S): at 21:43

## 2022-05-13 RX ADMIN — Medication 40 MILLIGRAM(S): at 15:19

## 2022-05-13 NOTE — SWALLOW BEDSIDE ASSESSMENT ADULT - SWALLOW EVAL: DIAGNOSIS
Pt p/w s/s of oropharyngeal dysphagia c/b impaired reception, impaired bolus formation, increased oral transit time, impaired A-P transport. No overt s/s aspiration on trials. Pt requires feeding assistance during meals.

## 2022-05-13 NOTE — PROGRESS NOTE ADULT - SUBJECTIVE AND OBJECTIVE BOX
[   ] ICU                                          [   ] CCU                                      [  X ] Medical Floor    Patient is a 76 year old male with ETOH abuse and anemia. GI consulted to evaluate.     Patient is a 76 year old male AAOx3, ambulates independently, leaves alone at home, with past medical history significant for HLD, DM, CKD, admitted with alcohol withdrowal found to have anemia. Patient admits to drinking 2 glasses of Whisky at night, and last drink was last night. He also c/o intermittent burning epigastric radiating to his chest associated with dyspepsia. Patient denies nausea, vomiting, hematemesis, hematochezia, melena, fever, chills, chest pain, SOB, cough, hematuria, dysuria or diarrhea.       Patient appears comfortable. No new complaints reported, No abdominal pain, N/V, hematemesis, hematochezia, melena, fever, chills, chest pain, SOB, cough or diarrhea reported.      PAIN MANAGEMENT:  Pain Scale:                2-3 /10  Pain Location:  Epigastric abdominal pain      Prior Colonoscopy:  No prior colonoscopy      PAST MEDICAL HISTORY  DM (diabetes mellitus)  HTN (hypertension)   Chronic kidney disease  HLD (hyperlipidemia)  Glaucoma  Gout        PAST SURGICAL HISTORY  No significant past surgical history        Allergies    No Known Allergies    Intolerances  None      HOME MEDICATIONS    MEDICATIONS  (STANDING):  brimonidine 0.2% Ophthalmic Solution 1 Drop(s) Both EYES daily  dextrose 5%. 1000 milliLiter(s) (50 mL/Hr) IV Continuous <Continuous>  dextrose 5%. 1000 milliLiter(s) (100 mL/Hr) IV Continuous <Continuous>  dextrose 50% Injectable 25 Gram(s) IV Push once  dextrose 50% Injectable 12.5 Gram(s) IV Push once  dextrose 50% Injectable 25 Gram(s) IV Push once  doxycycline IVPB      ergocalciferol [id number] Unit(s) Oral <User Schedule>  folic acid 1 milliGRAM(s) Oral daily  furosemide    Tablet 20 milliGRAM(s) Oral daily  glucagon  Injectable 1 milliGRAM(s) IntraMuscular once  heparin   Injectable 5000 Unit(s) SubCutaneous every 8 hours  insulin lispro (ADMELOG) corrective regimen sliding scale   SubCutaneous Before meals and at bedtime  LORazepam   Injectable 2 milliGRAM(s) IV Push every 4 hours  LORazepam   Injectable   IV Push   multivitamin 1 Tablet(s) Oral daily  timolol 0.5% Solution 1 Drop(s) Both EYES two times a day    MEDICATIONS  (PRN):  dextrose Oral Gel 15 Gram(s) Oral once PRN Blood Glucose LESS THAN 70 milliGRAM(s)/deciliter  LORazepam   Injectable 2 milliGRAM(s) IV Push every 2 hours PRN Symptom-triggered: 2 point increase in CIWA -Ar score and a total score of 7 or LESS  LORazepam   Injectable 2 milliGRAM(s) IV Push every 1 hour PRN Symptom-triggered: each CIWA -Ar score 8 or GREATER      SOCIAL HISTORY  Advanced Directives:       [ X ] Full Code       [  ] DNR  Marital Status:         [  ] M      [ X ] S      [  ] D       [  ] W  Children:       [ X ] Yes      [  ] No  Occupation:        [  ] Employed       [ X ] Unemployed       [  ] Retired  Diet:       [ X ] Regular       [  ] PEG feeding          [  ] NG tube feeding  Drug Use:           [ X ] Patient denied          [  ] Yes  Alcohol:           [X  ] No             [  ] Yes (socially)         [  ] Yes (chronic)  Tobacco:           [  ] Yes           [ X ] No      FAMILY HISTORY  [X  ] Heart Disease            [ X ] Diabetes             [ X ] HTN             [  ] Colon Cancer             [  ] Stomach Cancer              [  ] Pancreatic Cancer        VITALS  Vital Signs Last 24 Hrs  T(C): 35.6 (13 May 2022 14:35), Max: 36.8 (12 May 2022 21:18)  T(F): 96 (13 May 2022 14:35), Max: 98.3 (12 May 2022 21:18)  HR: 91 (13 May 2022 15:20) (86 - 91)  BP: 155/86 (13 May 2022 15:20) (144/90 - 169/96)   RR: 19 (13 May 2022 15:20) (18 - 20)  SpO2: 100% (13 May 2022 15:20) (89% - 100%)       MEDICATIONS  (STANDING):  brimonidine 0.2% Ophthalmic Solution 1 Drop(s) Both EYES daily  chlorhexidine 2% Cloths 1 Application(s) Topical <User Schedule>  clobetasol 0.05% Ointment 1 Application(s) Topical two times a day  dextrose 5%. 1000 milliLiter(s) (50 mL/Hr) IV Continuous <Continuous>  dextrose 5%. 1000 milliLiter(s) (100 mL/Hr) IV Continuous <Continuous>  dextrose 50% Injectable 25 Gram(s) IV Push once  dextrose 50% Injectable 12.5 Gram(s) IV Push once  dextrose 50% Injectable 25 Gram(s) IV Push once  doxycycline IVPB 100 milliGRAM(s) IV Intermittent every 12 hours  doxycycline IVPB      ergocalciferol [id number] Unit(s) Oral <User Schedule>  folic acid 1 milliGRAM(s) Oral daily  glucagon  Injectable 1 milliGRAM(s) IntraMuscular once  heparin   Injectable 5000 Unit(s) SubCutaneous every 8 hours  insulin lispro (ADMELOG) corrective regimen sliding scale   SubCutaneous Before meals and at bedtime  LORazepam   Injectable 0.5 milliGRAM(s) IV Push every 4 hours  LORazepam   Injectable   IV Push   multivitamin 1 Tablet(s) Oral daily  mupirocin 2% Ointment 1 Application(s) Both Nostrils two times a day  Nephro-marcella 1 Tablet(s) Oral daily  sodium chloride 0.45%. 1000 milliLiter(s) (80 mL/Hr) IV Continuous <Continuous>  thiamine 100 milliGRAM(s) Oral daily  timolol 0.5% Solution 1 Drop(s) Both EYES two times a day    MEDICATIONS  (PRN):  dextrose Oral Gel 15 Gram(s) Oral once PRN Blood Glucose LESS THAN 70 milliGRAM(s)/deciliter  LORazepam   Injectable 2 milliGRAM(s) IV Push every 2 hours PRN Symptom-triggered: 2 point increase in CIWA -Ar score and a total score of 7 or LESS  LORazepam   Injectable 2 milliGRAM(s) IV Push every 1 hour PRN Symptom-triggered: each CIWA -Ar score 8 or GREATER                            10.5   3.55  )-----------( 101      ( 13 May 2022 09:06 )             31.1       05-13    143  |  114<H>  |  61<H>  ----------------------------<  110<H>  4.9   |  21<L>  |  3.19<H>    Ca    9.7      13 May 2022 09:06  Phos  3.6     05-12  Mg     1.9     05-12    TPro  7.2  /  Alb  2.6<L>  /  TBili  0.4  /  DBili  x   /  AST  99<H>  /  ALT  62<H>  /  AlkPhos  110  05-13

## 2022-05-13 NOTE — PROGRESS NOTE ADULT - SUBJECTIVE AND OBJECTIVE BOX
NP Note discussed with  Primary Attending    Patient is a 76y old  Male who presents with a chief complaint of alcohol withdrawal (13 May 2022 12:13)      INTERVAL HPI/OVERNIGHT EVENTS: no new complaints    MEDICATIONS  (STANDING):  brimonidine 0.2% Ophthalmic Solution 1 Drop(s) Both EYES daily  chlorhexidine 2% Cloths 1 Application(s) Topical <User Schedule>  clobetasol 0.05% Ointment 1 Application(s) Topical two times a day  dextrose 5%. 1000 milliLiter(s) (50 mL/Hr) IV Continuous <Continuous>  dextrose 5%. 1000 milliLiter(s) (100 mL/Hr) IV Continuous <Continuous>  dextrose 50% Injectable 25 Gram(s) IV Push once  dextrose 50% Injectable 12.5 Gram(s) IV Push once  dextrose 50% Injectable 25 Gram(s) IV Push once  doxycycline IVPB 100 milliGRAM(s) IV Intermittent every 12 hours  doxycycline IVPB      ergocalciferol 00777 Unit(s) Oral <User Schedule>  folic acid 1 milliGRAM(s) Oral daily  furosemide   Injectable 40 milliGRAM(s) IV Push once  glucagon  Injectable 1 milliGRAM(s) IntraMuscular once  heparin   Injectable 5000 Unit(s) SubCutaneous every 8 hours  insulin lispro (ADMELOG) corrective regimen sliding scale   SubCutaneous Before meals and at bedtime  LORazepam   Injectable 0.5 milliGRAM(s) IV Push every 4 hours  LORazepam   Injectable   IV Push   multivitamin 1 Tablet(s) Oral daily  mupirocin 2% Ointment 1 Application(s) Both Nostrils two times a day  Nephro-marcella 1 Tablet(s) Oral daily  sodium chloride 0.45%. 1000 milliLiter(s) (80 mL/Hr) IV Continuous <Continuous>  thiamine 100 milliGRAM(s) Oral daily  timolol 0.5% Solution 1 Drop(s) Both EYES two times a day    MEDICATIONS  (PRN):  dextrose Oral Gel 15 Gram(s) Oral once PRN Blood Glucose LESS THAN 70 milliGRAM(s)/deciliter  LORazepam   Injectable 2 milliGRAM(s) IV Push every 2 hours PRN Symptom-triggered: 2 point increase in CIWA -Ar score and a total score of 7 or LESS  LORazepam   Injectable 2 milliGRAM(s) IV Push every 1 hour PRN Symptom-triggered: each CIWA -Ar score 8 or GREATER      __________________________________________________  REVIEW OF SYSTEMS:    CONSTITUTIONAL: No fever,   EYES: no acute visual disturbances  NECK: No pain or stiffness  RESPIRATORY: No cough; No shortness of breath  CARDIOVASCULAR: No chest pain, no palpitations  GASTROINTESTINAL: No pain. No nausea or vomiting; No diarrhea   NEUROLOGICAL: No headache or numbness, no tremors  MUSCULOSKELETAL: No joint pain, no muscle pain  GENITOURINARY: no dysuria, no frequency, no hesitancy  PSYCHIATRY: no depression , no anxiety  ALL OTHER  ROS negative        Vital Signs Last 24 Hrs  T(C): 35.6 (13 May 2022 14:35), Max: 36.8 (12 May 2022 21:18)  T(F): 96 (13 May 2022 14:35), Max: 98.3 (12 May 2022 21:18)  HR: 90 (13 May 2022 14:35) (86 - 90)  BP: 155/89 (13 May 2022 14:35) (144/90 - 169/96)  BP(mean): --  RR: 18 (13 May 2022 14:35) (18 - 20)  SpO2: 89% (13 May 2022 14:35) (89% - 100%)    ________________________________________________  PHYSICAL EXAM:  GENERAL: NAD  HEENT: Normocephalic;  conjunctivae and sclerae clear; moist mucous membranes;   NECK : supple  CHEST/LUNG: Clear to auscultation bilaterally with good air entry   HEART: S1 S2  regular; no murmurs, gallops or rubs  ABDOMEN: Soft, Nontender, Nondistended; Bowel sounds present  EXTREMITIES: no cyanosis; no edema; no calf tenderness  SKIN: warm and dry; no rash  NERVOUS SYSTEM:  Awake and alert; Oriented  to place, person and time ; no new deficits    _________________________________________________  LABS:                        10.5   3.55  )-----------( 101      ( 13 May 2022 09:06 )             31.1     05-13    143  |  114<H>  |  61<H>  ----------------------------<  110<H>  4.9   |  21<L>  |  3.19<H>    Ca    9.7      13 May 2022 09:06  Phos  3.6       Mg     1.9         TPro  7.2  /  Alb  2.6<L>  /  TBili  0.4  /  DBili  x   /  AST  99<H>  /  ALT  62<H>  /  AlkPhos  110        Urinalysis Basic - ( 12 May 2022 12:13 )    Color: Yellow / Appearance: Clear / S.010 / pH: x  Gluc: x / Ketone: Negative  / Bili: Negative / Urobili: Negative   Blood: x / Protein: 100 / Nitrite: Negative   Leuk Esterase: Negative / RBC: Negative /HPF / WBC 0-2 /HPF   Sq Epi: x / Non Sq Epi: Occasional /HPF / Bacteria: Negative /HPF      CAPILLARY BLOOD GLUCOSE      POCT Blood Glucose.: 130 mg/dL (13 May 2022 11:58)  POCT Blood Glucose.: 113 mg/dL (13 May 2022 08:17)  POCT Blood Glucose.: 123 mg/dL (12 May 2022 21:33)        RADIOLOGY & ADDITIONAL TESTS:    Imaging  Reviewed:  YES/NO    Consultant(s) Notes Reviewed:   YES/ No      Plan of care was discussed with patient and /or primary care giver; all questions and concerns were addressed  NP Note discussed with Primary Attending-     Patient is a 77 y/o Male who presents with a chief complaint of alcohol withdrawal (13 May 2022 12:13)      INTERVAL HPI/OVERNIGHT EVENTS: rhonchus breath sounds mid afternoon.       MEDICATIONS  (STANDING):  brimonidine 0.2% Ophthalmic Solution 1 Drop(s) Both EYES daily  chlorhexidine 2% Cloths 1 Application(s) Topical <User Schedule>  clobetasol 0.05% Ointment 1 Application(s) Topical two times a day  dextrose 5%. 1000 milliLiter(s) (50 mL/Hr) IV Continuous <Continuous>  dextrose 5%. 1000 milliLiter(s) (100 mL/Hr) IV Continuous <Continuous>  dextrose 50% Injectable 25 Gram(s) IV Push once  dextrose 50% Injectable 12.5 Gram(s) IV Push once  dextrose 50% Injectable 25 Gram(s) IV Push once  doxycycline IVPB 100 milliGRAM(s) IV Intermittent every 12 hours  doxycycline IVPB      ergocalciferol 85518 Unit(s) Oral <User Schedule>  folic acid 1 milliGRAM(s) Oral daily  furosemide   Injectable 40 milliGRAM(s) IV Push once  glucagon  Injectable 1 milliGRAM(s) IntraMuscular once  heparin   Injectable 5000 Unit(s) SubCutaneous every 8 hours  insulin lispro (ADMELOG) corrective regimen sliding scale   SubCutaneous Before meals and at bedtime  LORazepam   Injectable 0.5 milliGRAM(s) IV Push every 4 hours  LORazepam   Injectable   IV Push   multivitamin 1 Tablet(s) Oral daily  mupirocin 2% Ointment 1 Application(s) Both Nostrils two times a day  Nephro-marcella 1 Tablet(s) Oral daily  sodium chloride 0.45%. 1000 milliLiter(s) (80 mL/Hr) IV Continuous <Continuous>  thiamine 100 milliGRAM(s) Oral daily  timolol 0.5% Solution 1 Drop(s) Both EYES two times a day    MEDICATIONS  (PRN):  dextrose Oral Gel 15 Gram(s) Oral once PRN Blood Glucose LESS THAN 70 milliGRAM(s)/deciliter  LORazepam   Injectable 2 milliGRAM(s) IV Push every 2 hours PRN Symptom-triggered: 2 point increase in CIWA -Ar score and a total score of 7 or LESS  LORazepam   Injectable 2 milliGRAM(s) IV Push every 1 hour PRN Symptom-triggered: each CIWA -Ar score 8 or GREATER      __________________________________________________  REVIEW OF SYSTEMS:    CONSTITUTIONAL: No fever,   EYES: no acute visual disturbances  NECK: No pain or stiffness  RESPIRATORY: No cough; No shortness of breath  CARDIOVASCULAR: No chest pain, no palpitations  GASTROINTESTINAL: No pain. No nausea or vomiting; No diarrhea   NEUROLOGICAL: No headache or numbness, no tremors  MUSCULOSKELETAL: No joint pain, no muscle pain  GENITOURINARY: no dysuria, no frequency, no hesitancy  PSYCHIATRY: no depression , no anxiety  ALL OTHER  ROS negative        Vital Signs Last 24 Hrs  T(C): 35.6 (13 May 2022 14:35), Max: 36.8 (12 May 2022 21:18)  T(F): 96 (13 May 2022 14:35), Max: 98.3 (12 May 2022 21:18)  HR: 90 (13 May 2022 14:35) (86 - 90)  BP: 155/89 (13 May 2022 14:35) (144/90 - 169/96)  BP(mean): --  RR: 18 (13 May 2022 14:35) (18 - 20)  SpO2: 89% (13 May 2022 14:35) (89% - 100%)    ________________________________________________  PHYSICAL EXAM:  GENERAL: NAD,   HEENT: Normocephalic;  conjunctivae and sclerae clear; moist mucous membranes;   NECK : supple  CHEST/LUNG: b/l lungs field w/ rales R>L   HEART: S1 S2  regular; no murmurs, gallops or rubs  ABDOMEN: Soft, Nontender, Nondistended; Bowel sounds present  EXTREMITIES: no cyanosis; no edema; no calf tenderness  SKIN: diffuse skin changes consisted w/ psoriasis, warm and dry; no rash  NERVOUS SYSTEM:  Awake, alert and oriented to self,   _________________________________________________  LABS:                        10.5   3.55  )-----------( 101      ( 13 May 2022 09:06 )             31.1     05-13    143  |  114<H>  |  61<H>  ----------------------------<  110<H>  4.9   |  21<L>  |  3.19<H>    Ca    9.7      13 May 2022 09:06  Phos  3.6     05-12  Mg     1.9         TPro  7.2  /  Alb  2.6<L>  /  TBili  0.4  /  DBili  x   /  AST  99<H>  /  ALT  62<H>  /  AlkPhos  110        Urinalysis Basic - ( 12 May 2022 12:13 )    Color: Yellow / Appearance: Clear / S.010 / pH: x  Gluc: x / Ketone: Negative  / Bili: Negative / Urobili: Negative   Blood: x / Protein: 100 / Nitrite: Negative   Leuk Esterase: Negative / RBC: Negative /HPF / WBC 0-2 /HPF   Sq Epi: x / Non Sq Epi: Occasional /HPF / Bacteria: Negative /HPF      CAPILLARY BLOOD GLUCOSE      POCT Blood Glucose.: 130 mg/dL (13 May 2022 11:58)  POCT Blood Glucose.: 113 mg/dL (13 May 2022 08:17)  POCT Blood Glucose.: 123 mg/dL (12 May 2022 21:33)        RADIOLOGY & ADDITIONAL TESTS:    Imaging  Reviewed:  YES/NO    Consultant(s) Notes Reviewed:   YES/ No      Plan of care was discussed with patient and /or primary care giver; all questions and concerns were addressed

## 2022-05-13 NOTE — SWALLOW BEDSIDE ASSESSMENT ADULT - SLP PERTINENT HISTORY OF CURRENT PROBLEM
Patient is a 76yoM, AAOx3, ambulates independently, leaves alone at home, w/ PMH of HLD, DM, CKD, presents to the ED with weakness and tremulousness. Patient is currently a very poor historian. Patient's son, Mr. Dre Ojeda, contacted, but he is unable to provide much information. He request to call, his brother, Mr. Bruce Ojeda ([phone]), but he is unable to be reached over the phone. Thus, most of the information obtained from the ED chart review. According to the ED attending, patient is being admitted for alcohol withdrawal. Patient admits to drinking 2 glasses of Whisky at night, and last drink was last night. He denies fever, chill, n/v, chest pain, SOB, dizziness, vision changes, abdominal pain, urinary or bowel movement changes.

## 2022-05-13 NOTE — SWALLOW BEDSIDE ASSESSMENT ADULT - CONSISTENCIES ADMINISTERED
applesauce, x4 tsp/pureed ice chips, x2 applesauce+cassy crackers, x3 tsp/minced & moist water, 3oz/thin liquid cassy cracker/regular solid

## 2022-05-13 NOTE — SWALLOW BEDSIDE ASSESSMENT ADULT - COMMENTS
Pt AA+Ox1, confused, partially responsive to queries and simple commands, HOB elevated to 90°. Evaluation conducted in Arabic.

## 2022-05-13 NOTE — SWALLOW BEDSIDE ASSESSMENT ADULT - SWALLOW EVAL: CRITERIA FOR SKILLED INTERVENTION MET
not appropriate for swallowing intervention demonstrates skilled criteria for swallowing intervention

## 2022-05-13 NOTE — PROGRESS NOTE ADULT - SUBJECTIVE AND OBJECTIVE BOX
Patient is a 76y old  Male who presents with a chief complaint of alcohol withdrawal (12 May 2022 16:06)    pt seen in icu [  ], reg med floor [   ], bed [  ], chair at bedside [   ], a+o x3 [  ], lethargic [  ],  nad [  ]    smalls [  ], ngt [  ], peg [  ], et tube [  ], cent line [  ], picc line [  ]        Allergies    No Known Allergies        Vitals    T(F): 97 (05-13-22 @ 05:22), Max: 98.3 (05-12-22 @ 21:18)  HR: 87 (05-13-22 @ 05:22) (78 - 90)  BP: 152/86 (05-13-22 @ 05:22) (127/58 - 152/86)  RR: 19 (05-13-22 @ 05:22) (16 - 20)  SpO2: 95% (05-13-22 @ 05:22) (94% - 100%)  Wt(kg): --  CAPILLARY BLOOD GLUCOSE      POCT Blood Glucose.: 113 mg/dL (13 May 2022 08:17)      Labs                          10.5   3.55  )-----------( 101      ( 13 May 2022 09:06 )             31.1       05-13    143  |  114<H>  |  61<H>  ----------------------------<  110<H>  4.9   |  21<L>  |  3.19<H>    Ca    9.7      13 May 2022 09:06  Phos  3.6     05-12  Mg     1.9     05-12    TPro  7.2  /  Alb  2.6<L>  /  TBili  0.4  /  DBili  x   /  AST  99<H>  /  ALT  62<H>  /  AlkPhos  110  05-13                Radiology Results      Meds    MEDICATIONS  (STANDING):  brimonidine 0.2% Ophthalmic Solution 1 Drop(s) Both EYES daily  chlorhexidine 2% Cloths 1 Application(s) Topical <User Schedule>  clobetasol 0.05% Ointment 1 Application(s) Topical two times a day  dextrose 5%. 1000 milliLiter(s) (50 mL/Hr) IV Continuous <Continuous>  dextrose 5%. 1000 milliLiter(s) (100 mL/Hr) IV Continuous <Continuous>  dextrose 50% Injectable 25 Gram(s) IV Push once  dextrose 50% Injectable 12.5 Gram(s) IV Push once  dextrose 50% Injectable 25 Gram(s) IV Push once  doxycycline IVPB 100 milliGRAM(s) IV Intermittent every 12 hours  doxycycline IVPB      ergocalciferol 80059 Unit(s) Oral <User Schedule>  folic acid 1 milliGRAM(s) Oral daily  furosemide    Tablet 20 milliGRAM(s) Oral daily  glucagon  Injectable 1 milliGRAM(s) IntraMuscular once  heparin   Injectable 5000 Unit(s) SubCutaneous every 8 hours  insulin lispro (ADMELOG) corrective regimen sliding scale   SubCutaneous Before meals and at bedtime  LORazepam   Injectable 0.5 milliGRAM(s) IV Push every 4 hours  LORazepam   Injectable   IV Push   multivitamin 1 Tablet(s) Oral daily  mupirocin 2% Ointment 1 Application(s) Both Nostrils two times a day  Nephro-marcella 1 Tablet(s) Oral daily  sodium chloride 0.9%. 1000 milliLiter(s) (80 mL/Hr) IV Continuous <Continuous>  thiamine 100 milliGRAM(s) Oral daily  timolol 0.5% Solution 1 Drop(s) Both EYES two times a day      MEDICATIONS  (PRN):  dextrose Oral Gel 15 Gram(s) Oral once PRN Blood Glucose LESS THAN 70 milliGRAM(s)/deciliter  LORazepam   Injectable 2 milliGRAM(s) IV Push every 2 hours PRN Symptom-triggered: 2 point increase in CIWA -Ar score and a total score of 7 or LESS  LORazepam   Injectable 2 milliGRAM(s) IV Push every 1 hour PRN Symptom-triggered: each CIWA -Ar score 8 or GREATER      Physical Exam    Neuro :  no focal deficits  Respiratory: CTA B/L  CV: RRR, S1S2, no murmurs,   Abdominal: Soft, NT, ND +BS,  Extremities: No edema, + peripheral pulses    ASSESSMENT    Alcohol dependence with withdrawal    No pertinent past medical history    DM (diabetes mellitus)    HTN (hypertension)    Hypertension    Diabetes mellitus    Chronic kidney disease    HLD (hyperlipidemia)    Glaucoma    Gout    No significant past surgical history        PLAN     Patient is a 76y old  Male who presents with a chief complaint of alcohol withdrawal (12 May 2022 16:06)    pt seen in icu [  ], reg med floor [ x  ], bed [ x ], chair at bedside [   ], a+o x3 [x  ], lethargic [  ],  nad [ x ]      Allergies    No Known Allergies        Vitals    T(F): 97 (05-13-22 @ 05:22), Max: 98.3 (05-12-22 @ 21:18)  HR: 87 (05-13-22 @ 05:22) (78 - 90)  BP: 152/86 (05-13-22 @ 05:22) (127/58 - 152/86)  RR: 19 (05-13-22 @ 05:22) (16 - 20)  SpO2: 95% (05-13-22 @ 05:22) (94% - 100%)  Wt(kg): --  CAPILLARY BLOOD GLUCOSE      POCT Blood Glucose.: 113 mg/dL (13 May 2022 08:17)      Labs                          10.5   3.55  )-----------( 101      ( 13 May 2022 09:06 )             31.1       05-13    143  |  114<H>  |  61<H>  ----------------------------<  110<H>  4.9   |  21<L>  |  3.19<H>    Ca    9.7      13 May 2022 09:06  Phos  3.6     05-12  Mg     1.9     05-12    TPro  7.2  /  Alb  2.6<L>  /  TBili  0.4  /  DBili  x   /  AST  99<H>  /  ALT  62<H>  /  AlkPhos  110  05-13                Radiology Results    < from: US Abdomen Complete (US Abdomen Complete .) (05.13.22 @ 08:54) >  FINDINGS:  Liver: Hepatic steatosis.  Bile ducts: Normal caliber. Common bile duct measures 6 mm.  Gallbladder: Gallstones in the contracted gallbladder.  Pancreas: Not visualized due to overlying bowel gas.  Spleen: Not visualized due to overlying bowel gas.  Right kidney: 9.5 cm. No hydronephrosis. Within normal limits.  Left kidney: 10.4 cm. No hydronephrosis. Within normal limits.  Ascites: None.  Aorta and IVC: Not visualized due to overlying bowel gas.    IMPRESSION:  Gallstones in the contracted gallbladder.    Hepatic steatosis.    < end of copied text >      Meds    MEDICATIONS  (STANDING):  brimonidine 0.2% Ophthalmic Solution 1 Drop(s) Both EYES daily  chlorhexidine 2% Cloths 1 Application(s) Topical <User Schedule>  clobetasol 0.05% Ointment 1 Application(s) Topical two times a day  dextrose 5%. 1000 milliLiter(s) (50 mL/Hr) IV Continuous <Continuous>  dextrose 5%. 1000 milliLiter(s) (100 mL/Hr) IV Continuous <Continuous>  dextrose 50% Injectable 25 Gram(s) IV Push once  dextrose 50% Injectable 12.5 Gram(s) IV Push once  dextrose 50% Injectable 25 Gram(s) IV Push once  doxycycline IVPB 100 milliGRAM(s) IV Intermittent every 12 hours  doxycycline IVPB      ergocalciferol 71635 Unit(s) Oral <User Schedule>  folic acid 1 milliGRAM(s) Oral daily  furosemide    Tablet 20 milliGRAM(s) Oral daily  glucagon  Injectable 1 milliGRAM(s) IntraMuscular once  heparin   Injectable 5000 Unit(s) SubCutaneous every 8 hours  insulin lispro (ADMELOG) corrective regimen sliding scale   SubCutaneous Before meals and at bedtime  LORazepam   Injectable 0.5 milliGRAM(s) IV Push every 4 hours  LORazepam   Injectable   IV Push   multivitamin 1 Tablet(s) Oral daily  mupirocin 2% Ointment 1 Application(s) Both Nostrils two times a day  Nephro-marcella 1 Tablet(s) Oral daily  sodium chloride 0.9%. 1000 milliLiter(s) (80 mL/Hr) IV Continuous <Continuous>  thiamine 100 milliGRAM(s) Oral daily  timolol 0.5% Solution 1 Drop(s) Both EYES two times a day      MEDICATIONS  (PRN):  dextrose Oral Gel 15 Gram(s) Oral once PRN Blood Glucose LESS THAN 70 milliGRAM(s)/deciliter  LORazepam   Injectable 2 milliGRAM(s) IV Push every 2 hours PRN Symptom-triggered: 2 point increase in CIWA -Ar score and a total score of 7 or LESS  LORazepam   Injectable 2 milliGRAM(s) IV Push every 1 hour PRN Symptom-triggered: each CIWA -Ar score 8 or GREATER      Physical Exam    Neuro :  no focal deficits  Respiratory: CTA B/L  CV: RRR, S1S2, no murmurs,   Abdominal: Soft, NT, ND +BS,  Extremities: No edema, + peripheral pulses, rifgt distal leg erythema and excoriation resolved   skin: diffused scaly rash       ASSESSMENT    etoh withdrawal,   metabolic encephalopathy,   right le cellulitis,   psoriasis,   acute on ckd,   pancytopenia likely 2nd to alcoholism   r/o parkinsons disease  h/o HLD,   DM,   chronic HF rEF  CKD  Alcohol dependence with withdrawal  Glaucoma  Gout    PLAN    ativan as per ciwa protocol,   multivitamin, folate, thiamine   f/u ammonia lev   bld cx,   doxycycline to complete 7 days,   hold norvasc 2nd to lower extrem edema,   f/u echo   wound care eval   f/u stool occult blood  gi f/u   No evidence of acute GI bleeding  Monitor H/H  transfuse PRBC as needed  Protonix 40mg po daily  Avoid NSAID  Check stool for occult blood  f/u EGD and colonoscopy   abd us with Gallstones in the contracted gallbladder. 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  Deterioration in renal function, possible chronic versus acute due to dehydration  d/c'd lasix   serum creat improving  cont IV fluid 1/2 NS rate 80 ml per hour next 24 hours  Agree with Vitamin D start Nephro vitamin. Follow PTH level  Follow 2 gm K diet  Follow daily renal function magnesium and po4.  lispro ss   neuro cons noted   Mild distal symmetric bilateral UE tremulousness in the setting of, and entirely consistent with, EtOH withdrawal, chronic EtOH abuse,    No exam findings diagnostic for an extrapyramidal disorder.  In any case, Dx and consideration of potential management of any (possible suspected) extrapyramidal disorder cannot be made in an acute setting such as this one.    When the Pt has FULLY recovered from his acute medical problems, if there is a desire for neurologic evaluation, then please schedule a routine out-Pt visit.   cont clobetasol 0.05% bid for psoriasis  cont current meds

## 2022-05-13 NOTE — PROGRESS NOTE ADULT - PROBLEM SELECTOR PLAN 9
New rt lung infiltrate  Maxipime started  ID consulted, f/u recs  F/U AM labs including procal  Blood cultures

## 2022-05-13 NOTE — CONSULT NOTE ADULT - SUBJECTIVE AND OBJECTIVE BOX
Patient is a 76y old  Male , AAOx3, ambulates independently, leaves alone at home, w/ PMH of HLD, DM, CKD, presents to the ER on 5/10/22 for evaluation of weakness and tremulousness. As per ER attending, patient is being admitted for alcohol withdrawal. Patient admits to drinking 2 glasses of Whisky at night. ON admission, he has no fever and Negative CXR. Today, hospital day 3, he found to have respiratory distress and Repeat  CXR shows a new fairly significant right lung process at this time. He has started on Cefepime and the ID consult requested to assist with further evaluation and antibiotic management.         REVIEW OF SYSTEMS: Total of twelve systems have been reviewed with patient and found to be negative unless mentioned in HPI        PAST MEDICAL & SURGICAL HISTORY:  DM (diabetes mellitus)  HTN (hypertension)  Chronic kidney disease  HLD (hyperlipidemia)  Glaucoma  Gout  No significant past surgical history        SOCIAL HISTORY  Alcohol: Drinks  2 glasses of Whisky at night,  Tobacco: Does not smoke  Illicit substance use: None      FAMILY HISTORY: Non contributory to the present illness        ALLERGIES: No Known Allergies      Vital Signs Last 24 Hrs  T(C): 35.6 (13 May 2022 14:35), Max: 36.8 (12 May 2022 21:18)  T(F): 96 (13 May 2022 14:35), Max: 98.3 (12 May 2022 21:18)  HR: 91 (13 May 2022 15:20) (86 - 91)  BP: 155/86 (13 May 2022 15:20) (146/87 - 169/96)  BP(mean): --  RR: 19 (13 May 2022 15:20) (18 - 20)  SpO2: 100% (13 May 2022 15:20) (89% - 100%)      PHYSICAL EXAM:  GENERAL: Not in distress   CHEST/LUNG: Not using accessory muscles   HEART: s1 and s2 present  ABDOMEN:  Nontender and  Nondistended  EXTREMITIES: No pedal  edema  CNS: Awake and Alert      LABS:                        10.5   3.55  )-----------( 101      ( 13 May 2022 09:06 )             31.1         05-13    143  |  114<H>  |  61<H>  ----------------------------<  110<H>  4.9   |  21<L>  |  3.19<H>    Ca    9.7      13 May 2022 09:06  Phos  3.6     05-12  Mg     1.9     05-12    TPro  7.2  /  Alb  2.6<L>  /  TBili  0.4  /  DBili  x   /  AST  99<H>  /  ALT  62<H>  /  AlkPhos  110  05-13          CAPILLARY BLOOD GLUCOSE  POCT Blood Glucose.: 125 mg/dL (13 May 2022 17:01)  POCT Blood Glucose.: 130 mg/dL (13 May 2022 11:58)  POCT Blood Glucose.: 113 mg/dL (13 May 2022 08:17)  POCT Blood Glucose.: 123 mg/dL (12 May 2022 21:33)        Urinalysis Basic - ( 12 May 2022 12:13 )  Color: Yellow / Appearance: Clear / S.010 / pH: x  Gluc: x / Ketone: Negative  / Bili: Negative / Urobili: Negative   Blood: x / Protein: 100 / Nitrite: Negative   Leuk Esterase: Negative / RBC: Negative /HPF / WBC 0-2 /HPF   Sq Epi: x / Non Sq Epi: Occasional /HPF / Bacteria: Negative /HPF        MEDICATIONS  (STANDING):  brimonidine 0.2% Ophthalmic Solution 1 Drop(s) Both EYES daily  cefepime   IVPB      chlorhexidine 2% Cloths 1 Application(s) Topical <User Schedule>  clobetasol 0.05% Ointment 1 Application(s) Topical two times a day  doxycycline IVPB      doxycycline IVPB 100 milliGRAM(s) IV Intermittent every 12 hours  ergocalciferol 84288 Unit(s) Oral <User Schedule>  folic acid 1 milliGRAM(s) Oral daily  glucagon  Injectable 1 milliGRAM(s) IntraMuscular once  heparin   Injectable 5000 Unit(s) SubCutaneous every 8 hours  insulin lispro (ADMELOG) corrective regimen sliding scale   SubCutaneous Before meals and at bedtime  LORazepam   Injectable   IV Push   LORazepam   Injectable 0.5 milliGRAM(s) IV Push every 4 hours  multivitamin 1 Tablet(s) Oral daily  mupirocin 2% Ointment 1 Application(s) Both Nostrils two times a day  Nephro-marcella 1 Tablet(s) Oral daily  sodium chloride 0.45%. 1000 milliLiter(s) (80 mL/Hr) IV Continuous <Continuous>  thiamine 100 milliGRAM(s) Oral daily  timolol 0.5% Solution 1 Drop(s) Both EYES two times a day    MEDICATIONS  (PRN):  dextrose Oral Gel 15 Gram(s) Oral once PRN Blood Glucose LESS THAN 70 milliGRAM(s)/deciliter  LORazepam   Injectable 2 milliGRAM(s) IV Push every 2 hours PRN Symptom-triggered: 2 point increase in CIWA -Ar score and a total score of 7 or LESS  LORazepam   Injectable 2 milliGRAM(s) IV Push every 1 hour PRN Symptom-triggered: each CIWA -Ar score 8 or GREATER        RADIOLOGY & ADDITIONAL TESTS:    22: Xray Chest 1 View- PORTABLE-Urgent (Xray Chest 1 View- PORTABLE-Urgent .) (22 @ 16:03) >    IMPRESSION: There is a new fairly significant right lung process at this time.      22: US Abdomen Complete (US Abdomen Complete .) (22 @ 08:54) Gallstones in the contracted gallbladder.  Hepatic steatosis.      5/10/22: CT Head No Cont (05.10.22 @ 14:32) :   Moderate periventricular white matter ischemia. Moderate  bitemporal lobe and bifrontal lobe atrophy.     Mucosal thickening in the  RIGHT maxillary and RIGHT ethmoid and frontal and RIGHT frontal sinuses.      < from: Xray Chest 1 View- PORTABLE-Urgent (05.10.22 @ 09:20) >    Presently lungs are clear.      MICROBIOLOGY DATA:    MRSA/MSSA PCR (22 @ 05:59)   MRSA PCR Result.: NotDetec:    COVID-19 PCR (05.10.22 @ 09:36)   COVID-19 PCR: NotDetec:           Patient is a 76y old  Male , AAOx3, ambulates independently, leaves alone at home, w/ PMH of HLD, DM, CKD, presents to the ER on 5/10/22 for evaluation of weakness and tremulousness. As per ER attending, patient is being admitted for alcohol withdrawal. Patient admits to drinking 2 glasses of Whisky at night. ON admission, he has no fever and Negative CXR. Today, hospital day 3, he found to have respiratory distress and Repeat  CXR shows a new fairly significant right lung process at this time. He has started on Cefepime and the ID consult requested to assist with further evaluation and antibiotic management.     REVIEW OF SYSTEMS: Unable to obtain due to mental status unless mentioned in HPI      PAST MEDICAL & SURGICAL HISTORY:  DM (diabetes mellitus)  HTN (hypertension)  Chronic kidney disease  HLD (hyperlipidemia)  Glaucoma  Gout  No significant past surgical history      SOCIAL HISTORY  Alcohol: Drinks  2 glasses of Whisky at night,  Tobacco: Does not smoke  Illicit substance use: None      FAMILY HISTORY: Non contributory to the present illness      ALLERGIES: No Known Allergies      Vital Signs Last 24 Hrs  T(C): 35.6 (13 May 2022 14:35), Max: 36.8 (12 May 2022 21:18)  T(F): 96 (13 May 2022 14:35), Max: 98.3 (12 May 2022 21:18)  HR: 91 (13 May 2022 15:20) (86 - 91)  BP: 155/86 (13 May 2022 15:20) (146/87 - 169/96)  BP(mean): --  RR: 19 (13 May 2022 15:20) (18 - 20)  SpO2: 100% (13 May 2022 15:20) (89% - 100%)      PHYSICAL EXAM:  GENERAL: Not in distress, on oxygen via NC  CHEST/LUNG: Not using accessory muscles   HEART: s1 and s2 present  ABDOMEN:  Nontender and  Nondistended  EXTREMITIES: No pedal  edema  CNS:  mild sedated      LABS:                        10.5   3.55  )-----------( 101      ( 13 May 2022 09:06 )             31.1         05-13    143  |  114<H>  |  61<H>  ----------------------------<  110<H>  4.9   |  21<L>  |  3.19<H>    Ca    9.7      13 May 2022 09:06  Phos  3.6     05-12  Mg     1.9     05-12    TPro  7.2  /  Alb  2.6<L>  /  TBili  0.4  /  DBili  x   /  AST  99<H>  /  ALT  62<H>  /  AlkPhos  110  05-13        CAPILLARY BLOOD GLUCOSE  POCT Blood Glucose.: 125 mg/dL (13 May 2022 17:01)  POCT Blood Glucose.: 130 mg/dL (13 May 2022 11:58)  POCT Blood Glucose.: 113 mg/dL (13 May 2022 08:17)  POCT Blood Glucose.: 123 mg/dL (12 May 2022 21:33)        Urinalysis Basic - ( 12 May 2022 12:13 )  Color: Yellow / Appearance: Clear / S.010 / pH: x  Gluc: x / Ketone: Negative  / Bili: Negative / Urobili: Negative   Blood: x / Protein: 100 / Nitrite: Negative   Leuk Esterase: Negative / RBC: Negative /HPF / WBC 0-2 /HPF   Sq Epi: x / Non Sq Epi: Occasional /HPF / Bacteria: Negative /HPF        MEDICATIONS  (STANDING):  brimonidine 0.2% Ophthalmic Solution 1 Drop(s) Both EYES daily  cefepime   IVPB      chlorhexidine 2% Cloths 1 Application(s) Topical <User Schedule>  clobetasol 0.05% Ointment 1 Application(s) Topical two times a day  doxycycline IVPB      doxycycline IVPB 100 milliGRAM(s) IV Intermittent every 12 hours  ergocalciferol 49443 Unit(s) Oral <User Schedule>  folic acid 1 milliGRAM(s) Oral daily  glucagon  Injectable 1 milliGRAM(s) IntraMuscular once  heparin   Injectable 5000 Unit(s) SubCutaneous every 8 hours  insulin lispro (ADMELOG) corrective regimen sliding scale   SubCutaneous Before meals and at bedtime  LORazepam   Injectable   IV Push   LORazepam   Injectable 0.5 milliGRAM(s) IV Push every 4 hours  multivitamin 1 Tablet(s) Oral daily  mupirocin 2% Ointment 1 Application(s) Both Nostrils two times a day  Nephro-marcella 1 Tablet(s) Oral daily  sodium chloride 0.45%. 1000 milliLiter(s) (80 mL/Hr) IV Continuous <Continuous>  thiamine 100 milliGRAM(s) Oral daily  timolol 0.5% Solution 1 Drop(s) Both EYES two times a day    MEDICATIONS  (PRN):  dextrose Oral Gel 15 Gram(s) Oral once PRN Blood Glucose LESS THAN 70 milliGRAM(s)/deciliter  LORazepam   Injectable 2 milliGRAM(s) IV Push every 2 hours PRN Symptom-triggered: 2 point increase in CIWA -Ar score and a total score of 7 or LESS  LORazepam   Injectable 2 milliGRAM(s) IV Push every 1 hour PRN Symptom-triggered: each CIWA -Ar score 8 or GREATER        RADIOLOGY & ADDITIONAL TESTS:    22: Xray Chest 1 View- PORTABLE-Urgent (Xray Chest 1 View- PORTABLE-Urgent .) (22 @ 16:03) >    IMPRESSION: There is a new fairly significant right lung process at this time.      22: US Abdomen Complete (US Abdomen Complete .) (22 @ 08:54) Gallstones in the contracted gallbladder.  Hepatic steatosis.      5/10/22: CT Head No Cont (05.10.22 @ 14:32) :   Moderate periventricular white matter ischemia. Moderate  bitemporal lobe and bifrontal lobe atrophy.     Mucosal thickening in the  RIGHT maxillary and RIGHT ethmoid and frontal and RIGHT frontal sinuses.      < from: Xray Chest 1 View- PORTABLE-Urgent (05.10.22 @ 09:20) >    Presently lungs are clear.      MICROBIOLOGY DATA:    MRSA/MSSA PCR (22 @ 05:59)   MRSA PCR Result.: NotDetec:    COVID-19 PCR (05.10.22 @ 09:36)   COVID-19 PCR: NotDetec:

## 2022-05-13 NOTE — CONSULT NOTE ADULT - ASSESSMENT
Patient is a 76y old  Male , AAOx3, ambulates independently, leaves alone at home, w/ PMH of HLD, DM, CKD, presents to the ER on 5/10/22 for evaluation of weakness and tremulousness. As per ER attending, patient is being admitted for alcohol withdrawal. Patient admits to drinking 2 glasses of Whisky at night. ON admission, he has no fever and Negative CXR. Today, hospital day 3, he found to have respiratory distress and Repeat  CXR shows a new fairly significant right lung process at this time. He has started on Cefepime and the ID consult requested to assist with further evaluation and antibiotic management.     # Aspiration pneumonia  vs HAP  # Alcohol withdrawal syndrome     would recommend:    1. Follow up Blood cultures  2. Continue Cefepime and adjust doses to q 24 hour daily  3. Please change Doxycycline to Flagyl to cover anaerobes  4. Aspiration precaution  5. Supplemental oxygenation and bronchodilator as needed  6. Continue CIWA protocol as per Primary team    d/w Covering NP    will follow the patient with you and make further recommendation based on the clinical course and Lab results  Thank you for the opportunity to participate in Mr. VENCES's care    Attending Attestation:    Spent more than 65 minutes on total encounter, more than 50 % of the visit was spent counseling and/or coordinating care by the Attending physician.     Patient is a 76y old  Male , AAOx3, ambulates independently, leaves alone at home, w/ PMH of HLD, DM, CKD, presents to the ER on 5/10/22 for evaluation of weakness and tremulousness. As per ER attending, patient is being admitted for alcohol withdrawal. Patient admits to drinking 2 glasses of Whisky at night. ON admission, he has no fever and Negative CXR. Today, hospital day 3, he found to have respiratory distress and Repeat  CXR shows a new fairly significant right lung process at this time. He has started on Cefepime and the ID consult requested to assist with further evaluation and antibiotic management.     # Aspiration pneumonia  vs HAP- on CXR from 5/13  # Alcohol withdrawal syndrome     would recommend:    1. Follow up Blood cultures  2. Continue Cefepime and adjust doses to q 24 hour daily  3. Please change Doxycycline to Flagyl to cover anaerobes  4. Aspiration precaution  5. Supplemental oxygenation and bronchodilator as needed  6. Continue CIWA protocol as per Primary team  7. Sputum culture and MRSA PCR    d/w Covering NP, Mckay and Nursing staff    will follow the patient with you and make further recommendation based on the clinical course and Lab results  Thank you for the opportunity to participate in Mr. VENCES's care    Attending Attestation:    Spent more than 65 minutes on total encounter, more than 50 % of the visit was spent counseling and/or coordinating care by the Attending physician.

## 2022-05-13 NOTE — PROGRESS NOTE ADULT - PROBLEM SELECTOR PLAN 7
h/o CKD, on lasix  ddx: CKD progression vs NELLY on CKD  Cr 3.3 (last Cr 2.9 in 06/2021)  EKG sinus zaida w/ 1st degree block, LBBB (known since June2021)  c/w lasix h/o CKD, on lasix  ddx: CKD progression vs NELLY on CKD  Cr 3.3 (last Cr 2.9 in 06/2021)  EKG sinus zaida w/ 1st degree block, LBBB (known since June2021)  Received on dose of Lasix for presumed effusion  CXR showed rt lung infiltrate

## 2022-05-13 NOTE — SWALLOW BEDSIDE ASSESSMENT ADULT - SWALLOW EVAL: FUNCTIONAL LEVEL AT TIME OF EVAL
Dependent for feeding at the time of this evaluation; pt unable to grasp the spoon, the cup, or solid finger food like cracker

## 2022-05-13 NOTE — PROGRESS NOTE ADULT - SUBJECTIVE AND OBJECTIVE BOX
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  EGFR 20 at present  ETOH intoxication in withdrawal    PAST MEDICAL & SURGICAL HISTORY:  DM (diabetes mellitus)      HTN (hypertension)      Chronic kidney disease      HLD (hyperlipidemia)      Glaucoma      Gout      No significant past surgical history          No Known Allergies      MEDICATIONS  (STANDING):  brimonidine 0.2% Ophthalmic Solution 1 Drop(s) Both EYES daily  chlorhexidine 2% Cloths 1 Application(s) Topical <User Schedule>  clobetasol 0.05% Ointment 1 Application(s) Topical two times a day  dextrose 5%. 1000 milliLiter(s) (50 mL/Hr) IV Continuous <Continuous>  dextrose 5%. 1000 milliLiter(s) (100 mL/Hr) IV Continuous <Continuous>  dextrose 50% Injectable 25 Gram(s) IV Push once  dextrose 50% Injectable 12.5 Gram(s) IV Push once  dextrose 50% Injectable 25 Gram(s) IV Push once  doxycycline IVPB 100 milliGRAM(s) IV Intermittent every 12 hours  doxycycline IVPB      ergocalciferol 42471 Unit(s) Oral <User Schedule>  folic acid 1 milliGRAM(s) Oral daily  furosemide    Tablet 20 milliGRAM(s) Oral daily  glucagon  Injectable 1 milliGRAM(s) IntraMuscular once  heparin   Injectable 5000 Unit(s) SubCutaneous every 8 hours  insulin lispro (ADMELOG) corrective regimen sliding scale   SubCutaneous Before meals and at bedtime  LORazepam   Injectable 0.5 milliGRAM(s) IV Push every 4 hours  LORazepam   Injectable   IV Push   multivitamin 1 Tablet(s) Oral daily  mupirocin 2% Ointment 1 Application(s) Both Nostrils two times a day  Nephro-marcella 1 Tablet(s) Oral daily  sodium chloride 0.45%. 1000 milliLiter(s) (80 mL/Hr) IV Continuous <Continuous>  thiamine 100 milliGRAM(s) Oral daily  timolol 0.5% Solution 1 Drop(s) Both EYES two times a day    MEDICATIONS  (PRN):  dextrose Oral Gel 15 Gram(s) Oral once PRN Blood Glucose LESS THAN 70 milliGRAM(s)/deciliter  LORazepam   Injectable 2 milliGRAM(s) IV Push every 2 hours PRN Symptom-triggered: 2 point increase in CIWA -Ar score and a total score of 7 or LESS  LORazepam   Injectable 2 milliGRAM(s) IV Push every 1 hour PRN Symptom-triggered: each CIWA -Ar score 8 or GREATER                            10.5   3.55  )-----------( 101      ( 13 May 2022 09:06 )             31.1     05-13    143  |  114<H>  |  61<H>  ----------------------------<  110<H>  4.9   |  21<L>  |  3.19<H>    Ca    9.7      13 May 2022 09:06  Phos  3.6     05-12  Mg     1.9     05-12    TPro  7.2  /  Alb  2.6<L>  /  TBili  0.4  /  DBili  x   /  AST  99<H>  /  ALT  62<H>  /  AlkPhos  110  05-13    Repeat ECHO pending        REVIEW OF SYSTEMS:  confused      VITALS:  T(F): 96.8 (05-13-22 @ 11:45), Max: 98.3 (05-12-22 @ 21:18)  HR: 87 (05-13-22 @ 11:45)  BP: 169/96 (05-13-22 @ 11:45)  RR: 18 (05-13-22 @ 11:45)  SpO2: 100% (05-13-22 @ 11:45)  Wt(kg): --      PHYSICAL EXAM:  Constitutional: well developed, no diaphoresis, no distress.  Neck: No JVD, no carotid bruit, supple, no adenopathy  Respiratory: Good air entrance B/L, no wheezes, rales or rhonchi  Cardiovascular: S1, S2, RRR, no pericardial rub, no murmur  Abdomen: BS+, soft, no tenderness, no bruit  Pelvis: bladder nondistended  Tremor upper extremities confused  No lower extremities edema

## 2022-05-13 NOTE — SWALLOW BEDSIDE ASSESSMENT ADULT - ORAL PREPARATORY PHASE
Called IR and was told that needle biopsy could be done there. Need order for this specific, the order in now if too vague.     I will fax it to the department at [phone] and to have the physician in IR review and then schedule patient.     If thoracic surgery needed later, can refer to Dr. BRICE Rivera as Dr. Jaramillo is not accepting new patients at this time.    impaired reception (oral apraxia?)/Reduced oral grading impaired reception; pt did not know how to take a bite of the cracker despite intense verbal cues/Reduced oral grading/Decreased mastication ability Decreased mastication ability impaired reception (oral apraxia?); on cup sips, anterior spillage noted - pt did not seem to know how to sip from the cup and was unable to hold the cup; with the straw, pt initially blew the straw and after verbal cues, pt started to suck from the straw Reduced oral grading/Decreased mastication ability

## 2022-05-13 NOTE — PROGRESS NOTE ADULT - ASSESSMENT
Patient is a 76yoM, AAOx3, ambulates independently, leaves alone at home, w/ PMH of HLD, DM, CKD, presents to the ED with weakness and tremulousness. Admitted for alcohol withdrawal.     Case discussed w/ pt's son Dre Ojeda at bedside who reports that pt lives alone and he drinks about 50 years every night and now has been having about two drinks per day.  No prior Etoh related hospitalization as per son.  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 son reports pt is more awake today, no behavioral issues noted, continue taper dose of ativan. Neuro consulted for b; upper arm tremor  Patient is a 76yoM, AAOx3, ambulates independently, leaves alone at home, w/ PMH of HLD, DM, CKD, presents to the ED with weakness and tremulousness. Admitted for alcohol withdrawal.     Case discussed w/ pt's son Dre Ojeda at bedside who reports that pt lives alone and he drinks about 50 years every night and now has been having about two drinks per day.  No prior Etoh related hospitalization as per son.  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 son reports pt is more awake today, no behavioral issues noted, continue taper dose of ativan. Neuro consulted.     5/13- Pt evaluated and case discussed with his son Dre Ojeda at bedside and aware of plan for CXR given abnormal lung exam.  Pt received one dose of 40mg IV Lasix as per discussion w/ Nephro for presumed effusion.  CXR showed rt lung field infiltrate. Hence, will treat w/ Maxipime for likely Asp Pneumonia and ID consult in progress.

## 2022-05-13 NOTE — PROGRESS NOTE ADULT - ASSESSMENT
DKD with deterioration in renal function in the last year now stage 4  Reduced po intake  ETOH intoxication in withdrawal.  HF r EF 45%    plan:  Continue IV fluids 1/2 NS 80 ml per hour  DC Diuretics  Follow repeat Echocardiogram  Monitor magnesium and phosphorus in am.

## 2022-05-14 LAB
ALBUMIN SERPL ELPH-MCNC: 2.6 G/DL — LOW (ref 3.5–5)
ALP SERPL-CCNC: 111 U/L — SIGNIFICANT CHANGE UP (ref 40–120)
ALT FLD-CCNC: 53 U/L DA — SIGNIFICANT CHANGE UP (ref 10–60)
AMMONIA BLD-MCNC: 55 UMOL/L — HIGH (ref 11–32)
ANION GAP SERPL CALC-SCNC: 13 MMOL/L — SIGNIFICANT CHANGE UP (ref 5–17)
AST SERPL-CCNC: 65 U/L — HIGH (ref 10–40)
BILIRUB SERPL-MCNC: 0.5 MG/DL — SIGNIFICANT CHANGE UP (ref 0.2–1.2)
BUN SERPL-MCNC: 64 MG/DL — HIGH (ref 7–18)
CALCIUM SERPL-MCNC: 9.9 MG/DL — SIGNIFICANT CHANGE UP (ref 8.4–10.5)
CHLORIDE SERPL-SCNC: 112 MMOL/L — HIGH (ref 96–108)
CO2 SERPL-SCNC: 17 MMOL/L — LOW (ref 22–31)
CREAT SERPL-MCNC: 3.31 MG/DL — HIGH (ref 0.5–1.3)
EGFR: 19 ML/MIN/1.73M2 — LOW
GLUCOSE BLDC GLUCOMTR-MCNC: 114 MG/DL — HIGH (ref 70–99)
GLUCOSE BLDC GLUCOMTR-MCNC: 121 MG/DL — HIGH (ref 70–99)
GLUCOSE BLDC GLUCOMTR-MCNC: 140 MG/DL — HIGH (ref 70–99)
GLUCOSE BLDC GLUCOMTR-MCNC: 151 MG/DL — HIGH (ref 70–99)
GLUCOSE BLDC GLUCOMTR-MCNC: 152 MG/DL — HIGH (ref 70–99)
GLUCOSE SERPL-MCNC: 132 MG/DL — HIGH (ref 70–99)
HCT VFR BLD CALC: 35.2 % — LOW (ref 39–50)
HGB BLD-MCNC: 11.4 G/DL — LOW (ref 13–17)
MAGNESIUM SERPL-MCNC: 1.7 MG/DL — SIGNIFICANT CHANGE UP (ref 1.6–2.6)
MCHC RBC-ENTMCNC: 32.4 GM/DL — SIGNIFICANT CHANGE UP (ref 32–36)
MCHC RBC-ENTMCNC: 34.5 PG — HIGH (ref 27–34)
MCV RBC AUTO: 106.7 FL — HIGH (ref 80–100)
NRBC # BLD: 0 /100 WBCS — SIGNIFICANT CHANGE UP (ref 0–0)
PHOSPHATE SERPL-MCNC: 5 MG/DL — HIGH (ref 2.5–4.5)
PLATELET # BLD AUTO: 66 K/UL — LOW (ref 150–400)
POTASSIUM SERPL-MCNC: 5.1 MMOL/L — SIGNIFICANT CHANGE UP (ref 3.5–5.3)
POTASSIUM SERPL-SCNC: 5.1 MMOL/L — SIGNIFICANT CHANGE UP (ref 3.5–5.3)
PROCALCITONIN SERPL-MCNC: 0.47 NG/ML — HIGH (ref 0.02–0.1)
PROT SERPL-MCNC: 7.5 G/DL — SIGNIFICANT CHANGE UP (ref 6–8.3)
RBC # BLD: 3.3 M/UL — LOW (ref 4.2–5.8)
RBC # FLD: 15.3 % — HIGH (ref 10.3–14.5)
SODIUM SERPL-SCNC: 142 MMOL/L — SIGNIFICANT CHANGE UP (ref 135–145)
WBC # BLD: 5.49 K/UL — SIGNIFICANT CHANGE UP (ref 3.8–10.5)
WBC # FLD AUTO: 5.49 K/UL — SIGNIFICANT CHANGE UP (ref 3.8–10.5)

## 2022-05-14 PROCEDURE — 93010 ELECTROCARDIOGRAM REPORT: CPT

## 2022-05-14 PROCEDURE — 70450 CT HEAD/BRAIN W/O DYE: CPT | Mod: 26

## 2022-05-14 RX ORDER — LABETALOL HCL 100 MG
100 TABLET ORAL
Refills: 0 | Status: DISCONTINUED | OUTPATIENT
Start: 2022-05-14 | End: 2022-05-14

## 2022-05-14 RX ORDER — SODIUM BICARBONATE 1 MEQ/ML
650 SYRINGE (ML) INTRAVENOUS THREE TIMES A DAY
Refills: 0 | Status: DISCONTINUED | OUTPATIENT
Start: 2022-05-14 | End: 2022-05-16

## 2022-05-14 RX ORDER — LABETALOL HCL 100 MG
100 TABLET ORAL
Refills: 0 | Status: DISCONTINUED | OUTPATIENT
Start: 2022-05-14 | End: 2022-05-16

## 2022-05-14 RX ORDER — LANOLIN ALCOHOL/MO/W.PET/CERES
3 CREAM (GRAM) TOPICAL AT BEDTIME
Refills: 0 | Status: DISCONTINUED | OUTPATIENT
Start: 2022-05-14 | End: 2022-05-24

## 2022-05-14 RX ADMIN — Medication 1 APPLICATION(S): at 18:21

## 2022-05-14 RX ADMIN — HEPARIN SODIUM 5000 UNIT(S): 5000 INJECTION INTRAVENOUS; SUBCUTANEOUS at 06:45

## 2022-05-14 RX ADMIN — Medication 0.5 MILLIGRAM(S): at 05:13

## 2022-05-14 RX ADMIN — Medication 2 MILLIGRAM(S): at 21:42

## 2022-05-14 RX ADMIN — HEPARIN SODIUM 5000 UNIT(S): 5000 INJECTION INTRAVENOUS; SUBCUTANEOUS at 21:44

## 2022-05-14 RX ADMIN — MUPIROCIN 1 APPLICATION(S): 20 OINTMENT TOPICAL at 06:39

## 2022-05-14 RX ADMIN — HEPARIN SODIUM 5000 UNIT(S): 5000 INJECTION INTRAVENOUS; SUBCUTANEOUS at 14:23

## 2022-05-14 RX ADMIN — Medication 650 MILLIGRAM(S): at 21:42

## 2022-05-14 RX ADMIN — Medication 2 MILLIGRAM(S): at 10:06

## 2022-05-14 RX ADMIN — Medication 2 MILLIGRAM(S): at 17:11

## 2022-05-14 RX ADMIN — Medication 0.5 MILLIGRAM(S): at 02:50

## 2022-05-14 RX ADMIN — BRIMONIDINE TARTRATE 1 DROP(S): 2 SOLUTION/ DROPS OPHTHALMIC at 13:00

## 2022-05-14 RX ADMIN — Medication 1: at 12:59

## 2022-05-14 RX ADMIN — Medication 100 MILLIGRAM(S): at 14:23

## 2022-05-14 RX ADMIN — MUPIROCIN 1 APPLICATION(S): 20 OINTMENT TOPICAL at 18:21

## 2022-05-14 RX ADMIN — Medication 1 DROP(S): at 18:22

## 2022-05-14 RX ADMIN — Medication 100 MILLIGRAM(S): at 06:45

## 2022-05-14 RX ADMIN — Medication 1 APPLICATION(S): at 06:38

## 2022-05-14 RX ADMIN — Medication 3 MILLIGRAM(S): at 21:43

## 2022-05-14 RX ADMIN — Medication 2 MILLIGRAM(S): at 00:44

## 2022-05-14 RX ADMIN — Medication 1 DROP(S): at 06:38

## 2022-05-14 RX ADMIN — CHLORHEXIDINE GLUCONATE 1 APPLICATION(S): 213 SOLUTION TOPICAL at 06:38

## 2022-05-14 RX ADMIN — Medication 100 MILLIGRAM(S): at 18:20

## 2022-05-14 RX ADMIN — Medication 100 MILLIGRAM(S): at 21:43

## 2022-05-14 RX ADMIN — Medication 1 MILLIGRAM(S): at 18:28

## 2022-05-14 RX ADMIN — Medication 2 MILLIGRAM(S): at 13:10

## 2022-05-14 NOTE — PROGRESS NOTE ADULT - ASSESSMENT
Patient is a 76y old  Male , AAOx3, ambulates independently, leaves alone at home, w/ PMH of HLD, DM, CKD, presents to the ER on 5/10/22 for evaluation of weakness and tremulousness. As per ER attending, patient is being admitted for alcohol withdrawal. Patient admits to drinking 2 glasses of Whisky at night. ON admission, he has no fever and Negative CXR. Today, hospital day 3, he found to have respiratory distress and Repeat  CXR shows a new fairly significant right lung process at this time. He has started on Cefepime and the ID consult requested to assist with further evaluation and antibiotic management.     # Aspiration pneumonia  vs HAP- on CXR from 5/13  # Alcohol withdrawal syndrome     would recommend:    1. Follow up Blood cultures and  MRSA PCR  2. Continue Cefepime 1 g q 24 hour and Flagyl to cover anaerobes  3. Aspiration precaution  4. Bronchial suctioning  5. Supplemental oxygenation and bronchodilator as needed  6. Continue CIWA protocol as per Primary team    Attending Attestation:    Spent more than 45 minutes on total encounter, more than 50 % of the visit was spent counseling and/or coordinating care by the Attending physician.

## 2022-05-14 NOTE — PROGRESS NOTE ADULT - SUBJECTIVE AND OBJECTIVE BOX
Patient is seen and examined at the bed side, is afebrile. The Over night events noted regarding respiratory distress.      REVIEW OF SYSTEMS: Unable to obtain due to mental status       ALLERGIES: No Known Allergies      Vital Signs Last 24 Hrs  T(C): 36.1 (14 May 2022 13:11), Max: 36.7 (14 May 2022 05:45)  T(F): 97 (14 May 2022 13:11), Max: 98.1 (14 May 2022 05:45)  HR: 79 (14 May 2022 13:11) (79 - 102)  BP: 133/89 (14 May 2022 13:11) (129/69 - 173/108)  BP(mean): --  RR: 18 (14 May 2022 13:11) (18 - 23)  SpO2: 98% (14 May 2022 13:11) (89% - 100%)      PHYSICAL EXAM:  GENERAL: Not in acute distress, on oxygen via NC  CHEST/LUNG: Not using accessory muscles   HEART: s1 and s2 present  ABDOMEN:  Nontender and  Nondistended  EXTREMITIES: No pedal  edema  CNS:  mild sedated      LABS:                        11.4   5.49  )-----------( 66       ( 14 May 2022 08:27 )             35.2                           10.5   3.55  )-----------( 101      ( 13 May 2022 09:06 )             31.1           142  |  112<H>  |  64<H>  ----------------------------<  132<H>  5.1   |  17<L>  |  3.31<H>    Ca    9.9      14 May 2022 08:27  Phos  5.0       Mg     1.7         TPro  7.5  /  Alb  2.6<L>  /  TBili  0.5  /  DBili  x   /  AST  65<H>  /  ALT  53  /  AlkPhos  111  13    143  |  114<H>  |  61<H>  ----------------------------<  110<H>  4.9   |  21<L>  |  3.19<H>    Ca    9.7      13 May 2022 09:06  Phos  3.6     05-12  Mg     1.9     -12    TPro  7.2  /  Alb  2.6<L>  /  TBili  0.4  /  DBili  x   /  AST  99<H>  /  ALT  62<H>  /  AlkPhos  110  05-        CAPILLARY BLOOD GLUCOSE  POCT Blood Glucose.: 125 mg/dL (13 May 2022 17:01)  POCT Blood Glucose.: 130 mg/dL (13 May 2022 11:58)  POCT Blood Glucose.: 113 mg/dL (13 May 2022 08:17)  POCT Blood Glucose.: 123 mg/dL (12 May 2022 21:33)        Urinalysis Basic - ( 12 May 2022 12:13 )  Color: Yellow / Appearance: Clear / S.010 / pH: x  Gluc: x / Ketone: Negative  / Bili: Negative / Urobili: Negative   Blood: x / Protein: 100 / Nitrite: Negative   Leuk Esterase: Negative / RBC: Negative /HPF / WBC 0-2 /HPF   Sq Epi: x / Non Sq Epi: Occasional /HPF / Bacteria: Negative /HPF        MEDICATIONS  (STANDING):    brimonidine 0.2% Ophthalmic Solution 1 Drop(s) Both EYES daily  cefepime   IVPB 1000 milliGRAM(s) IV Intermittent every 24 hours  chlorhexidine 2% Cloths 1 Application(s) Topical <User Schedule>  clobetasol 0.05% Ointment 1 Application(s) Topical two times a day  ergocalciferol 97641 Unit(s) Oral <User Schedule>  folic acid 1 milliGRAM(s) Oral daily  glucagon  Injectable 1 milliGRAM(s) IntraMuscular once  heparin   Injectable 5000 Unit(s) SubCutaneous every 8 hours  insulin lispro (ADMELOG) corrective regimen sliding scale   SubCutaneous Before meals and at bedtime  labetalol 100 milliGRAM(s) Oral two times a day  LORazepam   Injectable 1 milliGRAM(s) IV Push every 12 hours  metroNIDAZOLE  IVPB      metroNIDAZOLE  IVPB 500 milliGRAM(s) IV Intermittent every 8 hours  multivitamin 1 Tablet(s) Oral daily  mupirocin 2% Ointment 1 Application(s) Both Nostrils two times a day  Nephro-marcella 1 Tablet(s) Oral daily  sodium bicarbonate 650 milliGRAM(s) Oral three times a day  sodium chloride 0.45%. 1000 milliLiter(s) (80 mL/Hr) IV Continuous <Continuous>  thiamine 100 milliGRAM(s) Oral daily  timolol 0.5% Solution 1 Drop(s) Both EYES two times a day        RADIOLOGY & ADDITIONAL TESTS:    22: Xray Chest 1 View- PORTABLE-Urgent (Xray Chest 1 View- PORTABLE-Urgent .) (22 @ 16:03) >    IMPRESSION: There is a new fairly significant right lung process at this time.      22: US Abdomen Complete (US Abdomen Complete .) (22 @ 08:54) Gallstones in the contracted gallbladder.  Hepatic steatosis.      5/10/22: CT Head No Cont (05.10.22 @ 14:32) :   Moderate periventricular white matter ischemia. Moderate  bitemporal lobe and bifrontal lobe atrophy.     Mucosal thickening in the  RIGHT maxillary and RIGHT ethmoid and frontal and RIGHT frontal sinuses.      < from: Xray Chest 1 View- PORTABLE-Urgent (05.10.22 @ 09:20) >    Presently lungs are clear.      MICROBIOLOGY DATA:    MRSA/MSSA PCR (22 @ 05:59)   MRSA PCR Result.: NotDetec:    COVID-19 PCR (05.10.22 @ 09:36)   COVID-19 PCR: NotDetec:

## 2022-05-14 NOTE — PROGRESS NOTE ADULT - ASSESSMENT
DKD with deterioration in renal function in the last year now stage 4    Reduced po intake  ETOH intoxication in withdrawal.  HF r EF 45%    Right lung side pneumonia  placed on Cefepime , follow with ID     PLAN:  Continue IV fluids  Sodium bicarbonate PO  2 gm K diet

## 2022-05-14 NOTE — PROGRESS NOTE ADULT - ASSESSMENT
1. Anemia  2. Thrombocytopenia  3. R/o chronic GI bleeding  4. ETOH abuse  5. Alcoholic liver disease  6. Alcohol withdrawal    Suggestions:    1. Monitor H/H  2. Transfuse PRBC as needed  3. Protonix 40mg po daily  4. Avoid NSAID  5. Check stool for occult blood  6. Withdrawal precaution  7. EGD and colonoscopy  8. Follow up LFT's  9. Follow up platelets  10. DVT prophylaxis

## 2022-05-14 NOTE — PROGRESS NOTE ADULT - SUBJECTIVE AND OBJECTIVE BOX
[   ] ICU                                          [   ] CCU                                      [ X  ] Medical Floor    Patient is a 76 year old male with ETOH abuse and anemia. GI consulted to evaluate.     Patient is a 76 year old male AAOx3, ambulates independently, leaves alone at home, with past medical history significant for HLD, DM, CKD, admitted with alcohol withdrowal found to have anemia. Patient admits to drinking 2 glasses of Whisky at night, and last drink was last night. He also c/o intermittent burning epigastric radiating to his chest associated with dyspepsia. Patient denies nausea, vomiting, hematemesis, hematochezia, melena, fever, chills, chest pain, SOB, cough, hematuria, dysuria or diarrhea.       Patient appears comfortable. No new complaints reported, No abdominal pain, N/V, hematemesis, hematochezia, melena, fever, chills, chest pain, SOB, cough or diarrhea reported.      PAIN MANAGEMENT:  Pain Scale:                2-3 /10  Pain Location:  Epigastric abdominal pain      Prior Colonoscopy:  No prior colonoscopy      PAST MEDICAL HISTORY  DM (diabetes mellitus)  HTN (hypertension)   Chronic kidney disease  HLD (hyperlipidemia)  Glaucoma  Gout        PAST SURGICAL HISTORY  No significant past surgical history        Allergies    No Known Allergies    Intolerances  None      HOME MEDICATIONS    MEDICATIONS  (STANDING):  brimonidine 0.2% Ophthalmic Solution 1 Drop(s) Both EYES daily  dextrose 5%. 1000 milliLiter(s) (50 mL/Hr) IV Continuous <Continuous>  dextrose 5%. 1000 milliLiter(s) (100 mL/Hr) IV Continuous <Continuous>  dextrose 50% Injectable 25 Gram(s) IV Push once  dextrose 50% Injectable 12.5 Gram(s) IV Push once  dextrose 50% Injectable 25 Gram(s) IV Push once  doxycycline IVPB      ergocalciferol 97128 Unit(s) Oral <User Schedule>  folic acid 1 milliGRAM(s) Oral daily  furosemide    Tablet 20 milliGRAM(s) Oral daily  glucagon  Injectable 1 milliGRAM(s) IntraMuscular once  heparin   Injectable 5000 Unit(s) SubCutaneous every 8 hours  insulin lispro (ADMELOG) corrective regimen sliding scale   SubCutaneous Before meals and at bedtime  LORazepam   Injectable 2 milliGRAM(s) IV Push every 4 hours  LORazepam   Injectable   IV Push   multivitamin 1 Tablet(s) Oral daily  timolol 0.5% Solution 1 Drop(s) Both EYES two times a day    MEDICATIONS  (PRN):  dextrose Oral Gel 15 Gram(s) Oral once PRN Blood Glucose LESS THAN 70 milliGRAM(s)/deciliter  LORazepam   Injectable 2 milliGRAM(s) IV Push every 2 hours PRN Symptom-triggered: 2 point increase in CIWA -Ar score and a total score of 7 or LESS  LORazepam   Injectable 2 milliGRAM(s) IV Push every 1 hour PRN Symptom-triggered: each CIWA -Ar score 8 or GREATER      SOCIAL HISTORY  Advanced Directives:       [ X ] Full Code       [  ] DNR  Marital Status:         [  ] M      [ X ] S      [  ] D       [  ] W  Children:       [ X ] Yes      [  ] No  Occupation:        [  ] Employed       [ X ] Unemployed       [  ] Retired  Diet:       [ X ] Regular       [  ] PEG feeding          [  ] NG tube feeding  Drug Use:           [ X ] Patient denied          [  ] Yes  Alcohol:           [X  ] No             [  ] Yes (socially)         [  ] Yes (chronic)  Tobacco:           [  ] Yes           [ X ] No      FAMILY HISTORY  [X  ] Heart Disease            [ X ] Diabetes             [ X ] HTN             [  ] Colon Cancer             [  ] Stomach Cancer              [  ] Pancreatic Cancer        VITALS  Vital Signs Last 24 Hrs  T(C): 36.5 (14 May 2022 20:32), Max: 36.7 (14 May 2022 05:45)  T(F): 97.7 (14 May 2022 20:32), Max: 98.1 (14 May 2022 05:45)  HR: 106 (14 May 2022 20:32) (71 - 106)  BP: 159/79 (14 May 2022 20:32) (129/69 - 159/102)   RR: 19 (14 May 2022 20:32) (18 - 23)  SpO2: 91% (14 May 2022 20:32) (91% - 100%)       MEDICATIONS  (STANDING):  brimonidine 0.2% Ophthalmic Solution 1 Drop(s) Both EYES daily  cefepime   IVPB 1000 milliGRAM(s) IV Intermittent every 24 hours  chlorhexidine 2% Cloths 1 Application(s) Topical <User Schedule>  clobetasol 0.05% Ointment 1 Application(s) Topical two times a day  dextrose 5%. 1000 milliLiter(s) (50 mL/Hr) IV Continuous <Continuous>  dextrose 5%. 1000 milliLiter(s) (100 mL/Hr) IV Continuous <Continuous>  dextrose 50% Injectable 12.5 Gram(s) IV Push once  dextrose 50% Injectable 25 Gram(s) IV Push once  dextrose 50% Injectable 25 Gram(s) IV Push once  ergocalciferol 37021 Unit(s) Oral <User Schedule>  folic acid 1 milliGRAM(s) Oral daily  glucagon  Injectable 1 milliGRAM(s) IntraMuscular once  heparin   Injectable 5000 Unit(s) SubCutaneous every 8 hours  insulin lispro (ADMELOG) corrective regimen sliding scale   SubCutaneous Before meals and at bedtime  labetalol 100 milliGRAM(s) Oral two times a day  LORazepam   Injectable 1 milliGRAM(s) IV Push every 12 hours  melatonin 3 milliGRAM(s) Oral at bedtime  metroNIDAZOLE  IVPB 500 milliGRAM(s) IV Intermittent every 8 hours  metroNIDAZOLE  IVPB      multivitamin 1 Tablet(s) Oral daily  mupirocin 2% Ointment 1 Application(s) Both Nostrils two times a day  Nephro-marcella 1 Tablet(s) Oral daily  sodium bicarbonate 650 milliGRAM(s) Oral three times a day  sodium chloride 0.45%. 1000 milliLiter(s) (80 mL/Hr) IV Continuous <Continuous>  thiamine 100 milliGRAM(s) Oral daily  timolol 0.5% Solution 1 Drop(s) Both EYES two times a day    MEDICATIONS  (PRN):  dextrose Oral Gel 15 Gram(s) Oral once PRN Blood Glucose LESS THAN 70 milliGRAM(s)/deciliter  LORazepam   Injectable 2 milliGRAM(s) IV Push every 2 hours PRN Symptom-triggered: 2 point increase in CIWA -Ar score and a total score of 7 or LESS  LORazepam   Injectable 2 milliGRAM(s) IV Push every 1 hour PRN Symptom-triggered: each CIWA -Ar score 8 or GREATER                            11.4   5.49  )-----------( 66       ( 14 May 2022 08:27 )             35.2       05-14    142  |  112<H>  |  64<H>  ----------------------------<  132<H>  5.1   |  17<L>  |  3.31<H>    Ca    9.9      14 May 2022 08:27  Phos  5.0     05-14  Mg     1.7     05-14    TPro  7.5  /  Alb  2.6<L>  /  TBili  0.5  /  DBili  x   /  AST  65<H>  /  ALT  53  /  AlkPhos  111  05-14

## 2022-05-14 NOTE — PROGRESS NOTE ADULT - SUBJECTIVE AND OBJECTIVE BOX
Patient is a 76y old  Male who presents with a chief complaint of alcohol withdrawal (13 May 2022 18:41)    pt seen in icu [  ], reg med floor [ x  ], bed [ x ], chair at bedside [   ], a+o x3 [x  ], lethargic [  ],    nad [ x ]        Allergies    No Known Allergies        Vitals    T(F): 97.5 (05-14-22 @ 02:37), Max: 97.5 (05-13-22 @ 21:10)  HR: 99 (05-14-22 @ 02:37) (87 - 102)  BP: 155/101 (05-14-22 @ 02:37) (155/86 - 173/108)  RR: 18 (05-14-22 @ 02:37) (18 - 23)  SpO2: 96% (05-14-22 @ 02:37) (89% - 100%)  Wt(kg): --  CAPILLARY BLOOD GLUCOSE      POCT Blood Glucose.: 111 mg/dL (13 May 2022 22:14)      Labs                          10.5   3.55  )-----------( 101      ( 13 May 2022 09:06 )             31.1       05-13    143  |  114<H>  |  61<H>  ----------------------------<  110<H>  4.9   |  21<L>  |  3.19<H>    Ca    9.7      13 May 2022 09:06  Phos  3.6     05-12  Mg     1.9     05-12    TPro  7.2  /  Alb  2.6<L>  /  TBili  0.4  /  DBili  x   /  AST  99<H>  /  ALT  62<H>  /  AlkPhos  110  05-13                Radiology Results      Meds    MEDICATIONS  (STANDING):  brimonidine 0.2% Ophthalmic Solution 1 Drop(s) Both EYES daily  cefepime   IVPB 1000 milliGRAM(s) IV Intermittent every 24 hours  chlorhexidine 2% Cloths 1 Application(s) Topical <User Schedule>  clobetasol 0.05% Ointment 1 Application(s) Topical two times a day  dextrose 5%. 1000 milliLiter(s) (100 mL/Hr) IV Continuous <Continuous>  dextrose 5%. 1000 milliLiter(s) (50 mL/Hr) IV Continuous <Continuous>  dextrose 50% Injectable 25 Gram(s) IV Push once  dextrose 50% Injectable 12.5 Gram(s) IV Push once  dextrose 50% Injectable 25 Gram(s) IV Push once  ergocalciferol 91138 Unit(s) Oral <User Schedule>  folic acid 1 milliGRAM(s) Oral daily  glucagon  Injectable 1 milliGRAM(s) IntraMuscular once  heparin   Injectable 5000 Unit(s) SubCutaneous every 8 hours  insulin lispro (ADMELOG) corrective regimen sliding scale   SubCutaneous Before meals and at bedtime  LORazepam   Injectable   IV Push   LORazepam   Injectable 0.5 milliGRAM(s) IV Push every 12 hours  metroNIDAZOLE  IVPB      metroNIDAZOLE  IVPB 500 milliGRAM(s) IV Intermittent every 8 hours  multivitamin 1 Tablet(s) Oral daily  mupirocin 2% Ointment 1 Application(s) Both Nostrils two times a day  Nephro-marcella 1 Tablet(s) Oral daily  sodium chloride 0.45%. 1000 milliLiter(s) (80 mL/Hr) IV Continuous <Continuous>  thiamine 100 milliGRAM(s) Oral daily  timolol 0.5% Solution 1 Drop(s) Both EYES two times a day      MEDICATIONS  (PRN):  dextrose Oral Gel 15 Gram(s) Oral once PRN Blood Glucose LESS THAN 70 milliGRAM(s)/deciliter  LORazepam   Injectable 2 milliGRAM(s) IV Push every 2 hours PRN Symptom-triggered: 2 point increase in CIWA -Ar score and a total score of 7 or LESS  LORazepam   Injectable 2 milliGRAM(s) IV Push every 1 hour PRN Symptom-triggered: each CIWA -Ar score 8 or GREATER      Physical Exam    Neuro :  no focal deficits  Respiratory: CTA B/L  CV: RRR, S1S2, no murmurs,   Abdominal: Soft, NT, ND +BS,  Extremities: No edema, + peripheral pulses, rifgt distal leg erythema and excoriation resolved   skin: diffused scaly rash       ASSESSMENT    etoh withdrawal,   metabolic encephalopathy,   right le cellulitis,   psoriasis,   acute on ckd,   pancytopenia likely 2nd to alcoholism   r/o parkinsons disease  h/o HLD,   DM,   chronic HF rEF  CKD  Alcohol dependence with withdrawal  Glaucoma  Gout    PLAN    ativan as per [facility] protocol,   multivitamin, folate, thiamine   f/u ammonia lev   bld cx,   doxycycline to complete 7 days,   hold norvasc 2nd to lower extrem edema,   f/u echo   wound care eval   f/u stool occult blood  gi f/u   No evidence of acute GI bleeding  Monitor H/H  transfuse PRBC as needed  Protonix 40mg po daily  Avoid NSAID  Check stool for occult blood  f/u EGD and colonoscopy   abd us with Gallstones in the contracted gallbladder. 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  Deterioration in renal function, possible chronic versus acute due to dehydration  d/c'd lasix   serum creat improving  cont IV fluid 1/2 NS rate 80 ml per hour next 24 hours  Agree with Vitamin D start Nephro vitamin. Follow PTH level  Follow 2 gm K diet  Follow daily renal function magnesium and po4.  lispro ss   neuro cons noted   Mild distal symmetric bilateral UE tremulousness in the setting of, and entirely consistent with, EtOH withdrawal, chronic EtOH abuse,    No exam findings diagnostic for an extrapyramidal disorder.  In any case, Dx and consideration of potential management of any (possible suspected) extrapyramidal disorder cannot be made in an acute setting such as this one.    When the Pt has FULLY recovered from his acute medical problems, if there is a desire for neurologic evaluation, then please schedule a routine out-Pt visit.   cont clobetasol 0.05% bid for psoriasis  cont current meds         Patient is a 76y old  Male who presents with a chief complaint of alcohol withdrawal (13 May 2022 18:41)    pt seen in icu [  ], reg med floor [ x  ], bed [ x ], chair at bedside [   ], a+o x3 [x  ], lethargic [  ],    nad [ x ]        Allergies    No Known Allergies        Vitals    T(F): 97.5 (05-14-22 @ 02:37), Max: 97.5 (05-13-22 @ 21:10)  HR: 99 (05-14-22 @ 02:37) (87 - 102)  BP: 155/101 (05-14-22 @ 02:37) (155/86 - 173/108)  RR: 18 (05-14-22 @ 02:37) (18 - 23)  SpO2: 96% (05-14-22 @ 02:37) (89% - 100%)  Wt(kg): --  CAPILLARY BLOOD GLUCOSE      POCT Blood Glucose.: 111 mg/dL (13 May 2022 22:14)      Labs                          10.5   3.55  )-----------( 101      ( 13 May 2022 09:06 )             31.1       05-13    143  |  114<H>  |  61<H>  ----------------------------<  110<H>  4.9   |  21<L>  |  3.19<H>    Ca    9.7      13 May 2022 09:06  Phos  3.6     05-12  Mg     1.9     05-12    TPro  7.2  /  Alb  2.6<L>  /  TBili  0.4  /  DBili  x   /  AST  99<H>  /  ALT  62<H>  /  AlkPhos  110  05-13                Radiology Results    < from: Xray Chest 1 View- PORTABLE-Urgent (Xray Chest 1 View- PORTABLE-Urgent .) (05.13.22 @ 16:03) >  IMPRESSION: There is a new fairly significant right lung process at this   time.    < end of copied text >      Meds    MEDICATIONS  (STANDING):  brimonidine 0.2% Ophthalmic Solution 1 Drop(s) Both EYES daily  cefepime   IVPB 1000 milliGRAM(s) IV Intermittent every 24 hours  chlorhexidine 2% Cloths 1 Application(s) Topical <User Schedule>  clobetasol 0.05% Ointment 1 Application(s) Topical two times a day  dextrose 5%. 1000 milliLiter(s) (100 mL/Hr) IV Continuous <Continuous>  dextrose 5%. 1000 milliLiter(s) (50 mL/Hr) IV Continuous <Continuous>  dextrose 50% Injectable 25 Gram(s) IV Push once  dextrose 50% Injectable 12.5 Gram(s) IV Push once  dextrose 50% Injectable 25 Gram(s) IV Push once  ergocalciferol 38369 Unit(s) Oral <User Schedule>  folic acid 1 milliGRAM(s) Oral daily  glucagon  Injectable 1 milliGRAM(s) IntraMuscular once  heparin   Injectable 5000 Unit(s) SubCutaneous every 8 hours  insulin lispro (ADMELOG) corrective regimen sliding scale   SubCutaneous Before meals and at bedtime  LORazepam   Injectable   IV Push   LORazepam   Injectable 0.5 milliGRAM(s) IV Push every 12 hours  metroNIDAZOLE  IVPB      metroNIDAZOLE  IVPB 500 milliGRAM(s) IV Intermittent every 8 hours  multivitamin 1 Tablet(s) Oral daily  mupirocin 2% Ointment 1 Application(s) Both Nostrils two times a day  Nephro-marcella 1 Tablet(s) Oral daily  sodium chloride 0.45%. 1000 milliLiter(s) (80 mL/Hr) IV Continuous <Continuous>  thiamine 100 milliGRAM(s) Oral daily  timolol 0.5% Solution 1 Drop(s) Both EYES two times a day      MEDICATIONS  (PRN):  dextrose Oral Gel 15 Gram(s) Oral once PRN Blood Glucose LESS THAN 70 milliGRAM(s)/deciliter  LORazepam   Injectable 2 milliGRAM(s) IV Push every 2 hours PRN Symptom-triggered: 2 point increase in CIWA -Ar score and a total score of 7 or LESS  LORazepam   Injectable 2 milliGRAM(s) IV Push every 1 hour PRN Symptom-triggered: each CIWA -Ar score 8 or GREATER      Physical Exam    Neuro :  no focal deficits  Respiratory: right lower lobe crackles  CV: RRR, S1S2, no murmurs,   Abdominal: Soft, NT, ND +BS,  Extremities: No edema, + peripheral pulses, right distal leg erythema and excoriation resolved   skin: diffused scaly rash       ASSESSMENT    etoh withdrawal,   metabolic encephalopathy,   right le cellulitis,   aspiration pna   psoriasis,   acute on ckd,   pancytopenia likely 2nd to alcoholism   r/o parkinsons disease  h/o HLD,   DM,   chronic HF rEF  CKD  Alcohol dependence with withdrawal  Glaucoma  Gout    PLAN    ativan as per [facility] protocol,   multivitamin, folate, thiamine   cxr with There is a new fairly significant right lung process at this time noted above.  id f/u   pt started on maxipime  doxy changed to flagyl  f/u blood cx   hold norvasc 2nd to lower extrem edema,   f/u echo   resume labetalol 100mg daily   check ecg  wound care eval   cont clobetasol 0.05% bid for psoriasis   lesions improving  f/u ammonia lev   f/u stool occult blood  gi f/u   No evidence of acute GI bleeding  Monitor H/H  transfuse PRBC as needed  Protonix 40mg po daily  Avoid NSAID  Check stool for occult blood  f/u EGD and colonoscopy   abd us with Gallstones in the contracted gallbladder. 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  Deterioration in renal function, possible chronic versus acute due to dehydration  d/c'd lasix   serum creat improving  cont IV fluid 1/2 NS rate 80 ml per hour next 24 hours  Agree with Vitamin D start Nephro vitamin. Follow PTH level  Follow 2 gm K diet  Follow daily renal function magnesium and po4.  lispro ss   neuro cons noted   Mild distal symmetric bilateral UE tremulousness in the setting of, and entirely consistent with, EtOH withdrawal, chronic EtOH abuse,    No exam findings diagnostic for an extrapyramidal disorder.  In any case, Dx and consideration of potential management of any (possible suspected) extrapyramidal disorder cannot be made in an acute setting such as this one.    When the Pt has FULLY recovered from his acute medical problems, if there is a desire for neurologic evaluation, then please schedule a routine out-Pt visit.   cont current meds         Patient is a 76y old  Male who presents with a chief complaint of alcohol withdrawal (13 May 2022 18:41)    pt seen in icu [  ], reg med floor [ x  ], bed [ x ], chair at bedside [   ], a+o x3 [  ], lethargic [x  ],    nad [ x ]        Allergies    No Known Allergies        Vitals    T(F): 97.5 (05-14-22 @ 02:37), Max: 97.5 (05-13-22 @ 21:10)  HR: 99 (05-14-22 @ 02:37) (87 - 102)  BP: 155/101 (05-14-22 @ 02:37) (155/86 - 173/108)  RR: 18 (05-14-22 @ 02:37) (18 - 23)  SpO2: 96% (05-14-22 @ 02:37) (89% - 100%)  Wt(kg): --  CAPILLARY BLOOD GLUCOSE      POCT Blood Glucose.: 111 mg/dL (13 May 2022 22:14)      Labs                          10.5   3.55  )-----------( 101      ( 13 May 2022 09:06 )             31.1       05-13    143  |  114<H>  |  61<H>  ----------------------------<  110<H>  4.9   |  21<L>  |  3.19<H>    Ca    9.7      13 May 2022 09:06  Phos  3.6     05-12  Mg     1.9     05-12    TPro  7.2  /  Alb  2.6<L>  /  TBili  0.4  /  DBili  x   /  AST  99<H>  /  ALT  62<H>  /  AlkPhos  110  05-13                Radiology Results    < from: Xray Chest 1 View- PORTABLE-Urgent (Xray Chest 1 View- PORTABLE-Urgent .) (05.13.22 @ 16:03) >  IMPRESSION: There is a new fairly significant right lung process at this   time.    < end of copied text >      Meds    MEDICATIONS  (STANDING):  brimonidine 0.2% Ophthalmic Solution 1 Drop(s) Both EYES daily  cefepime   IVPB 1000 milliGRAM(s) IV Intermittent every 24 hours  chlorhexidine 2% Cloths 1 Application(s) Topical <User Schedule>  clobetasol 0.05% Ointment 1 Application(s) Topical two times a day  dextrose 5%. 1000 milliLiter(s) (100 mL/Hr) IV Continuous <Continuous>  dextrose 5%. 1000 milliLiter(s) (50 mL/Hr) IV Continuous <Continuous>  dextrose 50% Injectable 25 Gram(s) IV Push once  dextrose 50% Injectable 12.5 Gram(s) IV Push once  dextrose 50% Injectable 25 Gram(s) IV Push once  ergocalciferol [id number] Unit(s) Oral <User Schedule>  folic acid 1 milliGRAM(s) Oral daily  glucagon  Injectable 1 milliGRAM(s) IntraMuscular once  heparin   Injectable 5000 Unit(s) SubCutaneous every 8 hours  insulin lispro (ADMELOG) corrective regimen sliding scale   SubCutaneous Before meals and at bedtime  LORazepam   Injectable   IV Push   LORazepam   Injectable 0.5 milliGRAM(s) IV Push every 12 hours  metroNIDAZOLE  IVPB      metroNIDAZOLE  IVPB 500 milliGRAM(s) IV Intermittent every 8 hours  multivitamin 1 Tablet(s) Oral daily  mupirocin 2% Ointment 1 Application(s) Both Nostrils two times a day  Nephro-marcella 1 Tablet(s) Oral daily  sodium chloride 0.45%. 1000 milliLiter(s) (80 mL/Hr) IV Continuous <Continuous>  thiamine 100 milliGRAM(s) Oral daily  timolol 0.5% Solution 1 Drop(s) Both EYES two times a day      MEDICATIONS  (PRN):  dextrose Oral Gel 15 Gram(s) Oral once PRN Blood Glucose LESS THAN 70 milliGRAM(s)/deciliter  LORazepam   Injectable 2 milliGRAM(s) IV Push every 2 hours PRN Symptom-triggered: 2 point increase in CIWA -Ar score and a total score of 7 or LESS  LORazepam   Injectable 2 milliGRAM(s) IV Push every 1 hour PRN Symptom-triggered: each CIWA -Ar score 8 or GREATER      Physical Exam    Neuro :  no focal deficits  Respiratory: right lower lobe crackles  CV: RRR, S1S2, no murmurs,   Abdominal: Soft, NT, ND +BS,  Extremities: No edema, + peripheral pulses, right distal leg erythema and excoriation resolved   skin: diffused scaly rash       ASSESSMENT    etoh withdrawal,   metabolic encephalopathy,   right le cellulitis,   aspiration pna   psoriasis,   acute on ckd,   pancytopenia likely 2nd to alcoholism   r/o parkinsons disease  h/o HLD,   DM,   chronic HF rEF  CKD  Alcohol dependence with withdrawal  Glaucoma  Gout    PLAN    ativan as per [facility] protocol,   multivitamin, folate, thiamine   cxr with There is a new fairly significant right lung process at this time noted above.  id f/u   pt started on maxipime  doxy changed to flagyl  f/u blood cx   hold norvasc 2nd to lower extrem edema,   f/u echo   resume labetalol 100mg daily   check ecg   repeat ct head   wound care eval   cont clobetasol 0.05% bid for psoriasis   lesions improving  f/u ammonia lev   f/u stool occult blood  gi f/u   No evidence of acute GI bleeding  Monitor H/H  transfuse PRBC as needed  Protonix 40mg po daily  Avoid NSAID  Check stool for occult blood  f/u EGD and colonoscopy   abd us with Gallstones in the contracted gallbladder. 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  Deterioration in renal function, possible chronic versus acute due to dehydration  d/c'd lasix   serum creat improving  cont IV fluid 1/2 NS rate 80 ml per hour next 24 hours  Agree with Vitamin D start Nephro vitamin. Follow PTH level  Follow 2 gm K diet  Follow daily renal function magnesium and po4.  lispro ss   neuro cons noted   Mild distal symmetric bilateral UE tremulousness in the setting of, and entirely consistent with, EtOH withdrawal, chronic EtOH abuse,    No exam findings diagnostic for an extrapyramidal disorder.  In any case, Dx and consideration of potential management of any (possible suspected) extrapyramidal disorder cannot be made in an acute setting such as this one.    When the Pt has FULLY recovered from his acute medical problems, if there is a desire for neurologic evaluation, then please schedule a routine out-Pt visit.   repeat ct head  cont current meds         Patient is a 76y old  Male who presents with a chief complaint of alcohol withdrawal (13 May 2022 18:41)    pt seen in icu [  ], reg med floor [ x  ], bed [ x ], chair at bedside [   ], a+o x3 [  ], lethargic [x  ],    nad [ x ]        Allergies    No Known Allergies        Vitals    T(F): 97.5 (05-14-22 @ 02:37), Max: 97.5 (05-13-22 @ 21:10)  HR: 99 (05-14-22 @ 02:37) (87 - 102)  BP: 155/101 (05-14-22 @ 02:37) (155/86 - 173/108)  RR: 18 (05-14-22 @ 02:37) (18 - 23)  SpO2: 96% (05-14-22 @ 02:37) (89% - 100%)  Wt(kg): --  CAPILLARY BLOOD GLUCOSE      POCT Blood Glucose.: 111 mg/dL (13 May 2022 22:14)      Labs                          10.5   3.55  )-----------( 101      ( 13 May 2022 09:06 )             31.1       05-13    143  |  114<H>  |  61<H>  ----------------------------<  110<H>  4.9   |  21<L>  |  3.19<H>    Ca    9.7      13 May 2022 09:06  Phos  3.6     05-12  Mg     1.9     05-12    TPro  7.2  /  Alb  2.6<L>  /  TBili  0.4  /  DBili  x   /  AST  99<H>  /  ALT  62<H>  /  AlkPhos  110  05-13                Radiology Results    < from: Xray Chest 1 View- PORTABLE-Urgent (Xray Chest 1 View- PORTABLE-Urgent .) (05.13.22 @ 16:03) >  IMPRESSION: There is a new fairly significant right lung process at this   time.    < end of copied text >      Meds    MEDICATIONS  (STANDING):  brimonidine 0.2% Ophthalmic Solution 1 Drop(s) Both EYES daily  cefepime   IVPB 1000 milliGRAM(s) IV Intermittent every 24 hours  chlorhexidine 2% Cloths 1 Application(s) Topical <User Schedule>  clobetasol 0.05% Ointment 1 Application(s) Topical two times a day  dextrose 5%. 1000 milliLiter(s) (100 mL/Hr) IV Continuous <Continuous>  dextrose 5%. 1000 milliLiter(s) (50 mL/Hr) IV Continuous <Continuous>  dextrose 50% Injectable 25 Gram(s) IV Push once  dextrose 50% Injectable 12.5 Gram(s) IV Push once  dextrose 50% Injectable 25 Gram(s) IV Push once  ergocalciferol 78167 Unit(s) Oral <User Schedule>  folic acid 1 milliGRAM(s) Oral daily  glucagon  Injectable 1 milliGRAM(s) IntraMuscular once  heparin   Injectable 5000 Unit(s) SubCutaneous every 8 hours  insulin lispro (ADMELOG) corrective regimen sliding scale   SubCutaneous Before meals and at bedtime  LORazepam   Injectable   IV Push   LORazepam   Injectable 0.5 milliGRAM(s) IV Push every 12 hours  metroNIDAZOLE  IVPB      metroNIDAZOLE  IVPB 500 milliGRAM(s) IV Intermittent every 8 hours  multivitamin 1 Tablet(s) Oral daily  mupirocin 2% Ointment 1 Application(s) Both Nostrils two times a day  Nephro-marcella 1 Tablet(s) Oral daily  sodium chloride 0.45%. 1000 milliLiter(s) (80 mL/Hr) IV Continuous <Continuous>  thiamine 100 milliGRAM(s) Oral daily  timolol 0.5% Solution 1 Drop(s) Both EYES two times a day      MEDICATIONS  (PRN):  dextrose Oral Gel 15 Gram(s) Oral once PRN Blood Glucose LESS THAN 70 milliGRAM(s)/deciliter  LORazepam   Injectable 2 milliGRAM(s) IV Push every 2 hours PRN Symptom-triggered: 2 point increase in CIWA -Ar score and a total score of 7 or LESS  LORazepam   Injectable 2 milliGRAM(s) IV Push every 1 hour PRN Symptom-triggered: each CIWA -Ar score 8 or GREATER      Physical Exam    Neuro :  no focal deficits  Respiratory: right lower lobe crackles  CV: RRR, S1S2, no murmurs,   Abdominal: Soft, NT, ND +BS,  Extremities: No edema, + peripheral pulses, right distal leg erythema and excoriation resolved   skin: diffused scaly rash       ASSESSMENT    etoh withdrawal,   metabolic encephalopathy,   right le cellulitis,   aspiration pna   psoriasis,   acute on ckd,   pancytopenia likely 2nd to alcoholism   r/o parkinsons disease  h/o HLD,   DM,   chronic HF rEF  CKD  Alcohol dependence with withdrawal  Glaucoma  Gout    PLAN    ativan as per [facility] protocol,   multivitamin, folate, thiamine   cxr with There is a new fairly significant right lung process at this time noted above.  id f/u   pt started on maxipime  doxy changed to flagyl  f/u blood cx   hold norvasc 2nd to lower extrem edema,   f/u echo   resume labetalol 100mg daily   check ecg   wound care eval   cont clobetasol 0.05% bid for psoriasis   lesions improving  f/u ammonia lev   f/u stool occult blood  gi f/u   No evidence of acute GI bleeding  Monitor H/H  transfuse PRBC as needed  Protonix 40mg po daily  Avoid NSAID  Check stool for occult blood  f/u EGD and colonoscopy   abd us with Gallstones in the contracted gallbladder. 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  Deterioration in renal function, possible chronic versus acute due to dehydration  d/c'd lasix   serum creat improving  cont IV fluid 1/2 NS rate 80 ml per hour next 24 hours  Agree with Vitamin D start Nephro vitamin. Follow PTH level  Follow 2 gm K diet  Follow daily renal function magnesium and po4.  lispro ss   neuro cons noted   Mild distal symmetric bilateral UE tremulousness in the setting of, and entirely consistent with, EtOH withdrawal, chronic EtOH abuse,    No exam findings diagnostic for an extrapyramidal disorder.  In any case, Dx and consideration of potential management of any (possible suspected) extrapyramidal disorder cannot be made in an acute setting such as this one.    When the Pt has FULLY recovered from his acute medical problems, if there is a desire for neurologic evaluation, then please schedule a routine out-Pt visit.   repeat ct head  cont current meds

## 2022-05-14 NOTE — PROGRESS NOTE ADULT - SUBJECTIVE AND OBJECTIVE BOX
Problem List:  CKD stage 4  ETOH intoxication  Right lung pneumonia    PAST MEDICAL & SURGICAL HISTORY:  DM (diabetes mellitus)      HTN (hypertension)      Chronic kidney disease      HLD (hyperlipidemia)      Glaucoma      Gout      No significant past surgical history          No Known Allergies      MEDICATIONS  (STANDING):  brimonidine 0.2% Ophthalmic Solution 1 Drop(s) Both EYES daily  cefepime   IVPB 1000 milliGRAM(s) IV Intermittent every 24 hours  chlorhexidine 2% Cloths 1 Application(s) Topical <User Schedule>  clobetasol 0.05% Ointment 1 Application(s) Topical two times a day  dextrose 5%. 1000 milliLiter(s) (50 mL/Hr) IV Continuous <Continuous>  dextrose 5%. 1000 milliLiter(s) (100 mL/Hr) IV Continuous <Continuous>  dextrose 50% Injectable 25 Gram(s) IV Push once  dextrose 50% Injectable 25 Gram(s) IV Push once  dextrose 50% Injectable 12.5 Gram(s) IV Push once  ergocalciferol 02580 Unit(s) Oral <User Schedule>  folic acid 1 milliGRAM(s) Oral daily  glucagon  Injectable 1 milliGRAM(s) IntraMuscular once  heparin   Injectable 5000 Unit(s) SubCutaneous every 8 hours  insulin lispro (ADMELOG) corrective regimen sliding scale   SubCutaneous Before meals and at bedtime  labetalol 100 milliGRAM(s) Oral two times a day  LORazepam   Injectable 1 milliGRAM(s) IV Push every 12 hours  metroNIDAZOLE  IVPB      metroNIDAZOLE  IVPB 500 milliGRAM(s) IV Intermittent every 8 hours  multivitamin 1 Tablet(s) Oral daily  mupirocin 2% Ointment 1 Application(s) Both Nostrils two times a day  Nephro-marcella 1 Tablet(s) Oral daily  sodium chloride 0.45%. 1000 milliLiter(s) (80 mL/Hr) IV Continuous <Continuous>  thiamine 100 milliGRAM(s) Oral daily  timolol 0.5% Solution 1 Drop(s) Both EYES two times a day    MEDICATIONS  (PRN):  dextrose Oral Gel 15 Gram(s) Oral once PRN Blood Glucose LESS THAN 70 milliGRAM(s)/deciliter  LORazepam   Injectable 2 milliGRAM(s) IV Push every 2 hours PRN Symptom-triggered: 2 point increase in CIWA -Ar score and a total score of 7 or LESS  LORazepam   Injectable 2 milliGRAM(s) IV Push every 1 hour PRN Symptom-triggered: each CIWA -Ar score 8 or GREATER                            11.4   5.49  )-----------( 66       ( 14 May 2022 08:27 )             35.2     05-14    142  |  112<H>  |  64<H>  ----------------------------<  132<H>  5.1   |  17<L>  |  3.31<H>    Ca    9.9      14 May 2022 08:27  Phos  5.0     05-14  Mg     1.7     05-14    TPro  7.5  /  Alb  2.6<L>  /  TBili  0.5  /  DBili  x   /  AST  65<H>  /  ALT  53  /  AlkPhos  111  05-14            REVIEW OF SYSTEMS:  confused        VITALS:  T(F): 97 (05-14-22 @ 13:11), Max: 98.1 (05-14-22 @ 05:45)  HR: 79 (05-14-22 @ 13:11)  BP: 133/89 (05-14-22 @ 13:11)  RR: 18 (05-14-22 @ 13:11)  SpO2: 98% (05-14-22 @ 13:11)  Wt(kg): --    05-13 @ 07:01  -  05-14 @ 07:00  --------------------------------------------------------  IN: 1540 mL / OUT: 0 mL / NET: 1540 mL        PHYSICAL EXAM:  Constitutional: RR 24  Neck: No JVD, no carotid bruit, supple, no adenopathy  Respiratory:  air entrance B/L, no wheezes, rales or rhonchi  Cardiovascular: S1, S2, RRR, no pericardial rub, no murmur  Abdomen: BS+, soft, no tenderness, no bruit  Pelvis: bladder nondistended  Extremities: No edema  Confused and upper extremities tremor

## 2022-05-15 LAB
ALBUMIN SERPL ELPH-MCNC: 2.7 G/DL — LOW (ref 3.5–5)
ALP SERPL-CCNC: 95 U/L — SIGNIFICANT CHANGE UP (ref 40–120)
ALT FLD-CCNC: 39 U/L DA — SIGNIFICANT CHANGE UP (ref 10–60)
ANION GAP SERPL CALC-SCNC: 10 MMOL/L — SIGNIFICANT CHANGE UP (ref 5–17)
AST SERPL-CCNC: 43 U/L — HIGH (ref 10–40)
BILIRUB SERPL-MCNC: 0.6 MG/DL — SIGNIFICANT CHANGE UP (ref 0.2–1.2)
BUN SERPL-MCNC: 69 MG/DL — HIGH (ref 7–18)
CALCIUM SERPL-MCNC: 10.2 MG/DL — SIGNIFICANT CHANGE UP (ref 8.4–10.5)
CHLORIDE SERPL-SCNC: 115 MMOL/L — HIGH (ref 96–108)
CO2 SERPL-SCNC: 21 MMOL/L — LOW (ref 22–31)
CREAT SERPL-MCNC: 3.29 MG/DL — HIGH (ref 0.5–1.3)
EGFR: 19 ML/MIN/1.73M2 — LOW
GLUCOSE BLDC GLUCOMTR-MCNC: 127 MG/DL — HIGH (ref 70–99)
GLUCOSE BLDC GLUCOMTR-MCNC: 130 MG/DL — HIGH (ref 70–99)
GLUCOSE BLDC GLUCOMTR-MCNC: 146 MG/DL — HIGH (ref 70–99)
GLUCOSE BLDC GLUCOMTR-MCNC: 186 MG/DL — HIGH (ref 70–99)
GLUCOSE SERPL-MCNC: 145 MG/DL — HIGH (ref 70–99)
HCT VFR BLD CALC: 33.5 % — LOW (ref 39–50)
HEPARIN-PF4 AB RESULT: <0.6 U/ML — SIGNIFICANT CHANGE UP (ref 0–0.9)
HGB BLD-MCNC: 11.2 G/DL — LOW (ref 13–17)
MCHC RBC-ENTMCNC: 33.4 GM/DL — SIGNIFICANT CHANGE UP (ref 32–36)
MCHC RBC-ENTMCNC: 33.8 PG — SIGNIFICANT CHANGE UP (ref 27–34)
MCV RBC AUTO: 101.2 FL — HIGH (ref 80–100)
MRSA PCR RESULT.: SIGNIFICANT CHANGE UP
NRBC # BLD: 1 /100 WBCS — HIGH (ref 0–0)
PF4 HEPARIN CMPLX AB SER-ACNC: NEGATIVE — SIGNIFICANT CHANGE UP
PLATELET # BLD AUTO: 109 K/UL — LOW (ref 150–400)
POTASSIUM SERPL-MCNC: 4.9 MMOL/L — SIGNIFICANT CHANGE UP (ref 3.5–5.3)
POTASSIUM SERPL-SCNC: 4.9 MMOL/L — SIGNIFICANT CHANGE UP (ref 3.5–5.3)
PROT SERPL-MCNC: 7.4 G/DL — SIGNIFICANT CHANGE UP (ref 6–8.3)
RBC # BLD: 3.31 M/UL — LOW (ref 4.2–5.8)
RBC # FLD: 15 % — HIGH (ref 10.3–14.5)
S AUREUS DNA NOSE QL NAA+PROBE: DETECTED
SODIUM SERPL-SCNC: 146 MMOL/L — HIGH (ref 135–145)
WBC # BLD: 4.07 K/UL — SIGNIFICANT CHANGE UP (ref 3.8–10.5)
WBC # FLD AUTO: 4.07 K/UL — SIGNIFICANT CHANGE UP (ref 3.8–10.5)

## 2022-05-15 PROCEDURE — 93306 TTE W/DOPPLER COMPLETE: CPT | Mod: 26

## 2022-05-15 RX ORDER — PANTOPRAZOLE SODIUM 20 MG/1
40 TABLET, DELAYED RELEASE ORAL
Refills: 0 | Status: DISCONTINUED | OUTPATIENT
Start: 2022-05-15 | End: 2022-05-24

## 2022-05-15 RX ORDER — SOD SULF/SODIUM/NAHCO3/KCL/PEG
4000 SOLUTION, RECONSTITUTED, ORAL ORAL ONCE
Refills: 0 | Status: COMPLETED | OUTPATIENT
Start: 2022-05-15 | End: 2022-05-15

## 2022-05-15 RX ORDER — PANTOPRAZOLE SODIUM 20 MG/1
40 TABLET, DELAYED RELEASE ORAL ONCE
Refills: 0 | Status: COMPLETED | OUTPATIENT
Start: 2022-05-15 | End: 2022-05-15

## 2022-05-15 RX ORDER — MAGNESIUM HYDROXIDE 400 MG/1
30 TABLET, CHEWABLE ORAL
Refills: 0 | Status: DISCONTINUED | OUTPATIENT
Start: 2022-05-15 | End: 2022-05-16

## 2022-05-15 RX ORDER — LACTULOSE 10 G/15ML
10 SOLUTION ORAL ONCE
Refills: 0 | Status: COMPLETED | OUTPATIENT
Start: 2022-05-15 | End: 2022-05-15

## 2022-05-15 RX ADMIN — Medication 1 MILLIGRAM(S): at 05:43

## 2022-05-15 RX ADMIN — Medication 1 DROP(S): at 19:06

## 2022-05-15 RX ADMIN — Medication 650 MILLIGRAM(S): at 13:07

## 2022-05-15 RX ADMIN — Medication 1 TABLET(S): at 13:10

## 2022-05-15 RX ADMIN — CHLORHEXIDINE GLUCONATE 1 APPLICATION(S): 213 SOLUTION TOPICAL at 05:43

## 2022-05-15 RX ADMIN — Medication 650 MILLIGRAM(S): at 21:09

## 2022-05-15 RX ADMIN — MAGNESIUM HYDROXIDE 30 MILLILITER(S): 400 TABLET, CHEWABLE ORAL at 19:06

## 2022-05-15 RX ADMIN — Medication 1 APPLICATION(S): at 05:19

## 2022-05-15 RX ADMIN — MUPIROCIN 1 APPLICATION(S): 20 OINTMENT TOPICAL at 05:19

## 2022-05-15 RX ADMIN — Medication 1 MILLIGRAM(S): at 19:06

## 2022-05-15 RX ADMIN — Medication 100 MILLIGRAM(S): at 05:44

## 2022-05-15 RX ADMIN — LACTULOSE 10 GRAM(S): 10 SOLUTION ORAL at 09:31

## 2022-05-15 RX ADMIN — MUPIROCIN 1 APPLICATION(S): 20 OINTMENT TOPICAL at 19:07

## 2022-05-15 RX ADMIN — Medication 100 MILLIGRAM(S): at 21:09

## 2022-05-15 RX ADMIN — HEPARIN SODIUM 5000 UNIT(S): 5000 INJECTION INTRAVENOUS; SUBCUTANEOUS at 13:09

## 2022-05-15 RX ADMIN — Medication 3 MILLIGRAM(S): at 21:09

## 2022-05-15 RX ADMIN — Medication 100 MILLIGRAM(S): at 13:11

## 2022-05-15 RX ADMIN — Medication 1 APPLICATION(S): at 19:08

## 2022-05-15 RX ADMIN — Medication 1 TABLET(S): at 13:09

## 2022-05-15 RX ADMIN — Medication 4000 MILLILITER(S): at 15:37

## 2022-05-15 RX ADMIN — HEPARIN SODIUM 5000 UNIT(S): 5000 INJECTION INTRAVENOUS; SUBCUTANEOUS at 05:21

## 2022-05-15 RX ADMIN — Medication 1: at 22:17

## 2022-05-15 RX ADMIN — PANTOPRAZOLE SODIUM 40 MILLIGRAM(S): 20 TABLET, DELAYED RELEASE ORAL at 12:15

## 2022-05-15 RX ADMIN — Medication 1 MILLIGRAM(S): at 13:11

## 2022-05-15 RX ADMIN — Medication 2 MILLIGRAM(S): at 13:53

## 2022-05-15 RX ADMIN — Medication 100 MILLIGRAM(S): at 19:06

## 2022-05-15 RX ADMIN — CEFEPIME 100 MILLIGRAM(S): 1 INJECTION, POWDER, FOR SOLUTION INTRAMUSCULAR; INTRAVENOUS at 05:18

## 2022-05-15 RX ADMIN — Medication 100 MILLIGRAM(S): at 13:14

## 2022-05-15 RX ADMIN — BRIMONIDINE TARTRATE 1 DROP(S): 2 SOLUTION/ DROPS OPHTHALMIC at 13:07

## 2022-05-15 RX ADMIN — Medication 2 MILLIGRAM(S): at 01:00

## 2022-05-15 RX ADMIN — HEPARIN SODIUM 5000 UNIT(S): 5000 INJECTION INTRAVENOUS; SUBCUTANEOUS at 21:10

## 2022-05-15 RX ADMIN — Medication 650 MILLIGRAM(S): at 05:20

## 2022-05-15 RX ADMIN — Medication 1 DROP(S): at 05:20

## 2022-05-15 NOTE — CHART NOTE - NSCHARTNOTEFT_GEN_A_CORE
EVENT:   5/13/22, 10pm, called by RN re: patient with aspirational PNA, right lobe infiltrate was in respiratory distress. Pt.'s breathing was very congested, O2sat - from 82 to 87 on 6 L N/C.  Assessed at bedside. Patient was confused and restless, had dyspnea and bilateral crackles - on auscultation. Congestion.  HPI:  Patient is a 76yoM, AAOx3, ambulates independently, leaves alone at home, w/ PMH of HLD, DM, CKD, presents to the ED with weakness and tremulousness. Patient is currently a very poor historian. Patient's son, Mr. Dre Ojeda, contacted, but he is unable to provide much information. He request to call, his brother, Mr. Bruce Ojeda ([phone]), but he is unable to be reached over the phone. Thus, most of the information obtained from the ED chart review. According to the ED attending, patient is being admitted for alcohol withdrawal. Patient admits to drinking 2 glasses of Whisky at night, and last drink was last night. He denies fever, chill, n/v, chest pain, SOB, dizziness, vision changes, abdominal pain, urinary or bowel movement changes. (10 May 2022 11:41)    OBJECTIVE:  Vital Signs Last 24 Hrs  T(C): 36 (14 May 2022 00:37), Max: 36.4 (13 May 2022 21:10)  T(F): 96.8 (14 May 2022 00:37), Max: 97.5 (13 May 2022 21:10)  HR: 98 (14 May 2022 00:37) (87 - 102)  BP: 157/102 (14 May 2022 00:37) (152/86 - 173/108)  BP(mean): --  RR: 21 (14 May 2022 00:37) (18 - 23)  SpO2: 99% (14 May 2022 00:37) (89% - 100%)    FOCUSED PHYSICAL EXAM:    LABS:                        10.5   3.55  )-----------( 101      ( 13 May 2022 09:06 )             31.1     05-13    143  |  114<H>  |  61<H>  ----------------------------<  110<H>  4.9   |  21<L>  |  3.19<H>    Ca    9.7      13 May 2022 09:06  Phos  3.6     05-12  Mg     1.9     05-12    TPro  7.2  /  Alb  2.6<L>  /  TBili  0.4  /  DBili  x   /  AST  99<H>  /  ALT  62<H>  /  AlkPhos  110  05-13    PLAN:   - Lasix 40 mg IVP x1 dose.    FOLLOW UP / RESULT:   - Reassess patient respiratory status and VS,   - Keep HOB elevated,   - Continue supportive care.
EVENT:   5/14/22, 10pm, patient was not able to fall asleep likely due to daytime naps.   HPI:     77 y/o male, AAOx3, ambulates independently, leaves alone at home, w/ PMH of HLD, DM, CKD, presents to the ED with weakness and tremulousness. Patient is currently a very poor historian. Patient's son, Mr. Dre Ojeda, contacted, but he is unable to provide much information. He request to call, his brother, Mr. Bruce Ojeda ([phone]), but he is unable to be reached over the phone. Thus, most of the information obtained from the ED chart review. According to the ED attending, patient is being admitted for alcohol withdrawal. Patient admits to drinking 2 glasses of Whisky at night, and last drink was last night. He denies fever, chill, n/v, chest pain, SOB, dizziness, vision changes, abdominal pain, urinary or bowel movement changes. (10 May 2022 11:41)    OBJECTIVE:  Vital Signs Last 24 Hrs  T(C): 36.5 (14 May 2022 20:32), Max: 36.7 (14 May 2022 05:45)  T(F): 97.7 (14 May 2022 20:32), Max: 98.1 (14 May 2022 05:45)  HR: 106 (14 May 2022 20:32) (71 - 106)  BP: 159/79 (14 May 2022 20:32) (129/69 - 159/79)  BP(mean): --  RR: 19 (14 May 2022 20:32) (18 - 20)  SpO2: 91% (14 May 2022 20:32) (91% - 100%)    FOCUSED PHYSICAL EXAM:    LABS:                        11.4   5.49  )-----------( 66       ( 14 May 2022 08:27 )             35.2     05-14    142  |  112<H>  |  64<H>  ----------------------------<  132<H>  5.1   |  17<L>  |  3.31<H>    Ca    9.9      14 May 2022 08:27  Phos  5.0     05-14  Mg     1.7     05-14    TPro  7.5  /  Alb  2.6<L>  /  TBili  0.5  /  DBili  x   /  AST  65<H>  /  ALT  53  /  AlkPhos  111  05-1    PLAN:   - Melatonin 3 mg PO QHS for insomnia.     FOLLOW UP / RESULT:   - Decrease environmental stimuli (bright light, noise),   - Maintain patient safety and comfort.

## 2022-05-15 NOTE — PROGRESS NOTE ADULT - SUBJECTIVE AND OBJECTIVE BOX
Patient is seen and examined at the bed side, is afebrile. The Blood cultures from  have NGTD.      REVIEW OF SYSTEMS: Unable to obtain due to mental status       ALLERGIES: No Known Allergies      Vital Signs Last 24 Hrs  T(C): 36.1 (15 May 2022 19:06), Max: 36.5 (14 May 2022 20:32)  T(F): 97 (15 May 2022 19:06), Max: 97.7 (14 May 2022 20:32)  HR: 89 (15 May 2022 19:06) (89 - 106)  BP: 155/86 (15 May 2022 19:06) (141/95 - 159/79)  BP(mean): --  RR: 18 (15 May 2022 19:06) (18 - 19)  SpO2: 96% (15 May 2022 19:06) (91% - 98%)        PHYSICAL EXAM:  GENERAL: Not in acute distress, on oxygen via NC  CHEST/LUNG: Not using accessory muscles   HEART: s1 and s2 present  ABDOMEN:  Nontender and  Nondistended  EXTREMITIES: No pedal  edema  CNS:  mild sedated      LABS:                                   11.2   4.07  )-----------( 109      ( 15 May 2022 08:41 )             33.5                11.4   5.49  )-----------( 66       ( 14 May 2022 08:27 )             35.2       05-15    146<H>  |  115<H>  |  69<H>  ----------------------------<  145<H>  4.9   |  21<L>  |  3.29<H>    Ca    10.2      15 May 2022 08:41  Phos  5.0       Mg     1.7         TPro  7.4  /  Alb  2.7<L>  /  TBili  0.6  /  DBili  x   /  AST  43<H>  /  ALT  39  /  AlkPhos  95  -15    05-14    142  |  112<H>  |  64<H>  ----------------------------<  132<H>  5.1   |  17<L>  |  3.31<H>    Ca    9.9      14 May 2022 08:27  Phos  5.0     05-  Mg     1.7     -14    TPro  7.5  /  Alb  2.6<L>  /  TBili  0.5  /  DBili  x   /  AST  65<H>  /  ALT  53  /  AlkPhos  111  -      CAPILLARY BLOOD GLUCOSE  POCT Blood Glucose.: 125 mg/dL (13 May 2022 17:01)  POCT Blood Glucose.: 130 mg/dL (13 May 2022 11:58)  POCT Blood Glucose.: 113 mg/dL (13 May 2022 08:17)  POCT Blood Glucose.: 123 mg/dL (12 May 2022 21:33)        Urinalysis Basic - ( 12 May 2022 12:13 )  Color: Yellow / Appearance: Clear / S.010 / pH: x  Gluc: x / Ketone: Negative  / Bili: Negative / Urobili: Negative   Blood: x / Protein: 100 / Nitrite: Negative   Leuk Esterase: Negative / RBC: Negative /HPF / WBC 0-2 /HPF   Sq Epi: x / Non Sq Epi: Occasional /HPF / Bacteria: Negative /HPF        MEDICATIONS  (STANDING):    brimonidine 0.2% Ophthalmic Solution 1 Drop(s) Both EYES daily  cefepime   IVPB 1000 milliGRAM(s) IV Intermittent every 24 hours  chlorhexidine 2% Cloths 1 Application(s) Topical <User Schedule>  clobetasol 0.05% Ointment 1 Application(s) Topical two times a day  ergocalciferol 49210 Unit(s) Oral <User Schedule>  folic acid 1 milliGRAM(s) Oral daily  glucagon  Injectable 1 milliGRAM(s) IntraMuscular once  heparin   Injectable 5000 Unit(s) SubCutaneous every 8 hours  insulin lispro (ADMELOG) corrective regimen sliding scale   SubCutaneous Before meals and at bedtime  labetalol 100 milliGRAM(s) Oral two times a day  LORazepam   Injectable 1 milliGRAM(s) IV Push every 12 hours  magnesium hydroxide Suspension 30 milliLiter(s) Oral two times a day  melatonin 3 milliGRAM(s) Oral at bedtime  metroNIDAZOLE  IVPB      metroNIDAZOLE  IVPB 500 milliGRAM(s) IV Intermittent every 8 hours  multivitamin 1 Tablet(s) Oral daily  mupirocin 2% Ointment 1 Application(s) Both Nostrils two times a day  Nephro-marcella 1 Tablet(s) Oral daily  pantoprazole    Tablet 40 milliGRAM(s) Oral before breakfast  sodium bicarbonate 650 milliGRAM(s) Oral three times a day  sodium chloride 0.45%. 1000 milliLiter(s) (80 mL/Hr) IV Continuous <Continuous>  timolol 0.5% Solution 1 Drop(s) Both EYES two times a day        RADIOLOGY & ADDITIONAL TESTS:    22: Xray Chest 1 View- PORTABLE-Urgent (Xray Chest 1 View- PORTABLE-Urgent .) (22 @ 16:03) >    IMPRESSION: There is a new fairly significant right lung process at this time.      22: US Abdomen Complete (US Abdomen Complete .) (22 @ 08:54) Gallstones in the contracted gallbladder.  Hepatic steatosis.      5/10/22: CT Head No Cont (05.10.22 @ 14:32) :   Moderate periventricular white matter ischemia. Moderate  bitemporal lobe and bifrontal lobe atrophy.     Mucosal thickening in the  RIGHT maxillary and RIGHT ethmoid and frontal and RIGHT frontal sinuses.      < from: Xray Chest 1 View- PORTABLE-Urgent (05.10.22 @ 09:20) >    Presently lungs are clear.        MICROBIOLOGY DATA:    Culture - Blood (22 @ 21:24)   Specimen Source: .Blood Blood-Peripheral   Culture Results: No growth to date.     Culture - Blood (22 @ 21:24)   Specimen Source: .Blood Blood-Peripheral   Culture Results: No growth to date.     MRSA/MSSA PCR (22 @ 05:59)   MRSA PCR Result.: NotDetec:    COVID-19 PCR (05.10.22 @ 09:36)   COVID-19 PCR: NotDetec:

## 2022-05-15 NOTE — PROGRESS NOTE ADULT - ASSESSMENT
and Patient is a 76y old  Male , AAOx3, ambulates independently, leaves alone at home, w/ PMH of HLD, DM, CKD, presents to the ER on 5/10/22 for evaluation of weakness and tremulousness. As per ER attending, patient is being admitted for alcohol withdrawal. Patient admits to drinking 2 glasses of Whisky at night. ON admission, he has no fever and Negative CXR. Today, hospital day 3, he found to have respiratory distress and Repeat  CXR shows a new fairly significant right lung process at this time. He has started on Cefepime and the ID consult requested to assist with further evaluation and antibiotic management.     # Aspiration pneumonia  vs HAP- on CXR from 5/13  # Alcohol withdrawal syndrome     would recommend:    1. Continue Cefepime 1 g q 24 hour and Flagyl  X total of 7 days  2. Aspiration precaution and  Bronchial suctioning  3. Supplemental oxygenation and bronchodilator as needed  4. Continue CIWA protocol as per Primary team    Attending Attestation:    Spent more than 35 minutes on total encounter, more than 50 % of the visit was spent counseling and/or coordinating care by the Attending physician.

## 2022-05-15 NOTE — PROGRESS NOTE ADULT - SUBJECTIVE AND OBJECTIVE BOX
[   ] ICU                                          [   ] CCU                                      [ X  ] Medical Floor    Patient is a 76 year old male with ETOH abuse and anemia. GI consulted to evaluate.     Patient is a 76 year old male AAOx3, ambulates independently, leaves alone at home, with past medical history significant for HLD, DM, CKD, admitted with alcohol withdrowal found to have anemia. Patient admits to drinking 2 glasses of Whisky at night, and last drink was last night. He also c/o intermittent burning epigastric radiating to his chest associated with dyspepsia. Patient denies nausea, vomiting, hematemesis, hematochezia, melena, fever, chills, chest pain, SOB, cough, hematuria, dysuria or diarrhea.       Patient appears comfortable. No new complaints reported, No abdominal pain, N/V, hematemesis, hematochezia, melena, fever, chills, chest pain, SOB, cough or diarrhea reported.      PAIN MANAGEMENT:  Pain Scale:                2-3 /10  Pain Location:  Epigastric abdominal pain      Prior Colonoscopy:  No prior colonoscopy      PAST MEDICAL HISTORY  DM (diabetes mellitus)  HTN (hypertension)   Chronic kidney disease  HLD (hyperlipidemia)  Glaucoma  Gout        PAST SURGICAL HISTORY  No significant past surgical history        Allergies    No Known Allergies    Intolerances  None      HOME MEDICATIONS    MEDICATIONS  (STANDING):  brimonidine 0.2% Ophthalmic Solution 1 Drop(s) Both EYES daily  dextrose 5%. 1000 milliLiter(s) (50 mL/Hr) IV Continuous <Continuous>  dextrose 5%. 1000 milliLiter(s) (100 mL/Hr) IV Continuous <Continuous>  dextrose 50% Injectable 25 Gram(s) IV Push once  dextrose 50% Injectable 12.5 Gram(s) IV Push once  dextrose 50% Injectable 25 Gram(s) IV Push once  doxycycline IVPB      ergocalciferol 48727 Unit(s) Oral <User Schedule>  folic acid 1 milliGRAM(s) Oral daily  furosemide    Tablet 20 milliGRAM(s) Oral daily  glucagon  Injectable 1 milliGRAM(s) IntraMuscular once  heparin   Injectable 5000 Unit(s) SubCutaneous every 8 hours  insulin lispro (ADMELOG) corrective regimen sliding scale   SubCutaneous Before meals and at bedtime  LORazepam   Injectable 2 milliGRAM(s) IV Push every 4 hours  LORazepam   Injectable   IV Push   multivitamin 1 Tablet(s) Oral daily  timolol 0.5% Solution 1 Drop(s) Both EYES two times a day    MEDICATIONS  (PRN):  dextrose Oral Gel 15 Gram(s) Oral once PRN Blood Glucose LESS THAN 70 milliGRAM(s)/deciliter  LORazepam   Injectable 2 milliGRAM(s) IV Push every 2 hours PRN Symptom-triggered: 2 point increase in CIWA -Ar score and a total score of 7 or LESS  LORazepam   Injectable 2 milliGRAM(s) IV Push every 1 hour PRN Symptom-triggered: each CIWA -Ar score 8 or GREATER      SOCIAL HISTORY  Advanced Directives:       [ X ] Full Code       [  ] DNR  Marital Status:         [  ] M      [ X ] S      [  ] D       [  ] W  Children:       [ X ] Yes      [  ] No  Occupation:        [  ] Employed       [ X ] Unemployed       [  ] Retired  Diet:       [ X ] Regular       [  ] PEG feeding          [  ] NG tube feeding  Drug Use:           [ X ] Patient denied          [  ] Yes  Alcohol:           [X  ] No             [  ] Yes (socially)         [  ] Yes (chronic)  Tobacco:           [  ] Yes           [ X ] No      FAMILY HISTORY  [X  ] Heart Disease            [ X ] Diabetes             [ X ] HTN             [  ] Colon Cancer             [  ] Stomach Cancer              [  ] Pancreatic Cancer        VITALS  Vital Signs Last 24 Hrs  T(C): 35.6 (15 May 2022 05:30), Max: 36.5 (14 May 2022 20:32)  T(F): 96 (15 May 2022 05:30), Max: 97.7 (14 May 2022 20:32)  HR: 92 (15 May 2022 05:30) (71 - 106)  BP: 141/95 (15 May 2022 05:30) (133/89 - 159/79)   RR: 18 (15 May 2022 05:30) (18 - 20)  SpO2: 96% (15 May 2022 05:30) (91% - 100%)       MEDICATIONS  (STANDING):  brimonidine 0.2% Ophthalmic Solution 1 Drop(s) Both EYES daily  cefepime   IVPB 1000 milliGRAM(s) IV Intermittent every 24 hours  chlorhexidine 2% Cloths 1 Application(s) Topical <User Schedule>  clobetasol 0.05% Ointment 1 Application(s) Topical two times a day  dextrose 5%. 1000 milliLiter(s) (100 mL/Hr) IV Continuous <Continuous>  dextrose 5%. 1000 milliLiter(s) (50 mL/Hr) IV Continuous <Continuous>  dextrose 50% Injectable 25 Gram(s) IV Push once  dextrose 50% Injectable 12.5 Gram(s) IV Push once  dextrose 50% Injectable 25 Gram(s) IV Push once  ergocalciferol 36925 Unit(s) Oral <User Schedule>  folic acid 1 milliGRAM(s) Oral daily  glucagon  Injectable 1 milliGRAM(s) IntraMuscular once  heparin   Injectable 5000 Unit(s) SubCutaneous every 8 hours  insulin lispro (ADMELOG) corrective regimen sliding scale   SubCutaneous Before meals and at bedtime  labetalol 100 milliGRAM(s) Oral two times a day  LORazepam   Injectable 1 milliGRAM(s) IV Push every 12 hours  magnesium hydroxide Suspension 30 milliLiter(s) Oral two times a day  melatonin 3 milliGRAM(s) Oral at bedtime  metroNIDAZOLE  IVPB      metroNIDAZOLE  IVPB 500 milliGRAM(s) IV Intermittent every 8 hours  multivitamin 1 Tablet(s) Oral daily  mupirocin 2% Ointment 1 Application(s) Both Nostrils two times a day  Nephro-marcella 1 Tablet(s) Oral daily  pantoprazole    Tablet 40 milliGRAM(s) Oral before breakfast  sodium bicarbonate 650 milliGRAM(s) Oral three times a day  sodium chloride 0.45%. 1000 milliLiter(s) (80 mL/Hr) IV Continuous <Continuous>  thiamine 100 milliGRAM(s) Oral daily  timolol 0.5% Solution 1 Drop(s) Both EYES two times a day    MEDICATIONS  (PRN):  dextrose Oral Gel 15 Gram(s) Oral once PRN Blood Glucose LESS THAN 70 milliGRAM(s)/deciliter  LORazepam   Injectable 2 milliGRAM(s) IV Push every 2 hours PRN Symptom-triggered: 2 point increase in CIWA -Ar score and a total score of 7 or LESS  LORazepam   Injectable 2 milliGRAM(s) IV Push every 1 hour PRN Symptom-triggered: each CIWA -Ar score 8 or GREATER                            11.2   4.07  )-----------( 109      ( 15 May 2022 08:41 )             33.5       05-15    146<H>  |  115<H>  |  69<H>  ----------------------------<  145<H>  4.9   |  21<L>  |  3.29<H>    Ca    10.2      15 May 2022 08:41  Phos  5.0     05-14  Mg     1.7     05-14    TPro  7.4  /  Alb  2.7<L>  /  TBili  0.6  /  DBili  x   /  AST  43<H>  /  ALT  39  /  AlkPhos  95  05-15

## 2022-05-15 NOTE — PROGRESS NOTE ADULT - SUBJECTIVE AND OBJECTIVE BOX
Patient is a 76y old  Male who presents with a chief complaint of alcohol withdrawal (14 May 2022 21:42)    pt seen in icu [  ], reg med floor [ x  ], bed [ x ], chair at bedside [   ], a+o x3 [  ], lethargic [x  ],    nad [ x ]      Allergies    No Known Allergies        Vitals    T(F): 96 (05-15-22 @ 05:30), Max: 97.7 (05-14-22 @ 09:04)  HR: 92 (05-15-22 @ 05:30) (71 - 106)  BP: 141/95 (05-15-22 @ 05:30) (129/69 - 159/79)  RR: 18 (05-15-22 @ 05:30) (18 - 20)  SpO2: 96% (05-15-22 @ 05:30) (91% - 100%)  Wt(kg): --  CAPILLARY BLOOD GLUCOSE      POCT Blood Glucose.: 121 mg/dL (14 May 2022 23:31)      Labs                          11.4   5.49  )-----------( 66       ( 14 May 2022 08:27 )             35.2       05-14    142  |  112<H>  |  64<H>  ----------------------------<  132<H>  5.1   |  17<L>  |  3.31<H>    Ca    9.9      14 May 2022 08:27  Phos  5.0     05-14  Mg     1.7     05-14    TPro  7.5  /  Alb  2.6<L>  /  TBili  0.5  /  DBili  x   /  AST  65<H>  /  ALT  53  /  AlkPhos  111  05-14            .Blood Blood-Peripheral  05-13 @ 21:24   No growth to date.  --  --          Radiology Results      Meds    MEDICATIONS  (STANDING):  brimonidine 0.2% Ophthalmic Solution 1 Drop(s) Both EYES daily  cefepime   IVPB 1000 milliGRAM(s) IV Intermittent every 24 hours  chlorhexidine 2% Cloths 1 Application(s) Topical <User Schedule>  clobetasol 0.05% Ointment 1 Application(s) Topical two times a day  dextrose 5%. 1000 milliLiter(s) (50 mL/Hr) IV Continuous <Continuous>  dextrose 5%. 1000 milliLiter(s) (100 mL/Hr) IV Continuous <Continuous>  dextrose 50% Injectable 25 Gram(s) IV Push once  dextrose 50% Injectable 25 Gram(s) IV Push once  dextrose 50% Injectable 12.5 Gram(s) IV Push once  ergocalciferol 30069 Unit(s) Oral <User Schedule>  folic acid 1 milliGRAM(s) Oral daily  glucagon  Injectable 1 milliGRAM(s) IntraMuscular once  heparin   Injectable 5000 Unit(s) SubCutaneous every 8 hours  insulin lispro (ADMELOG) corrective regimen sliding scale   SubCutaneous Before meals and at bedtime  labetalol 100 milliGRAM(s) Oral two times a day  LORazepam   Injectable 1 milliGRAM(s) IV Push every 12 hours  melatonin 3 milliGRAM(s) Oral at bedtime  metroNIDAZOLE  IVPB      metroNIDAZOLE  IVPB 500 milliGRAM(s) IV Intermittent every 8 hours  multivitamin 1 Tablet(s) Oral daily  mupirocin 2% Ointment 1 Application(s) Both Nostrils two times a day  Nephro-marcella 1 Tablet(s) Oral daily  sodium bicarbonate 650 milliGRAM(s) Oral three times a day  sodium chloride 0.45%. 1000 milliLiter(s) (80 mL/Hr) IV Continuous <Continuous>  thiamine 100 milliGRAM(s) Oral daily  timolol 0.5% Solution 1 Drop(s) Both EYES two times a day      MEDICATIONS  (PRN):  dextrose Oral Gel 15 Gram(s) Oral once PRN Blood Glucose LESS THAN 70 milliGRAM(s)/deciliter  LORazepam   Injectable 2 milliGRAM(s) IV Push every 2 hours PRN Symptom-triggered: 2 point increase in CIWA -Ar score and a total score of 7 or LESS  LORazepam   Injectable 2 milliGRAM(s) IV Push every 1 hour PRN Symptom-triggered: each CIWA -Ar score 8 or GREATER      Physical Exam    Neuro :  no focal deficits  Respiratory: right lower lobe crackles  CV: RRR, S1S2, no murmurs,   Abdominal: Soft, NT, ND +BS,  Extremities: No edema, + peripheral pulses, right distal leg erythema and excoriation resolved   skin: diffused scaly rash       ASSESSMENT    etoh withdrawal,   metabolic encephalopathy,   right le cellulitis,   aspiration pna   psoriasis,   acute on ckd,   pancytopenia likely 2nd to alcoholism   r/o parkinsons disease  h/o HLD,   DM,   chronic HF rEF  CKD  Alcohol dependence with withdrawal  Glaucoma  Gout    PLAN    ativan as per [facility] protocol,   multivitamin, folate, thiamine   cxr with There is a new fairly significant right lung process at this time noted above.  id f/u   pt started on maxipime  doxy changed to flagyl  f/u blood cx   hold norvasc 2nd to lower extrem edema,   f/u echo   resume labetalol 100mg daily   check ecg   wound care eval   cont clobetasol 0.05% bid for psoriasis   lesions improving  f/u ammonia lev   f/u stool occult blood  gi f/u   No evidence of acute GI bleeding  Monitor H/H  transfuse PRBC as needed  Protonix 40mg po daily  Avoid NSAID  Check stool for occult blood  f/u EGD and colonoscopy   abd us with Gallstones in the contracted gallbladder. 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  Deterioration in renal function, possible chronic versus acute due to dehydration  d/c'd lasix   serum creat improving  cont IV fluid 1/2 NS rate 80 ml per hour next 24 hours  Agree with Vitamin D start Nephro vitamin. Follow PTH level  Follow 2 gm K diet  Follow daily renal function magnesium and po4.  lispro ss   neuro cons noted   Mild distal symmetric bilateral UE tremulousness in the setting of, and entirely consistent with, EtOH withdrawal, chronic EtOH abuse,    No exam findings diagnostic for an extrapyramidal disorder.  In any case, Dx and consideration of potential management of any (possible suspected) extrapyramidal disorder cannot be made in an acute setting such as this one.    When the Pt has FULLY recovered from his acute medical problems, if there is a desire for neurologic evaluation, then please schedule a routine out-Pt visit.   repeat ct head  cont current meds         Patient is a 76y old  Male who presents with a chief complaint of alcohol withdrawal (14 May 2022 21:42)    pt seen in icu [  ], reg med floor [ x  ], bed [ x ], chair at bedside [   ], a+o x3 [  ], lethargic [x  ],    nad [ x ]      Allergies    No Known Allergies        Vitals    T(F): 96 (05-15-22 @ 05:30), Max: 97.7 (05-14-22 @ 09:04)  HR: 92 (05-15-22 @ 05:30) (71 - 106)  BP: 141/95 (05-15-22 @ 05:30) (129/69 - 159/79)  RR: 18 (05-15-22 @ 05:30) (18 - 20)  SpO2: 96% (05-15-22 @ 05:30) (91% - 100%)  Wt(kg): --  CAPILLARY BLOOD GLUCOSE      POCT Blood Glucose.: 121 mg/dL (14 May 2022 23:31)      Labs                          11.4   5.49  )-----------( 66       ( 14 May 2022 08:27 )             35.2       05-14    142  |  112<H>  |  64<H>  ----------------------------<  132<H>  5.1   |  17<L>  |  3.31<H>    Ca    9.9      14 May 2022 08:27  Phos  5.0     05-14  Mg     1.7     05-14    TPro  7.5  /  Alb  2.6<L>  /  TBili  0.5  /  DBili  x   /  AST  65<H>  /  ALT  53  /  AlkPhos  111  05-14    Ammonia, Serum (05.14.22 @ 10:03)   Ammonia, Serum: 55         .Blood Blood-Peripheral  05-13 @ 21:24   No growth to date.  --  --          Radiology Results    < from: CT Head No Cont (05.14.22 @ 10:40) >  Multiple axial sections were performed from base skull to vertex without   contrast enhancement. Coronal and sagittal reconstructions were performed    This exam is compared prior head CT performed on May 10, 2021    Extensive parenchymal volume loss and chronic microvascular ischemic   changes are again seen.    Calcification is seen involving both distal internal carotid, distal   vertebral and basilar arteries are identified. These findings appear   unchanged    There is no acute hemorrhage mass or mass effect seen    Evaluation of osseous structures with the appropriate window appears  normal    Right posterior ethmoid sinus mucosal thickening is again seen.    Both mastoid and middle ear regions appear clear.    IMPRESSION: Stable exam.    < end of copied text >      Meds    MEDICATIONS  (STANDING):  brimonidine 0.2% Ophthalmic Solution 1 Drop(s) Both EYES daily  cefepime   IVPB 1000 milliGRAM(s) IV Intermittent every 24 hours  chlorhexidine 2% Cloths 1 Application(s) Topical <User Schedule>  clobetasol 0.05% Ointment 1 Application(s) Topical two times a day  dextrose 5%. 1000 milliLiter(s) (50 mL/Hr) IV Continuous <Continuous>  dextrose 5%. 1000 milliLiter(s) (100 mL/Hr) IV Continuous <Continuous>  dextrose 50% Injectable 25 Gram(s) IV Push once  dextrose 50% Injectable 25 Gram(s) IV Push once  dextrose 50% Injectable 12.5 Gram(s) IV Push once  ergocalciferol 79232 Unit(s) Oral <User Schedule>  folic acid 1 milliGRAM(s) Oral daily  glucagon  Injectable 1 milliGRAM(s) IntraMuscular once  heparin   Injectable 5000 Unit(s) SubCutaneous every 8 hours  insulin lispro (ADMELOG) corrective regimen sliding scale   SubCutaneous Before meals and at bedtime  labetalol 100 milliGRAM(s) Oral two times a day  LORazepam   Injectable 1 milliGRAM(s) IV Push every 12 hours  melatonin 3 milliGRAM(s) Oral at bedtime  metroNIDAZOLE  IVPB      metroNIDAZOLE  IVPB 500 milliGRAM(s) IV Intermittent every 8 hours  multivitamin 1 Tablet(s) Oral daily  mupirocin 2% Ointment 1 Application(s) Both Nostrils two times a day  Nephro-marcella 1 Tablet(s) Oral daily  sodium bicarbonate 650 milliGRAM(s) Oral three times a day  sodium chloride 0.45%. 1000 milliLiter(s) (80 mL/Hr) IV Continuous <Continuous>  thiamine 100 milliGRAM(s) Oral daily  timolol 0.5% Solution 1 Drop(s) Both EYES two times a day      MEDICATIONS  (PRN):  dextrose Oral Gel 15 Gram(s) Oral once PRN Blood Glucose LESS THAN 70 milliGRAM(s)/deciliter  LORazepam   Injectable 2 milliGRAM(s) IV Push every 2 hours PRN Symptom-triggered: 2 point increase in CIWA -Ar score and a total score of 7 or LESS  LORazepam   Injectable 2 milliGRAM(s) IV Push every 1 hour PRN Symptom-triggered: each CIWA -Ar score 8 or GREATER      Physical Exam    Neuro :  no focal deficits  Respiratory: right lower lobe crackles  CV: RRR, S1S2, no murmurs,   Abdominal: Soft, NT, ND +BS,  Extremities: No edema, + peripheral pulses, right distal leg erythema and excoriation resolved   skin: diffused scaly rash       ASSESSMENT    etoh withdrawal,   metabolic encephalopathy,   right le cellulitis,   aspiration pna   psoriasis,   acute on ckd,   pancytopenia likely 2nd to alcoholism   r/o parkinsons disease  h/o HLD,   DM,   chronic HF rEF  CKD  Alcohol dependence with withdrawal  Glaucoma  Gout    PLAN    ativan as per [facility] protocol,   multivitamin, folate, thiamine   cxr with There is a new fairly significant right lung process at this time noted above.  id f/u   pt started on maxipime  cont flagyl  blood cx neg noted above   hold norvasc 2nd to lower extrem edema,   f/u echo   cont labetalol 100mg daily   check ecg   wound care eval   cont clobetasol 0.05% bid for psoriasis   lesions improving  ammonia lev elevated noted above   lactulose 10 g x1  f/u stool occult blood  gi f/u   No evidence of acute GI bleeding  Monitor H/H  transfuse PRBC as needed  Protonix 40mg po daily  Avoid NSAID  Check stool for occult blood  f/u EGD and colonoscopy   abd us with Gallstones in the contracted gallbladder. 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  Deterioration in renal function, possible chronic versus acute due to dehydration  d/c'd lasix   serum creat improving  Continue IV fluids  Sodium bicarbonate PO  2 gm K diet  lispro ss   neuro cons noted   Mild distal symmetric bilateral UE tremulousness in the setting of, and entirely consistent with, EtOH withdrawal, chronic EtOH abuse,    No exam findings diagnostic for an extrapyramidal disorder.  In any case, Dx and consideration of potential management of any (possible suspected) extrapyramidal disorder cannot be made in an acute setting such as this one.    When the Pt has FULLY recovered from his acute medical problems, if there is a desire for neurologic evaluation, then please schedule a routine out-Pt visit.   repeat ct head stable noted above  cont current meds

## 2022-05-16 DIAGNOSIS — I50.22 CHRONIC SYSTOLIC (CONGESTIVE) HEART FAILURE: ICD-10-CM

## 2022-05-16 DIAGNOSIS — E87.0 HYPEROSMOLALITY AND HYPERNATREMIA: ICD-10-CM

## 2022-05-16 DIAGNOSIS — N18.4 CHRONIC KIDNEY DISEASE, STAGE 4 (SEVERE): ICD-10-CM

## 2022-05-16 LAB
ALBUMIN SERPL ELPH-MCNC: 2.5 G/DL — LOW (ref 3.5–5)
ALP SERPL-CCNC: 84 U/L — SIGNIFICANT CHANGE UP (ref 40–120)
ALT FLD-CCNC: 31 U/L DA — SIGNIFICANT CHANGE UP (ref 10–60)
AMMONIA BLD-MCNC: <10 UMOL/L — LOW (ref 11–32)
ANION GAP SERPL CALC-SCNC: 9 MMOL/L — SIGNIFICANT CHANGE UP (ref 5–17)
AST SERPL-CCNC: 33 U/L — SIGNIFICANT CHANGE UP (ref 10–40)
BILIRUB SERPL-MCNC: 0.5 MG/DL — SIGNIFICANT CHANGE UP (ref 0.2–1.2)
BUN SERPL-MCNC: 74 MG/DL — HIGH (ref 7–18)
CALCIUM SERPL-MCNC: 9.7 MG/DL — SIGNIFICANT CHANGE UP (ref 8.4–10.5)
CHLORIDE SERPL-SCNC: 119 MMOL/L — HIGH (ref 96–108)
CO2 SERPL-SCNC: 23 MMOL/L — SIGNIFICANT CHANGE UP (ref 22–31)
CREAT SERPL-MCNC: 3.17 MG/DL — HIGH (ref 0.5–1.3)
EGFR: 20 ML/MIN/1.73M2 — LOW
GLUCOSE BLDC GLUCOMTR-MCNC: 130 MG/DL — HIGH (ref 70–99)
GLUCOSE BLDC GLUCOMTR-MCNC: 149 MG/DL — HIGH (ref 70–99)
GLUCOSE BLDC GLUCOMTR-MCNC: 154 MG/DL — HIGH (ref 70–99)
GLUCOSE BLDC GLUCOMTR-MCNC: 330 MG/DL — HIGH (ref 70–99)
GLUCOSE SERPL-MCNC: 149 MG/DL — HIGH (ref 70–99)
HCT VFR BLD CALC: 30.2 % — LOW (ref 39–50)
HGB BLD-MCNC: 10.2 G/DL — LOW (ref 13–17)
MCHC RBC-ENTMCNC: 33.8 GM/DL — SIGNIFICANT CHANGE UP (ref 32–36)
MCHC RBC-ENTMCNC: 34 PG — SIGNIFICANT CHANGE UP (ref 27–34)
MCV RBC AUTO: 100.7 FL — HIGH (ref 80–100)
NRBC # BLD: 0 /100 WBCS — SIGNIFICANT CHANGE UP (ref 0–0)
OB PNL STL: NEGATIVE — SIGNIFICANT CHANGE UP
PLATELET # BLD AUTO: 110 K/UL — LOW (ref 150–400)
POTASSIUM SERPL-MCNC: 4.5 MMOL/L — SIGNIFICANT CHANGE UP (ref 3.5–5.3)
POTASSIUM SERPL-SCNC: 4.5 MMOL/L — SIGNIFICANT CHANGE UP (ref 3.5–5.3)
PROT SERPL-MCNC: 7.2 G/DL — SIGNIFICANT CHANGE UP (ref 6–8.3)
RBC # BLD: 3 M/UL — LOW (ref 4.2–5.8)
RBC # FLD: 15 % — HIGH (ref 10.3–14.5)
SARS-COV-2 RNA SPEC QL NAA+PROBE: SIGNIFICANT CHANGE UP
SODIUM SERPL-SCNC: 151 MMOL/L — HIGH (ref 135–145)
WBC # BLD: 3.47 K/UL — LOW (ref 3.8–10.5)
WBC # FLD AUTO: 3.47 K/UL — LOW (ref 3.8–10.5)

## 2022-05-16 RX ORDER — ASPIRIN/CALCIUM CARB/MAGNESIUM 324 MG
81 TABLET ORAL DAILY
Refills: 0 | Status: DISCONTINUED | OUTPATIENT
Start: 2022-05-16 | End: 2022-05-24

## 2022-05-16 RX ORDER — HEPARIN SODIUM 5000 [USP'U]/ML
5000 INJECTION INTRAVENOUS; SUBCUTANEOUS EVERY 12 HOURS
Refills: 0 | Status: DISCONTINUED | OUTPATIENT
Start: 2022-05-16 | End: 2022-05-24

## 2022-05-16 RX ORDER — ISOSORBIDE MONONITRATE 60 MG/1
30 TABLET, EXTENDED RELEASE ORAL DAILY
Refills: 0 | Status: DISCONTINUED | OUTPATIENT
Start: 2022-05-16 | End: 2022-05-24

## 2022-05-16 RX ORDER — SOD SULF/SODIUM/NAHCO3/KCL/PEG
2000 SOLUTION, RECONSTITUTED, ORAL ORAL ONCE
Refills: 0 | Status: COMPLETED | OUTPATIENT
Start: 2022-05-16 | End: 2022-05-16

## 2022-05-16 RX ORDER — SODIUM CHLORIDE 9 MG/ML
1000 INJECTION, SOLUTION INTRAVENOUS
Refills: 0 | Status: DISCONTINUED | OUTPATIENT
Start: 2022-05-16 | End: 2022-05-18

## 2022-05-16 RX ORDER — HYDRALAZINE HCL 50 MG
25 TABLET ORAL EVERY 8 HOURS
Refills: 0 | Status: DISCONTINUED | OUTPATIENT
Start: 2022-05-16 | End: 2022-05-24

## 2022-05-16 RX ORDER — CARVEDILOL PHOSPHATE 80 MG/1
3.12 CAPSULE, EXTENDED RELEASE ORAL EVERY 12 HOURS
Refills: 0 | Status: DISCONTINUED | OUTPATIENT
Start: 2022-05-16 | End: 2022-05-21

## 2022-05-16 RX ADMIN — Medication 4: at 16:36

## 2022-05-16 RX ADMIN — Medication 1 APPLICATION(S): at 16:29

## 2022-05-16 RX ADMIN — Medication 1 ENEMA: at 11:32

## 2022-05-16 RX ADMIN — CEFEPIME 100 MILLIGRAM(S): 1 INJECTION, POWDER, FOR SOLUTION INTRAMUSCULAR; INTRAVENOUS at 06:22

## 2022-05-16 RX ADMIN — Medication 25 MILLIGRAM(S): at 21:56

## 2022-05-16 RX ADMIN — Medication 100 MILLIGRAM(S): at 06:23

## 2022-05-16 RX ADMIN — PANTOPRAZOLE SODIUM 40 MILLIGRAM(S): 20 TABLET, DELAYED RELEASE ORAL at 06:24

## 2022-05-16 RX ADMIN — Medication 1 MILLIGRAM(S): at 16:28

## 2022-05-16 RX ADMIN — CARVEDILOL PHOSPHATE 3.12 MILLIGRAM(S): 80 CAPSULE, EXTENDED RELEASE ORAL at 16:26

## 2022-05-16 RX ADMIN — Medication 2000 MILLILITER(S): at 18:35

## 2022-05-16 RX ADMIN — ISOSORBIDE MONONITRATE 30 MILLIGRAM(S): 60 TABLET, EXTENDED RELEASE ORAL at 16:26

## 2022-05-16 RX ADMIN — MUPIROCIN 1 APPLICATION(S): 20 OINTMENT TOPICAL at 16:26

## 2022-05-16 RX ADMIN — Medication 2 MILLIGRAM(S): at 23:50

## 2022-05-16 RX ADMIN — MUPIROCIN 1 APPLICATION(S): 20 OINTMENT TOPICAL at 06:21

## 2022-05-16 RX ADMIN — CHLORHEXIDINE GLUCONATE 1 APPLICATION(S): 213 SOLUTION TOPICAL at 06:27

## 2022-05-16 RX ADMIN — Medication 1 DROP(S): at 06:20

## 2022-05-16 RX ADMIN — Medication 100 MILLIGRAM(S): at 21:55

## 2022-05-16 RX ADMIN — Medication 1 DROP(S): at 16:26

## 2022-05-16 RX ADMIN — Medication 650 MILLIGRAM(S): at 06:27

## 2022-05-16 RX ADMIN — Medication 3 MILLIGRAM(S): at 21:57

## 2022-05-16 RX ADMIN — Medication 1 TABLET(S): at 16:28

## 2022-05-16 RX ADMIN — Medication 1 APPLICATION(S): at 06:21

## 2022-05-16 RX ADMIN — Medication 81 MILLIGRAM(S): at 16:25

## 2022-05-16 RX ADMIN — Medication 1 MILLIGRAM(S): at 07:03

## 2022-05-16 RX ADMIN — Medication 100 MILLIGRAM(S): at 13:00

## 2022-05-16 RX ADMIN — Medication 100 MILLIGRAM(S): at 06:24

## 2022-05-16 RX ADMIN — BRIMONIDINE TARTRATE 1 DROP(S): 2 SOLUTION/ DROPS OPHTHALMIC at 11:33

## 2022-05-16 RX ADMIN — Medication 2 MILLIGRAM(S): at 13:00

## 2022-05-16 RX ADMIN — SODIUM CHLORIDE 60 MILLILITER(S): 9 INJECTION, SOLUTION INTRAVENOUS at 11:33

## 2022-05-16 RX ADMIN — HEPARIN SODIUM 5000 UNIT(S): 5000 INJECTION INTRAVENOUS; SUBCUTANEOUS at 16:29

## 2022-05-16 NOTE — PROGRESS NOTE ADULT - PROBLEM SELECTOR PLAN 2
Pt is A&O x2-3, reports feeling fine at time of eval  possibly due to ativan vs alcohol withdrawal vs infection  CTH report noted, no acute hemorrhage  s/p lactulose  ammonia serum downtrending

## 2022-05-16 NOTE — DIETITIAN INITIAL EVALUATION ADULT - OTHER INFO
Pt visited. Pt is Confused. Pt is on Enhanced supervision.  D/W PCA Pt is fed by staff. Pt is on clear Liquid  Diet. Intake Good.  Last admission in 06/2021. S/P SLP je 5/13- Soft and Bite Sized w Thin liquids. At Present on clear Liquid Diet. Tolerated. Plan is For Colonoscopy .

## 2022-05-16 NOTE — PROGRESS NOTE ADULT - PROBLEM SELECTOR PLAN 7
h/o CKD, on lasix  ddx: CKD progression vs NELLY on CKD  baseline Cr 2.9 in 06/2021  Cr slowly improving 3.17  Received on dose of Lasix for presumed effusion  nephrology Dr. Art following

## 2022-05-16 NOTE — PROGRESS NOTE ADULT - ASSESSMENT
DKD with deterioration in renal function in the last year now stage 4  Hypernatremia.    Reduced po intake  ETOH intoxication in withdrawal.  HF r EF 45%    Right lung side pneumonia  placed on Cefepime , follow with ID     PLAN:  Continue IV fluids D5W follow lab in am  Sodium bicarbonate PO  2 gm K diet

## 2022-05-16 NOTE — PROGRESS NOTE ADULT - ASSESSMENT
Patient is a 76y old  Male , AAOx3, ambulates independently, leaves alone at home, w/ PMH of HLD, DM, CKD, presents to the ER on 5/10/22 for evaluation of weakness and tremulousness. As per ER attending, patient is being admitted for alcohol withdrawal. Patient admits to drinking 2 glasses of Whisky at night. ON admission, he has no fever and Negative CXR. Today, hospital day 3, he found to have respiratory distress and Repeat  CXR shows a new fairly significant right lung process at this time. He has started on Cefepime and the ID consult requested to assist with further evaluation and antibiotic management.     # Aspiration pneumonia  vs HAP- on CXR from 5/13  # Alcohol withdrawal syndrome     would recommend:    1. Wean off oxygen as tolerated   2. Continue Cefepime 1 g q 24 hour and Flagyl  until 5/20/22  3. Aspiration precaution  4. Continue CIWA protocol as per Primary team    Attending Attestation:    Spent more than 35 minutes on total encounter, more than 50 % of the visit was spent counseling and/or coordinating care by the Attending physician.

## 2022-05-16 NOTE — DIETITIAN INITIAL EVALUATION ADULT - PERTINENT MEDS FT
MEDICATIONS  (STANDING):  aspirin  chewable 81 milliGRAM(s) Oral daily  brimonidine 0.2% Ophthalmic Solution 1 Drop(s) Both EYES daily  carvedilol 3.125 milliGRAM(s) Oral every 12 hours  cefepime   IVPB 1000 milliGRAM(s) IV Intermittent every 24 hours  chlorhexidine 2% Cloths 1 Application(s) Topical <User Schedule>  clobetasol 0.05% Ointment 1 Application(s) Topical two times a day  dextrose 5%. 1000 milliLiter(s) (60 mL/Hr) IV Continuous <Continuous>  dextrose 5%. 1000 milliLiter(s) (50 mL/Hr) IV Continuous <Continuous>  dextrose 5%. 1000 milliLiter(s) (100 mL/Hr) IV Continuous <Continuous>  dextrose 50% Injectable 25 Gram(s) IV Push once  dextrose 50% Injectable 12.5 Gram(s) IV Push once  dextrose 50% Injectable 25 Gram(s) IV Push once  ergocalciferol 36903 Unit(s) Oral <User Schedule>  folic acid 1 milliGRAM(s) Oral daily  glucagon  Injectable 1 milliGRAM(s) IntraMuscular once  heparin   Injectable 5000 Unit(s) SubCutaneous every 12 hours  hydrALAZINE 25 milliGRAM(s) Oral every 8 hours  insulin lispro (ADMELOG) corrective regimen sliding scale   SubCutaneous Before meals and at bedtime  isosorbide   mononitrate ER Tablet (IMDUR) 30 milliGRAM(s) Oral daily  melatonin 3 milliGRAM(s) Oral at bedtime  metroNIDAZOLE  IVPB      metroNIDAZOLE  IVPB 500 milliGRAM(s) IV Intermittent every 8 hours  mupirocin 2% Ointment 1 Application(s) Both Nostrils two times a day  Nephro-marcella 1 Tablet(s) Oral daily  pantoprazole    Tablet 40 milliGRAM(s) Oral before breakfast  polyethylene glycol/electrolyte Solution. 2000 milliLiter(s) Oral once  timolol 0.5% Solution 1 Drop(s) Both EYES two times a day    MEDICATIONS  (PRN):  dextrose Oral Gel 15 Gram(s) Oral once PRN Blood Glucose LESS THAN 70 milliGRAM(s)/deciliter  LORazepam   Injectable 2 milliGRAM(s) IV Push every 2 hours PRN Symptom-triggered: 2 point increase in CIWA -Ar score and a total score of 7 or LESS  LORazepam   Injectable 2 milliGRAM(s) IV Push every 1 hour PRN Symptom-triggered: each CIWA -Ar score 8 or GREATER

## 2022-05-16 NOTE — PROGRESS NOTE ADULT - SUBJECTIVE AND OBJECTIVE BOX
Patient is a 76y old  Male who presents with a chief complaint of alcohol withdrawal (15 May 2022 20:26)      pt seen in icu [  ], reg med floor [ x  ], bed [ x ], chair at bedside [   ], a+o x3 [  ], lethargic [x  ],    nad [ x ]      Allergies    No Known Allergies        Vitals    T(F): 97.4 (05-16-22 @ 04:59), Max: 98 (05-15-22 @ 21:09)  HR: 74 (05-16-22 @ 04:59) (74 - 96)  BP: 142/90 (05-16-22 @ 04:59) (129/80 - 155/86)  RR: 18 (05-16-22 @ 04:59) (17 - 18)  SpO2: 98% (05-16-22 @ 04:59) (96% - 98%)  Wt(kg): --  CAPILLARY BLOOD GLUCOSE      POCT Blood Glucose.: 149 mg/dL (16 May 2022 07:43)      Labs                          10.2   3.47  )-----------( 110      ( 16 May 2022 07:45 )             30.2       05-16    151<H>  |  119<H>  |  74<H>  ----------------------------<  149<H>  4.5   |  23  |  3.17<H>    Ca    9.7      16 May 2022 07:45    TPro  7.2  /  Alb  2.5<L>  /  TBili  0.5  /  DBili  x   /  AST  33  /  ALT  31  /  AlkPhos  84  05-16    Ammonia, Serum (05.16.22 @ 07:45)   Ammonia, Serum: <10   Occult Blood, Feces (05.16.22 @ 03:38)   Occult Blood, Feces: Negative       .Blood Blood-Peripheral  05-13 @ 21:24   No growth to date.  --  --    < from: Transthoracic Echocardiogram (05.15.22 @ 16:26) >  CONCLUSIONS:  1. Normal mitral valve. Mild mitral regurgitation.  2. Calcified, probably trileaflet aortic valve with normal  opening. No aortic stenosis. Mild aortic regurgitation (PHT  665 msec).  3. Normal left ventricular internal dimensions and wall  thicknesses.  4. Endocardium not well visualized; grossly severely  reduced left ventricular systolic function (EF 20-25% by  visual estimation).  5. Grade I diastolic dysfunction (Impaired relaxation,  mild).  6. Right ventricle not well visualized. Normal RV systolic  function (TAPSE 1.8 cm).  7. RV systolic pressure is mildly increased at  37 mm Hg.  8. Normal tricuspid valve. Mild tricuspid regurgitation.  9. Pulmonic valve not well seen. Trace pulmonic  insufficiency is noted.  10. No pericardial effusion.    < end of copied text >    < from: 12 Lead ECG (05.14.22 @ 10:17) >  Diagnosis Line Sinus rhythm with Premature atrial complexes  Left axis deviation  Left bundle branch block  Abnormal ECG    < end of copied text >      Radiology Results      Meds    MEDICATIONS  (STANDING):  brimonidine 0.2% Ophthalmic Solution 1 Drop(s) Both EYES daily  cefepime   IVPB 1000 milliGRAM(s) IV Intermittent every 24 hours  chlorhexidine 2% Cloths 1 Application(s) Topical <User Schedule>  clobetasol 0.05% Ointment 1 Application(s) Topical two times a day  dextrose 5%. 1000 milliLiter(s) (50 mL/Hr) IV Continuous <Continuous>  dextrose 5%. 1000 milliLiter(s) (100 mL/Hr) IV Continuous <Continuous>  dextrose 50% Injectable 25 Gram(s) IV Push once  dextrose 50% Injectable 12.5 Gram(s) IV Push once  dextrose 50% Injectable 25 Gram(s) IV Push once  ergocalciferol 73765 Unit(s) Oral <User Schedule>  folic acid 1 milliGRAM(s) Oral daily  glucagon  Injectable 1 milliGRAM(s) IntraMuscular once  heparin   Injectable 5000 Unit(s) SubCutaneous every 8 hours  insulin lispro (ADMELOG) corrective regimen sliding scale   SubCutaneous Before meals and at bedtime  labetalol 100 milliGRAM(s) Oral two times a day  magnesium hydroxide Suspension 30 milliLiter(s) Oral two times a day  melatonin 3 milliGRAM(s) Oral at bedtime  metroNIDAZOLE  IVPB 500 milliGRAM(s) IV Intermittent every 8 hours  metroNIDAZOLE  IVPB      multivitamin 1 Tablet(s) Oral daily  mupirocin 2% Ointment 1 Application(s) Both Nostrils two times a day  Nephro-marcella 1 Tablet(s) Oral daily  pantoprazole    Tablet 40 milliGRAM(s) Oral before breakfast  saline laxative (FLEET) Rectal Enema 1 Enema Rectal once  saline laxative (FLEET) Rectal Enema 1 Enema Rectal once  sodium bicarbonate 650 milliGRAM(s) Oral three times a day  sodium chloride 0.45%. 1000 milliLiter(s) (80 mL/Hr) IV Continuous <Continuous>  timolol 0.5% Solution 1 Drop(s) Both EYES two times a day      MEDICATIONS  (PRN):  dextrose Oral Gel 15 Gram(s) Oral once PRN Blood Glucose LESS THAN 70 milliGRAM(s)/deciliter  LORazepam   Injectable 2 milliGRAM(s) IV Push every 2 hours PRN Symptom-triggered: 2 point increase in CIWA -Ar score and a total score of 7 or LESS  LORazepam   Injectable 2 milliGRAM(s) IV Push every 1 hour PRN Symptom-triggered: each CIWA -Ar score 8 or GREATER      Physical Exam    Neuro :  no focal deficits  Respiratory: right lower lobe crackles  CV: RRR, S1S2, no murmurs,   Abdominal: Soft, NT, ND +BS,  Extremities: No edema, + peripheral pulses, right distal leg erythema and excoriation resolved   skin: diffused scaly rash       ASSESSMENT    etoh withdrawal,   metabolic encephalopathy,   right le cellulitis,   aspiration pna   psoriasis,   acute on ckd,   pancytopenia likely 2nd to alcoholism   r/o parkinsons disease  h/o HLD,   DM,   chronic HF rEF  CKD  Alcohol dependence with withdrawal  Glaucoma  Gout      PLAN    ativan as per [facility] protocol,   multivitamin, folate, thiamine   cxr with There is a new fairly significant right lung process at this time noted above.  id f/u   cont maxipime and flagyl  blood cx neg noted above   hold norvasc 2nd to lower extrem edema,   echo with EF 20-25%, Grade I diastolic dysfunction  RV systolic pressure is mildly increased noted above  cont labetalol 100mg daily   ecg with Diagnosis Line Sinus rhythm @ 88 bpm with Premature atrial complexes, Left axis deviation, Left bundle branch block, Abnormal ECG noted above  cardio cons   wound care eval   cont clobetasol 0.05% bid for psoriasis   lesions near resolution   s/p lactulose 10 g x1   rept ammonia <10  stool occult blood neg noted above   lft wnl   gi f/u   No evidence of acute GI bleeding  Monitor H/H  transfuse PRBC as needed  Protonix 40mg po daily  Avoid NSAID  Check stool for occult blood  f/u EGD and colonoscopy   abd us with Gallstones in the contracted gallbladder. 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  Deterioration in renal function, possible chronic versus acute due to dehydration  d/c'd lasix   serum creat improving  change fluid to d5w @60 ml/hr  d/c Sodium bicarbonate PO  2 gm K diet  lispro ss   neuro cons noted   Mild distal symmetric bilateral UE tremulousness in the setting of, and entirely consistent with, EtOH withdrawal, chronic EtOH abuse,    No exam findings diagnostic for an extrapyramidal disorder.  In any case, Dx and consideration of potential management of any (possible suspected) extrapyramidal disorder cannot be made in an acute setting such as this one.    When the Pt has FULLY recovered from his acute medical problems, if there is a desire for neurologic evaluation, then please schedule a routine out-Pt visit.   repeat ct head stable noted    cont current meds

## 2022-05-16 NOTE — DIETITIAN INITIAL EVALUATION ADULT - PERTINENT LABORATORY DATA
05-16    151<H>  |  119<H>  |  74<H>  ----------------------------<  149<H>  4.5   |  23  |  3.17<H>    Ca    9.7      16 May 2022 07:45    TPro  7.2  /  Alb  2.5<L>  /  TBili  0.5  /  DBili  x   /  AST  33  /  ALT  31  /  AlkPhos  84  05-16  POCT Blood Glucose.: 130 mg/dL (05-16-22 @ 11:32)  A1C with Estimated Average Glucose Result: 6.0 % (05-11-22 @ 10:10)  A1C with Estimated Average Glucose Result: 5.3 % (06-03-21 @ 10:32)

## 2022-05-16 NOTE — PROGRESS NOTE ADULT - PROBLEM SELECTOR PLAN 4
echo noted 20-25%  d/c labetalol, add asa coreg, imdur, hydralazine.  cardiology Dr. Franz following

## 2022-05-16 NOTE — PROGRESS NOTE ADULT - PROBLEM SELECTOR PLAN 1
p/w generalized weakness and tremulousness  reports 2 glasses of Whisky, but likely more according to son  CTH- periventricular white matter ischemia, bitemporal and bifrontal atrophy  more alert and awake, follows commands  c/w CIWA protocol, thiamine, FA, MV  monitor CIWA

## 2022-05-16 NOTE — PROGRESS NOTE ADULT - SUBJECTIVE AND OBJECTIVE BOX
Patient is a 76y old  Male who presents with a chief complaint of Alcohol dependence with withdrawal     (16 May 2022 14:50)    OVERNIGHT EVENTS: no acute changes    REVIEW OF SYSTEMS:  used #[id number] Porfirio  unable to assess due to mental status    T(C): 36.3 (05-16-22 @ 11:59), Max: 36.7 (05-15-22 @ 21:09)  HR: 63 (05-16-22 @ 16:21) (63 - 93)  BP: 142/94 (05-16-22 @ 16:21) (129/80 - 155/86)  RR: 14 (05-16-22 @ 16:21) (14 - 18)  SpO2: 96% (05-16-22 @ 16:21) (95% - 98%)  Wt(kg): --Vital Signs Last 24 Hrs  T(C): 36.3 (16 May 2022 11:59), Max: 36.7 (15 May 2022 21:09)  T(F): 97.4 (16 May 2022 11:59), Max: 98 (15 May 2022 21:09)  HR: 63 (16 May 2022 16:21) (63 - 93)  BP: 142/94 (16 May 2022 16:21) (129/80 - 155/86)  BP(mean): --  RR: 14 (16 May 2022 16:21) (14 - 18)  SpO2: 96% (16 May 2022 16:21) (95% - 98%)    MEDICATIONS  (STANDING):  aspirin  chewable 81 milliGRAM(s) Oral daily  brimonidine 0.2% Ophthalmic Solution 1 Drop(s) Both EYES daily  carvedilol 3.125 milliGRAM(s) Oral every 12 hours  cefepime   IVPB 1000 milliGRAM(s) IV Intermittent every 24 hours  chlorhexidine 2% Cloths 1 Application(s) Topical <User Schedule>  clobetasol 0.05% Ointment 1 Application(s) Topical two times a day  dextrose 5%. 1000 milliLiter(s) (60 mL/Hr) IV Continuous <Continuous>  dextrose 5%. 1000 milliLiter(s) (50 mL/Hr) IV Continuous <Continuous>  dextrose 5%. 1000 milliLiter(s) (100 mL/Hr) IV Continuous <Continuous>  dextrose 50% Injectable 25 Gram(s) IV Push once  dextrose 50% Injectable 12.5 Gram(s) IV Push once  dextrose 50% Injectable 25 Gram(s) IV Push once  ergocalciferol 64600 Unit(s) Oral <User Schedule>  folic acid 1 milliGRAM(s) Oral daily  glucagon  Injectable 1 milliGRAM(s) IntraMuscular once  heparin   Injectable 5000 Unit(s) SubCutaneous every 12 hours  hydrALAZINE 25 milliGRAM(s) Oral every 8 hours  insulin lispro (ADMELOG) corrective regimen sliding scale   SubCutaneous Before meals and at bedtime  isosorbide   mononitrate ER Tablet (IMDUR) 30 milliGRAM(s) Oral daily  melatonin 3 milliGRAM(s) Oral at bedtime  metroNIDAZOLE  IVPB      metroNIDAZOLE  IVPB 500 milliGRAM(s) IV Intermittent every 8 hours  mupirocin 2% Ointment 1 Application(s) Both Nostrils two times a day  Nephro-marcella 1 Tablet(s) Oral daily  pantoprazole    Tablet 40 milliGRAM(s) Oral before breakfast  polyethylene glycol/electrolyte Solution. 2000 milliLiter(s) Oral once  timolol 0.5% Solution 1 Drop(s) Both EYES two times a day    MEDICATIONS  (PRN):  dextrose Oral Gel 15 Gram(s) Oral once PRN Blood Glucose LESS THAN 70 milliGRAM(s)/deciliter  LORazepam   Injectable 2 milliGRAM(s) IV Push every 2 hours PRN Symptom-triggered: 2 point increase in CIWA -Ar score and a total score of 7 or LESS  LORazepam   Injectable 2 milliGRAM(s) IV Push every 1 hour PRN Symptom-triggered: each CIWA -Ar score 8 or GREATER      PHYSICAL EXAM:  GENERAL: lethargic, confused  EYES: clear conjunctiva;  ENMT: Moist mucous membranes  NECK: Supple, No JVD, Normal thyroid  CHEST/LUNG: Clear to auscultation bilaterally; No rales, rhonchi, wheezing, or rubs  HEART: S1, S2, Regular rate and rhythm  ABDOMEN: Soft, Nontender, Nondistended; Bowel sounds present  NEURO: Alert & Oriented X1 (name only)  EXTREMITIES: No LE edema, no calf tenderness  LYMPH: No lymphadenopathy noted  SKIN: No rashes or lesions    Consultant(s) Notes Reviewed:  [x ] YES  [ ] NO  Care Discussed with Consultants/Other Providers [ x] YES  [ ] NO    LABS:                        10.2   3.47  )-----------( 110      ( 16 May 2022 07:45 )             30.2     05-16    151<H>  |  119<H>  |  74<H>  ----------------------------<  149<H>  4.5   |  23  |  3.17<H>    Ca    9.7      16 May 2022 07:45    TPro  7.2  /  Alb  2.5<L>  /  TBili  0.5  /  DBili  x   /  AST  33  /  ALT  31  /  AlkPhos  84  05-16      CAPILLARY BLOOD GLUCOSE      POCT Blood Glucose.: 330 mg/dL (16 May 2022 16:33)  POCT Blood Glucose.: 130 mg/dL (16 May 2022 11:32)  POCT Blood Glucose.: 149 mg/dL (16 May 2022 07:43)  POCT Blood Glucose.: 186 mg/dL (15 May 2022 21:37)            RADIOLOGY & ADDITIONAL TESTS:  < from: Xray Chest 1 View- PORTABLE-Urgent (05.10.22 @ 09:20) >    ACC: [id number] EXAM:  XR CHEST PORTABLE URGENT 1V                          PROCEDURE DATE:  05/10/2022          INTERPRETATION:  AP erect chest on May 10, 2022 at 9:11 AM. Patient has   chest pain.    Heart magnified by technique.    On June 8, 2021there were mild basilar effusions with adjacent   atelectases.    Presently lungs are clear.    IMPRESSION: Clear lungs at this time.    --- End of Report ---              < end of copied text >  < from: CT Head No Cont (05.10.22 @ 14:32) >    ACC: [id number] EXAM:  CT BRAIN                          PROCEDURE DATE:  05/10/2022          INTERPRETATION:  CT head without IV contrast    CLINICAL INFORMATION: tremors, alcohol dependence    TECHNIQUE: Contiguous axial 5 mm sections were obtainedthrough the head.   Sagittal and coronal 2-D reformatted images were also obtained.   This   scan was performed using automatic exposure control (radiation dose   reduction software) to obtain a diagnostic image quality scan with   patient dose as low as reasonably achievable.    FINDINGS:   CT dated 04/20/2016 available for review.    The brain demonstrates moderate periventricular white matter ischemia.     No acute cerebral cortical infarct is seen.  No intracranial hemorrhage   is found.  No mass effect is found in the brain.    The ventricles, sulci and basal cisterns show moderate bitemporal lobe   and bifrontal lobe atrophy.    The orbits are unremarkable.  The paranasal sinuses are significant for   mucosal thickening in the RIGHT maxillary and RIGHT ethmoid and frontal   and RIGHT frontal sinuses.  The nasal cavity appears intact.  The   nasopharynx is symmetric.  The central skull base, petrous temporal bones   and calvarium remain intact.      IMPRESSION:   Moderate periventricular white matter ischemia. Moderate   bitemporal lobe and bifrontal lobe atrophy.   Mucosal thickening in the   RIGHT maxillary and RIGHT ethmoid and frontal and RIGHT frontal sinuses.    --- End of Report ---            KAMILLE CAMPBELL MD; Attending Radiologist  This document has been electronically signed. May 10 2022  2:42PM    < end of copied text >  < from: US Abdomen Complete (US Abdomen Complete .) (05.13.22 @ 08:54) >    ACC: [id number] EXAM:  US ABDOMEN COMPLETE                          PROCEDURE DATE:  05/13/2022          INTERPRETATION:  CLINICAL INFORMATION: Elevated liver enzymes and renal   failure    COMPARISON: None available.    TECHNIQUE: Sonography of the abdomen.    FINDINGS:  Liver: Hepatic steatosis.  Bile ducts: Normal caliber. Common bile duct measures 6 mm.  Gallbladder: Gallstones in the contracted gallbladder.  Pancreas: Not visualized due to overlying bowel gas.  Spleen: Not visualized due to overlying bowel gas.  Right kidney: 9.5 cm. No hydronephrosis. Within normal limits.  Left kidney: 10.4 cm. No hydronephrosis. Within normal limits.  Ascites: None.  Aorta and IVC: Not visualized due to overlying bowel gas.    IMPRESSION:  Gallstones in the contracted gallbladder.    Hepatic steatosis.    --- End of Report ---            YASMIN BLISS MD; Attending Radiologist  This document has been electronically signed. May 13 2022  9:07AM    < end of copied text >  < from: Xray Chest 1 View- PORTABLE-Urgent (Xray Chest 1 View- PORTABLE-Urgent .) (05.13.22 @ 16:03) >    ACC: [id number] EXAM:  XR CHEST PORTABLE URGENT 1V                          PROCEDURE DATE:  05/13/2022          INTERPRETATION:  AP erect chest on May 13, 2022 at 3:14 PM. Patient is   short of breath.    Heart magnified by technique.    There is a significant infiltrates less effusion emanating off the right   lower hilum new since May 10.    IMPRESSION: There is a new fairly significant right lung process at this   time.    --- End of Report ---            TANISHA VELEZ MD; Attending Radiologist  This document has been electronically signed. May 13 2022  4:24PM    < end of copied text >  < from: CT Head No Cont (05.14.22 @ 10:40) >    ACC: [id number] EXAM:  CT BRAIN                          PROCEDURE DATE:  05/14/2022          INTERPRETATION:  Clinical indication: Altered mental status    Multiple axial sections were performed from base skull to vertex without   contrast enhancement. Coronal and sagittal reconstructions were performed    This exam is compared prior head CT performed on May 10, 2021    Extensive parenchymal volume loss and chronic microvascular ischemic   changes are again seen.    Calcification is seen involving both distal internal carotid, distal   vertebral and basilar arteries are identified. These findings appear   unchanged    There is no acute hemorrhage mass or mass effect seen    Evaluation of osseous structures with the appropriate window appears  normal    Right posterior ethmoid sinus mucosal thickening is again seen.    Both mastoid and middle ear regions appear clear.    IMPRESSION: Stable exam.    --- End of Report ---            CHAR FISHER MD; Attending Radiologist  This document has been electronically signed. May 14 2022 10:48AM    < end of copied text >      Imaging Personally Reviewed:  [ ] YES  [ ] NO

## 2022-05-16 NOTE — CONSULT NOTE ADULT - SUBJECTIVE AND OBJECTIVE BOX
CHIEF COMPLAINT:Patient is a 76y old  Male who presents with a chief complaint of alcohol withdrawal.      HPI:  Patient is a 76yoM, AAOx3, ambulates independently, leaves alone at home, w/ PMH of HLD, DM, CKD,CAD,CHF  presents to the ED with weakness and tremulousness. Patient is currently a very poor historian. Patient's son, Mr. Dre Ojeda, contacted, but he is unable to provide much information. He request to call, his brother, Mr. Bruce Ojeda ([phone]), but he is unable to be reached over the phone. Thus, most of the information obtained from the ED chart review. According to the ED attending, patient is being admitted for alcohol withdrawal. Patient admits to drinking 2 glasses of Whisky at night, and last drink was last night. He denies fever, chill, n/v, chest pain, SOB, dizziness, vision changes, abdominal pain, urinary or bowel movement changes. (10 May 2022 11:41)      PAST MEDICAL & SURGICAL HISTORY:  DM (diabetes mellitus)      HTN (hypertension)      Chronic kidney disease      HLD (hyperlipidemia)      Glaucoma      Gout    CAD    CHF-s/p cath 6/21-D1 sig stenosis          MEDICATIONS  (STANDING):  brimonidine 0.2% Ophthalmic Solution 1 Drop(s) Both EYES daily  cefepime   IVPB 1000 milliGRAM(s) IV Intermittent every 24 hours  chlorhexidine 2% Cloths 1 Application(s) Topical <User Schedule>  clobetasol 0.05% Ointment 1 Application(s) Topical two times a day  dextrose 5%. 1000 milliLiter(s) (60 mL/Hr) IV Continuous <Continuous>  dextrose 5%. 1000 milliLiter(s) (50 mL/Hr) IV Continuous <Continuous>  dextrose 5%. 1000 milliLiter(s) (100 mL/Hr) IV Continuous <Continuous>  dextrose 50% Injectable 25 Gram(s) IV Push once  dextrose 50% Injectable 12.5 Gram(s) IV Push once  dextrose 50% Injectable 25 Gram(s) IV Push once  ergocalciferol 56902 Unit(s) Oral <User Schedule>  folic acid 1 milliGRAM(s) Oral daily  glucagon  Injectable 1 milliGRAM(s) IntraMuscular once  heparin   Injectable 5000 Unit(s) SubCutaneous every 8 hours  insulin lispro (ADMELOG) corrective regimen sliding scale   SubCutaneous Before meals and at bedtime  labetalol 100 milliGRAM(s) Oral two times a day  magnesium hydroxide Suspension 30 milliLiter(s) Oral two times a day  melatonin 3 milliGRAM(s) Oral at bedtime  metroNIDAZOLE  IVPB      metroNIDAZOLE  IVPB 500 milliGRAM(s) IV Intermittent every 8 hours  multivitamin 1 Tablet(s) Oral daily  mupirocin 2% Ointment 1 Application(s) Both Nostrils two times a day  Nephro-marcella 1 Tablet(s) Oral daily  pantoprazole    Tablet 40 milliGRAM(s) Oral before breakfast  saline laxative (FLEET) Rectal Enema 1 Enema Rectal once  timolol 0.5% Solution 1 Drop(s) Both EYES two times a day    MEDICATIONS  (PRN):  dextrose Oral Gel 15 Gram(s) Oral once PRN Blood Glucose LESS THAN 70 milliGRAM(s)/deciliter  LORazepam   Injectable 2 milliGRAM(s) IV Push every 2 hours PRN Symptom-triggered: 2 point increase in CIWA -Ar score and a total score of 7 or LESS  LORazepam   Injectable 2 milliGRAM(s) IV Push every 1 hour PRN Symptom-triggered: each CIWA -Ar score 8 or GREATER      FAMILY HISTORY:  NO hx of CAD    SOCIAL HISTORY:    [x ] Non-smoker    [x ] Alcohol abuse    Allergies    No Known Allergies    Intolerances    	    REVIEW OF SYSTEMS:  CONSTITUTIONAL: No fever, weight loss, or fatigue  EYES: No eye pain, visual disturbances, or discharge  ENT:  No difficulty hearing, tinnitus, vertigo; No sinus or throat pain  NECK: No pain or stiffness  RESPIRATORY: No cough, wheezing, chills or hemoptysis; No Shortness of Breath  CARDIOVASCULAR: No chest pain, palpitations, passing out, dizziness, or leg swelling  GASTROINTESTINAL: No abdominal or epigastric pain. No nausea, vomiting, or hematemesis; No diarrhea or constipation. No melena or hematochezia.  GENITOURINARY: No dysuria, frequency, hematuria, or incontinence  NEUROLOGICAL: No headaches, memory loss, loss of strength, numbness, or tremors  SKIN: No itching, burning, rashes, or lesions   LYMPH Nodes: No enlarged glands  ENDOCRINE: No heat or cold intolerance; No hair loss  MUSCULOSKELETAL: No joint pain or swelling; No muscle, back, or extremity pain  PSYCHIATRIC: No depression, anxiety, mood swings, or difficulty sleeping  HEME/LYMPH: No easy bruising, or bleeding gums  ALLERGY AND IMMUNOLOGIC: No hives or eczema	    PHYSICAL EXAM:  T(C): 36.3 (05-16-22 @ 04:59), Max: 36.7 (05-15-22 @ 21:09)  HR: 74 (05-16-22 @ 04:59) (74 - 96)  BP: 142/90 (05-16-22 @ 04:59) (129/80 - 155/86)  RR: 18 (05-16-22 @ 04:59) (17 - 18)  SpO2: 98% (05-16-22 @ 04:59) (96% - 98%)  Wt(kg): --  I&O's Summary    15 May 2022 07:01  -  16 May 2022 07:00  --------------------------------------------------------  IN: 0 mL / OUT: 400 mL / NET: -400 mL        Appearance: Normal	  HEENT:   Normal oral mucosa, PERRL, EOMI	  Lymphatic: No lymphadenopathy  Cardiovascular: Normal S1 S2, No JVD, No murmurs, No edema  Respiratory: Lungs clear to auscultation	  Gastrointestinal:  Soft, Non-tender, + BS	  Skin: No rashes, No ecchymoses, No cyanosis	  Neurologic: Non-focal  Extremities: Normal range of motion, No clubbing, cyanosis or edema  Vascular: Peripheral pulses palpable 2+ bilaterally      ECG:    Sinus rhythm with Premature atrial complexes  Left axis deviation  Left bundle branch block      	  	  LABS:	 	                        10.2   3.47  )-----------( 110      ( 16 May 2022 07:45 )             30.2     05-16    151<H>  |  119<H>  |  74<H>  ----------------------------<  149<H>  4.5   |  23  |  3.17<H>    Ca    9.7      16 May 2022 07:45    TPro  7.2  /  Alb  2.5<L>  /  TBili  0.5  /  DBili  x   /  AST  33  /  ALT  31  /  AlkPhos  84  05-16        < from: Transthoracic Echocardiogram (05.15.22 @ 16:26) >  OBSERVATIONS:  Mitral Valve: Normal mitral valve. Mild mitral  regurgitation.  Aortic Root: Normal aortic root.  Aortic Valve: Calcified, probably trileaflet aortic valve  with normal opening. No aortic stenosis. Mild aortic  regurgitation ( msec).  Left Atrium: Normal left atrium.  LA volume index = 29  cc/m2.  Left Ventricle: Endocardium not well visualized; grossly  severely reduced left ventricular systolic function (EF  20-25% by visual estimation). Not all LV wall segments were  seen. Normal left ventricular internal dimensions and wall  thicknesses. Grade I diastolic dysfunction (Impaired  relaxation, mild).  Right Heart: Normal right atrium. Right ventricle not well  visualized. Normal RV systolic function (TAPSE 1.8 cm).  Normal tricuspid valve. Mild tricuspid regurgitation.  Pulmonic valve not well seen. Trace pulmonic insufficiency  is noted.  Pericardium/PleuraNo pericardial effusion.  Hemodynamic: RA Pressure is 8 mm Hg. RV systolic pressure  is mildly increased at  37 mm Hg.    < end of copied text >  < from: Transthoracic Echocardiogram (06.03.21 @ 07:06) >  OBSERVATIONS:  Mitral Valve: Normal mitral valve. Mild to moderate mitral  regurgitation.  Aortic Root: Normal aortic root.  Aortic Valve: Calcified trileaflet aortic valve with normal  opening. No aortic stenosis. Mild to moderate aortic  regurgitation.  Left Atrium: Normal left atrium.  LA volume index = 25  cc/m2.  Left Ventricle: Mild to moderate global left ventricular  systolic dysfunction (EF 40-45% by visual estimation).  Paradoxical septal motion is seen, consistent with  conduction delay. Not all LV wall segments were seen.  Normal left ventricular internal dimensions and wall  thicknesses. A false tendon is noted. This is a normal  variant.  Grade II diastolic dysfunction.  Right Heart: Right atrium not well visualized. Off axis  images preclude accurate assessment of right ventricular  size. Normal RV systolic function (TAPSE 2.5 cm). Normal  tricuspid valve. Trace tricuspid regurgitation. Normal  pulmonic valve. Trace pulmonic insufficiency is noted.  Pericardium/PleuraNo pericardial effusion. Left pleural  effusion.  Hemodynamic: RA Pressure is 8 mm Hg. RV systolic pressure  is mildly increased at  36 mm Hg.    < end of copied text >  < from: Cardiac Cath Lab - Adult (06.10.21 @ 14:54) >  Case Physician(s):  Vijaya Low M.D.  Fellow:  Jimmy Lal M.D.  Referring Physician:  Yaritza Jorge M.D.  INDICATIONS: Acute systolic congestive heart failure.  HISTORY: No history of previous myocardial infarction. The patient has  renal failure (not requiring dialysis).  PROCEDURE:  --  Right heart catheterization.  --  Left heart catheterization.  --  Left coronary angiography.  --  Right coronary angiography.  TECHNIQUE: The risks and alternatives of the procedures and conscious  sedation were explained to the patient and informed consent was obtained.  Cardiac catheterization performed electively.  Local anesthetic given. Right femoral artery access. Local anesthetic  given. A 5FR SHEATH PINNACLE was inserted in the vessel, utilizing the  modified Seldinger technique. Right femoral vein access. Local anesthetic  given. A 7FR SHEATH PINNACLE was inserted in the vessel, utilizing the  modified Seldinger technique. Right heart catheterization. The procedure  was performed utilizing a 5FR  EXPO catheter under fluoroscopic  guidance. Measurements of pressures, arterial and venous oxygen  saturation, and cardiac output (by Sigifredo using assumed VO2) were obtained.  The catheter was removed. Left heart catheterization. A 5FR FR4.0 EXPO  catheter was utilized. After recording ascending aortic pressure, the  catheter was advanced across the aortic valve and left ventricular  pressure was recorded. Left coronary artery angiography. The vessel was  injected utilizing a 5FR FL4.0 EXPO catheter and positioned in the vessel  ostia under fluoroscopic guidance. Angiography was performed in multiple  projections using hand-injection of contrast. Right coronary artery  angiography. The vessel was injected utilizing a 5FR FR4.0 EXPO catheter  and positioned in the vessel ostia under fluoroscopic guidance.  Angiography was performed in multiple projections using hand-injection of  contrast. RADIATION EXPOSURE: 14.2 min.  CONTRAST GIVEN: Omnipaque 35 ml.  MEDICATIONS GIVEN: Heparin, 5000 units, IV.  VENTRICLES: No left ventriculogram was performed.  CORONARY VESSELS: The coronary circulation is right dominant.  LM:   --  LM: Angiography showed mild atherosclerosis with no flow limiting  lesions.  LAD:   --  Proximal LAD: There was a tubular 40 % stenosis at a site with  no prior intervention. There was BALDEMAR grade 3 flow through the vessel  (brisk flow). iFR negative.  --  D1: There was a discrete 95 % stenosis at a site with no prior  intervention. There was BALDEMAR grade 3 flow through the vessel (brisk flow).  CX:   --  Circumflex: Angiography showed mild atherosclerosis with no flow  limiting lesions.  RCA:   --  RCA: Angiography showed mild atherosclerosis with no flow  limiting lesions.  COMPLICATIONS: There were no complications.  DIAGNOSTIC IMPRESSIONS: Severe stenosis of small to medium size D-1  Moderate stenosis proximal LAD, IFR negative  LVEF 31% is out of proportion to the CAD  Hemodynacmics as listed  DIAGNOSTIC RECOMMENDATIONS: Medical therapy for CAD  Optimize HF therapy  PCI to D-1 only if patient has angina  INTERVENTIONAL IMPRESSIONS: Severe stenosis of small to medium size D-1  Moderate stenosis proximal LAD, IFR negative  LVEF 31% is out of proportion to the CAD  Hemodynacmics as listed  INTERVENTIONAL RECOMMENDATIONS: Medical therapy for CAD  Optimize HF therapy  PCI to D-1 only if patient has angina  Prepared and signed by  Vijaya Low M.D.  Signed 06/10/2021 16:03:04      m< from: MR Cardiac w/wo IV Cont (06.14.21 @ 11:08) >  EXAM:  MR CARD MORPH FUNCTION WAW IC                            PROCEDURE DATE:  06/14/2021            INTERPRETATION:  ACC# [id number]    CARDIAC MRI WITH AND WITHOUT CONTRAST    CLINICAL INDICATION: Left ventricular dysfunction, rule out infiltrative cardiomyopathy. No coronary artery disease.    COMPARISON: None    TECHNIQUE: Multiplanar short axis and two-, three- and four-chamber long axis views of the heart were obtained using cine imaging to assess wall motion. MR imaging was performed with a 3D gradient echo technique after the uneventful administration of 12 ml of Gadavist. Multiplanar delayed enhancement inversion recovery images were obtained approximately 10-15 minutes following the administration of the gadolinium agent. Postprocessing including measurement of cardiac functional parameters was performed on an independent workstation.    FINDINGS:    The left ventricle is qualitatively dilated with decreased systolic function.  The right ventricle is qualitatively normal in size with normal systolic function.  The left atrium is normal in size. There is qualitatively mitral regurgitation.  The right atrium is normal in size. There is qualitatively no significant tricuspid regurgitation.  Qualitatively, there is aortic regurgitation and no significant aortic stenosis.  The thoracic aorta is normal in diameter.  No pericardial effusion.    REGIONAL FUNCTION    Global hypokinesia.    T2 SEQUENCE    No abnormal T2 signal to demonstrate myocardial inflammation or edema.    PERFUSION:    No perfusion abnormality.    LATE GADOLINIUM ENHANCEMENT (LGE)    On delayed contrast enhanced images, linear mid wall LGE within basilar interventricular septum suggestive of idiopathic dilated cardiomyopathy.    QUANTITATIVE ANALYSIS    BSA: 1.9 m2    LV MEASUREMENTS    LVEF: 29% (normal: male = 56-78%; female = 56-78%)  LVEDV: 228 ml (normal: male =  ml; female =  ml)  LVESV: 162 ml (normal: male = 19-72 ml; female = 13-51 ml)  SV: 66 ml (normal: male =  ml; female = 33-97)  LVEDVi (EDV/BSA): 120 ml/m? (normal: male= 47-92 ml/m?; female= 41-81 ml/m?)      ADDITIONAL FINDINGS:    Small bilateral pleural effusions. Please note this examination is focused on the heart.    IMPRESSION:    1.  The LV is dilated with decreased systolic function; LVEF: 29%.    2.  Linear mid wall LGE within basilar interventricular septum suggestive of idiopathic dilated cardiomyopathy. No evidence of infiltrative cardiomyopathy.                ELXI LANGE MD; Attending Radiologist  This document has been electronically signed. Jun 14 2021 11:30AM

## 2022-05-16 NOTE — PROGRESS NOTE ADULT - SUBJECTIVE AND OBJECTIVE BOX
[   ] ICU                                          [   ] CCU                                      [ X  ] Medical Floor    Patient is a 76 year old male with ETOH abuse and anemia. GI consulted to evaluate.     Patient is a 76 year old male AAOx3, ambulates independently, leaves alone at home, with past medical history significant for HLD, DM, CKD, admitted with alcohol withdrowal found to have anemia. Patient admits to drinking 2 glasses of Whisky at night, and last drink was last night. He also c/o intermittent burning epigastric radiating to his chest associated with dyspepsia. Patient denies nausea, vomiting, hematemesis, hematochezia, melena, fever, chills, chest pain, SOB, cough, hematuria, dysuria or diarrhea.       Patient appears comfortable. No new complaints reported, No abdominal pain, N/V, hematemesis, hematochezia, melena, fever, chills, chest pain, SOB, cough or diarrhea reported.      PAIN MANAGEMENT:  Pain Scale:                2-3 /10  Pain Location:  Epigastric abdominal pain      Prior Colonoscopy:  No prior colonoscopy      PAST MEDICAL HISTORY  DM (diabetes mellitus)  HTN (hypertension)   Chronic kidney disease  HLD (hyperlipidemia)  Glaucoma  Gout        PAST SURGICAL HISTORY  No significant past surgical history        Allergies    No Known Allergies    Intolerances  None      HOME MEDICATIONS    MEDICATIONS  (STANDING):  brimonidine 0.2% Ophthalmic Solution 1 Drop(s) Both EYES daily  dextrose 5%. 1000 milliLiter(s) (50 mL/Hr) IV Continuous <Continuous>  dextrose 5%. 1000 milliLiter(s) (100 mL/Hr) IV Continuous <Continuous>  dextrose 50% Injectable 25 Gram(s) IV Push once  dextrose 50% Injectable 12.5 Gram(s) IV Push once  dextrose 50% Injectable 25 Gram(s) IV Push once  doxycycline IVPB      ergocalciferol 17913 Unit(s) Oral <User Schedule>  folic acid 1 milliGRAM(s) Oral daily  furosemide    Tablet 20 milliGRAM(s) Oral daily  glucagon  Injectable 1 milliGRAM(s) IntraMuscular once  heparin   Injectable 5000 Unit(s) SubCutaneous every 8 hours  insulin lispro (ADMELOG) corrective regimen sliding scale   SubCutaneous Before meals and at bedtime  LORazepam   Injectable 2 milliGRAM(s) IV Push every 4 hours  LORazepam   Injectable   IV Push   multivitamin 1 Tablet(s) Oral daily  timolol 0.5% Solution 1 Drop(s) Both EYES two times a day    MEDICATIONS  (PRN):  dextrose Oral Gel 15 Gram(s) Oral once PRN Blood Glucose LESS THAN 70 milliGRAM(s)/deciliter  LORazepam   Injectable 2 milliGRAM(s) IV Push every 2 hours PRN Symptom-triggered: 2 point increase in CIWA -Ar score and a total score of 7 or LESS  LORazepam   Injectable 2 milliGRAM(s) IV Push every 1 hour PRN Symptom-triggered: each CIWA -Ar score 8 or GREATER      SOCIAL HISTORY  Advanced Directives:       [ X ] Full Code       [  ] DNR  Marital Status:         [  ] M      [ X ] S      [  ] D       [  ] W  Children:       [ X ] Yes      [  ] No  Occupation:        [  ] Employed       [ X ] Unemployed       [  ] Retired  Diet:       [ X ] Regular       [  ] PEG feeding          [  ] NG tube feeding  Drug Use:           [ X ] Patient denied          [  ] Yes  Alcohol:           [X  ] No             [  ] Yes (socially)         [  ] Yes (chronic)  Tobacco:           [  ] Yes           [ X ] No      FAMILY HISTORY  [X  ] Heart Disease            [ X ] Diabetes             [ X ] HTN             [  ] Colon Cancer             [  ] Stomach Cancer              [  ] Pancreatic Cancer      VITALS  Vital Signs Last 24 Hrs  T(C): 36.3 (16 May 2022 11:59), Max: 36.7 (15 May 2022 21:09)  T(F): 97.4 (16 May 2022 11:59), Max: 98 (15 May 2022 21:09)  HR: 93 (16 May 2022 11:59) (74 - 96)  BP: 138/80 (16 May 2022 11:59) (129/80 - 155/86)   RR: 16 (16 May 2022 11:59) (16 - 18)  SpO2: 95% (16 May 2022 11:59) (95% - 98%)       MEDICATIONS  (STANDING):  aspirin  chewable 81 milliGRAM(s) Oral daily  brimonidine 0.2% Ophthalmic Solution 1 Drop(s) Both EYES daily  carvedilol 3.125 milliGRAM(s) Oral every 12 hours  cefepime   IVPB 1000 milliGRAM(s) IV Intermittent every 24 hours  chlorhexidine 2% Cloths 1 Application(s) Topical <User Schedule>  clobetasol 0.05% Ointment 1 Application(s) Topical two times a day  dextrose 5%. 1000 milliLiter(s) (60 mL/Hr) IV Continuous <Continuous>  dextrose 5%. 1000 milliLiter(s) (50 mL/Hr) IV Continuous <Continuous>  dextrose 5%. 1000 milliLiter(s) (100 mL/Hr) IV Continuous <Continuous>  dextrose 50% Injectable 25 Gram(s) IV Push once  dextrose 50% Injectable 12.5 Gram(s) IV Push once  dextrose 50% Injectable 25 Gram(s) IV Push once  ergocalciferol 88473 Unit(s) Oral <User Schedule>  folic acid 1 milliGRAM(s) Oral daily  glucagon  Injectable 1 milliGRAM(s) IntraMuscular once  heparin   Injectable 5000 Unit(s) SubCutaneous every 12 hours  hydrALAZINE 25 milliGRAM(s) Oral every 8 hours  insulin lispro (ADMELOG) corrective regimen sliding scale   SubCutaneous Before meals and at bedtime  isosorbide   mononitrate ER Tablet (IMDUR) 30 milliGRAM(s) Oral daily  melatonin 3 milliGRAM(s) Oral at bedtime  metroNIDAZOLE  IVPB      metroNIDAZOLE  IVPB 500 milliGRAM(s) IV Intermittent every 8 hours  multivitamin 1 Tablet(s) Oral daily  mupirocin 2% Ointment 1 Application(s) Both Nostrils two times a day  Nephro-marcella 1 Tablet(s) Oral daily  pantoprazole    Tablet 40 milliGRAM(s) Oral before breakfast  timolol 0.5% Solution 1 Drop(s) Both EYES two times a day    MEDICATIONS  (PRN):  dextrose Oral Gel 15 Gram(s) Oral once PRN Blood Glucose LESS THAN 70 milliGRAM(s)/deciliter  LORazepam   Injectable 2 milliGRAM(s) IV Push every 2 hours PRN Symptom-triggered: 2 point increase in CIWA -Ar score and a total score of 7 or LESS  LORazepam   Injectable 2 milliGRAM(s) IV Push every 1 hour PRN Symptom-triggered: each CIWA -Ar score 8 or GREATER                            10.2   3.47  )-----------( 110      ( 16 May 2022 07:45 )             30.2       05-16    151<H>  |  119<H>  |  74<H>  ----------------------------<  149<H>  4.5   |  23  |  3.17<H>    Ca    9.7      16 May 2022 07:45    TPro  7.2  /  Alb  2.5<L>  /  TBili  0.5  /  DBili  x   /  AST  33  /  ALT  31  /  AlkPhos  84  05-16

## 2022-05-16 NOTE — PROGRESS NOTE ADULT - SUBJECTIVE AND OBJECTIVE BOX
Problem List:  CKD stage 4  Admitted with ETOH intoxication    PAST MEDICAL & SURGICAL HISTORY:  DM (diabetes mellitus)      HTN (hypertension)      Chronic kidney disease      HLD (hyperlipidemia)      Glaucoma      Gout      No significant past surgical history          No Known Allergies      MEDICATIONS  (STANDING):  aspirin  chewable 81 milliGRAM(s) Oral daily  brimonidine 0.2% Ophthalmic Solution 1 Drop(s) Both EYES daily  carvedilol 3.125 milliGRAM(s) Oral every 12 hours  cefepime   IVPB 1000 milliGRAM(s) IV Intermittent every 24 hours  chlorhexidine 2% Cloths 1 Application(s) Topical <User Schedule>  clobetasol 0.05% Ointment 1 Application(s) Topical two times a day  dextrose 5%. 1000 milliLiter(s) (60 mL/Hr) IV Continuous <Continuous>  dextrose 5%. 1000 milliLiter(s) (50 mL/Hr) IV Continuous <Continuous>  dextrose 5%. 1000 milliLiter(s) (100 mL/Hr) IV Continuous <Continuous>  dextrose 50% Injectable 25 Gram(s) IV Push once  dextrose 50% Injectable 12.5 Gram(s) IV Push once  dextrose 50% Injectable 25 Gram(s) IV Push once  ergocalciferol 92836 Unit(s) Oral <User Schedule>  folic acid 1 milliGRAM(s) Oral daily  glucagon  Injectable 1 milliGRAM(s) IntraMuscular once  heparin   Injectable 5000 Unit(s) SubCutaneous every 12 hours  hydrALAZINE 25 milliGRAM(s) Oral every 8 hours  insulin lispro (ADMELOG) corrective regimen sliding scale   SubCutaneous Before meals and at bedtime  isosorbide   mononitrate ER Tablet (IMDUR) 30 milliGRAM(s) Oral daily  melatonin 3 milliGRAM(s) Oral at bedtime  metroNIDAZOLE  IVPB      metroNIDAZOLE  IVPB 500 milliGRAM(s) IV Intermittent every 8 hours  multivitamin 1 Tablet(s) Oral daily  mupirocin 2% Ointment 1 Application(s) Both Nostrils two times a day  Nephro-marcella 1 Tablet(s) Oral daily  pantoprazole    Tablet 40 milliGRAM(s) Oral before breakfast  polyethylene glycol/electrolyte Solution. 2000 milliLiter(s) Oral once  timolol 0.5% Solution 1 Drop(s) Both EYES two times a day    MEDICATIONS  (PRN):  dextrose Oral Gel 15 Gram(s) Oral once PRN Blood Glucose LESS THAN 70 milliGRAM(s)/deciliter  LORazepam   Injectable 2 milliGRAM(s) IV Push every 2 hours PRN Symptom-triggered: 2 point increase in CIWA -Ar score and a total score of 7 or LESS  LORazepam   Injectable 2 milliGRAM(s) IV Push every 1 hour PRN Symptom-triggered: each CIWA -Ar score 8 or GREATER                            10.2   3.47  )-----------( 110      ( 16 May 2022 07:45 )             30.2     05-16    151<H>  |  119<H>  |  74<H>  ----------------------------<  149<H>  4.5   |  23  |  3.17<H>    Ca    9.7      16 May 2022 07:45    TPro  7.2  /  Alb  2.5<L>  /  TBili  0.5  /  DBili  x   /  AST  33  /  ALT  31  /  AlkPhos  84  05-16            REVIEW OF SYSTEMS:  confused      VITALS:  T(F): 97.4 (05-16-22 @ 11:59), Max: 98 (05-15-22 @ 21:09)  HR: 93 (05-16-22 @ 11:59)  BP: 138/80 (05-16-22 @ 11:59)  RR: 16 (05-16-22 @ 11:59)  SpO2: 95% (05-16-22 @ 11:59)  Wt(kg): --    05-15 @ 07:01  -  05-16 @ 07:00  --------------------------------------------------------  IN: 0 mL / OUT: 400 mL / NET: -400 mL        PHYSICAL EXAM:  Constitutional: well developed, no diaphoresis, no distress.  Neck: No JVD, no carotid bruit, supple, no adenopathy  Respiratory: air entrance B/L, no wheezes, rales or rhonchi  Cardiovascular: S1, S2, RRR, no pericardial rub, no murmur  Abdomen: BS+, soft, no tenderness, no bruit  Pelvis: bladder nondistended  Extremities: No cyanosis or clubbing. No peripheral edema.   Confused, agitated .

## 2022-05-16 NOTE — PROGRESS NOTE ADULT - PROBLEM SELECTOR PLAN 6
h/o HTN, on labetalol and amlodipine at home  d/c labetalol, add asa coreg, imdur, hydralazine.  amlodipine on hold due to lower extremity edema  ckd bp goal <140/90  monitor bp  cardiology Dr. Franz following

## 2022-05-16 NOTE — PROGRESS NOTE ADULT - SUBJECTIVE AND OBJECTIVE BOX
Patient is seen and examined at the bed side, is afebrile. He appears clinically better. But on one to one observation.       REVIEW OF SYSTEMS: All other review systems are negative      ALLERGIES: No Known Allergies      Vital Signs Last 24 Hrs  T(C): 36.3 (16 May 2022 11:59), Max: 36.7 (15 May 2022 21:09)  T(F): 97.4 (16 May 2022 11:59), Max: 98 (15 May 2022 21:09)  HR: 63 (16 May 2022 16:21) (63 - 93)  BP: 142/94 (16 May 2022 16:21) (129/80 - 142/94)  BP(mean): --  RR: 14 (16 May 2022 16:21) (14 - 18)  SpO2: 96% (16 May 2022 16:21) (95% - 98%)      PHYSICAL EXAM:  GENERAL: Not in distress, on oxygen via NC  CHEST/LUNG: Not using accessory muscles   HEART: s1 and s2 present  ABDOMEN:  Nontender and  Nondistended  EXTREMITIES: No pedal  edema  CNS: Awake and alert       LABS:                                   10.2   3.47  )-----------( 110      ( 16 May 2022 07:45 )             30.2                  11.4   5.49  )-----------( 66       ( 14 May 2022 08:27 )             35.2       05-16    151<H>  |  119<H>  |  74<H>  ----------------------------<  149<H>  4.5   |  23  |  3.17<H>    Ca    9.7      16 May 2022 07:45    TPro  7.2  /  Alb  2.5<L>  /  TBili  0.5  /  DBili  x   /  AST  33  /  ALT  31  /  AlkPhos  84  05-16    05-15    146<H>  |  115<H>  |  69<H>  ----------------------------<  145<H>  4.9   |  21<L>  |  3.29<H>    Ca    10.2      15 May 2022 08:41  Phos  5.0     05-14  Mg     1.7     -14    TPro  7.4  /  Alb  2.7<L>  /  TBili  0.6  /  DBili  x   /  AST  43<H>  /  ALT  39  /  AlkPhos  95  05-15    0  CAPILLARY BLOOD GLUCOSE  POCT Blood Glucose.: 125 mg/dL (13 May 2022 17:01)  POCT Blood Glucose.: 130 mg/dL (13 May 2022 11:58)  POCT Blood Glucose.: 113 mg/dL (13 May 2022 08:17)  POCT Blood Glucose.: 123 mg/dL (12 May 2022 21:33)        Urinalysis Basic - ( 12 May 2022 12:13 )  Color: Yellow / Appearance: Clear / S.010 / pH: x  Gluc: x / Ketone: Negative  / Bili: Negative / Urobili: Negative   Blood: x / Protein: 100 / Nitrite: Negative   Leuk Esterase: Negative / RBC: Negative /HPF / WBC 0-2 /HPF   Sq Epi: x / Non Sq Epi: Occasional /HPF / Bacteria: Negative /HPF        MEDICATIONS  (STANDING):    brimonidine 0.2% Ophthalmic Solution 1 Drop(s) Both EYES daily  cefepime   IVPB 1000 milliGRAM(s) IV Intermittent every 24 hours  chlorhexidine 2% Cloths 1 Application(s) Topical <User Schedule>  clobetasol 0.05% Ointment 1 Application(s) Topical two times a day  ergocalciferol 10483 Unit(s) Oral <User Schedule>  folic acid 1 milliGRAM(s) Oral daily  glucagon  Injectable 1 milliGRAM(s) IntraMuscular once  heparin   Injectable 5000 Unit(s) SubCutaneous every 8 hours  insulin lispro (ADMELOG) corrective regimen sliding scale   SubCutaneous Before meals and at bedtime  labetalol 100 milliGRAM(s) Oral two times a day  LORazepam   Injectable 1 milliGRAM(s) IV Push every 12 hours  magnesium hydroxide Suspension 30 milliLiter(s) Oral two times a day  melatonin 3 milliGRAM(s) Oral at bedtime  metroNIDAZOLE  IVPB      metroNIDAZOLE  IVPB 500 milliGRAM(s) IV Intermittent every 8 hours  multivitamin 1 Tablet(s) Oral daily  mupirocin 2% Ointment 1 Application(s) Both Nostrils two times a day  Nephro-marcella 1 Tablet(s) Oral daily  pantoprazole    Tablet 40 milliGRAM(s) Oral before breakfast  sodium bicarbonate 650 milliGRAM(s) Oral three times a day  sodium chloride 0.45%. 1000 milliLiter(s) (80 mL/Hr) IV Continuous <Continuous>  timolol 0.5% Solution 1 Drop(s) Both EYES two times a day        RADIOLOGY & ADDITIONAL TESTS:    22: Xray Chest 1 View- PORTABLE-Urgent (Xray Chest 1 View- PORTABLE-Urgent .) (22 @ 16:03) >    IMPRESSION: There is a new fairly significant right lung process at this time.      22: US Abdomen Complete (US Abdomen Complete .) (22 @ 08:54) Gallstones in the contracted gallbladder.  Hepatic steatosis.      5/10/22: CT Head No Cont (05.10.22 @ 14:32) :   Moderate periventricular white matter ischemia. Moderate  bitemporal lobe and bifrontal lobe atrophy.     Mucosal thickening in the  RIGHT maxillary and RIGHT ethmoid and frontal and RIGHT frontal sinuses.      < from: Xray Chest 1 View- PORTABLE-Urgent (05.10.22 @ 09:20) >    Presently lungs are clear.        MICROBIOLOGY DATA:    Culture - Blood (22 @ 21:24)   Specimen Source: .Blood Blood-Peripheral   Culture Results: No growth to date.     Culture - Blood (22 @ 21:24)   Specimen Source: .Blood Blood-Peripheral   Culture Results: No growth to date.     MRSA/MSSA PCR (22 @ 05:59)   MRSA PCR Result.: NotDetec:    COVID-19 PCR (05.10.22 @ 09:36)   COVID-19 PCR: NotDetec:

## 2022-05-16 NOTE — PROGRESS NOTE ADULT - ASSESSMENT
Patient is a 76yoM, AAOx3, ambulates independently, leaves alone at home, w/ PMH of HLD, DM, CKD, presents to the ED with weakness and tremulousness. Admitted for alcohol withdrawal.   Case discussed w/ pt's son Dre Ojeda at bedside who reports that pt lives alone and he drinks about 50 years every night and now has been having about two drinks per day.  No prior Etoh related hospitalization as per son.  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 son reports pt is more awake today, no behavioral issues noted, continue taper dose of ativan. Neuro consulted.   5/13- Pt evaluated and case discussed with his son Dre Ojeda at bedside and aware of plan for CXR given abnormal lung exam.  Pt received one dose of 40mg IV Lasix as per discussion w/ Nephro for presumed effusion.  CXR showed rt lung field infiltrate. Hence, will treat w/ Maxipime for likely Asp Pneumonia and ID Dr. Cui following.  echo noted reduced ef 20-25%    Pt is aox1 (name only), lethargic, afebrile, pt unable to finish bowel prep, EGD and colonoscopy moved to tomorrow 5/17. Pt still on CIWA, had one episode of agitation and was given ativan. Pt was also found to have hypernatremia and started on D5W gtt.

## 2022-05-16 NOTE — CONSULT NOTE ADULT - ASSESSMENT
76yoM, AAOx3, ambulates independently, leaves alone at home, w/ PMH of HLD, DM, CKD,CAD,CHF  presents to the ED with weakness and tremulousness,aspiration pneumonia.  1.CAD,CHF-d/c labetalol, add asa coreg,imdur,hydralazine.  2.CRI-Renal f/u.  3.DM-insulin.  4.Lipid d/o-statin.  5.CIWA.  6.GI and DVT prophylaxis.

## 2022-05-16 NOTE — DIETITIAN INITIAL EVALUATION ADULT - NS FNS DIET ORDER
Diet, NPO after Midnight:      NPO Start Date: 16-May-2022,   NPO Start Time: 23:59 (05-16-22 @ 13:25)  Diet, Clear Liquid:   Supplement Feeding Modality:  Oral  Ensure Clear Cans or Servings Per Day:  1       Frequency:  Three Times a day (05-16-22 @ 13:25)

## 2022-05-16 NOTE — PROGRESS NOTE ADULT - PROBLEM SELECTOR PLAN 3
CXR- shows New rt lung infiltrate  asymptomatic  afebrile no leukocytosis  continue cefepime and flagyl for 7 days (5/13-5/19)  ID Dr. Cui following

## 2022-05-17 ENCOUNTER — TRANSCRIPTION ENCOUNTER (OUTPATIENT)
Age: 77
End: 2022-05-17

## 2022-05-17 ENCOUNTER — RESULT REVIEW (OUTPATIENT)
Age: 77
End: 2022-05-17

## 2022-05-17 DIAGNOSIS — Z02.9 ENCOUNTER FOR ADMINISTRATIVE EXAMINATIONS, UNSPECIFIED: ICD-10-CM

## 2022-05-17 LAB
ALBUMIN SERPL ELPH-MCNC: 2.5 G/DL — LOW (ref 3.5–5)
ALP SERPL-CCNC: 85 U/L — SIGNIFICANT CHANGE UP (ref 40–120)
ALT FLD-CCNC: 28 U/L DA — SIGNIFICANT CHANGE UP (ref 10–60)
ANION GAP SERPL CALC-SCNC: 11 MMOL/L — SIGNIFICANT CHANGE UP (ref 5–17)
AST SERPL-CCNC: 30 U/L — SIGNIFICANT CHANGE UP (ref 10–40)
BILIRUB SERPL-MCNC: 0.5 MG/DL — SIGNIFICANT CHANGE UP (ref 0.2–1.2)
BUN SERPL-MCNC: 67 MG/DL — HIGH (ref 7–18)
CALCIUM SERPL-MCNC: 9.8 MG/DL — SIGNIFICANT CHANGE UP (ref 8.4–10.5)
CHLORIDE SERPL-SCNC: 117 MMOL/L — HIGH (ref 96–108)
CO2 SERPL-SCNC: 22 MMOL/L — SIGNIFICANT CHANGE UP (ref 22–31)
CREAT SERPL-MCNC: 3.3 MG/DL — HIGH (ref 0.5–1.3)
EGFR: 19 ML/MIN/1.73M2 — LOW
GLUCOSE BLDC GLUCOMTR-MCNC: 132 MG/DL — HIGH (ref 70–99)
GLUCOSE BLDC GLUCOMTR-MCNC: 183 MG/DL — HIGH (ref 70–99)
GLUCOSE BLDC GLUCOMTR-MCNC: 184 MG/DL — HIGH (ref 70–99)
GLUCOSE BLDC GLUCOMTR-MCNC: 276 MG/DL — HIGH (ref 70–99)
GLUCOSE BLDC GLUCOMTR-MCNC: 98 MG/DL — SIGNIFICANT CHANGE UP (ref 70–99)
GLUCOSE SERPL-MCNC: 174 MG/DL — HIGH (ref 70–99)
HCT VFR BLD CALC: 31.2 % — LOW (ref 39–50)
HGB BLD-MCNC: 10.6 G/DL — LOW (ref 13–17)
MCHC RBC-ENTMCNC: 34 GM/DL — SIGNIFICANT CHANGE UP (ref 32–36)
MCHC RBC-ENTMCNC: 34.6 PG — HIGH (ref 27–34)
MCV RBC AUTO: 102 FL — HIGH (ref 80–100)
NRBC # BLD: 0 /100 WBCS — SIGNIFICANT CHANGE UP (ref 0–0)
PLATELET # BLD AUTO: 129 K/UL — LOW (ref 150–400)
POTASSIUM SERPL-MCNC: 4.2 MMOL/L — SIGNIFICANT CHANGE UP (ref 3.5–5.3)
POTASSIUM SERPL-SCNC: 4.2 MMOL/L — SIGNIFICANT CHANGE UP (ref 3.5–5.3)
PROT SERPL-MCNC: 7.6 G/DL — SIGNIFICANT CHANGE UP (ref 6–8.3)
RBC # BLD: 3.06 M/UL — LOW (ref 4.2–5.8)
RBC # FLD: 14.7 % — HIGH (ref 10.3–14.5)
SARS-COV-2 RNA SPEC QL NAA+PROBE: SIGNIFICANT CHANGE UP
SODIUM SERPL-SCNC: 150 MMOL/L — HIGH (ref 135–145)
WBC # BLD: 4.46 K/UL — SIGNIFICANT CHANGE UP (ref 3.8–10.5)
WBC # FLD AUTO: 4.46 K/UL — SIGNIFICANT CHANGE UP (ref 3.8–10.5)

## 2022-05-17 PROCEDURE — 88305 TISSUE EXAM BY PATHOLOGIST: CPT | Mod: 26

## 2022-05-17 PROCEDURE — 90792 PSYCH DIAG EVAL W/MED SRVCS: CPT

## 2022-05-17 PROCEDURE — 88312 SPECIAL STAINS GROUP 1: CPT | Mod: 26

## 2022-05-17 DEVICE — CATH BALLOON DIL 6-8MM: Type: IMPLANTABLE DEVICE | Status: FUNCTIONAL

## 2022-05-17 DEVICE — CATH ESOPH DIL 9 ATM 6FR 8-10MM: Type: IMPLANTABLE DEVICE | Status: FUNCTIONAL

## 2022-05-17 DEVICE — CATH ESOPH DIL 8 ATM 6FR10-12M: Type: IMPLANTABLE DEVICE | Status: FUNCTIONAL

## 2022-05-17 RX ORDER — QUETIAPINE FUMARATE 200 MG/1
25 TABLET, FILM COATED ORAL THREE TIMES A DAY
Refills: 0 | Status: DISCONTINUED | OUTPATIENT
Start: 2022-05-17 | End: 2022-05-24

## 2022-05-17 RX ORDER — QUETIAPINE FUMARATE 200 MG/1
25 TABLET, FILM COATED ORAL THREE TIMES A DAY
Refills: 0 | Status: DISCONTINUED | OUTPATIENT
Start: 2022-05-17 | End: 2022-05-17

## 2022-05-17 RX ADMIN — Medication 3: at 17:08

## 2022-05-17 RX ADMIN — PANTOPRAZOLE SODIUM 40 MILLIGRAM(S): 20 TABLET, DELAYED RELEASE ORAL at 07:30

## 2022-05-17 RX ADMIN — CARVEDILOL PHOSPHATE 3.12 MILLIGRAM(S): 80 CAPSULE, EXTENDED RELEASE ORAL at 17:13

## 2022-05-17 RX ADMIN — QUETIAPINE FUMARATE 25 MILLIGRAM(S): 200 TABLET, FILM COATED ORAL at 23:34

## 2022-05-17 RX ADMIN — Medication 81 MILLIGRAM(S): at 12:42

## 2022-05-17 RX ADMIN — BRIMONIDINE TARTRATE 1 DROP(S): 2 SOLUTION/ DROPS OPHTHALMIC at 12:43

## 2022-05-17 RX ADMIN — Medication 1 DROP(S): at 05:15

## 2022-05-17 RX ADMIN — Medication 25 MILLIGRAM(S): at 14:12

## 2022-05-17 RX ADMIN — Medication 2 MILLIGRAM(S): at 01:30

## 2022-05-17 RX ADMIN — Medication 25 MILLIGRAM(S): at 22:47

## 2022-05-17 RX ADMIN — Medication 100 MILLIGRAM(S): at 14:13

## 2022-05-17 RX ADMIN — ISOSORBIDE MONONITRATE 30 MILLIGRAM(S): 60 TABLET, EXTENDED RELEASE ORAL at 12:41

## 2022-05-17 RX ADMIN — Medication 1 DROP(S): at 17:13

## 2022-05-17 RX ADMIN — Medication 2 MILLIGRAM(S): at 17:03

## 2022-05-17 RX ADMIN — Medication 100 MILLIGRAM(S): at 05:55

## 2022-05-17 RX ADMIN — MUPIROCIN 1 APPLICATION(S): 20 OINTMENT TOPICAL at 05:16

## 2022-05-17 RX ADMIN — Medication 2 MILLIGRAM(S): at 08:49

## 2022-05-17 RX ADMIN — Medication 1 TABLET(S): at 12:41

## 2022-05-17 RX ADMIN — ERGOCALCIFEROL 50000 UNIT(S): 1.25 CAPSULE ORAL at 12:42

## 2022-05-17 RX ADMIN — Medication 1 APPLICATION(S): at 17:14

## 2022-05-17 RX ADMIN — CHLORHEXIDINE GLUCONATE 1 APPLICATION(S): 213 SOLUTION TOPICAL at 05:17

## 2022-05-17 RX ADMIN — Medication 2 MILLIGRAM(S): at 04:41

## 2022-05-17 RX ADMIN — Medication 100 MILLIGRAM(S): at 22:48

## 2022-05-17 RX ADMIN — CARVEDILOL PHOSPHATE 3.12 MILLIGRAM(S): 80 CAPSULE, EXTENDED RELEASE ORAL at 05:15

## 2022-05-17 RX ADMIN — CEFEPIME 100 MILLIGRAM(S): 1 INJECTION, POWDER, FOR SOLUTION INTRAMUSCULAR; INTRAVENOUS at 05:19

## 2022-05-17 RX ADMIN — Medication 1 MILLIGRAM(S): at 12:41

## 2022-05-17 RX ADMIN — Medication 1: at 08:41

## 2022-05-17 RX ADMIN — Medication 3 MILLIGRAM(S): at 22:51

## 2022-05-17 RX ADMIN — Medication 25 MILLIGRAM(S): at 05:15

## 2022-05-17 RX ADMIN — Medication 1 APPLICATION(S): at 05:23

## 2022-05-17 NOTE — PROGRESS NOTE ADULT - PROBLEM SELECTOR PLAN 4
echo noted 20-25%  d/c home med labetalol  add asa coreg, imdur, hydralazine  cardiology Dr. Franz following

## 2022-05-17 NOTE — PROGRESS NOTE ADULT - ASSESSMENT
DKD with deterioration in renal function in the last year now stage 4  Hypernatremia.    Reduced po intake  ETOH intoxication in withdrawal.  HF r EF 45%    Right lung side pneumonia  placed on Cefepime , follow with ID     PLAN:  Continue IV fluids D5W follow lab in am  DC Sodium bicarb for now due to Hypernatremia  2 gm K diet

## 2022-05-17 NOTE — PROGRESS NOTE ADULT - PROBLEM SELECTOR PLAN 7
h/o CKD, on lasix  ddx: CKD progression vs NELLY on CKD  baseline Cr 2.9 in 06/2021  Cr slowly improving 3.17  Received dose of Lasix for presumed effusion  s/p sodium bicarb tabs  renal diet  nephrology Dr. Art following

## 2022-05-17 NOTE — PROGRESS NOTE ADULT - ASSESSMENT
Patient is a 76yoM, AAOx3, ambulates independently, leaves alone at home, w/ PMH of HLD, DM, CKD, presents to the ED with weakness and tremulousness. Admitted for alcohol withdrawal.   Case discussed w/ pt's son Dre Ojeda at bedside who reports that pt lives alone and he drinks about 50 years every night and now has been having about two drinks per day.  No prior Etoh related hospitalization as per son.  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 son reports pt is more awake today, no behavioral issues noted, continue taper dose of ativan. Neuro consulted. CXR showed rt lung field infiltrate, likely Asp Pneumonia, started on abx, ID Dr. Cui following. echo noted reduced ef 20-25%, cardiology Dr. Franz following. Pt is now s/p EGD and colonoscopy on 5/17, showed Erythema in the gastroesophageal junction. Hiatal Hernia. diverticulosis and hemorrhoids, started on ppi daily and high fiber diet.     Pt is aox1 (name only), afebrile, awake, confused paranoid, reports "three people in room, one with knife, I don't know where I am, this is not my house". Tremors now resolved. Psych Dr. Talamantes consulted as symptoms likely due to dementia, started on seroquel 25 mg TID, CIWA now discontinued. Pt continues to have hypernatremia, IV fluids ongoing, nephrology still following. Pending PT consult Patient is a 76yoM, AAOx3, ambulates independently, leaves alone at home, w/ PMH of HLD, DM, CKD, presents to the ED with weakness and tremulousness. Case discussed w/ pt's son Dre Ojeda at bedside who reports that pt lives alone and he drinks about 50 years every night and now has been having about two drinks per day.  No prior Etoh related hospitalization as per son.  Admitted to medicine for further management of alcohol withdrawal. GI Dr. Crandall consulted for EGD and colonoscopy due to anemia. Neurology Dr. Gotti was consulted for concern of EPS symptoms on ativan. CXR showed rt lung field infiltrate, likely Asp Pneumonia, started on abx, ID Dr. Cui following. echo noted reduced ef 20-25%, cardiology Dr. Franz following. Hospital course complicated by hypernatremia likely due to pre-renal cause, nephrology Dr. Art following. Pt is now s/p EGD and colonoscopy on 5/17, showed Erythema in the gastroesophageal junction. Hiatal Hernia. diverticulosis and hemorrhoids, started on ppi daily and high fiber diet.     Pt is aox1 (name only), afebrile, awake, confused paranoid, reports "three people in room, one with knife, I don't know where I am, this is not my house". Tremors now resolved, CIWA now discontinued. Psych Dr. Talamantes consulted as symptoms likely due to dementia, started on seroquel 25 mg TID,  Pt continues to have hypernatremia, IV fluids ongoing, nephrology still following. Pending PT consult

## 2022-05-17 NOTE — PROGRESS NOTE ADULT - SUBJECTIVE AND OBJECTIVE BOX
CHIEF COMPLAINT:Patient is a 76y old  Male who presents with a chief complaint of alcohol withdrawal.Pt appears comfortable.    	  REVIEW OF SYSTEMS:  CONSTITUTIONAL: No fever, weight loss, or fatigue  EYES: No eye pain, visual disturbances, or discharge  ENT:  No difficulty hearing, tinnitus, vertigo; No sinus or throat pain  NECK: No pain or stiffness  RESPIRATORY: No cough, wheezing, chills or hemoptysis; No Shortness of Breath  CARDIOVASCULAR: No chest pain, palpitations, passing out, dizziness, or leg swelling  GASTROINTESTINAL: No abdominal or epigastric pain. No nausea, vomiting, or hematemesis; No diarrhea or constipation. No melena or hematochezia.  GENITOURINARY: No dysuria, frequency, hematuria, or incontinence  NEUROLOGICAL: No headaches, memory loss, loss of strength, numbness, or tremors  SKIN: No itching, burning, rashes, or lesions   LYMPH Nodes: No enlarged glands  ENDOCRINE: No heat or cold intolerance; No hair loss  MUSCULOSKELETAL: No joint pain or swelling; No muscle, back, or extremity pain  PSYCHIATRIC: No depression, anxiety, mood swings, or difficulty sleeping  HEME/LYMPH: No easy bruising, or bleeding gums  ALLERGY AND IMMUNOLOGIC: No hives or eczema	      PHYSICAL EXAM:  T(C): 36.4 (05-17-22 @ 08:50), Max: 36.7 (05-16-22 @ 21:02)  HR: 81 (05-17-22 @ 08:50) (60 - 93)  BP: 138/89 (05-17-22 @ 08:50) (137/96 - 151/92)  RR: 20 (05-17-22 @ 08:50) (14 - 20)  SpO2: 97% (05-17-22 @ 08:50) (95% - 97%)        Appearance: Normal	  HEENT:   Normal oral mucosa, PERRL, EOMI	  Lymphatic: No lymphadenopathy  Cardiovascular: Normal S1 S2, No JVD, No murmurs, No edema  Respiratory: Lungs clear to auscultation	  Psychiatry: A & O x 3, Mood & affect appropriate  Gastrointestinal:  Soft, Non-tender, + BS	  Skin: No rashes, No ecchymoses, No cyanosis	  Neurologic: Non-focal  Extremities: Normal range of motion, No clubbing, cyanosis or edema  Vascular: Peripheral pulses palpable 2+ bilaterally    MEDICATIONS  (STANDING):  aspirin  chewable 81 milliGRAM(s) Oral daily  brimonidine 0.2% Ophthalmic Solution 1 Drop(s) Both EYES daily  carvedilol 3.125 milliGRAM(s) Oral every 12 hours  cefepime   IVPB 1000 milliGRAM(s) IV Intermittent every 24 hours  chlorhexidine 2% Cloths 1 Application(s) Topical <User Schedule>  clobetasol 0.05% Ointment 1 Application(s) Topical two times a day  dextrose 5%. 1000 milliLiter(s) (60 mL/Hr) IV Continuous <Continuous>  dextrose 5%. 1000 milliLiter(s) (50 mL/Hr) IV Continuous <Continuous>  dextrose 5%. 1000 milliLiter(s) (100 mL/Hr) IV Continuous <Continuous>  dextrose 50% Injectable 25 Gram(s) IV Push once  dextrose 50% Injectable 12.5 Gram(s) IV Push once  dextrose 50% Injectable 25 Gram(s) IV Push once  ergocalciferol 86255 Unit(s) Oral <User Schedule>  folic acid 1 milliGRAM(s) Oral daily  glucagon  Injectable 1 milliGRAM(s) IntraMuscular once  heparin   Injectable 5000 Unit(s) SubCutaneous every 12 hours  hydrALAZINE 25 milliGRAM(s) Oral every 8 hours  insulin lispro (ADMELOG) corrective regimen sliding scale   SubCutaneous Before meals and at bedtime  isosorbide   mononitrate ER Tablet (IMDUR) 30 milliGRAM(s) Oral daily  melatonin 3 milliGRAM(s) Oral at bedtime  metroNIDAZOLE  IVPB      metroNIDAZOLE  IVPB 500 milliGRAM(s) IV Intermittent every 8 hours  Nephro-marcella 1 Tablet(s) Oral daily  pantoprazole    Tablet 40 milliGRAM(s) Oral before breakfast  timolol 0.5% Solution 1 Drop(s) Both EYES two times a day      	  LABS:	 	                         10.6   4.46  )-----------( 129      ( 17 May 2022 08:10 )             31.2     05-17    150<H>  |  117<H>  |  67<H>  ----------------------------<  174<H>  4.2   |  22  |  3.30<H>    Ca    9.8      17 May 2022 08:10    TPro  7.6  /  Alb  2.5<L>  /  TBili  0.5  /  DBili  x   /  AST  30  /  ALT  28  /  AlkPhos  85  05-17    proBNP: Serum Pro-Brain Natriuretic Peptide: 1088 pg/mL (05-10 @ 09:36)

## 2022-05-17 NOTE — PROGRESS NOTE ADULT - ASSESSMENT
76yoM, AAOx3, ambulates independently, leaves alone at home, w/ PMH of HLD, DM, CKD,CAD,CHF  presents to the ED with weakness and tremulousness,aspiration pneumonia.  1.CAD,CHF-asa coreg,imdur,hydralazine.  2.CRI-Renal f/u.  3.DM-insulin.  4.Lipid d/o-statin.  5.CIWA.  6.GI and DVT prophylaxis.

## 2022-05-17 NOTE — PROGRESS NOTE ADULT - PROBLEM SELECTOR PLAN 3
CXR- shows New rt lung infiltrate  asymptomatic  afebrile no leukocytosis  continue cefepime and flagyl for 7 days (5/13-5/20)  ID Dr. Cui following

## 2022-05-17 NOTE — PROGRESS NOTE ADULT - ASSESSMENT
Patient is a 76y old  Male , AAOx3, ambulates independently, leaves alone at home, w/ PMH of HLD, DM, CKD, presents to the ER on 5/10/22 for evaluation of weakness and tremulousness. As per ER attending, patient is being admitted for alcohol withdrawal. Patient admits to drinking 2 glasses of Whisky at night. ON admission, he has no fever and Negative CXR. Today, hospital day 3, he found to have respiratory distress and Repeat  CXR shows a new fairly significant right lung process at this time. He has started on Cefepime and the ID consult requested to assist with further evaluation and antibiotic management.     # Aspiration pneumonia  vs HAP- on CXR from 5/13  # Alcohol withdrawal syndrome     would recommend:    1. Wean off oxygen as tolerated   2. Continue Cefepime 1 g q 24 hour and Flagyl  until 5/20/22  X 3  more days   3. Aspiration precaution  4. Continue CIWA protocol as per Primary team    Attending Attestation:    Spent more than 35 minutes on total encounter, more than 50 % of the visit was spent counseling and/or coordinating care by the Attending physician.

## 2022-05-17 NOTE — PROGRESS NOTE ADULT - SUBJECTIVE AND OBJECTIVE BOX
Patient is a 76y old  Male who presents with a chief complaint of alcohol withdrawal (16 May 2022 20:32)    pt seen in icu [  ], reg med floor [   ], bed [  ], chair at bedside [   ], a+o x3 [  ], lethargic [  ],  nad [  ]    smalls [  ], ngt [  ], peg [  ], et tube [  ], cent line [  ], picc line [  ]        Allergies    No Known Allergies        Vitals    T(F): 97.3 (05-17-22 @ 04:36), Max: 98 (05-16-22 @ 21:02)  HR: 74 (05-17-22 @ 04:36) (60 - 93)  BP: 151/92 (05-17-22 @ 04:36) (137/96 - 151/92)  RR: 17 (05-17-22 @ 04:36) (14 - 17)  SpO2: 97% (05-17-22 @ 04:36) (95% - 97%)  Wt(kg): --  CAPILLARY BLOOD GLUCOSE      POCT Blood Glucose.: 184 mg/dL (17 May 2022 08:11)      Labs                          10.2   3.47  )-----------( 110      ( 16 May 2022 07:45 )             30.2       05-16    151<H>  |  119<H>  |  74<H>  ----------------------------<  149<H>  4.5   |  23  |  3.17<H>    Ca    9.7      16 May 2022 07:45    TPro  7.2  /  Alb  2.5<L>  /  TBili  0.5  /  DBili  x   /  AST  33  /  ALT  31  /  AlkPhos  84  05-16            .Blood Blood-Peripheral  05-13 @ 21:24   No growth to date.  --  --          Radiology Results      Meds    MEDICATIONS  (STANDING):  aspirin  chewable 81 milliGRAM(s) Oral daily  brimonidine 0.2% Ophthalmic Solution 1 Drop(s) Both EYES daily  carvedilol 3.125 milliGRAM(s) Oral every 12 hours  cefepime   IVPB 1000 milliGRAM(s) IV Intermittent every 24 hours  chlorhexidine 2% Cloths 1 Application(s) Topical <User Schedule>  clobetasol 0.05% Ointment 1 Application(s) Topical two times a day  dextrose 5%. 1000 milliLiter(s) (60 mL/Hr) IV Continuous <Continuous>  dextrose 5%. 1000 milliLiter(s) (50 mL/Hr) IV Continuous <Continuous>  dextrose 5%. 1000 milliLiter(s) (100 mL/Hr) IV Continuous <Continuous>  dextrose 50% Injectable 25 Gram(s) IV Push once  dextrose 50% Injectable 12.5 Gram(s) IV Push once  dextrose 50% Injectable 25 Gram(s) IV Push once  ergocalciferol [id number] Unit(s) Oral <User Schedule>  folic acid 1 milliGRAM(s) Oral daily  glucagon  Injectable 1 milliGRAM(s) IntraMuscular once  heparin   Injectable 5000 Unit(s) SubCutaneous every 12 hours  hydrALAZINE 25 milliGRAM(s) Oral every 8 hours  insulin lispro (ADMELOG) corrective regimen sliding scale   SubCutaneous Before meals and at bedtime  isosorbide   mononitrate ER Tablet (IMDUR) 30 milliGRAM(s) Oral daily  melatonin 3 milliGRAM(s) Oral at bedtime  metroNIDAZOLE  IVPB      metroNIDAZOLE  IVPB 500 milliGRAM(s) IV Intermittent every 8 hours  Nephro-marcella 1 Tablet(s) Oral daily  pantoprazole    Tablet 40 milliGRAM(s) Oral before breakfast  timolol 0.5% Solution 1 Drop(s) Both EYES two times a day      MEDICATIONS  (PRN):  dextrose Oral Gel 15 Gram(s) Oral once PRN Blood Glucose LESS THAN 70 milliGRAM(s)/deciliter  LORazepam   Injectable 2 milliGRAM(s) IntraMuscular every 1 hour PRN Symptom-triggered each CIWA-Ar score 8 or Greater.  LORazepam   Injectable 2 milliGRAM(s) IntraMuscular every 2 hours PRN Symptom-triggered: 2 point increase in CIWA-Ar score and a total score of 7 or LESS.      Physical Exam    Neuro :  no focal deficits  Respiratory: CTA B/L  CV: RRR, S1S2, no murmurs,   Abdominal: Soft, NT, ND +BS,  Extremities: No edema, + peripheral pulses    ASSESSMENT    Alcohol dependence with withdrawal    No pertinent past medical history    DM (diabetes mellitus)    HTN (hypertension)    Hypertension    Diabetes mellitus    Chronic kidney disease    HLD (hyperlipidemia)    Glaucoma    Gout    No significant past surgical history        PLAN     Patient is a 76y old  Male who presents with a chief complaint of alcohol withdrawal (16 May 2022 20:32)    pt seen in icu [  ], reg med floor [ x  ], bed [ x ], chair at bedside [   ], awake and responsive [ x ], lethargic [  ],    nad [ x ]      Allergies    No Known Allergies        Vitals    T(F): 97.3 (05-17-22 @ 04:36), Max: 98 (05-16-22 @ 21:02)  HR: 74 (05-17-22 @ 04:36) (60 - 93)  BP: 151/92 (05-17-22 @ 04:36) (137/96 - 151/92)  RR: 17 (05-17-22 @ 04:36) (14 - 17)  SpO2: 97% (05-17-22 @ 04:36) (95% - 97%)  Wt(kg): --  CAPILLARY BLOOD GLUCOSE      POCT Blood Glucose.: 184 mg/dL (17 May 2022 08:11)      Labs                          10.2   3.47  )-----------( 110      ( 16 May 2022 07:45 )             30.2       05-16    151<H>  |  119<H>  |  74<H>  ----------------------------<  149<H>  4.5   |  23  |  3.17<H>    Ca    9.7      16 May 2022 07:45    TPro  7.2  /  Alb  2.5<L>  /  TBili  0.5  /  DBili  x   /  AST  33  /  ALT  31  /  AlkPhos  84  05-16            .Blood Blood-Peripheral  05-13 @ 21:24   No growth to date.  --  --          Radiology Results      Meds    MEDICATIONS  (STANDING):  aspirin  chewable 81 milliGRAM(s) Oral daily  brimonidine 0.2% Ophthalmic Solution 1 Drop(s) Both EYES daily  carvedilol 3.125 milliGRAM(s) Oral every 12 hours  cefepime   IVPB 1000 milliGRAM(s) IV Intermittent every 24 hours  chlorhexidine 2% Cloths 1 Application(s) Topical <User Schedule>  clobetasol 0.05% Ointment 1 Application(s) Topical two times a day  dextrose 5%. 1000 milliLiter(s) (60 mL/Hr) IV Continuous <Continuous>  dextrose 5%. 1000 milliLiter(s) (50 mL/Hr) IV Continuous <Continuous>  dextrose 5%. 1000 milliLiter(s) (100 mL/Hr) IV Continuous <Continuous>  dextrose 50% Injectable 25 Gram(s) IV Push once  dextrose 50% Injectable 12.5 Gram(s) IV Push once  dextrose 50% Injectable 25 Gram(s) IV Push once  ergocalciferol 35011 Unit(s) Oral <User Schedule>  folic acid 1 milliGRAM(s) Oral daily  glucagon  Injectable 1 milliGRAM(s) IntraMuscular once  heparin   Injectable 5000 Unit(s) SubCutaneous every 12 hours  hydrALAZINE 25 milliGRAM(s) Oral every 8 hours  insulin lispro (ADMELOG) corrective regimen sliding scale   SubCutaneous Before meals and at bedtime  isosorbide   mononitrate ER Tablet (IMDUR) 30 milliGRAM(s) Oral daily  melatonin 3 milliGRAM(s) Oral at bedtime  metroNIDAZOLE  IVPB      metroNIDAZOLE  IVPB 500 milliGRAM(s) IV Intermittent every 8 hours  Nephro-marcella 1 Tablet(s) Oral daily  pantoprazole    Tablet 40 milliGRAM(s) Oral before breakfast  timolol 0.5% Solution 1 Drop(s) Both EYES two times a day      MEDICATIONS  (PRN):  dextrose Oral Gel 15 Gram(s) Oral once PRN Blood Glucose LESS THAN 70 milliGRAM(s)/deciliter  LORazepam   Injectable 2 milliGRAM(s) IntraMuscular every 1 hour PRN Symptom-triggered each CIWA-Ar score 8 or Greater.  LORazepam   Injectable 2 milliGRAM(s) IntraMuscular every 2 hours PRN Symptom-triggered: 2 point increase in CIWA-Ar score and a total score of 7 or LESS.      Physical Exam      Neuro :  no focal deficits  Respiratory: right lower lobe crackles  CV: RRR, S1S2, no murmurs,   Abdominal: Soft, NT, ND +BS,  Extremities: No edema, + peripheral pulses, right distal leg erythema and excoriation resolved   skin: diffused scaly rash       ASSESSMENT    etoh withdrawal,   metabolic encephalopathy,   right le cellulitis,   aspiration pna   psoriasis,   acute on ckd,   pancytopenia likely 2nd to alcoholism   r/o parkinsons disease  h/o HLD,   DM,   chronic HF rEF  CKD  Alcohol dependence with withdrawal  Glaucoma  Gout      PLAN    ativan as per ciwa protocol,   multivitamin, folate, thiamine   cxr with There is a new fairly significant right lung process at this time noted above.  id f/u   Continue Cefepime 1 g q 24 hour and Flagyl  until 5/20/22  blood cx neg noted above   hold norvasc 2nd to lower extrem edema,   echo with EF 20-25%, Grade I diastolic dysfunction  RV systolic pressure is mildly increased noted    ecg with Diagnosis Line Sinus rhythm @ 88 bpm with Premature atrial complexes, Left axis deviation, Left bundle branch block, Abnormal ECG noted above  cardio f/u   d/c'd labetalol,   added asa coreg,imdur,hydralazine.  wound care eval   cont clobetasol 0.05% bid for psoriasis   lesions near resolution   s/p lactulose 10 g x1   rept ammonia <10  stool occult blood neg noted above   lft wnl   gi f/u   No evidence of acute GI bleeding  Monitor H/H  transfuse PRBC as needed  Protonix 40mg po daily  Avoid NSAID  Check stool for occult blood  f/u EGD and colonoscopy   abd us with Gallstones in the contracted gallbladder. 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  Deterioration in renal function, possible chronic versus acute due to dehydration  d/c'd lasix   serum creat improving  Continue IV fluids D5W follow lab in am  d/c'd Sodium bicarbonate PO  2 gm K diet  lispro ss   neuro cons noted   Mild distal symmetric bilateral UE tremulousness in the setting of, and entirely consistent with, EtOH withdrawal, chronic EtOH abuse,    No exam findings diagnostic for an extrapyramidal disorder.  In any case, Dx and consideration of potential management of any (possible suspected) extrapyramidal disorder cannot be made in an acute setting such as this one.    When the Pt has FULLY recovered from his acute medical problems, if there is a desire for neurologic evaluation, then please schedule a routine out-Pt visit.   repeat ct head stable noted    cont current meds

## 2022-05-17 NOTE — PROGRESS NOTE ADULT - PROBLEM SELECTOR PLAN 1
likely due to dementia vs alcohol withdrawal vs infection  [facility] report noted, no acute hemorrhage, Extensive parenchymal volume loss and chronic microvascular ischemic   changes are again seen  per radha peralta, baseline pt is aox3  currently aox1  s/p lactulose  ammonia serum downtrending  start trial on seroquel 25 mg TID PRN for agitation  psych Dr. nunez following likely due to dementia vs alcohol withdrawal vs infection  CTH report noted, no acute hemorrhage, Extensive parenchymal volume loss and chronic microvascular ischemic   changes are again seen  per radha peralta, baseline pt is aox3  currently aox1  s/p lactulose, ativan  ammonia serum downtrending  start trial on seroquel 25 mg TID PRN for agitation  psych Dr. nunez following  neurology Dr. Gotti following, symptoms not EPS related

## 2022-05-17 NOTE — PROGRESS NOTE ADULT - SUBJECTIVE AND OBJECTIVE BOX
Patient is a 76y old  Male who presents with a chief complaint of alcohol withdrawal (17 May 2022 18:00)    OVERNIGHT EVENTS: episode of agitation, ciwa 20, restrained    REVIEW OF SYSTEMS:  unable to assess due to altered mental status    T(C): 36.2 (05-17-22 @ 11:52), Max: 36.7 (05-16-22 @ 21:02)  HR: 72 (05-17-22 @ 11:52) (60 - 81)  BP: 141/69 (05-17-22 @ 11:52) (137/96 - 151/92)  RR: 19 (05-17-22 @ 11:52) (15 - 20)  SpO2: 94% (05-17-22 @ 11:52) (94% - 97%)  Wt(kg): --Vital Signs Last 24 Hrs  T(C): 36.2 (17 May 2022 11:52), Max: 36.7 (16 May 2022 21:02)  T(F): 97.2 (17 May 2022 11:52), Max: 98 (16 May 2022 21:02)  HR: 72 (17 May 2022 11:52) (60 - 81)  BP: 141/69 (17 May 2022 11:52) (137/96 - 151/92)  BP(mean): 111 (17 May 2022 04:36) (111 - 111)  RR: 19 (17 May 2022 11:52) (15 - 20)  SpO2: 94% (17 May 2022 11:52) (94% - 97%)    MEDICATIONS  (STANDING):  aspirin  chewable 81 milliGRAM(s) Oral daily  brimonidine 0.2% Ophthalmic Solution 1 Drop(s) Both EYES daily  carvedilol 3.125 milliGRAM(s) Oral every 12 hours  cefepime   IVPB 1000 milliGRAM(s) IV Intermittent every 24 hours  chlorhexidine 2% Cloths 1 Application(s) Topical <User Schedule>  clobetasol 0.05% Ointment 1 Application(s) Topical two times a day  dextrose 5%. 1000 milliLiter(s) (60 mL/Hr) IV Continuous <Continuous>  dextrose 5%. 1000 milliLiter(s) (50 mL/Hr) IV Continuous <Continuous>  dextrose 5%. 1000 milliLiter(s) (100 mL/Hr) IV Continuous <Continuous>  dextrose 50% Injectable 12.5 Gram(s) IV Push once  dextrose 50% Injectable 25 Gram(s) IV Push once  dextrose 50% Injectable 25 Gram(s) IV Push once  ergocalciferol 91710 Unit(s) Oral <User Schedule>  folic acid 1 milliGRAM(s) Oral daily  glucagon  Injectable 1 milliGRAM(s) IntraMuscular once  heparin   Injectable 5000 Unit(s) SubCutaneous every 12 hours  hydrALAZINE 25 milliGRAM(s) Oral every 8 hours  insulin lispro (ADMELOG) corrective regimen sliding scale   SubCutaneous Before meals and at bedtime  isosorbide   mononitrate ER Tablet (IMDUR) 30 milliGRAM(s) Oral daily  melatonin 3 milliGRAM(s) Oral at bedtime  metroNIDAZOLE  IVPB      metroNIDAZOLE  IVPB 500 milliGRAM(s) IV Intermittent every 8 hours  Nephro-marcella 1 Tablet(s) Oral daily  pantoprazole    Tablet 40 milliGRAM(s) Oral before breakfast  timolol 0.5% Solution 1 Drop(s) Both EYES two times a day    MEDICATIONS  (PRN):  dextrose Oral Gel 15 Gram(s) Oral once PRN Blood Glucose LESS THAN 70 milliGRAM(s)/deciliter  LORazepam   Injectable 2 milliGRAM(s) IV Push every 2 hours PRN Symptom-triggered: 2 point increase in CIWA -Ar score and a total score of 7 or LESS  LORazepam   Injectable 2 milliGRAM(s) IV Push every 1 hour PRN Symptom-triggered: each CIWA -Ar score 8 or GREATER    PHYSICAL EXAM:  GENERAL: awake, confused  EYES: clear conjunctiva;  ENMT: Moist mucous membranes  NECK: Supple, No JVD, Normal thyroid  CHEST/LUNG: Clear to auscultation bilaterally; No rales, rhonchi, wheezing, or rubs  HEART: S1, S2, Regular rate and rhythm  ABDOMEN: Soft, Nontender, Nondistended; Bowel sounds present  NEURO: Alert & Oriented X1 (name only)  EXTREMITIES: No LE edema, no calf tenderness  LYMPH: No lymphadenopathy noted  SKIN: No rashes or lesions    Consultant(s) Notes Reviewed:  [x ] YES  [ ] NO  Care Discussed with Consultants/Other Providers [ x] YES  [ ] NO    LABS:                        10.6   4.46  )-----------( 129      ( 17 May 2022 08:10 )             31.2     05-17    150<H>  |  117<H>  |  67<H>  ----------------------------<  174<H>  4.2   |  22  |  3.30<H>    Ca    9.8      17 May 2022 08:10    TPro  7.6  /  Alb  2.5<L>  /  TBili  0.5  /  DBili  x   /  AST  30  /  ALT  28  /  AlkPhos  85  05-17      CAPILLARY BLOOD GLUCOSE      POCT Blood Glucose.: 276 mg/dL (17 May 2022 16:37)  POCT Blood Glucose.: 132 mg/dL (17 May 2022 11:59)  POCT Blood Glucose.: 184 mg/dL (17 May 2022 08:11)  POCT Blood Glucose.: 183 mg/dL (17 May 2022 07:56)  POCT Blood Glucose.: 154 mg/dL (16 May 2022 21:07)            RADIOLOGY & ADDITIONAL TESTS:  < from: EGD-Colonoscopy (05.17.22 @ 14:30) >    EGD Impressions:        Erythema in the gastroesophageal junction.        Hiatal Hernia.        Erythema in the antrum. (Biopsy).        Normal mucosa in the whole examined duodenum.    < end of copied text >  < from: EGD-Colonoscopy (05.17.22 @ 14:30) >    Colonoscopy Impressions:        Moderate diverticulosis of the descending colon, transverse colon and    ascending colon.        Internal hemorrhoids.      < end of copied text >    < from: Xray Chest 1 View- PORTABLE-Urgent (05.10.22 @ 09:20) >    ACC: [id number] EXAM:  XR CHEST PORTABLE URGENT 1V                          PROCEDURE DATE:  05/10/2022          INTERPRETATION:  AP erect chest on May 10, 2022 at 9:11 AM. Patient has   chest pain.    Heart magnified by technique.    On June 8, 2021there were mild basilar effusions with adjacent   atelectases.    Presently lungs are clear.    IMPRESSION: Clear lungs at this time.    --- End of Report ---              < end of copied text >  < from: CT Head No Cont (05.10.22 @ 14:32) >    ACC: [id number] EXAM:  CT BRAIN                          PROCEDURE DATE:  05/10/2022          INTERPRETATION:  CT head without IV contrast    CLINICAL INFORMATION: tremors, alcohol dependence    TECHNIQUE: Contiguous axial 5 mm sections were obtainedthrough the head.   Sagittal and coronal 2-D reformatted images were also obtained.   This   scan was performed using automatic exposure control (radiation dose   reduction software) to obtain a diagnostic image quality scan with   patient dose as low as reasonably achievable.    FINDINGS:   CT dated 04/20/2016 available for review.    The brain demonstrates moderate periventricular white matter ischemia.     No acute cerebral cortical infarct is seen.  No intracranial hemorrhage   is found.  No mass effect is found in the brain.    The ventricles, sulci and basal cisterns show moderate bitemporal lobe   and bifrontal lobe atrophy.    The orbits are unremarkable.  The paranasal sinuses are significant for   mucosal thickening in the RIGHT maxillary and RIGHT ethmoid and frontal   and RIGHT frontal sinuses.  The nasal cavity appears intact.  The   nasopharynx is symmetric.  The central skull base, petrous temporal bones   and calvarium remain intact.      IMPRESSION:   Moderate periventricular white matter ischemia. Moderate   bitemporal lobe and bifrontal lobe atrophy.   Mucosal thickening in the   RIGHT maxillary and RIGHT ethmoid and frontal and RIGHT frontal sinuses.    --- End of Report ---            KAMILLE CAMPBELL MD; Attending Radiologist  This document has been electronically signed. May 10 2022  2:42PM    < end of copied text >  < from: US Abdomen Complete (US Abdomen Complete .) (05.13.22 @ 08:54) >    ACC: [id number] EXAM:  US ABDOMEN COMPLETE                          PROCEDURE DATE:  05/13/2022          INTERPRETATION:  CLINICAL INFORMATION: Elevated liver enzymes and renal   failure    COMPARISON: None available.    TECHNIQUE: Sonography of the abdomen.    FINDINGS:  Liver: Hepatic steatosis.  Bile ducts: Normal caliber. Common bile duct measures 6 mm.  Gallbladder: Gallstones in the contracted gallbladder.  Pancreas: Not visualized due to overlying bowel gas.  Spleen: Not visualized due to overlying bowel gas.  Right kidney: 9.5 cm. No hydronephrosis. Within normal limits.  Left kidney: 10.4 cm. No hydronephrosis. Within normal limits.  Ascites: None.  Aorta and IVC: Not visualized due to overlying bowel gas.    IMPRESSION:  Gallstones in the contracted gallbladder.    Hepatic steatosis.    --- End of Report ---            YASMIN BLISS MD; Attending Radiologist  This document has been electronically signed. May 13 2022  9:07AM    < end of copied text >  < from: Xray Chest 1 View- PORTABLE-Urgent (Xray Chest 1 View- PORTABLE-Urgent .) (05.13.22 @ 16:03) >    ACC: [id number] EXAM:  XR CHEST PORTABLE URGENT 1V                          PROCEDURE DATE:  05/13/2022          INTERPRETATION:  AP erect chest on May 13, 2022 at 3:14 PM. Patient is   short of breath.    Heart magnified by technique.    There is a significant infiltrates less effusion emanating off the right   lower hilum new since May 10.    IMPRESSION: There is a new fairly significant right lung process at this   time.    --- End of Report ---            TANISHA VELEZ MD; Attending Radiologist  This document has been electronically signed. May 13 2022  4:24PM    < end of copied text >  < from: CT Head No Cont (05.14.22 @ 10:40) >    ACC: [id number] EXAM:  CT BRAIN                          PROCEDURE DATE:  05/14/2022          INTERPRETATION:  Clinical indication: Altered mental status    Multiple axial sections were performed from base skull to vertex without   contrast enhancement. Coronal and sagittal reconstructions were performed    This exam is compared prior head CT performed on May 10, 2021    Extensive parenchymal volume loss and chronic microvascular ischemic   changes are again seen.    Calcification is seen involving both distal internal carotid, distal   vertebral and basilar arteries are identified. These findings appear   unchanged    There is no acute hemorrhage mass or mass effect seen    Evaluation of osseous structures with the appropriate window appears  normal    Right posterior ethmoid sinus mucosal thickening is again seen.    Both mastoid and middle ear regions appear clear.    IMPRESSION: Stable exam.    --- End of Report ---              Imaging Personally Reviewed:  [ ] YES  [ ] NO

## 2022-05-17 NOTE — PROGRESS NOTE ADULT - PROBLEM SELECTOR PLAN 2
p/w generalized weakness and tremulousness  reports 2 glasses of Whisky, but likely more according to son  CTH- periventricular white matter ischemia, bitemporal and bifrontal atrophy  more alert and awake, follows commands  s/p CIWA protocol  tremors now resolved  continue thiamine, FA, MV p/w generalized weakness and tremulousness  reports 2 glasses of Whisky, but likely more according to son  CTH- periventricular white matter ischemia, bitemporal and bifrontal atrophy  more alert and awake, follows commands  s/p CIWA protocol and ativan  tremors now resolved  continue thiamine, FA, MV

## 2022-05-17 NOTE — PROGRESS NOTE ADULT - SUBJECTIVE AND OBJECTIVE BOX
Patient is seen and examined at the bed side, is afebrile. He is tolerating Abx well.      REVIEW OF SYSTEMS: All other review systems are negative      ALLERGIES: No Known Allergies      Vital Signs Last 24 Hrs  T(C): 36.2 (17 May 2022 11:52), Max: 36.7 (16 May 2022 21:02)  T(F): 97.2 (17 May 2022 11:52), Max: 98 (16 May 2022 21:02)  HR: 72 (17 May 2022 11:52) (60 - 81)  BP: 141/69 (17 May 2022 11:52) (137/96 - 151/92)  BP(mean): 111 (17 May 2022 04:36) (111 - 111)  RR: 19 (17 May 2022 11:52) (14 - 20)  SpO2: 94% (17 May 2022 11:52) (94% - 97%)      PHYSICAL EXAM:  GENERAL: Not in distress, on oxygen via NC  CHEST/LUNG: Not using accessory muscles   HEART: s1 and s2 present  ABDOMEN:  Nontender and  Nondistended  EXTREMITIES: No pedal  edema  CNS: Awake and alert       LABS:                          10.6   4.46  )-----------( 129      ( 17 May 2022 08:10 )             31.2                                  10.2   3.47  )-----------( 110      ( 16 May 2022 07:45 )             30.2       0517    150<H>  |  117<H>  |  67<H>  ----------------------------<  174<H>  4.2   |  22  |  3.30<H>    Ca    9.8      17 May 2022 08:10    TPro  7.6  /  Alb  2.5<L>  /  TBili  0.5  /  DBili  x   /  AST  30  /  ALT  28  /  AlkPhos  85  --16    151<H>  |  119<H>  |  74<H>  ----------------------------<  149<H>  4.5   |  23  |  3.17<H>    Ca    9.7      16 May 2022 07:45    TPro  7.2  /  Alb  2.5<L>  /  TBili  0.5  /  DBili  x   /  AST  33  /  ALT  31  /  AlkPhos  84  05-16      CAPILLARY BLOOD GLUCOSE  POCT Blood Glucose.: 125 mg/dL (13 May 2022 17:01)  POCT Blood Glucose.: 130 mg/dL (13 May 2022 11:58)  POCT Blood Glucose.: 113 mg/dL (13 May 2022 08:17)  POCT Blood Glucose.: 123 mg/dL (12 May 2022 21:33)        Urinalysis Basic - ( 12 May 2022 12:13 )  Color: Yellow / Appearance: Clear / S.010 / pH: x  Gluc: x / Ketone: Negative  / Bili: Negative / Urobili: Negative   Blood: x / Protein: 100 / Nitrite: Negative   Leuk Esterase: Negative / RBC: Negative /HPF / WBC 0-2 /HPF   Sq Epi: x / Non Sq Epi: Occasional /HPF / Bacteria: Negative /HPF        MEDICATIONS  (STANDING):    aspirin  chewable 81 milliGRAM(s) Oral daily  brimonidine 0.2% Ophthalmic Solution 1 Drop(s) Both EYES daily  carvedilol 3.125 milliGRAM(s) Oral every 12 hours  cefepime   IVPB 1000 milliGRAM(s) IV Intermittent every 24 hours  chlorhexidine 2% Cloths 1 Application(s) Topical <User Schedule>  clobetasol 0.05% Ointment 1 Application(s) Topical two times a day  ergocalciferol 39408 Unit(s) Oral <User Schedule>  folic acid 1 milliGRAM(s) Oral daily  glucagon  Injectable 1 milliGRAM(s) IntraMuscular once  heparin   Injectable 5000 Unit(s) SubCutaneous every 12 hours  hydrALAZINE 25 milliGRAM(s) Oral every 8 hours  insulin lispro (ADMELOG) corrective regimen sliding scale   SubCutaneous Before meals and at bedtime  isosorbide   mononitrate ER Tablet (IMDUR) 30 milliGRAM(s) Oral daily  melatonin 3 milliGRAM(s) Oral at bedtime  metroNIDAZOLE  IVPB      metroNIDAZOLE  IVPB 500 milliGRAM(s) IV Intermittent every 8 hours  Nephro-marcella 1 Tablet(s) Oral daily  pantoprazole    Tablet 40 milliGRAM(s) Oral before breakfast  timolol 0.5% Solution 1 Drop(s) Both EYES two times a day        RADIOLOGY & ADDITIONAL TESTS:    22: Xray Chest 1 View- PORTABLE-Urgent (Xray Chest 1 View- PORTABLE-Urgent .) (22 @ 16:03) >    IMPRESSION: There is a new fairly significant right lung process at this time.      22: US Abdomen Complete (US Abdomen Complete .) (22 @ 08:54) Gallstones in the contracted gallbladder.  Hepatic steatosis.      5/10/22: CT Head No Cont (05.10.22 @ 14:32) :   Moderate periventricular white matter ischemia. Moderate  bitemporal lobe and bifrontal lobe atrophy.     Mucosal thickening in the  RIGHT maxillary and RIGHT ethmoid and frontal and RIGHT frontal sinuses.      5/10/22: Xray Chest 1 View- PORTABLE-Urgent (05.10.22 @ 09:20) >Presently lungs are clear.        MICROBIOLOGY DATA:    Culture - Blood (22 @ 21:24)   Specimen Source: .Blood Blood-Peripheral   Culture Results: No growth to date.     Culture - Blood (22 @ 21:24)   Specimen Source: .Blood Blood-Peripheral   Culture Results: No growth to date.     MRSA/MSSA PCR (22 @ 05:59)   MRSA PCR Result.: NotDetec:    COVID-19 PCR (05.10.22 @ 09:36)   COVID-19 PCR: NotDetec:

## 2022-05-17 NOTE — PROGRESS NOTE ADULT - SUBJECTIVE AND OBJECTIVE BOX
Problem List:  CKD stage 4 with NELLY  Hypernatremia    PAST MEDICAL & SURGICAL HISTORY:  DM (diabetes mellitus)      HTN (hypertension)      Chronic kidney disease      HLD (hyperlipidemia)      Glaucoma      Gout      No significant past surgical history          No Known Allergies      MEDICATIONS  (STANDING):  aspirin  chewable 81 milliGRAM(s) Oral daily  brimonidine 0.2% Ophthalmic Solution 1 Drop(s) Both EYES daily  carvedilol 3.125 milliGRAM(s) Oral every 12 hours  cefepime   IVPB 1000 milliGRAM(s) IV Intermittent every 24 hours  chlorhexidine 2% Cloths 1 Application(s) Topical <User Schedule>  clobetasol 0.05% Ointment 1 Application(s) Topical two times a day  dextrose 5%. 1000 milliLiter(s) (60 mL/Hr) IV Continuous <Continuous>  dextrose 5%. 1000 milliLiter(s) (50 mL/Hr) IV Continuous <Continuous>  dextrose 5%. 1000 milliLiter(s) (100 mL/Hr) IV Continuous <Continuous>  dextrose 50% Injectable 12.5 Gram(s) IV Push once  dextrose 50% Injectable 25 Gram(s) IV Push once  dextrose 50% Injectable 25 Gram(s) IV Push once  ergocalciferol 06084 Unit(s) Oral <User Schedule>  folic acid 1 milliGRAM(s) Oral daily  glucagon  Injectable 1 milliGRAM(s) IntraMuscular once  heparin   Injectable 5000 Unit(s) SubCutaneous every 12 hours  hydrALAZINE 25 milliGRAM(s) Oral every 8 hours  insulin lispro (ADMELOG) corrective regimen sliding scale   SubCutaneous Before meals and at bedtime  isosorbide   mononitrate ER Tablet (IMDUR) 30 milliGRAM(s) Oral daily  melatonin 3 milliGRAM(s) Oral at bedtime  metroNIDAZOLE  IVPB      metroNIDAZOLE  IVPB 500 milliGRAM(s) IV Intermittent every 8 hours  Nephro-marcella 1 Tablet(s) Oral daily  pantoprazole    Tablet 40 milliGRAM(s) Oral before breakfast  timolol 0.5% Solution 1 Drop(s) Both EYES two times a day    MEDICATIONS  (PRN):  dextrose Oral Gel 15 Gram(s) Oral once PRN Blood Glucose LESS THAN 70 milliGRAM(s)/deciliter  LORazepam   Injectable 2 milliGRAM(s) IV Push every 2 hours PRN Symptom-triggered: 2 point increase in CIWA -Ar score and a total score of 7 or LESS  LORazepam   Injectable 2 milliGRAM(s) IV Push every 1 hour PRN Symptom-triggered: each CIWA -Ar score 8 or GREATER                            10.6   4.46  )-----------( 129      ( 17 May 2022 08:10 )             31.2     05-17    150<H>  |  117<H>  |  67<H>  ----------------------------<  174<H>  4.2   |  22  |  3.30<H>    Ca    9.8      17 May 2022 08:10    TPro  7.6  /  Alb  2.5<L>  /  TBili  0.5  /  DBili  x   /  AST  30  /  ALT  28  /  AlkPhos  85  05-17            REVIEW OF SYSTEMS:  confused        VITALS:  T(F): 97.2 (05-17-22 @ 11:52), Max: 98 (05-16-22 @ 21:02)  HR: 72 (05-17-22 @ 11:52)  BP: 141/69 (05-17-22 @ 11:52)  RR: 19 (05-17-22 @ 11:52)  SpO2: 94% (05-17-22 @ 11:52)  Wt(kg): --      PHYSICAL EXAM:  Constitutional: well developed, no diaphoresis, no distress.  Neck: No JVD, no carotid bruit, supple, no adenopathy  Respiratory: Good air entrance B/L, no wheezes, rales or rhonchi  Cardiovascular: S1, S2, RRR, no pericardial rub, no murmur  Abdomen: BS+, soft, no tenderness, no bruit  Pelvis: bladder nondistended  Extremities: No edema  Pulses: All present  Confused and agitated

## 2022-05-18 ENCOUNTER — TRANSCRIPTION ENCOUNTER (OUTPATIENT)
Age: 77
End: 2022-05-18

## 2022-05-18 DIAGNOSIS — F10.20 ALCOHOL DEPENDENCE, UNCOMPLICATED: ICD-10-CM

## 2022-05-18 LAB
ALBUMIN SERPL ELPH-MCNC: 2.3 G/DL — LOW (ref 3.5–5)
ALP SERPL-CCNC: 77 U/L — SIGNIFICANT CHANGE UP (ref 40–120)
ALT FLD-CCNC: 23 U/L DA — SIGNIFICANT CHANGE UP (ref 10–60)
ANION GAP SERPL CALC-SCNC: 11 MMOL/L — SIGNIFICANT CHANGE UP (ref 5–17)
AST SERPL-CCNC: 26 U/L — SIGNIFICANT CHANGE UP (ref 10–40)
BILIRUB SERPL-MCNC: 0.5 MG/DL — SIGNIFICANT CHANGE UP (ref 0.2–1.2)
BUN SERPL-MCNC: 64 MG/DL — HIGH (ref 7–18)
CALCIUM SERPL-MCNC: 9.6 MG/DL — SIGNIFICANT CHANGE UP (ref 8.4–10.5)
CHLORIDE SERPL-SCNC: 118 MMOL/L — HIGH (ref 96–108)
CO2 SERPL-SCNC: 23 MMOL/L — SIGNIFICANT CHANGE UP (ref 22–31)
CREAT SERPL-MCNC: 3.29 MG/DL — HIGH (ref 0.5–1.3)
CULTURE RESULTS: SIGNIFICANT CHANGE UP
CULTURE RESULTS: SIGNIFICANT CHANGE UP
EGFR: 19 ML/MIN/1.73M2 — LOW
GLUCOSE BLDC GLUCOMTR-MCNC: 156 MG/DL — HIGH (ref 70–99)
GLUCOSE BLDC GLUCOMTR-MCNC: 190 MG/DL — HIGH (ref 70–99)
GLUCOSE BLDC GLUCOMTR-MCNC: 197 MG/DL — HIGH (ref 70–99)
GLUCOSE BLDC GLUCOMTR-MCNC: 310 MG/DL — HIGH (ref 70–99)
GLUCOSE SERPL-MCNC: 149 MG/DL — HIGH (ref 70–99)
HCT VFR BLD CALC: 29.5 % — LOW (ref 39–50)
HGB BLD-MCNC: 9.9 G/DL — LOW (ref 13–17)
MAGNESIUM SERPL-MCNC: 2.2 MG/DL — SIGNIFICANT CHANGE UP (ref 1.6–2.6)
MCHC RBC-ENTMCNC: 33.6 GM/DL — SIGNIFICANT CHANGE UP (ref 32–36)
MCHC RBC-ENTMCNC: 34.3 PG — HIGH (ref 27–34)
MCV RBC AUTO: 102.1 FL — HIGH (ref 80–100)
NRBC # BLD: 0 /100 WBCS — SIGNIFICANT CHANGE UP (ref 0–0)
PLATELET # BLD AUTO: 158 K/UL — SIGNIFICANT CHANGE UP (ref 150–400)
POTASSIUM SERPL-MCNC: 4 MMOL/L — SIGNIFICANT CHANGE UP (ref 3.5–5.3)
POTASSIUM SERPL-SCNC: 4 MMOL/L — SIGNIFICANT CHANGE UP (ref 3.5–5.3)
PROT SERPL-MCNC: 7.5 G/DL — SIGNIFICANT CHANGE UP (ref 6–8.3)
RBC # BLD: 2.89 M/UL — LOW (ref 4.2–5.8)
RBC # FLD: 15.1 % — HIGH (ref 10.3–14.5)
SODIUM SERPL-SCNC: 152 MMOL/L — HIGH (ref 135–145)
SPECIMEN SOURCE: SIGNIFICANT CHANGE UP
SPECIMEN SOURCE: SIGNIFICANT CHANGE UP
WBC # BLD: 3.92 K/UL — SIGNIFICANT CHANGE UP (ref 3.8–10.5)
WBC # FLD AUTO: 3.92 K/UL — SIGNIFICANT CHANGE UP (ref 3.8–10.5)

## 2022-05-18 PROCEDURE — 99232 SBSQ HOSP IP/OBS MODERATE 35: CPT

## 2022-05-18 RX ADMIN — HEPARIN SODIUM 5000 UNIT(S): 5000 INJECTION INTRAVENOUS; SUBCUTANEOUS at 06:38

## 2022-05-18 RX ADMIN — Medication 100 MILLIGRAM(S): at 14:55

## 2022-05-18 RX ADMIN — Medication 1 DROP(S): at 17:37

## 2022-05-18 RX ADMIN — CARVEDILOL PHOSPHATE 3.12 MILLIGRAM(S): 80 CAPSULE, EXTENDED RELEASE ORAL at 17:38

## 2022-05-18 RX ADMIN — PANTOPRAZOLE SODIUM 40 MILLIGRAM(S): 20 TABLET, DELAYED RELEASE ORAL at 06:39

## 2022-05-18 RX ADMIN — Medication 1: at 08:08

## 2022-05-18 RX ADMIN — Medication 81 MILLIGRAM(S): at 12:56

## 2022-05-18 RX ADMIN — Medication 25 MILLIGRAM(S): at 06:39

## 2022-05-18 RX ADMIN — Medication 1 DROP(S): at 06:37

## 2022-05-18 RX ADMIN — Medication 1 TABLET(S): at 12:53

## 2022-05-18 RX ADMIN — Medication 1 APPLICATION(S): at 06:39

## 2022-05-18 RX ADMIN — Medication 1 MILLIGRAM(S): at 12:59

## 2022-05-18 RX ADMIN — HEPARIN SODIUM 5000 UNIT(S): 5000 INJECTION INTRAVENOUS; SUBCUTANEOUS at 17:37

## 2022-05-18 RX ADMIN — ISOSORBIDE MONONITRATE 30 MILLIGRAM(S): 60 TABLET, EXTENDED RELEASE ORAL at 12:48

## 2022-05-18 RX ADMIN — Medication 100 MILLIGRAM(S): at 21:41

## 2022-05-18 RX ADMIN — Medication 25 MILLIGRAM(S): at 21:40

## 2022-05-18 RX ADMIN — Medication 1: at 21:42

## 2022-05-18 RX ADMIN — QUETIAPINE FUMARATE 25 MILLIGRAM(S): 200 TABLET, FILM COATED ORAL at 21:41

## 2022-05-18 RX ADMIN — Medication 3 MILLIGRAM(S): at 21:40

## 2022-05-18 RX ADMIN — CEFEPIME 100 MILLIGRAM(S): 1 INJECTION, POWDER, FOR SOLUTION INTRAMUSCULAR; INTRAVENOUS at 06:37

## 2022-05-18 RX ADMIN — BRIMONIDINE TARTRATE 1 DROP(S): 2 SOLUTION/ DROPS OPHTHALMIC at 12:57

## 2022-05-18 RX ADMIN — CARVEDILOL PHOSPHATE 3.12 MILLIGRAM(S): 80 CAPSULE, EXTENDED RELEASE ORAL at 06:38

## 2022-05-18 RX ADMIN — Medication 100 MILLIGRAM(S): at 06:36

## 2022-05-18 RX ADMIN — CHLORHEXIDINE GLUCONATE 1 APPLICATION(S): 213 SOLUTION TOPICAL at 06:37

## 2022-05-18 RX ADMIN — Medication 1 APPLICATION(S): at 17:40

## 2022-05-18 RX ADMIN — Medication 1: at 11:56

## 2022-05-18 RX ADMIN — Medication 25 MILLIGRAM(S): at 14:54

## 2022-05-18 RX ADMIN — Medication 4: at 17:33

## 2022-05-18 NOTE — PROGRESS NOTE ADULT - PROBLEM SELECTOR PLAN 4
- echo noted 20-25%  - C/W asa coreg, imdur, hydralazine  - cardiology Dr. Franz following - echo noted 20-25%  - C/W asa coreg, imdur, hydralazine, & statin  - cardiology Dr. Jorge following

## 2022-05-18 NOTE — PROGRESS NOTE ADULT - ASSESSMENT
76yoM, AAOx3, ambulates independently, leaves alone at home, w/ PMH of HLD, DM, CKD,CAD,CHF  presents to the ED with weakness and tremulousness,aspiration pneumonia.  1.CAD,CHF-asa coreg,imdur,hydralazine.  2.CRI-Renal f/u.  3.DM-insulin.  4.Lipid d/o-statin.  5.CIWA.  6.Hypernatremia-D5w.  7.GI and DVT prophylaxis.

## 2022-05-18 NOTE — BH CONSULTATION LIAISON ASSESSMENT NOTE - SUMMARY
77M, , retired and living alone, with PHx of alcohol use DO per family (never formally dx or tx) and MHx of HLD, DM, CKD and glaucoma, presented to the ED on 5/10 with weakness and tremulousness, admitted for alcohol withdrawal. Psych consult was requested for suspected dementia vs delirium after pt abruptly became confused and paranoid after receiving CIWA PRN (Ativan 2 mg) on 5/17. On exam, pt presents pleasant and cooperative but obviously confused and oriented only to self. On exam, pt acknowledges consistent heavy alcohol intake, but does not manifest s/s of withdrawal at this time. Clinical impression is dementia without behavioral disturbance with co-occurring alcohol dependence, which has likely increased severity of pt's dementia and/or accelerated its progression. Impression of dementia is supported by head CT which found extensive parenchymal volume loss. Unclear whether pt needs standing meds for dementia, but recommend Seroquel 25 mg q8h PRN to address any behavioral disturbances in the hospital. Pt does not appear to present an acute risk of harm to self or others at the time of assessment, and does not appear to be in need of admission to  psych at the time of assessment.  
77M, , retired and living alone, with PHx of alcohol use DO per family (never formally dx or tx) and MHx of HLD, DM, CKD and glaucoma, presented to the ED on 5/10 with weakness and tremulousness, admitted for alcohol withdrawal. Psych consult was requested for suspected dementia vs delirium after pt abruptly became confused and paranoid after receiving CIWA PRN (Ativan 2 mg) on 5/17. On exam, pt presents pleasant and cooperative but obviously confused and oriented only to self. On exam, pt acknowledges consistent heavy alcohol intake, but does not manifest s/s of withdrawal at this time. Clinical impression is dementia without behavioral disturbance with co-occurring alcohol dependence, which has likely increased severity of pt's dementia and/or accelerated its progression. Impression of dementia is supported by head CT which found extensive parenchymal volume loss. Unclear whether pt needs standing meds for dementia, but recommend Seroquel 25 mg q8h PRN to address any behavioral disturbances in the hospital. Pt does not appear to present an acute risk of harm to self or others at the time of assessment, and does not appear to be in need of admission to  psych at the time of assessment.

## 2022-05-18 NOTE — DISCHARGE NOTE PROVIDER - NSDCMRMEDTOKEN_GEN_ALL_CORE_FT
allopurinol 100 mg oral tablet: 1 tab(s) orally 2 times a day  amLODIPine 5 mg oral tablet: 1 tab(s) orally once a day  Combigan 0.2%-0.5% ophthalmic solution: 1 drop(s) to each affected eye 2 times a day  home  folic acid 1 mg oral tablet: 1 tab(s) orally once a day  home/hosp  furosemide 20 mg oral tablet: 1 tab(s) orally once a day   Januvia 25 mg oral tablet: 1 tab(s) orally once a day  home  labetalol 100 mg oral tablet: 1 tab(s) orally 2 times a day  timolol maleate 0.5% ophthalmic solution: 1 drop(s) to each affected eye 2 times a day  hosp  Vitamin D3 1250 mcg (50,000 intl units) oral capsule: 1 cap(s) orally once a week   allopurinol 100 mg oral tablet: 1 tab(s) orally 2 times a day  amLODIPine 5 mg oral tablet: 1 tab(s) orally once a day  aspirin 81 mg oral tablet, chewable: 1 tab(s) orally once a day  carvedilol 6.25 mg oral tablet: 1 tab(s) orally every 12 hours  Combigan 0.2%-0.5% ophthalmic solution: 1 drop(s) to each affected eye 2 times a day  home  folic acid 1 mg oral tablet: 1 tab(s) orally once a day  home/hosp  hydrALAZINE 25 mg oral tablet: 1 tab(s) orally every 8 hours  isosorbide mononitrate 30 mg oral tablet, extended release: 1 tab(s) orally once a day  Januvia 25 mg oral tablet: 1 tab(s) orally once a day  home  labetalol 100 mg oral tablet: 1 tab(s) orally 2 times a day  multivitamin: 1 tab(s) orally once a day  pantoprazole 40 mg oral delayed release tablet: 1 tab(s) orally once a day (before a meal)  sodium bicarbonate 650 mg oral tablet: 1 tab(s) orally 3 times a day  timolol maleate 0.5% ophthalmic solution: 1 drop(s) to each affected eye 2 times a day  hosp  torsemide 20 mg oral tablet: 1 tab(s) orally once a day  Vitamin D3 1250 mcg (50,000 intl units) oral capsule: 1 cap(s) orally once a week

## 2022-05-18 NOTE — BH CONSULTATION LIAISON ASSESSMENT NOTE - NSBHMSETHTASSOC_PSY_A_CORE
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 *    Medical changes or updates since last session: none reported    SUBJECTIVE   Collaboration with: Nurse Matta whom reported that has been doing well; no safety concerns reported    PPE worn: mask and face shield    Pain before session:  FLACC (face, legs, activity, cry, consolability):   Face 0 No particular expression or smile   Legs 0 Normal position or relaxed   Activity 0 Lying quietly, normal position, moves easily   Cry 0 No cry, quiet   Consolability 0 Content, relaxed   Score 0       OBJECTIVE   Infant awake and alert. Vitals wnl. No parent present.     Status at onset of session:   Physiologic: Stable autonomic behaviors including  stable heart rate, pink/stable color and appropriate oxygen saturations. or Instability of autonomic system as evidenced by  tachypnea.  Motor: Stable motor behaviors including  sucking and hand(s) to face.  State: Infant was in a active alert  state. Stable state behaviors including robust cry and self consoling.    Current Feeding: PO, NG, 75 mls q 3 hours, expressed breastmilk   Respiratory Status: room air,    Treatment/Intervention    Oral Motor/ Peripheral Examination  Structural observations: intact  Oral musculature: decreased strength of movement, decreased coordination of 
movement and decreased timing of movement  Reflexes: delayed responses; improving   Secretion management: within functional limits; noted to be improved this session   Phonation: WNL  Non-nutritive suck: Rooting: disorganized, Suck initiation: independent, Tongue movements: impaired tongue cup, impaired seal, Pattern: arrhythmical, disorganized   Stress cues observed during oral motor:   - Motor: gaze aversion, extraneous movements   - Physiological: change in vital signs increased RR; difficult to get the RR to decrease  Oral motor treatment: Organizing techniques done with good tolerance; able to initiate a suck on pacifier and maintain which calmed and organized her.     Oral Feeding:   - Infant fed by this clinician  - Postioning during feeding: Sidelying, Elevated and Swaddled with hands midline   - Infant consumed 50 mls  via Dr. Cano's Preemie Nipple.       Oral Phase:   - Initiation: able to initiate a suck without stimulation required   - Skills: impaired tongue cup, impaired seal on the nipple, arrhythmical, inconsistent jaw excursions, stress signals finger splaying; occasional chin tugging, increased work of breathing, extended rest breaks needed;decreased tachypnea with feeding and improved overall alertness; appeared to have improved control with liquid this session with less stress cues and signs of aspiration  - Pattern: uncoordinated suck/swallow/breath pattern, disorganized   Pharyngeal Phase: Cough, Uncoordinated suck/swallow/breathe coordination, Increased work of breathing, Congestion,  cough x1 this session after 50 mls when bottle being removed for a burp.     Stress Cues noted during oral feeding:  Autonomic: tachypnea   State: diffuse movement  Motor: diffuse activity, finger splay/hault hands, facial grimace, nasal flaring and uncoordinated oral pattern    Therapy Techniques: Paced based on cues; which she tolerated well. Infant coughed x1 when needing to burp.     Family Centered 
Support/Education: Caregiver(s) not present. Will meet and provide teaching strategies as available. Discussed with bedside nurse Discussed with bedside nurse.    ASSESSMENT:   Infant with moderately disorganized oral feeding pattern with uncoordinated suck/swallow/breathe; improved respiratory support with decreased tachypnea with feeding attempt. Decreased stress cues; coughed x1 when bottle being removed for a burp break this feeding. Improved control and coordination this feeding. Speech to continue to follow and monitor for safety of oral feedings.     Impressions & Recommendations:  Aspiration Risk Moderate  Factors include: co-morbitides, frequent stress cues     Tips for Caregivers  Positioning Recommendations: Elevated Side Lying  Nipple Recommendations: Dr. Cano's preemie  Pacing Recommendations: Requires feeder initiated pacing, Requires feeder to fully remove the nipple from oral cavity  State Regulation: feed when awake and alert, vitals WNL and demonstrating readiness  Stress Signals: tachpnea; coughing, finger splay, chin tug  Recommendation Comments: Establish non-nutritive suck prior to feeding; provide tasts. Stop feeding with safety concerns or increased RR    Patient will benefit from speech therapy. Habilitative potential is good based on assessment above    Pain after session:  FLACC (face, legs, activity, cry, consolability):   Face 0 No particular expression or smile   Legs 0 Normal position or relaxed   Activity 0 Lying quietly, normal position, moves easily   Cry 0 No cry, quiet   Consolability 0 Content, relaxed   Score 0     PLAN:   Suggestions for next session as indicated: Continue with plan of care    Goals:  Goals  Discharge Goal #1: Infant will initiate and maintain a safe, organized po feeding pattern to sustain nutrition and hydration  Goal Progression #1: Outcome not met, continue to monitor    Plan  Frequency: 3-4x/week  Plan for Next Session: Monitor tolerance for PO 
feeding    Documentation completed on following flowsheet: SLP flowsheet, Infant flowsheet  
Normal
Normal

## 2022-05-18 NOTE — PROGRESS NOTE ADULT - PROBLEM SELECTOR PLAN 1
- likely due to dementia vs alcohol withdrawal vs infection  - start trial on seroquel 25 mg TID PRN for agitation  - psych Dr. nunez following  - neurology Dr. Gotti following, symptoms not EPS related - likely due to dementia vs alcohol withdrawal vs infection  - start trial on seroquel 25 mg TID PRN for agitation  - psych Dr. nunez following  - neurology Dr. Gotti following,

## 2022-05-18 NOTE — PROGRESS NOTE ADULT - PROBLEM SELECTOR PLAN 12
- Pt came from home  - PT consulted, recommends KIRSTIE  - OK with psych to d/c  - dc with outpt gi follow up
pending behavior improvement  PT consult  psych recs  abx cefepime and flagyl until 5/20  dc with outpt gi follow up

## 2022-05-18 NOTE — PROGRESS NOTE ADULT - PROBLEM SELECTOR PLAN 6
- C/W  asa coreg, imdur, hydralazine.  - DASH diet  - monitor bp  cardiology Dr. Franz following - BP in acceptable range  - C/W  coreg, imdur, hydralazine.  - DASH diet  - monitor bp  - cardiology Dr. Jorge following

## 2022-05-18 NOTE — DISCHARGE NOTE PROVIDER - NSDCCPCAREPLAN_GEN_ALL_CORE_FT
PRINCIPAL DISCHARGE DIAGNOSIS  Diagnosis: Alcohol withdrawal  Assessment and Plan of Treatment: Follow up with your doctor within 1 week. Follow up at [facility] for alcohol abuse and/or drug abuse treatment.  [facility] Address: [address] [place] [address] Phone .It is important that you drink alcohol only in moderation.  Results of excessive drinking include, but are not limited to, intestinal bleeding, liver failure, and death.  Stay hydrated and do not take NSAIDs or drive when drinking. "[employer]" ([phone]  is a number you can call to find alcohol treatment options.        SECONDARY DISCHARGE DIAGNOSES  Diagnosis: Acute encephalopathy  Assessment and Plan of Treatment: You were admitted for acute confusion due to your alcohol use and    Diagnosis: Pneumonia, aspiration  Assessment and Plan of Treatment:     Diagnosis: Alcohol withdrawal syndrome, with unspecified complication  Assessment and Plan of Treatment:     Diagnosis: HLD (hyperlipidemia)  Assessment and Plan of Treatment:     Diagnosis: DM (diabetes mellitus)  Assessment and Plan of Treatment:     Diagnosis: HTN (hypertension)  Assessment and Plan of Treatment:     Diagnosis: Cellulitis  Assessment and Plan of Treatment:      PRINCIPAL DISCHARGE DIAGNOSIS  Diagnosis: Alcohol withdrawal  Assessment and Plan of Treatment: Follow up with your doctor within 1 week and follow up with your local alcoholics anonymous for assistance with alcohol abuse.  Follow up at [facility] for alcohol abuse and/or drug abuse treatment.  [facility] Address: [address] [place] [address] Phone .It is important that you drink alcohol only in moderation.  Results of excessive drinking include, but are not limited to, intestinal bleeding, liver failure, and death.  Stay hydrated and do not take NSAIDs or drive when drinking. "[address]" ([phone]  is a number you can call to find alcohol treatment options.        SECONDARY DISCHARGE DIAGNOSES  Diagnosis: Acute encephalopathy  Assessment and Plan of Treatment: You were admitted for acute confusion due to your alcohol use over the years.  Results of excessive drinking include, but are not limited to, intestinal bleeding, liver failure, and death.  Stay hydrated and do not take NSAIDs or drive when drinking. "[address]" ([phone]  is a number you can call to find alcohol treatment options.      Diagnosis: Pneumonia, aspiration  Assessment and Plan of Treatment: You were treated with IV antibiotic for as aspiration pneumonia.  Report any new changes in your condition such as cough, fever or chills, coughing up yellowish or greenish sputum, chest pain and shortness of breath.    Diagnosis: Alcohol withdrawal syndrome, with unspecified complication  Assessment and Plan of Treatment: Follow up with your doctor within 1 week. Follow up at [facility] for alcohol abuse and/or drug abuse treatment.  [facility] Address: [address] [place], [place] [id number] Phone .It is important that you drink alcohol only in moderation.  Results of excessive drinking include, but are not limited to, intestinal bleeding, liver failure, and death.  Stay hydrated and do not take NSAIDs or drive when drinking. "[address]" ([phone]  is a number you can call to find alcohol treatment options.      Diagnosis: HLD (hyperlipidemia)  Assessment and Plan of Treatment: Continue taking your cholesterol medication as prescribed and report any changes such as increasing body pain/cramps, changes in the color of your urine.  Follow up with  your doctor for routine blood test to monitor your liver enzymes.    Diagnosis: DM (diabetes mellitus)  Assessment and Plan of Treatment: Continue taking your medication as prescribed and follow up with your doctor.  Report signs and symptoms of hypo and hyperglycemia to your doctor such as dizziness, lightheadedness, increasing thirst, appetite and increase urination, cold, clammy skin.      Diagnosis: HTN (hypertension)  Assessment and Plan of Treatment: You are being treated for high blood pressure.  Continue taking your medication as prescribed to help prevent or minimize your risk of end organ damage.  Follow up with your doctor for routine blood pressure evaluation and medication regimen.  Report any symptoms of headaces with nausea or vomiting, visual changes, heart palpitation, chest pain or shortness.      Diagnosis: Cellulitis  Assessment and Plan of Treatment: You were treated with antibiotics for your skin infection.  Report any increasing redness, drainage, fever or chills.     PRINCIPAL DISCHARGE DIAGNOSIS  Diagnosis: Alcohol withdrawal  Assessment and Plan of Treatment: Follow up with your doctor within 1 week and follow up with your local alcoholics anonymous for assistance with alcohol abuse.  Follow up at [facility] for alcohol abuse and/or drug abuse treatment.  [facility] Address: [address] [place] [address] Phone .It is important that you drink alcohol only in moderation.  Results of excessive drinking include, but are not limited to, intestinal bleeding, liver failure, and death.  Stay hydrated and do not take NSAIDs or drive when drinking. "[address]" ([phone]  is a number you can call to find alcohol treatment options.        SECONDARY DISCHARGE DIAGNOSES  Diagnosis: Alcohol withdrawal syndrome, with unspecified complication  Assessment and Plan of Treatment: Follow up with your doctor within 1 week. Follow up at [facility] for alcohol abuse and/or drug abuse treatment.  [facility] Address: [address] [place], [place] [id number] Phone .It is important that you drink alcohol only in moderation.  Results of excessive drinking include, but are not limited to, intestinal bleeding, liver failure, and death.  Stay hydrated and do not take NSAIDs or drive when drinking. "[address]" ([phone]  is a number you can call to find alcohol treatment options.      Diagnosis: Acute encephalopathy  Assessment and Plan of Treatment: You were admitted for acute confusion due to your alcohol use over the years.  Results of excessive drinking include, but are not limited to, intestinal bleeding, liver failure, and death.  Stay hydrated and do not take NSAIDs or drive when drinking. "[address]" ([phone]  is a number you can call to find alcohol treatment options.      Diagnosis: HTN (hypertension)  Assessment and Plan of Treatment: You are being treated for high blood pressure.  Continue taking your medication as prescribed to help prevent or minimize your risk of end organ damage.  Follow up with your doctor for routine blood pressure evaluation and medication regimen.  Report any symptoms of headaces with nausea or vomiting, visual changes, heart palpitation, chest pain or shortness.      Diagnosis: Cellulitis  Assessment and Plan of Treatment: You were treated with antibiotics for your skin infection.  Report any increasing redness, drainage, fever or chills.    Diagnosis: Pneumonia, aspiration  Assessment and Plan of Treatment: You were treated with IV antibiotic for as aspiration pneumonia.  Report any new changes in your condition such as cough, fever or chills, coughing up yellowish or greenish sputum, chest pain and shortness of breath.    Diagnosis: HLD (hyperlipidemia)  Assessment and Plan of Treatment: Continue taking your cholesterol medication as prescribed and report any changes such as increasing body pain/cramps, changes in the color of your urine.  Follow up with  your doctor for routine blood test to monitor your liver enzymes.    Diagnosis: DM (diabetes mellitus)  Assessment and Plan of Treatment: Continue taking your medication as prescribed and follow up with your doctor.  Report signs and symptoms of hypo and hyperglycemia to your doctor such as dizziness, lightheadedness, increasing thirst, appetite and increase urination, cold, clammy skin.

## 2022-05-18 NOTE — BH CONSULTATION LIAISON ASSESSMENT NOTE - NSBHCHARTREVIEWVS_PSY_A_CORE FT
Vital Signs Last 24 Hrs  T(C): 36.1 (18 May 2022 05:10), Max: 36.2 (17 May 2022 11:52)  T(F): 96.9 (18 May 2022 05:10), Max: 97.2 (17 May 2022 11:52)  HR: 73 (18 May 2022 05:10) (72 - 73)  BP: 156/78 (18 May 2022 05:10) (141/69 - 156/78)  BP(mean): --  RR: 18 (18 May 2022 05:10) (18 - 19)  SpO2: 98% (18 May 2022 05:10) (94% - 98%)
Vital Signs Last 24 Hrs  T(C): 36 (23 May 2022 11:43), Max: 36.8 (22 May 2022 20:40)  T(F): 96.8 (23 May 2022 11:43), Max: 98.3 (22 May 2022 20:40)  HR: 86 (23 May 2022 18:08) (60 - 91)  BP: 132/69 (23 May 2022 18:08) (128/71 - 149/79)  BP(mean): 102 (23 May 2022 11:42) (94 - 102)  RR: 14 (23 May 2022 11:43) (14 - 18)  SpO2: 99% (23 May 2022 11:43) (97% - 100%)

## 2022-05-18 NOTE — DISCHARGE NOTE PROVIDER - HOSPITAL COURSE
Mr. Sarmiento is a 75y/o M, AAOx3, ambulates independently, leaves alone at home, w/ PMH of HLD, DM, CKD, presents to the ED with weakness and tremulousness.    Case discussed w/ pt's son Dre Ojeda at bedside who reports that pt lives alone and he drinks every night for about 50 years and now he has been having about two drinks per day.  No prior Etoh related hospitalization as per son.  Admitted to medicine for further management of alcohol withdrawal. GI Dr. Crandall consulted for EGD and colonoscopy due to anemia. Neurology Dr. Gotti was consulted for concern of EPS symptoms on ativan.  His head CT times two were negatives for acute bleed.  CXR showed rt lung field infiltrate, likely Asp Pneumonia, started on abx, ID Dr. Cui following. echo noted reduced ef 20-25%, cardiology Dr. Franz following. Hospital course complicated by hypernatremia likely due to pre-renal cause and pt was followed by Nephrology Dr. Art.  His EGD and colonoscopy on 5/17, showed Erythema in the gastroesophageal junction. Hiatal Hernia. diverticulosis and hemorrhoids, started on ppi daily and high fiber diet.       Pt is now awake, confused paranoid, reports "three people in room, one with knife, I don't know where I am, this is not my house".  His tremors resolved and his CIWA was discontinued.  Psych Dr. Talamantes was consulted as symptoms likely due to dementia and pt was started on Seroquel 25 mg TID.  Pt also developed hypernatremia and he was managed w/ IV hydration with nephrologist input.      PT was consulted and recommended KIRSTIE.      INCOMPLETE:::::: Mr. Sarmiento is a 77y/o M, AAOx3, ambulates independently, leaves alone at home, w/ PMH of HLD, DM, CKD, presents to the ED with weakness and tremulousness.    Case discussed w/ pt's son Dre Ojeda at bedside who reports that pt lives alone and he drinks every night for about 50 years and now he has been having about two drinks per day.  No prior Etoh related hospitalization as per son.  Admitted to medicine for further management of alcohol withdrawal. GI Dr. Crandall consulted for EGD and colonoscopy due to anemia. Neurology Dr. Gotti was consulted for concern of EPS symptoms on ativan.  His head CT times two were negatives for acute bleed.  CXR showed rt lung field infiltrate, likely Asp Pneumonia, started on abx, ID Dr. Cui following. echo noted reduced ef 20-25%, cardiology Dr. Franz following. Hospital course complicated by hypernatremia likely due to pre-renal cause and pt was followed by Nephrology Dr. Art.  His EGD and colonoscopy on 5/17, showed Erythema in the gastroesophageal junction. Hiatal Hernia. diverticulosis and hemorrhoids, started on ppi daily and high fiber diet. Psychiatrist Dr. Talamantes was consulted for increased altered mental status as symptoms likely due to dementia and pt was started on Seroquel 25 mg TID. Pt's mentation improved with medication. PT was consulted and recommended KIRSTIE. Given pt's improved clinical status, & current hemodynamic stability, decision was made to discharge.  Please note that this is a brief report of pt's      INCOMPLETE:::::: Mr. Sarmiento is a 77y/o M, AAOx3, ambulates independently, leaves alone at home, w/ PMH of HLD, DM, CKD, presents to the ED with weakness and tremulousness.    Case discussed w/ pt's son Dre Ojeda at bedside who reports that pt lives alone and he drinks every night for about 50 years and now he has been having about two drinks per day.  No prior Etoh related hospitalization as per son.  Admitted to medicine for further management of alcohol withdrawal. GI Dr. Crandall consulted for EGD and colonoscopy due to anemia. Neurology Dr. Gotti was consulted for concern of EPS symptoms on ativan.  His head CT times two were negatives for acute bleed.  CXR showed rt lung field infiltrate, likely Aspiration Pneumonia, started on abx, ID Dr. Cui consulted. Echo noted reduced ef 20-25%, cardiology Dr. Franz consulted. Hospital course complicated by hypernatremia likely due to pre-renal cause and pt was followed by Nephrology Dr. Art.  His EGD and colonoscopy on 5/17, showed Erythema in the gastroesophageal junction. Hiatal Hernia. diverticulosis and hemorrhoids, started on ppi daily and high fiber diet. Psychiatrist Dr. Talamantes was consulted for increased altered mental status as symptoms likely due to dementia and pt was started on Seroquel. Pt's mentation improved with medication. PT was consulted and recommended KIRSTIE. Given pt's improved clinical status, & current hemodynamic stability, decision was made to discharge.  Please note that this is a brief summary of hospital course. Please refer to daily progress notes& consult notes for full course & events.    INCOMPLETE::::::;;;;;;;;; INCOMPLETE Mr. Ojeda is a 77y/o M, AAOx3, ambulates independently, leaves alone at home, w/ PMH of HLD, DM, CKD, presents to the ED with weakness and tremulousness.    Case discussed w/ pt's son Dre Ojeda at bedside who reports that pt lives alone and he drinks every night for about 50 years and now he has been having about two drinks per day.  No prior Etoh related hospitalization as per son.  Admitted to medicine for further management of alcohol withdrawal. GI Dr. Crandall consulted for EGD and colonoscopy due to anemia. Neurology Dr. Gotti was consulted for concern of EPS symptoms on ativan.  His head CT times two were negatives for acute bleed.  CXR showed rt lung field infiltrate, likely Aspiration Pneumonia, started on abx, ID Dr. Cui consulted. Echo noted reduced ef 20-25%, cardiology Dr. Franz consulted. Hospital course complicated by hypernatremia likely due to pre-renal cause and pt was followed by Nephrology Dr. Art.  His EGD and colonoscopy on 5/17, showed Erythema in the gastroesophageal junction. Hiatal Hernia. diverticulosis and hemorrhoids, started on ppi daily and high fiber diet. Psychiatrist Dr. Talamantes was consulted for increased altered mental status as symptoms likely due to dementia and pt was started on Seroquel. Pt's mentation improved with medication. PT was consulted and recommended KIRSTIE. Given pt's improved clinical status, & current hemodynamic stability, decision was made to discharge.  Please note that this is a brief summary of hospital course. Please refer to daily progress notes& consult notes for full course & events.    Given patient's improved clinical status and current hemodynamic stability, decision was made to discharge.  Please refer to patient's complete medical chart with documents for a full hospital course, for this is only a brief summary.

## 2022-05-18 NOTE — BH CONSULTATION LIAISON ASSESSMENT NOTE - NSICDXPASTMEDICALHX_GEN_ALL_CORE_FT
PAST MEDICAL HISTORY:  Chronic kidney disease     DM (diabetes mellitus)     Glaucoma     Gout     HLD (hyperlipidemia)     HTN (hypertension)     
PAST MEDICAL HISTORY:  Chronic kidney disease     DM (diabetes mellitus)     Glaucoma     Gout     HLD (hyperlipidemia)     HTN (hypertension)

## 2022-05-18 NOTE — PROGRESS NOTE ADULT - SUBJECTIVE AND OBJECTIVE BOX
Patient is a 76y old  Male who presents with a chief complaint of alcohol withdrawal (17 May 2022 18:53)    pt seen in icu [  ], reg med floor [   ], bed [  ], chair at bedside [   ], a+o x3 [  ], lethargic [  ],  nad [  ]    smalls [  ], ngt [  ], peg [  ], et tube [  ], cent line [  ], picc line [  ]        Allergies    No Known Allergies        Vitals    T(F): 96.9 (05-18-22 @ 05:10), Max: 97.5 (05-17-22 @ 08:50)  HR: 73 (05-18-22 @ 05:10) (72 - 81)  BP: 156/78 (05-18-22 @ 05:10) (138/89 - 156/78)  RR: 18 (05-18-22 @ 05:10) (18 - 20)  SpO2: 98% (05-18-22 @ 05:10) (94% - 98%)  Wt(kg): --  CAPILLARY BLOOD GLUCOSE      POCT Blood Glucose.: 156 mg/dL (18 May 2022 08:02)      Labs                          10.6   4.46  )-----------( 129      ( 17 May 2022 08:10 )             31.2       05-17    150<H>  |  117<H>  |  67<H>  ----------------------------<  174<H>  4.2   |  22  |  3.30<H>    Ca    9.8      17 May 2022 08:10    TPro  7.6  /  Alb  2.5<L>  /  TBili  0.5  /  DBili  x   /  AST  30  /  ALT  28  /  AlkPhos  85  05-17            .Blood Blood-Peripheral  05-13 @ 21:24   No growth to date.  --  --          Radiology Results      Meds    MEDICATIONS  (STANDING):  aspirin  chewable 81 milliGRAM(s) Oral daily  brimonidine 0.2% Ophthalmic Solution 1 Drop(s) Both EYES daily  carvedilol 3.125 milliGRAM(s) Oral every 12 hours  cefepime   IVPB 1000 milliGRAM(s) IV Intermittent every 24 hours  chlorhexidine 2% Cloths 1 Application(s) Topical <User Schedule>  clobetasol 0.05% Ointment 1 Application(s) Topical two times a day  dextrose 5%. 1000 milliLiter(s) (60 mL/Hr) IV Continuous <Continuous>  dextrose 5%. 1000 milliLiter(s) (50 mL/Hr) IV Continuous <Continuous>  dextrose 5%. 1000 milliLiter(s) (100 mL/Hr) IV Continuous <Continuous>  dextrose 50% Injectable 25 Gram(s) IV Push once  dextrose 50% Injectable 12.5 Gram(s) IV Push once  dextrose 50% Injectable 25 Gram(s) IV Push once  ergocalciferol 19515 Unit(s) Oral <User Schedule>  folic acid 1 milliGRAM(s) Oral daily  glucagon  Injectable 1 milliGRAM(s) IntraMuscular once  heparin   Injectable 5000 Unit(s) SubCutaneous every 12 hours  hydrALAZINE 25 milliGRAM(s) Oral every 8 hours  insulin lispro (ADMELOG) corrective regimen sliding scale   SubCutaneous Before meals and at bedtime  isosorbide   mononitrate ER Tablet (IMDUR) 30 milliGRAM(s) Oral daily  melatonin 3 milliGRAM(s) Oral at bedtime  metroNIDAZOLE  IVPB      metroNIDAZOLE  IVPB 500 milliGRAM(s) IV Intermittent every 8 hours  Nephro-marcella 1 Tablet(s) Oral daily  pantoprazole    Tablet 40 milliGRAM(s) Oral before breakfast  timolol 0.5% Solution 1 Drop(s) Both EYES two times a day      MEDICATIONS  (PRN):  dextrose Oral Gel 15 Gram(s) Oral once PRN Blood Glucose LESS THAN 70 milliGRAM(s)/deciliter  QUEtiapine 25 milliGRAM(s) Oral three times a day PRN agitation      Physical Exam    Neuro :  no focal deficits  Respiratory: CTA B/L  CV: RRR, S1S2, no murmurs,   Abdominal: Soft, NT, ND +BS,  Extremities: No edema, + peripheral pulses    ASSESSMENT    Alcohol dependence with withdrawal    No pertinent past medical history    DM (diabetes mellitus)    HTN (hypertension)    Hypertension    Diabetes mellitus    Chronic kidney disease    HLD (hyperlipidemia)    Glaucoma    Gout    No significant past surgical history        PLAN     Patient is a 76y old  Male who presents with a chief complaint of alcohol withdrawal (17 May 2022 18:53)    pt seen in icu [  ], reg med floor [ x  ], bed [ x ], chair at bedside [   ], awake and responsive [ x ], lethargic [  ],    nad [ x ]      Allergies    No Known Allergies        Vitals    T(F): 96.9 (05-18-22 @ 05:10), Max: 97.5 (05-17-22 @ 08:50)  HR: 73 (05-18-22 @ 05:10) (72 - 81)  BP: 156/78 (05-18-22 @ 05:10) (138/89 - 156/78)  RR: 18 (05-18-22 @ 05:10) (18 - 20)  SpO2: 98% (05-18-22 @ 05:10) (94% - 98%)  Wt(kg): --  CAPILLARY BLOOD GLUCOSE      POCT Blood Glucose.: 156 mg/dL (18 May 2022 08:02)      Labs                          10.6   4.46  )-----------( 129      ( 17 May 2022 08:10 )             31.2       05-17    150<H>  |  117<H>  |  67<H>  ----------------------------<  174<H>  4.2   |  22  |  3.30<H>    Ca    9.8      17 May 2022 08:10    TPro  7.6  /  Alb  2.5<L>  /  TBili  0.5  /  DBili  x   /  AST  30  /  ALT  28  /  AlkPhos  85  05-17            .Blood Blood-Peripheral  05-13 @ 21:24   No growth to date.  --  --          Radiology Results      Meds    MEDICATIONS  (STANDING):  aspirin  chewable 81 milliGRAM(s) Oral daily  brimonidine 0.2% Ophthalmic Solution 1 Drop(s) Both EYES daily  carvedilol 3.125 milliGRAM(s) Oral every 12 hours  cefepime   IVPB 1000 milliGRAM(s) IV Intermittent every 24 hours  chlorhexidine 2% Cloths 1 Application(s) Topical <User Schedule>  clobetasol 0.05% Ointment 1 Application(s) Topical two times a day  dextrose 5%. 1000 milliLiter(s) (60 mL/Hr) IV Continuous <Continuous>  dextrose 5%. 1000 milliLiter(s) (50 mL/Hr) IV Continuous <Continuous>  dextrose 5%. 1000 milliLiter(s) (100 mL/Hr) IV Continuous <Continuous>  dextrose 50% Injectable 25 Gram(s) IV Push once  dextrose 50% Injectable 12.5 Gram(s) IV Push once  dextrose 50% Injectable 25 Gram(s) IV Push once  ergocalciferol 58800 Unit(s) Oral <User Schedule>  folic acid 1 milliGRAM(s) Oral daily  glucagon  Injectable 1 milliGRAM(s) IntraMuscular once  heparin   Injectable 5000 Unit(s) SubCutaneous every 12 hours  hydrALAZINE 25 milliGRAM(s) Oral every 8 hours  insulin lispro (ADMELOG) corrective regimen sliding scale   SubCutaneous Before meals and at bedtime  isosorbide   mononitrate ER Tablet (IMDUR) 30 milliGRAM(s) Oral daily  melatonin 3 milliGRAM(s) Oral at bedtime  metroNIDAZOLE  IVPB      metroNIDAZOLE  IVPB 500 milliGRAM(s) IV Intermittent every 8 hours  Nephro-marcella 1 Tablet(s) Oral daily  pantoprazole    Tablet 40 milliGRAM(s) Oral before breakfast  timolol 0.5% Solution 1 Drop(s) Both EYES two times a day      MEDICATIONS  (PRN):  dextrose Oral Gel 15 Gram(s) Oral once PRN Blood Glucose LESS THAN 70 milliGRAM(s)/deciliter  QUEtiapine 25 milliGRAM(s) Oral three times a day PRN agitation      Physical Exam    Neuro :  no focal deficits  Respiratory: right lower lobe crackles  CV: RRR, S1S2, no murmurs,   Abdominal: Soft, NT, ND +BS,  Extremities: No edema, + peripheral pulses, right distal leg erythema and excoriation resolved   skin: diffused scaly rash       ASSESSMENT    etoh withdrawal,   metabolic encephalopathy,   right le cellulitis,   aspiration pna   psoriasis,   acute on ckd,   pancytopenia likely 2nd to alcoholism   r/o parkinsons disease  h/o HLD,   DM,   chronic HF rEF  CKD  Alcohol dependence with withdrawal  Glaucoma  Gout      PLAN    ativan as per [facility] protocol,   multivitamin, folate, thiamine   psych eval   cxr with There is a new fairly significant right lung process at this time noted above.  id f/u   Continue Cefepime 1 g q 24 hour and Flagyl  until 5/20/22  blood cx neg noted above   hold norvasc 2nd to lower extrem edema,   echo with EF 20-25%, Grade I diastolic dysfunction  RV systolic pressure is mildly increased noted    ecg with Diagnosis Line Sinus rhythm @ 88 bpm with Premature atrial complexes, Left axis deviation, Left bundle branch block, Abnormal ECG noted above  cardio f/u   d/c'd labetalol,   cont  asa coreg, imdur, hydralazine.  wound care eval   cont clobetasol 0.05% bid for psoriasis   lesions near resolution   s/p lactulose 10 g x1   rept ammonia <10  stool occult blood neg noted above   lft wnl   gi f/u   No evidence of acute GI bleeding  Monitor H/H  transfuse PRBC as needed  Check stool for occult blood  s/p EGD with Erythema in the gastroesophageal junction. Hiatal Hernia. Erythema in the antrum. (Biopsy). Normal mucosa in the whole examined duodenum.  colonoscopy with Moderate diverticulosis of the descending colon, transverse colon and ascending colon. Internal hemorrhoids. Colonoscopy Limitations/Complications:  Await pathology results.  High Fiber Diet  Protonix 40mg PO daily  Treat for H. Pylori if positive  Avoid NSAID  Avoid ETOH  Anti reflux measure  abd us with Gallstones in the contracted gallbladder. 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  Deterioration in renal function, possible chronic versus acute due to dehydration  d/c'd lasix   serum creat improving  Continue IV fluids D5W follow lab in am  d/c'd Sodium bicarbonate PO  2 gm K diet  lispro ss   neuro cons noted   Mild distal symmetric bilateral UE tremulousness in the setting of, and entirely consistent with, EtOH withdrawal, chronic EtOH abuse,    No exam findings diagnostic for an extrapyramidal disorder.  In any case, Dx and consideration of potential management of any (possible suspected) extrapyramidal disorder cannot be made in an acute setting such as this one.    When the Pt has FULLY recovered from his acute medical problems, if there is a desire for neurologic evaluation, then please schedule a routine out-Pt visit.   repeat ct head stable noted    phys tx eval  cont current meds

## 2022-05-18 NOTE — BH CONSULTATION LIAISON ASSESSMENT NOTE - RISK ASSESSMENT
Risk factors: Older age, lives alone, , dementia, alcohol dependence. Protective factors: Absent h/o SI/SA/SIB, stable domicile, supportive family, attachment to independence, help seeking and cooperative with care. Acute risk level appears low at the time of assessment, but chronic risk may be mildly elevated due to above risk factors.    
Risk factors: Older age, lives alone, , dementia, alcohol dependence. Protective factors: Absent h/o SI/SA/SIB, stable domicile, supportive family, attachment to independence, help seeking and cooperative with care. Acute risk level appears low at the time of assessment, but chronic risk may be mildly elevated due to above risk factors.

## 2022-05-18 NOTE — PROGRESS NOTE ADULT - SUBJECTIVE AND OBJECTIVE BOX
Problem List:CKD stage 4 with NELLY  ETOH intoxication    PAST MEDICAL & SURGICAL HISTORY:  DM (diabetes mellitus)      HTN (hypertension)      Chronic kidney disease      HLD (hyperlipidemia)      Glaucoma      Gout      No significant past surgical history          No Known Allergies      MEDICATIONS  (STANDING):  aspirin  chewable 81 milliGRAM(s) Oral daily  brimonidine 0.2% Ophthalmic Solution 1 Drop(s) Both EYES daily  carvedilol 3.125 milliGRAM(s) Oral every 12 hours  cefepime   IVPB 1000 milliGRAM(s) IV Intermittent every 24 hours  chlorhexidine 2% Cloths 1 Application(s) Topical <User Schedule>  clobetasol 0.05% Ointment 1 Application(s) Topical two times a day  dextrose 5%. 1000 milliLiter(s) (100 mL/Hr) IV Continuous <Continuous>  dextrose 5%. 1000 milliLiter(s) (50 mL/Hr) IV Continuous <Continuous>  dextrose 50% Injectable 25 Gram(s) IV Push once  dextrose 50% Injectable 12.5 Gram(s) IV Push once  dextrose 50% Injectable 25 Gram(s) IV Push once  ergocalciferol 43666 Unit(s) Oral <User Schedule>  folic acid 1 milliGRAM(s) Oral daily  glucagon  Injectable 1 milliGRAM(s) IntraMuscular once  heparin   Injectable 5000 Unit(s) SubCutaneous every 12 hours  hydrALAZINE 25 milliGRAM(s) Oral every 8 hours  insulin lispro (ADMELOG) corrective regimen sliding scale   SubCutaneous Before meals and at bedtime  isosorbide   mononitrate ER Tablet (IMDUR) 30 milliGRAM(s) Oral daily  melatonin 3 milliGRAM(s) Oral at bedtime  metroNIDAZOLE  IVPB      metroNIDAZOLE  IVPB 500 milliGRAM(s) IV Intermittent every 8 hours  Nephro-marcella 1 Tablet(s) Oral daily  pantoprazole    Tablet 40 milliGRAM(s) Oral before breakfast  timolol 0.5% Solution 1 Drop(s) Both EYES two times a day    MEDICATIONS  (PRN):  dextrose Oral Gel 15 Gram(s) Oral once PRN Blood Glucose LESS THAN 70 milliGRAM(s)/deciliter  QUEtiapine 25 milliGRAM(s) Oral three times a day PRN agitation                            9.9    3.92  )-----------( 158      ( 18 May 2022 08:58 )             29.5     05-18    152<H>  |  118<H>  |  64<H>  ----------------------------<  149<H>  4.0   |  23  |  3.29<H>    Ca    9.6      18 May 2022 08:58  Mg     2.2     05-18    TPro  7.5  /  Alb  2.3<L>  /  TBili  0.5  /  DBili  x   /  AST  26  /  ALT  23  /  AlkPhos  77  05-18            REVIEW OF SYSTEMS:  Confused but less than yesterday      VITALS:  T(F): 97.7 (05-18-22 @ 12:08), Max: 97.7 (05-18-22 @ 12:08)  HR: 80 (05-18-22 @ 12:08)  BP: 143/82 (05-18-22 @ 12:08)  RR: 18 (05-18-22 @ 12:08)  SpO2: 98% (05-18-22 @ 12:08)  Wt(kg): --      PHYSICAL EXAM:  Constitutional: well developed, no diaphoresis, no distress.  Neck: No JVD, no carotid bruit, supple, no adenopathy  Respiratory: Good air entrance B/L, no wheezes, rales or rhonchi  Cardiovascular: S1, S2, RRR, no pericardial rub, no murmur  Abdomen: BS+, soft, no tenderness, no bruit  Pelvis: bladder nondistended  Extremities: No cyanosis or clubbing. No peripheral edema.   He is awake talakative, but not oriented to place, time and people

## 2022-05-18 NOTE — DISCHARGE NOTE PROVIDER - CARE PROVIDER_API CALL
Mayank Browning  INTERNAL MEDICINE  [phone] [place] [id number]  Phone: [phone]  Fax: [phone]  Follow Up Time:

## 2022-05-18 NOTE — BH CONSULTATION LIAISON ASSESSMENT NOTE - NSSUICPROTFACT_PSY_ALL_CORE
Responsibility to children, family, or others/Identifies reasons for living/Supportive social network of family or friends/Cultural, spiritual and/or moral attitudes against suicide
Responsibility to children, family, or others/Identifies reasons for living/Supportive social network of family or friends/Cultural, spiritual and/or moral attitudes against suicide

## 2022-05-18 NOTE — PHYSICAL THERAPY INITIAL EVALUATION ADULT - GENERAL OBSERVATIONS, REHAB EVAL
Pt. admitted for alcohol withdrawal (weakness and tremulousness). Pt. received supine in bed, NAD, cooperative and motivated during eval.  Pt. c/o itchiness in his back and bilateral LE's. Pt. A&O x 2 with periods of confusion. Pt. on enhanced supervision.

## 2022-05-18 NOTE — PROGRESS NOTE ADULT - ASSESSMENT
Patient is a 76yoM, AAOx3, ambulates independently, leaves alone at home, w/ PMH of HLD, DM, CKD, presents to the ED with weakness and tremulousness. Case discussed w/ pt's son Dre Ojeda at bedside who reports that pt lives alone and he drinks about 50 years every night and now has been having about two drinks per day.  No prior Etoh related hospitalization as per son.  Admitted to medicine for further management of alcohol withdrawal. GI Dr. Crandall consulted for EGD and colonoscopy due to anemia. Neurology Dr. Gotti was consulted for concern of EPS symptoms on ativan. CXR showed rt lung field infiltrate, likely Asp Pneumonia, started on abx, ID Dr. Cui following. echo noted reduced ef 20-25%, cardiology Dr. Franz following. Hospital course complicated by hypernatremia likely due to pre-renal cause, nephrology Dr. Art following. Pt is now s/p EGD and colonoscopy on 5/17, showed Erythema in the gastroesophageal junction. Hiatal Hernia. diverticulosis and hemorrhoids, started on ppi daily and high fiber diet. Tremors now resolved, CIWA now discontinued. Psych Dr. Talamantes consulted increased altered mental status  likely due to dementia, started on seroquel 25 mg TID,  Pt continues to have hypernatremia, IV fluids ongoing, nephrology still following.  Pt is more calm and relaxed with Seroquel regimen. PT evaluated pt and recommends KIRSTIE.       Patient is a 76yoM, AAOx3, ambulates independently, leaves alone at home, w/ PMH of HLD, DM, CKD, presents to the ED with weakness and tremulousness. Case discussed w/ pt's son Dre Ojeda at bedside who reports that pt lives alone and he drinks about 50 years every night and now has been having about two drinks per day.  No prior Etoh related hospitalization as per son.  Admitted to medicine for further management of alcohol withdrawal. GI Dr. Crandall consulted for EGD and colonoscopy due to anemia. Neurology Dr. Gotti was consulted for concern of EPS symptoms on ativan. CXR showed rt lung field infiltrate, likely Asp Pneumonia, started on abx, ID Dr. Cui following. echo noted reduced ef 20-25%, cardiology Dr. Franz following. Hospital course complicated by hypernatremia likely due to pre-renal cause, nephrology Dr. Art following. Pt is now s/p EGD and colonoscopy on 5/17, showed Erythema in the gastroesophageal junction. Hiatal Hernia. diverticulosis and hemorrhoids, started on ppi daily and high fiber diet. Tremors now resolved, CIWA now discontinued. Psych Dr. Talamantes consulted increased altered mental status  likely due to dementia, started on seroquel 25 mg TID,  Pt continues to have hypernatremia, IV fluids ongoing, nephrology  following.  Pt is more calm and relaxed with Seroquel regimen. PT evaluated pt and recommends KIRSTIE.

## 2022-05-18 NOTE — BH CONSULTATION LIAISON ASSESSMENT NOTE - HPI (INCLUDE ILLNESS QUALITY, SEVERITY, DURATION, TIMING, CONTEXT, MODIFYING FACTORS, ASSOCIATED SIGNS AND SYMPTOMS)
77M, , retired and living alone, with PHx of alcohol use DO per family (never formally dx or tx) and MHx of HLD, DM, CKD and glaucoma, presented to the ED on 5/10 with weakness and tremulousness, admitted for alcohol withdrawal. Psych consult was requested for suspected dementia vs delirium after pt abruptly became confused and paranoid after receiving CIWA PRN (Ativan 2 mg) on . Pt seen in his room, with son Bruce at bedside providing Malian interpretation. Pt says he has "no idea" of the date and gives location as "[place]," adding that he has been there about half an hour. 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 Pt does not see his drinking as a problem and has never tried to stop. When asked if he has been drinking more since his wife  in 2016, pt says, "Yes, I think that's true." Brief PE reveals no s/s of alcohol withdrawal (palms are dry, skin is normal color and temperature, pupils are not dilated, no tongue fasciculations). Pt has mild intention tremor in both hands, but no resting tremor. Outside room, son confirms that pt has been drinking consistently throughout his life, typically in the evening, but pt's consumption appears to have increased significantly since his wife . Per son, pt had a C1 fracture in 2016 2/2 alcohol-related fall after his wife's death. Son estimates that pt currently drinks about 2L of whiskey/wk, sometimes more. Per son, pt has never had alcohol withdrawal before, even during past medical admissions of >1 wk. Son states that he and brother Dre believe pt would benefit from HHA at least P/T to provide some supervision, but pt currently does not have Medicaid.
77M, , retired and living alone, with PHx of alcohol use DO per family (never formally dx or tx) and MHx of HLD, DM, CKD and glaucoma, presented to the ED on 5/10 with weakness and tremulousness, admitted for alcohol withdrawal. Psych consult was requested for suspected dementia vs delirium after pt abruptly became confused and paranoid after receiving CIWA PRN (Ativan 2 mg) on . Pt seen in his room, with son Bruce at bedside providing Swazi interpretation. Pt says he has "no idea" of the date and gives location as "we're in a factory," adding that he has been there about half an hour. 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 Pt does not see his drinking as a problem and has never tried to stop. When asked if he has been drinking more since his wife  in 2016, pt says, "Yes, I think that's true." Brief PE reveals no s/s of alcohol withdrawal (palms are dry, skin is normal color and temperature, pupils are not dilated, no tongue fasciculations). Pt has mild intention tremor in both hands, but no resting tremor. Outside room, son confirms that pt has been drinking consistently throughout his life, typically in the evening, but pt's consumption appears to have increased significantly since his wife . Per son, pt had a C1 fracture in 2016 2/2 alcohol-related fall after his wife's death. Son estimates that pt currently drinks about 2L of whiskey/wk, sometimes more. Per son, pt has never had alcohol withdrawal before, even during past medical admissions of >1 wk. Son states that he and brother Dre believe pt would benefit from HHA at least P/T to provide some supervision, but pt currently does not have Medicaid.

## 2022-05-18 NOTE — PROGRESS NOTE ADULT - PROBLEM SELECTOR PLAN 3
- CXR- shows New rt lung infiltrate  - Afebrile no leukocytosis  - C/W cefepime and flagyl  - ID Dr. Cui following - CXR- shows New rt lung infiltrate  - Afebrile no leukocytosis  - C/W cefepime and flagyl untill 5/20  - ID Dr. Cui following

## 2022-05-18 NOTE — PROGRESS NOTE ADULT - ASSESSMENT
1. Anemia  2. Thrombocytopenia  3. R/o chronic GI bleeding  4. ETOH abuse  5. Alcoholic liver disease  6. Alcohol withdrawal  7. S/p EGD and colonoscopy  8. Diverticulosis  9. Gastritis    Suggestions:    1. Monitor H/H  2. Transfuse PRBC as needed  3. Protonix 40mg po daily  4. Avoid NSAID  5. Follow up path  6. Withdrawal precaution  7. Treat for H. Pylori if positive  8. Follow up LFT's  9. Follow up platelets  10. DVT prophylaxis

## 2022-05-18 NOTE — BH CONSULTATION LIAISON ASSESSMENT NOTE - NSBHPSYCHOLCOGABN_PSY_A_CORE
disoriented to time/disoriented to place/disoriented to situation
disoriented to time/disoriented to place/disoriented to situation

## 2022-05-18 NOTE — PROGRESS NOTE ADULT - PROBLEM SELECTOR PLAN 11
- DVT: heparin subq  - GI- tolerating po - Pt came from home  - PT consulted, recommends KIRSTIE  - Cleared by psych for d/c

## 2022-05-18 NOTE — PROGRESS NOTE ADULT - ASSESSMENT
DKD with deterioration in renal function in the last year now stage 4  Hypernatremia.    Reduced po intake  ETOH intoxication in withdrawal.  HF r EF 45%    Right lung side pneumonia  placed on Cefepime , follow with ID     PLAN:  Continue IV fluids D5W follow lab in am, reinforce water intake PO  DC Sodium bicarb for now due to Hypernatremia  2 gm K diet

## 2022-05-18 NOTE — PROGRESS NOTE ADULT - SUBJECTIVE AND OBJECTIVE BOX
[   ] ICU                                          [   ] CCU                                      [ X  ] Medical Floor    Patient is a 76 year old male with ETOH abuse and anemia. GI consulted to evaluate.     Patient is a 76 year old male AAOx3, ambulates independently, leaves alone at home, with past medical history significant for HLD, DM, CKD, admitted with alcohol withdrawal found to have anemia. Patient admits to drinking 2 glasses of Whisky at night, and last drink was last night. He also c/o intermittent burning epigastric radiating to his chest associated with dyspepsia. Patient denies nausea, vomiting, hematemesis, hematochezia, melena, fever, chills, chest pain, SOB, cough, hematuria, dysuria or diarrhea.       Patient appears comfortable. No new complaints reported, No abdominal pain, N/V, hematemesis, hematochezia, melena, fever, chills, chest pain, SOB, cough or diarrhea reported.      PAIN MANAGEMENT:  Pain Scale:                2-3 /10  Pain Location:  Epigastric abdominal pain      Prior Colonoscopy:  No prior colonoscopy      PAST MEDICAL HISTORY  DM (diabetes mellitus)  HTN (hypertension)   Chronic kidney disease  HLD (hyperlipidemia)  Glaucoma  Gout        PAST SURGICAL HISTORY  No significant past surgical history        Allergies    No Known Allergies    Intolerances  None      HOME MEDICATIONS    MEDICATIONS  (STANDING):  brimonidine 0.2% Ophthalmic Solution 1 Drop(s) Both EYES daily  dextrose 5%. 1000 milliLiter(s) (50 mL/Hr) IV Continuous <Continuous>  dextrose 5%. 1000 milliLiter(s) (100 mL/Hr) IV Continuous <Continuous>  dextrose 50% Injectable 25 Gram(s) IV Push once  dextrose 50% Injectable 12.5 Gram(s) IV Push once  dextrose 50% Injectable 25 Gram(s) IV Push once  doxycycline IVPB      ergocalciferol 33742 Unit(s) Oral <User Schedule>  folic acid 1 milliGRAM(s) Oral daily  furosemide    Tablet 20 milliGRAM(s) Oral daily  glucagon  Injectable 1 milliGRAM(s) IntraMuscular once  heparin   Injectable 5000 Unit(s) SubCutaneous every 8 hours  insulin lispro (ADMELOG) corrective regimen sliding scale   SubCutaneous Before meals and at bedtime  LORazepam   Injectable 2 milliGRAM(s) IV Push every 4 hours  LORazepam   Injectable   IV Push   multivitamin 1 Tablet(s) Oral daily  timolol 0.5% Solution 1 Drop(s) Both EYES two times a day    MEDICATIONS  (PRN):  dextrose Oral Gel 15 Gram(s) Oral once PRN Blood Glucose LESS THAN 70 milliGRAM(s)/deciliter  LORazepam   Injectable 2 milliGRAM(s) IV Push every 2 hours PRN Symptom-triggered: 2 point increase in CIWA -Ar score and a total score of 7 or LESS  LORazepam   Injectable 2 milliGRAM(s) IV Push every 1 hour PRN Symptom-triggered: each CIWA -Ar score 8 or GREATER      SOCIAL HISTORY  Advanced Directives:       [ X ] Full Code       [  ] DNR  Marital Status:         [  ] M      [ X ] S      [  ] D       [  ] W  Children:       [ X ] Yes      [  ] No  Occupation:        [  ] Employed       [ X ] Unemployed       [  ] Retired  Diet:       [ X ] Regular       [  ] PEG feeding          [  ] NG tube feeding  Drug Use:           [ X ] Patient denied          [  ] Yes  Alcohol:           [X  ] No             [  ] Yes (socially)         [  ] Yes (chronic)  Tobacco:           [  ] Yes           [ X ] No      FAMILY HISTORY  [X  ] Heart Disease            [ X ] Diabetes             [ X ] HTN             [  ] Colon Cancer             [  ] Stomach Cancer              [  ] Pancreatic Cancer          VITALS  Vital Signs Last 24 Hrs  T(C): 36.5 (18 May 2022 12:08), Max: 36.5 (18 May 2022 12:08)  T(F): 97.7 (18 May 2022 12:08), Max: 97.7 (18 May 2022 12:08)  HR: 80 (18 May 2022 12:08) (72 - 80)  BP: 143/82 (18 May 2022 12:08) (143/82 - 156/78)   RR: 18 (18 May 2022 12:08) (18 - 18)  SpO2: 98% (18 May 2022 12:08) (97% - 98%)       MEDICATIONS  (STANDING):  aspirin  chewable 81 milliGRAM(s) Oral daily  brimonidine 0.2% Ophthalmic Solution 1 Drop(s) Both EYES daily  carvedilol 3.125 milliGRAM(s) Oral every 12 hours  cefepime   IVPB 1000 milliGRAM(s) IV Intermittent every 24 hours  chlorhexidine 2% Cloths 1 Application(s) Topical <User Schedule>  clobetasol 0.05% Ointment 1 Application(s) Topical two times a day  dextrose 5%. 1000 milliLiter(s) (100 mL/Hr) IV Continuous <Continuous>  dextrose 5%. 1000 milliLiter(s) (50 mL/Hr) IV Continuous <Continuous>  dextrose 50% Injectable 25 Gram(s) IV Push once  dextrose 50% Injectable 12.5 Gram(s) IV Push once  dextrose 50% Injectable 25 Gram(s) IV Push once  ergocalciferol 11883 Unit(s) Oral <User Schedule>  folic acid 1 milliGRAM(s) Oral daily  glucagon  Injectable 1 milliGRAM(s) IntraMuscular once  heparin   Injectable 5000 Unit(s) SubCutaneous every 12 hours  hydrALAZINE 25 milliGRAM(s) Oral every 8 hours  insulin lispro (ADMELOG) corrective regimen sliding scale   SubCutaneous Before meals and at bedtime  isosorbide   mononitrate ER Tablet (IMDUR) 30 milliGRAM(s) Oral daily  melatonin 3 milliGRAM(s) Oral at bedtime  metroNIDAZOLE  IVPB      metroNIDAZOLE  IVPB 500 milliGRAM(s) IV Intermittent every 8 hours  Nephro-marcella 1 Tablet(s) Oral daily  pantoprazole    Tablet 40 milliGRAM(s) Oral before breakfast  timolol 0.5% Solution 1 Drop(s) Both EYES two times a day    MEDICATIONS  (PRN):  dextrose Oral Gel 15 Gram(s) Oral once PRN Blood Glucose LESS THAN 70 milliGRAM(s)/deciliter  QUEtiapine 25 milliGRAM(s) Oral three times a day PRN agitation                            9.9    3.92  )-----------( 158      ( 18 May 2022 08:58 )             29.5       05-18    152<H>  |  118<H>  |  64<H>  ----------------------------<  149<H>  4.0   |  23  |  3.29<H>    Ca    9.6      18 May 2022 08:58  Mg     2.2     05-18    TPro  7.5  /  Alb  2.3<L>  /  TBili  0.5  /  DBili  x   /  AST  26  /  ALT  23  /  AlkPhos  77  05-18

## 2022-05-18 NOTE — PROGRESS NOTE ADULT - SUBJECTIVE AND OBJECTIVE BOX
NP Note discussed with  primary attending    Patient is a 76y old  Male who presents with a chief complaint of alcohol withdrawal (18 May 2022 15:21)      INTERVAL HPI/OVERNIGHT EVENTS: no new complaints    MEDICATIONS  (STANDING):  aspirin  chewable 81 milliGRAM(s) Oral daily  brimonidine 0.2% Ophthalmic Solution 1 Drop(s) Both EYES daily  carvedilol 3.125 milliGRAM(s) Oral every 12 hours  cefepime   IVPB 1000 milliGRAM(s) IV Intermittent every 24 hours  chlorhexidine 2% Cloths 1 Application(s) Topical <User Schedule>  clobetasol 0.05% Ointment 1 Application(s) Topical two times a day  dextrose 5%. 1000 milliLiter(s) (100 mL/Hr) IV Continuous <Continuous>  dextrose 5%. 1000 milliLiter(s) (50 mL/Hr) IV Continuous <Continuous>  dextrose 50% Injectable 25 Gram(s) IV Push once  dextrose 50% Injectable 12.5 Gram(s) IV Push once  dextrose 50% Injectable 25 Gram(s) IV Push once  ergocalciferol 19911 Unit(s) Oral <User Schedule>  folic acid 1 milliGRAM(s) Oral daily  glucagon  Injectable 1 milliGRAM(s) IntraMuscular once  heparin   Injectable 5000 Unit(s) SubCutaneous every 12 hours  hydrALAZINE 25 milliGRAM(s) Oral every 8 hours  insulin lispro (ADMELOG) corrective regimen sliding scale   SubCutaneous Before meals and at bedtime  isosorbide   mononitrate ER Tablet (IMDUR) 30 milliGRAM(s) Oral daily  melatonin 3 milliGRAM(s) Oral at bedtime  metroNIDAZOLE  IVPB      metroNIDAZOLE  IVPB 500 milliGRAM(s) IV Intermittent every 8 hours  Nephro-marcella 1 Tablet(s) Oral daily  pantoprazole    Tablet 40 milliGRAM(s) Oral before breakfast  timolol 0.5% Solution 1 Drop(s) Both EYES two times a day    MEDICATIONS  (PRN):  dextrose Oral Gel 15 Gram(s) Oral once PRN Blood Glucose LESS THAN 70 milliGRAM(s)/deciliter  QUEtiapine 25 milliGRAM(s) Oral three times a day PRN agitation      __________________________________________________  REVIEW OF SYSTEMS:    CONSTITUTIONAL: No fever,   EYES: no acute visual disturbances  NECK: No pain or stiffness  RESPIRATORY: No cough; No shortness of breath  CARDIOVASCULAR: No chest pain, no palpitations  GASTROINTESTINAL: No pain. No nausea or vomiting; No diarrhea   NEUROLOGICAL: No headache or numbness, no tremors  MUSCULOSKELETAL: No joint pain, no muscle pain  GENITOURINARY: no dysuria, no frequency, no hesitancy  PSYCHIATRY: no depression , no anxiety  ALL OTHER  ROS negative        Vital Signs Last 24 Hrs  T(C): 36.5 (18 May 2022 12:08), Max: 36.5 (18 May 2022 12:08)  T(F): 97.7 (18 May 2022 12:08), Max: 97.7 (18 May 2022 12:08)  HR: 62 (18 May 2022 15:56) (62 - 80)  BP: 141/95 (18 May 2022 15:56) (137/86 - 156/78)  BP(mean): 107 (18 May 2022 15:56) (98 - 107)  RR: 18 (18 May 2022 12:08) (18 - 18)  SpO2: 100% (18 May 2022 15:56) (97% - 100%)    ________________________________________________  PHYSICAL EXAM:  GENERAL: NAD  HEENT: Normocephalic;  conjunctivae and sclerae clear; moist mucous membranes;   NECK : supple  CHEST/LUNG: Clear to ausculitation bilaterally with good air entry   HEART: S1 S2  regular; no murmurs, gallops or rubs  ABDOMEN: Soft, Nontender, Nondistended; Bowel sounds present  EXTREMITIES: no cyanosis; no edema; no calf tenderness  SKIN: warm and dry; no rash  NERVOUS SYSTEM:  Awake and alert; Oriented x2  to place, person, forgetful at times ; no new deficits    _________________________________________________  LABS:                        9.9    3.92  )-----------( 158      ( 18 May 2022 08:58 )             29.5     05-18    152<H>  |  118<H>  |  64<H>  ----------------------------<  149<H>  4.0   |  23  |  3.29<H>    Ca    9.6      18 May 2022 08:58  Mg     2.2     05-18    TPro  7.5  /  Alb  2.3<L>  /  TBili  0.5  /  DBili  x   /  AST  26  /  ALT  23  /  AlkPhos  77  05-18        CAPILLARY BLOOD GLUCOSE      POCT Blood Glucose.: 197 mg/dL (18 May 2022 11:38)  POCT Blood Glucose.: 156 mg/dL (18 May 2022 08:02)  POCT Blood Glucose.: 98 mg/dL (17 May 2022 20:32)        RADIOLOGY & ADDITIONAL TESTS:    Imaging Personally Reviewed:  YES/NO    Consultant(s) Notes Reviewed:   YES/ No    Care Discussed with Consultants :     Plan of care was discussed with patient and /or primary care giver; all questions and concerns were addressed and care was aligned with patient's wishes.     NP Note discussed with  primary attending    Patient is a 76y old  Male who presents with a chief complaint of alcohol withdrawal (18 May 2022 15:21)      INTERVAL HPI/OVERNIGHT EVENTS: no new complaints    MEDICATIONS  (STANDING):  aspirin  chewable 81 milliGRAM(s) Oral daily  brimonidine 0.2% Ophthalmic Solution 1 Drop(s) Both EYES daily  carvedilol 3.125 milliGRAM(s) Oral every 12 hours  cefepime   IVPB 1000 milliGRAM(s) IV Intermittent every 24 hours  chlorhexidine 2% Cloths 1 Application(s) Topical <User Schedule>  clobetasol 0.05% Ointment 1 Application(s) Topical two times a day  dextrose 5%. 1000 milliLiter(s) (100 mL/Hr) IV Continuous <Continuous>  dextrose 5%. 1000 milliLiter(s) (50 mL/Hr) IV Continuous <Continuous>  dextrose 50% Injectable 25 Gram(s) IV Push once  dextrose 50% Injectable 12.5 Gram(s) IV Push once  dextrose 50% Injectable 25 Gram(s) IV Push once  ergocalciferol 94803 Unit(s) Oral <User Schedule>  folic acid 1 milliGRAM(s) Oral daily  glucagon  Injectable 1 milliGRAM(s) IntraMuscular once  heparin   Injectable 5000 Unit(s) SubCutaneous every 12 hours  hydrALAZINE 25 milliGRAM(s) Oral every 8 hours  insulin lispro (ADMELOG) corrective regimen sliding scale   SubCutaneous Before meals and at bedtime  isosorbide   mononitrate ER Tablet (IMDUR) 30 milliGRAM(s) Oral daily  melatonin 3 milliGRAM(s) Oral at bedtime  metroNIDAZOLE  IVPB      metroNIDAZOLE  IVPB 500 milliGRAM(s) IV Intermittent every 8 hours  Nephro-marcella 1 Tablet(s) Oral daily  pantoprazole    Tablet 40 milliGRAM(s) Oral before breakfast  timolol 0.5% Solution 1 Drop(s) Both EYES two times a day    MEDICATIONS  (PRN):  dextrose Oral Gel 15 Gram(s) Oral once PRN Blood Glucose LESS THAN 70 milliGRAM(s)/deciliter  QUEtiapine 25 milliGRAM(s) Oral three times a day PRN agitation      __________________________________________________  REVIEW OF SYSTEMS:    ROS: Unable to access due to impaired cognition  Vital Signs Last 24 Hrs  T(C): 36.5 (18 May 2022 12:08), Max: 36.5 (18 May 2022 12:08)  T(F): 97.7 (18 May 2022 12:08), Max: 97.7 (18 May 2022 12:08)  HR: 62 (18 May 2022 15:56) (62 - 80)  BP: 141/95 (18 May 2022 15:56) (137/86 - 156/78)  BP(mean): 107 (18 May 2022 15:56) (98 - 107)  RR: 18 (18 May 2022 12:08) (18 - 18)  SpO2: 100% (18 May 2022 15:56) (97% - 100%)    ________________________________________________  PHYSICAL EXAM:  GENERAL: NAD  HEENT: Normocephalic;  conjunctivae and sclerae clear; moist mucous membranes;   NECK : supple  CHEST/LUNG: Clear to auscultation bilaterally with good air entry   HEART: S1 S2  regular; no murmurs, gallops or rubs  ABDOMEN: Soft, Nontender, Nondistended; Bowel sounds present  EXTREMITIES: no cyanosis; no edema; no calf tenderness  SKIN: warm and dry; no rash  NERVOUS SYSTEM:  Awake and alert; Oriented x2  to place, person, forgetful at times ; no new deficits    _________________________________________________  LABS:                        9.9    3.92  )-----------( 158      ( 18 May 2022 08:58 )             29.5     05-18    152<H>  |  118<H>  |  64<H>  ----------------------------<  149<H>  4.0   |  23  |  3.29<H>    Ca    9.6      18 May 2022 08:58  Mg     2.2     05-18    TPro  7.5  /  Alb  2.3<L>  /  TBili  0.5  /  DBili  x   /  AST  26  /  ALT  23  /  AlkPhos  77  05-18        CAPILLARY BLOOD GLUCOSE      POCT Blood Glucose.: 197 mg/dL (18 May 2022 11:38)  POCT Blood Glucose.: 156 mg/dL (18 May 2022 08:02)  POCT Blood Glucose.: 98 mg/dL (17 May 2022 20:32)        RADIOLOGY & ADDITIONAL TESTS:    Imaging Personally Reviewed:  YES/NO    Consultant(s) Notes Reviewed:   YES/ No    Care Discussed with Consultants :     Plan of care was discussed with patient and /or primary care giver; all questions and concerns were addressed and care was aligned with patient's wishes.

## 2022-05-18 NOTE — PROGRESS NOTE ADULT - SUBJECTIVE AND OBJECTIVE BOX
Patient is seen and examined at the bed side, is afebrile. He is tolerating Abx well.      REVIEW OF SYSTEMS: All other review systems are negative      ALLERGIES: No Known Allergies      Vital Signs Last 24 Hrs  T(C): 36.5 (18 May 2022 12:08), Max: 36.5 (18 May 2022 12:08)  T(F): 97.7 (18 May 2022 12:08), Max: 97.7 (18 May 2022 12:08)  HR: 62 (18 May 2022 15:56) (62 - 80)  BP: 141/95 (18 May 2022 15:56) (137/86 - 156/78)  BP(mean): 107 (18 May 2022 15:56) (98 - 107)  RR: 18 (18 May 2022 12:08) (18 - 18)  SpO2: 100% (18 May 2022 15:56) (97% - 100%)      PHYSICAL EXAM:  GENERAL: Not in distress, on oxygen via NC  CHEST/LUNG: Not using accessory muscles   HEART: s1 and s2 present  ABDOMEN:  Nontender and  Nondistended  EXTREMITIES: No pedal  edema  CNS: Awake and alert       LABS:                        9.9    3.92  )-----------( 158      ( 18 May 2022 08:58 )             29.5                             10.6   4.46  )-----------( 129      ( 17 May 2022 08:10 )             31.2           152<H>  |  118<H>  |  64<H>  ----------------------------<  149<H>  4.0   |  23  |  3.29<H>    Ca    9.6      18 May 2022 08:58  Mg     2.2         TPro  7.5  /  Alb  2.3<L>  /  TBili  0.5  /  DBili  x   /  AST  26  /  ALT  23  /  AlkPhos  77      150<H>  |  117<H>  |  67<H>  ----------------------------<  174<H>  4.2   |  22  |  3.30<H>    Ca    9.8      17 May 2022 08:10    TPro  7.6  /  Alb  2.5<L>  /  TBili  0.5  /  DBili  x   /  AST  30  /  ALT  28  /  AlkPhos  85  05-17      CAPILLARY BLOOD GLUCOSE  POCT Blood Glucose.: 125 mg/dL (13 May 2022 17:01)  POCT Blood Glucose.: 130 mg/dL (13 May 2022 11:58)  POCT Blood Glucose.: 113 mg/dL (13 May 2022 08:17)  POCT Blood Glucose.: 123 mg/dL (12 May 2022 21:33)        Urinalysis Basic - ( 12 May 2022 12:13 )  Color: Yellow / Appearance: Clear / S.010 / pH: x  Gluc: x / Ketone: Negative  / Bili: Negative / Urobili: Negative   Blood: x / Protein: 100 / Nitrite: Negative   Leuk Esterase: Negative / RBC: Negative /HPF / WBC 0-2 /HPF   Sq Epi: x / Non Sq Epi: Occasional /HPF / Bacteria: Negative /HPF  Procalcitonin, Serum (22 @ 16:21)   Procalcitonin, Serum: 0.47:    MEDICATIONS  (STANDING):    aspirin  chewable 81 milliGRAM(s) Oral daily  brimonidine 0.2% Ophthalmic Solution 1 Drop(s) Both EYES daily  carvedilol 3.125 milliGRAM(s) Oral every 12 hours  cefepime   IVPB 1000 milliGRAM(s) IV Intermittent every 24 hours  chlorhexidine 2% Cloths 1 Application(s) Topical <User Schedule>  clobetasol 0.05% Ointment 1 Application(s) Topical two times a day  ergocalciferol 07827 Unit(s) Oral <User Schedule>  folic acid 1 milliGRAM(s) Oral daily  glucagon  Injectable 1 milliGRAM(s) IntraMuscular once  heparin   Injectable 5000 Unit(s) SubCutaneous every 12 hours  hydrALAZINE 25 milliGRAM(s) Oral every 8 hours  insulin lispro (ADMELOG) corrective regimen sliding scale   SubCutaneous Before meals and at bedtime  isosorbide   mononitrate ER Tablet (IMDUR) 30 milliGRAM(s) Oral daily  melatonin 3 milliGRAM(s) Oral at bedtime  metroNIDAZOLE  IVPB      metroNIDAZOLE  IVPB 500 milliGRAM(s) IV Intermittent every 8 hours  Nephro-marcella 1 Tablet(s) Oral daily  pantoprazole    Tablet 40 milliGRAM(s) Oral before breakfast  timolol 0.5% Solution 1 Drop(s) Both EYES two times a day        RADIOLOGY & ADDITIONAL TESTS:    22: Xray Chest 1 View- PORTABLE-Urgent (Xray Chest 1 View- PORTABLE-Urgent .) (22 @ 16:03) >    IMPRESSION: There is a new fairly significant right lung process at this time.      22: US Abdomen Complete (US Abdomen Complete .) (22 @ 08:54) Gallstones in the contracted gallbladder.  Hepatic steatosis.      5/10/22: CT Head No Cont (05.10.22 @ 14:32) :   Moderate periventricular white matter ischemia. Moderate  bitemporal lobe and bifrontal lobe atrophy.     Mucosal thickening in the  RIGHT maxillary and RIGHT ethmoid and frontal and RIGHT frontal sinuses.      5/10/22: Xray Chest 1 View- PORTABLE-Urgent (05.10.22 @ 09:20) >Presently lungs are clear.        MICROBIOLOGY DATA:    Culture - Blood (22 @ 21:24)   Specimen Source: .Blood Blood-Peripheral   Culture Results: No growth to date.     Culture - Blood (22 @ 21:24)   Specimen Source: .Blood Blood-Peripheral   Culture Results: No growth to date.     MRSA/MSSA PCR (22 @ 05:59)   MRSA PCR Result.: NotDetec:    COVID-19 PCR (05.10.22 @ 09:36)   COVID-19 PCR: NotDetec:

## 2022-05-18 NOTE — BH CONSULTATION LIAISON ASSESSMENT NOTE - NSBHCONSULTRECOMMENDOTHER_PSY_A_CORE FT
1. Start Seroquel 25 mg q8h PRN agitation/behavioral disturbance  --Do NOT recommend Ativan PRN for agitation, given potential to trigger delirium  2. DC CIWA and associated PRNs, as pt is no longer in alcohol withdrawal  3. Medical management as directed by primary team  4. Family would benefit from SW input on community resources to assist in applying for Medicaid so pt can obtain A services  5. Psychiatry will continue to follow  6. Case d/w Dr. Hutton of primary team    Tanika Talamantes MD  Director, Consultation-Liaison Psychiatry Service  [id number]    
1. Start Seroquel 25 mg q8h PRN agitation/behavioral disturbance  --Do NOT recommend Ativan PRN for agitation, given potential to trigger delirium  2. DC CIWA and associated PRNs, as pt is no longer in alcohol withdrawal  3. Medical management as directed by primary team  4. Family would benefit from SW input on community resources to assist in applying for Medicaid so pt can obtain A services  5. Psychiatry will continue to follow  6. Case d/w Dr. Hutton of primary team    Tanika Talamantes MD  Director, Consultation-Liaison Psychiatry Service  [id number]

## 2022-05-18 NOTE — BH CONSULTATION LIAISON ASSESSMENT NOTE - DIFFERENTIAL
Dementia without behavioral disturbance  Alcohol dependence  
Dementia without behavioral disturbance  Alcohol dependence

## 2022-05-18 NOTE — PROGRESS NOTE ADULT - ASSESSMENT
Patient is a 76y old  Male , AAOx3, ambulates independently, leaves alone at home, w/ PMH of HLD, DM, CKD, presents to the ER on 5/10/22 for evaluation of weakness and tremulousness. As per ER attending, patient is being admitted for alcohol withdrawal. Patient admits to drinking 2 glasses of Whisky at night. ON admission, he has no fever and Negative CXR. Today, hospital day 3, he found to have respiratory distress and Repeat  CXR shows a new fairly significant right lung process at this time. He has started on Cefepime and the ID consult requested to assist with further evaluation and antibiotic management.     # Aspiration pneumonia  vs HAP- on CXR from 5/13  # Alcohol withdrawal syndrome     would recommend:    1. Continue CIWA protocol as per Primary team   2. Continue Cefepime 1 g q 24 hour and Flagyl  until 5/20/22  X 2  more days   3. Aspiration precaution  4. Wean off oxygen as tolerated     Attending Attestation:    Spent more than 35 minutes on total encounter, more than 50 % of the visit was spent counseling and/or coordinating care by the Attending physician.

## 2022-05-18 NOTE — BH CONSULTATION LIAISON ASSESSMENT NOTE - NSBHCHARTREVIEWINVESTIGATE_PSY_A_CORE FT
< from: CT Head No Cont (05.14.22 @ 10:40) >    Extensive parenchymal volume loss and chronic microvascular ischemic   changes are again seen.    Calcification is seen involving both distal internal carotid, distal   vertebral and basilar arteries are identified. These findings appear   unchanged    There is no acute hemorrhage mass or mass effect seen    Evaluation of osseous structures with the appropriate window appears  normal    < end of copied text >    
< from: CT Head No Cont (05.14.22 @ 10:40) >    Extensive parenchymal volume loss and chronic microvascular ischemic   changes are again seen.    Calcification is seen involving both distal internal carotid, distal   vertebral and basilar arteries are identified. These findings appear   unchanged    There is no acute hemorrhage mass or mass effect seen    Evaluation of osseous structures with the appropriate window appears  normal    < end of copied text >

## 2022-05-18 NOTE — PROGRESS NOTE ADULT - SUBJECTIVE AND OBJECTIVE BOX
CHIEF COMPLAINT:Patient is a 76y old  Male who presents with a chief complaint of alcohol withdrawal .Pt appears comfortable.    	  REVIEW OF SYSTEMS:    [x ] Unable to obtain    PHYSICAL EXAM:  T(C): 36.1 (22 @ 05:10), Max: 36.2 (22 @ 11:52)  HR: 73 (22 @ 05:10) (72 - 73)  BP: 156/78 (22 @ 05:10) (141/69 - 156/78)  RR: 18 (22 @ 05:10) (18 - 19)  SpO2: 98% (22 @ 05:10) (94% - 98%)  Wt(kg): --  I&O's Summary      Appearance: Normal	  HEENT:   Normal oral mucosa, PERRL, EOMI	  Lymphatic: No lymphadenopathy  Cardiovascular: Normal S1 S2, No JVD, No murmurs, No edema  Respiratory: Lungs clear to auscultation	  Gastrointestinal:  Soft, Non-tender, + BS	  Skin: No rashes, No ecchymoses, No cyanosis	  Extremities: Normal range of motion, No clubbing, cyanosis or edema  Vascular: Peripheral pulses palpable 2+ bilaterally    MEDICATIONS  (STANDING):  aspirin  chewable 81 milliGRAM(s) Oral daily  brimonidine 0.2% Ophthalmic Solution 1 Drop(s) Both EYES daily  carvedilol 3.125 milliGRAM(s) Oral every 12 hours  cefepime   IVPB 1000 milliGRAM(s) IV Intermittent every 24 hours  chlorhexidine 2% Cloths 1 Application(s) Topical <User Schedule>  clobetasol 0.05% Ointment 1 Application(s) Topical two times a day  dextrose 5%. 1000 milliLiter(s) (60 mL/Hr) IV Continuous <Continuous>  dextrose 5%. 1000 milliLiter(s) (50 mL/Hr) IV Continuous <Continuous>  dextrose 5%. 1000 milliLiter(s) (100 mL/Hr) IV Continuous <Continuous>  dextrose 50% Injectable 25 Gram(s) IV Push once  dextrose 50% Injectable 12.5 Gram(s) IV Push once  dextrose 50% Injectable 25 Gram(s) IV Push once  ergocalciferol 42544 Unit(s) Oral <User Schedule>  folic acid 1 milliGRAM(s) Oral daily  glucagon  Injectable 1 milliGRAM(s) IntraMuscular once  heparin   Injectable 5000 Unit(s) SubCutaneous every 12 hours  hydrALAZINE 25 milliGRAM(s) Oral every 8 hours  insulin lispro (ADMELOG) corrective regimen sliding scale   SubCutaneous Before meals and at bedtime  isosorbide   mononitrate ER Tablet (IMDUR) 30 milliGRAM(s) Oral daily  melatonin 3 milliGRAM(s) Oral at bedtime  metroNIDAZOLE  IVPB      metroNIDAZOLE  IVPB 500 milliGRAM(s) IV Intermittent every 8 hours  Nephro-marcella 1 Tablet(s) Oral daily  pantoprazole    Tablet 40 milliGRAM(s) Oral before breakfast  timolol 0.5% Solution 1 Drop(s) Both EYES two times a day    	  	  LABS:	 	                      9.9    3.92  )-----------( 158      ( 18 May 2022 08:58 )             29.5         152<H>  |  118<H>  |  64<H>  ----------------------------<  149<H>  4.0   |  23  |  3.29<H>    Ca    9.6      18 May 2022 08:58  Mg     2.2         TPro  7.5  /  Alb  2.3<L>  /  TBili  0.5  /  DBili  x   /  AST  26  /  ALT  23  /  AlkPhos  77      proBNP: Serum Pro-Brain Natriuretic Peptide: 1088 pg/mL (05-10 @ 09:36)      < from: EGD-Colonoscopy (22 @ 14:30) >  EGD-Colonoscopy Report        Date: 2022 2:30 PM        Patient Name: PEMA VENCES        MRN: [id number]        Account Number:        [id number]        Gender: Male         (age): 1945 (76)        EGD Instrument(s):        [id number])([id number])        Colonoscopy Instrument(s):        [id number])([id number])        Attending/Fellow:        Zeyad Crandall        Technician:        Shelbie Vo RN        Procedure Room #:        PROCEDURE ROOM 1                Anesthesia Provider:        Dr. Abraham        Nurse(s):        Harini Richards RN        ASA Class:        P4 - 2022 6:23 PM Shelbie Vo RN        History of Present Illness:        H&P was previously reviewed.        Administered Medications:        As per Anesthesiology Record        EGD Indications:        Anemia and GI bleeding        Colonoscopy Indications:        Anemia and GI bleeding        General Procedure:        The procedure, indications, preparation and potential complications were    explained to the patient, who indicated understanding and signed the    corresponding consent forms. MAC was administered by anesthesiologist.    Continuous pulse oximetry and blood pressure monitoring were used throughout the    procedure. Supplemental oxygen was used.        EGD        EGD Procedure:        Patient was placed in left lateral decubitus position. The endoscope was    introduced through mouth and advanced under direct visualization until second    part of the duodenum reached. Patient tolerance to the procedure was good. The    procedure was not difficult. Blood loss was none.        EGD Findings:        Esophagus Mucosa Erythema of the mucosa with no bleeding was noted in the    gastroesophageal junction.        Stomach Lumen A medium size hiatal hernia was seen. Retroflexion view in the    stomach confirmed the size and morphology of the hernia.        Mucosa Patchy erythema of the mucosa with no bleeding was noted in the antrum.A    cold forceps biopsy was performed for histology.        Duodenum Mucosa Normal mucosa was noted in the whole examined duodenum.        EGD Impressions:        Erythema in the gastroesophageal junction.        Hiatal Hernia.        Erythema in the antrum. (Biopsy).        Normal mucosa in the whole examined duodenum.        EGD Limitations/Complications:        There were no apparent limitations or complications        Colonoscopy        Colonoscopy Procedure:        This is a  high risk patient  undergoing diagnostic colonoscopy. The quality of    preparation was fair. Patient was placed in left lateral decubitus position. The    colonoscope was introduced through rectum and advanced under direct    visualization until cecum reached. The appendiceal orifice and the ileocecal    valve were identified. The terminal ileum was identified. The colonoscope was    retroflexed within the rectum. Careful visualization was performed as the    instrument was withdrawn. Patient tolerance to the procedure was good. The    procedure was difficult. Digital exam was normal. Blood loss was none.        Colonoscopy Findings:        Excavated lesions Several non-bleeding diverticula with large openings were seen    in the descending colon, transverse colon and ascending colon. Diverticulosis    appeared to be of moderate severity.        Protruding lesions Large non-bleeding internal hemorrhoids were noted.        Colonoscopy Impressions:        Moderate diverticulosis of the descending colon, transverse colon and    ascending colon.        Internal hemorrhoids.                Colonoscopy Limitations/Complications:        There were no apparent limitations or complications        Plan:        Await pathology results.        High Fiber Diet        Protonix 40mg PO daily        Treat for H. Pylori if positive        Avoid NSAID        Avoid ETOH        Anti reflux measure        Zeyad Crandall        Version 1, Electronically signed on 2022 6:32:18 PM by Zeyad Crandall    < end of copied text >    	         You can access the FollowMyHealth Patient Portal offered by Health system by registering at the following website: [email or web address]. By joining [facility]’s FollowMyHealth portal, you will also be able to view your health information using other applications (apps) compatible with our system.

## 2022-05-18 NOTE — BH CONSULTATION LIAISON ASSESSMENT NOTE - CURRENT MEDICATION
MEDICATIONS  (STANDING):  aspirin  chewable 81 milliGRAM(s) Oral daily  brimonidine 0.2% Ophthalmic Solution 1 Drop(s) Both EYES daily  carvedilol 3.125 milliGRAM(s) Oral every 12 hours  cefepime   IVPB 1000 milliGRAM(s) IV Intermittent every 24 hours  chlorhexidine 2% Cloths 1 Application(s) Topical <User Schedule>  clobetasol 0.05% Ointment 1 Application(s) Topical two times a day  dextrose 5%. 1000 milliLiter(s) (60 mL/Hr) IV Continuous <Continuous>  dextrose 5%. 1000 milliLiter(s) (50 mL/Hr) IV Continuous <Continuous>  dextrose 5%. 1000 milliLiter(s) (100 mL/Hr) IV Continuous <Continuous>  dextrose 50% Injectable 25 Gram(s) IV Push once  dextrose 50% Injectable 12.5 Gram(s) IV Push once  dextrose 50% Injectable 25 Gram(s) IV Push once  ergocalciferol 91057 Unit(s) Oral <User Schedule>  folic acid 1 milliGRAM(s) Oral daily  glucagon  Injectable 1 milliGRAM(s) IntraMuscular once  heparin   Injectable 5000 Unit(s) SubCutaneous every 12 hours  hydrALAZINE 25 milliGRAM(s) Oral every 8 hours  insulin lispro (ADMELOG) corrective regimen sliding scale   SubCutaneous Before meals and at bedtime  isosorbide   mononitrate ER Tablet (IMDUR) 30 milliGRAM(s) Oral daily  melatonin 3 milliGRAM(s) Oral at bedtime  metroNIDAZOLE  IVPB      metroNIDAZOLE  IVPB 500 milliGRAM(s) IV Intermittent every 8 hours  Nephro-marcella 1 Tablet(s) Oral daily  pantoprazole    Tablet 40 milliGRAM(s) Oral before breakfast  timolol 0.5% Solution 1 Drop(s) Both EYES two times a day    MEDICATIONS  (PRN):  dextrose Oral Gel 15 Gram(s) Oral once PRN Blood Glucose LESS THAN 70 milliGRAM(s)/deciliter  QUEtiapine 25 milliGRAM(s) Oral three times a day PRN agitation  
MEDICATIONS  (STANDING):  aspirin  chewable 81 milliGRAM(s) Oral daily  brimonidine 0.2% Ophthalmic Solution 1 Drop(s) Both EYES daily  carvedilol 6.25 milliGRAM(s) Oral every 12 hours  chlorhexidine 2% Cloths 1 Application(s) Topical <User Schedule>  clobetasol 0.05% Ointment 1 Application(s) Topical two times a day  dextrose 5%. 1000 milliLiter(s) (50 mL/Hr) IV Continuous <Continuous>  dextrose 50% Injectable 25 Gram(s) IV Push once  dextrose 50% Injectable 12.5 Gram(s) IV Push once  dextrose 50% Injectable 25 Gram(s) IV Push once  ergocalciferol 86106 Unit(s) Oral <User Schedule>  folic acid 1 milliGRAM(s) Oral daily  glucagon  Injectable 1 milliGRAM(s) IntraMuscular once  heparin   Injectable 5000 Unit(s) SubCutaneous every 12 hours  hydrALAZINE 25 milliGRAM(s) Oral every 8 hours  insulin lispro (ADMELOG) corrective regimen sliding scale   SubCutaneous Before meals and at bedtime  isosorbide   mononitrate ER Tablet (IMDUR) 30 milliGRAM(s) Oral daily  melatonin 3 milliGRAM(s) Oral at bedtime  Nephro-marcella 1 Tablet(s) Oral daily  pantoprazole    Tablet 40 milliGRAM(s) Oral before breakfast  sodium bicarbonate 650 milliGRAM(s) Oral three times a day  timolol 0.5% Solution 1 Drop(s) Both EYES two times a day  torsemide 20 milliGRAM(s) Oral daily    MEDICATIONS  (PRN):  dextrose Oral Gel 15 Gram(s) Oral once PRN Blood Glucose LESS THAN 70 milliGRAM(s)/deciliter  QUEtiapine 25 milliGRAM(s) Oral three times a day PRN agitation

## 2022-05-18 NOTE — BH CONSULTATION LIAISON ASSESSMENT NOTE - VIOLENCE PROTECTIVE FACTORS:
Residential stability/Relationship stability/Affective Stability
Residential stability/Relationship stability/Affective Stability

## 2022-05-19 LAB
ANION GAP SERPL CALC-SCNC: 10 MMOL/L — SIGNIFICANT CHANGE UP (ref 5–17)
BUN SERPL-MCNC: 68 MG/DL — HIGH (ref 7–18)
CALCIUM SERPL-MCNC: 9.1 MG/DL — SIGNIFICANT CHANGE UP (ref 8.4–10.5)
CHLORIDE SERPL-SCNC: 113 MMOL/L — HIGH (ref 96–108)
CO2 SERPL-SCNC: 19 MMOL/L — LOW (ref 22–31)
CREAT SERPL-MCNC: 3.56 MG/DL — HIGH (ref 0.5–1.3)
EGFR: 17 ML/MIN/1.73M2 — LOW
GLUCOSE BLDC GLUCOMTR-MCNC: 146 MG/DL — HIGH (ref 70–99)
GLUCOSE BLDC GLUCOMTR-MCNC: 188 MG/DL — HIGH (ref 70–99)
GLUCOSE BLDC GLUCOMTR-MCNC: 199 MG/DL — HIGH (ref 70–99)
GLUCOSE BLDC GLUCOMTR-MCNC: 285 MG/DL — HIGH (ref 70–99)
GLUCOSE SERPL-MCNC: 154 MG/DL — HIGH (ref 70–99)
HCT VFR BLD CALC: 27.7 % — LOW (ref 39–50)
HGB BLD-MCNC: 9.3 G/DL — LOW (ref 13–17)
MCHC RBC-ENTMCNC: 33.6 GM/DL — SIGNIFICANT CHANGE UP (ref 32–36)
MCHC RBC-ENTMCNC: 34.2 PG — HIGH (ref 27–34)
MCV RBC AUTO: 101.8 FL — HIGH (ref 80–100)
NRBC # BLD: 0 /100 WBCS — SIGNIFICANT CHANGE UP (ref 0–0)
PLATELET # BLD AUTO: 150 K/UL — SIGNIFICANT CHANGE UP (ref 150–400)
POTASSIUM SERPL-MCNC: 4.2 MMOL/L — SIGNIFICANT CHANGE UP (ref 3.5–5.3)
POTASSIUM SERPL-SCNC: 4.2 MMOL/L — SIGNIFICANT CHANGE UP (ref 3.5–5.3)
RBC # BLD: 2.72 M/UL — LOW (ref 4.2–5.8)
RBC # FLD: 14.6 % — HIGH (ref 10.3–14.5)
SODIUM SERPL-SCNC: 142 MMOL/L — SIGNIFICANT CHANGE UP (ref 135–145)
WBC # BLD: 4.81 K/UL — SIGNIFICANT CHANGE UP (ref 3.8–10.5)
WBC # FLD AUTO: 4.81 K/UL — SIGNIFICANT CHANGE UP (ref 3.8–10.5)

## 2022-05-19 PROCEDURE — 99232 SBSQ HOSP IP/OBS MODERATE 35: CPT

## 2022-05-19 RX ORDER — SODIUM CHLORIDE 9 MG/ML
1000 INJECTION, SOLUTION INTRAVENOUS
Refills: 0 | Status: DISCONTINUED | OUTPATIENT
Start: 2022-05-19 | End: 2022-05-19

## 2022-05-19 RX ADMIN — Medication 100 MILLIGRAM(S): at 13:37

## 2022-05-19 RX ADMIN — HEPARIN SODIUM 5000 UNIT(S): 5000 INJECTION INTRAVENOUS; SUBCUTANEOUS at 05:07

## 2022-05-19 RX ADMIN — BRIMONIDINE TARTRATE 1 DROP(S): 2 SOLUTION/ DROPS OPHTHALMIC at 12:15

## 2022-05-19 RX ADMIN — CARVEDILOL PHOSPHATE 3.12 MILLIGRAM(S): 80 CAPSULE, EXTENDED RELEASE ORAL at 05:03

## 2022-05-19 RX ADMIN — HEPARIN SODIUM 5000 UNIT(S): 5000 INJECTION INTRAVENOUS; SUBCUTANEOUS at 17:46

## 2022-05-19 RX ADMIN — Medication 25 MILLIGRAM(S): at 21:48

## 2022-05-19 RX ADMIN — Medication 1 APPLICATION(S): at 17:47

## 2022-05-19 RX ADMIN — Medication 100 MILLIGRAM(S): at 06:18

## 2022-05-19 RX ADMIN — CHLORHEXIDINE GLUCONATE 1 APPLICATION(S): 213 SOLUTION TOPICAL at 05:03

## 2022-05-19 RX ADMIN — Medication 100 MILLIGRAM(S): at 21:48

## 2022-05-19 RX ADMIN — Medication 1 MILLIGRAM(S): at 12:16

## 2022-05-19 RX ADMIN — Medication 1 APPLICATION(S): at 05:09

## 2022-05-19 RX ADMIN — Medication 1 TABLET(S): at 12:15

## 2022-05-19 RX ADMIN — Medication 25 MILLIGRAM(S): at 05:04

## 2022-05-19 RX ADMIN — CARVEDILOL PHOSPHATE 3.12 MILLIGRAM(S): 80 CAPSULE, EXTENDED RELEASE ORAL at 17:46

## 2022-05-19 RX ADMIN — SODIUM CHLORIDE 100 MILLILITER(S): 9 INJECTION, SOLUTION INTRAVENOUS at 08:03

## 2022-05-19 RX ADMIN — Medication 1: at 21:49

## 2022-05-19 RX ADMIN — ISOSORBIDE MONONITRATE 30 MILLIGRAM(S): 60 TABLET, EXTENDED RELEASE ORAL at 12:16

## 2022-05-19 RX ADMIN — CEFEPIME 100 MILLIGRAM(S): 1 INJECTION, POWDER, FOR SOLUTION INTRAMUSCULAR; INTRAVENOUS at 04:53

## 2022-05-19 RX ADMIN — Medication 25 MILLIGRAM(S): at 13:37

## 2022-05-19 RX ADMIN — Medication 3 MILLIGRAM(S): at 21:48

## 2022-05-19 RX ADMIN — PANTOPRAZOLE SODIUM 40 MILLIGRAM(S): 20 TABLET, DELAYED RELEASE ORAL at 06:19

## 2022-05-19 RX ADMIN — Medication 1 DROP(S): at 05:02

## 2022-05-19 RX ADMIN — Medication 81 MILLIGRAM(S): at 12:16

## 2022-05-19 RX ADMIN — Medication 1 DROP(S): at 17:46

## 2022-05-19 NOTE — PROGRESS NOTE ADULT - ASSESSMENT
76yoM, AAOx3, ambulates independently, leaves alone at home, w/ PMH of HLD, DM, CKD,CAD,CHF  presents to the ED with weakness and tremulousness,aspiration pneumonia.  1.CAD,CHF-asa coreg,imdur,hydralazine.  2.CRI-Renal f/u.  3.DM-insulin.  4.Lipid d/o-statin.  5.CIWA.  6.Hypernatremia-resolved, d/c D5w.  7.GI and DVT prophylaxis.

## 2022-05-19 NOTE — BH CONSULTATION LIAISON PROGRESS NOTE - NSBHASSESSMENTFT_PSY_ALL_CORE
77M, , retired and living alone, with PHx of alcohol use DO per family (never formally dx or tx) and MHx of HLD, DM, CKD and glaucoma, presented to the ED on 5/10 with weakness and tremulousness, admitted for alcohol withdrawal. Psych consult was requested for suspected dementia vs delirium after pt abruptly became confused and paranoid after receiving CIWA PRN (Ativan 2 mg) on 5/17. On exam, pt presents pleasant and cooperative but obviously confused and oriented only to self. Pt acknowledges consistent heavy alcohol intake at home, but does not manifest s/s of withdrawal at this time. Clinical impression is dementia without behavioral disturbance with co-occurring alcohol dependence, which has likely increased severity of pt's dementia and/or accelerated its progression. Impression of dementia is supported by head CT which found extensive parenchymal volume loss. Unclear whether pt needs standing meds for dementia, but recommend Seroquel 25 mg q8h PRN to address any behavioral disturbances in the hospital. On f/u today, pt presents calm, cooperative and better oriented, which per family is his baseline. Pt is cooperating with plan to dispo him to [facility] before he returns home. Pt does not appear to present an acute risk of harm to self or others at the time of assessment, and does not appear to be in need of admission to IP psych at the time of assessment.  
77M, , retired and living alone, with PHx of alcohol use DO per family (never formally dx or tx) and MHx of HLD, DM, CKD and glaucoma, presented to the ED on 5/10 with weakness and tremulousness, admitted for alcohol withdrawal. Psych consult was requested for suspected dementia vs delirium after pt abruptly became confused and paranoid after receiving CIWA PRN (Ativan 2 mg) on 5/17. On exam, pt presents pleasant and cooperative but obviously confused and oriented only to self. Pt acknowledges consistent heavy alcohol intake at home, but does not manifest s/s of withdrawal at this time. Clinical impression is dementia without behavioral disturbance with co-occurring alcohol dependence, which has likely increased severity of pt's dementia and/or accelerated its progression. Impression of dementia is supported by head CT which found extensive parenchymal volume loss. Unclear whether pt needs standing meds for dementia, but recommend Seroquel 25 mg q8h PRN to address any behavioral disturbances in the hospital. On f/u today, pt presents calm and cooperative but still poorly oriented, c/w initial clinical impression. Pt does not appear to present an acute risk of harm to self or others at the time of assessment, and does not appear to be in need of admission to IP psych at the time of assessment.

## 2022-05-19 NOTE — PROGRESS NOTE ADULT - SUBJECTIVE AND OBJECTIVE BOX
Patient is seen and examined at the bed side, is afebrile. He is doing much better, saturating well in Room air.       REVIEW OF SYSTEMS: All other review systems are negative      ALLERGIES: No Known Allergies      Vital Signs Last 24 Hrs  T(C): 36.8 (19 May 2022 13:20), Max: 36.8 (19 May 2022 13:20)  T(F): 98.3 (19 May 2022 13:20), Max: 98.3 (19 May 2022 13:20)  HR: 75 (19 May 2022 13:20) (60 - 81)  BP: 131/90 (19 May 2022 17:47) (130/78 - 147/91)  BP(mean): 98 (19 May 2022 17:47) (88 - 98)  RR: 18 (19 May 2022 13:20) (18 - 18)  SpO2: 99% (19 May 2022 13:20) (98% - 100%)      PHYSICAL EXAM:  GENERAL: Not in distress, off oxygen   CHEST/LUNG: Not using accessory muscles   HEART: s1 and s2 present  ABDOMEN:  Nontender and  Nondistended  EXTREMITIES: No pedal  edema  CNS: Awake and alert       LABS:                        9.3    4.81  )-----------( 150      ( 19 May 2022 08:08 )             27.7                           9.9    3.92  )-----------( 158      ( 18 May 2022 08:58 )             29.5       05-    142  |  113<H>  |  68<H>  ----------------------------<  154<H>  4.2   |  19<L>  |  3.56<H>    Ca    9.1      19 May 2022 08:08  Mg     2.2         TPro  7.5  /  Alb  2.3<L>  /  TBili  0.5  /  DBili  x   /  AST  26  /  ALT  23  /  AlkPhos  77      152<H>  |  118<H>  |  64<H>  ----------------------------<  149<H>  4.0   |  23  |  3.29<H>    Ca    9.6      18 May 2022 08:58  Mg     2.2         TPro  7.5  /  Alb  2.3<L>  /  TBili  0.5  /  DBili  x   /  AST  26  /  ALT  23  /  AlkPhos  77        CAPILLARY BLOOD GLUCOSE  POCT Blood Glucose.: 125 mg/dL (13 May 2022 17:01)  POCT Blood Glucose.: 130 mg/dL (13 May 2022 11:58)  POCT Blood Glucose.: 113 mg/dL (13 May 2022 08:17)  POCT Blood Glucose.: 123 mg/dL (12 May 2022 21:33)        Urinalysis Basic - ( 12 May 2022 12:13 )  Color: Yellow / Appearance: Clear / S.010 / pH: x  Gluc: x / Ketone: Negative  / Bili: Negative / Urobili: Negative   Blood: x / Protein: 100 / Nitrite: Negative   Leuk Esterase: Negative / RBC: Negative /HPF / WBC 0-2 /HPF   Sq Epi: x / Non Sq Epi: Occasional /HPF / Bacteria: Negative /HPF  Procalcitonin, Serum (22 @ 16:21) : 0.47:    MEDICATIONS  (STANDING):    aspirin  chewable 81 milliGRAM(s) Oral daily  brimonidine 0.2% Ophthalmic Solution 1 Drop(s) Both EYES daily  carvedilol 3.125 milliGRAM(s) Oral every 12 hours  cefepime   IVPB 1000 milliGRAM(s) IV Intermittent every 24 hours  chlorhexidine 2% Cloths 1 Application(s) Topical <User Schedule>  clobetasol 0.05% Ointment 1 Application(s) Topical two times a day  ergocalciferol 33020 Unit(s) Oral <User Schedule>  folic acid 1 milliGRAM(s) Oral daily  glucagon  Injectable 1 milliGRAM(s) IntraMuscular once  heparin   Injectable 5000 Unit(s) SubCutaneous every 12 hours  hydrALAZINE 25 milliGRAM(s) Oral every 8 hours  insulin lispro (ADMELOG) corrective regimen sliding scale   SubCutaneous Before meals and at bedtime  isosorbide   mononitrate ER Tablet (IMDUR) 30 milliGRAM(s) Oral daily  melatonin 3 milliGRAM(s) Oral at bedtime  metroNIDAZOLE  IVPB 500 milliGRAM(s) IV Intermittent every 8 hours  metroNIDAZOLE  IVPB      Nephro-marcella 1 Tablet(s) Oral daily  pantoprazole    Tablet 40 milliGRAM(s) Oral before breakfast  timolol 0.5% Solution 1 Drop(s) Both EYES two times a day        RADIOLOGY & ADDITIONAL TESTS:    22: Xray Chest 1 View- PORTABLE-Urgent (Xray Chest 1 View- PORTABLE-Urgent .) (22 @ 16:03) >    IMPRESSION: There is a new fairly significant right lung process at this time.      22: US Abdomen Complete (US Abdomen Complete .) (22 @ 08:54) Gallstones in the contracted gallbladder.  Hepatic steatosis.      5/10/22: CT Head No Cont (05.10.22 @ 14:32) :   Moderate periventricular white matter ischemia. Moderate  bitemporal lobe and bifrontal lobe atrophy.     Mucosal thickening in the  RIGHT maxillary and RIGHT ethmoid and frontal and RIGHT frontal sinuses.      5/10/22: Xray Chest 1 View- PORTABLE-Urgent (05.10.22 @ 09:20) >Presently lungs are clear.        MICROBIOLOGY DATA:    Culture - Blood (22 @ 21:24)   Specimen Source: .Blood Blood-Peripheral   Culture Results: No growth to date.     Culture - Blood (22 @ 21:24)   Specimen Source: .Blood Blood-Peripheral   Culture Results: No growth to date.     MRSA/MSSA PCR (22 @ 05:59)   MRSA PCR Result.: NotDetec:    COVID-19 PCR (05.10.22 @ 09:36)   COVID-19 PCR: NotDetec:

## 2022-05-19 NOTE — PROGRESS NOTE ADULT - SUBJECTIVE AND OBJECTIVE BOX
CHIEF COMPLAINT:Patient is a 76y old  Male who presents with a chief complaint of alcohol withdrawal.Pt appears comfortable.    	  REVIEW OF SYSTEMS:    [x ] Unable to obtain    PHYSICAL EXAM:  T(C): 36.7 (05-19-22 @ 04:44), Max: 36.7 (05-19-22 @ 04:44)  HR: 81 (05-19-22 @ 04:44) (60 - 81)  BP: 137/70 (05-19-22 @ 04:44) (137/70 - 147/91)  RR: 18 (05-19-22 @ 04:44) (18 - 18)  SpO2: 98% (05-19-22 @ 04:44) (98% - 100%)  Wt(kg): --  I&O's Summary      Appearance: Normal	  HEENT:   Normal oral mucosa, PERRL, EOMI	  Lymphatic: No lymphadenopathy  Cardiovascular: Normal S1 S2, No JVD, No murmurs, No edema  Respiratory: Lungs clear to auscultation	  Gastrointestinal:  Soft, Non-tender, + BS	  Skin: No rashes, No ecchymoses, No cyanosis	  Extremities: Normal range of motion, No clubbing, cyanosis or edema  Vascular: Peripheral pulses palpable 2+ bilaterally    MEDICATIONS  (STANDING):  aspirin  chewable 81 milliGRAM(s) Oral daily  brimonidine 0.2% Ophthalmic Solution 1 Drop(s) Both EYES daily  carvedilol 3.125 milliGRAM(s) Oral every 12 hours  cefepime   IVPB 1000 milliGRAM(s) IV Intermittent every 24 hours  chlorhexidine 2% Cloths 1 Application(s) Topical <User Schedule>  clobetasol 0.05% Ointment 1 Application(s) Topical two times a day  dextrose 5%. 1000 milliLiter(s) (50 mL/Hr) IV Continuous <Continuous>  dextrose 5%. 1000 milliLiter(s) (100 mL/Hr) IV Continuous <Continuous>  dextrose 50% Injectable 25 Gram(s) IV Push once  dextrose 50% Injectable 12.5 Gram(s) IV Push once  dextrose 50% Injectable 25 Gram(s) IV Push once  ergocalciferol 52532 Unit(s) Oral <User Schedule>  folic acid 1 milliGRAM(s) Oral daily  glucagon  Injectable 1 milliGRAM(s) IntraMuscular once  heparin   Injectable 5000 Unit(s) SubCutaneous every 12 hours  hydrALAZINE 25 milliGRAM(s) Oral every 8 hours  insulin lispro (ADMELOG) corrective regimen sliding scale   SubCutaneous Before meals and at bedtime  isosorbide   mononitrate ER Tablet (IMDUR) 30 milliGRAM(s) Oral daily  melatonin 3 milliGRAM(s) Oral at bedtime  metroNIDAZOLE  IVPB      metroNIDAZOLE  IVPB 500 milliGRAM(s) IV Intermittent every 8 hours  Nephro-marcella 1 Tablet(s) Oral daily  pantoprazole    Tablet 40 milliGRAM(s) Oral before breakfast  timolol 0.5% Solution 1 Drop(s) Both EYES two times a day    	  	  LABS:	 	                         9.3    4.81  )-----------( 150      ( 19 May 2022 08:08 )             27.7     05-19    142  |  113<H>  |  68<H>  ----------------------------<  154<H>  4.2   |  19<L>  |  3.56<H>    Ca    9.1      19 May 2022 08:08  Mg     2.2     05-18    TPro  7.5  /  Alb  2.3<L>  /  TBili  0.5  /  DBili  x   /  AST  26  /  ALT  23  /  AlkPhos  77  05-18    proBNP: Serum Pro-Brain Natriuretic Peptide: 1088 pg/mL (05-10 @ 09:36)

## 2022-05-19 NOTE — PROGRESS NOTE ADULT - ASSESSMENT
Patient is a 76yoM, AAOx3, ambulates independently, leaves alone at home, w/ PMH of HLD, DM, CKD, presents to the ED with weakness and tremulousness. Case discussed w/ pt's son Dre Ojeda at bedside who reports that pt lives alone and he drinks about 50 years every night and now has been having about two drinks per day.  No prior Etoh related hospitalization as per son.  Admitted to medicine for further management of alcohol withdrawal. GI Dr. Crandall consulted for EGD and colonoscopy due to anemia. Neurology Dr. Gotti was consulted for concern of EPS symptoms on ativan. CXR showed rt lung field infiltrate, likely Asp Pneumonia, started on abx, ID Dr. Cui following. echo noted reduced ef 20-25%, cardiology Dr. Franz following. Hospital course complicated by hypernatremia likely due to pre-renal cause, nephrology Dr. Art following. Pt is now s/p EGD and colonoscopy on 5/17, showed Erythema in the gastroesophageal junction. Hiatal Hernia. diverticulosis and hemorrhoids, started on ppi daily and high fiber diet. Tremors now resolved, CIWA now discontinued. Psych Dr. Talamantes consulted increased altered mental status  likely due to dementia, started on seroquel 25 mg TID,  Pt continues to have hypernatremia, IV fluids ongoing, nephrology  following.  Pt is more calm and relaxed with Seroquel regimen. PT evaluated pt and recommends [facility], pending authorization.

## 2022-05-19 NOTE — PROGRESS NOTE ADULT - ASSESSMENT
CKD stage 5 , no change in renal function, DKD  Hypernatremia improved, sodium 142 will DC iv fluids  History of systolic cardiomyopathy     Left Ventricle: Endocardium not well visualized; grossly  severely reduced left ventricular systolic function (EF  20-25% by visual estimation). DC IV fluids  Continue Hydralazine, Avoid NSAID .  Avoid ARB and ACE at this time

## 2022-05-19 NOTE — PROGRESS NOTE ADULT - PROBLEM SELECTOR PLAN 4
- echo noted 20-25%  - C/W asa coreg, imdur, hydralazine, & statin  - cardiology Dr. Jorge following

## 2022-05-19 NOTE — BH CONSULTATION LIAISON PROGRESS NOTE - NSBHCHARTREVIEWLAB_PSY_A_CORE FT
9.9    3.92  )-----------( 158      ( 18 May 2022 08:58 )             29.5   05-18    152<H>  |  118<H>  |  64<H>  ----------------------------<  149<H>  4.0   |  23  |  3.29<H>    Ca    9.6      18 May 2022 08:58  Mg     2.2     05-18    TPro  7.5  /  Alb  2.3<L>  /  TBili  0.5  /  DBili  x   /  AST  26  /  ALT  23  /  AlkPhos  77  05-18  
                      9.9    3.92  )-----------( 158      ( 18 May 2022 08:58 )             29.5   05-18    152<H>  |  118<H>  |  64<H>  ----------------------------<  149<H>  4.0   |  23  |  3.29<H>    Ca    9.6      18 May 2022 08:58  Mg     2.2     05-18    TPro  7.5  /  Alb  2.3<L>  /  TBili  0.5  /  DBili  x   /  AST  26  /  ALT  23  /  AlkPhos  77  05-18

## 2022-05-19 NOTE — PROGRESS NOTE ADULT - PROBLEM SELECTOR PLAN 11
- Pt came from home  - PT consulted, recommends [facility]IE, needs autho, & pending family choices.  - Cleared by psych for d/c

## 2022-05-19 NOTE — BH CONSULTATION LIAISON PROGRESS NOTE - NSBHCONSULTRECOMMENDOTHER_PSY_A_CORE FT
1. C/w Seroquel 25 mg q8h PRN agitation/behavioral disturbance  --Do NOT recommend Ativan PRN for agitation, given potential to trigger delirium  2. DC CIWA and associated PRNs, as pt is no longer in alcohol withdrawal  3. Medical management as directed by primary team  4. Family has received SW input on Medicaid application process and intends to move forward once pt returns home  5. Psychiatry is signing off. Reconsult if additional issues arise as inpatient  6. Pt appears psych stable for DC as of today's assessment  7. Case d/w NP [place] of primary team    Tanika Talamantes MD  Director, Consultation-Liaison Psychiatry Service  [phone]    
1. C/w Seroquel 25 mg q8h PRN agitation/behavioral disturbance  --Do NOT recommend Ativan PRN for agitation, given potential to trigger delirium  2. DC CIWA and associated PRNs, as pt is no longer in alcohol withdrawal  3. Medical management as directed by primary team  4. Family has received SW input on Medicaid application process and intends to move forward once pt returns home  5. Psychiatry will continue to follow  6. Pt appears psych stable for DC, and is likely to be medically cleared tomorrow 5/20  7. Case d/w Dr. [place] of primary team    Tanika Talamantes MD  Director, Consultation-Liaison Psychiatry Service  [phone]

## 2022-05-19 NOTE — PROGRESS NOTE ADULT - PROBLEM SELECTOR PLAN 3
- CXR- shows New rt lung infiltrate  - Afebrile no leukocytosis  - C/W cefepime and flagyl untill 5/20  - ID Dr. Cui following

## 2022-05-19 NOTE — PROGRESS NOTE ADULT - SUBJECTIVE AND OBJECTIVE BOX
[   ] ICU                                          [   ] CCU                                      [ x  ] Medical Floor    Patient is a 76 year old male with ETOH abuse and anemia. GI consulted to evaluate.     Patient is a 76 year old male AAOx3, ambulates independently, leaves alone at home, with past medical history significant for HLD, DM, CKD, admitted with alcohol withdrawal found to have anemia. Patient admits to drinking 2 glasses of Whisky at night, and last drink was last night. He also c/o intermittent burning epigastric radiating to his chest associated with dyspepsia. Patient denies nausea, vomiting, hematemesis, hematochezia, melena, fever, chills, chest pain, SOB, cough, hematuria, dysuria or diarrhea.       Patient appears comfortable. No new complaints reported, No abdominal pain, N/V, hematemesis, hematochezia, melena, fever, chills, chest pain, SOB, cough or diarrhea reported.      PAIN MANAGEMENT:  Pain Scale:                2-3 /10  Pain Location:  Epigastric abdominal pain      Prior Colonoscopy:  No prior colonoscopy      PAST MEDICAL HISTORY  DM (diabetes mellitus)  HTN (hypertension)   Chronic kidney disease  HLD (hyperlipidemia)  Glaucoma  Gout        PAST SURGICAL HISTORY  No significant past surgical history        Allergies    No Known Allergies    Intolerances  None      HOME MEDICATIONS    MEDICATIONS  (STANDING):  brimonidine 0.2% Ophthalmic Solution 1 Drop(s) Both EYES daily  dextrose 5%. 1000 milliLiter(s) (50 mL/Hr) IV Continuous <Continuous>  dextrose 5%. 1000 milliLiter(s) (100 mL/Hr) IV Continuous <Continuous>  dextrose 50% Injectable 25 Gram(s) IV Push once  dextrose 50% Injectable 12.5 Gram(s) IV Push once  dextrose 50% Injectable 25 Gram(s) IV Push once  doxycycline IVPB      ergocalciferol 87912 Unit(s) Oral <User Schedule>  folic acid 1 milliGRAM(s) Oral daily  furosemide    Tablet 20 milliGRAM(s) Oral daily  glucagon  Injectable 1 milliGRAM(s) IntraMuscular once  heparin   Injectable 5000 Unit(s) SubCutaneous every 8 hours  insulin lispro (ADMELOG) corrective regimen sliding scale   SubCutaneous Before meals and at bedtime  LORazepam   Injectable 2 milliGRAM(s) IV Push every 4 hours  LORazepam   Injectable   IV Push   multivitamin 1 Tablet(s) Oral daily  timolol 0.5% Solution 1 Drop(s) Both EYES two times a day    MEDICATIONS  (PRN):  dextrose Oral Gel 15 Gram(s) Oral once PRN Blood Glucose LESS THAN 70 milliGRAM(s)/deciliter  LORazepam   Injectable 2 milliGRAM(s) IV Push every 2 hours PRN Symptom-triggered: 2 point increase in CIWA -Ar score and a total score of 7 or LESS  LORazepam   Injectable 2 milliGRAM(s) IV Push every 1 hour PRN Symptom-triggered: each CIWA -Ar score 8 or GREATER      SOCIAL HISTORY  Advanced Directives:       [ X ] Full Code       [  ] DNR  Marital Status:         [  ] M      [ X ] S      [  ] D       [  ] W  Children:       [ X ] Yes      [  ] No  Occupation:        [  ] Employed       [ X ] Unemployed       [  ] Retired  Diet:       [ X ] Regular       [  ] PEG feeding          [  ] NG tube feeding  Drug Use:           [ X ] Patient denied          [  ] Yes  Alcohol:           [X  ] No             [  ] Yes (socially)         [  ] Yes (chronic)  Tobacco:           [  ] Yes           [ X ] No      FAMILY HISTORY  [X  ] Heart Disease            [ X ] Diabetes             [ X ] HTN             [  ] Colon Cancer             [  ] Stomach Cancer              [  ] Pancreatic Cancer      VITALS  Vital Signs Last 24 Hrs  T(C): 36.8 (19 May 2022 13:20), Max: 36.8 (19 May 2022 13:20)  T(F): 98.3 (19 May 2022 13:20), Max: 98.3 (19 May 2022 13:20)  HR: 75 (19 May 2022 13:20) (60 - 81)  BP: 130/78 (19 May 2022 13:20) (130/78 - 147/91)  BP(mean): 88 (19 May 2022 04:44) (88 - 107)  RR: 18 (19 May 2022 13:20) (18 - 18)  SpO2: 99% (19 May 2022 13:20) (98% - 100%)       MEDICATIONS  (STANDING):  aspirin  chewable 81 milliGRAM(s) Oral daily  brimonidine 0.2% Ophthalmic Solution 1 Drop(s) Both EYES daily  carvedilol 3.125 milliGRAM(s) Oral every 12 hours  cefepime   IVPB 1000 milliGRAM(s) IV Intermittent every 24 hours  chlorhexidine 2% Cloths 1 Application(s) Topical <User Schedule>  clobetasol 0.05% Ointment 1 Application(s) Topical two times a day  dextrose 5%. 1000 milliLiter(s) (50 mL/Hr) IV Continuous <Continuous>  dextrose 50% Injectable 25 Gram(s) IV Push once  dextrose 50% Injectable 12.5 Gram(s) IV Push once  dextrose 50% Injectable 25 Gram(s) IV Push once  ergocalciferol 28773 Unit(s) Oral <User Schedule>  folic acid 1 milliGRAM(s) Oral daily  glucagon  Injectable 1 milliGRAM(s) IntraMuscular once  heparin   Injectable 5000 Unit(s) SubCutaneous every 12 hours  hydrALAZINE 25 milliGRAM(s) Oral every 8 hours  insulin lispro (ADMELOG) corrective regimen sliding scale   SubCutaneous Before meals and at bedtime  isosorbide   mononitrate ER Tablet (IMDUR) 30 milliGRAM(s) Oral daily  melatonin 3 milliGRAM(s) Oral at bedtime  metroNIDAZOLE  IVPB      metroNIDAZOLE  IVPB 500 milliGRAM(s) IV Intermittent every 8 hours  Nephro-marcella 1 Tablet(s) Oral daily  pantoprazole    Tablet 40 milliGRAM(s) Oral before breakfast  timolol 0.5% Solution 1 Drop(s) Both EYES two times a day    MEDICATIONS  (PRN):  dextrose Oral Gel 15 Gram(s) Oral once PRN Blood Glucose LESS THAN 70 milliGRAM(s)/deciliter  QUEtiapine 25 milliGRAM(s) Oral three times a day PRN agitation                            9.3    4.81  )-----------( 150      ( 19 May 2022 08:08 )             27.7       05-19    142  |  113<H>  |  68<H>  ----------------------------<  154<H>  4.2   |  19<L>  |  3.56<H>    Ca    9.1      19 May 2022 08:08  Mg     2.2     05-18    TPro  7.5  /  Alb  2.3<L>  /  TBili  0.5  /  DBili  x   /  AST  26  /  ALT  23  /  AlkPhos  77  05-18

## 2022-05-19 NOTE — PROGRESS NOTE ADULT - PROBLEM SELECTOR PLAN 1
- likely due to dementia vs alcohol withdrawal vs infection  - start trial on seroquel 25 mg TID PRN for agitation  - psych Dr. nunez following  - neurology Dr. Gotti following,

## 2022-05-19 NOTE — PROGRESS NOTE ADULT - PROBLEM SELECTOR PLAN 6
- BP in acceptable range  - C/W  coreg, imdur, hydralazine.  - DASH diet  - monitor bp  - cardiology Dr. Jorge following

## 2022-05-19 NOTE — BH CONSULTATION LIAISON PROGRESS NOTE - NSBHCHARTREVIEWINVESTIGATE_PSY_A_CORE FT
< from: EGD-Colonoscopy (05.17.22 @ 14:30) >    EGD Impressions:        Erythema in the gastroesophageal junction.        Hiatal Hernia.        Erythema in the antrum. (Biopsy).        Normal mucosa in the whole examined duodenum.    < from: EGD-Colonoscopy (05.17.22 @ 14:30) >    Colonoscopy Impressions:        Moderate diverticulosis of the descending colon, transverse colon and    ascending colon.        Internal hemorrhoids.    < end of copied text >    
< from: EGD-Colonoscopy (05.17.22 @ 14:30) >    EGD Impressions:        Erythema in the gastroesophageal junction.        Hiatal Hernia.        Erythema in the antrum. (Biopsy).        Normal mucosa in the whole examined duodenum.    < from: EGD-Colonoscopy (05.17.22 @ 14:30) >    Colonoscopy Impressions:        Moderate diverticulosis of the descending colon, transverse colon and    ascending colon.        Internal hemorrhoids.    < end of copied text >

## 2022-05-19 NOTE — PROGRESS NOTE ADULT - SUBJECTIVE AND OBJECTIVE BOX
Patient is a 76y old  Male who presents with a chief complaint of alcohol withdrawal (18 May 2022 18:48)    pt seen in icu [  ], reg med floor [ x  ], bed [ x ], chair at bedside [   ], awake and responsive [ x ], lethargic [  ],    nad [ x ]      Allergies    No Known Allergies        Vitals    T(F): 98.1 (05-19-22 @ 04:44), Max: 98.1 (05-19-22 @ 04:44)  HR: 81 (05-19-22 @ 04:44) (60 - 81)  BP: 137/70 (05-19-22 @ 04:44) (137/70 - 147/91)  RR: 18 (05-19-22 @ 04:44) (18 - 18)  SpO2: 98% (05-19-22 @ 04:44) (98% - 100%)  Wt(kg): --  CAPILLARY BLOOD GLUCOSE      POCT Blood Glucose.: 146 mg/dL (19 May 2022 07:26)      Labs                          9.3    4.81  )-----------( 150      ( 19 May 2022 08:08 )             27.7       05-19    142  |  113<H>  |  68<H>  ----------------------------<  154<H>  4.2   |  19<L>  |  3.56<H>    Ca    9.1      19 May 2022 08:08  Mg     2.2     05-18    TPro  7.5  /  Alb  2.3<L>  /  TBili  0.5  /  DBili  x   /  AST  26  /  ALT  23  /  AlkPhos  77  05-18            .Blood Blood-Peripheral  05-13 @ 21:24   No Growth Final  --  --          Radiology Results      Meds    MEDICATIONS  (STANDING):  aspirin  chewable 81 milliGRAM(s) Oral daily  brimonidine 0.2% Ophthalmic Solution 1 Drop(s) Both EYES daily  carvedilol 3.125 milliGRAM(s) Oral every 12 hours  cefepime   IVPB 1000 milliGRAM(s) IV Intermittent every 24 hours  chlorhexidine 2% Cloths 1 Application(s) Topical <User Schedule>  clobetasol 0.05% Ointment 1 Application(s) Topical two times a day  dextrose 5%. 1000 milliLiter(s) (50 mL/Hr) IV Continuous <Continuous>  dextrose 5%. 1000 milliLiter(s) (100 mL/Hr) IV Continuous <Continuous>  dextrose 5%. 1000 milliLiter(s) (100 mL/Hr) IV Continuous <Continuous>  dextrose 50% Injectable 25 Gram(s) IV Push once  dextrose 50% Injectable 12.5 Gram(s) IV Push once  dextrose 50% Injectable 25 Gram(s) IV Push once  ergocalciferol 96232 Unit(s) Oral <User Schedule>  folic acid 1 milliGRAM(s) Oral daily  glucagon  Injectable 1 milliGRAM(s) IntraMuscular once  heparin   Injectable 5000 Unit(s) SubCutaneous every 12 hours  hydrALAZINE 25 milliGRAM(s) Oral every 8 hours  insulin lispro (ADMELOG) corrective regimen sliding scale   SubCutaneous Before meals and at bedtime  isosorbide   mononitrate ER Tablet (IMDUR) 30 milliGRAM(s) Oral daily  melatonin 3 milliGRAM(s) Oral at bedtime  metroNIDAZOLE  IVPB      metroNIDAZOLE  IVPB 500 milliGRAM(s) IV Intermittent every 8 hours  Nephro-marcella 1 Tablet(s) Oral daily  pantoprazole    Tablet 40 milliGRAM(s) Oral before breakfast  timolol 0.5% Solution 1 Drop(s) Both EYES two times a day      MEDICATIONS  (PRN):  dextrose Oral Gel 15 Gram(s) Oral once PRN Blood Glucose LESS THAN 70 milliGRAM(s)/deciliter  QUEtiapine 25 milliGRAM(s) Oral three times a day PRN agitation      Physical Exam    Neuro :  no focal deficits  Respiratory: right lower lobe crackles  CV: RRR, S1S2, no murmurs,   Abdominal: Soft, NT, ND +BS,  Extremities: No edema, + peripheral pulses, right distal leg erythema and excoriation resolved   skin: diffused scaly rash       ASSESSMENT    etoh withdrawal,   Alcohol dependence   F10.20  Dementia   F03.90  metabolic encephalopathy,   right le cellulitis,   aspiration pna   psoriasis,   acute on ckd,   pancytopenia likely 2nd to alcoholism   r/o parkinsons disease  h/o HLD,   DM,   chronic HF rEF  CKD  Alcohol dependence with withdrawal  Glaucoma  Gout      PLAN    cont multivitamin, folate, thiamine   psych f/u    C/w Seroquel 25 mg q8h PRN agitation/behavioral disturbance  Do NOT recommend Ativan PRN for agitation, given potential to trigger delirium  DC CIWA and associated PRNs, as pt is no longer in alcohol withdrawal  Family has received SW input on Medicaid application process and intends to move forward once pt returns home  Pt appears psych stable for DC, and is likely to be medically cleared tomorrow 5/20  cxr with There is a new fairly significant right lung process at this time noted    id f/u   Continue Cefepime 1 g q 24 hour and Flagyl  until 5/20/22  blood cx neg noted above   echo with EF 20-25%, Grade I diastolic dysfunction  RV systolic pressure is mildly increased noted    ecg with Diagnosis Line Sinus rhythm @ 88 bpm with Premature atrial complexes, Left axis deviation, Left bundle branch block, Abnormal ECG noted above  cardio f/u   cont  asa coreg, imdur, hydralazine.  wound care eval   cont clobetasol 0.05% bid for psoriasis   lesions near resolution   s/p lactulose 10 g x1   rept ammonia <10  stool occult blood neg noted    lft wnl   gi f/u   No evidence of acute GI bleeding  Monitor H/H  transfuse PRBC as needed  Check stool for occult blood  s/p EGD with Erythema in the gastroesophageal junction. Hiatal Hernia. Erythema in the antrum. (Biopsy). Normal mucosa in the whole examined duodenum.  colonoscopy with Moderate diverticulosis of the descending colon, transverse colon and ascending colon. Internal hemorrhoids. Colonoscopy Limitations/Complications:  Await pathology results.  High Fiber Diet  Protonix 40mg PO daily  Treat for H. Pylori if positive  Avoid NSAID  Avoid ETOH  Anti reflux measure  abd us with Gallstones in the contracted gallbladder. 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  Deterioration in renal function, possible chronic versus acute due to dehydration  d/c'd lasix   serum creat stable   Continue IV fluids D5W  d/c'd Sodium bicarbonate PO   serum sodium wnl  2 gm K diet  lispro ss   neuro cons noted   Mild distal symmetric bilateral UE tremulousness in the setting of, and entirely consistent with, EtOH withdrawal, chronic EtOH abuse,    No exam findings diagnostic for an extrapyramidal disorder.  In any case, Dx and consideration of potential management of any (possible suspected) extrapyramidal disorder cannot be made in an acute setting such as this one.    When the Pt has FULLY recovered from his acute medical problems, if there is a desire for neurologic evaluation, then please schedule a routine out-Pt visit.   repeat ct head stable noted    phys tx eval noted and rec phys tx 3-5x/week x 4 weeks at rehabilitation facility; Sub-acute  cont current meds   d/c planning

## 2022-05-19 NOTE — PROGRESS NOTE ADULT - SUBJECTIVE AND OBJECTIVE BOX
Problem List:  CKD STAGE 5      PAST MEDICAL & SURGICAL HISTORY:  DM (diabetes mellitus      HTN (hypertension)      Chronic kidney disease      HLD (hyperlipidemia)      Glaucoma      Gout      No significant past surgical history          No Known Allergies      MEDICATIONS  (STANDING):  aspirin  chewable 81 milliGRAM(s) Oral daily  brimonidine 0.2% Ophthalmic Solution 1 Drop(s) Both EYES daily  carvedilol 3.125 milliGRAM(s) Oral every 12 hours  cefepime   IVPB 1000 milliGRAM(s) IV Intermittent every 24 hours  chlorhexidine 2% Cloths 1 Application(s) Topical <User Schedule>  clobetasol 0.05% Ointment 1 Application(s) Topical two times a day  dextrose 5%. 1000 milliLiter(s) (50 mL/Hr) IV Continuous <Continuous>  dextrose 5%. 1000 milliLiter(s) (100 mL/Hr) IV Continuous <Continuous>  dextrose 5%. 1000 milliLiter(s) (100 mL/Hr) IV Continuous <Continuous>  dextrose 50% Injectable 25 Gram(s) IV Push once  dextrose 50% Injectable 12.5 Gram(s) IV Push once  dextrose 50% Injectable 25 Gram(s) IV Push once  ergocalciferol 33882 Unit(s) Oral <User Schedule>  folic acid 1 milliGRAM(s) Oral daily  glucagon  Injectable 1 milliGRAM(s) IntraMuscular once  heparin   Injectable 5000 Unit(s) SubCutaneous every 12 hours  hydrALAZINE 25 milliGRAM(s) Oral every 8 hours  insulin lispro (ADMELOG) corrective regimen sliding scale   SubCutaneous Before meals and at bedtime  isosorbide   mononitrate ER Tablet (IMDUR) 30 milliGRAM(s) Oral daily  melatonin 3 milliGRAM(s) Oral at bedtime  metroNIDAZOLE  IVPB      metroNIDAZOLE  IVPB 500 milliGRAM(s) IV Intermittent every 8 hours  Nephro-marcella 1 Tablet(s) Oral daily  pantoprazole    Tablet 40 milliGRAM(s) Oral before breakfast  timolol 0.5% Solution 1 Drop(s) Both EYES two times a day    MEDICATIONS  (PRN):  dextrose Oral Gel 15 Gram(s) Oral once PRN Blood Glucose LESS THAN 70 milliGRAM(s)/deciliter  QUEtiapine 25 milliGRAM(s) Oral three times a day PRN agitation                            9.3    4.81  )-----------( 150      ( 19 May 2022 08:08 )             27.7     05-19    142  |  113<H>  |  68<H>  ----------------------------<  154<H>  4.2   |  19<L>  |  3.56<H>    Ca    9.1      19 May 2022 08:08  Mg     2.2     05-18    TPro  7.5  /  Alb  2.3<L>  /  TBili  0.5  /  DBili  x   /  AST  26  /  ALT  23  /  AlkPhos  77  05-18            REVIEW OF SYSTEMS:  awake but confused    VITALS:  T(F): 98.1 (05-19-22 @ 04:44), Max: 98.1 (05-19-22 @ 04:44)  HR: 81 (05-19-22 @ 04:44)  BP: 137/70 (05-19-22 @ 04:44)  RR: 18 (05-19-22 @ 04:44)  SpO2: 98% (05-19-22 @ 04:44)  Wt(kg): --      PHYSICAL EXAM:  Constitutional: no diaphoresis, no distress.  Neck: No JVD, no carotid bruit, supple, no adenopathy  Respiratory:  air entrance B/L, no wheezes, rales or rhonchi  Cardiovascular: S1, S2, RRR, no pericardial rub, no murmur  Abdomen: BS+, soft, no tenderness, no bruit  Pelvis: bladder nondistended  Extremities: No cyanosis or clubbing. No peripheral edema.   Confused    Skin: No rashes

## 2022-05-19 NOTE — PROGRESS NOTE ADULT - ASSESSMENT
Patient is a 76y old  Male , AAOx3, ambulates independently, leaves alone at home, w/ PMH of HLD, DM, CKD, presents to the ER on 5/10/22 for evaluation of weakness and tremulousness. As per ER attending, patient is being admitted for alcohol withdrawal. Patient admits to drinking 2 glasses of Whisky at night. ON admission, he has no fever and Negative CXR. Today, hospital day 3, he found to have respiratory distress and Repeat  CXR shows a new fairly significant right lung process at this time. He has started on Cefepime and the ID consult requested to assist with further evaluation and antibiotic management.     # Aspiration pneumonia  vs HAP- on CXR from 5/13  # Alcohol withdrawal syndrome     would recommend:    1. OOB to chair    2. Continue Cefepime 1 g q 24 hour and Flagyl  until 5/20/22  X 1  more day, may change to oral Abx on discharge   3. Aspiration precaution  4. Continue CIWA protocol as per Primary team     Attending Attestation:    Spent more than 35 minutes on total encounter, more than 50 % of the visit was spent counseling and/or coordinating care by the Attending physician.

## 2022-05-19 NOTE — BH CONSULTATION LIAISON PROGRESS NOTE - CURRENT MEDICATION
MEDICATIONS  (STANDING):  aspirin  chewable 81 milliGRAM(s) Oral daily  brimonidine 0.2% Ophthalmic Solution 1 Drop(s) Both EYES daily  carvedilol 3.125 milliGRAM(s) Oral every 12 hours  cefepime   IVPB 1000 milliGRAM(s) IV Intermittent every 24 hours  chlorhexidine 2% Cloths 1 Application(s) Topical <User Schedule>  clobetasol 0.05% Ointment 1 Application(s) Topical two times a day  dextrose 5%. 1000 milliLiter(s) (50 mL/Hr) IV Continuous <Continuous>  dextrose 5%. 1000 milliLiter(s) (100 mL/Hr) IV Continuous <Continuous>  dextrose 50% Injectable 25 Gram(s) IV Push once  dextrose 50% Injectable 12.5 Gram(s) IV Push once  dextrose 50% Injectable 25 Gram(s) IV Push once  ergocalciferol 89263 Unit(s) Oral <User Schedule>  folic acid 1 milliGRAM(s) Oral daily  glucagon  Injectable 1 milliGRAM(s) IntraMuscular once  heparin   Injectable 5000 Unit(s) SubCutaneous every 12 hours  hydrALAZINE 25 milliGRAM(s) Oral every 8 hours  insulin lispro (ADMELOG) corrective regimen sliding scale   SubCutaneous Before meals and at bedtime  isosorbide   mononitrate ER Tablet (IMDUR) 30 milliGRAM(s) Oral daily  melatonin 3 milliGRAM(s) Oral at bedtime  metroNIDAZOLE  IVPB      metroNIDAZOLE  IVPB 500 milliGRAM(s) IV Intermittent every 8 hours  Nephro-marcella 1 Tablet(s) Oral daily  pantoprazole    Tablet 40 milliGRAM(s) Oral before breakfast  timolol 0.5% Solution 1 Drop(s) Both EYES two times a day    MEDICATIONS  (PRN):  dextrose Oral Gel 15 Gram(s) Oral once PRN Blood Glucose LESS THAN 70 milliGRAM(s)/deciliter  QUEtiapine 25 milliGRAM(s) Oral three times a day PRN agitation  
MEDICATIONS  (STANDING):  aspirin  chewable 81 milliGRAM(s) Oral daily  brimonidine 0.2% Ophthalmic Solution 1 Drop(s) Both EYES daily  carvedilol 3.125 milliGRAM(s) Oral every 12 hours  cefepime   IVPB 1000 milliGRAM(s) IV Intermittent every 24 hours  chlorhexidine 2% Cloths 1 Application(s) Topical <User Schedule>  clobetasol 0.05% Ointment 1 Application(s) Topical two times a day  dextrose 5%. 1000 milliLiter(s) (50 mL/Hr) IV Continuous <Continuous>  dextrose 50% Injectable 25 Gram(s) IV Push once  dextrose 50% Injectable 12.5 Gram(s) IV Push once  dextrose 50% Injectable 25 Gram(s) IV Push once  ergocalciferol 38086 Unit(s) Oral <User Schedule>  folic acid 1 milliGRAM(s) Oral daily  glucagon  Injectable 1 milliGRAM(s) IntraMuscular once  heparin   Injectable 5000 Unit(s) SubCutaneous every 12 hours  hydrALAZINE 25 milliGRAM(s) Oral every 8 hours  insulin lispro (ADMELOG) corrective regimen sliding scale   SubCutaneous Before meals and at bedtime  isosorbide   mononitrate ER Tablet (IMDUR) 30 milliGRAM(s) Oral daily  melatonin 3 milliGRAM(s) Oral at bedtime  metroNIDAZOLE  IVPB 500 milliGRAM(s) IV Intermittent every 8 hours  metroNIDAZOLE  IVPB      Nephro-marcella 1 Tablet(s) Oral daily  pantoprazole    Tablet 40 milliGRAM(s) Oral before breakfast  timolol 0.5% Solution 1 Drop(s) Both EYES two times a day    MEDICATIONS  (PRN):  dextrose Oral Gel 15 Gram(s) Oral once PRN Blood Glucose LESS THAN 70 milliGRAM(s)/deciliter  QUEtiapine 25 milliGRAM(s) Oral three times a day PRN agitation

## 2022-05-19 NOTE — BH CONSULTATION LIAISON PROGRESS NOTE - NSBHCHARTREVIEWVS_PSY_A_CORE FT
Vital Signs Last 24 Hrs  T(C): 36.5 (18 May 2022 12:08), Max: 36.5 (18 May 2022 12:08)  T(F): 97.7 (18 May 2022 12:08), Max: 97.7 (18 May 2022 12:08)  HR: 62 (18 May 2022 15:56) (62 - 80)  BP: 141/95 (18 May 2022 15:56) (137/86 - 156/78)  BP(mean): 107 (18 May 2022 15:56) (98 - 107)  RR: 18 (18 May 2022 12:08) (18 - 18)  SpO2: 100% (18 May 2022 15:56) (97% - 100%)
Vital Signs Last 24 Hrs  T(C): 36.8 (19 May 2022 13:20), Max: 36.8 (19 May 2022 13:20)  T(F): 98.3 (19 May 2022 13:20), Max: 98.3 (19 May 2022 13:20)  HR: 75 (19 May 2022 13:20) (60 - 81)  BP: 131/90 (19 May 2022 17:47) (130/78 - 147/91)  BP(mean): 98 (19 May 2022 17:47) (88 - 98)  RR: 18 (19 May 2022 13:20) (18 - 18)  SpO2: 99% (19 May 2022 13:20) (98% - 100%)

## 2022-05-19 NOTE — PROGRESS NOTE ADULT - SUBJECTIVE AND OBJECTIVE BOX
NP Note discussed with  primary attending    Patient is a 76y old  Male who presents with a chief complaint of alcohol withdrawal (19 May 2022 11:52)      INTERVAL HPI/OVERNIGHT EVENTS: no new complaints    MEDICATIONS  (STANDING):  aspirin  chewable 81 milliGRAM(s) Oral daily  brimonidine 0.2% Ophthalmic Solution 1 Drop(s) Both EYES daily  carvedilol 3.125 milliGRAM(s) Oral every 12 hours  cefepime   IVPB 1000 milliGRAM(s) IV Intermittent every 24 hours  chlorhexidine 2% Cloths 1 Application(s) Topical <User Schedule>  clobetasol 0.05% Ointment 1 Application(s) Topical two times a day  dextrose 5%. 1000 milliLiter(s) (50 mL/Hr) IV Continuous <Continuous>  dextrose 50% Injectable 25 Gram(s) IV Push once  dextrose 50% Injectable 12.5 Gram(s) IV Push once  dextrose 50% Injectable 25 Gram(s) IV Push once  ergocalciferol 82931 Unit(s) Oral <User Schedule>  folic acid 1 milliGRAM(s) Oral daily  glucagon  Injectable 1 milliGRAM(s) IntraMuscular once  heparin   Injectable 5000 Unit(s) SubCutaneous every 12 hours  hydrALAZINE 25 milliGRAM(s) Oral every 8 hours  insulin lispro (ADMELOG) corrective regimen sliding scale   SubCutaneous Before meals and at bedtime  isosorbide   mononitrate ER Tablet (IMDUR) 30 milliGRAM(s) Oral daily  melatonin 3 milliGRAM(s) Oral at bedtime  metroNIDAZOLE  IVPB      metroNIDAZOLE  IVPB 500 milliGRAM(s) IV Intermittent every 8 hours  Nephro-marcella 1 Tablet(s) Oral daily  pantoprazole    Tablet 40 milliGRAM(s) Oral before breakfast  timolol 0.5% Solution 1 Drop(s) Both EYES two times a day    MEDICATIONS  (PRN):  dextrose Oral Gel 15 Gram(s) Oral once PRN Blood Glucose LESS THAN 70 milliGRAM(s)/deciliter  QUEtiapine 25 milliGRAM(s) Oral three times a day PRN agitation      __________________________________________________  REVIEW OF SYSTEMS:    CONSTITUTIONAL: No fever,   EYES: no acute visual disturbances  NECK: No pain or stiffness  RESPIRATORY: No cough; No shortness of breath  CARDIOVASCULAR: No chest pain, no palpitations  GASTROINTESTINAL: No pain. No nausea or vomiting; No diarrhea   NEUROLOGICAL: No headache or numbness, no tremors  MUSCULOSKELETAL: No joint pain, no muscle pain  GENITOURINARY: no dysuria, no frequency, no hesitancy  PSYCHIATRY: no depression , no anxiety  ALL OTHER  ROS negative        Vital Signs Last 24 Hrs  T(C): 36.7 (19 May 2022 04:44), Max: 36.7 (19 May 2022 04:44)  T(F): 98.1 (19 May 2022 04:44), Max: 98.1 (19 May 2022 04:44)  HR: 81 (19 May 2022 04:44) (60 - 81)  BP: 137/70 (19 May 2022 04:44) (137/70 - 147/91)  BP(mean): 88 (19 May 2022 04:44) (88 - 107)  RR: 18 (19 May 2022 04:44) (18 - 18)  SpO2: 98% (19 May 2022 04:44) (98% - 100%)    ________________________________________________  PHYSICAL EXAM:  GENERAL: NAD  HEENT: Normocephalic;  conjunctivae and sclerae clear; moist mucous membranes;   NECK : supple  CHEST/LUNG: Clear to ausculitation bilaterally with good air entry   HEART: S1 S2  regular; no murmurs, gallops or rubs  ABDOMEN: Soft, Nontender, Nondistended; Bowel sounds present  EXTREMITIES: no cyanosis; no edema; no calf tenderness  SKIN: warm and dry; no rash  NERVOUS SYSTEM:  Awake and alert; Oriented  to place, person and time ; no new deficits    _________________________________________________  LABS:                        9.3    4.81  )-----------( 150      ( 19 May 2022 08:08 )             27.7     05-19    142  |  113<H>  |  68<H>  ----------------------------<  154<H>  4.2   |  19<L>  |  3.56<H>    Ca    9.1      19 May 2022 08:08  Mg     2.2     05-18    TPro  7.5  /  Alb  2.3<L>  /  TBili  0.5  /  DBili  x   /  AST  26  /  ALT  23  /  AlkPhos  77  05-18        CAPILLARY BLOOD GLUCOSE      POCT Blood Glucose.: 285 mg/dL (19 May 2022 11:18)  POCT Blood Glucose.: 146 mg/dL (19 May 2022 07:26)  POCT Blood Glucose.: 190 mg/dL (18 May 2022 21:31)  POCT Blood Glucose.: 310 mg/dL (18 May 2022 17:14)        RADIOLOGY & ADDITIONAL TESTS:  < from: CT Head No Cont (05.14.22 @ 10:40) >    Multiple axial sections were performed from base skull to vertex without   contrast enhancement. Coronal and sagittal reconstructions were performed    This exam is compared prior head CT performed on May 10, 2021    Extensive parenchymal volume loss and chronic microvascular ischemic   changes are again seen.    Calcification is seen involving both distal internal carotid, distal   vertebral and basilar arteries are identified. These findings appear   unchanged    There is no acute hemorrhage mass or mass effect seen    Evaluation of osseous structures with the appropriate window appears  normal    Right posterior ethmoid sinus mucosal thickening is again seen.    Both mastoid and middle ear regions appear clear.    IMPRESSION: Stable exam.    < end of copied text >  < from: CT Head No Cont (05.14.22 @ 10:40) >    Multiple axial sections were performed from base skull to vertex without   contrast enhancement. Coronal and sagittal reconstructions were performed    This exam is compared prior head CT performed on May 10, 2021    Extensive parenchymal volume loss and chronic microvascular ischemic   changes are again seen.    Calcification is seen involving both distal internal carotid, distal   vertebral and basilar arteries are identified. These findings appear   unchanged    There is no acute hemorrhage mass or mass effect seen    Evaluation of osseous structures with the appropriate window appears  normal    Right posterior ethmoid sinus mucosal thickening is again seen.    Both mastoid and middle ear regions appear clear.    IMPRESSION: Stable exam.    < end of copied text >      Imaging Personally Reviewed:  YES/NO    Consultant(s) Notes Reviewed:   YES/ No    Care Discussed with Consultants :     Plan of care was discussed with patient and /or primary care giver; all questions and concerns were addressed and care was aligned with patient's wishes.     NP Note discussed with  primary attending    Patient is a 76y old  Male who presents with a chief complaint of alcohol withdrawal (19 May 2022 11:52)      INTERVAL HPI/OVERNIGHT EVENTS: no new complaints    MEDICATIONS  (STANDING):  aspirin  chewable 81 milliGRAM(s) Oral daily  brimonidine 0.2% Ophthalmic Solution 1 Drop(s) Both EYES daily  carvedilol 3.125 milliGRAM(s) Oral every 12 hours  cefepime   IVPB 1000 milliGRAM(s) IV Intermittent every 24 hours  chlorhexidine 2% Cloths 1 Application(s) Topical <User Schedule>  clobetasol 0.05% Ointment 1 Application(s) Topical two times a day  dextrose 5%. 1000 milliLiter(s) (50 mL/Hr) IV Continuous <Continuous>  dextrose 50% Injectable 25 Gram(s) IV Push once  dextrose 50% Injectable 12.5 Gram(s) IV Push once  dextrose 50% Injectable 25 Gram(s) IV Push once  ergocalciferol 88423 Unit(s) Oral <User Schedule>  folic acid 1 milliGRAM(s) Oral daily  glucagon  Injectable 1 milliGRAM(s) IntraMuscular once  heparin   Injectable 5000 Unit(s) SubCutaneous every 12 hours  hydrALAZINE 25 milliGRAM(s) Oral every 8 hours  insulin lispro (ADMELOG) corrective regimen sliding scale   SubCutaneous Before meals and at bedtime  isosorbide   mononitrate ER Tablet (IMDUR) 30 milliGRAM(s) Oral daily  melatonin 3 milliGRAM(s) Oral at bedtime  metroNIDAZOLE  IVPB      metroNIDAZOLE  IVPB 500 milliGRAM(s) IV Intermittent every 8 hours  Nephro-marcella 1 Tablet(s) Oral daily  pantoprazole    Tablet 40 milliGRAM(s) Oral before breakfast  timolol 0.5% Solution 1 Drop(s) Both EYES two times a day    MEDICATIONS  (PRN):  dextrose Oral Gel 15 Gram(s) Oral once PRN Blood Glucose LESS THAN 70 milliGRAM(s)/deciliter  QUEtiapine 25 milliGRAM(s) Oral three times a day PRN agitation      __________________________________________________  REVIEW OF SYSTEMS:    ROS: Unable to access, 2/2 impaired cognition.   Vital Signs Last 24 Hrs  T(C): 36.7 (19 May 2022 04:44), Max: 36.7 (19 May 2022 04:44)  T(F): 98.1 (19 May 2022 04:44), Max: 98.1 (19 May 2022 04:44)  HR: 81 (19 May 2022 04:44) (60 - 81)  BP: 137/70 (19 May 2022 04:44) (137/70 - 147/91)  BP(mean): 88 (19 May 2022 04:44) (88 - 107)  RR: 18 (19 May 2022 04:44) (18 - 18)  SpO2: 98% (19 May 2022 04:44) (98% - 100%)    ________________________________________________  PHYSICAL EXAM:  GENERAL: NAD  HEENT: Normocephalic;  conjunctivae and sclerae clear; moist mucous membranes;   NECK : supple  CHEST/LUNG: Clear to ausculitation bilaterally with good air entry   HEART: S1 S2  regular; no murmurs, gallops or rubs  ABDOMEN: Soft, Nontender, Nondistended; Bowel sounds present  EXTREMITIES: no cyanosis; no edema; no calf tenderness  SKIN: warm and dry; no rash  NERVOUS SYSTEM:  Awake and alert; Oriented  to self only ; no new deficits    _________________________________________________  LABS:                        9.3    4.81  )-----------( 150      ( 19 May 2022 08:08 )             27.7     05-19    142  |  113<H>  |  68<H>  ----------------------------<  154<H>  4.2   |  19<L>  |  3.56<H>    Ca    9.1      19 May 2022 08:08  Mg     2.2     05-18    TPro  7.5  /  Alb  2.3<L>  /  TBili  0.5  /  DBili  x   /  AST  26  /  ALT  23  /  AlkPhos  77  05-18        CAPILLARY BLOOD GLUCOSE      POCT Blood Glucose.: 285 mg/dL (19 May 2022 11:18)  POCT Blood Glucose.: 146 mg/dL (19 May 2022 07:26)  POCT Blood Glucose.: 190 mg/dL (18 May 2022 21:31)  POCT Blood Glucose.: 310 mg/dL (18 May 2022 17:14)        RADIOLOGY & ADDITIONAL TESTS:  < from: CT Head No Cont (05.14.22 @ 10:40) >    Multiple axial sections were performed from base skull to vertex without   contrast enhancement. Coronal and sagittal reconstructions were performed    This exam is compared prior head CT performed on May 10, 2021    Extensive parenchymal volume loss and chronic microvascular ischemic   changes are again seen.    Calcification is seen involving both distal internal carotid, distal   vertebral and basilar arteries are identified. These findings appear   unchanged    There is no acute hemorrhage mass or mass effect seen    Evaluation of osseous structures with the appropriate window appears  normal    Right posterior ethmoid sinus mucosal thickening is again seen.    Both mastoid and middle ear regions appear clear.    IMPRESSION: Stable exam.    < end of copied text >  < from: CT Head No Cont (05.14.22 @ 10:40) >    Multiple axial sections were performed from base skull to vertex without   contrast enhancement. Coronal and sagittal reconstructions were performed    This exam is compared prior head CT performed on May 10, 2021    Extensive parenchymal volume loss and chronic microvascular ischemic   changes are again seen.    Calcification is seen involving both distal internal carotid, distal   vertebral and basilar arteries are identified. These findings appear   unchanged    There is no acute hemorrhage mass or mass effect seen    Evaluation of osseous structures with the appropriate window appears  normal    Right posterior ethmoid sinus mucosal thickening is again seen.    Both mastoid and middle ear regions appear clear.    IMPRESSION: Stable exam.    < end of copied text >      Imaging Personally Reviewed:  YES/NO    Consultant(s) Notes Reviewed:   YES/ No    Care Discussed with Consultants :     Plan of care was discussed with patient and /or primary care giver; all questions and concerns were addressed and care was aligned with patient's wishes.

## 2022-05-20 LAB
ANION GAP SERPL CALC-SCNC: 10 MMOL/L — SIGNIFICANT CHANGE UP (ref 5–17)
BUN SERPL-MCNC: 65 MG/DL — HIGH (ref 7–18)
CALCIUM SERPL-MCNC: 9.2 MG/DL — SIGNIFICANT CHANGE UP (ref 8.4–10.5)
CHLORIDE SERPL-SCNC: 111 MMOL/L — HIGH (ref 96–108)
CO2 SERPL-SCNC: 19 MMOL/L — LOW (ref 22–31)
CREAT SERPL-MCNC: 3.45 MG/DL — HIGH (ref 0.5–1.3)
EGFR: 18 ML/MIN/1.73M2 — LOW
GLUCOSE BLDC GLUCOMTR-MCNC: 125 MG/DL — HIGH (ref 70–99)
GLUCOSE BLDC GLUCOMTR-MCNC: 178 MG/DL — HIGH (ref 70–99)
GLUCOSE BLDC GLUCOMTR-MCNC: 188 MG/DL — HIGH (ref 70–99)
GLUCOSE BLDC GLUCOMTR-MCNC: 203 MG/DL — HIGH (ref 70–99)
GLUCOSE SERPL-MCNC: 156 MG/DL — HIGH (ref 70–99)
HCT VFR BLD CALC: 28.7 % — LOW (ref 39–50)
HGB BLD-MCNC: 9.5 G/DL — LOW (ref 13–17)
MCHC RBC-ENTMCNC: 33.1 GM/DL — SIGNIFICANT CHANGE UP (ref 32–36)
MCHC RBC-ENTMCNC: 33.9 PG — SIGNIFICANT CHANGE UP (ref 27–34)
MCV RBC AUTO: 102.5 FL — HIGH (ref 80–100)
NRBC # BLD: 0 /100 WBCS — SIGNIFICANT CHANGE UP (ref 0–0)
PLATELET # BLD AUTO: 166 K/UL — SIGNIFICANT CHANGE UP (ref 150–400)
POTASSIUM SERPL-MCNC: 4.8 MMOL/L — SIGNIFICANT CHANGE UP (ref 3.5–5.3)
POTASSIUM SERPL-SCNC: 4.8 MMOL/L — SIGNIFICANT CHANGE UP (ref 3.5–5.3)
RBC # BLD: 2.8 M/UL — LOW (ref 4.2–5.8)
RBC # FLD: 14.6 % — HIGH (ref 10.3–14.5)
SARS-COV-2 RNA SPEC QL NAA+PROBE: SIGNIFICANT CHANGE UP
SODIUM SERPL-SCNC: 140 MMOL/L — SIGNIFICANT CHANGE UP (ref 135–145)
SURGICAL PATHOLOGY STUDY: SIGNIFICANT CHANGE UP
WBC # BLD: 5.26 K/UL — SIGNIFICANT CHANGE UP (ref 3.8–10.5)
WBC # FLD AUTO: 5.26 K/UL — SIGNIFICANT CHANGE UP (ref 3.8–10.5)

## 2022-05-20 RX ADMIN — Medication 25 MILLIGRAM(S): at 15:08

## 2022-05-20 RX ADMIN — Medication 100 MILLIGRAM(S): at 21:11

## 2022-05-20 RX ADMIN — HEPARIN SODIUM 5000 UNIT(S): 5000 INJECTION INTRAVENOUS; SUBCUTANEOUS at 05:11

## 2022-05-20 RX ADMIN — Medication 1 APPLICATION(S): at 18:08

## 2022-05-20 RX ADMIN — Medication 1 APPLICATION(S): at 05:12

## 2022-05-20 RX ADMIN — CARVEDILOL PHOSPHATE 3.12 MILLIGRAM(S): 80 CAPSULE, EXTENDED RELEASE ORAL at 18:08

## 2022-05-20 RX ADMIN — CHLORHEXIDINE GLUCONATE 1 APPLICATION(S): 213 SOLUTION TOPICAL at 05:13

## 2022-05-20 RX ADMIN — Medication 2: at 17:24

## 2022-05-20 RX ADMIN — Medication 1 MILLIGRAM(S): at 12:08

## 2022-05-20 RX ADMIN — HEPARIN SODIUM 5000 UNIT(S): 5000 INJECTION INTRAVENOUS; SUBCUTANEOUS at 18:09

## 2022-05-20 RX ADMIN — Medication 100 MILLIGRAM(S): at 06:43

## 2022-05-20 RX ADMIN — Medication 81 MILLIGRAM(S): at 12:08

## 2022-05-20 RX ADMIN — CEFEPIME 100 MILLIGRAM(S): 1 INJECTION, POWDER, FOR SOLUTION INTRAMUSCULAR; INTRAVENOUS at 05:11

## 2022-05-20 RX ADMIN — Medication 1 DROP(S): at 18:08

## 2022-05-20 RX ADMIN — Medication 1: at 12:11

## 2022-05-20 RX ADMIN — Medication 1 TABLET(S): at 12:08

## 2022-05-20 RX ADMIN — Medication 1 DROP(S): at 05:12

## 2022-05-20 RX ADMIN — Medication 3 MILLIGRAM(S): at 21:15

## 2022-05-20 RX ADMIN — ISOSORBIDE MONONITRATE 30 MILLIGRAM(S): 60 TABLET, EXTENDED RELEASE ORAL at 12:08

## 2022-05-20 RX ADMIN — BRIMONIDINE TARTRATE 1 DROP(S): 2 SOLUTION/ DROPS OPHTHALMIC at 12:18

## 2022-05-20 RX ADMIN — Medication 1: at 21:11

## 2022-05-20 RX ADMIN — Medication 25 MILLIGRAM(S): at 21:11

## 2022-05-20 RX ADMIN — Medication 100 MILLIGRAM(S): at 13:39

## 2022-05-20 RX ADMIN — Medication 25 MILLIGRAM(S): at 05:11

## 2022-05-20 RX ADMIN — PANTOPRAZOLE SODIUM 40 MILLIGRAM(S): 20 TABLET, DELAYED RELEASE ORAL at 05:12

## 2022-05-20 RX ADMIN — CARVEDILOL PHOSPHATE 3.12 MILLIGRAM(S): 80 CAPSULE, EXTENDED RELEASE ORAL at 05:11

## 2022-05-20 NOTE — PROGRESS NOTE ADULT - PROBLEM SELECTOR PLAN 11
- Pt came from home  - PT consulted, recommends KIRSTIE, needs autho, & pending family choices.  - Cleared by psych for d/c - Pt came from home  - PT consulted, recommends KIRSTIE, needs autho, will repeat Covid on Monday 5/23  - Cleared by psych for d/c

## 2022-05-20 NOTE — PROGRESS NOTE ADULT - ASSESSMENT
Encounter Date: 2018       History   No chief complaint on file.    Georgia is a FT o/w healthy 4 month old female here for evaluation of fever for the past 4 days. I evaluated Georgia yesterday, with consistent fever of 102 and some mild nasal congestion. At that time. Her urine was normal, labs with  Wbc of 19K, low elevated procal. Was given dose of ceftriaxone and dcd home. Mom notes overnight her fever did seem to break but she became very fussy this afternoon and refused to eat. No other new sx.- no rash. No vomiting.           Review of patient's allergies indicates:  No Known Allergies  No past medical history on file.  No past surgical history on file.  Family History   Problem Relation Age of Onset    Hypertension Maternal Grandfather         Copied from mother's family history at birth    Cancer Maternal Grandmother         Copied from mother's family history at birth    Cervical cancer Maternal Grandmother         Copied from mother's family history at birth    Seizures Mother         Copied from mother's history at birth     Social History   Substance Use Topics    Smoking status: Never Smoker    Smokeless tobacco: Never Used    Alcohol use Not on file     Review of Systems   Constitutional: Positive for activity change, appetite change, fever and irritability.   HENT: Positive for congestion.    Respiratory: Negative for cough.    Gastrointestinal: Negative for diarrhea and vomiting.   Genitourinary: Positive for decreased urine volume.   Skin: Negative for rash.       Physical Exam     Initial Vitals   BP Pulse Resp Temp SpO2   -- -- -- -- --      MAP       --         Physical Exam    Vitals reviewed.  Constitutional: She appears well-developed and well-nourished. She is active. She has a strong cry. No distress.   Fussy in moms arms, refusing bottle at this time but non toxic appearing   HENT:   Head: Anterior fontanelle is flat.   Mouth/Throat: Mucous membranes are moist. Oropharynx is  Patient is a 76yoM, AAOx3, ambulates independently, leaves alone at home, w/ PMH of HLD, DM, CKD, presents to the ED with weakness and tremulousness. Case discussed w/ pt's son Dre Ojeda at bedside who reports that pt lives alone and he drinks about 50 years every night and now has been having about two drinks per day.  No prior Etoh related hospitalization as per son.  Admitted to medicine for further management of alcohol withdrawal. GI Dr. Crandall consulted for EGD and colonoscopy due to anemia. Neurology Dr. Gotti was consulted for concern of EPS symptoms on ativan. CXR showed rt lung field infiltrate, likely Asp Pneumonia, started on abx, ID Dr. Cui following. echo noted reduced ef 20-25%, cardiology Dr. Franz following. Hospital course complicated by hypernatremia likely due to pre-renal cause, nephrology Dr. Art following. Pt is now s/p EGD and colonoscopy on 5/17, showed Erythema in the gastroesophageal junction. Hiatal Hernia. diverticulosis and hemorrhoids, started on ppi daily and high fiber diet. Tremors now resolved, CIWA now discontinued. Psych Dr. Talamantes consulted increased altered mental status  likely due to dementia, started on seroquel 25 mg TID,  Pt continues to have hypernatremia, IV fluids ongoing, nephrology  following.  Pt is more calm and relaxed with Seroquel regimen. PT evaluated pt and recommends KIRSTIE, pending authorization.      clear.   Mild erythema but pustules actually improved from yesterday   Eyes: Pupils are equal, round, and reactive to light.   Cardiovascular: Normal rate, regular rhythm, S1 normal and S2 normal. Pulses are strong.    Pulmonary/Chest: Effort normal and breath sounds normal. No respiratory distress.   Abdominal: Soft.   Neurological: She is alert.   Skin: Skin is warm and dry. Capillary refill takes less than 2 seconds. No rash noted.         ED Course   Lumbar Puncture  Date/Time: 2018 11:51 PM  Performed by: TSERING MILLER  Authorized by: TATUM RANKIN   Consent Done: Emergent Situation  Indications: evaluation for infection  Patient sedated: no  Lumbar space: L4-L5 interspace  Patient's position: right lateral decubitus  Needle gauge: 22  Needle length: 1.5 in  Number of attempts: 3  Fluid appearance: blood-tinged then clearing  Tubes of fluid: 4  Post-procedure: site cleaned and adhesive bandage applied  Complications: No  Specimens: No  Implants: No  Patient tolerance: Patient tolerated the procedure well with no immediate complications        Labs Reviewed - No data to display          Medical Decision Making:   History:   I obtained history from: someone other than patient and another health care provider.  Old Medical Records: I decided to obtain old medical records.  Initial Assessment:   Georgia presents for emergent evaluation of fever for the past 4 days which has improved and now with fussiness and poor PO. Discussed with family regarding my concerns at this point given her age and new sx, will rest of work up completed yesterday, feel it would be prudent to perform LP to r/o meningitis as source. Mom and dad comfortable with this.   Differential Diagnosis:   Viral syndrome, meningitis, bacteremia  Clinical Tests:   Lab Tests: Ordered and Reviewed  ED Management:  Patient seen and examined, labs ordered, LP completed- given ceftriaxone here and discussed with hospitalist. Mom and dad  aware of plan for admission.                      Clinical Impression:   The encounter diagnosis was Acute febrile illness in pediatric patient.    No orders to display       Disposition:   Disposition: Admitted  Condition: Surendra Luu MD  06/13/18 2923     Patient is a 76yoM, AAOx3, ambulates independently, leaves alone at home, w/ PMH of HLD, DM, CKD, presents to the ED with weakness and tremulousness. Case discussed w/ pt's son Dre Ojeda at bedside who reports that pt lives alone and he drinks about 50 years every night and now has been having about two drinks per day.  No prior Etoh related hospitalization as per son.  Admitted to medicine for further management of alcohol withdrawal. GI Dr. Crandall consulted for EGD and colonoscopy due to anemia. Neurology Dr. Gotti was consulted for concern of EPS symptoms on ativan. CXR showed rt lung field infiltrate, likely Asp Pneumonia, started on abx, ID Dr. Cui following. echo noted reduced ef 20-25%, cardiology Dr. Franz following. Hospital course complicated by hypernatremia likely due to pre-renal cause, nephrology Dr. Art following. Pt is now s/p EGD and colonoscopy on 5/17, showed Erythema in the gastroesophageal junction. Hiatal Hernia. diverticulosis and hemorrhoids, started on ppi daily and high fiber diet. Tremors now resolved, CIWA now discontinued. Psych Dr. Talamantes consulted increased altered mental status  likely due to dementia, started on seroquel 25 mg TID,  Pt continues to have hypernatremia, IV fluids ongoing, nephrology  following.  Pt is more calm and relaxed with Seroquel regimen. Cleared by psych for discharge. PT evaluated pt and recommends [facility]IRSTIE, pending authorization.

## 2022-05-20 NOTE — PROGRESS NOTE ADULT - PROBLEM SELECTOR PLAN 3
- CXR- shows New rt lung infiltrate  - Afebrile no leukocytosis  - S/P cefepime and flagyl last dose today 5/20  - ID Dr. Cui following

## 2022-05-20 NOTE — PROGRESS NOTE ADULT - ASSESSMENT
76yoM, AAOx3, ambulates independently, leaves alone at home, w/ PMH of HLD, DM, CKD,CAD,CHF  presents to the ED with weakness and tremulousness,aspiration pneumonia.  1.CAD,CHF-asa coreg,imdur,hydralazine.  2.CRI-Renal f/u.  3.DM-insulin.  4.Lipid d/o-statin.  5.CIWA.  6.Hypernatremia-resolved.  7.GI and DVT prophylaxis.

## 2022-05-20 NOTE — PROGRESS NOTE ADULT - SUBJECTIVE AND OBJECTIVE BOX
NP Note discussed with  primary attending    Patient is a 76y old  Male who presents with a chief complaint of alcohol withdrawal (20 May 2022 08:41)      INTERVAL HPI/OVERNIGHT EVENTS: no new complaints    MEDICATIONS  (STANDING):  aspirin  chewable 81 milliGRAM(s) Oral daily  brimonidine 0.2% Ophthalmic Solution 1 Drop(s) Both EYES daily  carvedilol 3.125 milliGRAM(s) Oral every 12 hours  cefepime   IVPB 1000 milliGRAM(s) IV Intermittent every 24 hours  chlorhexidine 2% Cloths 1 Application(s) Topical <User Schedule>  clobetasol 0.05% Ointment 1 Application(s) Topical two times a day  dextrose 5%. 1000 milliLiter(s) (50 mL/Hr) IV Continuous <Continuous>  dextrose 50% Injectable 25 Gram(s) IV Push once  dextrose 50% Injectable 12.5 Gram(s) IV Push once  dextrose 50% Injectable 25 Gram(s) IV Push once  ergocalciferol 56507 Unit(s) Oral <User Schedule>  folic acid 1 milliGRAM(s) Oral daily  glucagon  Injectable 1 milliGRAM(s) IntraMuscular once  heparin   Injectable 5000 Unit(s) SubCutaneous every 12 hours  hydrALAZINE 25 milliGRAM(s) Oral every 8 hours  insulin lispro (ADMELOG) corrective regimen sliding scale   SubCutaneous Before meals and at bedtime  isosorbide   mononitrate ER Tablet (IMDUR) 30 milliGRAM(s) Oral daily  melatonin 3 milliGRAM(s) Oral at bedtime  metroNIDAZOLE  IVPB      metroNIDAZOLE  IVPB 500 milliGRAM(s) IV Intermittent every 8 hours  Nephro-marcella 1 Tablet(s) Oral daily  pantoprazole    Tablet 40 milliGRAM(s) Oral before breakfast  timolol 0.5% Solution 1 Drop(s) Both EYES two times a day    MEDICATIONS  (PRN):  dextrose Oral Gel 15 Gram(s) Oral once PRN Blood Glucose LESS THAN 70 milliGRAM(s)/deciliter  QUEtiapine 25 milliGRAM(s) Oral three times a day PRN agitation      __________________________________________________  REVIEW OF SYSTEMS:    CONSTITUTIONAL: No fever,   EYES: no acute visual disturbances  NECK: No pain or stiffness  RESPIRATORY: No cough; No shortness of breath  CARDIOVASCULAR: No chest pain, no palpitations  GASTROINTESTINAL: No pain. No nausea or vomiting; No diarrhea   NEUROLOGICAL: No headache or numbness, no tremors  MUSCULOSKELETAL: No joint pain, no muscle pain  GENITOURINARY: no dysuria, no frequency, no hesitancy  PSYCHIATRY: no depression , no anxiety  ALL OTHER  ROS negative        Vital Signs Last 24 Hrs  T(C): 36.9 (20 May 2022 04:40), Max: 36.9 (20 May 2022 04:40)  T(F): 98.4 (20 May 2022 04:40), Max: 98.4 (20 May 2022 04:40)  HR: 84 (20 May 2022 12:06) (75 - 84)  BP: 164/78 (20 May 2022 12:06) (130/78 - 164/78)  BP(mean): 99 (20 May 2022 12:06) (91 - 108)  RR: 19 (20 May 2022 04:40) (18 - 19)  SpO2: 100% (20 May 2022 12:06) (98% - 100%)    ________________________________________________  PHYSICAL EXAM:  GENERAL: NAD  HEENT: Normocephalic;  conjunctivae and sclerae clear; moist mucous membranes;   NECK : supple  CHEST/LUNG: Clear to ausculitation bilaterally with good air entry   HEART: S1 S2  regular; no murmurs, gallops or rubs  ABDOMEN: Soft, Nontender, Nondistended; Bowel sounds present  EXTREMITIES: no cyanosis; no edema; no calf tenderness  SKIN: warm and dry; no rash  NERVOUS SYSTEM:  Awake and alert x2, forgetful at times no new deficits    _________________________________________________  LABS:                        9.5    5.26  )-----------( 166      ( 20 May 2022 08:45 )             28.7     05-20    140  |  111<H>  |  65<H>  ----------------------------<  156<H>  4.8   |  19<L>  |  3.45<H>    Ca    9.2      20 May 2022 08:45          CAPILLARY BLOOD GLUCOSE      POCT Blood Glucose.: 188 mg/dL (20 May 2022 11:51)  POCT Blood Glucose.: 125 mg/dL (20 May 2022 07:41)  POCT Blood Glucose.: 199 mg/dL (19 May 2022 21:26)  POCT Blood Glucose.: 188 mg/dL (19 May 2022 16:26)        RADIOLOGY & ADDITIONAL TESTS:  < from: Xray Chest 1 View- PORTABLE-Urgent (Xray Chest 1 View- PORTABLE-Urgent .) (05.13.22 @ 16:03) >    There is a significant infiltrates less effusion emanating off the right   lower hilum new since May 10.    IMPRESSION: There is a new fairly significant right lung process at this   time.    --- End of Report ---      < end of copied text >      Imaging Personally Reviewed:  YES/NO    Consultant(s) Notes Reviewed:   YES/ No    Care Discussed with Consultants :     Plan of care was discussed with patient and /or primary care giver; all questions and concerns were addressed and care was aligned with patient's wishes.

## 2022-05-20 NOTE — PROGRESS NOTE ADULT - SUBJECTIVE AND OBJECTIVE BOX
CHIEF COMPLAINT:Patient is a 76y old  Male who presents with a chief complaint of alcohol withdrawal.Pt appears comfortable.    	  REVIEW OF SYSTEMS:  CONSTITUTIONAL: No fever, weight loss, or fatigue  EYES: No eye pain, visual disturbances, or discharge  ENT:  No difficulty hearing, tinnitus, vertigo; No sinus or throat pain  NECK: No pain or stiffness  RESPIRATORY: No cough, wheezing, chills or hemoptysis; No Shortness of Breath  CARDIOVASCULAR: No chest pain, palpitations, passing out, dizziness, or leg swelling  GASTROINTESTINAL: No abdominal or epigastric pain. No nausea, vomiting, or hematemesis; No diarrhea or constipation. No melena or hematochezia.  GENITOURINARY: No dysuria, frequency, hematuria, or incontinence  NEUROLOGICAL: No headaches, memory loss, loss of strength, numbness, or tremors  SKIN: No itching, burning, rashes, or lesions   LYMPH Nodes: No enlarged glands  ENDOCRINE: No heat or cold intolerance; No hair loss  MUSCULOSKELETAL: No joint pain or swelling; No muscle, back, or extremity pain  PSYCHIATRIC: No depression, anxiety, mood swings, or difficulty sleeping  HEME/LYMPH: No easy bruising, or bleeding gums  ALLERGY AND IMMUNOLOGIC: No hives or eczema	      PHYSICAL EXAM:  T(C): 36.9 (05-20-22 @ 04:40), Max: 36.9 (05-20-22 @ 04:40)  HR: 84 (05-20-22 @ 12:06) (75 - 84)  BP: 164/78 (05-20-22 @ 12:06) (130/78 - 164/78)  RR: 19 (05-20-22 @ 04:40) (18 - 19)  SpO2: 100% (05-20-22 @ 12:06) (98% - 100%)  Wt(kg): --  I&O's Summary      Appearance: Normal	  HEENT:   Normal oral mucosa, PERRL, EOMI	  Lymphatic: No lymphadenopathy  Cardiovascular: Normal S1 S2, No JVD, No murmurs, No edema  Respiratory: Lungs clear to auscultation	  Psychiatry: A & O x 3, Mood & affect appropriate  Gastrointestinal:  Soft, Non-tender, + BS	  Skin: No rashes, No ecchymoses, No cyanosis	  Neurologic: Non-focal  Extremities: Normal range of motion, No clubbing, cyanosis or edema  Vascular: Peripheral pulses palpable 2+ bilaterally    MEDICATIONS  (STANDING):  aspirin  chewable 81 milliGRAM(s) Oral daily  brimonidine 0.2% Ophthalmic Solution 1 Drop(s) Both EYES daily  carvedilol 3.125 milliGRAM(s) Oral every 12 hours  cefepime   IVPB 1000 milliGRAM(s) IV Intermittent every 24 hours  chlorhexidine 2% Cloths 1 Application(s) Topical <User Schedule>  clobetasol 0.05% Ointment 1 Application(s) Topical two times a day  dextrose 5%. 1000 milliLiter(s) (50 mL/Hr) IV Continuous <Continuous>  dextrose 50% Injectable 25 Gram(s) IV Push once  dextrose 50% Injectable 12.5 Gram(s) IV Push once  dextrose 50% Injectable 25 Gram(s) IV Push once  ergocalciferol 34215 Unit(s) Oral <User Schedule>  folic acid 1 milliGRAM(s) Oral daily  glucagon  Injectable 1 milliGRAM(s) IntraMuscular once  heparin   Injectable 5000 Unit(s) SubCutaneous every 12 hours  hydrALAZINE 25 milliGRAM(s) Oral every 8 hours  insulin lispro (ADMELOG) corrective regimen sliding scale   SubCutaneous Before meals and at bedtime  isosorbide   mononitrate ER Tablet (IMDUR) 30 milliGRAM(s) Oral daily  melatonin 3 milliGRAM(s) Oral at bedtime  metroNIDAZOLE  IVPB      metroNIDAZOLE  IVPB 500 milliGRAM(s) IV Intermittent every 8 hours  Nephro-marcella 1 Tablet(s) Oral daily  pantoprazole    Tablet 40 milliGRAM(s) Oral before breakfast  timolol 0.5% Solution 1 Drop(s) Both EYES two times a day      	  	  LABS:	 	                        9.5    5.26  )-----------( 166      ( 20 May 2022 08:45 )             28.7     05-20    140  |  111<H>  |  65<H>  ----------------------------<  156<H>  4.8   |  19<L>  |  3.45<H>    Ca    9.2      20 May 2022 08:45      proBNP: Serum Pro-Brain Natriuretic Peptide: 1088 pg/mL (05-10 @ 09:36)

## 2022-05-20 NOTE — PROGRESS NOTE ADULT - ASSESSMENT
1. Anemia  2. Thrombocytopenia  3. R/o chronic GI bleeding  4. ETOH abuse  5. Alcoholic liver disease  6. Alcohol withdrawal  7. S/p EGD and colonoscopy  8. Diverticulosis  9. Gastritis    Suggestions:    1. Monitor H/H  2. Transfuse PRBC as needed  3. Protonix 40mg po daily  4. Avoid NSAID  5. Follow up path (pending)  6. Withdrawal precaution  7. Treat for H. Pylori if positive  8. Follow up LFT's  9. Follow up platelets  10. DVT prophylaxis

## 2022-05-20 NOTE — PROGRESS NOTE ADULT - SUBJECTIVE AND OBJECTIVE BOX
Patient is seen and examined at the bed side, is afebrile. The discharge plan noted.       REVIEW OF SYSTEMS: All other review systems are negative      ALLERGIES: No Known Allergies      Vital Signs Last 24 Hrs  T(C): 36.2 (20 May 2022 18:05), Max: 36.9 (20 May 2022 04:40)  T(F): 97.1 (20 May 2022 18:05), Max: 98.4 (20 May 2022 04:40)  HR: 78 (20 May 2022 18:05) (77 - 88)  BP: 144/89 (20 May 2022 18:05) (139/67 - 164/78)  BP(mean): 99 (20 May 2022 12:06) (91 - 108)  RR: 16 (20 May 2022 18:05) (16 - 19)  SpO2: 97% (20 May 2022 18:05) (97% - 100%)      PHYSICAL EXAM:  GENERAL: Not in distress, off oxygen   CHEST/LUNG: Not using accessory muscles   HEART: s1 and s2 present  ABDOMEN:  Nontender and  Nondistended  EXTREMITIES: No pedal  edema  CNS: Awake and alert       LABS:                        9.5    5.26  )-----------( 166      ( 20 May 2022 08:45 )             28.7                           9.3    4.81  )-----------( 150      ( 19 May 2022 08:08 )             27.7       05-20    140  |  111<H>  |  65<H>  ----------------------------<  156<H>  4.8   |  19<L>  |  3.45<H>    Ca    9.2      20 May 2022 08:45      05-19    142  |  113<H>  |  68<H>  ----------------------------<  154<H>  4.2   |  19<L>  |  3.56<H>    Ca    9.1      19 May 2022 08:08  Mg     2.2     05-18    TPro  7.5  /  Alb  2.3<L>  /  TBili  0.5  /  DBili  x   /  AST  26  /  ALT  23  /  AlkPhos  77  05-18      CAPILLARY BLOOD GLUCOSE  POCT Blood Glucose.: 125 mg/dL (13 May 2022 17:01)  POCT Blood Glucose.: 130 mg/dL (13 May 2022 11:58)  POCT Blood Glucose.: 113 mg/dL (13 May 2022 08:17)  POCT Blood Glucose.: 123 mg/dL (12 May 2022 21:33)        Urinalysis Basic - ( 12 May 2022 12:13 )  Color: Yellow / Appearance: Clear / S.010 / pH: x  Gluc: x / Ketone: Negative  / Bili: Negative / Urobili: Negative   Blood: x / Protein: 100 / Nitrite: Negative   Leuk Esterase: Negative / RBC: Negative /HPF / WBC 0-2 /HPF   Sq Epi: x / Non Sq Epi: Occasional /HPF / Bacteria: Negative /HPF  Procalcitonin, Serum (22 @ 16:21) : 0.47:    MEDICATIONS  (STANDING):    aspirin  chewable 81 milliGRAM(s) Oral daily  brimonidine 0.2% Ophthalmic Solution 1 Drop(s) Both EYES daily  carvedilol 3.125 milliGRAM(s) Oral every 12 hours  cefepime   IVPB 1000 milliGRAM(s) IV Intermittent every 24 hours  chlorhexidine 2% Cloths 1 Application(s) Topical <User Schedule>  clobetasol 0.05% Ointment 1 Application(s) Topical two times a day  ergocalciferol 38662 Unit(s) Oral <User Schedule>  folic acid 1 milliGRAM(s) Oral daily  glucagon  Injectable 1 milliGRAM(s) IntraMuscular once  heparin   Injectable 5000 Unit(s) SubCutaneous every 12 hours  hydrALAZINE 25 milliGRAM(s) Oral every 8 hours  insulin lispro (ADMELOG) corrective regimen sliding scale   SubCutaneous Before meals and at bedtime  isosorbide   mononitrate ER Tablet (IMDUR) 30 milliGRAM(s) Oral daily  melatonin 3 milliGRAM(s) Oral at bedtime  metroNIDAZOLE  IVPB 500 milliGRAM(s) IV Intermittent every 8 hours  metroNIDAZOLE  IVPB      Nephro-marcella 1 Tablet(s) Oral daily  pantoprazole    Tablet 40 milliGRAM(s) Oral before breakfast  timolol 0.5% Solution 1 Drop(s) Both EYES two times a day        RADIOLOGY & ADDITIONAL TESTS:    22: Xray Chest 1 View- PORTABLE-Urgent (Xray Chest 1 View- PORTABLE-Urgent .) (22 @ 16:03) >    IMPRESSION: There is a new fairly significant right lung process at this time.      22: US Abdomen Complete (US Abdomen Complete .) (22 @ 08:54) Gallstones in the contracted gallbladder.  Hepatic steatosis.      5/10/22: CT Head No Cont (05.10.22 @ 14:32) :   Moderate periventricular white matter ischemia. Moderate  bitemporal lobe and bifrontal lobe atrophy.     Mucosal thickening in the  RIGHT maxillary and RIGHT ethmoid and frontal and RIGHT frontal sinuses.      5/10/22: Xray Chest 1 View- PORTABLE-Urgent (05.10.22 @ 09:20) >Presently lungs are clear.        MICROBIOLOGY DATA:    Culture - Blood (22 @ 21:24)   Specimen Source: .Blood Blood-Peripheral   Culture Results: No growth to date.     Culture - Blood (22 @ 21:24)   Specimen Source: .Blood Blood-Peripheral   Culture Results: No growth to date.     MRSA/MSSA PCR (22 @ 05:59)   MRSA PCR Result.: NotDetec:    COVID-19 PCR (05.10.22 @ 09:36)   COVID-19 PCR: NotDetec:

## 2022-05-20 NOTE — PROGRESS NOTE ADULT - SUBJECTIVE AND OBJECTIVE BOX
Patient is a 76y old  Male who presents with a chief complaint of alcohol withdrawal (19 May 2022 18:00)    pt seen in icu [  ], reg med floor [ x  ], bed [ x ], chair at bedside [   ], awake and responsive [ x ], lethargic [  ],    nad [ x ]      Allergies    No Known Allergies        Vitals    T(F): 98.4 (05-20-22 @ 04:40), Max: 98.4 (05-20-22 @ 04:40)  HR: 77 (05-20-22 @ 04:40) (75 - 83)  BP: 139/67 (05-20-22 @ 04:40) (130/78 - 142/86)  RR: 19 (05-20-22 @ 04:40) (18 - 19)  SpO2: 100% (05-20-22 @ 04:40) (99% - 100%)  Wt(kg): --  CAPILLARY BLOOD GLUCOSE      POCT Blood Glucose.: 199 mg/dL (19 May 2022 21:26)      Labs                          9.3    4.81  )-----------( 150      ( 19 May 2022 08:08 )             27.7       05-19    142  |  113<H>  |  68<H>  ----------------------------<  154<H>  4.2   |  19<L>  |  3.56<H>    Ca    9.1      19 May 2022 08:08  Mg     2.2     05-18    TPro  7.5  /  Alb  2.3<L>  /  TBili  0.5  /  DBili  x   /  AST  26  /  ALT  23  /  AlkPhos  77  05-18            .Blood Blood-Peripheral  05-13 @ 21:24   No Growth Final  --  --          Radiology Results      Meds    MEDICATIONS  (STANDING):  aspirin  chewable 81 milliGRAM(s) Oral daily  brimonidine 0.2% Ophthalmic Solution 1 Drop(s) Both EYES daily  carvedilol 3.125 milliGRAM(s) Oral every 12 hours  cefepime   IVPB 1000 milliGRAM(s) IV Intermittent every 24 hours  chlorhexidine 2% Cloths 1 Application(s) Topical <User Schedule>  clobetasol 0.05% Ointment 1 Application(s) Topical two times a day  dextrose 5%. 1000 milliLiter(s) (50 mL/Hr) IV Continuous <Continuous>  dextrose 50% Injectable 25 Gram(s) IV Push once  dextrose 50% Injectable 12.5 Gram(s) IV Push once  dextrose 50% Injectable 25 Gram(s) IV Push once  ergocalciferol 16729 Unit(s) Oral <User Schedule>  folic acid 1 milliGRAM(s) Oral daily  glucagon  Injectable 1 milliGRAM(s) IntraMuscular once  heparin   Injectable 5000 Unit(s) SubCutaneous every 12 hours  hydrALAZINE 25 milliGRAM(s) Oral every 8 hours  insulin lispro (ADMELOG) corrective regimen sliding scale   SubCutaneous Before meals and at bedtime  isosorbide   mononitrate ER Tablet (IMDUR) 30 milliGRAM(s) Oral daily  melatonin 3 milliGRAM(s) Oral at bedtime  metroNIDAZOLE  IVPB      metroNIDAZOLE  IVPB 500 milliGRAM(s) IV Intermittent every 8 hours  Nephro-marcella 1 Tablet(s) Oral daily  pantoprazole    Tablet 40 milliGRAM(s) Oral before breakfast  timolol 0.5% Solution 1 Drop(s) Both EYES two times a day      MEDICATIONS  (PRN):  dextrose Oral Gel 15 Gram(s) Oral once PRN Blood Glucose LESS THAN 70 milliGRAM(s)/deciliter  QUEtiapine 25 milliGRAM(s) Oral three times a day PRN agitation      Physical Exam    Neuro :  no focal deficits  Respiratory: right lower lobe crackles  CV: RRR, S1S2, no murmurs,   Abdominal: Soft, NT, ND +BS,  Extremities: No edema, + peripheral pulses, right distal leg erythema and excoriation resolved   skin: diffused scaly rash       ASSESSMENT    etoh withdrawal,   Alcohol dependence   F10.20  Dementia   F03.90  metabolic encephalopathy,   right le cellulitis,   aspiration pna   psoriasis,   acute on ckd,   pancytopenia likely 2nd to alcoholism   r/o parkinsons disease  h/o HLD,   DM,   chronic HF rEF  CKD  Alcohol dependence with withdrawal  Glaucoma  Gout      PLAN    cont multivitamin, folate, thiamine   psych f/u    C/w Seroquel 25 mg q8h PRN agitation/behavioral disturbance  Do NOT recommend Ativan PRN for agitation, given potential to trigger delirium  DC CIWA and associated PRNs, as pt is no longer in alcohol withdrawal  Family has received SW input on Medicaid application process and intends to move forward once pt returns home  Pt appears psych stable for DC, and is likely to be medically cleared tomorrow 5/20  cxr with There is a new fairly significant right lung process at this time noted    id f/u   Continue Cefepime 1 g q 24 hour and Flagyl  until 5/20/22  blood cx neg noted above   echo with EF 20-25%, Grade I diastolic dysfunction  RV systolic pressure is mildly increased noted    ecg with Diagnosis Line Sinus rhythm @ 88 bpm with Premature atrial complexes, Left axis deviation, Left bundle branch block, Abnormal ECG noted above  cardio f/u   cont  asa coreg, imdur, hydralazine.  wound care eval   cont clobetasol 0.05% bid for psoriasis   lesions near resolution   s/p lactulose 10 g x1   rept ammonia <10  stool occult blood neg noted    lft wnl   gi f/u   No evidence of acute GI bleeding  Monitor H/H  transfuse PRBC as needed  Check stool for occult blood  s/p EGD with Erythema in the gastroesophageal junction. Hiatal Hernia. Erythema in the antrum. (Biopsy). Normal mucosa in the whole examined duodenum.  colonoscopy with Moderate diverticulosis of the descending colon, transverse colon and ascending colon. Internal hemorrhoids. Colonoscopy Limitations/Complications:  Await pathology results.  High Fiber Diet  Protonix 40mg PO daily  Treat for H. Pylori if positive  Avoid NSAID  Avoid ETOH  Anti reflux measure  abd us with Gallstones in the contracted gallbladder. 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  Deterioration in renal function, possible chronic versus acute due to dehydration  d/c'd lasix   serum creat stable   Continue IV fluids D5W  d/c'd Sodium bicarbonate PO   serum sodium wnl  2 gm K diet  lispro ss   neuro cons noted   Mild distal symmetric bilateral UE tremulousness in the setting of, and entirely consistent with, EtOH withdrawal, chronic EtOH abuse,    No exam findings diagnostic for an extrapyramidal disorder.  In any case, Dx and consideration of potential management of any (possible suspected) extrapyramidal disorder cannot be made in an acute setting such as this one.    When the Pt has FULLY recovered from his acute medical problems, if there is a desire for neurologic evaluation, then please schedule a routine out-Pt visit.   repeat ct head stable noted    phys tx eval noted and rec phys tx 3-5x/week x 4 weeks at rehabilitation facility; Sub-acute  cont current meds   d/c planning          Patient is a 76y old  Male who presents with a chief complaint of alcohol withdrawal (19 May 2022 18:00)    pt seen in icu [  ], reg med floor [ x  ], bed [ ], chair at bedside [ x  ], a+o x3 [ x ], lethargic [  ],    nad [ x ]      Allergies    No Known Allergies        Vitals    T(F): 98.4 (05-20-22 @ 04:40), Max: 98.4 (05-20-22 @ 04:40)  HR: 77 (05-20-22 @ 04:40) (75 - 83)  BP: 139/67 (05-20-22 @ 04:40) (130/78 - 142/86)  RR: 19 (05-20-22 @ 04:40) (18 - 19)  SpO2: 100% (05-20-22 @ 04:40) (99% - 100%)  Wt(kg): --  CAPILLARY BLOOD GLUCOSE      POCT Blood Glucose.: 199 mg/dL (19 May 2022 21:26)      Labs                          9.3    4.81  )-----------( 150      ( 19 May 2022 08:08 )             27.7       05-19    142  |  113<H>  |  68<H>  ----------------------------<  154<H>  4.2   |  19<L>  |  3.56<H>    Ca    9.1      19 May 2022 08:08  Mg     2.2     05-18    TPro  7.5  /  Alb  2.3<L>  /  TBili  0.5  /  DBili  x   /  AST  26  /  ALT  23  /  AlkPhos  77  05-18            .Blood Blood-Peripheral  05-13 @ 21:24   No Growth Final  --  --          Radiology Results      Meds    MEDICATIONS  (STANDING):  aspirin  chewable 81 milliGRAM(s) Oral daily  brimonidine 0.2% Ophthalmic Solution 1 Drop(s) Both EYES daily  carvedilol 3.125 milliGRAM(s) Oral every 12 hours  cefepime   IVPB 1000 milliGRAM(s) IV Intermittent every 24 hours  chlorhexidine 2% Cloths 1 Application(s) Topical <User Schedule>  clobetasol 0.05% Ointment 1 Application(s) Topical two times a day  dextrose 5%. 1000 milliLiter(s) (50 mL/Hr) IV Continuous <Continuous>  dextrose 50% Injectable 25 Gram(s) IV Push once  dextrose 50% Injectable 12.5 Gram(s) IV Push once  dextrose 50% Injectable 25 Gram(s) IV Push once  ergocalciferol 24842 Unit(s) Oral <User Schedule>  folic acid 1 milliGRAM(s) Oral daily  glucagon  Injectable 1 milliGRAM(s) IntraMuscular once  heparin   Injectable 5000 Unit(s) SubCutaneous every 12 hours  hydrALAZINE 25 milliGRAM(s) Oral every 8 hours  insulin lispro (ADMELOG) corrective regimen sliding scale   SubCutaneous Before meals and at bedtime  isosorbide   mononitrate ER Tablet (IMDUR) 30 milliGRAM(s) Oral daily  melatonin 3 milliGRAM(s) Oral at bedtime  metroNIDAZOLE  IVPB      metroNIDAZOLE  IVPB 500 milliGRAM(s) IV Intermittent every 8 hours  Nephro-marcella 1 Tablet(s) Oral daily  pantoprazole    Tablet 40 milliGRAM(s) Oral before breakfast  timolol 0.5% Solution 1 Drop(s) Both EYES two times a day      MEDICATIONS  (PRN):  dextrose Oral Gel 15 Gram(s) Oral once PRN Blood Glucose LESS THAN 70 milliGRAM(s)/deciliter  QUEtiapine 25 milliGRAM(s) Oral three times a day PRN agitation      Physical Exam    Neuro :  no focal deficits  Respiratory: right lower lobe crackles  CV: RRR, S1S2, no murmurs,   Abdominal: Soft, NT, ND +BS,  Extremities: No edema, + peripheral pulses, right distal leg erythema and excoriation resolved   skin: diffused scaly rash       ASSESSMENT    etoh withdrawal,   Alcohol dependence   F10.20  Dementia   F03.90  metabolic encephalopathy,   right le cellulitis,   aspiration pna   psoriasis,   acute on ckd,   pancytopenia likely 2nd to alcoholism   r/o parkinsons disease  h/o HLD,   DM,   chronic HF rEF  CKD  Alcohol dependence with withdrawal  Glaucoma  Gout      PLAN    cont multivitamin, folate, thiamine   psych f/u    C/w Seroquel 25 mg q8h PRN agitation/behavioral disturbance  Do NOT recommend Ativan PRN for agitation, given potential to trigger delirium  DC CIWA and associated PRNs, as pt is no longer in alcohol withdrawal  Family has received SW input on Medicaid application process and intends to move forward once pt returns home  Pt appears psych stable for DC,   cxr with There is a new fairly significant right lung process at this time noted    id f/u   pt o complete Cefepime 1 g q 24 hour and Flagyl  today 5/20/22  blood cx neg noted above   echo with EF 20-25%, Grade I diastolic dysfunction  RV systolic pressure is mildly increased noted    ecg with Diagnosis Line Sinus rhythm @ 88 bpm with Premature atrial complexes, Left axis deviation, Left bundle branch block, Abnormal ECG noted    cardio f/u   cont  asa coreg, imdur, hydralazine.  wound care eval   cont clobetasol 0.05% bid for psoriasis   lesions near resolution   s/p lactulose 10 g x1   rept ammonia <10  stool occult blood neg noted    lft wnl   gi f/u   No evidence of acute GI bleeding  Monitor H/H  transfuse PRBC as needed  Check stool for occult blood  s/p EGD with Erythema in the gastroesophageal junction. Hiatal Hernia. Erythema in the antrum. (Biopsy). Normal mucosa in the whole examined duodenum.  colonoscopy with Moderate diverticulosis of the descending colon, transverse colon and ascending colon. Internal hemorrhoids. Colonoscopy Limitations/Complications:  Await pathology results.  High Fiber Diet  Protonix 40mg PO daily  Treat for H. Pylori if positive  Avoid NSAID  Avoid ETOH  Anti reflux measure  abd us with Gallstones in the contracted gallbladder. 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  Deterioration in renal function, possible chronic versus acute due to dehydration  d/c'd lasix   serum creat stable   d/c IV fluids D5W  d/c'd Sodium bicarbonate PO   serum sodium wnl  2 gm K diet  lispro ss   neuro cons noted   Mild distal symmetric bilateral UE tremulousness in the setting of, and entirely consistent with, EtOH withdrawal, chronic EtOH abuse,    No exam findings diagnostic for an extrapyramidal disorder.  In any case, Dx and consideration of potential management of any (possible suspected) extrapyramidal disorder cannot be made in an acute setting such as this one.    When the Pt has FULLY recovered from his acute medical problems, if there is a desire for neurologic evaluation, then please schedule a routine out-Pt visit.   repeat ct head stable noted    phys tx eval noted and rec phys tx 3-5x/week x 4 weeks at rehabilitation facility; Sub-acute  cont current meds   d/c planning to [facility]

## 2022-05-20 NOTE — PROGRESS NOTE ADULT - PROBLEM SELECTOR PLAN 1
- likely due to dementia vs alcohol withdrawal vs infection  - start trial of seroquel 25 mg TID PRN for agitation  - psych Dr. nunez following  - neurology Dr. Gotti following,

## 2022-05-20 NOTE — PROGRESS NOTE ADULT - ASSESSMENT
Patient is a 76y old  Male , AAOx3, ambulates independently, leaves alone at home, w/ PMH of HLD, DM, CKD, presents to the ER on 5/10/22 for evaluation of weakness and tremulousness. As per ER attending, patient is being admitted for alcohol withdrawal. Patient admits to drinking 2 glasses of Whisky at night. ON admission, he has no fever and Negative CXR. Today, hospital day 3, he found to have respiratory distress and Repeat  CXR shows a new fairly significant right lung process at this time. He has started on Cefepime and the ID consult requested to assist with further evaluation and antibiotic management.     # Aspiration pneumonia  vs HAP- on CXR from 5/13  # Alcohol withdrawal syndrome     would recommend:    1. OOB to chair    2. Discontinue Cefepime and Flagyl  after Today's doses  3. Aspiration precaution  4. CIWA protocol as per Primary team     Attending Attestation:    Spent more than 35 minutes on total encounter, more than 50 % of the visit was spent counseling and/or coordinating care by the Attending physician.

## 2022-05-20 NOTE — PROGRESS NOTE ADULT - SUBJECTIVE AND OBJECTIVE BOX
[   ] ICU                                          [   ] CCU                                      [ X  ] Medical Floor    Patient is a 76 year old male with ETOH abuse and anemia. GI consulted to evaluate.     Patient is a 76 year old male AAOx3, ambulates independently, leaves alone at home, with past medical history significant for HLD, DM, CKD, admitted with alcohol withdrawal found to have anemia. Patient admits to drinking 2 glasses of Whisky at night, and last drink was last night. He also c/o intermittent burning epigastric radiating to his chest associated with dyspepsia. Patient denies nausea, vomiting, hematemesis, hematochezia, melena, fever, chills, chest pain, SOB, cough, hematuria, dysuria or diarrhea.       Patient appears comfortable. No new complaints reported, No abdominal pain, N/V, hematemesis, hematochezia, melena, fever, chills, chest pain, SOB, cough or diarrhea reported.      PAIN MANAGEMENT:  Pain Scale:                0 /10  Pain Location:          Prior Colonoscopy:  No prior colonoscopy      PAST MEDICAL HISTORY  DM (diabetes mellitus)  HTN (hypertension)   Chronic kidney disease  HLD (hyperlipidemia)  Glaucoma  Gout        PAST SURGICAL HISTORY  No significant past surgical history        Allergies    No Known Allergies    Intolerances  None      HOME MEDICATIONS    MEDICATIONS  (STANDING):  brimonidine 0.2% Ophthalmic Solution 1 Drop(s) Both EYES daily  dextrose 5%. 1000 milliLiter(s) (50 mL/Hr) IV Continuous <Continuous>  dextrose 5%. 1000 milliLiter(s) (100 mL/Hr) IV Continuous <Continuous>  dextrose 50% Injectable 25 Gram(s) IV Push once  dextrose 50% Injectable 12.5 Gram(s) IV Push once  dextrose 50% Injectable 25 Gram(s) IV Push once  doxycycline IVPB      ergocalciferol 21305 Unit(s) Oral <User Schedule>  folic acid 1 milliGRAM(s) Oral daily  furosemide    Tablet 20 milliGRAM(s) Oral daily  glucagon  Injectable 1 milliGRAM(s) IntraMuscular once  heparin   Injectable 5000 Unit(s) SubCutaneous every 8 hours  insulin lispro (ADMELOG) corrective regimen sliding scale   SubCutaneous Before meals and at bedtime  LORazepam   Injectable 2 milliGRAM(s) IV Push every 4 hours  LORazepam   Injectable   IV Push   multivitamin 1 Tablet(s) Oral daily  timolol 0.5% Solution 1 Drop(s) Both EYES two times a day    MEDICATIONS  (PRN):  dextrose Oral Gel 15 Gram(s) Oral once PRN Blood Glucose LESS THAN 70 milliGRAM(s)/deciliter  LORazepam   Injectable 2 milliGRAM(s) IV Push every 2 hours PRN Symptom-triggered: 2 point increase in CIWA -Ar score and a total score of 7 or LESS  LORazepam   Injectable 2 milliGRAM(s) IV Push every 1 hour PRN Symptom-triggered: each CIWA -Ar score 8 or GREATER      SOCIAL HISTORY  Advanced Directives:       [ X ] Full Code       [  ] DNR  Marital Status:         [  ] M      [ X ] S      [  ] D       [  ] W  Children:       [ X ] Yes      [  ] No  Occupation:        [  ] Employed       [ X ] Unemployed       [  ] Retired  Diet:       [ X ] Regular       [  ] PEG feeding          [  ] NG tube feeding  Drug Use:           [ X ] Patient denied          [  ] Yes  Alcohol:           [X  ] No             [  ] Yes (socially)         [  ] Yes (chronic)  Tobacco:           [  ] Yes           [ X ] No      FAMILY HISTORY  [X  ] Heart Disease            [ X ] Diabetes             [ X ] HTN             [  ] Colon Cancer             [  ] Stomach Cancer              [  ] Pancreatic Cancer      VITALS  Vital Signs Last 24 Hrs  T(C): 36.9 (20 May 2022 04:40), Max: 36.9 (20 May 2022 04:40)  T(F): 98.4 (20 May 2022 04:40), Max: 98.4 (20 May 2022 04:40)  HR: 77 (20 May 2022 04:40) (75 - 83)  BP: 139/67 (20 May 2022 04:40) (130/78 - 142/86)  BP(mean): 100 (20 May 2022 04:40) (98 - 108)  RR: 19 (20 May 2022 04:40) (18 - 19)  SpO2: 100% (20 May 2022 04:40) (99% - 100%)       MEDICATIONS  (STANDING):  aspirin  chewable 81 milliGRAM(s) Oral daily  brimonidine 0.2% Ophthalmic Solution 1 Drop(s) Both EYES daily  carvedilol 3.125 milliGRAM(s) Oral every 12 hours  cefepime   IVPB 1000 milliGRAM(s) IV Intermittent every 24 hours  chlorhexidine 2% Cloths 1 Application(s) Topical <User Schedule>  clobetasol 0.05% Ointment 1 Application(s) Topical two times a day  dextrose 5%. 1000 milliLiter(s) (50 mL/Hr) IV Continuous <Continuous>  dextrose 50% Injectable 25 Gram(s) IV Push once  dextrose 50% Injectable 12.5 Gram(s) IV Push once  dextrose 50% Injectable 25 Gram(s) IV Push once  ergocalciferol 03304 Unit(s) Oral <User Schedule>  folic acid 1 milliGRAM(s) Oral daily  glucagon  Injectable 1 milliGRAM(s) IntraMuscular once  heparin   Injectable 5000 Unit(s) SubCutaneous every 12 hours  hydrALAZINE 25 milliGRAM(s) Oral every 8 hours  insulin lispro (ADMELOG) corrective regimen sliding scale   SubCutaneous Before meals and at bedtime  isosorbide   mononitrate ER Tablet (IMDUR) 30 milliGRAM(s) Oral daily  melatonin 3 milliGRAM(s) Oral at bedtime  metroNIDAZOLE  IVPB 500 milliGRAM(s) IV Intermittent every 8 hours  metroNIDAZOLE  IVPB      Nephro-marcella 1 Tablet(s) Oral daily  pantoprazole    Tablet 40 milliGRAM(s) Oral before breakfast  timolol 0.5% Solution 1 Drop(s) Both EYES two times a day    MEDICATIONS  (PRN):  dextrose Oral Gel 15 Gram(s) Oral once PRN Blood Glucose LESS THAN 70 milliGRAM(s)/deciliter  QUEtiapine 25 milliGRAM(s) Oral three times a day PRN agitation                            9.3    4.81  )-----------( 150      ( 19 May 2022 08:08 )             27.7       05-19    142  |  113<H>  |  68<H>  ----------------------------<  154<H>  4.2   |  19<L>  |  3.56<H>    Ca    9.1      19 May 2022 08:08  Mg     2.2     05-18    TPro  7.5  /  Alb  2.3<L>  /  TBili  0.5  /  DBili  x   /  AST  26  /  ALT  23  /  AlkPhos  77  05-18

## 2022-05-21 LAB
GLUCOSE BLDC GLUCOMTR-MCNC: 125 MG/DL — HIGH (ref 70–99)
GLUCOSE BLDC GLUCOMTR-MCNC: 146 MG/DL — HIGH (ref 70–99)
GLUCOSE BLDC GLUCOMTR-MCNC: 226 MG/DL — HIGH (ref 70–99)
GLUCOSE BLDC GLUCOMTR-MCNC: 227 MG/DL — HIGH (ref 70–99)

## 2022-05-21 RX ORDER — CARVEDILOL PHOSPHATE 80 MG/1
6.25 CAPSULE, EXTENDED RELEASE ORAL EVERY 12 HOURS
Refills: 0 | Status: DISCONTINUED | OUTPATIENT
Start: 2022-05-21 | End: 2022-05-24

## 2022-05-21 RX ADMIN — ISOSORBIDE MONONITRATE 30 MILLIGRAM(S): 60 TABLET, EXTENDED RELEASE ORAL at 11:52

## 2022-05-21 RX ADMIN — Medication 25 MILLIGRAM(S): at 21:42

## 2022-05-21 RX ADMIN — Medication 1 TABLET(S): at 11:51

## 2022-05-21 RX ADMIN — Medication 2: at 11:53

## 2022-05-21 RX ADMIN — Medication 25 MILLIGRAM(S): at 14:04

## 2022-05-21 RX ADMIN — Medication 1 APPLICATION(S): at 18:24

## 2022-05-21 RX ADMIN — Medication 25 MILLIGRAM(S): at 06:07

## 2022-05-21 RX ADMIN — Medication 1 APPLICATION(S): at 06:08

## 2022-05-21 RX ADMIN — Medication 100 MILLIGRAM(S): at 06:33

## 2022-05-21 RX ADMIN — CHLORHEXIDINE GLUCONATE 1 APPLICATION(S): 213 SOLUTION TOPICAL at 06:08

## 2022-05-21 RX ADMIN — HEPARIN SODIUM 5000 UNIT(S): 5000 INJECTION INTRAVENOUS; SUBCUTANEOUS at 18:26

## 2022-05-21 RX ADMIN — PANTOPRAZOLE SODIUM 40 MILLIGRAM(S): 20 TABLET, DELAYED RELEASE ORAL at 06:07

## 2022-05-21 RX ADMIN — Medication 1 MILLIGRAM(S): at 11:52

## 2022-05-21 RX ADMIN — HEPARIN SODIUM 5000 UNIT(S): 5000 INJECTION INTRAVENOUS; SUBCUTANEOUS at 06:07

## 2022-05-21 RX ADMIN — Medication 1 DROP(S): at 06:07

## 2022-05-21 RX ADMIN — Medication 3 MILLIGRAM(S): at 21:42

## 2022-05-21 RX ADMIN — Medication 1 DROP(S): at 18:25

## 2022-05-21 RX ADMIN — CEFEPIME 100 MILLIGRAM(S): 1 INJECTION, POWDER, FOR SOLUTION INTRAMUSCULAR; INTRAVENOUS at 05:34

## 2022-05-21 RX ADMIN — BRIMONIDINE TARTRATE 1 DROP(S): 2 SOLUTION/ DROPS OPHTHALMIC at 11:52

## 2022-05-21 RX ADMIN — Medication 81 MILLIGRAM(S): at 11:52

## 2022-05-21 RX ADMIN — Medication 2: at 21:43

## 2022-05-21 RX ADMIN — CARVEDILOL PHOSPHATE 6.25 MILLIGRAM(S): 80 CAPSULE, EXTENDED RELEASE ORAL at 18:25

## 2022-05-21 RX ADMIN — CARVEDILOL PHOSPHATE 3.12 MILLIGRAM(S): 80 CAPSULE, EXTENDED RELEASE ORAL at 06:07

## 2022-05-21 NOTE — PROGRESS NOTE ADULT - SUBJECTIVE AND OBJECTIVE BOX
Problem List:  CKD stage 5, DKD  Systolic cardiomyopathy, HFrEF 25%    PAST MEDICAL & SURGICAL HISTORY:  DM (diabetes mellitus)      HTN (hypertension)      Chronic kidney disease      HLD (hyperlipidemia)      Glaucoma      Gout      No significant past surgical history          No Known Allergies      MEDICATIONS  (STANDING):  aspirin  chewable 81 milliGRAM(s) Oral daily  brimonidine 0.2% Ophthalmic Solution 1 Drop(s) Both EYES daily  carvedilol 3.125 milliGRAM(s) Oral every 12 hours  chlorhexidine 2% Cloths 1 Application(s) Topical <User Schedule>  clobetasol 0.05% Ointment 1 Application(s) Topical two times a day  dextrose 5%. 1000 milliLiter(s) (50 mL/Hr) IV Continuous <Continuous>  dextrose 50% Injectable 25 Gram(s) IV Push once  dextrose 50% Injectable 12.5 Gram(s) IV Push once  dextrose 50% Injectable 25 Gram(s) IV Push once  ergocalciferol 20650 Unit(s) Oral <User Schedule>  folic acid 1 milliGRAM(s) Oral daily  glucagon  Injectable 1 milliGRAM(s) IntraMuscular once  heparin   Injectable 5000 Unit(s) SubCutaneous every 12 hours  hydrALAZINE 25 milliGRAM(s) Oral every 8 hours  insulin lispro (ADMELOG) corrective regimen sliding scale   SubCutaneous Before meals and at bedtime  isosorbide   mononitrate ER Tablet (IMDUR) 30 milliGRAM(s) Oral daily  melatonin 3 milliGRAM(s) Oral at bedtime  Nephro-marcella 1 Tablet(s) Oral daily  pantoprazole    Tablet 40 milliGRAM(s) Oral before breakfast  timolol 0.5% Solution 1 Drop(s) Both EYES two times a day    MEDICATIONS  (PRN):  dextrose Oral Gel 15 Gram(s) Oral once PRN Blood Glucose LESS THAN 70 milliGRAM(s)/deciliter  QUEtiapine 25 milliGRAM(s) Oral three times a day PRN agitation                            9.5    5.26  )-----------( 166      ( 20 May 2022 08:45 )             28.7     05-20    140  |  111<H>  |  65<H>  ----------------------------<  156<H>  4.8   |  19<L>  |  3.45<H>    Ca    9.2      20 May 2022 08:45              REVIEW OF SYSTEMS:  General: no fever no chills, no weight loss.    RESPIRATORY: No cough, wheezing, hemoptysis; No shortness of breath  CARDIOVASCULAR: No chest pain or palpitations. No Edema  GASTROINTESTINAL: No abdominal or epigastric pain. No nausea, vomiting. No diarrhea or constipation. No melena.  GENITOURINARY: No dysuria, frequency, foamy urine, urinary urgency, incontinence or hematuria  NEUROLOGICAL: No numbness or weakness, no tremor , no dizziness.   Muscle skeletal : no joint pain and no swelling of joints and limbs.  SKIN: No itching, burning, rashes.        VITALS:  T(F): 97.3 (05-21-22 @ 04:52), Max: 98.4 (05-20-22 @ 20:04)  HR: 87 (05-21-22 @ 04:52)  BP: 154/89 (05-21-22 @ 04:52)  RR: 18 (05-21-22 @ 04:52)  SpO2: 100% (05-21-22 @ 04:52)  Wt(kg): --      PHYSICAL EXAM:  Constitutional: well developed, no diaphoresis, no distress.  Neck: No JVD, no carotid bruit, supple, no adenopathy  Respiratory: Good air entrance B/L, no wheezes, rales or rhonchi  Cardiovascular: S1, S2, RRR, no pericardial rub, no murmur  Abdomen: BS+, soft, no tenderness, no bruit  Pelvis: bladder nondistended  Extremities: No cyanosis or clubbing. No peripheral edema.   Pulses: All present  Neurological: A/O x 3, no focal deficits  Psychiatric: Normal mood, normal affect  Skin: No rashes  Vascular Access:     Problem List:  CKD stage 4, DKD  `  Systolic cardiomyopathy, HFrEF 25%    PAST MEDICAL & SURGICAL HISTORY:  DM (diabetes mellitus)      HTN (hypertension)      Chronic kidney disease      HLD (hyperlipidemia)      Glaucoma      Gout      No significant past surgical history          No Known Allergies      MEDICATIONS  (STANDING):  aspirin  chewable 81 milliGRAM(s) Oral daily  brimonidine 0.2% Ophthalmic Solution 1 Drop(s) Both EYES daily  carvedilol 3.125 milliGRAM(s) Oral every 12 hours  chlorhexidine 2% Cloths 1 Application(s) Topical <User Schedule>  clobetasol 0.05% Ointment 1 Application(s) Topical two times a day  dextrose 5%. 1000 milliLiter(s) (50 mL/Hr) IV Continuous <Continuous>  dextrose 50% Injectable 25 Gram(s) IV Push once  dextrose 50% Injectable 12.5 Gram(s) IV Push once  dextrose 50% Injectable 25 Gram(s) IV Push once  ergocalciferol 04367 Unit(s) Oral <User Schedule>  folic acid 1 milliGRAM(s) Oral daily  glucagon  Injectable 1 milliGRAM(s) IntraMuscular once  heparin   Injectable 5000 Unit(s) SubCutaneous every 12 hours  hydrALAZINE 25 milliGRAM(s) Oral every 8 hours  insulin lispro (ADMELOG) corrective regimen sliding scale   SubCutaneous Before meals and at bedtime  isosorbide   mononitrate ER Tablet (IMDUR) 30 milliGRAM(s) Oral daily  melatonin 3 milliGRAM(s) Oral at bedtime  Nephro-marcella 1 Tablet(s) Oral daily  pantoprazole    Tablet 40 milliGRAM(s) Oral before breakfast  timolol 0.5% Solution 1 Drop(s) Both EYES two times a day    MEDICATIONS  (PRN):  dextrose Oral Gel 15 Gram(s) Oral once PRN Blood Glucose LESS THAN 70 milliGRAM(s)/deciliter  QUEtiapine 25 milliGRAM(s) Oral three times a day PRN agitation                            9.5    5.26  )-----------( 166      ( 20 May 2022 08:45 )             28.7     05-20    140  |  111<H>  |  65<H>  ----------------------------<  156<H>  4.8   |  19<L>  |  3.45<H>    Ca    9.2      20 May 2022 08:45              REVIEW OF SYSTEMS:  General: no fever no chills, no weight loss.    RESPIRATORY: No cough, wheezing, hemoptysis; No shortness of breath  CARDIOVASCULAR: No chest pain or palpitations. No Edema  GASTROINTESTINAL: No abdominal or epigastric pain. No nausea, vomiting. No diarrhea or constipation. No melena.  GENITOURINARY: No dysuria, frequency, foamy urine, urinary urgency, incontinence or hematuria  NEUROLOGICAL: No numbness or weakness, no tremor , no dizziness.   Muscle skeletal : no joint pain and no swelling of joints and limbs.  SKIN: No itching, burning, rashes.        VITALS:  T(F): 97.3 (05-21-22 @ 04:52), Max: 98.4 (05-20-22 @ 20:04)  HR: 87 (05-21-22 @ 04:52)  BP: 154/89 (05-21-22 @ 04:52)  RR: 18 (05-21-22 @ 04:52)  SpO2: 100% (05-21-22 @ 04:52)  Wt(kg): --      PHYSICAL EXAM:  Constitutional: well developed, no diaphoresis, no distress.  Neck: No JVD, no carotid bruit, supple, no adenopathy  Respiratory: air entrance B/L, no wheezes, rales or rhonchi  Cardiovascular: S1, S2, RRR, no pericardial rub, no murmur  Abdomen: BS+, soft, no tenderness, no bruit  Pelvis: bladder nondistended  Extremities: No cyanosis or clubbing. No peripheral edema.   Pulses: All present  Neurological: A/O x 3, no focal deficits  Psychiatric: Normal mood, normal affect  No tremor

## 2022-05-21 NOTE — PROGRESS NOTE ADULT - SUBJECTIVE AND OBJECTIVE BOX
CHIEF COMPLAINT:Patient is a 76y old  Male who presents with a chief complaint of alcohol withdrawal .Pt appears comfortable.    	  REVIEW OF SYSTEMS:  CONSTITUTIONAL: No fever, weight loss, or fatigue  EYES: No eye pain, visual disturbances, or discharge  ENT:  No difficulty hearing, tinnitus, vertigo; No sinus or throat pain  NECK: No pain or stiffness  RESPIRATORY: No cough, wheezing, chills or hemoptysis; No Shortness of Breath  CARDIOVASCULAR: No chest pain, palpitations, passing out, dizziness, or leg swelling  GASTROINTESTINAL: No abdominal or epigastric pain. No nausea, vomiting, or hematemesis; No diarrhea or constipation. No melena or hematochezia.  GENITOURINARY: No dysuria, frequency, hematuria, or incontinence  NEUROLOGICAL: No headaches, memory loss, loss of strength, numbness, or tremors  SKIN: No itching, burning, rashes, or lesions   LYMPH Nodes: No enlarged glands  ENDOCRINE: No heat or cold intolerance; No hair loss  MUSCULOSKELETAL: No joint pain or swelling; No muscle, back, or extremity pain  PSYCHIATRIC: No depression, anxiety, mood swings, or difficulty sleeping  HEME/LYMPH: No easy bruising, or bleeding gums  ALLERGY AND IMMUNOLOGIC: No hives or eczema	        PHYSICAL EXAM:  T(C): 36.3 (05-21-22 @ 04:52), Max: 36.9 (05-20-22 @ 20:04)  HR: 87 (05-21-22 @ 04:52) (78 - 88)  BP: 154/89 (05-21-22 @ 04:52) (144/89 - 164/78)  RR: 18 (05-21-22 @ 04:52) (16 - 18)  SpO2: 100% (05-21-22 @ 04:52) (97% - 100%)  Wt(kg): --  I&O's Summary      Appearance: Normal	  HEENT:   Normal oral mucosa, PERRL, EOMI	  Lymphatic: No lymphadenopathy  Cardiovascular: Normal S1 S2, No JVD, No murmurs, No edema  Respiratory: Lungs clear to auscultation	  Psychiatry: A & O x 3, Mood & affect appropriate  Gastrointestinal:  Soft, Non-tender, + BS	  Skin: No rashes, No ecchymoses, No cyanosis	  Neurologic: Non-focal  Extremities: Normal range of motion, No clubbing, cyanosis or edema  Vascular: Peripheral pulses palpable 2+ bilaterally    MEDICATIONS  (STANDING):  aspirin  chewable 81 milliGRAM(s) Oral daily  brimonidine 0.2% Ophthalmic Solution 1 Drop(s) Both EYES daily  carvedilol 6.25 milliGRAM(s) Oral every 12 hours  chlorhexidine 2% Cloths 1 Application(s) Topical <User Schedule>  clobetasol 0.05% Ointment 1 Application(s) Topical two times a day  dextrose 5%. 1000 milliLiter(s) (50 mL/Hr) IV Continuous <Continuous>  dextrose 50% Injectable 25 Gram(s) IV Push once  dextrose 50% Injectable 12.5 Gram(s) IV Push once  dextrose 50% Injectable 25 Gram(s) IV Push once  ergocalciferol 17368 Unit(s) Oral <User Schedule>  folic acid 1 milliGRAM(s) Oral daily  glucagon  Injectable 1 milliGRAM(s) IntraMuscular once  heparin   Injectable 5000 Unit(s) SubCutaneous every 12 hours  hydrALAZINE 25 milliGRAM(s) Oral every 8 hours  insulin lispro (ADMELOG) corrective regimen sliding scale   SubCutaneous Before meals and at bedtime  isosorbide   mononitrate ER Tablet (IMDUR) 30 milliGRAM(s) Oral daily  melatonin 3 milliGRAM(s) Oral at bedtime  Nephro-marcella 1 Tablet(s) Oral daily  pantoprazole    Tablet 40 milliGRAM(s) Oral before breakfast  timolol 0.5% Solution 1 Drop(s) Both EYES two times a day      LABS:	 	                      9.5    5.26  )-----------( 166      ( 20 May 2022 08:45 )             28.7     05-20    140  |  111<H>  |  65<H>  ----------------------------<  156<H>  4.8   |  19<L>  |  3.45<H>    Ca    9.2      20 May 2022 08:45      proBNP: Serum Pro-Brain Natriuretic Peptide: 1088 pg/mL (05-10 @ 09:36)

## 2022-05-21 NOTE — PROGRESS NOTE ADULT - ASSESSMENT
Patient is a 76y old  Male , AAOx3, ambulates independently, leaves alone at home, w/ PMH of HLD, DM, CKD, presents to the ER on 5/10/22 for evaluation of weakness and tremulousness. As per ER attending, patient is being admitted for alcohol withdrawal. Patient admits to drinking 2 glasses of Whisky at night. ON admission, he has no fever and Negative CXR. Today, hospital day 3, he found to have respiratory distress and Repeat  CXR shows a new fairly significant right lung process at this time. He has started on Cefepime and the ID consult requested to assist with further evaluation and antibiotic management.     # Aspiration pneumonia  vs HAP- on CXR from 5/13  # Alcohol withdrawal syndrome     would recommend:    1. OOB to chair    2. Monitor OFF abx as he completed the course   3. Aspiration precaution  4. CIWA protocol as per Primary team     Attending Attestation:    Spent more than 35 minutes on total encounter, more than 50 % of the visit was spent counseling and/or coordinating care by the Attending physician.

## 2022-05-21 NOTE — PROGRESS NOTE ADULT - SUBJECTIVE AND OBJECTIVE BOX
Patient is a 76y old  Male who presents with a chief complaint of alcohol withdrawal (20 May 2022 18:50)    pt seen in icu [  ], reg med floor [ x  ], bed [ ], chair at bedside [ x  ], a+o x3 [ x ], lethargic [  ],    nad [ x ]        Allergies    No Known Allergies        Vitals    T(F): 97.3 (05-21-22 @ 04:52), Max: 98.4 (05-20-22 @ 20:04)  HR: 87 (05-21-22 @ 04:52) (78 - 88)  BP: 154/89 (05-21-22 @ 04:52) (144/89 - 164/78)  RR: 18 (05-21-22 @ 04:52) (16 - 18)  SpO2: 100% (05-21-22 @ 04:52) (97% - 100%)  Wt(kg): --  CAPILLARY BLOOD GLUCOSE      POCT Blood Glucose.: 178 mg/dL (20 May 2022 21:02)      Labs                          9.5    5.26  )-----------( 166      ( 20 May 2022 08:45 )             28.7       05-20    140  |  111<H>  |  65<H>  ----------------------------<  156<H>  4.8   |  19<L>  |  3.45<H>    Ca    9.2      20 May 2022 08:45              .Blood Blood-Peripheral  05-13 @ 21:24   No Growth Final  --  --          Radiology Results      Meds    MEDICATIONS  (STANDING):  aspirin  chewable 81 milliGRAM(s) Oral daily  brimonidine 0.2% Ophthalmic Solution 1 Drop(s) Both EYES daily  carvedilol 3.125 milliGRAM(s) Oral every 12 hours  cefepime   IVPB 1000 milliGRAM(s) IV Intermittent every 24 hours  chlorhexidine 2% Cloths 1 Application(s) Topical <User Schedule>  clobetasol 0.05% Ointment 1 Application(s) Topical two times a day  dextrose 5%. 1000 milliLiter(s) (50 mL/Hr) IV Continuous <Continuous>  dextrose 50% Injectable 25 Gram(s) IV Push once  dextrose 50% Injectable 12.5 Gram(s) IV Push once  dextrose 50% Injectable 25 Gram(s) IV Push once  ergocalciferol 25470 Unit(s) Oral <User Schedule>  folic acid 1 milliGRAM(s) Oral daily  glucagon  Injectable 1 milliGRAM(s) IntraMuscular once  heparin   Injectable 5000 Unit(s) SubCutaneous every 12 hours  hydrALAZINE 25 milliGRAM(s) Oral every 8 hours  insulin lispro (ADMELOG) corrective regimen sliding scale   SubCutaneous Before meals and at bedtime  isosorbide   mononitrate ER Tablet (IMDUR) 30 milliGRAM(s) Oral daily  melatonin 3 milliGRAM(s) Oral at bedtime  metroNIDAZOLE  IVPB 500 milliGRAM(s) IV Intermittent every 8 hours  metroNIDAZOLE  IVPB      Nephro-marcella 1 Tablet(s) Oral daily  pantoprazole    Tablet 40 milliGRAM(s) Oral before breakfast  timolol 0.5% Solution 1 Drop(s) Both EYES two times a day      MEDICATIONS  (PRN):  dextrose Oral Gel 15 Gram(s) Oral once PRN Blood Glucose LESS THAN 70 milliGRAM(s)/deciliter  QUEtiapine 25 milliGRAM(s) Oral three times a day PRN agitation      Physical Exam    Neuro :  no focal deficits  Respiratory: right lower lobe crackles  CV: RRR, S1S2, no murmurs,   Abdominal: Soft, NT, ND +BS,  Extremities: No edema, + peripheral pulses, right distal leg erythema and excoriation resolved   skin: diffused scaly rash       ASSESSMENT    etoh withdrawal,   Alcohol dependence   F10.20  Dementia   F03.90  metabolic encephalopathy,   right le cellulitis,   aspiration pna   psoriasis,   acute on ckd,   pancytopenia likely 2nd to alcoholism   r/o parkinsons disease  h/o HLD,   DM,   chronic HF rEF  CKD  Alcohol dependence with withdrawal  Glaucoma  Gout      PLAN    cont multivitamin, folate, thiamine   psych f/u    C/w Seroquel 25 mg q8h PRN agitation/behavioral disturbance  Do NOT recommend Ativan PRN for agitation, given potential to trigger delirium  DC CIWA and associated PRNs, as pt is no longer in alcohol withdrawal  Family has received  input on Medicaid application process and intends to move forward once pt returns home  Pt appears psych stable for DC,   cxr with There is a new fairly significant right lung process at this time noted    id f/u   pt o complete Cefepime 1 g q 24 hour and Flagyl  today 5/20/22  blood cx neg noted above   echo with EF 20-25%, Grade I diastolic dysfunction  RV systolic pressure is mildly increased noted    ecg with Diagnosis Line Sinus rhythm @ 88 bpm with Premature atrial complexes, Left axis deviation, Left bundle branch block, Abnormal ECG noted    cardio f/u   cont  asa coreg, imdur, hydralazine.  wound care eval   cont clobetasol 0.05% bid for psoriasis   lesions near resolution   s/p lactulose 10 g x1   rept ammonia <10  stool occult blood neg noted    lft wnl   gi f/u   No evidence of acute GI bleeding  Monitor H/H  transfuse PRBC as needed  Check stool for occult blood  s/p EGD with Erythema in the gastroesophageal junction. Hiatal Hernia. Erythema in the antrum. (Biopsy). Normal mucosa in the whole examined duodenum.  colonoscopy with Moderate diverticulosis of the descending colon, transverse colon and ascending colon. Internal hemorrhoids. Colonoscopy Limitations/Complications:  Await pathology results.  High Fiber Diet  Protonix 40mg PO daily  Treat for H. Pylori if positive  Avoid NSAID  Avoid ETOH  Anti reflux measure  abd us with Gallstones in the contracted gallbladder. 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  Deterioration in renal function, possible chronic versus acute due to dehydration  d/c'd lasix   serum creat stable   d/c IV fluids D5W  d/c'd Sodium bicarbonate PO   serum sodium wnl  2 gm K diet  lispro ss   neuro cons noted   Mild distal symmetric bilateral UE tremulousness in the setting of, and entirely consistent with, EtOH withdrawal, chronic EtOH abuse,    No exam findings diagnostic for an extrapyramidal disorder.  In any case, Dx and consideration of potential management of any (possible suspected) extrapyramidal disorder cannot be made in an acute setting such as this one.    When the Pt has FULLY recovered from his acute medical problems, if there is a desire for neurologic evaluation, then please schedule a routine out-Pt visit.   repeat ct head stable noted    phys tx eval noted and rec phys tx 3-5x/week x 4 weeks at rehabilitation facility; Sub-acute  cont current meds   d/c planning to [facility]             Patient is a 76y old  Male who presents with a chief complaint of alcohol withdrawal (20 May 2022 18:50)    pt seen in icu [  ], reg med floor [ x  ], bed [ ], chair at bedside [ x  ], a+o x3 [ x ], lethargic [  ],    nad [ x ]        Allergies    No Known Allergies        Vitals    T(F): 97.3 (05-21-22 @ 04:52), Max: 98.4 (05-20-22 @ 20:04)  HR: 87 (05-21-22 @ 04:52) (78 - 88)  BP: 154/89 (05-21-22 @ 04:52) (144/89 - 164/78)  RR: 18 (05-21-22 @ 04:52) (16 - 18)  SpO2: 100% (05-21-22 @ 04:52) (97% - 100%)  Wt(kg): --  CAPILLARY BLOOD GLUCOSE      POCT Blood Glucose.: 178 mg/dL (20 May 2022 21:02)      Labs                          9.5    5.26  )-----------( 166      ( 20 May 2022 08:45 )             28.7       05-20    140  |  111<H>  |  65<H>  ----------------------------<  156<H>  4.8   |  19<L>  |  3.45<H>    Ca    9.2      20 May 2022 08:45              .Blood Blood-Peripheral  05-13 @ 21:24   No Growth Final  --  --          Radiology Results      Meds    MEDICATIONS  (STANDING):  aspirin  chewable 81 milliGRAM(s) Oral daily  brimonidine 0.2% Ophthalmic Solution 1 Drop(s) Both EYES daily  carvedilol 3.125 milliGRAM(s) Oral every 12 hours  cefepime   IVPB 1000 milliGRAM(s) IV Intermittent every 24 hours  chlorhexidine 2% Cloths 1 Application(s) Topical <User Schedule>  clobetasol 0.05% Ointment 1 Application(s) Topical two times a day  dextrose 5%. 1000 milliLiter(s) (50 mL/Hr) IV Continuous <Continuous>  dextrose 50% Injectable 25 Gram(s) IV Push once  dextrose 50% Injectable 12.5 Gram(s) IV Push once  dextrose 50% Injectable 25 Gram(s) IV Push once  ergocalciferol 66666 Unit(s) Oral <User Schedule>  folic acid 1 milliGRAM(s) Oral daily  glucagon  Injectable 1 milliGRAM(s) IntraMuscular once  heparin   Injectable 5000 Unit(s) SubCutaneous every 12 hours  hydrALAZINE 25 milliGRAM(s) Oral every 8 hours  insulin lispro (ADMELOG) corrective regimen sliding scale   SubCutaneous Before meals and at bedtime  isosorbide   mononitrate ER Tablet (IMDUR) 30 milliGRAM(s) Oral daily  melatonin 3 milliGRAM(s) Oral at bedtime  metroNIDAZOLE  IVPB 500 milliGRAM(s) IV Intermittent every 8 hours  metroNIDAZOLE  IVPB      Nephro-marcella 1 Tablet(s) Oral daily  pantoprazole    Tablet 40 milliGRAM(s) Oral before breakfast  timolol 0.5% Solution 1 Drop(s) Both EYES two times a day      MEDICATIONS  (PRN):  dextrose Oral Gel 15 Gram(s) Oral once PRN Blood Glucose LESS THAN 70 milliGRAM(s)/deciliter  QUEtiapine 25 milliGRAM(s) Oral three times a day PRN agitation      Physical Exam    Neuro :  no focal deficits  Respiratory: right lower lobe crackles  CV: RRR, S1S2, no murmurs,   Abdominal: Soft, NT, ND +BS,  Extremities: No edema, + peripheral pulses, right distal leg erythema and excoriation resolved   skin: diffused scaly rash       ASSESSMENT    etoh withdrawal,   Alcohol dependence   F10.20  Dementia   F03.90  metabolic encephalopathy,   right le cellulitis,   aspiration pna   psoriasis,   acute on ckd,   pancytopenia likely 2nd to alcoholism   r/o parkinsons disease  h/o HLD,   DM,   chronic HF rEF  CKD  Alcohol dependence with withdrawal  Glaucoma  Gout      PLAN    cont multivitamin, folate, thiamine   psych f/u    C/w Seroquel 25 mg q8h PRN agitation/behavioral disturbance  Do NOT recommend Ativan PRN for agitation, given potential to trigger delirium  DC'd CIWA and associated PRNs, as pt is no longer in alcohol withdrawal  Family has received  input on Medicaid application process and intends to move forward once pt returns home  Pt appears psych stable for DC,   cxr with There is a new fairly significant right lung process at this time noted    id f/u   pt completed Cefepime 1 g q 24 hour and Flagyl  5/20/22  blood cx neg noted above   echo with EF 20-25%, Grade I diastolic dysfunction  RV systolic pressure is mildly increased noted    ecg with Diagnosis Line Sinus rhythm @ 88 bpm with Premature atrial complexes, Left axis deviation, Left bundle branch block, Abnormal ECG noted    cardio f/u   cont  asa coreg, imdur, hydralazine.  wound care eval   cont clobetasol 0.05% bid for psoriasis   lesions near resolution   s/p lactulose 10 g x1   rept ammonia <10  stool occult blood neg noted    lft wnl   gi f/u   No evidence of acute GI bleeding  Monitor H/H  transfuse PRBC as needed  Check stool for occult blood  s/p EGD with Erythema in the gastroesophageal junction. Hiatal Hernia. Erythema in the antrum. (Biopsy). Normal mucosa in the whole examined duodenum.  colonoscopy with Moderate diverticulosis of the descending colon, transverse colon and ascending colon. Internal hemorrhoids. Colonoscopy Limitations/Complications:  Await pathology results.  High Fiber Diet  Protonix 40mg PO daily  Treat for H. Pylori if positive  Avoid NSAID  Avoid ETOH  Anti reflux measure  abd us with Gallstones in the contracted gallbladder. 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  Deterioration in renal function, possible chronic versus acute due to dehydration  d/c'd lasix   serum creat stable   d/c IV fluids D5W  d/c'd Sodium bicarbonate PO   serum sodium wnl  2 gm K diet  lispro ss   neuro cons noted   Mild distal symmetric bilateral UE tremulousness in the setting of, and entirely consistent with, EtOH withdrawal, chronic EtOH abuse,    No exam findings diagnostic for an extrapyramidal disorder.  In any case, Dx and consideration of potential management of any (possible suspected) extrapyramidal disorder cannot be made in an acute setting such as this one.    When the Pt has FULLY recovered from his acute medical problems, if there is a desire for neurologic evaluation, then please schedule a routine out-Pt visit.   repeat ct head stable noted    phys tx eval noted and rec phys tx 3-5x/week x 4 weeks at rehabilitation facility; Sub-acute  cont current meds   d/c planning to [facility] pending acceptance

## 2022-05-21 NOTE — PROGRESS NOTE ADULT - SUBJECTIVE AND OBJECTIVE BOX
Patient is seen and examined at the bed side, is afebrile. He is doing much better, sitting up on a chair.      REVIEW OF SYSTEMS: All other review systems are negative      ALLERGIES: No Known Allergies      Vital Signs Last 24 Hrs  T(C): 36.1 (21 May 2022 12:28), Max: 36.9 (20 May 2022 20:04)  T(F): 97 (21 May 2022 12:28), Max: 98.4 (20 May 2022 20:04)  HR: 69 (21 May 2022 12:28) (69 - 87)  BP: 143/79 (21 May 2022 12:28) (143/79 - 163/79)  BP(mean): --  RR: 18 (21 May 2022 12:28) (17 - 18)  SpO2: 100% (21 May 2022 12:28) (98% - 100%)      PHYSICAL EXAM:  GENERAL: Not in distress, off oxygen   CHEST/LUNG: Not using accessory muscles   HEART: s1 and s2 present  ABDOMEN:  Nontender and  Nondistended  EXTREMITIES: No pedal  edema  CNS: Awake and alert       LABS: No new Labs                        9.5    5.26  )-----------( 166      ( 20 May 2022 08:45 )             28.7                         9.3    4.81  )-----------( 150      ( 19 May 2022 08:08 )             27.7       05-20    140  |  111<H>  |  65<H>  ----------------------------<  156<H>  4.8   |  19<L>  |  3.45<H>    Ca    9.2      20 May 2022 08:45      05-19    142  |  113<H>  |  68<H>  ----------------------------<  154<H>  4.2   |  19<L>  |  3.56<H>    Ca    9.1      19 May 2022 08:08  Mg     2.2     05-18    TPro  7.5  /  Alb  2.3<L>  /  TBili  0.5  /  DBili  x   /  AST  26  /  ALT  23  /  AlkPhos  77  -18      CAPILLARY BLOOD GLUCOSE  POCT Blood Glucose.: 125 mg/dL (13 May 2022 17:01)  POCT Blood Glucose.: 130 mg/dL (13 May 2022 11:58)  POCT Blood Glucose.: 113 mg/dL (13 May 2022 08:17)  POCT Blood Glucose.: 123 mg/dL (12 May 2022 21:33)        Urinalysis Basic - ( 12 May 2022 12:13 )  Color: Yellow / Appearance: Clear / S.010 / pH: x  Gluc: x / Ketone: Negative  / Bili: Negative / Urobili: Negative   Blood: x / Protein: 100 / Nitrite: Negative   Leuk Esterase: Negative / RBC: Negative /HPF / WBC 0-2 /HPF   Sq Epi: x / Non Sq Epi: Occasional /HPF / Bacteria: Negative /HPF  Procalcitonin, Serum (22 @ 16:21) : 0.47:    MEDICATIONS  (STANDING):    aspirin  chewable 81 milliGRAM(s) Oral daily  brimonidine 0.2% Ophthalmic Solution 1 Drop(s) Both EYES daily  carvedilol 6.25 milliGRAM(s) Oral every 12 hours  chlorhexidine 2% Cloths 1 Application(s) Topical <User Schedule>  clobetasol 0.05% Ointment 1 Application(s) Topical two times a day  ergocalciferol 02322 Unit(s) Oral <User Schedule>  folic acid 1 milliGRAM(s) Oral daily  glucagon  Injectable 1 milliGRAM(s) IntraMuscular once  heparin   Injectable 5000 Unit(s) SubCutaneous every 12 hours  hydrALAZINE 25 milliGRAM(s) Oral every 8 hours  insulin lispro (ADMELOG) corrective regimen sliding scale   SubCutaneous Before meals and at bedtime  isosorbide   mononitrate ER Tablet (IMDUR) 30 milliGRAM(s) Oral daily  melatonin 3 milliGRAM(s) Oral at bedtime  Nephro-marcella 1 Tablet(s) Oral daily  pantoprazole    Tablet 40 milliGRAM(s) Oral before breakfast  timolol 0.5% Solution 1 Drop(s) Both EYES two times a day      RADIOLOGY & ADDITIONAL TESTS:    22: Xray Chest 1 View- PORTABLE-Urgent (Xray Chest 1 View- PORTABLE-Urgent .) (22 @ 16:03) >    IMPRESSION: There is a new fairly significant right lung process at this time.      22: US Abdomen Complete (US Abdomen Complete .) (22 @ 08:54) Gallstones in the contracted gallbladder.  Hepatic steatosis.      5/10/22: CT Head No Cont (05.10.22 @ 14:32) :   Moderate periventricular white matter ischemia. Moderate  bitemporal lobe and bifrontal lobe atrophy.     Mucosal thickening in the  RIGHT maxillary and RIGHT ethmoid and frontal and RIGHT frontal sinuses.      5/10/22: Xray Chest 1 View- PORTABLE-Urgent (05.10.22 @ 09:20) >Presently lungs are clear.        MICROBIOLOGY DATA:    Culture - Blood (22 @ 21:24)   Specimen Source: .Blood Blood-Peripheral   Culture Results: No growth to date.     Culture - Blood (22 @ 21:24)   Specimen Source: .Blood Blood-Peripheral   Culture Results: No growth to date.     MRSA/MSSA PCR (22 @ 05:59)   MRSA PCR Result.: NotDetec:    COVID-19 PCR (05.10.22 @ 09:36)   COVID-19 PCR: NotDetec:

## 2022-05-21 NOTE — PROGRESS NOTE ADULT - ASSESSMENT
76yoM, AAOx3, ambulates independently, leaves alone at home, w/ PMH of HLD, DM, CKD,CAD,CHF  presents to the ED with weakness and tremulousness,aspiration pneumonia.  1.CAD,CHF-asa,inc  coreg 6.25mg bid,,imdur,hydralazine.  2.CRI-Renal f/u.  3.DM-insulin.  4.Lipid d/o-statin.  5.Etoh abuse cessation.  6.Hypernatremia-resolved.  7.GI and DVT prophylaxis.

## 2022-05-22 LAB
GLUCOSE BLDC GLUCOMTR-MCNC: 122 MG/DL — HIGH (ref 70–99)
GLUCOSE BLDC GLUCOMTR-MCNC: 133 MG/DL — HIGH (ref 70–99)
GLUCOSE BLDC GLUCOMTR-MCNC: 139 MG/DL — HIGH (ref 70–99)
GLUCOSE BLDC GLUCOMTR-MCNC: 278 MG/DL — HIGH (ref 70–99)

## 2022-05-22 RX ADMIN — Medication 25 MILLIGRAM(S): at 21:58

## 2022-05-22 RX ADMIN — CHLORHEXIDINE GLUCONATE 1 APPLICATION(S): 213 SOLUTION TOPICAL at 05:54

## 2022-05-22 RX ADMIN — Medication 1 DROP(S): at 20:11

## 2022-05-22 RX ADMIN — HEPARIN SODIUM 5000 UNIT(S): 5000 INJECTION INTRAVENOUS; SUBCUTANEOUS at 18:32

## 2022-05-22 RX ADMIN — PANTOPRAZOLE SODIUM 40 MILLIGRAM(S): 20 TABLET, DELAYED RELEASE ORAL at 08:35

## 2022-05-22 RX ADMIN — Medication 3: at 12:05

## 2022-05-22 RX ADMIN — Medication 1 MILLIGRAM(S): at 12:07

## 2022-05-22 RX ADMIN — Medication 1 DROP(S): at 05:53

## 2022-05-22 RX ADMIN — ISOSORBIDE MONONITRATE 30 MILLIGRAM(S): 60 TABLET, EXTENDED RELEASE ORAL at 12:07

## 2022-05-22 RX ADMIN — CARVEDILOL PHOSPHATE 6.25 MILLIGRAM(S): 80 CAPSULE, EXTENDED RELEASE ORAL at 05:55

## 2022-05-22 RX ADMIN — HEPARIN SODIUM 5000 UNIT(S): 5000 INJECTION INTRAVENOUS; SUBCUTANEOUS at 05:54

## 2022-05-22 RX ADMIN — Medication 25 MILLIGRAM(S): at 15:38

## 2022-05-22 RX ADMIN — Medication 81 MILLIGRAM(S): at 12:07

## 2022-05-22 RX ADMIN — CARVEDILOL PHOSPHATE 6.25 MILLIGRAM(S): 80 CAPSULE, EXTENDED RELEASE ORAL at 18:30

## 2022-05-22 RX ADMIN — Medication 1 APPLICATION(S): at 05:54

## 2022-05-22 RX ADMIN — BRIMONIDINE TARTRATE 1 DROP(S): 2 SOLUTION/ DROPS OPHTHALMIC at 12:06

## 2022-05-22 RX ADMIN — Medication 3 MILLIGRAM(S): at 21:59

## 2022-05-22 RX ADMIN — Medication 25 MILLIGRAM(S): at 05:55

## 2022-05-22 RX ADMIN — Medication 1 APPLICATION(S): at 18:30

## 2022-05-22 RX ADMIN — Medication 1 TABLET(S): at 12:07

## 2022-05-22 NOTE — PROGRESS NOTE ADULT - SUBJECTIVE AND OBJECTIVE BOX
Patient is a 76y old  Male who presents with a chief complaint of alcohol withdrawal (21 May 2022 18:05)    pt seen in icu [  ], reg med floor [ x  ], bed [ ], chair at bedside [ x  ], a+o x3 [ x ], lethargic [  ],    nad [ x ]        Allergies    No Known Allergies        Vitals    T(F): 97 (05-22-22 @ 05:24), Max: 98.2 (05-21-22 @ 20:40)  HR: 88 (05-22-22 @ 05:24) (69 - 88)  BP: 147/76 (05-22-22 @ 05:24) (143/79 - 152/80)  RR: 18 (05-22-22 @ 05:24) (18 - 18)  SpO2: 100% (05-22-22 @ 05:24) (100% - 100%)  Wt(kg): --  CAPILLARY BLOOD GLUCOSE      POCT Blood Glucose.: 227 mg/dL (21 May 2022 20:59)      Labs                          9.5    5.26  )-----------( 166      ( 20 May 2022 08:45 )             28.7       05-20    140  |  111<H>  |  65<H>  ----------------------------<  156<H>  4.8   |  19<L>  |  3.45<H>    Ca    9.2      20 May 2022 08:45              .Blood Blood-Peripheral  05-13 @ 21:24   No Growth Final  --  --          Radiology Results      Meds    MEDICATIONS  (STANDING):  aspirin  chewable 81 milliGRAM(s) Oral daily  brimonidine 0.2% Ophthalmic Solution 1 Drop(s) Both EYES daily  carvedilol 6.25 milliGRAM(s) Oral every 12 hours  chlorhexidine 2% Cloths 1 Application(s) Topical <User Schedule>  clobetasol 0.05% Ointment 1 Application(s) Topical two times a day  dextrose 5%. 1000 milliLiter(s) (50 mL/Hr) IV Continuous <Continuous>  dextrose 50% Injectable 25 Gram(s) IV Push once  dextrose 50% Injectable 12.5 Gram(s) IV Push once  dextrose 50% Injectable 25 Gram(s) IV Push once  ergocalciferol 45551 Unit(s) Oral <User Schedule>  folic acid 1 milliGRAM(s) Oral daily  glucagon  Injectable 1 milliGRAM(s) IntraMuscular once  heparin   Injectable 5000 Unit(s) SubCutaneous every 12 hours  hydrALAZINE 25 milliGRAM(s) Oral every 8 hours  insulin lispro (ADMELOG) corrective regimen sliding scale   SubCutaneous Before meals and at bedtime  isosorbide   mononitrate ER Tablet (IMDUR) 30 milliGRAM(s) Oral daily  melatonin 3 milliGRAM(s) Oral at bedtime  Nephro-marcella 1 Tablet(s) Oral daily  pantoprazole    Tablet 40 milliGRAM(s) Oral before breakfast  timolol 0.5% Solution 1 Drop(s) Both EYES two times a day      MEDICATIONS  (PRN):  dextrose Oral Gel 15 Gram(s) Oral once PRN Blood Glucose LESS THAN 70 milliGRAM(s)/deciliter  QUEtiapine 25 milliGRAM(s) Oral three times a day PRN agitation      Physical Exam    Neuro :  no focal deficits  Respiratory: right lower lobe crackles  CV: RRR, S1S2, no murmurs,   Abdominal: Soft, NT, ND +BS,  Extremities: No edema, + peripheral pulses, right distal leg erythema and excoriation resolved   skin: diffused scaly rash       ASSESSMENT    etoh withdrawal,   Alcohol dependence   F10.20  Dementia   F03.90  metabolic encephalopathy,   right le cellulitis,   aspiration pna   psoriasis,   acute on ckd,   pancytopenia likely 2nd to alcoholism   r/o parkinsons disease  h/o HLD,   DM,   chronic HF rEF  CKD  Alcohol dependence with withdrawal  Glaucoma  Gout      PLAN    cont multivitamin, folate, thiamine   psych f/u    C/w Seroquel 25 mg q8h PRN agitation/behavioral disturbance  Do NOT recommend Ativan PRN for agitation, given potential to trigger delirium  DC'd CIWA and associated PRNs, as pt is no longer in alcohol withdrawal  Family has received  input on Medicaid application process and intends to move forward once pt returns home  Pt appears psych stable for DC,   cxr with There is a new fairly significant right lung process at this time noted    id f/u   pt completed Cefepime 1 g q 24 hour and Flagyl  5/20/22  blood cx neg noted above   echo with EF 20-25%, Grade I diastolic dysfunction  RV systolic pressure is mildly increased noted    ecg with Diagnosis Line Sinus rhythm @ 88 bpm with Premature atrial complexes, Left axis deviation, Left bundle branch block, Abnormal ECG noted    cardio f/u   cont  asa coreg, imdur, hydralazine.  wound care eval   cont clobetasol 0.05% bid for psoriasis   lesions near resolution   s/p lactulose 10 g x1   rept ammonia <10  stool occult blood neg noted    lft wnl   gi f/u   No evidence of acute GI bleeding  Monitor H/H  transfuse PRBC as needed  Check stool for occult blood  s/p EGD with Erythema in the gastroesophageal junction. Hiatal Hernia. Erythema in the antrum. (Biopsy). Normal mucosa in the whole examined duodenum.  colonoscopy with Moderate diverticulosis of the descending colon, transverse colon and ascending colon. Internal hemorrhoids. Colonoscopy Limitations/Complications:  Await pathology results.  High Fiber Diet  Protonix 40mg PO daily  Treat for H. Pylori if positive  Avoid NSAID  Avoid ETOH  Anti reflux measure  abd us with Gallstones in the contracted gallbladder. 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  Deterioration in renal function, possible chronic versus acute due to dehydration  d/c'd lasix   serum creat stable   d/c IV fluids D5W  d/c'd Sodium bicarbonate PO   serum sodium wnl  2 gm K diet  lispro ss   neuro cons noted   Mild distal symmetric bilateral UE tremulousness in the setting of, and entirely consistent with, EtOH withdrawal, chronic EtOH abuse,    No exam findings diagnostic for an extrapyramidal disorder.  In any case, Dx and consideration of potential management of any (possible suspected) extrapyramidal disorder cannot be made in an acute setting such as this one.    When the Pt has FULLY recovered from his acute medical problems, if there is a desire for neurologic evaluation, then please schedule a routine out-Pt visit.   repeat ct head stable noted    phys tx eval noted and rec phys tx 3-5x/week x 4 weeks at rehabilitation facility; Sub-acute  cont current meds   d/c planning to [facility] pending acceptance

## 2022-05-22 NOTE — PROGRESS NOTE ADULT - SUBJECTIVE AND OBJECTIVE BOX
[   ] ICU                                          [   ] CCU                                      [  X ] Medical Floor    Patient is a 76 year old male with ETOH abuse and anemia. GI consulted to evaluate.     Patient is a 76 year old male AAOx3, ambulates independently, leaves alone at home, with past medical history significant for HLD, DM, CKD, admitted with alcohol withdrawal found to have anemia. Patient admits to drinking 2 glasses of Whisky at night, and last drink was last night. He also c/o intermittent burning epigastric radiating to his chest associated with dyspepsia. Patient denies nausea, vomiting, hematemesis, hematochezia, melena, fever, chills, chest pain, SOB, cough, hematuria, dysuria or diarrhea.       Patient appears comfortable. No new complaints reported, No abdominal pain, N/V, hematemesis, hematochezia, melena, fever, chills, chest pain, SOB, cough or diarrhea reported.      PAIN MANAGEMENT:  Pain Scale:                0 /10  Pain Location:          Prior Colonoscopy:  No prior colonoscopy      PAST MEDICAL HISTORY  DM (diabetes mellitus)  HTN (hypertension)   Chronic kidney disease  HLD (hyperlipidemia)  Glaucoma  Gout        PAST SURGICAL HISTORY  No significant past surgical history        Allergies    No Known Allergies    Intolerances  None      HOME MEDICATIONS    MEDICATIONS  (STANDING):  brimonidine 0.2% Ophthalmic Solution 1 Drop(s) Both EYES daily  dextrose 5%. 1000 milliLiter(s) (50 mL/Hr) IV Continuous <Continuous>  dextrose 5%. 1000 milliLiter(s) (100 mL/Hr) IV Continuous <Continuous>  dextrose 50% Injectable 25 Gram(s) IV Push once  dextrose 50% Injectable 12.5 Gram(s) IV Push once  dextrose 50% Injectable 25 Gram(s) IV Push once  doxycycline IVPB      ergocalciferol 44867 Unit(s) Oral <User Schedule>  folic acid 1 milliGRAM(s) Oral daily  furosemide    Tablet 20 milliGRAM(s) Oral daily  glucagon  Injectable 1 milliGRAM(s) IntraMuscular once  heparin   Injectable 5000 Unit(s) SubCutaneous every 8 hours  insulin lispro (ADMELOG) corrective regimen sliding scale   SubCutaneous Before meals and at bedtime  LORazepam   Injectable 2 milliGRAM(s) IV Push every 4 hours  LORazepam   Injectable   IV Push   multivitamin 1 Tablet(s) Oral daily  timolol 0.5% Solution 1 Drop(s) Both EYES two times a day    MEDICATIONS  (PRN):  dextrose Oral Gel 15 Gram(s) Oral once PRN Blood Glucose LESS THAN 70 milliGRAM(s)/deciliter  LORazepam   Injectable 2 milliGRAM(s) IV Push every 2 hours PRN Symptom-triggered: 2 point increase in CIWA -Ar score and a total score of 7 or LESS  LORazepam   Injectable 2 milliGRAM(s) IV Push every 1 hour PRN Symptom-triggered: each CIWA -Ar score 8 or GREATER      SOCIAL HISTORY  Advanced Directives:       [ X ] Full Code       [  ] DNR  Marital Status:         [  ] M      [ X ] S      [  ] D       [  ] W  Children:       [ X ] Yes      [  ] No  Occupation:        [  ] Employed       [ X ] Unemployed       [  ] Retired  Diet:       [ X ] Regular       [  ] PEG feeding          [  ] NG tube feeding  Drug Use:           [ X ] Patient denied          [  ] Yes  Alcohol:           [X  ] No             [  ] Yes (socially)         [  ] Yes (chronic)  Tobacco:           [  ] Yes           [ X ] No      FAMILY HISTORY  [X  ] Heart Disease            [ X ] Diabetes             [ X ] HTN             [  ] Colon Cancer             [  ] Stomach Cancer              [  ] Pancreatic Cancer      VITALS  Vital Signs Last 24 Hrs  T(C): 36.1 (22 May 2022 05:24), Max: 36.8 (21 May 2022 20:40)  T(F): 97 (22 May 2022 05:24), Max: 98.2 (21 May 2022 20:40)  HR: 88 (22 May 2022 05:24) (69 - 88)  BP: 147/76 (22 May 2022 05:24) (143/79 - 152/80)  BP(mean): 113 (22 May 2022 05:24) (113 - 113)  RR: 18 (22 May 2022 05:24) (18 - 18)  SpO2: 100% (22 May 2022 05:24) (100% - 100%)       MEDICATIONS  (STANDING):  aspirin  chewable 81 milliGRAM(s) Oral daily  brimonidine 0.2% Ophthalmic Solution 1 Drop(s) Both EYES daily  carvedilol 6.25 milliGRAM(s) Oral every 12 hours  chlorhexidine 2% Cloths 1 Application(s) Topical <User Schedule>  clobetasol 0.05% Ointment 1 Application(s) Topical two times a day  dextrose 5%. 1000 milliLiter(s) (50 mL/Hr) IV Continuous <Continuous>  dextrose 50% Injectable 25 Gram(s) IV Push once  dextrose 50% Injectable 12.5 Gram(s) IV Push once  dextrose 50% Injectable 25 Gram(s) IV Push once  ergocalciferol 14891 Unit(s) Oral <User Schedule>  folic acid 1 milliGRAM(s) Oral daily  glucagon  Injectable 1 milliGRAM(s) IntraMuscular once  heparin   Injectable 5000 Unit(s) SubCutaneous every 12 hours  hydrALAZINE 25 milliGRAM(s) Oral every 8 hours  insulin lispro (ADMELOG) corrective regimen sliding scale   SubCutaneous Before meals and at bedtime  isosorbide   mononitrate ER Tablet (IMDUR) 30 milliGRAM(s) Oral daily  melatonin 3 milliGRAM(s) Oral at bedtime  Nephro-marcella 1 Tablet(s) Oral daily  pantoprazole    Tablet 40 milliGRAM(s) Oral before breakfast  timolol 0.5% Solution 1 Drop(s) Both EYES two times a day    MEDICATIONS  (PRN):  dextrose Oral Gel 15 Gram(s) Oral once PRN Blood Glucose LESS THAN 70 milliGRAM(s)/deciliter  QUEtiapine 25 milliGRAM(s) Oral three times a day PRN agitation          Surgical Pathology Report (05.17.22 @ 18:11)   Surgical Pathology Report:   ACCESSION No: [id number]   Patient: PEMA VENCES   Accession: [id number]   Collected Date/Time: 5/17/2022 18:11 EDT   Received Date/Time: 5/18/2022 14:38 EDT   Surgical Pathology Report - Auth (Verified)   Specimen(s) Submitted   1 Antrum bx   Final Diagnosis   Gastric antrum; biopsy:   Chronic gastritis, mild   -H. Pylori are not identified with Cresyl Violet stain   Verified by: Etelvina Renteria M.D.   (Electronic Signature)   Reported on: 05/20/22 13:12 EDT, [id number]   Phone: [phone] Fax: [phone]   _________________________________________________________________   Clinical Information   Rule out H. pylori   Gross Description   Received in formalin labeled "antrum" is a 0.3 cm in greatest dimension   tan-pink, soft tissue fragment. Entirely in one cassette: ESTELA   Edgar Severe 05/19/2022 12:39 PM   Disclaimer   In addition to other data that may appear on the specimen containers, all   labels have been inspected to confirm the presence of the patient's name   and date of birth.   Histological processing performed at [facility], [place] [address].

## 2022-05-22 NOTE — PROGRESS NOTE ADULT - SUBJECTIVE AND OBJECTIVE BOX
CHIEF COMPLAINT:Patient is a 76y old  Male who presents with a chief complaint of alcohol withdrawal .Pt appears comfortable.    	  REVIEW OF SYSTEMS:  CONSTITUTIONAL: No fever, weight loss, or fatigue  EYES: No eye pain, visual disturbances, or discharge  ENT:  No difficulty hearing, tinnitus, vertigo; No sinus or throat pain  NECK: No pain or stiffness  RESPIRATORY: No cough, wheezing, chills or hemoptysis; No Shortness of Breath  CARDIOVASCULAR: No chest pain, palpitations, passing out, dizziness, or leg swelling  GASTROINTESTINAL: No abdominal or epigastric pain. No nausea, vomiting, or hematemesis; No diarrhea or constipation. No melena or hematochezia.  GENITOURINARY: No dysuria, frequency, hematuria, or incontinence  NEUROLOGICAL: No headaches, memory loss, loss of strength, numbness, or tremors  SKIN: No itching, burning, rashes, or lesions   LYMPH Nodes: No enlarged glands  ENDOCRINE: No heat or cold intolerance; No hair loss  MUSCULOSKELETAL: No joint pain or swelling; No muscle, back, or extremity pain  PSYCHIATRIC: No depression, anxiety, mood swings, or difficulty sleeping  HEME/LYMPH: No easy bruising, or bleeding gums  ALLERGY AND IMMUNOLOGIC: No hives or eczema	      PHYSICAL EXAM:  T(C): 36.1 (05-22-22 @ 05:24), Max: 36.8 (05-21-22 @ 20:40)  HR: 66 (05-22-22 @ 12:16) (66 - 88)  BP: 141/81 (05-22-22 @ 12:16) (141/81 - 152/80)  RR: 18 (05-22-22 @ 05:24) (18 - 18)  SpO2: 100% (05-22-22 @ 05:24) (100% - 100%)  Wt(kg): --  I&O's Summary      Appearance: Normal	  HEENT:   Normal oral mucosa, PERRL, EOMI	  Lymphatic: No lymphadenopathy  Cardiovascular: Normal S1 S2, No JVD, No murmurs, No edema  Respiratory: Lungs clear to auscultation	  Psychiatry: A & O x 3, Mood & affect appropriate  Gastrointestinal:  Soft, Non-tender, + BS	  Skin: No rashes, No ecchymoses, No cyanosis	  Neurologic: Non-focal  Extremities: Normal range of motion, No clubbing, cyanosis or edema  Vascular: Peripheral pulses palpable 2+ bilaterally    MEDICATIONS  (STANDING):  aspirin  chewable 81 milliGRAM(s) Oral daily  brimonidine 0.2% Ophthalmic Solution 1 Drop(s) Both EYES daily  carvedilol 6.25 milliGRAM(s) Oral every 12 hours  chlorhexidine 2% Cloths 1 Application(s) Topical <User Schedule>  clobetasol 0.05% Ointment 1 Application(s) Topical two times a day  dextrose 5%. 1000 milliLiter(s) (50 mL/Hr) IV Continuous <Continuous>  dextrose 50% Injectable 25 Gram(s) IV Push once  dextrose 50% Injectable 12.5 Gram(s) IV Push once  dextrose 50% Injectable 25 Gram(s) IV Push once  ergocalciferol 53901 Unit(s) Oral <User Schedule>  folic acid 1 milliGRAM(s) Oral daily  glucagon  Injectable 1 milliGRAM(s) IntraMuscular once  heparin   Injectable 5000 Unit(s) SubCutaneous every 12 hours  hydrALAZINE 25 milliGRAM(s) Oral every 8 hours  insulin lispro (ADMELOG) corrective regimen sliding scale   SubCutaneous Before meals and at bedtime  isosorbide   mononitrate ER Tablet (IMDUR) 30 milliGRAM(s) Oral daily  melatonin 3 milliGRAM(s) Oral at bedtime  Nephro-marcella 1 Tablet(s) Oral daily  pantoprazole    Tablet 40 milliGRAM(s) Oral before breakfast  timolol 0.5% Solution 1 Drop(s) Both EYES two times a day      	  	  LABS:	 	        proBNP: Serum Pro-Brain Natriuretic Peptide: 1088 pg/mL (05-10 @ 09:36)

## 2022-05-22 NOTE — PROGRESS NOTE ADULT - SUBJECTIVE AND OBJECTIVE BOX
Patient is seen and examined at the bed side, is afebrile. He has no new complaints.       REVIEW OF SYSTEMS: All other review systems are negative      ALLERGIES: No Known Allergies      Vital Signs Last 24 Hrs  T(C): 36.7 (22 May 2022 13:58), Max: 36.8 (21 May 2022 20:40)  T(F): 98 (22 May 2022 13:58), Max: 98.2 (21 May 2022 20:40)  HR: 60 (22 May 2022 15:37) (60 - 88)  BP: 128/76 (22 May 2022 15:37) (105/61 - 152/80)  BP(mean): 113 (22 May 2022 05:24) (113 - 113)  RR: 17 (22 May 2022 13:58) (17 - 18)  SpO2: 97% (22 May 2022 13:58) (97% - 100%)        PHYSICAL EXAM:  GENERAL: Not in distress, off oxygen   CHEST/LUNG: Not using accessory muscles   HEART: s1 and s2 present  ABDOMEN:  Nontender and  Nondistended  EXTREMITIES: No pedal  edema  CNS: Awake and alert       LABS: No new Labs                          9.5    5.26  )-----------( 166      ( 20 May 2022 08:45 )             28.7                         9.3    4.81  )-----------( 150      ( 19 May 2022 08:08 )             27.7       05-20    140  |  111<H>  |  65<H>  ----------------------------<  156<H>  4.8   |  19<L>  |  3.45<H>    Ca    9.2      20 May 2022 08:45      05-19    142  |  113<H>  |  68<H>  ----------------------------<  154<H>  4.2   |  19<L>  |  3.56<H>    Ca    9.1      19 May 2022 08:08  Mg     2.2     05-18    TPro  7.5  /  Alb  2.3<L>  /  TBili  0.5  /  DBili  x   /  AST  26  /  ALT  23  /  AlkPhos  77  05-18      CAPILLARY BLOOD GLUCOSE  POCT Blood Glucose.: 125 mg/dL (13 May 2022 17:01)  POCT Blood Glucose.: 130 mg/dL (13 May 2022 11:58)  POCT Blood Glucose.: 113 mg/dL (13 May 2022 08:17)  POCT Blood Glucose.: 123 mg/dL (12 May 2022 21:33)        Urinalysis Basic - ( 12 May 2022 12:13 )  Color: Yellow / Appearance: Clear / S.010 / pH: x  Gluc: x / Ketone: Negative  / Bili: Negative / Urobili: Negative   Blood: x / Protein: 100 / Nitrite: Negative   Leuk Esterase: Negative / RBC: Negative /HPF / WBC 0-2 /HPF   Sq Epi: x / Non Sq Epi: Occasional /HPF / Bacteria: Negative /HPF  Procalcitonin, Serum (22 @ 16:21) : 0.47:    MEDICATIONS  (STANDING):    aspirin  chewable 81 milliGRAM(s) Oral daily  brimonidine 0.2% Ophthalmic Solution 1 Drop(s) Both EYES daily  carvedilol 6.25 milliGRAM(s) Oral every 12 hours  chlorhexidine 2% Cloths 1 Application(s) Topical <User Schedule>  clobetasol 0.05% Ointment 1 Application(s) Topical two times a day  ergocalciferol 86211 Unit(s) Oral <User Schedule>  folic acid 1 milliGRAM(s) Oral daily  glucagon  Injectable 1 milliGRAM(s) IntraMuscular once  heparin   Injectable 5000 Unit(s) SubCutaneous every 12 hours  hydrALAZINE 25 milliGRAM(s) Oral every 8 hours  insulin lispro (ADMELOG) corrective regimen sliding scale   SubCutaneous Before meals and at bedtime  isosorbide   mononitrate ER Tablet (IMDUR) 30 milliGRAM(s) Oral daily  melatonin 3 milliGRAM(s) Oral at bedtime  Nephro-marcella 1 Tablet(s) Oral daily  pantoprazole    Tablet 40 milliGRAM(s) Oral before breakfast  timolol 0.5% Solution 1 Drop(s) Both EYES two times a day      RADIOLOGY & ADDITIONAL TESTS:    22: Xray Chest 1 View- PORTABLE-Urgent (Xray Chest 1 View- PORTABLE-Urgent .) (22 @ 16:03) >    IMPRESSION: There is a new fairly significant right lung process at this time.      22: US Abdomen Complete (US Abdomen Complete .) (22 @ 08:54) Gallstones in the contracted gallbladder.  Hepatic steatosis.      5/10/22: CT Head No Cont (05.10.22 @ 14:32) :   Moderate periventricular white matter ischemia. Moderate  bitemporal lobe and bifrontal lobe atrophy.     Mucosal thickening in the  RIGHT maxillary and RIGHT ethmoid and frontal and RIGHT frontal sinuses.      5/10/22: Xray Chest 1 View- PORTABLE-Urgent (05.10.22 @ 09:20) >Presently lungs are clear.        MICROBIOLOGY DATA:    Culture - Blood (22 @ 21:24)   Specimen Source: .Blood Blood-Peripheral   Culture Results: No growth to date.     Culture - Blood (22 @ 21:24)   Specimen Source: .Blood Blood-Peripheral   Culture Results: No growth to date.     MRSA/MSSA PCR (22 @ 05:59)   MRSA PCR Result.: NotDetec:    COVID-19 PCR (05.10.22 @ 09:36)   COVID-19 PCR: NotDetec:

## 2022-05-22 NOTE — PROGRESS NOTE ADULT - ASSESSMENT
76yoM, AAOx3, ambulates independently, leaves alone at home, w/ PMH of HLD, DM, CKD,CAD,CHF  presents to the ED with weakness and tremulousness,aspiration pneumonia.  1.CAD,CHF-asa, coreg 6.25mg bid,imdur,hydralazine.  2.CRI-Renal f/u.  3.DM-insulin.  4.Lipid d/o-statin.  5.Etoh abuse cessation.  6.Hypernatremia-resolved.  7.GI and DVT prophylaxis.

## 2022-05-22 NOTE — PROGRESS NOTE ADULT - ASSESSMENT
Patient is a 76y old  Male , AAOx3, ambulates independently, leaves alone at home, w/ PMH of HLD, DM, CKD, presents to the ER on 5/10/22 for evaluation of weakness and tremulousness. As per ER attending, patient is being admitted for alcohol withdrawal. Patient admits to drinking 2 glasses of Whisky at night. ON admission, he has no fever and Negative CXR. Today, hospital day 3, he found to have respiratory distress and Repeat  CXR shows a new fairly significant right lung process at this time. He has started on Cefepime and the ID consult requested to assist with further evaluation and antibiotic management.     # Aspiration pneumonia  vs HAP- on CXR from 5/13  # Alcohol withdrawal syndrome     would recommend:    1. PT/ OOB to chair    2. Monitor OFF abx as he completed the course   3. Aspiration precaution    Attending Attestation:    Spent more than 35 minutes on total encounter, more than 50 % of the visit was spent counseling and/or coordinating care by the Attending physician.

## 2022-05-22 NOTE — PROGRESS NOTE ADULT - ASSESSMENT
1. Anemia  2. Thrombocytopenia  3. R/o chronic GI bleeding  4. ETOH abuse  5. Alcoholic liver disease  6. Alcohol withdrawal  7. S/p EGD and colonoscopy  8. Diverticulosis  9. Gastritis    Suggestions:    1. Monitor H/H  2. Transfuse PRBC as needed  3. Protonix 40mg po daily  4. Avoid NSAID  5.ollow up LFT's  6. DVT prophylaxis

## 2022-05-23 LAB
ANION GAP SERPL CALC-SCNC: 10 MMOL/L — SIGNIFICANT CHANGE UP (ref 5–17)
BUN SERPL-MCNC: 58 MG/DL — HIGH (ref 7–18)
CALCIUM SERPL-MCNC: 9.4 MG/DL — SIGNIFICANT CHANGE UP (ref 8.4–10.5)
CHLORIDE SERPL-SCNC: 114 MMOL/L — HIGH (ref 96–108)
CO2 SERPL-SCNC: 17 MMOL/L — LOW (ref 22–31)
CREAT SERPL-MCNC: 3.25 MG/DL — HIGH (ref 0.5–1.3)
EGFR: 19 ML/MIN/1.73M2 — LOW
GLUCOSE BLDC GLUCOMTR-MCNC: 152 MG/DL — HIGH (ref 70–99)
GLUCOSE BLDC GLUCOMTR-MCNC: 157 MG/DL — HIGH (ref 70–99)
GLUCOSE BLDC GLUCOMTR-MCNC: 185 MG/DL — HIGH (ref 70–99)
GLUCOSE BLDC GLUCOMTR-MCNC: 188 MG/DL — HIGH (ref 70–99)
GLUCOSE SERPL-MCNC: 145 MG/DL — HIGH (ref 70–99)
HCT VFR BLD CALC: 29.5 % — LOW (ref 39–50)
HGB BLD-MCNC: 9.6 G/DL — LOW (ref 13–17)
MAGNESIUM SERPL-MCNC: 2.2 MG/DL — SIGNIFICANT CHANGE UP (ref 1.6–2.6)
MCHC RBC-ENTMCNC: 32.5 GM/DL — SIGNIFICANT CHANGE UP (ref 32–36)
MCHC RBC-ENTMCNC: 34.2 PG — HIGH (ref 27–34)
MCV RBC AUTO: 105 FL — HIGH (ref 80–100)
NRBC # BLD: 0 /100 WBCS — SIGNIFICANT CHANGE UP (ref 0–0)
PHOSPHATE SERPL-MCNC: 4.1 MG/DL — SIGNIFICANT CHANGE UP (ref 2.5–4.5)
PLATELET # BLD AUTO: 193 K/UL — SIGNIFICANT CHANGE UP (ref 150–400)
POTASSIUM SERPL-MCNC: 4.5 MMOL/L — SIGNIFICANT CHANGE UP (ref 3.5–5.3)
POTASSIUM SERPL-SCNC: 4.5 MMOL/L — SIGNIFICANT CHANGE UP (ref 3.5–5.3)
RBC # BLD: 2.81 M/UL — LOW (ref 4.2–5.8)
RBC # FLD: 15 % — HIGH (ref 10.3–14.5)
SARS-COV-2 RNA SPEC QL NAA+PROBE: SIGNIFICANT CHANGE UP
SODIUM SERPL-SCNC: 141 MMOL/L — SIGNIFICANT CHANGE UP (ref 135–145)
WBC # BLD: 4.49 K/UL — SIGNIFICANT CHANGE UP (ref 3.8–10.5)
WBC # FLD AUTO: 4.49 K/UL — SIGNIFICANT CHANGE UP (ref 3.8–10.5)

## 2022-05-23 RX ORDER — SODIUM BICARBONATE 1 MEQ/ML
650 SYRINGE (ML) INTRAVENOUS THREE TIMES A DAY
Refills: 0 | Status: DISCONTINUED | OUTPATIENT
Start: 2022-05-23 | End: 2022-05-24

## 2022-05-23 RX ADMIN — Medication 1 DROP(S): at 18:11

## 2022-05-23 RX ADMIN — CARVEDILOL PHOSPHATE 6.25 MILLIGRAM(S): 80 CAPSULE, EXTENDED RELEASE ORAL at 18:10

## 2022-05-23 RX ADMIN — Medication 650 MILLIGRAM(S): at 21:36

## 2022-05-23 RX ADMIN — Medication 25 MILLIGRAM(S): at 05:06

## 2022-05-23 RX ADMIN — BRIMONIDINE TARTRATE 1 DROP(S): 2 SOLUTION/ DROPS OPHTHALMIC at 12:15

## 2022-05-23 RX ADMIN — Medication 1 TABLET(S): at 12:15

## 2022-05-23 RX ADMIN — HEPARIN SODIUM 5000 UNIT(S): 5000 INJECTION INTRAVENOUS; SUBCUTANEOUS at 18:10

## 2022-05-23 RX ADMIN — Medication 1: at 12:14

## 2022-05-23 RX ADMIN — CHLORHEXIDINE GLUCONATE 1 APPLICATION(S): 213 SOLUTION TOPICAL at 05:07

## 2022-05-23 RX ADMIN — Medication 1 DROP(S): at 05:06

## 2022-05-23 RX ADMIN — CARVEDILOL PHOSPHATE 6.25 MILLIGRAM(S): 80 CAPSULE, EXTENDED RELEASE ORAL at 05:06

## 2022-05-23 RX ADMIN — ISOSORBIDE MONONITRATE 30 MILLIGRAM(S): 60 TABLET, EXTENDED RELEASE ORAL at 12:15

## 2022-05-23 RX ADMIN — Medication 20 MILLIGRAM(S): at 16:12

## 2022-05-23 RX ADMIN — Medication 1 MILLIGRAM(S): at 12:15

## 2022-05-23 RX ADMIN — Medication 81 MILLIGRAM(S): at 12:14

## 2022-05-23 RX ADMIN — PANTOPRAZOLE SODIUM 40 MILLIGRAM(S): 20 TABLET, DELAYED RELEASE ORAL at 07:30

## 2022-05-23 RX ADMIN — Medication 1 APPLICATION(S): at 18:10

## 2022-05-23 RX ADMIN — Medication 3 MILLIGRAM(S): at 21:37

## 2022-05-23 RX ADMIN — Medication 25 MILLIGRAM(S): at 21:37

## 2022-05-23 RX ADMIN — HEPARIN SODIUM 5000 UNIT(S): 5000 INJECTION INTRAVENOUS; SUBCUTANEOUS at 05:08

## 2022-05-23 RX ADMIN — Medication 1: at 08:23

## 2022-05-23 RX ADMIN — Medication 1: at 22:29

## 2022-05-23 RX ADMIN — Medication 1: at 18:03

## 2022-05-23 RX ADMIN — Medication 1 APPLICATION(S): at 05:08

## 2022-05-23 RX ADMIN — Medication 25 MILLIGRAM(S): at 14:09

## 2022-05-23 NOTE — PROGRESS NOTE ADULT - PROBLEM SELECTOR PROBLEM 3
Pneumonia, aspiration
Pneumonia, aspiration
HTN (hypertension)
Pneumonia, aspiration
HTN (hypertension)
Pneumonia, aspiration
Pneumonia, aspiration
HTN (hypertension)
Pneumonia, aspiration

## 2022-05-23 NOTE — PROGRESS NOTE ADULT - PROBLEM SELECTOR PLAN 5
pt p/w Na 151, hx of HFrEF  likely due to poor po intake and ams  start D5W gtt  monitor CMP
- likely due to poor po intake and ams  - monitor CMP  - nephrology Dr. Art following
h/o HLD, on rosuvastatin  c/w rosuvastatin
- likely due to poor po intake and ams  - monitor CMP  - nephrology Dr. Art following
- h/o HLD, on rosuvastatin  - c/w rosuvastatin
- likely due to poor po intake and ams  - monitor CMP  - nephrology Dr. Art following
pt p/w Na 151, hx of HFrEF  likely due to poor po intake and ams  continue D5W gtt  monitor CMP  nephrology Dr. Art following
h/o HLD, on rosuvastatin  c/w rosuvastatin
- likely due to poor po intake and ams  - monitor CMP  - nephrology Dr. Art following

## 2022-05-23 NOTE — PROGRESS NOTE ADULT - PROBLEM SELECTOR PROBLEM 4
Cellulitis
Chronic HFrEF (heart failure with reduced ejection fraction)
Cellulitis
Chronic HFrEF (heart failure with reduced ejection fraction)
Cellulitis
Chronic HFrEF (heart failure with reduced ejection fraction)
Chronic HFrEF (heart failure with reduced ejection fraction)

## 2022-05-23 NOTE — PROGRESS NOTE ADULT - ASSESSMENT
CKD stage 4 , DKD  HFrEF 25%. Increased ECFV  RTA  due to CKD.    Plan:  Start Sodium Bicarbonate.  Start Torsemide 20 mg daily

## 2022-05-23 NOTE — PROGRESS NOTE ADULT - PROBLEM SELECTOR PROBLEM 2
Acute encephalopathy
Alcohol withdrawal syndrome, with unspecified complication
Acute encephalopathy
Acute encephalopathy
Alcohol withdrawal syndrome, with unspecified complication
Alcohol withdrawal syndrome, with unspecified complication
Acute encephalopathy
Alcohol withdrawal syndrome, with unspecified complication
Alcohol withdrawal syndrome, with unspecified complication

## 2022-05-23 NOTE — PROGRESS NOTE ADULT - PROBLEM SELECTOR PLAN 7
- h/o CKD, on Lasix  - Cr slowly improving 3.56  - Renal diet  - started on Na Bicarb as per nephro  - c/w Torsemide 20mg qd  - Nephrology Dr. Art following

## 2022-05-23 NOTE — PROGRESS NOTE ADULT - SUBJECTIVE AND OBJECTIVE BOX
Patient is seen and examined at the bed side, is afebrile. The creatinine started trending down slowly.       REVIEW OF SYSTEMS: All other review systems are negative      ALLERGIES: No Known Allergies      Vital Signs Last 24 Hrs  T(C): 36 (23 May 2022 11:43), Max: 36.8 (22 May 2022 20:40)  T(F): 96.8 (23 May 2022 11:43), Max: 98.3 (22 May 2022 20:40)  HR: 86 (23 May 2022 18:08) (60 - 91)  BP: 132/69 (23 May 2022 18:08) (128/71 - 149/79)  BP(mean): 102 (23 May 2022 11:42) (94 - 102)  RR: 14 (23 May 2022 11:43) (14 - 18)  SpO2: 99% (23 May 2022 11:43) (97% - 100%)      PHYSICAL EXAM:  GENERAL: Not in distress, off oxygen   CHEST/LUNG: Not using accessory muscles   HEART: s1 and s2 present  ABDOMEN:  Nontender and  Nondistended  EXTREMITIES: No pedal  edema  CNS: Awake and alert       LABS:                         9.6    4.49  )-----------( 193      ( 23 May 2022 09:19 )             29.5                           9.5    5.26  )-----------( 166      ( 20 May 2022 08:45 )             28.7                05-23    141  |  114<H>  |  58<H>  ----------------------------<  145<H>  4.5   |  17<L>  |  3.25<H>    Ca    9.4      23 May 2022 09:19  Phos  4.1     05-23  Mg     2.2           05-20    140  |  111<H>  |  65<H>  ----------------------------<  156<H>  4.8   |  19<L>  |  3.45<H>    Ca    9.2      20 May 2022 08:45    TPro  7.5  /  Alb  2.3<L>  /  TBili  0.5  /  DBili  x   /  AST  26  /  ALT  23  /  AlkPhos  77  05-18      CAPILLARY BLOOD GLUCOSE  POCT Blood Glucose.: 125 mg/dL (13 May 2022 17:01)  POCT Blood Glucose.: 130 mg/dL (13 May 2022 11:58)  POCT Blood Glucose.: 113 mg/dL (13 May 2022 08:17)  POCT Blood Glucose.: 123 mg/dL (12 May 2022 21:33)        Urinalysis Basic - ( 12 May 2022 12:13 )  Color: Yellow / Appearance: Clear / S.010 / pH: x  Gluc: x / Ketone: Negative  / Bili: Negative / Urobili: Negative   Blood: x / Protein: 100 / Nitrite: Negative   Leuk Esterase: Negative / RBC: Negative /HPF / WBC 0-2 /HPF   Sq Epi: x / Non Sq Epi: Occasional /HPF / Bacteria: Negative /HPF  Procalcitonin, Serum (22 @ 16:21) : 0.47:    MEDICATIONS  (STANDING):    aspirin  chewable 81 milliGRAM(s) Oral daily  brimonidine 0.2% Ophthalmic Solution 1 Drop(s) Both EYES daily  carvedilol 6.25 milliGRAM(s) Oral every 12 hours  chlorhexidine 2% Cloths 1 Application(s) Topical <User Schedule>  clobetasol 0.05% Ointment 1 Application(s) Topical two times a day  ergocalciferol 02536 Unit(s) Oral <User Schedule>  folic acid 1 milliGRAM(s) Oral daily  glucagon  Injectable 1 milliGRAM(s) IntraMuscular once  heparin   Injectable 5000 Unit(s) SubCutaneous every 12 hours  hydrALAZINE 25 milliGRAM(s) Oral every 8 hours  insulin lispro (ADMELOG) corrective regimen sliding scale   SubCutaneous Before meals and at bedtime  isosorbide   mononitrate ER Tablet (IMDUR) 30 milliGRAM(s) Oral daily  melatonin 3 milliGRAM(s) Oral at bedtime  Nephro-marcella 1 Tablet(s) Oral daily  pantoprazole    Tablet 40 milliGRAM(s) Oral before breakfast  sodium bicarbonate 650 milliGRAM(s) Oral three times a day  timolol 0.5% Solution 1 Drop(s) Both EYES two times a day  torsemide 20 milliGRAM(s) Oral daily      RADIOLOGY & ADDITIONAL TESTS:    22: Xray Chest 1 View- PORTABLE-Urgent (Xray Chest 1 View- PORTABLE-Urgent .) (22 @ 16:03) >    IMPRESSION: There is a new fairly significant right lung process at this time.      22: US Abdomen Complete (US Abdomen Complete .) (22 @ 08:54) Gallstones in the contracted gallbladder.  Hepatic steatosis.      5/10/22: CT Head No Cont (05.10.22 @ 14:32) :   Moderate periventricular white matter ischemia. Moderate  bitemporal lobe and bifrontal lobe atrophy.     Mucosal thickening in the  RIGHT maxillary and RIGHT ethmoid and frontal and RIGHT frontal sinuses.      5/10/22: Xray Chest 1 View- PORTABLE-Urgent (05.10.22 @ 09:20) >Presently lungs are clear.        MICROBIOLOGY DATA:    Culture - Blood (22 @ 21:24)   Specimen Source: .Blood Blood-Peripheral   Culture Results: No growth to date.     Culture - Blood (22 @ 21:24)   Specimen Source: .Blood Blood-Peripheral   Culture Results: No growth to date.     MRSA/MSSA PCR (22 @ 05:59)   MRSA PCR Result.: NotDetec:    COVID-19 PCR (05.10.22 @ 09:36)   COVID-19 PCR: NotDetec:

## 2022-05-23 NOTE — PROGRESS NOTE ADULT - PROBLEM SELECTOR PROBLEM 5
HLD (hyperlipidemia)
Hypernatremia
HLD (hyperlipidemia)
Hypernatremia
HLD (hyperlipidemia)
Hypernatremia
Hypernatremia

## 2022-05-23 NOTE — PROGRESS NOTE ADULT - ASSESSMENT
Patient is a 76yoM, AAOx3, ambulates independently, leaves alone at home, w/ PMH of HLD, DM, CKD, presents to the ED with weakness and tremulousness. Case discussed w/ pt's son Dre Ojeda at bedside who reports that pt lives alone and he drinks about 50 years every night and now has been having about two drinks per day.  No prior Etoh related hospitalization as per son.  Admitted to medicine for further management of alcohol withdrawal. GI Dr. Crandall consulted for EGD and colonoscopy due to anemia. Neurology Dr. Gotti was consulted for concern of EPS symptoms on ativan. CXR showed rt lung field infiltrate, likely Asp Pneumonia, started on abx, ID Dr. Cui following. echo noted reduced ef 20-25%, cardiology Dr. Franz following. Hospital course complicated by hypernatremia likely due to pre-renal cause, nephrology Dr. Art following. Pt is now s/p EGD and colonoscopy on 5/17, showed Erythema in the gastroesophageal junction. Hiatal Hernia. diverticulosis and hemorrhoids, started on ppi daily and high fiber diet. Tremors now resolved, CIWA now discontinued. Psych Dr. Talamantes consulted increased altered mental status  likely due to dementia, started on seroquel 25 mg TID,  Pt continues to have hypernatremia, IV fluids ongoing, nephrology  following.  Pt is more calm and relaxed with Seroquel regimen. PT evaluated pt and recommends [facility], pending authorization.     05/23- pt seen and examined at bedside, HD in AM, patient was confused try to pull out lines. Pt given Zyprexa 2.5IM, and finished HD. Patient testing COVID positive 05/23 but is past quarantine period and not on iso. SW and CM is following for dc to [facility].

## 2022-05-23 NOTE — PROGRESS NOTE ADULT - SUBJECTIVE AND OBJECTIVE BOX
Patient is a 76y old  Male who presents with a chief complaint of alcohol withdrawal (22 May 2022 16:31)      pt seen in icu [  ], reg med floor [ x  ], bed [ ], chair at bedside [ x  ], a+o x3 [ x ], lethargic [  ],    nad [ x ]      Allergies    No Known Allergies        Vitals    T(F): 97.6 (05-23-22 @ 04:41), Max: 98.3 (05-22-22 @ 20:40)  HR: 67 (05-23-22 @ 04:41) (60 - 88)  BP: 141/84 (05-23-22 @ 04:41) (105/61 - 149/79)  RR: 18 (05-23-22 @ 04:41) (17 - 18)  SpO2: 100% (05-23-22 @ 04:41) (97% - 100%)  Wt(kg): --  CAPILLARY BLOOD GLUCOSE      POCT Blood Glucose.: 139 mg/dL (22 May 2022 21:12)      Labs                      .Blood Blood-Peripheral  05-13 @ 21:24   No Growth Final  --  --    ACCESSION No: [id number]   Patient: PEMA VENCES   Accession: [id number]   Collected Date/Time: 5/17/2022 18:11 EDT   Received Date/Time: 5/18/2022 14:38 EDT   Surgical Pathology Report - Auth (Verified)   Specimen(s) Submitted   1 Antrum bx   Final Diagnosis   Gastric antrum; biopsy:   Chronic gastritis, mild   -H. Pylori are not identified with Cresyl Violet stain       Radiology Results      Meds    MEDICATIONS  (STANDING):  aspirin  chewable 81 milliGRAM(s) Oral daily  brimonidine 0.2% Ophthalmic Solution 1 Drop(s) Both EYES daily  carvedilol 6.25 milliGRAM(s) Oral every 12 hours  chlorhexidine 2% Cloths 1 Application(s) Topical <User Schedule>  clobetasol 0.05% Ointment 1 Application(s) Topical two times a day  dextrose 5%. 1000 milliLiter(s) (50 mL/Hr) IV Continuous <Continuous>  dextrose 50% Injectable 25 Gram(s) IV Push once  dextrose 50% Injectable 25 Gram(s) IV Push once  dextrose 50% Injectable 12.5 Gram(s) IV Push once  ergocalciferol 26449 Unit(s) Oral <User Schedule>  folic acid 1 milliGRAM(s) Oral daily  glucagon  Injectable 1 milliGRAM(s) IntraMuscular once  heparin   Injectable 5000 Unit(s) SubCutaneous every 12 hours  hydrALAZINE 25 milliGRAM(s) Oral every 8 hours  insulin lispro (ADMELOG) corrective regimen sliding scale   SubCutaneous Before meals and at bedtime  isosorbide   mononitrate ER Tablet (IMDUR) 30 milliGRAM(s) Oral daily  melatonin 3 milliGRAM(s) Oral at bedtime  Nephro-marcella 1 Tablet(s) Oral daily  pantoprazole    Tablet 40 milliGRAM(s) Oral before breakfast  timolol 0.5% Solution 1 Drop(s) Both EYES two times a day      MEDICATIONS  (PRN):  dextrose Oral Gel 15 Gram(s) Oral once PRN Blood Glucose LESS THAN 70 milliGRAM(s)/deciliter  QUEtiapine 25 milliGRAM(s) Oral three times a day PRN agitation      Physical Exam    Neuro :  no focal deficits  Respiratory: right lower lobe crackles  CV: RRR, S1S2, no murmurs,   Abdominal: Soft, NT, ND +BS,  Extremities: No edema, + peripheral pulses, right distal leg erythema and excoriation resolved   skin: diffused scaly rash       ASSESSMENT    etoh withdrawal,   Alcohol dependence   F10.20  Dementia   F03.90  metabolic encephalopathy,   right le cellulitis,   aspiration pna   psoriasis,   acute on ckd,   pancytopenia likely 2nd to alcoholism   r/o parkinsons disease  h/o HLD,   DM,   chronic HF rEF  CKD  Alcohol dependence with withdrawal  Glaucoma  Gout      PLAN    cont multivitamin, folate, thiamine   psych f/u    C/w Seroquel 25 mg q8h PRN agitation/behavioral disturbance  Do NOT recommend Ativan PRN for agitation, given potential to trigger delirium  DC'd CIWA and associated PRNs, as pt is no longer in alcohol withdrawal  Family has received  input on Medicaid application process and intends to move forward once pt returns home  Pt appears psych stable for DC,   cxr with There is a new fairly significant right lung process at this time noted    id f/u   pt completed Cefepime 1 g q 24 hour and Flagyl  5/20/22  blood cx neg noted above   echo with EF 20-25%, Grade I diastolic dysfunction  RV systolic pressure is mildly increased noted    ecg with Diagnosis Line Sinus rhythm @ 88 bpm with Premature atrial complexes, Left axis deviation, Left bundle branch block, Abnormal ECG noted    cardio f/u   cont  asa coreg, imdur, hydralazine.  wound care eval   cont clobetasol 0.05% bid for psoriasis   lesions near resolution   s/p lactulose 10 g x1   rept ammonia <10  stool occult blood neg noted    lft wnl   gi f/u   No evidence of acute GI bleeding  Monitor H/H  transfuse PRBC as needed  Check stool for occult blood  s/p EGD with Erythema in the gastroesophageal junction. Hiatal Hernia. Erythema in the antrum. (Biopsy). Normal mucosa in the whole examined duodenum.  colonoscopy with Moderate diverticulosis of the descending colon, transverse colon and ascending colon. Internal hemorrhoids. Colonoscopy Limitations/Complications:  pathology results noted above neg for h pylori.  High Fiber Diet  Protonix 40mg PO daily  Avoid NSAID  Avoid ETOH  Anti reflux measure  abd us with Gallstones in the contracted gallbladder. 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  Deterioration in renal function, possible chronic versus acute due to dehydration  d/c'd lasix   serum creat stable   d/c IV fluids D5W  d/c'd Sodium bicarbonate PO   serum sodium wnl  2 gm K diet  lispro ss   neuro cons noted   Mild distal symmetric bilateral UE tremulousness in the setting of, and entirely consistent with, EtOH withdrawal, chronic EtOH abuse,    No exam findings diagnostic for an extrapyramidal disorder.  In any case, Dx and consideration of potential management of any (possible suspected) extrapyramidal disorder cannot be made in an acute setting such as this one.    When the Pt has FULLY recovered from his acute medical problems, if there is a desire for neurologic evaluation, then please schedule a routine out-Pt visit.   repeat ct head stable noted    phys tx eval noted and rec phys tx 3-5x/week x 4 weeks at rehabilitation facility; Sub-acute  cont current meds   d/c planning to [facility] pending acceptance

## 2022-05-23 NOTE — PROGRESS NOTE ADULT - SUBJECTIVE AND OBJECTIVE BOX
CHIEF COMPLAINT:Patient is a 76y old  Male who presents with a chief complaint of alcohol withdrawal.Pt appears comfortable.    	  REVIEW OF SYSTEMS:  CONSTITUTIONAL: No fever, weight loss, or fatigue  EYES: No eye pain, visual disturbances, or discharge  ENT:  No difficulty hearing, tinnitus, vertigo; No sinus or throat pain  NECK: No pain or stiffness  RESPIRATORY: No cough, wheezing, chills or hemoptysis; No Shortness of Breath  CARDIOVASCULAR: No chest pain, palpitations, passing out, dizziness, or leg swelling  GASTROINTESTINAL: No abdominal or epigastric pain. No nausea, vomiting, or hematemesis; No diarrhea or constipation. No melena or hematochezia.  GENITOURINARY: No dysuria, frequency, hematuria, or incontinence  NEUROLOGICAL: No headaches, memory loss, loss of strength, numbness, or tremors  SKIN: No itching, burning, rashes, or lesions   LYMPH Nodes: No enlarged glands  ENDOCRINE: No heat or cold intolerance; No hair loss  MUSCULOSKELETAL: No joint pain or swelling; No muscle, back, or extremity pain  PSYCHIATRIC: No depression, anxiety, mood swings, or difficulty sleeping  HEME/LYMPH: No easy bruising, or bleeding gums  ALLERGY AND IMMUNOLOGIC: No hives or eczema	        PHYSICAL EXAM:  T(C): 36 (05-23-22 @ 11:43), Max: 36.8 (05-22-22 @ 20:40)  HR: 60 (05-23-22 @ 11:43) (60 - 91)  BP: 128/71 (05-23-22 @ 11:43) (105/61 - 149/79)  RR: 14 (05-23-22 @ 11:43) (14 - 18)  SpO2: 99% (05-23-22 @ 11:43) (97% - 100%)      Appearance: Normal	  HEENT:   Normal oral mucosa, PERRL, EOMI	  Lymphatic: No lymphadenopathy  Cardiovascular: Normal S1 S2, No JVD, No murmurs, No edema  Respiratory: Lungs clear to auscultation	  Psychiatry: A & O x 3, Mood & affect appropriate  Gastrointestinal:  Soft, Non-tender, + BS	  Skin: No rashes, No ecchymoses, No cyanosis	  Neurologic: Non-focal  Extremities: Normal range of motion, No clubbing, cyanosis or edema  Vascular: Peripheral pulses palpable 2+ bilaterally    MEDICATIONS  (STANDING):  aspirin  chewable 81 milliGRAM(s) Oral daily  brimonidine 0.2% Ophthalmic Solution 1 Drop(s) Both EYES daily  carvedilol 6.25 milliGRAM(s) Oral every 12 hours  chlorhexidine 2% Cloths 1 Application(s) Topical <User Schedule>  clobetasol 0.05% Ointment 1 Application(s) Topical two times a day  dextrose 5%. 1000 milliLiter(s) (50 mL/Hr) IV Continuous <Continuous>  dextrose 50% Injectable 12.5 Gram(s) IV Push once  dextrose 50% Injectable 25 Gram(s) IV Push once  dextrose 50% Injectable 25 Gram(s) IV Push once  ergocalciferol 72091 Unit(s) Oral <User Schedule>  folic acid 1 milliGRAM(s) Oral daily  glucagon  Injectable 1 milliGRAM(s) IntraMuscular once  heparin   Injectable 5000 Unit(s) SubCutaneous every 12 hours  hydrALAZINE 25 milliGRAM(s) Oral every 8 hours  insulin lispro (ADMELOG) corrective regimen sliding scale   SubCutaneous Before meals and at bedtime  isosorbide   mononitrate ER Tablet (IMDUR) 30 milliGRAM(s) Oral daily  melatonin 3 milliGRAM(s) Oral at bedtime  Nephro-marcella 1 Tablet(s) Oral daily  pantoprazole    Tablet 40 milliGRAM(s) Oral before breakfast  timolol 0.5% Solution 1 Drop(s) Both EYES two times a day      LABS:	 	                       9.6    4.49  )-----------( 193      ( 23 May 2022 09:19 )             29.5     05-23    141  |  114<H>  |  58<H>  ----------------------------<  145<H>  4.5   |  17<L>  |  3.25<H>    Ca    9.4      23 May 2022 09:19  Phos  4.1     05-23  Mg     2.2     05-23      proBNP: Serum Pro-Brain Natriuretic Peptide: 1088 pg/mL (05-10 @ 09:36)

## 2022-05-23 NOTE — PROGRESS NOTE ADULT - PROBLEM SELECTOR PLAN 1
- likely due to dementia vs alcohol withdrawal vs infection  - start trial on Seroquel 25 mg TID PRN for agitation  - psych Dr. Talamantes following  - neurology Dr. Gotti following,

## 2022-05-23 NOTE — PROGRESS NOTE ADULT - ASSESSMENT
1. Anemia  2. Thrombocytopenia  3. R/o chronic GI bleeding  4. ETOH abuse  5. Alcoholic liver disease  6. Alcohol withdrawal  7. S/p EGD and colonoscopy  8. Diverticulosis  9. Gastritis    Suggestions:    1. Monitor H/H  2. Transfuse PRBC as needed  3. Protonix 40mg po daily  4. Avoid NSAID  5. Follow up LFT's  6. DVT prophylaxis

## 2022-05-23 NOTE — PROGRESS NOTE ADULT - PROBLEM SELECTOR PROBLEM 7
Stage 4 chronic kidney disease
Stage 4 chronic kidney disease
Chronic kidney disease, unspecified CKD stage
Chronic kidney disease, unspecified CKD stage
Stage 4 chronic kidney disease
Chronic kidney disease, unspecified CKD stage
Stage 4 chronic kidney disease

## 2022-05-23 NOTE — PROGRESS NOTE ADULT - SUBJECTIVE AND OBJECTIVE BOX
NP Note discussed with Primary Provider.    Patient is a 76y old  Male who presents with a chief complaint of alcohol withdrawal (23 May 2022 14:28)      INTERVAL HPI/OVERNIGHT EVENTS: no new complaints    MEDICATIONS  (STANDING):  aspirin  chewable 81 milliGRAM(s) Oral daily  brimonidine 0.2% Ophthalmic Solution 1 Drop(s) Both EYES daily  carvedilol 6.25 milliGRAM(s) Oral every 12 hours  chlorhexidine 2% Cloths 1 Application(s) Topical <User Schedule>  clobetasol 0.05% Ointment 1 Application(s) Topical two times a day  dextrose 5%. 1000 milliLiter(s) (50 mL/Hr) IV Continuous <Continuous>  dextrose 50% Injectable 25 Gram(s) IV Push once  dextrose 50% Injectable 12.5 Gram(s) IV Push once  dextrose 50% Injectable 25 Gram(s) IV Push once  ergocalciferol 54540 Unit(s) Oral <User Schedule>  folic acid 1 milliGRAM(s) Oral daily  glucagon  Injectable 1 milliGRAM(s) IntraMuscular once  heparin   Injectable 5000 Unit(s) SubCutaneous every 12 hours  hydrALAZINE 25 milliGRAM(s) Oral every 8 hours  insulin lispro (ADMELOG) corrective regimen sliding scale   SubCutaneous Before meals and at bedtime  isosorbide   mononitrate ER Tablet (IMDUR) 30 milliGRAM(s) Oral daily  melatonin 3 milliGRAM(s) Oral at bedtime  Nephro-marcella 1 Tablet(s) Oral daily  pantoprazole    Tablet 40 milliGRAM(s) Oral before breakfast  sodium bicarbonate 650 milliGRAM(s) Oral three times a day  timolol 0.5% Solution 1 Drop(s) Both EYES two times a day  torsemide 20 milliGRAM(s) Oral daily    MEDICATIONS  (PRN):  dextrose Oral Gel 15 Gram(s) Oral once PRN Blood Glucose LESS THAN 70 milliGRAM(s)/deciliter  QUEtiapine 25 milliGRAM(s) Oral three times a day PRN agitation      __________________________________________________  REVIEW OF SYSTEMS:    CONSTITUTIONAL: No fever,   EYES: no acute visual disturbances  NECK: No pain or stiffness  RESPIRATORY: No cough; No shortness of breath  CARDIOVASCULAR: CHF, No chest pain, no palpitations  GASTROINTESTINAL: No pain. No nausea or vomiting; No diarrhea   NEUROLOGICAL: No headache or numbness, no tremors  MUSCULOSKELETAL: Generalized weakness, No joint pain, no muscle pain  GENITOURINARY: Dysuria, CKD 5, no dysuria, no frequency, no hesitancy  PSYCHIATRY: no depression , no anxiety  ALL OTHER  ROS negative        Vital Signs Last 24 Hrs  T(C): 36 (23 May 2022 11:43), Max: 36.8 (22 May 2022 20:40)  T(F): 96.8 (23 May 2022 11:43), Max: 98.3 (22 May 2022 20:40)  HR: 60 (23 May 2022 14:04) (60 - 91)  BP: 129/77 (23 May 2022 14:04) (128/71 - 149/79)  BP(mean): 102 (23 May 2022 11:42) (94 - 102)  RR: 14 (23 May 2022 11:43) (14 - 18)  SpO2: 99% (23 May 2022 11:43) (97% - 100%)    ________________________________________________  PHYSICAL EXAM:  GENERAL: NAD  HEENT: Normocephalic;  conjunctivae and sclerae clear; moist mucous membranes;   NECK : supple  CHEST/LUNG: Clear to auscultation bilaterally with good air entry   HEART: S1 S2  regular; no murmurs, gallops or rubs  ABDOMEN: Soft, Nontender, Nondistended; Bowel sounds present  EXTREMITIES: no cyanosis; no edema; no calf tenderness  SKIN: warm and dry; no rash  NERVOUS SYSTEM:  Awake and alert; Oriented  to place, person and time ; no new deficits    _________________________________________________  LABS:                        9.6    4.49  )-----------( 193      ( 23 May 2022 09:19 )             29.5     05-23    141  |  114<H>  |  58<H>  ----------------------------<  145<H>  4.5   |  17<L>  |  3.25<H>    Ca    9.4      23 May 2022 09:19  Phos  4.1     05-23  Mg     2.2     05-23          CAPILLARY BLOOD GLUCOSE      POCT Blood Glucose.: 185 mg/dL (23 May 2022 11:35)  POCT Blood Glucose.: 152 mg/dL (23 May 2022 07:47)  POCT Blood Glucose.: 139 mg/dL (22 May 2022 21:12)  POCT Blood Glucose.: 122 mg/dL (22 May 2022 17:16)        RADIOLOGY & ADDITIONAL TESTS:  ACC: [id number] EXAM:  CT BRAIN                          PROCEDURE DATE:  05/14/2022          INTERPRETATION:  Clinical indication: Altered mental status    Multiple axial sections were performed from base skull to vertex without   contrast enhancement. Coronal and sagittal reconstructions were performed    This exam is compared prior head CT performed on May 10, 2021    Extensive parenchymal volume loss and chronic microvascular ischemic   changes are again seen.    Calcification is seen involving both distal internal carotid, distal   vertebral and basilar arteries are identified. These findings appear   unchanged    There is no acute hemorrhage mass or mass effect seen    Evaluation of osseous structures with the appropriate window appears  normal    Right posterior ethmoid sinus mucosal thickening is again seen.    Both mastoid and middle ear regions appear clear.    IMPRESSION: Stable exam.    --- End of Report ---      ACC: [id number] EXAM:  XR CHEST PORTABLE URGENT 1V                          PROCEDURE DATE:  05/13/2022          INTERPRETATION:  AP erect chest on May 13, 2022 at 3:14 PM. Patient is   short of breath.    Heart magnified by technique.    There is a significant infiltrates less effusion emanating off the right   lower hilum new since May 10.    IMPRESSION: There is a new fairly significant right lung process at this   time.    --- End of Report ---            TANISHA EVLEZ MD; Attending Radiologist  This document has been electronically signed. May 13 2022  4:24PM    ACC: [id number] EXAM:  US ABDOMEN COMPLETE                          PROCEDURE DATE:  05/13/2022          INTERPRETATION:  CLINICAL INFORMATION: Elevated liver enzymes and renal   failure    COMPARISON: None available.    TECHNIQUE: Sonography of the abdomen.    FINDINGS:  Liver: Hepatic steatosis.  Bile ducts: Normal caliber. Common bile duct measures 6 mm.  Gallbladder: Gallstones in the contracted gallbladder.  Pancreas: Not visualized due to overlying bowel gas.  Spleen: Not visualized due to overlying bowel gas.  Right kidney: 9.5 cm. No hydronephrosis. Within normal limits.  Left kidney: 10.4 cm. No hydronephrosis. Within normal limits.  Ascites: None.  Aorta and IVC: Not visualized due to overlying bowel gas.    IMPRESSION:  Gallstones in the contracted gallbladder.    Hepatic steatosis.    --- End of Report ---            YASMIN BLISS MD; Attending Radiologist  This document has been electronically signed. May 13 2022  9:07AM      Imaging  Reviewed:  YES/NO    Consultant(s) Notes Reviewed:   YES/ No      Plan of care was discussed with patient and /or primary care giver; all questions and concerns were addressed

## 2022-05-23 NOTE — PROGRESS NOTE ADULT - ASSESSMENT
Patient is a 76y old  Male , AAOx3, ambulates independently, leaves alone at home, w/ PMH of HLD, DM, CKD, presents to the ER on 5/10/22 for evaluation of weakness and tremulousness. As per ER attending, patient is being admitted for alcohol withdrawal. Patient admits to drinking 2 glasses of Whisky at night. ON admission, he has no fever and Negative CXR. Today, hospital day 3, he found to have respiratory distress and Repeat  CXR shows a new fairly significant right lung process at this time. He has started on Cefepime and the ID consult requested to assist with further evaluation and antibiotic management.     # Aspiration pneumonia  vs HAP- on CXR from 5/13  # Alcohol withdrawal syndrome     would recommend:    1. Monitor  kidney function and IVF  2. Monitor OFF abx as he completed the course   3. OOB to chair     Attending Attestation:    Spent more than 35 minutes on total encounter, more than 50 % of the visit was spent counseling and/or coordinating care by the Attending physician.

## 2022-05-23 NOTE — PROGRESS NOTE ADULT - PROBLEM SELECTOR PLAN 3
- CXR- shows New rt lung infiltrate  - Afebrile no leukocytosis  - s/p Cefepime and flagyl  - ID Dr. Cui following

## 2022-05-23 NOTE — PROGRESS NOTE ADULT - PROBLEM SELECTOR PROBLEM 1
Alcohol withdrawal syndrome, with unspecified complication
Acute encephalopathy
Alcohol withdrawal syndrome, with unspecified complication
Acute encephalopathy

## 2022-05-23 NOTE — PROGRESS NOTE ADULT - PROBLEM SELECTOR PROBLEM 6
HTN (hypertension)
DM (diabetes mellitus)
HTN (hypertension)
HTN (hypertension)
DM (diabetes mellitus)
HTN (hypertension)
HTN (hypertension)
DM (diabetes mellitus)
HTN (hypertension)

## 2022-05-23 NOTE — PROGRESS NOTE ADULT - SUBJECTIVE AND OBJECTIVE BOX
Problem List:  CKD stage 4  Systolic cardiomyopathy, HFrEF 20-25%.      PAST MEDICAL & SURGICAL HISTORY:  DM (diabetes mellitus)      HTN (hypertension)      Chronic kidney disease      HLD (hyperlipidemia)      Glaucoma      Gout      No significant past surgical history          No Known Allergies      MEDICATIONS  (STANDING):  aspirin  chewable 81 milliGRAM(s) Oral daily  brimonidine 0.2% Ophthalmic Solution 1 Drop(s) Both EYES daily  carvedilol 6.25 milliGRAM(s) Oral every 12 hours  chlorhexidine 2% Cloths 1 Application(s) Topical <User Schedule>  clobetasol 0.05% Ointment 1 Application(s) Topical two times a day  dextrose 5%. 1000 milliLiter(s) (50 mL/Hr) IV Continuous <Continuous>  dextrose 50% Injectable 25 Gram(s) IV Push once  dextrose 50% Injectable 12.5 Gram(s) IV Push once  dextrose 50% Injectable 25 Gram(s) IV Push once  ergocalciferol 66180 Unit(s) Oral <User Schedule>  folic acid 1 milliGRAM(s) Oral daily  glucagon  Injectable 1 milliGRAM(s) IntraMuscular once  heparin   Injectable 5000 Unit(s) SubCutaneous every 12 hours  hydrALAZINE 25 milliGRAM(s) Oral every 8 hours  insulin lispro (ADMELOG) corrective regimen sliding scale   SubCutaneous Before meals and at bedtime  isosorbide   mononitrate ER Tablet (IMDUR) 30 milliGRAM(s) Oral daily  melatonin 3 milliGRAM(s) Oral at bedtime  Nephro-marcella 1 Tablet(s) Oral daily  pantoprazole    Tablet 40 milliGRAM(s) Oral before breakfast  timolol 0.5% Solution 1 Drop(s) Both EYES two times a day    MEDICATIONS  (PRN):  dextrose Oral Gel 15 Gram(s) Oral once PRN Blood Glucose LESS THAN 70 milliGRAM(s)/deciliter  QUEtiapine 25 milliGRAM(s) Oral three times a day PRN agitation                            9.6    4.49  )-----------( 193      ( 23 May 2022 09:19 )             29.5     05-23    141  |  114<H>  |  58<H>  ----------------------------<  145<H>  4.5   |  17<L>  |  3.25<H>    Ca    9.4      23 May 2022 09:19  Phos  4.1     05-23  Mg     2.2     05-23              REVIEW OF SYSTEMS:  General: no fever no chills, no weight loss.  EYES/ENT: No visual changes;  No vertigo, no headache.  NECK: No pain or stiffness  RESPIRATORY: No cough, wheezing, hemoptysis; No shortness of breath  CARDIOVASCULAR: No chest pain or palpitations. No Edema  GASTROINTESTINAL: No abdominal or epigastric pain. No nausea, vomiting. No diarrhea or constipation. No melena.  GENITOURINARY: No dysuria, frequency, foamy urine, urinary urgency, incontinence or hematuria  NEUROLOGICAL: No numbness or weakness, no tremor , no dizziness.           VITALS:  T(F): 96.8 (05-23-22 @ 11:43), Max: 98.3 (05-22-22 @ 20:40)  HR: 60 (05-23-22 @ 14:04)  BP: 129/77 (05-23-22 @ 14:04)  RR: 14 (05-23-22 @ 11:43)  SpO2: 99% (05-23-22 @ 11:43)  Wt(kg): --      PHYSICAL EXAM:  Constitutional: well developed, no diaphoresis, no distress.  Neck: No JVD, no carotid bruit, supple, no adenopathy  Respiratory: reduce air entrance to right base.  Cardiovascular: S1, S2, RRR, no pericardial rub, no murmur  Abdomen: BS+, soft, no tenderness, no bruit  Pelvis: bladder nondistended  Extremities: edema.

## 2022-05-23 NOTE — PROGRESS NOTE ADULT - SUBJECTIVE AND OBJECTIVE BOX
[   ] ICU                                          [   ] CCU                                      [ X  ] Medical Floor    Patient is a 76 year old male with ETOH abuse and anemia. GI consulted to evaluate.     Patient is a 76 year old male AAOx3, ambulates independently, leaves alone at home, with past medical history significant for HLD, DM, CKD, admitted with alcohol withdrawal found to have anemia. Patient admits to drinking 2 glasses of Whisky at night, and last drink was last night. He also c/o intermittent burning epigastric radiating to his chest associated with dyspepsia. Patient denies nausea, vomiting, hematemesis, hematochezia, melena, fever, chills, chest pain, SOB, cough, hematuria, dysuria or diarrhea.       Patient appears comfortable. No new complaints reported, No abdominal pain, N/V, hematemesis, hematochezia, melena, fever, chills, chest pain, SOB, cough or diarrhea reported.      PAIN MANAGEMENT:  Pain Scale:                0 /10  Pain Location:          Prior Colonoscopy:  No prior colonoscopy      PAST MEDICAL HISTORY  DM (diabetes mellitus)  HTN (hypertension)   Chronic kidney disease  HLD (hyperlipidemia)  Glaucoma  Gout        PAST SURGICAL HISTORY  No significant past surgical history        Allergies    No Known Allergies    Intolerances  None      HOME MEDICATIONS    MEDICATIONS  (STANDING):  brimonidine 0.2% Ophthalmic Solution 1 Drop(s) Both EYES daily  dextrose 5%. 1000 milliLiter(s) (50 mL/Hr) IV Continuous <Continuous>  dextrose 5%. 1000 milliLiter(s) (100 mL/Hr) IV Continuous <Continuous>  dextrose 50% Injectable 25 Gram(s) IV Push once  dextrose 50% Injectable 12.5 Gram(s) IV Push once  dextrose 50% Injectable 25 Gram(s) IV Push once  doxycycline IVPB      ergocalciferol 80200 Unit(s) Oral <User Schedule>  folic acid 1 milliGRAM(s) Oral daily  furosemide    Tablet 20 milliGRAM(s) Oral daily  glucagon  Injectable 1 milliGRAM(s) IntraMuscular once  heparin   Injectable 5000 Unit(s) SubCutaneous every 8 hours  insulin lispro (ADMELOG) corrective regimen sliding scale   SubCutaneous Before meals and at bedtime  LORazepam   Injectable 2 milliGRAM(s) IV Push every 4 hours  LORazepam   Injectable   IV Push   multivitamin 1 Tablet(s) Oral daily  timolol 0.5% Solution 1 Drop(s) Both EYES two times a day    MEDICATIONS  (PRN):  dextrose Oral Gel 15 Gram(s) Oral once PRN Blood Glucose LESS THAN 70 milliGRAM(s)/deciliter  LORazepam   Injectable 2 milliGRAM(s) IV Push every 2 hours PRN Symptom-triggered: 2 point increase in CIWA -Ar score and a total score of 7 or LESS  LORazepam   Injectable 2 milliGRAM(s) IV Push every 1 hour PRN Symptom-triggered: each CIWA -Ar score 8 or GREATER      SOCIAL HISTORY  Advanced Directives:       [ X ] Full Code       [  ] DNR  Marital Status:         [  ] M      [ X ] S      [  ] D       [  ] W  Children:       [ X ] Yes      [  ] No  Occupation:        [  ] Employed       [ X ] Unemployed       [  ] Retired  Diet:       [ X ] Regular       [  ] PEG feeding          [  ] NG tube feeding  Drug Use:           [ X ] Patient denied          [  ] Yes  Alcohol:           [X  ] No             [  ] Yes (socially)         [  ] Yes (chronic)  Tobacco:           [  ] Yes           [ X ] No      FAMILY HISTORY  [X  ] Heart Disease            [ X ] Diabetes             [ X ] HTN             [  ] Colon Cancer             [  ] Stomach Cancer              [  ] Pancreatic Cancer        VITALS  Vital Signs Last 24 Hrs  T(C): 36 (23 May 2022 11:43), Max: 36.8 (22 May 2022 20:40)  T(F): 96.8 (23 May 2022 11:43), Max: 98.3 (22 May 2022 20:40)  HR: 60 (23 May 2022 14:04) (60 - 91)  BP: 129/77 (23 May 2022 14:04) (128/71 - 149/79)  BP(mean): 102 (23 May 2022 11:42) (94 - 102)  RR: 14 (23 May 2022 11:43) (14 - 18)  SpO2: 99% (23 May 2022 11:43) (97% - 100%)       MEDICATIONS  (STANDING):  aspirin  chewable 81 milliGRAM(s) Oral daily  brimonidine 0.2% Ophthalmic Solution 1 Drop(s) Both EYES daily  carvedilol 6.25 milliGRAM(s) Oral every 12 hours  chlorhexidine 2% Cloths 1 Application(s) Topical <User Schedule>  clobetasol 0.05% Ointment 1 Application(s) Topical two times a day  dextrose 5%. 1000 milliLiter(s) (50 mL/Hr) IV Continuous <Continuous>  dextrose 50% Injectable 25 Gram(s) IV Push once  dextrose 50% Injectable 12.5 Gram(s) IV Push once  dextrose 50% Injectable 25 Gram(s) IV Push once  ergocalciferol 98307 Unit(s) Oral <User Schedule>  folic acid 1 milliGRAM(s) Oral daily  glucagon  Injectable 1 milliGRAM(s) IntraMuscular once  heparin   Injectable 5000 Unit(s) SubCutaneous every 12 hours  hydrALAZINE 25 milliGRAM(s) Oral every 8 hours  insulin lispro (ADMELOG) corrective regimen sliding scale   SubCutaneous Before meals and at bedtime  isosorbide   mononitrate ER Tablet (IMDUR) 30 milliGRAM(s) Oral daily  melatonin 3 milliGRAM(s) Oral at bedtime  Nephro-marcella 1 Tablet(s) Oral daily  pantoprazole    Tablet 40 milliGRAM(s) Oral before breakfast  sodium bicarbonate 650 milliGRAM(s) Oral three times a day  timolol 0.5% Solution 1 Drop(s) Both EYES two times a day  torsemide 20 milliGRAM(s) Oral daily    MEDICATIONS  (PRN):  dextrose Oral Gel 15 Gram(s) Oral once PRN Blood Glucose LESS THAN 70 milliGRAM(s)/deciliter  QUEtiapine 25 milliGRAM(s) Oral three times a day PRN agitation                            9.6    4.49  )-----------( 193      ( 23 May 2022 09:19 )             29.5       05-23    141  |  114<H>  |  58<H>  ----------------------------<  145<H>  4.5   |  17<L>  |  3.25<H>    Ca    9.4      23 May 2022 09:19  Phos  4.1     05-23  Mg     2.2     05-23

## 2022-05-23 NOTE — PROGRESS NOTE ADULT - CARDIOVASCULAR
Sheela Mclain is here for Follow up evaluation of potential COVID-19 exposure.      I spoke with TM, and let her know that her Covid-19 test was negative. TM states she still has a scratchy throat, wheezing, and mild SOB. TM said, she feels like her cough has gotten worse. Denies any fever. TM feels like she can still work from home, as she was previously doing. TM instructed not to return to work, or to go out in public places, except to seek medical attention. EH to F/U with TM on 6/11/2020. TM to call us if any new, or worsening of symptoms.TM verbalized understanding.                                  details… detailed exam

## 2022-05-24 ENCOUNTER — TRANSCRIPTION ENCOUNTER (OUTPATIENT)
Age: 77
End: 2022-05-24

## 2022-05-24 VITALS — HEART RATE: 60 BPM | SYSTOLIC BLOOD PRESSURE: 122 MMHG | DIASTOLIC BLOOD PRESSURE: 65 MMHG

## 2022-05-24 LAB
ALBUMIN SERPL ELPH-MCNC: 2.5 G/DL — LOW (ref 3.5–5)
ALP SERPL-CCNC: 64 U/L — SIGNIFICANT CHANGE UP (ref 40–120)
ALT FLD-CCNC: 22 U/L DA — SIGNIFICANT CHANGE UP (ref 10–60)
ANION GAP SERPL CALC-SCNC: 10 MMOL/L — SIGNIFICANT CHANGE UP (ref 5–17)
AST SERPL-CCNC: 25 U/L — SIGNIFICANT CHANGE UP (ref 10–40)
BILIRUB SERPL-MCNC: 0.4 MG/DL — SIGNIFICANT CHANGE UP (ref 0.2–1.2)
BUN SERPL-MCNC: 61 MG/DL — HIGH (ref 7–18)
CALCIUM SERPL-MCNC: 9.7 MG/DL — SIGNIFICANT CHANGE UP (ref 8.4–10.5)
CHLORIDE SERPL-SCNC: 111 MMOL/L — HIGH (ref 96–108)
CO2 SERPL-SCNC: 17 MMOL/L — LOW (ref 22–31)
CREAT SERPL-MCNC: 3.36 MG/DL — HIGH (ref 0.5–1.3)
EGFR: 18 ML/MIN/1.73M2 — LOW
GLUCOSE BLDC GLUCOMTR-MCNC: 117 MG/DL — HIGH (ref 70–99)
GLUCOSE BLDC GLUCOMTR-MCNC: 140 MG/DL — HIGH (ref 70–99)
GLUCOSE BLDC GLUCOMTR-MCNC: 209 MG/DL — HIGH (ref 70–99)
GLUCOSE SERPL-MCNC: 158 MG/DL — HIGH (ref 70–99)
HCT VFR BLD CALC: 28.7 % — LOW (ref 39–50)
HGB BLD-MCNC: 9.5 G/DL — LOW (ref 13–17)
MCHC RBC-ENTMCNC: 33.1 GM/DL — SIGNIFICANT CHANGE UP (ref 32–36)
MCHC RBC-ENTMCNC: 34.1 PG — HIGH (ref 27–34)
MCV RBC AUTO: 102.9 FL — HIGH (ref 80–100)
NRBC # BLD: 0 /100 WBCS — SIGNIFICANT CHANGE UP (ref 0–0)
PLATELET # BLD AUTO: 215 K/UL — SIGNIFICANT CHANGE UP (ref 150–400)
POTASSIUM SERPL-MCNC: 5 MMOL/L — SIGNIFICANT CHANGE UP (ref 3.5–5.3)
POTASSIUM SERPL-SCNC: 5 MMOL/L — SIGNIFICANT CHANGE UP (ref 3.5–5.3)
PROT SERPL-MCNC: 7.6 G/DL — SIGNIFICANT CHANGE UP (ref 6–8.3)
RBC # BLD: 2.79 M/UL — LOW (ref 4.2–5.8)
RBC # FLD: 14.9 % — HIGH (ref 10.3–14.5)
SODIUM SERPL-SCNC: 138 MMOL/L — SIGNIFICANT CHANGE UP (ref 135–145)
WBC # BLD: 4.34 K/UL — SIGNIFICANT CHANGE UP (ref 3.8–10.5)
WBC # FLD AUTO: 4.34 K/UL — SIGNIFICANT CHANGE UP (ref 3.8–10.5)

## 2022-05-24 PROCEDURE — 85025 COMPLETE CBC W/AUTO DIFF WBC: CPT

## 2022-05-24 PROCEDURE — 88305 TISSUE EXAM BY PATHOLOGIST: CPT

## 2022-05-24 PROCEDURE — 97162 PT EVAL MOD COMPLEX 30 MIN: CPT

## 2022-05-24 PROCEDURE — 96374 THER/PROPH/DIAG INJ IV PUSH: CPT

## 2022-05-24 PROCEDURE — 84100 ASSAY OF PHOSPHORUS: CPT

## 2022-05-24 PROCEDURE — 86022 PLATELET ANTIBODIES: CPT

## 2022-05-24 PROCEDURE — 97110 THERAPEUTIC EXERCISES: CPT

## 2022-05-24 PROCEDURE — 36415 COLL VENOUS BLD VENIPUNCTURE: CPT

## 2022-05-24 PROCEDURE — 71045 X-RAY EXAM CHEST 1 VIEW: CPT

## 2022-05-24 PROCEDURE — 83735 ASSAY OF MAGNESIUM: CPT

## 2022-05-24 PROCEDURE — 82962 GLUCOSE BLOOD TEST: CPT

## 2022-05-24 PROCEDURE — 81001 URINALYSIS AUTO W/SCOPE: CPT

## 2022-05-24 PROCEDURE — 97116 GAIT TRAINING THERAPY: CPT

## 2022-05-24 PROCEDURE — 93306 TTE W/DOPPLER COMPLETE: CPT

## 2022-05-24 PROCEDURE — 83970 ASSAY OF PARATHORMONE: CPT

## 2022-05-24 PROCEDURE — 92610 EVALUATE SWALLOWING FUNCTION: CPT

## 2022-05-24 PROCEDURE — 82310 ASSAY OF CALCIUM: CPT

## 2022-05-24 PROCEDURE — 93005 ELECTROCARDIOGRAM TRACING: CPT

## 2022-05-24 PROCEDURE — 80048 BASIC METABOLIC PNL TOTAL CA: CPT

## 2022-05-24 PROCEDURE — 85027 COMPLETE CBC AUTOMATED: CPT

## 2022-05-24 PROCEDURE — 83036 HEMOGLOBIN GLYCOSYLATED A1C: CPT

## 2022-05-24 PROCEDURE — 87635 SARS-COV-2 COVID-19 AMP PRB: CPT

## 2022-05-24 PROCEDURE — 76700 US EXAM ABDOM COMPLETE: CPT

## 2022-05-24 PROCEDURE — 80074 ACUTE HEPATITIS PANEL: CPT

## 2022-05-24 PROCEDURE — 82140 ASSAY OF AMMONIA: CPT

## 2022-05-24 PROCEDURE — 84484 ASSAY OF TROPONIN QUANT: CPT

## 2022-05-24 PROCEDURE — 99285 EMERGENCY DEPT VISIT HI MDM: CPT

## 2022-05-24 PROCEDURE — 88312 SPECIAL STAINS GROUP 1: CPT

## 2022-05-24 PROCEDURE — 80053 COMPREHEN METABOLIC PANEL: CPT

## 2022-05-24 PROCEDURE — 70450 CT HEAD/BRAIN W/O DYE: CPT

## 2022-05-24 PROCEDURE — 82272 OCCULT BLD FECES 1-3 TESTS: CPT

## 2022-05-24 PROCEDURE — 83880 ASSAY OF NATRIURETIC PEPTIDE: CPT

## 2022-05-24 PROCEDURE — 80307 DRUG TEST PRSMV CHEM ANLYZR: CPT

## 2022-05-24 PROCEDURE — 84145 PROCALCITONIN (PCT): CPT

## 2022-05-24 PROCEDURE — 87641 MR-STAPH DNA AMP PROBE: CPT

## 2022-05-24 PROCEDURE — 87040 BLOOD CULTURE FOR BACTERIA: CPT

## 2022-05-24 PROCEDURE — 92526 ORAL FUNCTION THERAPY: CPT

## 2022-05-24 PROCEDURE — 87640 STAPH A DNA AMP PROBE: CPT

## 2022-05-24 RX ORDER — HYDRALAZINE HCL 50 MG
1 TABLET ORAL
Qty: 0 | Refills: 0 | DISCHARGE
Start: 2022-05-24

## 2022-05-24 RX ORDER — PANTOPRAZOLE SODIUM 20 MG/1
1 TABLET, DELAYED RELEASE ORAL
Qty: 0 | Refills: 0 | DISCHARGE
Start: 2022-05-24

## 2022-05-24 RX ORDER — ISOSORBIDE MONONITRATE 60 MG/1
1 TABLET, EXTENDED RELEASE ORAL
Qty: 0 | Refills: 0 | DISCHARGE
Start: 2022-05-24

## 2022-05-24 RX ORDER — CARVEDILOL PHOSPHATE 80 MG/1
1 CAPSULE, EXTENDED RELEASE ORAL
Qty: 0 | Refills: 0 | DISCHARGE
Start: 2022-05-24

## 2022-05-24 RX ORDER — SODIUM BICARBONATE 1 MEQ/ML
1 SYRINGE (ML) INTRAVENOUS
Qty: 0 | Refills: 0 | DISCHARGE
Start: 2022-05-24

## 2022-05-24 RX ORDER — ASPIRIN/CALCIUM CARB/MAGNESIUM 324 MG
1 TABLET ORAL
Qty: 0 | Refills: 0 | DISCHARGE
Start: 2022-05-24

## 2022-05-24 RX ADMIN — Medication 650 MILLIGRAM(S): at 05:21

## 2022-05-24 RX ADMIN — ERGOCALCIFEROL 50000 UNIT(S): 1.25 CAPSULE ORAL at 11:43

## 2022-05-24 RX ADMIN — Medication 81 MILLIGRAM(S): at 11:42

## 2022-05-24 RX ADMIN — PANTOPRAZOLE SODIUM 40 MILLIGRAM(S): 20 TABLET, DELAYED RELEASE ORAL at 06:38

## 2022-05-24 RX ADMIN — HEPARIN SODIUM 5000 UNIT(S): 5000 INJECTION INTRAVENOUS; SUBCUTANEOUS at 05:22

## 2022-05-24 RX ADMIN — Medication 2: at 11:41

## 2022-05-24 RX ADMIN — Medication 25 MILLIGRAM(S): at 05:22

## 2022-05-24 RX ADMIN — CARVEDILOL PHOSPHATE 6.25 MILLIGRAM(S): 80 CAPSULE, EXTENDED RELEASE ORAL at 05:21

## 2022-05-24 RX ADMIN — ISOSORBIDE MONONITRATE 30 MILLIGRAM(S): 60 TABLET, EXTENDED RELEASE ORAL at 11:43

## 2022-05-24 RX ADMIN — Medication 1 APPLICATION(S): at 05:22

## 2022-05-24 RX ADMIN — Medication 1 MILLIGRAM(S): at 11:43

## 2022-05-24 RX ADMIN — Medication 25 MILLIGRAM(S): at 14:26

## 2022-05-24 RX ADMIN — CHLORHEXIDINE GLUCONATE 1 APPLICATION(S): 213 SOLUTION TOPICAL at 05:22

## 2022-05-24 RX ADMIN — Medication 1 TABLET(S): at 11:44

## 2022-05-24 RX ADMIN — Medication 20 MILLIGRAM(S): at 05:21

## 2022-05-24 RX ADMIN — BRIMONIDINE TARTRATE 1 DROP(S): 2 SOLUTION/ DROPS OPHTHALMIC at 11:45

## 2022-05-24 RX ADMIN — Medication 650 MILLIGRAM(S): at 14:26

## 2022-05-24 RX ADMIN — Medication 1 DROP(S): at 05:23

## 2022-05-24 NOTE — PROGRESS NOTE ADULT - SUBJECTIVE AND OBJECTIVE BOX
Patient is a 76y old  Male who presents with a chief complaint of alcohol withdrawal (23 May 2022 20:22)    pt seen in icu [  ], reg med floor [ x  ], bed [ ], chair at bedside [ x  ], a+o x3 [ x ], lethargic [  ],    nad [ x ]      Allergies    No Known Allergies        Vitals    T(F): 97.5 (05-24-22 @ 05:17), Max: 97.5 (05-24-22 @ 05:17)  HR: 80 (05-24-22 @ 05:17) (60 - 91)  BP: 151/86 (05-24-22 @ 05:17) (128/71 - 153/90)  RR: 17 (05-24-22 @ 05:17) (14 - 17)  SpO2: 100% (05-24-22 @ 05:17) (97% - 100%)  Wt(kg): --  CAPILLARY BLOOD GLUCOSE      POCT Blood Glucose.: 188 mg/dL (23 May 2022 21:42)      Labs                          9.6    4.49  )-----------( 193      ( 23 May 2022 09:19 )             29.5       05-23    141  |  114<H>  |  58<H>  ----------------------------<  145<H>  4.5   |  17<L>  |  3.25<H>    Ca    9.4      23 May 2022 09:19  Phos  4.1     05-23  Mg     2.2     05-23              .Blood Blood-Peripheral  05-13 @ 21:24   No Growth Final  --  --          Radiology Results      Meds    MEDICATIONS  (STANDING):  aspirin  chewable 81 milliGRAM(s) Oral daily  brimonidine 0.2% Ophthalmic Solution 1 Drop(s) Both EYES daily  carvedilol 6.25 milliGRAM(s) Oral every 12 hours  chlorhexidine 2% Cloths 1 Application(s) Topical <User Schedule>  clobetasol 0.05% Ointment 1 Application(s) Topical two times a day  dextrose 5%. 1000 milliLiter(s) (50 mL/Hr) IV Continuous <Continuous>  dextrose 50% Injectable 25 Gram(s) IV Push once  dextrose 50% Injectable 12.5 Gram(s) IV Push once  dextrose 50% Injectable 25 Gram(s) IV Push once  ergocalciferol 92866 Unit(s) Oral <User Schedule>  folic acid 1 milliGRAM(s) Oral daily  glucagon  Injectable 1 milliGRAM(s) IntraMuscular once  heparin   Injectable 5000 Unit(s) SubCutaneous every 12 hours  hydrALAZINE 25 milliGRAM(s) Oral every 8 hours  insulin lispro (ADMELOG) corrective regimen sliding scale   SubCutaneous Before meals and at bedtime  isosorbide   mononitrate ER Tablet (IMDUR) 30 milliGRAM(s) Oral daily  melatonin 3 milliGRAM(s) Oral at bedtime  Nephro-marcella 1 Tablet(s) Oral daily  pantoprazole    Tablet 40 milliGRAM(s) Oral before breakfast  sodium bicarbonate 650 milliGRAM(s) Oral three times a day  timolol 0.5% Solution 1 Drop(s) Both EYES two times a day  torsemide 20 milliGRAM(s) Oral daily      MEDICATIONS  (PRN):  dextrose Oral Gel 15 Gram(s) Oral once PRN Blood Glucose LESS THAN 70 milliGRAM(s)/deciliter  QUEtiapine 25 milliGRAM(s) Oral three times a day PRN agitation      Physical Exam    Neuro :  no focal deficits  Respiratory: cta b/l  CV: RRR, S1S2, no murmurs,   Abdominal: Soft, NT, ND +BS,  Extremities: No edema, + peripheral pulses, right distal leg erythema and excoriation resolved   skin: diffused scaly rash       ASSESSMENT    etoh withdrawal,   Alcohol dependence   F10.20  Dementia   F03.90  metabolic encephalopathy,   right le cellulitis,   aspiration pna   psoriasis,   acute on ckd,   pancytopenia likely 2nd to alcoholism   r/o parkinsons disease  h/o HLD,   DM,   chronic HF rEF  CKD  Alcohol dependence with withdrawal  Glaucoma  Gout      PLAN    cont multivitamin, folate, thiamine   psych f/u    C/w Seroquel 25 mg q8h PRN agitation/behavioral disturbance  Do NOT recommend Ativan PRN for agitation, given potential to trigger delirium  DC'd CIWA and associated PRNs, as pt is no longer in alcohol withdrawal  Family has received SW input on Medicaid application process and intends to move forward once pt returns home  Pt appears psych stable for DC,   cxr with There is a new fairly significant right lung process at this time noted    id f/u   pt completed Cefepime 1 g q 24 hour and Flagyl  5/20/22  blood cx neg noted above   echo with EF 20-25%, Grade I diastolic dysfunction  RV systolic pressure is mildly increased noted    ecg with Diagnosis Line Sinus rhythm @ 88 bpm with Premature atrial complexes, Left axis deviation, Left bundle branch block, Abnormal ECG noted    cardio f/u   cont  asa coreg, imdur, hydralazine.  wound care eval   cont clobetasol 0.05% bid for psoriasis   lesions near resolution   s/p lactulose 10 g x1   rept ammonia <10  stool occult blood neg noted    lft wnl   gi f/u   No evidence of acute GI bleeding  Monitor H/H  transfuse PRBC as needed  Check stool for occult blood  s/p EGD with Erythema in the gastroesophageal junction. Hiatal Hernia. Erythema in the antrum. (Biopsy). Normal mucosa in the whole examined duodenum.  colonoscopy with Moderate diverticulosis of the descending colon, transverse colon and ascending colon. Internal hemorrhoids. Colonoscopy Limitations/Complications:  pathology results noted above neg for h pylori.  High Fiber Diet  Protonix 40mg PO daily  Avoid NSAID  Avoid ETOH  Anti reflux measure  abd us with Gallstones in the contracted gallbladder. 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  RTA  due to CKD.  serum sodium wnl  2 gm K diet  lispro ss   neuro cons noted   Mild distal symmetric bilateral UE tremulousness in the setting of, and entirely consistent with, EtOH withdrawal, chronic EtOH abuse,    No exam findings diagnostic for an extrapyramidal disorder.  In any case, Dx and consideration of potential management of any (possible suspected) extrapyramidal disorder cannot be made in an acute setting such as this one.    When the Pt has FULLY recovered from his acute medical problems, if there is a desire for neurologic evaluation, then please schedule a routine out-Pt visit.   repeat ct head stable noted    phys tx eval noted and rec phys tx 3-5x/week x 4 weeks at rehabilitation facility; Sub-acute  cont current meds   d/c planning to [facility] pending acceptance

## 2022-05-24 NOTE — PROGRESS NOTE ADULT - SUBJECTIVE AND OBJECTIVE BOX
Patient is seen and examined at the bed side, is afebrile. The discharge plan noted.       REVIEW OF SYSTEMS: All other review systems are negative      ALLERGIES: No Known Allergies      Vital Signs Last 24 Hrs  T(C): 36.4 (24 May 2022 12:35), Max: 36.4 (24 May 2022 05:17)  T(F): 97.6 (24 May 2022 12:35), Max: 97.6 (24 May 2022 12:35)  HR: 60 (24 May 2022 14:36) (60 - 83)  BP: 122/65 (24 May 2022 14:36) (122/65 - 153/90)  BP(mean): --  RR: 18 (24 May 2022 12:35) (15 - 18)  SpO2: 98% (24 May 2022 12:35) (98% - 100%)      PHYSICAL EXAM:  GENERAL: Not in distress  CHEST/LUNG: Not using accessory muscles   HEART: s1 and s2 present  ABDOMEN:  Nontender and  Nondistended  EXTREMITIES: No pedal  edema  CNS: Awake and alert       LABS:                         9.5    4.34  )-----------( 215      ( 24 May 2022 07:22 )             28.7                           9.6    4.49  )-----------( 193      ( 23 May 2022 09:19 )             29.5                        05-24    138  |  111<H>  |  61<H>  ----------------------------<  158<H>  5.0   |  17<L>  |  3.36<H>    Ca    9.7      24 May 2022 07:22  Phos  4.1     05-23  Mg     2.2         TPro  7.6  /  Alb  2.5<L>  /  TBili  0.4  /  DBili  x   /  AST  25  /  ALT  22  /  AlkPhos  64  0524    0523    141  |  114<H>  |  58<H>  ----------------------------<  145<H>  4.5   |  17<L>  |  3.25<H>    Ca    9.4      23 May 2022 09:19  Phos  4.1     05-23  Mg     2.2     05    TPro  7.5  /  Alb  2.3<L>  /  TBili  0.5  /  DBili  x   /  AST  26  /  ALT  23  /  AlkPhos  77        CAPILLARY BLOOD GLUCOSE  POCT Blood Glucose.: 125 mg/dL (13 May 2022 17:01)  POCT Blood Glucose.: 130 mg/dL (13 May 2022 11:58)  POCT Blood Glucose.: 113 mg/dL (13 May 2022 08:17)  POCT Blood Glucose.: 123 mg/dL (12 May 2022 21:33)        Urinalysis Basic - ( 12 May 2022 12:13 )  Color: Yellow / Appearance: Clear / S.010 / pH: x  Gluc: x / Ketone: Negative  / Bili: Negative / Urobili: Negative   Blood: x / Protein: 100 / Nitrite: Negative   Leuk Esterase: Negative / RBC: Negative /HPF / WBC 0-2 /HPF   Sq Epi: x / Non Sq Epi: Occasional /HPF / Bacteria: Negative /HPF  Procalcitonin, Serum (22 @ 16:21) : 0.47:    MEDICATIONS  (STANDING):    aspirin  chewable 81 milliGRAM(s) Oral daily  brimonidine 0.2% Ophthalmic Solution 1 Drop(s) Both EYES daily  carvedilol 6.25 milliGRAM(s) Oral every 12 hours  chlorhexidine 2% Cloths 1 Application(s) Topical <User Schedule>  clobetasol 0.05% Ointment 1 Application(s) Topical two times a day  ergocalciferol 13486 Unit(s) Oral <User Schedule>  folic acid 1 milliGRAM(s) Oral daily  glucagon  Injectable 1 milliGRAM(s) IntraMuscular once  heparin   Injectable 5000 Unit(s) SubCutaneous every 12 hours  hydrALAZINE 25 milliGRAM(s) Oral every 8 hours  insulin lispro (ADMELOG) corrective regimen sliding scale   SubCutaneous Before meals and at bedtime  isosorbide   mononitrate ER Tablet (IMDUR) 30 milliGRAM(s) Oral daily  melatonin 3 milliGRAM(s) Oral at bedtime  Nephro-marcella 1 Tablet(s) Oral daily  pantoprazole    Tablet 40 milliGRAM(s) Oral before breakfast  sodium bicarbonate 650 milliGRAM(s) Oral three times a day  timolol 0.5% Solution 1 Drop(s) Both EYES two times a day  torsemide 20 milliGRAM(s) Oral daily      RADIOLOGY & ADDITIONAL TESTS:    22: Xray Chest 1 View- PORTABLE-Urgent (Xray Chest 1 View- PORTABLE-Urgent .) (22 @ 16:03) >    IMPRESSION: There is a new fairly significant right lung process at this time.      22: US Abdomen Complete (US Abdomen Complete .) (22 @ 08:54) Gallstones in the contracted gallbladder.  Hepatic steatosis.      5/10/22: CT Head No Cont (05.10.22 @ 14:32) :   Moderate periventricular white matter ischemia. Moderate  bitemporal lobe and bifrontal lobe atrophy.     Mucosal thickening in the  RIGHT maxillary and RIGHT ethmoid and frontal and RIGHT frontal sinuses.      5/10/22: Xray Chest 1 View- PORTABLE-Urgent (05.10.22 @ 09:20) >Presently lungs are clear.        MICROBIOLOGY DATA:    Culture - Blood (22 @ 21:24)   Specimen Source: .Blood Blood-Peripheral   Culture Results: No growth to date.     Culture - Blood (22 @ 21:24)   Specimen Source: .Blood Blood-Peripheral   Culture Results: No growth to date.     MRSA/MSSA PCR (22 @ 05:59)   MRSA PCR Result.: NotDetec:    COVID-19 PCR (05.10.22 @ 09:36)   COVID-19 PCR: NotDetec:

## 2022-05-24 NOTE — PROGRESS NOTE ADULT - PHARYNX
no redness/no discharge
no redness
no redness/no discharge

## 2022-05-24 NOTE — DISCHARGE NOTE NURSING/CASE MANAGEMENT/SOCIAL WORK - PATIENT PORTAL LINK FT
You can access the FollowMyHealth Patient Portal offered by [facility] by registering at the following website: [email or web address]. By joining [facility]’s FollowMyHealth portal, you will also be able to view your health information using other applications (apps) compatible with our system.

## 2022-05-24 NOTE — PROGRESS NOTE ADULT - REASON FOR ADMISSION
alcohol withdrawal

## 2022-05-24 NOTE — PROGRESS NOTE ADULT - NEGATIVE RESPIRATORY AND THORAX SYMPTOMS
no wheezing/no dyspnea/no cough

## 2022-05-24 NOTE — PROGRESS NOTE ADULT - TONSILS
no redness/no swelling
no redness
no redness/no swelling

## 2022-05-24 NOTE — PROGRESS NOTE ADULT - NEGATIVE HEMATOLOGY SYMPTOMS
no gum bleeding/no skin lumps

## 2022-05-24 NOTE — PROGRESS NOTE ADULT - MOUTH
normal mouth and gums/moist
normal mouth and gums
normal mouth and gums/moist

## 2022-05-24 NOTE — PROGRESS NOTE ADULT - NEGATIVE GENERAL SYMPTOMS
no fever/no chills/no polyphagia/no polyuria/no polydipsia

## 2022-05-24 NOTE — PROGRESS NOTE ADULT - NEGATIVE CARDIOVASCULAR SYMPTOMS
no chest pain/no palpitations

## 2022-05-24 NOTE — PROGRESS NOTE ADULT - CONSTITUTIONAL DETAILS
no distress/cachectic

## 2022-05-24 NOTE — PROGRESS NOTE ADULT - NEGATIVE GENERAL GENITOURINARY SYMPTOMS
no hematuria/no renal colic/no flank pain L/no flank pain R/no dysuria

## 2022-05-24 NOTE — PROGRESS NOTE ADULT - CARDIOVASCULAR DETAILS
positive S1/positive S2

## 2022-05-24 NOTE — PROGRESS NOTE ADULT - PROVIDER SPECIALTY LIST ADULT
Cardiology
Infectious Disease
Infectious Disease
Internal Medicine
Nephrology
Cardiology
Cardiology
Gastroenterology
Gastroenterology
Infectious Disease
Infectious Disease
Internal Medicine
Nephrology
Nephrology
Cardiology
Cardiology
Infectious Disease
Internal Medicine
Nephrology
Cardiology
Infectious Disease
Internal Medicine
Nephrology
Nephrology
Internal Medicine
Gastroenterology
Internal Medicine
Gastroenterology
Internal Medicine
Internal Medicine
Gastroenterology

## 2022-05-24 NOTE — PROGRESS NOTE ADULT - CARDIOVASCULAR SYMPTOMS
palpitations/orthopnea

## 2022-05-24 NOTE — PROGRESS NOTE ADULT - SUBJECTIVE AND OBJECTIVE BOX
[   ] ICU                                          [   ] CCU                                      [ X  ] Medical Floor    Patient is a 76 year old male with ETOH abuse and anemia. GI consulted to evaluate.     Patient is a 76 year old male AAOx3, ambulates independently, leaves alone at home, with past medical history significant for HLD, DM, CKD, admitted with alcohol withdrawal found to have anemia. Patient admits to drinking 2 glasses of Whisky at night, and last drink was last night. He also c/o intermittent burning epigastric radiating to his chest associated with dyspepsia. Patient denies nausea, vomiting, hematemesis, hematochezia, melena, fever, chills, chest pain, SOB, cough, hematuria, dysuria or diarrhea.       Patient appears comfortable. No new complaints reported, No abdominal pain, N/V, hematemesis, hematochezia, melena, fever, chills, chest pain, SOB, cough or diarrhea reported.      PAIN MANAGEMENT:  Pain Scale:                0 /10  Pain Location:          Prior Colonoscopy:  No prior colonoscopy      PAST MEDICAL HISTORY  DM (diabetes mellitus)  HTN (hypertension)   Chronic kidney disease  HLD (hyperlipidemia)  Glaucoma  Gout        PAST SURGICAL HISTORY  No significant past surgical history        Allergies    No Known Allergies    Intolerances  None      HOME MEDICATIONS    MEDICATIONS  (STANDING):  brimonidine 0.2% Ophthalmic Solution 1 Drop(s) Both EYES daily  dextrose 5%. 1000 milliLiter(s) (50 mL/Hr) IV Continuous <Continuous>  dextrose 5%. 1000 milliLiter(s) (100 mL/Hr) IV Continuous <Continuous>  dextrose 50% Injectable 25 Gram(s) IV Push once  dextrose 50% Injectable 12.5 Gram(s) IV Push once  dextrose 50% Injectable 25 Gram(s) IV Push once  doxycycline IVPB      ergocalciferol 87119 Unit(s) Oral <User Schedule>  folic acid 1 milliGRAM(s) Oral daily  furosemide    Tablet 20 milliGRAM(s) Oral daily  glucagon  Injectable 1 milliGRAM(s) IntraMuscular once  heparin   Injectable 5000 Unit(s) SubCutaneous every 8 hours  insulin lispro (ADMELOG) corrective regimen sliding scale   SubCutaneous Before meals and at bedtime  LORazepam   Injectable 2 milliGRAM(s) IV Push every 4 hours  LORazepam   Injectable   IV Push   multivitamin 1 Tablet(s) Oral daily  timolol 0.5% Solution 1 Drop(s) Both EYES two times a day    MEDICATIONS  (PRN):  dextrose Oral Gel 15 Gram(s) Oral once PRN Blood Glucose LESS THAN 70 milliGRAM(s)/deciliter  LORazepam   Injectable 2 milliGRAM(s) IV Push every 2 hours PRN Symptom-triggered: 2 point increase in CIWA -Ar score and a total score of 7 or LESS  LORazepam   Injectable 2 milliGRAM(s) IV Push every 1 hour PRN Symptom-triggered: each CIWA -Ar score 8 or GREATER      SOCIAL HISTORY  Advanced Directives:       [ X ] Full Code       [  ] DNR  Marital Status:         [  ] M      [ X ] S      [  ] D       [  ] W  Children:       [ X ] Yes      [  ] No  Occupation:        [  ] Employed       [ X ] Unemployed       [  ] Retired  Diet:       [ X ] Regular       [  ] PEG feeding          [  ] NG tube feeding  Drug Use:           [ X ] Patient denied          [  ] Yes  Alcohol:           [X  ] No             [  ] Yes (socially)         [  ] Yes (chronic)  Tobacco:           [  ] Yes           [ X ] No      FAMILY HISTORY  [X  ] Heart Disease            [ X ] Diabetes             [ X ] HTN             [  ] Colon Cancer             [  ] Stomach Cancer              [  ] Pancreatic Cancer        VITALS  Vital Signs Last 24 Hrs  T(C): 36.4 (24 May 2022 12:35), Max: 36.4 (24 May 2022 05:17)  T(F): 97.6 (24 May 2022 12:35), Max: 97.6 (24 May 2022 12:35)  HR: 60 (24 May 2022 14:36) (60 - 86)  BP: 122/65 (24 May 2022 14:36) (122/65 - 153/90)   RR: 18 (24 May 2022 12:35) (15 - 18)  SpO2: 98% (24 May 2022 12:35) (98% - 100%)       MEDICATIONS  (STANDING):  aspirin  chewable 81 milliGRAM(s) Oral daily  brimonidine 0.2% Ophthalmic Solution 1 Drop(s) Both EYES daily  carvedilol 6.25 milliGRAM(s) Oral every 12 hours  chlorhexidine 2% Cloths 1 Application(s) Topical <User Schedule>  clobetasol 0.05% Ointment 1 Application(s) Topical two times a day  dextrose 5%. 1000 milliLiter(s) (50 mL/Hr) IV Continuous <Continuous>  dextrose 50% Injectable 25 Gram(s) IV Push once  dextrose 50% Injectable 12.5 Gram(s) IV Push once  dextrose 50% Injectable 25 Gram(s) IV Push once  ergocalciferol 93653 Unit(s) Oral <User Schedule>  folic acid 1 milliGRAM(s) Oral daily  glucagon  Injectable 1 milliGRAM(s) IntraMuscular once  heparin   Injectable 5000 Unit(s) SubCutaneous every 12 hours  hydrALAZINE 25 milliGRAM(s) Oral every 8 hours  insulin lispro (ADMELOG) corrective regimen sliding scale   SubCutaneous Before meals and at bedtime  isosorbide   mononitrate ER Tablet (IMDUR) 30 milliGRAM(s) Oral daily  melatonin 3 milliGRAM(s) Oral at bedtime  Nephro-marcella 1 Tablet(s) Oral daily  pantoprazole    Tablet 40 milliGRAM(s) Oral before breakfast  sodium bicarbonate 650 milliGRAM(s) Oral three times a day  timolol 0.5% Solution 1 Drop(s) Both EYES two times a day  torsemide 20 milliGRAM(s) Oral daily    MEDICATIONS  (PRN):  dextrose Oral Gel 15 Gram(s) Oral once PRN Blood Glucose LESS THAN 70 milliGRAM(s)/deciliter  QUEtiapine 25 milliGRAM(s) Oral three times a day PRN agitation                            9.5    4.34  )-----------( 215      ( 24 May 2022 07:22 )             28.7       05-24    138  |  111<H>  |  61<H>  ----------------------------<  158<H>  5.0   |  17<L>  |  3.36<H>    Ca    9.7      24 May 2022 07:22  Phos  4.1     05-23  Mg     2.2     05-23    TPro  7.6  /  Alb  2.5<L>  /  TBili  0.4  /  DBili  x   /  AST  25  /  ALT  22  /  AlkPhos  64  05-24

## 2022-05-24 NOTE — PROGRESS NOTE ADULT - ASSESSMENT
Patient is a 76y old  Male , AAOx3, ambulates independently, leaves alone at home, w/ PMH of HLD, DM, CKD, presents to the ER on 5/10/22 for evaluation of weakness and tremulousness. As per ER attending, patient is being admitted for alcohol withdrawal. Patient admits to drinking 2 glasses of Whisky at night. ON admission, he has no fever and Negative CXR. Today, hospital day 3, he found to have respiratory distress and Repeat  CXR shows a new fairly significant right lung process at this time. He has started on Cefepime and the ID consult requested to assist with further evaluation and antibiotic management.     # Aspiration pneumonia  vs HAP- on CXR from 5/13  # Alcohol withdrawal syndrome     would recommend:    1. OOB to chair   2. Monitor  kidney function   3. Monitor OFF abx as he completed the course      Attending Attestation:    Spent more than 35 minutes on total encounter, more than 50 % of the visit was spent counseling and/or coordinating care by the Attending physician.

## 2022-05-24 NOTE — PROGRESS NOTE ADULT - NEGATIVE GASTROINTESTINAL SYMPTOMS
no nausea/no vomiting/no constipation/no change in bowel habits/no abdominal pain/no melena/no hematochezia/no steatorrhea

## 2022-05-24 NOTE — DISCHARGE NOTE NURSING/CASE MANAGEMENT/SOCIAL WORK - NSDCPEFALRISK_GEN_ALL_CORE
For information on Fall & Injury Prevention, visit: [email or web address] OR  [email or web address] OR  https://www.cdc.gov/steadi/patient.html

## 2022-05-24 NOTE — PROGRESS NOTE ADULT - SUBJECTIVE AND OBJECTIVE BOX
CHIEF COMPLAINT:Patient is a 76y old  Male who presents with a chief complaint of alcohol withdrawal.Pt appears comfortable.    	  REVIEW OF SYSTEMS:  CONSTITUTIONAL: No fever, weight loss, or fatigue  EYES: No eye pain, visual disturbances, or discharge  ENT:  No difficulty hearing, tinnitus, vertigo; No sinus or throat pain  NECK: No pain or stiffness  RESPIRATORY: No cough, wheezing, chills or hemoptysis; No Shortness of Breath  CARDIOVASCULAR: No chest pain, palpitations, passing out, dizziness, or leg swelling  GASTROINTESTINAL: No abdominal or epigastric pain. No nausea, vomiting, or hematemesis; No diarrhea or constipation. No melena or hematochezia.  GENITOURINARY: No dysuria, frequency, hematuria, or incontinence  NEUROLOGICAL: No headaches, memory loss, loss of strength, numbness, or tremors  SKIN: No itching, burning, rashes, or lesions   LYMPH Nodes: No enlarged glands  ENDOCRINE: No heat or cold intolerance; No hair loss  MUSCULOSKELETAL: No joint pain or swelling; No muscle, back, or extremity pain  PSYCHIATRIC: No depression, anxiety, mood swings, or difficulty sleeping  HEME/LYMPH: No easy bruising, or bleeding gums  ALLERGY AND IMMUNOLOGIC: No hives or eczema	      PHYSICAL EXAM:  T(C): 36.4 (05-24-22 @ 05:17), Max: 36.4 (05-24-22 @ 05:17)  HR: 80 (05-24-22 @ 05:17) (60 - 86)  BP: 151/86 (05-24-22 @ 05:17) (129/77 - 153/90)  RR: 17 (05-24-22 @ 05:17) (15 - 17)  SpO2: 100% (05-24-22 @ 05:17) (100% - 100%)  Wt(kg): --  I&O's Summary    24 May 2022 07:01  -  24 May 2022 11:48  --------------------------------------------------------  IN: 0 mL / OUT: 650 mL / NET: -650 mL        Appearance: Normal	  HEENT:   Normal oral mucosa, PERRL, EOMI	  Lymphatic: No lymphadenopathy  Cardiovascular: Normal S1 S2, No JVD, No murmurs, No edema  Respiratory: Lungs clear to auscultation	  Psychiatry: A & O x 3, Mood & affect appropriate  Gastrointestinal:  Soft, Non-tender, + BS	  Skin: No rashes, No ecchymoses, No cyanosis	  Neurologic: Non-focal  Extremities: Normal range of motion, No clubbing, cyanosis or edema  Vascular: Peripheral pulses palpable 2+ bilaterally    MEDICATIONS  (STANDING):  aspirin  chewable 81 milliGRAM(s) Oral daily  brimonidine 0.2% Ophthalmic Solution 1 Drop(s) Both EYES daily  carvedilol 6.25 milliGRAM(s) Oral every 12 hours  chlorhexidine 2% Cloths 1 Application(s) Topical <User Schedule>  clobetasol 0.05% Ointment 1 Application(s) Topical two times a day  dextrose 5%. 1000 milliLiter(s) (50 mL/Hr) IV Continuous <Continuous>  dextrose 50% Injectable 25 Gram(s) IV Push once  dextrose 50% Injectable 12.5 Gram(s) IV Push once  dextrose 50% Injectable 25 Gram(s) IV Push once  ergocalciferol 29615 Unit(s) Oral <User Schedule>  folic acid 1 milliGRAM(s) Oral daily  glucagon  Injectable 1 milliGRAM(s) IntraMuscular once  heparin   Injectable 5000 Unit(s) SubCutaneous every 12 hours  hydrALAZINE 25 milliGRAM(s) Oral every 8 hours  insulin lispro (ADMELOG) corrective regimen sliding scale   SubCutaneous Before meals and at bedtime  isosorbide   mononitrate ER Tablet (IMDUR) 30 milliGRAM(s) Oral daily  melatonin 3 milliGRAM(s) Oral at bedtime  Nephro-marcella 1 Tablet(s) Oral daily  pantoprazole    Tablet 40 milliGRAM(s) Oral before breakfast  sodium bicarbonate 650 milliGRAM(s) Oral three times a day  timolol 0.5% Solution 1 Drop(s) Both EYES two times a day  torsemide 20 milliGRAM(s) Oral daily      LABS:	 	                          9.5    4.34  )-----------( 215      ( 24 May 2022 07:22 )             28.7     05-24    138  |  111<H>  |  61<H>  ----------------------------<  158<H>  5.0   |  17<L>  |  3.36<H>    Ca    9.7      24 May 2022 07:22  Phos  4.1     05-23  Mg     2.2     05-23    TPro  7.6  /  Alb  2.5<L>  /  TBili  0.4  /  DBili  x   /  AST  25  /  ALT  22  /  AlkPhos  64  05-24    proBNP: Serum Pro-Brain Natriuretic Peptide: 1088 pg/mL (05-10 @ 09:36)

## 2022-05-24 NOTE — PROGRESS NOTE ADULT - NECK DETAILS
supple/no JVD

## 2022-05-24 NOTE — PROGRESS NOTE ADULT - RS GEN PE MLT RESP DETAILS PC
airway patent/breath sounds equal/good air movement/respirations non-labored/clear to auscultation bilaterally/no chest wall tenderness/no rales
airway patent/breath sounds equal/good air movement/respirations non-labored/clear to auscultation bilaterally/no chest wall tenderness/no rales/no rhonchi
airway patent/breath sounds equal/good air movement/respirations non-labored/no rales/no rhonchi
airway patent/breath sounds equal/good air movement/respirations non-labored/clear to auscultation bilaterally/no rales/no rhonchi
airway patent/breath sounds equal/good air movement/respirations non-labored/clear to auscultation bilaterally/no rales/no rhonchi/no wheezes
airway patent/breath sounds equal/good air movement/respirations non-labored/no rales/no rhonchi
airway patent/breath sounds equal/good air movement/respirations non-labored/clear to auscultation bilaterally/no rales
airway patent/breath sounds equal/good air movement/respirations non-labored/clear to auscultation bilaterally/no rales/no rhonchi
airway patent/breath sounds equal/good air movement/respirations non-labored/no rales/no rhonchi

## 2022-05-24 NOTE — PROGRESS NOTE ADULT - NOSE
no discharge/no deviation
no discharge
no discharge/no deviation

## 2022-05-24 NOTE — PROGRESS NOTE ADULT - ITE SK HX ROS MEA POS PC
itching/dryness/brittle nails

## 2022-05-24 NOTE — DISCHARGE NOTE NURSING/CASE MANAGEMENT/SOCIAL WORK - NSDCVIVACCINE_GEN_ALL_CORE_FT
Td (adult) preservative free; 15-Apr-2016 13:19; Lorraine Arriola (RN); [employer]; [id number]; IntraMuscular; Deltoid Right.; 0.5 milliLiter(s); VIS (VIS Published: 10-Apr-2016, VIS Presented: 15-Apr-2016);

## 2022-05-24 NOTE — PROGRESS NOTE ADULT - NEGATIVE NEUROLOGICAL SYMPTOMS
no vertigo/no loss of sensation/no difficulty walking/no headache/no hemiparesis/no confusion

## 2022-05-24 NOTE — PROGRESS NOTE ADULT - GASTROINTESTINAL DETAILS
soft/nontender/no distention/no masses palpable/bowel sounds normal/no rebound tenderness/no guarding/no rigidity
soft/nontender/no distention/no masses palpable/no rebound tenderness/no guarding/no rigidity
soft/nontender/no distention/no masses palpable/bowel sounds normal/no rebound tenderness/no guarding/no rigidity
soft/nontender/no distention/no masses palpable/bowel sounds normal/no rebound tenderness/no guarding/no rigidity/no organomegaly
soft/nontender/no distention/no masses palpable/no rebound tenderness/no guarding/no rigidity
soft/nontender/no distention/no masses palpable/bowel sounds normal/no rebound tenderness/no guarding/no rigidity
soft/nontender/no distention/bowel sounds normal/no rebound tenderness/no guarding/no rigidity

## 2022-05-24 NOTE — PROGRESS NOTE ADULT - ASSESSMENT
76yoM, AAOx3, ambulates independently, leaves alone at home, w/ PMH of HLD, DM, CKD,CAD,CHF  presents to the ED with weakness and tremulousness,aspiration pneumonia.  1.CAD,CHF-asa, coreg 6.25mg bid,imdur,hydralazine.Diuretics as per renal.  2.CRI-Renal f/u.  3.DM-insulin.  4.Lipid d/o-statin.  5.Etoh abuse cessation.  6.GI and DVT prophylaxis.

## 2022-05-24 NOTE — PROGRESS NOTE ADULT - NEGATIVE PSYCHIATRIC SYMPTOMS
no suicidal ideation/no depression/no anxiety/no insomnia/no paranoia/no agitation

## 2022-05-24 NOTE — PROGRESS NOTE ADULT - MS EXT PE MLT D E PC
no clubbing/no cyanosis

## 2022-05-24 NOTE — PROGRESS NOTE ADULT - ASSESSMENT
CKD stage 4 , DKD  HFrEF 25%. Increased ECFV  RTA  due to CKD. Hyperkalemia  Anemia stable    Admitted for ETOH intoxication    Plan:  Continue  Sodium Bicarbonate PO TID  Continue Torsemide 20 mg daily.  2 gm K diet.  Discharge for Rehab, follow up with out patient nephrologist and decision for AV access for future dialysis as an outpatient.

## 2022-05-24 NOTE — PROGRESS NOTE ADULT - SUBJECTIVE AND OBJECTIVE BOX
Problem List:  CKD stage 4 DKD  HFrEF    PAST MEDICAL & SURGICAL HISTORY:  DM (diabetes mellitus)      HTN (hypertension)      Chronic kidney disease      HLD (hyperlipidemia)      Glaucoma      Gout      No significant past surgical history          No Known Allergies      MEDICATIONS  (STANDING):  aspirin  chewable 81 milliGRAM(s) Oral daily  brimonidine 0.2% Ophthalmic Solution 1 Drop(s) Both EYES daily  carvedilol 6.25 milliGRAM(s) Oral every 12 hours  chlorhexidine 2% Cloths 1 Application(s) Topical <User Schedule>  clobetasol 0.05% Ointment 1 Application(s) Topical two times a day  dextrose 5%. 1000 milliLiter(s) (50 mL/Hr) IV Continuous <Continuous>  dextrose 50% Injectable 25 Gram(s) IV Push once  dextrose 50% Injectable 12.5 Gram(s) IV Push once  dextrose 50% Injectable 25 Gram(s) IV Push once  ergocalciferol 73690 Unit(s) Oral <User Schedule>  folic acid 1 milliGRAM(s) Oral daily  glucagon  Injectable 1 milliGRAM(s) IntraMuscular once  heparin   Injectable 5000 Unit(s) SubCutaneous every 12 hours  hydrALAZINE 25 milliGRAM(s) Oral every 8 hours  insulin lispro (ADMELOG) corrective regimen sliding scale   SubCutaneous Before meals and at bedtime  isosorbide   mononitrate ER Tablet (IMDUR) 30 milliGRAM(s) Oral daily  melatonin 3 milliGRAM(s) Oral at bedtime  Nephro-marcella 1 Tablet(s) Oral daily  pantoprazole    Tablet 40 milliGRAM(s) Oral before breakfast  sodium bicarbonate 650 milliGRAM(s) Oral three times a day  timolol 0.5% Solution 1 Drop(s) Both EYES two times a day  torsemide 20 milliGRAM(s) Oral daily    MEDICATIONS  (PRN):  dextrose Oral Gel 15 Gram(s) Oral once PRN Blood Glucose LESS THAN 70 milliGRAM(s)/deciliter  QUEtiapine 25 milliGRAM(s) Oral three times a day PRN agitation                            9.5    4.34  )-----------( 215      ( 24 May 2022 07:22 )             28.7     05-24    138  |  111<H>  |  61<H>  ----------------------------<  158<H>  5.0   |  17<L>  |  3.36<H>    Ca    9.7      24 May 2022 07:22  Phos  4.1     05-23  Mg     2.2     05-23    TPro  7.6  /  Alb  2.5<L>  /  TBili  0.4  /  DBili  x   /  AST  25  /  ALT  22  /  AlkPhos  64  05-24            REVIEW OF SYSTEMS:  General: no fever no chills, no weight loss.    RESPIRATORY: No cough, wheezing, hemoptysis; No shortness of breath  CARDIOVASCULAR: No chest pain or palpitations. No Edema  GASTROINTESTINAL: No abdominal or epigastric pain. No nausea, vomiting. No diarrhea or constipation. No melena.  GENITOURINARY: No dysuria, frequency, foamy urine, urinary urgency, incontinence or hematuria  NEUROLOGICAL: No numbness or weakness, no tremor , no dizziness.           VITALS:  T(F): 97.5 (05-24-22 @ 05:17), Max: 97.5 (05-24-22 @ 05:17)  HR: 80 (05-24-22 @ 05:17)  BP: 151/86 (05-24-22 @ 05:17)  RR: 17 (05-24-22 @ 05:17)  SpO2: 100% (05-24-22 @ 05:17)  Wt(kg): --      PHYSICAL EXAM:  Constitutional: well developed, no diaphoresis, no distress.  Neck: No JVD, no carotid bruit, supple, no adenopathy  Respiratory:  air entrance B/L, no wheezes, rales or rhonchi  Cardiovascular: S1, S2, RRR, no pericardial rub, no murmur  Abdomen: BS+, soft, no tenderness, no bruit  Pelvis: bladder nondistended  Extremities: TRACE EDEMA BILATERAL in tibial areas

## 2022-05-24 NOTE — PROGRESS NOTE ADULT - NEGATIVE MUSCULOSKELETAL SYMPTOMS
no muscle cramps/no stiffness/no neck pain/no arm pain L/no arm pain R/no back pain/no leg pain L/no leg pain R

## 2022-05-24 NOTE — PROGRESS NOTE ADULT - NEGATIVE OPHTHALMOLOGIC SYMPTOMS
no diplopia/no photophobia/no lacrimation L/no lacrimation R/no blurred vision L/no blurred vision R/no discharge L/no discharge R/no pain L/no pain R

## 2022-06-09 LAB — GLUCOSE BLDC GLUCOMTR-MCNC: 98 MG/DL — SIGNIFICANT CHANGE UP (ref 70–99)

## 2022-08-03 NOTE — PHYSICAL THERAPY INITIAL EVALUATION ADULT - WEIGHT-BEARING RESTRICTIONS: STAND/SIT, REHAB EVAL
Robson Villa MD [facility]   8/16/2022  9:15 AM Domingo Reina MD [facility] full weight-bearing

## 2022-08-25 NOTE — PROGRESS NOTE ADULT - ASSESSMENT
Addended by: LEE ARZOLA on: 8/25/2022 09:37 AM     Modules accepted: Orders     76yoM, AAOx3, ambulates independently, leaves alone at home, w/ PMH of HLD, DM, CKD,CAD,CHF  presents to the ED with weakness and tremulousness,aspiration pneumonia.  1.CAD,CHF-asa, coreg 6.25mg bid,imdur,hydralazine.  2.CRI-Renal f/u.  3.DM-insulin.  4.Lipid d/o-statin.  5.Etoh abuse cessation.  6.GI and DVT prophylaxis.

## 2022-09-17 NOTE — PROGRESS NOTE ADULT - PROBLEM SELECTOR PROBLEM 6
DM (diabetes mellitus)
HTN (hypertension)
HTN (hypertension)
DM (diabetes mellitus)
DM (diabetes mellitus)
HTN (hypertension)
DM (diabetes mellitus)
HTN (hypertension)
---

## 2022-11-04 NOTE — PATIENT PROFILE ADULT - NSPROMUTANXFEARFT_GEN_A_NUR
Group Therapy Note    Date: 11/4/2022    Group Start Time: 1100  Group End Time: 1150  Group Topic: Healthy Living/Wellness    [facility] Adult [facility] 4E    Jae Lacey LPN        Group Therapy Note    Attendees: 5       Notes:  did not attend    Discipline Responsible: Licensed Practical Nurse, students      Signature:  Jae Lacey LPN NO

## 2022-12-18 NOTE — PROGRESS NOTE ADULT - PROBLEM SELECTOR PROBLEM 10
Goals of care, counseling/discussion
pt BIB mom for difficulty breathing with b/l wheezing x 4 days. mom states that this is recurring over the past few months.

## 2023-01-11 NOTE — PHYSICAL THERAPY INITIAL EVALUATION ADULT - LIVES WITH, PROFILE
in upper apartment with 1 flight of stairs; pt has only cleaning lady that comes in about once a week otherwise has family members visiting him often as per pt/alone Hatchet Flap Text: The defect edges were debeveled with a #15 scalpel blade.  Given the location of the defect, shape of the defect and the proximity to free margins a hatchet flap was deemed most appropriate.  Using a sterile surgical marker, an appropriate hatchet flap was drawn incorporating the defect and placing the expected incisions within the relaxed skin tension lines where possible.    The area thus outlined was incised deep to adipose tissue with a #15 scalpel blade.  The skin margins were undermined to an appropriate distance in all directions utilizing iris scissors.

## 2023-01-13 NOTE — ED PROVIDER NOTE - CADM POA CENTRAL LINE
[facility] Nutrition Follow Up       Mary Lou Sahni is a 49 JAIME Couch, RD, LD  Registered Dietitian   [facility]  [phone]
No

## 2023-02-28 NOTE — H&P ADULT - NSICDXPASTMEDICALHX_GEN_ALL_CORE_FT
HOSPITALIST DISCHARGE INSTRUCTIONS  NAME: Kacey Avendaño   :  1995   MRN:  [id number]     Date/Time:  2023 5:01 PM    ADMIT DATE: 2023     DISCHARGE DATE: 3/1/2023     ADMITTING DIAGNOSIS:  Urinary Tract Infection    DISCHARGE DIAGNOSIS:  You were admitted for evaluation and treatment of the above. You will need to complete several more days of antibiotics. MEDICATIONS:    It is important that you take the medication exactly as they are prescribed. Keep your medication in the bottles provided by the pharmacist and keep a list of the medication names, dosages, and times to be taken in your wallet. Do not take other medications without consulting your doctor. If you experience any of the following symptoms then please call your primary care physician or return to the emergency room if you cannot get hold of your doctor:  Fever, chills, nausea, vomiting, diarrhea, change in mentation, falling, bleeding, shortness of breath    Follow Up:  Please call and set up an appointment to see them in 1 week. PCP and your Obstetrician      Information obtained by :  I understand that if any problems occur once I am at home I am to contact my physician. I understand and acknowledge receipt of the instructions indicated above.                                                                                                                                            Physician's or R.N.'s Signature                                                                  Date/Time                                                                                                                                              Patient or Representative Signature                                                          Date/Time
PAST MEDICAL HISTORY:  Chronic kidney disease     DM (diabetes mellitus)     Glaucoma     Gout     HLD (hyperlipidemia)     HTN (hypertension)

## 2023-04-18 NOTE — BH CONSULTATION LIAISON PROGRESS NOTE - NSBHMSEBEHAV_PSY_A_CORE
Patient presents to the [address] [place] [address] Drive today for 1 week site check s/p Linq II placement. Patient's device was implanted on 4/7 by [employer]. Incision is closed, clean, and dry with all dressings/steri strips removed. Site left open to the air. Incision well approximated. No s/s of infection. Patient education was provided about site care, device functionality, in home monitoring, and any other patient questions and/or concerns were addressed. Aftercare and remote monitoring literature was provided. Patient voices understanding.
Cooperative
Cooperative

## 2023-08-28 NOTE — ED PROVIDER NOTE - NSTIMEPROVIDERCAREINITIATE_GEN_ER
Acyclovir Pending    Insurance response  Prescription Drug Insurance: [employer]  Notes: Prior authorization submitted - will update provider when decision has been made by insurance.            10-May-2022 09:01

## 2023-12-16 ENCOUNTER — INPATIENT (INPATIENT)
Facility: HOSPITAL | Age: 78
LOS: 3 days | Discharge: HOME | DRG: 69 | End: 2023-12-20
Attending: INTERNAL MEDICINE | Admitting: INTERNAL MEDICINE
Payer: MEDICARE

## 2023-12-16 VITALS
HEART RATE: 64 BPM | DIASTOLIC BLOOD PRESSURE: 84 MMHG | HEIGHT: 68 IN | OXYGEN SATURATION: 100 % | RESPIRATION RATE: 18 BRPM | SYSTOLIC BLOOD PRESSURE: 170 MMHG | TEMPERATURE: 98 F | WEIGHT: 169.98 LBS

## 2023-12-16 DIAGNOSIS — I10 ESSENTIAL (PRIMARY) HYPERTENSION: ICD-10-CM

## 2023-12-16 DIAGNOSIS — H54.7 UNSPECIFIED VISUAL LOSS: ICD-10-CM

## 2023-12-16 DIAGNOSIS — E11.9 TYPE 2 DIABETES MELLITUS WITHOUT COMPLICATIONS: ICD-10-CM

## 2023-12-16 DIAGNOSIS — N18.6 END STAGE RENAL DISEASE: ICD-10-CM

## 2023-12-16 DIAGNOSIS — H40.9 UNSPECIFIED GLAUCOMA: ICD-10-CM

## 2023-12-16 DIAGNOSIS — Z29.9 ENCOUNTER FOR PROPHYLACTIC MEASURES, UNSPECIFIED: ICD-10-CM

## 2023-12-16 DIAGNOSIS — G45.9 TRANSIENT CEREBRAL ISCHEMIC ATTACK, UNSPECIFIED: ICD-10-CM

## 2023-12-16 LAB
ALBUMIN SERPL ELPH-MCNC: 2.8 G/DL — LOW (ref 3.5–5)
ALBUMIN SERPL ELPH-MCNC: 2.8 G/DL — LOW (ref 3.5–5)
ALP SERPL-CCNC: 131 U/L — HIGH (ref 40–120)
ALP SERPL-CCNC: 131 U/L — HIGH (ref 40–120)
ALT FLD-CCNC: <6 U/L DA — LOW (ref 10–60)
ALT FLD-CCNC: <6 U/L DA — LOW (ref 10–60)
ANION GAP SERPL CALC-SCNC: 3 MMOL/L — LOW (ref 5–17)
ANION GAP SERPL CALC-SCNC: 3 MMOL/L — LOW (ref 5–17)
ANISOCYTOSIS BLD QL: SLIGHT — SIGNIFICANT CHANGE UP
ANISOCYTOSIS BLD QL: SLIGHT — SIGNIFICANT CHANGE UP
APTT BLD: 35.1 SEC — SIGNIFICANT CHANGE UP (ref 24.5–35.6)
APTT BLD: 35.1 SEC — SIGNIFICANT CHANGE UP (ref 24.5–35.6)
AST SERPL-CCNC: 19 U/L — SIGNIFICANT CHANGE UP (ref 10–40)
AST SERPL-CCNC: 19 U/L — SIGNIFICANT CHANGE UP (ref 10–40)
BASOPHILS # BLD AUTO: 0 K/UL — SIGNIFICANT CHANGE UP (ref 0–0.2)
BASOPHILS # BLD AUTO: 0 K/UL — SIGNIFICANT CHANGE UP (ref 0–0.2)
BASOPHILS NFR BLD AUTO: 0 % — SIGNIFICANT CHANGE UP (ref 0–2)
BASOPHILS NFR BLD AUTO: 0 % — SIGNIFICANT CHANGE UP (ref 0–2)
BILIRUB SERPL-MCNC: 0.4 MG/DL — SIGNIFICANT CHANGE UP (ref 0.2–1.2)
BILIRUB SERPL-MCNC: 0.4 MG/DL — SIGNIFICANT CHANGE UP (ref 0.2–1.2)
BUN SERPL-MCNC: 15 MG/DL — SIGNIFICANT CHANGE UP (ref 7–18)
BUN SERPL-MCNC: 15 MG/DL — SIGNIFICANT CHANGE UP (ref 7–18)
CALCIUM SERPL-MCNC: 9.4 MG/DL — SIGNIFICANT CHANGE UP (ref 8.4–10.5)
CALCIUM SERPL-MCNC: 9.4 MG/DL — SIGNIFICANT CHANGE UP (ref 8.4–10.5)
CHLORIDE SERPL-SCNC: 97 MMOL/L — SIGNIFICANT CHANGE UP (ref 96–108)
CHLORIDE SERPL-SCNC: 97 MMOL/L — SIGNIFICANT CHANGE UP (ref 96–108)
CO2 SERPL-SCNC: 32 MMOL/L — HIGH (ref 22–31)
CO2 SERPL-SCNC: 32 MMOL/L — HIGH (ref 22–31)
CREAT SERPL-MCNC: 2.77 MG/DL — HIGH (ref 0.5–1.3)
CREAT SERPL-MCNC: 2.77 MG/DL — HIGH (ref 0.5–1.3)
EGFR: 23 ML/MIN/1.73M2 — LOW
EGFR: 23 ML/MIN/1.73M2 — LOW
EOSINOPHIL # BLD AUTO: 3.29 K/UL — HIGH (ref 0–0.5)
EOSINOPHIL # BLD AUTO: 3.29 K/UL — HIGH (ref 0–0.5)
EOSINOPHIL NFR BLD AUTO: 39 % — HIGH (ref 0–6)
EOSINOPHIL NFR BLD AUTO: 39 % — HIGH (ref 0–6)
GLUCOSE BLDC GLUCOMTR-MCNC: 109 MG/DL — HIGH (ref 70–99)
GLUCOSE BLDC GLUCOMTR-MCNC: 109 MG/DL — HIGH (ref 70–99)
GLUCOSE SERPL-MCNC: 199 MG/DL — HIGH (ref 70–99)
GLUCOSE SERPL-MCNC: 199 MG/DL — HIGH (ref 70–99)
HCT VFR BLD CALC: 29.3 % — LOW (ref 39–50)
HCT VFR BLD CALC: 29.3 % — LOW (ref 39–50)
HGB BLD-MCNC: 9.9 G/DL — LOW (ref 13–17)
HGB BLD-MCNC: 9.9 G/DL — LOW (ref 13–17)
HYPOCHROMIA BLD QL: SLIGHT — SIGNIFICANT CHANGE UP
HYPOCHROMIA BLD QL: SLIGHT — SIGNIFICANT CHANGE UP
INR BLD: 2.63 RATIO — HIGH (ref 0.85–1.18)
INR BLD: 2.63 RATIO — HIGH (ref 0.85–1.18)
LG PLATELETS BLD QL AUTO: SLIGHT — SIGNIFICANT CHANGE UP
LG PLATELETS BLD QL AUTO: SLIGHT — SIGNIFICANT CHANGE UP
LYMPHOCYTES # BLD AUTO: 1.69 K/UL — SIGNIFICANT CHANGE UP (ref 1–3.3)
LYMPHOCYTES # BLD AUTO: 1.69 K/UL — SIGNIFICANT CHANGE UP (ref 1–3.3)
LYMPHOCYTES # BLD AUTO: 20 % — SIGNIFICANT CHANGE UP (ref 13–44)
LYMPHOCYTES # BLD AUTO: 20 % — SIGNIFICANT CHANGE UP (ref 13–44)
MANUAL SMEAR VERIFICATION: SIGNIFICANT CHANGE UP
MANUAL SMEAR VERIFICATION: SIGNIFICANT CHANGE UP
MCHC RBC-ENTMCNC: 32.5 PG — SIGNIFICANT CHANGE UP (ref 27–34)
MCHC RBC-ENTMCNC: 32.5 PG — SIGNIFICANT CHANGE UP (ref 27–34)
MCHC RBC-ENTMCNC: 33.8 GM/DL — SIGNIFICANT CHANGE UP (ref 32–36)
MCHC RBC-ENTMCNC: 33.8 GM/DL — SIGNIFICANT CHANGE UP (ref 32–36)
MCV RBC AUTO: 96.1 FL — SIGNIFICANT CHANGE UP (ref 80–100)
MCV RBC AUTO: 96.1 FL — SIGNIFICANT CHANGE UP (ref 80–100)
MICROCYTES BLD QL: SLIGHT — SIGNIFICANT CHANGE UP
MICROCYTES BLD QL: SLIGHT — SIGNIFICANT CHANGE UP
MONOCYTES # BLD AUTO: 0.67 K/UL — SIGNIFICANT CHANGE UP (ref 0–0.9)
MONOCYTES # BLD AUTO: 0.67 K/UL — SIGNIFICANT CHANGE UP (ref 0–0.9)
MONOCYTES NFR BLD AUTO: 8 % — SIGNIFICANT CHANGE UP (ref 2–14)
MONOCYTES NFR BLD AUTO: 8 % — SIGNIFICANT CHANGE UP (ref 2–14)
NEUTROPHILS # BLD AUTO: 2.78 K/UL — SIGNIFICANT CHANGE UP (ref 1.8–7.4)
NEUTROPHILS # BLD AUTO: 2.78 K/UL — SIGNIFICANT CHANGE UP (ref 1.8–7.4)
NEUTROPHILS NFR BLD AUTO: 33 % — LOW (ref 43–77)
NEUTROPHILS NFR BLD AUTO: 33 % — LOW (ref 43–77)
NRBC # BLD: 0 /100 — SIGNIFICANT CHANGE UP (ref 0–0)
NRBC # BLD: 0 /100 — SIGNIFICANT CHANGE UP (ref 0–0)
PLAT MORPH BLD: NORMAL — SIGNIFICANT CHANGE UP
PLAT MORPH BLD: NORMAL — SIGNIFICANT CHANGE UP
PLATELET # BLD AUTO: 161 K/UL — SIGNIFICANT CHANGE UP (ref 150–400)
PLATELET # BLD AUTO: 161 K/UL — SIGNIFICANT CHANGE UP (ref 150–400)
POIKILOCYTOSIS BLD QL AUTO: SLIGHT — SIGNIFICANT CHANGE UP
POIKILOCYTOSIS BLD QL AUTO: SLIGHT — SIGNIFICANT CHANGE UP
POTASSIUM SERPL-MCNC: 3.8 MMOL/L — SIGNIFICANT CHANGE UP (ref 3.5–5.3)
POTASSIUM SERPL-MCNC: 3.8 MMOL/L — SIGNIFICANT CHANGE UP (ref 3.5–5.3)
POTASSIUM SERPL-SCNC: 3.8 MMOL/L — SIGNIFICANT CHANGE UP (ref 3.5–5.3)
POTASSIUM SERPL-SCNC: 3.8 MMOL/L — SIGNIFICANT CHANGE UP (ref 3.5–5.3)
PROT SERPL-MCNC: 7.9 G/DL — SIGNIFICANT CHANGE UP (ref 6–8.3)
PROT SERPL-MCNC: 7.9 G/DL — SIGNIFICANT CHANGE UP (ref 6–8.3)
PROTHROM AB SERPL-ACNC: 29.2 SEC — HIGH (ref 9.5–13)
PROTHROM AB SERPL-ACNC: 29.2 SEC — HIGH (ref 9.5–13)
RBC # BLD: 3.05 M/UL — LOW (ref 4.2–5.8)
RBC # BLD: 3.05 M/UL — LOW (ref 4.2–5.8)
RBC # FLD: 12 % — SIGNIFICANT CHANGE UP (ref 10.3–14.5)
RBC # FLD: 12 % — SIGNIFICANT CHANGE UP (ref 10.3–14.5)
RBC BLD AUTO: ABNORMAL
RBC BLD AUTO: ABNORMAL
SODIUM SERPL-SCNC: 132 MMOL/L — LOW (ref 135–145)
SODIUM SERPL-SCNC: 132 MMOL/L — LOW (ref 135–145)
SPHEROCYTES BLD QL SMEAR: SLIGHT — SIGNIFICANT CHANGE UP
SPHEROCYTES BLD QL SMEAR: SLIGHT — SIGNIFICANT CHANGE UP
TROPONIN I, HIGH SENSITIVITY RESULT: 27 NG/L — SIGNIFICANT CHANGE UP
TROPONIN I, HIGH SENSITIVITY RESULT: 27 NG/L — SIGNIFICANT CHANGE UP
WBC # BLD: 8.43 K/UL — SIGNIFICANT CHANGE UP (ref 3.8–10.5)
WBC # BLD: 8.43 K/UL — SIGNIFICANT CHANGE UP (ref 3.8–10.5)
WBC # FLD AUTO: 8.43 K/UL — SIGNIFICANT CHANGE UP (ref 3.8–10.5)
WBC # FLD AUTO: 8.43 K/UL — SIGNIFICANT CHANGE UP (ref 3.8–10.5)

## 2023-12-16 PROCEDURE — 93010 ELECTROCARDIOGRAM REPORT: CPT

## 2023-12-16 PROCEDURE — 0042T: CPT | Mod: MA

## 2023-12-16 PROCEDURE — 99222 1ST HOSP IP/OBS MODERATE 55: CPT

## 2023-12-16 PROCEDURE — 99285 EMERGENCY DEPT VISIT HI MDM: CPT

## 2023-12-16 PROCEDURE — 70496 CT ANGIOGRAPHY HEAD: CPT | Mod: 26,MA

## 2023-12-16 PROCEDURE — 70450 CT HEAD/BRAIN W/O DYE: CPT | Mod: 26,MA

## 2023-12-16 PROCEDURE — 70498 CT ANGIOGRAPHY NECK: CPT | Mod: 26,MA

## 2023-12-16 RX ORDER — LABETALOL HCL 100 MG
1 TABLET ORAL
Qty: 0 | Refills: 0 | DISCHARGE

## 2023-12-16 RX ORDER — CHOLECALCIFEROL (VITAMIN D3) 125 MCG
1 CAPSULE ORAL
Qty: 0 | Refills: 0 | DISCHARGE

## 2023-12-16 RX ORDER — ONDANSETRON 8 MG/1
4 TABLET, FILM COATED ORAL EVERY 8 HOURS
Refills: 0 | Status: DISCONTINUED | OUTPATIENT
Start: 2023-12-16 | End: 2023-12-16

## 2023-12-16 RX ORDER — DORZOLAMIDE HYDROCHLORIDE 20 MG/ML
1 SOLUTION/ DROPS OPHTHALMIC THREE TIMES A DAY
Refills: 0 | Status: DISCONTINUED | OUTPATIENT
Start: 2023-12-16 | End: 2023-12-16

## 2023-12-16 RX ORDER — ASPIRIN/CALCIUM CARB/MAGNESIUM 324 MG
81 TABLET ORAL DAILY
Refills: 0 | Status: DISCONTINUED | OUTPATIENT
Start: 2023-12-16 | End: 2023-12-20

## 2023-12-16 RX ORDER — SITAGLIPTIN 50 MG/1
1 TABLET, FILM COATED ORAL
Qty: 0 | Refills: 0 | DISCHARGE

## 2023-12-16 RX ORDER — FOLIC ACID 0.8 MG
1 TABLET ORAL
Qty: 0 | Refills: 0 | DISCHARGE

## 2023-12-16 RX ORDER — ALLOPURINOL 300 MG
1 TABLET ORAL
Qty: 0 | Refills: 0 | DISCHARGE

## 2023-12-16 RX ORDER — DORZOLAMIDE HYDROCHLORIDE 20 MG/ML
1 SOLUTION/ DROPS OPHTHALMIC
Refills: 0 | Status: DISCONTINUED | OUTPATIENT
Start: 2023-12-16 | End: 2023-12-20

## 2023-12-16 RX ORDER — HEPARIN SODIUM 5000 [USP'U]/ML
5000 INJECTION INTRAVENOUS; SUBCUTANEOUS EVERY 8 HOURS
Refills: 0 | Status: DISCONTINUED | OUTPATIENT
Start: 2023-12-16 | End: 2023-12-20

## 2023-12-16 RX ORDER — INSULIN LISPRO 100/ML
VIAL (ML) SUBCUTANEOUS
Refills: 0 | Status: DISCONTINUED | OUTPATIENT
Start: 2023-12-16 | End: 2023-12-20

## 2023-12-16 RX ORDER — PANTOPRAZOLE SODIUM 20 MG/1
40 TABLET, DELAYED RELEASE ORAL
Refills: 0 | Status: DISCONTINUED | OUTPATIENT
Start: 2023-12-16 | End: 2023-12-20

## 2023-12-16 RX ORDER — TIMOLOL 0.5 %
1 DROPS OPHTHALMIC (EYE)
Refills: 0 | Status: DISCONTINUED | OUTPATIENT
Start: 2023-12-16 | End: 2023-12-20

## 2023-12-16 RX ORDER — ACETAMINOPHEN 500 MG
650 TABLET ORAL EVERY 6 HOURS
Refills: 0 | Status: DISCONTINUED | OUTPATIENT
Start: 2023-12-16 | End: 2023-12-20

## 2023-12-16 RX ORDER — CLOPIDOGREL BISULFATE 75 MG/1
75 TABLET, FILM COATED ORAL DAILY
Refills: 0 | Status: DISCONTINUED | OUTPATIENT
Start: 2023-12-16 | End: 2023-12-20

## 2023-12-16 RX ORDER — BRIMONIDINE TARTRATE 2 MG/MG
1 SOLUTION/ DROPS OPHTHALMIC
Refills: 0 | Status: DISCONTINUED | OUTPATIENT
Start: 2023-12-16 | End: 2023-12-20

## 2023-12-16 RX ORDER — AMLODIPINE BESYLATE 2.5 MG/1
1 TABLET ORAL
Qty: 0 | Refills: 0 | DISCHARGE

## 2023-12-16 RX ORDER — ATORVASTATIN CALCIUM 80 MG/1
80 TABLET, FILM COATED ORAL AT BEDTIME
Refills: 0 | Status: DISCONTINUED | OUTPATIENT
Start: 2023-12-16 | End: 2023-12-20

## 2023-12-16 RX ORDER — INSULIN LISPRO 100/ML
VIAL (ML) SUBCUTANEOUS AT BEDTIME
Refills: 0 | Status: DISCONTINUED | OUTPATIENT
Start: 2023-12-16 | End: 2023-12-20

## 2023-12-16 RX ORDER — LANOLIN ALCOHOL/MO/W.PET/CERES
3 CREAM (GRAM) TOPICAL AT BEDTIME
Refills: 0 | Status: DISCONTINUED | OUTPATIENT
Start: 2023-12-16 | End: 2023-12-16

## 2023-12-16 RX ORDER — PANTOPRAZOLE SODIUM 20 MG/1
1 TABLET, DELAYED RELEASE ORAL
Refills: 0 | DISCHARGE

## 2023-12-16 RX ADMIN — ATORVASTATIN CALCIUM 80 MILLIGRAM(S): 80 TABLET, FILM COATED ORAL at 22:06

## 2023-12-16 RX ADMIN — HEPARIN SODIUM 5000 UNIT(S): 5000 INJECTION INTRAVENOUS; SUBCUTANEOUS at 22:06

## 2023-12-16 NOTE — CONSULT NOTE ADULT - ASSESSMENT
Amaurosis fugax OD in the setting of a hemodialysis session.      Bilateral dense cataracts, worse OS; lagally blind OS.   Amaurosis fugax OD in the setting of a hemodialysis session.      Bilateral dense cataracts, worse OS; legally blind OS.   Amaurosis fugax OD in the setting of a hemodialysis session.      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     Not likely a cerebral cortical hemispheric event, which would result in a hemianopsia as opposed to total visual loss.    Alternatively a vertebrobasilar TIA could cause bilateral hemianopsias by affecting circulation to the visual cortices bilaterally; however one would expect one or more additional symptoms, such as altered consciousness or gait impairment.  Pt was fully alert, talking, and walking during the episode.       Could have been a retinal TIA (microembolus that broke up).      Bilateral dense cataracts, worse OS; legally blind OS.  Apparently cataracts have gotten worse - in May 2022 he could still count fingers OS.      Calcified aortic valve noted on TTEs in 2022 and 2021; stenosis but not calcification noted on TTE in 2019    ESRD on dialysis.  Alcohol use disorder (was hospitalized here May 2022 for EtOH withdrawal syndrome).     Chronically anemic with fairly stable Hgb/Hct since start of EMR (2016); macrocytic 4729-0063; normocytic with high normal MCV since 2021.      B12 296 in 2021 noted.      RECOMMENDATIONS    Non-con MR head (It seems he never had an MR study in our system.  His EMR goes back to 2016 and he was  hospitalized for stroke in the past. Is there a contraindication to MR imaging?     Cardiac telemetry.      TTE with bubble study.    Out-Pt ophthalmology follow-up.      ESR, CRP, RPR, B12, serum folic acid, methylmalonic acid + homocysteine.      Continue ASA 81mg daily.       Clopidogrel 75mg daily to end 1/5/24.                                                             IMPORTANT  -  PLEASE NOTE:                              I am a neurohospitalist. I do not see patients outside of the hospital.        Patients requiring neurological follow-up after discharge may contact one of the following offices.     [facility] [place]  [address].  [place], [place] [id number] [phone]    [facility]  [address].  [place], [place]  [phone]    Jostin Gotti M.D.   - Department of Neurology  [employer] at [facility]   Amaurosis fugax OD in the setting of a hemodialysis session.      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     Not likely a cerebral cortical hemispheric event, which would result in a hemianopsia as opposed to total visual loss.    Alternatively a vertebrobasilar TIA could cause bilateral hemianopsias by affecting circulation to the visual cortices bilaterally; however one would expect one or more additional symptoms, such as altered consciousness or gait impairment.  Pt was fully alert, talking, and walking during the episode.       Could have been a retinal TIA (microembolus that broke up).      Bilateral dense cataracts, worse OS; legally blind OS.  Apparently cataracts have gotten worse - in May 2022 he could still count fingers OS.      Calcified aortic valve noted on TTEs in 2022 and 2021; stenosis but not calcification noted on TTE in 2019    ESRD on dialysis.  Alcohol use disorder (was hospitalized here May 2022 for EtOH withdrawal syndrome).     Chronically anemic with fairly stable Hgb/Hct since start of EMR (2016); macrocytic 9435-2905; normocytic with high normal MCV since 2021.      B12 296 in 2021 noted.      RECOMMENDATIONS    Non-con MR head (It seems he never had an MR study in our system.  His EMR goes back to 2016 and he was  hospitalized for stroke in the past. Is there a contraindication to MR imaging?     Cardiac telemetry.      TTE with bubble study.    Out-Pt ophthalmology follow-up.      ESR, CRP, RPR, B12, serum folic acid, methylmalonic acid + homocysteine.      Continue ASA 81mg daily.       Clopidogrel 75mg daily to end 1/5/24.                                                             IMPORTANT  -  PLEASE NOTE:                              I am a neurohospitalist. I do not see patients outside of the hospital.        Patients requiring neurological follow-up after discharge may contact one of the following offices.     [facility] [place]  [address].  [place], [place] [id number] [phone]    [facility]  [address].  [place], [place]  [phone]    Jostin Gotti M.D.   - Department of Neurology  [employer] at [facility]   Amaurosis fugax OD in the setting of a hemodialysis session.      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     Not likely a cerebral cortical hemispheric event, which would result in a hemianopsia as opposed to total visual loss.    Alternatively a vertebrobasilar TIA could cause bilateral hemianopsias by affecting circulation to the visual cortices bilaterally; however one would expect one or more additional symptoms, such as altered consciousness or gait impairment.  Pt was fully alert, talking, and walking during the episode.       Could have been a retinal TIA (microembolus that broke up).      Bilateral dense cataracts, worse OS; legally blind OS.  Apparently cataracts have gotten worse - in May 2022 he could still count fingers OS.      Calcified aortic valve noted on TTEs in 2022 and 2021; stenosis but not calcification noted on TTE in 2019    ESRD on dialysis.      Alcohol use disorder (was hospitalized here May 2022 for EtOH withdrawal syndrome).     Chronically anemic with fairly stable Hgb/Hct since start of EMR (2016); macrocytic 2331-7964; normocytic with high normal MCV since 2021.      B12 296 in 2021 noted.      RECOMMENDATIONS    Non-con MR head (It seems he never had an MR study in our system.  His EMR goes back to 2016 and he was  hospitalized for stroke in the past. Is there a contraindication to MR imaging?     Cardiac telemetry.      TTE with bubble study.    Out-Pt ophthalmology follow-up.      ESR, CRP, RPR, B12, serum folic acid, methylmalonic acid + homocysteine.    The assay for B12 does not measure B12 level directly.  False normal and false elevations (to or above the normal range) occur with pernicious anemia due to intrinsic factor antibodies, which may or may not be detected by antibody tests.  B12-like compounds of vegetable origin (a problem with vegans) without cobalamin activity also can lead to falsely normal or elevated determinations.  Methylmalonic acid (MMA) and homocysteine (Hcy) determinations are necessary to elucidate what is happening.    Irrespective of B12 and folate levels:       high Hcy w normal MMA implies folate deficiency;        high MMA and Hcy implies B12 deficiency +/- folate deficiency.    Continue ASA 81mg daily.       Clopidogrel 75mg daily to end 1/5/24.                                                             IMPORTANT  -  PLEASE NOTE:                              I am a neurohospitalist. I do not see patients outside of the hospital.        Patients requiring neurological follow-up after discharge may contact one of the following offices.     [address] [place] [address]  [phone]    [facility]  [address].  [place], [place]  [phone]    Jostin Gotti M.D.   - Department of Neurology  [place] [facility] [employer] [facility] [employer] at [facility]   Amaurosis fugax OD in the setting of a hemodialysis session.      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     Not likely a cerebral cortical hemispheric event, which would result in a hemianopsia as opposed to total visual loss.    Alternatively a vertebrobasilar TIA could cause bilateral hemianopsias by affecting circulation to the visual cortices bilaterally; however one would expect one or more additional symptoms, such as altered consciousness or gait impairment.  Pt was fully alert, talking, and walking during the episode.       Could have been a retinal TIA (microembolus that broke up).      Bilateral dense cataracts, worse OS; legally blind OS.  Apparently cataracts have gotten worse - in May 2022 he could still count fingers OS.      Calcified aortic valve noted on TTEs in 2022 and 2021; stenosis but not calcification noted on TTE in 2019    ESRD on dialysis.      Alcohol use disorder (was hospitalized here May 2022 for EtOH withdrawal syndrome).     Chronically anemic with fairly stable Hgb/Hct since start of EMR (2016); macrocytic 0651-6773; normocytic with high normal MCV since 2021.      B12 296 in 2021 noted.      RECOMMENDATIONS    Non-con MR head (It seems he never had an MR study in our system.  His EMR goes back to 2016 and he was  hospitalized for stroke in the past. Is there a contraindication to MR imaging?     Cardiac telemetry.      TTE with bubble study.    Out-Pt ophthalmology follow-up.      ESR, CRP, RPR, B12, serum folic acid, methylmalonic acid + homocysteine.    The assay for B12 does not measure B12 level directly.  False normal and false elevations (to or above the normal range) occur with pernicious anemia due to intrinsic factor antibodies, which may or may not be detected by antibody tests.  B12-like compounds of vegetable origin (a problem with vegans) without cobalamin activity also can lead to falsely normal or elevated determinations.  Methylmalonic acid (MMA) and homocysteine (Hcy) determinations are necessary to elucidate what is happening.    Irrespective of B12 and folate levels:       high Hcy w normal MMA implies folate deficiency;        high MMA and Hcy implies B12 deficiency +/- folate deficiency.    Continue ASA 81mg daily.       Clopidogrel 75mg daily to end 1/5/24.                                                             IMPORTANT  -  PLEASE NOTE:                              I am a neurohospitalist. I do not see patients outside of the hospital.        Patients requiring neurological follow-up after discharge may contact one of the following offices.     [address] [place] [address]  [phone]    [facility]  [address].  [place], [place]  [phone]    Jostin Gotti M.D.   - Department of Neurology  [place] [facility] [employer] [facility] [employer] at [facility]   Amaurosis fugax OD in the setting of a hemodialysis session.      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     Not likely a cerebral cortical hemispheric event, which would result in a hemianopsia as opposed to total visual loss.    Alternatively a vertebrobasilar TIA could cause bilateral hemianopsias by affecting circulation to the visual cortices bilaterally; however one would expect one or more additional symptoms, such as altered consciousness or gait impairment.  Pt was fully alert, talking, and walking during the episode.       Could have been a retinal TIA (microembolus that broke up).      Bilateral dense cataracts, worse OS; legally blind OS.  Apparently cataracts have gotten worse - in May 2022 he could still count fingers OS (see neuro consult of 5/12/22).      Calcified aortic valve noted on TTEs in 2022 and 2021; stenosis but not calcification noted on TTE in 2019    ESRD on dialysis.      Alcohol use disorder (was hospitalized here May 2022 for EtOH withdrawal syndrome).     Chronically anemic with fairly stable Hgb/Hct since start of EMR (2016); macrocytic 2541-6969; normocytic with high normal MCV since 2021.      B12 296 in 2021 noted.      RECOMMENDATIONS    Non-con MR head (It seems he never had an MR study in our system.  His EMR goes back to 2016 and he was  hospitalized for stroke in the past. Is there a contraindication to MR imaging?     Cardiac telemetry.      TTE with bubble study.    Out-Pt ophthalmology follow-up.      ESR, CRP, RPR, B12, serum folate, methylmalonic acid + homocysteine.    The assay for B12 does not measure B12 level directly.  False normal and false elevations (to or above the normal range) occur with pernicious anemia due to intrinsic factor antibodies, which may or may not be detected by antibody tests.  B12-like compounds of vegetable origin (a problem with vegans) without cobalamin activity also can lead to falsely normal or elevated determinations.  Methylmalonic acid (MMA) and homocysteine (Hcy) determinations are necessary to elucidate what is happening.    Irrespective of B12 and folate levels:       high Hcy w normal MMA implies folate deficiency;        high MMA and Hcy implies B12 deficiency +/- folate deficiency.    Continue ASA 81mg daily.       Clopidogrel 75mg daily to end 1/5/24.                                                             IMPORTANT  -  PLEASE NOTE:                              I am a neurohospitalist. I do not see patients outside of the hospital.        Patients requiring neurological follow-up after discharge may contact one of the following offices.     [facility] [place]  [address] [place] [address]  [phone]    [facility]  [address].  [place], [place]  [phone]    Jostin Gotti M.D.   - Department of Neurology  [place] [facility] [employer] [employer] at [facility]   Amaurosis fugax OD in the setting of a hemodialysis session.      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     Not likely a cerebral cortical hemispheric event, which would result in a hemianopsia as opposed to total visual loss.    Alternatively a vertebrobasilar TIA could cause bilateral hemianopsias by affecting circulation to the visual cortices bilaterally; however one would expect one or more additional symptoms, such as altered consciousness or gait impairment.  Pt was fully alert, talking, and walking during the episode.       Could have been a retinal TIA (microembolus that broke up).      Bilateral dense cataracts, worse OS; legally blind OS.  Apparently cataracts have gotten worse - in May 2022 he could still count fingers OS (see neuro consult of 5/12/22).      Calcified aortic valve noted on TTEs in 2022 and 2021; stenosis but not calcification noted on TTE in 2019    ESRD on dialysis.      Alcohol use disorder (was hospitalized here May 2022 for EtOH withdrawal syndrome).     Chronically anemic with fairly stable Hgb/Hct since start of EMR (2016); macrocytic 0552-2769; normocytic with high normal MCV since 2021.      B12 296 in 2021 noted.      RECOMMENDATIONS    Non-con MR head (It seems he never had an MR study in our system.  His EMR goes back to 2016 and he was  hospitalized for stroke in the past. Is there a contraindication to MR imaging?     Cardiac telemetry.      TTE with bubble study.    Out-Pt ophthalmology follow-up.      ESR, CRP, RPR, B12, serum folate, methylmalonic acid + homocysteine.    The assay for B12 does not measure B12 level directly.  False normal and false elevations (to or above the normal range) occur with pernicious anemia due to intrinsic factor antibodies, which may or may not be detected by antibody tests.  B12-like compounds of vegetable origin (a problem with vegans) without cobalamin activity also can lead to falsely normal or elevated determinations.  Methylmalonic acid (MMA) and homocysteine (Hcy) determinations are necessary to elucidate what is happening.    Irrespective of B12 and folate levels:       high Hcy w normal MMA implies folate deficiency;        high MMA and Hcy implies B12 deficiency +/- folate deficiency.    Continue ASA 81mg daily.       Clopidogrel 75mg daily to end 1/5/24.                                                             IMPORTANT  -  PLEASE NOTE:                              I am a neurohospitalist. I do not see patients outside of the hospital.        Patients requiring neurological follow-up after discharge may contact one of the following offices.     [facility] [place]  [address] [place] [address]  [phone]    [facility]  [address].  [place], [place]  [phone]    Jostin Gotti M.D.   - Department of Neurology  [place] [facility] [employer] [facility] [employer] at [facility]   Amaurosis fugax OD in the setting of a hemodialysis session.      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     Not likely a cerebral cortical hemispheric event, which would result in a hemianopsia as opposed to total visual loss.    Alternatively a vertebrobasilar TIA could cause bilateral hemianopsias by affecting circulation to the visual cortices bilaterally; however one would expect one or more additional symptoms, such as altered consciousness or gait impairment.  Pt was fully alert, talking, and walking during the episode.       Could have been a retinal TIA (microembolus that broke up).      Bilateral dense cataracts, worse OS; legally blind OS.  Apparently cataracts have gotten worse - in May 2022 he could still count fingers OS (see neuro consult of 5/12/22).      Calcified aortic valve noted on TTEs in 2022 and 2021; stenosis but not calcification noted on TTE in 2019    ESRD on dialysis.      Alcohol use disorder (was hospitalized here May 2022 for EtOH withdrawal syndrome).     Chronically anemic with fairly stable Hgb/Hct since start of EMR (2016); macrocytic 1275-4885; normocytic with high normal MCV since 2021.      B12 296 in 2021 noted.    Hx of EtOH abuse.        RECOMMENDATIONS    Non-con MR head (It seems he never had an MR study in our system.  His EMR goes back to 2016 and he was  hospitalized for stroke in the past. Is there a contraindication to MR imaging?     Cardiac telemetry.      TTE with bubble study.    Out-Pt ophthalmology follow-up.      ESR, CRP, RPR, B12, serum folate, methylmalonic acid + homocysteine.    The assay for B12 does not measure B12 level directly.  False normal and false elevations (to or above the normal range) occur with pernicious anemia due to intrinsic factor antibodies, which may or may not be detected by antibody tests.  B12-like compounds of vegetable origin (a problem with vegans) without cobalamin activity also can lead to falsely normal or elevated determinations.  Methylmalonic acid (MMA) and homocysteine (Hcy) determinations are necessary to elucidate what is happening.    Irrespective of B12 and folate levels:       high Hcy w normal MMA implies folate deficiency;        high MMA and Hcy implies B12 deficiency +/- folate deficiency.    Continue ASA 81mg daily.       Clopidogrel 75mg daily to end 1/5/24.                                                             IMPORTANT  -  PLEASE NOTE:                              I am a neurohospitalist. I do not see patients outside of the hospital.        Patients requiring neurological follow-up after discharge may contact one of the following offices.     [facility]  [address] [place] [address]  [phone]    [facility]  [address]  [place], [place]  [phone]    Jostin Gotti M.D.   - Department of Neurology  [place] and [employer] [facility] [employer] at [facility]   Amaurosis fugax OD in the setting of a hemodialysis session.      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     Not likely a cerebral cortical hemispheric event, which would result in a hemianopsia as opposed to total visual loss.    Alternatively a vertebrobasilar TIA could cause bilateral hemianopsias by affecting circulation to the visual cortices bilaterally; however one would expect one or more additional symptoms, such as altered consciousness or gait impairment.  Pt was fully alert, talking, and walking during the episode.       Could have been a retinal TIA (microembolus that broke up).      Bilateral dense cataracts, worse OS; legally blind OS.  Apparently cataracts have gotten worse - in May 2022 he could still count fingers OS (see neuro consult of 5/12/22).      Calcified aortic valve noted on TTEs in 2022 and 2021; stenosis but not calcification noted on TTE in 2019    ESRD on dialysis.      Alcohol use disorder (was hospitalized here May 2022 for EtOH withdrawal syndrome).     Chronically anemic with fairly stable Hgb/Hct since start of EMR (2016); macrocytic 4927-5140; normocytic with high normal MCV since 2021.      B12 296 in 2021 noted.    Hx of EtOH abuse.        RECOMMENDATIONS    Non-con MR head (It seems he never had an MR study in our system.  His EMR goes back to 2016 and he was  hospitalized for stroke in the past. Is there a contraindication to MR imaging?     Cardiac telemetry.      TTE with bubble study.    Out-Pt ophthalmology follow-up.      ESR, CRP, RPR, B12, serum folate, methylmalonic acid + homocysteine.    The assay for B12 does not measure B12 level directly.  False normal and false elevations (to or above the normal range) occur with pernicious anemia due to intrinsic factor antibodies, which may or may not be detected by antibody tests.  B12-like compounds of vegetable origin (a problem with vegans) without cobalamin activity also can lead to falsely normal or elevated determinations.  Methylmalonic acid (MMA) and homocysteine (Hcy) determinations are necessary to elucidate what is happening.    Irrespective of B12 and folate levels:       high Hcy w normal MMA implies folate deficiency;        high MMA and Hcy implies B12 deficiency +/- folate deficiency.    Continue ASA 81mg daily.       Clopidogrel 75mg daily to end 1/5/24.                                                             IMPORTANT  -  PLEASE NOTE:                              I am a neurohospitalist. I do not see patients outside of the hospital.        Patients requiring neurological follow-up after discharge may contact one of the following offices.     [facility]  [address] [place] [address]  [phone]    [facility]  [address].  [place], [place]  [phone]    Jostin Gotti M.D.   - Department of Neurology  [place] and [employer] [facility] [employer] at [facility]

## 2023-12-16 NOTE — H&P ADULT - NSHPREVIEWOFSYSTEMS_GEN_ALL_CORE
CONSTITUTIONAL: No fever, weight loss, or fatigue  EYES: right vision not back at baseline; No eye pain or discharge  ENT:  No difficulty hearing, tinnitus, vertigo; No sinus or throat pain  NECK: + neck pain; No stiffness  RESPIRATORY: No cough, wheezing, chills or hemoptysis; No Shortness of Breath  CARDIOVASCULAR: No chest pain, palpitations, passing out, dizziness, or leg swelling  GASTROINTESTINAL: No abdominal or epigastric pain. No nausea, vomiting, or hematemesis; No diarrhea or constipation. No melena or hematochezia.  GENITOURINARY: No dysuria, frequency, hematuria, or incontinence  NEUROLOGICAL: No headaches, memory loss, loss of strength, numbness, or tremors  SKIN: No itching, burning, rashes, or lesions   LYMPH Nodes: No enlarged glands  ENDOCRINE: No heat or cold intolerance; No hair loss  MUSCULOSKELETAL: No joint pain or swelling; No muscle, back, No extremity pain  PSYCHIATRIC: No depression, anxiety, mood swings, or difficulty sleeping  HEME/LYMPH: No easy bruising, or bleeding gums  ALLERGY AND IMMUNOLOGIC: No hives or eczema

## 2023-12-16 NOTE — ED PROVIDER NOTE - CLINICAL SUMMARY MEDICAL DECISION MAKING FREE TEXT BOX
Patient presenting reporting unilateral neck pain followed by loss of vision in the same eye.  Stroke code called by triage.  I have a higher concern for possible dissection or aponeurosis.  Will continue with stroke code and obtain vessel imaging.  May require transfer to higher level of care for ophthalmology.

## 2023-12-16 NOTE — ED PROVIDER NOTE - OBJECTIVE STATEMENT
78-year-old man with a past medical history of hypertension hyperlipidemia CKD on dialysis glaucoma with baseline blindness of left eye presenting for episode of pain in the right side of the neck associated with loss of vision in his right eye shortly after returning from dialysis today.  Visual loss lasted approximately 15 minutes and started coming back to normal.  His vision is not back to baseline at this time.  He denies any pain in the eye itself.  He denies any headaches.  He denies any numbness tingling or weakness.  There is no slurred speech.

## 2023-12-16 NOTE — ED ADULT NURSE NOTE - OBJECTIVE STATEMENT
Covering RN: 77 yo male brought in by son the ED for right-sided neck pain and vision loss on the right eye after his dialysis session today. As per patient's son, patient has left eye blindness. Patient denies headache, weakness, numbness or tingling sensation on the extremities. No slurred speech noted.

## 2023-12-16 NOTE — H&P ADULT - HISTORY OF PRESENT ILLNESS
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 Per the patient, patient returned home from dialysis and started having 5/10 pain in the back of his neck and had complete loss of vision for 15 minutes. Patient was able to stand and walk during the episode of vision loss. The vision is slowly returning but it hasn't returned to baseline. The neck pain did not radiate to the head or back. Patient denies headache, dizziness, SOB, or chest pain or N/V. He denies any numbness, tingling, weakness, unbalanced, slurred speech.     Of note, patient was admitted to [facility] for 3 weeks and discharged on 12/11/23 due to his perm cath Staph infection and was discharged with antibiotics.    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 Per the patient, patient returned home from dialysis and started having 5/10 pain in the back of his neck and had complete loss of vision for 15 minutes. Patient was able to stand and walk during the episode of vision loss. The vision is slowly returning but it hasn't returned to baseline. The neck pain did not radiate to the head or back. Patient denies headache, dizziness, SOB, or chest pain or N/V. He denies any numbness, tingling, weakness, unbalanced, slurred speech.     Of note, patient was admitted to [facility] for 3 weeks and discharged on 12/11/23 due to his perm cath Staph infection and was discharged with antibiotics.    IN ED   T 98.3  HR 64, /84  RR18 O2 100% in RA

## 2023-12-16 NOTE — ED PROVIDER NOTE - PROGRESS NOTE DETAILS
Patient reporting vision back to baseline, significant findings on CT but no emergent intervention indicated, not TNK candidate due to NOAC with elevated INR, will admit for further workup.

## 2023-12-16 NOTE — H&P ADULT - NSHPPHYSICALEXAM_GEN_ALL_CORE
GENERAL: NAD, laying comfortably in bed   HEAD:  Atraumatic, Normocephalic  EYES: complete blindness of Left eye; right eye can tell fingers are up but cannot tell how many are held (not baseline) EOMI, PERRLA, conjunctiva and sclera clear  ENMT: No tonsillar erythema, exudates, or enlargement; Moist mucous membranes, No lesions  NECK: Supple, normal appearance, No JVD; Normal thyroid; Trachea midline  NERVOUS SYSTEM:  Alert & Oriented X3,  Motor Strength 5/5 B/L upper and lower extremities, sensation intact  CHEST/LUNG: Lungs clear to auscultation bilaterally, No rales, rhonchi, wheezing   HEART: Regular rate and rhythm; S1/ loud S2; No murmurs, rubs, or gallops  ABDOMEN: Soft, Nontender, Nondistended; Bowel sounds present  EXTREMITIES:  2+ Peripheral Pulses, No clubbing, cyanosis, or edema  LYMPH: No lymphadenopathy noted  SKIN: Perm cath site clean, no erythema; No rashes or lesions;  Good capillary refill

## 2023-12-16 NOTE — H&P ADULT - ATTENDING COMMENTS
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 Per the patient, patient returned home from dialysis and started having 5/10 pain in the back of his neck and had complete loss of vision for 15 minutes. Patient was able to stand and walk during the episode of vision loss. The vision is slowly returning but it hasn't returned to baseline. The neck pain did not radiate to the head or back. Patient denies headache, dizziness, SOB, or chest pain or N/V. He denies any numbness, tingling, weakness, unbalanced, slurred speech.     Of note, patient was admitted to [facility] for 3 weeks and discharged on 12/11/23 due to his perm cath Staph infection and was discharged with antibiotics.    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 Per the patient, patient returned home from dialysis and started having 5/10 pain in the back of his neck and had complete loss of vision for 15 minutes. Patient was able to stand and walk during the episode of vision loss. The vision is slowly returning but it hasn't returned to baseline. The neck pain did not radiate to the head or back. Patient denies headache, dizziness, SOB, or chest pain or N/V. He denies any numbness, tingling, weakness, unbalanced, slurred speech.     Of note, patient was admitted to [facility] for 3 weeks and discharged on 12/11/23 due to his perm cath Staph infection and was discharged with antibiotics.    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

## 2023-12-16 NOTE — ED PROVIDER NOTE - PHYSICAL EXAMINATION
Exam:  General: Patient well appearing, hypertensive otherwise vital signs within normal limits  HEENT: airway patent with moist mucous membranes, R pupil 4mm and reactive, able to visualize hands but not count fingers, not baseline  Cardiac: RRR S1/S2 with strong peripheral pulses  Respiratory: lungs clear without respiratory distress  GI: abdomen soft, non tender, non distended  Neuro: no gross neurologic deficits aside from HEENT - CN II-XII intact, strength 5/5 all extremities, sensation to light touch intact, no slurring of speech.  Skin: warm, well perfused  Psych: normal mood and affect

## 2023-12-16 NOTE — H&P ADULT - ASSESSMENT
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 Per the patient, patient returned home from dialysis and started having 5/10 pain in the back of his neck and had complete loss of vision for 15 minutes. CTH negative for acute hemorrhage wit moderate small vessel ischemic disease. CTA head R and L ICA stenosis (70-80%) and deep white matter ischemia. Patient admitted for TIA workup.

## 2023-12-16 NOTE — H&P ADULT - PROBLEM SELECTOR PLAN 6
DVT propylaxis - hep SQ   GI prophylaxis - pantoprazole 20mg qd (home med) DVT propylaxis - holding DVT prophy with elevated INR, f/u in AM and start hep SQ  GI prophylaxis - pantoprazole 20mg qd (home med)

## 2023-12-16 NOTE — CONSULT NOTE ADULT - SUBJECTIVE AND OBJECTIVE BOX
BIB family member from home early this afternoon.  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 Per the patient, patient returned home from dialysis and started having 5/10 pain in the back of his neck and had complete loss of vision for 15 minutes. Patient was able to stand and walk during the episode of vision loss. The vision is slowly returning but it hasn't returned to baseline. The neck pain did not radiate to the head or back. Patient denies headache, dizziness, SOB, or chest pain or N/V. He denies any numbness, tingling, weakness, unbalanced, slurred speech.     Of note, patient was admitted to [facility] for 3 weeks and discharged on 12/11/23 due to his perm cath Staph infection and was discharged with antibiotics.  .   IN ED   T 98.3  HR 64, /84  RR18 O2 100% in RA       Review of Systems:  Review of Systems: CONSTITUTIONAL: No fever, weight loss, or fatigue  EYES: right vision not back at baseline; No eye pain or discharge  ENT:  No difficulty hearing, tinnitus, vertigo; No sinus or throat pain  NECK: + neck pain; No stiffness  RESPIRATORY: No cough, wheezing, chills or hemoptysis; No Shortness of Breath  CARDIOVASCULAR: No chest pain, palpitations, passing out, dizziness, or leg swelling  GASTROINTESTINAL: No abdominal or epigastric pain. No nausea, vomiting, or hematemesis; No diarrhea or constipation. No melena or hematochezia.  GENITOURINARY: No dysuria, frequency, hematuria, or incontinence  NEUROLOGICAL: No headaches, memory loss, loss of strength, numbness, or tremors  SKIN: No itching, burning, rashes, or lesions   LYMPH Nodes: No enlarged glands  ENDOCRINE: No heat or cold intolerance; No hair loss  MUSCULOSKELETAL: No joint pain or swelling; No muscle, back, No extremity pain  PSYCHIATRIC: No depression, anxiety, mood swings, or difficulty sleeping  HEME/LYMPH: No easy bruising, or bleeding gums  ALLERGY AND IMMUNOLOGIC: No hives or eczema   . . .     Home Medications:   * Patient Currently Takes Medications as of 24-May-2022 10:41 documented in Structured Notes  · 	multivitamin: 1 tab(s) orally once a day  · 	hydrALAZINE 25 mg oral tablet: 1 tab(s) orally every 8 hours  · 	pantoprazole 40 mg oral delayed release tablet: 1 tab(s) orally once a day (before a meal)  · 	sodium bicarbonate 650 mg oral tablet: 1 tab(s) orally 3 times a day  · 	torsemide 20 mg oral tablet: 1 tab(s) orally once a day  · 	carvedilol 6.25 mg oral tablet: 1 tab(s) orally every 12 hours  · 	aspirin 81 mg oral tablet, chewable: 1 tab(s) orally once a day  · 	isosorbide mononitrate 30 mg oral tablet, extended release: 1 tab(s) orally once a day  · 	timolol maleate 0.5% ophthalmic solution: 1 drop(s) to each affected eye 2 times a day  	hosp  · 	Vitamin D3 1250 mcg (50,000 intl units) oral capsule: 1 cap(s) orally once a week  · 	Combigan 0.2%-0.5% ophthalmic solution: 1 drop(s) to each affected eye 2 times a day  	home  · 	Januvia 25 mg oral tablet: 1 tab(s) orally once a day  	home  · 	folic acid 1 mg oral tablet: 1 tab(s) orally once a day  	home/hosp  · 	labetalol 100 mg oral tablet: 1 tab(s) orally 2 times a day  · 	amLODIPine 5 mg oral tablet: 1 tab(s) orally once a day  · 	allopurinol 100 mg oral tablet: 1 tab(s) orally 2 times a day   . . .   · Substance use	No   . . . Lives at home with son"  <End of quote(s) from H&P>       BIB family member from home early this afternoon.  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 Per the patient, patient returned home from dialysis and started having 5/10 pain in the back of his neck and had complete loss of vision for 15 minutes. Patient was able to stand and walk during the episode of vision loss. The vision is slowly returning but it hasn't returned to baseline. The neck pain did not radiate to the head or back. Patient denies headache, dizziness, SOB, or chest pain or N/V. He denies any numbness, tingling, weakness, unbalanced, slurred speech.     Of note, patient was admitted to [facility] for 3 weeks and discharged on 12/11/23 due to his perm cath Staph infection and was discharged with antibiotics.  .   IN ED   T 98.3  HR 64, /84  RR18 O2 100% in RA       Review of Systems:  Review of Systems: CONSTITUTIONAL: No fever, weight loss, or fatigue  EYES: right vision not back at baseline; No eye pain or discharge  ENT:  No difficulty hearing, tinnitus, vertigo; No sinus or throat pain  NECK: + neck pain; No stiffness  RESPIRATORY: No cough, wheezing, chills or hemoptysis; No Shortness of Breath  CARDIOVASCULAR: No chest pain, palpitations, passing out, dizziness, or leg swelling  GASTROINTESTINAL: No abdominal or epigastric pain. No nausea, vomiting, or hematemesis; No diarrhea or constipation. No melena or hematochezia.  GENITOURINARY: No dysuria, frequency, hematuria, or incontinence  NEUROLOGICAL: No headaches, memory loss, loss of strength, numbness, or tremors  SKIN: No itching, burning, rashes, or lesions   LYMPH Nodes: No enlarged glands  ENDOCRINE: No heat or cold intolerance; No hair loss  MUSCULOSKELETAL: No joint pain or swelling; No muscle, back, No extremity pain  PSYCHIATRIC: No depression, anxiety, mood swings, or difficulty sleeping  HEME/LYMPH: No easy bruising, or bleeding gums  ALLERGY AND IMMUNOLOGIC: No hives or eczema   . . .     Home Medications:   * Patient Currently Takes Medications as of 24-May-2022 10:41 documented in Structured Notes  · 	multivitamin: 1 tab(s) orally once a day  · 	hydrALAZINE 25 mg oral tablet: 1 tab(s) orally every 8 hours  · 	pantoprazole 40 mg oral delayed release tablet: 1 tab(s) orally once a day (before a meal)  · 	sodium bicarbonate 650 mg oral tablet: 1 tab(s) orally 3 times a day  · 	torsemide 20 mg oral tablet: 1 tab(s) orally once a day  · 	carvedilol 6.25 mg oral tablet: 1 tab(s) orally every 12 hours  · 	aspirin 81 mg oral tablet, chewable: 1 tab(s) orally once a day  · 	isosorbide mononitrate 30 mg oral tablet, extended release: 1 tab(s) orally once a day  · 	timolol maleate 0.5% ophthalmic solution: 1 drop(s) to each affected eye 2 times a day  	hosp  · 	Vitamin D3 1250 mcg (50,000 intl units) oral capsule: 1 cap(s) orally once a week  · 	Combigan 0.2%-0.5% ophthalmic solution: 1 drop(s) to each affected eye 2 times a day  	home  · 	Januvia 25 mg oral tablet: 1 tab(s) orally once a day  	home  · 	folic acid 1 mg oral tablet: 1 tab(s) orally once a day  	home/hosp  · 	labetalol 100 mg oral tablet: 1 tab(s) orally 2 times a day  · 	amLODIPine 5 mg oral tablet: 1 tab(s) orally once a day  · 	allopurinol 100 mg oral tablet: 1 tab(s) orally 2 times a day   . . .   · Substance use	No   . . . Lives at home with son"  <End of quote(s) from H&P>       BIB family member from home early this afternoon.  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 Per the patient, patient returned home from dialysis and started having 5/10 pain in the back of his neck and had complete loss of vision for 15 minutes. Patient was able to stand and walk during the episode of vision loss. The vision is slowly returning but it hasn't returned to baseline. The neck pain did not radiate to the head or back. Patient denies headache, dizziness, SOB, or chest pain or N/V. He denies any numbness, tingling, weakness, unbalanced, slurred speech.     Of note, patient was admitted to [facility] for 3 weeks and discharged on 12/11/23 due to his perm cath Staph infection and was discharged with antibiotics.  .   IN ED   T 98.3  HR 64, /84  RR18 O2 100% in RA       Review of Systems:  Review of Systems: CONSTITUTIONAL: No fever, weight loss, or fatigue  EYES: right vision not back at baseline; No eye pain or discharge  ENT:  No difficulty hearing, tinnitus, vertigo; No sinus or throat pain  NECK: + neck pain; No stiffness  RESPIRATORY: No cough, wheezing, chills or hemoptysis; No Shortness of Breath  CARDIOVASCULAR: No chest pain, palpitations, passing out, dizziness, or leg swelling  GASTROINTESTINAL: No abdominal or epigastric pain. No nausea, vomiting, or hematemesis; No diarrhea or constipation. No melena or hematochezia.  GENITOURINARY: No dysuria, frequency, hematuria, or incontinence  NEUROLOGICAL: No headaches, memory loss, loss of strength, numbness, or tremors  SKIN: No itching, burning, rashes, or lesions   LYMPH Nodes: No enlarged glands  ENDOCRINE: No heat or cold intolerance; No hair loss  MUSCULOSKELETAL: No joint pain or swelling; No muscle, back, No extremity pain  PSYCHIATRIC: No depression, anxiety, mood swings, or difficulty sleeping  HEME/LYMPH: No easy bruising, or bleeding gums  ALLERGY AND IMMUNOLOGIC: No hives or eczema   . . .     Home Medications:   * Patient Currently Takes Medications as of 24-May-2022 10:41 documented in Structured Notes  · 	multivitamin: 1 tab(s) orally once a day  · 	hydrALAZINE 25 mg oral tablet: 1 tab(s) orally every 8 hours  · 	pantoprazole 40 mg oral delayed release tablet: 1 tab(s) orally once a day (before a meal)  · 	sodium bicarbonate 650 mg oral tablet: 1 tab(s) orally 3 times a day  · 	torsemide 20 mg oral tablet: 1 tab(s) orally once a day  · 	carvedilol 6.25 mg oral tablet: 1 tab(s) orally every 12 hours  · 	aspirin 81 mg oral tablet, chewable: 1 tab(s) orally once a day  · 	isosorbide mononitrate 30 mg oral tablet, extended release: 1 tab(s) orally once a day  · 	timolol maleate 0.5% ophthalmic solution: 1 drop(s) to each affected eye 2 times a day  	hosp  · 	Vitamin D3 1250 mcg (50,000 intl units) oral capsule: 1 cap(s) orally once a week  · 	Combigan 0.2%-0.5% ophthalmic solution: 1 drop(s) to each affected eye 2 times a day  	home  · 	Januvia 25 mg oral tablet: 1 tab(s) orally once a day  	home  · 	folic acid 1 mg oral tablet: 1 tab(s) orally once a day  	home/hosp  · 	labetalol 100 mg oral tablet: 1 tab(s) orally 2 times a day  · 	amLODIPine 5 mg oral tablet: 1 tab(s) orally once a day  · 	allopurinol 100 mg oral tablet: 1 tab(s) orally 2 times a day   . . .   · Substance use	No   . . . Lives at home with son"  <End of quote(s) from H&P>    Per radiology report of CT head:  "Comparison made to previous head CT 5/14/2022    There is diffuse prominence of cortical sulci, fissures and ventricles   related generalized volume loss. There is moderate pericolonic matter   hypodensities likely to small vessel ischemic disease. There is a   prominent right inferior basal ganglia perivascular cystic space. There   is no midline shift. There is no acute intracranial hemorrhage.    There are extensive atherosclerotic calcifications of the distal internal   carotid and vertebral arteries.    The calvarium is intact. The visualized orbits are unremarkable. There is   frontal ethmoid sinus mucosal disease. The nasal septum is deviated to   the right. There are multiple opacified left mastoid air cells.    IMPRESSION:    No acute intracranial hemorrhage, midline shift or mass effect.    Moderate white matter small vessel ischemic disease.    Extensive atherosclerotic calcifications of the distal carotid and   vertebral arteries."    Per radiology report of CTAs head and neck and CTP:  "The RIGHT carotid bifurcation is significant for moderate plaque at the   origin of the RIGHT internal carotid artery resulting in high-grade   (70-80%) stenosis. The LEFT carotid bifurcation demonstrates moderate   calcified plaque at the origin of the LEFT internal carotid artery   resulting in a high-grade (70-80%) stenosis. The vertebral arteries are   patent.    The intracranial circulation is intact without aneurysm, vascular   malformation or abnormal vessel termination.  Scattered vascular   calcification involves the intracranial vertebral arteries, basilar   artery and cavernous internal carotid arteries.    The brain demonstrates mild periventricular and deep white matter   ischemia.  No acute cerebral cortical infarct is seen.  No intracranial   hemorrhage is found.  The ventricles, sulci and basal cisterns appear   unremarkable.    The orbits are unremarkable.  The paranasal sinuses are clear.  The nasal   cavity remains intact.  The nasopharynx is symmetric.  The central skull   base, petrous temporal bones and calvarium remain intact.    Perfusion parameters are reported as follows:    CBF < 30%: 0 mL  TMax > 6s: 30 mL  Mismatch volume: 30 mL  Mismatchratio: Infinite    Perfusion imaging predicted core infarct of 0 ml. Based on the perfusion   mismatch, there is a predicted volume of 30 mL of critically hypoperfused   tissue at risk in the centrum semiovale ovale and BILATERAL inferior   frontal white matter.      IMPRESSION:        1.   Right carotid system: Moderate plaque at the origin of the RIGHT   internal carotid artery resulting in high-grade (70-80%) stenosis.        2.   Left carotid system:  Moderate calcified plaque at the origin of   the LEFT internal carotid artery resulting in a high-grade (70-80%)   stenosis.        3.   Intracranial circulation:  No significant vascular lesion.  Mild   arteriosclerosis.        4.   Brain:  Mild periventricular and deep white matter ischemia.        5.  Perfusion: No core infarct. 30 mL critically hypoperfused tissue   at risk is identified."            BIB family member from home early this afternoon.  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 Per the patient, patient returned home from dialysis and started having 5/10 pain in the back of his neck and had complete loss of vision for 15 minutes. Patient was able to stand and walk during the episode of vision loss. The vision is slowly returning but it hasn't returned to baseline. The neck pain did not radiate to the head or back. Patient denies headache, dizziness, SOB, or chest pain or N/V. He denies any numbness, tingling, weakness, unbalanced, slurred speech.     Of note, patient was admitted to [facility] for 3 weeks and discharged on 12/11/23 due to his perm cath Staph infection and was discharged with antibiotics.  .   IN ED   T 98.3  HR 64, /84  RR18 O2 100% in RA       Review of Systems:  Review of Systems: CONSTITUTIONAL: No fever, weight loss, or fatigue  EYES: right vision not back at baseline; No eye pain or discharge  ENT:  No difficulty hearing, tinnitus, vertigo; No sinus or throat pain  NECK: + neck pain; No stiffness  RESPIRATORY: No cough, wheezing, chills or hemoptysis; No Shortness of Breath  CARDIOVASCULAR: No chest pain, palpitations, passing out, dizziness, or leg swelling  GASTROINTESTINAL: No abdominal or epigastric pain. No nausea, vomiting, or hematemesis; No diarrhea or constipation. No melena or hematochezia.  GENITOURINARY: No dysuria, frequency, hematuria, or incontinence  NEUROLOGICAL: No headaches, memory loss, loss of strength, numbness, or tremors  SKIN: No itching, burning, rashes, or lesions   LYMPH Nodes: No enlarged glands  ENDOCRINE: No heat or cold intolerance; No hair loss  MUSCULOSKELETAL: No joint pain or swelling; No muscle, back, No extremity pain  PSYCHIATRIC: No depression, anxiety, mood swings, or difficulty sleeping  HEME/LYMPH: No easy bruising, or bleeding gums  ALLERGY AND IMMUNOLOGIC: No hives or eczema   . . .     Home Medications:   * Patient Currently Takes Medications as of 24-May-2022 10:41 documented in Structured Notes  · 	multivitamin: 1 tab(s) orally once a day  · 	hydrALAZINE 25 mg oral tablet: 1 tab(s) orally every 8 hours  · 	pantoprazole 40 mg oral delayed release tablet: 1 tab(s) orally once a day (before a meal)  · 	sodium bicarbonate 650 mg oral tablet: 1 tab(s) orally 3 times a day  · 	torsemide 20 mg oral tablet: 1 tab(s) orally once a day  · 	carvedilol 6.25 mg oral tablet: 1 tab(s) orally every 12 hours  · 	aspirin 81 mg oral tablet, chewable: 1 tab(s) orally once a day  · 	isosorbide mononitrate 30 mg oral tablet, extended release: 1 tab(s) orally once a day  · 	timolol maleate 0.5% ophthalmic solution: 1 drop(s) to each affected eye 2 times a day  	hosp  · 	Vitamin D3 1250 mcg (50,000 intl units) oral capsule: 1 cap(s) orally once a week  · 	Combigan 0.2%-0.5% ophthalmic solution: 1 drop(s) to each affected eye 2 times a day  	home  · 	Januvia 25 mg oral tablet: 1 tab(s) orally once a day  	home  · 	folic acid 1 mg oral tablet: 1 tab(s) orally once a day  	home/hosp  · 	labetalol 100 mg oral tablet: 1 tab(s) orally 2 times a day  · 	amLODIPine 5 mg oral tablet: 1 tab(s) orally once a day  · 	allopurinol 100 mg oral tablet: 1 tab(s) orally 2 times a day   . . .   · Substance use	No   . . . Lives at home with son"  <End of quote(s) from H&P>    Per radiology report of CT head:  "Comparison made to previous head CT 5/14/2022    There is diffuse prominence of cortical sulci, fissures and ventricles   related generalized volume loss. There is moderate pericolonic matter   hypodensities likely to small vessel ischemic disease. There is a   prominent right inferior basal ganglia perivascular cystic space. There   is no midline shift. There is no acute intracranial hemorrhage.    There are extensive atherosclerotic calcifications of the distal internal   carotid and vertebral arteries.    The calvarium is intact. The visualized orbits are unremarkable. There is   frontal ethmoid sinus mucosal disease. The nasal septum is deviated to   the right. There are multiple opacified left mastoid air cells.    IMPRESSION:    No acute intracranial hemorrhage, midline shift or mass effect.    Moderate white matter small vessel ischemic disease.    Extensive atherosclerotic calcifications of the distal carotid and   vertebral arteries."    Per radiology report of CTAs head and neck and CTP:  "The RIGHT carotid bifurcation is significant for moderate plaque at the   origin of the RIGHT internal carotid artery resulting in high-grade   (70-80%) stenosis. The LEFT carotid bifurcation demonstrates moderate   calcified plaque at the origin of the LEFT internal carotid artery   resulting in a high-grade (70-80%) stenosis. The vertebral arteries are   patent.    The intracranial circulation is intact without aneurysm, vascular   malformation or abnormal vessel termination.  Scattered vascular   calcification involves the intracranial vertebral arteries, basilar   artery and cavernous internal carotid arteries.    The brain demonstrates mild periventricular and deep white matter   ischemia.  No acute cerebral cortical infarct is seen.  No intracranial   hemorrhage is found.  The ventricles, sulci and basal cisterns appear   unremarkable.    The orbits are unremarkable.  The paranasal sinuses are clear.  The nasal   cavity remains intact.  The nasopharynx is symmetric.  The central skull   base, petrous temporal bones and calvarium remain intact.    Perfusion parameters are reported as follows:    CBF < 30%: 0 mL  TMax > 6s: 30 mL  Mismatch volume: 30 mL  Mismatchratio: Infinite    Perfusion imaging predicted core infarct of 0 ml. Based on the perfusion   mismatch, there is a predicted volume of 30 mL of critically hypoperfused   tissue at risk in the centrum semiovale ovale and BILATERAL inferior   frontal white matter.      IMPRESSION:        1.   Right carotid system: Moderate plaque at the origin of the RIGHT   internal carotid artery resulting in high-grade (70-80%) stenosis.        2.   Left carotid system:  Moderate calcified plaque at the origin of   the LEFT internal carotid artery resulting in a high-grade (70-80%)   stenosis.        3.   Intracranial circulation:  No significant vascular lesion.  Mild   arteriosclerosis.        4.   Brain:  Mild periventricular and deep white matter ischemia.        5.  Perfusion: No core infarct. 30 mL critically hypoperfused tissue   at risk is identified."            BIB family member from home early this afternoon.  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 Per the patient, patient returned home from dialysis and started having 5/10 pain in the back of his neck and had complete loss of vision for 15 minutes. Patient was able to stand and walk during the episode of vision loss. The vision is slowly returning but it hasn't returned to baseline. The neck pain did not radiate to the head or back. Patient denies headache, dizziness, SOB, or chest pain or N/V. He denies any numbness, tingling, weakness, unbalanced, slurred speech.     Of note, patient was admitted to [facility] for 3 weeks and discharged on 12/11/23 due to his perm cath Staph infection and was discharged with antibiotics.  .   IN ED   T 98.3  HR 64, /84  RR18 O2 100% in RA       Review of Systems:  Review of Systems: CONSTITUTIONAL: No fever, weight loss, or fatigue  EYES: right vision not back at baseline; No eye pain or discharge  ENT:  No difficulty hearing, tinnitus, vertigo; No sinus or throat pain  NECK: + neck pain; No stiffness  RESPIRATORY: No cough, wheezing, chills or hemoptysis; No Shortness of Breath  CARDIOVASCULAR: No chest pain, palpitations, passing out, dizziness, or leg swelling  GASTROINTESTINAL: No abdominal or epigastric pain. No nausea, vomiting, or hematemesis; No diarrhea or constipation. No melena or hematochezia.  GENITOURINARY: No dysuria, frequency, hematuria, or incontinence  NEUROLOGICAL: No headaches, memory loss, loss of strength, numbness, or tremors  SKIN: No itching, burning, rashes, or lesions   LYMPH Nodes: No enlarged glands  ENDOCRINE: No heat or cold intolerance; No hair loss  MUSCULOSKELETAL: No joint pain or swelling; No muscle, back, No extremity pain  PSYCHIATRIC: No depression, anxiety, mood swings, or difficulty sleeping  HEME/LYMPH: No easy bruising, or bleeding gums  ALLERGY AND IMMUNOLOGIC: No hives or eczema   . . .     Home Medications:   * Patient Currently Takes Medications as of 24-May-2022 10:41 documented in Structured Notes  · 	multivitamin: 1 tab(s) orally once a day  · 	hydrALAZINE 25 mg oral tablet: 1 tab(s) orally every 8 hours  · 	pantoprazole 40 mg oral delayed release tablet: 1 tab(s) orally once a day (before a meal)  · 	sodium bicarbonate 650 mg oral tablet: 1 tab(s) orally 3 times a day  · 	torsemide 20 mg oral tablet: 1 tab(s) orally once a day  · 	carvedilol 6.25 mg oral tablet: 1 tab(s) orally every 12 hours  · 	aspirin 81 mg oral tablet, chewable: 1 tab(s) orally once a day  · 	isosorbide mononitrate 30 mg oral tablet, extended release: 1 tab(s) orally once a day  · 	timolol maleate 0.5% ophthalmic solution: 1 drop(s) to each affected eye 2 times a day  	hosp  · 	Vitamin D3 1250 mcg (50,000 intl units) oral capsule: 1 cap(s) orally once a week  · 	Combigan 0.2%-0.5% ophthalmic solution: 1 drop(s) to each affected eye 2 times a day  	home  · 	Januvia 25 mg oral tablet: 1 tab(s) orally once a day  	home  · 	folic acid 1 mg oral tablet: 1 tab(s) orally once a day  	home/hosp  · 	labetalol 100 mg oral tablet: 1 tab(s) orally 2 times a day  · 	amLODIPine 5 mg oral tablet: 1 tab(s) orally once a day  · 	allopurinol 100 mg oral tablet: 1 tab(s) orally 2 times a day   . . .   · Substance use	No   . . . Lives at home with son"  <End of quote(s) from H&P>    Per radiology report of CT head:  "Comparison made to previous head CT 5/14/2022    There is diffuse prominence of cortical sulci, fissures and ventricles   related generalized volume loss. There is moderate pericolonic matter   hypodensities likely to small vessel ischemic disease. There is a   prominent right inferior basal ganglia perivascular cystic space. There   is no midline shift. There is no acute intracranial hemorrhage.    There are extensive atherosclerotic calcifications of the distal internal   carotid and vertebral arteries.    The calvarium is intact. The visualized orbits are unremarkable. There is   frontal ethmoid sinus mucosal disease. The nasal septum is deviated to   the right. There are multiple opacified left mastoid air cells.    IMPRESSION:    No acute intracranial hemorrhage, midline shift or mass effect.    Moderate white matter small vessel ischemic disease.    Extensive atherosclerotic calcifications of the distal carotid and   vertebral arteries."    Per radiology report of CTAs head and neck and CTP:  "The RIGHT carotid bifurcation is significant for moderate plaque at the   origin of the RIGHT internal carotid artery resulting in high-grade   (70-80%) stenosis. The LEFT carotid bifurcation demonstrates moderate   calcified plaque at the origin of the LEFT internal carotid artery   resulting in a high-grade (70-80%) stenosis. The vertebral arteries are   patent.    The intracranial circulation is intact without aneurysm, vascular   malformation or abnormal vessel termination.  Scattered vascular   calcification involves the intracranial vertebral arteries, basilar   artery and cavernous internal carotid arteries.    The brain demonstrates mild periventricular and deep white matter   ischemia.  No acute cerebral cortical infarct is seen.  No intracranial   hemorrhage is found.  The ventricles, sulci and basal cisterns appear   unremarkable.    The orbits are unremarkable.  The paranasal sinuses are clear.  The nasal   cavity remains intact.  The nasopharynx is symmetric.  The central skull   base, petrous temporal bones and calvarium remain intact.    Perfusion parameters are reported as follows:    CBF < 30%: 0 mL  TMax > 6s: 30 mL  Mismatch volume: 30 mL  Mismatchratio: Infinite    Perfusion imaging predicted core infarct of 0 ml. Based on the perfusion   mismatch, there is a predicted volume of 30 mL of critically hypoperfused   tissue at risk in the centrum semiovale ovale and BILATERAL inferior   frontal white matter.      IMPRESSION:        1.   Right carotid system: Moderate plaque at the origin of the RIGHT   internal carotid artery resulting in high-grade (70-80%) stenosis.        2.   Left carotid system:  Moderate calcified plaque at the origin of   the LEFT internal carotid artery resulting in a high-grade (70-80%)   stenosis.        3.   Intracranial circulation:  No significant vascular lesion.  Mild   arteriosclerosis.        4.   Brain:  Mild periventricular and deep white matter ischemia.        5.  Perfusion: No core infarct. 30 mL critically hypoperfused tissue   at risk is identified."      EXAMINATION     [facility]  Interviewed by me in [place]ian.  Provides the correct date and location; Pres = "Biden."  Grossly normal comprehension, expression, articulation in [place]ian;  limited prosody; deliberate rate of speaking.  Dense cataracts OU; sees nothing OS; can count fingers at four feet OD.      No gross UE drift.  On confrontation testing of limb muscles he is quite strong, without grossly evident focal or lateralized motor deficits.  LT and P sensation grossly intact; no extinction on DSS.    Reflex                           Right    Left   Comment    Biceps                            1         1  Triceps                           0         0  Patellar                           0         0  Gastroc                           0        0  heel cords tight bi  Plantar        BIB family member from home early this afternoon.  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 Per the patient, patient returned home from dialysis and started having 5/10 pain in the back of his neck and had complete loss of vision for 15 minutes. Patient was able to stand and walk during the episode of vision loss. The vision is slowly returning but it hasn't returned to baseline. The neck pain did not radiate to the head or back. Patient denies headache, dizziness, SOB, or chest pain or N/V. He denies any numbness, tingling, weakness, unbalanced, slurred speech.     Of note, patient was admitted to [facility] for 3 weeks and discharged on 12/11/23 due to his perm cath Staph infection and was discharged with antibiotics.  .   IN ED   T 98.3  HR 64, /84  RR18 O2 100% in RA       Review of Systems:  Review of Systems: CONSTITUTIONAL: No fever, weight loss, or fatigue  EYES: right vision not back at baseline; No eye pain or discharge  ENT:  No difficulty hearing, tinnitus, vertigo; No sinus or throat pain  NECK: + neck pain; No stiffness  RESPIRATORY: No cough, wheezing, chills or hemoptysis; No Shortness of Breath  CARDIOVASCULAR: No chest pain, palpitations, passing out, dizziness, or leg swelling  GASTROINTESTINAL: No abdominal or epigastric pain. No nausea, vomiting, or hematemesis; No diarrhea or constipation. No melena or hematochezia.  GENITOURINARY: No dysuria, frequency, hematuria, or incontinence  NEUROLOGICAL: No headaches, memory loss, loss of strength, numbness, or tremors  SKIN: No itching, burning, rashes, or lesions   LYMPH Nodes: No enlarged glands  ENDOCRINE: No heat or cold intolerance; No hair loss  MUSCULOSKELETAL: No joint pain or swelling; No muscle, back, No extremity pain  PSYCHIATRIC: No depression, anxiety, mood swings, or difficulty sleeping  HEME/LYMPH: No easy bruising, or bleeding gums  ALLERGY AND IMMUNOLOGIC: No hives or eczema   . . .     Home Medications:   * Patient Currently Takes Medications as of 24-May-2022 10:41 documented in Structured Notes  · 	multivitamin: 1 tab(s) orally once a day  · 	hydrALAZINE 25 mg oral tablet: 1 tab(s) orally every 8 hours  · 	pantoprazole 40 mg oral delayed release tablet: 1 tab(s) orally once a day (before a meal)  · 	sodium bicarbonate 650 mg oral tablet: 1 tab(s) orally 3 times a day  · 	torsemide 20 mg oral tablet: 1 tab(s) orally once a day  · 	carvedilol 6.25 mg oral tablet: 1 tab(s) orally every 12 hours  · 	aspirin 81 mg oral tablet, chewable: 1 tab(s) orally once a day  · 	isosorbide mononitrate 30 mg oral tablet, extended release: 1 tab(s) orally once a day  · 	timolol maleate 0.5% ophthalmic solution: 1 drop(s) to each affected eye 2 times a day  	hosp  · 	Vitamin D3 1250 mcg (50,000 intl units) oral capsule: 1 cap(s) orally once a week  · 	Combigan 0.2%-0.5% ophthalmic solution: 1 drop(s) to each affected eye 2 times a day  	home  · 	Januvia 25 mg oral tablet: 1 tab(s) orally once a day  	home  · 	folic acid 1 mg oral tablet: 1 tab(s) orally once a day  	home/hosp  · 	labetalol 100 mg oral tablet: 1 tab(s) orally 2 times a day  · 	amLODIPine 5 mg oral tablet: 1 tab(s) orally once a day  · 	allopurinol 100 mg oral tablet: 1 tab(s) orally 2 times a day   . . .   · Substance use	No   . . . Lives at home with son"  <End of quote(s) from H&P>    Per radiology report of CT head:  "Comparison made to previous head CT 5/14/2022    There is diffuse prominence of cortical sulci, fissures and ventricles   related generalized volume loss. There is moderate pericolonic matter   hypodensities likely to small vessel ischemic disease. There is a   prominent right inferior basal ganglia perivascular cystic space. There   is no midline shift. There is no acute intracranial hemorrhage.    There are extensive atherosclerotic calcifications of the distal internal   carotid and vertebral arteries.    The calvarium is intact. The visualized orbits are unremarkable. There is   frontal ethmoid sinus mucosal disease. The nasal septum is deviated to   the right. There are multiple opacified left mastoid air cells.    IMPRESSION:    No acute intracranial hemorrhage, midline shift or mass effect.    Moderate white matter small vessel ischemic disease.    Extensive atherosclerotic calcifications of the distal carotid and   vertebral arteries."    Per radiology report of CTAs head and neck and CTP:  "The RIGHT carotid bifurcation is significant for moderate plaque at the   origin of the RIGHT internal carotid artery resulting in high-grade   (70-80%) stenosis. The LEFT carotid bifurcation demonstrates moderate   calcified plaque at the origin of the LEFT internal carotid artery   resulting in a high-grade (70-80%) stenosis. The vertebral arteries are   patent.    The intracranial circulation is intact without aneurysm, vascular   malformation or abnormal vessel termination.  Scattered vascular   calcification involves the intracranial vertebral arteries, basilar   artery and cavernous internal carotid arteries.    The brain demonstrates mild periventricular and deep white matter   ischemia.  No acute cerebral cortical infarct is seen.  No intracranial   hemorrhage is found.  The ventricles, sulci and basal cisterns appear   unremarkable.    The orbits are unremarkable.  The paranasal sinuses are clear.  The nasal   cavity remains intact.  The nasopharynx is symmetric.  The central skull   base, petrous temporal bones and calvarium remain intact.    Perfusion parameters are reported as follows:    CBF < 30%: 0 mL  TMax > 6s: 30 mL  Mismatch volume: 30 mL  Mismatchratio: Infinite    Perfusion imaging predicted core infarct of 0 ml. Based on the perfusion   mismatch, there is a predicted volume of 30 mL of critically hypoperfused   tissue at risk in the centrum semiovale ovale and BILATERAL inferior   frontal white matter.      IMPRESSION:        1.   Right carotid system: Moderate plaque at the origin of the RIGHT   internal carotid artery resulting in high-grade (70-80%) stenosis.        2.   Left carotid system:  Moderate calcified plaque at the origin of   the LEFT internal carotid artery resulting in a high-grade (70-80%)   stenosis.        3.   Intracranial circulation:  No significant vascular lesion.  Mild   arteriosclerosis.        4.   Brain:  Mild periventricular and deep white matter ischemia.        5.  Perfusion: No core infarct. 30 mL critically hypoperfused tissue   at risk is identified."      EXAMINATION     [facility]  Interviewed by me in Romanian.  Provides the correct date and location; Pres = "Biden."  Grossly normal comprehension, expression, articulation in Romanian;  limited prosody; deliberate rate of speaking.  Dense cataracts OU; sees nothing OS; can count fingers at four feet OD.      No gross UE drift.  On confrontation testing of limb muscles he is quite strong, without grossly evident focal or lateralized motor deficits.  LT and P sensation grossly intact; no extinction on DSS.    Reflex                           Right    Left   Comment    Biceps                            1         1  Triceps                           0         0  Patellar                           0         0  Gastroc                           0        0  heel cords tight bi  Plantar        BIB family member from home early this afternoon.  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 Per the patient, patient returned home from dialysis and started having 5/10 pain in the back of his neck and had complete loss of vision for 15 minutes. Patient was able to stand and walk during the episode of vision loss. The vision is slowly returning but it hasn't returned to baseline. The neck pain did not radiate to the head or back. Patient denies headache, dizziness, SOB, or chest pain or N/V. He denies any numbness, tingling, weakness, unbalanced, slurred speech.     Of note, patient was admitted to [facility] for [facility] weeks and discharged on 12/11/23 due to his perm cath Staph infection and was discharged with antibiotics.  .   IN ED   T 98.3  HR 64, /84  RR18 O2 100% in RA       Review of Systems:  Review of Systems: CONSTITUTIONAL: No fever, weight loss, or fatigue  EYES: right vision not back at baseline; No eye pain or discharge  ENT:  No difficulty hearing, tinnitus, vertigo; No sinus or throat pain  NECK: + neck pain; No stiffness  RESPIRATORY: No cough, wheezing, chills or hemoptysis; No Shortness of Breath  CARDIOVASCULAR: No chest pain, palpitations, passing out, dizziness, or leg swelling  GASTROINTESTINAL: No abdominal or epigastric pain. No nausea, vomiting, or hematemesis; No diarrhea or constipation. No melena or hematochezia.  GENITOURINARY: No dysuria, frequency, hematuria, or incontinence  NEUROLOGICAL: No headaches, memory loss, loss of strength, numbness, or tremors  SKIN: No itching, burning, rashes, or lesions   LYMPH Nodes: No enlarged glands  ENDOCRINE: No heat or cold intolerance; No hair loss  MUSCULOSKELETAL: No joint pain or swelling; No muscle, back, No extremity pain  PSYCHIATRIC: No depression, anxiety, mood swings, or difficulty sleeping  HEME/LYMPH: No easy bruising, or bleeding gums  ALLERGY AND IMMUNOLOGIC: No hives or eczema   . . .     Home Medications:   * Patient Currently Takes Medications as of 24-May-2022 10:41 documented in Structured Notes  · 	multivitamin: 1 tab(s) orally once a day  · 	hydrALAZINE 25 mg oral tablet: 1 tab(s) orally every 8 hours  · 	pantoprazole 40 mg oral delayed release tablet: 1 tab(s) orally once a day (before a meal)  · 	sodium bicarbonate 650 mg oral tablet: 1 tab(s) orally 3 times a day  · 	torsemide 20 mg oral tablet: 1 tab(s) orally once a day  · 	carvedilol 6.25 mg oral tablet: 1 tab(s) orally every 12 hours  · 	aspirin 81 mg oral tablet, chewable: 1 tab(s) orally once a day  · 	isosorbide mononitrate 30 mg oral tablet, extended release: 1 tab(s) orally once a day  · 	timolol maleate 0.5% ophthalmic solution: 1 drop(s) to each affected eye 2 times a day  	hosp  · 	Vitamin D3 1250 mcg (50,000 intl units) oral capsule: 1 cap(s) orally once a week  · 	Combigan 0.2%-0.5% ophthalmic solution: 1 drop(s) to each affected eye 2 times a day  	home  · 	Januvia 25 mg oral tablet: 1 tab(s) orally once a day  	home  · 	folic acid 1 mg oral tablet: 1 tab(s) orally once a day  	home/hosp  · 	labetalol 100 mg oral tablet: 1 tab(s) orally 2 times a day  · 	amLODIPine 5 mg oral tablet: 1 tab(s) orally once a day  · 	allopurinol 100 mg oral tablet: 1 tab(s) orally 2 times a day   . . .   · Substance use	No   . . . Lives at home with son"  <End of quote(s) from H&P>    Per radiology report of CT head:  "Comparison made to previous head CT 5/14/2022    There is diffuse prominence of cortical sulci, fissures and ventricles   related generalized volume loss. There is moderate pericolonic matter   hypodensities likely to small vessel ischemic disease. There is a   prominent right inferior basal ganglia perivascular cystic space. There   is no midline shift. There is no acute intracranial hemorrhage.    There are extensive atherosclerotic calcifications of the distal internal   carotid and vertebral arteries.    The calvarium is intact. The visualized orbits are unremarkable. There is   frontal ethmoid sinus mucosal disease. The nasal septum is deviated to   the right. There are multiple opacified left mastoid air cells.    IMPRESSION:    No acute intracranial hemorrhage, midline shift or mass effect.    Moderate white matter small vessel ischemic disease.    Extensive atherosclerotic calcifications of the distal carotid and   vertebral arteries."    Per radiology report of CTAs head and neck and CTP:  "The RIGHT carotid bifurcation is significant for moderate plaque at the   origin of the RIGHT internal carotid artery resulting in high-grade   (70-80%) stenosis. The LEFT carotid bifurcation demonstrates moderate   calcified plaque at the origin of the LEFT internal carotid artery   resulting in a high-grade (70-80%) stenosis. The vertebral arteries are   patent.    The intracranial circulation is intact without aneurysm, vascular   malformation or abnormal vessel termination.  Scattered vascular   calcification involves the intracranial vertebral arteries, basilar   artery and cavernous internal carotid arteries.    The brain demonstrates mild periventricular and deep white matter   ischemia.  No acute cerebral cortical infarct is seen.  No intracranial   hemorrhage is found.  The ventricles, sulci and basal cisterns appear   unremarkable.    The orbits are unremarkable.  The paranasal sinuses are clear.  The nasal   cavity remains intact.  The nasopharynx is symmetric.  The central skull   base, petrous temporal bones and calvarium remain intact.    Perfusion parameters are reported as follows:    CBF < 30%: 0 mL  TMax > 6s: 30 mL  Mismatch volume: 30 mL  Mismatchratio: Infinite    Perfusion imaging predicted core infarct of 0 ml. Based on the perfusion   mismatch, there is a predicted volume of 30 mL of critically hypoperfused   tissue at risk in the centrum semiovale ovale and BILATERAL inferior   frontal white matter.      IMPRESSION:        1.   Right carotid system: Moderate plaque at the origin of the RIGHT   internal carotid artery resulting in high-grade (70-80%) stenosis.        2.   Left carotid system:  Moderate calcified plaque at the origin of   the LEFT internal carotid artery resulting in a high-grade (70-80%)   stenosis.        3.   Intracranial circulation:  No significant vascular lesion.  Mild   arteriosclerosis.        4.   Brain:  Mild periventricular and deep white matter ischemia.        5.  Perfusion: No core infarct. 30 mL critically hypoperfused tissue   at risk is identified."    MCVs 105, 102.9 in 2022; now 96.1  B12/folate  296/>20 in 2021      EXAMINATION     Awake, alert, supine on [place].  Interviewed by me in Dutch.  Provides the correct date and location; Pres = "Biden."  Grossly normal comprehension, expression, articulation in Dutch;  limited prosody; deliberate rate of speaking.  Dense cataracts OU; sees nothing OS; can count fingers at four feet OD.      No gross UE drift.  On confrontation testing of limb muscles he is quite strong, without grossly evident focal or lateralized motor deficits.  LT and P sensation grossly intact; no extinction on DSS.    Reflex                           Right    Left   Comment    Biceps                            1         1  Triceps                           0         0  Patellar                           0         0  Gastroc                           0        0  heel cords tight bilaterally  Plantar                         flexor  flexor    No tremor or AIMs.          BIB family member from home early this afternoon.  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 Per the patient, patient returned home from dialysis and started having 5/10 pain in the back of his neck and had complete loss of vision for 15 minutes. Patient was able to stand and walk during the episode of vision loss. The vision is slowly returning but it hasn't returned to baseline. The neck pain did not radiate to the head or back. Patient denies headache, dizziness, SOB, or chest pain or N/V. He denies any numbness, tingling, weakness, unbalanced, slurred speech.     Of note, patient was admitted to [facility] for 3 weeks and discharged on 12/11/23 due to his perm cath Staph infection and was discharged with antibiotics.  .   IN ED   T 98.3  HR 64, /84  RR18 O2 100% in RA       Review of Systems:  Review of Systems: CONSTITUTIONAL: No fever, weight loss, or fatigue  EYES: right vision not back at baseline; No eye pain or discharge  ENT:  No difficulty hearing, tinnitus, vertigo; No sinus or throat pain  NECK: + neck pain; No stiffness  RESPIRATORY: No cough, wheezing, chills or hemoptysis; No Shortness of Breath  CARDIOVASCULAR: No chest pain, palpitations, passing out, dizziness, or leg swelling  GASTROINTESTINAL: No abdominal or epigastric pain. No nausea, vomiting, or hematemesis; No diarrhea or constipation. No melena or hematochezia.  GENITOURINARY: No dysuria, frequency, hematuria, or incontinence  NEUROLOGICAL: No headaches, memory loss, loss of strength, numbness, or tremors  SKIN: No itching, burning, rashes, or lesions   LYMPH Nodes: No enlarged glands  ENDOCRINE: No heat or cold intolerance; No hair loss  MUSCULOSKELETAL: No joint pain or swelling; No muscle, back, No extremity pain  PSYCHIATRIC: No depression, anxiety, mood swings, or difficulty sleeping  HEME/LYMPH: No easy bruising, or bleeding gums  ALLERGY AND IMMUNOLOGIC: No hives or eczema   . . .     Home Medications:   * Patient Currently Takes Medications as of 24-May-2022 10:41 documented in Structured Notes  · 	multivitamin: 1 tab(s) orally once a day  · 	hydrALAZINE 25 mg oral tablet: 1 tab(s) orally every 8 hours  · 	pantoprazole 40 mg oral delayed release tablet: 1 tab(s) orally once a day (before a meal)  · 	sodium bicarbonate 650 mg oral tablet: 1 tab(s) orally 3 times a day  · 	torsemide 20 mg oral tablet: 1 tab(s) orally once a day  · 	carvedilol 6.25 mg oral tablet: 1 tab(s) orally every 12 hours  · 	aspirin 81 mg oral tablet, chewable: 1 tab(s) orally once a day  · 	isosorbide mononitrate 30 mg oral tablet, extended release: 1 tab(s) orally once a day  · 	timolol maleate 0.5% ophthalmic solution: 1 drop(s) to each affected eye 2 times a day  	hosp  · 	Vitamin D3 1250 mcg (50,000 intl units) oral capsule: 1 cap(s) orally once a week  · 	Combigan 0.2%-0.5% ophthalmic solution: 1 drop(s) to each affected eye 2 times a day  	home  · 	Januvia 25 mg oral tablet: 1 tab(s) orally once a day  	home  · 	folic acid 1 mg oral tablet: 1 tab(s) orally once a day  	home/hosp  · 	labetalol 100 mg oral tablet: 1 tab(s) orally 2 times a day  · 	amLODIPine 5 mg oral tablet: 1 tab(s) orally once a day  · 	allopurinol 100 mg oral tablet: 1 tab(s) orally 2 times a day   . . .   · Substance use	No   . . . Lives at home with son"  <End of quote(s) from H&P>    Per radiology report of CT head:  "Comparison made to previous head CT 5/14/2022    There is diffuse prominence of cortical sulci, fissures and ventricles   related generalized volume loss. There is moderate pericolonic matter   hypodensities likely to small vessel ischemic disease. There is a   prominent right inferior basal ganglia perivascular cystic space. There   is no midline shift. There is no acute intracranial hemorrhage.    There are extensive atherosclerotic calcifications of the distal internal   carotid and vertebral arteries.    The calvarium is intact. The visualized orbits are unremarkable. There is   frontal ethmoid sinus mucosal disease. The nasal septum is deviated to   the right. There are multiple opacified left mastoid air cells.    IMPRESSION:    No acute intracranial hemorrhage, midline shift or mass effect.    Moderate white matter small vessel ischemic disease.    Extensive atherosclerotic calcifications of the distal carotid and   vertebral arteries."    Per radiology report of CTAs head and neck and CTP:  "The RIGHT carotid bifurcation is significant for moderate plaque at the   origin of the RIGHT internal carotid artery resulting in high-grade   (70-80%) stenosis. The LEFT carotid bifurcation demonstrates moderate   calcified plaque at the origin of the LEFT internal carotid artery   resulting in a high-grade (70-80%) stenosis. The vertebral arteries are   patent.    The intracranial circulation is intact without aneurysm, vascular   malformation or abnormal vessel termination.  Scattered vascular   calcification involves the intracranial vertebral arteries, basilar   artery and cavernous internal carotid arteries.    The brain demonstrates mild periventricular and deep white matter   ischemia.  No acute cerebral cortical infarct is seen.  No intracranial   hemorrhage is found.  The ventricles, sulci and basal cisterns appear   unremarkable.    The orbits are unremarkable.  The paranasal sinuses are clear.  The nasal   cavity remains intact.  The nasopharynx is symmetric.  The central skull   base, petrous temporal bones and calvarium remain intact.    Perfusion parameters are reported as follows:    CBF < 30%: 0 mL  TMax > 6s: 30 mL  Mismatch volume: 30 mL  Mismatchratio: Infinite    Perfusion imaging predicted core infarct of 0 ml. Based on the perfusion   mismatch, there is a predicted volume of 30 mL of critically hypoperfused   tissue at risk in the centrum semiovale ovale and BILATERAL inferior   frontal white matter.      IMPRESSION:        1.   Right carotid system: Moderate plaque at the origin of the RIGHT   internal carotid artery resulting in high-grade (70-80%) stenosis.        2.   Left carotid system:  Moderate calcified plaque at the origin of   the LEFT internal carotid artery resulting in a high-grade (70-80%)   stenosis.        3.   Intracranial circulation:  No significant vascular lesion.  Mild   arteriosclerosis.        4.   Brain:  Mild periventricular and deep white matter ischemia.        5.  Perfusion: No core infarct. 30 mL critically hypoperfused tissue   at risk is identified."    MCVs 105, 102.9 in 2022; now 96.1  B12/folate  296/>20 in 2021      EXAMINATION     Awake, alert, supine on Gurney.  Interviewed by me in Maltese.  Provides the correct date and location; Pres = "Biden."  Grossly normal comprehension, expression, articulation in Maltese;  limited prosody; deliberate rate of speaking.  Dense cataracts OU; sees nothing OS; can count fingers at four feet OD.      No gross UE drift.  On confrontation testing of limb muscles he is quite strong, without grossly evident focal or lateralized motor deficits.  LT and P sensation grossly intact; no extinction on DSS.    Reflex                           Right    Left   Comment    Biceps                            1         1  Triceps                           0         0  Patellar                           0         0  Gastroc                           0        0  heel cords tight bilaterally  Plantar                         flexor  flexor    No tremor or AIMs.          BIB family member from home early this afternoon.  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 Per the patient, patient returned home from dialysis and started having 5/10 pain in the back of his neck and had complete loss of vision for 15 minutes. Patient was able to stand and walk during the episode of vision loss. The vision is slowly returning but it hasn't returned to baseline. The neck pain did not radiate to the head or back. Patient denies headache, dizziness, SOB, or chest pain or N/V. He denies any numbness, tingling, weakness, unbalanced, slurred speech.     Of note, patient was admitted to [facility] for 3 weeks and discharged on 12/11/23 due to his perm cath Staph infection and was discharged with antibiotics.  .   IN ED   T 98.3  HR 64, /84  RR18 O2 100% in RA       Review of Systems:  Review of Systems: CONSTITUTIONAL: No fever, weight loss, or fatigue  EYES: right vision not back at baseline; No eye pain or discharge  ENT:  No difficulty hearing, tinnitus, vertigo; No sinus or throat pain  NECK: + neck pain; No stiffness  RESPIRATORY: No cough, wheezing, chills or hemoptysis; No Shortness of Breath  CARDIOVASCULAR: No chest pain, palpitations, passing out, dizziness, or leg swelling  GASTROINTESTINAL: No abdominal or epigastric pain. No nausea, vomiting, or hematemesis; No diarrhea or constipation. No melena or hematochezia.  GENITOURINARY: No dysuria, frequency, hematuria, or incontinence  NEUROLOGICAL: No headaches, memory loss, loss of strength, numbness, or tremors  SKIN: No itching, burning, rashes, or lesions   LYMPH Nodes: No enlarged glands  ENDOCRINE: No heat or cold intolerance; No hair loss  MUSCULOSKELETAL: No joint pain or swelling; No muscle, back, No extremity pain  PSYCHIATRIC: No depression, anxiety, mood swings, or difficulty sleeping  HEME/LYMPH: No easy bruising, or bleeding gums  ALLERGY AND IMMUNOLOGIC: No hives or eczema   . . .     Home Medications:   * Patient Currently Takes Medications as of 24-May-2022 10:41 documented in Structured Notes  · 	multivitamin: 1 tab(s) orally once a day  · 	hydrALAZINE 25 mg oral tablet: 1 tab(s) orally every 8 hours  · 	pantoprazole 40 mg oral delayed release tablet: 1 tab(s) orally once a day (before a meal)  · 	sodium bicarbonate 650 mg oral tablet: 1 tab(s) orally 3 times a day  · 	torsemide 20 mg oral tablet: 1 tab(s) orally once a day  · 	carvedilol 6.25 mg oral tablet: 1 tab(s) orally every 12 hours  · 	aspirin 81 mg oral tablet, chewable: 1 tab(s) orally once a day  · 	isosorbide mononitrate 30 mg oral tablet, extended release: 1 tab(s) orally once a day  · 	timolol maleate 0.5% ophthalmic solution: 1 drop(s) to each affected eye 2 times a day  	hosp  · 	Vitamin D3 1250 mcg (50,000 intl units) oral capsule: 1 cap(s) orally once a week  · 	Combigan 0.2%-0.5% ophthalmic solution: 1 drop(s) to each affected eye 2 times a day  	home  · 	Januvia 25 mg oral tablet: 1 tab(s) orally once a day  	home  · 	folic acid 1 mg oral tablet: 1 tab(s) orally once a day  	home/hosp  · 	labetalol 100 mg oral tablet: 1 tab(s) orally 2 times a day  · 	amLODIPine 5 mg oral tablet: 1 tab(s) orally once a day  · 	allopurinol 100 mg oral tablet: 1 tab(s) orally 2 times a day   . . .   · Substance use	No   . . . Lives at home with son"  <End of quote(s) from H&P>    ADDITIONAL Hx form chart review:    He was hospitalized 5/10/22 for acute EtOH withdrawal. At that time he said he was drinking 2 glasses of whiskey every night.      Per radiology report of CT head:  "Comparison made to previous head CT 5/14/2022    There is diffuse prominence of cortical sulci, fissures and ventricles   related generalized volume loss. There is moderate pericolonic matter   hypodensities likely to small vessel ischemic disease. There is a   prominent right inferior basal ganglia perivascular cystic space. There   is no midline shift. There is no acute intracranial hemorrhage.    There are extensive atherosclerotic calcifications of the distal internal   carotid and vertebral arteries.    The calvarium is intact. The visualized orbits are unremarkable. There is   frontal ethmoid sinus mucosal disease. The nasal septum is deviated to   the right. There are multiple opacified left mastoid air cells.    IMPRESSION:    No acute intracranial hemorrhage, midline shift or mass effect.    Moderate white matter small vessel ischemic disease.    Extensive atherosclerotic calcifications of the distal carotid and   vertebral arteries."    Per radiology report of CTAs head and neck and CTP:  "The RIGHT carotid bifurcation is significant for moderate plaque at the   origin of the RIGHT internal carotid artery resulting in high-grade   (70-80%) stenosis. The LEFT carotid bifurcation demonstrates moderate   calcified plaque at the origin of the LEFT internal carotid artery   resulting in a high-grade (70-80%) stenosis. The vertebral arteries are   patent.    The intracranial circulation is intact without aneurysm, vascular   malformation or abnormal vessel termination.  Scattered vascular   calcification involves the intracranial vertebral arteries, basilar   artery and cavernous internal carotid arteries.    The brain demonstrates mild periventricular and deep white matter   ischemia.  No acute cerebral cortical infarct is seen.  No intracranial   hemorrhage is found.  The ventricles, sulci and basal cisterns appear   unremarkable.    The orbits are unremarkable.  The paranasal sinuses are clear.  The nasal   cavity remains intact.  The nasopharynx is symmetric.  The central skull   base, petrous temporal bones and calvarium remain intact.    Perfusion parameters are reported as follows:    CBF < 30%: 0 mL  TMax > 6s: 30 mL  Mismatch volume: 30 mL  Mismatchratio: Infinite    Perfusion imaging predicted core infarct of 0 ml. Based on the perfusion   mismatch, there is a predicted volume of 30 mL of critically hypoperfused   tissue at risk in the centrum semiovale ovale and BILATERAL inferior   frontal white matter.      IMPRESSION:        1.   Right carotid system: Moderate plaque at the origin of the RIGHT   internal carotid artery resulting in high-grade (70-80%) stenosis.        2.   Left carotid system:  Moderate calcified plaque at the origin of   the LEFT internal carotid artery resulting in a high-grade (70-80%)   stenosis.        3.   Intracranial circulation:  No significant vascular lesion.  Mild   arteriosclerosis.        4.   Brain:  Mild periventricular and deep white matter ischemia.        5.  Perfusion: No core infarct. 30 mL critically hypoperfused tissue   at risk is identified."    MCVs 105, 102.9 in 2022; now 96.1  B12/folate  296/>20 in 2021      EXAMINATION     Awake, alert, supine on Gurney.  Interviewed by me in English.  Provides the correct date and location; Pres = "Biden."  Grossly normal comprehension, expression, articulation in English;  limited prosody; deliberate rate of speaking, mild psychomotor slowing.  Dense cataracts OU; sees nothing OS; can count fingers at four feet OD.      No gross UE drift.  On confrontation testing of limb muscles he is quite strong, without grossly evident focal or lateralized motor deficits.  LT and P sensation grossly intact; no extinction on DSS.    Reflex                           Right    Left   Comment    Biceps                            1         1  Triceps                           0         0  Patellar                           0         0  Gastroc                           0        0  heel cords tight bilaterally  Plantar                         flexor  flexor    No tremor or AIMs.          BIB family member from home early this afternoon.  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 Per the patient, patient returned home from dialysis and started having 5/10 pain in the back of his neck and had complete loss of vision for 15 minutes. Patient was able to stand and walk during the episode of vision loss. The vision is slowly returning but it hasn't returned to baseline. The neck pain did not radiate to the head or back. Patient denies headache, dizziness, SOB, or chest pain or N/V. He denies any numbness, tingling, weakness, unbalanced, slurred speech.     Of note, patient was admitted to [facility] for 3 weeks and discharged on 12/11/23 due to his perm cath Staph infection and was discharged with antibiotics.  .   IN ED   T 98.3  HR 64, /84  RR18 O2 100% in RA       Review of Systems:  Review of Systems: CONSTITUTIONAL: No fever, weight loss, or fatigue  EYES: right vision not back at baseline; No eye pain or discharge  ENT:  No difficulty hearing, tinnitus, vertigo; No sinus or throat pain  NECK: + neck pain; No stiffness  RESPIRATORY: No cough, wheezing, chills or hemoptysis; No Shortness of Breath  CARDIOVASCULAR: No chest pain, palpitations, passing out, dizziness, or leg swelling  GASTROINTESTINAL: No abdominal or epigastric pain. No nausea, vomiting, or hematemesis; No diarrhea or constipation. No melena or hematochezia.  GENITOURINARY: No dysuria, frequency, hematuria, or incontinence  NEUROLOGICAL: No headaches, memory loss, loss of strength, numbness, or tremors  SKIN: No itching, burning, rashes, or lesions   LYMPH Nodes: No enlarged glands  ENDOCRINE: No heat or cold intolerance; No hair loss  MUSCULOSKELETAL: No joint pain or swelling; No muscle, back, No extremity pain  PSYCHIATRIC: No depression, anxiety, mood swings, or difficulty sleeping  HEME/LYMPH: No easy bruising, or bleeding gums  ALLERGY AND IMMUNOLOGIC: No hives or eczema   . . .     Home Medications:   * Patient Currently Takes Medications as of 24-May-2022 10:41 documented in Structured Notes  · 	multivitamin: 1 tab(s) orally once a day  · 	hydrALAZINE 25 mg oral tablet: 1 tab(s) orally every 8 hours  · 	pantoprazole 40 mg oral delayed release tablet: 1 tab(s) orally once a day (before a meal)  · 	sodium bicarbonate 650 mg oral tablet: 1 tab(s) orally 3 times a day  · 	torsemide 20 mg oral tablet: 1 tab(s) orally once a day  · 	carvedilol 6.25 mg oral tablet: 1 tab(s) orally every 12 hours  · 	aspirin 81 mg oral tablet, chewable: 1 tab(s) orally once a day  · 	isosorbide mononitrate 30 mg oral tablet, extended release: 1 tab(s) orally once a day  · 	timolol maleate 0.5% ophthalmic solution: 1 drop(s) to each affected eye 2 times a day  	hosp  · 	Vitamin D3 1250 mcg (50,000 intl units) oral capsule: 1 cap(s) orally once a week  · 	Combigan 0.2%-0.5% ophthalmic solution: 1 drop(s) to each affected eye 2 times a day  	home  · 	Januvia 25 mg oral tablet: 1 tab(s) orally once a day  	home  · 	folic acid 1 mg oral tablet: 1 tab(s) orally once a day  	home/hosp  · 	labetalol 100 mg oral tablet: 1 tab(s) orally 2 times a day  · 	amLODIPine 5 mg oral tablet: 1 tab(s) orally once a day  · 	allopurinol 100 mg oral tablet: 1 tab(s) orally 2 times a day   . . .   · Substance use	No   . . . Lives at home with son"  <End of quote(s) from H&P>    ADDITIONAL Hx form chart review:    He was hospitalized 5/10/22 for acute EtOH withdrawal. At that time he said he was drinking 2 glasses of whiskey every night.      Per radiology report of CT head:  "Comparison made to previous head CT 5/14/2022    There is diffuse prominence of cortical sulci, fissures and ventricles   related generalized volume loss. There is moderate pericolonic matter   hypodensities likely to small vessel ischemic disease. There is a   prominent right inferior basal ganglia perivascular cystic space. There   is no midline shift. There is no acute intracranial hemorrhage.    There are extensive atherosclerotic calcifications of the distal internal   carotid and vertebral arteries.    The calvarium is intact. The visualized orbits are unremarkable. There is   frontal ethmoid sinus mucosal disease. The nasal septum is deviated to   the right. There are multiple opacified left mastoid air cells.    IMPRESSION:    No acute intracranial hemorrhage, midline shift or mass effect.    Moderate white matter small vessel ischemic disease.    Extensive atherosclerotic calcifications of the distal carotid and   vertebral arteries."    Per radiology report of CTAs head and neck and CTP:  "The RIGHT carotid bifurcation is significant for moderate plaque at the   origin of the RIGHT internal carotid artery resulting in high-grade   (70-80%) stenosis. The LEFT carotid bifurcation demonstrates moderate   calcified plaque at the origin of the LEFT internal carotid artery   resulting in a high-grade (70-80%) stenosis. The vertebral arteries are   patent.    The intracranial circulation is intact without aneurysm, vascular   malformation or abnormal vessel termination.  Scattered vascular   calcification involves the intracranial vertebral arteries, basilar   artery and cavernous internal carotid arteries.    The brain demonstrates mild periventricular and deep white matter   ischemia.  No acute cerebral cortical infarct is seen.  No intracranial   hemorrhage is found.  The ventricles, sulci and basal cisterns appear   unremarkable.    The orbits are unremarkable.  The paranasal sinuses are clear.  The nasal   cavity remains intact.  The nasopharynx is symmetric.  The central skull   base, petrous temporal bones and calvarium remain intact.    Perfusion parameters are reported as follows:    CBF < 30%: 0 mL  TMax > 6s: 30 mL  Mismatch volume: 30 mL  Mismatchratio: Infinite    Perfusion imaging predicted core infarct of 0 ml. Based on the perfusion   mismatch, there is a predicted volume of 30 mL of critically hypoperfused   tissue at risk in the centrum semiovale ovale and BILATERAL inferior   frontal white matter.      IMPRESSION:        1.   Right carotid system: Moderate plaque at the origin of the RIGHT   internal carotid artery resulting in high-grade (70-80%) stenosis.        2.   Left carotid system:  Moderate calcified plaque at the origin of   the LEFT internal carotid artery resulting in a high-grade (70-80%)   stenosis.        3.   Intracranial circulation:  No significant vascular lesion.  Mild   arteriosclerosis.        4.   Brain:  Mild periventricular and deep white matter ischemia.        5.  Perfusion: No core infarct. 30 mL critically hypoperfused tissue   at risk is identified."    MCVs 105, 102.9 in 2022; now 96.1  B12/folate  296/>20 in 2021      EXAMINATION     Awake, alert, supine on Gurney.  Interviewed by me in Lao.  Provides the correct date and location; Pres = "Biden."  Grossly normal comprehension, expression, articulation in [place]o;  limited prosody; deliberate rate of speaking, mild psychomotor slowing.  Dense cataracts OU; sees nothing OS; can count fingers at four feet OD.      No gross UE drift.  On confrontation testing of limb muscles he is quite strong, without grossly evident focal or lateralized motor deficits.  LT and P sensation grossly intact; no extinction on DSS.    Reflex                           Right    Left   Comment    Biceps                            1         1  Triceps                           0         0  Patellar                           0         0  Gastroc                           0        0  heel cords tight bilaterally  Plantar                         flexor  flexor    No tremor or AIMs.

## 2023-12-16 NOTE — H&P ADULT - PROBLEM SELECTOR PLAN 1
CTH negative for acute hemorrhage. Moderate white matter small vessel ischemic disease.  CTA Head: R and L ICA stenosis (70-80%) and deep white matter ischemia.  Patient with continued right vision dysfunction - NIHSS 1 in ED   Admit to tele   c/w aspirin 81mg, plavix 75mg, statin 80mg   Consulted Neuro Ana María  Consulted Cardio Ania   f/u Echo with bubble

## 2023-12-16 NOTE — ED ADULT NURSE NOTE - NSFALLUNIVINTERV_ED_ALL_ED
Bed/Stretcher in lowest position, wheels locked, appropriate side rails in place/Call bell, personal items and telephone in reach/Instruct patient to call for assistance before getting out of bed/chair/stretcher/Non-slip footwear applied when patient is off stretcher/[place] to call system/Physically safe environment - no spills, clutter or unnecessary equipment/Purposeful proactive rounding/Room/bathroom lighting operational, light cord in reach Bed/Stretcher in lowest position, wheels locked, appropriate side rails in place/Call bell, personal items and telephone in reach/Instruct patient to call for assistance before getting out of bed/chair/stretcher/Non-slip footwear applied when patient is off stretcher/[place] to call system/Physically safe environment - no spills, clutter or unnecessary equipment/Purposeful proactive rounding/Room/bathroom lighting operational, light cord in reach

## 2023-12-16 NOTE — H&P ADULT - PROBLEM SELECTOR PROBLEM 3
Problem: Falls - Risk of:  Goal: Will remain free from falls  Will remain free from falls   Outcome: Met This Shift      Problem: Risk for Impaired Skin Integrity  Goal: Tissue integrity - skin and mucous membranes  Structural intactness and normal physiological function of skin and  mucous membranes.    Outcome: Met This Shift      Problem: Airway Clearance - Ineffective:  Goal: Ability to maintain a clear airway will improve  Ability to maintain a clear airway will improve   Outcome: Met This Shift      Problem: Skin Integrity - Impaired:  Goal: Absence of new skin breakdown  Absence of new skin breakdown   Outcome: Met This Shift ESRD on dialysis

## 2023-12-16 NOTE — H&P ADULT - PROBLEM SELECTOR PLAN 3
Patient gets dialysis at [facility] ([facility])  Consulted Nephro Dr. Dolan Patient gets dialysis at [facility] ([facility])  Consulted Nephro Dr. Dolan

## 2023-12-17 LAB
A1C WITH ESTIMATED AVERAGE GLUCOSE RESULT: 6.4 % — HIGH (ref 4–5.6)
A1C WITH ESTIMATED AVERAGE GLUCOSE RESULT: 6.4 % — HIGH (ref 4–5.6)
ANION GAP SERPL CALC-SCNC: 4 MMOL/L — LOW (ref 5–17)
ANION GAP SERPL CALC-SCNC: 4 MMOL/L — LOW (ref 5–17)
BUN SERPL-MCNC: 21 MG/DL — HIGH (ref 7–18)
BUN SERPL-MCNC: 21 MG/DL — HIGH (ref 7–18)
CALCIUM SERPL-MCNC: 8.8 MG/DL — SIGNIFICANT CHANGE UP (ref 8.4–10.5)
CALCIUM SERPL-MCNC: 8.8 MG/DL — SIGNIFICANT CHANGE UP (ref 8.4–10.5)
CHLORIDE SERPL-SCNC: 97 MMOL/L — SIGNIFICANT CHANGE UP (ref 96–108)
CHLORIDE SERPL-SCNC: 97 MMOL/L — SIGNIFICANT CHANGE UP (ref 96–108)
CHOLEST SERPL-MCNC: 88 MG/DL — SIGNIFICANT CHANGE UP
CHOLEST SERPL-MCNC: 88 MG/DL — SIGNIFICANT CHANGE UP
CO2 SERPL-SCNC: 32 MMOL/L — HIGH (ref 22–31)
CO2 SERPL-SCNC: 32 MMOL/L — HIGH (ref 22–31)
CREAT SERPL-MCNC: 3.26 MG/DL — HIGH (ref 0.5–1.3)
CREAT SERPL-MCNC: 3.26 MG/DL — HIGH (ref 0.5–1.3)
EGFR: 19 ML/MIN/1.73M2 — LOW
EGFR: 19 ML/MIN/1.73M2 — LOW
ESTIMATED AVERAGE GLUCOSE: 137 MG/DL — HIGH (ref 68–114)
ESTIMATED AVERAGE GLUCOSE: 137 MG/DL — HIGH (ref 68–114)
GLUCOSE BLDC GLUCOMTR-MCNC: 115 MG/DL — HIGH (ref 70–99)
GLUCOSE BLDC GLUCOMTR-MCNC: 115 MG/DL — HIGH (ref 70–99)
GLUCOSE BLDC GLUCOMTR-MCNC: 121 MG/DL — HIGH (ref 70–99)
GLUCOSE BLDC GLUCOMTR-MCNC: 121 MG/DL — HIGH (ref 70–99)
GLUCOSE BLDC GLUCOMTR-MCNC: 136 MG/DL — HIGH (ref 70–99)
GLUCOSE BLDC GLUCOMTR-MCNC: 136 MG/DL — HIGH (ref 70–99)
GLUCOSE BLDC GLUCOMTR-MCNC: 138 MG/DL — HIGH (ref 70–99)
GLUCOSE BLDC GLUCOMTR-MCNC: 138 MG/DL — HIGH (ref 70–99)
GLUCOSE SERPL-MCNC: 113 MG/DL — HIGH (ref 70–99)
GLUCOSE SERPL-MCNC: 113 MG/DL — HIGH (ref 70–99)
HCT VFR BLD CALC: 27.6 % — LOW (ref 39–50)
HCT VFR BLD CALC: 27.6 % — LOW (ref 39–50)
HDLC SERPL-MCNC: 37 MG/DL — LOW
HDLC SERPL-MCNC: 37 MG/DL — LOW
HGB BLD-MCNC: 9.2 G/DL — LOW (ref 13–17)
HGB BLD-MCNC: 9.2 G/DL — LOW (ref 13–17)
INR BLD: 1.98 RATIO — HIGH (ref 0.85–1.18)
INR BLD: 1.98 RATIO — HIGH (ref 0.85–1.18)
LIPID PNL WITH DIRECT LDL SERPL: 41 MG/DL — SIGNIFICANT CHANGE UP
LIPID PNL WITH DIRECT LDL SERPL: 41 MG/DL — SIGNIFICANT CHANGE UP
MAGNESIUM SERPL-MCNC: 1.9 MG/DL — SIGNIFICANT CHANGE UP (ref 1.6–2.6)
MAGNESIUM SERPL-MCNC: 1.9 MG/DL — SIGNIFICANT CHANGE UP (ref 1.6–2.6)
MCHC RBC-ENTMCNC: 31.7 PG — SIGNIFICANT CHANGE UP (ref 27–34)
MCHC RBC-ENTMCNC: 31.7 PG — SIGNIFICANT CHANGE UP (ref 27–34)
MCHC RBC-ENTMCNC: 33.3 GM/DL — SIGNIFICANT CHANGE UP (ref 32–36)
MCHC RBC-ENTMCNC: 33.3 GM/DL — SIGNIFICANT CHANGE UP (ref 32–36)
MCV RBC AUTO: 95.2 FL — SIGNIFICANT CHANGE UP (ref 80–100)
MCV RBC AUTO: 95.2 FL — SIGNIFICANT CHANGE UP (ref 80–100)
NON HDL CHOLESTEROL: 51 MG/DL — SIGNIFICANT CHANGE UP
NON HDL CHOLESTEROL: 51 MG/DL — SIGNIFICANT CHANGE UP
NRBC # BLD: 0 /100 WBCS — SIGNIFICANT CHANGE UP (ref 0–0)
NRBC # BLD: 0 /100 WBCS — SIGNIFICANT CHANGE UP (ref 0–0)
PHOSPHATE SERPL-MCNC: 2.9 MG/DL — SIGNIFICANT CHANGE UP (ref 2.5–4.5)
PHOSPHATE SERPL-MCNC: 2.9 MG/DL — SIGNIFICANT CHANGE UP (ref 2.5–4.5)
PLATELET # BLD AUTO: 155 K/UL — SIGNIFICANT CHANGE UP (ref 150–400)
PLATELET # BLD AUTO: 155 K/UL — SIGNIFICANT CHANGE UP (ref 150–400)
POTASSIUM SERPL-MCNC: 3.9 MMOL/L — SIGNIFICANT CHANGE UP (ref 3.5–5.3)
POTASSIUM SERPL-MCNC: 3.9 MMOL/L — SIGNIFICANT CHANGE UP (ref 3.5–5.3)
POTASSIUM SERPL-SCNC: 3.9 MMOL/L — SIGNIFICANT CHANGE UP (ref 3.5–5.3)
POTASSIUM SERPL-SCNC: 3.9 MMOL/L — SIGNIFICANT CHANGE UP (ref 3.5–5.3)
PROTHROM AB SERPL-ACNC: 22.1 SEC — HIGH (ref 9.5–13)
PROTHROM AB SERPL-ACNC: 22.1 SEC — HIGH (ref 9.5–13)
RBC # BLD: 2.9 M/UL — LOW (ref 4.2–5.8)
RBC # BLD: 2.9 M/UL — LOW (ref 4.2–5.8)
RBC # FLD: 11.9 % — SIGNIFICANT CHANGE UP (ref 10.3–14.5)
RBC # FLD: 11.9 % — SIGNIFICANT CHANGE UP (ref 10.3–14.5)
SODIUM SERPL-SCNC: 133 MMOL/L — LOW (ref 135–145)
SODIUM SERPL-SCNC: 133 MMOL/L — LOW (ref 135–145)
TRIGL SERPL-MCNC: 52 MG/DL — SIGNIFICANT CHANGE UP
TRIGL SERPL-MCNC: 52 MG/DL — SIGNIFICANT CHANGE UP
TSH SERPL-MCNC: 5.58 UU/ML — HIGH (ref 0.34–4.82)
TSH SERPL-MCNC: 5.58 UU/ML — HIGH (ref 0.34–4.82)
WBC # BLD: 6.56 K/UL — SIGNIFICANT CHANGE UP (ref 3.8–10.5)
WBC # BLD: 6.56 K/UL — SIGNIFICANT CHANGE UP (ref 3.8–10.5)
WBC # FLD AUTO: 6.56 K/UL — SIGNIFICANT CHANGE UP (ref 3.8–10.5)
WBC # FLD AUTO: 6.56 K/UL — SIGNIFICANT CHANGE UP (ref 3.8–10.5)

## 2023-12-17 RX ORDER — SODIUM CHLORIDE 9 MG/ML
1000 INJECTION INTRAMUSCULAR; INTRAVENOUS; SUBCUTANEOUS
Refills: 0 | Status: DISCONTINUED | OUTPATIENT
Start: 2023-12-17 | End: 2023-12-17

## 2023-12-17 RX ORDER — CHLORHEXIDINE GLUCONATE 213 G/1000ML
1 SOLUTION TOPICAL DAILY
Refills: 0 | Status: DISCONTINUED | OUTPATIENT
Start: 2023-12-17 | End: 2023-12-20

## 2023-12-17 RX ADMIN — DORZOLAMIDE HYDROCHLORIDE 1 DROP(S): 20 SOLUTION/ DROPS OPHTHALMIC at 05:47

## 2023-12-17 RX ADMIN — CLOPIDOGREL BISULFATE 75 MILLIGRAM(S): 75 TABLET, FILM COATED ORAL at 11:57

## 2023-12-17 RX ADMIN — HEPARIN SODIUM 5000 UNIT(S): 5000 INJECTION INTRAVENOUS; SUBCUTANEOUS at 14:03

## 2023-12-17 RX ADMIN — CHLORHEXIDINE GLUCONATE 1 APPLICATION(S): 213 SOLUTION TOPICAL at 11:59

## 2023-12-17 RX ADMIN — Medication 1 DROP(S): at 05:47

## 2023-12-17 RX ADMIN — DORZOLAMIDE HYDROCHLORIDE 1 DROP(S): 20 SOLUTION/ DROPS OPHTHALMIC at 17:22

## 2023-12-17 RX ADMIN — Medication 1 TABLET(S): at 11:57

## 2023-12-17 RX ADMIN — ATORVASTATIN CALCIUM 80 MILLIGRAM(S): 80 TABLET, FILM COATED ORAL at 21:53

## 2023-12-17 RX ADMIN — PANTOPRAZOLE SODIUM 40 MILLIGRAM(S): 20 TABLET, DELAYED RELEASE ORAL at 05:46

## 2023-12-17 RX ADMIN — BRIMONIDINE TARTRATE 1 DROP(S): 2 SOLUTION/ DROPS OPHTHALMIC at 17:22

## 2023-12-17 RX ADMIN — Medication 81 MILLIGRAM(S): at 11:57

## 2023-12-17 RX ADMIN — BRIMONIDINE TARTRATE 1 DROP(S): 2 SOLUTION/ DROPS OPHTHALMIC at 05:47

## 2023-12-17 RX ADMIN — Medication 1 DROP(S): at 17:21

## 2023-12-17 RX ADMIN — HEPARIN SODIUM 5000 UNIT(S): 5000 INJECTION INTRAVENOUS; SUBCUTANEOUS at 05:46

## 2023-12-17 NOTE — CONSULT NOTE ADULT - SUBJECTIVE AND OBJECTIVE BOX
Date of Service  12-17-23 @ 12:06    CHIEF COMPLAINT:Patient is a 78y old  Male who presents with a chief complaint of TIA.      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 Per the patient, patient returned home from dialysis and started having 5/10 pain in the back of his neck and had complete loss of vision for 15 minutes. Patient was able to stand and walk during the episode of vision loss. The vision is slowly returning but it hasn't returned to baseline. The neck pain did not radiate to the head or back. Patient denies headache, dizziness, SOB, or chest pain or N/V. He denies any numbness, tingling, weakness, unbalanced, slurred speech.     Of note, patient was admitted to [facility] for 3 weeks and discharged on 12/11/23 due to his perm cath Staph infection and was discharged with antibiotics.    IN ED   T 98.3  HR 64, /84  RR18 O2 100% in RA (16 Dec 2023 16:31)      PAST MEDICAL & SURGICAL HISTORY:  DM (diabetes mellitus)      HTN (hypertension)      Chronic kidney disease      HLD (hyperlipidemia)      Glaucoma      Gout                MEDICATIONS  (STANDING):  aspirin  chewable 81 milliGRAM(s) Oral daily  atorvastatin 80 milliGRAM(s) Oral at bedtime  brimonidine 0.2% Ophthalmic Solution 1 Drop(s) Both EYES two times a day  chlorhexidine 2% Cloths 1 Application(s) Topical daily  clopidogrel Tablet 75 milliGRAM(s) Oral daily  dorzolamide 2% Ophthalmic Solution 1 Drop(s) Both EYES <User Schedule>  heparin   Injectable 5000 Unit(s) SubCutaneous every 8 hours  insulin lispro (ADMELOG) corrective regimen sliding scale   SubCutaneous at bedtime  insulin lispro (ADMELOG) corrective regimen sliding scale   SubCutaneous three times a day before meals  Nephro-marcella 1 Tablet(s) Oral daily  pantoprazole    Tablet 40 milliGRAM(s) Oral before breakfast  timolol 0.5% Solution 1 Drop(s) Both EYES two times a day    MEDICATIONS  (PRN):  acetaminophen     Tablet .. 650 milliGRAM(s) Oral every 6 hours PRN Temp greater or equal to 38C (100.4F), Mild Pain (1 - 3)      FAMILY HISTORY:No hx of CAD      SOCIAL HISTORY:    [ x] Non-smoker    [ x] Alcohol-denies    Allergies    No Known Allergies    Intolerances    	    REVIEW OF SYSTEMS:  CONSTITUTIONAL: No fever, weight loss, or fatigue  EYES: No eye pain, + visual disturbances, No discharge  ENT:  No difficulty hearing, tinnitus, vertigo; No sinus or throat pain  NECK: No pain or stiffness  RESPIRATORY: No cough, wheezing, chills or hemoptysis; No Shortness of Breath  CARDIOVASCULAR: No chest pain, palpitations, passing out, dizziness, or leg swelling  GASTROINTESTINAL: No abdominal or epigastric pain. No nausea, vomiting, or hematemesis; No diarrhea or constipation. No melena or hematochezia.  GENITOURINARY: No dysuria, frequency, hematuria, or incontinence  NEUROLOGICAL: No headaches, memory loss, loss of strength, numbness, or tremors  SKIN: No itching, burning, rashes, or lesions   LYMPH Nodes: No enlarged glands  ENDOCRINE: No heat or cold intolerance; No hair loss  MUSCULOSKELETAL: No joint pain or swelling; No muscle, back, or extremity pain  PSYCHIATRIC: No depression, anxiety, mood swings, or difficulty sleeping  HEME/LYMPH: No easy bruising, or bleeding gums  ALLERGY AND IMMUNOLOGIC: No hives or eczema	      PHYSICAL EXAM:  T(C): 36.8 (12-17-23 @ 10:39), Max: 36.8 (12-16-23 @ 13:08)  HR: 63 (12-17-23 @ 10:39) (57 - 69)  BP: 123/83 (12-17-23 @ 10:39) (122/71 - 180/85)  RR: 18 (12-17-23 @ 10:39) (16 - 19)  SpO2: 99% (12-17-23 @ 10:39) (98% - 100%)  Wt(kg): --  I&O's Summary      Appearance: Normal	  HEENT:   Normal oral mucosa, PERRL, EOMI	  Lymphatic: No lymphadenopathy  Cardiovascular: Normal S1 S2, No JVD, No murmurs, No edema  Respiratory: Lungs clear to auscultation	  Psychiatry: A & O x 3, Mood & affect appropriate  Gastrointestinal:  Soft, Non-tender, + BS	  Skin: No rashes, No ecchymoses, No cyanosis	  Neurologic: Non-focal  Extremities: Normal range of motion, No clubbing, cyanosis or edema  Vascular: Peripheral pulses palpable 2+ bilaterally      ECG:  nsr,IVCD, t wave innersion dina-lateral leads	  	  	  LABS:	 	          Troponin I, High Sensitivity Result: 27.0 ng/L (12-16-23 @ 13:25)                          9.2    6.56  )-----------( 155      ( 17 Dec 2023 05:59 )             27.6     12-17    133<L>  |  97  |  21<H>  ----------------------------<  113<H>  3.9   |  32<H>  |  3.26<H>    Ca    8.8      17 Dec 2023 05:59  Phos  2.9     12-17  Mg     1.9     12-17    TPro  7.9  /  Alb  2.8<L>  /  TBili  0.4  /  DBili  x   /  AST  19  /  ALT  <6<L>  /  AlkPhos  131<H>  12-16    proBNP:   Lipid Profile: Cholesterol 88  LDL --  HDL 37  TG 52  ldl calc 41  Ratio --    HgA1c:   TSH: Thyroid Stimulating Hormone, Serum: 5.58 uU/mL (12-17 @ 05:59)        < from: Transthoracic Echocardiogram (05.15.22 @ 16:26) >  OBSERVATIONS:  Mitral Valve: Normal mitral valve. Mild mitral  regurgitation.  Aortic Root: Normal aortic root.  Aortic Valve: Calcified, probably trileaflet aortic valve  with normal opening. No aortic stenosis. Mild aortic  regurgitation ( msec).  Left Atrium: Normal left atrium.  LA volume index = 29  cc/m2.  Left Ventricle: Endocardium not well visualized; grossly  severely reduced left ventricular systolic function (EF  20-25% by visual estimation). Not all LV wall segments were  seen. Normal left ventricular internal dimensions and wall  thicknesses. Grade I diastolic dysfunction (Impaired  relaxation, mild).  Right Heart: Normal right atrium. Right ventricle not well  visualized. Normal RV systolic function (TAPSE 1.8 cm).  Normal tricuspid valve. Mild tricuspid regurgitation.  Pulmonic valve not well seen. Trace pulmonic insufficiency  is noted.  Pericardium/PleuraNo pericardial effusion.  Hemodynamic: RA Pressure is 8 mm Hg. RV systolic pressure  is mildly increased at  37 mm Hg.    < end of copied text >  < from: CT Brain Perfusion Maps Stroke (12.16.23 @ 13:54) >  ACC: [id number] EXAM:  CT ANGIO NECK STROKE PROTCL IC   ORDERED BY:   ROULA FUNEZ     ACC: [id number] EXAM:  CT BRAIN PERFUSION MAPS STROKE   ORDERED BY:   ROULA FUNEZ     ACC: [id number] EXAM:  CT ANGIO BRAIN STROKE PROTC IC   ORDERED BY:   ROULA FUNEZ     PROCEDURE DATE:  12/16/2023          INTERPRETATION:  Examinations were performed on this patient:  1. CT Angiography of the carotid arteries with IV contrast  2. CT Angiography of the intracranial circulation with IV contrast  3. CT Head without contrast  4. CT perfusion with contrast      CLINICAL INFORMATION:  Carotid stenosis. Intracranial stenosis/aneurysm.   TIA/stroke.    TECHNIQUE:   Preceding intravenous contrast contiguous axial 4 mm   sections were obtained through the head. CT angiography images at .5 mm   thickness were acquired from the aortic arch to the vertex of the skull.     Images were acquired during rapid bolus intravenous administration of 90   mL of Omnipaque 350 contrast/ 10 mL discarded.  This data setwas   reconstructed axial 1 mm sections. Post processing maximum intensity   projection angiographic reconstruction of images was performed.  This   data set was reconstructed and reviewed in 2 mm sagittal and coronal   reformatted images to evaluatecarotid morphology and intracranial   vessels.   The magnitude of stenosis was determined using NASCET   criteria.   This scan was performed using automatic exposure control   (radiation dose reduction software) to obtain a diagnostic image quality   scan with patient dose as low as reasonably achievable.  CT perfusion:   During IV contrast administration, serial thin section CT images of the   brain were obtained for CT perfusion. The raw data was sent to rapid   ischemia view for post processing.  FINDINGS:   No previous examinations are available for review.    The RIGHT carotid bifurcation is significant for moderate plaque at the   origin of the RIGHT internal carotid artery resulting in high-grade   (70-80%) stenosis. The LEFT carotid bifurcation demonstrates moderate   calcified plaque at the origin of the LEFT internal carotid artery   resulting in a high-grade (70-80%) stenosis. The vertebral arteries are   patent.    The intracranial circulation is intact without aneurysm, vascular   malformation or abnormal vessel termination.  Scattered vascular   calcification involves the intracranial vertebral arteries, basilar   artery and cavernous internal carotid arteries.    The brain demonstrates mild periventricular and deep white matter   ischemia.  No acute cerebral cortical infarct is seen.  No intracranial   hemorrhage is found.  The ventricles, sulci and basal cisterns appear   unremarkable.    The orbits are unremarkable.  The paranasal sinuses are clear.  The nasal   cavity remains intact.  The nasopharynx is symmetric.  The central skull   base, petrous temporal bones and calvarium remain intact.    Perfusion parameters are reported as follows:    CBF < 30%: 0 mL  TMax > 6s: 30 mL  Mismatch volume: 30 mL  Mismatchratio: Infinite    Perfusion imaging predicted core infarct of 0 ml. Based on the perfusion   mismatch, there is a predicted volume of 30 mL of critically hypoperfused   tissue at risk in the centrum semiovale ovale and BILATERAL inferior   frontal white matter.      IMPRESSION:        1.   Right carotid system: Moderate plaque at the origin of the RIGHT   internal carotid artery resulting in high-grade (70-80%) stenosis.        2.   Left carotid system:  Moderate calcified plaque at the origin of   the LEFT internal carotid artery resulting in a high-grade (70-80%)   stenosis.        3.   Intracranial circulation:  No significant vascular lesion.  Mild   arteriosclerosis.        4.   Brain:  Mild periventricular and deep white matter ischemia.        5.  Perfusion: No core infarct. 30 mL critically hypoperfused tissue   at risk is identified.    Critical value:  I discussed the finding of this report with Dr. Funez   at 2:30 PM on 12/16/2023.  Critical value policy of the hospital was   followed.  Read back and confirmation of receipt of this communication   was performed.  This verbal communication supplements the text report of   this document.    --- End of Report ---            KAMILLE CAMPBELL MD; Attending Radiologist  This document has been electronically signed. Dec 16 2023  2:39PM    < end of copied text >  < from: Cardiac Cath Lab - Adult (06.10.21 @ 14:54) >  Case Physician(s):  Vijaya Low M.D.  Fellow:  Jimmy Lal M.D.  Referring Physician:  Yaritza Jorge M.D.  INDICATIONS: Acute systolic congestive heart failure.  HISTORY: No history of previous myocardial infarction. The patient has  renal failure (not requiring dialysis).  PROCEDURE:  --  Right heart catheterization.  --  Left heart catheterization.  --  Left coronary angiography.  --  Right coronary angiography.  TECHNIQUE: The risks and alternatives of the procedures and conscious  sedation were explained to the patient and informed consent was obtained.  Cardiac catheterization performed electively.  Local anesthetic given. Right femoral artery access. Local anesthetic  given. A 5FR SHEATH PINNACLE was inserted in the vessel, utilizing the  modified Seldinger technique. Right femoral vein access. Local anesthetic  given. A 7FR SHEATH PINNACLE was inserted in the vessel, utilizing the  modified Seldinger technique. Right heart catheterization. The procedure  was performed utilizing a 5FR  EXPO catheter under fluoroscopic  guidance. Measurements of pressures, arterial and venous oxygen  saturation, and cardiac output (by Sigifredo using assumed VO2) were obtained.  The catheter was removed. Left heart catheterization. A 5FR FR4.0 EXPO  catheter was utilized. After recording ascending aortic pressure, the  catheter was advanced across the aortic valve and left ventricular  pressure was recorded. Left coronary artery angiography. The vessel was  injected utilizing a 5FR FL4.0 EXPO catheter and positioned in the vessel  ostia under fluoroscopic guidance. Angiography was performed in multiple  projections using hand-injection of contrast. Right coronary artery  angiography. The vessel was injected utilizing a 5FR FR4.0 EXPO catheter  and positioned in the vessel ostia under fluoroscopic guidance.  Angiography was performed in multiple projections using hand-injection of  contrast. RADIATION EXPOSURE: 14.2 min.  CONTRAST GIVEN: Omnipaque 35 ml.  MEDICATIONS GIVEN: Heparin, 5000 units, IV.  VENTRICLES: No left ventriculogram was performed.  CORONARY VESSELS: The coronary circulation is right dominant.  LM:   --  LM: Angiography showed mild atherosclerosis with no flow limiting  lesions.  LAD:   --  Proximal LAD: There was a tubular 40 % stenosis at a site with  no prior intervention. There was BALDEMAR grade 3 flow through the vessel  (brisk flow). iFR negative.  --  D1: There was a discrete 95 % stenosis at a site with no prior  intervention. There was BALDEMAR grade 3 flow through the vessel (brisk flow).  CX:   --  Circumflex: Angiography showed mild atherosclerosis with no flow  limiting lesions.  RCA:   --  RCA: Angiography showed mild atherosclerosis with no flow  limiting lesions.  COMPLICATIONS: There were no complications.  DIAGNOSTIC IMPRESSIONS: Severe stenosis of small to medium size D-1  Moderate stenosis proximal LAD, IFR negative  LVEF 31% is out of proportion to the CAD  Hemodynacmics as listed  DIAGNOSTIC RECOMMENDATIONS: Medical therapy for CAD  Optimize HF therapy  PCI to D-1 only if patient has angina  INTERVENTIONAL IMPRESSIONS: Severe stenosis of small to medium size D-1  Moderate stenosis proximal LAD, IFR negative  LVEF 31% is out of proportion to the CAD  Hemodynacmics as listed  INTERVENTIONAL RECOMMENDATIONS: Medical therapy for CAD    < end of copied text >       Date of Service  12-17-23 @ 12:06    CHIEF COMPLAINT:Patient is a 78y old  Male who presents with a chief complaint of TIA.      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 Per the patient, patient returned home from dialysis and started having 5/10 pain in the back of his neck and had complete loss of vision for 15 minutes. Patient was able to stand and walk during the episode of vision loss. The vision is slowly returning but it hasn't returned to baseline. The neck pain did not radiate to the head or back. Patient denies headache, dizziness, SOB, or chest pain or N/V. He denies any numbness, tingling, weakness, unbalanced, slurred speech.     Of note, patient was admitted to [facility] for 3 weeks and discharged on 12/11/23 due to his perm cath Staph infection and was discharged with antibiotics.    IN ED   T 98.3  HR 64, /84  RR18 O2 100% in RA (16 Dec 2023 16:31)      PAST MEDICAL & SURGICAL HISTORY:  DM (diabetes mellitus)      HTN (hypertension)      Chronic kidney disease      HLD (hyperlipidemia)      Glaucoma      Gout                MEDICATIONS  (STANDING):  aspirin  chewable 81 milliGRAM(s) Oral daily  atorvastatin 80 milliGRAM(s) Oral at bedtime  brimonidine 0.2% Ophthalmic Solution 1 Drop(s) Both EYES two times a day  chlorhexidine 2% Cloths 1 Application(s) Topical daily  clopidogrel Tablet 75 milliGRAM(s) Oral daily  dorzolamide 2% Ophthalmic Solution 1 Drop(s) Both EYES <User Schedule>  heparin   Injectable 5000 Unit(s) SubCutaneous every 8 hours  insulin lispro (ADMELOG) corrective regimen sliding scale   SubCutaneous at bedtime  insulin lispro (ADMELOG) corrective regimen sliding scale   SubCutaneous three times a day before meals  Nephro-marcella 1 Tablet(s) Oral daily  pantoprazole    Tablet 40 milliGRAM(s) Oral before breakfast  timolol 0.5% Solution 1 Drop(s) Both EYES two times a day    MEDICATIONS  (PRN):  acetaminophen     Tablet .. 650 milliGRAM(s) Oral every 6 hours PRN Temp greater or equal to 38C (100.4F), Mild Pain (1 - 3)      FAMILY HISTORY:No hx of CAD      SOCIAL HISTORY:    [ x] Non-smoker    [ x] Alcohol-denies    Allergies    No Known Allergies    Intolerances    	    REVIEW OF SYSTEMS:  CONSTITUTIONAL: No fever, weight loss, or fatigue  EYES: No eye pain, + visual disturbances, No discharge  ENT:  No difficulty hearing, tinnitus, vertigo; No sinus or throat pain  NECK: No pain or stiffness  RESPIRATORY: No cough, wheezing, chills or hemoptysis; No Shortness of Breath  CARDIOVASCULAR: No chest pain, palpitations, passing out, dizziness, or leg swelling  GASTROINTESTINAL: No abdominal or epigastric pain. No nausea, vomiting, or hematemesis; No diarrhea or constipation. No melena or hematochezia.  GENITOURINARY: No dysuria, frequency, hematuria, or incontinence  NEUROLOGICAL: No headaches, memory loss, loss of strength, numbness, or tremors  SKIN: No itching, burning, rashes, or lesions   LYMPH Nodes: No enlarged glands  ENDOCRINE: No heat or cold intolerance; No hair loss  MUSCULOSKELETAL: No joint pain or swelling; No muscle, back, or extremity pain  PSYCHIATRIC: No depression, anxiety, mood swings, or difficulty sleeping  HEME/LYMPH: No easy bruising, or bleeding gums  ALLERGY AND IMMUNOLOGIC: No hives or eczema	      PHYSICAL EXAM:  T(C): 36.8 (12-17-23 @ 10:39), Max: 36.8 (12-16-23 @ 13:08)  HR: 63 (12-17-23 @ 10:39) (57 - 69)  BP: 123/83 (12-17-23 @ 10:39) (122/71 - 180/85)  RR: 18 (12-17-23 @ 10:39) (16 - 19)  SpO2: 99% (12-17-23 @ 10:39) (98% - 100%)  Wt(kg): --  I&O's Summary      Appearance: Normal	  HEENT:   Normal oral mucosa, PERRL, EOMI	  Lymphatic: No lymphadenopathy  Cardiovascular: Normal S1 S2, No JVD, No murmurs, No edema  Respiratory: Lungs clear to auscultation	  Psychiatry: A & O x 3, Mood & affect appropriate  Gastrointestinal:  Soft, Non-tender, + BS	  Skin: No rashes, No ecchymoses, No cyanosis	  Neurologic: Non-focal  Extremities: Normal range of motion, No clubbing, cyanosis or edema  Vascular: Peripheral pulses palpable 2+ bilaterally      ECG:  nsr,IVCD, t wave innersion dina-lateral leads	  	  	  LABS:	 	          Troponin I, High Sensitivity Result: 27.0 ng/L (12-16-23 @ 13:25)                          9.2    6.56  )-----------( 155      ( 17 Dec 2023 05:59 )             27.6     12-17    133<L>  |  97  |  21<H>  ----------------------------<  113<H>  3.9   |  32<H>  |  3.26<H>    Ca    8.8      17 Dec 2023 05:59  Phos  2.9     12-17  Mg     1.9     12-17    TPro  7.9  /  Alb  2.8<L>  /  TBili  0.4  /  DBili  x   /  AST  19  /  ALT  <6<L>  /  AlkPhos  131<H>  12-16    proBNP:   Lipid Profile: Cholesterol 88  LDL --  HDL 37  TG 52  ldl calc 41  Ratio --    HgA1c:   TSH: Thyroid Stimulating Hormone, Serum: 5.58 uU/mL (12-17 @ 05:59)        < from: Transthoracic Echocardiogram (05.15.22 @ 16:26) >  OBSERVATIONS:  Mitral Valve: Normal mitral valve. Mild mitral  regurgitation.  Aortic Root: Normal aortic root.  Aortic Valve: Calcified, probably trileaflet aortic valve  with normal opening. No aortic stenosis. Mild aortic  regurgitation ( msec).  Left Atrium: Normal left atrium.  LA volume index = 29  cc/m2.  Left Ventricle: Endocardium not well visualized; grossly  severely reduced left ventricular systolic function (EF  20-25% by visual estimation). Not all LV wall segments were  seen. Normal left ventricular internal dimensions and wall  thicknesses. Grade I diastolic dysfunction (Impaired  relaxation, mild).  Right Heart: Normal right atrium. Right ventricle not well  visualized. Normal RV systolic function (TAPSE 1.8 cm).  Normal tricuspid valve. Mild tricuspid regurgitation.  Pulmonic valve not well seen. Trace pulmonic insufficiency  is noted.  Pericardium/PleuraNo pericardial effusion.  Hemodynamic: RA Pressure is 8 mm Hg. RV systolic pressure  is mildly increased at  37 mm Hg.    < end of copied text >  < from: CT Brain Perfusion Maps Stroke (12.16.23 @ 13:54) >  ACC: [id number] EXAM:  CT ANGIO NECK STROKE PROTCL IC   ORDERED BY:   ROULA FUNEZ     ACC: [id number] EXAM:  CT BRAIN PERFUSION MAPS STROKE   ORDERED BY:   ROULA FUNEZ     ACC: [id number] EXAM:  CT ANGIO BRAIN STROKE PROTC IC   ORDERED BY:   ROULA FUNEZ     PROCEDURE DATE:  12/16/2023          INTERPRETATION:  Examinations were performed on this patient:  1. CT Angiography of the carotid arteries with IV contrast  2. CT Angiography of the intracranial circulation with IV contrast  3. CT Head without contrast  4. CT perfusion with contrast      CLINICAL INFORMATION:  Carotid stenosis. Intracranial stenosis/aneurysm.   TIA/stroke.    TECHNIQUE:   Preceding intravenous contrast contiguous axial 4 mm   sections were obtained through the head. CT angiography images at .5 mm   thickness were acquired from the aortic arch to the vertex of the skull.     Images were acquired during rapid bolus intravenous administration of 90   mL of Omnipaque 350 contrast/ 10 mL discarded.  This data setwas   reconstructed axial 1 mm sections. Post processing maximum intensity   projection angiographic reconstruction of images was performed.  This   data set was reconstructed and reviewed in 2 mm sagittal and coronal   reformatted images to evaluatecarotid morphology and intracranial   vessels.   The magnitude of stenosis was determined using NASCET   criteria.   This scan was performed using automatic exposure control   (radiation dose reduction software) to obtain a diagnostic image quality   scan with patient dose as low as reasonably achievable.  CT perfusion:   During IV contrast administration, serial thin section CT images of the   brain were obtained for CT perfusion. The raw data was sent to rapid   ischemia view for post processing.  FINDINGS:   No previous examinations are available for review.    The RIGHT carotid bifurcation is significant for moderate plaque at the   origin of the RIGHT internal carotid artery resulting in high-grade   (70-80%) stenosis. The LEFT carotid bifurcation demonstrates moderate   calcified plaque at the origin of the LEFT internal carotid artery   resulting in a high-grade (70-80%) stenosis. The vertebral arteries are   patent.    The intracranial circulation is intact without aneurysm, vascular   malformation or abnormal vessel termination.  Scattered vascular   calcification involves the intracranial vertebral arteries, basilar   artery and cavernous internal carotid arteries.    The brain demonstrates mild periventricular and deep white matter   ischemia.  No acute cerebral cortical infarct is seen.  No intracranial   hemorrhage is found.  The ventricles, sulci and basal cisterns appear   unremarkable.    The orbits are unremarkable.  The paranasal sinuses are clear.  The nasal   cavity remains intact.  The nasopharynx is symmetric.  The central skull   base, petrous temporal bones and calvarium remain intact.    Perfusion parameters are reported as follows:    CBF < 30%: 0 mL  TMax > 6s: 30 mL  Mismatch volume: 30 mL  Mismatchratio: Infinite    Perfusion imaging predicted core infarct of 0 ml. Based on the perfusion   mismatch, there is a predicted volume of 30 mL of critically hypoperfused   tissue at risk in the centrum semiovale ovale and BILATERAL inferior   frontal white matter.      IMPRESSION:        1.   Right carotid system: Moderate plaque at the origin of the RIGHT   internal carotid artery resulting in high-grade (70-80%) stenosis.        2.   Left carotid system:  Moderate calcified plaque at the origin of   the LEFT internal carotid artery resulting in a high-grade (70-80%)   stenosis.        3.   Intracranial circulation:  No significant vascular lesion.  Mild   arteriosclerosis.        4.   Brain:  Mild periventricular and deep white matter ischemia.        5.  Perfusion: No core infarct. 30 mL critically hypoperfused tissue   at risk is identified.    Critical value:  I discussed the finding of this report with Dr. Funez   at 2:30 PM on 12/16/2023.  Critical value policy of the hospital was   followed.  Read back and confirmation of receipt of this communication   was performed.  This verbal communication supplements the text report of   this document.    --- End of Report ---            KAMILLE CAMPBELL MD; Attending Radiologist  This document has been electronically signed. Dec 16 2023  2:39PM    < end of copied text >  < from: Cardiac Cath Lab - Adult (06.10.21 @ 14:54) >  Case Physician(s):  Vijaya Low M.D.  Fellow:  Jimmy Lal M.D.  Referring Physician:  Yaritza Jorge M.D.  INDICATIONS: Acute systolic congestive heart failure.  HISTORY: No history of previous myocardial infarction. The patient has  renal failure (not requiring dialysis).  PROCEDURE:  --  Right heart catheterization.  --  Left heart catheterization.  --  Left coronary angiography.  --  Right coronary angiography.  TECHNIQUE: The risks and alternatives of the procedures and conscious  sedation were explained to the patient and informed consent was obtained.  Cardiac catheterization performed electively.  Local anesthetic given. Right femoral artery access. Local anesthetic  given. A 5FR SHEATH PINNACLE was inserted in the vessel, utilizing the  modified Seldinger technique. Right femoral vein access. Local anesthetic  given. A 7FR SHEATH PINNACLE was inserted in the vessel, utilizing the  modified Seldinger technique. Right heart catheterization. The procedure  was performed utilizing a 5FR  EXPO catheter under fluoroscopic  guidance. Measurements of pressures, arterial and venous oxygen  saturation, and cardiac output (by Sigifredo using assumed VO2) were obtained.  The catheter was removed. Left heart catheterization. A 5FR FR4.0 EXPO  catheter was utilized. After recording ascending aortic pressure, the  catheter was advanced across the aortic valve and left ventricular  pressure was recorded. Left coronary artery angiography. The vessel was  injected utilizing a 5FR FL4.0 EXPO catheter and positioned in the vessel  ostia under fluoroscopic guidance. Angiography was performed in multiple  projections using hand-injection of contrast. Right coronary artery  angiography. The vessel was injected utilizing a 5FR FR4.0 EXPO catheter  and positioned in the vessel ostia under fluoroscopic guidance.  Angiography was performed in multiple projections using hand-injection of  contrast. RADIATION EXPOSURE: 14.2 min.  CONTRAST GIVEN: Omnipaque 35 ml.  MEDICATIONS GIVEN: Heparin, 5000 units, IV.  VENTRICLES: No left ventriculogram was performed.  CORONARY VESSELS: The coronary circulation is right dominant.  LM:   --  LM: Angiography showed mild atherosclerosis with no flow limiting  lesions.  LAD:   --  Proximal LAD: There was a tubular 40 % stenosis at a site with  no prior intervention. There was BALDEMAR grade 3 flow through the vessel  (brisk flow). iFR negative.  --  D1: There was a discrete 95 % stenosis at a site with no prior  intervention. There was BALDEMAR grade 3 flow through the vessel (brisk flow).  CX:   --  Circumflex: Angiography showed mild atherosclerosis with no flow  limiting lesions.  RCA:   --  RCA: Angiography showed mild atherosclerosis with no flow  limiting lesions.  COMPLICATIONS: There were no complications.  DIAGNOSTIC IMPRESSIONS: Severe stenosis of small to medium size D-1  Moderate stenosis proximal LAD, IFR negative  LVEF 31% is out of proportion to the CAD  Hemodynacmics as listed  DIAGNOSTIC RECOMMENDATIONS: Medical therapy for CAD  Optimize HF therapy  PCI to D-1 only if patient has angina  INTERVENTIONAL IMPRESSIONS: Severe stenosis of small to medium size D-1  Moderate stenosis proximal LAD, IFR negative  LVEF 31% is out of proportion to the CAD  Hemodynacmics as listed  INTERVENTIONAL RECOMMENDATIONS: Medical therapy for CAD    < end of copied text >

## 2023-12-17 NOTE — PROGRESS NOTE ADULT - PROBLEM SELECTOR PLAN 1
p/w complete loss of vision for 15 mins post dialysis   CTH negative for acute hemorrhage. Moderate white matter small vessel ischemic disease.  CTA Head: R and L ICA stenosis (70-80%) and deep white matter ischemia.  Patient with continued right vision dysfunction - NIHSS 1 in ED   Started on Aspirin 81mg, Plavix 75mg and statin 80mg  Admited to tele   Neuro Dr. Gotti consulted  Cardio Dr. Jorge consulted   - c/w aspirin 81mg, plavix 75mg, statin 80mg   - f/u Echo with bubble p/w complete loss of vision for 15 mins post dialysis   CTH negative for acute hemorrhage. Moderate white matter small vessel ischemic disease.  CTA Head: R and L ICA stenosis (70-80%) and deep white matter ischemia.  Patient with continued right vision dysfunction - NIHSS 1 in ED   Started on Aspirin 81mg, Plavix 75mg and statin 80mg  Admitted to tele   Neuro Dr. Gotti consulted  Cardio Dr. Jorge consulted   - c/w aspirin 81mg, plavix 75mg, statin 80mg   - f/u Echo with bubble

## 2023-12-17 NOTE — PROGRESS NOTE ADULT - SUBJECTIVE AND OBJECTIVE BOX
PGY-1 Progress Note discussed with attending    PAGER #: [[phone]] TILL 5:00 PM  PLEASE CONTACT ON CALL TEAM:  - On Call Team (Please refer to Darrick) FROM 5:00 PM - 8:30PM  - Nightfloat Team FROM 8:30 -7:30 AM    INTERVAL HPI/OVERNIGHT EVENTS:   No overnight events. Pt was examined in the morning at bedside and he is in NAD at the moment. Pt denies any symptom    REVIEW OF SYSTEMS:  CONSTITUTIONAL: No fever, chills,  or fatigue  RESPIRATORY: No cough, wheezing, No shortness of breath  CARDIOVASCULAR: No chest pain, palpitations,  leg swelling  GASTROINTESTINAL: No abdominal pain. No nausea, vomiting, or hematemesis; No diarrhea or constipation. No bloody or black stool  GENITOURINARY: No dysuria or hematuria, urinary frequency  NEUROLOGICAL: No headaches, dizziness  SKIN: No itching, burning, rashes, or lesions     Vital Signs Last 24 Hrs  T(C): 36.6 (17 Dec 2023 05:13), Max: 36.8 (16 Dec 2023 13:08)  T(F): 97.9 (17 Dec 2023 05:13), Max: 98.3 (16 Dec 2023 13:08)  HR: 59 (17 Dec 2023 05:13) (57 - 69)  BP: 122/71 (17 Dec 2023 05:13) (122/71 - 180/85)  BP(mean): --  RR: 18 (17 Dec 2023 05:13) (16 - 19)  SpO2: 100% (17 Dec 2023 05:13) (98% - 100%)    Parameters below as of 17 Dec 2023 05:13  Patient On (Oxygen Delivery Method): room air        PHYSICAL EXAMINATION:  GENERAL: NAD,   HEAD:  Atraumatic, Normocephalic  EYES:  + B/L cataract noted,  conjunctiva and sclera clear  NECK: Supple, No JVD,   CHEST/LUNG: Clear to auscultation. Clear to percussion bilaterally; No rales, rhonchi, wheezing, or rubs  HEART: Regular rate and rhythm; No murmurs, rubs, or gallops  ABDOMEN: Soft, Nontender, Nondistended; Bowel sounds present, No Rovsing's sign   NERVOUS SYSTEM:  AAOx3 CN ll-Vll, 5/5 strength in BUE & BLE, sensation intact, finger to nose test intact, good finger counting with both eyes  EXTREMITIES:  2+ Peripheral Pulses, No clubbing, cyanosis, or edema  CALF: No Calf tenderness   SKIN: warm dry                          9.2    6.56  )-----------( 155      ( 17 Dec 2023 05:59 )             27.6     12-17    133<L>  |  97  |  21<H>  ----------------------------<  113<H>  3.9   |  32<H>  |  3.26<H>    Ca    8.8      17 Dec 2023 05:59  Phos  2.9     12-17  Mg     1.9     12-17    TPro  7.9  /  Alb  2.8<L>  /  TBili  0.4  /  DBili  x   /  AST  19  /  ALT  <6<L>  /  AlkPhos  131<H>  12-16    LIVER FUNCTIONS - ( 16 Dec 2023 13:25 )  Alb: 2.8 g/dL / Pro: 7.9 g/dL / ALK PHOS: 131 U/L / ALT: <6 U/L DA / AST: 19 U/L / GGT: x               PT/INR - ( 17 Dec 2023 05:59 )   PT: 22.1 sec;   INR: 1.98 ratio         PTT - ( 16 Dec 2023 13:25 )  PTT:35.1 sec    CAPILLARY BLOOD GLUCOSE      RADIOLOGY & ADDITIONAL TESTS:                   PGY-1 Progress Note discussed with attending    PAGER #: [[phone]] TILL 5:00 PM  PLEASE CONTACT ON CALL TEAM:  - On Call Team (Please refer to Darrick) FROM 5:00 PM - 8:30PM  - Nightfloat Team FROM 8:30 -7:30 AM    INTERVAL HPI/OVERNIGHT EVENTS:   No overnight events. Pt was examined in the morning at bedside and he is in NAD at the moment. Pt denies any symptom    REVIEW OF SYSTEMS:  CONSTITUTIONAL: No fever, chills,  or fatigue  RESPIRATORY: No cough, wheezing, No shortness of breath  CARDIOVASCULAR: No chest pain, palpitations,  leg swelling  GASTROINTESTINAL: No abdominal pain. No nausea, vomiting, or hematemesis; No diarrhea or constipation. No bloody or black stool  GENITOURINARY: No dysuria or hematuria, urinary frequency  NEUROLOGICAL: No headaches, dizziness  SKIN: No itching, burning, rashes, or lesions     Vital Signs Last 24 Hrs  T(C): 36.6 (17 Dec 2023 05:13), Max: 36.8 (16 Dec 2023 13:08)  T(F): 97.9 (17 Dec 2023 05:13), Max: 98.3 (16 Dec 2023 13:08)  HR: 59 (17 Dec 2023 05:13) (57 - 69)  BP: 122/71 (17 Dec 2023 05:13) (122/71 - 180/85)  BP(mean): --  RR: 18 (17 Dec 2023 05:13) (16 - 19)  SpO2: 100% (17 Dec 2023 05:13) (98% - 100%)    Parameters below as of 17 Dec 2023 05:13  Patient On (Oxygen Delivery Method): room air        PHYSICAL EXAMINATION:  GENERAL: NAD,   HEAD:  Atraumatic, Normocephalic  EYES:  + B/L cataract noted,  conjunctiva and sclera clear  NECK: Supple, No JVD,   CHEST/LUNG: Clear to auscultation. Clear to percussion bilaterally; No rales, rhonchi, wheezing, or rubs  HEART: Regular rate and rhythm; No murmurs, rubs, or gallops  ABDOMEN: Soft, Nontender, Nondistended; Bowel sounds present, No Rovsing's sign   NERVOUS SYSTEM:  AAOx3 CN ll-Vll, 5/5 strength in BUE & BLE, sensation intact, finger to nose test intact, good finger counting with both eyes  EXTREMITIES:  2+ Peripheral Pulses, No clubbing, cyanosis, or edema  CALF: No Calf tenderness   SKIN: warm dry                          9.2    6.56  )-----------( 155      ( 17 Dec 2023 05:59 )             27.6     12-17    133<L>  |  97  |  21<H>  ----------------------------<  113<H>  3.9   |  32<H>  |  3.26<H>    Ca    8.8      17 Dec 2023 05:59  Phos  2.9     12-17  Mg     1.9     12-17    TPro  7.9  /  Alb  2.8<L>  /  TBili  0.4  /  DBili  x   /  AST  19  /  ALT  <6<L>  /  AlkPhos  131<H>  12-16    LIVER FUNCTIONS - ( 16 Dec 2023 13:25 )  Alb: 2.8 g/dL / Pro: 7.9 g/dL / ALK PHOS: 131 U/L / ALT: <6 U/L DA / AST: 19 U/L / GGT: x               PT/INR - ( 17 Dec 2023 05:59 )   PT: 22.1 sec;   INR: 1.98 ratio         PTT - ( 16 Dec 2023 13:25 )  PTT:35.1 sec    CAPILLARY BLOOD GLUCOSE      RADIOLOGY & ADDITIONAL TESTS:

## 2023-12-17 NOTE — CONSULT NOTE ADULT - SUBJECTIVE AND OBJECTIVE BOX
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 Per the patient, patient returned home from dialysis and started having 5/10 pain in the back of his neck and had complete loss of vision for 15 minutes. Patient was able to stand and walk during the episode of vision loss. The vision is slowly returning but it hasn't returned to baseline. The neck pain did not radiate to the head or back. Patient denies headache, dizziness, SOB, or chest pain or N/V. He denies any numbness, tingling, weakness, unbalanced, slurred speech.     Of note, patient was admitted to [facility] for 3 weeks and discharged on 12/11/23 due to his perm cath Staph infection and was discharged with antibiotics.IN ED   T 98.3  HR 64, /84  RR18 O2 100% in RA    Review of Systems: CONSTITUTIONAL: No fever, weight loss, or fatigue  EYES: right vision not back at baseline; No eye pain or discharge  ENT:  No difficulty hearing, tinnitus, vertigo; No sinus or throat pain  NECK: + neck pain; No stiffness  RESPIRATORY: No cough, wheezing, chills or hemoptysis; No Shortness of Breath  CARDIOVASCULAR: No chest pain, palpitations, passing out, dizziness, or leg swelling  GASTROINTESTINAL: No abdominal or epigastric pain. No nausea, vomiting, or hematemesis; No diarrhea or constipation. No melena or hematochezia.  GENITOURINARY: No dysuria, frequency, hematuria, or incontinence  NEUROLOGICAL: No headaches, memory loss, loss of strength, numbness, or tremors  SKIN: No itching, burning, rashes, or lesions   LYMPH Nodes: No enlarged glands  ENDOCRINE: No heat or cold intolerance; No hair loss  MUSCULOSKELETAL: No joint pain or swelling; No muscle, back, No extremity pain  PSYCHIATRIC: No depression, anxiety, mood swings, or difficulty sleeping  HEME/LYMPH: No easy bruising, or bleeding gums  ALLERGY AND IMMUNOLOGIC: No hives or eczema  *   Patient Currently Takes Medications as of 24-May-2022 10:41 documented in Structured Notes  · 	multivitamin: 1 tab(s) orally once a day  · 	hydrALAZINE 25 mg oral tablet: 1 tab(s) orally every 8 hours  · 	pantoprazole 40 mg oral delayed release tablet: 1 tab(s) orally once a day (before a meal)  · 	sodium bicarbonate 650 mg oral tablet: 1 tab(s) orally 3 times a day  · 	torsemide 20 mg oral tablet: 1 tab(s) orally once a day  · 	carvedilol 6.25 mg oral tablet: 1 tab(s) orally every 12 hours  · 	aspirin 81 mg oral tablet, chewable: 1 tab(s) orally once a day  · 	isosorbide mononitrate 30 mg oral tablet, extended release: 1 tab(s) orally once a day  · 	timolol maleate 0.5% ophthalmic solution: 1 drop(s) to each affected eye 2 times a day  	hosp  · 	Vitamin D3 1250 mcg (50,000 intl units) oral capsule: 1 cap(s) orally once a week  · 	Combigan 0.2%-0.5% ophthalmic solution: 1 drop(s) to each affected eye 2 times a day  	home  · 	Januvia 25 mg oral tablet: 1 tab(s) orally once a day  	home  · 	folic acid 1 mg oral tablet: 1 tab(s) orally once a day  	home/hosp  · 	labetalol 100 mg oral tablet: 1 tab(s) orally 2 times a day  · 	amLODIPine 5 mg oral tablet: 1 tab(s) orally once a day  · 	allopurinol 100 mg oral tablet: 1 tab(s) orally 2 times a day    PAST MEDICAL & SURGICAL HISTORY:  DM (diabetes mellitus)      HTN (hypertension)      Chronic kidney disease      HLD (hyperlipidemia)      Glaucoma      Gout      No significant past surgical history          Home Medications Reviewed    Hospital Medications:   MEDICATIONS  (STANDING):  aspirin  chewable 81 milliGRAM(s) Oral daily  atorvastatin 80 milliGRAM(s) Oral at bedtime  brimonidine 0.2% Ophthalmic Solution 1 Drop(s) Both EYES two times a day  chlorhexidine 2% Cloths 1 Application(s) Topical daily  clopidogrel Tablet 75 milliGRAM(s) Oral daily  dorzolamide 2% Ophthalmic Solution 1 Drop(s) Both EYES <User Schedule>  heparin   Injectable 5000 Unit(s) SubCutaneous every 8 hours  insulin lispro (ADMELOG) corrective regimen sliding scale   SubCutaneous at bedtime  insulin lispro (ADMELOG) corrective regimen sliding scale   SubCutaneous three times a day before meals  pantoprazole    Tablet 40 milliGRAM(s) Oral before breakfast  timolol 0.5% Solution 1 Drop(s) Both EYES two times a day    MEDICATIONS  (PRN):  acetaminophen     Tablet .. 650 milliGRAM(s) Oral every 6 hours PRN Temp greater or equal to 38C (100.4F), Mild Pain (1 - 3)      Allergies    No Known Allergies    Intolerances                              9.2    6.56  )-----------( 155      ( 17 Dec 2023 05:59 )             27.6     12-17    133<L>  |  97  |  21<H>  ----------------------------<  113<H>  3.9   |  32<H>  |  3.26<H>    Ca    8.8      17 Dec 2023 05:59  Phos  2.9     12-17  Mg     1.9     12-17    TPro  7.9  /  Alb  2.8<L>  /  TBili  0.4  /  DBili  x   /  AST  19  /  ALT  <6<L>  /  AlkPhos  131<H>  12-16    PT/INR - ( 17 Dec 2023 05:59 )   PT: 22.1 sec;   INR: 1.98 ratio         PTT - ( 16 Dec 2023 13:25 )  PTT:35.1 sec  Urinalysis Basic - ( 17 Dec 2023 05:59 )    Color: x / Appearance: x / SG: x / pH: x  Gluc: 113 mg/dL / Ketone: x  / Bili: x / Urobili: x   Blood: x / Protein: x / Nitrite: x   Leuk Esterase: x / RBC: x / WBC x   Sq Epi: x / Non Sq Epi: x / Bacteria: x            RADIOLOGY & ADDITIONAL STUDIES:    SOCIAL HISTORY: Denies ETOh,Smoking,     FAMILY HISTORY:      REVIEW OF SYSTEMS:  CONSTITUTIONAL: No malaise, No fatigue, No fevers or chills, well developed, no diaphoresis  EYES/ENT: No visual changes;  No vertigo or throat pain   NECK: No pain or stiffness  RESPIRATORY: No cough, wheezing, hemoptysis; No shortness of breath  CARDIOVASCULAR: No chest pain or palpitations. No edema  GASTROINTESTINAL: No abdominal or epigastric pain. No nausea, vomiting, or hematemesis; No diarrhea or constipation. No melena or hematochezia.  GENITOURINARY: No dysuria, frequency, foamy urine, urinary urgency, incontinence or hematuria  NEUROLOGICAL: No numbness or weakness, No tremor  SKIN: No itching, burning, rashes, or lesions   VASCULAR: No claudication  Musculoskeletal: no arthralgia, no myalgia  All other review of systems is negative unless indicated above.    VITALS:  Vital Signs Last 24 Hrs  T(C): 36.6 (17 Dec 2023 05:13), Max: 36.8 (16 Dec 2023 13:08)  T(F): 97.9 (17 Dec 2023 05:13), Max: 98.3 (16 Dec 2023 13:08)  HR: 59 (17 Dec 2023 05:13) (57 - 69)  BP: 122/71 (17 Dec 2023 05:13) (122/71 - 180/85)  BP(mean): --  RR: 18 (17 Dec 2023 05:13) (16 - 19)  SpO2: 100% (17 Dec 2023 05:13) (98% - 100%)    Parameters below as of 17 Dec 2023 05:13  Patient On (Oxygen Delivery Method): room air        Height (cm): 172.7 (12-16 @ 13:08)  Weight (kg): 82 (12-16 @ 13:50)  BMI (kg/m2): 27.5 (12-16 @ 13:50)  BSA (m2): 1.96 (12-16 @ 13:50)    PHYSICAL EXAM:  Constitutional: NAD  HEENT: anicteric sclera, oropharynx clear, MMM  Neck: No JVD  Respiratory: good air entrance B/L, no wheezes, rales or rhonchi  Cardiovascular: S1, S2, RRR, no pericardial rub, no murmur  Gastrointestinal: BS+, soft, no tenderness, no distension, no bruit  Pelvis: bladder non-distended, no CVA tenderness  Extremities: No cyanosis or clubbing. No peripheral edema  Neurological: A/O x 3, no focal deficits  Psychiatric: Normal mood, normal affect  : No CVA tenderness. No smlals.   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 Per the patient, patient returned home from dialysis and started having 5/10 pain in the back of his neck and had complete loss of vision for 15 minutes. Patient was able to stand and walk during the episode of vision loss. The vision is slowly returning but it hasn't returned to baseline. The neck pain did not radiate to the head or back. Patient denies headache, dizziness, SOB, or chest pain or N/V. He denies any numbness, tingling, weakness, unbalanced, slurred speech.     Of note, patient was admitted to [facility] for 3 weeks and discharged on 12/11/23 due to his perm cath Staph infection and was discharged with antibiotics.IN ED   T 98.3  HR 64, /84  RR18 O2 100% in RA    Review of Systems: CONSTITUTIONAL: No fever, weight loss, or fatigue  EYES: right vision not back at baseline; No eye pain or discharge  ENT:  No difficulty hearing, tinnitus, vertigo; No sinus or throat pain  NECK: + neck pain; No stiffness  RESPIRATORY: No cough, wheezing, chills or hemoptysis; No Shortness of Breath  CARDIOVASCULAR: No chest pain, palpitations, passing out, dizziness, or leg swelling  GASTROINTESTINAL: No abdominal or epigastric pain. No nausea, vomiting, or hematemesis; No diarrhea or constipation. No melena or hematochezia.  GENITOURINARY: No dysuria, frequency, hematuria, or incontinence  NEUROLOGICAL: No headaches, memory loss, loss of strength, numbness, or tremors  SKIN: No itching, burning, rashes, or lesions   LYMPH Nodes: No enlarged glands  ENDOCRINE: No heat or cold intolerance; No hair loss  MUSCULOSKELETAL: No joint pain or swelling; No muscle, back, No extremity pain  PSYCHIATRIC: No depression, anxiety, mood swings, or difficulty sleeping  HEME/LYMPH: No easy bruising, or bleeding gums  ALLERGY AND IMMUNOLOGIC: No hives or eczema  *   Patient Currently Takes Medications as of 24-May-2022 10:41 documented in Structured Notes  · 	multivitamin: 1 tab(s) orally once a day  · 	hydrALAZINE 25 mg oral tablet: 1 tab(s) orally every 8 hours  · 	pantoprazole 40 mg oral delayed release tablet: 1 tab(s) orally once a day (before a meal)  · 	sodium bicarbonate 650 mg oral tablet: 1 tab(s) orally 3 times a day  · 	torsemide 20 mg oral tablet: 1 tab(s) orally once a day  · 	carvedilol 6.25 mg oral tablet: 1 tab(s) orally every 12 hours  · 	aspirin 81 mg oral tablet, chewable: 1 tab(s) orally once a day  · 	isosorbide mononitrate 30 mg oral tablet, extended release: 1 tab(s) orally once a day  · 	timolol maleate 0.5% ophthalmic solution: 1 drop(s) to each affected eye 2 times a day  	hosp  · 	Vitamin D3 1250 mcg (50,000 intl units) oral capsule: 1 cap(s) orally once a week  · 	Combigan 0.2%-0.5% ophthalmic solution: 1 drop(s) to each affected eye 2 times a day  	home  · 	Januvia 25 mg oral tablet: 1 tab(s) orally once a day  	home  · 	folic acid 1 mg oral tablet: 1 tab(s) orally once a day  	home/hosp  · 	labetalol 100 mg oral tablet: 1 tab(s) orally 2 times a day  · 	amLODIPine 5 mg oral tablet: 1 tab(s) orally once a day  · 	allopurinol 100 mg oral tablet: 1 tab(s) orally 2 times a day    PAST MEDICAL & SURGICAL HISTORY:  DM (diabetes mellitus)      HTN (hypertension)      Chronic kidney disease      HLD (hyperlipidemia)      Glaucoma      Gout      No significant past surgical history          Home Medications Reviewed    Hospital Medications:   MEDICATIONS  (STANDING):  aspirin  chewable 81 milliGRAM(s) Oral daily  atorvastatin 80 milliGRAM(s) Oral at bedtime  brimonidine 0.2% Ophthalmic Solution 1 Drop(s) Both EYES two times a day  chlorhexidine 2% Cloths 1 Application(s) Topical daily  clopidogrel Tablet 75 milliGRAM(s) Oral daily  dorzolamide 2% Ophthalmic Solution 1 Drop(s) Both EYES <User Schedule>  heparin   Injectable 5000 Unit(s) SubCutaneous every 8 hours  insulin lispro (ADMELOG) corrective regimen sliding scale   SubCutaneous at bedtime  insulin lispro (ADMELOG) corrective regimen sliding scale   SubCutaneous three times a day before meals  pantoprazole    Tablet 40 milliGRAM(s) Oral before breakfast  timolol 0.5% Solution 1 Drop(s) Both EYES two times a day    MEDICATIONS  (PRN):  acetaminophen     Tablet .. 650 milliGRAM(s) Oral every 6 hours PRN Temp greater or equal to 38C (100.4F), Mild Pain (1 - 3)      Allergies    No Known Allergies    Intolerances                              9.2    6.56  )-----------( 155      ( 17 Dec 2023 05:59 )             27.6     12-17    133<L>  |  97  |  21<H>  ----------------------------<  113<H>  3.9   |  32<H>  |  3.26<H>    Ca    8.8      17 Dec 2023 05:59  Phos  2.9     12-17  Mg     1.9     12-17    TPro  7.9  /  Alb  2.8<L>  /  TBili  0.4  /  DBili  x   /  AST  19  /  ALT  <6<L>  /  AlkPhos  131<H>  12-16    PT/INR - ( 17 Dec 2023 05:59 )   PT: 22.1 sec;   INR: 1.98 ratio         PTT - ( 16 Dec 2023 13:25 )  PTT:35.1 sec  Urinalysis Basic - ( 17 Dec 2023 05:59 )    Color: x / Appearance: x / SG: x / pH: x  Gluc: 113 mg/dL / Ketone: x  / Bili: x / Urobili: x   Blood: x / Protein: x / Nitrite: x   Leuk Esterase: x / RBC: x / WBC x   Sq Epi: x / Non Sq Epi: x / Bacteria: x            RADIOLOGY & ADDITIONAL STUDIES:    SOCIAL HISTORY: Denies ETOh,Smoking,     FAMILY HISTORY:      REVIEW OF SYSTEMS:  CONSTITUTIONAL: No malaise, No fatigue, No fevers or chills, well developed, no diaphoresis  EYES/ENT: No visual changes;  No vertigo or throat pain   NECK: No pain or stiffness  RESPIRATORY: No cough, wheezing, hemoptysis; No shortness of breath  CARDIOVASCULAR: No chest pain or palpitations. No edema  GASTROINTESTINAL: No abdominal or epigastric pain. No nausea, vomiting, or hematemesis; No diarrhea or constipation. No melena or hematochezia.  GENITOURINARY: No dysuria, frequency, foamy urine, urinary urgency, incontinence or hematuria  NEUROLOGICAL: No numbness or weakness, No tremor  SKIN: No itching, burning, rashes, or lesions   VASCULAR: No claudication  Musculoskeletal: no arthralgia, no myalgia  All other review of systems is negative unless indicated above.    VITALS:  Vital Signs Last 24 Hrs  T(C): 36.6 (17 Dec 2023 05:13), Max: 36.8 (16 Dec 2023 13:08)  T(F): 97.9 (17 Dec 2023 05:13), Max: 98.3 (16 Dec 2023 13:08)  HR: 59 (17 Dec 2023 05:13) (57 - 69)  BP: 122/71 (17 Dec 2023 05:13) (122/71 - 180/85)  BP(mean): --  RR: 18 (17 Dec 2023 05:13) (16 - 19)  SpO2: 100% (17 Dec 2023 05:13) (98% - 100%)    Parameters below as of 17 Dec 2023 05:13  Patient On (Oxygen Delivery Method): room air        Height (cm): 172.7 (12-16 @ 13:08)  Weight (kg): 82 (12-16 @ 13:50)  BMI (kg/m2): 27.5 (12-16 @ 13:50)  BSA (m2): 1.96 (12-16 @ 13:50)    PHYSICAL EXAM:  Constitutional: NAD  HEENT: anicteric sclera, oropharynx clear, MMM  Neck: No JVD  Respiratory: good air entrance B/L, no wheezes, rales or rhonchi  Cardiovascular: S1, S2, RRR, no pericardial rub, no murmur  Gastrointestinal: BS+, soft, no tenderness, no distension, no bruit  Pelvis: bladder non-distended, no CVA tenderness  Extremities: No cyanosis or clubbing. No peripheral edema  Neurological: A/O x 3, no focal deficits  Psychiatric: Normal mood, normal affect  : No CVA tenderness. No smalls.   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 Per the patient, patient returned home from dialysis and started having 5/10 pain in the back of his neck and had complete loss of vision for 15 minutes. Patient was able to stand and walk during the episode of vision loss. The vision is slowly returning but it hasn't returned to baseline. The neck pain did not radiate to the head or back. Patient denies headache, dizziness, SOB, or chest pain or N/V. He denies any numbness, tingling, weakness, unbalanced, slurred speech.     Of note, patient was admitted to [facility] for 3 weeks and discharged on 12/11/23 due to his perm cath Staph infection , catheter wsa removed and new catheter was placed in Right IJ.  He refused AV acces surgery in the past , agrees to have it now.    T 98.3  HR 64, /84  RR18 O2 100% in RA    Review of Systems: CONSTITUTIONAL: No fever, weight loss, or fatigue  EYES: right vision not back at baseline; No eye pain or discharge  ENT:  No difficulty hearing, tinnitus, vertigo; No sinus or throat pain  NECK: + neck pain; No stiffness  RESPIRATORY: No cough, wheezing, chills or hemoptysis; No Shortness of Breath  CARDIOVASCULAR: No chest pain, palpitations, passing out, dizziness, or leg swelling  GASTROINTESTINAL: No abdominal or epigastric pain. No nausea, vomiting, or hematemesis; No diarrhea or constipation. No melena or hematochezia.  GENITOURINARY: No dysuria, frequency, hematuria, or incontinence  NEUROLOGICAL: No headaches, memory loss, loss of strength, numbness, or tremors  SKIN: No itching, burning, rashes, or lesions   LYMPH Nodes: No enlarged glands  ENDOCRINE: No heat or cold intolerance; No hair loss  MUSCULOSKELETAL: No joint pain or swelling; No muscle, back, No extremity pain  PSYCHIATRIC: No depression, anxiety, mood swings, or difficulty sleeping  HEME/LYMPH: No easy bruising, or bleeding gums  ALLERGY AND IMMUNOLOGIC: No hives or eczema  *   Patient Currently Takes Medications as of 24-May-2022 10:41 documented in Structured Notes  · 	multivitamin: 1 tab(s) orally once a day  · 	hydrALAZINE 25 mg oral tablet: 1 tab(s) orally every 8 hours  · 	pantoprazole 40 mg oral delayed release tablet: 1 tab(s) orally once a day (before a meal)  · 	sodium bicarbonate 650 mg oral tablet: 1 tab(s) orally 3 times a day  · 	torsemide 20 mg oral tablet: 1 tab(s) orally once a day  · 	carvedilol 6.25 mg oral tablet: 1 tab(s) orally every 12 hours  · 	aspirin 81 mg oral tablet, chewable: 1 tab(s) orally once a day  · 	isosorbide mononitrate 30 mg oral tablet, extended release: 1 tab(s) orally once a day  · 	timolol maleate 0.5% ophthalmic solution: 1 drop(s) to each affected eye 2 times a day  	hosp  · 	Vitamin D3 1250 mcg (50,000 intl units) oral capsule: 1 cap(s) orally once a week  · 	Combigan 0.2%-0.5% ophthalmic solution: 1 drop(s) to each affected eye 2 times a day  	home  · 	Januvia 25 mg oral tablet: 1 tab(s) orally once a day  	home  · 	folic acid 1 mg oral tablet: 1 tab(s) orally once a day  	home/hosp  · 	labetalol 100 mg oral tablet: 1 tab(s) orally 2 times a day  · 	amLODIPine 5 mg oral tablet: 1 tab(s) orally once a day  · 	allopurinol 100 mg oral tablet: 1 tab(s) orally 2 times a day    PAST MEDICAL & SURGICAL HISTORY:  DM (diabetes mellitus)      HTN (hypertension)      Chronic kidney disease      HLD (hyperlipidemia)      Glaucoma      Gout      No significant past surgical history          Home Medications Reviewed    Hospital Medications:   MEDICATIONS  (STANDING):  aspirin  chewable 81 milliGRAM(s) Oral daily  atorvastatin 80 milliGRAM(s) Oral at bedtime  brimonidine 0.2% Ophthalmic Solution 1 Drop(s) Both EYES two times a day  chlorhexidine 2% Cloths 1 Application(s) Topical daily  clopidogrel Tablet 75 milliGRAM(s) Oral daily  dorzolamide 2% Ophthalmic Solution 1 Drop(s) Both EYES <User Schedule>  heparin   Injectable 5000 Unit(s) SubCutaneous every 8 hours  insulin lispro (ADMELOG) corrective regimen sliding scale   SubCutaneous at bedtime  insulin lispro (ADMELOG) corrective regimen sliding scale   SubCutaneous three times a day before meals  pantoprazole    Tablet 40 milliGRAM(s) Oral before breakfast  timolol 0.5% Solution 1 Drop(s) Both EYES two times a day    MEDICATIONS  (PRN):  acetaminophen     Tablet .. 650 milliGRAM(s) Oral every 6 hours PRN Temp greater or equal to 38C (100.4F), Mild Pain (1 - 3)      Allergies    No Known Allergies    Intolerances                              9.2    6.56  )-----------( 155      ( 17 Dec 2023 05:59 )             27.6     12-17    133<L>  |  97  |  21<H>  ----------------------------<  113<H>  3.9   |  32<H>  |  3.26<H>    Ca    8.8      17 Dec 2023 05:59  Phos  2.9     12-17  Mg     1.9     12-17    TPro  7.9  /  Alb  2.8<L>  /  TBili  0.4  /  DBili  x   /  AST  19  /  ALT  <6<L>  /  AlkPhos  131<H>  12-16    PT/INR - ( 17 Dec 2023 05:59 )   PT: 22.1 sec;   INR: 1.98 ratio         PTT - ( 16 Dec 2023 13:25 )  PTT:35.1 sec  Urinalysis Basic - ( 17 Dec 2023 05:59 )    Color: x / Appearance: x / SG: x / pH: x  Gluc: 113 mg/dL / Ketone: x  / Bili: x / Urobili: x   Blood: x / Protein: x / Nitrite: x   Leuk Esterase: x / RBC: x / WBC x   Sq Epi: x / Non Sq Epi: x / Bacteria: x            RADIOLOGY & ADDITIONAL STUDIES:  < from: CT Brain Perfusion Maps Stroke (12.16.23 @ 13:54) >  IMPRESSION:        1.   Right carotid system: Moderate plaque at the origin of the RIGHT   internal carotid artery resulting in high-grade (70-80%) stenosis.        2.   Left carotid system:  Moderate calcified plaque at the origin of   the LEFT internal carotid artery resulting in a high-grade (70-80%)   stenosis.        3.   Intracranial circulation:  No significant vascular lesion.  Mild   arteriosclerosis.        4.   Brain:  Mild periventricular and deep white matter ischemia.        5.  Perfusion: No core infarct. 30 mL critically hypoperfused tissue   at risk is identified.    Critical value:  I discussed the finding of this report with Dr. Funez   at 2:30 PM on 12/16/2023.  Critical value policy of the hospital was   followed.  Read back and confirmation of receipt of this communication   was performed.  This verbal communication supplements the text report of     < end of copied text >    SOCIAL HISTORY: Denies ETOh,Smoking,     FAMILY HISTORY:      REVIEW OF SYSTEMS:  CONSTITUTIONAL: No malaise, No fatigue, No fevers or chills, well developed, no diaphoresis  EYES/ENT: No visual changes;  No vertigo or throat pain   NECK: No pain or stiffness  RESPIRATORY: No cough, wheezing, hemoptysis; No shortness of breath  CARDIOVASCULAR: No chest pain or palpitations. No edema  GASTROINTESTINAL: No abdominal or epigastric pain. No nausea, vomiting, or hematemesis; No diarrhea or constipation. No melena or hematochezia.  GENITOURINARY: No dysuria, frequency, foamy urine, urinary urgency, incontinence or hematuria  NEUROLOGICAL: No numbness or weakness, No tremor      VITALS:  Vital Signs Last 24 Hrs  T(C): 36.6 (17 Dec 2023 05:13), Max: 36.8 (16 Dec 2023 13:08)  T(F): 97.9 (17 Dec 2023 05:13), Max: 98.3 (16 Dec 2023 13:08)  HR: 59 (17 Dec 2023 05:13) (57 - 69)  BP: 122/71 (17 Dec 2023 05:13) (122/71 - 180/85)  BP(mean): --  RR: 18 (17 Dec 2023 05:13) (16 - 19)  SpO2: 100% (17 Dec 2023 05:13) (98% - 100%)    Parameters below as of 17 Dec 2023 05:13  Patient On (Oxygen Delivery Method): room air        Height (cm): 172.7 (12-16 @ 13:08)  Weight (kg): 82 (12-16 @ 13:50)  BMI (kg/m2): 27.5 (12-16 @ 13:50)  BSA (m2): 1.96 (12-16 @ 13:50)    PHYSICAL EXAM:  Constitutional: NAD  HEENT: anicteric sclera, oropharynx clear, MMM  Neck: No JVD  Respiratory:  air entrance B/L, no wheezes, rales or rhonchi  Cardiovascular: S1, S2, RRR, no pericardial rub, no murmur  Gastrointestinal: BS+, soft, no tenderness, no distension, no bruit  Pelvis: bladder non-distended, no CVA tenderness  Extremities: No cyanosis or clubbing. 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 Per the patient, patient returned home from dialysis and started having 5/10 pain in the back of his neck and had complete loss of vision for 15 minutes. Patient was able to stand and walk during the episode of vision loss. The vision is slowly returning but it hasn't returned to baseline. The neck pain did not radiate to the head or back. Patient denies headache, dizziness, SOB, or chest pain or N/V. He denies any numbness, tingling, weakness, unbalanced, slurred speech.     Of note, patient was admitted to [facility] for 3 weeks and discharged on 12/11/23 due to his perm cath Staph infection , catheter wsa removed and new catheter was placed in Right IJ.  He refused AV acces surgery in the past , agrees to have it now.    T 98.3  HR 64, /84  RR18 O2 100% in RA    Review of Systems: CONSTITUTIONAL: No fever, weight loss, or fatigue  EYES: right vision not back at baseline; No eye pain or discharge  ENT:  No difficulty hearing, tinnitus, vertigo; No sinus or throat pain  NECK: + neck pain; No stiffness  RESPIRATORY: No cough, wheezing, chills or hemoptysis; No Shortness of Breath  CARDIOVASCULAR: No chest pain, palpitations, passing out, dizziness, or leg swelling  GASTROINTESTINAL: No abdominal or epigastric pain. No nausea, vomiting, or hematemesis; No diarrhea or constipation. No melena or hematochezia.  GENITOURINARY: No dysuria, frequency, hematuria, or incontinence  NEUROLOGICAL: No headaches, memory loss, loss of strength, numbness, or tremors  SKIN: No itching, burning, rashes, or lesions   LYMPH Nodes: No enlarged glands  ENDOCRINE: No heat or cold intolerance; No hair loss  MUSCULOSKELETAL: No joint pain or swelling; No muscle, back, No extremity pain  PSYCHIATRIC: No depression, anxiety, mood swings, or difficulty sleeping  HEME/LYMPH: No easy bruising, or bleeding gums  ALLERGY AND IMMUNOLOGIC: No hives or eczema  *   Patient Currently Takes Medications as of 24-May-2022 10:41 documented in Structured Notes  · 	multivitamin: 1 tab(s) orally once a day  · 	hydrALAZINE 25 mg oral tablet: 1 tab(s) orally every 8 hours  · 	pantoprazole 40 mg oral delayed release tablet: 1 tab(s) orally once a day (before a meal)  · 	sodium bicarbonate 650 mg oral tablet: 1 tab(s) orally 3 times a day  · 	torsemide 20 mg oral tablet: 1 tab(s) orally once a day  · 	carvedilol 6.25 mg oral tablet: 1 tab(s) orally every 12 hours  · 	aspirin 81 mg oral tablet, chewable: 1 tab(s) orally once a day  · 	isosorbide mononitrate 30 mg oral tablet, extended release: 1 tab(s) orally once a day  · 	timolol maleate 0.5% ophthalmic solution: 1 drop(s) to each affected eye 2 times a day  	hosp  · 	Vitamin D3 1250 mcg (50,000 intl units) oral capsule: 1 cap(s) orally once a week  · 	Combigan 0.2%-0.5% ophthalmic solution: 1 drop(s) to each affected eye 2 times a day  	home  · 	Januvia 25 mg oral tablet: 1 tab(s) orally once a day  	home  · 	folic acid 1 mg oral tablet: 1 tab(s) orally once a day  	home/hosp  · 	labetalol 100 mg oral tablet: 1 tab(s) orally 2 times a day  · 	amLODIPine 5 mg oral tablet: 1 tab(s) orally once a day  · 	allopurinol 100 mg oral tablet: 1 tab(s) orally 2 times a day    PAST MEDICAL & SURGICAL HISTORY:  DM (diabetes mellitus)      HTN (hypertension)      Chronic kidney disease      HLD (hyperlipidemia)      Glaucoma      Gout      No significant past surgical history          Home Medications Reviewed    Hospital Medications:   MEDICATIONS  (STANDING):  aspirin  chewable 81 milliGRAM(s) Oral daily  atorvastatin 80 milliGRAM(s) Oral at bedtime  brimonidine 0.2% Ophthalmic Solution 1 Drop(s) Both EYES two times a day  chlorhexidine 2% Cloths 1 Application(s) Topical daily  clopidogrel Tablet 75 milliGRAM(s) Oral daily  dorzolamide 2% Ophthalmic Solution 1 Drop(s) Both EYES <User Schedule>  heparin   Injectable 5000 Unit(s) SubCutaneous every 8 hours  insulin lispro (ADMELOG) corrective regimen sliding scale   SubCutaneous at bedtime  insulin lispro (ADMELOG) corrective regimen sliding scale   SubCutaneous three times a day before meals  pantoprazole    Tablet 40 milliGRAM(s) Oral before breakfast  timolol 0.5% Solution 1 Drop(s) Both EYES two times a day    MEDICATIONS  (PRN):  acetaminophen     Tablet .. 650 milliGRAM(s) Oral every 6 hours PRN Temp greater or equal to 38C (100.4F), Mild Pain (1 - 3)      Allergies    No Known Allergies    Intolerances                              9.2    6.56  )-----------( 155      ( 17 Dec 2023 05:59 )             27.6     12-17    133<L>  |  97  |  21<H>  ----------------------------<  113<H>  3.9   |  32<H>  |  3.26<H>    Ca    8.8      17 Dec 2023 05:59  Phos  2.9     12-17  Mg     1.9     12-17    TPro  7.9  /  Alb  2.8<L>  /  TBili  0.4  /  DBili  x   /  AST  19  /  ALT  <6<L>  /  AlkPhos  131<H>  12-16    PT/INR - ( 17 Dec 2023 05:59 )   PT: 22.1 sec;   INR: 1.98 ratio         PTT - ( 16 Dec 2023 13:25 )  PTT:35.1 sec  Urinalysis Basic - ( 17 Dec 2023 05:59 )    Color: x / Appearance: x / SG: x / pH: x  Gluc: 113 mg/dL / Ketone: x  / Bili: x / Urobili: x   Blood: x / Protein: x / Nitrite: x   Leuk Esterase: x / RBC: x / WBC x   Sq Epi: x / Non Sq Epi: x / Bacteria: x            RADIOLOGY & ADDITIONAL STUDIES:  < from: CT Brain Perfusion Maps Stroke (12.16.23 @ 13:54) >  IMPRESSION:        1.   Right carotid system: Moderate plaque at the origin of the RIGHT   internal carotid artery resulting in high-grade (70-80%) stenosis.        2.   Left carotid system:  Moderate calcified plaque at the origin of   the LEFT internal carotid artery resulting in a high-grade (70-80%)   stenosis.        3.   Intracranial circulation:  No significant vascular lesion.  Mild   arteriosclerosis.        4.   Brain:  Mild periventricular and deep white matter ischemia.        5.  Perfusion: No core infarct. 30 mL critically hypoperfused tissue   at risk is identified.    Critical value:  I discussed the finding of this report with Dr. Funez   at 2:30 PM on 12/16/2023.  Critical value policy of the hospital was   followed.  Read back and confirmation of receipt of this communication   was performed.  This verbal communication supplements the text report of     < end of copied text >    SOCIAL HISTORY: Denies ETOh,Smoking,     FAMILY HISTORY:      REVIEW OF SYSTEMS:  CONSTITUTIONAL: No malaise, No fatigue, No fevers or chills, well developed, no diaphoresis  EYES/ENT: No visual changes;  No vertigo or throat pain   NECK: No pain or stiffness  RESPIRATORY: No cough, wheezing, hemoptysis; No shortness of breath  CARDIOVASCULAR: No chest pain or palpitations. No edema  GASTROINTESTINAL: No abdominal or epigastric pain. No nausea, vomiting, or hematemesis; No diarrhea or constipation. No melena or hematochezia.  GENITOURINARY: No dysuria, frequency, foamy urine, urinary urgency, incontinence or hematuria  NEUROLOGICAL: No numbness or weakness, No tremor      VITALS:  Vital Signs Last 24 Hrs  T(C): 36.6 (17 Dec 2023 05:13), Max: 36.8 (16 Dec 2023 13:08)  T(F): 97.9 (17 Dec 2023 05:13), Max: 98.3 (16 Dec 2023 13:08)  HR: 59 (17 Dec 2023 05:13) (57 - 69)  BP: 122/71 (17 Dec 2023 05:13) (122/71 - 180/85)  BP(mean): --  RR: 18 (17 Dec 2023 05:13) (16 - 19)  SpO2: 100% (17 Dec 2023 05:13) (98% - 100%)    Parameters below as of 17 Dec 2023 05:13  Patient On (Oxygen Delivery Method): room air        Height (cm): 172.7 (12-16 @ 13:08)  Weight (kg): 82 (12-16 @ 13:50)  BMI (kg/m2): 27.5 (12-16 @ 13:50)  BSA (m2): 1.96 (12-16 @ 13:50)    PHYSICAL EXAM:  Constitutional: NAD  HEENT: anicteric sclera, oropharynx clear, MMM  Neck: No JVD  Respiratory:  air entrance B/L, no wheezes, rales or rhonchi  Cardiovascular: S1, S2, RRR, no pericardial rub, no murmur  Gastrointestinal: BS+, soft, no tenderness, no distension, no bruit  Pelvis: bladder non-distended, no CVA tenderness  Extremities: No cyanosis or clubbing. No peripheral edema  Neurological: A/O x 3, no focal deficits  Psychiatric: Normal mood, normal affect    Access: Right IJ , no discharge and no erythema

## 2023-12-17 NOTE — PROGRESS NOTE ADULT - PROBLEM SELECTOR PLAN 6
DVT propylaxis - holding DVT prophy with elevated INR, f/u in AM and start hep SQ  GI prophylaxis - pantoprazole 20mg qd (home med) DVT propylaxis - holding DVT prophy with elevated INR, f/u in AM and start hep SQ  GI prophylaxis - pantoprazole 40mg qd

## 2023-12-17 NOTE — PATIENT PROFILE ADULT - VISION (WITH CORRECTIVE LENSES IF THE PATIENT USUALLY WEARS THEM):
blind in the left eye. Right eye also impaired/Severely impaired: cannot locate objects without hearing or touching them or patient nonresponsive.

## 2023-12-17 NOTE — PATIENT PROFILE ADULT - FUNCTIONAL ASSESSMENT - BASIC MOBILITY 6.
2-calculated by average/Not able to assess (calculate score using [facility] averaging method) 2-calculated by average/Not able to assess (calculate score using [facility] averaging method)

## 2023-12-17 NOTE — CONSULT NOTE ADULT - ASSESSMENT
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  1.Echocardiogram.  2.Vascular eval-b/l carotid stenosis.  3.CAD and chronic systolic HF-asa,statin. No B blocker/Ace/arb/entresto due to low bp,  4.DM-Insulin.  5.Lipid d/o-statin.  6.GI and DVT prophylaxis.

## 2023-12-17 NOTE — PATIENT PROFILE ADULT - FALL HARM RISK - HARM RISK INTERVENTIONS
Assistance with ambulation/Assistance OOB with selected safe patient handling equipment/Communicate Risk of Fall with Harm to all staff/Discuss with provider need for PT consult/Monitor for mental status changes/Monitor gait and stability/Provide patient with walking aids - walker, cane, crutches/Reinforce activity limits and safety measures with patient and family/Tailored Fall Risk Interventions/Toileting schedule using arm’s reach rule for commode and bathroom/Use of alarms - bed, chair and/or voice tab/Visual Cue: Yellow wristband and red socks/Bed in lowest position, wheels locked, appropriate side rails in place/Call bell, personal items and telephone in reach/Instruct patient to call for assistance before getting out of bed or chair/Non-slip footwear when patient is out of bed/[place] to call system/Physically safe environment - no spills, clutter or unnecessary equipment/Purposeful Proactive Rounding/Room/bathroom lighting operational, light cord in reach Assistance with ambulation/Assistance OOB with selected safe patient handling equipment/Communicate Risk of Fall with Harm to all staff/Discuss with provider need for PT consult/Monitor for mental status changes/Monitor gait and stability/Provide patient with walking aids - walker, cane, crutches/Reinforce activity limits and safety measures with patient and family/Tailored Fall Risk Interventions/Toileting schedule using arm’s reach rule for commode and bathroom/Use of alarms - bed, chair and/or voice tab/Visual Cue: Yellow wristband and red socks/Bed in lowest position, wheels locked, appropriate side rails in place/Call bell, personal items and telephone in reach/Instruct patient to call for assistance before getting out of bed or chair/Non-slip footwear when patient is out of bed/[place] to call system/Physically safe environment - no spills, clutter or unnecessary equipment/Purposeful Proactive Rounding/Room/bathroom lighting operational, light cord in reach

## 2023-12-17 NOTE — PROGRESS NOTE ADULT - PROBLEM SELECTOR PLAN 3
Patient gets dialysis at [facility] ([facility])  Nephro Dr. Dolan Consulted Patient gets dialysis at [facility] ([facility])  Nephro Dr. Dolan Consulted

## 2023-12-17 NOTE — CONSULT NOTE ADULT - ASSESSMENT
ESRD, dialysis days are TTS, last dialysis was yesterday. BP , fluid status and LABS are OK and no need for dialysis today and will continue with TTS treatment.   Anemia , post hospitalization will continue with AVNI and follow iron storages.  Follow PO4 in am.   History of Staph catheter infection,  post removal of left side PC and new catheter in the right IJ, patient agrees for AV acces surgery.  Loss of vision and pain after dialysis, patient with a history of Glaucoma, r/o CVA versus Glaucoma with increased pressure in dialysis.  Bilateral carotid arteries stenosis 70 %- 80 %. .

## 2023-12-17 NOTE — PROGRESS NOTE ADULT - SUBJECTIVE AND OBJECTIVE BOX
Patient is a 78y old  Male who presents with a chief complaint of TIA (16 Dec 2023 16:31)    pt seen in icu [  ], reg med floor [   ], bed [  ], chair at bedside [   ], a+o x3 [  ], lethargic [  ],  nad [  ]    smalls [  ], ngt [  ], peg [  ], et tube [  ], cent line [  ], picc line [  ]        Allergies    No Known Allergies        Vitals    T(F): 97.9 (12-17-23 @ 05:13), Max: 98.3 (12-16-23 @ 13:08)  HR: 59 (12-17-23 @ 05:13) (57 - 69)  BP: 122/71 (12-17-23 @ 05:13) (122/71 - 180/85)  RR: 18 (12-17-23 @ 05:13) (16 - 19)  SpO2: 100% (12-17-23 @ 05:13) (98% - 100%)  Wt(kg): --  CAPILLARY BLOOD GLUCOSE      POCT Blood Glucose.: 109 mg/dL (16 Dec 2023 22:03)      Labs                          9.9    8.43  )-----------( 161      ( 16 Dec 2023 13:25 )             29.3       12-16    132<L>  |  97  |  15  ----------------------------<  199<H>  3.8   |  32<H>  |  2.77<H>    Ca    9.4      16 Dec 2023 13:25    TPro  7.9  /  Alb  2.8<L>  /  TBili  0.4  /  DBili  x   /  AST  19  /  ALT  <6<L>  /  AlkPhos  131<H>  12-16          Troponin I, High Sensitivity Result: 27.0 ng/L (12-16-23 @ 13:25)        Radiology Results      Meds    MEDICATIONS  (STANDING):  aspirin  chewable 81 milliGRAM(s) Oral daily  atorvastatin 80 milliGRAM(s) Oral at bedtime  brimonidine 0.2% Ophthalmic Solution 1 Drop(s) Both EYES two times a day  chlorhexidine 2% Cloths 1 Application(s) Topical daily  clopidogrel Tablet 75 milliGRAM(s) Oral daily  dorzolamide 2% Ophthalmic Solution 1 Drop(s) Both EYES <User Schedule>  heparin   Injectable 5000 Unit(s) SubCutaneous every 8 hours  insulin lispro (ADMELOG) corrective regimen sliding scale   SubCutaneous at bedtime  insulin lispro (ADMELOG) corrective regimen sliding scale   SubCutaneous three times a day before meals  pantoprazole    Tablet 40 milliGRAM(s) Oral before breakfast  timolol 0.5% Solution 1 Drop(s) Both EYES two times a day      MEDICATIONS  (PRN):  acetaminophen     Tablet .. 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     Patient is a 78y old  Male who presents with a chief complaint of TIA (16 Dec 2023 16:31)    pt seen in tele [x  ], reg med floor [   ], bed [ x ], chair at bedside [   ], a+o x3 [ x ], lethargic [  ],  nad [ x ]      Allergies    No Known Allergies        Vitals    T(F): 97.9 (12-17-23 @ 05:13), Max: 98.3 (12-16-23 @ 13:08)  HR: 59 (12-17-23 @ 05:13) (57 - 69)  BP: 122/71 (12-17-23 @ 05:13) (122/71 - 180/85)  RR: 18 (12-17-23 @ 05:13) (16 - 19)  SpO2: 100% (12-17-23 @ 05:13) (98% - 100%)  Wt(kg): --  CAPILLARY BLOOD GLUCOSE      POCT Blood Glucose.: 109 mg/dL (16 Dec 2023 22:03)      Labs                          9.9    8.43  )-----------( 161      ( 16 Dec 2023 13:25 )             29.3       12-16    132<L>  |  97  |  15  ----------------------------<  199<H>  3.8   |  32<H>  |  2.77<H>    Ca    9.4      16 Dec 2023 13:25    TPro  7.9  /  Alb  2.8<L>  /  TBili  0.4  /  DBili  x   /  AST  19  /  ALT  <6<L>  /  AlkPhos  131<H>  12-16          Troponin I, High Sensitivity Result: 27.0 ng/L (12-16-23 @ 13:25)        Radiology Results      Meds    MEDICATIONS  (STANDING):  aspirin  chewable 81 milliGRAM(s) Oral daily  atorvastatin 80 milliGRAM(s) Oral at bedtime  brimonidine 0.2% Ophthalmic Solution 1 Drop(s) Both EYES two times a day  chlorhexidine 2% Cloths 1 Application(s) Topical daily  clopidogrel Tablet 75 milliGRAM(s) Oral daily  dorzolamide 2% Ophthalmic Solution 1 Drop(s) Both EYES <User Schedule>  heparin   Injectable 5000 Unit(s) SubCutaneous every 8 hours  insulin lispro (ADMELOG) corrective regimen sliding scale   SubCutaneous at bedtime  insulin lispro (ADMELOG) corrective regimen sliding scale   SubCutaneous three times a day before meals  pantoprazole    Tablet 40 milliGRAM(s) Oral before breakfast  timolol 0.5% Solution 1 Drop(s) Both EYES two times a day      MEDICATIONS  (PRN):  acetaminophen     Tablet .. 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

## 2023-12-18 LAB
ALBUMIN SERPL ELPH-MCNC: 2.6 G/DL — LOW (ref 3.5–5)
ALBUMIN SERPL ELPH-MCNC: 2.6 G/DL — LOW (ref 3.5–5)
ALP SERPL-CCNC: 126 U/L — HIGH (ref 40–120)
ALP SERPL-CCNC: 126 U/L — HIGH (ref 40–120)
ALT FLD-CCNC: <6 U/L DA — LOW (ref 10–60)
ALT FLD-CCNC: <6 U/L DA — LOW (ref 10–60)
ANION GAP SERPL CALC-SCNC: 6 MMOL/L — SIGNIFICANT CHANGE UP (ref 5–17)
ANION GAP SERPL CALC-SCNC: 6 MMOL/L — SIGNIFICANT CHANGE UP (ref 5–17)
AST SERPL-CCNC: 16 U/L — SIGNIFICANT CHANGE UP (ref 10–40)
AST SERPL-CCNC: 16 U/L — SIGNIFICANT CHANGE UP (ref 10–40)
BILIRUB SERPL-MCNC: 0.3 MG/DL — SIGNIFICANT CHANGE UP (ref 0.2–1.2)
BILIRUB SERPL-MCNC: 0.3 MG/DL — SIGNIFICANT CHANGE UP (ref 0.2–1.2)
BUN SERPL-MCNC: 36 MG/DL — HIGH (ref 7–18)
BUN SERPL-MCNC: 36 MG/DL — HIGH (ref 7–18)
CALCIUM SERPL-MCNC: 8.4 MG/DL — SIGNIFICANT CHANGE UP (ref 8.4–10.5)
CALCIUM SERPL-MCNC: 8.4 MG/DL — SIGNIFICANT CHANGE UP (ref 8.4–10.5)
CHLORIDE SERPL-SCNC: 99 MMOL/L — SIGNIFICANT CHANGE UP (ref 96–108)
CHLORIDE SERPL-SCNC: 99 MMOL/L — SIGNIFICANT CHANGE UP (ref 96–108)
CO2 SERPL-SCNC: 28 MMOL/L — SIGNIFICANT CHANGE UP (ref 22–31)
CO2 SERPL-SCNC: 28 MMOL/L — SIGNIFICANT CHANGE UP (ref 22–31)
CREAT SERPL-MCNC: 4.46 MG/DL — HIGH (ref 0.5–1.3)
CREAT SERPL-MCNC: 4.46 MG/DL — HIGH (ref 0.5–1.3)
EGFR: 13 ML/MIN/1.73M2 — LOW
EGFR: 13 ML/MIN/1.73M2 — LOW
GLUCOSE BLDC GLUCOMTR-MCNC: 109 MG/DL — HIGH (ref 70–99)
GLUCOSE BLDC GLUCOMTR-MCNC: 109 MG/DL — HIGH (ref 70–99)
GLUCOSE BLDC GLUCOMTR-MCNC: 122 MG/DL — HIGH (ref 70–99)
GLUCOSE BLDC GLUCOMTR-MCNC: 122 MG/DL — HIGH (ref 70–99)
GLUCOSE BLDC GLUCOMTR-MCNC: 243 MG/DL — HIGH (ref 70–99)
GLUCOSE BLDC GLUCOMTR-MCNC: 243 MG/DL — HIGH (ref 70–99)
GLUCOSE BLDC GLUCOMTR-MCNC: 98 MG/DL — SIGNIFICANT CHANGE UP (ref 70–99)
GLUCOSE BLDC GLUCOMTR-MCNC: 98 MG/DL — SIGNIFICANT CHANGE UP (ref 70–99)
GLUCOSE SERPL-MCNC: 95 MG/DL — SIGNIFICANT CHANGE UP (ref 70–99)
GLUCOSE SERPL-MCNC: 95 MG/DL — SIGNIFICANT CHANGE UP (ref 70–99)
HCT VFR BLD CALC: 27.1 % — LOW (ref 39–50)
HCT VFR BLD CALC: 27.1 % — LOW (ref 39–50)
HGB BLD-MCNC: 8.9 G/DL — LOW (ref 13–17)
HGB BLD-MCNC: 8.9 G/DL — LOW (ref 13–17)
INR BLD: 1.68 RATIO — HIGH (ref 0.85–1.18)
INR BLD: 1.68 RATIO — HIGH (ref 0.85–1.18)
IRON SATN MFR SERPL: 105 UG/DL — SIGNIFICANT CHANGE UP (ref 65–170)
IRON SATN MFR SERPL: 105 UG/DL — SIGNIFICANT CHANGE UP (ref 65–170)
IRON SATN MFR SERPL: 52 % — SIGNIFICANT CHANGE UP (ref 20–55)
IRON SATN MFR SERPL: 52 % — SIGNIFICANT CHANGE UP (ref 20–55)
MAGNESIUM SERPL-MCNC: 1.8 MG/DL — SIGNIFICANT CHANGE UP (ref 1.6–2.6)
MAGNESIUM SERPL-MCNC: 1.8 MG/DL — SIGNIFICANT CHANGE UP (ref 1.6–2.6)
MCHC RBC-ENTMCNC: 31.7 PG — SIGNIFICANT CHANGE UP (ref 27–34)
MCHC RBC-ENTMCNC: 31.7 PG — SIGNIFICANT CHANGE UP (ref 27–34)
MCHC RBC-ENTMCNC: 32.8 GM/DL — SIGNIFICANT CHANGE UP (ref 32–36)
MCHC RBC-ENTMCNC: 32.8 GM/DL — SIGNIFICANT CHANGE UP (ref 32–36)
MCV RBC AUTO: 96.4 FL — SIGNIFICANT CHANGE UP (ref 80–100)
MCV RBC AUTO: 96.4 FL — SIGNIFICANT CHANGE UP (ref 80–100)
NRBC # BLD: 0 /100 WBCS — SIGNIFICANT CHANGE UP (ref 0–0)
NRBC # BLD: 0 /100 WBCS — SIGNIFICANT CHANGE UP (ref 0–0)
PHOSPHATE SERPL-MCNC: 3.4 MG/DL — SIGNIFICANT CHANGE UP (ref 2.5–4.5)
PHOSPHATE SERPL-MCNC: 3.4 MG/DL — SIGNIFICANT CHANGE UP (ref 2.5–4.5)
PLATELET # BLD AUTO: 154 K/UL — SIGNIFICANT CHANGE UP (ref 150–400)
PLATELET # BLD AUTO: 154 K/UL — SIGNIFICANT CHANGE UP (ref 150–400)
POTASSIUM SERPL-MCNC: 4.5 MMOL/L — SIGNIFICANT CHANGE UP (ref 3.5–5.3)
POTASSIUM SERPL-MCNC: 4.5 MMOL/L — SIGNIFICANT CHANGE UP (ref 3.5–5.3)
POTASSIUM SERPL-SCNC: 4.5 MMOL/L — SIGNIFICANT CHANGE UP (ref 3.5–5.3)
POTASSIUM SERPL-SCNC: 4.5 MMOL/L — SIGNIFICANT CHANGE UP (ref 3.5–5.3)
PROT SERPL-MCNC: 7.1 G/DL — SIGNIFICANT CHANGE UP (ref 6–8.3)
PROT SERPL-MCNC: 7.1 G/DL — SIGNIFICANT CHANGE UP (ref 6–8.3)
PROTHROM AB SERPL-ACNC: 18.9 SEC — HIGH (ref 9.5–13)
PROTHROM AB SERPL-ACNC: 18.9 SEC — HIGH (ref 9.5–13)
RBC # BLD: 2.81 M/UL — LOW (ref 4.2–5.8)
RBC # BLD: 2.81 M/UL — LOW (ref 4.2–5.8)
RBC # FLD: 11.9 % — SIGNIFICANT CHANGE UP (ref 10.3–14.5)
RBC # FLD: 11.9 % — SIGNIFICANT CHANGE UP (ref 10.3–14.5)
SODIUM SERPL-SCNC: 133 MMOL/L — LOW (ref 135–145)
SODIUM SERPL-SCNC: 133 MMOL/L — LOW (ref 135–145)
TIBC SERPL-MCNC: 200 UG/DL — LOW (ref 250–450)
TIBC SERPL-MCNC: 200 UG/DL — LOW (ref 250–450)
UIBC SERPL-MCNC: 95 UG/DL — LOW (ref 110–370)
UIBC SERPL-MCNC: 95 UG/DL — LOW (ref 110–370)
WBC # BLD: 7.33 K/UL — SIGNIFICANT CHANGE UP (ref 3.8–10.5)
WBC # BLD: 7.33 K/UL — SIGNIFICANT CHANGE UP (ref 3.8–10.5)
WBC # FLD AUTO: 7.33 K/UL — SIGNIFICANT CHANGE UP (ref 3.8–10.5)
WBC # FLD AUTO: 7.33 K/UL — SIGNIFICANT CHANGE UP (ref 3.8–10.5)

## 2023-12-18 RX ADMIN — CLOPIDOGREL BISULFATE 75 MILLIGRAM(S): 75 TABLET, FILM COATED ORAL at 11:48

## 2023-12-18 RX ADMIN — DORZOLAMIDE HYDROCHLORIDE 1 DROP(S): 20 SOLUTION/ DROPS OPHTHALMIC at 05:48

## 2023-12-18 RX ADMIN — CHLORHEXIDINE GLUCONATE 1 APPLICATION(S): 213 SOLUTION TOPICAL at 13:49

## 2023-12-18 RX ADMIN — Medication 1 DROP(S): at 05:47

## 2023-12-18 RX ADMIN — ATORVASTATIN CALCIUM 80 MILLIGRAM(S): 80 TABLET, FILM COATED ORAL at 21:40

## 2023-12-18 RX ADMIN — Medication 81 MILLIGRAM(S): at 11:48

## 2023-12-18 RX ADMIN — HEPARIN SODIUM 5000 UNIT(S): 5000 INJECTION INTRAVENOUS; SUBCUTANEOUS at 21:40

## 2023-12-18 RX ADMIN — Medication 1 DROP(S): at 17:33

## 2023-12-18 RX ADMIN — Medication 1 TABLET(S): at 11:48

## 2023-12-18 RX ADMIN — PANTOPRAZOLE SODIUM 40 MILLIGRAM(S): 20 TABLET, DELAYED RELEASE ORAL at 05:48

## 2023-12-18 RX ADMIN — DORZOLAMIDE HYDROCHLORIDE 1 DROP(S): 20 SOLUTION/ DROPS OPHTHALMIC at 17:35

## 2023-12-18 RX ADMIN — BRIMONIDINE TARTRATE 1 DROP(S): 2 SOLUTION/ DROPS OPHTHALMIC at 17:34

## 2023-12-18 RX ADMIN — HEPARIN SODIUM 5000 UNIT(S): 5000 INJECTION INTRAVENOUS; SUBCUTANEOUS at 05:48

## 2023-12-18 RX ADMIN — HEPARIN SODIUM 5000 UNIT(S): 5000 INJECTION INTRAVENOUS; SUBCUTANEOUS at 15:28

## 2023-12-18 RX ADMIN — BRIMONIDINE TARTRATE 1 DROP(S): 2 SOLUTION/ DROPS OPHTHALMIC at 05:48

## 2023-12-18 RX ADMIN — Medication 2: at 11:48

## 2023-12-18 NOTE — CONSULT NOTE ADULT - SUBJECTIVE AND OBJECTIVE BOX
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 Per the patient, patient returned home from dialysis and started having 5/10 pain in the back of his neck and had complete loss of vision for 15 minutes. Patient was able to stand and walk during the episode of vision loss. The vision is slowly returning but it hasn't returned to baseline. The neck pain did not radiate to the head or back. Patient denies headache, dizziness, SOB, or chest pain or N/V. He denies any numbness, tingling, weakness, unbalanced, slurred speech.     Of note, patient was admitted to [facility] for 3 weeks and discharged on 12/11/23 due to his perm cath Staph infection and was discharged with antibiotics.    Called see and eval 78y.o. Male for b/l carotid stenosis and permanent HD access. Neuro consulted after admission, attributes acute vision loss to progressive glaucoma. Awaiting MRI brain. CTA showed Pt currently with R chest PC for HD. Pt also unsure of dominant hand.     PAST MEDICAL & SURGICAL HISTORY:  DM (diabetes mellitus)      HTN (hypertension)      Chronic kidney disease      HLD (hyperlipidemia)      Glaucoma      Gout      No significant past surgical history    MEDICATIONS  (STANDING):  aspirin  chewable 81 milliGRAM(s) Oral daily  atorvastatin 80 milliGRAM(s) Oral at bedtime  brimonidine 0.2% Ophthalmic Solution 1 Drop(s) Both EYES two times a day  chlorhexidine 2% Cloths 1 Application(s) Topical daily  clopidogrel Tablet 75 milliGRAM(s) Oral daily  dorzolamide 2% Ophthalmic Solution 1 Drop(s) Both EYES <User Schedule>  heparin   Injectable 5000 Unit(s) SubCutaneous every 8 hours  insulin lispro (ADMELOG) corrective regimen sliding scale   SubCutaneous at bedtime  insulin lispro (ADMELOG) corrective regimen sliding scale   SubCutaneous three times a day before meals  Nephro-marcella 1 Tablet(s) Oral daily  pantoprazole    Tablet 40 milliGRAM(s) Oral before breakfast  timolol 0.5% Solution 1 Drop(s) Both EYES two times a day    MEDICATIONS  (PRN):  acetaminophen     Tablet .. 650 milliGRAM(s) Oral every 6 hours PRN Temp greater or equal to 38C (100.4F), Mild Pain (1 - 3)    Allergies    No Known Allergies    Intolerances    Vital Signs Last 24 Hrs  T(C): 36.5 (18 Dec 2023 10:20), Max: 37.1 (17 Dec 2023 13:40)  T(F): 97.7 (18 Dec 2023 10:20), Max: 98.8 (17 Dec 2023 13:40)  HR: 61 (18 Dec 2023 10:20) (61 - 75)  BP: 145/80 (18 Dec 2023 10:20) (141/71 - 147/78)  BP(mean): --  RR: 18 (18 Dec 2023 10:20) (18 - 18)  SpO2: 97% (18 Dec 2023 10:20) (93% - 99%)    Parameters below as of 18 Dec 2023 10:20  Patient On (Oxygen Delivery Method): room air    Physical:  Gen: A&Ox3. NAD  HEENT: Neck supple  Chest: R chest PC in place.  UE: radial pulse 2+ b/l    LABS:                        8.9    7.33  )-----------( 154      ( 18 Dec 2023 06:44 )             27.1              12-18    133<L>  |  99  |  36<H>  ----------------------------<  95  4.5   |  28  |  4.46<H>    Ca    8.4      18 Dec 2023 06:44  Phos  3.4     12-18  Mg     1.8     12-18    TPro  7.1  /  Alb  2.6<L>  /  TBili  0.3  /  DBili  x   /  AST  16  /  ALT  <6<L>  /  AlkPhos  126<H>  12-18            PT/INR - ( 18 Dec 2023 06:44 )   PT: 18.9 sec;   INR: 1.68 ratio       PTT - ( 16 Dec 2023 13:25 )  PTT:35.1 sec  Urinalysis Basic - ( 18 Dec 2023 06:44 )    RADIOLOGY & ADDITIONAL STUDIES:  < from: CT Angio Neck Stroke Protocol w/ IV Cont (12.16.23 @ 13:54) >  IMPRESSION:        1.   Right carotid system: Moderate plaque at the origin of the RIGHT   internal carotid artery resulting in high-grade (70-80%) stenosis.        2.   Left carotid system:  Moderate calcified plaque at the origin of   the LEFT internal carotid artery resulting in a high-grade (70-80%)   stenosis.        3.   Intracranial circulation:  No significant vascular lesion.  Mild   arteriosclerosis.        4.   Brain:  Mild periventricular and deep white matter ischemia.        5.  Perfusion: No core infarct. 30 mL critically hypoperfused tissue   at risk is identified.    Critical value:  I discussed the finding of this report with Dr. Funez   at 2:30 PM on 12/16/2023.  Critical value policy of the hospital was   followed.  Read back and confirmation of receipt of this communication   was performed.  This verbal communication supplements the text report of   this document.    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 Per the patient, patient returned home from dialysis and started having 5/10 pain in the back of his neck and had complete loss of vision for 15 minutes. Patient was able to stand and walk during the episode of vision loss. The vision is slowly returning but it hasn't returned to baseline. The neck pain did not radiate to the head or back. Patient denies headache, dizziness, SOB, or chest pain or N/V. He denies any numbness, tingling, weakness, unbalanced, slurred speech.     Of note, patient was admitted to [facility] for 3 weeks and discharged on 12/11/23 due to his perm cath Staph infection and was discharged with antibiotics.    Called see and eval 78y.o. Male for b/l carotid stenosis and permanent HD access. Neuro consulted after admission, attributes acute vision loss to progressive glaucoma. Awaiting MRI brain. CTA showed Pt currently with R chest PC for HD. Pt also unsure of dominant hand.     PAST MEDICAL & SURGICAL HISTORY:  DM (diabetes mellitus)      HTN (hypertension)      Chronic kidney disease      HLD (hyperlipidemia)      Glaucoma      Gout      No significant past surgical history    MEDICATIONS  (STANDING):  aspirin  chewable 81 milliGRAM(s) Oral daily  atorvastatin 80 milliGRAM(s) Oral at bedtime  brimonidine 0.2% Ophthalmic Solution 1 Drop(s) Both EYES two times a day  chlorhexidine 2% Cloths 1 Application(s) Topical daily  clopidogrel Tablet 75 milliGRAM(s) Oral daily  dorzolamide 2% Ophthalmic Solution 1 Drop(s) Both EYES <User Schedule>  heparin   Injectable 5000 Unit(s) SubCutaneous every 8 hours  insulin lispro (ADMELOG) corrective regimen sliding scale   SubCutaneous at bedtime  insulin lispro (ADMELOG) corrective regimen sliding scale   SubCutaneous three times a day before meals  Nephro-marcella 1 Tablet(s) Oral daily  pantoprazole    Tablet 40 milliGRAM(s) Oral before breakfast  timolol 0.5% Solution 1 Drop(s) Both EYES two times a day    MEDICATIONS  (PRN):  acetaminophen     Tablet .. 650 milliGRAM(s) Oral every 6 hours PRN Temp greater or equal to 38C (100.4F), Mild Pain (1 - 3)    Allergies    No Known Allergies    Intolerances    Vital Signs Last 24 Hrs  T(C): 36.5 (18 Dec 2023 10:20), Max: 37.1 (17 Dec 2023 13:40)  T(F): 97.7 (18 Dec 2023 10:20), Max: 98.8 (17 Dec 2023 13:40)  HR: 61 (18 Dec 2023 10:20) (61 - 75)  BP: 145/80 (18 Dec 2023 10:20) (141/71 - 147/78)  BP(mean): --  RR: 18 (18 Dec 2023 10:20) (18 - 18)  SpO2: 97% (18 Dec 2023 10:20) (93% - 99%)    Parameters below as of 18 Dec 2023 10:20  Patient On (Oxygen Delivery Method): room air    Physical:  Gen: A&Ox3. NAD  HEENT: Neck supple  Chest: R chest PC in place.  UE: radial pulse 2+ b/l    LABS:                        8.9    7.33  )-----------( 154      ( 18 Dec 2023 06:44 )             27.1              12-18    133<L>  |  99  |  36<H>  ----------------------------<  95  4.5   |  28  |  4.46<H>    Ca    8.4      18 Dec 2023 06:44  Phos  3.4     12-18  Mg     1.8     12-18    TPro  7.1  /  Alb  2.6<L>  /  TBili  0.3  /  DBili  x   /  AST  16  /  ALT  <6<L>  /  AlkPhos  126<H>  12-18            PT/INR - ( 18 Dec 2023 06:44 )   PT: 18.9 sec;   INR: 1.68 ratio       PTT - ( 16 Dec 2023 13:25 )  PTT:35.1 sec  Urinalysis Basic - ( 18 Dec 2023 06:44 )    RADIOLOGY & ADDITIONAL STUDIES:  < from: CT Angio Neck Stroke Protocol w/ IV Cont (12.16.23 @ 13:54) >  IMPRESSION:        1.   Right carotid system: Moderate plaque at the origin of the RIGHT   internal carotid artery resulting in high-grade (70-80%) stenosis.        2.   Left carotid system:  Moderate calcified plaque at the origin of   the LEFT internal carotid artery resulting in a high-grade (70-80%)   stenosis.        3.   Intracranial circulation:  No significant vascular lesion.  Mild   arteriosclerosis.        4.   Brain:  Mild periventricular and deep white matter ischemia.        5.  Perfusion: No core infarct. 30 mL critically hypoperfused tissue   at risk is identified.    Critical value:  I discussed the finding of this report with Dr. Funez   at 2:30 PM on 12/16/2023.  Critical value policy of the hospital was   followed.  Read back and confirmation of receipt of this communication   was performed.  This verbal communication supplements the text report of   this document.    --- End of Report ---    < end of copied text >

## 2023-12-18 NOTE — PROGRESS NOTE ADULT - SUBJECTIVE AND OBJECTIVE BOX
Patient is a 78y old  Male who presents with a chief complaint of TIA (17 Dec 2023 12:05)    pt seen in tele [x  ], reg med floor [   ], bed [ x ], chair at bedside [   ], a+o x3 [ x ], lethargic [  ],    nad [ x ]      Allergies    No Known Allergies        Vitals    T(F): 98.3 (12-18-23 @ 05:22), Max: 98.8 (12-17-23 @ 13:40)  HR: 75 (12-18-23 @ 05:22) (63 - 75)  BP: 141/71 (12-18-23 @ 05:22) (123/83 - 147/78)  RR: 18 (12-18-23 @ 05:22) (18 - 18)  SpO2: 93% (12-18-23 @ 05:22) (93% - 99%)  Wt(kg): --  CAPILLARY BLOOD GLUCOSE      POCT Blood Glucose.: 121 mg/dL (17 Dec 2023 21:14)      Labs                          9.2    6.56  )-----------( 155      ( 17 Dec 2023 05:59 )             27.6       12-17    133<L>  |  97  |  21<H>  ----------------------------<  113<H>  3.9   |  32<H>  |  3.26<H>    Ca    8.8      17 Dec 2023 05:59  Phos  2.9     12-17  Mg     1.9     12-17    TPro  7.9  /  Alb  2.8<L>  /  TBili  0.4  /  DBili  x   /  AST  19  /  ALT  <6<L>  /  AlkPhos  131<H>  12-16          Troponin I, High Sensitivity Result: 27.0 ng/L (12-16-23 @ 13:25)        Radiology Results      Meds    MEDICATIONS  (STANDING):  aspirin  chewable 81 milliGRAM(s) Oral daily  atorvastatin 80 milliGRAM(s) Oral at bedtime  brimonidine 0.2% Ophthalmic Solution 1 Drop(s) Both EYES two times a day  chlorhexidine 2% Cloths 1 Application(s) Topical daily  clopidogrel Tablet 75 milliGRAM(s) Oral daily  dorzolamide 2% Ophthalmic Solution 1 Drop(s) Both EYES <User Schedule>  heparin   Injectable 5000 Unit(s) SubCutaneous every 8 hours  insulin lispro (ADMELOG) corrective regimen sliding scale   SubCutaneous at bedtime  insulin lispro (ADMELOG) corrective regimen sliding scale   SubCutaneous three times a day before meals  Nephro-marcella 1 Tablet(s) Oral daily  pantoprazole    Tablet 40 milliGRAM(s) Oral before breakfast  timolol 0.5% Solution 1 Drop(s) Both EYES two times a day      MEDICATIONS  (PRN):  acetaminophen     Tablet .. 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 out of the right eye      PLAN    cont tele,   aspirin, statin, plavix,   neuro cons   trop x1 neg noted above  cardio cons   f/u echo   vascular cons  lispro ss  hgba1c   pt on hd tts  renal cons   cont current meds          Patient is a 78y old  Male who presents with a chief complaint of TIA (17 Dec 2023 12:05)    pt seen in tele [x  ], reg med floor [   ], bed [ x ], chair at bedside [   ], a+o x3 [ x ], lethargic [  ],    nad [ x ]      Allergies    No Known Allergies        Vitals    T(F): 98.3 (12-18-23 @ 05:22), Max: 98.8 (12-17-23 @ 13:40)  HR: 75 (12-18-23 @ 05:22) (63 - 75)  BP: 141/71 (12-18-23 @ 05:22) (123/83 - 147/78)  RR: 18 (12-18-23 @ 05:22) (18 - 18)  SpO2: 93% (12-18-23 @ 05:22) (93% - 99%)  Wt(kg): --  CAPILLARY BLOOD GLUCOSE      POCT Blood Glucose.: 121 mg/dL (17 Dec 2023 21:14)      Labs                          9.2    6.56  )-----------( 155      ( 17 Dec 2023 05:59 )             27.6       12-17    133<L>  |  97  |  21<H>  ----------------------------<  113<H>  3.9   |  32<H>  |  3.26<H>    Ca    8.8      17 Dec 2023 05:59  Phos  2.9     12-17  Mg     1.9     12-17    TPro  7.9  /  Alb  2.8<L>  /  TBili  0.4  /  DBili  x   /  AST  19  /  ALT  <6<L>  /  AlkPhos  131<H>  12-16          Troponin I, High Sensitivity Result: 27.0 ng/L (12-16-23 @ 13:25)        Radiology Results      Meds    MEDICATIONS  (STANDING):  aspirin  chewable 81 milliGRAM(s) Oral daily  atorvastatin 80 milliGRAM(s) Oral at bedtime  brimonidine 0.2% Ophthalmic Solution 1 Drop(s) Both EYES two times a day  chlorhexidine 2% Cloths 1 Application(s) Topical daily  clopidogrel Tablet 75 milliGRAM(s) Oral daily  dorzolamide 2% Ophthalmic Solution 1 Drop(s) Both EYES <User Schedule>  heparin   Injectable 5000 Unit(s) SubCutaneous every 8 hours  insulin lispro (ADMELOG) corrective regimen sliding scale   SubCutaneous at bedtime  insulin lispro (ADMELOG) corrective regimen sliding scale   SubCutaneous three times a day before meals  Nephro-marcella 1 Tablet(s) Oral daily  pantoprazole    Tablet 40 milliGRAM(s) Oral before breakfast  timolol 0.5% Solution 1 Drop(s) Both EYES two times a day      MEDICATIONS  (PRN):  acetaminophen     Tablet .. 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   Not likely a cerebral cortical hemispheric event, which would result in a hemianopsia as opposed to total visual loss.  Could have been a retinal TIA (microembolus that broke up).  f/u Non-con MR head   Out-Pt ophthalmology follow-up.    Continue ASA 81mg daily.     Clopidogrel 75mg daily to end 1/5/24.    trop x1 neg noted above  cardio f/u   No B blocker/Ace/arb/entresto due to low bp,   f/u echo   vascular cons for eval of b/l carotid stenosis and avf placement  lispro ss  hgba1c   pt on hd tts  renal f/u   continue with AVNI and follow iron storages.  Follow PO4     patient agrees for AV acces surgery.  cont current meds

## 2023-12-18 NOTE — CONSULT NOTE ADULT - ASSESSMENT
78y.o. Male with b/l carotid stenosis, ESRD on HD    -Outpt f/u with Dr. Riojas for AVF placement  -No periop workup needed while inpt  -con't HD via PC  -b/l carotid stenosis unlikely source of acute vision loss  -con't ASA/statin    This note and its recommendations herein are preliminary until such time as cosigned by an attending.

## 2023-12-18 NOTE — PROGRESS NOTE ADULT - SUBJECTIVE AND OBJECTIVE BOX
Problem List:  ESRD  Admitted for loss of vision for 35 minutes on day of dialysis, after he arrived home.  Seen by neurology.       PAST MEDICAL & SURGICAL HISTORY:  DM (diabetes mellitus)      HTN (hypertension)      Chronic kidney disease      HLD (hyperlipidemia)      Glaucoma      Gout      No significant past surgical history          No Known Allergies      MEDICATIONS  (STANDING):  aspirin  chewable 81 milliGRAM(s) Oral daily  atorvastatin 80 milliGRAM(s) Oral at bedtime  brimonidine 0.2% Ophthalmic Solution 1 Drop(s) Both EYES two times a day  chlorhexidine 2% Cloths 1 Application(s) Topical daily  clopidogrel Tablet 75 milliGRAM(s) Oral daily  dorzolamide 2% Ophthalmic Solution 1 Drop(s) Both EYES <User Schedule>  heparin   Injectable 5000 Unit(s) SubCutaneous every 8 hours  insulin lispro (ADMELOG) corrective regimen sliding scale   SubCutaneous at bedtime  insulin lispro (ADMELOG) corrective regimen sliding scale   SubCutaneous three times a day before meals  Nephro-marcella 1 Tablet(s) Oral daily  pantoprazole    Tablet 40 milliGRAM(s) Oral before breakfast  timolol 0.5% Solution 1 Drop(s) Both EYES two times a day    MEDICATIONS  (PRN):  acetaminophen     Tablet .. 650 milliGRAM(s) Oral every 6 hours PRN Temp greater or equal to 38C (100.4F), Mild Pain (1 - 3)                            8.9    7.33  )-----------( 154      ( 18 Dec 2023 06:44 )             27.1     12-18    133<L>  |  99  |  36<H>  ----------------------------<  95  4.5   |  28  |  4.46<H>    Ca    8.4      18 Dec 2023 06:44  Phos  3.4     12-18  Mg     1.8     12-18    TPro  7.1  /  Alb  2.6<L>  /  TBili  0.3  /  DBili  x   /  AST  16  /  ALT  <6<L>  /  AlkPhos  126<H>  12-18    PT/INR - ( 18 Dec 2023 06:44 )   PT: 18.9 sec;   INR: 1.68 ratio         REVIEW OF SYSTEMS:  General: no fever no chills, no weight loss.  EYES/ENT: No visual changes;  No vertigo, no headache.  RESPIRATORY: No cough, wheezing, hemoptysis; No shortness of breath  CARDIOVASCULAR: No chest pain or palpitations. No Edema  GASTROINTESTINAL: No abdominal or epigastric pain. No nausea, vomiting. No diarrhea or constipation. No melena.  GENITOURINARY: No dysuria, frequency, foamy urine, urinary urgency, incontinence or hematuria  NEUROLOGICAL: No numbness or weakness, no tremor , no dizziness.   Muscle skeletal : no joint pain and no swelling of joints and limbs.          VITALS:  T(F): 98.2 (12-18-23 @ 13:15), Max: 98.4 (12-17-23 @ 20:37)  HR: 66 (12-18-23 @ 13:15)  BP: 151/81 (12-18-23 @ 13:15)  RR: 18 (12-18-23 @ 13:15)  SpO2: 98% (12-18-23 @ 13:15)  Wt(kg): --      PHYSICAL EXAM:  Constitutional: well developed, no diaphoresis, no distress.  Neck: No JVD, no carotid bruit, supple, no adenopathy  Respiratory: Good air entrance B/L, no wheezes, rales or rhonchi  Cardiovascular: S1, S2, RRR, no pericardial rub, no murmur  Abdomen: BS+, soft, no tenderness, no bruit  Pelvis: bladder nondistended  Extremities: No cyanosis or clubbing. No peripheral edema.     Vascular Access: right PC

## 2023-12-18 NOTE — PROGRESS NOTE ADULT - SUBJECTIVE AND OBJECTIVE BOX
Date of Service 12-18-23 @ 10:15    CHIEF COMPLAINT:Patient is a 78y old  Male who presents with a chief complaint of TIA.Pt appears comfortable.    	  REVIEW OF SYSTEMS:  CONSTITUTIONAL: No fever, weight loss, or fatigue  EYES: No eye pain, visual disturbances, or discharge  ENT:  No difficulty hearing, tinnitus, vertigo; No sinus or throat pain  NECK: No pain or stiffness  RESPIRATORY: No cough, wheezing, chills or hemoptysis; No Shortness of Breath  CARDIOVASCULAR: No chest pain, palpitations, passing out, dizziness, or leg swelling  GASTROINTESTINAL: No abdominal or epigastric pain. No nausea, vomiting, or hematemesis; No diarrhea or constipation. No melena or hematochezia.  GENITOURINARY: No dysuria, frequency, hematuria, or incontinence  NEUROLOGICAL: No headaches, memory loss, loss of strength, numbness, or tremors  SKIN: No itching, burning, rashes, or lesions   LYMPH Nodes: No enlarged glands  ENDOCRINE: No heat or cold intolerance; No hair loss  MUSCULOSKELETAL: No joint pain or swelling; No muscle, back, or extremity pain  PSYCHIATRIC: No depression, anxiety, mood swings, or difficulty sleeping  HEME/LYMPH: No easy bruising, or bleeding gums  ALLERGY AND IMMUNOLOGIC: No hives or eczema	        PHYSICAL EXAM:  T(C): 36.8 (12-18-23 @ 05:22), Max: 37.1 (12-17-23 @ 13:40)  HR: 75 (12-18-23 @ 05:22) (63 - 75)  BP: 141/71 (12-18-23 @ 05:22) (123/83 - 147/78)  RR: 18 (12-18-23 @ 05:22) (18 - 18)  SpO2: 93% (12-18-23 @ 05:22) (93% - 99%)  Wt(kg): --  I&O's Summary      Appearance: Normal	  HEENT:   Normal oral mucosa, PERRL, EOMI	  Lymphatic: No lymphadenopathy  Cardiovascular: Normal S1 S2, No JVD, No murmurs, No edema  Respiratory: Lungs clear to auscultation	  Psychiatry: A & O x 3, Mood & affect appropriate  Gastrointestinal:  Soft, Non-tender, + BS	  Skin: No rashes, No ecchymoses, No cyanosis	  Neurologic: Non-focal  Extremities: Normal range of motion, No clubbing, cyanosis or edema  Vascular: Peripheral pulses palpable 2+ bilaterally    MEDICATIONS  (STANDING):  aspirin  chewable 81 milliGRAM(s) Oral daily  atorvastatin 80 milliGRAM(s) Oral at bedtime  brimonidine 0.2% Ophthalmic Solution 1 Drop(s) Both EYES two times a day  chlorhexidine 2% Cloths 1 Application(s) Topical daily  clopidogrel Tablet 75 milliGRAM(s) Oral daily  dorzolamide 2% Ophthalmic Solution 1 Drop(s) Both EYES <User Schedule>  heparin   Injectable 5000 Unit(s) SubCutaneous every 8 hours  insulin lispro (ADMELOG) corrective regimen sliding scale   SubCutaneous at bedtime  insulin lispro (ADMELOG) corrective regimen sliding scale   SubCutaneous three times a day before meals  Nephro-marcella 1 Tablet(s) Oral daily  pantoprazole    Tablet 40 milliGRAM(s) Oral before breakfast  timolol 0.5% Solution 1 Drop(s) Both EYES two times a day        LABS:	 	    Troponin I, High Sensitivity Result: 27.0 ng/L (12-16 @ 13:25)                            8.9    7.33  )-----------( 154      ( 18 Dec 2023 06:44 )             27.1     12-18    133<L>  |  99  |  36<H>  ----------------------------<  95  4.5   |  28  |  4.46<H>    Ca    8.4      18 Dec 2023 06:44  Phos  3.4     12-18  Mg     1.8     12-18    TPro  7.1  /  Alb  2.6<L>  /  TBili  0.3  /  DBili  x   /  AST  16  /  ALT  <6<L>  /  AlkPhos  126<H>  12-18    proBNP:   Lipid Profile: Cholesterol 88  LDL --  HDL 37  TG 52  Ldl calc 41  Ratio --    HgA1c:   TSH: Thyroid Stimulating Hormone, Serum: 5.58 uU/mL (12-17 @ 05:59)

## 2023-12-19 LAB
ALBUMIN SERPL ELPH-MCNC: 2.6 G/DL — LOW (ref 3.5–5)
ALBUMIN SERPL ELPH-MCNC: 2.6 G/DL — LOW (ref 3.5–5)
ALP SERPL-CCNC: 132 U/L — HIGH (ref 40–120)
ALP SERPL-CCNC: 132 U/L — HIGH (ref 40–120)
ALT FLD-CCNC: <6 U/L DA — LOW (ref 10–60)
ALT FLD-CCNC: <6 U/L DA — LOW (ref 10–60)
ANION GAP SERPL CALC-SCNC: 7 MMOL/L — SIGNIFICANT CHANGE UP (ref 5–17)
ANION GAP SERPL CALC-SCNC: 7 MMOL/L — SIGNIFICANT CHANGE UP (ref 5–17)
AST SERPL-CCNC: 15 U/L — SIGNIFICANT CHANGE UP (ref 10–40)
AST SERPL-CCNC: 15 U/L — SIGNIFICANT CHANGE UP (ref 10–40)
BASOPHILS # BLD AUTO: 0.03 K/UL — SIGNIFICANT CHANGE UP (ref 0–0.2)
BASOPHILS # BLD AUTO: 0.03 K/UL — SIGNIFICANT CHANGE UP (ref 0–0.2)
BASOPHILS NFR BLD AUTO: 0.4 % — SIGNIFICANT CHANGE UP (ref 0–2)
BASOPHILS NFR BLD AUTO: 0.4 % — SIGNIFICANT CHANGE UP (ref 0–2)
BILIRUB SERPL-MCNC: 0.3 MG/DL — SIGNIFICANT CHANGE UP (ref 0.2–1.2)
BILIRUB SERPL-MCNC: 0.3 MG/DL — SIGNIFICANT CHANGE UP (ref 0.2–1.2)
BUN SERPL-MCNC: 53 MG/DL — HIGH (ref 7–18)
BUN SERPL-MCNC: 53 MG/DL — HIGH (ref 7–18)
CALCIUM SERPL-MCNC: 8.5 MG/DL — SIGNIFICANT CHANGE UP (ref 8.4–10.5)
CALCIUM SERPL-MCNC: 8.5 MG/DL — SIGNIFICANT CHANGE UP (ref 8.4–10.5)
CHLORIDE SERPL-SCNC: 100 MMOL/L — SIGNIFICANT CHANGE UP (ref 96–108)
CHLORIDE SERPL-SCNC: 100 MMOL/L — SIGNIFICANT CHANGE UP (ref 96–108)
CO2 SERPL-SCNC: 26 MMOL/L — SIGNIFICANT CHANGE UP (ref 22–31)
CO2 SERPL-SCNC: 26 MMOL/L — SIGNIFICANT CHANGE UP (ref 22–31)
CREAT SERPL-MCNC: 4.95 MG/DL — HIGH (ref 0.5–1.3)
CREAT SERPL-MCNC: 4.95 MG/DL — HIGH (ref 0.5–1.3)
EGFR: 11 ML/MIN/1.73M2 — LOW
EGFR: 11 ML/MIN/1.73M2 — LOW
EOSINOPHIL # BLD AUTO: 3.25 K/UL — HIGH (ref 0–0.5)
EOSINOPHIL # BLD AUTO: 3.25 K/UL — HIGH (ref 0–0.5)
EOSINOPHIL NFR BLD AUTO: 39.8 % — HIGH (ref 0–6)
EOSINOPHIL NFR BLD AUTO: 39.8 % — HIGH (ref 0–6)
ERYTHROCYTE [SEDIMENTATION RATE] IN BLOOD: 77 MM/HR — HIGH (ref 0–20)
ERYTHROCYTE [SEDIMENTATION RATE] IN BLOOD: 77 MM/HR — HIGH (ref 0–20)
GLUCOSE BLDC GLUCOMTR-MCNC: 109 MG/DL — HIGH (ref 70–99)
GLUCOSE BLDC GLUCOMTR-MCNC: 109 MG/DL — HIGH (ref 70–99)
GLUCOSE BLDC GLUCOMTR-MCNC: 169 MG/DL — HIGH (ref 70–99)
GLUCOSE BLDC GLUCOMTR-MCNC: 169 MG/DL — HIGH (ref 70–99)
GLUCOSE BLDC GLUCOMTR-MCNC: 211 MG/DL — HIGH (ref 70–99)
GLUCOSE BLDC GLUCOMTR-MCNC: 211 MG/DL — HIGH (ref 70–99)
GLUCOSE BLDC GLUCOMTR-MCNC: 93 MG/DL — SIGNIFICANT CHANGE UP (ref 70–99)
GLUCOSE BLDC GLUCOMTR-MCNC: 93 MG/DL — SIGNIFICANT CHANGE UP (ref 70–99)
GLUCOSE SERPL-MCNC: 87 MG/DL — SIGNIFICANT CHANGE UP (ref 70–99)
GLUCOSE SERPL-MCNC: 87 MG/DL — SIGNIFICANT CHANGE UP (ref 70–99)
HCT VFR BLD CALC: 27.7 % — LOW (ref 39–50)
HCT VFR BLD CALC: 27.7 % — LOW (ref 39–50)
HGB BLD-MCNC: 9.4 G/DL — LOW (ref 13–17)
HGB BLD-MCNC: 9.4 G/DL — LOW (ref 13–17)
IMM GRANULOCYTES NFR BLD AUTO: 0.2 % — SIGNIFICANT CHANGE UP (ref 0–0.9)
IMM GRANULOCYTES NFR BLD AUTO: 0.2 % — SIGNIFICANT CHANGE UP (ref 0–0.9)
LYMPHOCYTES # BLD AUTO: 2.08 K/UL — SIGNIFICANT CHANGE UP (ref 1–3.3)
LYMPHOCYTES # BLD AUTO: 2.08 K/UL — SIGNIFICANT CHANGE UP (ref 1–3.3)
LYMPHOCYTES # BLD AUTO: 25.5 % — SIGNIFICANT CHANGE UP (ref 13–44)
LYMPHOCYTES # BLD AUTO: 25.5 % — SIGNIFICANT CHANGE UP (ref 13–44)
MAGNESIUM SERPL-MCNC: 1.9 MG/DL — SIGNIFICANT CHANGE UP (ref 1.6–2.6)
MAGNESIUM SERPL-MCNC: 1.9 MG/DL — SIGNIFICANT CHANGE UP (ref 1.6–2.6)
MCHC RBC-ENTMCNC: 32.6 PG — SIGNIFICANT CHANGE UP (ref 27–34)
MCHC RBC-ENTMCNC: 32.6 PG — SIGNIFICANT CHANGE UP (ref 27–34)
MCHC RBC-ENTMCNC: 33.9 GM/DL — SIGNIFICANT CHANGE UP (ref 32–36)
MCHC RBC-ENTMCNC: 33.9 GM/DL — SIGNIFICANT CHANGE UP (ref 32–36)
MCV RBC AUTO: 96.2 FL — SIGNIFICANT CHANGE UP (ref 80–100)
MCV RBC AUTO: 96.2 FL — SIGNIFICANT CHANGE UP (ref 80–100)
MONOCYTES # BLD AUTO: 0.88 K/UL — SIGNIFICANT CHANGE UP (ref 0–0.9)
MONOCYTES # BLD AUTO: 0.88 K/UL — SIGNIFICANT CHANGE UP (ref 0–0.9)
MONOCYTES NFR BLD AUTO: 10.8 % — SIGNIFICANT CHANGE UP (ref 2–14)
MONOCYTES NFR BLD AUTO: 10.8 % — SIGNIFICANT CHANGE UP (ref 2–14)
NEUTROPHILS # BLD AUTO: 1.91 K/UL — SIGNIFICANT CHANGE UP (ref 1.8–7.4)
NEUTROPHILS # BLD AUTO: 1.91 K/UL — SIGNIFICANT CHANGE UP (ref 1.8–7.4)
NEUTROPHILS NFR BLD AUTO: 23.3 % — LOW (ref 43–77)
NEUTROPHILS NFR BLD AUTO: 23.3 % — LOW (ref 43–77)
NRBC # BLD: 0 /100 WBCS — SIGNIFICANT CHANGE UP (ref 0–0)
NRBC # BLD: 0 /100 WBCS — SIGNIFICANT CHANGE UP (ref 0–0)
PHOSPHATE SERPL-MCNC: 3.4 MG/DL — SIGNIFICANT CHANGE UP (ref 2.5–4.5)
PHOSPHATE SERPL-MCNC: 3.4 MG/DL — SIGNIFICANT CHANGE UP (ref 2.5–4.5)
PLATELET # BLD AUTO: 163 K/UL — SIGNIFICANT CHANGE UP (ref 150–400)
PLATELET # BLD AUTO: 163 K/UL — SIGNIFICANT CHANGE UP (ref 150–400)
POTASSIUM SERPL-MCNC: 4.9 MMOL/L — SIGNIFICANT CHANGE UP (ref 3.5–5.3)
POTASSIUM SERPL-MCNC: 4.9 MMOL/L — SIGNIFICANT CHANGE UP (ref 3.5–5.3)
POTASSIUM SERPL-SCNC: 4.9 MMOL/L — SIGNIFICANT CHANGE UP (ref 3.5–5.3)
POTASSIUM SERPL-SCNC: 4.9 MMOL/L — SIGNIFICANT CHANGE UP (ref 3.5–5.3)
PROT SERPL-MCNC: 7.3 G/DL — SIGNIFICANT CHANGE UP (ref 6–8.3)
PROT SERPL-MCNC: 7.3 G/DL — SIGNIFICANT CHANGE UP (ref 6–8.3)
RBC # BLD: 2.88 M/UL — LOW (ref 4.2–5.8)
RBC # BLD: 2.88 M/UL — LOW (ref 4.2–5.8)
RBC # FLD: 12.1 % — SIGNIFICANT CHANGE UP (ref 10.3–14.5)
RBC # FLD: 12.1 % — SIGNIFICANT CHANGE UP (ref 10.3–14.5)
SODIUM SERPL-SCNC: 133 MMOL/L — LOW (ref 135–145)
SODIUM SERPL-SCNC: 133 MMOL/L — LOW (ref 135–145)
WBC # BLD: 8.17 K/UL — SIGNIFICANT CHANGE UP (ref 3.8–10.5)
WBC # BLD: 8.17 K/UL — SIGNIFICANT CHANGE UP (ref 3.8–10.5)
WBC # FLD AUTO: 8.17 K/UL — SIGNIFICANT CHANGE UP (ref 3.8–10.5)
WBC # FLD AUTO: 8.17 K/UL — SIGNIFICANT CHANGE UP (ref 3.8–10.5)

## 2023-12-19 PROCEDURE — 99232 SBSQ HOSP IP/OBS MODERATE 35: CPT | Mod: 25,57

## 2023-12-19 RX ADMIN — CLOPIDOGREL BISULFATE 75 MILLIGRAM(S): 75 TABLET, FILM COATED ORAL at 11:50

## 2023-12-19 RX ADMIN — DORZOLAMIDE HYDROCHLORIDE 1 DROP(S): 20 SOLUTION/ DROPS OPHTHALMIC at 18:21

## 2023-12-19 RX ADMIN — Medication 1 DROP(S): at 07:07

## 2023-12-19 RX ADMIN — BRIMONIDINE TARTRATE 1 DROP(S): 2 SOLUTION/ DROPS OPHTHALMIC at 18:21

## 2023-12-19 RX ADMIN — Medication 2: at 11:49

## 2023-12-19 RX ADMIN — DORZOLAMIDE HYDROCHLORIDE 1 DROP(S): 20 SOLUTION/ DROPS OPHTHALMIC at 07:06

## 2023-12-19 RX ADMIN — ATORVASTATIN CALCIUM 80 MILLIGRAM(S): 80 TABLET, FILM COATED ORAL at 22:28

## 2023-12-19 RX ADMIN — HEPARIN SODIUM 5000 UNIT(S): 5000 INJECTION INTRAVENOUS; SUBCUTANEOUS at 22:28

## 2023-12-19 RX ADMIN — Medication 81 MILLIGRAM(S): at 11:50

## 2023-12-19 RX ADMIN — CHLORHEXIDINE GLUCONATE 1 APPLICATION(S): 213 SOLUTION TOPICAL at 11:50

## 2023-12-19 RX ADMIN — HEPARIN SODIUM 5000 UNIT(S): 5000 INJECTION INTRAVENOUS; SUBCUTANEOUS at 07:07

## 2023-12-19 RX ADMIN — HEPARIN SODIUM 5000 UNIT(S): 5000 INJECTION INTRAVENOUS; SUBCUTANEOUS at 13:33

## 2023-12-19 RX ADMIN — PANTOPRAZOLE SODIUM 40 MILLIGRAM(S): 20 TABLET, DELAYED RELEASE ORAL at 07:07

## 2023-12-19 RX ADMIN — Medication 1 TABLET(S): at 11:49

## 2023-12-19 RX ADMIN — Medication 1 DROP(S): at 18:22

## 2023-12-19 RX ADMIN — BRIMONIDINE TARTRATE 1 DROP(S): 2 SOLUTION/ DROPS OPHTHALMIC at 07:06

## 2023-12-19 NOTE — DIETITIAN INITIAL EVALUATION ADULT - PROBLEM SELECTOR PLAN 6
DVT propylaxis - holding DVT prophy with elevated INR, f/u in AM and start hep SQ  GI prophylaxis - pantoprazole 20mg qd (home med)

## 2023-12-19 NOTE — CONSULT NOTE ADULT - SUBJECTIVE AND OBJECTIVE BOX
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  He got admitted for TIA work up. Denies any dizziness or syncope history. Noted to have pauses and AIVR on tele.     PAST MEDICAL & SURGICAL HISTORY:  DM (diabetes mellitus)      HTN (hypertension)      Chronic kidney disease      HLD (hyperlipidemia)      Glaucoma      Gout      No significant past surgical history            PERTINENT DIAGNOSTIC TESTING:    [x] Echocardiogram: 12/18/2023   1. Left ventricular wall thickness is mildly increased. Left ventricular systolic function is normal with an ejection fraction visually estimated at 50 to 55 %. There are no regional wall motion abnormalities seen.   2. Normal left ventricular diastolic function.   3. Normal right ventricular cavity size, wall thickness, and systolic function. Tricuspid annular plane systolic excursion (TAPSE) is 2.8 cm (normal >=1.7 cm).   4. Mild mitral regurgitation.   5. Normal left and right atrial size.   6. No pericardial effusion seen.   7. Agitated saline injection was negative for intracardiac shunt.   8. Mild aortic regurgitation.   9. There is calcification of the mitral valve annulus.  10. Trileaflet aortic valve with reduced systolic excursion. mild aortic stenosis.      Allergies    No Known Allergies    Intolerances    	    MEDICATIONS:        acetaminophen     Tablet .. 650 milliGRAM(s) Oral every 6 hours PRN    pantoprazole    Tablet 40 milliGRAM(s) Oral before breakfast    atorvastatin 80 milliGRAM(s) Oral at bedtime  insulin lispro (ADMELOG) corrective regimen sliding scale   SubCutaneous at bedtime  insulin lispro (ADMELOG) corrective regimen sliding scale   SubCutaneous three times a day before meals    aspirin  chewable 81 milliGRAM(s) Oral daily  brimonidine 0.2% Ophthalmic Solution 1 Drop(s) Both EYES two times a day  chlorhexidine 2% Cloths 1 Application(s) Topical daily  clopidogrel Tablet 75 milliGRAM(s) Oral daily  dorzolamide 2% Ophthalmic Solution 1 Drop(s) Both EYES <User Schedule>  heparin   Injectable 5000 Unit(s) SubCutaneous every 8 hours  Nephro-marcella 1 Tablet(s) Oral daily  timolol 0.5% Solution 1 Drop(s) Both EYES two times a day      FAMILY HISTORY:  Non-contributory    SOCIAL HISTORY:    [x] Non-smoker  [ ] Smoker  [ ] Alcohol        REVIEW OF SYSTEMS:    CONSTITUTIONAL: No fever, weight loss, or fatigue  EYES: No eye pain, visual disturbances, or discharge  ENMT:  No difficulty hearing, tinnitus, vertigo; No sinus or throat pain  NECK: No pain or stiffness  BREASTS: No pain, masses, or nipple discharge  RESPIRATORY: No cough, wheezing, chills or hemoptysis; No Shortness of Breath  CARDIOVASCULAR: No chest pain, palpitations, dizziness, or leg swelling  GASTROINTESTINAL: No abdominal or epigastric pain. No nausea, vomiting, or hematemesis; No diarrhea or constipation. No melena or hematochezia.  GENITOURINARY: No dysuria, frequency, hematuria, or incontinence  NEUROLOGICAL: No headaches, memory loss, loss of strength, numbness, or tremors  SKIN: No itching, burning, rashes, or lesions   LYMPH Nodes: No enlarged glands  ENDOCRINE: No heat or cold intolerance; No hair loss  MUSCULOSKELETAL: No joint pain or swelling; No muscle, back, or extremity pain  PSYCHIATRIC: No depression, anxiety, mood swings, or difficulty sleeping  HEME/LYMPH: No easy bruising, or bleeding gums  ALLERY AND IMMUNOLOGIC: No hives or eczema	      PHYSICAL EXAM:  T(C): 36.8 (12-19-23 @ 13:31), Max: 37.1 (12-19-23 @ 01:04)  HR: 63 (12-19-23 @ 13:31) (63 - 86)  BP: 149/84 (12-19-23 @ 13:31) (131/94 - 153/86)  RR: 18 (12-19-23 @ 13:31) (18 - 18)  SpO2: 100% (12-19-23 @ 13:31) (97% - 100%)  Wt(kg): --  I&O's Summary      TELEMETRY: Sinus bradycardia, non-conducted APCs, non-sustained pAT, AIVR    Appearance: Normal	  HEENT:   Normal oral mucosa, PERRL, EOMI	  Cardiovascular: Normal S1 S2, No JVD, No murmurs, No edema  Respiratory: Lungs clear to auscultation	  Gastrointestinal:  Soft, Non-tender, + BS	  Neurologic: A&O x 3, Non-focal  Extremities: Normal range of motion, No clubbing, cyanosis or edema  Vascular: Peripheral pulses palpable 2+ bilaterally    	  LABS:	 	    CARDIAC MARKERS:                                  9.4    8.17  )-----------( 163      ( 19 Dec 2023 06:07 )             27.7     12-19    133<L>  |  100  |  53<H>  ----------------------------<  87  4.9   |  26  |  4.95<H>    Ca    8.5      19 Dec 2023 06:07  Phos  3.4     12-19  Mg     1.9     12-19    TPro  7.3  /  Alb  2.6<L>  /  TBili  0.3  /  DBili  x   /  AST  15  /  ALT  <6<L>  /  AlkPhos  132<H>  12-19

## 2023-12-19 NOTE — DIETITIAN INITIAL EVALUATION ADULT - OTHER INFO
Pt lives home with family support, HHA help PTA, left eye blindness, poor vision right eye; alert, oriented, Bhutanese speaking, contacted via [employer]  ID # [id number], well-communicated; Reported appetite good, denied recent wt changes, denied GI distress, chewing or swallowing problem at present, no specific food choices, unknown food allergies; BUN/Cr=53/4.95, eGFR=11, Finger stick range=98 to 243, XynY1R=3.4, acceptable K=4.9, PO4=3.4; ESRD on HD x >1y, followed by dietitian at out-pt dialysis center; s/p  consult noted  Pt lives home with family support, HHA help PTA, left eye blindness, poor vision right eye; alert, oriented, [place]ine[place] speaking, contacted via [employer]  ID # [id number], well-communicated; Reported appetite good, denied recent wt changes, denied GI distress, chewing or swallowing problem at present, no specific food choices, unknown food allergies; BUN/Cr=53/4.95, eGFR=11, Finger stick range=98 to 243, LzoR1V=7.4, acceptable K=4.9, PO4=3.4; ESRD on HD x >1y, followed by dietitian at out-pt dialysis center; s/p  consult noted

## 2023-12-19 NOTE — DIETITIAN INITIAL EVALUATION ADULT - PROBLEM SELECTOR PLAN 3
Patient gets dialysis at [facility] ([facility])  Consulted Nephro Dr. Dolan Patient gets dialysis at [facility] ([facility])  Consulted Nephro Dr. Dolan

## 2023-12-19 NOTE — DIETITIAN INITIAL EVALUATION ADULT - WEIGHT FOR BMI (KG)
Endorsed to night VIDAL Ruiz to call MRI @ 520  08/20  To advise pt needs to go to MRI in am as has EGD scheduled tentatively per Dr Rajput at 1230   81.6

## 2023-12-19 NOTE — DIETITIAN INITIAL EVALUATION ADULT - FACTORS AFF FOOD INTAKE
acute on chronic comorbidities including ESRD on HD, TIA, DM, vision impairment/Anabaptist/ethnic/cultural/personal food preferences acute on chronic comorbidities including ESRD on HD, TIA, DM, vision impairment/Taoist/ethnic/cultural/personal food preferences

## 2023-12-19 NOTE — CONSULT NOTE ADULT - ASSESSMENT
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 EP called for AIVR on tele.     #AIVR  #non-concducted APCs  #nonsustained pAT  #SND    - Evidence of sinus node dysfunction. Asymptomatic   - Asymptomatic AIVR that is short lived.  - Patient on Timolol, no objection to continuing  - No indication for PPM placement  - Would not treat his AIVR with BB as this is likely to increase the frequency of the episodes in someone with SND.

## 2023-12-19 NOTE — DIETITIAN INITIAL EVALUATION ADULT - FEEDING ASSISTANCE
Provide food choices within diet Rx as available/updated; Nursing to continue feeding assistance and encouragement as needed

## 2023-12-19 NOTE — DIETITIAN INITIAL EVALUATION ADULT - SIGNS/SYMPTOMS
BUN/Cr=53/4.95, eGFR=11, Finger stick range=98 to 243, MgkO9W=2.4 BUN/Cr=53/4.95, eGFR=11, Finger stick range=98 to 243, MsoU6T=3.4

## 2023-12-19 NOTE — PROGRESS NOTE ADULT - SUBJECTIVE AND OBJECTIVE BOX
Patient is a 78y old  Male who presents with a chief complaint of TIA (18 Dec 2023 16:43)    pt seen in tele [x  ], reg med floor [   ], bed [ x ], chair at bedside [   ], a+o x3 [ x ], lethargic [  ],    nad [ x ]        Allergies    No Known Allergies        Vitals    T(F): 98.5 (12-19-23 @ 05:15), Max: 98.8 (12-19-23 @ 01:04)  HR: 76 (12-19-23 @ 05:15) (61 - 86)  BP: 153/86 (12-19-23 @ 05:15) (131/94 - 153/86)  RR: 18 (12-19-23 @ 05:15) (18 - 18)  SpO2: 100% (12-19-23 @ 05:15) (97% - 100%)  Wt(kg): --  CAPILLARY BLOOD GLUCOSE      POCT Blood Glucose.: 122 mg/dL (18 Dec 2023 21:43)      Labs                          8.9    7.33  )-----------( 154      ( 18 Dec 2023 06:44 )             27.1       12-18    133<L>  |  99  |  36<H>  ----------------------------<  95  4.5   |  28  |  4.46<H>    Ca    8.4      18 Dec 2023 06:44  Phos  3.4     12-18  Mg     1.8     12-18    TPro  7.1  /  Alb  2.6<L>  /  TBili  0.3  /  DBili  x   /  AST  16  /  ALT  <6<L>  /  AlkPhos  126<H>  12-18          Troponin I, High Sensitivity Result: 27.0 ng/L (12-16-23 @ 13:25)        Radiology Results      Meds    MEDICATIONS  (STANDING):  aspirin  chewable 81 milliGRAM(s) Oral daily  atorvastatin 80 milliGRAM(s) Oral at bedtime  brimonidine 0.2% Ophthalmic Solution 1 Drop(s) Both EYES two times a day  chlorhexidine 2% Cloths 1 Application(s) Topical daily  clopidogrel Tablet 75 milliGRAM(s) Oral daily  dorzolamide 2% Ophthalmic Solution 1 Drop(s) Both EYES <User Schedule>  heparin   Injectable 5000 Unit(s) SubCutaneous every 8 hours  insulin lispro (ADMELOG) corrective regimen sliding scale   SubCutaneous at bedtime  insulin lispro (ADMELOG) corrective regimen sliding scale   SubCutaneous three times a day before meals  Nephro-marcella 1 Tablet(s) Oral daily  pantoprazole    Tablet 40 milliGRAM(s) Oral before breakfast  timolol 0.5% Solution 1 Drop(s) Both EYES two times a day      MEDICATIONS  (PRN):  acetaminophen     Tablet .. 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   Not likely a cerebral cortical hemispheric event, which would result in a hemianopsia as opposed to total visual loss.  Could have been a retinal TIA (microembolus that broke up).  f/u Non-con MR head   Out-Pt ophthalmology follow-up.    Continue ASA 81mg daily.     Clopidogrel 75mg daily to end 1/5/24.    trop x1 neg noted above  cardio f/u   No B blocker/Ace/arb/entresto due to low bp,   f/u echo   vascular cons for eval of b/l carotid stenosis and avf placement  lispro ss  hgba1c   pt on hd tts  renal f/u   continue with AVNI and follow iron storages.  Follow PO4     patient agrees for AV acces surgery.  cont current meds              Patient is a 78y old  Male who presents with a chief complaint of TIA (18 Dec 2023 16:43)    pt seen in tele [x  ], reg med floor [   ], bed [ x ], chair at bedside [   ], a+o x3 [ x ], lethargic [  ],    nad [ x ]        Allergies    No Known Allergies        Vitals    T(F): 98.5 (12-19-23 @ 05:15), Max: 98.8 (12-19-23 @ 01:04)  HR: 76 (12-19-23 @ 05:15) (61 - 86)  BP: 153/86 (12-19-23 @ 05:15) (131/94 - 153/86)  RR: 18 (12-19-23 @ 05:15) (18 - 18)  SpO2: 100% (12-19-23 @ 05:15) (97% - 100%)  Wt(kg): --  CAPILLARY BLOOD GLUCOSE      POCT Blood Glucose.: 122 mg/dL (18 Dec 2023 21:43)      Labs                          8.9    7.33  )-----------( 154      ( 18 Dec 2023 06:44 )             27.1       12-18    133<L>  |  99  |  36<H>  ----------------------------<  95  4.5   |  28  |  4.46<H>    Ca    8.4      18 Dec 2023 06:44  Phos  3.4     12-18  Mg     1.8     12-18    TPro  7.1  /  Alb  2.6<L>  /  TBili  0.3  /  DBili  x   /  AST  16  /  ALT  <6<L>  /  AlkPhos  126<H>  12-18          Troponin I, High Sensitivity Result: 27.0 ng/L (12-16-23 @ 13:25)    < from: TTE W or WO Ultrasound Enhancing Agent (12.18.23 @ 07:38) >  CONCLUSIONS:      1. Left ventricular wall thickness is mildly increased. Left ventricular systolic function is normal with an ejection fraction visually estimated at 50 to 55 %. There are no regional wall motion abnormalities seen.   2. Normal left ventricular diastolic function.   3. Normal right ventricular cavity size, wall thickness, and systolic function. Tricuspid annular plane systolic excursion (TAPSE) is 2.8 cm (normal >=1.7 cm).   4. Mild mitral regurgitation.   5. Normal left and right atrial size.   6. No pericardial effusion seen.   7. Agitated saline injection was negative for intracardiac shunt.   8. Mild aortic regurgitation.   9. There is calcification of the mitral valve annulus.  10. Trileaflet aortic valve with reduced systolic excursion.mild aortic stenosis.    < end of copied text >      Radiology Results      Meds    MEDICATIONS  (STANDING):  aspirin  chewable 81 milliGRAM(s) Oral daily  atorvastatin 80 milliGRAM(s) Oral at bedtime  brimonidine 0.2% Ophthalmic Solution 1 Drop(s) Both EYES two times a day  chlorhexidine 2% Cloths 1 Application(s) Topical daily  clopidogrel Tablet 75 milliGRAM(s) Oral daily  dorzolamide 2% Ophthalmic Solution 1 Drop(s) Both EYES <User Schedule>  heparin   Injectable 5000 Unit(s) SubCutaneous every 8 hours  insulin lispro (ADMELOG) corrective regimen sliding scale   SubCutaneous at bedtime  insulin lispro (ADMELOG) corrective regimen sliding scale   SubCutaneous three times a day before meals  Nephro-marcella 1 Tablet(s) Oral daily  pantoprazole    Tablet 40 milliGRAM(s) Oral before breakfast  timolol 0.5% Solution 1 Drop(s) Both EYES two times a day      MEDICATIONS  (PRN):  acetaminophen     Tablet .. 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   Not likely a cerebral cortical hemispheric event, which would result in a hemianopsia as opposed to total visual loss.  Could have been a retinal TIA (microembolus that broke up).  f/u Non-con MR head   Out-Pt ophthalmology follow-up.    Continue ASA 81mg daily.     Clopidogrel 75mg daily to end 1/5/24.    trop x1 neg noted above  cardio f/u   No B blocker/Ace/arb/entresto due to low bp,   echo with mild aortic stenosis. lvef  50 to 55 %. Agitated saline injection was negative for intracardiac shunt noted above.  vascular cons noted   Outpt f/u with Dr. Riojas for AVF placement  -No periop workup needed while inpt  -con't HD via PC  -b/l carotid stenosis unlikely source of acute vision loss  -con't ASA/statin  lispro ss  hgba1c   pt on hd tts  renal f/u   continue AVNI, NO NEED FOR iRON iv  Renal bone disease satble po4 , off binders  patient agrees for AV acces surgery.  cont current meds

## 2023-12-19 NOTE — PROGRESS NOTE ADULT - SUBJECTIVE AND OBJECTIVE BOX
PAST MEDICAL & SURGICAL HISTORY:  DM (diabetes mellitus)      HTN (hypertension)      Chronic kidney disease      HLD (hyperlipidemia)      Glaucoma      Gout      No significant past surgical history          No Known Allergies      MEDICATIONS  (STANDING):  aspirin  chewable 81 milliGRAM(s) Oral daily  atorvastatin 80 milliGRAM(s) Oral at bedtime  brimonidine 0.2% Ophthalmic Solution 1 Drop(s) Both EYES two times a day  chlorhexidine 2% Cloths 1 Application(s) Topical daily  clopidogrel Tablet 75 milliGRAM(s) Oral daily  dorzolamide 2% Ophthalmic Solution 1 Drop(s) Both EYES <User Schedule>  heparin   Injectable 5000 Unit(s) SubCutaneous every 8 hours  insulin lispro (ADMELOG) corrective regimen sliding scale   SubCutaneous at bedtime  insulin lispro (ADMELOG) corrective regimen sliding scale   SubCutaneous three times a day before meals  Nephro-marcella 1 Tablet(s) Oral daily  pantoprazole    Tablet 40 milliGRAM(s) Oral before breakfast  timolol 0.5% Solution 1 Drop(s) Both EYES two times a day    MEDICATIONS  (PRN):  acetaminophen     Tablet .. 650 milliGRAM(s) Oral every 6 hours PRN Temp greater or equal to 38C (100.4F), Mild Pain (1 - 3)                            9.4    8.17  )-----------( 163      ( 19 Dec 2023 06:07 )             27.7     12-19    133<L>  |  100  |  53<H>  ----------------------------<  87  4.9   |  26  |  4.95<H>    Ca    8.5      19 Dec 2023 06:07  Phos  3.4     12-19  Mg     1.9     12-19    TPro  7.3  /  Alb  2.6<L>  /  TBili  0.3  /  DBili  x   /  AST  15  /  ALT  <6<L>  /  AlkPhos  132<H>  12-19    PT/INR - ( 18 Dec 2023 06:44 )   PT: 18.9 sec;   INR: 1.68 ratio                 REVIEW OF SYSTEMS:  General: no fever no chills, no weight loss.  Blind in left eye   RESPIRATORY: No cough, wheezing, hemoptysis; No shortness of breath  CARDIOVASCULAR: No chest pain or palpitations. No Edema  GASTROINTESTINAL: No abdominal or epigastric pain. No nausea, vomiting. No diarrhea or constipation. No melena.  GENITOURINARY: No dysuria, frequency, foamy urine, urinary urgency, incontinence or hematuria  NEUROLOGICAL: No numbness or weakness, no tremor , no dizziness.   Muscle skeletal : no joint pain and no swelling of joints and limbs.  SKIN: No itching, burning, rashes.        VITALS:  T(F): 97.1 (12-19-23 @ 17:56), Max: 98.8 (12-19-23 @ 01:04)  HR: 82 (12-19-23 @ 17:56)  BP: 141/76 (12-19-23 @ 17:56)  RR: 18 (12-19-23 @ 17:56)  SpO2: 100% (12-19-23 @ 17:56)  Wt(kg): --      PHYSICAL EXAM:  Constitutional: well developed, no diaphoresis, no distress.  Neck: No JVD, no carotid bruit, supple, no adenopathy  Respiratory: Good air entrance B/L, no wheezes, rales or rhonchi  Cardiovascular: S1, S2, RRR, no pericardial rub, no murmur  Abdomen: BS+, soft, no tenderness, no bruit  Pelvis: bladder nondistended  Extremities: No cyanosis or clubbing. No peripheral edema.     Vascular Access:right PC

## 2023-12-19 NOTE — DIETITIAN INITIAL EVALUATION ADULT - PERTINENT LABORATORY DATA
12-19    133<L>  |  100  |  53<H>  ----------------------------<  87  4.9   |  26  |  4.95<H>    Ca    8.5      19 Dec 2023 06:07  Phos  3.4     12-19  Mg     1.9     12-19    TPro  7.3  /  Alb  2.6<L>  /  TBili  0.3  /  DBili  x   /  AST  15  /  ALT  <6<L>  /  AlkPhos  132<H>  12-19  POCT Blood Glucose.: 211 mg/dL (12-19-23 @ 11:18)  A1C with Estimated Average Glucose Result: 6.4 % (12-17-23 @ 05:59)

## 2023-12-19 NOTE — PROGRESS NOTE ADULT - SUBJECTIVE AND OBJECTIVE BOX
Date of Service 12-19-23 @ 11:26    CHIEF COMPLAINT:Patient is a 78y old  Male who presents with a chief complaint of TIA .Pt appears comfortable.    	  REVIEW OF SYSTEMS:  CONSTITUTIONAL: No fever, weight loss, or fatigue  EYES: No eye pain, visual disturbances, or discharge  ENT:  No difficulty hearing, tinnitus, vertigo; No sinus or throat pain  NECK: No pain or stiffness  RESPIRATORY: No cough, wheezing, chills or hemoptysis; No Shortness of Breath  CARDIOVASCULAR: No chest pain, palpitations, passing out, dizziness, or leg swelling  GASTROINTESTINAL: No abdominal or epigastric pain. No nausea, vomiting, or hematemesis; No diarrhea or constipation. No melena or hematochezia.  GENITOURINARY: No dysuria, frequency, hematuria, or incontinence  NEUROLOGICAL: No headaches, memory loss, loss of strength, numbness, or tremors  SKIN: No itching, burning, rashes, or lesions   LYMPH Nodes: No enlarged glands  ENDOCRINE: No heat or cold intolerance; No hair loss  MUSCULOSKELETAL: No joint pain or swelling; No muscle, back, or extremity pain  PSYCHIATRIC: No depression, anxiety, mood swings, or difficulty sleeping  HEME/LYMPH: No easy bruising, or bleeding gums  ALLERGY AND IMMUNOLOGIC: No hives or eczema	        PHYSICAL EXAM:  T(C): 36.7 (12-19-23 @ 10:32), Max: 37.1 (12-19-23 @ 01:04)  HR: 63 (12-19-23 @ 10:32) (63 - 86)  BP: 146/71 (12-19-23 @ 10:32) (131/94 - 153/86)  RR: 18 (12-19-23 @ 10:32) (18 - 18)  SpO2: 98% (12-19-23 @ 10:32) (97% - 100%)  Wt(kg): --  I&O's Summary      Appearance: Normal	  HEENT:   Normal oral mucosa, PERRL, EOMI	  Lymphatic: No lymphadenopathy  Cardiovascular: Normal S1 S2, No JVD, No murmurs, No edema  Respiratory: Lungs clear to auscultation	  Psychiatry: A & O x 3, Mood & affect appropriate  Gastrointestinal:  Soft, Non-tender, + BS	  Skin: No rashes, No ecchymoses, No cyanosis	  Neurologic: Non-focal  Extremities: Normal range of motion, No clubbing, cyanosis or edema  Vascular: Peripheral pulses palpable 2+ bilaterally    MEDICATIONS  (STANDING):  aspirin  chewable 81 milliGRAM(s) Oral daily  atorvastatin 80 milliGRAM(s) Oral at bedtime  brimonidine 0.2% Ophthalmic Solution 1 Drop(s) Both EYES two times a day  chlorhexidine 2% Cloths 1 Application(s) Topical daily  clopidogrel Tablet 75 milliGRAM(s) Oral daily  dorzolamide 2% Ophthalmic Solution 1 Drop(s) Both EYES <User Schedule>  heparin   Injectable 5000 Unit(s) SubCutaneous every 8 hours  insulin lispro (ADMELOG) corrective regimen sliding scale   SubCutaneous at bedtime  insulin lispro (ADMELOG) corrective regimen sliding scale   SubCutaneous three times a day before meals  Nephro-marcella 1 Tablet(s) Oral daily  pantoprazole    Tablet 40 milliGRAM(s) Oral before breakfast  timolol 0.5% Solution 1 Drop(s) Both EYES two times a day      TELEMETRY: aivr,wct	      	  LABS:	 	          Troponin I, High Sensitivity Result: 27.0 ng/L (12-16 @ 13:25)                            9.4    8.17  )-----------( 163      ( 19 Dec 2023 06:07 )             27.7     12-19    133<L>  |  100  |  53<H>  ----------------------------<  87  4.9   |  26  |  4.95<H>    Ca    8.5      19 Dec 2023 06:07  Phos  3.4     12-19  Mg     1.9     12-19    TPro  7.3  /  Alb  2.6<L>  /  TBili  0.3  /  DBili  x   /  AST  15  /  ALT  <6<L>  /  AlkPhos  132<H>  12-19    proBNP:   Lipid Profile: Cholesterol 88  LDL --  HDL 37  TG 52  Ldl calc 41  Ratio --    HgA1c:   TSH: Thyroid Stimulating Hormone, Serum: 5.58 uU/mL (12-17 @ 05:59)      	  < from: TTE W or WO Ultrasound Enhancing Agent (12.18.23 @ 07:38) >  CONCLUSIONS:      1. Left ventricular wall thickness is mildly increased. Left ventricular systolic function is normal with an ejection fraction visually estimated at 50 to 55 %. There are no regional wall motion abnormalities seen.   2. Normal left ventricular diastolic function.   3. Normal right ventricular cavity size, wall thickness, and systolic function. Tricuspid annular plane systolic excursion (TAPSE) is 2.8 cm (normal >=1.7 cm).   4. Mild mitral regurgitation.   5. Normal left and right atrial size.   6. No pericardial effusion seen.   7. Agitated saline injection was negative for intracardiac shunt.   8. Mild aortic regurgitation.   9. There is calcification of the mitral valve annulus.  10. Trileaflet aortic valve with reduced systolic excursion.mild aortic stenosis.    < end of copied text >

## 2023-12-19 NOTE — DIETITIAN INITIAL EVALUATION ADULT - PERTINENT MEDS FT
MEDICATIONS  (STANDING):  aspirin  chewable 81 milliGRAM(s) Oral daily  atorvastatin 80 milliGRAM(s) Oral at bedtime  brimonidine 0.2% Ophthalmic Solution 1 Drop(s) Both EYES two times a day  chlorhexidine 2% Cloths 1 Application(s) Topical daily  clopidogrel Tablet 75 milliGRAM(s) Oral daily  dorzolamide 2% Ophthalmic Solution 1 Drop(s) Both EYES <User Schedule>  heparin   Injectable 5000 Unit(s) SubCutaneous every 8 hours  insulin lispro (ADMELOG) corrective regimen sliding scale   SubCutaneous at bedtime  insulin lispro (ADMELOG) corrective regimen sliding scale   SubCutaneous three times a day before meals  Nephro-marcella 1 Tablet(s) Oral daily  pantoprazole    Tablet 40 milliGRAM(s) Oral before breakfast  timolol 0.5% Solution 1 Drop(s) Both EYES two times a day    MEDICATIONS  (PRN):  acetaminophen     Tablet .. 650 milliGRAM(s) Oral every 6 hours PRN Temp greater or equal to 38C (100.4F), Mild Pain (1 - 3)

## 2023-12-20 ENCOUNTER — TRANSCRIPTION ENCOUNTER (OUTPATIENT)
Age: 78
End: 2023-12-20

## 2023-12-20 VITALS
HEART RATE: 66 BPM | OXYGEN SATURATION: 98 % | TEMPERATURE: 97 F | RESPIRATION RATE: 18 BRPM | SYSTOLIC BLOOD PRESSURE: 134 MMHG | DIASTOLIC BLOOD PRESSURE: 89 MMHG

## 2023-12-20 LAB
ANION GAP SERPL CALC-SCNC: 8 MMOL/L — SIGNIFICANT CHANGE UP (ref 5–17)
ANION GAP SERPL CALC-SCNC: 8 MMOL/L — SIGNIFICANT CHANGE UP (ref 5–17)
ANISOCYTOSIS BLD QL: SLIGHT — SIGNIFICANT CHANGE UP
ANISOCYTOSIS BLD QL: SLIGHT — SIGNIFICANT CHANGE UP
BASOPHILS # BLD AUTO: 0 K/UL — SIGNIFICANT CHANGE UP (ref 0–0.2)
BASOPHILS # BLD AUTO: 0 K/UL — SIGNIFICANT CHANGE UP (ref 0–0.2)
BASOPHILS NFR BLD AUTO: 0 % — SIGNIFICANT CHANGE UP (ref 0–2)
BASOPHILS NFR BLD AUTO: 0 % — SIGNIFICANT CHANGE UP (ref 0–2)
BUN SERPL-MCNC: 31 MG/DL — HIGH (ref 7–18)
BUN SERPL-MCNC: 31 MG/DL — HIGH (ref 7–18)
CALCIUM SERPL-MCNC: 9.4 MG/DL — SIGNIFICANT CHANGE UP (ref 8.4–10.5)
CALCIUM SERPL-MCNC: 9.4 MG/DL — SIGNIFICANT CHANGE UP (ref 8.4–10.5)
CHLORIDE SERPL-SCNC: 99 MMOL/L — SIGNIFICANT CHANGE UP (ref 96–108)
CHLORIDE SERPL-SCNC: 99 MMOL/L — SIGNIFICANT CHANGE UP (ref 96–108)
CO2 SERPL-SCNC: 28 MMOL/L — SIGNIFICANT CHANGE UP (ref 22–31)
CO2 SERPL-SCNC: 28 MMOL/L — SIGNIFICANT CHANGE UP (ref 22–31)
CREAT SERPL-MCNC: 3.74 MG/DL — HIGH (ref 0.5–1.3)
CREAT SERPL-MCNC: 3.74 MG/DL — HIGH (ref 0.5–1.3)
CRP SERPL-MCNC: 6 MG/L — HIGH
CRP SERPL-MCNC: 6 MG/L — HIGH
EGFR: 16 ML/MIN/1.73M2 — LOW
EGFR: 16 ML/MIN/1.73M2 — LOW
EOSINOPHIL # BLD AUTO: 3.61 K/UL — HIGH (ref 0–0.5)
EOSINOPHIL # BLD AUTO: 3.61 K/UL — HIGH (ref 0–0.5)
EOSINOPHIL NFR BLD AUTO: 45 % — HIGH (ref 0–6)
EOSINOPHIL NFR BLD AUTO: 45 % — HIGH (ref 0–6)
FOLATE SERPL-MCNC: 14.1 NG/ML — SIGNIFICANT CHANGE UP
FOLATE SERPL-MCNC: 14.1 NG/ML — SIGNIFICANT CHANGE UP
GLUCOSE BLDC GLUCOMTR-MCNC: 123 MG/DL — HIGH (ref 70–99)
GLUCOSE BLDC GLUCOMTR-MCNC: 123 MG/DL — HIGH (ref 70–99)
GLUCOSE BLDC GLUCOMTR-MCNC: 129 MG/DL — HIGH (ref 70–99)
GLUCOSE BLDC GLUCOMTR-MCNC: 129 MG/DL — HIGH (ref 70–99)
GLUCOSE BLDC GLUCOMTR-MCNC: 98 MG/DL — SIGNIFICANT CHANGE UP (ref 70–99)
GLUCOSE BLDC GLUCOMTR-MCNC: 98 MG/DL — SIGNIFICANT CHANGE UP (ref 70–99)
GLUCOSE SERPL-MCNC: 101 MG/DL — HIGH (ref 70–99)
GLUCOSE SERPL-MCNC: 101 MG/DL — HIGH (ref 70–99)
HCT VFR BLD CALC: 29.1 % — LOW (ref 39–50)
HCT VFR BLD CALC: 29.1 % — LOW (ref 39–50)
HGB BLD-MCNC: 9.5 G/DL — LOW (ref 13–17)
HGB BLD-MCNC: 9.5 G/DL — LOW (ref 13–17)
HYPOCHROMIA BLD QL: SLIGHT — SIGNIFICANT CHANGE UP
HYPOCHROMIA BLD QL: SLIGHT — SIGNIFICANT CHANGE UP
LYMPHOCYTES # BLD AUTO: 1.52 K/UL — SIGNIFICANT CHANGE UP (ref 1–3.3)
LYMPHOCYTES # BLD AUTO: 1.52 K/UL — SIGNIFICANT CHANGE UP (ref 1–3.3)
LYMPHOCYTES # BLD AUTO: 19 % — SIGNIFICANT CHANGE UP (ref 13–44)
LYMPHOCYTES # BLD AUTO: 19 % — SIGNIFICANT CHANGE UP (ref 13–44)
MAGNESIUM SERPL-MCNC: 1.8 MG/DL — SIGNIFICANT CHANGE UP (ref 1.6–2.6)
MAGNESIUM SERPL-MCNC: 1.8 MG/DL — SIGNIFICANT CHANGE UP (ref 1.6–2.6)
MANUAL SMEAR VERIFICATION: SIGNIFICANT CHANGE UP
MANUAL SMEAR VERIFICATION: SIGNIFICANT CHANGE UP
MCHC RBC-ENTMCNC: 31.1 PG — SIGNIFICANT CHANGE UP (ref 27–34)
MCHC RBC-ENTMCNC: 31.1 PG — SIGNIFICANT CHANGE UP (ref 27–34)
MCHC RBC-ENTMCNC: 32.6 GM/DL — SIGNIFICANT CHANGE UP (ref 32–36)
MCHC RBC-ENTMCNC: 32.6 GM/DL — SIGNIFICANT CHANGE UP (ref 32–36)
MCV RBC AUTO: 95.4 FL — SIGNIFICANT CHANGE UP (ref 80–100)
MCV RBC AUTO: 95.4 FL — SIGNIFICANT CHANGE UP (ref 80–100)
MICROCYTES BLD QL: SLIGHT — SIGNIFICANT CHANGE UP
MICROCYTES BLD QL: SLIGHT — SIGNIFICANT CHANGE UP
MONOCYTES # BLD AUTO: 0.72 K/UL — SIGNIFICANT CHANGE UP (ref 0–0.9)
MONOCYTES # BLD AUTO: 0.72 K/UL — SIGNIFICANT CHANGE UP (ref 0–0.9)
MONOCYTES NFR BLD AUTO: 9 % — SIGNIFICANT CHANGE UP (ref 2–14)
MONOCYTES NFR BLD AUTO: 9 % — SIGNIFICANT CHANGE UP (ref 2–14)
NEUTROPHILS # BLD AUTO: 1.92 K/UL — SIGNIFICANT CHANGE UP (ref 1.8–7.4)
NEUTROPHILS # BLD AUTO: 1.92 K/UL — SIGNIFICANT CHANGE UP (ref 1.8–7.4)
NEUTROPHILS NFR BLD AUTO: 24 % — LOW (ref 43–77)
NEUTROPHILS NFR BLD AUTO: 24 % — LOW (ref 43–77)
NRBC # BLD: 0 /100 — SIGNIFICANT CHANGE UP (ref 0–0)
NRBC # BLD: 0 /100 — SIGNIFICANT CHANGE UP (ref 0–0)
PHOSPHATE SERPL-MCNC: 3.4 MG/DL — SIGNIFICANT CHANGE UP (ref 2.5–4.5)
PHOSPHATE SERPL-MCNC: 3.4 MG/DL — SIGNIFICANT CHANGE UP (ref 2.5–4.5)
PLAT MORPH BLD: NORMAL — SIGNIFICANT CHANGE UP
PLAT MORPH BLD: NORMAL — SIGNIFICANT CHANGE UP
PLATELET # BLD AUTO: 151 K/UL — SIGNIFICANT CHANGE UP (ref 150–400)
PLATELET # BLD AUTO: 151 K/UL — SIGNIFICANT CHANGE UP (ref 150–400)
PLATELET COUNT - ESTIMATE: NORMAL — SIGNIFICANT CHANGE UP
PLATELET COUNT - ESTIMATE: NORMAL — SIGNIFICANT CHANGE UP
POTASSIUM SERPL-MCNC: 4.2 MMOL/L — SIGNIFICANT CHANGE UP (ref 3.5–5.3)
POTASSIUM SERPL-MCNC: 4.2 MMOL/L — SIGNIFICANT CHANGE UP (ref 3.5–5.3)
POTASSIUM SERPL-SCNC: 4.2 MMOL/L — SIGNIFICANT CHANGE UP (ref 3.5–5.3)
POTASSIUM SERPL-SCNC: 4.2 MMOL/L — SIGNIFICANT CHANGE UP (ref 3.5–5.3)
RBC # BLD: 3.05 M/UL — LOW (ref 4.2–5.8)
RBC # BLD: 3.05 M/UL — LOW (ref 4.2–5.8)
RBC # FLD: 12.2 % — SIGNIFICANT CHANGE UP (ref 10.3–14.5)
RBC # FLD: 12.2 % — SIGNIFICANT CHANGE UP (ref 10.3–14.5)
RBC BLD AUTO: ABNORMAL
RBC BLD AUTO: ABNORMAL
RPR SERPL-ACNC: SIGNIFICANT CHANGE UP
RPR SERPL-ACNC: SIGNIFICANT CHANGE UP
SODIUM SERPL-SCNC: 135 MMOL/L — SIGNIFICANT CHANGE UP (ref 135–145)
SODIUM SERPL-SCNC: 135 MMOL/L — SIGNIFICANT CHANGE UP (ref 135–145)
T PALLIDUM AB TITR SER: POSITIVE
T PALLIDUM AB TITR SER: POSITIVE
T PALLIDUM IGG SER QL IF: ABNORMAL
T PALLIDUM IGG SER QL IF: ABNORMAL
VARIANT LYMPHS # BLD: 3 % — SIGNIFICANT CHANGE UP (ref 0–6)
VARIANT LYMPHS # BLD: 3 % — SIGNIFICANT CHANGE UP (ref 0–6)
VIT B12 SERPL-MCNC: 524 PG/ML — SIGNIFICANT CHANGE UP (ref 232–1245)
VIT B12 SERPL-MCNC: 524 PG/ML — SIGNIFICANT CHANGE UP (ref 232–1245)
WBC # BLD: 8.02 K/UL — SIGNIFICANT CHANGE UP (ref 3.8–10.5)
WBC # BLD: 8.02 K/UL — SIGNIFICANT CHANGE UP (ref 3.8–10.5)
WBC # FLD AUTO: 8.02 K/UL — SIGNIFICANT CHANGE UP (ref 3.8–10.5)
WBC # FLD AUTO: 8.02 K/UL — SIGNIFICANT CHANGE UP (ref 3.8–10.5)

## 2023-12-20 PROCEDURE — 83735 ASSAY OF MAGNESIUM: CPT

## 2023-12-20 PROCEDURE — 82746 ASSAY OF FOLIC ACID SERUM: CPT

## 2023-12-20 PROCEDURE — 85652 RBC SED RATE AUTOMATED: CPT

## 2023-12-20 PROCEDURE — 80048 BASIC METABOLIC PNL TOTAL CA: CPT

## 2023-12-20 PROCEDURE — 70496 CT ANGIOGRAPHY HEAD: CPT | Mod: MA

## 2023-12-20 PROCEDURE — 70551 MRI BRAIN STEM W/O DYE: CPT

## 2023-12-20 PROCEDURE — 99233 SBSQ HOSP IP/OBS HIGH 50: CPT | Mod: FS

## 2023-12-20 PROCEDURE — 86140 C-REACTIVE PROTEIN: CPT

## 2023-12-20 PROCEDURE — 0042T: CPT | Mod: MA

## 2023-12-20 PROCEDURE — 83090 ASSAY OF HOMOCYSTEINE: CPT

## 2023-12-20 PROCEDURE — 93005 ELECTROCARDIOGRAM TRACING: CPT

## 2023-12-20 PROCEDURE — 84100 ASSAY OF PHOSPHORUS: CPT

## 2023-12-20 PROCEDURE — 87641 MR-STAPH DNA AMP PROBE: CPT

## 2023-12-20 PROCEDURE — 83550 IRON BINDING TEST: CPT

## 2023-12-20 PROCEDURE — 85610 PROTHROMBIN TIME: CPT

## 2023-12-20 PROCEDURE — 85730 THROMBOPLASTIN TIME PARTIAL: CPT

## 2023-12-20 PROCEDURE — 86592 SYPHILIS TEST NON-TREP QUAL: CPT

## 2023-12-20 PROCEDURE — 83036 HEMOGLOBIN GLYCOSYLATED A1C: CPT

## 2023-12-20 PROCEDURE — 85025 COMPLETE CBC W/AUTO DIFF WBC: CPT

## 2023-12-20 PROCEDURE — 84443 ASSAY THYROID STIM HORMONE: CPT

## 2023-12-20 PROCEDURE — 85027 COMPLETE CBC AUTOMATED: CPT

## 2023-12-20 PROCEDURE — 87640 STAPH A DNA AMP PROBE: CPT

## 2023-12-20 PROCEDURE — 84484 ASSAY OF TROPONIN QUANT: CPT

## 2023-12-20 PROCEDURE — 82607 VITAMIN B-12: CPT

## 2023-12-20 PROCEDURE — 83540 ASSAY OF IRON: CPT

## 2023-12-20 PROCEDURE — 70498 CT ANGIOGRAPHY NECK: CPT | Mod: MA

## 2023-12-20 PROCEDURE — 36415 COLL VENOUS BLD VENIPUNCTURE: CPT

## 2023-12-20 PROCEDURE — 80053 COMPREHEN METABOLIC PANEL: CPT

## 2023-12-20 PROCEDURE — 86780 TREPONEMA PALLIDUM: CPT

## 2023-12-20 PROCEDURE — 83921 ORGANIC ACID SINGLE QUANT: CPT

## 2023-12-20 PROCEDURE — 80061 LIPID PANEL: CPT

## 2023-12-20 PROCEDURE — 82962 GLUCOSE BLOOD TEST: CPT

## 2023-12-20 PROCEDURE — 93306 TTE W/DOPPLER COMPLETE: CPT

## 2023-12-20 PROCEDURE — 99285 EMERGENCY DEPT VISIT HI MDM: CPT

## 2023-12-20 PROCEDURE — 70551 MRI BRAIN STEM W/O DYE: CPT | Mod: 26

## 2023-12-20 PROCEDURE — 70450 CT HEAD/BRAIN W/O DYE: CPT | Mod: MA

## 2023-12-20 RX ORDER — ASPIRIN/CALCIUM CARB/MAGNESIUM 324 MG
1 TABLET ORAL
Qty: 30 | Refills: 0
Start: 2023-12-20 | End: 2024-01-18

## 2023-12-20 RX ORDER — CEFAZOLIN SODIUM 1 G
1000 VIAL (EA) INJECTION ONCE
Refills: 0 | Status: DISCONTINUED | OUTPATIENT
Start: 2023-12-20 | End: 2023-12-20

## 2023-12-20 RX ORDER — ATORVASTATIN CALCIUM 80 MG/1
1 TABLET, FILM COATED ORAL
Qty: 0 | Refills: 0 | DISCHARGE
Start: 2023-12-20

## 2023-12-20 RX ADMIN — HEPARIN SODIUM 5000 UNIT(S): 5000 INJECTION INTRAVENOUS; SUBCUTANEOUS at 06:21

## 2023-12-20 RX ADMIN — CHLORHEXIDINE GLUCONATE 1 APPLICATION(S): 213 SOLUTION TOPICAL at 13:42

## 2023-12-20 RX ADMIN — Medication 1 DROP(S): at 17:39

## 2023-12-20 RX ADMIN — HEPARIN SODIUM 5000 UNIT(S): 5000 INJECTION INTRAVENOUS; SUBCUTANEOUS at 13:34

## 2023-12-20 RX ADMIN — Medication 81 MILLIGRAM(S): at 13:33

## 2023-12-20 RX ADMIN — BRIMONIDINE TARTRATE 1 DROP(S): 2 SOLUTION/ DROPS OPHTHALMIC at 17:39

## 2023-12-20 RX ADMIN — Medication 1 DROP(S): at 06:16

## 2023-12-20 RX ADMIN — PANTOPRAZOLE SODIUM 40 MILLIGRAM(S): 20 TABLET, DELAYED RELEASE ORAL at 06:16

## 2023-12-20 RX ADMIN — Medication 1 TABLET(S): at 13:33

## 2023-12-20 RX ADMIN — DORZOLAMIDE HYDROCHLORIDE 1 DROP(S): 20 SOLUTION/ DROPS OPHTHALMIC at 17:39

## 2023-12-20 RX ADMIN — DORZOLAMIDE HYDROCHLORIDE 1 DROP(S): 20 SOLUTION/ DROPS OPHTHALMIC at 06:16

## 2023-12-20 RX ADMIN — CLOPIDOGREL BISULFATE 75 MILLIGRAM(S): 75 TABLET, FILM COATED ORAL at 13:33

## 2023-12-20 RX ADMIN — BRIMONIDINE TARTRATE 1 DROP(S): 2 SOLUTION/ DROPS OPHTHALMIC at 06:17

## 2023-12-20 NOTE — PROGRESS NOTE ADULT - SUBJECTIVE AND OBJECTIVE BOX
Date of Service 12-20-23 @ 11:37    CHIEF COMPLAINT:Patient is a 78y old  Male who presents with a chief complaint of TIA .Pt appears comfortable.    	  REVIEW OF SYSTEMS:  CONSTITUTIONAL: No fever, weight loss, or fatigue  EYES: No eye pain, visual disturbances, or discharge  ENT:  No difficulty hearing, tinnitus, vertigo; No sinus or throat pain  NECK: No pain or stiffness  RESPIRATORY: No cough, wheezing, chills or hemoptysis; No Shortness of Breath  CARDIOVASCULAR: No chest pain, palpitations, passing out, dizziness, or leg swelling  GASTROINTESTINAL: No abdominal or epigastric pain. No nausea, vomiting, or hematemesis; No diarrhea or constipation. No melena or hematochezia.  GENITOURINARY: No dysuria, frequency, hematuria, or incontinence  NEUROLOGICAL: No headaches, memory loss, loss of strength, numbness, or tremors  SKIN: No itching, burning, rashes, or lesions   LYMPH Nodes: No enlarged glands  ENDOCRINE: No heat or cold intolerance; No hair loss  MUSCULOSKELETAL: No joint pain or swelling; No muscle, back, or extremity pain  PSYCHIATRIC: No depression, anxiety, mood swings, or difficulty sleeping  HEME/LYMPH: No easy bruising, or bleeding gums  ALLERGY AND IMMUNOLOGIC: No hives or eczema	      PHYSICAL EXAM:  T(C): 36.3 (12-20-23 @ 10:08), Max: 37 (12-20-23 @ 05:05)  HR: 66 (12-20-23 @ 10:08) (62 - 82)  BP: 134/89 (12-20-23 @ 10:08) (129/82 - 171/88)  RR: 18 (12-20-23 @ 10:08) (17 - 19)  SpO2: 98% (12-20-23 @ 10:08) (98% - 100%)  Wt(kg): --  I&O's Summary    19 Dec 2023 07:01  -  20 Dec 2023 07:00  --------------------------------------------------------  IN: 0 mL / OUT: 600 mL / NET: -600 mL        Appearance: Normal	  HEENT:   Normal oral mucosa, PERRL, EOMI	  Lymphatic: No lymphadenopathy  Cardiovascular: Normal S1 S2, No JVD, No murmurs, No edema  Respiratory: Lungs clear to auscultation	  Psychiatry: A & O x 3, Mood & affect appropriate  Gastrointestinal:  Soft, Non-tender, + BS	  Skin: No rashes, No ecchymoses, No cyanosis	  Neurologic: Non-focal  Extremities: Normal range of motion, No clubbing, cyanosis or edema  Vascular: Peripheral pulses palpable 2+ bilaterally    MEDICATIONS  (STANDING):  aspirin  chewable 81 milliGRAM(s) Oral daily  atorvastatin 80 milliGRAM(s) Oral at bedtime  brimonidine 0.2% Ophthalmic Solution 1 Drop(s) Both EYES two times a day  chlorhexidine 2% Cloths 1 Application(s) Topical daily  clopidogrel Tablet 75 milliGRAM(s) Oral daily  dorzolamide 2% Ophthalmic Solution 1 Drop(s) Both EYES <User Schedule>  heparin   Injectable 5000 Unit(s) SubCutaneous every 8 hours  insulin lispro (ADMELOG) corrective regimen sliding scale   SubCutaneous at bedtime  insulin lispro (ADMELOG) corrective regimen sliding scale   SubCutaneous three times a day before meals  Nephro-marcella 1 Tablet(s) Oral daily  pantoprazole    Tablet 40 milliGRAM(s) Oral before breakfast  timolol 0.5% Solution 1 Drop(s) Both EYES two times a day    	  	  LABS:	 	                        9.5    8.02  )-----------( 151      ( 20 Dec 2023 05:26 )             29.1     12-20    135  |  99  |  31<H>  ----------------------------<  101<H>  4.2   |  28  |  3.74<H>    Ca    9.4      20 Dec 2023 05:26  Phos  3.4     12-20  Mg     1.8     12-20    TPro  7.3  /  Alb  2.6<L>  /  TBili  0.3  /  DBili  x   /  AST  15  /  ALT  <6<L>  /  AlkPhos  132<H>  12-19    Lipid Profile: Cholesterol 88  LDL --  HDL 37  TG 52  Ldl calc 41  Ratio --    HgA1c:   TSH: Thyroid Stimulating Hormone, Serum: 5.58 uU/mL (12-17 @ 05:59)

## 2023-12-20 NOTE — DISCHARGE NOTE NURSING/CASE MANAGEMENT/SOCIAL WORK - NSDCPEFALRISK_GEN_ALL_CORE
For information on Fall & Injury Prevention, visit: [email or web address] OR  [email or web address] OR  https://www.cdc.gov/steadi/patient.html For information on Fall & Injury Prevention, visit: [email or web address] OR  [email or web address] OR  https://www.cdc.gov/steadi/patient.html

## 2023-12-20 NOTE — PROGRESS NOTE ADULT - SUBJECTIVE AND OBJECTIVE BOX
ESRD, last dialysis yesterday.      PAST MEDICAL & SURGICAL HISTORY:  DM (diabetes mellitus)      HTN (hypertension)      Chronic kidney disease      HLD (hyperlipidemia)      Glaucoma      Gout      No significant past surgical history          No Known Allergies      MEDICATIONS  (STANDING):  aspirin  chewable 81 milliGRAM(s) Oral daily  atorvastatin 80 milliGRAM(s) Oral at bedtime  brimonidine 0.2% Ophthalmic Solution 1 Drop(s) Both EYES two times a day  chlorhexidine 2% Cloths 1 Application(s) Topical daily  clopidogrel Tablet 75 milliGRAM(s) Oral daily  dorzolamide 2% Ophthalmic Solution 1 Drop(s) Both EYES <User Schedule>  heparin   Injectable 5000 Unit(s) SubCutaneous every 8 hours  insulin lispro (ADMELOG) corrective regimen sliding scale   SubCutaneous at bedtime  insulin lispro (ADMELOG) corrective regimen sliding scale   SubCutaneous three times a day before meals  Nephro-marcella 1 Tablet(s) Oral daily  pantoprazole    Tablet 40 milliGRAM(s) Oral before breakfast  timolol 0.5% Solution 1 Drop(s) Both EYES two times a day    MEDICATIONS  (PRN):  acetaminophen     Tablet .. 650 milliGRAM(s) Oral every 6 hours PRN Temp greater or equal to 38C (100.4F), Mild Pain (1 - 3)                            9.5    8.02  )-----------( 151      ( 20 Dec 2023 05:26 )             29.1     12-20    135  |  99  |  31<H>  ----------------------------<  101<H>  4.2   |  28  |  3.74<H>    Ca    9.4      20 Dec 2023 05:26  Phos  3.4     12-20  Mg     1.8     12-20    TPro  7.3  /  Alb  2.6<L>  /  TBili  0.3  /  DBili  x   /  AST  15  /  ALT  <6<L>  /  AlkPhos  132<H>  12-19            REVIEW OF SYSTEMS:  General: no fever no chills, no weight loss.  Reduced vision in left eye.  RESPIRATORY: No cough, wheezing, hemoptysis; No shortness of breath  CARDIOVASCULAR: No chest pain or palpitations. No Edema  GASTROINTESTINAL: No abdominal or epigastric pain. No nausea, vomiting. No diarrhea or constipation. No melena.  GENITOURINARY: No dysuria, frequency, foamy urine, urinary urgency, incontinence or hematuria  NEUROLOGICAL: No numbness or weakness, no tremor , no dizziness.   Muscle skeletal : no joint pain and no swelling of joints and limbs.  SKIN: No itching, burning, rashes.        VITALS:  T(F): 97.3 (12-20-23 @ 10:08), Max: 98.6 (12-20-23 @ 05:05)  HR: 66 (12-20-23 @ 10:08)  BP: 134/89 (12-20-23 @ 10:08)  RR: 18 (12-20-23 @ 10:08)  SpO2: 98% (12-20-23 @ 10:08)  Wt(kg): --    12-19 @ 07:01  -  12-20 @ 07:00  --------------------------------------------------------  IN: 0 mL / OUT: 600 mL / NET: -600 mL        PHYSICAL EXAM:  Constitutional: well developed, no diaphoresis, no distress.  Neck: No JVD, no carotid bruit, supple, no adenopathy  Respiratory: Good air entrance B/L, no wheezes, rales or rhonchi  Cardiovascular: S1, S2, RRR, no pericardial rub, no murmur  Abdomen: BS+, soft, no tenderness, no bruit  Pelvis: bladder nondistended  Extremities: No cyanosis or clubbing. No peripheral edema.   Right PC with no discharge.

## 2023-12-20 NOTE — DISCHARGE NOTE PROVIDER - NSDCMRMEDTOKEN_GEN_ALL_CORE_FT
Combigan 0.2%-0.5% ophthalmic solution: 1 drop(s) to each affected eye 2 times a day  home  dorzolamide 2% ophthalmic solution: 1 drop(s) in each eye 2 times a day  losartan 50 mg oral tablet: 1 tab(s) orally once a day  multivitamin: 1 tab(s) orally once a day  pantoprazole 20 mg oral delayed release tablet: 1 tab(s) orally once a day before breakfast  Vitamin D3 1250 mcg (50,000 intl units) oral capsule: 1 cap(s) orally once a week   aspirin 81 mg oral tablet, chewable: 1 tab(s) orally once a day  Combigan 0.2%-0.5% ophthalmic solution: 1 drop(s) to each affected eye 2 times a day  home  dorzolamide 2% ophthalmic solution: 1 drop(s) in each eye 2 times a day  Lipitor 20 mg oral tablet: 1 orally once a day (at bedtime)  losartan 50 mg oral tablet: 1 tab(s) orally once a day  multivitamin: 1 tab(s) orally once a day  pantoprazole 20 mg oral delayed release tablet: 1 tab(s) orally once a day before breakfast  Vitamin D3 1250 mcg (50,000 intl units) oral capsule: 1 cap(s) orally once a week

## 2023-12-20 NOTE — DISCHARGE NOTE PROVIDER - HOSPITAL COURSE
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 Per the patient, patient returned home from dialysis and started having 5/10 pain in the back of his neck and had complete loss of vision for 15 minutes. Patient was able to stand and walk during the episode of vision loss. The vision is slowly returning but it hasn't returned to baseline. The neck pain did not radiate to the head or back. Patient denied headache, dizziness, SOB, or chest pain or N/V. He denied any numbness, tingling, weakness, unbalance, slurred speech. Of note, patient was admitted to [facility] for 3 weeks and discharged on 12/11/23 due to his perm cath Staph infection and was discharged with antibiotics. In ED: T 98.3, HR 64, /84, RR18 O2, 100% in RA. Admitted to telemetry for TIA work up. CT head stroke protocol showed No acute intracranial hemorrhage, midline shift or mass effect. Moderate white matter small vessel ischemic disease. Extensive atherosclerotic calcifications of the distal carotid and vertebral arteries. CT angio and perfusion scan showed bilateral high-grade stenosis (70-80%) of internal carotid arteries. Mild periventricular and deep white matter ischemia. No core infarct, however identified predicted volume of 30 mL of critically hypoperfused tissue at risk in the centrum semiovale ovale and BILATERAL inferior frontal white matter. Neurology Dr. Gotti consulted. Neurology assessment was that episode "could have been a manifestation of dialysis disequilibrium syndrome. Not likely a cerebral cortical hemispheric event, which would result in a hemianopsia as opposed to total visual loss. Alternatively a vertebrobasilar TIA could cause bilateral hemianopsias by affecting circulation to the visual cortices bilaterally; however one would expect one or more additional symptoms, such as altered consciousness or gait impairment.  Pt was fully alert, talking, and walking during the episode. Could have been a retinal TIA (microembolus that broke up)". Neurology recommended continuing ASA and plavix, non-con MR brain, TTE with bubble study, cardiac telemetry, and outpatient ophthalmology follow up. TTE showed EF 50-55%, normal diastolic function, mild MR, mild AS and AR, and no intracardiac shunt on agitated saline injection. Telemetry showed brief episode of AIVR noted on telemetry. Cardiology, Dr. Jorge, and Electrophysiolgy, Dr. Jones consulted. Cardiology recommended asa,statin and no beta-blocker/Ace/arb/entresto due to low bp. EP determined No indication for PPM placement and recommended not treat his AIVR with BB as this is likely to increase the frequency of the episodes in setting of sinus node dysfunction. Vascular surgery consulted for b/l carotid stenosis and AVM placement. Vascular recommended outpatient follow up. MR brain showed _____. Patient continued on regular dialysis TTS via new RIJ permacath placed at recent admission to [facility]. Patient is stable for discharge and is advised to follow up with PCP as outpatient. Please refer to patient's complete medical chart with documents for a full hospital course, for this is only a brief summary.          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 Per the patient, patient returned home from dialysis and started having 5/10 pain in the back of his neck and had complete loss of vision for 15 minutes. Patient was able to stand and walk during the episode of vision loss. The vision is slowly returning but it hasn't returned to baseline. The neck pain did not radiate to the head or back. Patient denied headache, dizziness, SOB, or chest pain or N/V. He denied any numbness, tingling, weakness, unbalance, slurred speech. Of note, patient was admitted to [facility] for 3 weeks and discharged on 12/11/23 due to his perm cath Staph infection and was discharged with antibiotics. In ED: T 98.3, HR 64, /84, RR18 O2, 100% in RA. Admitted to telemetry for TIA work up. CT head stroke protocol showed No acute intracranial hemorrhage, midline shift or mass effect. Moderate white matter small vessel ischemic disease. Extensive atherosclerotic calcifications of the distal carotid and vertebral arteries. CT angio and perfusion scan showed bilateral high-grade stenosis (70-80%) of internal carotid arteries. Mild periventricular and deep white matter ischemia. No core infarct, however identified predicted volume of 30 mL of critically hypoperfused tissue at risk in the centrum semiovale ovale and BILATERAL inferior frontal white matter. Neurology Dr. Gotti consulted. Neurology assessment was that episode "could have been a manifestation of dialysis disequilibrium syndrome. Not likely a cerebral cortical hemispheric event, which would result in a hemianopsia as opposed to total visual loss. Alternatively a vertebrobasilar TIA could cause bilateral hemianopsias by affecting circulation to the visual cortices bilaterally; however one would expect one or more additional symptoms, such as altered consciousness or gait impairment.  Pt was fully alert, talking, and walking during the episode. Could have been a retinal TIA (microembolus that broke up)". Neurology recommended continuing ASA and plavix, non-con MR brain, TTE with bubble study, cardiac telemetry, and outpatient ophthalmology follow up. TTE showed EF 50-55%, normal diastolic function, mild MR, mild AS and AR, and no intracardiac shunt on agitated saline injection. Telemetry showed brief episode of AIVR noted on telemetry. Cardiology, Dr. Jorge, and Electrophysiolgy, Dr. Jones consulted. Cardiology recommended asa,statin and no beta-blocker/Ace/arb/entresto due to low bp. EP determined No indication for PPM placement and recommended not treat his AIVR with BB as this is likely to increase the frequency of the episodes in setting of sinus node dysfunction. Vascular surgery consulted for b/l carotid stenosis and AVM placement. Vascular recommended outpatient follow up. MR brain showed _____. Patient continued on regular dialysis TTS via new RIJ permacath placed at recent admission to [facility]. Patient is stable for discharge and is advised to follow up with PCP as outpatient. Please refer to patient's complete medical chart with documents for a full hospital course, for this is only a brief summary.          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 Per the patient, patient returned home from dialysis and started having 5/10 pain in the back of his neck and had complete loss of vision for 15 minutes. Patient was able to stand and walk during the episode of vision loss. The vision is slowly returning but it hasn't returned to baseline. The neck pain did not radiate to the head or back. Patient denied headache, dizziness, SOB, or chest pain or N/V. He denied any numbness, tingling, weakness, unbalance, slurred speech. Of note, patient was admitted to [facility] for 3 weeks and discharged on 12/11/23 due to his perm cath Staph infection and was discharged with antibiotics. In ED: T 98.3, HR 64, /84, RR18 O2, 100% in RA. Admitted to telemetry for TIA work up. CT head stroke protocol showed No acute intracranial hemorrhage, midline shift or mass effect. Moderate white matter small vessel ischemic disease. Extensive atherosclerotic calcifications of the distal carotid and vertebral arteries. CT angio and perfusion scan showed bilateral high-grade stenosis (70-80%) of internal carotid arteries. Mild periventricular and deep white matter ischemia. No core infarct, however identified predicted volume of 30 mL of critically hypoperfused tissue at risk in the centrum semiovale ovale and BILATERAL inferior frontal white matter. Neurology Dr. Gotti consulted. Neurology assessment was that episode "could have been a manifestation of dialysis disequilibrium syndrome. Not likely a cerebral cortical hemispheric event, which would result in a hemianopsia as opposed to total visual loss. Alternatively a vertebrobasilar TIA could cause bilateral hemianopsias by affecting circulation to the visual cortices bilaterally; however one would expect one or more additional symptoms, such as altered consciousness or gait impairment.  Pt was fully alert, talking, and walking during the episode. Could have been a retinal TIA (microembolus that broke up)". Neurology recommended continuing ASA and plavix, non-con MR brain, TTE with bubble study, cardiac telemetry, and outpatient ophthalmology follow up. TTE showed EF 50-55%, normal diastolic function, mild MR, mild AS and AR, and no intracardiac shunt on agitated saline injection. Telemetry showed brief episode of AIVR noted on telemetry. Cardiology, Dr. Jorge, and Electrophysiolgy, Dr. Jones consulted. Cardiology recommended asa,statin and no beta-blocker/Ace/arb/entresto due to low bp. EP determined No indication for PPM placement and recommended not treat his AIVR with BB as this is likely to increase the frequency of the episodes in setting of sinus node dysfunction. Vascular surgery consulted for b/l carotid stenosis and AVM placement. Vascular recommended outpatient follow up. MR brain showed No acute intracranial hemorrhage or acute infarct. Patient continued on regular dialysis TTS via new RIJ permacath placed at recent admission to [facility]. Patient is stable for discharge and is advised to follow up with PCP as outpatient. Please refer to patient's complete medical chart with documents for a full hospital course, for this is only a brief summary.          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 Per the patient, patient returned home from dialysis and started having 5/10 pain in the back of his neck and had complete loss of vision for 15 minutes. Patient was able to stand and walk during the episode of vision loss. The vision is slowly returning but it hasn't returned to baseline. The neck pain did not radiate to the head or back. Patient denied headache, dizziness, SOB, or chest pain or N/V. He denied any numbness, tingling, weakness, unbalance, slurred speech. Of note, patient was admitted to [facility] for 3 weeks and discharged on 12/11/23 due to his perm cath Staph infection and was discharged with antibiotics. In ED: T 98.3, HR 64, /84, RR18 O2, 100% in RA. Admitted to telemetry for TIA work up. CT head stroke protocol showed No acute intracranial hemorrhage, midline shift or mass effect. Moderate white matter small vessel ischemic disease. Extensive atherosclerotic calcifications of the distal carotid and vertebral arteries. CT angio and perfusion scan showed bilateral high-grade stenosis (70-80%) of internal carotid arteries. Mild periventricular and deep white matter ischemia. No core infarct, however identified predicted volume of 30 mL of critically hypoperfused tissue at risk in the centrum semiovale ovale and BILATERAL inferior frontal white matter. Neurology Dr. Gotti consulted. Neurology assessment was that episode "could have been a manifestation of dialysis disequilibrium syndrome. Not likely a cerebral cortical hemispheric event, which would result in a hemianopsia as opposed to total visual loss. Alternatively a vertebrobasilar TIA could cause bilateral hemianopsias by affecting circulation to the visual cortices bilaterally; however one would expect one or more additional symptoms, such as altered consciousness or gait impairment.  Pt was fully alert, talking, and walking during the episode. Could have been a retinal TIA (microembolus that broke up)". Neurology recommended continuing ASA and plavix, non-con MR brain, TTE with bubble study, cardiac telemetry, and outpatient ophthalmology follow up. TTE showed EF 50-55%, normal diastolic function, mild MR, mild AS and AR, and no intracardiac shunt on agitated saline injection. Telemetry showed brief episode of AIVR noted on telemetry. Cardiology, Dr. Jorge, and Electrophysiolgy, Dr. Jones consulted. Cardiology recommended asa,statin and no beta-blocker/Ace/arb/entresto due to low bp. EP determined No indication for PPM placement and recommended not treat his AIVR with BB as this is likely to increase the frequency of the episodes in setting of sinus node dysfunction. Vascular surgery consulted for b/l carotid stenosis and AVM placement. Vascular recommended outpatient follow up. MR brain showed No acute intracranial hemorrhage or acute infarct. Patient continued on regular dialysis TTS via new RIJ permacath placed at recent admission to [facility]. Patient is stable for discharge and is advised to follow up with PCP as outpatient. Please refer to patient's complete medical chart with documents for a full hospital course, for this is only a brief summary.

## 2023-12-20 NOTE — PROGRESS NOTE ADULT - PROVIDER SPECIALTY LIST ADULT
Nephrology
Neurology
Cardiology
Cardiology
Internal Medicine
Nephrology
Internal Medicine
Nephrology
Cardiology
Internal Medicine

## 2023-12-20 NOTE — DISCHARGE NOTE PROVIDER - NSDCCAREPROVSEEN_GEN_ALL_CORE_FT
Maury, Fabian Jovel, Jostin Jorge, Yaritza Ghotra, Corky Valencia, Melida Hutton, Todd Gibson, Eduardo Low, Guillermina Dutta

## 2023-12-20 NOTE — PROGRESS NOTE ADULT - TIME BILLING
I counseled the patient and primary team about the medications that should be maintained to manage stroke risk factors.

## 2023-12-20 NOTE — DISCHARGE NOTE NURSING/CASE MANAGEMENT/SOCIAL WORK - PATIENT PORTAL LINK FT
You can access the FollowMyHealth Patient Portal offered by [facility] by registering at the following website: [email or web address]. By joining [employer]’s FollowMyHealth portal, you will also be able to view your health information using other applications (apps) compatible with our system. You can access the FollowMyHealth Patient Portal offered by [facility] by registering at the following website: [email or web address]. By joining [employer]’s FollowMyHealth portal, you will also be able to view your health information using other applications (apps) compatible with our system.

## 2023-12-20 NOTE — PROGRESS NOTE ADULT - ASSESSMENT
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  1.Echocardiogram.  2.Vascular eval-b/l carotid stenosis.  3.CAD and chronic systolic HF-asa,statin. No B blocker/Ace/arb/entresto due to low bp,  4.DM-Insulin.  5.Lipid d/o-statin.  6.GI and DVT prophylaxis.
ESRD , continue with Dialysis TTS.  Post catheter infection and change of catheter few weeks ago , patient agreed for AVF surgery  Anemia continue AVNI, no need for Iron  Renal bone diseasestable po4 , off binders  Loss of vision right eye as per neurology.   Bilateral Carotid stenosis follow with vascular as an out patient. AVF surgery in the out patient setting. 
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  1.AIVR-EP eval..  2.Vascular eval-b/l carotid stenosis.  3.CAD and chronic systolic HF-asa,statin. No B blocker/Ace/arb/entresto due to low bp,  4.DM-Insulin.  5.Lipid d/o-statin.  6.GI and DVT prophylaxis.
ESRD , continue with Dialysis TTS.  Post catheter infection and change of catheter few weeks ago , patient agreed for AVF surgery  Anemia continue AVNI, NO NEED FOR iRON iv  Renal bone disease satble po4 , off binders  Loss of vision right eye as per neurology  Bilateral Carotid stenosis follow with vascular as an out patient. 
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  1.AIVR-EP eval noted, rec no PPM.  2.Vascular eval-b/l carotid stenosis noted, no intervention, outpatient f/u AVF.  3.CAD and chronic systolic HF-asa,statin. No B blocker/Ace/arb/entresto due to low bp,  4.DM-Insulin.  5.Lipid d/o-statin.  6.GI and DVT prophylaxis.
ESRD , continue with Dialysis TTS.  Post catheter infection and change of catheter few weeks ago , patient agreed for AVF surgery  Anemia continue AVNI, NO NEED FOR iRON iv.  Renal bone disease , STABLE  po4 , off binders  Loss of vision right eye as per neurology  Bilateral Carotid stenosis follow with vascular as an out patient.   Follow up new AVF as an outpatient called the dialysis unit.  I called his dialysis he has been on Cefazolin due to catheter infection, in need to continue it for another 8 dialysis treatments .   Start Cefazolin 1 gm IV today and will follow dose of Cefazolin on discharge. 
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 Per the patient, patient returned home from dialysis and started having 5/10 pain in the back of his neck and had complete loss of vision for 15 minutes. CTH negative for acute hemorrhage wit moderate small vessel ischemic disease. CTA head R and L ICA stenosis (70-80%) and deep white matter ischemia. Patient admitted for TIA workup.

## 2023-12-20 NOTE — DISCHARGE NOTE PROVIDER - NSDCCPCAREPLAN_GEN_ALL_CORE_FT
PRINCIPAL DISCHARGE DIAGNOSIS  Diagnosis: Vision loss  Assessment and Plan of Treatment: You came to the hospital after an episode of complete vision loss during a dialysis. You were admitted for further work up and management of possible transient ischemic attack (TIA) as a cause of your vision loss. A transient ischemic attack (TIA) is when blood flow to a part of your brain is blocked for a short time. A TIA causes stroke symptoms that can last for at least a few minutes. Stroke symptoms include sudden weakness or loss of movement in a part of your body, confusion, vision changes, trouble speaking, and trouble walking or balancing. But unlike a stroke, a TIA doesn't cause lasting brain damage      SECONDARY DISCHARGE DIAGNOSES  Diagnosis: Carotid artery stenosis  Assessment and Plan of Treatment:     Diagnosis: ESRD on dialysis  Assessment and Plan of Treatment:     Diagnosis: DM (diabetes mellitus)  Assessment and Plan of Treatment:     Diagnosis: Sinus node dysfunction  Assessment and Plan of Treatment:      PRINCIPAL DISCHARGE DIAGNOSIS  Diagnosis: Vision loss  Assessment and Plan of Treatment: You came to the hospital after an episode of complete vision loss during a dialysis. You were admitted for further work up and management of possible transient ischemic attack (TIA) as a cause of your vision loss. A transient ischemic attack (TIA) is when blood flow to a part of your brain is blocked for a short time. A TIA causes stroke symptoms that can last for at least a few minutes. Stroke symptoms include sudden weakness or loss of movement in a part of your body, confusion, vision changes, trouble speaking, and trouble walking or balancing. But unlike a stroke, a TIA doesn't cause lasting brain damage. CT scan of your head was negative for acute stroke but did show an area of susceptibility to decrease blood flow, likely due to underlying carotid artery stenosis. MRI of the brain showed _____. You were started on secondary stroke prophylaxis medications aspirin, Plavix, and Atorvastatin. PLEASE TAKE ASPIRIN 81MG ONCE A DAY. PLEASE TAKE PLAVIX 75MG ONCE A DAY. PLEASE TAKE ATORVASTATIN 81MG ONCE A DAY AT BEDTIME. Please follow up with your primary care doctor in a week from discharge to adjust medications as needed and discuss further management.        SECONDARY DISCHARGE DIAGNOSES  Diagnosis: Carotid artery stenosis  Assessment and Plan of Treatment: CT angiography of your head and neck revealed severe stenosis of your left and right internal carotid arteries. Carotid stenosis is narrowing of one or both of the carotid arteries. These arteries take blood from the heart to the brain. There is one on each side of the neck.  Carotid artery stenosis is caused by a process called hardening of the arteries, or atherosclerosis. A substance called plaque builds up inside the carotid arteries. Things that can lead to plaque include diabetes, high blood pressure, high cholesterol, and smoking. This plaque may limit blood flow to your brain. If plaque breaks open, it may form a blood clot. Or pieces of the plaque may break off. A piece of plaque or a blood clot could move to the brain, causing a stroke or transient ischemic attack (TIA).  The goal of treatment is to lower your risk of having a stroke or TIA. You can lower your risk by having a heart-healthy lifestyle and taking medicine. Vascular surgery was consulted who recommended following up with them as an outpatient for further management.    Diagnosis: Sinus node dysfunction  Assessment and Plan of Treatment: You have a history of heart failure with reduced ejection fraction and coronary artery disease. On this admission your echocardiogram showed an improved to normal ejection fraction of 50-55%. While monitoring your heart rate on rhythm on telemetry you had a breif episode of an abnormal accelerated rhythm called AIVT that was determined to be due to sinus node dysfunction also known as sick sinus syndrome. Sick sinus syndrome is the name given to a group of arrhythmias that occur because the normal pacemaker of the heart (the sinus node) does not work properly. Various irregular heart rates (arrhythmias) or combinations of arrhythmias can occur in this condition. People with this syndrome can have slow arrhythmias or a combination of fast and slow arrhythmias. Cardiology and electrophysiology were consulted and recommendations were followed. PLEASE DO NOT TAKE BETA-BLOCKER MEDICATIONS as this class of medication can lead to more frequent arrythmias. Please follow up with your primary care doctor and cardiologist in a week from discharge to adjust medications as needed and discuss further management.      Diagnosis: ESRD on dialysis  Assessment and Plan of Treatment: You have a history of end stage renal failure on dialysis. You were continued on your regular dialysis schedule throughout your hospital stay. Vascular surgery was consulted to initiate planning for creation of an arteriovenous fistula (AVF) for easier long term acess for dialysis. Please follow up with vascular surgery within one week of discharge to arrange for the creation of your AVF.    Diagnosis: DM (diabetes mellitus)  Assessment and Plan of Treatment: You have a history of diabetes.   Your HbA1c was 6.4 during this admission.  You need to continue monitoring your blood sugar levels closely and maintain healthy lifestyle by eating healthy diabetic regimen and exercising regularly as tolerated.  Please continue to take your medications as prescribed.   Please follow up with your PCP/Endocrinologist within a week of discharge.       PRINCIPAL DISCHARGE DIAGNOSIS  Diagnosis: Vision loss  Assessment and Plan of Treatment: You came to the hospital after an episode of complete vision loss during a dialysis. You were admitted for further work up and management of possible transient ischemic attack (TIA) as a cause of your vision loss. A transient ischemic attack (TIA) is when blood flow to a part of your brain is blocked for a short time. A TIA causes stroke symptoms that can last for at least a few minutes. Stroke symptoms include sudden weakness or loss of movement in a part of your body, confusion, vision changes, trouble speaking, and trouble walking or balancing. But unlike a stroke, a TIA doesn't cause lasting brain damage. CT scan of your head was negative for acute stroke but did show an area of susceptibility to decrease blood flow, likely due to underlying carotid artery stenosis. MRI of the brain showed no signs of acute stroke. You were started on secondary stroke prophylaxis medications aspirin, Plavix, and Atorvastatin. PLEASE TAKE ASPIRIN 81MG ONCE A DAY. PLEASE TAKE PLAVIX 75MG ONCE A DAY. PLEASE TAKE ATORVASTATIN 81MG ONCE A DAY AT BEDTIME. Please follow up with your primary care doctor in a week from discharge to adjust medications as needed and discuss further management.        SECONDARY DISCHARGE DIAGNOSES  Diagnosis: Carotid artery stenosis  Assessment and Plan of Treatment: CT angiography of your head and neck revealed severe stenosis of your left and right internal carotid arteries. Carotid stenosis is narrowing of one or both of the carotid arteries. These arteries take blood from the heart to the brain. There is one on each side of the neck.  Carotid artery stenosis is caused by a process called hardening of the arteries, or atherosclerosis. A substance called plaque builds up inside the carotid arteries. Things that can lead to plaque include diabetes, high blood pressure, high cholesterol, and smoking. This plaque may limit blood flow to your brain. If plaque breaks open, it may form a blood clot. Or pieces of the plaque may break off. A piece of plaque or a blood clot could move to the brain, causing a stroke or transient ischemic attack (TIA).  The goal of treatment is to lower your risk of having a stroke or TIA. You can lower your risk by having a heart-healthy lifestyle and taking medicine. Vascular surgery was consulted who recommended following up with them as an outpatient for further management.    Diagnosis: Sinus node dysfunction  Assessment and Plan of Treatment: You have a history of heart failure with reduced ejection fraction and coronary artery disease. On this admission your echocardiogram showed an improved to normal ejection fraction of 50-55%. While monitoring your heart rate on rhythm on telemetry you had a breif episode of an abnormal accelerated rhythm called AIVT that was determined to be due to sinus node dysfunction also known as sick sinus syndrome. Sick sinus syndrome is the name given to a group of arrhythmias that occur because the normal pacemaker of the heart (the sinus node) does not work properly. Various irregular heart rates (arrhythmias) or combinations of arrhythmias can occur in this condition. People with this syndrome can have slow arrhythmias or a combination of fast and slow arrhythmias. Cardiology and electrophysiology were consulted and recommendations were followed. PLEASE DO NOT TAKE BETA-BLOCKER MEDICATIONS as this class of medication can lead to more frequent arrythmias. Please follow up with your primary care doctor and cardiologist in a week from discharge to adjust medications as needed and discuss further management.      Diagnosis: ESRD on dialysis  Assessment and Plan of Treatment: You have a history of end stage renal failure on dialysis. You were continued on your regular dialysis schedule throughout your hospital stay. Vascular surgery was consulted to initiate planning for creation of an arteriovenous fistula (AVF) for easier long term acess for dialysis. Please follow up with vascular surgery within one week of discharge to arrange for the creation of your AVF.    Diagnosis: DM (diabetes mellitus)  Assessment and Plan of Treatment: You have a history of diabetes.   Your HbA1c was 6.4 during this admission.  You need to continue monitoring your blood sugar levels closely and maintain healthy lifestyle by eating healthy diabetic regimen and exercising regularly as tolerated.  Please continue to take your medications as prescribed.   Please follow up with your PCP/Endocrinologist within a week of discharge.       PRINCIPAL DISCHARGE DIAGNOSIS  Diagnosis: Vision loss  Assessment and Plan of Treatment: You came to the hospital after an episode of complete vision loss during a dialysis. You were admitted for further work up and management of possible transient ischemic attack (TIA) as a cause of your vision loss. A transient ischemic attack (TIA) is when blood flow to a part of your brain is blocked for a short time. A TIA causes stroke symptoms that can last for at least a few minutes. Stroke symptoms include sudden weakness or loss of movement in a part of your body, confusion, vision changes, trouble speaking, and trouble walking or balancing. But unlike a stroke, a TIA doesn't cause lasting brain damage. CT scan of your head was negative for acute stroke but did show an area of susceptibility to decrease blood flow, likely due to underlying carotid artery stenosis. MRI of the brain showed no signs of acute stroke. You were started on secondary stroke prophylaxis medications aspirin, Plavix, and Atorvastatin. Neurology was consulted and reccomendations were followed. PLEASE TAKE ASPIRIN 81MG ONCE A DAY.  PLEASE TAKE ATORVASTATIN 20MG ONCE A DAY AT BEDTIME. Please follow up with your primary care doctor in a week from discharge to adjust medications as needed and discuss further management.        SECONDARY DISCHARGE DIAGNOSES  Diagnosis: Carotid artery stenosis  Assessment and Plan of Treatment: CT angiography of your head and neck revealed severe stenosis of your left and right internal carotid arteries. Carotid stenosis is narrowing of one or both of the carotid arteries. These arteries take blood from the heart to the brain. There is one on each side of the neck.  Carotid artery stenosis is caused by a process called hardening of the arteries, or atherosclerosis. A substance called plaque builds up inside the carotid arteries. Things that can lead to plaque include diabetes, high blood pressure, high cholesterol, and smoking. This plaque may limit blood flow to your brain. If plaque breaks open, it may form a blood clot. Or pieces of the plaque may break off. A piece of plaque or a blood clot could move to the brain, causing a stroke or transient ischemic attack (TIA).  The goal of treatment is to lower your risk of having a stroke or TIA. You can lower your risk by having a heart-healthy lifestyle and taking medicine. Vascular surgery was consulted who recommended following up with them as an outpatient for further management.    Diagnosis: Sinus node dysfunction  Assessment and Plan of Treatment: You have a history of heart failure with reduced ejection fraction and coronary artery disease. On this admission your echocardiogram showed an improved to normal ejection fraction of 50-55%. While monitoring your heart rate on rhythm on telemetry you had a breif episode of an abnormal accelerated rhythm called AIVT that was determined to be due to sinus node dysfunction also known as sick sinus syndrome. Sick sinus syndrome is the name given to a group of arrhythmias that occur because the normal pacemaker of the heart (the sinus node) does not work properly. Various irregular heart rates (arrhythmias) or combinations of arrhythmias can occur in this condition. People with this syndrome can have slow arrhythmias or a combination of fast and slow arrhythmias. Cardiology and electrophysiology were consulted and recommendations were followed. PLEASE DO NOT TAKE BETA-BLOCKER MEDICATIONS as this class of medication can lead to more frequent arrythmias. Please follow up with your primary care doctor and cardiologist in a week from discharge to adjust medications as needed and discuss further management.      Diagnosis: ESRD on dialysis  Assessment and Plan of Treatment: You have a history of end stage renal failure on dialysis. You were continued on your regular dialysis schedule throughout your hospital stay. Vascular surgery was consulted to initiate planning for creation of an arteriovenous fistula (AVF) for easier long term acess for dialysis. Please follow up with vascular surgery within one week of discharge to arrange for the creation of your AVF.    Diagnosis: DM (diabetes mellitus)  Assessment and Plan of Treatment: You have a history of diabetes.   Your HbA1c was 6.4 during this admission.  You need to continue monitoring your blood sugar levels closely and maintain healthy lifestyle by eating healthy diabetic regimen and exercising regularly as tolerated.  Please continue to take your medications as prescribed.   Please follow up with your PCP/Endocrinologist within a week of discharge.

## 2023-12-20 NOTE — PROGRESS NOTE ADULT - SUBJECTIVE AND OBJECTIVE BOX
Patient is a 78y old  Male who presents with a chief complaint of TIA (19 Dec 2023 19:36)    pt seen in tele [x  ], reg med floor [   ], bed [ x ], chair at bedside [   ], a+o x3 [ x ], lethargic [  ],    nad [ x ]      Allergies    No Known Allergies        Vitals    T(F): 98.6 (12-20-23 @ 05:05), Max: 98.6 (12-20-23 @ 05:05)  HR: 64 (12-20-23 @ 05:05) (62 - 82)  BP: 147/101 (12-20-23 @ 05:05) (129/82 - 171/88)  RR: 17 (12-20-23 @ 05:05) (17 - 19)  SpO2: 98% (12-20-23 @ 05:05) (98% - 100%)  Wt(kg): --  CAPILLARY BLOOD GLUCOSE      POCT Blood Glucose.: 169 mg/dL (19 Dec 2023 22:04)      Labs                          9.4    8.17  )-----------( 163      ( 19 Dec 2023 06:07 )             27.7       12-19    133<L>  |  100  |  53<H>  ----------------------------<  87  4.9   |  26  |  4.95<H>    Ca    8.5      19 Dec 2023 06:07  Phos  3.4     12-19  Mg     1.9     12-19    TPro  7.3  /  Alb  2.6<L>  /  TBili  0.3  /  DBili  x   /  AST  15  /  ALT  <6<L>  /  AlkPhos  132<H>  12-19                Radiology Results      Meds    MEDICATIONS  (STANDING):  aspirin  chewable 81 milliGRAM(s) Oral daily  atorvastatin 80 milliGRAM(s) Oral at bedtime  brimonidine 0.2% Ophthalmic Solution 1 Drop(s) Both EYES two times a day  chlorhexidine 2% Cloths 1 Application(s) Topical daily  clopidogrel Tablet 75 milliGRAM(s) Oral daily  dorzolamide 2% Ophthalmic Solution 1 Drop(s) Both EYES <User Schedule>  heparin   Injectable 5000 Unit(s) SubCutaneous every 8 hours  insulin lispro (ADMELOG) corrective regimen sliding scale   SubCutaneous at bedtime  insulin lispro (ADMELOG) corrective regimen sliding scale   SubCutaneous three times a day before meals  Nephro-marcella 1 Tablet(s) Oral daily  pantoprazole    Tablet 40 milliGRAM(s) Oral before breakfast  timolol 0.5% Solution 1 Drop(s) Both EYES two times a day      MEDICATIONS  (PRN):  acetaminophen     Tablet .. 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   Not likely a cerebral cortical hemispheric event, which would result in a hemianopsia as opposed to total visual loss.  Could have been a retinal TIA (microembolus that broke up).  f/u Non-con MR head   Out-Pt ophthalmology follow-up.    Continue ASA 81mg daily.     Clopidogrel 75mg daily to end 1/5/24.    trop x1 neg noted above  cardio f/u   No B blocker/Ace/arb/entresto due to low bp,   echo with mild aortic stenosis. lvef  50 to 55 %. Agitated saline injection was negative for intracardiac shunt noted above.  vascular cons noted   Outpt f/u with Dr. Riojas for AVF placement  -No periop workup needed while inpt  -con't HD via PC  -b/l carotid stenosis unlikely source of acute vision loss  -con't ASA/statin  lispro ss  hgba1c   pt on hd tts  renal f/u   continue AVNI, NO NEED FOR iRON iv  Renal bone disease satble po4 , off binders  patient agrees for AV acces surgery.  cont current meds          Patient is a 78y old  Male who presents with a chief complaint of TIA (19 Dec 2023 19:36)    pt seen in tele [x  ], reg med floor [   ], bed [ x ], chair at bedside [   ], a+o x3 [ x ], lethargic [  ],    nad [ x ]      Allergies    No Known Allergies        Vitals    T(F): 98.6 (12-20-23 @ 05:05), Max: 98.6 (12-20-23 @ 05:05)  HR: 64 (12-20-23 @ 05:05) (62 - 82)  BP: 147/101 (12-20-23 @ 05:05) (129/82 - 171/88)  RR: 17 (12-20-23 @ 05:05) (17 - 19)  SpO2: 98% (12-20-23 @ 05:05) (98% - 100%)  Wt(kg): --  CAPILLARY BLOOD GLUCOSE      POCT Blood Glucose.: 169 mg/dL (19 Dec 2023 22:04)      Labs                          9.4    8.17  )-----------( 163      ( 19 Dec 2023 06:07 )             27.7       12-19    133<L>  |  100  |  53<H>  ----------------------------<  87  4.9   |  26  |  4.95<H>    Ca    8.5      19 Dec 2023 06:07  Phos  3.4     12-19  Mg     1.9     12-19    TPro  7.3  /  Alb  2.6<L>  /  TBili  0.3  /  DBili  x   /  AST  15  /  ALT  <6<L>  /  AlkPhos  132<H>  12-19      A1C with Estimated Average Glucose (12.17.23 @ 05:59)   A1C with Estimated Average Glucose Result: 6.4          Radiology Results      Meds    MEDICATIONS  (STANDING):  aspirin  chewable 81 milliGRAM(s) Oral daily  atorvastatin 80 milliGRAM(s) Oral at bedtime  brimonidine 0.2% Ophthalmic Solution 1 Drop(s) Both EYES two times a day  chlorhexidine 2% Cloths 1 Application(s) Topical daily  clopidogrel Tablet 75 milliGRAM(s) Oral daily  dorzolamide 2% Ophthalmic Solution 1 Drop(s) Both EYES <User Schedule>  heparin   Injectable 5000 Unit(s) SubCutaneous every 8 hours  insulin lispro (ADMELOG) corrective regimen sliding scale   SubCutaneous at bedtime  insulin lispro (ADMELOG) corrective regimen sliding scale   SubCutaneous three times a day before meals  Nephro-marcella 1 Tablet(s) Oral daily  pantoprazole    Tablet 40 milliGRAM(s) Oral before breakfast  timolol 0.5% Solution 1 Drop(s) Both EYES two times a day      MEDICATIONS  (PRN):  acetaminophen     Tablet .. 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   Not likely a cerebral cortical hemispheric event, which would result in a hemianopsia as opposed to total visual loss.  Could have been a retinal TIA (microembolus that broke up).  f/u Non-con MR head   Out-Pt ophthalmology follow-up.    Continue ASA 81mg daily.     Clopidogrel 75mg daily to end 1/5/24.    trop x1 neg noted above  cardio f/u   No B blocker/Ace/arb/entresto due to low bp,   echo with mild aortic stenosis. lvef  50 to 55 %. Agitated saline injection was negative for intracardiac shunt noted    ep cons noted   Asymptomatic AIVR that is short lived.  - Patient on Timolol, no objection to continuing  - No indication for PPM placement  - Would not treat his AIVR with BB as this is likely to increase the frequency of the episodes in someone with SND.   vascular cons noted   Outpt f/u with Dr. Riojas for AVF placement  -No periop workup needed while inpt  -con't HD via PC  -b/l carotid stenosis unlikely source of acute vision loss  -con't ASA/statin  lispro ss  hgba1c 6.4 noted aove  pt on hd tts  renal f/u   continue AVNI, NO NEED FOR iRON iv  Renal bone diseasestable po4 , off binders  Bilateral Carotid stenosis follow with vascular as an out patient. AVF surgery in the out patient setting.   cont current meds   d/c plan pend mri

## 2023-12-20 NOTE — DISCHARGE NOTE NURSING/CASE MANAGEMENT/SOCIAL WORK - NSDCVIVACCINE_GEN_ALL_CORE_FT
Td (adult) preservative free; 15-Apr-2016 13:19; Lorraine Arriola (RN); [employer]; [id number]; IntraMuscular; Deltoid Right.; 0.5 milliLiter(s); VIS (VIS Published: 10-Apr-2016, VIS Presented: 15-Apr-2016);    Td (adult) preservative free; 15-Apr-2016 13:19; Lorraine Arriola (RN); [employer]; [id number]; IntraMuscular; Deltoid Right.; 0.5 milliLiter(s); VIS (VIS Published: 10-Apr-2016, VIS Presented: 15-Apr-2016);

## 2023-12-20 NOTE — PROGRESS NOTE ADULT - SUBJECTIVE AND OBJECTIVE BOX
NEUROLOGY FOLLOW-UP NOTE    NAME:  PEMA VENCES      ASSESSMENT:  78 LHM with chronic left eye blindness and poor right eye vision secondary to bilateral cataracts and glaucoma, with episode of transient vision loss during hemodialysis, concerning for transient ischemic attack; also with neuroimaging evidence of chronic intraparenchymal microhemorrhage      RECOMMENDATIONS:      1. TIA workup  - Q4H Neurochecks & Vital signs  - Telemetry monitoring while inpatient      2. Secondary stroke prevention  - Continue Aspirin 81mg PO Daily  - Okay to discontinue Clopidogrel 75mg PO Daily due to neuroimaging evidence of chronic intraparenchymal microhemorrhage  - Okay to decrease Atorvastatin dosage from 80mg to home dosage of 20mg PO QHS since LDL is at goal of under 70 mg/dL)  - Treat BP if over 140/90 (goal /80) - Maintain home antihypertensive medications, No role for permissive hypertension  - Diabetes management as per primary team  - PT/OT  - DVT ppx: SCDs, Heparin          NOTE TO BE COMPLETED - PLEASE REFER TO ABOVE ONLY AND IGNORE INFORMATION BELOW    ******************************    HPI:  Patient is a 78M living with son SUN (5x/week for 4h) with PMHx HTN, HLD, DM (not on meds) ESRD on dialysis (Tue, Thu, Sat) and glaucoma and 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 out of the right eye who presented to the hospital after complete loss of vision for 15-20 minutes shortly after dialysis today. Per the patient, patient returned home from dialysis and started having 5/10 pain in the back of his neck and had complete loss of vision for 15 minutes. Patient was able to stand and walk during the episode of vision loss. The vision is slowly returning but it hasn't returned to baseline. The neck pain did not radiate to the head or back. Patient denies headache, dizziness, SOB, or chest pain or N/V. He denies any numbness, tingling, weakness, unbalanced, slurred speech.     Of note, patient was admitted to [facility] for 3 weeks and discharged on 12/11/23 due to his perm cath Staph infection and was discharged with antibiotics.    IN ED   T 98.3  HR 64, /84  RR18 O2 100% in RA (16 Dec 2023 16:31)      NEURO HPI:      INTERVAL HISTORY:      MEDICATIONS:  acetaminophen     Tablet .. 650 milliGRAM(s) Oral every 6 hours PRN  aspirin  chewable 81 milliGRAM(s) Oral daily  atorvastatin 80 milliGRAM(s) Oral at bedtime  brimonidine 0.2% Ophthalmic Solution 1 Drop(s) Both EYES two times a day  chlorhexidine 2% Cloths 1 Application(s) Topical daily  clopidogrel Tablet 75 milliGRAM(s) Oral daily  dorzolamide 2% Ophthalmic Solution 1 Drop(s) Both EYES <User Schedule>  heparin   Injectable 5000 Unit(s) SubCutaneous every 8 hours  insulin lispro (ADMELOG) corrective regimen sliding scale   SubCutaneous at bedtime  insulin lispro (ADMELOG) corrective regimen sliding scale   SubCutaneous three times a day before meals  Nephro-marcella 1 Tablet(s) Oral daily  pantoprazole    Tablet 40 milliGRAM(s) Oral before breakfast  timolol 0.5% Solution 1 Drop(s) Both EYES two times a day      ALLERGIES:  No Known Allergies      REVIEW OF SYSTEMS:  Fourteen systems reviewed and negative except as in HPI / Interval History.          OBJECTIVE:  Vital Signs Last 24 Hrs  T(C): 36.3 (20 Dec 2023 10:08), Max: 37 (20 Dec 2023 05:05)  T(F): 97.3 (20 Dec 2023 10:08), Max: 98.6 (20 Dec 2023 05:05)  HR: 66 (20 Dec 2023 10:08) (64 - 82)  BP: 134/89 (20 Dec 2023 10:08) (129/82 - 160/117)  RR: 18 (20 Dec 2023 10:08) (17 - 18)  SpO2: 98% (20 Dec 2023 10:08) (98% - 100%)  Parameters below as of 20 Dec 2023 10:08  Patient On (Oxygen Delivery Method): room air      General Examination:  General: No acute distress  HEENT: Atraumatic, Normocephalic  Respiratory: CTA B/l.  No crackles, rhonchi, or wheezes.  Cardiovascular: RRR.  Normal S1 & S2.  Normal b/l radial and pedal pulses.    Neurological Examination:  General / Mental Status: AAO x 3.  No aphasia or dysarthria.  Naming and repetition intact.  Cranial Nerves: VFF x 4.  PERRL.  EOMI x 2, No nystagmus or diplopia.  B/l V1-V3 equal and intact to light touch and pinprick.  Symmetric facial movement and palate elevation.  B/l hearing equal to finger rub.  5/5 strength with b/l sternocleidomastoid and trapezius.  Midline tongue protrusion, with no atrophy or fasciculations.  Motor: Normal bulk & tone in all four extremities.  5/5 strength throughout all four extremities.  No downward drift, rigidity, spasticity, or tremors in any of the four extremities.  Sensory: Intact to light touch and pinprick in all four extremities.  Negative Romberg.  Reflex: 2+ and symmetric at b/l biceps, triceps, brachioradialis, patellae, and ankles.  Downgoing toes b/l.  Coordination: No dysmetria with b/l finger-to-nose and heel raise tests.  Symmetric rapid alternating movements b/l.  Gait: Normal, narrow-based gait.  No difficulty with tiptoe, heel, and tandem gaits.        LABORATORY VALUES:                          9.5    8.02  )-----------( 151      ( 20 Dec 2023 05:26 )             29.1       12-20    135  |  99  |  31<H>  ----------------------------<  101<H>  4.2   |  28  |  3.74<H>    Ca    9.4      20 Dec 2023 05:26  Phos  3.4     12-20  Mg     1.8     12-20    TPro  7.3  /  Alb  2.6<L>  /  TBili  0.3  /  DBili  x   /  AST  15  /  ALT  <6<L>  /  AlkPhos  132<H>  12-19 12-17 Chol 88 LDL 41 HDL 37<L> Trig 52    Glucose Trend  12-20-23 @ 11:26   -  -- -- 123<H>  12-20-23 @ 07:43   -  -- -- 98  12-20-23 @ 05:26   -  101<H> -- --  12-19-23 @ 22:04   -  -- -- 169<H>  12-19-23 @ 16:21   -  -- -- 93  12-19-23 @ 11:18   -  -- -- 211<H>  12-19-23 @ 07:58   -  -- -- 109<H>  12-19-23 @ 06:07   -  87 -- --  12-18-23 @ 21:43   -  -- -- 122<H>  12-18-23 @ 16:58   -  -- -- 98    Vitamin B12, Serum: 524 pg/mL (12-19-23 @ 14:50)  Folate, Serum: 14.1 ng/mL (12-19-23 @ 14:50)    A1C with Estimated Average Glucose (12.17.23 @ 05:59)   A1C with Estimated Average Glucose Result: 6.4%  Estimated Average Glucose: 137 mg/dL          NEUROIMAGING:          Please contact the Neurology consult service with any neurological questions.      Oscar Low MD   of Neurology  [facility]         NEUROLOGY FOLLOW-UP NOTE    NAME:  PEMA VENCES      ASSESSMENT:  78 LHM with chronic left eye blindness and poor right eye vision secondary to bilateral cataracts and glaucoma, with episode of transient vision loss during hemodialysis, concerning for transient ischemic attack; also with neuroimaging evidence of chronic intraparenchymal microhemorrhage      RECOMMENDATIONS:      1. TIA workup  - Q4H Neurochecks & Vital signs  - Telemetry monitoring while inpatient      2. Secondary stroke prevention  - Continue Aspirin 81mg PO Daily  - Okay to discontinue Clopidogrel 75mg PO Daily due to neuroimaging evidence of chronic intraparenchymal microhemorrhage  - Okay to decrease Atorvastatin dosage from 80mg to home dosage of 20mg PO QHS since LDL is at goal of under 70 mg/dL)  - Treat BP if over 140/90 (goal /80) - Maintain home antihypertensive medications, No role for permissive hypertension  - Diabetes management as per primary team  - PT/OT  - DVT ppx: SCDs, Heparin          NOTE TO BE COMPLETED - PLEASE REFER TO ABOVE ONLY AND IGNORE INFORMATION BELOW    ******************************    HPI:  Patient is a 78M living with son SUN (5x/week for 4h) with PMHx HTN, HLD, DM (not on meds) ESRD on dialysis (Tue, Thu, Sat) and glaucoma and 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 out of the right eye who presented to the hospital after complete loss of vision for 15-20 minutes shortly after dialysis today. Per the patient, patient returned home from dialysis and started having 5/10 pain in the back of his neck and had complete loss of vision for 15 minutes. Patient was able to stand and walk during the episode of vision loss. The vision is slowly returning but it hasn't returned to baseline. The neck pain did not radiate to the head or back. Patient denies headache, dizziness, SOB, or chest pain or N/V. He denies any numbness, tingling, weakness, unbalanced, slurred speech.     Of note, patient was admitted to [facility] for 3 weeks and discharged on 12/11/23 due to his perm cath Staph infection and was discharged with antibiotics.    IN ED   T 98.3  HR 64, /84  RR18 O2 100% in RA (16 Dec 2023 16:31)      NEURO HPI:      INTERVAL HISTORY:      MEDICATIONS:  acetaminophen     Tablet .. 650 milliGRAM(s) Oral every 6 hours PRN  aspirin  chewable 81 milliGRAM(s) Oral daily  atorvastatin 80 milliGRAM(s) Oral at bedtime  brimonidine 0.2% Ophthalmic Solution 1 Drop(s) Both EYES two times a day  chlorhexidine 2% Cloths 1 Application(s) Topical daily  clopidogrel Tablet 75 milliGRAM(s) Oral daily  dorzolamide 2% Ophthalmic Solution 1 Drop(s) Both EYES <User Schedule>  heparin   Injectable 5000 Unit(s) SubCutaneous every 8 hours  insulin lispro (ADMELOG) corrective regimen sliding scale   SubCutaneous at bedtime  insulin lispro (ADMELOG) corrective regimen sliding scale   SubCutaneous three times a day before meals  Nephro-marcella 1 Tablet(s) Oral daily  pantoprazole    Tablet 40 milliGRAM(s) Oral before breakfast  timolol 0.5% Solution 1 Drop(s) Both EYES two times a day      ALLERGIES:  No Known Allergies      REVIEW OF SYSTEMS:  Fourteen systems reviewed and negative except as in HPI / Interval History.          OBJECTIVE:  Vital Signs Last 24 Hrs  T(C): 36.3 (20 Dec 2023 10:08), Max: 37 (20 Dec 2023 05:05)  T(F): 97.3 (20 Dec 2023 10:08), Max: 98.6 (20 Dec 2023 05:05)  HR: 66 (20 Dec 2023 10:08) (64 - 82)  BP: 134/89 (20 Dec 2023 10:08) (129/82 - 160/117)  RR: 18 (20 Dec 2023 10:08) (17 - 18)  SpO2: 98% (20 Dec 2023 10:08) (98% - 100%)  Parameters below as of 20 Dec 2023 10:08  Patient On (Oxygen Delivery Method): room air      General Examination:  General: No acute distress  HEENT: Atraumatic, Normocephalic  Respiratory: CTA B/l.  No crackles, rhonchi, or wheezes.  Cardiovascular: RRR.  Normal S1 & S2.  Normal b/l radial and pedal pulses.    Neurological Examination:  General / Mental Status: AAO x 3.  No aphasia or dysarthria.  Naming and repetition intact.  Cranial Nerves: VFF x 4.  PERRL.  EOMI x 2, No nystagmus or diplopia.  B/l V1-V3 equal and intact to light touch and pinprick.  Symmetric facial movement and palate elevation.  B/l hearing equal to finger rub.  5/5 strength with b/l sternocleidomastoid and trapezius.  Midline tongue protrusion, with no atrophy or fasciculations.  Motor: Normal bulk & tone in all four extremities.  5/5 strength throughout all four extremities.  No downward drift, rigidity, spasticity, or tremors in any of the four extremities.  Sensory: Intact to light touch and pinprick in all four extremities.  Negative Romberg.  Reflex: 2+ and symmetric at b/l biceps, triceps, brachioradialis, patellae, and ankles.  Downgoing toes b/l.  Coordination: No dysmetria with b/l finger-to-nose and heel raise tests.  Symmetric rapid alternating movements b/l.  Gait: Normal, narrow-based gait.  No difficulty with tiptoe, heel, and tandem gaits.        LABORATORY VALUES:                          9.5    8.02  )-----------( 151      ( 20 Dec 2023 05:26 )             29.1       12-20    135  |  99  |  31<H>  ----------------------------<  101<H>  4.2   |  28  |  3.74<H>    Ca    9.4      20 Dec 2023 05:26  Phos  3.4     12-20  Mg     1.8     12-20    TPro  7.3  /  Alb  2.6<L>  /  TBili  0.3  /  DBili  x   /  AST  15  /  ALT  <6<L>  /  AlkPhos  132<H>  12-19 12-17 Chol 88 LDL 41 HDL 37<L> Trig 52    Glucose Trend  12-20-23 @ 11:26   -  -- -- 123<H>  12-20-23 @ 07:43   -  -- -- 98  12-20-23 @ 05:26   -  101<H> -- --  12-19-23 @ 22:04   -  -- -- 169<H>  12-19-23 @ 16:21   -  -- -- 93  12-19-23 @ 11:18   -  -- -- 211<H>  12-19-23 @ 07:58   -  -- -- 109<H>  12-19-23 @ 06:07   -  87 -- --  12-18-23 @ 21:43   -  -- -- 122<H>  12-18-23 @ 16:58   -  -- -- 98    Vitamin B12, Serum: 524 pg/mL (12-19-23 @ 14:50)  Folate, Serum: 14.1 ng/mL (12-19-23 @ 14:50)    A1C with Estimated Average Glucose (12.17.23 @ 05:59)   A1C with Estimated Average Glucose Result: 6.4%  Estimated Average Glucose: 137 mg/dL          NEUROIMAGING:          Please contact the Neurology consult service with any neurological questions.      Oscar Low MD   of Neurology  [facility] at [facility]         NEUROLOGY FOLLOW-UP NOTE    NAME:  PEMA VENCES      ASSESSMENT:  78 LHM with chronic left eye blindness and poor right eye vision secondary to bilateral cataracts and glaucoma, with episode of transient vision loss during hemodialysis, concerning for transient ischemic attack; also with neuroimaging evidence of chronic intraparenchymal microhemorrhage      RECOMMENDATIONS:      1. TIA workup  - Q4H Neurochecks & Vital signs  - Telemetry monitoring while inpatient      2. Secondary stroke prevention  - Continue Aspirin 81mg PO Daily  - Okay to discontinue Clopidogrel 75mg PO Daily due to neuroimaging evidence of chronic intraparenchymal microhemorrhage  - Okay to decrease Atorvastatin dosage from 80mg to home dosage of 20mg PO QHS since LDL is at goal of under 70 mg/dL)  - Treat BP if over 140/90 (goal /80) - Maintain home antihypertensive medications, No role for permissive hypertension  - Diabetes management as per primary team  - PT/OT  - DVT ppx: SCDs, Heparin          ******************************    HPI / NEURO HPI:  Patient is a 78M living with son SUN (5x/week for 4h) with PMHx HTN, HLD, DM (not on meds) ESRD on dialysis (Tue, Thu, Sat) and glaucoma and 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 out of the right eye who presented to the hospital after complete loss of vision for 15-20 minutes shortly after dialysis today. Per the patient, patient returned home from dialysis and started having 5/10 pain in the back of his neck and had complete loss of vision for 15 minutes. Patient was able to stand and walk during the episode of vision loss. The vision is slowly returning but it hasn't returned to baseline. The neck pain did not radiate to the head or back. Patient denies headache, dizziness, SOB, or chest pain or N/V. He denies any numbness, tingling, weakness, unbalanced, slurred speech. Of note, patient was admitted to [facility] for 3 weeks and discharged on 12/11/23 due to his perm cath Staph infection and was discharged with antibiotics.  IN ED   T 98.3  HR 64, /84  RR18 O2 100% in RA (16 Dec 2023 16:31)      INTERVAL HISTORY:  Interview conducted in Portuguese. The patient continues to have baseline blindness with his left eye and poor vision with his right eye, but did not experience any episodes of total blindness overnight.      MEDICATIONS:  acetaminophen     Tablet .. 650 milliGRAM(s) Oral every 6 hours PRN  aspirin  chewable 81 milliGRAM(s) Oral daily  atorvastatin 80 milliGRAM(s) Oral at bedtime  brimonidine 0.2% Ophthalmic Solution 1 Drop(s) Both EYES two times a day  chlorhexidine 2% Cloths 1 Application(s) Topical daily  clopidogrel Tablet 75 milliGRAM(s) Oral daily  dorzolamide 2% Ophthalmic Solution 1 Drop(s) Both EYES <User Schedule>  heparin   Injectable 5000 Unit(s) SubCutaneous every 8 hours  insulin lispro (ADMELOG) corrective regimen sliding scale   SubCutaneous at bedtime  insulin lispro (ADMELOG) corrective regimen sliding scale   SubCutaneous three times a day before meals  Nephro-marcella 1 Tablet(s) Oral daily  pantoprazole    Tablet 40 milliGRAM(s) Oral before breakfast  timolol 0.5% Solution 1 Drop(s) Both EYES two times a day      ALLERGIES:  No Known Allergies      REVIEW OF SYSTEMS:  Fourteen systems reviewed and negative except as in HPI / Interval History.          OBJECTIVE:  Vital Signs Last 24 Hrs  T(C): 36.3 (20 Dec 2023 10:08), Max: 37 (20 Dec 2023 05:05)  T(F): 97.3 (20 Dec 2023 10:08), Max: 98.6 (20 Dec 2023 05:05)  HR: 66 (20 Dec 2023 10:08) (64 - 82)  BP: 134/89 (20 Dec 2023 10:08) (129/82 - 160/117)  RR: 18 (20 Dec 2023 10:08) (17 - 18)  SpO2: 98% (20 Dec 2023 10:08) (98% - 100%)  Parameters below as of 20 Dec 2023 10:08  Patient On (Oxygen Delivery Method): room air      General Examination:  General: No acute distress  HEENT: Atraumatic, Normocephalic  Respiratory: CTA B/l.  No crackles, rhonchi, or wheezes.  Cardiovascular: RRR.  Normal S1 & S2.  Normal b/l radial and pedal pulses.    Neurological Examination:  General / Mental Status: AAO x 2 (not to date).  No aphasia or dysarthria.  Cranial Nerves: Absent blink to threat to left eye b/l.  Sluggish blink to threat to right eye b/l  PERRL.  EOMI x 2, No nystagmus or diplopia.  B/l V1-V3 equal and intact to light touch and pinprick.  Symmetric facial movement and palate elevation.  B/l hearing equal to finger rub.  5/5 strength with b/l sternocleidomastoid and trapezius.  Midline tongue protrusion, with no atrophy or fasciculations.  Motor: Normal bulk & tone in all four extremities.  5/5 strength throughout all four extremities.  No downward drift, rigidity, spasticity, or tremors in any of the four extremities.  Sensory: Intact to light touch and pinprick in all four extremities.  Reflex: 1+ and symmetric at b/l biceps, triceps, brachioradialis, patellae, and ankles.  Downgoing toes b/l.  Coordination: No dysmetria with b/l finger-to-nose and heel raise tests.  Gait and Romberg sign testing deferred per patient preference.      NIHSS Score:    LOC - 0  LOC Questions - 0  LOC Commands - 0  Gaze Preference - 0  Visual Fields - 2  Facial Palsy - 0  Motor Arm Left - 0  Motor Arm Right - 0  Motor Leg Left - 0  Motor Leg Right - 0  Limb Ataxia - 0  Sensory - 0  Language - 0  Speech - 0  Extinction - 0    NIHSS Score Total: 2 - No IV TNK due to resolution of acute symptoms    Modified Hampton Scale: 2          LABORATORY VALUES:                          9.5    8.02  )-----------( 151      ( 20 Dec 2023 05:26 )             29.1       12-20    135  |  99  |  31<H>  ----------------------------<  101<H>  4.2   |  28  |  3.74<H>    Ca    9.4      20 Dec 2023 05:26  Phos  3.4     12-20  Mg     1.8     12-20    TPro  7.3  /  Alb  2.6<L>  /  TBili  0.3  /  DBili  x   /  AST  15  /  ALT  <6<L>  /  AlkPhos  132<H>  12-19 12-17 Chol 88 LDL 41 HDL 37<L> Trig 52    Glucose Trend  12-20-23 @ 11:26   -  -- -- 123<H>  12-20-23 @ 07:43   -  -- -- 98  12-20-23 @ 05:26   -  101<H> -- --  12-19-23 @ 22:04   -  -- -- 169<H>  12-19-23 @ 16:21   -  -- -- 93  12-19-23 @ 11:18   -  -- -- 211<H>  12-19-23 @ 07:58   -  -- -- 109<H>  12-19-23 @ 06:07   -  87 -- --  12-18-23 @ 21:43   -  -- -- 122<H>  12-18-23 @ 16:58   -  -- -- 98    Vitamin B12, Serum: 524 pg/mL (12-19-23 @ 14:50)  Folate, Serum: 14.1 ng/mL (12-19-23 @ 14:50)    A1C with Estimated Average Glucose (12.17.23 @ 05:59)   A1C with Estimated Average Glucose Result: 6.4%  Estimated Average Glucose: 137 mg/dL          NEUROIMAGING:      CT Head & CTA Head/Neck & CT Perfusion Head (12/16/23):  - No acute intracranial structural abnormality  - Moderate chronic microvascular changes  - Moderate stenoses at b/l ICAs  - B/l inferior frontal foci of hypoperfusion      Echo (12/18/23):  - LVEF 50-55%  - No cardiac thrombus or intracardiac shunt          Please contact the Neurology consult service with any neurological questions.      Osacr Low MD   of Neurology  [facility] at [facility]         NEUROLOGY FOLLOW-UP NOTE    NAME:  PEMA VENCES      ASSESSMENT:  78 LHM with chronic left eye blindness and poor right eye vision secondary to bilateral cataracts and glaucoma, with episode of transient vision loss during hemodialysis, concerning for transient ischemic attack; also with neuroimaging evidence of chronic intraparenchymal microhemorrhage      RECOMMENDATIONS:      1. TIA workup  - Q4H Neurochecks & Vital signs  - Telemetry monitoring while inpatient      2. Secondary stroke prevention  - Continue Aspirin 81mg PO Daily  - Okay to discontinue Clopidogrel 75mg PO Daily due to neuroimaging evidence of chronic intraparenchymal microhemorrhage  - Okay to decrease Atorvastatin dosage from 80mg to home dosage of 20mg PO QHS since LDL is at goal of under 70 mg/dL)  - Treat BP if over 140/90 (goal /80) - Maintain home antihypertensive medications, No role for permissive hypertension  - Diabetes management as per primary team  - PT/OT  - DVT ppx: SCDs, Heparin          ******************************    HPI / NEURO HPI:  Patient is a 78M living with son SUN (5x/week for 4h) with PMHx HTN, HLD, DM (not on meds) ESRD on dialysis (Tue, Thu, Sat) and glaucoma and 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 out of the right eye who presented to the hospital after complete loss of vision for 15-20 minutes shortly after dialysis today. Per the patient, patient returned home from dialysis and started having 5/10 pain in the back of his neck and had complete loss of vision for 15 minutes. Patient was able to stand and walk during the episode of vision loss. The vision is slowly returning but it hasn't returned to baseline. The neck pain did not radiate to the head or back. Patient denies headache, dizziness, SOB, or chest pain or N/V. He denies any numbness, tingling, weakness, unbalanced, slurred speech. Of note, patient was admitted to [facility] for 3 weeks and discharged on 12/11/23 due to his perm cath Staph infection and was discharged with antibiotics.  IN ED   T 98.3  HR 64, /84  RR18 O2 100% in RA (16 Dec 2023 16:31)      INTERVAL HISTORY:  Interview conducted in Maltese. The patient continues to have baseline blindness with his left eye and poor vision with his right eye, but did not experience any episodes of total blindness overnight.      MEDICATIONS:  acetaminophen     Tablet .. 650 milliGRAM(s) Oral every 6 hours PRN  aspirin  chewable 81 milliGRAM(s) Oral daily  atorvastatin 80 milliGRAM(s) Oral at bedtime  brimonidine 0.2% Ophthalmic Solution 1 Drop(s) Both EYES two times a day  chlorhexidine 2% Cloths 1 Application(s) Topical daily  clopidogrel Tablet 75 milliGRAM(s) Oral daily  dorzolamide 2% Ophthalmic Solution 1 Drop(s) Both EYES <User Schedule>  heparin   Injectable 5000 Unit(s) SubCutaneous every 8 hours  insulin lispro (ADMELOG) corrective regimen sliding scale   SubCutaneous at bedtime  insulin lispro (ADMELOG) corrective regimen sliding scale   SubCutaneous three times a day before meals  Nephro-marcella 1 Tablet(s) Oral daily  pantoprazole    Tablet 40 milliGRAM(s) Oral before breakfast  timolol 0.5% Solution 1 Drop(s) Both EYES two times a day      ALLERGIES:  No Known Allergies      REVIEW OF SYSTEMS:  Fourteen systems reviewed and negative except as in HPI / Interval History.          OBJECTIVE:  Vital Signs Last 24 Hrs  T(C): 36.3 (20 Dec 2023 10:08), Max: 37 (20 Dec 2023 05:05)  T(F): 97.3 (20 Dec 2023 10:08), Max: 98.6 (20 Dec 2023 05:05)  HR: 66 (20 Dec 2023 10:08) (64 - 82)  BP: 134/89 (20 Dec 2023 10:08) (129/82 - 160/117)  RR: 18 (20 Dec 2023 10:08) (17 - 18)  SpO2: 98% (20 Dec 2023 10:08) (98% - 100%)  Parameters below as of 20 Dec 2023 10:08  Patient On (Oxygen Delivery Method): room air      General Examination:  General: No acute distress  HEENT: Atraumatic, Normocephalic  Respiratory: CTA B/l.  No crackles, rhonchi, or wheezes.  Cardiovascular: RRR.  Normal S1 & S2.  Normal b/l radial and pedal pulses.    Neurological Examination:  General / Mental Status: AAO x 2 (not to date).  No aphasia or dysarthria.  Cranial Nerves: Absent blink to threat to left eye b/l.  Sluggish blink to threat to right eye b/l  PERRL.  EOMI x 2, No nystagmus or diplopia.  B/l V1-V3 equal and intact to light touch and pinprick.  Symmetric facial movement and palate elevation.  B/l hearing equal to finger rub.  5/5 strength with b/l sternocleidomastoid and trapezius.  Midline tongue protrusion, with no atrophy or fasciculations.  Motor: Normal bulk & tone in all four extremities.  5/5 strength throughout all four extremities.  No downward drift, rigidity, spasticity, or tremors in any of the four extremities.  Sensory: Intact to light touch and pinprick in all four extremities.  Reflex: 1+ and symmetric at b/l biceps, triceps, brachioradialis, patellae, and ankles.  Downgoing toes b/l.  Coordination: No dysmetria with b/l finger-to-nose and heel raise tests.  Gait and Romberg sign testing deferred per patient preference.      NIHSS Score:    LOC - 0  LOC Questions - 0  LOC Commands - 0  Gaze Preference - 0  Visual Fields - 2  Facial Palsy - 0  Motor Arm Left - 0  Motor Arm Right - 0  Motor Leg Left - 0  Motor Leg Right - 0  Limb Ataxia - 0  Sensory - 0  Language - 0  Speech - 0  Extinction - 0    NIHSS Score Total: 2 - No IV TNK due to resolution of acute symptoms    Modified Forrest Scale: 2          LABORATORY VALUES:                          9.5    8.02  )-----------( 151      ( 20 Dec 2023 05:26 )             29.1       12-20    135  |  99  |  31<H>  ----------------------------<  101<H>  4.2   |  28  |  3.74<H>    Ca    9.4      20 Dec 2023 05:26  Phos  3.4     12-20  Mg     1.8     12-20    TPro  7.3  /  Alb  2.6<L>  /  TBili  0.3  /  DBili  x   /  AST  15  /  ALT  <6<L>  /  AlkPhos  132<H>  12-19 12-17 Chol 88 LDL 41 HDL 37<L> Trig 52    Glucose Trend  12-20-23 @ 11:26   -  -- -- 123<H>  12-20-23 @ 07:43   -  -- -- 98  12-20-23 @ 05:26   -  101<H> -- --  12-19-23 @ 22:04   -  -- -- 169<H>  12-19-23 @ 16:21   -  -- -- 93  12-19-23 @ 11:18   -  -- -- 211<H>  12-19-23 @ 07:58   -  -- -- 109<H>  12-19-23 @ 06:07   -  87 -- --  12-18-23 @ 21:43   -  -- -- 122<H>  12-18-23 @ 16:58   -  -- -- 98    Vitamin B12, Serum: 524 pg/mL (12-19-23 @ 14:50)  Folate, Serum: 14.1 ng/mL (12-19-23 @ 14:50)    A1C with Estimated Average Glucose (12.17.23 @ 05:59)   A1C with Estimated Average Glucose Result: 6.4%  Estimated Average Glucose: 137 mg/dL          NEUROIMAGING:      CT Head & CTA Head/Neck & CT Perfusion Head (12/16/23):  - No acute intracranial structural abnormality  - Moderate chronic microvascular changes  - Moderate stenoses at b/l ICAs  - B/l inferior frontal foci of hypoperfusion      Echo (12/18/23):  - LVEF 50-55%  - No cardiac thrombus or intracardiac shunt          Please contact the Neurology consult service with any neurological questions.      Oscar Low MD   of Neurology  [facility] [employer] [facility] at [facility]

## 2023-12-20 NOTE — DISCHARGE NOTE PROVIDER - CARE PROVIDER_API CALL
Mayank Browning  Internal Medicine  [address] [place] [address]  Phone: [phone]  Fax: [phone]  Established Patient  Follow Up Time: 1 week   Mayank Browning  Internal Medicine  [address] [place] [id number]  Phone: [phone]  Fax: [phone]  Established Patient  Follow Up Time: 1 week

## 2023-12-21 LAB
MRSA PCR RESULT.: SIGNIFICANT CHANGE UP
MRSA PCR RESULT.: SIGNIFICANT CHANGE UP
S AUREUS DNA NOSE QL NAA+PROBE: SIGNIFICANT CHANGE UP
S AUREUS DNA NOSE QL NAA+PROBE: SIGNIFICANT CHANGE UP

## 2023-12-22 NOTE — PROGRESS NOTE ADULT - PROBLEM/PLAN-8
DISPLAY PLAN FREE TEXT
HF education complete.   
DISPLAY PLAN FREE TEXT

## 2023-12-24 LAB
HOMOCYSTEINE LEVEL: 18.5 UMOL/L — HIGH
HOMOCYSTEINE LEVEL: 18.5 UMOL/L — HIGH
METHYLMALONIC ACID LEVEL: 395 NMOL/L — HIGH (ref 87–318)
METHYLMALONIC ACID LEVEL: 395 NMOL/L — HIGH (ref 87–318)

## 2024-01-01 NOTE — PROGRESS NOTE ADULT - SUBJECTIVE AND OBJECTIVE BOX
"This note was copied from the mother's chart.  LC in to follow up with lactation progress. Patient has been pumping every 3 hours with no pain. She states she is \"not getting anything\". Lc reminded her that it will come in about 2 more days.   " Dr. Hansen  Office [phone]  Cell [phone]  Hannah DIAZ  Cell [phone]      Patient is a 73y old  Male who presents with a chief complaint of Bradycardia and hyperkalemia (03 Jun 2019 13:13)      Patient seen and examined at bedside. No chest pain/sob    VITALS:  T(F): 97.4 (06-03-19 @ 15:19), Max: 97.6 (06-03-19 @ 12:00)  HR: 65 (06-03-19 @ 20:00)  BP: 120/71 (06-03-19 @ 20:00)  RR: 22 (06-03-19 @ 20:00)  SpO2: 100% (06-03-19 @ 19:00)  Wt(kg): --    06-02 @ 07:01  -  06-03 @ 07:00  --------------------------------------------------------  IN: 417 mL / OUT: 2225 mL / NET: -1808 mL    06-03 @ 07:01  -  06-03 @ 20:50  --------------------------------------------------------  IN: 960 mL / OUT: 200 mL / NET: 760 mL          PHYSICAL EXAM:  Constitutional: NAD  Neck: No JVD  Respiratory: CTAB, no wheezes, rales or rhonchi  Cardiovascular: S1, S2, RRR  Gastrointestinal: BS+, soft, NT/ND  Extremities: No peripheral edema    Hospital Medications:   MEDICATIONS  (STANDING):  amLODIPine   Tablet 10 milliGRAM(s) Oral daily  atorvastatin 20 milliGRAM(s) Oral at bedtime  chlorhexidine 4% Liquid 1 Application(s) Topical two times a day  folic acid 1 milliGRAM(s) Oral daily  heparin  Injectable 5000 Unit(s) SubCutaneous every 12 hours  insulin lispro (HumaLOG) corrective regimen sliding scale   SubCutaneous three times a day before meals  insulin lispro (HumaLOG) corrective regimen sliding scale   SubCutaneous at bedtime  pantoprazole  Injectable 40 milliGRAM(s) IV Push daily      LABS:  06-03    137  |  106  |  24<H>  ----------------------------<  106<H>  3.8   |  22  |  1.64<H>    Ca    9.1      03 Jun 2019 06:12  Phos  4.6     06-03  Mg     1.8     06-02    TPro  7.6  /  Alb  3.2<L>  /  TBili  0.8  /  DBili      /  AST  32  /  ALT  30  /  AlkPhos  78  06-03    Creatinine Trend: 1.64 <--, 1.96 <--, 1.98 <--, 2.25 <--, 2.44 <--, 2.91 <--, 3.10 <--    Phosphorus Level, Serum: 4.6 mg/dL (06-03 @ 15:55)  Albumin, Serum: 3.2 g/dL (06-03 @ 06:12)                              11.0   3.60  )-----------( 197      ( 03 Jun 2019 06:12 )             32.9     Urine Studies:  Urinalysis - [06-01-19 @ 18:31]      Color Yellow / Appearance Clear / SG 1.005 / pH 7.0      Gluc Negative / Ketone Negative  / Bili Small / Urobili 1       Blood Negative / Protein 15 / Leuk Est Trace / Nitrite Negative      RBC 0-2 / WBC 6-10 / Hyaline  / Gran  / Sq Epi  / Non Sq Epi Occasional / Bacteria Few    Urine Creatinine 240      [06-01-19 @ 19:36]  Urine Protein 23      [06-01-19 @ 19:36]  Urine Sodium 19      [06-01-19 @ 19:36]  Urine Potassium 117      [06-01-19 @ 19:36]  Urine Chloride 48      [06-01-19 @ 19:36]  Urine Osmolality 488      [06-01-19 @ 19:36]    HbA1c 6.2      [06-03-19 @ 10:21]  TSH 2.35      [06-02-19 @ 06:14]  Lipid: chol 116, TG 90, HDL 48, LDL 50      [06-03-19 @ 06:12]    HCV 0.19, Nonreact      [06-02-19 @ 13:34]      RADIOLOGY & ADDITIONAL STUDIES:

## 2024-01-23 NOTE — PHYSICAL THERAPY INITIAL EVALUATION ADULT - REFERRING PHYSICIAN, REHAB EVAL
Before beginning the following exercises, you must first be able to locate your pelvic floor muscles. Here’s how to do it:   Begin lying down with your knees bent and your feet on the floor.  Squeeze and lift the rectal and vaginal areas as if you were trying to stop yourself from urinating. Hold for 5-10 seconds and then release.  You should feel a closing feeling in your genital area when you squeeze. Imagine yourself drawing energy from the base of the pelvic bowl all the way up your body and through the crown of your head.  Repeat for 5 reps.  Use this Kegel sensation throughout the following 7 exercises.    7 Exercises That Strengthen Pelvic Floor  Bridge  3 reps    This pelvic exercise helps to strengthen the pelvic floor, core, and hamstrings.   Start by lying down with your knees bent and your feet on the floor. Place your arms down alongside your body with your palms facing down.  Engage your pelvic floor. Then, inhale to lift your hips up towards the ceiling. Hold for 20 seconds and then exhale to slowly release your hips back down.  Repeat for 3 reps.  Shifting Plank  15 reps    This exercise strengthens your pelvic floor and the other surrounding core muscles.   Start lying on the floor on your belly. Prop yourself up onto your forearms, straighten your legs and tuck your toes under, coming into a forearm plank.  Draw your navel up and in and engage your pelvic floor. Your shoulders should be stacked over your elbows and your hips should be in line with your shoulders.  Staying in your plank shape, inhale to shift your shoulders in front of your elbows, coming high onto the balls of your feet.  Exhale to shift your shoulders back over your elbows, pressing your heels back.  Repeat for 15 reps.  Wall Sit  60 sec    This exercise helps to strengthen the pelvic floor, core, and legs.   Stand facing away from a wall.  Place your back against the wall and then walk your feet out in front of you so that the  wall is supporting you.  Bend your knees until they are at 90-degree angles and engage your pelvic floor. Keep your navel drawing up and in towards your spine so that your lower back is pressing into the wall.  Reach your arms straight out in front of you hold for 60 seconds, then release.  Split Tabletop  15 reps    This exercise strengthens the pelvic floor and core muscles.   Start by lying down with your knees bent and your feet on the floor. Place your arms down alongside your body with your palms facing down.  Engage your pelvic floor and lift your feet off the ground. Parallel your shins to the ground so that your knees are at a 90-degree angle.  Keeping your knees at 90-degree angles, inhale to separate your thighs into a straddle.  Exhale to squeeze your thighs back together and contract your pelvic floor.  Repeat for 15 reps.  Tabletop Pelvic Tilts  15 reps    These pelvic tilts help you to strengthen the pelvic floor.   Begin on your hands and knees in a tabletop position, with your shoulders stacked over your wrists and your hips over your knees.  Inhale to round your lower back, tilting your tailbone down. Tuck your chin, draw your navel up and in, and contract your pelvic floor muscles.  Exhale to come back to center.  Repeat for 15 reps.  Bird Dog  12 reps per side    This exercise strengthens the pelvic floor and the rest of the core muscles, improving balance and coordination.   Start in a tabletop position on your hands and knees. Engage your pelvic floor.  Shift your weight onto your left hand and your right knee. Then, inhale to reach your right arm forward and your left leg back.  Pause for a moment to balance, then exhale to place your right hand and your left knee back down to the starting position.  Shift your weight onto your right hand and your left knee. Then, inhale to reach your left arm forward and your right leg back.  Pause for a moment, then exhale to place your left hand and  right knee back down.  Continue alternating for 12 reps.  Dead Bugs Crunch  15 reps    This exercise strengthens the pelvic floor and abdominal muscles.   Start by lying down with your knees bent and your heels on the floor. Extend your arms back behind your head and engage your pelvic floor.  On an exhale, draw your knees into your chest and crunch your shoulders up off the ground. Reach towards your toes as you contract your pelvic floor.  Inhale to lower your arms and heels back to the starting position.  Repeat for 15 reps.    Lane Nathan

## 2024-04-12 ENCOUNTER — INPATIENT (INPATIENT)
Facility: HOSPITAL | Age: 79
LOS: 13 days | Discharge: HOME | DRG: 864 | End: 2024-04-26
Attending: INTERNAL MEDICINE | Admitting: INTERNAL MEDICINE
Payer: MEDICARE

## 2024-04-12 VITALS
DIASTOLIC BLOOD PRESSURE: 98 MMHG | RESPIRATION RATE: 16 BRPM | SYSTOLIC BLOOD PRESSURE: 205 MMHG | HEIGHT: 65 IN | TEMPERATURE: 100 F | HEART RATE: 73 BPM | WEIGHT: 169.98 LBS

## 2024-04-12 DIAGNOSIS — R50.9 FEVER, UNSPECIFIED: ICD-10-CM

## 2024-04-12 LAB
ALBUMIN SERPL ELPH-MCNC: 3.5 G/DL — SIGNIFICANT CHANGE UP (ref 3.5–5)
ALP SERPL-CCNC: 198 U/L — HIGH (ref 40–120)
ALT FLD-CCNC: 18 U/L DA — SIGNIFICANT CHANGE UP (ref 10–60)
ANION GAP SERPL CALC-SCNC: 9 MMOL/L — SIGNIFICANT CHANGE UP (ref 5–17)
APPEARANCE UR: CLEAR — SIGNIFICANT CHANGE UP
APTT BLD: 27.7 SEC — SIGNIFICANT CHANGE UP (ref 24.5–35.6)
AST SERPL-CCNC: 22 U/L — SIGNIFICANT CHANGE UP (ref 10–40)
BACTERIA # UR AUTO: ABNORMAL /HPF
BASOPHILS # BLD AUTO: 0.03 K/UL — SIGNIFICANT CHANGE UP (ref 0–0.2)
BASOPHILS NFR BLD AUTO: 0.3 % — SIGNIFICANT CHANGE UP (ref 0–2)
BILIRUB SERPL-MCNC: 1.1 MG/DL — SIGNIFICANT CHANGE UP (ref 0.2–1.2)
BILIRUB UR-MCNC: NEGATIVE — SIGNIFICANT CHANGE UP
BUN SERPL-MCNC: 40 MG/DL — HIGH (ref 7–18)
CALCIUM SERPL-MCNC: 9.3 MG/DL — SIGNIFICANT CHANGE UP (ref 8.4–10.5)
CHLORIDE SERPL-SCNC: 101 MMOL/L — SIGNIFICANT CHANGE UP (ref 96–108)
CK MB CFR SERPL CALC: <1 NG/ML — SIGNIFICANT CHANGE UP (ref 0–3.6)
CO2 SERPL-SCNC: 23 MMOL/L — SIGNIFICANT CHANGE UP (ref 22–31)
COLOR SPEC: YELLOW — SIGNIFICANT CHANGE UP
CREAT SERPL-MCNC: 4.43 MG/DL — HIGH (ref 0.5–1.3)
DIFF PNL FLD: NEGATIVE — SIGNIFICANT CHANGE UP
EGFR: 13 ML/MIN/1.73M2 — LOW
EOSINOPHIL # BLD AUTO: 0.65 K/UL — HIGH (ref 0–0.5)
EOSINOPHIL NFR BLD AUTO: 6.2 % — HIGH (ref 0–6)
GLUCOSE SERPL-MCNC: 157 MG/DL — HIGH (ref 70–99)
GLUCOSE UR QL: NEGATIVE MG/DL — SIGNIFICANT CHANGE UP
HCT VFR BLD CALC: 34.8 % — LOW (ref 39–50)
HGB BLD-MCNC: 11.6 G/DL — LOW (ref 13–17)
IMM GRANULOCYTES NFR BLD AUTO: 0.6 % — SIGNIFICANT CHANGE UP (ref 0–0.9)
INR BLD: 1.45 RATIO — HIGH (ref 0.85–1.18)
KETONES UR-MCNC: NEGATIVE MG/DL — SIGNIFICANT CHANGE UP
LACTATE SERPL-SCNC: 1.3 MMOL/L — SIGNIFICANT CHANGE UP (ref 0.7–2)
LEUKOCYTE ESTERASE UR-ACNC: NEGATIVE — SIGNIFICANT CHANGE UP
LIDOCAIN IGE QN: 26 U/L — SIGNIFICANT CHANGE UP (ref 13–75)
LYMPHOCYTES # BLD AUTO: 1.06 K/UL — SIGNIFICANT CHANGE UP (ref 1–3.3)
LYMPHOCYTES # BLD AUTO: 10.1 % — LOW (ref 13–44)
MCHC RBC-ENTMCNC: 33 PG — SIGNIFICANT CHANGE UP (ref 27–34)
MCHC RBC-ENTMCNC: 33.3 GM/DL — SIGNIFICANT CHANGE UP (ref 32–36)
MCV RBC AUTO: 99.1 FL — SIGNIFICANT CHANGE UP (ref 80–100)
MONOCYTES # BLD AUTO: 0.71 K/UL — SIGNIFICANT CHANGE UP (ref 0–0.9)
MONOCYTES NFR BLD AUTO: 6.7 % — SIGNIFICANT CHANGE UP (ref 2–14)
NEUTROPHILS # BLD AUTO: 8.02 K/UL — HIGH (ref 1.8–7.4)
NEUTROPHILS NFR BLD AUTO: 76.1 % — SIGNIFICANT CHANGE UP (ref 43–77)
NITRITE UR-MCNC: NEGATIVE — SIGNIFICANT CHANGE UP
NRBC # BLD: 0 /100 WBCS — SIGNIFICANT CHANGE UP (ref 0–0)
PH UR: 8.5 (ref 5–8)
PLATELET # BLD AUTO: 120 K/UL — LOW (ref 150–400)
POTASSIUM SERPL-MCNC: 4.8 MMOL/L — SIGNIFICANT CHANGE UP (ref 3.5–5.3)
POTASSIUM SERPL-SCNC: 4.8 MMOL/L — SIGNIFICANT CHANGE UP (ref 3.5–5.3)
PROT SERPL-MCNC: 8 G/DL — SIGNIFICANT CHANGE UP (ref 6–8.3)
PROT UR-MCNC: 300 MG/DL
PROTHROM AB SERPL-ACNC: 16.4 SEC — HIGH (ref 9.5–13)
RAPID RVP RESULT: SIGNIFICANT CHANGE UP
RBC # BLD: 3.51 M/UL — LOW (ref 4.2–5.8)
RBC # FLD: 13.2 % — SIGNIFICANT CHANGE UP (ref 10.3–14.5)
RBC CASTS # UR COMP ASSIST: 3 /HPF — SIGNIFICANT CHANGE UP (ref 0–4)
SARS-COV-2 RNA SPEC QL NAA+PROBE: SIGNIFICANT CHANGE UP
SODIUM SERPL-SCNC: 133 MMOL/L — LOW (ref 135–145)
SP GR SPEC: 1.01 — SIGNIFICANT CHANGE UP (ref 1–1.03)
TROPONIN I, HIGH SENSITIVITY RESULT: 125.3 NG/L — HIGH
TROPONIN I, HIGH SENSITIVITY RESULT: 229.9 NG/L — HIGH
UROBILINOGEN FLD QL: 0.2 MG/DL — SIGNIFICANT CHANGE UP (ref 0.2–1)
WBC # BLD: 10.53 K/UL — HIGH (ref 3.8–10.5)
WBC # FLD AUTO: 10.53 K/UL — HIGH (ref 3.8–10.5)
WBC UR QL: 2 /HPF — SIGNIFICANT CHANGE UP (ref 0–5)

## 2024-04-12 PROCEDURE — 99285 EMERGENCY DEPT VISIT HI MDM: CPT

## 2024-04-12 PROCEDURE — 93010 ELECTROCARDIOGRAM REPORT: CPT | Mod: 76

## 2024-04-12 PROCEDURE — 71250 CT THORAX DX C-: CPT | Mod: 26,MC

## 2024-04-12 PROCEDURE — 74176 CT ABD & PELVIS W/O CONTRAST: CPT | Mod: 26,MC

## 2024-04-12 RX ORDER — DEXTROSE 50 % IN WATER 50 %
12.5 SYRINGE (ML) INTRAVENOUS ONCE
Refills: 0 | Status: DISCONTINUED | OUTPATIENT
Start: 2024-04-12 | End: 2024-04-26

## 2024-04-12 RX ORDER — DEXTROSE 50 % IN WATER 50 %
15 SYRINGE (ML) INTRAVENOUS ONCE
Refills: 0 | Status: DISCONTINUED | OUTPATIENT
Start: 2024-04-12 | End: 2024-04-26

## 2024-04-12 RX ORDER — CEFEPIME 1 G/1
500 INJECTION, POWDER, FOR SOLUTION INTRAMUSCULAR; INTRAVENOUS EVERY 24 HOURS
Refills: 0 | Status: DISCONTINUED | OUTPATIENT
Start: 2024-04-12 | End: 2024-04-14

## 2024-04-12 RX ORDER — ASPIRIN/CALCIUM CARB/MAGNESIUM 324 MG
81 TABLET ORAL DAILY
Refills: 0 | Status: DISCONTINUED | OUTPATIENT
Start: 2024-04-13 | End: 2024-04-16

## 2024-04-12 RX ORDER — VANCOMYCIN HCL 1 G
1000 VIAL (EA) INTRAVENOUS ONCE
Refills: 0 | Status: COMPLETED | OUTPATIENT
Start: 2024-04-12 | End: 2024-04-12

## 2024-04-12 RX ORDER — DORZOLAMIDE HYDROCHLORIDE 20 MG/ML
1 SOLUTION/ DROPS OPHTHALMIC
Refills: 0 | DISCHARGE

## 2024-04-12 RX ORDER — GLUCAGON INJECTION, SOLUTION 0.5 MG/.1ML
1 INJECTION, SOLUTION SUBCUTANEOUS ONCE
Refills: 0 | Status: DISCONTINUED | OUTPATIENT
Start: 2024-04-12 | End: 2024-04-26

## 2024-04-12 RX ORDER — SODIUM CHLORIDE 9 MG/ML
1000 INJECTION, SOLUTION INTRAVENOUS
Refills: 0 | Status: DISCONTINUED | OUTPATIENT
Start: 2024-04-12 | End: 2024-04-26

## 2024-04-12 RX ORDER — VANCOMYCIN HCL 1 G
1000 VIAL (EA) INTRAVENOUS
Refills: 0 | Status: DISCONTINUED | OUTPATIENT
Start: 2024-04-12 | End: 2024-04-16

## 2024-04-12 RX ORDER — HEPARIN SODIUM 5000 [USP'U]/ML
5000 INJECTION INTRAVENOUS; SUBCUTANEOUS EVERY 12 HOURS
Refills: 0 | Status: DISCONTINUED | OUTPATIENT
Start: 2024-04-12 | End: 2024-04-16

## 2024-04-12 RX ORDER — ACETAMINOPHEN 500 MG
650 TABLET ORAL EVERY 6 HOURS
Refills: 0 | Status: DISCONTINUED | OUTPATIENT
Start: 2024-04-12 | End: 2024-04-26

## 2024-04-12 RX ORDER — INSULIN LISPRO 100/ML
VIAL (ML) SUBCUTANEOUS
Refills: 0 | Status: DISCONTINUED | OUTPATIENT
Start: 2024-04-12 | End: 2024-04-26

## 2024-04-12 RX ORDER — DEXTROSE 10 % IN WATER 10 %
125 INTRAVENOUS SOLUTION INTRAVENOUS ONCE
Refills: 0 | Status: DISCONTINUED | OUTPATIENT
Start: 2024-04-12 | End: 2024-04-26

## 2024-04-12 RX ORDER — DEXTROSE 50 % IN WATER 50 %
25 SYRINGE (ML) INTRAVENOUS ONCE
Refills: 0 | Status: DISCONTINUED | OUTPATIENT
Start: 2024-04-12 | End: 2024-04-26

## 2024-04-12 RX ORDER — BRIMONIDINE TARTRATE, TIMOLOL MALEATE 2; 5 MG/ML; MG/ML
1 SOLUTION/ DROPS OPHTHALMIC
Qty: 0 | Refills: 0 | DISCHARGE

## 2024-04-12 RX ORDER — HYDRALAZINE HCL 50 MG
50 TABLET ORAL ONCE
Refills: 0 | Status: COMPLETED | OUTPATIENT
Start: 2024-04-12 | End: 2024-04-12

## 2024-04-12 RX ORDER — CEFEPIME 1 G/1
1000 INJECTION, POWDER, FOR SOLUTION INTRAMUSCULAR; INTRAVENOUS ONCE
Refills: 0 | Status: COMPLETED | OUTPATIENT
Start: 2024-04-12 | End: 2024-04-12

## 2024-04-12 RX ORDER — LOSARTAN POTASSIUM 100 MG/1
50 TABLET, FILM COATED ORAL DAILY
Refills: 0 | Status: DISCONTINUED | OUTPATIENT
Start: 2024-04-13 | End: 2024-04-19

## 2024-04-12 RX ORDER — DORZOLAMIDE HYDROCHLORIDE 20 MG/ML
1 SOLUTION/ DROPS OPHTHALMIC THREE TIMES A DAY
Refills: 0 | Status: DISCONTINUED | OUTPATIENT
Start: 2024-04-12 | End: 2024-04-26

## 2024-04-12 RX ORDER — ATORVASTATIN CALCIUM 80 MG/1
20 TABLET, FILM COATED ORAL AT BEDTIME
Refills: 0 | Status: DISCONTINUED | OUTPATIENT
Start: 2024-04-12 | End: 2024-04-26

## 2024-04-12 RX ORDER — ACETAMINOPHEN 500 MG
1000 TABLET ORAL ONCE
Refills: 0 | Status: COMPLETED | OUTPATIENT
Start: 2024-04-12 | End: 2024-04-12

## 2024-04-12 RX ADMIN — CEFEPIME 1000 MILLIGRAM(S): 1 INJECTION, POWDER, FOR SOLUTION INTRAMUSCULAR; INTRAVENOUS at 19:30

## 2024-04-12 RX ADMIN — Medication 400 MILLIGRAM(S): at 18:56

## 2024-04-12 RX ADMIN — CEFEPIME 100 MILLIGRAM(S): 1 INJECTION, POWDER, FOR SOLUTION INTRAMUSCULAR; INTRAVENOUS at 18:08

## 2024-04-12 RX ADMIN — Medication 50 MILLIGRAM(S): at 19:45

## 2024-04-12 RX ADMIN — Medication 1000 MILLIGRAM(S): at 23:34

## 2024-04-12 RX ADMIN — Medication 250 MILLIGRAM(S): at 18:34

## 2024-04-12 RX ADMIN — Medication 1000 MILLIGRAM(S): at 19:30

## 2024-04-12 NOTE — ED PROVIDER NOTE - QRS
Patient is a 75y old  Female who presents with a chief complaint of shortness of breath (01 Oct 2018 08:45)      INTERVAL HPI/OVERNIGHT EVENTS:  T(C): 36.6 (10-01-18 @ 04:24), Max: 36.7 (09-30-18 @ 11:15)  HR: 52 (10-01-18 @ 04:24) (52 - 81)  BP: 125/73 (10-01-18 @ 04:24) (95/58 - 125/73)  RR: 18 (10-01-18 @ 04:24) (18 - 18)  SpO2: 94% (10-01-18 @ 04:24) (94% - 97%)  Wt(kg): --  I&O's Summary    30 Sep 2018 07:01  -  01 Oct 2018 07:00  --------------------------------------------------------  IN: 530 mL / OUT: 0 mL / NET: 530 mL        LABS:                        12.6   19.48 )-----------( 175      ( 01 Oct 2018 07:44 )             39.3     10-01    141  |  104  |  20  ----------------------------<  93  4.7   |  28  |  1.04    Ca    8.8      01 Oct 2018 05:55  Phos  3.3     09-30  Mg     2.2     09-30      PT/INR - ( 30 Sep 2018 06:50 )   PT: 36.2 sec;   INR: 3.13 ratio         PTT - ( 30 Sep 2018 06:50 )  PTT:32.2 sec    CAPILLARY BLOOD GLUCOSE              REVIEW OF SYSTEMS:  CONSTITUTIONAL: No fever, weight loss, or fatigue  EYES: No eye pain, visual disturbances, or discharge  ENMT:  No difficulty hearing, tinnitus, vertigo; No sinus or throat pain  NECK: No pain or stiffness  BREASTS: No pain, masses, or nipple discharge  RESPIRATORY: No cough, wheezing, chills or hemoptysis; No shortness of breath  CARDIOVASCULAR: No chest pain, palpitations, dizziness, or leg swelling  GASTROINTESTINAL: No abdominal or epigastric pain. No nausea, vomiting, or hematemesis; No diarrhea or constipation. No melena or hematochezia.  GENITOURINARY: No dysuria, frequency, hematuria, or incontinence  NEUROLOGICAL: No headaches, memory loss, loss of strength, numbness, or tremors  SKIN: No itching, burning, rashes, or lesions   LYMPH NODES: No enlarged glands  ENDOCRINE: No heat or cold intolerance; No hair loss  MUSCULOSKELETAL: No joint pain or swelling; No muscle, back, or extremity pain  PSYCHIATRIC: No depression, anxiety, mood swings, or difficulty sleeping  HEME/LYMPH: No easy bruising, or bleeding gums  ALLERY AND IMMUNOLOGIC: No hives or eczema    RADIOLOGY & ADDITIONAL TESTS:    Imaging Personally Reviewed:  [ ] YES  [ ] NO    Consultant(s) Notes Reviewed:  [ ] YES  [ ] NO    PHYSICAL EXAM:  GENERAL: NAD, well-groomed, well-developed  HEAD:  Atraumatic, Normocephalic  EYES: EOMI, PERRLA, conjunctiva and sclera clear  ENMT: No tonsillar erythema, exudates, or enlargement; Moist mucous membranes, Good dentition, No lesions  NECK: Supple, No JVD, Normal thyroid  NERVOUS SYSTEM:  Alert & Oriented X3, Good concentration; Motor Strength 5/5 B/L upper and lower extremities; DTRs 2+ intact and symmetric  CHEST/LUNG: Clear to auscultation bilaterally; No rales, rhonchi, wheezing, or rubs  HEART: Regular rate and rhythm; No murmurs, rubs, or gallops  ABDOMEN: Soft, Nontender, Nondistended; Bowel sounds present  EXTREMITIES:  2+ Peripheral Pulses, No clubbing, cyanosis, or edema  LYMPH: No lymphadenopathy noted  SKIN: No rashes or lesions    Care Discussed with Consultants/Other Providers [ ] YES  [ ] NO Patient is a 75y old  Female who presents with a chief complaint of shortness of breath (01 Oct 2018 08:45)      INTERVAL HPI/OVERNIGHT EVENTS:    Pt stable still w/ cough but no other complaints    T(C): 36.6 (10-01-18 @ 04:24), Max: 36.7 (09-30-18 @ 11:15)  HR: 52 (10-01-18 @ 04:24) (52 - 81)  BP: 125/73 (10-01-18 @ 04:24) (95/58 - 125/73)  RR: 18 (10-01-18 @ 04:24) (18 - 18)  SpO2: 94% (10-01-18 @ 04:24) (94% - 97%)  Wt(kg): --  I&O's Summary    30 Sep 2018 07:01  -  01 Oct 2018 07:00  --------------------------------------------------------  IN: 530 mL / OUT: 0 mL / NET: 530 mL        LABS:                        12.6   19.48 )-----------( 175      ( 01 Oct 2018 07:44 )             39.3     10-01    141  |  104  |  20  ----------------------------<  93  4.7   |  28  |  1.04    Ca    8.8      01 Oct 2018 05:55  Phos  3.3     09-30  Mg     2.2     09-30      PT/INR - ( 30 Sep 2018 06:50 )   PT: 36.2 sec;   INR: 3.13 ratio         PTT - ( 30 Sep 2018 06:50 )  PTT:32.2 sec    REVIEW OF SYSTEMS:  CONSTITUTIONAL: No fever, weight loss, or fatigue  EYES: No eye pain, visual disturbances, or discharge  ENMT:  No difficulty hearing, tinnitus, vertigo; No sinus or throat pain  NECK: No pain or stiffness  BREASTS: No pain, masses, or nipple discharge  RESPIRATORY: occassional cough, no wheezing, chills or hemoptysis; No shortness of breath  CARDIOVASCULAR: No chest pain, palpitations, dizziness, or leg swelling  GASTROINTESTINAL: No abdominal or epigastric pain. No nausea, vomiting, or hematemesis; No diarrhea or constipation. No melena or hematochezia.  GENITOURINARY: No dysuria, frequency, hematuria, or incontinence  NEUROLOGICAL: No headaches, memory loss, loss of strength, numbness, or tremors  SKIN: No itching, burning, rashes, or lesions   LYMPH NODES: No enlarged glands  ENDOCRINE: No heat or cold intolerance; No hair loss  MUSCULOSKELETAL: No joint pain or swelling; No muscle, back, or extremity pain  PSYCHIATRIC: No depression, anxiety, mood swings, or difficulty sleeping  HEME/LYMPH: No easy bruising, or bleeding gums  ALLERY AND IMMUNOLOGIC: No hives or eczema        Consultant(s) Notes Reviewed:  [x ] YES  [ ] NO    PHYSICAL EXAM:  GENERAL: NAD, well-groomed, well-developed  HEAD:  Atraumatic, Normocephalic  EYES:  conjunctiva and sclera clear  ENMT: No tonsillar erythema, exudates, or enlargement; Moist mucous membranes, Good dentition, No lesions  NECK: Supple, No JVD  NERVOUS SYSTEM:  Alert & Oriented X3, Good concentration; Motor Strength 5/5 B/L upper and lower extremitie  CHEST/LUNG: Clear to auscultation bilaterally; No rales, rhonchi, wheezing, or rubs  HEART: Regular rate and rhythm; No murmurs, rubs, or gallops  ABDOMEN: Soft, Nontender, Nondistended; Bowel sounds present,neg HSM  EXTREMITIES: No clubbing, cyanosis, or edema  LYMPH: No lymphadenopathy noted  SKIN: No rashes or lesions 114

## 2024-04-12 NOTE — H&P ADULT - ASSESSMENT
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 Admitted to telemetry for further evaluation.     ***MORNING TEAM TO CONFIRM MEDREC***

## 2024-04-12 NOTE — ED PROVIDER NOTE - PHYSICAL EXAMINATION
CONSTITUTIONAL: non-toxic, well appearing  SKIN: no rash, no petechiae.  EYES: PERRL, EOMI, pink conjunctiva, anicteric  ENT: tongue and uvular midline, no exudates, moist mucous membranes  NECK: Supple; no meningismus, no JVD  CARD: RRR, no murmurs, equal radial pulses bilaterally 2+. Right permacath site C/D/I.   RESP: CTAB, no respiratory distress  ABD: Soft, non-tender, non-distended, no peritoneal signs, no CVA tenderness  EXT: Normal ROM x4. No edema.   NEURO: Alert, oriented to person and place only. Neuro exam nonfocal.  PSYCH: Cooperative, appropriate.

## 2024-04-12 NOTE — H&P ADULT - PROBLEM SELECTOR PLAN 1
s/p 1L Bolus NS  UA negative   RVP negative   F/U Serum Lactate  F/U Blood, Urine, and Sputum cultures  CT chest noted. No upper or lower respiratory symptoms  F/U Procal  Tylenol PRN Hx as above. Non specific symptoms. Hx of permacath infection in Dec 2023 and changed. High concerns for permacath as source of Sepsis   Temp 100.4F borderline for criteria, and tachypnea  WBC count 10.53  UA negative   RVP negative   Normal serum Lactate  F/U Blood and Urine cultures  F/U Procal  CT chest noted. No upper or lower respiratory symptoms  CTH noted  F/U Procal  s/p Zosyn in the ED  Will continue Zosyn and add Vancomycin for now until cultures are back  ***MORNING TEAM TO CONSULT VASCULAR AND/OR IR TO CHANGE PERMACATH***    Tylenol PRN Hx as above. Non specific symptoms. Hx of permacath infection in Dec 2023 and changed. High concerns for permacath as source of Sepsis   Temp 100.4F borderline for criteria, and tachypnea  WBC count 10.53  UA negative   RVP negative   Normal serum Lactate  F/U Blood and Urine cultures  F/U Procal  CT chest noted. No upper or lower respiratory symptoms  CTH noted  F/U Procal  s/p Zosyn in the ED  Will continue Zosyn and add Vancomycin for now until cultures are back  ***MORNING TEAM TO CONSULT VASCULAR AND/OR IR TO CHANGE PERMACATH***  ***Please draw cultures from permacath***  Tylenol PRN

## 2024-04-12 NOTE — H&P ADULT - PROBLEM SELECTOR PLAN 3
HD (T/T/S )  Morning team to consult Nephro Dr Art  Avoid Nephrotoxic Meds/ Agents such as (NSAIDs, IV contrast, Aminoglycosides such as gentamicin, Gadolinium contrast, Phosphate containing enemas, etc..)  Adjust Medications according to eGFR  Renal diet

## 2024-04-12 NOTE — ED ADULT NURSE NOTE - ED STAT RN HANDOFF DETAILS
Patient endorsed on to INDIO Arevalo. Patient resting in bed, respirations even and unlabored. No acute distress noted.

## 2024-04-12 NOTE — ED ADULT NURSE NOTE - OBJECTIVE STATEMENT
As per son, patient c/o abdominal pain/loss of appetite since yesterday. Patient denies nausea/vomiting. Patient is a dialysis patient Tuesday/Thursday/Saturday. Port at right upper chest. As per son, patient c/o abdominal pain/loss of appetite since yesterday. Patient denies nausea/vomiting. Patient is a dialysis patient Tuesday/Thursday/Saturday. Port at right upper chest. Patient oriented to self.

## 2024-04-12 NOTE — H&P ADULT - PROBLEM SELECTOR PLAN 4
HTN urgency   Continue Losartan with parameters  Will start Labetalol   DASH/TLC diet  Adjust medications as needed to meet target.  ***MORNING TEAM TO CONFIRM MEDREC*** CT Abdomen and Pelvis No Cont  showed No acute septic pathology. Scattered bilateral small pulmonary nodules measuring up to 7 mm in the right upper lobe are nonspecific and may represent infectious/inflammatory disease or metastatic disease. 3.1 x 2.1 cm cystic structure in the posterior mediastinum at the level of the leelee may represent a lymphatic structure, bronchogenic cyst, orforegut duplication cyst. No intra-abdominal collection.  ***MORNING TEAM TO CONSULT PULM***

## 2024-04-12 NOTE — H&P ADULT - ATTENDING COMMENTS
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 Patient AAOx3 at bedside and mentioned having intermittent, mild, suprapubic discomfort associated w/ dysuria that has been going on for the last 48 hours. On further questioning, patient experiencing chills, malaise, and anorexia about the same time. Denies any chest pain, dyspnea, palpitations, nausea, vomiting, headache, neck pain, or weakness. Denies diarrhea or constipation. He does not have any other complaints. Patient was admitted to [facility] on 11/24/2023 and discharge on 12/08/23 where he was treated for sepsis. His perm cath was exchanged on Friday 11/25. Found to have MSSA bacteremia and transitioned from Vancomycin to Cefazolin. 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 Underwent right Permcath placement by IR on 12/4. LHC performed on 12/7 with non obstructive CAD, non ischemic cardiomyopathy w/o complications.       < from: CT Chest No Cont (04.12.24 @ 18:53) >    IMPRESSION:  *  No acute septic pathology.  *  Scattered bilateral small pulmonary nodules measuring up to 7 mm in   the right upper lobe are nonspecific and may represent   infectious/inflammatory disease or metastatic disease.  *  3.1 x 2.1 cm cystic structure in the posterior mediastinum at the   level of the leelee may represent a lymphatic structure, bronchogenic   cyst, or foregut duplication cyst.  *  No intra-abdominal collection.    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

## 2024-04-12 NOTE — ED PROVIDER NOTE - OBJECTIVE STATEMENT
History limited from patient, history obtained from chart review and son at bedside.    78-year-old male with past medical history ESRD on dialysis (Tuesday, Thursday, Saturday, last full session yesterday), hypertension, diabetes, glaucoma presents with abdominal pain since yesterday.  Patient with son, states patient complaining of upper abdominal pain associated with decreased appetite, lethargy, and confusion.  Denies any fevers, vomiting, diarrhea, bloody stools, pain with urination, cough, chest pain, shortness of breath, numbness, focal weakness, rash.  Son states patient still makes urine at baseline.  Denies any recent injury or trauma.  Denies additional complaints.

## 2024-04-12 NOTE — ED PROVIDER NOTE - PROGRESS NOTE DETAILS
Tariq: discussed with hospitalist and MAR re: admission. Ghanshyam-: called to bedside by RN, pt with episode of ~10 beats of nonsustained VTach on tele. Repeat EKG ordered. MAR made aware.

## 2024-04-12 NOTE — H&P ADULT - PROBLEM SELECTOR PLAN 7
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

## 2024-04-12 NOTE — H&P ADULT - NSHPPHYSICALEXAM_GEN_ALL_CORE
PHYSICAL EXAMINATION:  GENERAL: NAD, appears fatigued   HEAD:  Atraumatic, Normocephalic  EYES:  Conjunctiva and sclera clear, pupils are equal, round, and reactive to light and accommodation.  NECK: Supple, No JVD, trachea is midline, no evidence of thyroid enlargement, no lymphadenopathy or tenderness.  CHEST/LUNG: Clear to auscultation; No rales, rhonchi, wheezing, or rubs, right permacath noted w/o discharge, swelling, or tenderness to palpation  HEART: Regular rate and rhythm; No murmurs, rubs, or gallops  ABDOMEN: Soft, minimal suprapubic tenderness to deep palpation, Nondistended; Bowel sounds present  NERVOUS SYSTEM:  Alert & Oriented X3; No obvious acute focal sensory or motor deficits are noted; Recent and remote memory is fair for his age, Appropriate mood and affect.  EXTREMITIES:  2+ Peripheral Pulses, No clubbing, cyanosis, trace pitting edema on lower extremity bilaterally   SKIN: Warm, dry, and well perfused; Good turgor; No lesions, nodules or rashes are noted.     Vital Signs Last 24 Hrs  T(C): 36.9 (13 Apr 2024 00:09), Max: 38 (12 Apr 2024 16:07)  T(F): 98.4 (13 Apr 2024 00:09), Max: 100.4 (12 Apr 2024 16:07)  HR: 63 (13 Apr 2024 00:09) (63 - 74)  BP: 186/90 (13 Apr 2024 00:09) (131/82 - 205/98)  BP(mean): --  RR: 21 (13 Apr 2024 00:09) (16 - 21)  SpO2: 100% (13 Apr 2024 00:09) (96% - 100%)    Parameters below as of 13 Apr 2024 00:09  Patient On (Oxygen Delivery Method): room air

## 2024-04-12 NOTE — H&P ADULT - PROBLEM SELECTOR PLAN 2
DDx to include, but not limited to Cardiovascular such as Increased demand (stable coronary artery disease lesion), Infectious such as Sepsis/SIRS and/or Viral illness, CKD, or others  EKG noted  Likely ischemic demand in the setting of sepsis   Trend troponin  F/U Repeat troponin and EKG  Morning team to consult Cardio Dr Jorge   Remote tele  Vitals q4hr  F/U Cardio recommendations

## 2024-04-12 NOTE — H&P ADULT - HISTORY OF PRESENT ILLNESS
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 Patient AAOx3 at bedside and mentioned having intermittent, mild, suprapubic discomfort associated w/ dysuria that has been going on for the last 48 hours. On further questioning, patient experiencing chills, malaise, and anorexia about the same time. Denies any chest pain, dyspnea, palpitations, nausea, vomiting, headache, neck pain, or weakness. Denies diarrhea or constipation. Patient with Hx of permacath infection and change of catheter in Dec 2023. He does not have any other complaints.  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 Patient AAOx3 at bedside and mentioned having intermittent, mild, suprapubic discomfort associated w/ dysuria that has been going on for the last 48 hours. On further questioning, patient experiencing chills, malaise, and anorexia about the same time. Denies any chest pain, dyspnea, palpitations, nausea, vomiting, headache, neck pain, or weakness. Denies diarrhea or constipation. He does not have any other complaints. Patient was admitted to [facility] on 11/24/2023 and discharge on 12/08/23 where he was treated for sepsis. His perm cath was exchanged on Friday 11/25. Found to have MSSA bacteremia and transitioned from Vancomycin to Cefazolin. 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 Underwent right Permcath placement by IR on 12/4. LHC performed on 12/7 with non obstructive CAD, non ischemic cardiomyopathy w/o complications.

## 2024-04-12 NOTE — H&P ADULT - PROBLEM SELECTOR PLAN 5
***MORNING TEAM TO CONFIRM MEDREC***  Will start ISS  Finger stick before meal and bedtime   F/U HA1c  Diabetic diet. HTN urgency   Continue Losartan with parameters  Will start Labetalol   DASH/TLC diet  Adjust medications as needed to meet target.  ***MORNING TEAM TO CONFIRM MEDREC***

## 2024-04-12 NOTE — ED PROVIDER NOTE - CLINICAL SUMMARY MEDICAL DECISION MAKING FREE TEXT BOX
Tariq: 78-year-old male with past medical history ESRD on dialysis (Tuesday, Thursday, Saturday, last full session yesterday), hypertension, diabetes, glaucoma presents with abdominal pain since yesterday.  Patient with son, states patient complaining of upper abdominal pain associated with decreased appetite, lethargy, and confusion.  Denies any fevers, vomiting, diarrhea, bloody stools, pain with urination, cough, chest pain, shortness of breath, numbness, focal weakness, rash.  Son states patient still makes urine at baseline.  Denies any recent injury or trauma.  Physical exam per above. Patient A&Ox2, febrile, no meningeal signs. Likely infectious, unclear source. Plan includes labs, imaging, provide supportive treatment, admit.

## 2024-04-12 NOTE — H&P ADULT - PROBLEM SELECTOR PLAN 6
Heparin SQ ***MORNING TEAM TO CONFIRM MEDREC***  Will start ISS  Finger stick before meal and bedtime   F/U HA1c  Diabetic diet.

## 2024-04-13 DIAGNOSIS — A41.9 SEPSIS, UNSPECIFIED ORGANISM: ICD-10-CM

## 2024-04-13 DIAGNOSIS — N18.6 END STAGE RENAL DISEASE: ICD-10-CM

## 2024-04-13 DIAGNOSIS — Z29.9 ENCOUNTER FOR PROPHYLACTIC MEASURES, UNSPECIFIED: ICD-10-CM

## 2024-04-13 DIAGNOSIS — R79.89 OTHER SPECIFIED ABNORMAL FINDINGS OF BLOOD CHEMISTRY: ICD-10-CM

## 2024-04-13 DIAGNOSIS — R50.9 FEVER, UNSPECIFIED: ICD-10-CM

## 2024-04-13 DIAGNOSIS — I10 ESSENTIAL (PRIMARY) HYPERTENSION: ICD-10-CM

## 2024-04-13 DIAGNOSIS — E11.9 TYPE 2 DIABETES MELLITUS WITHOUT COMPLICATIONS: ICD-10-CM

## 2024-04-13 DIAGNOSIS — R93.89 ABNORMAL FINDINGS ON DIAGNOSTIC IMAGING OF OTHER SPECIFIED BODY STRUCTURES: ICD-10-CM

## 2024-04-13 LAB
A1C WITH ESTIMATED AVERAGE GLUCOSE RESULT: 6.4 % — HIGH (ref 4–5.6)
ALBUMIN SERPL ELPH-MCNC: 2.9 G/DL — LOW (ref 3.5–5)
ALP SERPL-CCNC: 183 U/L — HIGH (ref 40–120)
ALT FLD-CCNC: 15 U/L DA — SIGNIFICANT CHANGE UP (ref 10–60)
ANION GAP SERPL CALC-SCNC: 8 MMOL/L — SIGNIFICANT CHANGE UP (ref 5–17)
APTT BLD: 26.7 SEC — SIGNIFICANT CHANGE UP (ref 24.5–35.6)
AST SERPL-CCNC: 18 U/L — SIGNIFICANT CHANGE UP (ref 10–40)
BASOPHILS # BLD AUTO: 0.02 K/UL — SIGNIFICANT CHANGE UP (ref 0–0.2)
BASOPHILS NFR BLD AUTO: 0.2 % — SIGNIFICANT CHANGE UP (ref 0–2)
BILIRUB SERPL-MCNC: 1.1 MG/DL — SIGNIFICANT CHANGE UP (ref 0.2–1.2)
BUN SERPL-MCNC: 42 MG/DL — HIGH (ref 7–18)
CALCIUM SERPL-MCNC: 9.2 MG/DL — SIGNIFICANT CHANGE UP (ref 8.4–10.5)
CHLORIDE SERPL-SCNC: 104 MMOL/L — SIGNIFICANT CHANGE UP (ref 96–108)
CK MB BLD-MCNC: 2.2 % — SIGNIFICANT CHANGE UP (ref 0–3.5)
CK MB CFR SERPL CALC: 1.3 NG/ML — SIGNIFICANT CHANGE UP (ref 0–3.6)
CK SERPL-CCNC: 59 U/L — SIGNIFICANT CHANGE UP (ref 35–232)
CO2 SERPL-SCNC: 23 MMOL/L — SIGNIFICANT CHANGE UP (ref 22–31)
CREAT SERPL-MCNC: 4.82 MG/DL — HIGH (ref 0.5–1.3)
EGFR: 12 ML/MIN/1.73M2 — LOW
EOSINOPHIL # BLD AUTO: 0.19 K/UL — SIGNIFICANT CHANGE UP (ref 0–0.5)
EOSINOPHIL NFR BLD AUTO: 2 % — SIGNIFICANT CHANGE UP (ref 0–6)
ESTIMATED AVERAGE GLUCOSE: 137 MG/DL — HIGH (ref 68–114)
GLUCOSE BLDC GLUCOMTR-MCNC: 121 MG/DL — HIGH (ref 70–99)
GLUCOSE BLDC GLUCOMTR-MCNC: 136 MG/DL — HIGH (ref 70–99)
GLUCOSE BLDC GLUCOMTR-MCNC: 186 MG/DL — HIGH (ref 70–99)
GLUCOSE BLDC GLUCOMTR-MCNC: 98 MG/DL — SIGNIFICANT CHANGE UP (ref 70–99)
GLUCOSE SERPL-MCNC: 167 MG/DL — HIGH (ref 70–99)
HCT VFR BLD CALC: 34.3 % — LOW (ref 39–50)
HGB BLD-MCNC: 11.3 G/DL — LOW (ref 13–17)
IMM GRANULOCYTES NFR BLD AUTO: 0.2 % — SIGNIFICANT CHANGE UP (ref 0–0.9)
INR BLD: 1.45 RATIO — HIGH (ref 0.85–1.18)
IRON SATN MFR SERPL: 16 % — LOW (ref 20–55)
IRON SATN MFR SERPL: 25 UG/DL — LOW (ref 65–170)
LYMPHOCYTES # BLD AUTO: 1.12 K/UL — SIGNIFICANT CHANGE UP (ref 1–3.3)
LYMPHOCYTES # BLD AUTO: 11.8 % — LOW (ref 13–44)
MCHC RBC-ENTMCNC: 32.5 PG — SIGNIFICANT CHANGE UP (ref 27–34)
MCHC RBC-ENTMCNC: 32.9 GM/DL — SIGNIFICANT CHANGE UP (ref 32–36)
MCV RBC AUTO: 98.6 FL — SIGNIFICANT CHANGE UP (ref 80–100)
MONOCYTES # BLD AUTO: 0.86 K/UL — SIGNIFICANT CHANGE UP (ref 0–0.9)
MONOCYTES NFR BLD AUTO: 9.1 % — SIGNIFICANT CHANGE UP (ref 2–14)
NEUTROPHILS # BLD AUTO: 7.29 K/UL — SIGNIFICANT CHANGE UP (ref 1.8–7.4)
NEUTROPHILS NFR BLD AUTO: 76.7 % — SIGNIFICANT CHANGE UP (ref 43–77)
NRBC # BLD: 0 /100 WBCS — SIGNIFICANT CHANGE UP (ref 0–0)
PHOSPHATE SERPL-MCNC: 4 MG/DL — SIGNIFICANT CHANGE UP (ref 2.5–4.5)
PLATELET # BLD AUTO: 108 K/UL — LOW (ref 150–400)
POTASSIUM SERPL-MCNC: 5.5 MMOL/L — HIGH (ref 3.5–5.3)
POTASSIUM SERPL-SCNC: 5.5 MMOL/L — HIGH (ref 3.5–5.3)
PROCALCITONIN SERPL-MCNC: 0.35 NG/ML — HIGH (ref 0.02–0.1)
PROCALCITONIN SERPL-MCNC: 0.6 NG/ML — HIGH (ref 0.02–0.1)
PROT SERPL-MCNC: 7.3 G/DL — SIGNIFICANT CHANGE UP (ref 6–8.3)
PROTHROM AB SERPL-ACNC: 16.3 SEC — HIGH (ref 9.5–13)
RBC # BLD: 3.48 M/UL — LOW (ref 4.2–5.8)
RBC # FLD: 13.6 % — SIGNIFICANT CHANGE UP (ref 10.3–14.5)
SODIUM SERPL-SCNC: 135 MMOL/L — SIGNIFICANT CHANGE UP (ref 135–145)
TIBC SERPL-MCNC: 153 UG/DL — LOW (ref 250–450)
TROPONIN I, HIGH SENSITIVITY RESULT: 224.2 NG/L — HIGH
UIBC SERPL-MCNC: 128 UG/DL — SIGNIFICANT CHANGE UP (ref 110–370)
WBC # BLD: 9.5 K/UL — SIGNIFICANT CHANGE UP (ref 3.8–10.5)
WBC # FLD AUTO: 9.5 K/UL — SIGNIFICANT CHANGE UP (ref 3.8–10.5)

## 2024-04-13 RX ORDER — PREDNISOLONE SODIUM PHOSPHATE 1 %
1 DROPS OPHTHALMIC (EYE) EVERY 4 HOURS
Refills: 0 | Status: DISCONTINUED | OUTPATIENT
Start: 2024-04-13 | End: 2024-04-17

## 2024-04-13 RX ORDER — KETOROLAC TROMETHAMINE 0.5 %
1 DROPS OPHTHALMIC (EYE)
Refills: 0 | Status: DISCONTINUED | OUTPATIENT
Start: 2024-04-13 | End: 2024-04-26

## 2024-04-13 RX ORDER — SODIUM ZIRCONIUM CYCLOSILICATE 10 G/10G
10 POWDER, FOR SUSPENSION ORAL ONCE
Refills: 0 | Status: COMPLETED | OUTPATIENT
Start: 2024-04-13 | End: 2024-04-13

## 2024-04-13 RX ORDER — POLYETHYLENE GLYCOL 3350 17 G/17G
17 POWDER, FOR SOLUTION ORAL DAILY
Refills: 0 | Status: DISCONTINUED | OUTPATIENT
Start: 2024-04-14 | End: 2024-04-26

## 2024-04-13 RX ORDER — CIPROFLOXACIN HCL 0.3 %
1 DROPS OPHTHALMIC (EYE)
Refills: 0 | Status: DISCONTINUED | OUTPATIENT
Start: 2024-04-13 | End: 2024-04-26

## 2024-04-13 RX ORDER — BRIMONIDINE TARTRATE 2 MG/MG
1 SOLUTION/ DROPS OPHTHALMIC
Refills: 0 | Status: DISCONTINUED | OUTPATIENT
Start: 2024-04-13 | End: 2024-04-26

## 2024-04-13 RX ORDER — SENNA PLUS 8.6 MG/1
2 TABLET ORAL AT BEDTIME
Refills: 0 | Status: DISCONTINUED | OUTPATIENT
Start: 2024-04-13 | End: 2024-04-26

## 2024-04-13 RX ORDER — LACTULOSE 10 G/15ML
10 SOLUTION ORAL ONCE
Refills: 0 | Status: COMPLETED | OUTPATIENT
Start: 2024-04-13 | End: 2024-04-13

## 2024-04-13 RX ORDER — HYDRALAZINE HCL 50 MG
50 TABLET ORAL THREE TIMES A DAY
Refills: 0 | Status: DISCONTINUED | OUTPATIENT
Start: 2024-04-13 | End: 2024-04-25

## 2024-04-13 RX ORDER — CARVEDILOL PHOSPHATE 80 MG/1
12.5 CAPSULE, EXTENDED RELEASE ORAL EVERY 12 HOURS
Refills: 0 | Status: DISCONTINUED | OUTPATIENT
Start: 2024-04-13 | End: 2024-04-26

## 2024-04-13 RX ORDER — LABETALOL HCL 100 MG
100 TABLET ORAL EVERY 12 HOURS
Refills: 0 | Status: DISCONTINUED | OUTPATIENT
Start: 2024-04-13 | End: 2024-04-13

## 2024-04-13 RX ORDER — OFLOXACIN 0.3 %
1 DROPS OPHTHALMIC (EYE) THREE TIMES A DAY
Refills: 0 | Status: DISCONTINUED | OUTPATIENT
Start: 2024-04-13 | End: 2024-04-13

## 2024-04-13 RX ADMIN — Medication 81 MILLIGRAM(S): at 14:08

## 2024-04-13 RX ADMIN — BRIMONIDINE TARTRATE 1 DROP(S): 2 SOLUTION/ DROPS OPHTHALMIC at 19:09

## 2024-04-13 RX ADMIN — LOSARTAN POTASSIUM 50 MILLIGRAM(S): 100 TABLET, FILM COATED ORAL at 06:31

## 2024-04-13 RX ADMIN — LACTULOSE 10 GRAM(S): 10 SOLUTION ORAL at 19:17

## 2024-04-13 RX ADMIN — SENNA PLUS 2 TABLET(S): 8.6 TABLET ORAL at 21:56

## 2024-04-13 RX ADMIN — CARVEDILOL PHOSPHATE 12.5 MILLIGRAM(S): 80 CAPSULE, EXTENDED RELEASE ORAL at 19:41

## 2024-04-13 RX ADMIN — Medication 50 MILLIGRAM(S): at 06:31

## 2024-04-13 RX ADMIN — Medication 1 DROP(S): at 21:56

## 2024-04-13 RX ADMIN — HEPARIN SODIUM 5000 UNIT(S): 5000 INJECTION INTRAVENOUS; SUBCUTANEOUS at 06:31

## 2024-04-13 RX ADMIN — DORZOLAMIDE HYDROCHLORIDE 1 DROP(S): 20 SOLUTION/ DROPS OPHTHALMIC at 14:08

## 2024-04-13 RX ADMIN — Medication 100 MILLIGRAM(S): at 02:29

## 2024-04-13 RX ADMIN — Medication 1 DROP(S): at 19:15

## 2024-04-13 RX ADMIN — Medication 1 DROP(S): at 19:08

## 2024-04-13 RX ADMIN — Medication 1 DROP(S): at 19:13

## 2024-04-13 RX ADMIN — CEFEPIME 100 MILLIGRAM(S): 1 INJECTION, POWDER, FOR SOLUTION INTRAMUSCULAR; INTRAVENOUS at 21:54

## 2024-04-13 RX ADMIN — HEPARIN SODIUM 5000 UNIT(S): 5000 INJECTION INTRAVENOUS; SUBCUTANEOUS at 19:06

## 2024-04-13 RX ADMIN — Medication 50 MILLIGRAM(S): at 14:08

## 2024-04-13 RX ADMIN — SODIUM ZIRCONIUM CYCLOSILICATE 10 GRAM(S): 10 POWDER, FOR SUSPENSION ORAL at 11:59

## 2024-04-13 RX ADMIN — Medication 50 MILLIGRAM(S): at 21:56

## 2024-04-13 RX ADMIN — Medication 1: at 12:00

## 2024-04-13 RX ADMIN — Medication 250 MILLIGRAM(S): at 22:39

## 2024-04-13 RX ADMIN — ATORVASTATIN CALCIUM 20 MILLIGRAM(S): 80 TABLET, FILM COATED ORAL at 21:56

## 2024-04-13 RX ADMIN — DORZOLAMIDE HYDROCHLORIDE 1 DROP(S): 20 SOLUTION/ DROPS OPHTHALMIC at 09:38

## 2024-04-13 NOTE — ED ADULT NURSE REASSESSMENT NOTE - NS ED NURSE REASSESS COMMENT FT1
Received handoff report from INDIO Arevalo Pt A & O x3, PT denies pain, Pt have a 22 g right forearm, PT awaiting bed assignment, no acute distress

## 2024-04-13 NOTE — CONSULT NOTE ADULT - ASSESSMENT
ESRD - dialysis days are T-T-S.  Mild Hyperkalemia , follow 2 gm K diet. Dialysis today.   Fever r/o bacteremia  placed on Vancomycin and Cefepime follow results.   
  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 Patient AAOx3 at bedside and mentioned having intermittent, mild, suprapubic discomfort associated w/ dysuria that has been going on for the last 48 hours. Patient was admitted to [facility] on 11/24/2023 and discharge on 12/08/23 where he was treated for sepsis. His perm cath was exchanged on 11/223. Found to have MSSA bacteremia and transitioned from Vancomycin to Cefazolin. 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 Underwent right Permcath placement by IR on 12/4/23. On admission, he found to have fever, tachypnea but negative Urine analysis and negative chest CT. He has started on Cefepime and IV  Vancomycin, and the ID consult requested to assist with further evaluation and antibiotic management.    # Sepsis ( Fever + tachypnea)- Suspected Line sepsis  # H/o Recent MSSA bacteremia    would recommend:    1. Follow up Perm-A-cath site and Blood cultures  2. Monitor Temp. and c/w supportive care  3. Continue Cefepime and IV Vancomycin until work up is done    will follow the patient with you and make further recommendation based on the clinical course and Lab results  Thank you for the opportunity to participate in Mr. VENCES's care    Attending Attestation:    Spent more than 65 minutes on total encounter, more than 50 % of the visit was spent counseling and/or coordinating care by the Attending physician.    
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  1.Pan cx.Abx.  2.ESRD-HD as per renal.  3.HTN-cont bp medication.  4.DM-Insulin.  5.Lipid d/o-statin.  6.GI and DVT prophylaxis.  .
1. Abdominal pain (etiology is not clear)  2. Constipation  3. Cholelithiasis  4. Biliary colic unlikely  5. Diverticulitis unlikely  6. Colitis unlikely  7. Anemia  8. No evidence of acute GI bleeding    Suggestions:    1. Protonix 40mg daily  2. Avoid NSAID  3. Daily stool softener  4. Monitor H/H  5. Transfuse PRBC as needed  6. DVT prophylaxis

## 2024-04-13 NOTE — CONSULT NOTE ADULT - NEGATIVE ENMT SYMPTOMS
no hearing difficulty/no ear pain/no tinnitus/no vertigo/no sinus symptoms/no nasal congestion/no nasal discharge/no nose bleeds/no gum bleeding/no dry mouth/no throat pain/no dysphagia

## 2024-04-13 NOTE — PATIENT PROFILE ADULT - FALL HARM RISK - HARM RISK INTERVENTIONS

## 2024-04-13 NOTE — CONSULT NOTE ADULT - SUBJECTIVE AND OBJECTIVE BOX
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 Patient AAOx3 at bedside and mentioned having intermittent, mild, suprapubic discomfort associated w/ dysuria that has been going on for the last 48 hours. Patient was admitted to [facility] on 11/24/2023 and discharge on 12/08/23 where he was treated for sepsis. His perm cath was exchanged on 11/223. Found to have MSSA bacteremia and transitioned from Vancomycin to Cefazolin. 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 Underwent right Permcath placement by IR on 12/4/23. On admission, he found to have fever, tachypnea but negative Urine analysis and negative chest CT. He has started on Cefepime and IV  Vancomycin, and the ID consult requested to assist with further evaluation and antibiotic management.      REVIEW OF SYSTEMS: Total of twelve systems have been reviewed with patient and found to be negative unless mentioned in HPI      PAST MEDICAL & SURGICAL HISTORY:  DM (diabetes mellitus)  HTN (hypertension)  Chronic kidney disease  HLD (hyperlipidemia)  Glaucoma  Gout  No significant past surgical history        SOCIAL HISTORY  Alcohol: Does not drink  Tobacco: Does not smoke  Illicit substance use: None      FAMILY HISTORY: Non contributory to the present illness      ALLERGIES: No Known Allergies      Vital Signs Last 24 Hrs  T(C): 36.9 (13 Apr 2024 15:54), Max: 38 (12 Apr 2024 16:07)  T(F): 98.4 (13 Apr 2024 15:54), Max: 100.4 (12 Apr 2024 16:07)  HR: 64 (13 Apr 2024 15:54) (58 - 74)  BP: 174/84 (13 Apr 2024 15:54) (131/82 - 205/98)  BP(mean): --  RR: 18 (13 Apr 2024 15:54) (16 - 21)  SpO2: 96% (13 Apr 2024 15:54) (96% - 100%)    Parameters below as of 13 Apr 2024 15:54  Patient On (Oxygen Delivery Method): room air      PHYSICAL EXAM:  GENERAL: Not in distress   CHEST/LUNG: Not using accessory muscles   HEART: s1 and s2 present  ABDOMEN:  Nontender and  Nondistended  EXTREMITIES: No pedal  edema  CNS: Awake and Alert      LABS:                        11.3   9.50  )-----------( 108      ( 13 Apr 2024 05:00 )             34.3       04-13    135  |  104  |  42<H>  ----------------------------<  167<H>  5.5<H>   |  23  |  4.82<H>    Ca    9.2      13 Apr 2024 05:00    TPro  7.3  /  Alb  2.9<L>  /  TBili  1.1  /  DBili  x   /  AST  18  /  ALT  15  /  AlkPhos  183<H>  04-13    PT/INR - ( 13 Apr 2024 05:00 )   PT: 16.3 sec;   INR: 1.45 ratio      PTT - ( 13 Apr 2024 05:00 )  PTT:26.7 sec      CAPILLARY BLOOD GLUCOSE  POCT Blood Glucose.: 186 mg/dL (13 Apr 2024 11:30)  POCT Blood Glucose.: 121 mg/dL (13 Apr 2024 07:37)        MEDICATIONS  (STANDING):  aspirin  chewable 81 milliGRAM(s) Oral daily  atorvastatin 20 milliGRAM(s) Oral at bedtime  brimonidine 0.2% Ophthalmic Solution 1 Drop(s) Right EYE two times a day  carvedilol 12.5 milliGRAM(s) Oral every 12 hours  cefepime   IVPB 500 milliGRAM(s) IV Intermittent every 24 hours  ciprofloxacin  0.3% Ophthalmic Solution 1 Drop(s) Right EYE two times a day  dorzolamide 2% Ophthalmic Solution 1 Drop(s) Both EYES three times a day  glucagon  Injectable 1 milliGRAM(s) IntraMuscular once  heparin   Injectable 5000 Unit(s) SubCutaneous every 12 hours  hydrALAZINE 50 milliGRAM(s) Oral three times a day  insulin lispro (ADMELOG) corrective regimen sliding scale   SubCutaneous Before meals and at bedtime  ketorolac 0.5% Ophthalmic Solution 1 Drop(s) Right EYE two times a day  lactulose Syrup 10 Gram(s) Oral once  losartan 50 milliGRAM(s) Oral daily  prednisoLONE acetate 1% Suspension 1 Drop(s) Right EYE every 4 hours  senna 2 Tablet(s) Oral at bedtime  vancomycin  IVPB 1000 milliGRAM(s) IV Intermittent every 48 hours    MEDICATIONS  (PRN):  acetaminophen     Tablet .. 650 milliGRAM(s) Oral every 6 hours PRN Temp greater or equal to 38C (100.4F), Mild Pain (1 - 3)  dextrose Oral Gel 15 Gram(s) Oral once PRN Blood Glucose LESS THAN 70 milliGRAM(s)/deciliter        RADIOLOGY & ADDITIONAL TESTS:    4/12/24 : CT Abdomen and Pelvis No Cont (04.12.24 @ 18:53) >  *  No acute septic pathology.  *  Scattered bilateral small pulmonary nodules measuring up to 7 mm in the right upper lobe are nonspecific and may represent   infectious/inflammatory disease or metastatic disease.  *  3.1 x 2.1 cm cystic structure in the posterior mediastinum at the level of the leelee may represent a lymphatic structure, bronchogenic cyst, or foregut duplication cyst.  *  No intra-abdominal collection.        4/12/24 : CT Chest No Cont (04.12.24 @ 18:53) LUNGS AND LARGE AIRWAYS: The central airways are patent. Scattered   bilateral small pulmonary nodules measuring up to 7 mm in the right upper  lobe are nonspecific. No acute consolidation.  PLEURA: Trace effusions.        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 Patient AAOx3 at bedside and mentioned having intermittent, mild, suprapubic discomfort associated w/ dysuria that has been going on for the last 48 hours. Patient was admitted to [facility] on 11/24/2023 and discharge on 12/08/23 where he was treated for sepsis. His perm cath was exchanged on 11/223. Found to have MSSA bacteremia and transitioned from Vancomycin to Cefazolin. 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 Underwent right Permcath placement by IR on 12/4/23. On admission, he found to have fever, tachypnea but negative Urine analysis and negative chest CT. He has started on Cefepime and IV  Vancomycin, and the ID consult requested to assist with further evaluation and antibiotic management.      REVIEW OF SYSTEMS: Total of twelve systems have been reviewed with patient and found to be negative unless mentioned in HPI      PAST MEDICAL & SURGICAL HISTORY:  DM (diabetes mellitus)  HTN (hypertension)  Chronic kidney disease  HLD (hyperlipidemia)  Glaucoma  Gout  No significant past surgical history      SOCIAL HISTORY  Alcohol: Does not drink  Tobacco: Does not smoke  Illicit substance use: None      FAMILY HISTORY: Non contributory to the present illness      ALLERGIES: No Known Allergies      Vital Signs Last 24 Hrs  T(C): 36.9 (13 Apr 2024 15:54), Max: 38 (12 Apr 2024 16:07)  T(F): 98.4 (13 Apr 2024 15:54), Max: 100.4 (12 Apr 2024 16:07)  HR: 64 (13 Apr 2024 15:54) (58 - 74)  BP: 174/84 (13 Apr 2024 15:54) (131/82 - 205/98)  BP(mean): --  RR: 18 (13 Apr 2024 15:54) (16 - 21)  SpO2: 96% (13 Apr 2024 15:54) (96% - 100%)    Parameters below as of 13 Apr 2024 15:54  Patient On (Oxygen Delivery Method): room air      PHYSICAL EXAM:  GENERAL: Not in distress   CHEST/LUNG: Not using accessory muscles   HEART: s1 and s2 present  ABDOMEN:  Nontender and  Nondistended  EXTREMITIES: No pedal  edema  CNS: Awake and Alert      LABS:                        11.3   9.50  )-----------( 108      ( 13 Apr 2024 05:00 )             34.3       04-13    135  |  104  |  42<H>  ----------------------------<  167<H>  5.5<H>   |  23  |  4.82<H>    Ca    9.2      13 Apr 2024 05:00    TPro  7.3  /  Alb  2.9<L>  /  TBili  1.1  /  DBili  x   /  AST  18  /  ALT  15  /  AlkPhos  183<H>  04-13    PT/INR - ( 13 Apr 2024 05:00 )   PT: 16.3 sec;   INR: 1.45 ratio      PTT - ( 13 Apr 2024 05:00 )  PTT:26.7 sec      CAPILLARY BLOOD GLUCOSE  POCT Blood Glucose.: 186 mg/dL (13 Apr 2024 11:30)  POCT Blood Glucose.: 121 mg/dL (13 Apr 2024 07:37)        MEDICATIONS  (STANDING):  aspirin  chewable 81 milliGRAM(s) Oral daily  atorvastatin 20 milliGRAM(s) Oral at bedtime  brimonidine 0.2% Ophthalmic Solution 1 Drop(s) Right EYE two times a day  carvedilol 12.5 milliGRAM(s) Oral every 12 hours  cefepime   IVPB 500 milliGRAM(s) IV Intermittent every 24 hours  ciprofloxacin  0.3% Ophthalmic Solution 1 Drop(s) Right EYE two times a day  dorzolamide 2% Ophthalmic Solution 1 Drop(s) Both EYES three times a day  glucagon  Injectable 1 milliGRAM(s) IntraMuscular once  heparin   Injectable 5000 Unit(s) SubCutaneous every 12 hours  hydrALAZINE 50 milliGRAM(s) Oral three times a day  insulin lispro (ADMELOG) corrective regimen sliding scale   SubCutaneous Before meals and at bedtime  ketorolac 0.5% Ophthalmic Solution 1 Drop(s) Right EYE two times a day  lactulose Syrup 10 Gram(s) Oral once  losartan 50 milliGRAM(s) Oral daily  prednisoLONE acetate 1% Suspension 1 Drop(s) Right EYE every 4 hours  senna 2 Tablet(s) Oral at bedtime  vancomycin  IVPB 1000 milliGRAM(s) IV Intermittent every 48 hours    MEDICATIONS  (PRN):  acetaminophen     Tablet .. 650 milliGRAM(s) Oral every 6 hours PRN Temp greater or equal to 38C (100.4F), Mild Pain (1 - 3)  dextrose Oral Gel 15 Gram(s) Oral once PRN Blood Glucose LESS THAN 70 milliGRAM(s)/deciliter        RADIOLOGY & ADDITIONAL TESTS:    4/12/24 : CT Abdomen and Pelvis No Cont (04.12.24 @ 18:53) >  *  No acute septic pathology.  *  Scattered bilateral small pulmonary nodules measuring up to 7 mm in the right upper lobe are nonspecific and may represent   infectious/inflammatory disease or metastatic disease.  *  3.1 x 2.1 cm cystic structure in the posterior mediastinum at the level of the leelee may represent a lymphatic structure, bronchogenic cyst, or foregut duplication cyst.  *  No intra-abdominal collection.        4/12/24 : CT Chest No Cont (04.12.24 @ 18:53) LUNGS AND LARGE AIRWAYS: The central airways are patent. Scattered   bilateral small pulmonary nodules measuring up to 7 mm in the right upper  lobe are nonspecific. No acute consolidation.  PLEURA: Trace effusions.        MICROBIOLOGY DATA:    Urine Microscopic-Add On (NC) (04.12.24 @ 18:13)   Red Blood Cell - Urine: 3 /HPF  White Blood Cell - Urine: 2 /HPF  Bacteria: Occasional /HPF    Respiratory Viral Panel with COVID-19 by OXANA (04.12.24 @ 17:50)   [facility]  SARS-CoV-2: NotDete:

## 2024-04-13 NOTE — CONSULT NOTE ADULT - SUBJECTIVE AND OBJECTIVE BOX
[  ] STAT REQUEST              [ X ] ROUTINE REQUEST    Patient is a 78 year old male with abdominal pain. GI consulted to evaluate.       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 Patient also c/o constipation but denies nausea, vomiting, hematemesis, hematochezia, melena, fever, chest pain, SOB, cough, hematuria, or diarrhea.        PAIN MANAGEMENT:  Pain Scale:                5 /10  Pain Location:  Suprapubic abdominal pain       PAST MEDICAL HISTORY     DM (diabetes mellitus)    HTN (hypertension)     Diabetes mellitus    ESRD on HD     Hyperlipidemia     Glaucoma    Gout        PAST SURGICAL HISTORY    No significant past surgical history        Allergies    No Known Allergies    Intolerances  None         MEDICATIONS  (STANDING):  aspirin  chewable 81 milliGRAM(s) Oral daily  atorvastatin 20 milliGRAM(s) Oral at bedtime  carvedilol 12.5 milliGRAM(s) Oral every 12 hours  cefepime   IVPB 500 milliGRAM(s) IV Intermittent every 24 hours  dextrose 10% Bolus 125 milliLiter(s) IV Bolus once  dextrose 5%. 1000 milliLiter(s) (100 mL/Hr) IV Continuous <Continuous>  dextrose 5%. 1000 milliLiter(s) (50 mL/Hr) IV Continuous <Continuous>  dextrose 50% Injectable 25 Gram(s) IV Push once  dextrose 50% Injectable 12.5 Gram(s) IV Push once  dorzolamide 2% Ophthalmic Solution 1 Drop(s) Both EYES three times a day  glucagon  Injectable 1 milliGRAM(s) IntraMuscular once  heparin   Injectable 5000 Unit(s) SubCutaneous every 12 hours  hydrALAZINE 50 milliGRAM(s) Oral three times a day  insulin lispro (ADMELOG) corrective regimen sliding scale   SubCutaneous Before meals and at bedtime  lactulose Syrup 10 Gram(s) Oral once  losartan 50 milliGRAM(s) Oral daily  senna 2 Tablet(s) Oral at bedtime  vancomycin  IVPB 1000 milliGRAM(s) IV Intermittent every 48 hours    MEDICATIONS  (PRN):  acetaminophen     Tablet .. 650 milliGRAM(s) Oral every 6 hours PRN Temp greater or equal to 38C (100.4F), Mild Pain (1 - 3)  dextrose Oral Gel 15 Gram(s) Oral once PRN Blood Glucose LESS THAN 70 milliGRAM(s)/deciliter      SOCIAL HISTORY  Advanced Directives:       [X  ] Full Code       [  ] DNR  Marital Status:         [  ] M      [X  ] S      [  ] D       [  ] W  Children:       [ X ] Yes      [  ] No  Occupation:        [  ] Employed       [ X ] Unemployed       [  ] Retired  Diet:       [ X ] Regular       [  ] PEG feeding          [  ] NG tube feeding  Drug Use:           [ X ] Patient denied          [  ] Yes  Alcohol:           [X  ] No             [  ] Yes (socially)         [  ] Yes (chronic)  Tobacco:           [  ] Yes           [ X ] No    FAMILY HISTORY  [ X ] Heart Disease            [ X ] Diabetes             [ X ] HTN             [  ] Colon Cancer             [  ] Stomach Cancer              [  ] Pancreatic Cancer      VITAL SIGNS   Vital Signs Last 24 Hrs  T(C): 36.8 (04-13-24 @ 11:30), Max: 38 (04-12-24 @ 16:07)  T(F): 98.2 (04-13-24 @ 11:30), Max: 100.4 (04-12-24 @ 16:07)  HR: 58 (04-13-24 @ 11:30) (58 - 74)  BP: 161/83 (04-13-24 @ 11:30) (131/82 - 205/98)   RR: 18 (04-13-24 @ 11:30) (16 - 21)  SpO2: 98% (04-13-24 @ 11:30) (96% - 100%)  Daily Height in cm: 165.1 (12 Apr 2024 16:07)            CBC Full  -  ( 13 Apr 2024 05:00 )  WBC Count : 9.50 K/uL  RBC Count : 3.48 M/uL  Hemoglobin : 11.3 g/dL  Hematocrit : 34.3 %  Platelet Count - Automated : 108 K/uL  Mean Cell Volume : 98.6 fl  Mean Cell Hemoglobin : 32.5 pg  Mean Cell Hemoglobin Concentration : 32.9 gm/dL  Auto Neutrophil # : 7.29 K/uL  Auto Lymphocyte # : 1.12 K/uL  Auto Monocyte # : 0.86 K/uL  Auto Eosinophil # : 0.19 K/uL  Auto Basophil # : 0.02 K/uL  Auto Neutrophil % : 76.7 %  Auto Lymphocyte % : 11.8 %  Auto Monocyte % : 9.1 %  Auto Eosinophil % : 2.0 %  Auto Basophil % : 0.2 %      04-13    135  |  104  |  42<H>  ----------------------------<  167<H>  5.5<H>   |  23  |  4.82<H>    Ca    9.2      13 Apr 2024 05:00    TPro  7.3  /  Alb  2.9<L>  /  TBili  1.1  /  DBili  x   /  AST  18  /  ALT  15  /  AlkPhos  183<H>  04-13    Lipase: 26 U/L (04-12 @ 17:50)     PT/INR - ( 13 Apr 2024 05:00 )   PT: 16.3 sec;   INR: 1.45 ratio         PTT - ( 13 Apr 2024 05:00 )  PTT:26.7 sec      Iron with Total Binding Capacity in AM (12.18.23 @ 06:44)   Iron - Total Binding Capacity.: 200 ug/dL  % Saturation, Iron: 52 %  Iron Total: 105 ug/dL  Unsaturated Iron Binding Capacity: 95 ug/dLUrinalysis (04.12.24 @ 18:13)   pH Urine: 8.5  Glucose Qualitative, Urine: Negative mg/dL  Blood, Urine: Negative  Color: Yellow  Urine Appearance: Clear  Bilirubin: Negative  Ketone - Urine: Negative mg/dL  Specific Gravity: 1.014  Protein, Urine: 300 mg/dL  Urobilinogen: 0.2 mg/dL  Nitrite: Negative  Leukocyte Esterase Concentration: Negative        ECG     Ventricular Rate 71 BPM    Atrial Rate 71 BPM    P-R Interval 228 ms    QRS Duration 122 ms    Q-T Interval 410 ms    QTC Calculation(Bazett) 445 ms    P Axis 7 degrees    R Axis -22 degrees    T Axis 167 degrees    Diagnosis Line Sinus rhythm [id number] degree A-V block  Left bundle branch block  Abnormal ECG           RADIOLOGY/IMAGING                ACC: [id number] EXAM:  CT ABDOMEN AND PELVIS   ORDERED BY: DEANGELO MOLINA     ACC: [id number] EXAM:  CT CHEST   ORDERED BY: DEANGELO MOLINA     PROCEDURE DATE:  04/12/2024          INTERPRETATION:  CLINICAL INFORMATION: Fever and altered mental statuson   hemodialysis, abdominal pain    COMPARISON: None.    CONTRAST/COMPLICATIONS:  IV Contrast: None  Oral Contrast: None  Complications: None    PROCEDURE:  CT of the Chest, Abdomen and Pelvis was performed.  Sagittal and coronal reformats were performed.    FINDINGS:  CHEST:  LUNGS AND LARGE AIRWAYS: The central airways are patent. Scattered   bilateral small pulmonary nodules measuring up to 7 mm in the right upper   lobe are nonspecific. No acute consolidation.  PLEURA: Trace effusions.  VESSELS: Aortic atherosclerosis without aneurysm. Right IJ dialysis line   tip in the right atrium.  HEART: Mild cardiomegaly. No pericardial effusion. Coronary, mitral   annular, and aortic valve calcification.  MEDIASTINUM AND CHRISTIANO: No adenopathy. 3.1 x2.1 cm cystic structure in the   posterior mediastinum at the level of the leelee may represent a   lymphatic structure, bronchogenic cyst, or foregut duplication cyst.  CHEST WALL AND LOWER NECK: No masses.    ABDOMEN AND PELVIS:  LIVER: Normal.  BILE DUCTS: Nondilated.  GALLBLADDER: Gallstones.  SPLEEN: Normal.  PANCREAS: Normal.  ADRENALS: Normal.  KIDNEYS/URETERS: No hydronephrosis or urinary tract calculi.    BLADDER: Normal.  REPRODUCTIVE ORGANS: Enlarged prostate.    BOWEL: No bowel-related abnormality. No bowel obstruction or bowel   inflammation. Normal appendix and ileocecal region. No evidence of active   colitis or diverticulitis.  PERITONEUM: No free air or ascites. No collection.  VESSELS: Aortoiliac atherosclerosis without aneurysm.  RETROPERITONEUM/LYMPH NODES: No adenopathy or hematoma.  ABDOMINAL WALL: Normal.  BONES: No aggressive lesion.    IMPRESSION:  *  No acute septic pathology.  *  Scattered bilateral small pulmonary nodules measuring up to 7 mm in   the right upper lobe are nonspecific and may represent   infectious/inflammatory disease or metastatic disease.  *  3.1 x 2.1 cm cystic structure in the posterior mediastinum at the   level of the leelee may represent a lymphatic structure, bronchogenic   cyst, orforegut duplication cyst.  *  No intra-abdominal collection.

## 2024-04-13 NOTE — CONSULT NOTE ADULT - GASTROINTESTINAL
details… soft/nontender/nondistended/normal active bowel sounds/no guarding/no rigidity/no organomegaly/no palpable roaslva

## 2024-04-13 NOTE — PROGRESS NOTE ADULT - SUBJECTIVE AND OBJECTIVE BOX
Patient is a 78y old  Male who presents with a chief complaint of Sepsis (12 Apr 2024 23:25)    pt seen in icu [  ], reg med floor [   ], bed [  ], chair at bedside [   ], a+o x3 [  ], lethargic [  ],  nad [  ]    smalls [  ], ngt [  ], peg [  ], et tube [  ], cent line [  ], picc line [  ]        Allergies    No Known Allergies        Vitals    T(F): 98.2 (04-13-24 @ 04:35), Max: 100.4 (04-12-24 @ 16:07)  HR: 63 (04-13-24 @ 04:04) (63 - 74)  BP: 171/86 (04-13-24 @ 04:04) (131/82 - 205/98)  RR: 20 (04-13-24 @ 04:04) (16 - 21)  SpO2: 97% (04-13-24 @ 04:04) (96% - 100%)  Wt(kg): --  CAPILLARY BLOOD GLUCOSE          Labs                          11.3   9.50  )-----------( 108      ( 13 Apr 2024 05:00 )             34.3       04-13    135  |  104  |  42<H>  ----------------------------<  167<H>  5.5<H>   |  23  |  4.82<H>    Ca    9.2      13 Apr 2024 05:00    TPro  7.3  /  Alb  2.9<L>  /  TBili  1.1  /  DBili  x   /  AST  18  /  ALT  15  /  AlkPhos  183<H>  04-13      CARDIAC MARKERS ( 13 Apr 2024 05:00 )  x     / x     / 59 U/L / x     / 1.3 ng/mL  CARDIAC MARKERS ( 12 Apr 2024 23:04 )  x     / x     / x     / x     / <1.0 ng/mL      Troponin I, High Sensitivity Result: 224.2 ng/L (04-13-24 @ 05:00)  Troponin I, High Sensitivity Result: 229.9 ng/L (04-12-24 @ 23:04)  Troponin I, High Sensitivity Result: 125.3 ng/L (04-12-24 @ 17:50)        Radiology Results      Meds    MEDICATIONS  (STANDING):  aspirin  chewable 81 milliGRAM(s) Oral daily  atorvastatin 20 milliGRAM(s) Oral at bedtime  carvedilol 12.5 milliGRAM(s) Oral every 12 hours  cefepime   IVPB 500 milliGRAM(s) IV Intermittent every 24 hours  dextrose 10% Bolus 125 milliLiter(s) IV Bolus once  dextrose 5%. 1000 milliLiter(s) (100 mL/Hr) IV Continuous <Continuous>  dextrose 5%. 1000 milliLiter(s) (50 mL/Hr) IV Continuous <Continuous>  dextrose 50% Injectable 25 Gram(s) IV Push once  dextrose 50% Injectable 12.5 Gram(s) IV Push once  dorzolamide 2% Ophthalmic Solution 1 Drop(s) Both EYES three times a day  glucagon  Injectable 1 milliGRAM(s) IntraMuscular once  heparin   Injectable 5000 Unit(s) SubCutaneous every 12 hours  hydrALAZINE 50 milliGRAM(s) Oral three times a day  insulin lispro (ADMELOG) corrective regimen sliding scale   SubCutaneous Before meals and at bedtime  losartan 50 milliGRAM(s) Oral daily  vancomycin  IVPB 1000 milliGRAM(s) IV Intermittent every 48 hours      MEDICATIONS  (PRN):  acetaminophen     Tablet .. 650 milliGRAM(s) Oral every 6 hours PRN Temp greater or equal to 38C (100.4F), Mild Pain (1 - 3)  dextrose Oral Gel 15 Gram(s) Oral once PRN Blood Glucose LESS THAN 70 milliGRAM(s)/deciliter      Physical Exam    Neuro :  no focal deficits  Respiratory: CTA B/L  CV: RRR, S1S2, no murmurs,   Abdominal: Soft, NT, ND +BS,  Extremities: No edema, + peripheral pulses    ASSESSMENT    Fever due to unspecified condition    No pertinent past medical history    DM (diabetes mellitus)    HTN (hypertension)    Hypertension    Diabetes mellitus    Chronic kidney disease    HLD (hyperlipidemia)    Glaucoma    Gout    No significant past surgical history        PLAN     Patient is a 78y old  Male who presents with a chief complaint of Sepsis (12 Apr 2024 23:25)    pt seen in ed tele [ x ], reg med floor [   ], bed [x  ], chair at bedside [   ], a+o x3 [ x ], lethargic [  ],  nad [ x ]      Allergies    No Known Allergies        Vitals    T(F): 98.2 (04-13-24 @ 04:35), Max: 100.4 (04-12-24 @ 16:07)  HR: 63 (04-13-24 @ 04:04) (63 - 74)  BP: 171/86 (04-13-24 @ 04:04) (131/82 - 205/98)  RR: 20 (04-13-24 @ 04:04) (16 - 21)  SpO2: 97% (04-13-24 @ 04:04) (96% - 100%)  Wt(kg): --  CAPILLARY BLOOD GLUCOSE          Labs                          11.3   9.50  )-----------( 108      ( 13 Apr 2024 05:00 )             34.3       04-13    135  |  104  |  42<H>  ----------------------------<  167<H>  5.5<H>   |  23  |  4.82<H>    Ca    9.2      13 Apr 2024 05:00    TPro  7.3  /  Alb  2.9<L>  /  TBili  1.1  /  DBili  x   /  AST  18  /  ALT  15  /  AlkPhos  183<H>  04-13      CARDIAC MARKERS ( 13 Apr 2024 05:00 )  x     / x     / 59 U/L / x     / 1.3 ng/mL  CARDIAC MARKERS ( 12 Apr 2024 23:04 )  x     / x     / x     / x     / <1.0 ng/mL      Troponin I, High Sensitivity Result: 224.2 ng/L (04-13-24 @ 05:00)  Troponin I, High Sensitivity Result: 229.9 ng/L (04-12-24 @ 23:04)  Troponin I, High Sensitivity Result: 125.3 ng/L (04-12-24 @ 17:50)        Radiology Results      Meds    MEDICATIONS  (STANDING):  aspirin  chewable 81 milliGRAM(s) Oral daily  atorvastatin 20 milliGRAM(s) Oral at bedtime  carvedilol 12.5 milliGRAM(s) Oral every 12 hours  cefepime   IVPB 500 milliGRAM(s) IV Intermittent every 24 hours  dextrose 10% Bolus 125 milliLiter(s) IV Bolus once  dextrose 5%. 1000 milliLiter(s) (100 mL/Hr) IV Continuous <Continuous>  dextrose 5%. 1000 milliLiter(s) (50 mL/Hr) IV Continuous <Continuous>  dextrose 50% Injectable 25 Gram(s) IV Push once  dextrose 50% Injectable 12.5 Gram(s) IV Push once  dorzolamide 2% Ophthalmic Solution 1 Drop(s) Both EYES three times a day  glucagon  Injectable 1 milliGRAM(s) IntraMuscular once  heparin   Injectable 5000 Unit(s) SubCutaneous every 12 hours  hydrALAZINE 50 milliGRAM(s) Oral three times a day  insulin lispro (ADMELOG) corrective regimen sliding scale   SubCutaneous Before meals and at bedtime  losartan 50 milliGRAM(s) Oral daily  vancomycin  IVPB 1000 milliGRAM(s) IV Intermittent every 48 hours      MEDICATIONS  (PRN):  acetaminophen     Tablet .. 650 milliGRAM(s) Oral every 6 hours PRN Temp greater or equal to 38C (100.4F), Mild Pain (1 - 3)  dextrose Oral Gel 15 Gram(s) Oral once PRN Blood Glucose LESS THAN 70 milliGRAM(s)/deciliter      Physical Exam    Neuro :  no focal deficits  Respiratory: CTA B/L  CV: RRR, S1S2, no murmurs,   Abdominal: Soft, NT, ND +BS,  Extremities: No edema, + peripheral pulses      ASSESSMENT      uncontrolled htn,   abnormal trop r/o acs,   poss 2nd to demand ischemia,    abd pain poss 2nd to constipation,    fever ?source,   pulmonary nodules,    bronchogenic cyst, or foregut duplication cyst,   hyperkalemia  h/o sinus node dysfunction,   chronic HFpEF,   aortic stenosis,   HTN,   HLD,   b/l carotid stenosis,   DM (not on meds),   ESRD on dialysis (Tue, Thu, Sat),   Gout,    glaucoma,   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

## 2024-04-13 NOTE — CONSULT NOTE ADULT - SUBJECTIVE AND OBJECTIVE BOX
Date of Service  04-13-24 @ 12:22    CHIEF COMPLAINT:Patient is a 78y old  Male who presents with a chief complaint of Sepsis .      HPI:  A 78 year old male, from 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 Patient AAOx3 at bedside and mentioned having intermittent, mild, suprapubic discomfort associated w/ dysuria that has been going on for the last 48 hours. On further questioning, patient experiencing chills, malaise, and anorexia about the same time. Denies any chest pain, dyspnea, palpitations, nausea, vomiting, headache, neck pain, or weakness. Denies diarrhea or constipation. He does not have any other complaints. Patient was admitted to [facility] on 11/24/2023 and discharge on 12/08/23 where he was treated for sepsis. His perm cath was exchanged on Friday 11/25. Found to have MSSA bacteremia and transitioned from Vancomycin to Cefazolin. 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 Underwent right Permcath placement by IR on 12/4. LHC performed on 12/7 with non obstructive CAD, non ischemic cardiomyopathy w/o complications. (12 Apr 2024 23:25)      PAST MEDICAL & SURGICAL HISTORY:  DM (diabetes mellitus)      HTN (hypertension)      Chronic kidney disease      HLD (hyperlipidemia)      Glaucoma      Gout          MEDICATIONS  (STANDING):  aspirin  chewable 81 milliGRAM(s) Oral daily  atorvastatin 20 milliGRAM(s) Oral at bedtime  carvedilol 12.5 milliGRAM(s) Oral every 12 hours  cefepime   IVPB 500 milliGRAM(s) IV Intermittent every 24 hours  dextrose 10% Bolus 125 milliLiter(s) IV Bolus once  dextrose 5%. 1000 milliLiter(s) (100 mL/Hr) IV Continuous <Continuous>  dextrose 5%. 1000 milliLiter(s) (50 mL/Hr) IV Continuous <Continuous>  dextrose 50% Injectable 25 Gram(s) IV Push once  dextrose 50% Injectable 12.5 Gram(s) IV Push once  dorzolamide 2% Ophthalmic Solution 1 Drop(s) Both EYES three times a day  glucagon  Injectable 1 milliGRAM(s) IntraMuscular once  heparin   Injectable 5000 Unit(s) SubCutaneous every 12 hours  hydrALAZINE 50 milliGRAM(s) Oral three times a day  insulin lispro (ADMELOG) corrective regimen sliding scale   SubCutaneous Before meals and at bedtime  lactulose Syrup 10 Gram(s) Oral once  losartan 50 milliGRAM(s) Oral daily  senna 2 Tablet(s) Oral at bedtime  vancomycin  IVPB 1000 milliGRAM(s) IV Intermittent every 48 hours    MEDICATIONS  (PRN):  acetaminophen     Tablet .. 650 milliGRAM(s) Oral every 6 hours PRN Temp greater or equal to 38C (100.4F), Mild Pain (1 - 3)  dextrose Oral Gel 15 Gram(s) Oral once PRN Blood Glucose LESS THAN 70 milliGRAM(s)/deciliter      FAMILY HISTORY:  No pertinent family history in first degree relatives        SOCIAL HISTORY:    [x ] Non-smoker    [ x] Alcohol-denies    Allergies    No Known Allergies    Intolerances    	    REVIEW OF SYSTEMS:  CONSTITUTIONAL: No fever, weight loss, or fatigue  EYES: No eye pain, visual disturbances, or discharge  ENT:  No difficulty hearing, tinnitus, vertigo; No sinus or throat pain  NECK: No pain or stiffness  RESPIRATORY: No cough, wheezing, chills or hemoptysis; No Shortness of Breath  CARDIOVASCULAR: No chest pain, palpitations, passing out, dizziness, or leg swelling  GASTROINTESTINAL: + abdominal or epigastric pain. No nausea, vomiting, or hematemesis; No diarrhea or constipation. No melena or hematochezia.  GENITOURINARY: No dysuria, frequency, hematuria, or incontinence  NEUROLOGICAL: No headaches, memory loss, loss of strength, numbness, or tremors  SKIN: No itching, burning, rashes, or lesions   LYMPH Nodes: No enlarged glands  ENDOCRINE: No heat or cold intolerance; No hair loss  MUSCULOSKELETAL: No joint pain or swelling; No muscle, back, or extremity pain  PSYCHIATRIC: No depression, anxiety, mood swings, or difficulty sleeping  HEME/LYMPH: No easy bruising, or bleeding gums  ALLERGY AND IMMUNOLOGIC: No hives or eczema	      PHYSICAL EXAM:  T(C): 36.8 (04-13-24 @ 11:30), Max: 38 (04-12-24 @ 16:07)  HR: 58 (04-13-24 @ 11:30) (58 - 74)  BP: 161/83 (04-13-24 @ 11:30) (131/82 - 205/98)  RR: 18 (04-13-24 @ 11:30) (16 - 21)  SpO2: 98% (04-13-24 @ 11:30) (96% - 100%)  Wt(kg): --  I&O's Summary      Appearance: Normal	  HEENT:   Normal oral mucosa, PERRL, EOMI	  Lymphatic: No lymphadenopathy  Cardiovascular: Normal S1 S2, No JVD, No murmurs, No edema  Respiratory: Lungs clear to auscultation	  Psychiatry: A & O x 3, Mood & affect appropriate  Gastrointestinal:  Soft, Non-tender, + BS	  Skin: No rashes, No ecchymoses, No cyanosis	  Neurologic: Non-focal  Extremities: Normal range of motion, No clubbing, cyanosis or edema  Vascular: Peripheral pulses palpable 2+ bilaterally    	    ECG:nsr,lad,q anterior leads  	  	  	  LABS:	 	      CARDIAC MARKERS ( 13 Apr 2024 05:00 )  x     / x     / 59 U/L / x     / 1.3 ng/mL  CARDIAC MARKERS ( 12 Apr 2024 23:04 )  x     / x     / x     / x     / <1.0 ng/mL      Troponin I, High Sensitivity Result: 224.2 ng/L (04-13-24 @ 05:00)  Troponin I, High Sensitivity Result: 229.9 ng/L (04-12-24 @ 23:04)  Troponin I, High Sensitivity Result: 125.3 ng/L (04-12-24 @ 17:50)                          11.3   9.50  )-----------( 108      ( 13 Apr 2024 05:00 )             34.3     04-13    135  |  104  |  42<H>  ----------------------------<  167<H>  5.5<H>   |  23  |  4.82<H>    Ca    9.2      13 Apr 2024 05:00    TPro  7.3  /  Alb  2.9<L>  /  TBili  1.1  /  DBili  x   /  AST  18  /  ALT  15  /  AlkPhos  183<H>  04-13    < from: CT Chest No Cont (04.12.24 @ 18:53) >  ACC: [id number] EXAM:  CT ABDOMEN AND PELVIS   ORDERED BY: DEANGELO MOLINA     ACC: [id number] EXAM:  CT CHEST   ORDERED BY: DEANGELO MOLINA     PROCEDURE DATE:  04/12/2024          INTERPRETATION:  CLINICAL INFORMATION: Fever and altered mental statuson   hemodialysis, abdominal pain    COMPARISON: None.    CONTRAST/COMPLICATIONS:  IV Contrast: None  Oral Contrast: None  Complications: None    PROCEDURE:  CT of the Chest, Abdomen and Pelvis was performed.  Sagittal and coronal reformats were performed.    FINDINGS:  CHEST:  LUNGS AND LARGE AIRWAYS: The central airways are patent. Scattered   bilateral small pulmonary nodules measuring up to 7 mm in the right upper   lobe are nonspecific. No acute consolidation.  PLEURA: Trace effusions.  VESSELS: Aortic atherosclerosis without aneurysm. Right IJ dialysis line   tip in the right atrium.  HEART: Mild cardiomegaly. No pericardial effusion. Coronary, mitral   annular, and aortic valve calcification.  MEDIASTINUM AND CHRISTIANO: No adenopathy. 3.1 x2.1 cm cystic structure in the   posterior mediastinum at the level of the leelee may represent a   lymphatic structure, bronchogenic cyst, or foregut duplication cyst.  CHEST WALL AND LOWER NECK: No masses.    ABDOMEN AND PELVIS:  LIVER: Normal.  BILE DUCTS: Nondilated.  GALLBLADDER: Gallstones.  SPLEEN: Normal.  PANCREAS: Normal.  ADRENALS: Normal.  KIDNEYS/URETERS: No hydronephrosis or urinary tract calculi.    BLADDER: Normal.  REPRODUCTIVE ORGANS: Enlarged prostate.    BOWEL: No bowel-related abnormality. No bowel obstruction or bowel   inflammation. Normal appendix and ileocecal region. No evidence of active   colitis or diverticulitis.  PERITONEUM: No free air or ascites. No collection.  VESSELS: Aortoiliac atherosclerosis without aneurysm.  RETROPERITONEUM/LYMPH NODES: No adenopathy or hematoma.  ABDOMINAL WALL: Normal.  BONES: No aggressive lesion.    IMPRESSION:  *  No acute septic pathology.  *  Scattered bilateral small pulmonary nodules measuring up to 7 mm in   the right upper lobe are nonspecific and may represent   infectious/inflammatory disease or metastatic disease.  *  3.1 x 2.1 cm cystic structure in the posterior mediastinum at the   level of the leelee may represent a lymphatic structure, bronchogenic   cyst, orforegut duplication cyst.  *  No intra-abdominal collection.      --- End of Report ---            BAILEE WATTERS MD; Attending Radiologist  This document has been electronically signed. Apr 12 2024  7:53PM    < from: TTE W or WO Ultrasound Enhancing Agent (12.18.23 @ 07:38) >  CONCLUSIONS:      1. Left ventricular wall thickness is mildly increased. Left ventricular systolic function is normal with an ejection fraction visually estimated at 50 to 55 %. There are no regional wall motion abnormalities seen.   2. Normal left ventricular diastolic function.   3. Normal right ventricular cavity size, wall thickness, and systolic function. Tricuspid annular plane systolic excursion (TAPSE) is 2.8 cm (normal >=1.7 cm).   4. Mild mitral regurgitation.   5. Normal left and right atrial size.   6. No pericardial effusion seen.   7. Agitated saline injection was negative for intracardiac shunt.   8. Mild aortic regurgitation.   9. There is calcification of the mitral valve annulus.  10. Trileaflet aortic valve with reduced systolic excursion.mild aortic stenosis.    < end of copied text >

## 2024-04-13 NOTE — CONSULT NOTE ADULT - SUBJECTIVE AND OBJECTIVE BOX
Chief complain/HPI  A 78 year old male, from 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 Patient AAOx3 at bedside and mentioned having intermittent, mild, suprapubic discomfort associated w/ dysuria that has been going on for the last 48 hours. On further questioning, patient experiencing chills, malaise, and anorexia about the same time. Denies any chest pain, dyspnea, palpitations, nausea, vomiting, headache, neck pain, or weakness. Denies diarrhea or constipation. He does not have any other complaints. Patient was admitted to [facility] on 11/24/2023 and discharge on 12/08/23 where he was treated for sepsis. His perm cath was exchanged on Friday 11/25. Found to have MSSA bacteremia and transitioned from Vancomycin to Cefazolin. 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 Underwent right Permcath placement by IR on 12/4. LHC performed on 12/7 with non obstructive CAD, non ischemic cardiomyopathy w/o complications.    Arrival to the ER temp: 100.1, at present 98.1. He was placed on Cefepime and Vancomycin for possible PC infection.   CT abdomen and chest were negative .      PAST MEDICAL & SURGICAL HISTORY:  DM (diabetes mellitus)      HTN (hypertension)      Chronic kidney disease      HLD (hyperlipidemia)      Glaucoma      Gout      No significant past surgical history          Home Medications Reviewed    Hospital Medications:   MEDICATIONS  (STANDING):  aspirin  chewable 81 milliGRAM(s) Oral daily  atorvastatin 20 milliGRAM(s) Oral at bedtime  carvedilol 12.5 milliGRAM(s) Oral every 12 hours  cefepime   IVPB 500 milliGRAM(s) IV Intermittent every 24 hours  dextrose 10% Bolus 125 milliLiter(s) IV Bolus once  dextrose 5%. 1000 milliLiter(s) (100 mL/Hr) IV Continuous <Continuous>  dextrose 5%. 1000 milliLiter(s) (50 mL/Hr) IV Continuous <Continuous>  dextrose 50% Injectable 25 Gram(s) IV Push once  dextrose 50% Injectable 12.5 Gram(s) IV Push once  dorzolamide 2% Ophthalmic Solution 1 Drop(s) Both EYES three times a day  glucagon  Injectable 1 milliGRAM(s) IntraMuscular once  heparin   Injectable 5000 Unit(s) SubCutaneous every 12 hours  hydrALAZINE 50 milliGRAM(s) Oral three times a day  insulin lispro (ADMELOG) corrective regimen sliding scale   SubCutaneous Before meals and at bedtime  lactulose Syrup 10 Gram(s) Oral once  losartan 50 milliGRAM(s) Oral daily  senna 2 Tablet(s) Oral at bedtime  vancomycin  IVPB 1000 milliGRAM(s) IV Intermittent every 48 hours    MEDICATIONS  (PRN):  acetaminophen     Tablet .. 650 milliGRAM(s) Oral every 6 hours PRN Temp greater or equal to 38C (100.4F), Mild Pain (1 - 3)  dextrose Oral Gel 15 Gram(s) Oral once PRN Blood Glucose LESS THAN 70 milliGRAM(s)/deciliter      Allergies    No Known Allergies    Intolerances                              11.3   9.50  )-----------( 108      ( 13 Apr 2024 05:00 )             34.3     04-13    135  |  104  |  42<H>  ----------------------------<  167<H>  5.5<H>   |  23  |  4.82<H>    Ca    9.2      13 Apr 2024 05:00    TPro  7.3  /  Alb  2.9<L>  /  TBili  1.1  /  DBili  x   /  AST  18  /  ALT  15  /  AlkPhos  183<H>  04-13    PT/INR - ( 13 Apr 2024 05:00 )   PT: 16.3 sec;   INR: 1.45 ratio         PTT - ( 13 Apr 2024 05:00 )  PTT:26.7 sec  Urine Microscopic-Add On (NC) (04.12.24 @ 18:13)   Red Blood Cell - Urine: 3 /HPF  White Blood Cell - Urine: 2 /HPF  Bacteria: Occasional /HPFUrinalysis (04.12.24 @ 18:13)   pH Urine: 8.5  Glucose Qualitative, Urine: Negative mg/dL  Blood, Urine: Negative  Color: Yellow  Urine Appearance: Clear  Bilirubin: Negative  Ketone - Urine: Negative mg/dL  Specific Gravity: 1.014  Protein, Urine: 300 mg/dL  Urobilinogen: 0.2 mg/dL  Nitrite: Negative  Leukocyte Esterase Concentration: Negative          RADIOLOGY & ADDITIONAL STUDIES:    < from: CT Abdomen and Pelvis No Cont (04.12.24 @ 18:53) >  IMPRESSION:  *  No acute septic pathology.  *  Scattered bilateral small pulmonary nodules measuring up to 7 mm in   the right upper lobe are nonspecific and may represent   infectious/inflammatory disease or metastatic disease.  *  3.1 x 2.1 cm cystic structure in the posterior mediastinum at the   level of the leelee may represent a lymphatic structure, bronchogenic   cyst, orforegut duplication cyst.  *  No intra-abdominal collection.    < end of copied text >  SOCIAL HISTORY: Denies ETOh,Smoking,     FAMILY HISTORY:  No pertinent family history in first degree relatives        REVIEW OF SYSTEMS:  CONSTITUTIONAL: No malaise, No fatigue, No fevers or chills, well developed, no diaphoresis  EYES/ENT: reduced vision.    NECK: No pain or stiffness  RESPIRATORY: No cough, wheezing, hemoptysis; No shortness of breath  CARDIOVASCULAR: No chest pain or palpitations. No edema  GASTROINTESTINAL: No abdominal or epigastric pain. No nausea, vomiting, or hematemesis; No diarrhea or constipation. No melena or hematochezia.  GENITOURINARY: No dysuria, frequency, foamy urine, urinary urgency, incontinence or hematuria  NEUROLOGICAL: weakness in lower extremities       VITALS:  Vital Signs Last 24 Hrs  T(C): 36.8 (13 Apr 2024 11:30), Max: 38 (12 Apr 2024 16:07)  T(F): 98.2 (13 Apr 2024 11:30), Max: 100.4 (12 Apr 2024 16:07)  HR: 58 (13 Apr 2024 11:30) (58 - 74)  BP: 161/83 (13 Apr 2024 11:30) (131/82 - 205/98)  BP(mean): --  RR: 18 (13 Apr 2024 11:30) (16 - 21)  SpO2: 98% (13 Apr 2024 11:30) (96% - 100%)    Parameters below as of 13 Apr 2024 11:30  Patient On (Oxygen Delivery Method): room air        Height (cm): 165.1 (04-12 @ 16:07)  Weight (kg): 77.1 (04-12 @ 16:07)  BMI (kg/m2): 28.3 (04-12 @ 16:07)  BSA (m2): 1.85 (04-12 @ 16:07)    PHYSICAL EXAM:  Constitutional: NAD  HEENT: anicteric sclera, oropharynx clear, MMM  Neck: No JVD  Respiratory: air entrance B/L, no wheezes, rales or rhonchi  Cardiovascular: S1, S2, RRR, no pericardial rub, no murmur  Gastrointestinal: BS+, soft, no tenderness, no distension, no bruit  Pelvis: bladder non-distended, no CVA tenderness  Extremities: No cyanosis or clubbing. No peripheral edema  Neurological: A/O x 3.   Psychiatric: Normal mood, normal affect    Access: right PC , tunnel with no erythema  No discharge from exit site.

## 2024-04-14 LAB
-  STREPTOCOCCUS SP. (NOT GRP A, B OR S PNEUMONIAE): SIGNIFICANT CHANGE UP
ALBUMIN SERPL ELPH-MCNC: 2.7 G/DL — LOW (ref 3.5–5)
ALP SERPL-CCNC: 157 U/L — HIGH (ref 40–120)
ALT FLD-CCNC: 14 U/L DA — SIGNIFICANT CHANGE UP (ref 10–60)
ANION GAP SERPL CALC-SCNC: 11 MMOL/L — SIGNIFICANT CHANGE UP (ref 5–17)
AST SERPL-CCNC: 15 U/L — SIGNIFICANT CHANGE UP (ref 10–40)
BASOPHILS # BLD AUTO: 0.01 K/UL — SIGNIFICANT CHANGE UP (ref 0–0.2)
BASOPHILS NFR BLD AUTO: 0.2 % — SIGNIFICANT CHANGE UP (ref 0–2)
BILIRUB SERPL-MCNC: 0.9 MG/DL — SIGNIFICANT CHANGE UP (ref 0.2–1.2)
BUN SERPL-MCNC: 36 MG/DL — HIGH (ref 7–18)
CALCIUM SERPL-MCNC: 8.3 MG/DL — LOW (ref 8.4–10.5)
CALCIUM SERPL-MCNC: 8.7 MG/DL — SIGNIFICANT CHANGE UP (ref 8.4–10.5)
CHLORIDE SERPL-SCNC: 102 MMOL/L — SIGNIFICANT CHANGE UP (ref 96–108)
CO2 SERPL-SCNC: 22 MMOL/L — SIGNIFICANT CHANGE UP (ref 22–31)
CREAT SERPL-MCNC: 4.18 MG/DL — HIGH (ref 0.5–1.3)
CULTURE RESULTS: SIGNIFICANT CHANGE UP
EGFR: 14 ML/MIN/1.73M2 — LOW
EOSINOPHIL # BLD AUTO: 1.03 K/UL — HIGH (ref 0–0.5)
EOSINOPHIL NFR BLD AUTO: 18.5 % — HIGH (ref 0–6)
GLUCOSE BLDC GLUCOMTR-MCNC: 118 MG/DL — HIGH (ref 70–99)
GLUCOSE BLDC GLUCOMTR-MCNC: 121 MG/DL — HIGH (ref 70–99)
GLUCOSE BLDC GLUCOMTR-MCNC: 128 MG/DL — HIGH (ref 70–99)
GLUCOSE BLDC GLUCOMTR-MCNC: 160 MG/DL — HIGH (ref 70–99)
GLUCOSE SERPL-MCNC: 124 MG/DL — HIGH (ref 70–99)
GRAM STN FLD: ABNORMAL
GRAM STN FLD: ABNORMAL
HCT VFR BLD CALC: 32 % — LOW (ref 39–50)
HGB BLD-MCNC: 10.9 G/DL — LOW (ref 13–17)
IMM GRANULOCYTES NFR BLD AUTO: 0.2 % — SIGNIFICANT CHANGE UP (ref 0–0.9)
LYMPHOCYTES # BLD AUTO: 0.92 K/UL — LOW (ref 1–3.3)
LYMPHOCYTES # BLD AUTO: 16.5 % — SIGNIFICANT CHANGE UP (ref 13–44)
MCHC RBC-ENTMCNC: 32.5 PG — SIGNIFICANT CHANGE UP (ref 27–34)
MCHC RBC-ENTMCNC: 34.1 GM/DL — SIGNIFICANT CHANGE UP (ref 32–36)
MCV RBC AUTO: 95.5 FL — SIGNIFICANT CHANGE UP (ref 80–100)
METHOD TYPE: SIGNIFICANT CHANGE UP
MONOCYTES # BLD AUTO: 0.69 K/UL — SIGNIFICANT CHANGE UP (ref 0–0.9)
MONOCYTES NFR BLD AUTO: 12.4 % — SIGNIFICANT CHANGE UP (ref 2–14)
NEUTROPHILS # BLD AUTO: 2.9 K/UL — SIGNIFICANT CHANGE UP (ref 1.8–7.4)
NEUTROPHILS NFR BLD AUTO: 52.2 % — SIGNIFICANT CHANGE UP (ref 43–77)
NRBC # BLD: 0 /100 WBCS — SIGNIFICANT CHANGE UP (ref 0–0)
PLATELET # BLD AUTO: 107 K/UL — LOW (ref 150–400)
POTASSIUM SERPL-MCNC: 3.8 MMOL/L — SIGNIFICANT CHANGE UP (ref 3.5–5.3)
POTASSIUM SERPL-SCNC: 3.8 MMOL/L — SIGNIFICANT CHANGE UP (ref 3.5–5.3)
PROT SERPL-MCNC: 6.9 G/DL — SIGNIFICANT CHANGE UP (ref 6–8.3)
PTH-INTACT FLD-MCNC: 384 PG/ML — HIGH (ref 15–65)
RBC # BLD: 3.35 M/UL — LOW (ref 4.2–5.8)
RBC # FLD: 13.2 % — SIGNIFICANT CHANGE UP (ref 10.3–14.5)
SODIUM SERPL-SCNC: 135 MMOL/L — SIGNIFICANT CHANGE UP (ref 135–145)
SPECIMEN SOURCE: SIGNIFICANT CHANGE UP
WBC # BLD: 5.56 K/UL — SIGNIFICANT CHANGE UP (ref 3.8–10.5)
WBC # FLD AUTO: 5.56 K/UL — SIGNIFICANT CHANGE UP (ref 3.8–10.5)

## 2024-04-14 RX ORDER — CEFTRIAXONE 500 MG/1
2000 INJECTION, POWDER, FOR SOLUTION INTRAMUSCULAR; INTRAVENOUS EVERY 24 HOURS
Refills: 0 | Status: COMPLETED | OUTPATIENT
Start: 2024-04-14 | End: 2024-04-21

## 2024-04-14 RX ADMIN — HEPARIN SODIUM 5000 UNIT(S): 5000 INJECTION INTRAVENOUS; SUBCUTANEOUS at 05:54

## 2024-04-14 RX ADMIN — CARVEDILOL PHOSPHATE 12.5 MILLIGRAM(S): 80 CAPSULE, EXTENDED RELEASE ORAL at 18:24

## 2024-04-14 RX ADMIN — CARVEDILOL PHOSPHATE 12.5 MILLIGRAM(S): 80 CAPSULE, EXTENDED RELEASE ORAL at 05:54

## 2024-04-14 RX ADMIN — Medication 50 MILLIGRAM(S): at 21:48

## 2024-04-14 RX ADMIN — LOSARTAN POTASSIUM 50 MILLIGRAM(S): 100 TABLET, FILM COATED ORAL at 05:54

## 2024-04-14 RX ADMIN — Medication 1 DROP(S): at 21:48

## 2024-04-14 RX ADMIN — Medication 1 DROP(S): at 18:25

## 2024-04-14 RX ADMIN — Medication 1: at 12:42

## 2024-04-14 RX ADMIN — Medication 50 MILLIGRAM(S): at 04:50

## 2024-04-14 RX ADMIN — Medication 1 DROP(S): at 05:55

## 2024-04-14 RX ADMIN — DORZOLAMIDE HYDROCHLORIDE 1 DROP(S): 20 SOLUTION/ DROPS OPHTHALMIC at 13:55

## 2024-04-14 RX ADMIN — SENNA PLUS 2 TABLET(S): 8.6 TABLET ORAL at 21:48

## 2024-04-14 RX ADMIN — Medication 81 MILLIGRAM(S): at 12:43

## 2024-04-14 RX ADMIN — ATORVASTATIN CALCIUM 20 MILLIGRAM(S): 80 TABLET, FILM COATED ORAL at 21:48

## 2024-04-14 RX ADMIN — Medication 1 DROP(S): at 12:44

## 2024-04-14 RX ADMIN — HEPARIN SODIUM 5000 UNIT(S): 5000 INJECTION INTRAVENOUS; SUBCUTANEOUS at 18:24

## 2024-04-14 RX ADMIN — CEFTRIAXONE 100 MILLIGRAM(S): 500 INJECTION, POWDER, FOR SOLUTION INTRAMUSCULAR; INTRAVENOUS at 18:24

## 2024-04-14 RX ADMIN — Medication 50 MILLIGRAM(S): at 15:37

## 2024-04-14 RX ADMIN — DORZOLAMIDE HYDROCHLORIDE 1 DROP(S): 20 SOLUTION/ DROPS OPHTHALMIC at 05:55

## 2024-04-14 RX ADMIN — BRIMONIDINE TARTRATE 1 DROP(S): 2 SOLUTION/ DROPS OPHTHALMIC at 18:24

## 2024-04-14 RX ADMIN — BRIMONIDINE TARTRATE 1 DROP(S): 2 SOLUTION/ DROPS OPHTHALMIC at 05:55

## 2024-04-14 RX ADMIN — Medication 1 DROP(S): at 15:38

## 2024-04-14 RX ADMIN — Medication 1 DROP(S): at 02:04

## 2024-04-14 RX ADMIN — POLYETHYLENE GLYCOL 3350 17 GRAM(S): 17 POWDER, FOR SOLUTION ORAL at 12:47

## 2024-04-14 NOTE — PROGRESS NOTE ADULT - ASSESSMENT
ESRD - dialysis days are T-T-S.  Mild Hyperkalemia , K improved after dialysis, follow 2 gm K diet. Dialysis today, K   Fever with bacteremia Streptococcus sp. (Not Grp A, B or S pneumoniae): Detec  Gram Stain:    placed on Vancomycin and Cefepime follow results, await final Culture and sensitivity and to follow with ID. Source of infection ?

## 2024-04-14 NOTE — PROGRESS NOTE ADULT - SUBJECTIVE AND OBJECTIVE BOX
Patient is seen and examined at the bed side, is afebrile. The Both blood cultures from 4/12/24 grew Streptococcus sp.      REVIEW OF SYSTEMS: All other review systems are negative      ALLERGIES: No Known Allergies      Vital Signs Last 24 Hrs  T(C): 36.5 (14 Apr 2024 15:30), Max: 37.5 (14 Apr 2024 04:40)  T(F): 97.7 (14 Apr 2024 15:30), Max: 99.5 (14 Apr 2024 04:40)  HR: 52 (14 Apr 2024 15:30) (52 - 64)  BP: 127/74 (14 Apr 2024 15:30) (100/51 - 176/76)  BP(mean): 88 (14 Apr 2024 15:30) (66 - 88)  RR: 18 (14 Apr 2024 15:30) (17 - 19)  SpO2: 97% (14 Apr 2024 15:30) (95% - 99%)    Parameters below as of 14 Apr 2024 15:30  Patient On (Oxygen Delivery Method): room air      PHYSICAL EXAM:  GENERAL: Not in distress   CHEST/LUNG: Not using accessory muscles   HEART: s1 and s2 present  ABDOMEN:  Nontender and  Nondistended  EXTREMITIES: No pedal  edema  CNS: Awake and Alert      LABS:                        10.9   5.56  )-----------( 107      ( 14 Apr 2024 06:34 )             32.0                           11.3   9.50  )-----------( 108      ( 13 Apr 2024 05:00 )             34.3         04-14    135  |  102  |  36<H>  ----------------------------<  124<H>  3.8   |  22  |  4.18<H>    Ca    8.3<L>      14 Apr 2024 06:34  Phos  4.0     04-13    TPro  6.9  /  Alb  2.7<L>  /  TBili  0.9  /  DBili  x   /  AST  15  /  ALT  14  /  AlkPhos  157<H>  04-14 04-13    135  |  104  |  42<H>  ----------------------------<  167<H>  5.5<H>   |  23  |  4.82<H>    Ca    9.2      13 Apr 2024 05:00    TPro  7.3  /  Alb  2.9<L>  /  TBili  1.1  /  DBili  x   /  AST  18  /  ALT  15  /  AlkPhos  183<H>  04-13    PT/INR - ( 13 Apr 2024 05:00 )   PT: 16.3 sec;   INR: 1.45 ratio      PTT - ( 13 Apr 2024 05:00 )  PTT:26.7 sec      CAPILLARY BLOOD GLUCOSE  POCT Blood Glucose.: 186 mg/dL (13 Apr 2024 11:30)  POCT Blood Glucose.: 121 mg/dL (13 Apr 2024 07:37)        MEDICATIONS  (STANDING):    aspirin  chewable 81 milliGRAM(s) Oral daily  atorvastatin 20 milliGRAM(s) Oral at bedtime  brimonidine 0.2% Ophthalmic Solution 1 Drop(s) Right EYE two times a day  carvedilol 12.5 milliGRAM(s) Oral every 12 hours  cefepime   IVPB 500 milliGRAM(s) IV Intermittent every 24 hours  ciprofloxacin  0.3% Ophthalmic Solution 1 Drop(s) Right EYE two times a day  dorzolamide 2% Ophthalmic Solution 1 Drop(s) Both EYES three times a day  glucagon  Injectable 1 milliGRAM(s) IntraMuscular once  heparin   Injectable 5000 Unit(s) SubCutaneous every 12 hours  hydrALAZINE 50 milliGRAM(s) Oral three times a day  insulin lispro (ADMELOG) corrective regimen sliding scale   SubCutaneous Before meals and at bedtime  ketorolac 0.5% Ophthalmic Solution 1 Drop(s) Right EYE two times a day  losartan 50 milliGRAM(s) Oral daily  polyethylene glycol 3350 17 Gram(s) Oral daily  prednisoLONE acetate 1% Suspension 1 Drop(s) Right EYE every 4 hours  senna 2 Tablet(s) Oral at bedtime  vancomycin  IVPB 1000 milliGRAM(s) IV Intermittent every 48 hours      RADIOLOGY & ADDITIONAL TESTS:    4/12/24 : CT Abdomen and Pelvis No Cont (04.12.24 @ 18:53) >  *  No acute septic pathology.  *  Scattered bilateral small pulmonary nodules measuring up to 7 mm in the right upper lobe are nonspecific and may represent   infectious/inflammatory disease or metastatic disease.  *  3.1 x 2.1 cm cystic structure in the posterior mediastinum at the level of the leelee may represent a lymphatic structure, bronchogenic cyst, or foregut duplication cyst.  *  No intra-abdominal collection.      4/12/24 : CT Chest No Cont (04.12.24 @ 18:53) LUNGS AND LARGE AIRWAYS: The central airways are patent. Scattered   bilateral small pulmonary nodules measuring up to 7 mm in the right upper  lobe are nonspecific. No acute consolidation.  PLEURA: Trace effusions.        MICROBIOLOGY DATA:    Culture - Other (04.13.24 @ 09:39)   Specimen Source: Cath Site Catheter Site  Culture Results: No growth to date.    Culture - Blood (04.12.24 @ 18:00)   Gram Stain:   Growth in anaerobic bottle: Gram positive cocci in pairs  - Streptococcus sp. (Not Grp A, B or S pneumoniae): Detec  Specimen Source: .Blood Blood-Peripheral  Organism: Blood Culture PCR  Culture Results:   Growth in anaerobic bottle: Gram positive cocci in pairs   Direct identification is available within approximately 3-5   hours either by Blood Panel Multiplexed PCR or Direct   MALDI-TOF. Details: [email or web address]  Organism Identification: Blood Culture PCR  Method Type: PCR  Culture - Blood (04.12.24 @ 17:50)   Gram Stain:   Growth in aerobic bottle: Gram positive cocci in pairs  Specimen Source: .Blood Blood-Peripheral  Culture Results:   Growth in aerobic bottle: Gram positive cocci in pairs    Urine Microscopic-Add On (NC) (04.12.24 @ 18:13)   Red Blood Cell - Urine: 3 /HPF  White Blood Cell - Urine: 2 /HPF  Bacteria: Occasional /HPF    Respiratory Viral Panel with COVID-19 by OXANA (04.12.24 @ 17:50)   Rapid RVP Result: NotDetec  SARS-CoV-2: NotDetec:

## 2024-04-14 NOTE — PROGRESS NOTE ADULT - GASTROINTESTINAL
details… soft/nontender/nondistended/normal active bowel sounds/no guarding/no rigidity/no organomegaly/no palpable rosalva

## 2024-04-14 NOTE — PROGRESS NOTE ADULT - SUBJECTIVE AND OBJECTIVE BOX
Date of Service 04-14-24 @ 11:09    CHIEF COMPLAINT:Patient is a 78y old  Male who presents with a chief complaint of Sepsis .Pt appears comfortable.    	  REVIEW OF SYSTEMS:  CONSTITUTIONAL: No fever, weight loss, or fatigue  EYES: No eye pain, visual disturbances, or discharge  ENT:  No difficulty hearing, tinnitus, vertigo; No sinus or throat pain  NECK: No pain or stiffness  RESPIRATORY: No cough, wheezing, chills or hemoptysis; No Shortness of Breath  CARDIOVASCULAR: No chest pain, palpitations, passing out, dizziness, or leg swelling  GASTROINTESTINAL: No abdominal or epigastric pain. No nausea, vomiting, or hematemesis; No diarrhea or constipation. No melena or hematochezia.  GENITOURINARY: No dysuria, frequency, hematuria, or incontinence  NEUROLOGICAL: No headaches, memory loss, loss of strength, numbness, or tremors  SKIN: No itching, burning, rashes, or lesions   LYMPH Nodes: No enlarged glands  ENDOCRINE: No heat or cold intolerance; No hair loss  MUSCULOSKELETAL: No joint pain or swelling; No muscle, back, or extremity pain  PSYCHIATRIC: No depression, anxiety, mood swings, or difficulty sleeping  HEME/LYMPH: No easy bruising, or bleeding gums  ALLERGY AND IMMUNOLOGIC: No hives or eczema	        PHYSICAL EXAM:  T(C): 36.9 (04-14-24 @ 08:24), Max: 37.5 (04-14-24 @ 04:40)  HR: 59 (04-14-24 @ 08:24) (58 - 65)  BP: 115/59 (04-14-24 @ 08:24) (115/59 - 176/76)  RR: 18 (04-14-24 @ 08:24) (17 - 19)  SpO2: 98% (04-14-24 @ 08:24) (95% - 99%)  Wt(kg): --  I&O's Summary    13 Apr 2024 07:01  -  14 Apr 2024 07:00  --------------------------------------------------------  IN: 50 mL / OUT: 375 mL / NET: -325 mL        Appearance: Normal	  HEENT:   Normal oral mucosa, PERRL, EOMI	  Lymphatic: No lymphadenopathy  Cardiovascular: Normal S1 S2, No JVD, No murmurs, No edema  Respiratory: Lungs clear to auscultation	  Psychiatry: A & O x 3, Mood & affect appropriate  Gastrointestinal:  Soft, Non-tender, + BS	  Skin: No rashes, No ecchymoses, No cyanosis	  Neurologic: Non-focal  Extremities: Normal range of motion, No clubbing, cyanosis or edema  Vascular: Peripheral pulses palpable 2+ bilaterally    MEDICATIONS  (STANDING):  aspirin  chewable 81 milliGRAM(s) Oral daily  atorvastatin 20 milliGRAM(s) Oral at bedtime  brimonidine 0.2% Ophthalmic Solution 1 Drop(s) Right EYE two times a day  carvedilol 12.5 milliGRAM(s) Oral every 12 hours  cefepime   IVPB 500 milliGRAM(s) IV Intermittent every 24 hours  ciprofloxacin  0.3% Ophthalmic Solution 1 Drop(s) Right EYE two times a day  dextrose 10% Bolus 125 milliLiter(s) IV Bolus once  dextrose 5%. 1000 milliLiter(s) (100 mL/Hr) IV Continuous <Continuous>  dextrose 5%. 1000 milliLiter(s) (50 mL/Hr) IV Continuous <Continuous>  dextrose 50% Injectable 25 Gram(s) IV Push once  dextrose 50% Injectable 12.5 Gram(s) IV Push once  dorzolamide 2% Ophthalmic Solution 1 Drop(s) Both EYES three times a day  glucagon  Injectable 1 milliGRAM(s) IntraMuscular once  heparin   Injectable 5000 Unit(s) SubCutaneous every 12 hours  hydrALAZINE 50 milliGRAM(s) Oral three times a day  insulin lispro (ADMELOG) corrective regimen sliding scale   SubCutaneous Before meals and at bedtime  ketorolac 0.5% Ophthalmic Solution 1 Drop(s) Right EYE two times a day  losartan 50 milliGRAM(s) Oral daily  polyethylene glycol 3350 17 Gram(s) Oral daily  prednisoLONE acetate 1% Suspension 1 Drop(s) Right EYE every 4 hours  senna 2 Tablet(s) Oral at bedtime  vancomycin  IVPB 1000 milliGRAM(s) IV Intermittent every 48 hours      TELEMETRY: nsr,pac's	      	  LABS:	 	      CARDIAC MARKERS ( 13 Apr 2024 05:00 )  x     / x     / 59 U/L / x     / 1.3 ng/mL  CARDIAC MARKERS ( 12 Apr 2024 23:04 )  x     / x     / x     / x     / <1.0 ng/mL      Troponin I, High Sensitivity Result: 224.2 ng/L (04-13 @ 05:00)  Troponin I, High Sensitivity Result: 229.9 ng/L (04-12 @ 23:04)  Troponin I, High Sensitivity Result: 125.3 ng/L (04-12 @ 17:50)                            10.9   5.56  )-----------( 107      ( 14 Apr 2024 06:34 )             32.0     04-14    135  |  102  |  36<H>  ----------------------------<  124<H>  3.8   |  22  |  4.18<H>    Ca    8.3<L>      14 Apr 2024 06:34  Phos  4.0     04-13    TPro  6.9  /  Alb  2.7<L>  /  TBili  0.9  /  DBili  x   /  AST  15  /  ALT  14  /  AlkPhos  157<H>  04-14

## 2024-04-14 NOTE — PROGRESS NOTE ADULT - SUBJECTIVE AND OBJECTIVE BOX
Problem List:  ESRD, ADMITTED FOR FEVER AND SEPSIS  Culture - Urine (04.12.24 @ 18:13)   Specimen Source: Clean Catch Clean Catch (Midstream)  Culture Results:   <10,000 CFU/mL Normal Urogenital Kavita  Culture - Blood (04.12.24 @ 18:00)   - Streptococcus sp. (Not Grp A, B or S pneumoniae): Detec  Gram Stain:   Growth in anaerobic bottle: Gram positive cocci in pairs  Specimen Source: .Blood Blood-Peripheral  Organism: Blood Culture PCR  Culture Results:   Growth in anaerobic bottle: Gram positive cocci in pairs   Direct identification is available within approximately 3-5   hours either by Blood Panel Multiplexed PCR or Direct Culture - Blood (04.12.24 @ 17:50)   Specimen Source: .Blood Blood-Peripheral  Culture Results:   No growth at 24 hours  A 78 year old male, from [facility], living with son SUN (5x/week for 4h) with PMHx of HTN, HLD, DM (not on meds) ESRD on dialysis (Tue, Thu, Sat) and glaucoma, 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 Patient AAOx3 at bedside and mentioned having intermittent, mild, suprapubic discomfort associated w/ dysuria that has been going on for the last 48 hours. On further questioning, patient experiencing chills, malaise, and anorexia about the same time. 11/25. Found to have MSSA bacteremia and transitioned from Vancomycin to Cefazolin. 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 Underwent right Permcath placement by IR on 12/4. LHC performed on 12/7 with non obstructive CAD, non ischemic cardiomyopathy w/o complications.    PAST MEDICAL & SURGICAL HISTORY:  DM (diabetes mellitus)      HTN (hypertension)      Chronic kidney disease      HLD (hyperlipidemia)      Glaucoma      Gout      No significant past surgical history          No Known Allergies      MEDICATIONS  (STANDING):  aspirin  chewable 81 milliGRAM(s) Oral daily  atorvastatin 20 milliGRAM(s) Oral at bedtime  brimonidine 0.2% Ophthalmic Solution 1 Drop(s) Right EYE two times a day  carvedilol 12.5 milliGRAM(s) Oral every 12 hours  cefepime   IVPB 500 milliGRAM(s) IV Intermittent every 24 hours  ciprofloxacin  0.3% Ophthalmic Solution 1 Drop(s) Right EYE two times a day  dextrose 10% Bolus 125 milliLiter(s) IV Bolus once  dextrose 5%. 1000 milliLiter(s) (100 mL/Hr) IV Continuous <Continuous>  dextrose 5%. 1000 milliLiter(s) (50 mL/Hr) IV Continuous <Continuous>  dextrose 50% Injectable 25 Gram(s) IV Push once  dextrose 50% Injectable 12.5 Gram(s) IV Push once  dorzolamide 2% Ophthalmic Solution 1 Drop(s) Both EYES three times a day  glucagon  Injectable 1 milliGRAM(s) IntraMuscular once  heparin   Injectable 5000 Unit(s) SubCutaneous every 12 hours  hydrALAZINE 50 milliGRAM(s) Oral three times a day  insulin lispro (ADMELOG) corrective regimen sliding scale   SubCutaneous Before meals and at bedtime  ketorolac 0.5% Ophthalmic Solution 1 Drop(s) Right EYE two times a day  losartan 50 milliGRAM(s) Oral daily  polyethylene glycol 3350 17 Gram(s) Oral daily  prednisoLONE acetate 1% Suspension 1 Drop(s) Right EYE every 4 hours  senna 2 Tablet(s) Oral at bedtime  vancomycin  IVPB 1000 milliGRAM(s) IV Intermittent every 48 hours    MEDICATIONS  (PRN):  acetaminophen     Tablet .. 650 milliGRAM(s) Oral every 6 hours PRN Temp greater or equal to 38C (100.4F), Mild Pain (1 - 3)  dextrose Oral Gel 15 Gram(s) Oral once PRN Blood Glucose LESS THAN 70 milliGRAM(s)/deciliter                            10.9   5.56  )-----------( 107      ( 14 Apr 2024 06:34 )             32.0     04-14    135  |  102  |  36<H>  ----------------------------<  124<H>  3.8   |  22  |  4.18<H>    Ca    8.3<L>      14 Apr 2024 06:34  Phos  4.0     04-13    TPro  6.9  /  Alb  2.7<L>  /  TBili  0.9  /  DBili  x   /  AST  15  /  ALT  14  /  AlkPhos  157<H>  04-14    PT/INR - ( 13 Apr 2024 05:00 )   PT: 16.3 sec;   INR: 1.45 ratio         PTT - ( 13 Apr 2024 05:00 )  PTT:26.7 sec        REVIEW OF SYSTEMS:  General: no fever no chills, no weight loss.  EYES/ENT: No visual changes;  No vertigo, no headache.  NECK: No pain or stiffness  RESPIRATORY: No cough, wheezing, hemoptysis; No shortness of breath  CARDIOVASCULAR: No chest pain or palpitations. No Edema  GASTROINTESTINAL: No abdominal or epigastric pain. No nausea, vomiting. No diarrhea or constipation. No melena.  GENITOURINARY: No dysuria, frequency, foamy urine, urinary urgency, incontinence or hematuria  NEUROLOGICAL: No numbness or weakness, no tremor , no dizziness.   Muscle skeletal : no joint pain and no swelling of joints and limbs.  SKIN: No itching, burning, rashes.        VITALS:  T(F): 98.4 (04-14-24 @ 08:24), Max: 99.5 (04-14-24 @ 04:40)  HR: 59 (04-14-24 @ 08:24)  BP: 115/59 (04-14-24 @ 08:24)  RR: 18 (04-14-24 @ 08:24)  SpO2: 98% (04-14-24 @ 08:24)  Wt(kg): --    04-13 @ 07:01  -  04-14 @ 07:00  --------------------------------------------------------  IN: 50 mL / OUT: 375 mL / NET: -325 mL        PHYSICAL EXAM:  Constitutional: well developed, no diaphoresis, no distress.  Neck: No JVD, no carotid bruit, supple, no adenopathy  Respiratory: Good air entrance B/L, no wheezes, rales or rhonchi  Cardiovascular: S1, S2, RRR, no pericardial rub, no murmur  Abdomen: BS+, soft, no tenderness, no bruit  Pelvis: bladder nondistended  Extremities: No cyanosis or clubbing. No peripheral edema.   Pulses: All present  Neurological: A/O x 3, no focal deficits  Psychiatric: Normal mood, normal affect  Skin: No rashes  Vascular Access:     Problem List:  ESRD, ADMITTED FOR FEVER AND SEPSIS  Culture - Urine (04.12.24 @ 18:13)   Specimen Source: Clean Catch Clean Catch (Midstream)  Culture Results:   <10,000 CFU/mL Normal Urogenital Kavita  Culture - Blood (04.12.24 @ 18:00)   - Streptococcus sp. (Not Grp A, B or S pneumoniae): Detec  Gram Stain:   Growth in anaerobic bottle: Gram positive cocci in pairs  Specimen Source: .Blood Blood-Peripheral  Organism: Blood Culture PCR  Culture Results:   Growth in anaerobic bottle: Gram positive cocci in pairs   Direct identification is available within approximately 3-5   hours either by Blood Panel Multiplexed PCR or Direct Culture - Blood (04.12.24 @ 17:50)   Specimen Source: .Blood Blood-Peripheral  Culture Results:   No growth at 24 hours  A 78 year old male, from [facility], living with son SUN (5x/week for 4h) with PMHx of HTN, HLD, DM (not on meds) ESRD on dialysis (Tue, Thu, Sat) and glaucoma, 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 Patient AAOx3 at bedside and mentioned having intermittent, mild, suprapubic discomfort associated w/ dysuria that has been going on for the last 48 hours. On further questioning, patient experiencing chills, malaise, and anorexia about the same time. 11/25. Found to have MSSA bacteremia and transitioned from Vancomycin to Cefazolin. 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 Underwent right Permcath placement by IR on 12/4. LHC performed on 12/7 with non obstructive CAD, non ischemic cardiomyopathy w/o complications.    PAST MEDICAL & SURGICAL HISTORY:  DM (diabetes mellitus)      HTN (hypertension)      Chronic kidney disease      HLD (hyperlipidemia)      Glaucoma      Gout      No significant past surgical history          No Known Allergies      MEDICATIONS  (STANDING):  aspirin  chewable 81 milliGRAM(s) Oral daily  atorvastatin 20 milliGRAM(s) Oral at bedtime  brimonidine 0.2% Ophthalmic Solution 1 Drop(s) Right EYE two times a day  carvedilol 12.5 milliGRAM(s) Oral every 12 hours  cefepime   IVPB 500 milliGRAM(s) IV Intermittent every 24 hours  ciprofloxacin  0.3% Ophthalmic Solution 1 Drop(s) Right EYE two times a day  dextrose 10% Bolus 125 milliLiter(s) IV Bolus once  dextrose 5%. 1000 milliLiter(s) (100 mL/Hr) IV Continuous <Continuous>  dextrose 5%. 1000 milliLiter(s) (50 mL/Hr) IV Continuous <Continuous>  dextrose 50% Injectable 25 Gram(s) IV Push once  dextrose 50% Injectable 12.5 Gram(s) IV Push once  dorzolamide 2% Ophthalmic Solution 1 Drop(s) Both EYES three times a day  glucagon  Injectable 1 milliGRAM(s) IntraMuscular once  heparin   Injectable 5000 Unit(s) SubCutaneous every 12 hours  hydrALAZINE 50 milliGRAM(s) Oral three times a day  insulin lispro (ADMELOG) corrective regimen sliding scale   SubCutaneous Before meals and at bedtime  ketorolac 0.5% Ophthalmic Solution 1 Drop(s) Right EYE two times a day  losartan 50 milliGRAM(s) Oral daily  polyethylene glycol 3350 17 Gram(s) Oral daily  prednisoLONE acetate 1% Suspension 1 Drop(s) Right EYE every 4 hours  senna 2 Tablet(s) Oral at bedtime  vancomycin  IVPB 1000 milliGRAM(s) IV Intermittent every 48 hours    MEDICATIONS  (PRN):  acetaminophen     Tablet .. 650 milliGRAM(s) Oral every 6 hours PRN Temp greater or equal to 38C (100.4F), Mild Pain (1 - 3)  dextrose Oral Gel 15 Gram(s) Oral once PRN Blood Glucose LESS THAN 70 milliGRAM(s)/deciliter                            10.9   5.56  )-----------( 107      ( 14 Apr 2024 06:34 )             32.0     04-14    135  |  102  |  36<H>  ----------------------------<  124<H>  3.8   |  22  |  4.18<H>    Ca    8.3<L>      14 Apr 2024 06:34  Phos  4.0     04-13    TPro  6.9  /  Alb  2.7<L>  /  TBili  0.9  /  DBili  x   /  AST  15  /  ALT  14  /  AlkPhos  157<H>  04-14    PT/INR - ( 13 Apr 2024 05:00 )   PT: 16.3 sec;   INR: 1.45 ratio         PTT - ( 13 Apr 2024 05:00 )  PTT:26.7 sec        REVIEW OF SYSTEMS:  General: no fever no chills, no weight loss.  Feels better today. No sore throat no cough.   RESPIRATORY: No cough, wheezing, hemoptysis; No shortness of breath  CARDIOVASCULAR: No chest pain or palpitations. No Edema  GASTROINTESTINAL: No abdominal or epigastric pain. No nausea, vomiting. No diarrhea or constipation. No melena.  GENITOURINARY: No dysuria, frequency, foamy urine, urinary urgency, incontinence or hematuria  NEUROLOGICAL: No numbness or weakness, no tremor , no dizziness.           VITALS:  T(F): 98.4 (04-14-24 @ 08:24), Max: 99.5 (04-14-24 @ 04:40)  HR: 59 (04-14-24 @ 08:24)  BP: 115/59 (04-14-24 @ 08:24)  RR: 18 (04-14-24 @ 08:24)  SpO2: 98% (04-14-24 @ 08:24)  Wt(kg): --    04-13 @ 07:01  -  04-14 @ 07:00  --------------------------------------------------------  IN: 50 mL / OUT: 375 mL / NET: -325 mL        PHYSICAL EXAM:  Constitutional: well developed, no diaphoresis, no distress.  Neck: No JVD, no carotid bruit, supple, no adenopathy  Respiratory:  air entrance B/L, no wheezes, rales or rhonchi  Cardiovascular: S1, S2, RRR, no pericardial rub, no murmur  Abdomen: BS+, soft, no tenderness, no bruit  Pelvis: bladder nondistended  Extremities: No cyanosis or clubbing. No peripheral edema.   Vascular Access: right PC with no discharge and no tunnel erythema.

## 2024-04-14 NOTE — PROGRESS NOTE ADULT - ASSESSMENT
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 Patient AAOx3 at bedside and mentioned having intermittent, mild, suprapubic discomfort associated w/ dysuria that has been going on for the last 48 hours. Patient was admitted to [facility] on 11/24/2023 and discharge on 12/08/23 where he was treated for sepsis. His perm cath was exchanged on 11/223. Found to have MSSA bacteremia and transitioned from Vancomycin to Cefazolin. 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 Underwent right Permcath placement by IR on 12/4/23. On admission, he found to have fever, tachypnea but negative Urine analysis and negative chest CT. He has started on Cefepime and IV  Vancomycin, and the ID consult requested to assist with further evaluation and antibiotic management.    # Sepsis ( Fever + tachypnea)- Suspected Line sepsis  # Streptococcal Bacteremia- 4/12/24- in the setting of Perm-a cath  # H/o Recent MSSA bacteremia    would recommend:    1. Please consider removing the Perm-A-cath   2. Change Cefepime to ceftriaxone 2 g daily and Continue Vancomycin until blood cultures are finalzied  3. Repeat Blood cultures X 2 in AM to document clearing the blood stream  4. TTE    Attending Attestation:    Spent more than 45 minutes on total encounter, more than 50 % of the visit was spent counseling and/or coordinating care by the Attending physician.

## 2024-04-14 NOTE — PROGRESS NOTE ADULT - ASSESSMENT
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  1.Pan cx.Abx.  2.ESRD-HD as per renal.  3.HTN-cont bp medication.  4.DM-Insulin.  5.Lipid d/o-statin.  6.GI and DVT prophylaxis.  .

## 2024-04-14 NOTE — PROGRESS NOTE ADULT - ASSESSMENT
1. Abdominal pain improved  2. Constipation  3. Cholelithiasis  4. Biliary colic unlikely  5. Diverticulitis unlikely  6. Colitis unlikely  7. Anemia  8. No evidence of acute GI bleeding    Suggestions:    1. Protonix 40mg daily  2. Avoid NSAID  3. Daily stool softener  4. Monitor H/H  5. Transfuse PRBC as needed  6. Diet as tolerated  7. DVT prophylaxis

## 2024-04-14 NOTE — PROGRESS NOTE ADULT - SUBJECTIVE AND OBJECTIVE BOX
[   ] ICU                                          [   ] CCU                                      [ X  ] Medical Floor    Patient is a 78 year old male with abdominal pain. GI consulted to evaluate.        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 Patient also c/o constipation but denies nausea, vomiting, hematemesis, hematochezia, melena, fever, chest pain, SOB, cough, hematuria, or diarrhea.      Today patient appears comfortable. No new complaints reported, No abdominal pain, N/V, hematemesis, hematochezia, melena, fever, chills, chest pain, SOB, cough or diarrhea reported.         PAST MEDICAL HISTORY     DM (diabetes mellitus)    HTN (hypertension)     Diabetes mellitus    ESRD on HD     Hyperlipidemia     Glaucoma    Gout        PAST SURGICAL HISTORY    No significant past surgical history        Allergies    No Known Allergies    Intolerances  None       SOCIAL HISTORY  Advanced Directives:       [X  ] Full Code       [  ] DNR  Marital Status:         [  ] M      [X  ] S      [  ] D       [  ] W  Children:       [ X ] Yes      [  ] No  Occupation:        [  ] Employed       [ X ] Unemployed       [  ] Retired  Diet:       [ X ] Regular       [  ] PEG feeding          [  ] NG tube feeding  Drug Use:           [ X ] Patient denied          [  ] Yes  Alcohol:           [X  ] No             [  ] Yes (socially)         [  ] Yes (chronic)  Tobacco:           [  ] Yes           [ X ] No    FAMILY HISTORY  [ X ] Heart Disease            [ X ] Diabetes             [ X ] HTN             [  ] Colon Cancer             [  ] Stomach Cancer              [  ] Pancreatic Cancer          VITALS   Vital Signs Last 24 Hrs  T(C): 36.9 (04-14-24 @ 08:24), Max: 37.5 (04-14-24 @ 04:40)  T(F): 98.4 (04-14-24 @ 08:24), Max: 99.5 (04-14-24 @ 04:40)  HR: 59 (04-14-24 @ 08:24) (58 - 65)  BP: 115/59 (04-14-24 @ 08:24) (115/59 - 176/76)  BP(mean): 76 (04-14-24 @ 08:24) (76 - 76)  RR: 18 (04-14-24 @ 08:24) (17 - 19)  SpO2: 98% (04-14-24 @ 08:24) (95% - 99%)        MEDICATIONS  (STANDING):  aspirin  chewable 81 milliGRAM(s) Oral daily  atorvastatin 20 milliGRAM(s) Oral at bedtime  brimonidine 0.2% Ophthalmic Solution 1 Drop(s) Right EYE two times a day  carvedilol 12.5 milliGRAM(s) Oral every 12 hours  cefepime   IVPB 500 milliGRAM(s) IV Intermittent every 24 hours  ciprofloxacin  0.3% Ophthalmic Solution 1 Drop(s) Right EYE two times a day  dextrose 10% Bolus 125 milliLiter(s) IV Bolus once  dextrose 5%. 1000 milliLiter(s) (100 mL/Hr) IV Continuous <Continuous>  dextrose 5%. 1000 milliLiter(s) (50 mL/Hr) IV Continuous <Continuous>  dextrose 50% Injectable 25 Gram(s) IV Push once  dextrose 50% Injectable 12.5 Gram(s) IV Push once  dorzolamide 2% Ophthalmic Solution 1 Drop(s) Both EYES three times a day  glucagon  Injectable 1 milliGRAM(s) IntraMuscular once  heparin   Injectable 5000 Unit(s) SubCutaneous every 12 hours  hydrALAZINE 50 milliGRAM(s) Oral three times a day  insulin lispro (ADMELOG) corrective regimen sliding scale   SubCutaneous Before meals and at bedtime  ketorolac 0.5% Ophthalmic Solution 1 Drop(s) Right EYE two times a day  losartan 50 milliGRAM(s) Oral daily  polyethylene glycol 3350 17 Gram(s) Oral daily  prednisoLONE acetate 1% Suspension 1 Drop(s) Right EYE every 4 hours  senna 2 Tablet(s) Oral at bedtime  vancomycin  IVPB 1000 milliGRAM(s) IV Intermittent every 48 hours    MEDICATIONS  (PRN):  acetaminophen     Tablet .. 650 milliGRAM(s) Oral every 6 hours PRN Temp greater or equal to 38C (100.4F), Mild Pain (1 - 3)  dextrose Oral Gel 15 Gram(s) Oral once PRN Blood Glucose LESS THAN 70 milliGRAM(s)/deciliter                            10.9   5.56  )-----------( 107      ( 14 Apr 2024 06:34 )             32.0       04-14    135  |  102  |  36<H>  ----------------------------<  124<H>  3.8   |  22  |  4.18<H>    Ca    8.3<L>      14 Apr 2024 06:34  Phos  4.0     04-13    TPro  6.9  /  Alb  2.7<L>  /  TBili  0.9  /  DBili  x   /  AST  15  /  ALT  14  /  AlkPhos  157<H>  04-14      PT/INR - ( 13 Apr 2024 05:00 )   PT: 16.3 sec;   INR: 1.45 ratio         PTT - ( 13 Apr 2024 05:00 )  PTT:26.7 sec

## 2024-04-14 NOTE — PROGRESS NOTE ADULT - SUBJECTIVE AND OBJECTIVE BOX
Patient is a 78y old  Male who presents with a chief complaint of Sepsis (13 Apr 2024 16:00)    pt seen in ed tele [ x ], reg med floor [   ], bed [x  ], chair at bedside [   ], a+o x3 [ x ], lethargic [  ],    nad [ x ]        Allergies    No Known Allergies        Vitals    T(F): 99.5 (04-14-24 @ 04:40), Max: 99.5 (04-14-24 @ 04:40)  HR: 64 (04-14-24 @ 04:40) (58 - 65)  BP: 150/73 (04-14-24 @ 04:40) (150/73 - 176/76)  RR: 18 (04-14-24 @ 04:40) (17 - 19)  SpO2: 97% (04-14-24 @ 04:40) (95% - 99%)  Wt(kg): --  CAPILLARY BLOOD GLUCOSE      POCT Blood Glucose.: 136 mg/dL (13 Apr 2024 20:42)      Labs                          11.3   9.50  )-----------( 108      ( 13 Apr 2024 05:00 )             34.3       04-13    135  |  104  |  42<H>  ----------------------------<  167<H>  5.5<H>   |  23  |  4.82<H>    Ca    9.2      13 Apr 2024 05:00  Phos  4.0     04-13    TPro  7.3  /  Alb  2.9<L>  /  TBili  1.1  /  DBili  x   /  AST  18  /  ALT  15  /  AlkPhos  183<H>  04-13      CARDIAC MARKERS ( 13 Apr 2024 05:00 )  x     / x     / 59 U/L / x     / 1.3 ng/mL  CARDIAC MARKERS ( 12 Apr 2024 23:04 )  x     / x     / x     / x     / <1.0 ng/mL      Troponin I, High Sensitivity Result: 224.2 ng/L (04-13-24 @ 05:00)  Troponin I, High Sensitivity Result: 229.9 ng/L (04-12-24 @ 23:04)  Troponin I, High Sensitivity Result: 125.3 ng/L (04-12-24 @ 17:50)    .Blood Blood-Peripheral  04-12 @ 18:00   Growth in anaerobic bottle: Gram positive cocci in pairs  Direct identification is available within approximately 3-5  hours either by Blood Panel Multiplexed PCR or Direct  MALDI-TOF. Details: [email or web address]  --    Growth in anaerobic bottle: Gram positive cocci in pairs      .Blood Blood-Peripheral  04-12 @ 17:50   No growth at 24 hours  --  --          Radiology Results      Meds    MEDICATIONS  (STANDING):  aspirin  chewable 81 milliGRAM(s) Oral daily  atorvastatin 20 milliGRAM(s) Oral at bedtime  brimonidine 0.2% Ophthalmic Solution 1 Drop(s) Right EYE two times a day  carvedilol 12.5 milliGRAM(s) Oral every 12 hours  cefepime   IVPB 500 milliGRAM(s) IV Intermittent every 24 hours  ciprofloxacin  0.3% Ophthalmic Solution 1 Drop(s) Right EYE two times a day  dextrose 10% Bolus 125 milliLiter(s) IV Bolus once  dextrose 5%. 1000 milliLiter(s) (50 mL/Hr) IV Continuous <Continuous>  dextrose 5%. 1000 milliLiter(s) (100 mL/Hr) IV Continuous <Continuous>  dextrose 50% Injectable 25 Gram(s) IV Push once  dextrose 50% Injectable 12.5 Gram(s) IV Push once  dorzolamide 2% Ophthalmic Solution 1 Drop(s) Both EYES three times a day  glucagon  Injectable 1 milliGRAM(s) IntraMuscular once  heparin   Injectable 5000 Unit(s) SubCutaneous every 12 hours  hydrALAZINE 50 milliGRAM(s) Oral three times a day  insulin lispro (ADMELOG) corrective regimen sliding scale   SubCutaneous Before meals and at bedtime  ketorolac 0.5% Ophthalmic Solution 1 Drop(s) Right EYE two times a day  losartan 50 milliGRAM(s) Oral daily  polyethylene glycol 3350 17 Gram(s) Oral daily  prednisoLONE acetate 1% Suspension 1 Drop(s) Right EYE every 4 hours  senna 2 Tablet(s) Oral at bedtime  vancomycin  IVPB 1000 milliGRAM(s) IV Intermittent every 48 hours      MEDICATIONS  (PRN):  acetaminophen     Tablet .. 650 milliGRAM(s) Oral every 6 hours PRN Temp greater or equal to 38C (100.4F), Mild Pain (1 - 3)  dextrose Oral Gel 15 Gram(s) Oral once PRN Blood Glucose LESS THAN 70 milliGRAM(s)/deciliter      Physical Exam    Neuro :  no focal deficits  Respiratory: CTA B/L  CV: RRR, S1S2, no murmurs,   Abdominal: Soft, NT, ND +BS,  Extremities: No edema, + peripheral pulses      ASSESSMENT      uncontrolled htn,   abnormal trop r/o acs,   poss 2nd to demand ischemia,    abd pain poss 2nd to constipation,    fever ?source,   pulmonary nodules,    bronchogenic cyst, or foregut duplication cyst,   hyperkalemia  h/o sinus node dysfunction,   chronic HFpEF,   aortic stenosis,   HTN,   HLD,   b/l carotid stenosis,   DM (not on meds),   ESRD on dialysis (Tue, Thu, Sat),   Gout,    glaucoma,   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         Patient is a 78y old  Male who presents with a chief complaint of Sepsis (13 Apr 2024 16:00)    pt seen in tele [ x ], reg med floor [   ], bed [x  ], chair at bedside [   ], a+o x3 [ x ], lethargic [  ],    nad [ x ]        Allergies    No Known Allergies        Vitals    T(F): 99.5 (04-14-24 @ 04:40), Max: 99.5 (04-14-24 @ 04:40)  HR: 64 (04-14-24 @ 04:40) (58 - 65)  BP: 150/73 (04-14-24 @ 04:40) (150/73 - 176/76)  RR: 18 (04-14-24 @ 04:40) (17 - 19)  SpO2: 97% (04-14-24 @ 04:40) (95% - 99%)  Wt(kg): --  CAPILLARY BLOOD GLUCOSE      POCT Blood Glucose.: 136 mg/dL (13 Apr 2024 20:42)      Labs                          11.3   9.50  )-----------( 108      ( 13 Apr 2024 05:00 )             34.3       04-13    135  |  104  |  42<H>  ----------------------------<  167<H>  5.5<H>   |  23  |  4.82<H>    Ca    9.2      13 Apr 2024 05:00  Phos  4.0     04-13    TPro  7.3  /  Alb  2.9<L>  /  TBili  1.1  /  DBili  x   /  AST  18  /  ALT  15  /  AlkPhos  183<H>  04-13    Procalcitonin, Serum (04.13.24 @ 05:00)   Procalcitonin, Serum: 0.60:    A1C with Estimated Average Glucose (04.13.24 @ 05:00)   A1C with Estimated Average Glucose Result: 6.4:       CARDIAC MARKERS ( 13 Apr 2024 05:00 )  x     / x     / 59 U/L / x     / 1.3 ng/mL  CARDIAC MARKERS ( 12 Apr 2024 23:04 )  x     / x     / x     / x     / <1.0 ng/mL      Troponin I, High Sensitivity Result: 224.2 ng/L (04-13-24 @ 05:00)  Troponin I, High Sensitivity Result: 229.9 ng/L (04-12-24 @ 23:04)  Troponin I, High Sensitivity Result: 125.3 ng/L (04-12-24 @ 17:50)      Culture - Urine (04.12.24 @ 18:13)   Specimen Source: Clean Catch Clean Catch (Midstream)  Culture Results:   <10,000 CFU/mL Normal Urogenital Kavita     .Blood Blood-Peripheral  04-12 @ 18:00   Growth in anaerobic bottle: Gram positive cocci in pairs  Direct identification is available within approximately 3-5  hours either by Blood Panel Multiplexed PCR or Direct  MALDI-TOF. Details: [email or web address]  --    Growth in anaerobic bottle: Gram positive cocci in pairs  - Streptococcus sp. (Not Grp A, B or S pneumoniae): Detec      .Blood Blood-Peripheral  04-12 @ 17:50   No growth at 24 hours  --  --          Radiology Results      Meds    MEDICATIONS  (STANDING):  aspirin  chewable 81 milliGRAM(s) Oral daily  atorvastatin 20 milliGRAM(s) Oral at bedtime  brimonidine 0.2% Ophthalmic Solution 1 Drop(s) Right EYE two times a day  carvedilol 12.5 milliGRAM(s) Oral every 12 hours  cefepime   IVPB 500 milliGRAM(s) IV Intermittent every 24 hours  ciprofloxacin  0.3% Ophthalmic Solution 1 Drop(s) Right EYE two times a day  dextrose 10% Bolus 125 milliLiter(s) IV Bolus once  dextrose 5%. 1000 milliLiter(s) (50 mL/Hr) IV Continuous <Continuous>  dextrose 5%. 1000 milliLiter(s) (100 mL/Hr) IV Continuous <Continuous>  dextrose 50% Injectable 25 Gram(s) IV Push once  dextrose 50% Injectable 12.5 Gram(s) IV Push once  dorzolamide 2% Ophthalmic Solution 1 Drop(s) Both EYES three times a day  glucagon  Injectable 1 milliGRAM(s) IntraMuscular once  heparin   Injectable 5000 Unit(s) SubCutaneous every 12 hours  hydrALAZINE 50 milliGRAM(s) Oral three times a day  insulin lispro (ADMELOG) corrective regimen sliding scale   SubCutaneous Before meals and at bedtime  ketorolac 0.5% Ophthalmic Solution 1 Drop(s) Right EYE two times a day  losartan 50 milliGRAM(s) Oral daily  polyethylene glycol 3350 17 Gram(s) Oral daily  prednisoLONE acetate 1% Suspension 1 Drop(s) Right EYE every 4 hours  senna 2 Tablet(s) Oral at bedtime  vancomycin  IVPB 1000 milliGRAM(s) IV Intermittent every 48 hours      MEDICATIONS  (PRN):  acetaminophen     Tablet .. 650 milliGRAM(s) Oral every 6 hours PRN Temp greater or equal to 38C (100.4F), Mild Pain (1 - 3)  dextrose Oral Gel 15 Gram(s) Oral once PRN Blood Glucose LESS THAN 70 milliGRAM(s)/deciliter      Physical Exam    Neuro :  no focal deficits  Respiratory: CTA B/L  CV: RRR, S1S2, no murmurs,   Abdominal: Soft, NT, ND +BS,  Extremities: No edema, + peripheral pulses      ASSESSMENT      uncontrolled htn,   abnormal trop r/o acs,   poss 2nd to demand ischemia,    abd pain poss 2nd to constipation,    fever ?source,   pulmonary nodules,    bronchogenic cyst, or foregut duplication cyst,   hyperkalemia  h/o sinus node dysfunction,   chronic HFpEF,   aortic stenosis,   HTN,   HLD,   b/l carotid stenosis,   DM (not on meds),   ESRD on dialysis (Tue, Thu, Sat),   Gout,    glaucoma,   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 (Not Grp A, B or S pneumoniae) noted above  id f/u    Follow up Perm-A-cath site and Blood cultures  Monitor Temp. and c/w supportive care  Continue Cefepime and IV Vancomycin until work up is done  procalcitonin 0.6 noted above   f/u quantiferon tb gold test   pulm cons   pt on hd t,th,s   s/p hd yesterday    renal f/u   f/ bmp  hgba1c 6.4 noted above   lispro ss   cont current meds

## 2024-04-15 ENCOUNTER — RESULT REVIEW (OUTPATIENT)
Age: 79
End: 2024-04-15

## 2024-04-15 LAB
ALBUMIN SERPL ELPH-MCNC: 2.6 G/DL — LOW (ref 3.5–5)
ALP SERPL-CCNC: 141 U/L — HIGH (ref 40–120)
ALT FLD-CCNC: 13 U/L DA — SIGNIFICANT CHANGE UP (ref 10–60)
ANION GAP SERPL CALC-SCNC: 11 MMOL/L — SIGNIFICANT CHANGE UP (ref 5–17)
AST SERPL-CCNC: 16 U/L — SIGNIFICANT CHANGE UP (ref 10–40)
BASOPHILS # BLD AUTO: 0.02 K/UL — SIGNIFICANT CHANGE UP (ref 0–0.2)
BASOPHILS NFR BLD AUTO: 0.3 % — SIGNIFICANT CHANGE UP (ref 0–2)
BILIRUB SERPL-MCNC: 0.3 MG/DL — SIGNIFICANT CHANGE UP (ref 0.2–1.2)
BUN SERPL-MCNC: 58 MG/DL — HIGH (ref 7–18)
CALCIUM SERPL-MCNC: 8.4 MG/DL — SIGNIFICANT CHANGE UP (ref 8.4–10.5)
CHLORIDE SERPL-SCNC: 104 MMOL/L — SIGNIFICANT CHANGE UP (ref 96–108)
CO2 SERPL-SCNC: 21 MMOL/L — LOW (ref 22–31)
CREAT SERPL-MCNC: 5.21 MG/DL — HIGH (ref 0.5–1.3)
CULTURE RESULTS: ABNORMAL
EGFR: 11 ML/MIN/1.73M2 — LOW
EOSINOPHIL # BLD AUTO: 3.16 K/UL — HIGH (ref 0–0.5)
EOSINOPHIL NFR BLD AUTO: 51.7 % — HIGH (ref 0–6)
GLUCOSE BLDC GLUCOMTR-MCNC: 107 MG/DL — HIGH (ref 70–99)
GLUCOSE BLDC GLUCOMTR-MCNC: 117 MG/DL — HIGH (ref 70–99)
GLUCOSE BLDC GLUCOMTR-MCNC: 127 MG/DL — HIGH (ref 70–99)
GLUCOSE BLDC GLUCOMTR-MCNC: 142 MG/DL — HIGH (ref 70–99)
GLUCOSE SERPL-MCNC: 116 MG/DL — HIGH (ref 70–99)
GRAM STN FLD: ABNORMAL
HBV CORE AB SER-ACNC: REACTIVE
HBV SURFACE AB SER-ACNC: ABNORMAL
HBV SURFACE AG SER-ACNC: SIGNIFICANT CHANGE UP
HCT VFR BLD CALC: 31.4 % — LOW (ref 39–50)
HCV AB S/CO SERPL IA: 0.11 S/CO — SIGNIFICANT CHANGE UP (ref 0–0.99)
HCV AB SERPL-IMP: SIGNIFICANT CHANGE UP
HGB BLD-MCNC: 10.7 G/DL — LOW (ref 13–17)
IMM GRANULOCYTES NFR BLD AUTO: 0.2 % — SIGNIFICANT CHANGE UP (ref 0–0.9)
LYMPHOCYTES # BLD AUTO: 1.5 K/UL — SIGNIFICANT CHANGE UP (ref 1–3.3)
LYMPHOCYTES # BLD AUTO: 24.5 % — SIGNIFICANT CHANGE UP (ref 13–44)
MCHC RBC-ENTMCNC: 32.7 PG — SIGNIFICANT CHANGE UP (ref 27–34)
MCHC RBC-ENTMCNC: 34.1 GM/DL — SIGNIFICANT CHANGE UP (ref 32–36)
MCV RBC AUTO: 96 FL — SIGNIFICANT CHANGE UP (ref 80–100)
MONOCYTES # BLD AUTO: 0.79 K/UL — SIGNIFICANT CHANGE UP (ref 0–0.9)
MONOCYTES NFR BLD AUTO: 12.9 % — SIGNIFICANT CHANGE UP (ref 2–14)
NEUTROPHILS # BLD AUTO: 0.63 K/UL — LOW (ref 1.8–7.4)
NEUTROPHILS NFR BLD AUTO: 10.4 % — LOW (ref 43–77)
NRBC # BLD: 0 /100 WBCS — SIGNIFICANT CHANGE UP (ref 0–0)
ORGANISM # SPEC MICROSCOPIC CNT: ABNORMAL
ORGANISM # SPEC MICROSCOPIC CNT: ABNORMAL
PLATELET # BLD AUTO: 118 K/UL — LOW (ref 150–400)
POTASSIUM SERPL-MCNC: 4.3 MMOL/L — SIGNIFICANT CHANGE UP (ref 3.5–5.3)
POTASSIUM SERPL-SCNC: 4.3 MMOL/L — SIGNIFICANT CHANGE UP (ref 3.5–5.3)
PROT SERPL-MCNC: 6.9 G/DL — SIGNIFICANT CHANGE UP (ref 6–8.3)
RBC # BLD: 3.27 M/UL — LOW (ref 4.2–5.8)
RBC # FLD: 13.3 % — SIGNIFICANT CHANGE UP (ref 10.3–14.5)
SODIUM SERPL-SCNC: 136 MMOL/L — SIGNIFICANT CHANGE UP (ref 135–145)
SPECIMEN SOURCE: SIGNIFICANT CHANGE UP
VANCOMYCIN TROUGH SERPL-MCNC: 13.8 UG/ML — SIGNIFICANT CHANGE UP (ref 10–20)
WBC # BLD: 6.11 K/UL — SIGNIFICANT CHANGE UP (ref 3.8–10.5)
WBC # FLD AUTO: 6.11 K/UL — SIGNIFICANT CHANGE UP (ref 3.8–10.5)

## 2024-04-15 RX ORDER — CHLORHEXIDINE GLUCONATE 213 G/1000ML
1 SOLUTION TOPICAL
Refills: 0 | Status: DISCONTINUED | OUTPATIENT
Start: 2024-04-15 | End: 2024-04-26

## 2024-04-15 RX ADMIN — HEPARIN SODIUM 5000 UNIT(S): 5000 INJECTION INTRAVENOUS; SUBCUTANEOUS at 18:49

## 2024-04-15 RX ADMIN — Medication 1 DROP(S): at 18:49

## 2024-04-15 RX ADMIN — Medication 1 DROP(S): at 05:49

## 2024-04-15 RX ADMIN — CARVEDILOL PHOSPHATE 12.5 MILLIGRAM(S): 80 CAPSULE, EXTENDED RELEASE ORAL at 18:49

## 2024-04-15 RX ADMIN — Medication 81 MILLIGRAM(S): at 11:54

## 2024-04-15 RX ADMIN — BRIMONIDINE TARTRATE 1 DROP(S): 2 SOLUTION/ DROPS OPHTHALMIC at 18:50

## 2024-04-15 RX ADMIN — Medication 1 DROP(S): at 18:51

## 2024-04-15 RX ADMIN — Medication 1 DROP(S): at 11:48

## 2024-04-15 RX ADMIN — Medication 50 MILLIGRAM(S): at 13:34

## 2024-04-15 RX ADMIN — BRIMONIDINE TARTRATE 1 DROP(S): 2 SOLUTION/ DROPS OPHTHALMIC at 05:50

## 2024-04-15 RX ADMIN — HEPARIN SODIUM 5000 UNIT(S): 5000 INJECTION INTRAVENOUS; SUBCUTANEOUS at 05:49

## 2024-04-15 RX ADMIN — Medication 1 DROP(S): at 16:02

## 2024-04-15 RX ADMIN — Medication 50 MILLIGRAM(S): at 05:48

## 2024-04-15 RX ADMIN — DORZOLAMIDE HYDROCHLORIDE 1 DROP(S): 20 SOLUTION/ DROPS OPHTHALMIC at 13:32

## 2024-04-15 RX ADMIN — Medication 1 DROP(S): at 18:50

## 2024-04-15 RX ADMIN — DORZOLAMIDE HYDROCHLORIDE 1 DROP(S): 20 SOLUTION/ DROPS OPHTHALMIC at 05:49

## 2024-04-15 RX ADMIN — Medication 250 MILLIGRAM(S): at 21:46

## 2024-04-15 RX ADMIN — POLYETHYLENE GLYCOL 3350 17 GRAM(S): 17 POWDER, FOR SOLUTION ORAL at 11:54

## 2024-04-15 RX ADMIN — LOSARTAN POTASSIUM 50 MILLIGRAM(S): 100 TABLET, FILM COATED ORAL at 05:49

## 2024-04-15 RX ADMIN — SENNA PLUS 2 TABLET(S): 8.6 TABLET ORAL at 21:45

## 2024-04-15 RX ADMIN — Medication 50 MILLIGRAM(S): at 21:46

## 2024-04-15 RX ADMIN — Medication 1 DROP(S): at 02:32

## 2024-04-15 RX ADMIN — CEFTRIAXONE 100 MILLIGRAM(S): 500 INJECTION, POWDER, FOR SOLUTION INTRAMUSCULAR; INTRAVENOUS at 18:50

## 2024-04-15 RX ADMIN — Medication 1 DROP(S): at 21:46

## 2024-04-15 RX ADMIN — ATORVASTATIN CALCIUM 20 MILLIGRAM(S): 80 TABLET, FILM COATED ORAL at 21:46

## 2024-04-15 RX ADMIN — Medication 1 DROP(S): at 05:50

## 2024-04-15 NOTE — PROGRESS NOTE ADULT - ASSESSMENT
ESRD - dialysis days are T-T-S.  Mild Hyperkalemia , K improved after dialysis, follow 2 gm K diet. Dialysis today, K   Fever with bacteremia Streptococcus sp. (Not Grp A, B or S pneumoniae): Detec  Gram Stain:  catheter site blood cultures  are negative  Malfunction catheter on last  dialysis  traetment       Will have dialysis in am , follow catheter function.  ID to follow and consider complete change of catheter versus a change over guidewire.   AVG placement after discharge at [facility].

## 2024-04-15 NOTE — PROGRESS NOTE ADULT - GASTROINTESTINAL
soft/nontender/nondistended/normal active bowel sounds/no guarding/no rigidity/no organomegaly/no palpable rosalva details…

## 2024-04-15 NOTE — PROGRESS NOTE ADULT - SUBJECTIVE AND OBJECTIVE BOX
Patient is seen and examined at the bed side, is afebrile. The repeat blood cultures from 4/13/24 have NGTD.      REVIEW OF SYSTEMS: All other review systems are negative      ALLERGIES: No Known Allergies      Vital Signs Last 24 Hrs  T(C): 36.3 (15 Apr 2024 11:13), Max: 36.9 (15 Apr 2024 07:53)  T(F): 97.3 (15 Apr 2024 11:13), Max: 98.4 (15 Apr 2024 07:53)  HR: 58 (15 Apr 2024 11:13) (50 - 58)  BP: 131/67 (15 Apr 2024 11:13) (120/67 - 146/71)  BP(mean): 83 (14 Apr 2024 20:15) (83 - 83)  RR: 19 (15 Apr 2024 11:13) (17 - 19)  SpO2: 97% (15 Apr 2024 11:13) (94% - 98%)    Parameters below as of 15 Apr 2024 11:13  Patient On (Oxygen Delivery Method): room air        PHYSICAL EXAM:  GENERAL: Not in distress   CHEST/LUNG: Not using accessory muscles   HEART: s1 and s2 present  ABDOMEN:  Nontender and  Nondistended  EXTREMITIES: No pedal  edema  CNS: Awake and Alert      LABS:                        10.7   6.11  )-----------( 118      ( 15 Apr 2024 06:54 )             31.4                           10.9   5.56  )-----------( 107      ( 14 Apr 2024 06:34 )             32.0         04-15    136  |  104  |  58<H>  ----------------------------<  116<H>  4.3   |  21<L>  |  5.21<H>    Ca    8.4      15 Apr 2024 06:54  Phos  4.0     04-13    TPro  6.9  /  Alb  2.6<L>  /  TBili  0.3  /  DBili  x   /  AST  16  /  ALT  13  /  AlkPhos  141<H>  04-15    04-14    135  |  102  |  36<H>  ----------------------------<  124<H>  3.8   |  22  |  4.18<H>    Ca    8.3<L>      14 Apr 2024 06:34  Phos  4.0     04-13    TPro  6.9  /  Alb  2.7<L>  /  TBili  0.9  /  DBili  x   /  AST  15  /  ALT  14  /  AlkPhos  157<H>  04-14    PT/INR - ( 13 Apr 2024 05:00 )   PT: 16.3 sec;   INR: 1.45 ratio      PTT - ( 13 Apr 2024 05:00 )  PTT:26.7 sec      CAPILLARY BLOOD GLUCOSE  POCT Blood Glucose.: 186 mg/dL (13 Apr 2024 11:30)  POCT Blood Glucose.: 121 mg/dL (13 Apr 2024 07:37)        MEDICATIONS  (STANDING):    aspirin  chewable 81 milliGRAM(s) Oral daily  atorvastatin 20 milliGRAM(s) Oral at bedtime  brimonidine 0.2% Ophthalmic Solution 1 Drop(s) Right EYE two times a day  carvedilol 12.5 milliGRAM(s) Oral every 12 hours  cefTRIAXone   IVPB 2000 milliGRAM(s) IV Intermittent every 24 hours  chlorhexidine 2% Cloths 1 Application(s) Topical <User Schedule>  ciprofloxacin  0.3% Ophthalmic Solution 1 Drop(s) Right EYE two times a day  dorzolamide 2% Ophthalmic Solution 1 Drop(s) Both EYES three times a day  glucagon  Injectable 1 milliGRAM(s) IntraMuscular once  heparin   Injectable 5000 Unit(s) SubCutaneous every 12 hours  hydrALAZINE 50 milliGRAM(s) Oral three times a day  insulin lispro (ADMELOG) corrective regimen sliding scale   SubCutaneous Before meals and at bedtime  ketorolac 0.5% Ophthalmic Solution 1 Drop(s) Right EYE two times a day  losartan 50 milliGRAM(s) Oral daily  polyethylene glycol 3350 17 Gram(s) Oral daily  prednisoLONE acetate 1% Suspension 1 Drop(s) Right EYE every 4 hours  senna 2 Tablet(s) Oral at bedtime  vancomycin  IVPB 1000 milliGRAM(s) IV Intermittent every 48 hours        RADIOLOGY & ADDITIONAL TESTS:    4/12/24 : CT Abdomen and Pelvis No Cont (04.12.24 @ 18:53) >  *  No acute septic pathology.  *  Scattered bilateral small pulmonary nodules measuring up to 7 mm in the right upper lobe are nonspecific and may represent   infectious/inflammatory disease or metastatic disease.  *  3.1 x 2.1 cm cystic structure in the posterior mediastinum at the level of the leelee may represent a lymphatic structure, bronchogenic cyst, or foregut duplication cyst.  *  No intra-abdominal collection.      4/12/24 : CT Chest No Cont (04.12.24 @ 18:53) LUNGS AND LARGE AIRWAYS: The central airways are patent. Scattered   bilateral small pulmonary nodules measuring up to 7 mm in the right upper  lobe are nonspecific. No acute consolidation.  PLEURA: Trace effusions.        MICROBIOLOGY DATA:    Culture - Blood (04.13.24 @ 17:00)   Specimen Source: .Blood Dialysis Catheter  Culture Results: No growth at 24 hours    Culture - Other (04.13.24 @ 09:39)   Specimen Source: Cath Site Catheter Site  Culture Results: No growth to date.    Culture - Blood (04.12.24 @ 17:50)   Gram Stain:   Growth in aerobic bottle: Gram positive cocci in pairs  Specimen Source: .Blood Blood-Peripheral  Culture Results:   Growth in aerobic bottle: Streptococcus salivarius/vestibularis group   Susceptibility to follow.    Culture - Blood (04.12.24 @ 18:00)   Gram Stain:   Growth in anaerobic bottle: Gram positive cocci in pairs  - Streptococcus sp. (Not Grp A, B or S pneumoniae): Detec  Specimen Source: .Blood Blood-Peripheral  Organism: Blood Culture PCR  Culture Results:   Growth in anaerobic bottle: Gram positive cocci in pairs   Direct identification is available within approximately 3-5   hours either by Blood Panel Multiplexed PCR or Direct   MALDI-TOF. Details: [email or web address]  Organism Identification: Blood Culture PCR  Method Type: PCR      Culture - Blood (04.12.24 @ 17:50)   Gram Stain:   Growth in aerobic bottle: Gram positive cocci in pairs  Specimen Source: .Blood Blood-Peripheral  Culture Results:   Growth in aerobic bottle: Gram positive cocci in pairs    Urine Microscopic-Add On (NC) (04.12.24 @ 18:13)   Red Blood Cell - Urine: 3 /HPF  White Blood Cell - Urine: 2 /HPF  Bacteria: Occasional /HPF    Respiratory Viral Panel with COVID-19 by OXANA (04.12.24 @ 17:50)   Rapid RVP Result: NotDetec  SARS-CoV-2: NotDetec:          Patient is seen and examined at the bed side, is afebrile. The blood cultures from catheter as of 4/13/24 have NGTD.      REVIEW OF SYSTEMS: All other review systems are negative      ALLERGIES: No Known Allergies      Vital Signs Last 24 Hrs  T(C): 36.3 (15 Apr 2024 11:13), Max: 36.9 (15 Apr 2024 07:53)  T(F): 97.3 (15 Apr 2024 11:13), Max: 98.4 (15 Apr 2024 07:53)  HR: 58 (15 Apr 2024 11:13) (50 - 58)  BP: 131/67 (15 Apr 2024 11:13) (120/67 - 146/71)  BP(mean): 83 (14 Apr 2024 20:15) (83 - 83)  RR: 19 (15 Apr 2024 11:13) (17 - 19)  SpO2: 97% (15 Apr 2024 11:13) (94% - 98%)    Parameters below as of 15 Apr 2024 11:13  Patient On (Oxygen Delivery Method): room air        PHYSICAL EXAM:  GENERAL: Not in distress   CHEST/LUNG: Not using accessory muscles   HEART: s1 and s2 present  ABDOMEN:  Nontender and  Nondistended  EXTREMITIES: No pedal  edema  CNS: Awake and Alert      LABS:                        10.7   6.11  )-----------( 118      ( 15 Apr 2024 06:54 )             31.4                           10.9   5.56  )-----------( 107      ( 14 Apr 2024 06:34 )             32.0         04-15    136  |  104  |  58<H>  ----------------------------<  116<H>  4.3   |  21<L>  |  5.21<H>    Ca    8.4      15 Apr 2024 06:54  Phos  4.0     04-13    TPro  6.9  /  Alb  2.6<L>  /  TBili  0.3  /  DBili  x   /  AST  16  /  ALT  13  /  AlkPhos  141<H>  04-15    04-14    135  |  102  |  36<H>  ----------------------------<  124<H>  3.8   |  22  |  4.18<H>    Ca    8.3<L>      14 Apr 2024 06:34  Phos  4.0     04-13    TPro  6.9  /  Alb  2.7<L>  /  TBili  0.9  /  DBili  x   /  AST  15  /  ALT  14  /  AlkPhos  157<H>  04-14    PT/INR - ( 13 Apr 2024 05:00 )   PT: 16.3 sec;   INR: 1.45 ratio      PTT - ( 13 Apr 2024 05:00 )  PTT:26.7 sec      CAPILLARY BLOOD GLUCOSE  POCT Blood Glucose.: 186 mg/dL (13 Apr 2024 11:30)  POCT Blood Glucose.: 121 mg/dL (13 Apr 2024 07:37)        MEDICATIONS  (STANDING):    aspirin  chewable 81 milliGRAM(s) Oral daily  atorvastatin 20 milliGRAM(s) Oral at bedtime  brimonidine 0.2% Ophthalmic Solution 1 Drop(s) Right EYE two times a day  carvedilol 12.5 milliGRAM(s) Oral every 12 hours  cefTRIAXone   IVPB 2000 milliGRAM(s) IV Intermittent every 24 hours  chlorhexidine 2% Cloths 1 Application(s) Topical <User Schedule>  ciprofloxacin  0.3% Ophthalmic Solution 1 Drop(s) Right EYE two times a day  dorzolamide 2% Ophthalmic Solution 1 Drop(s) Both EYES three times a day  glucagon  Injectable 1 milliGRAM(s) IntraMuscular once  heparin   Injectable 5000 Unit(s) SubCutaneous every 12 hours  hydrALAZINE 50 milliGRAM(s) Oral three times a day  insulin lispro (ADMELOG) corrective regimen sliding scale   SubCutaneous Before meals and at bedtime  ketorolac 0.5% Ophthalmic Solution 1 Drop(s) Right EYE two times a day  losartan 50 milliGRAM(s) Oral daily  polyethylene glycol 3350 17 Gram(s) Oral daily  prednisoLONE acetate 1% Suspension 1 Drop(s) Right EYE every 4 hours  senna 2 Tablet(s) Oral at bedtime  vancomycin  IVPB 1000 milliGRAM(s) IV Intermittent every 48 hours        RADIOLOGY & ADDITIONAL TESTS:    4/12/24 : CT Abdomen and Pelvis No Cont (04.12.24 @ 18:53) >  *  No acute septic pathology.  *  Scattered bilateral small pulmonary nodules measuring up to 7 mm in the right upper lobe are nonspecific and may represent   infectious/inflammatory disease or metastatic disease.  *  3.1 x 2.1 cm cystic structure in the posterior mediastinum at the level of the leelee may represent a lymphatic structure, bronchogenic cyst, or foregut duplication cyst.  *  No intra-abdominal collection.      4/12/24 : CT Chest No Cont (04.12.24 @ 18:53) LUNGS AND LARGE AIRWAYS: The central airways are patent. Scattered   bilateral small pulmonary nodules measuring up to 7 mm in the right upper  lobe are nonspecific. No acute consolidation.  PLEURA: Trace effusions.        MICROBIOLOGY DATA:    Culture - Blood (04.13.24 @ 17:00)   Specimen Source: .Blood Dialysis Catheter  Culture Results: No growth at 24 hours    Culture - Other (04.13.24 @ 09:39)   Specimen Source: Cath Site Catheter Site  Culture Results: No growth to date.    Culture - Blood (04.12.24 @ 17:50)   Gram Stain:   Growth in aerobic bottle: Gram positive cocci in pairs  Specimen Source: .Blood Blood-Peripheral  Culture Results:   Growth in aerobic bottle: Streptococcus salivarius/vestibularis group   Susceptibility to follow.    Culture - Blood (04.12.24 @ 18:00)   Gram Stain:   Growth in anaerobic bottle: Gram positive cocci in pairs  - Streptococcus sp. (Not Grp A, B or S pneumoniae): Detec  Specimen Source: .Blood Blood-Peripheral  Organism: Blood Culture PCR  Culture Results:   Growth in anaerobic bottle: Gram positive cocci in pairs   Direct identification is available within approximately 3-5   hours either by Blood Panel Multiplexed PCR or Direct   MALDI-TOF. Details: [email or web address]  Organism Identification: Blood Culture PCR  Method Type: PCR      Culture - Blood (04.12.24 @ 17:50)   Gram Stain:   Growth in aerobic bottle: Gram positive cocci in pairs  Specimen Source: .Blood Blood-Peripheral  Culture Results:   Growth in aerobic bottle: Gram positive cocci in pairs    Urine Microscopic-Add On (NC) (04.12.24 @ 18:13)   Red Blood Cell - Urine: 3 /HPF  White Blood Cell - Urine: 2 /HPF  Bacteria: Occasional /HPF    Respiratory Viral Panel with COVID-19 by OXANA (04.12.24 @ 17:50)   Rapid RVP Result: NotDetec  SARS-CoV-2: NotDetec:

## 2024-04-15 NOTE — PROGRESS NOTE ADULT - SUBJECTIVE AND OBJECTIVE BOX
NP Note discussed with  primary attending    Patient is a 78y old  Male who presents with a chief complaint of Sepsis (15 Apr 2024 14:11)      INTERVAL HPI/OVERNIGHT EVENTS: no new complaints    MEDICATIONS  (STANDING):  aspirin  chewable 81 milliGRAM(s) Oral daily  atorvastatin 20 milliGRAM(s) Oral at bedtime  brimonidine 0.2% Ophthalmic Solution 1 Drop(s) Right EYE two times a day  carvedilol 12.5 milliGRAM(s) Oral every 12 hours  cefTRIAXone   IVPB 2000 milliGRAM(s) IV Intermittent every 24 hours  chlorhexidine 2% Cloths 1 Application(s) Topical <User Schedule>  ciprofloxacin  0.3% Ophthalmic Solution 1 Drop(s) Right EYE two times a day  dextrose 10% Bolus 125 milliLiter(s) IV Bolus once  dextrose 5%. 1000 milliLiter(s) (100 mL/Hr) IV Continuous <Continuous>  dextrose 5%. 1000 milliLiter(s) (50 mL/Hr) IV Continuous <Continuous>  dextrose 50% Injectable 25 Gram(s) IV Push once  dextrose 50% Injectable 12.5 Gram(s) IV Push once  dorzolamide 2% Ophthalmic Solution 1 Drop(s) Both EYES three times a day  glucagon  Injectable 1 milliGRAM(s) IntraMuscular once  heparin   Injectable 5000 Unit(s) SubCutaneous every 12 hours  hydrALAZINE 50 milliGRAM(s) Oral three times a day  insulin lispro (ADMELOG) corrective regimen sliding scale   SubCutaneous Before meals and at bedtime  ketorolac 0.5% Ophthalmic Solution 1 Drop(s) Right EYE two times a day  losartan 50 milliGRAM(s) Oral daily  polyethylene glycol 3350 17 Gram(s) Oral daily  prednisoLONE acetate 1% Suspension 1 Drop(s) Right EYE every 4 hours  senna 2 Tablet(s) Oral at bedtime  vancomycin  IVPB 1000 milliGRAM(s) IV Intermittent every 48 hours    MEDICATIONS  (PRN):  acetaminophen     Tablet .. 650 milliGRAM(s) Oral every 6 hours PRN Temp greater or equal to 38C (100.4F), Mild Pain (1 - 3)  dextrose Oral Gel 15 Gram(s) Oral once PRN Blood Glucose LESS THAN 70 milliGRAM(s)/deciliter      __________________________________________________  REVIEW OF SYSTEMS:    CONSTITUTIONAL: No fever  EYES: No acute visual disturbances  NECK: No pain or stiffness  RESPIRATORY: No cough; No shortness of breath  CARDIOVASCULAR: No chest pain, no palpitations  GASTROINTESTINAL: No pain. No nausea or vomiting.  No diarrhea   NEUROLOGICAL: No headache or numbness, no tremors  MUSCULOSKELETAL: No joint pain, no muscle pain  GENITOURINARY: No dysuria, no frequency, no hesitancy  PSYCHIATRY: No depression , no anxiety  ALL OTHER  ROS negative        Vital Signs Last 24 Hrs  T(C): 36.3 (15 Apr 2024 11:13), Max: 36.9 (15 Apr 2024 07:53)  T(F): 97.3 (15 Apr 2024 11:13), Max: 98.4 (15 Apr 2024 07:53)  HR: 58 (15 Apr 2024 11:13) (50 - 58)  BP: 131/67 (15 Apr 2024 11:13) (120/67 - 146/71)  BP(mean): 83 (14 Apr 2024 20:15) (83 - 83)  RR: 19 (15 Apr 2024 11:13) (17 - 19)  SpO2: 97% (15 Apr 2024 11:13) (94% - 98%)    Parameters below as of 15 Apr 2024 11:13  Patient On (Oxygen Delivery Method): room air        ________________________________________________  PHYSICAL EXAM:  GENERAL: NAD  HEENT: Normocephalic;  conjunctivae and sclerae clear; moist mucous membranes   NECK : Supple  CHEST/LUNG: Clear to auscultation bilaterally with good air entry.  (+) Right Permacath.   HEART: S1 S2  regular; no murmurs, gallops or rubs  ABDOMEN: Soft, Nontender, Nondistended; Bowel sounds present x 4 quad  EXTREMITIES: No cyanosis; no edema; no calf tenderness  SKIN: Warm and dry; no rash  NERVOUS SYSTEM:  Awake and alert; Oriented to person, not place and time; no new deficits    _________________________________________________  LABS:                        10.7   6.11  )-----------( 118      ( 15 Apr 2024 06:54 )             31.4     04-15    136  |  104  |  58<H>  ----------------------------<  116<H>  4.3   |  21<L>  |  5.21<H>    Ca    8.4      15 Apr 2024 06:54  Phos  4.0     04-13    TPro  6.9  /  Alb  2.6<L>  /  TBili  0.3  /  DBili  x   /  AST  16  /  ALT  13  /  AlkPhos  141<H>  04-15      Urinalysis Basic - ( 15 Apr 2024 06:54 )    Color: x / Appearance: x / SG: x / pH: x  Gluc: 116 mg/dL / Ketone: x  / Bili: x / Urobili: x   Blood: x / Protein: x / Nitrite: x   Leuk Esterase: x / RBC: x / WBC x   Sq Epi: x / Non Sq Epi: x / Bacteria: x      CAPILLARY BLOOD GLUCOSE      POCT Blood Glucose.: 127 mg/dL (15 Apr 2024 11:23)  POCT Blood Glucose.: 107 mg/dL (15 Apr 2024 07:29)  POCT Blood Glucose.: 128 mg/dL (14 Apr 2024 21:47)  POCT Blood Glucose.: 121 mg/dL (14 Apr 2024 17:11)        RADIOLOGY & ADDITIONAL TESTS:    Imaging Personally Reviewed:  YES    Consultant(s) Notes Reviewed:   YES    Care Discussed with Consultants :     Plan of care was discussed with patient and /or primary care giver; all questions and concerns were addressed and care was aligned with patient's wishes.

## 2024-04-15 NOTE — PROGRESS NOTE ADULT - PROBLEM SELECTOR PLAN 1
H/o MSSA infection-Pt admitted to [facility] on 11/24/2023 and discharged on 12/08/23.  His permacath was exchanged on Friday 11/25/23. Treated w/ Cefazolin.   - P/w Sepsis & permacath possible source    - Afebrile overnight   - S/P (-) UA & RVP    - S/p CT chest noted below   - 4/12 (+) Bld cx  - 4/13 HD Bld cx negative  - Quant Tb pending-f/u results      - Currently on Ceftriaxone & Vancomycin  - ID-Dr. Cui

## 2024-04-15 NOTE — PROGRESS NOTE ADULT - ASSESSMENT
1. Abdominal pain improved  2. Constipation  3. Cholelithiasis  4. Anemia  5. No evidence of acute GI bleeding  6. Sepsis    Suggestions:    1. Protonix 40mg daily  2. Avoid NSAID  3. Daily stool softener  4. Monitor H/H  5. Transfuse PRBC as needed  6. Diet as tolerated  7. Continue antibiotics  8. DVT prophylaxis

## 2024-04-15 NOTE — PROVIDER CONTACT NOTE (CRITICAL VALUE NOTIFICATION) - SITUATION
Blood culture collected on 4/12 has growth in anaerobic bottle gram positive cocci in pairs. Direct identification will be available in approximately 3-5hrs.
Blood culture from 4/12 growth in aerobic bottle streptococcus salivarius/vestibularis

## 2024-04-15 NOTE — PROGRESS NOTE ADULT - ASSESSMENT
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 Patient AAOx3 at bedside and mentioned having intermittent, mild, suprapubic discomfort associated w/ dysuria that has been going on for the last 48 hours. Patient was admitted to [facility] on 11/24/2023 and discharge on 12/08/23 where he was treated for sepsis. His perm cath was exchanged on 11/223. Found to have MSSA bacteremia and transitioned from Vancomycin to Cefazolin. 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 Underwent right Permcath placement by IR on 12/4/23. On admission, he found to have fever, tachypnea but negative Urine analysis and negative chest CT. He has started on Cefepime and IV  Vancomycin, and the ID consult requested to assist with further evaluation and antibiotic management.    # Sepsis ( Fever + tachypnea)- Suspected Line sepsis  # Streptococcal Bacteremia- 4/12/24- in the setting of Perm-a cath  # H/o Recent MSSA bacteremia    would recommend:    1. Please consider removing the Perm-A-cath   2. Change Cefepime to ceftriaxone 2 g daily and Continue Vancomycin until blood cultures are finalzied  3. Repeat Blood cultures X 2 in AM to document clearing the blood stream  4. TTE    Attending Attestation:    Spent more than 45 minutes on total encounter, more than 50 % of the visit was spent counseling and/or coordinating care by the Attending physician.   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 Patient AAOx3 at bedside and mentioned having intermittent, mild, suprapubic discomfort associated w/ dysuria that has been going on for the last 48 hours. Patient was admitted to [facility] on 11/24/2023 and discharge on 12/08/23 where he was treated for sepsis. His perm cath was exchanged on 11/223. Found to have MSSA bacteremia and transitioned from Vancomycin to Cefazolin. 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 Underwent right Permcath placement by IR on 12/4/23. On admission, he found to have fever, tachypnea but negative Urine analysis and negative chest CT. He has started on Cefepime and IV  Vancomycin, and the ID consult requested to assist with further evaluation and antibiotic management.    # Sepsis ( Fever + tachypnea)- Suspected Line sepsis  # Streptococcal Bacteremia- 4/12/24- in the setting of Perm-a cath - the blood cx form catheter as of 4/13 NGTD- Renal team likes to keep the Perm-a-cath   # H/o Recent MSSA bacteremia    would recommend:    1. TTE followed by URVASHI to francisco j out vegetation  2. Continue Ceftriaxone 2 g daily and discontinue Vancomycin   3. Repeat Blood cultures X 2 in AM to document clearing the blood stream    d/w covering Aung BENEDICT    Attending Attestation:    Spent more than 35 minutes on total encounter, more than 50 % of the visit was spent counseling and/or coordinating care by the Attending physician.

## 2024-04-15 NOTE — PROGRESS NOTE ADULT - ASSESSMENT
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  1.Strep bactermia,.Abx as per ID.  2.ESRD-HD as per renal.  3.HTN-cont bp medication.  4.DM-Insulin.  5.Lipid d/o-statin.  6.Echocardiogram.  7.PC to be removed.  8.GI and DVT prophylaxis.  .

## 2024-04-15 NOTE — PROGRESS NOTE ADULT - SUBJECTIVE AND OBJECTIVE BOX
[   ] ICU                                          [   ] CCU                                      [ X  ] Medical Floor    Patient is a 78 year old male with abdominal pain. GI consulted to evaluate.        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 Patient also c/o constipation but denies nausea, vomiting, hematemesis, hematochezia, melena, fever, chest pain, SOB, cough, hematuria, or diarrhea.      Today patient appears comfortable. No new complaints reported, No abdominal pain, N/V, hematemesis, hematochezia, melena, fever, chills, chest pain, SOB, cough or diarrhea reported.         PAST MEDICAL HISTORY     DM (diabetes mellitus)    HTN (hypertension)     Diabetes mellitus    ESRD on HD     Hyperlipidemia     Glaucoma    Gout        PAST SURGICAL HISTORY    No significant past surgical history        Allergies    No Known Allergies    Intolerances  None       SOCIAL HISTORY  Advanced Directives:       [X  ] Full Code       [  ] DNR  Marital Status:         [  ] M      [X  ] S      [  ] D       [  ] W  Children:       [ X ] Yes      [  ] No  Occupation:        [  ] Employed       [ X ] Unemployed       [  ] Retired  Diet:       [ X ] Regular       [  ] PEG feeding          [  ] NG tube feeding  Drug Use:           [ X ] Patient denied          [  ] Yes  Alcohol:           [X  ] No             [  ] Yes (socially)         [  ] Yes (chronic)  Tobacco:           [  ] Yes           [ X ] No    FAMILY HISTORY  [ X ] Heart Disease            [ X ] Diabetes             [ X ] HTN             [  ] Colon Cancer             [  ] Stomach Cancer              [  ] Pancreatic Cancer        VITALS   Vital Signs Last 24 Hrs  T(C): 36.3 (04-15-24 @ 11:13), Max: 36.9 (04-15-24 @ 07:53)  T(F): 97.3 (04-15-24 @ 11:13), Max: 98.4 (04-15-24 @ 07:53)  HR: 58 (04-15-24 @ 11:13) (50 - 58)  BP: 131/67 (04-15-24 @ 11:13) (120/67 - 146/71)  BP(mean): 83 (04-14-24 @ 20:15) (83 - 88)  RR: 19 (04-15-24 @ 11:13) (17 - 19)  SpO2: 97% (04-15-24 @ 11:13) (94% - 98%)        MEDICATIONS  (STANDING):  aspirin  chewable 81 milliGRAM(s) Oral daily  atorvastatin 20 milliGRAM(s) Oral at bedtime  brimonidine 0.2% Ophthalmic Solution 1 Drop(s) Right EYE two times a day  carvedilol 12.5 milliGRAM(s) Oral every 12 hours  cefTRIAXone   IVPB 2000 milliGRAM(s) IV Intermittent every 24 hours  ciprofloxacin  0.3% Ophthalmic Solution 1 Drop(s) Right EYE two times a day  dextrose 10% Bolus 125 milliLiter(s) IV Bolus once  dextrose 5%. 1000 milliLiter(s) (100 mL/Hr) IV Continuous <Continuous>  dextrose 5%. 1000 milliLiter(s) (50 mL/Hr) IV Continuous <Continuous>  dextrose 50% Injectable 25 Gram(s) IV Push once  dextrose 50% Injectable 12.5 Gram(s) IV Push once  dorzolamide 2% Ophthalmic Solution 1 Drop(s) Both EYES three times a day  glucagon  Injectable 1 milliGRAM(s) IntraMuscular once  heparin   Injectable 5000 Unit(s) SubCutaneous every 12 hours  hydrALAZINE 50 milliGRAM(s) Oral three times a day  insulin lispro (ADMELOG) corrective regimen sliding scale   SubCutaneous Before meals and at bedtime  ketorolac 0.5% Ophthalmic Solution 1 Drop(s) Right EYE two times a day  losartan 50 milliGRAM(s) Oral daily  polyethylene glycol 3350 17 Gram(s) Oral daily  prednisoLONE acetate 1% Suspension 1 Drop(s) Right EYE every 4 hours  senna 2 Tablet(s) Oral at bedtime  vancomycin  IVPB 1000 milliGRAM(s) IV Intermittent every 48 hours    MEDICATIONS  (PRN):  acetaminophen     Tablet .. 650 milliGRAM(s) Oral every 6 hours PRN Temp greater or equal to 38C (100.4F), Mild Pain (1 - 3)  dextrose Oral Gel 15 Gram(s) Oral once PRN Blood Glucose LESS THAN 70 milliGRAM(s)/deciliter                            10.7   6.11  )-----------( 118      ( 15 Apr 2024 06:54 )             31.4       04-15    136  |  104  |  58<H>  ----------------------------<  116<H>  4.3   |  21<L>  |  5.21<H>    Ca    8.4      15 Apr 2024 06:54  Phos  4.0     04-13    TPro  6.9  /  Alb  2.6<L>  /  TBili  0.3  /  DBili  x   /  AST  16  /  ALT  13  /  AlkPhos  141<H>  04-15

## 2024-04-15 NOTE — PROGRESS NOTE ADULT - PROBLEM SELECTOR PLAN 2
- P/w Elevated trops   - S/p EKG   - Likely ischemic demand in s/o sepsis  - S/p Telemetry as discussed w/ CV   - Echo pending-f/u results   - CV-Dr. Jorge

## 2024-04-15 NOTE — PROGRESS NOTE ADULT - SUBJECTIVE AND OBJECTIVE BOX
Patient is a 78y old  Male who presents with a chief complaint of Sepsis (14 Apr 2024 12:36)    pt seen in tele [ x ], reg med floor [   ], bed [x  ], chair at bedside [   ], a+o x3 [ x ], lethargic [  ],    nad [ x ]      Allergies    No Known Allergies        Vitals    T(F): 98.4 (04-15-24 @ 07:53), Max: 98.4 (04-14-24 @ 08:24)  HR: 55 (04-15-24 @ 07:53) (50 - 59)  BP: 137/70 (04-15-24 @ 07:53) (100/51 - 146/71)  RR: 17 (04-15-24 @ 07:53) (17 - 18)  SpO2: 94% (04-15-24 @ 07:53) (94% - 99%)  Wt(kg): --  CAPILLARY BLOOD GLUCOSE      POCT Blood Glucose.: 107 mg/dL (15 Apr 2024 07:29)      Labs                          10.7   6.11  )-----------( 118      ( 15 Apr 2024 06:54 )             31.4       04-14    135  |  102  |  36<H>  ----------------------------<  124<H>  3.8   |  22  |  4.18<H>    Ca    8.3<L>      14 Apr 2024 06:34  Phos  4.0     04-13    TPro  6.9  /  Alb  2.7<L>  /  TBili  0.9  /  DBili  x   /  AST  15  /  ALT  14  /  AlkPhos  157<H>  04-14          Troponin I, High Sensitivity Result: 224.2 ng/L (04-13-24 @ 05:00)  Troponin I, High Sensitivity Result: 229.9 ng/L (04-12-24 @ 23:04)  Troponin I, High Sensitivity Result: 125.3 ng/L (04-12-24 @ 17:50)    .Blood Dialysis Catheter  04-13 @ 17:00   No growth at 24 hours  --  --      Cath Site Catheter Site  04-13 @ 09:39   No growth to date.  --  --      Clean Catch Clean Catch (Midstream)  04-12 @ 18:13   <10,000 CFU/mL Normal Urogenital Kavita  --  --      .Blood Blood-Peripheral  04-12 @ 18:00   Growth in anaerobic bottle: Streptococcus salivarius/vestibularis group  Alpha hemolytic streptoccous (Not Streptococcus pneumoniae or  Enterococcus)  isolated from a single blood culture sets may represent  contamination. Contact the Microbiology Department at [phone] if  susceptibility testing is  Direct identification is available within approximately 3-5  hours either by Blood Panel Multiplexed PCR or Direct  MALDI-TOF. Details: [email or web address]  --  Blood Culture PCR      .Blood Blood-Peripheral  04-12 @ 17:50   Growth in aerobic bottle: Gram positive cocci in pairs  --    Growth in aerobic bottle: Gram positive cocci in pairs          Radiology Results      Meds    MEDICATIONS  (STANDING):  aspirin  chewable 81 milliGRAM(s) Oral daily  atorvastatin 20 milliGRAM(s) Oral at bedtime  brimonidine 0.2% Ophthalmic Solution 1 Drop(s) Right EYE two times a day  carvedilol 12.5 milliGRAM(s) Oral every 12 hours  cefTRIAXone   IVPB 2000 milliGRAM(s) IV Intermittent every 24 hours  ciprofloxacin  0.3% Ophthalmic Solution 1 Drop(s) Right EYE two times a day  dextrose 10% Bolus 125 milliLiter(s) IV Bolus once  dextrose 5%. 1000 milliLiter(s) (50 mL/Hr) IV Continuous <Continuous>  dextrose 5%. 1000 milliLiter(s) (100 mL/Hr) IV Continuous <Continuous>  dextrose 50% Injectable 25 Gram(s) IV Push once  dextrose 50% Injectable 12.5 Gram(s) IV Push once  dorzolamide 2% Ophthalmic Solution 1 Drop(s) Both EYES three times a day  glucagon  Injectable 1 milliGRAM(s) IntraMuscular once  heparin   Injectable 5000 Unit(s) SubCutaneous every 12 hours  hydrALAZINE 50 milliGRAM(s) Oral three times a day  insulin lispro (ADMELOG) corrective regimen sliding scale   SubCutaneous Before meals and at bedtime  ketorolac 0.5% Ophthalmic Solution 1 Drop(s) Right EYE two times a day  losartan 50 milliGRAM(s) Oral daily  polyethylene glycol 3350 17 Gram(s) Oral daily  prednisoLONE acetate 1% Suspension 1 Drop(s) Right EYE every 4 hours  senna 2 Tablet(s) Oral at bedtime  vancomycin  IVPB 1000 milliGRAM(s) IV Intermittent every 48 hours      MEDICATIONS  (PRN):  acetaminophen     Tablet .. 650 milliGRAM(s) Oral every 6 hours PRN Temp greater or equal to 38C (100.4F), Mild Pain (1 - 3)  dextrose Oral Gel 15 Gram(s) Oral once PRN Blood Glucose LESS THAN 70 milliGRAM(s)/deciliter      Physical Exam    Neuro :  no focal deficits  Respiratory: CTA B/L  CV: RRR, S1S2, no murmurs,   Abdominal: Soft, NT, ND +BS,  Extremities: No edema, + peripheral pulses      ASSESSMENT      uncontrolled htn,   abnormal trop r/o acs,   poss 2nd to demand ischemia,    abd pain poss 2nd to constipation,    fever poss 2nd to like sepsis   bacteremia   pulmonary nodules,    bronchogenic cyst, or foregut duplication cyst,   hyperkalemia  h/o sinus node dysfunction,   chronic HFpEF,   aortic stenosis,   HTN,   HLD,   b/l carotid stenosis,   DM (not on meds),   ESRD on dialysis (Tue, Thu, Sat),   Gout,    glaucoma,   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  rept blood cx peripheral and cath neg noted above  id f/u    Changed Cefepime to ceftriaxone 2 g daily   Continue Vancomycin until blood cultures are finalzied  procalcitonin 0.6 noted    f/u quantiferon tb gold test   pulm cons   pt on hd t,th,s   renal f/u   K improved after dialysis,   follow 2 gm K diet.   hd in am   hgba1c 6.4 noted    lispro ss   cont current meds

## 2024-04-15 NOTE — PROGRESS NOTE ADULT - SUBJECTIVE AND OBJECTIVE BOX
Date of Service 04-15-24 @ 09:14    CHIEF COMPLAINT:Patient is a 78y old  Male who presents with a chief complaint of Sepsis .Pt appears comfortable.    	  REVIEW OF SYSTEMS:  CONSTITUTIONAL: No fever, weight loss, or fatigue  EYES: No eye pain, visual disturbances, or discharge  ENT:  No difficulty hearing, tinnitus, vertigo; No sinus or throat pain  NECK: No pain or stiffness  RESPIRATORY: No cough, wheezing, chills or hemoptysis; No Shortness of Breath  CARDIOVASCULAR: No chest pain, palpitations, passing out, dizziness, or leg swelling  GASTROINTESTINAL: No abdominal or epigastric pain. No nausea, vomiting, or hematemesis; No diarrhea or constipation. No melena or hematochezia.  GENITOURINARY: No dysuria, frequency, hematuria, or incontinence  NEUROLOGICAL: No headaches, memory loss, loss of strength, numbness, or tremors  SKIN: No itching, burning, rashes, or lesions   LYMPH Nodes: No enlarged glands  ENDOCRINE: No heat or cold intolerance; No hair loss  MUSCULOSKELETAL: No joint pain or swelling; No muscle, back, or extremity pain  PSYCHIATRIC: No depression, anxiety, mood swings, or difficulty sleeping  HEME/LYMPH: No easy bruising, or bleeding gums  ALLERGY AND IMMUNOLOGIC: No hives or eczema	      PHYSICAL EXAM:  T(C): 36.9 (04-15-24 @ 07:53), Max: 36.9 (04-15-24 @ 07:53)  HR: 55 (04-15-24 @ 07:53) (50 - 55)  BP: 137/70 (04-15-24 @ 07:53) (100/51 - 146/71)  RR: 17 (04-15-24 @ 07:53) (17 - 18)  SpO2: 94% (04-15-24 @ 07:53) (94% - 99%)  Wt(kg): --  I&O's Summary    14 Apr 2024 07:01  -  15 Apr 2024 07:00  --------------------------------------------------------  IN: 100 mL / OUT: 1150 mL / NET: -1050 mL        Appearance: Normal	  HEENT:   Normal oral mucosa, PERRL, EOMI	  Lymphatic: No lymphadenopathy  Cardiovascular: Normal S1 S2, No JVD, No murmurs, No edema  Respiratory: Lungs clear to auscultation	  Psychiatry: A & O x 3, Mood & affect appropriate  Gastrointestinal:  Soft, Non-tender, + BS	  Skin: No rashes, No ecchymoses, No cyanosis	  Neurologic: Non-focal  Extremities: Normal range of motion, No clubbing, cyanosis or edema  Vascular: Peripheral pulses palpable 2+ bilaterally    MEDICATIONS  (STANDING):  aspirin  chewable 81 milliGRAM(s) Oral daily  atorvastatin 20 milliGRAM(s) Oral at bedtime  brimonidine 0.2% Ophthalmic Solution 1 Drop(s) Right EYE two times a day  carvedilol 12.5 milliGRAM(s) Oral every 12 hours  cefTRIAXone   IVPB 2000 milliGRAM(s) IV Intermittent every 24 hours  ciprofloxacin  0.3% Ophthalmic Solution 1 Drop(s) Right EYE two times a day  dextrose 10% Bolus 125 milliLiter(s) IV Bolus once  dextrose 5%. 1000 milliLiter(s) (50 mL/Hr) IV Continuous <Continuous>  dextrose 5%. 1000 milliLiter(s) (100 mL/Hr) IV Continuous <Continuous>  dextrose 50% Injectable 25 Gram(s) IV Push once  dextrose 50% Injectable 12.5 Gram(s) IV Push once  dorzolamide 2% Ophthalmic Solution 1 Drop(s) Both EYES three times a day  glucagon  Injectable 1 milliGRAM(s) IntraMuscular once  heparin   Injectable 5000 Unit(s) SubCutaneous every 12 hours  hydrALAZINE 50 milliGRAM(s) Oral three times a day  insulin lispro (ADMELOG) corrective regimen sliding scale   SubCutaneous Before meals and at bedtime  ketorolac 0.5% Ophthalmic Solution 1 Drop(s) Right EYE two times a day  losartan 50 milliGRAM(s) Oral daily  polyethylene glycol 3350 17 Gram(s) Oral daily  prednisoLONE acetate 1% Suspension 1 Drop(s) Right EYE every 4 hours  senna 2 Tablet(s) Oral at bedtime  vancomycin  IVPB 1000 milliGRAM(s) IV Intermittent every 48 hours      	  	  LABS:	 	          Troponin I, High Sensitivity Result: 224.2 ng/L (04-13 @ 05:00)  Troponin I, High Sensitivity Result: 229.9 ng/L (04-12 @ 23:04)  Troponin I, High Sensitivity Result: 125.3 ng/L (04-12 @ 17:50)                            10.7   6.11  )-----------( 118      ( 15 Apr 2024 06:54 )             31.4     04-15    136  |  104  |  58<H>  ----------------------------<  116<H>  4.3   |  21<L>  |  5.21<H>    Ca    8.4      15 Apr 2024 06:54  Phos  4.0     04-13    TPro  6.9  /  Alb  2.6<L>  /  TBili  0.3  /  DBili  x   /  AST  16  /  ALT  13  /  AlkPhos  141<H>  04-15  Culture - Blood (04.13.24 @ 17:00)   Specimen Source: .Blood Dialysis Catheter  Culture Results:   No growth at 24 hoursCulture - Blood (04.12.24 @ 18:00)   - Streptococcus sp. (Not Grp A, B or S pneumoniae): Detec  Gram Stain:   Growth in anaerobic bottle: Gram positive cocci in pairs  Specimen Source: .Blood Blood-Peripheral  Organism: Blood Culture PCR  Culture Results:   Growth in anaerobic bottle: Streptococcus salivarius/vestibularis group Culture - Blood (04.12.24 @ 17:50)   Gram Stain:   Growth in aerobic bottle: Gram positive cocci in pairs  Specimen Source: .Blood Blood-Peripheral  Culture Results:   Growth in aerobic bottle: Gram positive cocci in pairs

## 2024-04-15 NOTE — PROGRESS NOTE ADULT - SUBJECTIVE AND OBJECTIVE BOX
Problem List:  ESRD  Admitted with fever , sepsis that improved  Back to his baseline.   As per the nurse on Saturday dialysis catheter was malfunction , BFR was 200-250.    Culture - Other (04.13.24 @ 09:39)   Specimen Source: Cath Site Catheter Site  Culture Results:   No growth to date.  Culture - Blood (04.13.24 @ 17:00)   Specimen Source: .Blood Dialysis Catheter  Culture Results:   No growth at 24 hours  Growth in anaerobic bottle: Gram positive cocci in pairs  - Streptococcus sp. (Not Grp A, B or S pneumoniae): Detec  Specimen Source: .Blood Blood-Peripheral  Organism: Blood Culture PCR  Culture Results:     PAST MEDICAL & SURGICAL HISTORY:  DM (diabetes mellitus)      HTN (hypertension)      Chronic kidney disease      HLD (hyperlipidemia)      Glaucoma      Gout      No significant past surgical history          No Known Allergies      MEDICATIONS  (STANDING):  aspirin  chewable 81 milliGRAM(s) Oral daily  atorvastatin 20 milliGRAM(s) Oral at bedtime  brimonidine 0.2% Ophthalmic Solution 1 Drop(s) Right EYE two times a day  carvedilol 12.5 milliGRAM(s) Oral every 12 hours  cefTRIAXone   IVPB 2000 milliGRAM(s) IV Intermittent every 24 hours  chlorhexidine 2% Cloths 1 Application(s) Topical <User Schedule>  ciprofloxacin  0.3% Ophthalmic Solution 1 Drop(s) Right EYE two times a day  dextrose 10% Bolus 125 milliLiter(s) IV Bolus once  dextrose 5%. 1000 milliLiter(s) (50 mL/Hr) IV Continuous <Continuous>  dextrose 5%. 1000 milliLiter(s) (100 mL/Hr) IV Continuous <Continuous>  dextrose 50% Injectable 25 Gram(s) IV Push once  dextrose 50% Injectable 12.5 Gram(s) IV Push once  dorzolamide 2% Ophthalmic Solution 1 Drop(s) Both EYES three times a day  glucagon  Injectable 1 milliGRAM(s) IntraMuscular once  heparin   Injectable 5000 Unit(s) SubCutaneous every 12 hours  hydrALAZINE 50 milliGRAM(s) Oral three times a day  insulin lispro (ADMELOG) corrective regimen sliding scale   SubCutaneous Before meals and at bedtime  ketorolac 0.5% Ophthalmic Solution 1 Drop(s) Right EYE two times a day  losartan 50 milliGRAM(s) Oral daily  polyethylene glycol 3350 17 Gram(s) Oral daily  prednisoLONE acetate 1% Suspension 1 Drop(s) Right EYE every 4 hours  senna 2 Tablet(s) Oral at bedtime  vancomycin  IVPB 1000 milliGRAM(s) IV Intermittent every 48 hours    MEDICATIONS  (PRN):  acetaminophen     Tablet .. 650 milliGRAM(s) Oral every 6 hours PRN Temp greater or equal to 38C (100.4F), Mild Pain (1 - 3)  dextrose Oral Gel 15 Gram(s) Oral once PRN Blood Glucose LESS THAN 70 milliGRAM(s)/deciliter                            10.7   6.11  )-----------( 118      ( 15 Apr 2024 06:54 )             31.4     04-15    136  |  104  |  58<H>  ----------------------------<  116<H>  4.3   |  21<L>  |  5.21<H>    Ca    8.4      15 Apr 2024 06:54  Phos  4.0     04-13    TPro  6.9  /  Alb  2.6<L>  /  TBili  0.3  /  DBili  x   /  AST  16  /  ALT  13  /  AlkPhos  141<H>  04-15            REVIEW OF SYSTEMS:  General: no fever no chills, no weight loss.  EYES/ENT: No visual changes;  No vertigo, no headache.  NECK: No pain or stiffness  RESPIRATORY: No cough, wheezing, hemoptysis; No shortness of breath  CARDIOVASCULAR: No chest pain or palpitations. No Edema  GASTROINTESTINAL: No abdominal or epigastric pain. No nausea, vomiting. No diarrhea or constipation. No melena.  GENITOURINARY: No dysuria, frequency, foamy urine, urinary urgency, incontinence or hematuria  NEUROLOGICAL: No numbness or weakness, no tremor , no dizziness.   Muscle skeletal : no joint pain and no swelling of joints and limbs.  SKIN: No itching, burning, rashes.        VITALS:  T(F): 98.6 (04-15-24 @ 15:49), Max: 98.6 (04-15-24 @ 15:49)  HR: 57 (04-15-24 @ 15:49)  BP: 145/72 (04-15-24 @ 15:49)  RR: 18 (04-15-24 @ 15:49)  SpO2: 97% (04-15-24 @ 15:49)  Wt(kg): --    04-14 @ 07:01  -  04-15 @ 07:00  --------------------------------------------------------  IN: 100 mL / OUT: 1150 mL / NET: -1050 mL        PHYSICAL EXAM:  Constitutional: well developed, no diaphoresis, no distress.  Neck: No JVD, no carotid bruit, supple, no adenopathy  Respiratory:  air entrance B/L, no wheezes, rales or rhonchi  Cardiovascular: S1, S2, RRR, no pericardial rub, no murmur  Abdomen: BS+, soft, no tenderness, no bruit  Pelvis: bladder nondistended  Extremities: No cyanosis or clubbing. No peripheral edema.     Vascular Access: right PC with no erythema and no diacharge.

## 2024-04-15 NOTE — PROGRESS NOTE ADULT - ASSESSMENT
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  Presented to the ED due to abdominal pain for the last 24 hours and associated w/ non specific symptoms.  Admitted Abdominal pain 2/2 constipation, & Strep bacteremia.

## 2024-04-15 NOTE — PROGRESS NOTE ADULT - PROBLEM SELECTOR PLAN 4
- S/p CT Chest/A/P showed scattered bilateral small pulmonary nodules measuring up to 7 mm in the right upper lobe are nonspecific and may represent infectious/inflammatory disease or metastatic disease. 3.1 x 2.1 cm cystic structure in the posterior mediastinum at the level of the leelee may represent a lymphatic structure, bronchogenic cyst, or foregut duplication cyst. No intra-abdominal collection.  - Pulm-Dr. Ghotra consult pending-f/u recommendations

## 2024-04-16 ENCOUNTER — TRANSCRIPTION ENCOUNTER (OUTPATIENT)
Age: 79
End: 2024-04-16

## 2024-04-16 DIAGNOSIS — R91.8 OTHER NONSPECIFIC ABNORMAL FINDING OF LUNG FIELD: ICD-10-CM

## 2024-04-16 LAB
-  CEFTRIAXONE: SIGNIFICANT CHANGE UP
-  LEVOFLOXACIN: SIGNIFICANT CHANGE UP
-  MINOCYCLINE: SIGNIFICANT CHANGE UP
-  PENICILLIN: SIGNIFICANT CHANGE UP
-  TRIMETHOPRIM/SULFAMETHOXAZOLE: SIGNIFICANT CHANGE UP
-  VANCOMYCIN: SIGNIFICANT CHANGE UP
ALBUMIN SERPL ELPH-MCNC: 2.8 G/DL — LOW (ref 3.5–5)
ALP SERPL-CCNC: 136 U/L — HIGH (ref 40–120)
ALT FLD-CCNC: 14 U/L DA — SIGNIFICANT CHANGE UP (ref 10–60)
ANION GAP SERPL CALC-SCNC: 10 MMOL/L — SIGNIFICANT CHANGE UP (ref 5–17)
AST SERPL-CCNC: 14 U/L — SIGNIFICANT CHANGE UP (ref 10–40)
BASOPHILS # BLD AUTO: 0.02 K/UL — SIGNIFICANT CHANGE UP (ref 0–0.2)
BASOPHILS NFR BLD AUTO: 0.3 % — SIGNIFICANT CHANGE UP (ref 0–2)
BILIRUB SERPL-MCNC: 0.3 MG/DL — SIGNIFICANT CHANGE UP (ref 0.2–1.2)
BUN SERPL-MCNC: 78 MG/DL — HIGH (ref 7–18)
CALCIUM SERPL-MCNC: 9.1 MG/DL — SIGNIFICANT CHANGE UP (ref 8.4–10.5)
CHLORIDE SERPL-SCNC: 105 MMOL/L — SIGNIFICANT CHANGE UP (ref 96–108)
CO2 SERPL-SCNC: 21 MMOL/L — LOW (ref 22–31)
CREAT SERPL-MCNC: 5.28 MG/DL — HIGH (ref 0.5–1.3)
CULTURE RESULTS: ABNORMAL
CULTURE RESULTS: ABNORMAL
EGFR: 11 ML/MIN/1.73M2 — LOW
EOSINOPHIL # BLD AUTO: 3.82 K/UL — HIGH (ref 0–0.5)
EOSINOPHIL NFR BLD AUTO: 58.2 % — HIGH (ref 0–6)
GAMMA INTERFERON BACKGROUND BLD IA-ACNC: 0.17 IU/ML — SIGNIFICANT CHANGE UP
GLUCOSE BLDC GLUCOMTR-MCNC: 104 MG/DL — HIGH (ref 70–99)
GLUCOSE BLDC GLUCOMTR-MCNC: 105 MG/DL — HIGH (ref 70–99)
GLUCOSE BLDC GLUCOMTR-MCNC: 106 MG/DL — HIGH (ref 70–99)
GLUCOSE BLDC GLUCOMTR-MCNC: 182 MG/DL — HIGH (ref 70–99)
GLUCOSE SERPL-MCNC: 166 MG/DL — HIGH (ref 70–99)
HCT VFR BLD CALC: 32.4 % — LOW (ref 39–50)
HGB BLD-MCNC: 10.6 G/DL — LOW (ref 13–17)
IMM GRANULOCYTES NFR BLD AUTO: 0.2 % — SIGNIFICANT CHANGE UP (ref 0–0.9)
LYMPHOCYTES # BLD AUTO: 1.57 K/UL — SIGNIFICANT CHANGE UP (ref 1–3.3)
LYMPHOCYTES # BLD AUTO: 23.9 % — SIGNIFICANT CHANGE UP (ref 13–44)
M TB IFN-G BLD-IMP: NEGATIVE — SIGNIFICANT CHANGE UP
M TB IFN-G CD4+ BCKGRND COR BLD-ACNC: 0 IU/ML — SIGNIFICANT CHANGE UP
M TB IFN-G CD4+CD8+ BCKGRND COR BLD-ACNC: 0 IU/ML — SIGNIFICANT CHANGE UP
MAGNESIUM SERPL-MCNC: 2.2 MG/DL — SIGNIFICANT CHANGE UP (ref 1.6–2.6)
MCHC RBC-ENTMCNC: 32 PG — SIGNIFICANT CHANGE UP (ref 27–34)
MCHC RBC-ENTMCNC: 32.7 GM/DL — SIGNIFICANT CHANGE UP (ref 32–36)
MCV RBC AUTO: 97.9 FL — SIGNIFICANT CHANGE UP (ref 80–100)
METHOD TYPE: SIGNIFICANT CHANGE UP
MONOCYTES # BLD AUTO: 0.51 K/UL — SIGNIFICANT CHANGE UP (ref 0–0.9)
MONOCYTES NFR BLD AUTO: 7.8 % — SIGNIFICANT CHANGE UP (ref 2–14)
MRSA PCR RESULT.: SIGNIFICANT CHANGE UP
NEUTROPHILS # BLD AUTO: 0.63 K/UL — LOW (ref 1.8–7.4)
NEUTROPHILS NFR BLD AUTO: 9.6 % — LOW (ref 43–77)
NRBC # BLD: 0 /100 WBCS — SIGNIFICANT CHANGE UP (ref 0–0)
ORGANISM # SPEC MICROSCOPIC CNT: ABNORMAL
PHOSPHATE SERPL-MCNC: 4.6 MG/DL — HIGH (ref 2.5–4.5)
PLATELET # BLD AUTO: 150 K/UL — SIGNIFICANT CHANGE UP (ref 150–400)
POTASSIUM SERPL-MCNC: 4.5 MMOL/L — SIGNIFICANT CHANGE UP (ref 3.5–5.3)
POTASSIUM SERPL-SCNC: 4.5 MMOL/L — SIGNIFICANT CHANGE UP (ref 3.5–5.3)
PROT SERPL-MCNC: 6.8 G/DL — SIGNIFICANT CHANGE UP (ref 6–8.3)
QUANT TB PLUS MITOGEN MINUS NIL: 2.14 IU/ML — SIGNIFICANT CHANGE UP
RBC # BLD: 3.31 M/UL — LOW (ref 4.2–5.8)
RBC # FLD: 13.2 % — SIGNIFICANT CHANGE UP (ref 10.3–14.5)
S AUREUS DNA NOSE QL NAA+PROBE: SIGNIFICANT CHANGE UP
SODIUM SERPL-SCNC: 136 MMOL/L — SIGNIFICANT CHANGE UP (ref 135–145)
SPECIMEN SOURCE: SIGNIFICANT CHANGE UP
SPECIMEN SOURCE: SIGNIFICANT CHANGE UP
WBC # BLD: 6.56 K/UL — SIGNIFICANT CHANGE UP (ref 3.8–10.5)
WBC # FLD AUTO: 6.56 K/UL — SIGNIFICANT CHANGE UP (ref 3.8–10.5)

## 2024-04-16 PROCEDURE — 71045 X-RAY EXAM CHEST 1 VIEW: CPT | Mod: 26

## 2024-04-16 RX ORDER — MINOCYCLINE HYDROCHLORIDE 45 MG/1
TABLET, EXTENDED RELEASE ORAL
Refills: 0 | Status: DISCONTINUED | OUTPATIENT
Start: 2024-04-16 | End: 2024-04-22

## 2024-04-16 RX ORDER — MINOCYCLINE HYDROCHLORIDE 45 MG/1
200 TABLET, EXTENDED RELEASE ORAL ONCE
Refills: 0 | Status: COMPLETED | OUTPATIENT
Start: 2024-04-16 | End: 2024-04-16

## 2024-04-16 RX ORDER — MINOCYCLINE HYDROCHLORIDE 45 MG/1
100 TABLET, EXTENDED RELEASE ORAL EVERY 12 HOURS
Refills: 0 | Status: DISCONTINUED | OUTPATIENT
Start: 2024-04-17 | End: 2024-04-22

## 2024-04-16 RX ADMIN — ATORVASTATIN CALCIUM 20 MILLIGRAM(S): 80 TABLET, FILM COATED ORAL at 21:32

## 2024-04-16 RX ADMIN — Medication 1 DROP(S): at 05:32

## 2024-04-16 RX ADMIN — DORZOLAMIDE HYDROCHLORIDE 1 DROP(S): 20 SOLUTION/ DROPS OPHTHALMIC at 05:32

## 2024-04-16 RX ADMIN — Medication 1 DROP(S): at 17:39

## 2024-04-16 RX ADMIN — DORZOLAMIDE HYDROCHLORIDE 1 DROP(S): 20 SOLUTION/ DROPS OPHTHALMIC at 21:32

## 2024-04-16 RX ADMIN — POLYETHYLENE GLYCOL 3350 17 GRAM(S): 17 POWDER, FOR SOLUTION ORAL at 14:06

## 2024-04-16 RX ADMIN — Medication 50 MILLIGRAM(S): at 14:05

## 2024-04-16 RX ADMIN — BRIMONIDINE TARTRATE 1 DROP(S): 2 SOLUTION/ DROPS OPHTHALMIC at 17:38

## 2024-04-16 RX ADMIN — LOSARTAN POTASSIUM 50 MILLIGRAM(S): 100 TABLET, FILM COATED ORAL at 05:32

## 2024-04-16 RX ADMIN — MINOCYCLINE HYDROCHLORIDE 100 MILLIGRAM(S): 45 TABLET, EXTENDED RELEASE ORAL at 16:00

## 2024-04-16 RX ADMIN — SENNA PLUS 2 TABLET(S): 8.6 TABLET ORAL at 21:33

## 2024-04-16 RX ADMIN — BRIMONIDINE TARTRATE 1 DROP(S): 2 SOLUTION/ DROPS OPHTHALMIC at 05:33

## 2024-04-16 RX ADMIN — Medication 81 MILLIGRAM(S): at 14:05

## 2024-04-16 RX ADMIN — Medication 1 DROP(S): at 17:38

## 2024-04-16 RX ADMIN — Medication 1 DROP(S): at 02:35

## 2024-04-16 RX ADMIN — Medication 1 DROP(S): at 14:06

## 2024-04-16 RX ADMIN — HEPARIN SODIUM 5000 UNIT(S): 5000 INJECTION INTRAVENOUS; SUBCUTANEOUS at 05:31

## 2024-04-16 RX ADMIN — CHLORHEXIDINE GLUCONATE 1 APPLICATION(S): 213 SOLUTION TOPICAL at 05:33

## 2024-04-16 RX ADMIN — Medication 1: at 17:34

## 2024-04-16 RX ADMIN — Medication 50 MILLIGRAM(S): at 21:32

## 2024-04-16 RX ADMIN — Medication 1 DROP(S): at 21:31

## 2024-04-16 RX ADMIN — CEFTRIAXONE 100 MILLIGRAM(S): 500 INJECTION, POWDER, FOR SOLUTION INTRAMUSCULAR; INTRAVENOUS at 17:38

## 2024-04-16 RX ADMIN — Medication 265 MILLIGRAM(S): at 17:34

## 2024-04-16 RX ADMIN — Medication 50 MILLIGRAM(S): at 05:32

## 2024-04-16 RX ADMIN — DORZOLAMIDE HYDROCHLORIDE 1 DROP(S): 20 SOLUTION/ DROPS OPHTHALMIC at 14:05

## 2024-04-16 NOTE — DISCHARGE NOTE PROVIDER - PROVIDER TOKENS
PROVIDER:[TOKEN:[[id number]],FOLLOWUP:[1 week],ESTABLISHEDPATIENT:[T]] PROVIDER:[TOKEN:[[id number]],FOLLOWUP:[1 week],ESTABLISHEDPATIENT:[T]],PROVIDER:[TOKEN:[[id number]],SCHEDULEDAPPT:[04/30/2024]]

## 2024-04-16 NOTE — DISCHARGE NOTE PROVIDER - HOSPITAL COURSE
78 year old male, from home, living with son SUN (5x/week for 4h) with PMHx of HTN, HLD, DM (not on meds) ESRD on dialysis (Tue, Thu, Sat) and glaucoma, cataract was brought into the ED due to abdominal pain for the last 24 hours, and associated w/ non specific symptoms. In the Ed: Temp 100.4F borderline for criteria, and tachypnea, WBC count 10.53, pt started on IV antibiotics. GI, cardio, nephro and ID consulted. Admitted to telemetry for sepsis and elevated trops.     ID recc:  TTE followed by URVASHI to francisco j out vegetation. Continue Ceftriaxone 2 g daily and discontinue Vancomycin .Repeat Blood cultures X 2 in AM to document clearing the blood stream    Cardio recc: TTE with ejection fraction visually estimated at 50 to 55 %. mild (grade 1) left ventricular diastolic dysfunction. No vegetations seen on this study, consider URVASHI for further evaluation if clinically indicated noted above.     URVASHI to rule out vegetation    nephro recc: Will have dialysis in am , follow catheter function.  ID to follow and consider complete change of catheter versus a change over guidewire.     Gi recc: Protonix 40mg daily, Avoid NSAID, Daily stool softener, Diet as tolerate.      case discussed with attending, pt medically stable for d/c. Please note that this a brief summary of hospital course please refer to daily progress notes and consult notes for full course and events        incomplete 4/16--->>>         78 year old male, from home, living with son SUN (5x/week for 4h) with PMHx of HTN, HLD, DM (not on meds) ESRD on dialysis (Tue, Thu, Sat) and glaucoma, cataract was brought into the ED due to abdominal pain for the last 24 hours, and associated w/ non specific symptoms. In the Ed: Temp 100.4F borderline for criteria, and tachypnea, WBC count 10.53, pt started on IV antibiotics. GI, cardio, nephro and ID consulted. Admitted to telemetry for sepsis and elevated troponin.     ID recommended  TTE followed by URVASHI to rule out vegetation. Continue Ceftriaxone 2 g daily and discontinue Vancomycin .Repeat Blood cultures X 2 in AM to document clearing the blood stream    Cardio recommendation TTE with ejection fraction visually estimated at 50 to 55 %. mild (grade 1) left ventricular diastolic dysfunction. No vegetations seen on this study, consider URVASHI for further evaluation if clinically indicated noted above.     URVASHI to rule out vegetation  Catheter was removed in ER, with new tunneled dialysis catheter placed on 4/24.  Case discussed with attending, pt medically stable for d/c. Please note that this a brief summary of hospital course please refer to daily progress notes and consult notes for full course and events                 78 year old male, from home, living with son SUN (5x/week for 4h) with PMHx of HTN, HLD, DM (not on meds) ESRD on dialysis (Tue, Thu, Sat) and glaucoma, cataract was brought into the ED due to abdominal pain for the last 24 hours, and associated w/ non specific symptoms. In the Ed: Temp 100.4F borderline for criteria, and tachypnea, WBC count 10.53, pt started on IV antibiotics. GI, cardio, nephro and ID consulted. Admitted to telemetry for sepsis and elevated troponin.   Cardio recommendation TTE with ejection fraction visually estimated at 50 to 55 %. mild (grade 1) left ventricular diastolic dysfunction. No vegetations seen on this study, consider URVASHI for further evaluation if clinically indicated noted above.     Catheter was removed in ER, with new tunneled dialysis catheter placed on 4/24.  Case discussed with attending, pt medically stable for d/c. Please note that this a brief summary of hospital course please refer to daily progress notes and consult notes for full course and events.   78 year old male, from home, living with son SUN (5x/week for 4h) with PMHx of HTN, HLD, DM (not on meds) ESRD on dialysis (Tue, Thu, Sat) and glaucoma, cataract was brought into the ED due to abdominal pain for the last 24 hours, and associated w/ non specific symptoms. In the Ed: Temp 100.4F borderline for criteria, and tachypnea, WBC count 10.53, pt started on IV antibiotics. GI, cardio, nephro and ID consulted. Admitted to telemetry for sepsis and elevated troponin.     # Sepsis ( Fever + tachypnea)- Suspected Line sepsis  # Streptococcal Bacteremia- 4/12/24 - in the setting of Perm-a cath - the blood cx from catheter as of 4/13 NGTD- Repeat Blood cx from 4/17 and 4/20 have NGTD  - TTE from 4/15 and URVASHI from 4/17 shows no vegetation  # Catheter site culture grew Stenotrophomonas - s/p removal of Perm-a-cath on 4/18/24    ID recommend:  1. Continue  Bactrim and Minocycline to cover Stenotrophomonas  2. Continue Ceftriaxone 2 g daily to cover Streptococcus  3. Need Antibiotics until 5/2/24    new tunneled dialysis catheter placed on 4/24.  Case discussed with attending, pt medically stable for d/c. Please note that this a brief summary of hospital course please refer to daily progress notes and consult notes for full course and events.   78 year old male, from home, living with son SUN (5x/week for 4h) with PMHx of HTN, HLD, DM (not on meds) ESRD on dialysis (Tue, Thu, Sat) and glaucoma, cataract was brought into the ED due to abdominal pain for the last 24 hours, and associated w/ non specific symptoms. In the Ed: Temp 100.4F borderline for criteria, and tachypnea, WBC count 10.53, pt started on IV antibiotics. GI, cardio, nephro and ID consulted. Admitted to telemetry for sepsis and elevated troponin.     # Sepsis ( Fever + tachypnea)- Suspected Line sepsis  # Streptococcal Bacteremia- 4/12/24 - in the setting of Perm-a cath - the blood cx from catheter as of 4/13 NGTD- Repeat Blood cx from 4/17 and 4/20 have NGTD  - TTE from 4/15 and URVASHI from 4/17 shows no vegetation  # Catheter site culture grew Stenotrophomonas - s/p removal of Perm-a-cath on 4/18/24    ID recommend:  1. Continue  Bactrim and Minocycline to cover Stenotrophomonas  2. Continue Ceftriaxone 2 g daily to cover Streptococcus  3. Need Antibiotics until 5/2/24  patient to follow up with Dr. Skip Isbell via telehealth   new tunneled dialysis catheter placed on 4/24.  Case discussed with attending, pt medically stable for d/c. Please note that this a brief summary of hospital course please refer to daily progress notes and consult notes for full course and events.

## 2024-04-16 NOTE — PROGRESS NOTE ADULT - ASSESSMENT
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 Patient AAOx3 at bedside and mentioned having intermittent, mild, suprapubic discomfort associated w/ dysuria that has been going on for the last 48 hours. Patient was admitted to [facility] on 11/24/2023 and discharge on 12/08/23 where he was treated for sepsis. His perm cath was exchanged on 11/223. Found to have MSSA bacteremia and transitioned from Vancomycin to Cefazolin. 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 Underwent right Permcath placement by IR on 12/4/23. On admission, he found to have fever, tachypnea but negative Urine analysis and negative chest CT. He has started on Cefepime and IV  Vancomycin, and the ID consult requested to assist with further evaluation and antibiotic management.    # Sepsis ( Fever + tachypnea)- Suspected Line sepsis  # Streptococcal Bacteremia- 4/12/24- in the setting of Perm-a cath - the blood cx form catheter as of 4/13 NGTD- Renal team likes to keep the Perm-a-cath   # H/o Recent MSSA bacteremia    would recommend:    1. TTE followed by URVASHI to rule out vegetation  2. Continue Ceftriaxone 2 g daily and discontinue Vancomycin   3. Repeat Blood cultures X 2 in AM to document clearing the blood stream    d/w covering Aung BENEDICT    Attending Attestation:    Spent more than 35 minutes on total encounter, more than 50 % of the visit was spent counseling and/or coordinating care by the Attending physician.       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 Patient AAOx3 at bedside and mentioned having intermittent, mild, suprapubic discomfort associated w/ dysuria that has been going on for the last 48 hours. Patient was admitted to [facility] on 11/24/2023 and discharge on 12/08/23 where he was treated for sepsis. His perm cath was exchanged on 11/223. Found to have MSSA bacteremia and transitioned from Vancomycin to Cefazolin. 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 Underwent right Permcath placement by IR on 12/4/23. On admission, he found to have fever, tachypnea but negative Urine analysis and negative chest CT. He has started on Cefepime and IV  Vancomycin, and the ID consult requested to assist with further evaluation and antibiotic management.    # Sepsis ( Fever + tachypnea)- Suspected Line sepsis  # Streptococcal Bacteremia- 4/12/24- in the setting of Perm-a cath - the blood cx form catheter as of 4/13 NGTD- Renal team likes to keep the Perm-a-cath - TTE from 4/15 shows no vegetation  # Catheter site culture grew Stenotrophomonas     would recommend:    1. Please remove the Perm-a-catheter  2. Add IV Bactrim and Minocycline to cover Stenotrophomonas  3. URVASHI to rule out vegetation  4. Continue Ceftriaxone 2 g daily to cover Streptococcus  5. Repeat Blood cultures X 2 in AM to document clearing the blood stream    d/w covering NP, Aung, DR. Art, Kade, Pharm-D and Dr. Hutton     Attending Attestation:    Spent more than 35 minutes on total encounter, more than 50 % of the visit was spent counseling and/or coordinating care by the Attending physician.

## 2024-04-16 NOTE — PROGRESS NOTE ADULT - SUBJECTIVE AND OBJECTIVE BOX
Patient is seen and examined at the bed side, is afebrile. The catheter site culture grew Stenotrophomonas. The TTE from 4/15 shows no vegetation.      REVIEW OF SYSTEMS: All other review systems are negative      ALLERGIES: No Known Allergies      Vital Signs Last 24 Hrs  T(C): 36.6 (16 Apr 2024 13:15), Max: 37 (15 Apr 2024 15:49)  T(F): 97.9 (16 Apr 2024 13:15), Max: 98.6 (15 Apr 2024 15:49)  HR: 55 (16 Apr 2024 13:15) (51 - 59)  BP: 147/77 (16 Apr 2024 13:15) (145/72 - 167/79)  BP(mean): --  RR: 17 (16 Apr 2024 13:15) (16 - 18)  SpO2: 99% (16 Apr 2024 13:15) (97% - 99%)    Parameters below as of 16 Apr 2024 13:15  Patient On (Oxygen Delivery Method): room air        PHYSICAL EXAM:  GENERAL: Not in distress   CHEST/LUNG: Not using accessory muscles   HEART: s1 and s2 present  ABDOMEN:  Nontender and  Nondistended  EXTREMITIES: No pedal  edema  CNS: Awake and Alert      LABS:                        10.6   6.56  )-----------( 150      ( 16 Apr 2024 10:10 )             32.4                           10.7   6.11  )-----------( 118      ( 15 Apr 2024 06:54 )             31.4         04-16    136  |  105  |  78<H>  ----------------------------<  166<H>  4.5   |  21<L>  |  5.28<H>    Ca    9.1      16 Apr 2024 10:10  Phos  4.6     04-16  Mg     2.2     04-16    TPro  6.8  /  Alb  2.8<L>  /  TBili  0.3  /  DBili  x   /  AST  14  /  ALT  14  /  AlkPhos  136<H>  04-16    04-15    136  |  104  |  58<H>  ----------------------------<  116<H>  4.3   |  21<L>  |  5.21<H>    Ca    8.4      15 Apr 2024 06:54  Phos  4.0     04-13    TPro  6.9  /  Alb  2.6<L>  /  TBili  0.3  /  DBili  x   /  AST  16  /  ALT  13  /  AlkPhos  141<H>  04-15    PT/INR - ( 13 Apr 2024 05:00 )   PT: 16.3 sec;   INR: 1.45 ratio      PTT - ( 13 Apr 2024 05:00 )  PTT:26.7 sec      CAPILLARY BLOOD GLUCOSE  POCT Blood Glucose.: 186 mg/dL (13 Apr 2024 11:30)  POCT Blood Glucose.: 121 mg/dL (13 Apr 2024 07:37)      < from: TTE W or WO Ultrasound Enhancing Agent (04.15.24 @ 12:57) >     CONCLUSIONS:      1. Left ventricular cavity is normal in size. Left ventricular systolic function is normal with an ejection fraction visually estimated at 50 to 55 %. There are no regional wall motion abnormalities seen.   2. There is mild (grade 1) left ventricular diastolic dysfunction.   3. Normal right ventricular cavity size, with normal wall thickness, and normal systolic function. Tricuspid annular plane systolic excursion (TAPSE) is 2.1 cm (normal >=1.7 cm).   4. Normal left and right atrial size.   5. Trace tricuspid regurgitation.   6. Compared to the transthoracic echocardiogram performed on 12/18/2023, there have been no significant interval changes.   7. Estimated pulmonary artery systolic pressure is 20 mmHg, consistent with normal pulmonary artery pressure.   8. Mild aortic regurgitation.   9. Mild pulmonic regurgitation.  10. There is calcification of the mitral valve annulus.  11. No pericardial effusion seen.  12. No vegetations seen on this study,consider URVASHI for further evaluation if clinically indicated.        MEDICATIONS  (STANDING):    aspirin  chewable 81 milliGRAM(s) Oral daily  atorvastatin 20 milliGRAM(s) Oral at bedtime  brimonidine 0.2% Ophthalmic Solution 1 Drop(s) Right EYE two times a day  carvedilol 12.5 milliGRAM(s) Oral every 12 hours  cefTRIAXone   IVPB 2000 milliGRAM(s) IV Intermittent every 24 hours  chlorhexidine 2% Cloths 1 Application(s) Topical <User Schedule>  ciprofloxacin  0.3% Ophthalmic Solution 1 Drop(s) Right EYE two times a day  dorzolamide 2% Ophthalmic Solution 1 Drop(s) Both EYES three times a day  glucagon  Injectable 1 milliGRAM(s) IntraMuscular once  heparin   Injectable 5000 Unit(s) SubCutaneous every 12 hours  hydrALAZINE 50 milliGRAM(s) Oral three times a day  insulin lispro (ADMELOG) corrective regimen sliding scale   SubCutaneous Before meals and at bedtime  ketorolac 0.5% Ophthalmic Solution 1 Drop(s) Right EYE two times a day  losartan 50 milliGRAM(s) Oral daily  polyethylene glycol 3350 17 Gram(s) Oral daily  prednisoLONE acetate 1% Suspension 1 Drop(s) Right EYE every 4 hours  senna 2 Tablet(s) Oral at bedtime        RADIOLOGY & ADDITIONAL TESTS:    4/12/24 : CT Abdomen and Pelvis No Cont (04.12.24 @ 18:53) >  *  No acute septic pathology.  *  Scattered bilateral small pulmonary nodules measuring up to 7 mm in the right upper lobe are nonspecific and may represent   infectious/inflammatory disease or metastatic disease.  *  3.1 x 2.1 cm cystic structure in the posterior mediastinum at the level of the leelee may represent a lymphatic structure, bronchogenic cyst, or foregut duplication cyst.  *  No intra-abdominal collection.      4/12/24 : CT Chest No Cont (04.12.24 @ 18:53) LUNGS AND LARGE AIRWAYS: The central airways are patent. Scattered   bilateral small pulmonary nodules measuring up to 7 mm in the right upper  lobe are nonspecific. No acute consolidation.  PLEURA: Trace effusions.        MICROBIOLOGY DATA:    Culture - Other (04.13.24 @ 09:39)   - Minocycline: S  - Levofloxacin: S <=0.5  - Trimethoprim/Sulfamethoxazole: S <=0.5/9.5  Specimen Source: Cath Site Catheter Site  Culture Results:   Rare Stenotrophomonas maltophilia  Organism Identification: Stenotrophomonas maltophilia  Organism: Stenotrophomonas maltophilia  Organism: Stenotrophomonas maltophilia  Method Type: KB  Method Type: LAWRENCE    Culture - Blood (04.13.24 @ 17:00)   Specimen Source: .Blood Dialysis Catheter  Culture Results: No growth at 48 hours      Culture - Other (04.13.24 @ 09:39)   Specimen Source: Cath Site Catheter Site  Culture Results: No growth to date.      Culture - Blood (04.12.24 @ 17:50)   Gram Stain:   Growth in aerobic bottle: Gram positive cocci in pairs  Specimen Source: .Blood Blood-Peripheral  Culture Results:   Growth in aerobic bottle: Streptococcus salivarius/vestibularis group   Susceptibility to follow.      Culture - Blood (04.12.24 @ 18:00)   Gram Stain:   Growth in anaerobic bottle: Gram positive cocci in pairs  - Streptococcus sp. (Not Grp A, B or S pneumoniae): Detec  Specimen Source: .Blood Blood-Peripheral  Organism: Blood Culture PCR  Culture Results:   Growth in anaerobic bottle: Gram positive cocci in pairs   Direct identification is available within approximately 3-5   hours either by Blood Panel Multiplexed PCR or Direct   MALDI-TOF. Details: [email or web address]  Organism Identification: Blood Culture PCR  Method Type: PCR      Culture - Blood (04.12.24 @ 17:50)   Gram Stain:   Growth in aerobic bottle: Gram positive cocci in pairs  Specimen Source: .Blood Blood-Peripheral  Culture Results:   Growth in aerobic bottle: Gram positive cocci in pairs    Urine Microscopic-Add On (NC) (04.12.24 @ 18:13)   Red Blood Cell - Urine: 3 /HPF  White Blood Cell - Urine: 2 /HPF  Bacteria: Occasional /HPF    Respiratory Viral Panel with COVID-19 by OXANA (04.12.24 @ 17:50)   Rapid RVP Result: NotDetec  SARS-CoV-2: NotDetec:            Patient is seen and examined at the bed side, is afebrile. The catheter site culture grew Stenotrophomonas. The TTE from 4/15 shows no vegetation.      REVIEW OF SYSTEMS: All other review systems are negative      ALLERGIES: No Known Allergies      Vital Signs Last 24 Hrs  T(C): 36.6 (16 Apr 2024 13:15), Max: 37 (15 Apr 2024 15:49)  T(F): 97.9 (16 Apr 2024 13:15), Max: 98.6 (15 Apr 2024 15:49)  HR: 55 (16 Apr 2024 13:15) (51 - 59)  BP: 147/77 (16 Apr 2024 13:15) (145/72 - 167/79)  BP(mean): --  RR: 17 (16 Apr 2024 13:15) (16 - 18)  SpO2: 99% (16 Apr 2024 13:15) (97% - 99%)    Parameters below as of 16 Apr 2024 13:15  Patient On (Oxygen Delivery Method): room air      PHYSICAL EXAM:  GENERAL: Not in distress   CHEST/LUNG: Not using accessory muscles   HEART: s1 and s2 present  ABDOMEN:  Nontender and  Nondistended  EXTREMITIES: No pedal  edema  CNS: Awake and Alert      LABS:                        10.6   6.56  )-----------( 150      ( 16 Apr 2024 10:10 )             32.4                           10.7   6.11  )-----------( 118      ( 15 Apr 2024 06:54 )             31.4         04-16    136  |  105  |  78<H>  ----------------------------<  166<H>  4.5   |  21<L>  |  5.28<H>    Ca    9.1      16 Apr 2024 10:10  Phos  4.6     04-16  Mg     2.2     04-16    TPro  6.8  /  Alb  2.8<L>  /  TBili  0.3  /  DBili  x   /  AST  14  /  ALT  14  /  AlkPhos  136<H>  04-16    04-15    136  |  104  |  58<H>  ----------------------------<  116<H>  4.3   |  21<L>  |  5.21<H>    Ca    8.4      15 Apr 2024 06:54  Phos  4.0     04-13    TPro  6.9  /  Alb  2.6<L>  /  TBili  0.3  /  DBili  x   /  AST  16  /  ALT  13  /  AlkPhos  141<H>  04-15    PT/INR - ( 13 Apr 2024 05:00 )   PT: 16.3 sec;   INR: 1.45 ratio      PTT - ( 13 Apr 2024 05:00 )  PTT:26.7 sec      CAPILLARY BLOOD GLUCOSE  POCT Blood Glucose.: 186 mg/dL (13 Apr 2024 11:30)  POCT Blood Glucose.: 121 mg/dL (13 Apr 2024 07:37)      < from: TTE W or WO Ultrasound Enhancing Agent (04.15.24 @ 12:57) >     CONCLUSIONS:      1. Left ventricular cavity is normal in size. Left ventricular systolic function is normal with an ejection fraction visually estimated at 50 to 55 %. There are no regional wall motion abnormalities seen.   2. There is mild (grade 1) left ventricular diastolic dysfunction.   3. Normal right ventricular cavity size, with normal wall thickness, and normal systolic function. Tricuspid annular plane systolic excursion (TAPSE) is 2.1 cm (normal >=1.7 cm).   4. Normal left and right atrial size.   5. Trace tricuspid regurgitation.   6. Compared to the transthoracic echocardiogram performed on 12/18/2023, there have been no significant interval changes.   7. Estimated pulmonary artery systolic pressure is 20 mmHg, consistent with normal pulmonary artery pressure.   8. Mild aortic regurgitation.   9. Mild pulmonic regurgitation.  10. There is calcification of the mitral valve annulus.  11. No pericardial effusion seen.  12. No vegetations seen on this study,consider URVASHI for further evaluation if clinically indicated.        MEDICATIONS  (STANDING):    aspirin  chewable 81 milliGRAM(s) Oral daily  atorvastatin 20 milliGRAM(s) Oral at bedtime  brimonidine 0.2% Ophthalmic Solution 1 Drop(s) Right EYE two times a day  carvedilol 12.5 milliGRAM(s) Oral every 12 hours  cefTRIAXone   IVPB 2000 milliGRAM(s) IV Intermittent every 24 hours  chlorhexidine 2% Cloths 1 Application(s) Topical <User Schedule>  ciprofloxacin  0.3% Ophthalmic Solution 1 Drop(s) Right EYE two times a day  dorzolamide 2% Ophthalmic Solution 1 Drop(s) Both EYES three times a day  glucagon  Injectable 1 milliGRAM(s) IntraMuscular once  heparin   Injectable 5000 Unit(s) SubCutaneous every 12 hours  hydrALAZINE 50 milliGRAM(s) Oral three times a day  insulin lispro (ADMELOG) corrective regimen sliding scale   SubCutaneous Before meals and at bedtime  ketorolac 0.5% Ophthalmic Solution 1 Drop(s) Right EYE two times a day  losartan 50 milliGRAM(s) Oral daily  polyethylene glycol 3350 17 Gram(s) Oral daily  prednisoLONE acetate 1% Suspension 1 Drop(s) Right EYE every 4 hours  senna 2 Tablet(s) Oral at bedtime        RADIOLOGY & ADDITIONAL TESTS:    4/12/24 : CT Abdomen and Pelvis No Cont (04.12.24 @ 18:53) >  *  No acute septic pathology.  *  Scattered bilateral small pulmonary nodules measuring up to 7 mm in the right upper lobe are nonspecific and may represent   infectious/inflammatory disease or metastatic disease.  *  3.1 x 2.1 cm cystic structure in the posterior mediastinum at the level of the leelee may represent a lymphatic structure, bronchogenic cyst, or foregut duplication cyst.  *  No intra-abdominal collection.      4/12/24 : CT Chest No Cont (04.12.24 @ 18:53) LUNGS AND LARGE AIRWAYS: The central airways are patent. Scattered   bilateral small pulmonary nodules measuring up to 7 mm in the right upper  lobe are nonspecific. No acute consolidation.  PLEURA: Trace effusions.        MICROBIOLOGY DATA:    Culture - Other (04.13.24 @ 09:39)   - Minocycline: S  - Levofloxacin: S <=0.5  - Trimethoprim/Sulfamethoxazole: S <=0.5/9.5  Specimen Source: Cath Site Catheter Site  Culture Results:   Rare Stenotrophomonas maltophilia  Organism Identification: Stenotrophomonas maltophilia  Organism: Stenotrophomonas maltophilia  Organism: Stenotrophomonas maltophilia  Method Type: KB  Method Type: LAWRENCE    Culture - Blood (04.13.24 @ 17:00)   Specimen Source: .Blood Dialysis Catheter  Culture Results: No growth at 48 hours      Culture - Other (04.13.24 @ 09:39)   Specimen Source: Cath Site Catheter Site  Culture Results: No growth to date.      Culture - Blood (04.12.24 @ 17:50)   Gram Stain:   Growth in aerobic bottle: Gram positive cocci in pairs  Specimen Source: .Blood Blood-Peripheral  Culture Results:   Growth in aerobic bottle: Streptococcus salivarius/vestibularis group   Susceptibility to follow.      Culture - Blood (04.12.24 @ 18:00)   Gram Stain:   Growth in anaerobic bottle: Gram positive cocci in pairs  - Streptococcus sp. (Not Grp A, B or S pneumoniae): Detec  Specimen Source: .Blood Blood-Peripheral  Organism: Blood Culture PCR  Culture Results:   Growth in anaerobic bottle: Gram positive cocci in pairs   Direct identification is available within approximately 3-5   hours either by Blood Panel Multiplexed PCR or Direct   MALDI-TOF. Details: [email or web address]  Organism Identification: Blood Culture PCR  Method Type: PCR      Culture - Blood (04.12.24 @ 17:50)   Gram Stain:   Growth in aerobic bottle: Gram positive cocci in pairs  Specimen Source: .Blood Blood-Peripheral  Culture Results:   Growth in aerobic bottle: Gram positive cocci in pairs    Urine Microscopic-Add On (NC) (04.12.24 @ 18:13)   Red Blood Cell - Urine: 3 /HPF  White Blood Cell - Urine: 2 /HPF  Bacteria: Occasional /HPF    Respiratory Viral Panel with COVID-19 by OXANA (04.12.24 @ 17:50)   Rapid RVP Result: NotDetec  SARS-CoV-2: NotDetec:

## 2024-04-16 NOTE — CONSULT NOTE ADULT - PROBLEM SELECTOR PROBLEM 4
Health Maintenance Due   Topic Date Due   • Pneumococcal Vaccine 19-64 Highest Risk (1 of 3 - PCV13) 11/09/2004       Patient is due for topics as listed above but declines             DM (diabetes mellitus)

## 2024-04-16 NOTE — PROGRESS NOTE ADULT - SUBJECTIVE AND OBJECTIVE BOX
[   ] ICU                                          [   ] CCU                                      [ X  ] Medical Floor    Patient is a 78 year old male with abdominal pain. GI consulted to evaluate.        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 Patient also c/o constipation but denies nausea, vomiting, hematemesis, hematochezia, melena, fever, chest pain, SOB, cough, hematuria, or diarrhea.      Today patient appears comfortable. No new complaints reported, No abdominal pain, N/V, hematemesis, hematochezia, melena, fever, chills, chest pain, SOB, cough or diarrhea reported.         PAST MEDICAL HISTORY     DM (diabetes mellitus)    HTN (hypertension)     Diabetes mellitus    ESRD on HD     Hyperlipidemia     Glaucoma    Gout        PAST SURGICAL HISTORY    No significant past surgical history        Allergies    No Known Allergies    Intolerances  None       SOCIAL HISTORY  Advanced Directives:       [X  ] Full Code       [  ] DNR  Marital Status:         [  ] M      [X  ] S      [  ] D       [  ] W  Children:       [ X ] Yes      [  ] No  Occupation:        [  ] Employed       [ X ] Unemployed       [  ] Retired  Diet:       [ X ] Regular       [  ] PEG feeding          [  ] NG tube feeding  Drug Use:           [ X ] Patient denied          [  ] Yes  Alcohol:           [X  ] No             [  ] Yes (socially)         [  ] Yes (chronic)  Tobacco:           [  ] Yes           [ X ] No    FAMILY HISTORY  [ X ] Heart Disease            [ X ] Diabetes             [ X ] HTN             [  ] Colon Cancer             [  ] Stomach Cancer              [  ] Pancreatic Cancer        VITALS   Vital Signs Last 24 Hrs  T(C): 36.4 (04-16-24 @ 08:07), Max: 37 (04-15-24 @ 15:49)  T(F): 97.5 (04-16-24 @ 08:07), Max: 98.6 (04-15-24 @ 15:49)  HR: 51 (04-16-24 @ 08:07) (51 - 59)  BP: 152/70 (04-16-24 @ 08:07) (131/67 - 167/79)   RR: 17 (04-16-24 @ 08:07) (17 - 19)  SpO2: 98% (04-16-24 @ 08:07) (97% - 98%)        MEDICATIONS  (STANDING):  aspirin  chewable 81 milliGRAM(s) Oral daily  atorvastatin 20 milliGRAM(s) Oral at bedtime  brimonidine 0.2% Ophthalmic Solution 1 Drop(s) Right EYE two times a day  carvedilol 12.5 milliGRAM(s) Oral every 12 hours  cefTRIAXone   IVPB 2000 milliGRAM(s) IV Intermittent every 24 hours  chlorhexidine 2% Cloths 1 Application(s) Topical <User Schedule>  ciprofloxacin  0.3% Ophthalmic Solution 1 Drop(s) Right EYE two times a day  dextrose 10% Bolus 125 milliLiter(s) IV Bolus once  dextrose 5%. 1000 milliLiter(s) (50 mL/Hr) IV Continuous <Continuous>  dextrose 5%. 1000 milliLiter(s) (100 mL/Hr) IV Continuous <Continuous>  dextrose 50% Injectable 25 Gram(s) IV Push once  dextrose 50% Injectable 12.5 Gram(s) IV Push once  dorzolamide 2% Ophthalmic Solution 1 Drop(s) Both EYES three times a day  glucagon  Injectable 1 milliGRAM(s) IntraMuscular once  heparin   Injectable 5000 Unit(s) SubCutaneous every 12 hours  hydrALAZINE 50 milliGRAM(s) Oral three times a day  insulin lispro (ADMELOG) corrective regimen sliding scale   SubCutaneous Before meals and at bedtime  ketorolac 0.5% Ophthalmic Solution 1 Drop(s) Right EYE two times a day  losartan 50 milliGRAM(s) Oral daily  polyethylene glycol 3350 17 Gram(s) Oral daily  prednisoLONE acetate 1% Suspension 1 Drop(s) Right EYE every 4 hours  senna 2 Tablet(s) Oral at bedtime    MEDICATIONS  (PRN):  acetaminophen     Tablet .. 650 milliGRAM(s) Oral every 6 hours PRN Temp greater or equal to 38C (100.4F), Mild Pain (1 - 3)  dextrose Oral Gel 15 Gram(s) Oral once PRN Blood Glucose LESS THAN 70 milliGRAM(s)/deciliter                            10.7   6.11  )-----------( 118      ( 15 Apr 2024 06:54 )             31.4       04-15    136  |  104  |  58<H>  ----------------------------<  116<H>  4.3   |  21<L>  |  5.21<H>    Ca    8.4      15 Apr 2024 06:54    TPro  6.9  /  Alb  2.6<L>  /  TBili  0.3  /  DBili  x   /  AST  16  /  ALT  13  /  AlkPhos  141<H>  04-15

## 2024-04-16 NOTE — PROGRESS NOTE ADULT - PROBLEM SELECTOR PLAN 2
- P/w Elevated trops   - S/p EKG   - Likely ischemic demand in s/o sepsis  - S/p Telemetry as discussed w/ CV   - S/p Echo wnl  - URVASHI pending-f/u results   - CV-Dr. Jorge

## 2024-04-16 NOTE — PROGRESS NOTE ADULT - ASSESSMENT
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  1.Strep bactermia,.Abx as per ID.URVASHI in am.  2.ESRD-HD as per renal.  3.HTN-cont bp medication.  4.DM-Insulin.  5.Lipid d/o-statin.  6.Echocardiogram.  7.PC to be exchanged.  8.GI and DVT prophylaxis.  . 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  1.Strep bactermia,.Abx as per ID.URVASHI in am.  2.ESRD-HD as per renal.  3.HTN-cont bp medication.  4.DM-Insulin.  5.Lipid d/o-statin.  6.Echocardiogram as above.  7.PC to be exchanged.  8.GI and DVT prophylaxis.  .

## 2024-04-16 NOTE — DISCHARGE NOTE PROVIDER - CARE PROVIDER_API CALL
Mayank Browning  Internal Medicine  [address] [place] [id number]  Phone: [phone]  Fax: [phone]  Established Patient  Follow Up Time: 1 week   Mayank Browning  Internal Medicine  [address] [place] [id number]  Phone: [phone]  Fax: [phone]  Established Patient  Follow Up Time: 1 week    Skip Cardozo  Infectious Disease  [address] [place] [id number]  Phone: [phone]  Fax: [phone]  Scheduled Appointment: 04/30/2024

## 2024-04-16 NOTE — DISCHARGE NOTE PROVIDER - NSDCMRMEDTOKEN_GEN_ALL_CORE_FT
aspirin 81 mg oral tablet, chewable: 1 tab(s) orally once a day  Combigan 0.2%-0.5% ophthalmic solution: 1 drop(s) to each affected eye 2 times a day  home  dorzolamide 2% ophthalmic solution: 1 drop(s) in each eye 2 times a day  Lipitor 20 mg oral tablet: 1 orally once a day (at bedtime)  losartan 50 mg oral tablet: 1 tab(s) orally once a day  multivitamin: 1 tab(s) orally once a day  pantoprazole 20 mg oral delayed release tablet: 1 tab(s) orally once a day before breakfast  Vitamin D3 1250 mcg (50,000 intl units) oral capsule: 1 cap(s) orally once a week   acetaminophen 325 mg oral tablet: 2 tab(s) orally every 6 hours As needed Temp greater or equal to 38C (100.4F), Mild Pain (1 - 3)  aspirin 81 mg oral tablet, chewable: 1 tab(s) orally once a day  cefTRIAXone 2 g injection: 2 gram(s) intravenously once a day last day of antibiotics 5/9/24  Combigan 0.2%-0.5% ophthalmic solution: 1 drop(s) to each affected eye 2 times a day  home  dorzolamide 2% ophthalmic solution: 1 drop(s) in each eye 2 times a day  Lipitor 20 mg oral tablet: 1 orally once a day (at bedtime)  losartan 50 mg oral tablet: 1 tab(s) orally once a day  minocycline 100 mg oral capsule: 1 cap(s) orally every 12 hours  multivitamin: 1 tab(s) orally once a day  Normal Saline Flush 0.9% injectable solution: 10 milliliter(s) injectable 2 times a day flush extended dwell before and after antibiotic administration  pantoprazole 20 mg oral delayed release tablet: 1 tab(s) orally once a day before breakfast  polyethylene glycol 3350 oral powder for reconstitution: 17 gram(s) orally once a day  Remove Extended dwell: on 5/9/24  sulfamethoxazole-trimethoprim 800 mg-160 mg oral tablet: 2 tab(s) orally every 24 hours  Vitamin D3 1250 mcg (50,000 intl units) oral capsule: 1 cap(s) orally once a week   acetaminophen 325 mg oral tablet: 2 tab(s) orally every 6 hours As needed Temp greater or equal to 38C (100.4F), Mild Pain (1 - 3)  aspirin 81 mg oral tablet, chewable: 1 tab(s) orally once a day  carvedilol 12.5 mg oral tablet: 1 tab(s) orally every 12 hours  cefTRIAXone 2 g injection: 2 gram(s) intravenously once a day last day of antibiotics 5/9/24  Combigan 0.2%-0.5% ophthalmic solution: 1 drop(s) to each affected eye 2 times a day  home  dorzolamide 2% ophthalmic solution: 1 drop(s) in each eye 2 times a day  famotidine 20 mg oral tablet: 1 tab(s) orally once a day  Lipitor 20 mg oral tablet: 1 orally once a day (at bedtime)  minocycline 100 mg oral capsule: 1 cap(s) orally every 12 hours  multivitamin: 1 tab(s) orally once a day  Normal Saline Flush 0.9% injectable solution: 10 milliliter(s) injectable 2 times a day flush extended dwell before and after antibiotic administration  pantoprazole 20 mg oral delayed release tablet: 1 tab(s) orally once a day before breakfast  polyethylene glycol 3350 oral powder for reconstitution: 17 gram(s) orally once a day  Remove Extended dwell: on 5/9/24 after last dose of antbiotics  sevelamer carbonate 800 mg oral tablet: 1 tab(s) orally 3 times a day (with meals)  sulfamethoxazole-trimethoprim 800 mg-160 mg oral tablet: 2 tab(s) orally every 24 hours  Vitamin D3 1250 mcg (50,000 intl units) oral capsule: 1 cap(s) orally once a week  Weekly labs: CBC, BMP: during IV antibiotics

## 2024-04-16 NOTE — PROGRESS NOTE ADULT - SUBJECTIVE AND OBJECTIVE BOX
Patient is a 78y old  Male who presents with a chief complaint of Sepsis (15 Apr 2024 15:50)    pt seen in tele [ x ], reg med floor [   ], bed [x  ], chair at bedside [   ], a+o x3 [ x ], lethargic [  ],    nad [ x ]        Allergies    No Known Allergies        Vitals    T(F): 97.5 (04-16-24 @ 08:07), Max: 98.6 (04-15-24 @ 15:49)  HR: 51 (04-16-24 @ 08:07) (51 - 59)  BP: 152/70 (04-16-24 @ 08:07) (131/67 - 167/79)  RR: 17 (04-16-24 @ 08:07) (17 - 19)  SpO2: 98% (04-16-24 @ 08:07) (97% - 98%)  Wt(kg): --  CAPILLARY BLOOD GLUCOSE      POCT Blood Glucose.: 105 mg/dL (16 Apr 2024 07:39)      Labs                          10.7   6.11  )-----------( 118      ( 15 Apr 2024 06:54 )             31.4       04-15    136  |  104  |  58<H>  ----------------------------<  116<H>  4.3   |  21<L>  |  5.21<H>    Ca    8.4      15 Apr 2024 06:54    TPro  6.9  /  Alb  2.6<L>  /  TBili  0.3  /  DBili  x   /  AST  16  /  ALT  13  /  AlkPhos  141<H>  04-15            .Blood Dialysis Catheter  04-13 @ 17:00   No growth at 48 Hours  --  --      Cath Site Catheter Site  04-13 @ 09:39   Rare Stenotrophomonas maltophilia  --  Stenotrophomonas maltophilia  - Levofloxacin: S <=0.5  - Trimethoprim/Sulfamethoxazole: S <=0.5/9.5  - Minocycline: S      Clean Catch Clean Catch (Midstream)  04-12 @ 18:13   <10,000 CFU/mL Normal Urogenital Kavita  --  --      .Blood Blood-Peripheral  04-12 @ 18:00   Growth in anaerobic bottle: Streptococcus salivarius/vestibularis group  "Susceptibilities not performed"  Direct identification is available within approximately 3-5  hours either by Blood Panel Multiplexed PCR or Direct  MALDI-TOF. Details: [email or web address]  --  Blood Culture PCR      .Blood Blood-Peripheral  04-12 @ 17:50   Growth in aerobic bottle: Streptococcus salivarius/vestibularis group  Susceptibility to follow.  Growth in anaerobic bottle: Gram positive cocci in pairs  --    Growth in aerobic bottle: Gram positive cocci in pairs  Growth in anaerobic bottle: Gram positive cocci in pairs    < from: TTE W or WO Ultrasound Enhancing Agent (04.15.24 @ 12:57) >  CONCLUSIONS:      1. Left ventricular cavity is normal in size. Left ventricular systolic function is normal with an ejection fraction visually estimated at 50 to 55 %. There are no regional wall motion abnormalities seen.   2. There is mild (grade 1) left ventricular diastolic dysfunction.   3. Normal right ventricular cavity size, with normal wall thickness, and normal systolic function. Tricuspid annular plane systolic excursion (TAPSE) is 2.1 cm (normal >=1.7 cm).   4. Normal left and right atrial size.   5. Trace tricuspid regurgitation.   6. Compared to the transthoracic echocardiogram performed on 12/18/2023, there have been no significant interval changes.   7. Estimated pulmonary artery systolic pressure is 20 mmHg, consistent with normal pulmonary artery pressure.   8. Mild aortic regurgitation.   9. Mild pulmonic regurgitation.  10. There is calcification of the mitral valve annulus.  11. No pericardial effusion seen.  12. No vegetations seen on this study,consider URVASHI for further evaluation if clinically indicated.    < end of copied text >        Radiology Results      Meds    MEDICATIONS  (STANDING):  aspirin  chewable 81 milliGRAM(s) Oral daily  atorvastatin 20 milliGRAM(s) Oral at bedtime  brimonidine 0.2% Ophthalmic Solution 1 Drop(s) Right EYE two times a day  carvedilol 12.5 milliGRAM(s) Oral every 12 hours  cefTRIAXone   IVPB 2000 milliGRAM(s) IV Intermittent every 24 hours  chlorhexidine 2% Cloths 1 Application(s) Topical <User Schedule>  ciprofloxacin  0.3% Ophthalmic Solution 1 Drop(s) Right EYE two times a day  dextrose 10% Bolus 125 milliLiter(s) IV Bolus once  dextrose 5%. 1000 milliLiter(s) (50 mL/Hr) IV Continuous <Continuous>  dextrose 5%. 1000 milliLiter(s) (100 mL/Hr) IV Continuous <Continuous>  dextrose 50% Injectable 25 Gram(s) IV Push once  dextrose 50% Injectable 12.5 Gram(s) IV Push once  dorzolamide 2% Ophthalmic Solution 1 Drop(s) Both EYES three times a day  glucagon  Injectable 1 milliGRAM(s) IntraMuscular once  heparin   Injectable 5000 Unit(s) SubCutaneous every 12 hours  hydrALAZINE 50 milliGRAM(s) Oral three times a day  insulin lispro (ADMELOG) corrective regimen sliding scale   SubCutaneous Before meals and at bedtime  ketorolac 0.5% Ophthalmic Solution 1 Drop(s) Right EYE two times a day  losartan 50 milliGRAM(s) Oral daily  polyethylene glycol 3350 17 Gram(s) Oral daily  prednisoLONE acetate 1% Suspension 1 Drop(s) Right EYE every 4 hours  senna 2 Tablet(s) Oral at bedtime      MEDICATIONS  (PRN):  acetaminophen     Tablet .. 650 milliGRAM(s) Oral every 6 hours PRN Temp greater or equal to 38C (100.4F), Mild Pain (1 - 3)  dextrose Oral Gel 15 Gram(s) Oral once PRN Blood Glucose LESS THAN 70 milliGRAM(s)/deciliter      Physical Exam    Neuro :  no focal deficits  Respiratory: CTA B/L  CV: RRR, S1S2, no murmurs,   Abdominal: Soft, NT, ND +BS,  Extremities: No edema, + peripheral pulses      ASSESSMENT      uncontrolled htn,   abnormal trop r/o acs,   poss 2nd to demand ischemia,    abd pain poss 2nd to constipation,    fever poss 2nd to line sepsis   bacteremia   pulmonary nodules,    bronchogenic cyst, or foregut duplication cyst,   hyperkalemia  h/o sinus node dysfunction,   chronic HFpEF,   aortic stenosis,   HTN,   HLD,   b/l carotid stenosis,   DM (not on meds),   ESRD on dialysis (Tue, Thu, Sat),   Gout,    glaucoma,   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  cx and sens fr hd cath site with Stenotrophomonas maltophilia noted above   blood cx cath neg noted above  id f/u    TTE with ejection fraction visually estimated at 50 to 55 %. mild (grade 1) left ventricular diastolic dysfunction.   No vegetations seen on this study, consider URVASHI for further evaluation if clinically indicated noted above.     URVASHI to francisco j out vegetation  Continue Ceftriaxone 2 g daily and discontinue Vancomycin   Repeat Blood cultures X 2 today to document clearing the blood stream  procalcitonin 0.6 noted    f/u quantiferon tb gold test   pulm cons   pt on hd t,th,s   renal f/u   K improved after dialysis,   follow 2 gm K diet.   Malfunction catheter on last  dialysis  traetment 4/14/24  Will have dialysis today,   follow catheter function.  consider complete change of catheter versus a change over guidewire.   AVG placement after discharge at [facility].   hgba1c 6.4 noted    lispro ss   cont current meds

## 2024-04-16 NOTE — PROGRESS NOTE ADULT - SUBJECTIVE AND OBJECTIVE BOX
Date of Service 04-16-24 @ 11:46    CHIEF COMPLAINT:Patient is a 78y old  Male who presents with a chief complaint of Sepsis.Pt appears comfortable.    	  REVIEW OF SYSTEMS:  CONSTITUTIONAL: No fever, weight loss, or fatigue  EYES: No eye pain, visual disturbances, or discharge  ENT:  No difficulty hearing, tinnitus, vertigo; No sinus or throat pain  NECK: No pain or stiffness  RESPIRATORY: No cough, wheezing, chills or hemoptysis; No Shortness of Breath  CARDIOVASCULAR: No chest pain, palpitations, passing out, dizziness, or leg swelling  GASTROINTESTINAL: No abdominal or epigastric pain. No nausea, vomiting, or hematemesis; No diarrhea or constipation. No melena or hematochezia.  GENITOURINARY: No dysuria, frequency, hematuria, or incontinence  NEUROLOGICAL: No headaches, memory loss, loss of strength, numbness, or tremors  SKIN: No itching, burning, rashes, or lesions   LYMPH Nodes: No enlarged glands  ENDOCRINE: No heat or cold intolerance; No hair loss  MUSCULOSKELETAL: No joint pain or swelling; No muscle, back, or extremity pain  PSYCHIATRIC: No depression, anxiety, mood swings, or difficulty sleeping  HEME/LYMPH: No easy bruising, or bleeding gums  ALLERGY AND IMMUNOLOGIC: No hives or eczema	      PHYSICAL EXAM:  T(C): 36.4 (04-16-24 @ 08:07), Max: 37 (04-15-24 @ 15:49)  HR: 51 (04-16-24 @ 08:07) (51 - 59)  BP: 152/70 (04-16-24 @ 08:07) (145/72 - 167/79)  RR: 17 (04-16-24 @ 08:07) (17 - 18)  SpO2: 98% (04-16-24 @ 08:07) (97% - 98%)  Wt(kg): --  I&O's Summary    15 Apr 2024 07:01  -  16 Apr 2024 07:00  --------------------------------------------------------  IN: 50 mL / OUT: 550 mL / NET: -500 mL    16 Apr 2024 07:01  -  16 Apr 2024 11:46  --------------------------------------------------------  IN: 0 mL / OUT: 200 mL / NET: -200 mL        Appearance: Normal	  HEENT:   Normal oral mucosa, PERRL, EOMI	  Lymphatic: No lymphadenopathy  Cardiovascular: Normal S1 S2, No JVD, No murmurs, No edema  Respiratory: Lungs clear to auscultation	  Psychiatry: A & O x 3, Mood & affect appropriate  Gastrointestinal:  Soft, Non-tender, + BS	  Skin: No rashes, No ecchymoses, No cyanosis	  Neurologic: Non-focal  Extremities: Normal range of motion, No clubbing, cyanosis or edema  Vascular: Peripheral pulses palpable 2+ bilaterally    MEDICATIONS  (STANDING):  aspirin  chewable 81 milliGRAM(s) Oral daily  atorvastatin 20 milliGRAM(s) Oral at bedtime  brimonidine 0.2% Ophthalmic Solution 1 Drop(s) Right EYE two times a day  carvedilol 12.5 milliGRAM(s) Oral every 12 hours  cefTRIAXone   IVPB 2000 milliGRAM(s) IV Intermittent every 24 hours  chlorhexidine 2% Cloths 1 Application(s) Topical <User Schedule>  ciprofloxacin  0.3% Ophthalmic Solution 1 Drop(s) Right EYE two times a day  dextrose 10% Bolus 125 milliLiter(s) IV Bolus once  dextrose 5%. 1000 milliLiter(s) (100 mL/Hr) IV Continuous <Continuous>  dextrose 5%. 1000 milliLiter(s) (50 mL/Hr) IV Continuous <Continuous>  dextrose 50% Injectable 25 Gram(s) IV Push once  dextrose 50% Injectable 12.5 Gram(s) IV Push once  dorzolamide 2% Ophthalmic Solution 1 Drop(s) Both EYES three times a day  glucagon  Injectable 1 milliGRAM(s) IntraMuscular once  heparin   Injectable 5000 Unit(s) SubCutaneous every 12 hours  hydrALAZINE 50 milliGRAM(s) Oral three times a day  insulin lispro (ADMELOG) corrective regimen sliding scale   SubCutaneous Before meals and at bedtime  ketorolac 0.5% Ophthalmic Solution 1 Drop(s) Right EYE two times a day  losartan 50 milliGRAM(s) Oral daily  polyethylene glycol 3350 17 Gram(s) Oral daily  prednisoLONE acetate 1% Suspension 1 Drop(s) Right EYE every 4 hours  senna 2 Tablet(s) Oral at bedtime      	  LABS:	 	                         10.6   6.56  )-----------( 150      ( 16 Apr 2024 10:10 )             32.4     04-16    136  |  105  |  78<H>  ----------------------------<  166<H>  4.5   |  21<L>  |  5.28<H>    Ca    9.1      16 Apr 2024 10:10  Phos  4.6     04-16  Mg     2.2     04-16    TPro  6.8  /  Alb  2.8<L>  /  TBili  0.3  /  DBili  x   /  AST  14  /  ALT  14  /  AlkPhos  136<H>  04-16      	      Culture - Blood (04.13.24 @ 17:00)   Specimen Source: .Blood Dialysis Catheter  Culture Results:   No growth at 48 HoursCulture - Blood (04.12.24 @ 18:00)   - Streptococcus sp. (Not Grp A, B or S pneumoniae): Detec  Gram Stain:   Growth in anaerobic bottle: Gram positive cocci in pairs  Specimen Source: .Blood Blood-Peripheral  Organism: Blood Culture PCR  Culture Results:   Growth in anaerobic bottle: Streptococcus salivarius/vestibularis group   "Susceptibilities not performed"   Direct identification is available within approximately 3-5   hours either by Blood Panel Multiplexed PCR or Direct   MALDI-TOF. Details: [email or web address]  Organism Identification: Blood Culture PCR  Culture - Blood (04.12.24 @ 17:50)   Gram Stain:   Growth in aerobic bottle: Gram positive cocci in pairs   Growth in anaerobic bottle: Gram positive cocci in pairs  - Ceftriaxone: S <=0.25  - Penicillin: S 0.12  - Vancomycin: S 1  Specimen Source: .Blood Blood-Peripheral  Organism: Streptococcus salivarius/vestibularis group  Culture Results:   Growth in aerobic bottle: Streptococcus salivarius/vestibularis group   Growth in anaerobic bottle: Gram positive cocci in pairs  Organism Identification: Streptococcus salivarius/vestibularis group  Method Type: LAWRENCE  < from: TTE W or WO Ultrasound Enhancing Agent (04.15.24 @ 12:57) >  CONCLUSIONS:      1. Left ventricular cavity is normal in size. Left ventricular systolic function is normal with an ejection fraction visually estimated at 50 to 55 %. There are no regional wall motion abnormalities seen.   2. There is mild (grade 1) left ventricular diastolic dysfunction.   3. Normal right ventricular cavity size, with normal wall thickness, and normal systolic function. Tricuspid annular plane systolic excursion (TAPSE) is 2.1 cm (normal >=1.7 cm).   4. Normal left and right atrial size.   5. Trace tricuspid regurgitation.   6. Compared to the transthoracic echocardiogram performed on 12/18/2023, there have been no significant interval changes.   7. Estimated pulmonary artery systolic pressure is 20 mmHg, consistent with normal pulmonary artery pressure.   8. Mild aortic regurgitation.   9. Mild pulmonic regurgitation.  10. There is calcification of the mitral valve annulus.  11. No pericardial effusion seen.  12. No vegetations seen on this study,consider URVASHI for further evaluation if clinically indicated.    < end of copied text >

## 2024-04-16 NOTE — PROGRESS NOTE ADULT - ASSESSMENT
ESRD - dialysis days are T-T-S.  Mild Hyperkalemia , K improved after dialysis, follow 2 gm K diet. Dialysis today, K   Fever with bacteremia Streptococcus sp. (Not Grp A, B or S pneumoniae): Detec  Gram Stain:  catheter site blood cultures  are negative    Malfunction catheter max  ml/minute today, In need for removal and placement of new catheter according to ID. Follow BC from HD catheter.

## 2024-04-16 NOTE — PROGRESS NOTE ADULT - SUBJECTIVE AND OBJECTIVE BOX
Problem List:  ESRD , admitted for sepsis.    Culture - Blood (04.13.24 @ 17:00)   Specimen Source: .Blood Dialysis Catheterlture Results: No growth at 48 Hours      -Culture tip of catheter.   Trimethoprim/Sulfamethoxazole: S <=0.5/9.5  - Levofloxacin: S <=0.5  - Minocycline: S  Specimen Source: Cath Site Catheter Site  Culture Results:   Rare Stenotrophomonas maltophilia  Organism Identification: Stenotrophomonas maltophilia  Organism: Stenotrophomonas maltophilia  Organism: Stenotrophomonas maltophilia  Method Type: LAWRENCE  Method Type: KBCulture - Blood (04.12.24 @ 17:50)   - Ceftriaxone: S <=0.25  - Penicillin: S 0.12  - Vancomycin: S 1  Gram Stain:   Growth in aerobic bottle: Gram positive cocci in pairs   Growth in anaerobic bottle: Gram positive cocci in pairs  Specimen Source: .Blood Blood-Peripheral  Organism: Streptococcus salivarius/vestibularis group  Culture Results:   Growth in aerobic bottle: Streptococcus salivarius/vestibularis group   Growth in anaerobic bottle: Gram positive cocci in pairs  Organism Identification: Streptococcus salivarius/vestibularis group  Method Type: LAWRENCE    PAST MEDICAL & SURGICAL HISTORY:  DM (diabetes mellitus)      HTN (hypertension)      Chronic kidney disease      HLD (hyperlipidemia)      Glaucoma      Gout      No significant past surgical history          No Known Allergies      MEDICATIONS  (STANDING):  aspirin  chewable 81 milliGRAM(s) Oral daily  atorvastatin 20 milliGRAM(s) Oral at bedtime  brimonidine 0.2% Ophthalmic Solution 1 Drop(s) Right EYE two times a day  carvedilol 12.5 milliGRAM(s) Oral every 12 hours  cefTRIAXone   IVPB 2000 milliGRAM(s) IV Intermittent every 24 hours  chlorhexidine 2% Cloths 1 Application(s) Topical <User Schedule>  ciprofloxacin  0.3% Ophthalmic Solution 1 Drop(s) Right EYE two times a day  dextrose 10% Bolus 125 milliLiter(s) IV Bolus once  dextrose 5%. 1000 milliLiter(s) (50 mL/Hr) IV Continuous <Continuous>  dextrose 5%. 1000 milliLiter(s) (100 mL/Hr) IV Continuous <Continuous>  dextrose 50% Injectable 25 Gram(s) IV Push once  dextrose 50% Injectable 12.5 Gram(s) IV Push once  dorzolamide 2% Ophthalmic Solution 1 Drop(s) Both EYES three times a day  glucagon  Injectable 1 milliGRAM(s) IntraMuscular once  heparin   Injectable 5000 Unit(s) SubCutaneous every 12 hours  hydrALAZINE 50 milliGRAM(s) Oral three times a day  insulin lispro (ADMELOG) corrective regimen sliding scale   SubCutaneous Before meals and at bedtime  ketorolac 0.5% Ophthalmic Solution 1 Drop(s) Right EYE two times a day  losartan 50 milliGRAM(s) Oral daily  polyethylene glycol 3350 17 Gram(s) Oral daily  prednisoLONE acetate 1% Suspension 1 Drop(s) Right EYE every 4 hours  senna 2 Tablet(s) Oral at bedtime    MEDICATIONS  (PRN):  acetaminophen     Tablet .. 650 milliGRAM(s) Oral every 6 hours PRN Temp greater or equal to 38C (100.4F), Mild Pain (1 - 3)  dextrose Oral Gel 15 Gram(s) Oral once PRN Blood Glucose LESS THAN 70 milliGRAM(s)/deciliter                            10.6   6.56  )-----------( 150      ( 16 Apr 2024 10:10 )             32.4     04-16    136  |  105  |  78<H>  ----------------------------<  166<H>  4.5   |  21<L>  |  5.28<H>    Ca    9.1      16 Apr 2024 10:10  Phos  4.6     04-16  Mg     2.2     04-16    TPro  6.8  /  Alb  2.8<L>  /  TBili  0.3  /  DBili  x   /  AST  14  /  ALT  14  /  AlkPhos  136<H>  04-16            REVIEW OF SYSTEMS:  General: no fever no chills, no weight loss. No fever and no chills  reduced vision   RESPIRATORY: No cough, wheezing, hemoptysis; No shortness of breath  CARDIOVASCULAR: No chest pain or palpitations. No Edema  GASTROINTESTINAL: No abdominal or epigastric pain. No nausea, vomiting. No diarrhea or constipation. No melena.  GENITOURINARY: No dysuria, frequency, foamy urine, urinary urgency, incontinence or hematuria  NEUROLOGICAL: No numbness or weakness, no tremor , no dizziness.           VITALS:  T(F): 98.1 (04-16-24 @ 10:01), Max: 98.6 (04-15-24 @ 15:49)  HR: 53 (04-16-24 @ 10:01)  BP: 164/80 (04-16-24 @ 10:01)  RR: 16 (04-16-24 @ 10:01)  SpO2: 98% (04-16-24 @ 10:01)  Wt(kg): --    04-15 @ 07:01  -  04-16 @ 07:00  --------------------------------------------------------  IN: 50 mL / OUT: 550 mL / NET: -500 mL    04-16 @ 07:01  -  04-16 @ 13:22  --------------------------------------------------------  IN: 0 mL / OUT: 200 mL / NET: -200 mL        PHYSICAL EXAM:  Constitutional: well developed, no diaphoresis, no distress.  Neck: No JVD, no carotid bruit, supple, no adenopathy  Respiratory: Good air entrance B/L, no wheezes, rales or rhonchi  Cardiovascular: S1, S2, RRR, no pericardial rub, no murmur  Abdomen: BS+, soft, no tenderness, no bruit  Pelvis: bladder nondistended  Extremities: No cyanosis or clubbing. No peripheral edema.   Pulses: All present  Neurological: A/O x 3, no focal deficits  Psychiatric: Normal mood, normal affect  Skin: No rashes  Vascular Access: right PC with bruit and thrill. Ni tunnel erythema.
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
## 2024-04-16 NOTE — PROGRESS NOTE ADULT - PROBLEM SELECTOR PROBLEM 3
RH Factor added at [facility] (blood still good to use)- PM to watch for results  OV scheduled for tomorrow      Task to PM for [place] ESRD on dialysis

## 2024-04-16 NOTE — PROGRESS NOTE ADULT - PROBLEM SELECTOR PLAN 1
H/o MSSA infection-Pt admitted to [facility] on 11/24/2023 and discharged on 12/08/23.  His permacath was exchanged on Friday 11/25/23. Treated w/ Cefazolin.   - P/w Sepsis & permacath possible source    - Afebrile overnight   - S/P (-) UA & RVP    - S/p CT chest noted below   - 4/12 (+) Bld cx  - 4/13 HD Bld cx negative  - Repeat bld cx's in AM-f/u results   - Quant Tb negative  - S/p Vancomycin      - Currently on Ceftriaxone   - ID-Dr. Cui H/o MSSA infection-Pt admitted to [facility] on 11/24/2023 and discharged on 12/08/23.  His permacath was exchanged on Friday 11/25/23. Treated w/ Cefazolin.   - P/w Sepsis & permacath possible source    - Afebrile overnight   - S/P (-) UA & RVP    - S/p CT chest noted below   - 4/12 (+) Bld cx  - 4/13 HD Bld cx negative. 4/13 HD cath site bld cx (+) Stenotrophomonas,  - Repeat bld cx's in AM-f/u results   - Quant Tb negative  - S/p Vancomycin      - Currently on Ceftriaxone.  ID Added Minocycline & Bactrim.   - ID-Dr. Cui    - Discussed w/ IR permacath exchange.  IR requested CXR to evaluate catheter-f/u results

## 2024-04-16 NOTE — DISCHARGE NOTE PROVIDER - NSDCCPCAREPLAN_GEN_ALL_CORE_FT
PRINCIPAL DISCHARGE DIAGNOSIS  Diagnosis: Sepsis  Assessment and Plan of Treatment: You were found to have sepsis. Sepsis is the body's extreme response to an infection. The cause of youe sepsis is likley due to ???? You were treated with IV antibiotics and your condition improved.  Take all antibiotics as ordered.  Call you Health care provider upon arrival home to make a one week follow up appointment.  If you develop fever, chills, malaise, or change in mental status call your Health Care Provider or go to the Emergency Department.  Nutrition is important, eat small frequent meals to help ensure you get adequate calories.  Do not stay in bed all day!  Increase your activity daily as tolerated.        SECONDARY DISCHARGE DIAGNOSES  Diagnosis: Bacteremia  Assessment and Plan of Treatment: You were treated for bacteremia. Bacteremia is the presence of bacteria in the bloodstream. Complete antibiotic course as infectious doctor recommended. If you have fever, chills, loss of appetite of nausea/ vomiting, call health care provider and follow the instruction.   follow up with your primary doctor in community within 2 weeks from discharge from hospital.      Diagnosis: ACS (acute coronary syndrome)  Assessment and Plan of Treatment: You were found to have elevated cardiac enzymes and an abnormal ekg. you were followed by a cardiologist and you had an echocardiogram which showed ???   Acute coronary syndrome is sudden decreased blood flow to your heart. This causes a lack of oxygen to your heart and can lead to unstable angina or a heart attack.  Call your local emergency number (911 in the [place]) for any of the following:  You have any of the following signs of a heart attack:  Squeezing, pressure, or pain in your chest. You may also have any of the following:  Discomfort or pain in your back, neck, jaw, stomach, or arm. Shortness of breath  Nausea or vomiting. Lightheadedness or a sudden cold sweat  Manage or prevent ACS: Eat heart-healthy foods. Limit sodium (salt) as directed. Maintain a healthy weight. Do not smoke.   Follow up with your doctor      Diagnosis: DM (diabetes mellitus)  Assessment and Plan of Treatment: Continue to follow with your primary care MD or your endocrinologist. Discuss what the goal hemoglobin A1C level is for you.  Follow a heart healthy diabetic diet. If you check your fingerstick glucose at home, call your MD if it is greater than 250mg/dL on 2 occasions or less than 100mg/dL on 2 occasions. Know signs of low blood sugar, such as: dizziness, shakiness, sweating, confusion, hunger, nervousness- drink 4 ounces apple juice if occurs and call your doctor. Know early signs of high blood sugar, such as: frequent urination, increased thirst, blurry vision, fatigue, headache - call your doctor if this occurs.      Diagnosis: HTN (hypertension)  Assessment and Plan of Treatment: You have a history of high blood pressure. High blood pressure is a condition that puts you at risk for heart attack, stroke and kidney disease. Please continue to take your medications as prescribed. You can also help control your blood pressure by maintaining a healthy weight, eating a diet low in fat and rich in fruits and vegetables, reduce the amount of salt in your diet. Also, reduce alcohol and try to include some form of physical activity daily for at least 30 mins. Follow up with your medical doctor to establish long term blood pressure treatment goals.  Notify your doctor if you have any of the following symptoms:   Dizziness, Lightheadedness, Blurry vision, Headache, Chest pain, Shortness of breath      Diagnosis: ESRD on dialysis  Assessment and Plan of Treatment: Continue with your dialysis treatments. Follow with your nephrologist (kidney physician). Continue your medications as prescribed.      Diagnosis: Pulmonary nodule  Assessment and Plan of Treatment:     Diagnosis: HLD (hyperlipidemia)  Assessment and Plan of Treatment: You have a history of hyperlipidemia, which is when you have too much cholesterol in your blood. High amounts of cholesterol in your blood can put you at higher risks for heart attck, strokes and other health problems. Follow up with PCP for treatment goals, continue medication as prescribed, have liver function testing every 3 months as anti lipid medications can cause liver irritation, eat low fat meals, avoid red meat, butter, fried foods and cheese. Get daily exercise.      Diagnosis: Heart failure  Assessment and Plan of Treatment: You have a history of heart failure. Heart failure is a condition in which the heart is no longer able to pump oxygen-rich blood to the rest of the body efficiently. When symptoms become severe, a hospital stay may be necessary. Your health care team closely adjusted the fluids you drank or received through an intravenous (IV) line. They also watched and measured how much urine you produced. You may have received medicines to help your body get rid of extra fluids  Weigh yourself daily.  If you gain 3lbs in 3 days, or 5lbs in a week call your Health Care Provider.  Do not eat or drink foods containing more than 2000mg of salt (sodium) in your diet every day.  Call your Health Care Provider if you have any swelling or increased swelling in your feet, ankles, and/or stomach.  Take all of your medication as directed.  If you become dizzy call your Health Care Provider.       PRINCIPAL DISCHARGE DIAGNOSIS  Diagnosis: Sepsis  Assessment and Plan of Treatment: You were found to have sepsis. Sepsis is the body's extreme response to an infection. The cause of your sepsis is likley due to  Suspected  dialysis Line infection. ID reccommends: Continue  Bactrim and Minocycline to cover Stenotrophomonas. Continue Ceftriaxone 2 g daily to cover Streptococcus. Need Antibiotics until 5/2/24  You were treated with IV antibiotics and your condition improved.  Take all antibiotics as ordered.  Call you Health care provider upon arrival home to make a one week follow up appointment.  If you develop fever, chills, malaise, or change in mental status call your Health Care Provider or go to the Emergency Department.  Nutrition is important, eat small frequent meals to help ensure you get adequate calories.  Do not stay in bed all day!  Increase your activity daily as tolerated.        SECONDARY DISCHARGE DIAGNOSES  Diagnosis: Bacteremia  Assessment and Plan of Treatment: You were treated for bacteremia. Bacteremia is the presence of bacteria in the bloodstream. Complete antibiotic course as infectious doctor recommended. If you have fever, chills, loss of appetite of nausea/ vomiting, call health care provider and follow the instruction.   follow up with your primary doctor in community within 2 weeks from discharge from hospital.      Diagnosis: ACS (acute coronary syndrome)  Assessment and Plan of Treatment: You were found to have elevated cardiac enzymes and an abnormal ekg. you were followed by a cardiologist and you had an echocardiogram and a URVASHI which weer normal.  Acute coronary syndrome is sudden decreased blood flow to your heart. This causes a lack of oxygen to your heart and can lead to unstable angina or a heart attack.  Call your local emergency number (911 in the ) for any of the following:  You have any of the following signs of a heart attack:  Squeezing, pressure, or pain in your chest. You may also have any of the following:  Discomfort or pain in your back, neck, jaw, stomach, or arm. Shortness of breath  Nausea or vomiting. Lightheadedness or a sudden cold sweat  Manage or prevent ACS: Eat heart-healthy foods. Limit sodium (salt) as directed. Maintain a healthy weight. Do not smoke.   Follow up with your doctor      Diagnosis: DM (diabetes mellitus)  Assessment and Plan of Treatment: Continue to follow with your primary care MD or your endocrinologist. Discuss what the goal hemoglobin A1C level is for you.  Follow a heart healthy diabetic diet. If you check your fingerstick glucose at home, call your MD if it is greater than 250mg/dL on 2 occasions or less than 100mg/dL on 2 occasions. Know signs of low blood sugar, such as: dizziness, shakiness, sweating, confusion, hunger, nervousness- drink 4 ounces apple juice if occurs and call your doctor. Know early signs of high blood sugar, such as: frequent urination, increased thirst, blurry vision, fatigue, headache - call your doctor if this occurs.      Diagnosis: HTN (hypertension)  Assessment and Plan of Treatment: You have a history of high blood pressure. High blood pressure is a condition that puts you at risk for heart attack, stroke and kidney disease. Please continue to take your medications as prescribed. You can also help control your blood pressure by maintaining a healthy weight, eating a diet low in fat and rich in fruits and vegetables, reduce the amount of salt in your diet. Also, reduce alcohol and try to include some form of physical activity daily for at least 30 mins. Follow up with your medical doctor to establish long term blood pressure treatment goals.  Notify your doctor if you have any of the following symptoms:   Dizziness, Lightheadedness, Blurry vision, Headache, Chest pain, Shortness of breath      Diagnosis: ESRD on dialysis  Assessment and Plan of Treatment: Continue with your dialysis treatments. Follow with your nephrologist (kidney physician). Continue your medications as prescribed.      Diagnosis: Pulmonary nodule  Assessment and Plan of Treatment: CT Chest/A/P showed scattered bilateral small pulmonary nodules measuring up to 7 mm in the right upper lobe are nonspecific and may represent infectious/inflammatory disease or metastatic disease. 3.1 x 2.1 cm cystic structure in the posterior mediastinum at the level of the leelee may represent a lymphatic structure, bronchogenic cyst, or foregut duplication cyst. No intra-abdominal collection.  - Pulmonary  -  Dr. Ghotra Recommends Follow up CT in 12 months.    Diagnosis: HLD (hyperlipidemia)  Assessment and Plan of Treatment: You have a history of hyperlipidemia, which is when you have too much cholesterol in your blood. High amounts of cholesterol in your blood can put you at higher risks for heart attck, strokes and other health problems. Follow up with PCP for treatment goals, continue medication as prescribed, have liver function testing every 3 months as anti lipid medications can cause liver irritation, eat low fat meals, avoid red meat, butter, fried foods and cheese. Get daily exercise.      Diagnosis: Heart failure  Assessment and Plan of Treatment: You have a history of heart failure. Heart failure is a condition in which the heart is no longer able to pump oxygen-rich blood to the rest of the body efficiently. When symptoms become severe, a hospital stay may be necessary. Your health care team closely adjusted the fluids you drank or received through an intravenous (IV) line. They also watched and measured how much urine you produced. You may have received medicines to help your body get rid of extra fluids  Weigh yourself daily.  If you gain 3lbs in 3 days, or 5lbs in a week call your Health Care Provider.  Do not eat or drink foods containing more than 2000mg of salt (sodium) in your diet every day.  Call your Health Care Provider if you have any swelling or increased swelling in your feet, ankles, and/or stomach.  Take all of your medication as directed.  If you become dizzy call your Health Care Provider.       PRINCIPAL DISCHARGE DIAGNOSIS  Diagnosis: Sepsis  Assessment and Plan of Treatment: You were found to have sepsis. Sepsis is the body's extreme response to an infection.   The cause of your sepsis is likely due to  Suspected  dialysis Line infection.   ID reccommends: Continue oral  Bactrim and Minocycline to cover Stenotrophomonas.   Continue Ceftriaxone 2 g daily to cover Streptococcus.   Continue all Antibiotics until 5/2/24  You were treated with IV antibiotics and your condition improved.  Take all antibiotics as ordered.  Call you Health care provider upon arrival home to make a one week follow up appointment.  If you develop fever, chills, malaise, or change in mental status call your Health Care Provider or go to the Emergency Department.  Nutrition is important, eat small frequent meals to help ensure you get adequate calories.  Do not stay in bed all day!  Increase your activity daily as tolerated.        SECONDARY DISCHARGE DIAGNOSES  Diagnosis: ESRD on dialysis  Assessment and Plan of Treatment: A tunnelled catheter was placed in your Right EJ on 4/24/24 by Interventional Radiology.   Continue with your dialysis treatments. Follow with your nephrologist (kidney physician). Continue your medications as prescribed.      Diagnosis: HTN (hypertension)  Assessment and Plan of Treatment: You have a history of high blood pressure. High blood pressure is a condition that puts you at risk for heart attack, stroke and kidney disease. Please continue to take your medications as prescribed. You can also help control your blood pressure by maintaining a healthy weight, eating a diet low in fat and rich in fruits and vegetables, reduce the amount of salt in your diet. Also, reduce alcohol and try to include some form of physical activity daily for at least 30 mins. Follow up with your medical doctor to establish long term blood pressure treatment goals.  Notify your doctor if you have any of the following symptoms:   Dizziness, Lightheadedness, Blurry vision, Headache, Chest pain, Shortness of breath      Diagnosis: DM (diabetes mellitus)  Assessment and Plan of Treatment: Continue to follow with your primary care MD or your endocrinologist. Discuss what the goal hemoglobin A1C level is for you.  Follow a heart healthy diabetic diet. If you check your fingerstick glucose at home, call your MD if it is greater than 250mg/dL on 2 occasions or less than 100mg/dL on 2 occasions. Know signs of low blood sugar, such as: dizziness, shakiness, sweating, confusion, hunger, nervousness- drink 4 ounces apple juice if occurs and call your doctor. Know early signs of high blood sugar, such as: frequent urination, increased thirst, blurry vision, fatigue, headache - call your doctor if this occurs.      Diagnosis: Bacteremia  Assessment and Plan of Treatment: You were treated for bacteremia. Bacteremia is the presence of bacteria in the bloodstream. Complete antibiotic course as infectious doctor recommended. If you have fever, chills, loss of appetite of nausea/ vomiting, call health care provider and follow the instruction.   follow up with your primary doctor in community within 2 weeks from discharge from hospital.      Diagnosis: Pulmonary nodule  Assessment and Plan of Treatment: CT Chest/A/P showed scattered bilateral small pulmonary nodules measuring up to 7 mm in the right upper lobe are nonspecific and may represent infectious/inflammatory disease or metastatic disease. 3.1 x 2.1 cm cystic structure in the posterior mediastinum at the level of the leelee may represent a lymphatic structure, bronchogenic cyst, or foregut duplication cyst. No intra-abdominal collection.  - Pulmonary  -  Dr. Ghotra Recommends Follow up CT in 12 months.    Diagnosis: HLD (hyperlipidemia)  Assessment and Plan of Treatment: You have a history of hyperlipidemia, which is when you have too much cholesterol in your blood. High amounts of cholesterol in your blood can put you at higher risks for heart attck, strokes and other health problems. Follow up with PCP for treatment goals, continue medication as prescribed, have liver function testing every 3 months as anti lipid medications can cause liver irritation, eat low fat meals, avoid red meat, butter, fried foods and cheese. Get daily exercise.      Diagnosis: Heart failure  Assessment and Plan of Treatment: You have a history of heart failure. Heart failure is a condition in which the heart is no longer able to pump oxygen-rich blood to the rest of the body efficiently. When symptoms become severe, a hospital stay may be necessary. Your health care team closely adjusted the fluids you drank or received through an intravenous (IV) line. They also watched and measured how much urine you produced. You may have received medicines to help your body get rid of extra fluids  Weigh yourself daily.  If you gain 3lbs in 3 days, or 5lbs in a week call your Health Care Provider.  Do not eat or drink foods containing more than 2000mg of salt (sodium) in your diet every day.  Call your Health Care Provider if you have any swelling or increased swelling in your feet, ankles, and/or stomach.  Take all of your medication as directed.  If you become dizzy call your Health Care Provider.      Diagnosis: ACS (acute coronary syndrome)  Assessment and Plan of Treatment: You were found to have elevated cardiac enzymes and an abnormal ekg. you were followed by a cardiologist and you had an echocardiogram and a URVASHI which weer normal.  Acute coronary syndrome is sudden decreased blood flow to your heart. This causes a lack of oxygen to your heart and can lead to unstable angina or a heart attack.  Call your local emergency number (911 in the [place]) for any of the following:  You have any of the following signs of a heart attack:  Squeezing, pressure, or pain in your chest. You may also have any of the following:  Discomfort or pain in your back, neck, jaw, stomach, or arm. Shortness of breath  Nausea or vomiting. Lightheadedness or a sudden cold sweat  Manage or prevent ACS: Eat heart-healthy foods. Limit sodium (salt) as directed. Maintain a healthy weight. Do not smoke.   Follow up with your doctor       PRINCIPAL DISCHARGE DIAGNOSIS  Diagnosis: Sepsis  Assessment and Plan of Treatment: You were found to have sepsis. Sepsis is the body's extreme response to an infection.   The cause of your sepsis is likely due to  Suspected  dialysis Line infection.   ID reccommends: Continue oral  Bactrim and Minocycline to cover Stenotrophomonas until 5/2/24  Continue Ceftriaxone 2 g IV daily to cover Streptococcus.   Continue IV Antibiotics until 5/9/24  You will need to follow up with Dr. Skip Cardozo via Telehealth   on Tuesday 4/30/24. [phone] and Fax: [phone]  You were treated with IV antibiotics and your condition improved.  Take all antibiotics as ordered.  Call you Health care provider upon arrival home to make a one week follow up appointment.  If you develop fever, chills, malaise, or change in mental status call your Health Care Provider or go to the Emergency Department.  Nutrition is important, eat small frequent meals to help ensure you get adequate calories.  Do not stay in bed all day!  Increase your activity daily as tolerated.        SECONDARY DISCHARGE DIAGNOSES  Diagnosis: ESRD on dialysis  Assessment and Plan of Treatment: A tunnelled catheter was placed in your Right EJ on 4/24/24 by Interventional Radiology.   Continue with your dialysis treatments. Follow with your nephrologist (kidney physician). Continue your medications as prescribed.      Diagnosis: HTN (hypertension)  Assessment and Plan of Treatment: You have a history of high blood pressure. High blood pressure is a condition that puts you at risk for heart attack, stroke and kidney disease. Please continue to take your medications as prescribed. You can also help control your blood pressure by maintaining a healthy weight, eating a diet low in fat and rich in fruits and vegetables, reduce the amount of salt in your diet. Also, reduce alcohol and try to include some form of physical activity daily for at least 30 mins. Follow up with your medical doctor to establish long term blood pressure treatment goals.  Notify your doctor if you have any of the following symptoms:   Dizziness, Lightheadedness, Blurry vision, Headache, Chest pain, Shortness of breath      Diagnosis: DM (diabetes mellitus)  Assessment and Plan of Treatment: Continue to follow with your primary care MD or your endocrinologist. Discuss what the goal hemoglobin A1C level is for you.  Follow a heart healthy diabetic diet. If you check your fingerstick glucose at home, call your MD if it is greater than 250mg/dL on 2 occasions or less than 100mg/dL on 2 occasions. Know signs of low blood sugar, such as: dizziness, shakiness, sweating, confusion, hunger, nervousness- drink 4 ounces apple juice if occurs and call your doctor. Know early signs of high blood sugar, such as: frequent urination, increased thirst, blurry vision, fatigue, headache - call your doctor if this occurs.      Diagnosis: Bacteremia  Assessment and Plan of Treatment: You were treated for bacteremia. Bacteremia is the presence of bacteria in the bloodstream. Complete antibiotic course as infectious doctor recommended. If you have fever, chills, loss of appetite of nausea/ vomiting, call health care provider and follow the instruction.   follow up with your primary doctor in community within 2 weeks from discharge from hospital.      Diagnosis: Pulmonary nodule  Assessment and Plan of Treatment: CT Chest/A/P showed scattered bilateral small pulmonary nodules measuring up to 7 mm in the right upper lobe are nonspecific and may represent infectious/inflammatory disease or metastatic disease. 3.1 x 2.1 cm cystic structure in the posterior mediastinum at the level of the leelee may represent a lymphatic structure, bronchogenic cyst, or foregut duplication cyst. No intra-abdominal collection.  - Pulmonary  -  Dr. Ghotra Recommends Follow up CT in 12 months.    Diagnosis: HLD (hyperlipidemia)  Assessment and Plan of Treatment: You have a history of hyperlipidemia, which is when you have too much cholesterol in your blood. High amounts of cholesterol in your blood can put you at higher risks for heart attck, strokes and other health problems. Follow up with PCP for treatment goals, continue medication as prescribed, have liver function testing every 3 months as anti lipid medications can cause liver irritation, eat low fat meals, avoid red meat, butter, fried foods and cheese. Get daily exercise.      Diagnosis: Heart failure  Assessment and Plan of Treatment: You have a history of heart failure. Heart failure is a condition in which the heart is no longer able to pump oxygen-rich blood to the rest of the body efficiently. When symptoms become severe, a hospital stay may be necessary. Your health care team closely adjusted the fluids you drank or received through an intravenous (IV) line. They also watched and measured how much urine you produced. You may have received medicines to help your body get rid of extra fluids  Weigh yourself daily.  If you gain 3lbs in 3 days, or 5lbs in a week call your Health Care Provider.  Do not eat or drink foods containing more than 2000mg of salt (sodium) in your diet every day.  Call your Health Care Provider if you have any swelling or increased swelling in your feet, ankles, and/or stomach.  Take all of your medication as directed.  If you become dizzy call your Health Care Provider.      Diagnosis: ACS (acute coronary syndrome)  Assessment and Plan of Treatment: You were found to have elevated cardiac enzymes and an abnormal ekg. you were followed by a cardiologist and you had an echocardiogram and a URVASHI which weer normal.  Acute coronary syndrome is sudden decreased blood flow to your heart. This causes a lack of oxygen to your heart and can lead to unstable angina or a heart attack.  Call your local emergency number (911 in the [place]) for any of the following:  You have any of the following signs of a heart attack:  Squeezing, pressure, or pain in your chest. You may also have any of the following:  Discomfort or pain in your back, neck, jaw, stomach, or arm. Shortness of breath  Nausea or vomiting. Lightheadedness or a sudden cold sweat  Manage or prevent ACS: Eat heart-healthy foods. Limit sodium (salt) as directed. Maintain a healthy weight. Do not smoke.   Follow up with your doctor

## 2024-04-16 NOTE — CONSULT NOTE ADULT - SUBJECTIVE AND OBJECTIVE BOX
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 Patient AAOx3 at bedside and mentioned having intermittent, mild, suprapubic discomfort associated w/ dysuria that has been going on for the last 48 hours. On further questioning, patient experiencing chills, malaise, and anorexia about the same time. Denies any chest pain, dyspnea, palpitations, nausea, vomiting, headache, neck pain, or weakness. Denies diarrhea or constipation. He does not have any other complaints. Patient was admitted to [facility] on 11/24/2023 and discharge on 12/08/23 where he was treated for sepsis. His perm cath was exchanged on Friday 11/25. Found to have MSSA bacteremia and transitioned from Vancomycin to Cefazolin. 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 Underwent right Permcath placement by IR on 12/4. LHC performed on 12/7 with non obstructive CAD, non ischemic cardiomyopathy w/o complications.        HISTORY OF PRESENT ILLNESS: As Above. Awake, alert, comfortable in bed in NAD    MEDICATIONS  (STANDING):  aspirin  chewable 81 milliGRAM(s) Oral daily  atorvastatin 20 milliGRAM(s) Oral at bedtime  brimonidine 0.2% Ophthalmic Solution 1 Drop(s) Right EYE two times a day  carvedilol 12.5 milliGRAM(s) Oral every 12 hours  cefTRIAXone   IVPB 2000 milliGRAM(s) IV Intermittent every 24 hours  chlorhexidine 2% Cloths 1 Application(s) Topical <User Schedule>  ciprofloxacin  0.3% Ophthalmic Solution 1 Drop(s) Right EYE two times a day  dextrose 10% Bolus 125 milliLiter(s) IV Bolus once  dextrose 5%. 1000 milliLiter(s) (50 mL/Hr) IV Continuous <Continuous>  dextrose 5%. 1000 milliLiter(s) (100 mL/Hr) IV Continuous <Continuous>  dextrose 50% Injectable 25 Gram(s) IV Push once  dextrose 50% Injectable 12.5 Gram(s) IV Push once  dorzolamide 2% Ophthalmic Solution 1 Drop(s) Both EYES three times a day  glucagon  Injectable 1 milliGRAM(s) IntraMuscular once  heparin   Injectable 5000 Unit(s) SubCutaneous every 12 hours  hydrALAZINE 50 milliGRAM(s) Oral three times a day  insulin lispro (ADMELOG) corrective regimen sliding scale   SubCutaneous Before meals and at bedtime  ketorolac 0.5% Ophthalmic Solution 1 Drop(s) Right EYE two times a day  losartan 50 milliGRAM(s) Oral daily  polyethylene glycol 3350 17 Gram(s) Oral daily  prednisoLONE acetate 1% Suspension 1 Drop(s) Right EYE every 4 hours  senna 2 Tablet(s) Oral at bedtime      MEDICATIONS  (PRN):  acetaminophen     Tablet .. 650 milliGRAM(s) Oral every 6 hours PRN Temp greater or equal to 38C (100.4F), Mild Pain (1 - 3)  dextrose Oral Gel 15 Gram(s) Oral once PRN Blood Glucose LESS THAN 70 milliGRAM(s)/deciliter      Allergies    No Known Allergies    Intolerances        PAST MEDICAL & SURGICAL HISTORY:  DM (diabetes mellitus)      HTN (hypertension)      Chronic kidney disease      HLD (hyperlipidemia)      Glaucoma      Gout      No significant past surgical history          FAMILY HISTORY:  No pertinent family history in first degree relatives        SOCIAL HISTORY  Smoking History:     REVIEW OF SYSTEMS:    CONSTITUTIONAL:  No fevers, chills, sweats    HEENT:  Eyes:  No diplopia or blurred vision. ENT:  No earache, sore throat or runny nose.    CARDIOVASCULAR:  No pressure, squeezing, tightness, or heaviness about the chest; no palpitations.    RESPIRATORY:  Per HPI    GASTROINTESTINAL:  No abdominal pain, nausea, vomiting or diarrhea.    GENITOURINARY:  No dysuria, frequency or urgency.    NEUROLOGIC:  No paresthesias, fasciculations, seizures or weakness.    PSYCHIATRIC:  No disorder of thought or mood.    Vital Signs Last 24 Hrs  T(C): 36.4 (16 Apr 2024 08:07), Max: 37 (15 Apr 2024 15:49)  T(F): 97.5 (16 Apr 2024 08:07), Max: 98.6 (15 Apr 2024 15:49)  HR: 51 (16 Apr 2024 08:07) (51 - 59)  BP: 152/70 (16 Apr 2024 08:07) (131/67 - 167/79)  BP(mean): --  RR: 17 (16 Apr 2024 08:07) (17 - 19)  SpO2: 98% (16 Apr 2024 08:07) (97% - 98%)    Parameters below as of 16 Apr 2024 08:07  Patient On (Oxygen Delivery Method): room air      I&O's Detail    15 Apr 2024 07:01  -  16 Apr 2024 07:00  --------------------------------------------------------  IN:    Oral Fluid: 50 mL  Total IN: 50 mL    OUT:    Voided (mL): 550 mL  Total OUT: 550 mL    Total NET: -500 mL      16 Apr 2024 07:01  -  16 Apr 2024 10:28  --------------------------------------------------------  IN:  Total IN: 0 mL    OUT:    Voided (mL): 200 mL  Total OUT: 200 mL    Total NET: -200 mL          PHYSICAL EXAMINATION:    GENERAL: The patient is a well-developed, well-nourished _____in no apparent distress.     HEENT: Head is normocephalic and atraumatic. Extraocular muscles are intact. Mucous membranes are moist.     NECK: Supple.     LUNGS: Clear to auscultation without wheezing, rales, or rhonchi. Respirations unlabored    HEART: Regular rate and rhythm without murmur.    ABDOMEN: Soft, nontender, and nondistended.  No hepatosplenomegaly is noted.    EXTREMITIES: Without any cyanosis, clubbing, rash, lesions or edema.    NEUROLOGIC: Grossly intact.      LABS:                        10.6   6.56  )-----------( 150      ( 16 Apr 2024 10:10 )             32.4     04-15    136  |  104  |  58<H>  ----------------------------<  116<H>  4.3   |  21<L>  |  5.21<H>    Ca    8.4      15 Apr 2024 06:54    TPro  6.9  /  Alb  2.6<L>  /  TBili  0.3  /  DBili  x   /  AST  16  /  ALT  13  /  AlkPhos  141<H>  04-15      Urinalysis Basic - ( 15 Apr 2024 06:54 )    Color: x / Appearance: x / SG: x / pH: x  Gluc: 116 mg/dL / Ketone: x  / Bili: x / Urobili: x   Blood: x / Protein: x / Nitrite: x   Leuk Esterase: x / RBC: x / WBC x   Sq Epi: x / Non Sq Epi: x / Bacteria: x        Culture - Blood (04.13.24 @ 17:00)   Specimen Source: .Blood Dialysis Catheter  Culture Results:   No growth at 48 Hours    Culture - Other (04.13.24 @ 09:39)   - Levofloxacin: S <=0.5  - Trimethoprim/Sulfamethoxazole: S <=0.5/9.5  - Minocycline: S  Specimen Source: Cath Site Catheter Site  Culture Results:   Rare Stenotrophomonas maltophilia  Organism Identification: Stenotrophomonas maltophilia  Organism: Stenotrophomonas maltophilia  Organism: Stenotrophomonas maltophilia  Method Type: LAWRENCE  Method Type: KBCulture - Urine (04.12.24 @ 18:13)   Specimen Source: Clean Catch Clean Catch (Midstream)  Culture Results:   <10,000 CFU/mL Normal Urogenital FloraCulture - Blood (04.12.24 @ 18:00)   Gram Stain:   Growth in anaerobic bottle: Gram positive cocci in pairs  - Streptococcus sp. (Not Grp A, B or S pneumoniae): Detec  Specimen Source: .Blood Blood-Peripheral  Organism: Blood Culture PCR  Culture Results:   Growth in anaerobic bottle: Streptococcus salivarius/vestibularis group   "Susceptibilities not performed"   Direct identification is available within approximately 3-5   hours either by Blood Panel Multiplexed PCR or Direct   MALDI-TOF. Details: [email or web address]  Organism Identification: Blood Culture PCR  Method Type: PCRCulture - Blood (04.12.24 @ 17:50)   Gram Stain:   Growth in aerobic bottle: Gram positive cocci in pairs   Growth in anaerobic bottle: Gram positive cocci in pairs  Specimen Source: .Blood Blood-Peripheral  Culture Results:   Growth in aerobic bottle: Streptococcus salivarius/vestibularis group   Susceptibility to follow.   Growth in anaerobic bottle: Gram positive cocci in pairs              MICROBIOLOGY:    RADIOLOGY & ADDITIONAL STUDIES:  < from: CT Chest No Cont (04.12.24 @ 18:53) >  IMPRESSION:  *  No acute septic pathology.  *  Scattered bilateral small pulmonary nodules measuring up to 7 mm in   the right upper lobe are nonspecific and may represent   infectious/inflammatory disease or metastatic disease.  *  3.1 x 2.1 cm cystic structure in the posterior mediastinum at the   level of the leelee may represent a lymphatic structure, bronchogenic   cyst, orforegut duplication cyst.  *  No intra-abdominal collection.      < end of copied text >    CXR:    Ct scan chest;    ekg;    echo:< from: TTE W or WO Ultrasound Enhancing Agent (04.15.24 @ 12:57) >  CONCLUSIONS:      1. Left ventricular cavity is normal in size. Left ventricular systolic function is normal with an ejection fraction visually estimated at 50 to 55 %. There are no regional wall motion abnormalities seen.   2. There is mild (grade 1) left ventricular diastolic dysfunction.   3. Normal right ventricular cavity size, with normal wall thickness, and normal systolic function. Tricuspid annular plane systolic excursion (TAPSE) is 2.1 cm (normal >=1.7 cm).   4. Normal left and right atrial size.   5. Trace tricuspid regurgitation.   6. Compared to the transthoracic echocardiogram performed on 12/18/2023, there have been no significant interval changes.   7. Estimated pulmonary artery systolic pressure is 20 mmHg, consistent with normal pulmonary artery pressure.   8. Mild aortic regurgitation.   9. Mild pulmonic regurgitation.  10. There is calcification of the mitral valve annulus.  11. No pericardial effusion seen.  12. No vegetations seen on this study,consider URVASHI for further evaluation if clinically indicated.    < end of copied text >

## 2024-04-16 NOTE — PROGRESS NOTE ADULT - SUBJECTIVE AND OBJECTIVE BOX
NP Note discussed with  primary attending    Patient is a 78y old  Male who presents with a chief complaint of Sepsis (16 Apr 2024 13:22)      INTERVAL HPI/OVERNIGHT EVENTS: No new complaints.     MEDICATIONS  (STANDING):  atorvastatin 20 milliGRAM(s) Oral at bedtime  brimonidine 0.2% Ophthalmic Solution 1 Drop(s) Right EYE two times a day  carvedilol 12.5 milliGRAM(s) Oral every 12 hours  cefTRIAXone   IVPB 2000 milliGRAM(s) IV Intermittent every 24 hours  chlorhexidine 2% Cloths 1 Application(s) Topical <User Schedule>  ciprofloxacin  0.3% Ophthalmic Solution 1 Drop(s) Right EYE two times a day  dextrose 10% Bolus 125 milliLiter(s) IV Bolus once  dextrose 5%. 1000 milliLiter(s) (50 mL/Hr) IV Continuous <Continuous>  dextrose 5%. 1000 milliLiter(s) (100 mL/Hr) IV Continuous <Continuous>  dextrose 50% Injectable 25 Gram(s) IV Push once  dextrose 50% Injectable 12.5 Gram(s) IV Push once  dorzolamide 2% Ophthalmic Solution 1 Drop(s) Both EYES three times a day  glucagon  Injectable 1 milliGRAM(s) IntraMuscular once  hydrALAZINE 50 milliGRAM(s) Oral three times a day  insulin lispro (ADMELOG) corrective regimen sliding scale   SubCutaneous Before meals and at bedtime  ketorolac 0.5% Ophthalmic Solution 1 Drop(s) Right EYE two times a day  losartan 50 milliGRAM(s) Oral daily  minocycline IVPB      minocycline IVPB 200 milliGRAM(s) IV Intermittent once  polyethylene glycol 3350 17 Gram(s) Oral daily  prednisoLONE acetate 1% Suspension 1 Drop(s) Right EYE every 4 hours  senna 2 Tablet(s) Oral at bedtime  trimethoprim / sulfamethoxazole IVPB 240 milliGRAM(s) IV Intermittent every 24 hours    MEDICATIONS  (PRN):  acetaminophen     Tablet .. 650 milliGRAM(s) Oral every 6 hours PRN Temp greater or equal to 38C (100.4F), Mild Pain (1 - 3)  dextrose Oral Gel 15 Gram(s) Oral once PRN Blood Glucose LESS THAN 70 milliGRAM(s)/deciliter      __________________________________________________  REVIEW OF SYSTEMS:    CONSTITUTIONAL: No fever  EYES: No acute visual disturbances  NECK: No pain or stiffness  RESPIRATORY: No cough; No shortness of breath  CARDIOVASCULAR: No chest pain, no palpitations  GASTROINTESTINAL: No pain. No nausea or vomiting.  No diarrhea   NEUROLOGICAL: No headache or numbness, no tremors  MUSCULOSKELETAL: No joint pain, no muscle pain  GENITOURINARY: No dysuria, no frequency, no hesitancy  PSYCHIATRY: No depression , no anxiety  ALL OTHER  ROS negative        Vital Signs Last 24 Hrs  T(C): 36.4 (16 Apr 2024 14:11), Max: 37 (15 Apr 2024 15:49)  T(F): 97.5 (16 Apr 2024 14:11), Max: 98.6 (15 Apr 2024 15:49)  HR: 62 (16 Apr 2024 14:11) (51 - 62)  BP: 139/74 (16 Apr 2024 14:11) (139/74 - 167/79)  RR: 18 (16 Apr 2024 14:11) (16 - 18)  SpO2: 100% (16 Apr 2024 14:11) (97% - 100%)    Parameters below as of 16 Apr 2024 14:11  Patient On (Oxygen Delivery Method): room air        ________________________________________________  PHYSICAL EXAM:  GENERAL: NAD  HEENT: Normocephalic;  conjunctivae and sclerae clear; moist mucous membranes   NECK : Supple  CHEST/LUNG: Clear to auscultation bilaterally with good air entry.  (+) Right Permacath.   HEART: S1 S2  regular; no murmurs, gallops or rubs  ABDOMEN: Soft, Nontender, Nondistended; Bowel sounds present x 4 quad  EXTREMITIES: No cyanosis; no edema; no calf tenderness  SKIN: Warm and dry; no rash  NERVOUS SYSTEM:  Awake and alert; Oriented to person, not place and time; no new deficits  _________________________________________________  LABS:                        10.6   6.56  )-----------( 150      ( 16 Apr 2024 10:10 )             32.4     04-16    136  |  105  |  78<H>  ----------------------------<  166<H>  4.5   |  21<L>  |  5.28<H>    Ca    9.1      16 Apr 2024 10:10  Phos  4.6     04-16  Mg     2.2     04-16    TPro  6.8  /  Alb  2.8<L>  /  TBili  0.3  /  DBili  x   /  AST  14  /  ALT  14  /  AlkPhos  136<H>  04-16      Urinalysis Basic - ( 16 Apr 2024 10:10 )    Color: x / Appearance: x / SG: x / pH: x  Gluc: 166 mg/dL / Ketone: x  / Bili: x / Urobili: x   Blood: x / Protein: x / Nitrite: x   Leuk Esterase: x / RBC: x / WBC x   Sq Epi: x / Non Sq Epi: x / Bacteria: x      CAPILLARY BLOOD GLUCOSE      POCT Blood Glucose.: 104 mg/dL (16 Apr 2024 13:06)  POCT Blood Glucose.: 105 mg/dL (16 Apr 2024 07:39)  POCT Blood Glucose.: 117 mg/dL (15 Apr 2024 21:11)  POCT Blood Glucose.: 142 mg/dL (15 Apr 2024 17:48)        RADIOLOGY & ADDITIONAL TESTS:    Imaging Personally Reviewed:  YES/NO    Consultant(s) Notes Reviewed:   YES/ No    Care Discussed with Consultants :     Plan of care was discussed with patient and /or primary care giver; all questions and concerns were addressed and care was aligned with patient's wishes.

## 2024-04-17 ENCOUNTER — RESULT REVIEW (OUTPATIENT)
Age: 79
End: 2024-04-17

## 2024-04-17 DIAGNOSIS — Z02.9 ENCOUNTER FOR ADMINISTRATIVE EXAMINATIONS, UNSPECIFIED: ICD-10-CM

## 2024-04-17 LAB
ALBUMIN SERPL ELPH-MCNC: 3 G/DL — LOW (ref 3.5–5)
ALP SERPL-CCNC: 144 U/L — HIGH (ref 40–120)
ALT FLD-CCNC: 14 U/L DA — SIGNIFICANT CHANGE UP (ref 10–60)
ANION GAP SERPL CALC-SCNC: 10 MMOL/L — SIGNIFICANT CHANGE UP (ref 5–17)
AST SERPL-CCNC: 14 U/L — SIGNIFICANT CHANGE UP (ref 10–40)
BASOPHILS # BLD AUTO: 0.02 K/UL — SIGNIFICANT CHANGE UP (ref 0–0.2)
BASOPHILS NFR BLD AUTO: 0.3 % — SIGNIFICANT CHANGE UP (ref 0–2)
BILIRUB SERPL-MCNC: 0.3 MG/DL — SIGNIFICANT CHANGE UP (ref 0.2–1.2)
BUN SERPL-MCNC: 64 MG/DL — HIGH (ref 7–18)
CALCIUM SERPL-MCNC: 8.7 MG/DL — SIGNIFICANT CHANGE UP (ref 8.4–10.5)
CHLORIDE SERPL-SCNC: 102 MMOL/L — SIGNIFICANT CHANGE UP (ref 96–108)
CO2 SERPL-SCNC: 24 MMOL/L — SIGNIFICANT CHANGE UP (ref 22–31)
CREAT SERPL-MCNC: 4.81 MG/DL — HIGH (ref 0.5–1.3)
EGFR: 12 ML/MIN/1.73M2 — LOW
EOSINOPHIL # BLD AUTO: 4.12 K/UL — HIGH (ref 0–0.5)
EOSINOPHIL NFR BLD AUTO: 54 % — HIGH (ref 0–6)
GLUCOSE BLDC GLUCOMTR-MCNC: 127 MG/DL — HIGH (ref 70–99)
GLUCOSE BLDC GLUCOMTR-MCNC: 132 MG/DL — HIGH (ref 70–99)
GLUCOSE BLDC GLUCOMTR-MCNC: 93 MG/DL — SIGNIFICANT CHANGE UP (ref 70–99)
GLUCOSE BLDC GLUCOMTR-MCNC: 96 MG/DL — SIGNIFICANT CHANGE UP (ref 70–99)
GLUCOSE SERPL-MCNC: 98 MG/DL — SIGNIFICANT CHANGE UP (ref 70–99)
HCT VFR BLD CALC: 35.2 % — LOW (ref 39–50)
HGB BLD-MCNC: 11.7 G/DL — LOW (ref 13–17)
IMM GRANULOCYTES NFR BLD AUTO: 0.1 % — SIGNIFICANT CHANGE UP (ref 0–0.9)
LYMPHOCYTES # BLD AUTO: 1.86 K/UL — SIGNIFICANT CHANGE UP (ref 1–3.3)
LYMPHOCYTES # BLD AUTO: 24.4 % — SIGNIFICANT CHANGE UP (ref 13–44)
MAGNESIUM SERPL-MCNC: 2.5 MG/DL — SIGNIFICANT CHANGE UP (ref 1.6–2.6)
MCHC RBC-ENTMCNC: 32.9 PG — SIGNIFICANT CHANGE UP (ref 27–34)
MCHC RBC-ENTMCNC: 33.2 GM/DL — SIGNIFICANT CHANGE UP (ref 32–36)
MCV RBC AUTO: 98.9 FL — SIGNIFICANT CHANGE UP (ref 80–100)
MONOCYTES # BLD AUTO: 0.63 K/UL — SIGNIFICANT CHANGE UP (ref 0–0.9)
MONOCYTES NFR BLD AUTO: 8.3 % — SIGNIFICANT CHANGE UP (ref 2–14)
NEUTROPHILS # BLD AUTO: 0.99 K/UL — LOW (ref 1.8–7.4)
NEUTROPHILS NFR BLD AUTO: 12.9 % — LOW (ref 43–77)
NRBC # BLD: 0 /100 WBCS — SIGNIFICANT CHANGE UP (ref 0–0)
PHOSPHATE SERPL-MCNC: 5.1 MG/DL — HIGH (ref 2.5–4.5)
PLATELET # BLD AUTO: 153 K/UL — SIGNIFICANT CHANGE UP (ref 150–400)
POTASSIUM SERPL-MCNC: 4.7 MMOL/L — SIGNIFICANT CHANGE UP (ref 3.5–5.3)
POTASSIUM SERPL-SCNC: 4.7 MMOL/L — SIGNIFICANT CHANGE UP (ref 3.5–5.3)
PROT SERPL-MCNC: 7.4 G/DL — SIGNIFICANT CHANGE UP (ref 6–8.3)
RBC # BLD: 3.56 M/UL — LOW (ref 4.2–5.8)
RBC # FLD: 13.2 % — SIGNIFICANT CHANGE UP (ref 10.3–14.5)
SODIUM SERPL-SCNC: 136 MMOL/L — SIGNIFICANT CHANGE UP (ref 135–145)
WBC # BLD: 7.63 K/UL — SIGNIFICANT CHANGE UP (ref 3.8–10.5)
WBC # FLD AUTO: 7.63 K/UL — SIGNIFICANT CHANGE UP (ref 3.8–10.5)

## 2024-04-17 RX ORDER — PREDNISOLONE SODIUM PHOSPHATE 1 %
1 DROPS OPHTHALMIC (EYE) EVERY 4 HOURS
Refills: 0 | Status: COMPLETED | OUTPATIENT
Start: 2024-04-18 | End: 2024-04-18

## 2024-04-17 RX ORDER — PREDNISOLONE SODIUM PHOSPHATE 1 %
1 DROPS OPHTHALMIC (EYE) EVERY 6 HOURS
Refills: 0 | Status: COMPLETED | OUTPATIENT
Start: 2024-04-19 | End: 2024-04-25

## 2024-04-17 RX ORDER — PREDNISOLONE SODIUM PHOSPHATE 1 %
1 DROPS OPHTHALMIC (EYE)
Refills: 0 | Status: COMPLETED | OUTPATIENT
Start: 2024-04-17 | End: 2024-04-18

## 2024-04-17 RX ADMIN — Medication 1 DROP(S): at 02:21

## 2024-04-17 RX ADMIN — ATORVASTATIN CALCIUM 20 MILLIGRAM(S): 80 TABLET, FILM COATED ORAL at 21:33

## 2024-04-17 RX ADMIN — MINOCYCLINE HYDROCHLORIDE 100 MILLIGRAM(S): 45 TABLET, EXTENDED RELEASE ORAL at 18:25

## 2024-04-17 RX ADMIN — Medication 1 DROP(S): at 21:35

## 2024-04-17 RX ADMIN — BRIMONIDINE TARTRATE 1 DROP(S): 2 SOLUTION/ DROPS OPHTHALMIC at 18:23

## 2024-04-17 RX ADMIN — CHLORHEXIDINE GLUCONATE 1 APPLICATION(S): 213 SOLUTION TOPICAL at 06:16

## 2024-04-17 RX ADMIN — Medication 265 MILLIGRAM(S): at 16:16

## 2024-04-17 RX ADMIN — DORZOLAMIDE HYDROCHLORIDE 1 DROP(S): 20 SOLUTION/ DROPS OPHTHALMIC at 14:19

## 2024-04-17 RX ADMIN — Medication 1 DROP(S): at 18:17

## 2024-04-17 RX ADMIN — DORZOLAMIDE HYDROCHLORIDE 1 DROP(S): 20 SOLUTION/ DROPS OPHTHALMIC at 06:14

## 2024-04-17 RX ADMIN — DORZOLAMIDE HYDROCHLORIDE 1 DROP(S): 20 SOLUTION/ DROPS OPHTHALMIC at 21:33

## 2024-04-17 RX ADMIN — Medication 50 MILLIGRAM(S): at 21:33

## 2024-04-17 RX ADMIN — Medication 1 DROP(S): at 10:57

## 2024-04-17 RX ADMIN — Medication 1 DROP(S): at 18:24

## 2024-04-17 RX ADMIN — Medication 1 DROP(S): at 18:13

## 2024-04-17 RX ADMIN — SENNA PLUS 2 TABLET(S): 8.6 TABLET ORAL at 21:33

## 2024-04-17 RX ADMIN — Medication 1 DROP(S): at 06:14

## 2024-04-17 RX ADMIN — CARVEDILOL PHOSPHATE 12.5 MILLIGRAM(S): 80 CAPSULE, EXTENDED RELEASE ORAL at 18:18

## 2024-04-17 RX ADMIN — MINOCYCLINE HYDROCHLORIDE 100 MILLIGRAM(S): 45 TABLET, EXTENDED RELEASE ORAL at 06:15

## 2024-04-17 RX ADMIN — CEFTRIAXONE 100 MILLIGRAM(S): 500 INJECTION, POWDER, FOR SOLUTION INTRAMUSCULAR; INTRAVENOUS at 18:19

## 2024-04-17 RX ADMIN — Medication 1 DROP(S): at 14:19

## 2024-04-17 RX ADMIN — Medication 1 DROP(S): at 06:13

## 2024-04-17 RX ADMIN — BRIMONIDINE TARTRATE 1 DROP(S): 2 SOLUTION/ DROPS OPHTHALMIC at 06:14

## 2024-04-17 RX ADMIN — POLYETHYLENE GLYCOL 3350 17 GRAM(S): 17 POWDER, FOR SOLUTION ORAL at 14:18

## 2024-04-17 NOTE — CHART NOTE - NSCHARTNOTEFT_GEN_A_CORE
NP notified by Kaylie that son informed her pt recently had cataract surgery and had eye drop instructions.      RN arranged for son to email eye drop instructions to Nurse Tab for eye drop instructions to be given to medical team to continue while inpatient.       F/u for eye drop instructions. NP notified by Kaylie that son informed her pt recently had cataract surgery and had eye drop instructions.      RN arranged for son to email eye drop instructions to Nurse Tab for eye drop instructions to be given to medical team to continue while inpatient.       F/u for eye drop instructions.    RN provided eye drop instructions.  Med ordered as noted.  Copy in chart.

## 2024-04-17 NOTE — PROGRESS NOTE ADULT - SUBJECTIVE AND OBJECTIVE BOX
Patient is seen and examined at the bed side, is afebrile. The Perm-a-cath  removal is pending. The repeat blood cultures from 4/17 are in process.       REVIEW OF SYSTEMS: All other review systems are negative      ALLERGIES: No Known Allergies      Vital Signs Last 24 Hrs  T(C): 36.3 (17 Apr 2024 16:14), Max: 36.7 (16 Apr 2024 20:56)  T(F): 97.3 (17 Apr 2024 16:14), Max: 98.1 (16 Apr 2024 20:56)  HR: 63 (17 Apr 2024 18:15) (51 - 98)  BP: 151/86 (17 Apr 2024 18:15) (139/74 - 165/76)  BP(mean): --  RR: 18 (17 Apr 2024 16:14) (17 - 18)  SpO2: 97% (17 Apr 2024 16:14) (95% - 98%)    Parameters below as of 17 Apr 2024 16:14  Patient On (Oxygen Delivery Method): room air      PHYSICAL EXAM:  GENERAL: Not in distress   CHEST/LUNG: Not using accessory muscles   HEART: s1 and s2 present  ABDOMEN:  Nontender and  Nondistended  EXTREMITIES: No pedal  edema  CNS: Awake and Alert      LABS:                        11.7   7.63  )-----------( 153      ( 17 Apr 2024 10:00 )             35.2                           10.6   6.56  )-----------( 150      ( 16 Apr 2024 10:10 )             32.4       04-17    136  |  102  |  64<H>  ----------------------------<  98  4.7   |  24  |  4.81<H>    Ca    8.7      17 Apr 2024 10:00  Phos  5.1     04-17  Mg     2.5     04-17    TPro  7.4  /  Alb  3.0<L>  /  TBili  0.3  /  DBili  x   /  AST  14  /  ALT  14  /  AlkPhos  144<H>  04-17 04-16    136  |  105  |  78<H>  ----------------------------<  166<H>  4.5   |  21<L>  |  5.28<H>    Ca    9.1      16 Apr 2024 10:10  Phos  4.6     04-16  Mg     2.2     04-16    TPro  6.8  /  Alb  2.8<L>  /  TBili  0.3  /  DBili  x   /  AST  14  /  ALT  14  /  AlkPhos  136<H>  04-16    PT/INR - ( 13 Apr 2024 05:00 )   PT: 16.3 sec;   INR: 1.45 ratio      PTT - ( 13 Apr 2024 05:00 )  PTT:26.7 sec      CAPILLARY BLOOD GLUCOSE  POCT Blood Glucose.: 186 mg/dL (13 Apr 2024 11:30)  POCT Blood Glucose.: 121 mg/dL (13 Apr 2024 07:37)      < from: TTE W or WO Ultrasound Enhancing Agent (04.15.24 @ 12:57) >     CONCLUSIONS:      1. Left ventricular cavity is normal in size. Left ventricular systolic function is normal with an ejection fraction visually estimated at 50 to 55 %. There are no regional wall motion abnormalities seen.   2. There is mild (grade 1) left ventricular diastolic dysfunction.   3. Normal right ventricular cavity size, with normal wall thickness, and normal systolic function. Tricuspid annular plane systolic excursion (TAPSE) is 2.1 cm (normal >=1.7 cm).   4. Normal left and right atrial size.   5. Trace tricuspid regurgitation.   6. Compared to the transthoracic echocardiogram performed on 12/18/2023, there have been no significant interval changes.   7. Estimated pulmonary artery systolic pressure is 20 mmHg, consistent with normal pulmonary artery pressure.   8. Mild aortic regurgitation.   9. Mild pulmonic regurgitation.  10. There is calcification of the mitral valve annulus.  11. No pericardial effusion seen.  12. No vegetations seen on this study,consider URVASHI for further evaluation if clinically indicated.        MEDICATIONS  (STANDING):    atorvastatin 20 milliGRAM(s) Oral at bedtime  brimonidine 0.2% Ophthalmic Solution 1 Drop(s) Right EYE two times a day  carvedilol 12.5 milliGRAM(s) Oral every 12 hours  cefTRIAXone   IVPB 2000 milliGRAM(s) IV Intermittent every 24 hours  chlorhexidine 2% Cloths 1 Application(s) Topical <User Schedule>  ciprofloxacin  0.3% Ophthalmic Solution 1 Drop(s) Right EYE two times a day  dorzolamide 2% Ophthalmic Solution 1 Drop(s) Both EYES three times a day  glucagon  Injectable 1 milliGRAM(s) IntraMuscular once  hydrALAZINE 50 milliGRAM(s) Oral three times a day  insulin lispro (ADMELOG) corrective regimen sliding scale   SubCutaneous Before meals and at bedtime  ketorolac 0.5% Ophthalmic Solution 1 Drop(s) Right EYE two times a day  losartan 50 milliGRAM(s) Oral daily  minocycline IVPB 100 milliGRAM(s) IV Intermittent every 12 hours  minocycline IVPB      polyethylene glycol 3350 17 Gram(s) Oral daily  prednisoLONE acetate 1% Suspension 1 Drop(s) Right EYE <User Schedule>  senna 2 Tablet(s) Oral at bedtime  trimethoprim / sulfamethoxazole IVPB 240 milliGRAM(s) IV Intermittent every 24 hours      RADIOLOGY & ADDITIONAL TESTS:    4/12/24 : CT Abdomen and Pelvis No Cont (04.12.24 @ 18:53) >  *  No acute septic pathology.  *  Scattered bilateral small pulmonary nodules measuring up to 7 mm in the right upper lobe are nonspecific and may represent   infectious/inflammatory disease or metastatic disease.  *  3.1 x 2.1 cm cystic structure in the posterior mediastinum at the level of the leelee may represent a lymphatic structure, bronchogenic cyst, or foregut duplication cyst.  *  No intra-abdominal collection.      4/12/24 : CT Chest No Cont (04.12.24 @ 18:53) LUNGS AND LARGE AIRWAYS: The central airways are patent. Scattered   bilateral small pulmonary nodules measuring up to 7 mm in the right upper  lobe are nonspecific. No acute consolidation.  PLEURA: Trace effusions.        MICROBIOLOGY DATA:    Culture - Other (04.13.24 @ 09:39)   - Minocycline: S  - Levofloxacin: S <=0.5  - Trimethoprim/Sulfamethoxazole: S <=0.5/9.5  Specimen Source: Cath Site Catheter Site  Culture Results:   Rare Stenotrophomonas maltophilia  Organism Identification: Stenotrophomonas maltophilia  Organism: Stenotrophomonas maltophilia  Organism: Stenotrophomonas maltophilia  Method Type: BRYN  Method Type: LAWRENCE    Culture - Blood (04.13.24 @ 17:00)   Specimen Source: .Blood Dialysis Catheter  Culture Results: No growth at 48 hours      Culture - Other (04.13.24 @ 09:39)   Specimen Source: Cath Site Catheter Site  Culture Results: No growth to date.      Culture - Blood (04.12.24 @ 17:50)   Gram Stain:   Growth in aerobic bottle: Gram positive cocci in pairs  Specimen Source: .Blood Blood-Peripheral  Culture Results:   Growth in aerobic bottle: Streptococcus salivarius/vestibularis group   Susceptibility to follow.      Culture - Blood (04.12.24 @ 18:00)   Gram Stain:   Growth in anaerobic bottle: Gram positive cocci in pairs  - Streptococcus sp. (Not Grp A, B or S pneumoniae): Detec  Specimen Source: .Blood Blood-Peripheral  Organism: Blood Culture PCR  Culture Results:   Growth in anaerobic bottle: Gram positive cocci in pairs   Direct identification is available within approximately 3-5   hours either by Blood Panel Multiplexed PCR or Direct   MALDI-TOF. Details: [email or web address]  Organism Identification: Blood Culture PCR  Method Type: PCR      Culture - Blood (04.12.24 @ 17:50)   Gram Stain:   Growth in aerobic bottle: Gram positive cocci in pairs  Specimen Source: .Blood Blood-Peripheral  Culture Results:   Growth in aerobic bottle: Gram positive cocci in pairs    Urine Microscopic-Add On (NC) (04.12.24 @ 18:13)   Red Blood Cell - Urine: 3 /HPF  White Blood Cell - Urine: 2 /HPF  Bacteria: Occasional /HPF    Respiratory Viral Panel with COVID-19 by OXANA (04.12.24 @ 17:50)   Rapid RVP Result: NotDetec  SARS-CoV-2: NotDetec:

## 2024-04-17 NOTE — PROGRESS NOTE ADULT - ASSESSMENT
ESRD - dialysis days are T-T-S.    Fever with bacteremia Streptococcus sp. (Not Grp A, B or S pneumoniae): Detec  Gram Stain:  catheter site blood cultures  are negative, Exit site culture positive for Culture - Other (04.13.24 @ 09:39)   - Minocycline: S  - Levofloxacin: S <=0.5  - Trimethoprim/Sulfamethoxazole: S <=0.5/9.5  Specimen Source: Cath Site Catheter Site  Culture Results:   Rare Stenotrophomonas maltophilia      Malfunction catheter max  ml/minute today.  Removal of PC by IR due to Stenotrophomonas maltophilia positive cultures .

## 2024-04-17 NOTE — PROGRESS NOTE ADULT - PROBLEM SELECTOR PLAN 4
- S/p CT Chest/A/P showed scattered bilateral small pulmonary nodules measuring up to 7 mm in the right upper lobe are nonspecific and may represent infectious/inflammatory disease or metastatic disease. 3.1 x 2.1 cm cystic structure in the posterior mediastinum at the level of the leelee may represent a lymphatic structure, bronchogenic cyst, or foregut duplication cyst. No intra-abdominal collection.  - Pulm-Dr. Ghotra consult pending-f/u recommendations - S/p CT Chest/A/P showed scattered bilateral small pulmonary nodules measuring up to 7 mm in the right upper lobe are nonspecific and may represent infectious/inflammatory disease or metastatic disease. 3.1 x 2.1 cm cystic structure in the posterior mediastinum at the level of the leelee may represent a lymphatic structure, bronchogenic cyst, or foregut duplication cyst. No intra-abdominal collection.  - Pulm-Dr. Ghotra-Rec Follow up CT in 12 months

## 2024-04-17 NOTE — PROGRESS NOTE ADULT - ASSESSMENT
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 Patient AAOx3 at bedside and mentioned having intermittent, mild, suprapubic discomfort associated w/ dysuria that has been going on for the last 48 hours. Patient was admitted to [facility] on 11/24/2023 and discharge on 12/08/23 where he was treated for sepsis. His perm cath was exchanged on 11/223. Found to have MSSA bacteremia and transitioned from Vancomycin to Cefazolin. 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 Underwent right Permcath placement by IR on 12/4/23. On admission, he found to have fever, tachypnea but negative Urine analysis and negative chest CT. He has started on Cefepime and IV  Vancomycin, and the ID consult requested to assist with further evaluation and antibiotic management.    # Sepsis ( Fever + tachypnea)- Suspected Line sepsis  # Streptococcal Bacteremia- 4/12/24- in the setting of Perm-a cath - the blood cx form catheter as of 4/13 NGTD- Renal team likes to keep the Perm-a-cath - TTE from 4/15 shows no vegetation  # Catheter site culture grew Stenotrophomonas     would recommend:    1. Removal of the Perm-a-catheter is pending  2. Continue IV Bactrim and Minocycline to cover Stenotrophomonas  3. URVASHI to rule out vegetation  4. Continue Ceftriaxone 2 g daily to cover Streptococcus  5. Follow up Repeat Blood cultures form 4/17 to document clearing the blood stream    d/w covering NP, Aung, DR. Art, Kade, Pharm-D and Dr. Hutton     Attending Attestation:    Spent more than 35 minutes on total encounter, more than 50 % of the visit was spent counseling and/or coordinating care by the Attending physician.

## 2024-04-17 NOTE — PROGRESS NOTE ADULT - SUBJECTIVE AND OBJECTIVE BOX
[   ] ICU                                          [   ] CCU                                      [ X  ] Medical Floor    Patient is a 78 year old male with abdominal pain. GI consulted to evaluate.        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 Patient also c/o constipation but denies nausea, vomiting, hematemesis, hematochezia, melena, fever, chest pain, SOB, cough, hematuria, or diarrhea.      Today patient appears comfortable. No new complaints reported, No abdominal pain, N/V, hematemesis, hematochezia, melena, fever, chills, chest pain, SOB, cough or diarrhea reported.         PAST MEDICAL HISTORY     DM (diabetes mellitus)    HTN (hypertension)     Diabetes mellitus    ESRD on HD     Hyperlipidemia     Glaucoma    Gout        PAST SURGICAL HISTORY    No significant past surgical history        Allergies    No Known Allergies    Intolerances  None       SOCIAL HISTORY  Advanced Directives:       [X  ] Full Code       [  ] DNR  Marital Status:         [  ] M      [X  ] S      [  ] D       [  ] W  Children:       [ X ] Yes      [  ] No  Occupation:        [  ] Employed       [ X ] Unemployed       [  ] Retired  Diet:       [ X ] Regular       [  ] PEG feeding          [  ] NG tube feeding  Drug Use:           [ X ] Patient denied          [  ] Yes  Alcohol:           [X  ] No             [  ] Yes (socially)         [  ] Yes (chronic)  Tobacco:           [  ] Yes           [ X ] No    FAMILY HISTORY  [ X ] Heart Disease            [ X ] Diabetes             [ X ] HTN             [  ] Colon Cancer             [  ] Stomach Cancer              [  ] Pancreatic Cancer        VITALS   Vital Signs Last 24 Hrs  T(C): 36.3 (17 Apr 2024 11:28), Max: 36.7 (16 Apr 2024 20:56)  T(F): 97.3 (17 Apr 2024 11:28), Max: 98.1 (16 Apr 2024 20:56)  HR: 53 (17 Apr 2024 11:28) (51 - 98)  BP: 165/76 (17 Apr 2024 11:28) (106/66 - 165/76)   RR: 17 (17 Apr 2024 11:28) (17 - 18)  SpO2: 98% (17 Apr 2024 11:28) (95% - 100%)  Parameters below as of 17 Apr 2024 11:28  Patient On (Oxygen Delivery Method): room air        MEDICATIONS  (STANDING):  atorvastatin 20 milliGRAM(s) Oral at bedtime  brimonidine 0.2% Ophthalmic Solution 1 Drop(s) Right EYE two times a day  carvedilol 12.5 milliGRAM(s) Oral every 12 hours  cefTRIAXone   IVPB 2000 milliGRAM(s) IV Intermittent every 24 hours  chlorhexidine 2% Cloths 1 Application(s) Topical <User Schedule>  ciprofloxacin  0.3% Ophthalmic Solution 1 Drop(s) Right EYE two times a day  dextrose 10% Bolus 125 milliLiter(s) IV Bolus once  dextrose 5%. 1000 milliLiter(s) (50 mL/Hr) IV Continuous <Continuous>  dextrose 5%. 1000 milliLiter(s) (100 mL/Hr) IV Continuous <Continuous>  dextrose 50% Injectable 25 Gram(s) IV Push once  dextrose 50% Injectable 12.5 Gram(s) IV Push once  dorzolamide 2% Ophthalmic Solution 1 Drop(s) Both EYES three times a day  glucagon  Injectable 1 milliGRAM(s) IntraMuscular once  hydrALAZINE 50 milliGRAM(s) Oral three times a day  insulin lispro (ADMELOG) corrective regimen sliding scale   SubCutaneous Before meals and at bedtime  ketorolac 0.5% Ophthalmic Solution 1 Drop(s) Right EYE two times a day  losartan 50 milliGRAM(s) Oral daily  minocycline IVPB      minocycline IVPB 100 milliGRAM(s) IV Intermittent every 12 hours  polyethylene glycol 3350 17 Gram(s) Oral daily  prednisoLONE acetate 1% Suspension 1 Drop(s) Right EYE every 4 hours  senna 2 Tablet(s) Oral at bedtime  trimethoprim / sulfamethoxazole IVPB 240 milliGRAM(s) IV Intermittent every 24 hours    MEDICATIONS  (PRN):  acetaminophen     Tablet .. 650 milliGRAM(s) Oral every 6 hours PRN Temp greater or equal to 38C (100.4F), Mild Pain (1 - 3)  dextrose Oral Gel 15 Gram(s) Oral once PRN Blood Glucose LESS THAN 70 milliGRAM(s)/deciliter                            11.7   7.63  )-----------( 153      ( 17 Apr 2024 10:00 )             35.2       04-17    136  |  102  |  64<H>  ----------------------------<  98  4.7   |  24  |  4.81<H>    Ca    8.7      17 Apr 2024 10:00  Phos  5.1     04-17  Mg     2.5     04-17    TPro  7.4  /  Alb  3.0<L>  /  TBili  0.3  /  DBili  x   /  AST  14  /  ALT  14  /  AlkPhos  144<H>  04-17

## 2024-04-17 NOTE — PROGRESS NOTE ADULT - SUBJECTIVE AND OBJECTIVE BOX
Problem List:  ESRD      PAST MEDICAL & SURGICAL HISTORY:  DM (diabetes mellitus)      HTN (hypertension)      Chronic kidney disease      HLD (hyperlipidemia)      Glaucoma      Gout      No significant past surgical history          No Known Allergies      MEDICATIONS  (STANDING):  atorvastatin 20 milliGRAM(s) Oral at bedtime  brimonidine 0.2% Ophthalmic Solution 1 Drop(s) Right EYE two times a day  carvedilol 12.5 milliGRAM(s) Oral every 12 hours  cefTRIAXone   IVPB 2000 milliGRAM(s) IV Intermittent every 24 hours  chlorhexidine 2% Cloths 1 Application(s) Topical <User Schedule>  ciprofloxacin  0.3% Ophthalmic Solution 1 Drop(s) Right EYE two times a day  dextrose 10% Bolus 125 milliLiter(s) IV Bolus once  dextrose 5%. 1000 milliLiter(s) (50 mL/Hr) IV Continuous <Continuous>  dextrose 5%. 1000 milliLiter(s) (100 mL/Hr) IV Continuous <Continuous>  dextrose 50% Injectable 25 Gram(s) IV Push once  dextrose 50% Injectable 12.5 Gram(s) IV Push once  dorzolamide 2% Ophthalmic Solution 1 Drop(s) Both EYES three times a day  glucagon  Injectable 1 milliGRAM(s) IntraMuscular once  hydrALAZINE 50 milliGRAM(s) Oral three times a day  insulin lispro (ADMELOG) corrective regimen sliding scale   SubCutaneous Before meals and at bedtime  ketorolac 0.5% Ophthalmic Solution 1 Drop(s) Right EYE two times a day  losartan 50 milliGRAM(s) Oral daily  minocycline IVPB      minocycline IVPB 100 milliGRAM(s) IV Intermittent every 12 hours  polyethylene glycol 3350 17 Gram(s) Oral daily  prednisoLONE acetate 1% Suspension 1 Drop(s) Right EYE every 4 hours  senna 2 Tablet(s) Oral at bedtime  trimethoprim / sulfamethoxazole IVPB 240 milliGRAM(s) IV Intermittent every 24 hours    MEDICATIONS  (PRN):  acetaminophen     Tablet .. 650 milliGRAM(s) Oral every 6 hours PRN Temp greater or equal to 38C (100.4F), Mild Pain (1 - 3)  dextrose Oral Gel 15 Gram(s) Oral once PRN Blood Glucose LESS THAN 70 milliGRAM(s)/deciliter                            11.7   7.63  )-----------( 153      ( 17 Apr 2024 10:00 )             35.2     04-17    136  |  102  |  64<H>  ----------------------------<  98  4.7   |  24  |  4.81<H>    Ca    8.7      17 Apr 2024 10:00  Phos  5.1     04-17  Mg     2.5     04-17    TPro  7.4  /  Alb  3.0<L>  /  TBili  0.3  /  DBili  x   /  AST  14  /  ALT  14  /  AlkPhos  144<H>  04-17            REVIEW OF SYSTEMS:  General: no fever no chills, no weight loss.    RESPIRATORY: No cough, wheezing, hemoptysis; No shortness of breath  CARDIOVASCULAR: No chest pain or palpitations. No Edema  GASTROINTESTINAL: No abdominal or epigastric pain. No nausea, vomiting. No diarrhea or constipation. No melena.  GENITOURINARY: No dysuria, frequency, foamy urine, urinary urgency, incontinence or hematuria  NEUROLOGICAL: No numbness or weakness, no tremor , no dizziness.   Muscle skeletal : no joint pain and no swelling of joints and limbs.  SKIN: No itching, burning, rashes.        VITALS:  T(F): 97.3 (04-17-24 @ 11:28), Max: 98.1 (04-16-24 @ 20:56)  HR: 53 (04-17-24 @ 11:28)  BP: 165/76 (04-17-24 @ 11:28)  RR: 17 (04-17-24 @ 11:28)  SpO2: 98% (04-17-24 @ 11:28)  Wt(kg): --    04-16 @ 07:01  -  04-17 @ 07:00  --------------------------------------------------------  IN: 0 mL / OUT: 900 mL / NET: -900 mL        PHYSICAL EXAM:  Constitutional: well developed, no diaphoresis, no distress.  Neck: No JVD, no carotid bruit, supple, no adenopathy  Respiratory: Good air entrance B/L, no wheezes, rales or rhonchi  Cardiovascular: S1, S2, RRR, no pericardial rub, no murmur  Abdomen: BS+, soft, no tenderness, no bruit  Pelvis: bladder nondistended  Extremities: No cyanosis or clubbing. No peripheral edema.     Vascular Access: Right PC WITH NO DISCHARGE AND NO ERYTHEMA

## 2024-04-17 NOTE — PROGRESS NOTE ADULT - PROBLEM SELECTOR PLAN 1
H/o MSSA infection-Pt admitted to [facility] on 11/24/2023 and discharged on 12/08/23.  His permacath was exchanged on Friday 11/25/23. Treated w/ Cefazolin.   - P/w Sepsis & permacath possible source    - Afebrile overnight   - S/P (-) UA & RVP    - S/p CT chest noted below   - 4/12 (+) Bld cx  - 4/13 HD Bld cx negative. 4/13 HD cath site bld cx (+) Stenotrophomonas  - Repeat bld cx's pending-f/u results   - Quant Tb negative  - S/p Vancomycin      - Currently on Ceftriaxone, Minocycline & Bactrim  - ID-Dr. Cui    - Discussed w/ IR permacath exchange.  IR requested CXR to evaluate catheter-f/u results

## 2024-04-17 NOTE — PROGRESS NOTE ADULT - ASSESSMENT
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  1.Strep bactermia,.Abx as per ID.URVASHI no vegetation  2.ESRD-HD as per renal.  3.HTN-cont bp medication.  4.DM-Insulin.  5.Lipid d/o-statin.  6.PC to be exchanged.  7.GI and DVT prophylaxis.  .

## 2024-04-17 NOTE — CHART NOTE - NSCHARTNOTESELECT_GEN_ALL_CORE
Internal Medicine/Event Note
Internal Medicine/Event Note
URVASHI Procedure note/Event Note
Internal Medicine/Event Note

## 2024-04-17 NOTE — CHART NOTE - NSCHARTNOTEFT_GEN_A_CORE
NP updated son Bruce at bedside.  NP informed son that pt had URVASHI today and prelim results negative.  NP informed son that pt was awaiting IR for permacath removal and new site placement.  Son verbalized understanding.      Son asked if permacath would be done tomorrow b/c he was told the person who places permacath is only availbale on Wednesdays.  NP informed son that the Medical team would contact IR in AM to discuss when permacath would be placed.   Son verbalized understanding.

## 2024-04-17 NOTE — PROGRESS NOTE ADULT - SUBJECTIVE AND OBJECTIVE BOX
Patient is a 78y old  Male who presents with a chief complaint of Sepsis (17 Apr 2024 08:36)  Awake, alert, comfortable in bed in NAD.    INTERVAL HPI/OVERNIGHT EVENTS:      VITAL SIGNS:  T(F): 97.5 (04-17-24 @ 09:05)  HR: 54 (04-17-24 @ 09:05)  BP: 154/70 (04-17-24 @ 09:05)  RR: 17 (04-17-24 @ 09:05)  SpO2: 98% (04-17-24 @ 09:05)  Wt(kg): --  I&O's Detail    16 Apr 2024 07:01  -  17 Apr 2024 07:00  --------------------------------------------------------  IN:  Total IN: 0 mL    OUT:    Voided (mL): 900 mL  Total OUT: 900 mL    Total NET: -900 mL              REVIEW OF SYSTEMS:    CONSTITUTIONAL:  No fevers, chills, sweats    HEENT:  Eyes:  No diplopia or blurred vision. ENT:  No earache, sore throat or runny nose.    CARDIOVASCULAR:  No pressure, squeezing, tightness, or heaviness about the chest; no palpitations.    RESPIRATORY:  Per HPI    GASTROINTESTINAL:  No abdominal pain, nausea, vomiting or diarrhea.    GENITOURINARY:  No dysuria, frequency or urgency.    NEUROLOGIC:  No paresthesias, fasciculations, seizures or weakness.    PSYCHIATRIC:  No disorder of thought or mood.      PHYSICAL EXAM:    Constitutional: Well developed and nourished  Eyes:Perrla  ENMT: normal  Neck:supple  Respiratory: good air entry  Cardiovascular: S1 S2 regular  Gastrointestinal: Soft, Non tender  Extremities: No edema  Vascular:normal  Neurological:Awake, alert,Ox3  Musculoskeletal:Normal      MEDICATIONS  (STANDING):  atorvastatin 20 milliGRAM(s) Oral at bedtime  brimonidine 0.2% Ophthalmic Solution 1 Drop(s) Right EYE two times a day  carvedilol 12.5 milliGRAM(s) Oral every 12 hours  cefTRIAXone   IVPB 2000 milliGRAM(s) IV Intermittent every 24 hours  chlorhexidine 2% Cloths 1 Application(s) Topical <User Schedule>  ciprofloxacin  0.3% Ophthalmic Solution 1 Drop(s) Right EYE two times a day  dextrose 10% Bolus 125 milliLiter(s) IV Bolus once  dextrose 5%. 1000 milliLiter(s) (100 mL/Hr) IV Continuous <Continuous>  dextrose 5%. 1000 milliLiter(s) (50 mL/Hr) IV Continuous <Continuous>  dextrose 50% Injectable 25 Gram(s) IV Push once  dextrose 50% Injectable 12.5 Gram(s) IV Push once  dorzolamide 2% Ophthalmic Solution 1 Drop(s) Both EYES three times a day  glucagon  Injectable 1 milliGRAM(s) IntraMuscular once  hydrALAZINE 50 milliGRAM(s) Oral three times a day  insulin lispro (ADMELOG) corrective regimen sliding scale   SubCutaneous Before meals and at bedtime  ketorolac 0.5% Ophthalmic Solution 1 Drop(s) Right EYE two times a day  losartan 50 milliGRAM(s) Oral daily  minocycline IVPB 100 milliGRAM(s) IV Intermittent every 12 hours  minocycline IVPB      polyethylene glycol 3350 17 Gram(s) Oral daily  prednisoLONE acetate 1% Suspension 1 Drop(s) Right EYE every 4 hours  senna 2 Tablet(s) Oral at bedtime  trimethoprim / sulfamethoxazole IVPB 240 milliGRAM(s) IV Intermittent every 24 hours    MEDICATIONS  (PRN):  acetaminophen     Tablet .. 650 milliGRAM(s) Oral every 6 hours PRN Temp greater or equal to 38C (100.4F), Mild Pain (1 - 3)  dextrose Oral Gel 15 Gram(s) Oral once PRN Blood Glucose LESS THAN 70 milliGRAM(s)/deciliter      Allergies    No Known Allergies    Intolerances        LABS:                        10.6   6.56  )-----------( 150      ( 16 Apr 2024 10:10 )             32.4     04-16    136  |  105  |  78<H>  ----------------------------<  166<H>  4.5   |  21<L>  |  5.28<H>    Ca    9.1      16 Apr 2024 10:10  Phos  4.6     04-16  Mg     2.2     04-16    TPro  6.8  /  Alb  2.8<L>  /  TBili  0.3  /  DBili  x   /  AST  14  /  ALT  14  /  AlkPhos  136<H>  04-16      Urinalysis Basic - ( 16 Apr 2024 10:10 )    Color: x / Appearance: x / SG: x / pH: x  Gluc: 166 mg/dL / Ketone: x  / Bili: x / Urobili: x   Blood: x / Protein: x / Nitrite: x   Leuk Esterase: x / RBC: x / WBC x   Sq Epi: x / Non Sq Epi: x / Bacteria: x            CAPILLARY BLOOD GLUCOSE      POCT Blood Glucose.: 93 mg/dL (17 Apr 2024 09:00)  POCT Blood Glucose.: 106 mg/dL (16 Apr 2024 21:22)  POCT Blood Glucose.: 182 mg/dL (16 Apr 2024 16:42)  POCT Blood Glucose.: 104 mg/dL (16 Apr 2024 13:06)        RADIOLOGY & ADDITIONAL TESTS:  < from: CT Abdomen and Pelvis No Cont (04.12.24 @ 18:53) >  *  No acute septic pathology.  *  Scattered bilateral small pulmonary nodules measuring up to 7 mm in   the right upper lobe are nonspecific and may represent   infectious/inflammatory disease or metastatic disease.  *  3.1 x 2.1 cm cystic structure in the posterior mediastinum at the   level of the leelee may represent a lymphatic structure, bronchogenic   cyst, orforegut duplication cyst.  *  No intra-abdominal collection.      < end of copied text >    CXR:    Ct scan chest:    ekg;    echo:< from: URVASHI W or WO Ultrasound Enhancing Agent (04.17.24 @ 07:41) >  CONCLUSIONS:      1. Left ventricular wall thickness is normal. Left ventricular systolic function is normal. There are no regional wall motion abnormalities seen.   2. Normal right ventricular cavity size, with normal wall thickness, and normal systolic function.   3. Normal left and right atrial size.   4. No thrombus seen in the left atrial, left atrial appendage, right atrium..   5. Trace tricuspid regurgitation. Pulmonary artery systolic pressure could not be estimated.   6. Mild mitral regurgitation.   7. Trace pulmonic regurgitation.   8. Agitated saline injection was negative for intracardiac shunt.   9. Left atrial appendage emptying velocites are normal.  10. Normal pulmonary venous flow.  11. Aortic root at the sinuses of Valsalva is normal in size, measuring 3.30 cm (indexed 1.79 cm/m²).  12. Mild aortic regurgitation.  13. No pericardial effusion seen.  14. No vegetations seen on this study.    ____________________________________________________________________    < end of copied text >

## 2024-04-17 NOTE — CHART NOTE - NSCHARTNOTEFT_GEN_A_CORE
PEMA VENCES  240006    After risks, benefits and alternatives of the procedure were explained, consent was signed and placed in the medical record.  Procedural timeout was taken.  Sedation was administered by anesthesia.  URVASHI probe inserted without complication and URVASHI performed.   Patient tolerated the procedure well without complication.  See full report for findings.

## 2024-04-17 NOTE — PROGRESS NOTE ADULT - SUBJECTIVE AND OBJECTIVE BOX
Patient is a 78y old  Male who presents with a chief complaint of Sepsis (16 Apr 2024 14:52)    pt seen in tele [ x ], reg med floor [   ], bed [x  ], chair at bedside [   ], a+o x3 [ x ], lethargic [  ],    nad [ x ]        Allergies    No Known Allergies        Vitals    T(F): 97.9 (04-17-24 @ 04:44), Max: 98.1 (04-16-24 @ 10:01)  HR: 51 (04-17-24 @ 04:44) (51 - 98)  BP: 139/74 (04-17-24 @ 04:44) (106/66 - 164/80)  RR: 18 (04-17-24 @ 04:44) (16 - 18)  SpO2: 98% (04-17-24 @ 04:44) (95% - 100%)  Wt(kg): --  CAPILLARY BLOOD GLUCOSE      POCT Blood Glucose.: 106 mg/dL (16 Apr 2024 21:22)      Labs                          10.6   6.56  )-----------( 150      ( 16 Apr 2024 10:10 )             32.4       04-16    136  |  105  |  78<H>  ----------------------------<  166<H>  4.5   |  21<L>  |  5.28<H>    Ca    9.1      16 Apr 2024 10:10  Phos  4.6     04-16  Mg     2.2     04-16    TPro  6.8  /  Alb  2.8<L>  /  TBili  0.3  /  DBili  x   /  AST  14  /  ALT  14  /  AlkPhos  136<H>  04-16            .Blood Dialysis Catheter  04-13 @ 17:00   No growth at 72 Hours  --  --      Cath Site Catheter Site  04-13 @ 09:39   Rare Stenotrophomonas maltophilia  --  Stenotrophomonas maltophilia      Clean Catch Clean Catch (Midstream)  04-12 @ 18:13   <10,000 CFU/mL Normal Urogenital Kavita  --  --      .Blood Blood-Peripheral  04-12 @ 18:00   Growth in anaerobic bottle: Streptococcus salivarius/vestibularis group  "Susceptibilities not performed"  Direct identification is available within approximately 3-5  hours either by Blood Panel Multiplexed PCR or Direct  MALDI-TOF. Details: [email or web address]  --  Blood Culture PCR      .Blood Blood-Peripheral  04-12 @ 17:50   Growth in aerobic and anaerobic bottles: Streptococcus  salivarius/vestibularis group  --  Streptococcus salivarius/vestibularis group    Quantiferon Plus TB (04.13.24 @ 09:15)   Quantiferon TB Plus: NEGATIVE      Radiology Results      Meds    MEDICATIONS  (STANDING):  atorvastatin 20 milliGRAM(s) Oral at bedtime  brimonidine 0.2% Ophthalmic Solution 1 Drop(s) Right EYE two times a day  carvedilol 12.5 milliGRAM(s) Oral every 12 hours  cefTRIAXone   IVPB 2000 milliGRAM(s) IV Intermittent every 24 hours  chlorhexidine 2% Cloths 1 Application(s) Topical <User Schedule>  ciprofloxacin  0.3% Ophthalmic Solution 1 Drop(s) Right EYE two times a day  dextrose 10% Bolus 125 milliLiter(s) IV Bolus once  dextrose 5%. 1000 milliLiter(s) (100 mL/Hr) IV Continuous <Continuous>  dextrose 5%. 1000 milliLiter(s) (50 mL/Hr) IV Continuous <Continuous>  dextrose 50% Injectable 25 Gram(s) IV Push once  dextrose 50% Injectable 12.5 Gram(s) IV Push once  dorzolamide 2% Ophthalmic Solution 1 Drop(s) Both EYES three times a day  glucagon  Injectable 1 milliGRAM(s) IntraMuscular once  hydrALAZINE 50 milliGRAM(s) Oral three times a day  insulin lispro (ADMELOG) corrective regimen sliding scale   SubCutaneous Before meals and at bedtime  ketorolac 0.5% Ophthalmic Solution 1 Drop(s) Right EYE two times a day  losartan 50 milliGRAM(s) Oral daily  minocycline IVPB 100 milliGRAM(s) IV Intermittent every 12 hours  minocycline IVPB      polyethylene glycol 3350 17 Gram(s) Oral daily  prednisoLONE acetate 1% Suspension 1 Drop(s) Right EYE every 4 hours  senna 2 Tablet(s) Oral at bedtime  trimethoprim / sulfamethoxazole IVPB 240 milliGRAM(s) IV Intermittent every 24 hours      MEDICATIONS  (PRN):  acetaminophen     Tablet .. 650 milliGRAM(s) Oral every 6 hours PRN Temp greater or equal to 38C (100.4F), Mild Pain (1 - 3)  dextrose Oral Gel 15 Gram(s) Oral once PRN Blood Glucose LESS THAN 70 milliGRAM(s)/deciliter      Physical Exam    Neuro :  no focal deficits  Respiratory: CTA B/L  CV: RRR, S1S2, no murmurs,   Abdominal: Soft, NT, ND +BS,  Extremities: No edema, + peripheral pulses      ASSESSMENT      uncontrolled htn,   abnormal trop r/o acs,   poss 2nd to demand ischemia,    abd pain poss 2nd to constipation,    fever poss 2nd to line sepsis   bacteremia   pulmonary nodules,    bronchogenic cyst, or foregut duplication cyst,   hyperkalemia  h/o sinus node dysfunction,   chronic HFpEF,   aortic stenosis,   HTN,   HLD,   b/l carotid stenosis,   DM (not on meds),   ESRD on dialysis (Tue, Thu, Sat),   Gout,    glaucoma,   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  cx and sens fr hd cath site with Stenotrophomonas maltophilia noted above   blood cx cath neg noted above  id f/u    TTE with ejection fraction visually estimated at 50 to 55 %. mild (grade 1) left ventricular diastolic dysfunction.   No vegetations seen on this study, consider URVASHI for further evaluation if clinically indicated noted above.     URVASHI with   ir to remove the Perm-a-catheter  Added IV Bactrim and Minocycline to cover Stenotrophomonas  Continue Ceftriaxone 2 g daily to cover Streptococcus  f/u Repeat Blood cultures X 2 to document clearing the blood stream  procalcitonin 0.6 noted    quantiferon tb gold test neg noted above  pulm f/u    Follow up CT in 12 months  Nodify testing as OP.  pt on hd t,th,s   renal f/u   K improved after dialysis,   follow 2 gm K diet.   Malfunction catheter max  ml/minute 4/16/24,   need to removal and placement of new catheter  AVG placement after discharge at [facility].   hgba1c 6.4 noted    lispro ss   cont current meds     Patient is a 78y old  Male who presents with a chief complaint of Sepsis (16 Apr 2024 14:52)    pt seen in tele [ x ], reg med floor [   ], bed [x  ], chair at bedside [   ], a+o x3 [ x ], lethargic [  ],    nad [ x ]        Allergies    No Known Allergies        Vitals    T(F): 97.9 (04-17-24 @ 04:44), Max: 98.1 (04-16-24 @ 10:01)  HR: 51 (04-17-24 @ 04:44) (51 - 98)  BP: 139/74 (04-17-24 @ 04:44) (106/66 - 164/80)  RR: 18 (04-17-24 @ 04:44) (16 - 18)  SpO2: 98% (04-17-24 @ 04:44) (95% - 100%)  Wt(kg): --  CAPILLARY BLOOD GLUCOSE      POCT Blood Glucose.: 106 mg/dL (16 Apr 2024 21:22)      Labs                          10.6   6.56  )-----------( 150      ( 16 Apr 2024 10:10 )             32.4       04-16    136  |  105  |  78<H>  ----------------------------<  166<H>  4.5   |  21<L>  |  5.28<H>    Ca    9.1      16 Apr 2024 10:10  Phos  4.6     04-16  Mg     2.2     04-16    TPro  6.8  /  Alb  2.8<L>  /  TBili  0.3  /  DBili  x   /  AST  14  /  ALT  14  /  AlkPhos  136<H>  04-16            .Blood Dialysis Catheter  04-13 @ 17:00   No growth at 72 Hours  --  --      Cath Site Catheter Site  04-13 @ 09:39   Rare Stenotrophomonas maltophilia  --  Stenotrophomonas maltophilia      Clean Catch Clean Catch (Midstream)  04-12 @ 18:13   <10,000 CFU/mL Normal Urogenital Kavita  --  --      .Blood Blood-Peripheral  04-12 @ 18:00   Growth in anaerobic bottle: Streptococcus salivarius/vestibularis group  "Susceptibilities not performed"  Direct identification is available within approximately 3-5  hours either by Blood Panel Multiplexed PCR or Direct  MALDI-TOF. Details: [email or web address]  --  Blood Culture PCR      .Blood Blood-Peripheral  04-12 @ 17:50   Growth in aerobic and anaerobic bottles: Streptococcus  salivarius/vestibularis group  --  Streptococcus salivarius/vestibularis group    Quantiferon Plus TB (04.13.24 @ 09:15)   Quantiferon TB Plus: NEGATIVE    < from: URVASHI W or WO Ultrasound Enhancing Agent (04.17.24 @ 07:41) >  CONCLUSIONS:      1. Left ventricular wall thickness is normal. Left ventricular systolic function is normal. There are no regional wall motion abnormalities seen.   2. Normal right ventricular cavity size, with normal wall thickness, and normal systolic function.   3. Normal left and right atrial size.   4. No thrombus seen in the left atrial, left atrial appendage, right atrium..   5. Trace tricuspid regurgitation. Pulmonary artery systolic pressure could not be estimated.   6. Mild mitral regurgitation.   7. Trace pulmonic regurgitation.   8. Agitated saline injection was negative for intracardiac shunt.   9. Left atrial appendage emptying velocites are normal.  10. Normal pulmonary venous flow.  11. Aortic root at the sinuses of Valsalva is normal in size, measuring 3.30 cm (indexed 1.79 cm/m²).  12. Mild aortic regurgitation.  13. No pericardial effusion seen.  14. No vegetations seen on this study.    < end of copied text >      Radiology Results      Meds    MEDICATIONS  (STANDING):  atorvastatin 20 milliGRAM(s) Oral at bedtime  brimonidine 0.2% Ophthalmic Solution 1 Drop(s) Right EYE two times a day  carvedilol 12.5 milliGRAM(s) Oral every 12 hours  cefTRIAXone   IVPB 2000 milliGRAM(s) IV Intermittent every 24 hours  chlorhexidine 2% Cloths 1 Application(s) Topical <User Schedule>  ciprofloxacin  0.3% Ophthalmic Solution 1 Drop(s) Right EYE two times a day  dextrose 10% Bolus 125 milliLiter(s) IV Bolus once  dextrose 5%. 1000 milliLiter(s) (100 mL/Hr) IV Continuous <Continuous>  dextrose 5%. 1000 milliLiter(s) (50 mL/Hr) IV Continuous <Continuous>  dextrose 50% Injectable 25 Gram(s) IV Push once  dextrose 50% Injectable 12.5 Gram(s) IV Push once  dorzolamide 2% Ophthalmic Solution 1 Drop(s) Both EYES three times a day  glucagon  Injectable 1 milliGRAM(s) IntraMuscular once  hydrALAZINE 50 milliGRAM(s) Oral three times a day  insulin lispro (ADMELOG) corrective regimen sliding scale   SubCutaneous Before meals and at bedtime  ketorolac 0.5% Ophthalmic Solution 1 Drop(s) Right EYE two times a day  losartan 50 milliGRAM(s) Oral daily  minocycline IVPB 100 milliGRAM(s) IV Intermittent every 12 hours  minocycline IVPB      polyethylene glycol 3350 17 Gram(s) Oral daily  prednisoLONE acetate 1% Suspension 1 Drop(s) Right EYE every 4 hours  senna 2 Tablet(s) Oral at bedtime  trimethoprim / sulfamethoxazole IVPB 240 milliGRAM(s) IV Intermittent every 24 hours      MEDICATIONS  (PRN):  acetaminophen     Tablet .. 650 milliGRAM(s) Oral every 6 hours PRN Temp greater or equal to 38C (100.4F), Mild Pain (1 - 3)  dextrose Oral Gel 15 Gram(s) Oral once PRN Blood Glucose LESS THAN 70 milliGRAM(s)/deciliter      Physical Exam    Neuro :  no focal deficits  Respiratory: CTA B/L  CV: RRR, S1S2, no murmurs,   Abdominal: Soft, NT, ND +BS,  Extremities: No edema, + peripheral pulses      ASSESSMENT      uncontrolled htn,   abnormal trop r/o acs,   poss 2nd to demand ischemia,    abd pain poss 2nd to constipation,    fever poss 2nd to line sepsis   bacteremia   pulmonary nodules,    bronchogenic cyst, or foregut duplication cyst,   hyperkalemia  h/o sinus node dysfunction,   chronic HFpEF,   aortic stenosis,   HTN,   HLD,   b/l carotid stenosis,   DM (not on meds),   ESRD on dialysis (Tue, Thu, Sat),   Gout,    glaucoma,   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  cx and sens fr hd cath site with Stenotrophomonas maltophilia noted above   blood cx cath neg noted above  id f/u    TTE with ejection fraction visually estimated at 50 to 55 %. mild (grade 1) left ventricular diastolic dysfunction.   No vegetations seen on this study, consider URVASHI for further evaluation if clinically indicated noted    URVASHI with No thrombus seen in the left atrial, left atrial appendage, right atrium.   Agitated saline injection was negative for intracardiac shunt.   No vegetations seen on this study noted above.    ir to remove the Perm-a-catheter  Added IV Bactrim and Minocycline to cover Stenotrophomonas  Continue Ceftriaxone 2 g daily to cover Streptococcus  f/u Repeat Blood cultures X 2 to document clearing the blood stream  procalcitonin 0.6 noted    quantiferon tb gold test neg noted above  pulm f/u    Follow up CT in 12 months  Nodify testing as OP.  pt on hd t,th,s   renal f/u   K improved after dialysis,   follow 2 gm K diet.   Malfunction catheter max  ml/minute 4/16/24,   need to removal and placement of new catheter  AVG placement after discharge at [facility].   hgba1c 6.4 noted    lispro ss   cont current meds

## 2024-04-17 NOTE — PROGRESS NOTE ADULT - SUBJECTIVE AND OBJECTIVE BOX
Date of Service 04-17-24 @ 08:36    CHIEF COMPLAINT:Patient is a 78y old  Male who presents with a chief complaint of Sepsis .Pt appears comfortable.    	  REVIEW OF SYSTEMS:  CONSTITUTIONAL: No fever, weight loss, or fatigue  EYES: No eye pain, visual disturbances, or discharge  ENT:  No difficulty hearing, tinnitus, vertigo; No sinus or throat pain  NECK: No pain or stiffness  RESPIRATORY: No cough, wheezing, chills or hemoptysis; No Shortness of Breath  CARDIOVASCULAR: No chest pain, palpitations, passing out, dizziness, or leg swelling  GASTROINTESTINAL: No abdominal or epigastric pain. No nausea, vomiting, or hematemesis; No diarrhea or constipation. No melena or hematochezia.  GENITOURINARY: No dysuria, frequency, hematuria, or incontinence  NEUROLOGICAL: No headaches, memory loss, loss of strength, numbness, or tremors  SKIN: No itching, burning, rashes, or lesions   LYMPH Nodes: No enlarged glands  ENDOCRINE: No heat or cold intolerance; No hair loss  MUSCULOSKELETAL: No joint pain or swelling; No muscle, back, or extremity pain  PSYCHIATRIC: No depression, anxiety, mood swings, or difficulty sleeping  HEME/LYMPH: No easy bruising, or bleeding gums  ALLERGY AND IMMUNOLOGIC: No hives or eczema	        PHYSICAL EXAM:  T(C): 36.6 (04-17-24 @ 04:44), Max: 36.7 (04-16-24 @ 10:01)  HR: 51 (04-17-24 @ 04:44) (51 - 98)  BP: 139/74 (04-17-24 @ 04:44) (106/66 - 164/80)  RR: 18 (04-17-24 @ 04:44) (16 - 18)  SpO2: 98% (04-17-24 @ 04:44) (95% - 100%)  Wt(kg): --  I&O's Summary    16 Apr 2024 07:01  -  17 Apr 2024 07:00  --------------------------------------------------------  IN: 0 mL / OUT: 900 mL / NET: -900 mL        Appearance: Normal	  HEENT:   Normal oral mucosa, PERRL, EOMI	  Lymphatic: No lymphadenopathy  Cardiovascular: Normal S1 S2, No JVD, No murmurs, No edema  Respiratory: Lungs clear to auscultation	  Psychiatry: A & O x 3, Mood & affect appropriate  Gastrointestinal:  Soft, Non-tender, + BS	  Skin: No rashes, No ecchymoses, No cyanosis	  Neurologic: Non-focal  Extremities: Normal range of motion, No clubbing, cyanosis or edema  Vascular: Peripheral pulses palpable 2+ bilaterally    MEDICATIONS  (STANDING):  atorvastatin 20 milliGRAM(s) Oral at bedtime  brimonidine 0.2% Ophthalmic Solution 1 Drop(s) Right EYE two times a day  carvedilol 12.5 milliGRAM(s) Oral every 12 hours  cefTRIAXone   IVPB 2000 milliGRAM(s) IV Intermittent every 24 hours  chlorhexidine 2% Cloths 1 Application(s) Topical <User Schedule>  ciprofloxacin  0.3% Ophthalmic Solution 1 Drop(s) Right EYE two times a day  dextrose 10% Bolus 125 milliLiter(s) IV Bolus once  dextrose 5%. 1000 milliLiter(s) (50 mL/Hr) IV Continuous <Continuous>  dextrose 5%. 1000 milliLiter(s) (100 mL/Hr) IV Continuous <Continuous>  dextrose 50% Injectable 25 Gram(s) IV Push once  dextrose 50% Injectable 12.5 Gram(s) IV Push once  dorzolamide 2% Ophthalmic Solution 1 Drop(s) Both EYES three times a day  glucagon  Injectable 1 milliGRAM(s) IntraMuscular once  hydrALAZINE 50 milliGRAM(s) Oral three times a day  insulin lispro (ADMELOG) corrective regimen sliding scale   SubCutaneous Before meals and at bedtime  ketorolac 0.5% Ophthalmic Solution 1 Drop(s) Right EYE two times a day  losartan 50 milliGRAM(s) Oral daily  minocycline IVPB      minocycline IVPB 100 milliGRAM(s) IV Intermittent every 12 hours  polyethylene glycol 3350 17 Gram(s) Oral daily  prednisoLONE acetate 1% Suspension 1 Drop(s) Right EYE every 4 hours  senna 2 Tablet(s) Oral at bedtime  trimethoprim / sulfamethoxazole IVPB 240 milliGRAM(s) IV Intermittent every 24 hours       	  	  LABS:	 	                         10.6   6.56  )-----------( 150      ( 16 Apr 2024 10:10 )             32.4     04-16    136  |  105  |  78<H>  ----------------------------<  166<H>  4.5   |  21<L>  |  5.28<H>    Ca    9.1      16 Apr 2024 10:10  Phos  4.6     04-16  Mg     2.2     04-16    TPro  6.8  /  Alb  2.8<L>  /  TBili  0.3  /  DBili  x   /  AST  14  /  ALT  14  /  AlkPhos  136<H>  04-16

## 2024-04-17 NOTE — PROGRESS NOTE ADULT - PROBLEM SELECTOR PLAN 2
- P/w Elevated trops   - S/p EKG   - Likely ischemic demand in s/o sepsis  - S/p Telemetry as discussed w/ CV   - S/p Echo wnl  - 4/17 URVASHI pending-f/u results   - CV-Dr. Jorge

## 2024-04-17 NOTE — PROGRESS NOTE ADULT - GASTROINTESTINAL
soft/nontender/nondistended/normal active bowel sounds/no guarding/no rigidity/no organomegaly/no palpable rosalva 11-Oct-2018 02:42 details…

## 2024-04-17 NOTE — PROGRESS NOTE ADULT - SUBJECTIVE AND OBJECTIVE BOX
NP Note discussed with  primary attending    Patient is a 78y old  Male who presents with a chief complaint of Sepsis (17 Apr 2024 12:11)      INTERVAL HPI/OVERNIGHT EVENTS: no new complaints    MEDICATIONS  (STANDING):  atorvastatin 20 milliGRAM(s) Oral at bedtime  brimonidine 0.2% Ophthalmic Solution 1 Drop(s) Right EYE two times a day  carvedilol 12.5 milliGRAM(s) Oral every 12 hours  cefTRIAXone   IVPB 2000 milliGRAM(s) IV Intermittent every 24 hours  chlorhexidine 2% Cloths 1 Application(s) Topical <User Schedule>  ciprofloxacin  0.3% Ophthalmic Solution 1 Drop(s) Right EYE two times a day  dextrose 10% Bolus 125 milliLiter(s) IV Bolus once  dextrose 5%. 1000 milliLiter(s) (100 mL/Hr) IV Continuous <Continuous>  dextrose 5%. 1000 milliLiter(s) (50 mL/Hr) IV Continuous <Continuous>  dextrose 50% Injectable 25 Gram(s) IV Push once  dextrose 50% Injectable 12.5 Gram(s) IV Push once  dorzolamide 2% Ophthalmic Solution 1 Drop(s) Both EYES three times a day  glucagon  Injectable 1 milliGRAM(s) IntraMuscular once  hydrALAZINE 50 milliGRAM(s) Oral three times a day  insulin lispro (ADMELOG) corrective regimen sliding scale   SubCutaneous Before meals and at bedtime  ketorolac 0.5% Ophthalmic Solution 1 Drop(s) Right EYE two times a day  losartan 50 milliGRAM(s) Oral daily  minocycline IVPB      minocycline IVPB 100 milliGRAM(s) IV Intermittent every 12 hours  polyethylene glycol 3350 17 Gram(s) Oral daily  prednisoLONE acetate 1% Suspension 1 Drop(s) Right EYE every 4 hours  senna 2 Tablet(s) Oral at bedtime  trimethoprim / sulfamethoxazole IVPB 240 milliGRAM(s) IV Intermittent every 24 hours    MEDICATIONS  (PRN):  acetaminophen     Tablet .. 650 milliGRAM(s) Oral every 6 hours PRN Temp greater or equal to 38C (100.4F), Mild Pain (1 - 3)  dextrose Oral Gel 15 Gram(s) Oral once PRN Blood Glucose LESS THAN 70 milliGRAM(s)/deciliter      __________________________________________________  REVIEW OF SYSTEMS:    CONSTITUTIONAL: No fever  EYES: No acute visual disturbances  NECK: No pain or stiffness  RESPIRATORY: No cough; No shortness of breath  CARDIOVASCULAR: No chest pain, no palpitations  GASTROINTESTINAL: No pain. No nausea or vomiting.  No diarrhea   NEUROLOGICAL: No headache or numbness, no tremors  MUSCULOSKELETAL: No joint pain, no muscle pain  GENITOURINARY: No dysuria, no frequency, no hesitancy  PSYCHIATRY: No depression , no anxiety  ALL OTHER  ROS negative        Vital Signs Last 24 Hrs  T(C): 36.3 (17 Apr 2024 11:28), Max: 36.7 (16 Apr 2024 20:56)  T(F): 97.3 (17 Apr 2024 11:28), Max: 98.1 (16 Apr 2024 20:56)  HR: 53 (17 Apr 2024 11:28) (51 - 98)  BP: 165/76 (17 Apr 2024 11:28) (106/66 - 165/76)  RR: 17 (17 Apr 2024 11:28) (17 - 18)  SpO2: 98% (17 Apr 2024 11:28) (95% - 100%)    Parameters below as of 17 Apr 2024 11:28  Patient On (Oxygen Delivery Method): room air        ________________________________________________  PHYSICAL EXAM:  GENERAL: NAD  HEENT: Normocephalic;  conjunctivae and sclerae clear; moist mucous membranes   NECK : Supple  CHEST/LUNG: Clear to auscultation bilaterally with good air entry.  (+) Right Permacath.   HEART: S1 S2  regular; no murmurs, gallops or rubs  ABDOMEN: Soft, Nontender, Nondistended; Bowel sounds present x 4 quad  EXTREMITIES: No cyanosis; no edema; no calf tenderness  SKIN: Warm and dry; no rash  NERVOUS SYSTEM:  Awake and alert; Oriented to person, not place and time; no new deficits  ________________________________________________  LABS:                        11.7   7.63  )-----------( 153      ( 17 Apr 2024 10:00 )             35.2     04-17    136  |  102  |  64<H>  ----------------------------<  98  4.7   |  24  |  4.81<H>    Ca    8.7      17 Apr 2024 10:00  Phos  5.1     04-17  Mg     2.5     04-17    TPro  7.4  /  Alb  3.0<L>  /  TBili  0.3  /  DBili  x   /  AST  14  /  ALT  14  /  AlkPhos  144<H>  04-17      Urinalysis Basic - ( 17 Apr 2024 10:00 )    Color: x / Appearance: x / SG: x / pH: x  Gluc: 98 mg/dL / Ketone: x  / Bili: x / Urobili: x   Blood: x / Protein: x / Nitrite: x   Leuk Esterase: x / RBC: x / WBC x   Sq Epi: x / Non Sq Epi: x / Bacteria: x      CAPILLARY BLOOD GLUCOSE      POCT Blood Glucose.: 96 mg/dL (17 Apr 2024 11:20)  POCT Blood Glucose.: 93 mg/dL (17 Apr 2024 09:00)  POCT Blood Glucose.: 106 mg/dL (16 Apr 2024 21:22)  POCT Blood Glucose.: 182 mg/dL (16 Apr 2024 16:42)        RADIOLOGY & ADDITIONAL TESTS:    Imaging Personally Reviewed:  YES    Consultant(s) Notes Reviewed:   YES    Care Discussed with Consultants :     Plan of care was discussed with patient and /or primary care giver; all questions and concerns were addressed and care was aligned with patient's wishes.

## 2024-04-18 DIAGNOSIS — R91.1 SOLITARY PULMONARY NODULE: ICD-10-CM

## 2024-04-18 LAB
ANION GAP SERPL CALC-SCNC: 8 MMOL/L — SIGNIFICANT CHANGE UP (ref 5–17)
BUN SERPL-MCNC: 76 MG/DL — HIGH (ref 7–18)
CALCIUM SERPL-MCNC: 8.2 MG/DL — LOW (ref 8.4–10.5)
CHLORIDE SERPL-SCNC: 105 MMOL/L — SIGNIFICANT CHANGE UP (ref 96–108)
CO2 SERPL-SCNC: 22 MMOL/L — SIGNIFICANT CHANGE UP (ref 22–31)
CREAT SERPL-MCNC: 5.36 MG/DL — HIGH (ref 0.5–1.3)
CULTURE RESULTS: SIGNIFICANT CHANGE UP
EGFR: 10 ML/MIN/1.73M2 — LOW
GLUCOSE BLDC GLUCOMTR-MCNC: 110 MG/DL — HIGH (ref 70–99)
GLUCOSE BLDC GLUCOMTR-MCNC: 115 MG/DL — HIGH (ref 70–99)
GLUCOSE BLDC GLUCOMTR-MCNC: 95 MG/DL — SIGNIFICANT CHANGE UP (ref 70–99)
GLUCOSE BLDC GLUCOMTR-MCNC: 96 MG/DL — SIGNIFICANT CHANGE UP (ref 70–99)
GLUCOSE BLDC GLUCOMTR-MCNC: 98 MG/DL — SIGNIFICANT CHANGE UP (ref 70–99)
GLUCOSE SERPL-MCNC: 165 MG/DL — HIGH (ref 70–99)
HCT VFR BLD CALC: 32.1 % — LOW (ref 39–50)
HGB BLD-MCNC: 10.7 G/DL — LOW (ref 13–17)
INR BLD: 1.24 RATIO — HIGH (ref 0.85–1.18)
MAGNESIUM SERPL-MCNC: 2.5 MG/DL — SIGNIFICANT CHANGE UP (ref 1.6–2.6)
MCHC RBC-ENTMCNC: 32.7 PG — SIGNIFICANT CHANGE UP (ref 27–34)
MCHC RBC-ENTMCNC: 33.3 GM/DL — SIGNIFICANT CHANGE UP (ref 32–36)
MCV RBC AUTO: 98.2 FL — SIGNIFICANT CHANGE UP (ref 80–100)
NRBC # BLD: 0 /100 WBCS — SIGNIFICANT CHANGE UP (ref 0–0)
PHOSPHATE SERPL-MCNC: 6.1 MG/DL — HIGH (ref 2.5–4.5)
PLATELET # BLD AUTO: 151 K/UL — SIGNIFICANT CHANGE UP (ref 150–400)
POTASSIUM SERPL-MCNC: 4.6 MMOL/L — SIGNIFICANT CHANGE UP (ref 3.5–5.3)
POTASSIUM SERPL-SCNC: 4.6 MMOL/L — SIGNIFICANT CHANGE UP (ref 3.5–5.3)
PROTHROM AB SERPL-ACNC: 14.1 SEC — HIGH (ref 9.5–13)
RBC # BLD: 3.27 M/UL — LOW (ref 4.2–5.8)
RBC # FLD: 13.2 % — SIGNIFICANT CHANGE UP (ref 10.3–14.5)
SODIUM SERPL-SCNC: 135 MMOL/L — SIGNIFICANT CHANGE UP (ref 135–145)
SPECIMEN SOURCE: SIGNIFICANT CHANGE UP
WBC # BLD: 7.72 K/UL — SIGNIFICANT CHANGE UP (ref 3.8–10.5)
WBC # FLD AUTO: 7.72 K/UL — SIGNIFICANT CHANGE UP (ref 3.8–10.5)

## 2024-04-18 PROCEDURE — 36589 REMOVAL TUNNELED CV CATH: CPT

## 2024-04-18 RX ADMIN — Medication 50 MILLIGRAM(S): at 05:41

## 2024-04-18 RX ADMIN — BRIMONIDINE TARTRATE 1 DROP(S): 2 SOLUTION/ DROPS OPHTHALMIC at 05:40

## 2024-04-18 RX ADMIN — SENNA PLUS 2 TABLET(S): 8.6 TABLET ORAL at 21:30

## 2024-04-18 RX ADMIN — Medication 1 DROP(S): at 18:10

## 2024-04-18 RX ADMIN — MINOCYCLINE HYDROCHLORIDE 100 MILLIGRAM(S): 45 TABLET, EXTENDED RELEASE ORAL at 19:41

## 2024-04-18 RX ADMIN — Medication 1 DROP(S): at 02:38

## 2024-04-18 RX ADMIN — BRIMONIDINE TARTRATE 1 DROP(S): 2 SOLUTION/ DROPS OPHTHALMIC at 18:09

## 2024-04-18 RX ADMIN — CARVEDILOL PHOSPHATE 12.5 MILLIGRAM(S): 80 CAPSULE, EXTENDED RELEASE ORAL at 18:11

## 2024-04-18 RX ADMIN — LOSARTAN POTASSIUM 50 MILLIGRAM(S): 100 TABLET, FILM COATED ORAL at 05:42

## 2024-04-18 RX ADMIN — DORZOLAMIDE HYDROCHLORIDE 1 DROP(S): 20 SOLUTION/ DROPS OPHTHALMIC at 21:31

## 2024-04-18 RX ADMIN — Medication 1 DROP(S): at 05:41

## 2024-04-18 RX ADMIN — Medication 50 MILLIGRAM(S): at 21:30

## 2024-04-18 RX ADMIN — DORZOLAMIDE HYDROCHLORIDE 1 DROP(S): 20 SOLUTION/ DROPS OPHTHALMIC at 16:25

## 2024-04-18 RX ADMIN — DORZOLAMIDE HYDROCHLORIDE 1 DROP(S): 20 SOLUTION/ DROPS OPHTHALMIC at 05:41

## 2024-04-18 RX ADMIN — Medication 1 DROP(S): at 05:43

## 2024-04-18 RX ADMIN — MINOCYCLINE HYDROCHLORIDE 100 MILLIGRAM(S): 45 TABLET, EXTENDED RELEASE ORAL at 05:43

## 2024-04-18 RX ADMIN — Medication 1 DROP(S): at 18:09

## 2024-04-18 RX ADMIN — CHLORHEXIDINE GLUCONATE 1 APPLICATION(S): 213 SOLUTION TOPICAL at 05:40

## 2024-04-18 RX ADMIN — CEFTRIAXONE 100 MILLIGRAM(S): 500 INJECTION, POWDER, FOR SOLUTION INTRAMUSCULAR; INTRAVENOUS at 19:39

## 2024-04-18 RX ADMIN — Medication 1 DROP(S): at 16:25

## 2024-04-18 RX ADMIN — ATORVASTATIN CALCIUM 20 MILLIGRAM(S): 80 TABLET, FILM COATED ORAL at 21:29

## 2024-04-18 RX ADMIN — Medication 1 DROP(S): at 21:30

## 2024-04-18 RX ADMIN — Medication 50 MILLIGRAM(S): at 16:24

## 2024-04-18 RX ADMIN — CARVEDILOL PHOSPHATE 12.5 MILLIGRAM(S): 80 CAPSULE, EXTENDED RELEASE ORAL at 05:40

## 2024-04-18 RX ADMIN — Medication 1 DROP(S): at 05:42

## 2024-04-18 RX ADMIN — Medication 1 DROP(S): at 09:12

## 2024-04-18 RX ADMIN — Medication 265 MILLIGRAM(S): at 16:42

## 2024-04-18 NOTE — PROGRESS NOTE ADULT - SUBJECTIVE AND OBJECTIVE BOX
Patient is seen and examined at the bed side, is afebrile. He is s/p removal of Perm-a-cath today, 4/18/24, and tolerated the procedure well.       REVIEW OF SYSTEMS: All other review systems are negative      ALLERGIES: No Known Allergies      Vital Signs Last 24 Hrs  T(C): 36.7 (18 Apr 2024 16:15), Max: 36.7 (18 Apr 2024 16:15)  T(F): 98.1 (18 Apr 2024 16:15), Max: 98.1 (18 Apr 2024 16:15)  HR: 60 (18 Apr 2024 16:15) (50 - 63)  BP: 142/71 (18 Apr 2024 16:15) (121/64 - 151/86)  BP(mean): --  RR: 18 (18 Apr 2024 16:15) (17 - 18)  SpO2: 100% (18 Apr 2024 16:15) (97% - 100%)    Parameters below as of 18 Apr 2024 16:15  Patient On (Oxygen Delivery Method): room air      PHYSICAL EXAM:  GENERAL: Not in distress   CHEST/LUNG: Not using accessory muscles   HEART: s1 and s2 present  ABDOMEN:  Nontender and  Nondistended  EXTREMITIES: No pedal  edema  CNS: Awake and Alert      LABS:                        10.7   7.72  )-----------( 151      ( 18 Apr 2024 10:00 )             32.1                           11.7   7.63  )-----------( 153      ( 17 Apr 2024 10:00 )             35.2         04-18    135  |  105  |  76<H>  ----------------------------<  165<H>  4.6   |  22  |  5.36<H>    Ca    8.2<L>      18 Apr 2024 10:00  Phos  6.1     04-18  Mg     2.5     04-18    TPro  7.4  /  Alb  3.0<L>  /  TBili  0.3  /  DBili  x   /  AST  14  /  ALT  14  /  AlkPhos  144<H>  04-17 04-17    136  |  102  |  64<H>  ----------------------------<  98  4.7   |  24  |  4.81<H>    Ca    8.7      17 Apr 2024 10:00  Phos  5.1     04-17  Mg     2.5     04-17    TPro  7.4  /  Alb  3.0<L>  /  TBili  0.3  /  DBili  x   /  AST  14  /  ALT  14  /  AlkPhos  144<H>  04-17    PT/INR - ( 13 Apr 2024 05:00 )   PT: 16.3 sec;   INR: 1.45 ratio      PTT - ( 13 Apr 2024 05:00 )  PTT:26.7 sec      CAPILLARY BLOOD GLUCOSE  POCT Blood Glucose.: 186 mg/dL (13 Apr 2024 11:30)  POCT Blood Glucose.: 121 mg/dL (13 Apr 2024 07:37)      < from: TTE W or WO Ultrasound Enhancing Agent (04.15.24 @ 12:57) >     CONCLUSIONS:      1. Left ventricular cavity is normal in size. Left ventricular systolic function is normal with an ejection fraction visually estimated at 50 to 55 %. There are no regional wall motion abnormalities seen.   2. There is mild (grade 1) left ventricular diastolic dysfunction.   3. Normal right ventricular cavity size, with normal wall thickness, and normal systolic function. Tricuspid annular plane systolic excursion (TAPSE) is 2.1 cm (normal >=1.7 cm).   4. Normal left and right atrial size.   5. Trace tricuspid regurgitation.   6. Compared to the transthoracic echocardiogram performed on 12/18/2023, there have been no significant interval changes.   7. Estimated pulmonary artery systolic pressure is 20 mmHg, consistent with normal pulmonary artery pressure.   8. Mild aortic regurgitation.   9. Mild pulmonic regurgitation.  10. There is calcification of the mitral valve annulus.  11. No pericardial effusion seen.  12. No vegetations seen on this study,consider URVASHI for further evaluation if clinically indicated.        MEDICATIONS  (STANDING):    atorvastatin 20 milliGRAM(s) Oral at bedtime  brimonidine 0.2% Ophthalmic Solution 1 Drop(s) Right EYE two times a day  carvedilol 12.5 milliGRAM(s) Oral every 12 hours  cefTRIAXone   IVPB 2000 milliGRAM(s) IV Intermittent every 24 hours  chlorhexidine 2% Cloths 1 Application(s) Topical <User Schedule>  ciprofloxacin  0.3% Ophthalmic Solution 1 Drop(s) Right EYE two times a day  dorzolamide 2% Ophthalmic Solution 1 Drop(s) Both EYES three times a day  glucagon  Injectable 1 milliGRAM(s) IntraMuscular once  hydrALAZINE 50 milliGRAM(s) Oral three times a day  insulin lispro (ADMELOG) corrective regimen sliding scale   SubCutaneous Before meals and at bedtime  ketorolac 0.5% Ophthalmic Solution 1 Drop(s) Right EYE two times a day  losartan 50 milliGRAM(s) Oral daily  minocycline IVPB      minocycline IVPB 100 milliGRAM(s) IV Intermittent every 12 hours  polyethylene glycol 3350 17 Gram(s) Oral daily  prednisoLONE acetate 1% Suspension 1 Drop(s) Right EYE <User Schedule>  prednisoLONE acetate 1% Suspension 1 Drop(s) Right EYE every 4 hours  senna 2 Tablet(s) Oral at bedtime  trimethoprim / sulfamethoxazole IVPB 240 milliGRAM(s) IV Intermittent every 24 hours      RADIOLOGY & ADDITIONAL TESTS:    4/12/24 : CT Abdomen and Pelvis No Cont (04.12.24 @ 18:53) >  *  No acute septic pathology.  *  Scattered bilateral small pulmonary nodules measuring up to 7 mm in the right upper lobe are nonspecific and may represent   infectious/inflammatory disease or metastatic disease.  *  3.1 x 2.1 cm cystic structure in the posterior mediastinum at the level of the leelee may represent a lymphatic structure, bronchogenic cyst, or foregut duplication cyst.  *  No intra-abdominal collection.      4/12/24 : CT Chest No Cont (04.12.24 @ 18:53) LUNGS AND LARGE AIRWAYS: The central airways are patent. Scattered   bilateral small pulmonary nodules measuring up to 7 mm in the right upper  lobe are nonspecific. No acute consolidation.  PLEURA: Trace effusions.        MICROBIOLOGY DATA:    Culture - Other (04.13.24 @ 09:39)   - Minocycline: S  - Levofloxacin: S <=0.5  - Trimethoprim/Sulfamethoxazole: S <=0.5/9.5  Specimen Source: Cath Site Catheter Site  Culture Results:   Rare Stenotrophomonas maltophilia  Organism Identification: Stenotrophomonas maltophilia  Organism: Stenotrophomonas maltophilia  Organism: Stenotrophomonas maltophilia  Method Type: KB  Method Type: LAWRENCE    Culture - Blood (04.13.24 @ 17:00)   Specimen Source: .Blood Dialysis Catheter  Culture Results: No growth at 48 hours      Culture - Other (04.13.24 @ 09:39)   Specimen Source: Cath Site Catheter Site  Culture Results: No growth to date.      Culture - Blood (04.12.24 @ 17:50)   Gram Stain:   Growth in aerobic bottle: Gram positive cocci in pairs  Specimen Source: .Blood Blood-Peripheral  Culture Results:   Growth in aerobic bottle: Streptococcus salivarius/vestibularis group   Susceptibility to follow.      Culture - Blood (04.12.24 @ 18:00)   Gram Stain:   Growth in anaerobic bottle: Gram positive cocci in pairs  - Streptococcus sp. (Not Grp A, B or S pneumoniae): Detec  Specimen Source: .Blood Blood-Peripheral  Organism: Blood Culture PCR  Culture Results:   Growth in anaerobic bottle: Gram positive cocci in pairs   Direct identification is available within approximately 3-5   hours either by Blood Panel Multiplexed PCR or Direct   MALDI-TOF. Details: [email or web address]  Organism Identification: Blood Culture PCR  Method Type: PCR      Culture - Blood (04.12.24 @ 17:50)   Gram Stain:   Growth in aerobic bottle: Gram positive cocci in pairs  Specimen Source: .Blood Blood-Peripheral  Culture Results:   Growth in aerobic bottle: Gram positive cocci in pairs    Urine Microscopic-Add On (NC) (04.12.24 @ 18:13)   Red Blood Cell - Urine: 3 /HPF  White Blood Cell - Urine: 2 /HPF  Bacteria: Occasional /HPF    Respiratory Viral Panel with COVID-19 by OXANA (04.12.24 @ 17:50)   Rapid RVP Result: [place]  SARS-CoV-2: NotDetec:

## 2024-04-18 NOTE — PROGRESS NOTE ADULT - PROBLEM SELECTOR PLAN 3
H/o HD (T/T/S )  - Continue to avoid nephrotoxic drugs  - C/w HD   - Nephro-Dr. Art - S/p CT Chest/A/P showed scattered bilateral small pulmonary nodules measuring up to 7 mm in the right upper lobe are nonspecific and may represent infectious/inflammatory disease or metastatic disease. 3.1 x 2.1 cm cystic structure in the posterior mediastinum at the level of the leelee may represent a lymphatic structure, bronchogenic cyst, or foregut duplication cyst. No intra-abdominal collection.  - Pulm-Dr. Ghotra-Rec Follow up CT in 12 months

## 2024-04-18 NOTE — PROGRESS NOTE ADULT - PROBLEM SELECTOR PLAN 4
- S/p CT Chest/A/P showed scattered bilateral small pulmonary nodules measuring up to 7 mm in the right upper lobe are nonspecific and may represent infectious/inflammatory disease or metastatic disease. 3.1 x 2.1 cm cystic structure in the posterior mediastinum at the level of the leelee may represent a lymphatic structure, bronchogenic cyst, or foregut duplication cyst. No intra-abdominal collection.  - Pulm-Dr. Ghotra-Rec Follow up CT in 12 months H/o HD (T/Th/S )  - mrsa/mssa pcr negative   - C/w HD today   - Avoid Nephrotoxic Meds/ Agents such as (NSAIDs, IV contrast, Aminoglycosides such as gentamicin, Gadolinium contrast, Phosphate containing enemas, etc..)  - monitor BMP, phos serum   - CHG bathing daily  - Nephro-Dr. Art following

## 2024-04-18 NOTE — PROGRESS NOTE ADULT - SUBJECTIVE AND OBJECTIVE BOX
Date of Service 04-18-24 @ 09:38    CHIEF COMPLAINT:Patient is a 78y old  Male who presents with a chief complaint of Sepsis.Pt appears comfortable.    	  REVIEW OF SYSTEMS:  CONSTITUTIONAL: No fever, weight loss, or fatigue  EYES: No eye pain, visual disturbances, or discharge  ENT:  No difficulty hearing, tinnitus, vertigo; No sinus or throat pain  NECK: No pain or stiffness  RESPIRATORY: No cough, wheezing, chills or hemoptysis; No Shortness of Breath  CARDIOVASCULAR: No chest pain, palpitations, passing out, dizziness, or leg swelling  GASTROINTESTINAL: No abdominal or epigastric pain. No nausea, vomiting, or hematemesis; No diarrhea or constipation. No melena or hematochezia.  GENITOURINARY: No dysuria, frequency, hematuria, or incontinence  NEUROLOGICAL: No headaches, memory loss, loss of strength, numbness, or tremors  SKIN: No itching, burning, rashes, or lesions   LYMPH Nodes: No enlarged glands  ENDOCRINE: No heat or cold intolerance; No hair loss  MUSCULOSKELETAL: No joint pain or swelling; No muscle, back, or extremity pain  PSYCHIATRIC: No depression, anxiety, mood swings, or difficulty sleeping  HEME/LYMPH: No easy bruising, or bleeding gums  ALLERGY AND IMMUNOLOGIC: No hives or eczema	    PHYSICAL EXAM:  T(C): 36.5 (04-18-24 @ 07:51), Max: 36.5 (04-18-24 @ 07:51)  HR: 53 (04-18-24 @ 07:51) (53 - 63)  BP: 133/65 (04-18-24 @ 07:51) (132/73 - 165/76)  RR: 18 (04-18-24 @ 07:51) (17 - 18)  SpO2: 98% (04-18-24 @ 07:51) (97% - 98%)  Wt(kg): --  I&O's Summary    17 Apr 2024 07:01  -  18 Apr 2024 07:00  --------------------------------------------------------  IN: 0 mL / OUT: 500 mL / NET: -500 mL        Appearance: Normal	  HEENT:   Normal oral mucosa, PERRL, EOMI	  Lymphatic: No lymphadenopathy  Cardiovascular: Normal S1 S2, No JVD, No murmurs, No edema  Respiratory: Lungs clear to auscultation	  Psychiatry: A & O x 3, Mood & affect appropriate  Gastrointestinal:  Soft, Non-tender, + BS	  Skin: No rashes, No ecchymoses, No cyanosis	  Neurologic: Non-focal  Extremities: Normal range of motion, No clubbing, cyanosis or edema  Vascular: Peripheral pulses palpable 2+ bilaterally    MEDICATIONS  (STANDING):  atorvastatin 20 milliGRAM(s) Oral at bedtime  brimonidine 0.2% Ophthalmic Solution 1 Drop(s) Right EYE two times a day  carvedilol 12.5 milliGRAM(s) Oral every 12 hours  cefTRIAXone   IVPB 2000 milliGRAM(s) IV Intermittent every 24 hours  chlorhexidine 2% Cloths 1 Application(s) Topical <User Schedule>  ciprofloxacin  0.3% Ophthalmic Solution 1 Drop(s) Right EYE two times a day  dextrose 10% Bolus 125 milliLiter(s) IV Bolus once  dextrose 5%. 1000 milliLiter(s) (100 mL/Hr) IV Continuous <Continuous>  dextrose 5%. 1000 milliLiter(s) (50 mL/Hr) IV Continuous <Continuous>  dextrose 50% Injectable 25 Gram(s) IV Push once  dextrose 50% Injectable 12.5 Gram(s) IV Push once  dorzolamide 2% Ophthalmic Solution 1 Drop(s) Both EYES three times a day  glucagon  Injectable 1 milliGRAM(s) IntraMuscular once  hydrALAZINE 50 milliGRAM(s) Oral three times a day  insulin lispro (ADMELOG) corrective regimen sliding scale   SubCutaneous Before meals and at bedtime  ketorolac 0.5% Ophthalmic Solution 1 Drop(s) Right EYE two times a day  losartan 50 milliGRAM(s) Oral daily  minocycline IVPB 100 milliGRAM(s) IV Intermittent every 12 hours  minocycline IVPB      polyethylene glycol 3350 17 Gram(s) Oral daily  prednisoLONE acetate 1% Suspension 1 Drop(s) Right EYE every 4 hours  prednisoLONE acetate 1% Suspension 1 Drop(s) Right EYE <User Schedule>  senna 2 Tablet(s) Oral at bedtime  trimethoprim / sulfamethoxazole IVPB 240 milliGRAM(s) IV Intermittent every 24 hours      	  LABS:	 	                          11.7   7.63  )-----------( 153      ( 17 Apr 2024 10:00 )             35.2     04-17    136  |  102  |  64<H>  ----------------------------<  98  4.7   |  24  |  4.81<H>    Ca    8.7      17 Apr 2024 10:00  Phos  5.1     04-17  Mg     2.5     04-17    TPro  7.4  /  Alb  3.0<L>  /  TBili  0.3  /  DBili  x   /  AST  14  /  ALT  14  /  AlkPhos  144<H>  04-17

## 2024-04-18 NOTE — PROGRESS NOTE ADULT - ASSESSMENT
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 Patient AAOx3 at bedside and mentioned having intermittent, mild, suprapubic discomfort associated w/ dysuria that has been going on for the last 48 hours. Patient was admitted to [facility] on 11/24/2023 and discharge on 12/08/23 where he was treated for sepsis. His perm cath was exchanged on 11/223. Found to have MSSA bacteremia and transitioned from Vancomycin to Cefazolin. 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 Underwent right Permcath placement by IR on 12/4/23. On admission, he found to have fever, tachypnea but negative Urine analysis and negative chest CT. He has started on Cefepime and IV  Vancomycin, and the ID consult requested to assist with further evaluation and antibiotic management.    # Sepsis ( Fever + tachypnea)- Suspected Line sepsis  # Streptococcal Bacteremia- 4/12/24- in the setting of Perm-a cath - the blood cx form catheter as of 4/13 NGTD- Renal team likes to keep the Perm-a-cath - TTE from 4/15 shows no vegetation  # Catheter site culture grew Stenotrophomonas - s/p removal of Perm-a-cath on 4/18/24    would recommend:    1. Follow up Repeat Blood cultures form 4/17 to document clearing the blood stream  2. Continue IV Bactrim and Minocycline to cover Stenotrophomonas  3. URVASHI to rule out vegetation  4. Continue Ceftriaxone 2 g daily to cover Streptococcus  5. OOB to chair     d/w covering NPMichael    Attending Attestation:    Spent more than 35 minutes on total encounter, more than 50 % of the visit was spent counseling and/or coordinating care by the Attending physician.

## 2024-04-18 NOTE — PROGRESS NOTE ADULT - SUBJECTIVE AND OBJECTIVE BOX
Patient is a 78y old  Male who presents with a chief complaint of Sepsis (18 Apr 2024 09:41)  Awake, alert, comfortable in bed in NAD. For HD today    INTERVAL HPI/OVERNIGHT EVENTS:      VITAL SIGNS:  T(F): 97.7 (04-18-24 @ 07:51)  HR: 53 (04-18-24 @ 07:51)  BP: 133/65 (04-18-24 @ 07:51)  RR: 18 (04-18-24 @ 07:51)  SpO2: 98% (04-18-24 @ 07:51)  Wt(kg): --  I&O's Detail    17 Apr 2024 07:01  -  18 Apr 2024 07:00  --------------------------------------------------------  IN:  Total IN: 0 mL    OUT:    Blood Loss (mL): 500 mL  Total OUT: 500 mL    Total NET: -500 mL              REVIEW OF SYSTEMS:    CONSTITUTIONAL:  No fevers, chills, sweats    HEENT:  Eyes:  No diplopia or blurred vision. ENT:  No earache, sore throat or runny nose.    CARDIOVASCULAR:  No pressure, squeezing, tightness, or heaviness about the chest; no palpitations.    RESPIRATORY:  Per HPI    GASTROINTESTINAL:  No abdominal pain, nausea, vomiting or diarrhea.    GENITOURINARY:  No dysuria, frequency or urgency.    NEUROLOGIC:  No paresthesias, fasciculations, seizures or weakness.    PSYCHIATRIC:  No disorder of thought or mood.      PHYSICAL EXAM:    Constitutional: Well developed and nourished  Eyes:Perrla  ENMT: normal  Neck:supple  Respiratory: good air entry  Cardiovascular: S1 S2 regular  Gastrointestinal: Soft, Non tender  Extremities: No edema  Vascular:normal  Neurological:Awake, alert,Ox3  Musculoskeletal:Normal      MEDICATIONS  (STANDING):  atorvastatin 20 milliGRAM(s) Oral at bedtime  brimonidine 0.2% Ophthalmic Solution 1 Drop(s) Right EYE two times a day  carvedilol 12.5 milliGRAM(s) Oral every 12 hours  cefTRIAXone   IVPB 2000 milliGRAM(s) IV Intermittent every 24 hours  chlorhexidine 2% Cloths 1 Application(s) Topical <User Schedule>  ciprofloxacin  0.3% Ophthalmic Solution 1 Drop(s) Right EYE two times a day  dextrose 10% Bolus 125 milliLiter(s) IV Bolus once  dextrose 5%. 1000 milliLiter(s) (50 mL/Hr) IV Continuous <Continuous>  dextrose 5%. 1000 milliLiter(s) (100 mL/Hr) IV Continuous <Continuous>  dextrose 50% Injectable 25 Gram(s) IV Push once  dextrose 50% Injectable 12.5 Gram(s) IV Push once  dorzolamide 2% Ophthalmic Solution 1 Drop(s) Both EYES three times a day  glucagon  Injectable 1 milliGRAM(s) IntraMuscular once  hydrALAZINE 50 milliGRAM(s) Oral three times a day  insulin lispro (ADMELOG) corrective regimen sliding scale   SubCutaneous Before meals and at bedtime  ketorolac 0.5% Ophthalmic Solution 1 Drop(s) Right EYE two times a day  losartan 50 milliGRAM(s) Oral daily  minocycline IVPB 100 milliGRAM(s) IV Intermittent every 12 hours  minocycline IVPB      polyethylene glycol 3350 17 Gram(s) Oral daily  prednisoLONE acetate 1% Suspension 1 Drop(s) Right EYE every 4 hours  prednisoLONE acetate 1% Suspension 1 Drop(s) Right EYE <User Schedule>  senna 2 Tablet(s) Oral at bedtime  trimethoprim / sulfamethoxazole IVPB 240 milliGRAM(s) IV Intermittent every 24 hours    MEDICATIONS  (PRN):  acetaminophen     Tablet .. 650 milliGRAM(s) Oral every 6 hours PRN Temp greater or equal to 38C (100.4F), Mild Pain (1 - 3)  dextrose Oral Gel 15 Gram(s) Oral once PRN Blood Glucose LESS THAN 70 milliGRAM(s)/deciliter      Allergies    No Known Allergies    Intolerances        LABS:                        11.7   7.63  )-----------( 153      ( 17 Apr 2024 10:00 )             35.2     04-17    136  |  102  |  64<H>  ----------------------------<  98  4.7   |  24  |  4.81<H>    Ca    8.7      17 Apr 2024 10:00  Phos  5.1     04-17  Mg     2.5     04-17    TPro  7.4  /  Alb  3.0<L>  /  TBili  0.3  /  DBili  x   /  AST  14  /  ALT  14  /  AlkPhos  144<H>  04-17      Urinalysis Basic - ( 17 Apr 2024 10:00 )    Color: x / Appearance: x / SG: x / pH: x  Gluc: 98 mg/dL / Ketone: x  / Bili: x / Urobili: x   Blood: x / Protein: x / Nitrite: x   Leuk Esterase: x / RBC: x / WBC x   Sq Epi: x / Non Sq Epi: x / Bacteria: x    Culture - Blood (04.13.24 @ 17:00)   Specimen Source: .Blood Dialysis Catheter  Culture Results:   No growth at 4 daysCulture - Other (04.13.24 @ 09:39)   - Minocycline: S  - Levofloxacin: S <=0.5  - Trimethoprim/Sulfamethoxazole: S <=0.5/9.5  Specimen Source: Cath Site Catheter Site  Culture Results:   Rare Stenotrophomonas maltophilia  Organism Identification: Stenotrophomonas maltophilia  Organism: Stenotrophomonas maltophilia  Organism: Stenotrophomonas maltophilia  Method Type: LAWRENCE  Method Type: KBCulture - Urine (04.12.24 @ 18:13)   Specimen Source: Clean Catch Clean Catch (Midstream)  Culture Results:   <10,000 CFU/mL Normal Urogenital FloraCulture - Blood (04.12.24 @ 18:00)   - Streptococcus sp. (Not Grp A, B or S pneumoniae): Detec  Gram Stain:   Growth in anaerobic bottle: Gram positive cocci in pairs  Specimen Source: .Blood Blood-Peripheral  Organism: Blood Culture PCR  Culture Results:   Growth in anaerobic bottle: Streptococcus salivarius/vestibularis group   "Susceptibilities not performed"   Direct identification is available within approximately 3-5   hours either by Blood Panel Multiplexed PCR or Direct   MALDI-TOF. Details: [email or web address]  Organism Identification: Blood Culture PCR  Method Type: PCRCulture - Blood (04.12.24 @ 17:50)   Gram Stain:   Growth in aerobic bottle: Gram positive cocci in pairs   Growth in anaerobic bottle: Gram positive cocci in pairs  - Vancomycin: S 1  - Ceftriaxone: S <=0.25  - Penicillin: S 0.12  Specimen Source: .Blood Blood-Peripheral  Organism: Streptococcus salivarius/vestibularis group  Culture Results:   Growth in aerobic and anaerobic bottles: Streptococcus   salivarius/vestibularis group  Organism Identification: Streptococcus salivarius/vestibularis group  Method Type: LAWRENCE        CAPILLARY BLOOD GLUCOSE      POCT Blood Glucose.: 95 mg/dL (18 Apr 2024 08:16)  POCT Blood Glucose.: 127 mg/dL (17 Apr 2024 21:07)  POCT Blood Glucose.: 132 mg/dL (17 Apr 2024 16:53)  POCT Blood Glucose.: 96 mg/dL (17 Apr 2024 11:20)        RADIOLOGY & ADDITIONAL TESTS:    CXR:    Ct scan chest:    ekg;    echo:

## 2024-04-18 NOTE — PROGRESS NOTE ADULT - PROBLEM SELECTOR PLAN 5
- BP stable   - P/w HTN urgency   - C/w Losartan, Hydralazine, & Coreg   - CV- Ania - BP stable   - P/w HTN urgency   - C/w Losartan, Hydralazine, & Coreg   - Cards Nabatian following  - BP goal <140/90, controlled

## 2024-04-18 NOTE — PROGRESS NOTE ADULT - SUBJECTIVE AND OBJECTIVE BOX
Patient is a 78y old  Male who presents with a chief complaint of Sepsis (18 Apr 2024 13:17)    OVERNIGHT EVENTS: no acute changes.     Pt tolerated HD this AM.     REVIEW OF SYSTEMS:  CONSTITUTIONAL: No fever, chills  ENMT:  No difficulty hearing, no change in vision  NECK: No pain or stiffness  RESPIRATORY: No cough, SOB  CARDIOVASCULAR: No chest pain, palpitations  GASTROINTESTINAL: No abdominal pain. No nausea, vomiting, or diarrhea  GENITOURINARY: No dysuria  NEUROLOGICAL: No HA  SKIN: No itching, burning, rashes, or lesions   LYMPH NODES: No enlarged glands  ENDOCRINE: No heat or cold intolerance; No hair loss  MUSCULOSKELETAL: No joint pain or swelling; No muscle, back, or extremity pain  PSYCHIATRIC: No depression, anxiety  HEME/LYMPH: No easy bruising, or bleeding gums    T(C): 36.6 (04-18-24 @ 13:39), Max: 36.6 (04-18-24 @ 10:07)  HR: 56 (04-18-24 @ 13:39) (50 - 63)  BP: 137/68 (04-18-24 @ 13:39) (121/64 - 155/72)  RR: 17 (04-18-24 @ 13:39) (17 - 18)  SpO2: 100% (04-18-24 @ 13:39) (97% - 100%)  Wt(kg): --Vital Signs Last 24 Hrs  T(C): 36.6 (18 Apr 2024 13:39), Max: 36.6 (18 Apr 2024 10:07)  T(F): 97.9 (18 Apr 2024 13:39), Max: 97.9 (18 Apr 2024 13:39)  HR: 56 (18 Apr 2024 13:39) (50 - 63)  BP: 137/68 (18 Apr 2024 13:39) (121/64 - 155/72)  BP(mean): --  RR: 17 (18 Apr 2024 13:39) (17 - 18)  SpO2: 100% (18 Apr 2024 13:39) (97% - 100%)    Parameters below as of 18 Apr 2024 13:39  Patient On (Oxygen Delivery Method): room air        MEDICATIONS  (STANDING):  atorvastatin 20 milliGRAM(s) Oral at bedtime  brimonidine 0.2% Ophthalmic Solution 1 Drop(s) Right EYE two times a day  carvedilol 12.5 milliGRAM(s) Oral every 12 hours  cefTRIAXone   IVPB 2000 milliGRAM(s) IV Intermittent every 24 hours  chlorhexidine 2% Cloths 1 Application(s) Topical <User Schedule>  ciprofloxacin  0.3% Ophthalmic Solution 1 Drop(s) Right EYE two times a day  dextrose 10% Bolus 125 milliLiter(s) IV Bolus once  dextrose 5%. 1000 milliLiter(s) (100 mL/Hr) IV Continuous <Continuous>  dextrose 5%. 1000 milliLiter(s) (50 mL/Hr) IV Continuous <Continuous>  dextrose 50% Injectable 25 Gram(s) IV Push once  dextrose 50% Injectable 12.5 Gram(s) IV Push once  dorzolamide 2% Ophthalmic Solution 1 Drop(s) Both EYES three times a day  glucagon  Injectable 1 milliGRAM(s) IntraMuscular once  hydrALAZINE 50 milliGRAM(s) Oral three times a day  insulin lispro (ADMELOG) corrective regimen sliding scale   SubCutaneous Before meals and at bedtime  ketorolac 0.5% Ophthalmic Solution 1 Drop(s) Right EYE two times a day  losartan 50 milliGRAM(s) Oral daily  minocycline IVPB      minocycline IVPB 100 milliGRAM(s) IV Intermittent every 12 hours  polyethylene glycol 3350 17 Gram(s) Oral daily  prednisoLONE acetate 1% Suspension 1 Drop(s) Right EYE <User Schedule>  prednisoLONE acetate 1% Suspension 1 Drop(s) Right EYE every 4 hours  senna 2 Tablet(s) Oral at bedtime  trimethoprim / sulfamethoxazole IVPB 240 milliGRAM(s) IV Intermittent every 24 hours    MEDICATIONS  (PRN):  acetaminophen     Tablet .. 650 milliGRAM(s) Oral every 6 hours PRN Temp greater or equal to 38C (100.4F), Mild Pain (1 - 3)  dextrose Oral Gel 15 Gram(s) Oral once PRN Blood Glucose LESS THAN 70 milliGRAM(s)/deciliter      PHYSICAL EXAM:  GENERAL: NAD  EYES: clear conjunctiva  ENMT: Moist mucous membranes  NECK: Supple, No JVD, Normal thyroid  CHEST/LUNG: +right chest permacath. Clear to auscultation bilaterally; No rales, rhonchi, wheezing, or rubs  HEART: S1, S2, Regular rate and rhythm  ABDOMEN: Soft, Nontender, Nondistended; Bowel sounds present  NEURO: Alert & Oriented X3  EXTREMITIES: No LE edema, no calf tenderness  LYMPH: No lymphadenopathy noted  SKIN: No rashes or lesions    Consultant(s) Notes Reviewed:  [x ] YES  [ ] NO  Care Discussed with Consultants/Other Providers [ x] YES  [ ] NO    LABS:                        10.7   7.72  )-----------( 151      ( 18 Apr 2024 10:00 )             32.1     04-18    135  |  105  |  76<H>  ----------------------------<  165<H>  4.6   |  22  |  5.36<H>    Ca    8.2<L>      18 Apr 2024 10:00  Phos  6.1     04-18  Mg     2.5     04-18    TPro  7.4  /  Alb  3.0<L>  /  TBili  0.3  /  DBili  x   /  AST  14  /  ALT  14  /  AlkPhos  144<H>  04-17    PT/INR - ( 18 Apr 2024 10:40 )   PT: 14.1 sec;   INR: 1.24 ratio           CAPILLARY BLOOD GLUCOSE      POCT Blood Glucose.: 110 mg/dL (18 Apr 2024 11:32)  POCT Blood Glucose.: 95 mg/dL (18 Apr 2024 08:16)  POCT Blood Glucose.: 127 mg/dL (17 Apr 2024 21:07)  POCT Blood Glucose.: 132 mg/dL (17 Apr 2024 16:53)        Urinalysis Basic - ( 18 Apr 2024 10:00 )    Color: x / Appearance: x / SG: x / pH: x  Gluc: 165 mg/dL / Ketone: x  / Bili: x / Urobili: x   Blood: x / Protein: x / Nitrite: x   Leuk Esterase: x / RBC: x / WBC x   Sq Epi: x / Non Sq Epi: x / Bacteria: x        RADIOLOGY & ADDITIONAL TESTS:  < from: CT Chest No Cont (04.12.24 @ 18:53) >    ACC: [id number] EXAM:  CT ABDOMEN AND PELVIS   ORDERED BY: DEANGELO MOLINA     ACC: [id number] EXAM:  CT CHEST   ORDERED BY: DEANGELO MOLINA     PROCEDURE DATE:  04/12/2024          INTERPRETATION:  CLINICAL INFORMATION: Fever and altered mental statuson   hemodialysis, abdominal pain    COMPARISON: None.    CONTRAST/COMPLICATIONS:  IV Contrast: None  Oral Contrast: None  Complications: None    PROCEDURE:  CT of the Chest, Abdomen and Pelvis was performed.  Sagittal and coronal reformats were performed.    FINDINGS:  CHEST:  LUNGS AND LARGE AIRWAYS: The central airways are patent. Scattered   bilateral small pulmonary nodules measuring up to 7 mm in the right upper   lobe are nonspecific. No acute consolidation.  PLEURA: Trace effusions.  VESSELS: Aortic atherosclerosis without aneurysm. Right IJ dialysis line   tip in the right atrium.  HEART: Mild cardiomegaly. No pericardial effusion. Coronary, mitral   annular, and aortic valve calcification.  MEDIASTINUM AND CHRISTIANO: No adenopathy. 3.1 x2.1 cm cystic structure in the   posterior mediastinum at the level of the leelee may represent a   lymphatic structure, bronchogenic cyst, or foregut duplication cyst.  CHEST WALL AND LOWER NECK: No masses.    ABDOMEN AND PELVIS:  LIVER: Normal.  BILE DUCTS: Nondilated.  GALLBLADDER: Gallstones.  SPLEEN: Normal.  PANCREAS: Normal.  ADRENALS: Normal.  KIDNEYS/URETERS: No hydronephrosis or urinary tract calculi.    BLADDER: Normal.  REPRODUCTIVE ORGANS: Enlarged prostate.    BOWEL: No bowel-related abnormality. No bowel obstruction or bowel   inflammation. Normal appendix and ileocecal region. No evidence of active   colitis or diverticulitis.  PERITONEUM: No free air or ascites. No collection.  VESSELS: Aortoiliac atherosclerosis without aneurysm.  RETROPERITONEUM/LYMPH NODES: No adenopathy or hematoma.  ABDOMINAL WALL: Normal.  BONES: No aggressive lesion.    IMPRESSION:  *  No acute septic pathology.  *  Scattered bilateral small pulmonary nodules measuring up to 7 mm in   the right upper lobe are nonspecific and may represent   infectious/inflammatory disease or metastatic disease.  *  3.1 x 2.1 cm cystic structure in the posterior mediastinum at the   level of the leelee may represent a lymphatic structure, bronchogenic   cyst, orforegut duplication cyst.  *  No intra-abdominal collection.      --- End of Report ---            BAILEE WATTERS MD; Attending Radiologist  This document has been electronically signed. Apr 12 2024  7:53PM    < end of copied text >    Imaging Personally Reviewed:  [ ] YES  [ ] NO

## 2024-04-18 NOTE — PROGRESS NOTE ADULT - SUBJECTIVE AND OBJECTIVE BOX
Problem List:  ESRD    PAST MEDICAL & SURGICAL HISTORY:  DM (diabetes mellitus)      HTN (hypertension)      Chronic kidney disease      HLD (hyperlipidemia)      Glaucoma      Gout      No significant past surgical history          No Known Allergies      MEDICATIONS  (STANDING):  atorvastatin 20 milliGRAM(s) Oral at bedtime  brimonidine 0.2% Ophthalmic Solution 1 Drop(s) Right EYE two times a day  carvedilol 12.5 milliGRAM(s) Oral every 12 hours  cefTRIAXone   IVPB 2000 milliGRAM(s) IV Intermittent every 24 hours  chlorhexidine 2% Cloths 1 Application(s) Topical <User Schedule>  ciprofloxacin  0.3% Ophthalmic Solution 1 Drop(s) Right EYE two times a day  dextrose 10% Bolus 125 milliLiter(s) IV Bolus once  dextrose 5%. 1000 milliLiter(s) (50 mL/Hr) IV Continuous <Continuous>  dextrose 5%. 1000 milliLiter(s) (100 mL/Hr) IV Continuous <Continuous>  dextrose 50% Injectable 25 Gram(s) IV Push once  dextrose 50% Injectable 12.5 Gram(s) IV Push once  dorzolamide 2% Ophthalmic Solution 1 Drop(s) Both EYES three times a day  glucagon  Injectable 1 milliGRAM(s) IntraMuscular once  hydrALAZINE 50 milliGRAM(s) Oral three times a day  insulin lispro (ADMELOG) corrective regimen sliding scale   SubCutaneous Before meals and at bedtime  ketorolac 0.5% Ophthalmic Solution 1 Drop(s) Right EYE two times a day  losartan 50 milliGRAM(s) Oral daily  minocycline IVPB      minocycline IVPB 100 milliGRAM(s) IV Intermittent every 12 hours  polyethylene glycol 3350 17 Gram(s) Oral daily  prednisoLONE acetate 1% Suspension 1 Drop(s) Right EYE <User Schedule>  prednisoLONE acetate 1% Suspension 1 Drop(s) Right EYE every 4 hours  senna 2 Tablet(s) Oral at bedtime  trimethoprim / sulfamethoxazole IVPB 240 milliGRAM(s) IV Intermittent every 24 hours    MEDICATIONS  (PRN):  acetaminophen     Tablet .. 650 milliGRAM(s) Oral every 6 hours PRN Temp greater or equal to 38C (100.4F), Mild Pain (1 - 3)  dextrose Oral Gel 15 Gram(s) Oral once PRN Blood Glucose LESS THAN 70 milliGRAM(s)/deciliter                            10.7   7.72  )-----------( 151      ( 18 Apr 2024 10:00 )             32.1     04-18    135  |  105  |  76<H>  ----------------------------<  165<H>  4.6   |  22  |  5.36<H>    Ca    8.2<L>      18 Apr 2024 10:00  Phos  6.1     04-18  Mg     2.5     04-18    TPro  7.4  /  Alb  3.0<L>  /  TBili  0.3  /  DBili  x   /  AST  14  /  ALT  14  /  AlkPhos  144<H>  04-17    PT/INR - ( 18 Apr 2024 10:40 )   PT: 14.1 sec;   INR: 1.24 ratio                 REVIEW OF SYSTEMS:  General: no fever no chills, no weight loss.    RESPIRATORY: No cough, wheezing, hemoptysis; No shortness of breath  CARDIOVASCULAR: No chest pain or palpitations. No Edema  GASTROINTESTINAL: No abdominal or epigastric pain. No nausea, vomiting. No diarrhea or constipation. No melena.  GENITOURINARY: No dysuria, frequency, foamy urine, urinary urgency, incontinence or hematuria  NEUROLOGICAL: No numbness or weakness, no tremor , no dizziness.   Muscle skeletal : no joint pain and no swelling of joints and limbs.  SKIN: No itching, burning, rashes.        VITALS:  T(F): 98.1 (04-18-24 @ 16:15), Max: 98.1 (04-18-24 @ 16:15)  HR: 60 (04-18-24 @ 16:15)  BP: 142/71 (04-18-24 @ 16:15)  RR: 18 (04-18-24 @ 16:15)  SpO2: 100% (04-18-24 @ 16:15)  Wt(kg): --    04-17 @ 07:01  -  04-18 @ 07:00  --------------------------------------------------------  IN: 0 mL / OUT: 500 mL / NET: -500 mL    04-18 @ 07:01  -  04-18 @ 17:41  --------------------------------------------------------  IN: 600 mL / OUT: 1550 mL / NET: -950 mL        PHYSICAL EXAM:  Constitutional: well developed, no diaphoresis, no distress.  Neck: No JVD, no carotid bruit, supple, no adenopathy  Respiratory: air entrance B/L, no wheezes, rales or rhonchi  Cardiovascular: S1, S2, RRR, no pericardial rub, no murmur  Abdomen: BS+, soft, no tenderness, no bruit  Pelvis: bladder nondistended  Extremities: No cyanosis or clubbing. No peripheral edema.

## 2024-04-18 NOTE — PROGRESS NOTE ADULT - ASSESSMENT
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  1.Strep bactermia,.Abx as per ID.URVASHI no vegetation  2.ESRD-HD as per renal.  3.HTN-cont bp medication.  4.DM-Insulin.  5.Lipid d/o-statin.  6.PC to be exchanged.  7.GI and DVT prophylaxis.  .

## 2024-04-18 NOTE — PROGRESS NOTE ADULT - SUBJECTIVE AND OBJECTIVE BOX
Patient is a 78y old  Male who presents with a chief complaint of Sepsis (17 Apr 2024 14:29)    pt seen in tele [ x ], reg med floor [   ], bed [x  ], chair at bedside [   ], a+o x3 [ x ], lethargic [  ],    nad [ x ]        Allergies    No Known Allergies        Vitals    T(F): 97.7 (04-18-24 @ 07:51), Max: 97.7 (04-18-24 @ 07:51)  HR: 53 (04-18-24 @ 07:51) (53 - 63)  BP: 133/65 (04-18-24 @ 07:51) (132/73 - 165/76)  RR: 18 (04-18-24 @ 07:51) (17 - 18)  SpO2: 98% (04-18-24 @ 07:51) (97% - 98%)  Wt(kg): --  CAPILLARY BLOOD GLUCOSE      POCT Blood Glucose.: 95 mg/dL (18 Apr 2024 08:16)      Labs                          11.7   7.63  )-----------( 153      ( 17 Apr 2024 10:00 )             35.2       04-17    136  |  102  |  64<H>  ----------------------------<  98  4.7   |  24  |  4.81<H>    Ca    8.7      17 Apr 2024 10:00  Phos  5.1     04-17  Mg     2.5     04-17    TPro  7.4  /  Alb  3.0<L>  /  TBili  0.3  /  DBili  x   /  AST  14  /  ALT  14  /  AlkPhos  144<H>  04-17            .Blood Dialysis Catheter  04-13 @ 17:00   No growth at 4 days  --  --      Cath Site Catheter Site  04-13 @ 09:39   Rare Stenotrophomonas maltophilia  --  Stenotrophomonas maltophilia      Clean Catch Clean Catch (Midstream)  04-12 @ 18:13   <10,000 CFU/mL Normal Urogenital Kavita  --  --      .Blood Blood-Peripheral  04-12 @ 18:00   Growth in anaerobic bottle: Streptococcus salivarius/vestibularis group  "Susceptibilities not performed"  Direct identification is available within approximately 3-5  hours either by Blood Panel Multiplexed PCR or Direct  MALDI-TOF. Details: [email or web address]  --  Blood Culture PCR      .Blood Blood-Peripheral  04-12 @ 17:50   Growth in aerobic and anaerobic bottles: Streptococcus  salivarius/vestibularis group  --  Streptococcus salivarius/vestibularis group          Radiology Results      Meds    MEDICATIONS  (STANDING):  atorvastatin 20 milliGRAM(s) Oral at bedtime  brimonidine 0.2% Ophthalmic Solution 1 Drop(s) Right EYE two times a day  carvedilol 12.5 milliGRAM(s) Oral every 12 hours  cefTRIAXone   IVPB 2000 milliGRAM(s) IV Intermittent every 24 hours  chlorhexidine 2% Cloths 1 Application(s) Topical <User Schedule>  ciprofloxacin  0.3% Ophthalmic Solution 1 Drop(s) Right EYE two times a day  dextrose 10% Bolus 125 milliLiter(s) IV Bolus once  dextrose 5%. 1000 milliLiter(s) (100 mL/Hr) IV Continuous <Continuous>  dextrose 5%. 1000 milliLiter(s) (50 mL/Hr) IV Continuous <Continuous>  dextrose 50% Injectable 25 Gram(s) IV Push once  dextrose 50% Injectable 12.5 Gram(s) IV Push once  dorzolamide 2% Ophthalmic Solution 1 Drop(s) Both EYES three times a day  glucagon  Injectable 1 milliGRAM(s) IntraMuscular once  hydrALAZINE 50 milliGRAM(s) Oral three times a day  insulin lispro (ADMELOG) corrective regimen sliding scale   SubCutaneous Before meals and at bedtime  ketorolac 0.5% Ophthalmic Solution 1 Drop(s) Right EYE two times a day  losartan 50 milliGRAM(s) Oral daily  minocycline IVPB      minocycline IVPB 100 milliGRAM(s) IV Intermittent every 12 hours  polyethylene glycol 3350 17 Gram(s) Oral daily  prednisoLONE acetate 1% Suspension 1 Drop(s) Right EYE <User Schedule>  prednisoLONE acetate 1% Suspension 1 Drop(s) Right EYE every 4 hours  senna 2 Tablet(s) Oral at bedtime  trimethoprim / sulfamethoxazole IVPB 240 milliGRAM(s) IV Intermittent every 24 hours      MEDICATIONS  (PRN):  acetaminophen     Tablet .. 650 milliGRAM(s) Oral every 6 hours PRN Temp greater or equal to 38C (100.4F), Mild Pain (1 - 3)  dextrose Oral Gel 15 Gram(s) Oral once PRN Blood Glucose LESS THAN 70 milliGRAM(s)/deciliter      Physical Exam    Neuro :  no focal deficits  Respiratory: CTA B/L  CV: RRR, S1S2, no murmurs,   Abdominal: Soft, NT, ND +BS,  Extremities: No edema, + peripheral pulses      ASSESSMENT      uncontrolled htn,   abnormal trop r/o acs,   poss 2nd to demand ischemia,    abd pain poss 2nd to constipation,    fever poss 2nd to line sepsis   bacteremia   pulmonary nodules,    bronchogenic cyst, or foregut duplication cyst,   hyperkalemia  h/o sinus node dysfunction,   chronic HFpEF,   aortic stenosis,   HTN,   HLD,   b/l carotid stenosis,   DM (not on meds),   ESRD on dialysis (Tue, Thu, Sat),   Gout,    glaucoma,   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  cx and sens fr hd cath site with Stenotrophomonas maltophilia noted above   blood cx cath neg noted above  id f/u    TTE with ejection fraction visually estimated at 50 to 55 %. mild (grade 1) left ventricular diastolic dysfunction.   No vegetations seen on this study, consider URVASHI for further evaluation if clinically indicated noted    URVASHI with No thrombus seen in the left atrial, left atrial appendage, right atrium.   Agitated saline injection was negative for intracardiac shunt.   No vegetations seen on this study noted    ir to remove the Perm-a-catheter  Added IV Bactrim and Minocycline to cover Stenotrophomonas  Continue Ceftriaxone 2 g daily to cover Streptococcus  f/u Repeat Blood cultures X 2 to document clearing the blood stream  procalcitonin 0.6 noted    quantiferon tb gold test neg noted    pulm f/u    Follow up CT in 12 months  Nodify testing as OP.  pt on hd t,th,s   renal f/u   K improved after dialysis,   follow 2 gm K diet.   Malfunction catheter max  ml/minute 4/16/24,   need to removal and placement of new catheter  AVG placement after discharge at [facility] [place].   hgba1c 6.4 noted    lispro ss   cont current meds     Patient is a 78y old  Male who presents with a chief complaint of Sepsis (17 Apr 2024 14:29)    pt seen in tele [ x ], reg med floor [   ], bed [x  ], chair at bedside [   ], a+o x3 [ x ], lethargic [  ],    nad [ x ]        Allergies    No Known Allergies        Vitals    T(F): 97.7 (04-18-24 @ 07:51), Max: 97.7 (04-18-24 @ 07:51)  HR: 53 (04-18-24 @ 07:51) (53 - 63)  BP: 133/65 (04-18-24 @ 07:51) (132/73 - 165/76)  RR: 18 (04-18-24 @ 07:51) (17 - 18)  SpO2: 98% (04-18-24 @ 07:51) (97% - 98%)  Wt(kg): --  CAPILLARY BLOOD GLUCOSE      POCT Blood Glucose.: 95 mg/dL (18 Apr 2024 08:16)      Labs                          11.7   7.63  )-----------( 153      ( 17 Apr 2024 10:00 )             35.2       04-17    136  |  102  |  64<H>  ----------------------------<  98  4.7   |  24  |  4.81<H>    Ca    8.7      17 Apr 2024 10:00  Phos  5.1     04-17  Mg     2.5     04-17    TPro  7.4  /  Alb  3.0<L>  /  TBili  0.3  /  DBili  x   /  AST  14  /  ALT  14  /  AlkPhos  144<H>  04-17            .Blood Dialysis Catheter  04-13 @ 17:00   No growth at 4 days  --  --      Cath Site Catheter Site  04-13 @ 09:39   Rare Stenotrophomonas maltophilia  --  Stenotrophomonas maltophilia      Clean Catch Clean Catch (Midstream)  04-12 @ 18:13   <10,000 CFU/mL Normal Urogenital Kavita  --  --      .Blood Blood-Peripheral  04-12 @ 18:00   Growth in anaerobic bottle: Streptococcus salivarius/vestibularis group  "Susceptibilities not performed"  Direct identification is available within approximately 3-5  hours either by Blood Panel Multiplexed PCR or Direct  MALDI-TOF. Details: [email or web address]  --  Blood Culture PCR      .Blood Blood-Peripheral  04-12 @ 17:50   Growth in aerobic and anaerobic bottles: Streptococcus  salivarius/vestibularis group  --  Streptococcus salivarius/vestibularis group          Radiology Results      Meds    MEDICATIONS  (STANDING):  atorvastatin 20 milliGRAM(s) Oral at bedtime  brimonidine 0.2% Ophthalmic Solution 1 Drop(s) Right EYE two times a day  carvedilol 12.5 milliGRAM(s) Oral every 12 hours  cefTRIAXone   IVPB 2000 milliGRAM(s) IV Intermittent every 24 hours  chlorhexidine 2% Cloths 1 Application(s) Topical <User Schedule>  ciprofloxacin  0.3% Ophthalmic Solution 1 Drop(s) Right EYE two times a day  dextrose 10% Bolus 125 milliLiter(s) IV Bolus once  dextrose 5%. 1000 milliLiter(s) (100 mL/Hr) IV Continuous <Continuous>  dextrose 5%. 1000 milliLiter(s) (50 mL/Hr) IV Continuous <Continuous>  dextrose 50% Injectable 25 Gram(s) IV Push once  dextrose 50% Injectable 12.5 Gram(s) IV Push once  dorzolamide 2% Ophthalmic Solution 1 Drop(s) Both EYES three times a day  glucagon  Injectable 1 milliGRAM(s) IntraMuscular once  hydrALAZINE 50 milliGRAM(s) Oral three times a day  insulin lispro (ADMELOG) corrective regimen sliding scale   SubCutaneous Before meals and at bedtime  ketorolac 0.5% Ophthalmic Solution 1 Drop(s) Right EYE two times a day  losartan 50 milliGRAM(s) Oral daily  minocycline IVPB      minocycline IVPB 100 milliGRAM(s) IV Intermittent every 12 hours  polyethylene glycol 3350 17 Gram(s) Oral daily  prednisoLONE acetate 1% Suspension 1 Drop(s) Right EYE <User Schedule>  prednisoLONE acetate 1% Suspension 1 Drop(s) Right EYE every 4 hours  senna 2 Tablet(s) Oral at bedtime  trimethoprim / sulfamethoxazole IVPB 240 milliGRAM(s) IV Intermittent every 24 hours      MEDICATIONS  (PRN):  acetaminophen     Tablet .. 650 milliGRAM(s) Oral every 6 hours PRN Temp greater or equal to 38C (100.4F), Mild Pain (1 - 3)  dextrose Oral Gel 15 Gram(s) Oral once PRN Blood Glucose LESS THAN 70 milliGRAM(s)/deciliter      Physical Exam    Neuro :  no focal deficits  Respiratory: CTA B/L  CV: RRR, S1S2, no murmurs,   Abdominal: Soft, NT, ND +BS,  Extremities: No edema, + peripheral pulses      ASSESSMENT      uncontrolled htn,   abnormal trop r/o acs,   poss 2nd to demand ischemia,    abd pain poss 2nd to constipation,    fever poss 2nd to line sepsis   bacteremia   pulmonary nodules,    bronchogenic cyst, or foregut duplication cyst,   hyperkalemia  h/o sinus node dysfunction,   chronic HFpEF,   aortic stenosis,   HTN,   HLD,   b/l carotid stenosis,   DM (not on meds),   ESRD on dialysis (Tue, Thu, Sat),   Gout,    glaucoma,   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  cx and sens fr hd cath site with Stenotrophomonas maltophilia noted above   blood cx cath neg noted above  id f/u    TTE with ejection fraction visually estimated at 50 to 55 %. mild (grade 1) left ventricular diastolic dysfunction.   No vegetations seen on this study, consider URVASHI for further evaluation if clinically indicated noted    URVASHI with No thrombus seen in the left atrial, left atrial appendage, right atrium.   Agitated saline injection was negative for intracardiac shunt.   No vegetations seen on this study noted    ir to remove the Perm-a-catheter today   cont IV Bactrim and Minocycline to cover Stenotrophomonas  Continue Ceftriaxone 2 g daily to cover Streptococcus  f/u Repeat Blood cultures X 2 to document clearing the blood stream  procalcitonin 0.6 noted    quantiferon tb gold test neg noted    pulm f/u    Follow up CT in 12 months  Nodify testing as OP.  pt on hd t,th,s   renal f/u   K improved after dialysis,   follow 2 gm K diet.   Malfunction catheter max  ml/minute 4/16/24,   after removal pt will need placement of new catheter next week  AVG placement after discharge at [facility].   hgba1c 6.4 noted    lispro ss   cont current meds

## 2024-04-18 NOTE — PROGRESS NOTE ADULT - SUBJECTIVE AND OBJECTIVE BOX
[   ] ICU                                          [   ] CCU                                      [ X  ] Medical Floor    Patient is a 78 year old male with abdominal pain. GI consulted to evaluate.        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 Patient also c/o constipation but denies nausea, vomiting, hematemesis, hematochezia, melena, fever, chest pain, SOB, cough, hematuria, or diarrhea.      Today patient appears comfortable. No new complaints reported, No abdominal pain, N/V, hematemesis, hematochezia, melena, fever, chills, chest pain, SOB, cough or diarrhea reported.         PAST MEDICAL HISTORY     DM (diabetes mellitus)    HTN (hypertension)     Diabetes mellitus    ESRD on HD     Hyperlipidemia     Glaucoma    Gout        PAST SURGICAL HISTORY    No significant past surgical history        Allergies    No Known Allergies    Intolerances  None       SOCIAL HISTORY  Advanced Directives:       [X  ] Full Code       [  ] DNR  Marital Status:         [  ] M      [X  ] S      [  ] D       [  ] W  Children:       [ X ] Yes      [  ] No  Occupation:        [  ] Employed       [ X ] Unemployed       [  ] Retired  Diet:       [ X ] Regular       [  ] PEG feeding          [  ] NG tube feeding  Drug Use:           [ X ] Patient denied          [  ] Yes  Alcohol:           [X  ] No             [  ] Yes (socially)         [  ] Yes (chronic)  Tobacco:           [  ] Yes           [ X ] No    FAMILY HISTORY  [ X ] Heart Disease            [ X ] Diabetes             [ X ] HTN             [  ] Colon Cancer             [  ] Stomach Cancer              [  ] Pancreatic Cancer        VITALS  Vital Signs Last 24 Hrs  T(C): 36.5 (04-18-24 @ 07:51), Max: 36.5 (04-18-24 @ 07:51)  T(F): 97.7 (04-18-24 @ 07:51), Max: 97.7 (04-18-24 @ 07:51)  HR: 53 (04-18-24 @ 07:51) (53 - 63)  BP: 133/65 (04-18-24 @ 07:51) (132/73 - 165/76)   RR: 18 (04-18-24 @ 07:51) (17 - 18)  SpO2: 98% (04-18-24 @ 07:51) (97% - 98%)       MEDICATIONS  (STANDING):  atorvastatin 20 milliGRAM(s) Oral at bedtime  brimonidine 0.2% Ophthalmic Solution 1 Drop(s) Right EYE two times a day  carvedilol 12.5 milliGRAM(s) Oral every 12 hours  cefTRIAXone   IVPB 2000 milliGRAM(s) IV Intermittent every 24 hours  chlorhexidine 2% Cloths 1 Application(s) Topical <User Schedule>  ciprofloxacin  0.3% Ophthalmic Solution 1 Drop(s) Right EYE two times a day  dextrose 10% Bolus 125 milliLiter(s) IV Bolus once  dextrose 5%. 1000 milliLiter(s) (50 mL/Hr) IV Continuous <Continuous>  dextrose 5%. 1000 milliLiter(s) (100 mL/Hr) IV Continuous <Continuous>  dextrose 50% Injectable 25 Gram(s) IV Push once  dextrose 50% Injectable 12.5 Gram(s) IV Push once  dorzolamide 2% Ophthalmic Solution 1 Drop(s) Both EYES three times a day  glucagon  Injectable 1 milliGRAM(s) IntraMuscular once  hydrALAZINE 50 milliGRAM(s) Oral three times a day  insulin lispro (ADMELOG) corrective regimen sliding scale   SubCutaneous Before meals and at bedtime  ketorolac 0.5% Ophthalmic Solution 1 Drop(s) Right EYE two times a day  losartan 50 milliGRAM(s) Oral daily  minocycline IVPB      minocycline IVPB 100 milliGRAM(s) IV Intermittent every 12 hours  polyethylene glycol 3350 17 Gram(s) Oral daily  prednisoLONE acetate 1% Suspension 1 Drop(s) Right EYE every 4 hours  prednisoLONE acetate 1% Suspension 1 Drop(s) Right EYE <User Schedule>  senna 2 Tablet(s) Oral at bedtime  trimethoprim / sulfamethoxazole IVPB 240 milliGRAM(s) IV Intermittent every 24 hours    MEDICATIONS  (PRN):  acetaminophen     Tablet .. 650 milliGRAM(s) Oral every 6 hours PRN Temp greater or equal to 38C (100.4F), Mild Pain (1 - 3)  dextrose Oral Gel 15 Gram(s) Oral once PRN Blood Glucose LESS THAN 70 milliGRAM(s)/deciliter                            11.7   7.63  )-----------( 153      ( 17 Apr 2024 10:00 )             35.2       04-17    136  |  102  |  64<H>  ----------------------------<  98  4.7   |  24  |  4.81<H>    Ca    8.7      17 Apr 2024 10:00  Phos  5.1     04-17  Mg     2.5     04-17    TPro  7.4  /  Alb  3.0<L>  /  TBili  0.3  /  DBili  x   /  AST  14  /  ALT  14  /  AlkPhos  144<H>  04-17

## 2024-04-18 NOTE — PROGRESS NOTE ADULT - PROBLEM SELECTOR PLAN 2
- P/w Elevated trops   - S/p EKG   - Likely ischemic demand in s/o sepsis  - S/p Telemetry as discussed w/ CV   - S/p Echo wnl  - 4/17 URVASHI pending-f/u results   - CV-Dr. Jorge - P/w Elevated trops   - S/p EKG   - Likely ischemic demand in s/o sepsis  - S/p Echo wnl  - s/p URVASHI on 4/17 no vegetations   - dc telemetry  - CV-Dr. Jorge

## 2024-04-18 NOTE — PROGRESS NOTE ADULT - PROBLEM SELECTOR PLAN 1
H/o MSSA infection-Pt admitted to [facility] on 11/24/2023 and discharged on 12/08/23.  His permacath was exchanged on Friday 11/25/23. Treated w/ Cefazolin.   - P/w Sepsis & permacath possible source    - Afebrile overnight   - S/P (-) UA & RVP    - S/p CT chest noted below   - 4/12 (+) Bld cx  - 4/13 HD Bld cx negative. 4/13 HD cath site bld cx (+) Stenotrophomonas  - Repeat bld cx's pending-f/u results   - Quant Tb negative  - S/p Vancomycin      - Currently on Ceftriaxone, Minocycline & Bactrim  - ID-Dr. Cui    - Discussed w/ IR permacath exchange.  IR requested CXR to evaluate catheter-f/u results H/o MSSA infection-Pt admitted to [facility] on 11/24/2023 and discharged on 12/08/23.  His permacath was exchanged on Friday 11/25/23. Treated w/ Cefazolin.   - P/w Sepsis & permacath possible source    - S/P (-) UA & RVP    - mrsa/mssa pcr negative   - 4/12 (+) Bld cx  - 4/13 HD Bld cx negative. 4/13 HD cath site bld cx (+) Stenotrophomonas  - Repeat bld cx's on 4/17 f/u results   - Quant Tb negative  - S/p Vancomycin      - Currently on Ceftriaxone, Minocycline & Bactrim  - IR to replace right chest permacath on 4/18  - ID-Dr. Cui following

## 2024-04-18 NOTE — PROGRESS NOTE ADULT - ASSESSMENT
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  Presented to the ED due to abdominal pain for the last 24 hours and associated w/ non specific symptoms.  Admitted for further management of Sepsis 2/2 Strep. Bacteremia due to infected permacath. ID. Dr. Cui following. Awaiting repeat blood cultures from 4/17. IR was consulted for replacement of right chest permacath today.

## 2024-04-18 NOTE — PROGRESS NOTE ADULT - ASSESSMENT
ESRD - dialysis days are T-T-S.    Fever with bacteremia Streptococcus sp. (Not Grp A, B or S pneumoniae): Detec  Gram Stain:  catheter site blood cultures  are negative, Exit site culture positive for Culture - Other (04.13.24 @ 09:39)   Catheter was removed after dialysis today. Monitor lab and follow with a placement of new PC as per ID.   Minocycline: S  - Levofloxacin: S <=0.5  - Trimethoprim/Sulfamethoxazole: S <=0.5/9.5  Specimen Source: Cath Site Catheter Site  Culture Results:   Rare Stenotrophomonas maltophilia      Malfunction catheter max  ml/minute today.  Removal of PC by IR due to Stenotrophomonas maltophilia positive cultures .

## 2024-04-19 LAB
GLUCOSE BLDC GLUCOMTR-MCNC: 106 MG/DL — HIGH (ref 70–99)
GLUCOSE BLDC GLUCOMTR-MCNC: 111 MG/DL — HIGH (ref 70–99)
GLUCOSE BLDC GLUCOMTR-MCNC: 124 MG/DL — HIGH (ref 70–99)
GLUCOSE BLDC GLUCOMTR-MCNC: 95 MG/DL — SIGNIFICANT CHANGE UP (ref 70–99)

## 2024-04-19 RX ORDER — SEVELAMER CARBONATE 2400 MG/1
800 POWDER, FOR SUSPENSION ORAL
Refills: 0 | Status: DISCONTINUED | OUTPATIENT
Start: 2024-04-19 | End: 2024-04-26

## 2024-04-19 RX ORDER — SODIUM ZIRCONIUM CYCLOSILICATE 10 G/10G
10 POWDER, FOR SUSPENSION ORAL DAILY
Refills: 0 | Status: DISCONTINUED | OUTPATIENT
Start: 2024-04-19 | End: 2024-04-23

## 2024-04-19 RX ADMIN — DORZOLAMIDE HYDROCHLORIDE 1 DROP(S): 20 SOLUTION/ DROPS OPHTHALMIC at 06:07

## 2024-04-19 RX ADMIN — SEVELAMER CARBONATE 800 MILLIGRAM(S): 2400 POWDER, FOR SUSPENSION ORAL at 09:20

## 2024-04-19 RX ADMIN — SENNA PLUS 2 TABLET(S): 8.6 TABLET ORAL at 22:37

## 2024-04-19 RX ADMIN — DORZOLAMIDE HYDROCHLORIDE 1 DROP(S): 20 SOLUTION/ DROPS OPHTHALMIC at 22:38

## 2024-04-19 RX ADMIN — DORZOLAMIDE HYDROCHLORIDE 1 DROP(S): 20 SOLUTION/ DROPS OPHTHALMIC at 15:10

## 2024-04-19 RX ADMIN — SEVELAMER CARBONATE 800 MILLIGRAM(S): 2400 POWDER, FOR SUSPENSION ORAL at 18:07

## 2024-04-19 RX ADMIN — Medication 1 DROP(S): at 06:07

## 2024-04-19 RX ADMIN — Medication 50 MILLIGRAM(S): at 22:37

## 2024-04-19 RX ADMIN — Medication 1 DROP(S): at 18:08

## 2024-04-19 RX ADMIN — Medication 1 DROP(S): at 18:07

## 2024-04-19 RX ADMIN — Medication 1 DROP(S): at 01:06

## 2024-04-19 RX ADMIN — Medication 265 MILLIGRAM(S): at 18:07

## 2024-04-19 RX ADMIN — BRIMONIDINE TARTRATE 1 DROP(S): 2 SOLUTION/ DROPS OPHTHALMIC at 06:07

## 2024-04-19 RX ADMIN — MINOCYCLINE HYDROCHLORIDE 100 MILLIGRAM(S): 45 TABLET, EXTENDED RELEASE ORAL at 06:09

## 2024-04-19 RX ADMIN — BRIMONIDINE TARTRATE 1 DROP(S): 2 SOLUTION/ DROPS OPHTHALMIC at 18:08

## 2024-04-19 RX ADMIN — Medication 1 DROP(S): at 12:35

## 2024-04-19 RX ADMIN — SODIUM ZIRCONIUM CYCLOSILICATE 10 GRAM(S): 10 POWDER, FOR SUSPENSION ORAL at 15:10

## 2024-04-19 RX ADMIN — MINOCYCLINE HYDROCHLORIDE 100 MILLIGRAM(S): 45 TABLET, EXTENDED RELEASE ORAL at 21:22

## 2024-04-19 RX ADMIN — CHLORHEXIDINE GLUCONATE 1 APPLICATION(S): 213 SOLUTION TOPICAL at 06:08

## 2024-04-19 RX ADMIN — ATORVASTATIN CALCIUM 20 MILLIGRAM(S): 80 TABLET, FILM COATED ORAL at 22:38

## 2024-04-19 RX ADMIN — LOSARTAN POTASSIUM 50 MILLIGRAM(S): 100 TABLET, FILM COATED ORAL at 06:08

## 2024-04-19 RX ADMIN — Medication 50 MILLIGRAM(S): at 15:10

## 2024-04-19 RX ADMIN — POLYETHYLENE GLYCOL 3350 17 GRAM(S): 17 POWDER, FOR SOLUTION ORAL at 12:35

## 2024-04-19 RX ADMIN — Medication 50 MILLIGRAM(S): at 06:08

## 2024-04-19 RX ADMIN — Medication 1 DROP(S): at 06:08

## 2024-04-19 RX ADMIN — Medication 1 DROP(S): at 23:40

## 2024-04-19 RX ADMIN — CEFTRIAXONE 100 MILLIGRAM(S): 500 INJECTION, POWDER, FOR SOLUTION INTRAMUSCULAR; INTRAVENOUS at 18:27

## 2024-04-19 RX ADMIN — SEVELAMER CARBONATE 800 MILLIGRAM(S): 2400 POWDER, FOR SUSPENSION ORAL at 12:35

## 2024-04-19 NOTE — PROGRESS NOTE ADULT - SUBJECTIVE AND OBJECTIVE BOX
[   ] ICU                                          [   ] CCU                                      [ X  ] Medical Floor    Patient is a 78 year old male with abdominal pain. GI consulted to evaluate.        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 Patient also c/o constipation but denies nausea, vomiting, hematemesis, hematochezia, melena, fever, chest pain, SOB, cough, hematuria, or diarrhea.      Today patient appears comfortable. No new complaints reported, No abdominal pain, N/V, hematemesis, hematochezia, melena, fever, chills, chest pain, SOB, cough or diarrhea reported.         PAST MEDICAL HISTORY     DM (diabetes mellitus)    HTN (hypertension)     Diabetes mellitus    ESRD on HD     Hyperlipidemia     Glaucoma    Gout        PAST SURGICAL HISTORY    No significant past surgical history        Allergies    No Known Allergies    Intolerances  None       SOCIAL HISTORY  Advanced Directives:       [X  ] Full Code       [  ] DNR  Marital Status:         [  ] M      [X  ] S      [  ] D       [  ] W  Children:       [ X ] Yes      [  ] No  Occupation:        [  ] Employed       [ X ] Unemployed       [  ] Retired  Diet:       [ X ] Regular       [  ] PEG feeding          [  ] NG tube feeding  Drug Use:           [ X ] Patient denied          [  ] Yes  Alcohol:           [X  ] No             [  ] Yes (socially)         [  ] Yes (chronic)  Tobacco:           [  ] Yes           [ X ] No    FAMILY HISTORY  [ X ] Heart Disease            [ X ] Diabetes             [ X ] HTN             [  ] Colon Cancer             [  ] Stomach Cancer              [  ] Pancreatic Cancer          VITALS   Vital Signs Last 24 Hrs  T(C): 36.9 (04-19-24 @ 11:10), Max: 36.9 (04-19-24 @ 11:10)  T(F): 98.4 (04-19-24 @ 11:10), Max: 98.4 (04-19-24 @ 11:10)  HR: 56 (04-19-24 @ 11:10) (52 - 60)  BP: 125/66 (04-19-24 @ 11:10) (125/66 - 156/69)   RR: 18 (04-19-24 @ 11:10) (18 - 18)  SpO2: 98% (04-19-24 @ 11:10) (97% - 100%)        MEDICATIONS  (STANDING):  atorvastatin 20 milliGRAM(s) Oral at bedtime  brimonidine 0.2% Ophthalmic Solution 1 Drop(s) Right EYE two times a day  carvedilol 12.5 milliGRAM(s) Oral every 12 hours  cefTRIAXone   IVPB 2000 milliGRAM(s) IV Intermittent every 24 hours  chlorhexidine 2% Cloths 1 Application(s) Topical <User Schedule>  ciprofloxacin  0.3% Ophthalmic Solution 1 Drop(s) Right EYE two times a day  dextrose 10% Bolus 125 milliLiter(s) IV Bolus once  dextrose 5%. 1000 milliLiter(s) (50 mL/Hr) IV Continuous <Continuous>  dextrose 5%. 1000 milliLiter(s) (100 mL/Hr) IV Continuous <Continuous>  dextrose 50% Injectable 25 Gram(s) IV Push once  dextrose 50% Injectable 12.5 Gram(s) IV Push once  dorzolamide 2% Ophthalmic Solution 1 Drop(s) Both EYES three times a day  glucagon  Injectable 1 milliGRAM(s) IntraMuscular once  hydrALAZINE 50 milliGRAM(s) Oral three times a day  insulin lispro (ADMELOG) corrective regimen sliding scale   SubCutaneous Before meals and at bedtime  ketorolac 0.5% Ophthalmic Solution 1 Drop(s) Right EYE two times a day  minocycline IVPB      minocycline IVPB 100 milliGRAM(s) IV Intermittent every 12 hours  polyethylene glycol 3350 17 Gram(s) Oral daily  prednisoLONE acetate 1% Suspension 1 Drop(s) Right EYE every 6 hours  senna 2 Tablet(s) Oral at bedtime  sevelamer carbonate 800 milliGRAM(s) Oral three times a day with meals  sodium zirconium cyclosilicate 10 Gram(s) Oral daily  trimethoprim / sulfamethoxazole IVPB 240 milliGRAM(s) IV Intermittent every 24 hours    MEDICATIONS  (PRN):  acetaminophen     Tablet .. 650 milliGRAM(s) Oral every 6 hours PRN Temp greater or equal to 38C (100.4F), Mild Pain (1 - 3)  dextrose Oral Gel 15 Gram(s) Oral once PRN Blood Glucose LESS THAN 70 milliGRAM(s)/deciliter                            10.7   7.72  )-----------( 151      ( 18 Apr 2024 10:00 )             32.1       04-18    135  |  105  |  76<H>  ----------------------------<  165<H>  4.6   |  22  |  5.36<H>    Ca    8.2<L>      18 Apr 2024 10:00  Phos  6.1     04-18  Mg     2.5     04-18        PT/INR - ( 18 Apr 2024 10:40 )   PT: 14.1 sec;   INR: 1.24 ratio

## 2024-04-19 NOTE — PROGRESS NOTE ADULT - SUBJECTIVE AND OBJECTIVE BOX
Patient is seen and examined at the bed side, is afebrile. He is s/p removal of Perm-a-cath today, 4/18/24, and tolerated the procedure well.       REVIEW OF SYSTEMS: All other review systems are negative      ALLERGIES: No Known Allergies      Vital Signs Last 24 Hrs  T(C): 36.7 (18 Apr 2024 16:15), Max: 36.7 (18 Apr 2024 16:15)  T(F): 98.1 (18 Apr 2024 16:15), Max: 98.1 (18 Apr 2024 16:15)  HR: 60 (18 Apr 2024 16:15) (50 - 63)  BP: 142/71 (18 Apr 2024 16:15) (121/64 - 151/86)  BP(mean): --  RR: 18 (18 Apr 2024 16:15) (17 - 18)  SpO2: 100% (18 Apr 2024 16:15) (97% - 100%)    Parameters below as of 18 Apr 2024 16:15  Patient On (Oxygen Delivery Method): room air      PHYSICAL EXAM:  GENERAL: Not in distress   CHEST/LUNG: Not using accessory muscles   HEART: s1 and s2 present  ABDOMEN:  Nontender and  Nondistended  EXTREMITIES: No pedal  edema  CNS: Awake and Alert      LABS:                        10.7   7.72  )-----------( 151      ( 18 Apr 2024 10:00 )             32.1                           11.7   7.63  )-----------( 153      ( 17 Apr 2024 10:00 )             35.2         04-18    135  |  105  |  76<H>  ----------------------------<  165<H>  4.6   |  22  |  5.36<H>    Ca    8.2<L>      18 Apr 2024 10:00  Phos  6.1     04-18  Mg     2.5     04-18    TPro  7.4  /  Alb  3.0<L>  /  TBili  0.3  /  DBili  x   /  AST  14  /  ALT  14  /  AlkPhos  144<H>  04-17 04-17    136  |  102  |  64<H>  ----------------------------<  98  4.7   |  24  |  4.81<H>    Ca    8.7      17 Apr 2024 10:00  Phos  5.1     04-17  Mg     2.5     04-17    TPro  7.4  /  Alb  3.0<L>  /  TBili  0.3  /  DBili  x   /  AST  14  /  ALT  14  /  AlkPhos  144<H>  04-17    PT/INR - ( 13 Apr 2024 05:00 )   PT: 16.3 sec;   INR: 1.45 ratio      PTT - ( 13 Apr 2024 05:00 )  PTT:26.7 sec      CAPILLARY BLOOD GLUCOSE  POCT Blood Glucose.: 186 mg/dL (13 Apr 2024 11:30)  POCT Blood Glucose.: 121 mg/dL (13 Apr 2024 07:37)      < from: URVASHI W or WO Ultrasound Enhancing Agent (04.17.24 @ 07:41) >  CONCLUSIONS:      1. Left ventricular wall thickness is normal. Left ventricular systolic function is normal. There are no regional wall motion abnormalities seen.   2. Normal right ventricular cavity size, with normal wall thickness, and normal systolic function.   3. Normal left and right atrial size.   4. No thrombus seen in the left atrial, left atrial appendage, right atrium..   5. Trace tricuspid regurgitation. Pulmonary artery systolic pressure could not be estimated.   6. Mild mitral regurgitation.   7. Trace pulmonic regurgitation.   8. Agitated saline injection was negative for intracardiac shunt.   9. Left atrial appendage emptying velocites are normal.  10. Normal pulmonary venous flow.  11. Aortic root at the sinuses of Valsalva is normal in size, measuring 3.30 cm (indexed 1.79 cm/m²).  12. Mild aortic regurgitation.  13. No pericardial effusion seen.  14. No vegetations seen on this study.    < end of copied text >    < from: TTE W or WO Ultrasound Enhancing Agent (04.15.24 @ 12:57) >     CONCLUSIONS:      1. Left ventricular cavity is normal in size. Left ventricular systolic function is normal with an ejection fraction visually estimated at 50 to 55 %. There are no regional wall motion abnormalities seen.   2. There is mild (grade 1) left ventricular diastolic dysfunction.   3. Normal right ventricular cavity size, with normal wall thickness, and normal systolic function. Tricuspid annular plane systolic excursion (TAPSE) is 2.1 cm (normal >=1.7 cm).   4. Normal left and right atrial size.   5. Trace tricuspid regurgitation.   6. Compared to the transthoracic echocardiogram performed on 12/18/2023, there have been no significant interval changes.   7. Estimated pulmonary artery systolic pressure is 20 mmHg, consistent with normal pulmonary artery pressure.   8. Mild aortic regurgitation.   9. Mild pulmonic regurgitation.  10. There is calcification of the mitral valve annulus.  11. No pericardial effusion seen.  12. No vegetations seen on this study,consider URVASHI for further evaluation if clinically indicated.        MEDICATIONS  (STANDING):    atorvastatin 20 milliGRAM(s) Oral at bedtime  brimonidine 0.2% Ophthalmic Solution 1 Drop(s) Right EYE two times a day  carvedilol 12.5 milliGRAM(s) Oral every 12 hours  cefTRIAXone   IVPB 2000 milliGRAM(s) IV Intermittent every 24 hours  chlorhexidine 2% Cloths 1 Application(s) Topical <User Schedule>  ciprofloxacin  0.3% Ophthalmic Solution 1 Drop(s) Right EYE two times a day  dorzolamide 2% Ophthalmic Solution 1 Drop(s) Both EYES three times a day  glucagon  Injectable 1 milliGRAM(s) IntraMuscular once  hydrALAZINE 50 milliGRAM(s) Oral three times a day  insulin lispro (ADMELOG) corrective regimen sliding scale   SubCutaneous Before meals and at bedtime  ketorolac 0.5% Ophthalmic Solution 1 Drop(s) Right EYE two times a day  losartan 50 milliGRAM(s) Oral daily  minocycline IVPB      minocycline IVPB 100 milliGRAM(s) IV Intermittent every 12 hours  polyethylene glycol 3350 17 Gram(s) Oral daily  prednisoLONE acetate 1% Suspension 1 Drop(s) Right EYE <User Schedule>  prednisoLONE acetate 1% Suspension 1 Drop(s) Right EYE every 4 hours  senna 2 Tablet(s) Oral at bedtime  trimethoprim / sulfamethoxazole IVPB 240 milliGRAM(s) IV Intermittent every 24 hours      RADIOLOGY & ADDITIONAL TESTS:    4/12/24 : CT Abdomen and Pelvis No Cont (04.12.24 @ 18:53) >  *  No acute septic pathology.  *  Scattered bilateral small pulmonary nodules measuring up to 7 mm in the right upper lobe are nonspecific and may represent   infectious/inflammatory disease or metastatic disease.  *  3.1 x 2.1 cm cystic structure in the posterior mediastinum at the level of the leelee may represent a lymphatic structure, bronchogenic cyst, or foregut duplication cyst.  *  No intra-abdominal collection.      4/12/24 : CT Chest No Cont (04.12.24 @ 18:53) LUNGS AND LARGE AIRWAYS: The central airways are patent. Scattered   bilateral small pulmonary nodules measuring up to 7 mm in the right upper  lobe are nonspecific. No acute consolidation.  PLEURA: Trace effusions.        MICROBIOLOGY DATA:    Culture - Other (04.13.24 @ 09:39)   - Minocycline: S  - Levofloxacin: S <=0.5  - Trimethoprim/Sulfamethoxazole: S <=0.5/9.5  Specimen Source: Cath Site Catheter Site  Culture Results:   Rare Stenotrophomonas maltophilia  Organism Identification: Stenotrophomonas maltophilia  Organism: Stenotrophomonas maltophilia  Organism: Stenotrophomonas maltophilia  Method Type: KB  Method Type: LAWRENCE    Culture - Blood (04.13.24 @ 17:00)   Specimen Source: .Blood Dialysis Catheter  Culture Results: No growth at 48 hours      Culture - Other (04.13.24 @ 09:39)   Specimen Source: Cath Site Catheter Site  Culture Results: No growth to date.      Culture - Blood (04.12.24 @ 17:50)   Gram Stain:   Growth in aerobic bottle: Gram positive cocci in pairs  Specimen Source: .Blood Blood-Peripheral  Culture Results:   Growth in aerobic bottle: Streptococcus salivarius/vestibularis group   Susceptibility to follow.      Culture - Blood (04.12.24 @ 18:00)   Gram Stain:   Growth in anaerobic bottle: Gram positive cocci in pairs  - Streptococcus sp. (Not Grp A, B or S pneumoniae): Detec  Specimen Source: .Blood Blood-Peripheral  Organism: Blood Culture PCR  Culture Results:   Growth in anaerobic bottle: Gram positive cocci in pairs   Direct identification is available within approximately 3-5   hours either by Blood Panel Multiplexed PCR or Direct   MALDI-TOF. Details: [email or web address]  Organism Identification: Blood Culture PCR  Method Type: PCR      Culture - Blood (04.12.24 @ 17:50)   Gram Stain:   Growth in aerobic bottle: Gram positive cocci in pairs  Specimen Source: .Blood Blood-Peripheral  Culture Results:   Growth in aerobic bottle: Gram positive cocci in pairs    Urine Microscopic-Add On (NC) (04.12.24 @ 18:13)   Red Blood Cell - Urine: 3 /HPF  White Blood Cell - Urine: 2 /HPF  Bacteria: Occasional /HPF    Respiratory Viral Panel with COVID-19 by OXANA (04.12.24 @ 17:50)   Rapid RVP Result: NotDetec  SARS-CoV-2: NotDetec:

## 2024-04-19 NOTE — PROGRESS NOTE ADULT - PROBLEM SELECTOR PLAN 5
- BP stable   - P/w HTN urgency   - C/w Losartan, Hydralazine, & Coreg   - Cards Nabatian following  - BP goal <140/90, controlled

## 2024-04-19 NOTE — PROGRESS NOTE ADULT - PROBLEM SELECTOR PLAN 3
- P/w Elevated trops   - S/p EKG   - Likely ischemic demand in s/o sepsis  - S/p Echo wnl  - s/p URVASHI on 4/17 no vegetations   - dc telemetry  - CV-Dr. Jorge

## 2024-04-19 NOTE — PROGRESS NOTE ADULT - ASSESSMENT
ESRD - dialysis days are T-T-S. Post PC removal , monitor renal function daily and need for dialysis. Plan of new catheter placement as per ID.  DC Losartan , follow up with Lokelma 10 gm daily , 2 gm K diet and fluid restriction.     Fever with bacteremia Streptococcus sp. (Not Grp A, B or S pneumoniae): Detec  Gram Stain:  catheter site blood cultures  are negative, Exit site culture positive for Culture - Other (04.13.24 @ 09:39)   Post PC removal 04/18 24. Last dialysis 04/18/24.  Minocycline: S  - Levofloxacin: S <=0.5  - Trimethoprim/Sulfamethoxazole: S <=0.5/9.5  Specimen Source: Cath Site Catheter Site  Culture Results:   Rare Stenotrophomonas maltophilia      Malfunction catheter max  ml/minute today.  Removal of PC by IR due to Stenotrophomonas maltophilia positive cultures .

## 2024-04-19 NOTE — PROGRESS NOTE ADULT - ASSESSMENT
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  Presented to the ED due to abdominal pain for the last 24 hours and associated w/ non specific symptoms.  Admitted for further management of Sepsis 2/2 Strep. Bacteremia due to infected permacath. ID. Dr. Cui following. Repeat blood cultures from 4/17 was negative. IR removed right chest permacath on 4/18. Will resend blood cultures on 4/20 am again. Discussed with Nephro Dr. Art, plan for possible next HD on 4/22. Also coordinating with IR, plan for possible permacath placement on 4/22.

## 2024-04-19 NOTE — PROGRESS NOTE ADULT - ASSESSMENT
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 Patient AAOx3 at bedside and mentioned having intermittent, mild, suprapubic discomfort associated w/ dysuria that has been going on for the last 48 hours. Patient was admitted to [facility] on 11/24/2023 and discharge on 12/08/23 where he was treated for sepsis. His perm cath was exchanged on 11/223. Found to have MSSA bacteremia and transitioned from Vancomycin to Cefazolin. 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 Underwent right Permcath placement by IR on 12/4/23. On admission, he found to have fever, tachypnea but negative Urine analysis and negative chest CT. He has started on Cefepime and IV  Vancomycin, and the ID consult requested to assist with further evaluation and antibiotic management.    # Sepsis ( Fever + tachypnea)- Suspected Line sepsis  # Streptococcal Bacteremia- 4/12/24- in the setting of Perm-a cath - the blood cx form catheter as of 4/13 NGTD- Renal team likes to keep the Perm-a-cath - TTE from 4/15 and URVASHI from 4/17 shows no vegetation  # Catheter site culture grew Stenotrophomonas - s/p removal of Perm-a-cath on 4/18/24    would recommend:    1. Repeat Blood cultures X 2 in AM   2. Continue IV Bactrim and Minocycline to cover Stenotrophomonas  3. Continue Ceftriaxone 2 g daily to cover Streptococcus  4. OOB to chair     d/w covering NPMichael    Attending Attestation:    Spent more than 35 minutes on total encounter, more than 50 % of the visit was spent counseling and/or coordinating care by the Attending physician.

## 2024-04-19 NOTE — PROGRESS NOTE ADULT - PROBLEM SELECTOR PLAN 2
H/o HD (T/Th/S )  - right chest permacath removed by IR on 4/18  - mrsa/mssa pcr negative   - s/p HD on 4/18  - Avoid Nephrotoxic Meds/ Agents such as (NSAIDs, IV contrast, Aminoglycosides such as gentamicin, Gadolinium contrast, Phosphate containing enemas, etc..)  - monitor BMP, phos serum   - CHG bathing daily  - IR consulted, possible permacath placement on 4/22 if blood cultures negative from 4/20  - Nephro-Dr. Art following, next HD on 4/22

## 2024-04-19 NOTE — PROGRESS NOTE ADULT - SUBJECTIVE AND OBJECTIVE BOX
Patient is a 78y old  Male who presents with a chief complaint of Sepsis (19 Apr 2024 14:30)    OVERNIGHT EVENTS: no acute changes.    Pt is aox3, comfortable appearing, tolerating PO, needs assistance out of bed.     REVIEW OF SYSTEMS:  CONSTITUTIONAL: No fever, chills  ENMT:  No difficulty hearing, no change in vision  NECK: No pain or stiffness  RESPIRATORY: No cough, SOB  CARDIOVASCULAR: No chest pain, palpitations  GASTROINTESTINAL: No abdominal pain. No nausea, vomiting, or diarrhea  GENITOURINARY: No dysuria  NEUROLOGICAL: No HA  SKIN: No itching, burning, rashes, or lesions   LYMPH NODES: No enlarged glands  ENDOCRINE: No heat or cold intolerance; No hair loss  MUSCULOSKELETAL: No joint pain or swelling; No muscle, back, or extremity pain  PSYCHIATRIC: No depression, anxiety  HEME/LYMPH: No easy bruising, or bleeding gums    T(C): 36.5 (04-19-24 @ 15:08), Max: 36.9 (04-19-24 @ 11:10)  HR: 57 (04-19-24 @ 15:08) (52 - 60)  BP: 164/81 (04-19-24 @ 15:08) (125/66 - 164/81)  RR: 19 (04-19-24 @ 15:08) (18 - 19)  SpO2: 95% (04-19-24 @ 15:08) (95% - 100%)  Wt(kg): --Vital Signs Last 24 Hrs  T(C): 36.5 (19 Apr 2024 15:08), Max: 36.9 (19 Apr 2024 11:10)  T(F): 97.7 (19 Apr 2024 15:08), Max: 98.4 (19 Apr 2024 11:10)  HR: 57 (19 Apr 2024 15:08) (52 - 60)  BP: 164/81 (19 Apr 2024 15:08) (125/66 - 164/81)  BP(mean): --  RR: 19 (19 Apr 2024 15:08) (18 - 19)  SpO2: 95% (19 Apr 2024 15:08) (95% - 100%)    Parameters below as of 19 Apr 2024 15:08  Patient On (Oxygen Delivery Method): room air        MEDICATIONS  (STANDING):  atorvastatin 20 milliGRAM(s) Oral at bedtime  brimonidine 0.2% Ophthalmic Solution 1 Drop(s) Right EYE two times a day  carvedilol 12.5 milliGRAM(s) Oral every 12 hours  cefTRIAXone   IVPB 2000 milliGRAM(s) IV Intermittent every 24 hours  chlorhexidine 2% Cloths 1 Application(s) Topical <User Schedule>  ciprofloxacin  0.3% Ophthalmic Solution 1 Drop(s) Right EYE two times a day  dextrose 10% Bolus 125 milliLiter(s) IV Bolus once  dextrose 5%. 1000 milliLiter(s) (50 mL/Hr) IV Continuous <Continuous>  dextrose 5%. 1000 milliLiter(s) (100 mL/Hr) IV Continuous <Continuous>  dextrose 50% Injectable 25 Gram(s) IV Push once  dextrose 50% Injectable 12.5 Gram(s) IV Push once  dorzolamide 2% Ophthalmic Solution 1 Drop(s) Both EYES three times a day  glucagon  Injectable 1 milliGRAM(s) IntraMuscular once  hydrALAZINE 50 milliGRAM(s) Oral three times a day  insulin lispro (ADMELOG) corrective regimen sliding scale   SubCutaneous Before meals and at bedtime  ketorolac 0.5% Ophthalmic Solution 1 Drop(s) Right EYE two times a day  minocycline IVPB 100 milliGRAM(s) IV Intermittent every 12 hours  minocycline IVPB      polyethylene glycol 3350 17 Gram(s) Oral daily  prednisoLONE acetate 1% Suspension 1 Drop(s) Right EYE every 6 hours  senna 2 Tablet(s) Oral at bedtime  sevelamer carbonate 800 milliGRAM(s) Oral three times a day with meals  sodium zirconium cyclosilicate 10 Gram(s) Oral daily  trimethoprim / sulfamethoxazole IVPB 240 milliGRAM(s) IV Intermittent every 24 hours    MEDICATIONS  (PRN):  acetaminophen     Tablet .. 650 milliGRAM(s) Oral every 6 hours PRN Temp greater or equal to 38C (100.4F), Mild Pain (1 - 3)  dextrose Oral Gel 15 Gram(s) Oral once PRN Blood Glucose LESS THAN 70 milliGRAM(s)/deciliter      PHYSICAL EXAM:  GENERAL: NAD  EYES: clear conjunctiva  ENMT: Moist mucous membranes  NECK: Supple, No JVD, Normal thyroid  CHEST/LUNG: s/p Right Permacath removal, guaze in place, no bleeding or discharge. Clear to auscultation bilaterally; No rales, rhonchi, wheezing, or rubs  HEART: S1, S2, Regular rate and rhythm  ABDOMEN: Soft, Nontender, Nondistended; Bowel sounds present  NEURO: Alert & Oriented X3  EXTREMITIES: No LE edema, no calf tenderness  LYMPH: No lymphadenopathy noted  SKIN: No rashes or lesions    Consultant(s) Notes Reviewed:  [x ] YES  [ ] NO  Care Discussed with Consultants/Other Providers [ x] YES  [ ] NO    LABS:                        10.7   7.72  )-----------( 151      ( 18 Apr 2024 10:00 )             32.1     04-18    135  |  105  |  76<H>  ----------------------------<  165<H>  4.6   |  22  |  5.36<H>    Ca    8.2<L>      18 Apr 2024 10:00  Phos  6.1     04-18  Mg     2.5     04-18      PT/INR - ( 18 Apr 2024 10:40 )   PT: 14.1 sec;   INR: 1.24 ratio           CAPILLARY BLOOD GLUCOSE      POCT Blood Glucose.: 111 mg/dL (19 Apr 2024 11:24)  POCT Blood Glucose.: 106 mg/dL (19 Apr 2024 07:41)  POCT Blood Glucose.: 115 mg/dL (18 Apr 2024 21:10)  POCT Blood Glucose.: 96 mg/dL (18 Apr 2024 17:01)  POCT Blood Glucose.: 98 mg/dL (18 Apr 2024 16:47)        Urinalysis Basic - ( 18 Apr 2024 10:00 )    Color: x / Appearance: x / SG: x / pH: x  Gluc: 165 mg/dL / Ketone: x  / Bili: x / Urobili: x   Blood: x / Protein: x / Nitrite: x   Leuk Esterase: x / RBC: x / WBC x   Sq Epi: x / Non Sq Epi: x / Bacteria: x        RADIOLOGY & ADDITIONAL TESTS:  < from: CT Chest No Cont (04.12.24 @ 18:53) >    ACC: [id number] EXAM:  CT ABDOMEN AND PELVIS   ORDERED BY: DEANGELO MOLINA     ACC: [id number] EXAM:  CT CHEST   ORDERED BY: DEANGELO MOLINA     PROCEDURE DATE:  04/12/2024          INTERPRETATION:  CLINICAL INFORMATION: Fever and altered mental statuson   hemodialysis, abdominal pain    COMPARISON: None.    CONTRAST/COMPLICATIONS:  IV Contrast: None  Oral Contrast: None  Complications: None    PROCEDURE:  CT of the Chest, Abdomen and Pelvis was performed.  Sagittal and coronal reformats were performed.    FINDINGS:  CHEST:  LUNGS AND LARGE AIRWAYS: The central airways are patent. Scattered   bilateral small pulmonary nodules measuring up to 7 mm in the right upper   lobe are nonspecific. No acute consolidation.  PLEURA: Trace effusions.  VESSELS: Aortic atherosclerosis without aneurysm. Right IJ dialysis line   tip in the right atrium.  HEART: Mild cardiomegaly. No pericardial effusion. Coronary, mitral   annular, and aortic valve calcification.  MEDIASTINUM AND CHRISTIANO: No adenopathy. 3.1 x2.1 cm cystic structure in the   posterior mediastinum at the level of the leelee may represent a   lymphatic structure, bronchogenic cyst, or foregut duplication cyst.  CHEST WALL AND LOWER NECK: No masses.    ABDOMEN AND PELVIS:  LIVER: Normal.  BILE DUCTS: Nondilated.  GALLBLADDER: Gallstones.  SPLEEN: Normal.  PANCREAS: Normal.  ADRENALS: Normal.  KIDNEYS/URETERS: No hydronephrosis or urinary tract calculi.    BLADDER: Normal.  REPRODUCTIVE ORGANS: Enlarged prostate.    BOWEL: No bowel-related abnormality. No bowel obstruction or bowel   inflammation. Normal appendix and ileocecal region. No evidence of active   colitis or diverticulitis.  PERITONEUM: No free air or ascites. No collection.  VESSELS: Aortoiliac atherosclerosis without aneurysm.  RETROPERITONEUM/LYMPH NODES: No adenopathy or hematoma.  ABDOMINAL WALL: Normal.  BONES: No aggressive lesion.    IMPRESSION:  *  No acute septic pathology.  *  Scattered bilateral small pulmonary nodules measuring up to 7 mm in   the right upper lobe are nonspecific and may represent   infectious/inflammatory disease or metastatic disease.  *  3.1 x 2.1 cm cystic structure in the posterior mediastinum at the   level of the leelee may represent a lymphatic structure, bronchogenic   cyst, orforegut duplication cyst.  *  No intra-abdominal collection.      --- End of Report ---            BAILEE WATTERS MD; Attending Radiologist  This document has been electronically signed. Apr 12 2024  7:53PM        Imaging Personally Reviewed:  [ ] YES  [ ] NO

## 2024-04-19 NOTE — PROGRESS NOTE ADULT - SUBJECTIVE AND OBJECTIVE BOX
Patient is a 78y old  Male who presents with a chief complaint of Sepsis (2024 07:52)  Awake, alert, comfortable in bed in NAD. Had infected PC removed already    INTERVAL HPI/OVERNIGHT EVENTS:      VITAL SIGNS:  T(F): 98 (24 @ 07:41)  HR: 55 (24 @ 07:41)  BP: 139/69 (24 @ 07:41)  RR: 18 (24 @ 07:41)  SpO2: 97% (24 @ 07:41)  Wt(kg): --  I&O's Detail    2024 07:01  -  2024 07:00  --------------------------------------------------------  IN:    Other (mL): 600 mL  Total IN: 600 mL    OUT:    Other (mL): 1550 mL    Voided (mL): 700 mL  Total OUT: 2250 mL    Total NET: -1650 mL              REVIEW OF SYSTEMS:    CONSTITUTIONAL:  No fevers, chills, sweats    HEENT:  Eyes:  No diplopia or blurred vision. ENT:  No earache, sore throat or runny nose.    CARDIOVASCULAR:  No pressure, squeezing, tightness, or heaviness about the chest; no palpitations.    RESPIRATORY:  Per HPI    GASTROINTESTINAL:  No abdominal pain, nausea, vomiting or diarrhea.    GENITOURINARY:  No dysuria, frequency or urgency.    NEUROLOGIC:  No paresthesias, fasciculations, seizures or weakness.    PSYCHIATRIC:  No disorder of thought or mood.      PHYSICAL EXAM:    Constitutional: Well developed and nourished  Eyes:Perrla  ENMT: normal  Neck:supple  Respiratory: good air entry  Cardiovascular: S1 S2 regular  Gastrointestinal: Soft, Non tender  Extremities: No edema  Vascular:normal  Neurological:Awake, alert,Ox3  Musculoskeletal:Normal      MEDICATIONS  (STANDING):  atorvastatin 20 milliGRAM(s) Oral at bedtime  brimonidine 0.2% Ophthalmic Solution 1 Drop(s) Right EYE two times a day  carvedilol 12.5 milliGRAM(s) Oral every 12 hours  cefTRIAXone   IVPB 2000 milliGRAM(s) IV Intermittent every 24 hours  chlorhexidine 2% Cloths 1 Application(s) Topical <User Schedule>  ciprofloxacin  0.3% Ophthalmic Solution 1 Drop(s) Right EYE two times a day  dextrose 10% Bolus 125 milliLiter(s) IV Bolus once  dextrose 5%. 1000 milliLiter(s) (100 mL/Hr) IV Continuous <Continuous>  dextrose 5%. 1000 milliLiter(s) (50 mL/Hr) IV Continuous <Continuous>  dextrose 50% Injectable 25 Gram(s) IV Push once  dextrose 50% Injectable 12.5 Gram(s) IV Push once  dorzolamide 2% Ophthalmic Solution 1 Drop(s) Both EYES three times a day  glucagon  Injectable 1 milliGRAM(s) IntraMuscular once  hydrALAZINE 50 milliGRAM(s) Oral three times a day  insulin lispro (ADMELOG) corrective regimen sliding scale   SubCutaneous Before meals and at bedtime  ketorolac 0.5% Ophthalmic Solution 1 Drop(s) Right EYE two times a day  minocycline IVPB 100 milliGRAM(s) IV Intermittent every 12 hours  minocycline IVPB      polyethylene glycol 3350 17 Gram(s) Oral daily  prednisoLONE acetate 1% Suspension 1 Drop(s) Right EYE every 6 hours  senna 2 Tablet(s) Oral at bedtime  sevelamer carbonate 800 milliGRAM(s) Oral three times a day with meals  sodium zirconium cyclosilicate 10 Gram(s) Oral daily  trimethoprim / sulfamethoxazole IVPB 240 milliGRAM(s) IV Intermittent every 24 hours    MEDICATIONS  (PRN):  acetaminophen     Tablet .. 650 milliGRAM(s) Oral every 6 hours PRN Temp greater or equal to 38C (100.4F), Mild Pain (1 - 3)  dextrose Oral Gel 15 Gram(s) Oral once PRN Blood Glucose LESS THAN 70 milliGRAM(s)/deciliter      Allergies    No Known Allergies    Intolerances        LABS:                        10.7   7.72  )-----------( 151      ( 2024 10:00 )             32.1     04-18    135  |  105  |  76<H>  ----------------------------<  165<H>  4.6   |  22  |  5.36<H>    Ca    8.2<L>      2024 10:00  Phos  6.1     04-18  Mg     2.5     04-18      PT/INR - ( 2024 10:40 )   PT: 14.1 sec;   INR: 1.24 ratio           Urinalysis Basic - ( 2024 10:00 )    Color: x / Appearance: x / SG: x / pH: x  Gluc: 165 mg/dL / Ketone: x  / Bili: x / Urobili: x   Blood: x / Protein: x / Nitrite: x   Leuk Esterase: x / RBC: x / WBC x   Sq Epi: x / Non Sq Epi: x / Bacteria: x            CAPILLARY BLOOD GLUCOSE      POCT Blood Glucose.: 106 mg/dL (2024 07:41)  POCT Blood Glucose.: 115 mg/dL (2024 21:10)  POCT Blood Glucose.: 96 mg/dL (2024 17:01)  POCT Blood Glucose.: 98 mg/dL (2024 16:47)  POCT Blood Glucose.: 110 mg/dL (2024 11:32)        RADIOLOGY & ADDITIONAL TESTS:    CXR:    · Procedure Name	Interventional Radiology  · Procedure Name	Removal of Tunneled Dialysis Catheter  · TIME OUT	Patient's first and last name, , procedure, and correct site confirmed prior to the start of procedure.  · Procedure Date/Time	2024  · Informed Consent	Benefits, risks, and possible complications of procedure explained to patient/caregiver who verbalized understanding and gave written consent.  · Procedure Performed By	Myself  · Pre-Op Diagnosis	PRE-OP DIAGNOSIS:  Bacteremia 2024 15:58:15  Jade Lobo  · Access Site (if applicable)	Right  · Specimen Obtained	None  · Estimated Blood Loss	None  · Complications	No complications  · Local Anesthesia	1% Lidocaine; 14cc  · Procedure Findings and Details	Right sided tunneled dialysis catheter removed dressing applied  · Patient Condition/Disposition	Back to floor    PROCEDURE SELECTOR:   Ct scan chest:    ekg;    echo:

## 2024-04-19 NOTE — PROGRESS NOTE ADULT - PROBLEM SELECTOR PLAN 4
- S/p CT Chest/A/P showed scattered bilateral small pulmonary nodules measuring up to 7 mm in the right upper lobe are nonspecific and may represent infectious/inflammatory disease or metastatic disease. 3.1 x 2.1 cm cystic structure in the posterior mediastinum at the level of the leelee may represent a lymphatic structure, bronchogenic cyst, or foregut duplication cyst. No intra-abdominal collection.  - Pulm-Dr. Ghotra-Rec Follow up CT in 12 months

## 2024-04-19 NOTE — PROGRESS NOTE ADULT - SUBJECTIVE AND OBJECTIVE BOX
Problem List:    PAST MEDICAL & SURGICAL HISTORY:  DM (diabetes mellitus)      HTN (hypertension)      Chronic kidney disease      HLD (hyperlipidemia)      Glaucoma      Gout      No significant past surgical history          No Known Allergies      MEDICATIONS  (STANDING):  atorvastatin 20 milliGRAM(s) Oral at bedtime  brimonidine 0.2% Ophthalmic Solution 1 Drop(s) Right EYE two times a day  carvedilol 12.5 milliGRAM(s) Oral every 12 hours  cefTRIAXone   IVPB 2000 milliGRAM(s) IV Intermittent every 24 hours  chlorhexidine 2% Cloths 1 Application(s) Topical <User Schedule>  ciprofloxacin  0.3% Ophthalmic Solution 1 Drop(s) Right EYE two times a day  dextrose 10% Bolus 125 milliLiter(s) IV Bolus once  dextrose 5%. 1000 milliLiter(s) (100 mL/Hr) IV Continuous <Continuous>  dextrose 5%. 1000 milliLiter(s) (50 mL/Hr) IV Continuous <Continuous>  dextrose 50% Injectable 25 Gram(s) IV Push once  dextrose 50% Injectable 12.5 Gram(s) IV Push once  dorzolamide 2% Ophthalmic Solution 1 Drop(s) Both EYES three times a day  glucagon  Injectable 1 milliGRAM(s) IntraMuscular once  hydrALAZINE 50 milliGRAM(s) Oral three times a day  insulin lispro (ADMELOG) corrective regimen sliding scale   SubCutaneous Before meals and at bedtime  ketorolac 0.5% Ophthalmic Solution 1 Drop(s) Right EYE two times a day  losartan 50 milliGRAM(s) Oral daily  minocycline IVPB      minocycline IVPB 100 milliGRAM(s) IV Intermittent every 12 hours  polyethylene glycol 3350 17 Gram(s) Oral daily  prednisoLONE acetate 1% Suspension 1 Drop(s) Right EYE every 6 hours  senna 2 Tablet(s) Oral at bedtime  trimethoprim / sulfamethoxazole IVPB 240 milliGRAM(s) IV Intermittent every 24 hours    MEDICATIONS  (PRN):  acetaminophen     Tablet .. 650 milliGRAM(s) Oral every 6 hours PRN Temp greater or equal to 38C (100.4F), Mild Pain (1 - 3)  dextrose Oral Gel 15 Gram(s) Oral once PRN Blood Glucose LESS THAN 70 milliGRAM(s)/deciliter                            10.7   7.72  )-----------( 151      ( 18 Apr 2024 10:00 )             32.1     04-18    135  |  105  |  76<H>  ----------------------------<  165<H>  4.6   |  22  |  5.36<H>    Ca    8.2<L>      18 Apr 2024 10:00  Phos  6.1     04-18  Mg     2.5     04-18    TPro  7.4  /  Alb  3.0<L>  /  TBili  0.3  /  DBili  x   /  AST  14  /  ALT  14  /  AlkPhos  144<H>  04-17    PT/INR - ( 18 Apr 2024 10:40 )   PT: 14.1 sec;   INR: 1.24 ratio                 REVIEW OF SYSTEMS:  General: no fever no chills, no weight loss.  EYES/ENT: No visual changes;  No vertigo, no headache.  NECK: No pain or stiffness  RESPIRATORY: No cough, wheezing, hemoptysis; No shortness of breath  CARDIOVASCULAR: No chest pain or palpitations. No Edema  GASTROINTESTINAL: No abdominal or epigastric pain. No nausea, vomiting. No diarrhea or constipation. No melena.  GENITOURINARY: No dysuria, frequency, foamy urine, urinary urgency, incontinence or hematuria  NEUROLOGICAL: No numbness or weakness, no tremor , no dizziness.   Muscle skeletal : no joint pain and no swelling of joints and limbs.  SKIN: No itching, burning, rashes.        VITALS:  T(F): 98 (04-19-24 @ 07:41), Max: 98.1 (04-18-24 @ 16:15)  HR: 55 (04-19-24 @ 07:41)  BP: 139/69 (04-19-24 @ 07:41)  RR: 18 (04-19-24 @ 07:41)  SpO2: 97% (04-19-24 @ 07:41)  Wt(kg): --    04-18 @ 07:01  -  04-19 @ 07:00  --------------------------------------------------------  IN: 600 mL / OUT: 2250 mL / NET: -1650 mL        PHYSICAL EXAM:  Constitutional: well developed, no diaphoresis, no distress.  Neck: No JVD, no carotid bruit, supple, no adenopathy  Respiratory: Good air entrance B/L, no wheezes, rales or rhonchi  Cardiovascular: S1, S2, RRR, no pericardial rub, no murmur  Abdomen: BS+, soft, no tenderness, no bruit  Pelvis: bladder nondistended  Extremities: No cyanosis or clubbing. No peripheral edema.   Pulses: All present  Neurological: A/O x 3, no focal deficits  Psychiatric: Normal mood, normal affect  Skin: No rashes  Vascular Access:     Problem List:  ESRD, post dialysis PC removal 04/18 /24    PAST MEDICAL & SURGICAL HISTORY:  DM (diabetes mellitus)      HTN (hypertension)      Chronic kidney disease      HLD (hyperlipidemia)      Glaucoma      Gout      No significant past surgical history          No Known Allergies      MEDICATIONS  (STANDING):  atorvastatin 20 milliGRAM(s) Oral at bedtime  brimonidine 0.2% Ophthalmic Solution 1 Drop(s) Right EYE two times a day  carvedilol 12.5 milliGRAM(s) Oral every 12 hours  cefTRIAXone   IVPB 2000 milliGRAM(s) IV Intermittent every 24 hours  chlorhexidine 2% Cloths 1 Application(s) Topical <User Schedule>  ciprofloxacin  0.3% Ophthalmic Solution 1 Drop(s) Right EYE two times a day  dextrose 10% Bolus 125 milliLiter(s) IV Bolus once  dextrose 5%. 1000 milliLiter(s) (100 mL/Hr) IV Continuous <Continuous>  dextrose 5%. 1000 milliLiter(s) (50 mL/Hr) IV Continuous <Continuous>  dextrose 50% Injectable 25 Gram(s) IV Push once  dextrose 50% Injectable 12.5 Gram(s) IV Push once  dorzolamide 2% Ophthalmic Solution 1 Drop(s) Both EYES three times a day  glucagon  Injectable 1 milliGRAM(s) IntraMuscular once  hydrALAZINE 50 milliGRAM(s) Oral three times a day  insulin lispro (ADMELOG) corrective regimen sliding scale   SubCutaneous Before meals and at bedtime  ketorolac 0.5% Ophthalmic Solution 1 Drop(s) Right EYE two times a day  losartan 50 milliGRAM(s) Oral daily  minocycline IVPB      minocycline IVPB 100 milliGRAM(s) IV Intermittent every 12 hours  polyethylene glycol 3350 17 Gram(s) Oral daily  prednisoLONE acetate 1% Suspension 1 Drop(s) Right EYE every 6 hours  senna 2 Tablet(s) Oral at bedtime  trimethoprim / sulfamethoxazole IVPB 240 milliGRAM(s) IV Intermittent every 24 hours    MEDICATIONS  (PRN):  acetaminophen     Tablet .. 650 milliGRAM(s) Oral every 6 hours PRN Temp greater or equal to 38C (100.4F), Mild Pain (1 - 3)  dextrose Oral Gel 15 Gram(s) Oral once PRN Blood Glucose LESS THAN 70 milliGRAM(s)/deciliter                            10.7   7.72  )-----------( 151      ( 18 Apr 2024 10:00 )             32.1     04-18    135  |  105  |  76<H>  ----------------------------<  165<H>  4.6   |  22  |  5.36<H>    Ca    8.2<L>      18 Apr 2024 10:00  Phos  6.1     04-18  Mg     2.5     04-18    TPro  7.4  /  Alb  3.0<L>  /  TBili  0.3  /  DBili  x   /  AST  14  /  ALT  14  /  AlkPhos  144<H>  04-17    PT/INR - ( 18 Apr 2024 10:40 )   PT: 14.1 sec;   INR: 1.24 ratio                 REVIEW OF SYSTEMS:  General: no fever no chills, no weight loss.  EYES/ENT: No visual changes;  No vertigo, no headache.  NECK: No pain or stiffness  RESPIRATORY: No cough, wheezing, hemoptysis; No shortness of breath  CARDIOVASCULAR: No chest pain or palpitations. No Edema  GASTROINTESTINAL: No abdominal or epigastric pain. No nausea, vomiting. No diarrhea or constipation. No melena.  GENITOURINARY: No dysuria, frequency, foamy urine, urinary urgency, incontinence or hematuria          VITALS:  T(F): 98 (04-19-24 @ 07:41), Max: 98.1 (04-18-24 @ 16:15)  HR: 55 (04-19-24 @ 07:41)  BP: 139/69 (04-19-24 @ 07:41)  RR: 18 (04-19-24 @ 07:41)  SpO2: 97% (04-19-24 @ 07:41)  Wt(kg): --    04-18 @ 07:01  -  04-19 @ 07:00  --------------------------------------------------------  IN: 600 mL / OUT: 2250 mL / NET: -1650 mL        PHYSICAL EXAM:  Constitutional: well developed, no diaphoresis, no distress.  Neck: No JVD, no carotid bruit, supple, no adenopathy  Respiratory: Good air entrance B/L, no wheezes, rales or rhonchi  Cardiovascular: S1, S2, RRR, no pericardial rub, no murmur  Abdomen: BS+, soft, no tenderness, no bruit  Pelvis: bladder nondistended  Extremities: No cyanosis or clubbing. No peripheral edema.   Aler and awake oriented by 3.

## 2024-04-19 NOTE — PROGRESS NOTE ADULT - SUBJECTIVE AND OBJECTIVE BOX
Date of Service 04-19-24 @ 13:20    CHIEF COMPLAINT:Patient is a 78y old  Male who presents with a chief complaint of Sepsis.Pt appears comfortable.    	  REVIEW OF SYSTEMS:  CONSTITUTIONAL: No fever, weight loss, or fatigue  EYES: No eye pain, visual disturbances, or discharge  ENT:  No difficulty hearing, tinnitus, vertigo; No sinus or throat pain  NECK: No pain or stiffness  RESPIRATORY: No cough, wheezing, chills or hemoptysis; No Shortness of Breath  CARDIOVASCULAR: No chest pain, palpitations, passing out, dizziness, or leg swelling  GASTROINTESTINAL: No abdominal or epigastric pain. No nausea, vomiting, or hematemesis; No diarrhea or constipation. No melena or hematochezia.  GENITOURINARY: No dysuria, frequency, hematuria, or incontinence  NEUROLOGICAL: No headaches, memory loss, loss of strength, numbness, or tremors  SKIN: No itching, burning, rashes, or lesions   LYMPH Nodes: No enlarged glands  ENDOCRINE: No heat or cold intolerance; No hair loss  MUSCULOSKELETAL: No joint pain or swelling; No muscle, back, or extremity pain  PSYCHIATRIC: No depression, anxiety, mood swings, or difficulty sleeping  HEME/LYMPH: No easy bruising, or bleeding gums  ALLERGY AND IMMUNOLOGIC: No hives or eczema	        PHYSICAL EXAM:  T(C): 36.9 (04-19-24 @ 11:10), Max: 36.9 (04-19-24 @ 11:10)  HR: 56 (04-19-24 @ 11:10) (52 - 60)  BP: 125/66 (04-19-24 @ 11:10) (125/66 - 156/69)  RR: 18 (04-19-24 @ 11:10) (17 - 18)  SpO2: 98% (04-19-24 @ 11:10) (97% - 100%)  Wt(kg): --  I&O's Summary    18 Apr 2024 07:01  -  19 Apr 2024 07:00  --------------------------------------------------------  IN: 600 mL / OUT: 2250 mL / NET: -1650 mL        Appearance: Normal	  HEENT:   Normal oral mucosa, PERRL, EOMI	  Lymphatic: No lymphadenopathy  Cardiovascular: Normal S1 S2, No JVD, No murmurs, No edema  Respiratory: Lungs clear to auscultation	  Psychiatry: A & O x 3, Mood & affect appropriate  Gastrointestinal:  Soft, Non-tender, + BS	  Skin: No rashes, No ecchymoses, No cyanosis	  Neurologic: Non-focal  Extremities: Normal range of motion, No clubbing, cyanosis or edema  Vascular: Peripheral pulses palpable 2+ bilaterally    MEDICATIONS  (STANDING):  atorvastatin 20 milliGRAM(s) Oral at bedtime  brimonidine 0.2% Ophthalmic Solution 1 Drop(s) Right EYE two times a day  carvedilol 12.5 milliGRAM(s) Oral every 12 hours  cefTRIAXone   IVPB 2000 milliGRAM(s) IV Intermittent every 24 hours  chlorhexidine 2% Cloths 1 Application(s) Topical <User Schedule>  ciprofloxacin  0.3% Ophthalmic Solution 1 Drop(s) Right EYE two times a day  dextrose 10% Bolus 125 milliLiter(s) IV Bolus once  dextrose 5%. 1000 milliLiter(s) (100 mL/Hr) IV Continuous <Continuous>  dextrose 5%. 1000 milliLiter(s) (50 mL/Hr) IV Continuous <Continuous>  dextrose 50% Injectable 25 Gram(s) IV Push once  dextrose 50% Injectable 12.5 Gram(s) IV Push once  dorzolamide 2% Ophthalmic Solution 1 Drop(s) Both EYES three times a day  glucagon  Injectable 1 milliGRAM(s) IntraMuscular once  hydrALAZINE 50 milliGRAM(s) Oral three times a day  insulin lispro (ADMELOG) corrective regimen sliding scale   SubCutaneous Before meals and at bedtime  ketorolac 0.5% Ophthalmic Solution 1 Drop(s) Right EYE two times a day  minocycline IVPB 100 milliGRAM(s) IV Intermittent every 12 hours  minocycline IVPB      polyethylene glycol 3350 17 Gram(s) Oral daily  prednisoLONE acetate 1% Suspension 1 Drop(s) Right EYE every 6 hours  senna 2 Tablet(s) Oral at bedtime  sevelamer carbonate 800 milliGRAM(s) Oral three times a day with meals  sodium zirconium cyclosilicate 10 Gram(s) Oral daily  trimethoprim / sulfamethoxazole IVPB 240 milliGRAM(s) IV Intermittent every 24 hours        	  LABS:	 	                                    10.7   7.72  )-----------( 151      ( 18 Apr 2024 10:00 )             32.1     04-18    135  |  105  |  76<H>  ----------------------------<  165<H>  4.6   |  22  |  5.36<H>    Ca    8.2<L>      18 Apr 2024 10:00  Phos  6.1     04-18  Mg     2.5     04-18

## 2024-04-19 NOTE — PROGRESS NOTE ADULT - ASSESSMENT
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  1.Strep bactermia,.Abx as per ID.URVASHI no vegetation  2.ESRD-HD as per renal.  3.HTN-cont bp medication.  4.DM-Insulin.  5.Lipid d/o-statin.  6.PC to be placed next week.  7.GI and DVT prophylaxis.  .

## 2024-04-19 NOTE — PROGRESS NOTE ADULT - SUBJECTIVE AND OBJECTIVE BOX
Patient is a 78y old  Male who presents with a chief complaint of Sepsis (18 Apr 2024 17:41)    pt seen in tele [ x ], reg med floor [   ], bed [x  ], chair at bedside [   ], a+o x3 [ x ], lethargic [  ],    nad [ x ]      Allergies    No Known Allergies        Vitals    T(F): 98.1 (04-19-24 @ 04:49), Max: 98.1 (04-18-24 @ 16:15)  HR: 52 (04-19-24 @ 04:49) (50 - 60)  BP: 142/71 (04-19-24 @ 04:49) (121/64 - 156/69)  RR: 18 (04-19-24 @ 04:49) (17 - 18)  SpO2: 97% (04-19-24 @ 04:49) (97% - 100%)  Wt(kg): --  CAPILLARY BLOOD GLUCOSE      POCT Blood Glucose.: 115 mg/dL (18 Apr 2024 21:10)      Labs                          10.7   7.72  )-----------( 151      ( 18 Apr 2024 10:00 )             32.1       04-18    135  |  105  |  76<H>  ----------------------------<  165<H>  4.6   |  22  |  5.36<H>    Ca    8.2<L>      18 Apr 2024 10:00  Phos  6.1     04-18  Mg     2.5     04-18    TPro  7.4  /  Alb  3.0<L>  /  TBili  0.3  /  DBili  x   /  AST  14  /  ALT  14  /  AlkPhos  144<H>  04-17            .Blood Blood-Peripheral  04-17 @ 10:15   No growth at 24 hours  --  --      .Blood Blood-Peripheral  04-17 @ 10:00   No growth at 24 hours  --  --      .Blood Dialysis Catheter  04-13 @ 17:00   No growth at 5 days  --  --      Cath Site Catheter Site  04-13 @ 09:39   Rare Stenotrophomonas maltophilia  --  Stenotrophomonas maltophilia      Clean Catch Clean Catch (Midstream)  04-12 @ 18:13   <10,000 CFU/mL Normal Urogenital Kavita  --  --      .Blood Blood-Peripheral  04-12 @ 18:00   Growth in anaerobic bottle: Streptococcus salivarius/vestibularis group  "Susceptibilities not performed"  Direct identification is available within approximately 3-5  hours either by Blood Panel Multiplexed PCR or Direct  MALDI-TOF. Details: [email or web address]  --  Blood Culture PCR      .Blood Blood-Peripheral  04-12 @ 17:50   Growth in aerobic and anaerobic bottles: Streptococcus  salivarius/vestibularis group  --  Streptococcus salivarius/vestibularis group          Radiology Results      Meds    MEDICATIONS  (STANDING):  atorvastatin 20 milliGRAM(s) Oral at bedtime  brimonidine 0.2% Ophthalmic Solution 1 Drop(s) Right EYE two times a day  carvedilol 12.5 milliGRAM(s) Oral every 12 hours  cefTRIAXone   IVPB 2000 milliGRAM(s) IV Intermittent every 24 hours  chlorhexidine 2% Cloths 1 Application(s) Topical <User Schedule>  ciprofloxacin  0.3% Ophthalmic Solution 1 Drop(s) Right EYE two times a day  dextrose 10% Bolus 125 milliLiter(s) IV Bolus once  dextrose 5%. 1000 milliLiter(s) (100 mL/Hr) IV Continuous <Continuous>  dextrose 5%. 1000 milliLiter(s) (50 mL/Hr) IV Continuous <Continuous>  dextrose 50% Injectable 25 Gram(s) IV Push once  dextrose 50% Injectable 12.5 Gram(s) IV Push once  dorzolamide 2% Ophthalmic Solution 1 Drop(s) Both EYES three times a day  glucagon  Injectable 1 milliGRAM(s) IntraMuscular once  hydrALAZINE 50 milliGRAM(s) Oral three times a day  insulin lispro (ADMELOG) corrective regimen sliding scale   SubCutaneous Before meals and at bedtime  ketorolac 0.5% Ophthalmic Solution 1 Drop(s) Right EYE two times a day  losartan 50 milliGRAM(s) Oral daily  minocycline IVPB 100 milliGRAM(s) IV Intermittent every 12 hours  minocycline IVPB      polyethylene glycol 3350 17 Gram(s) Oral daily  prednisoLONE acetate 1% Suspension 1 Drop(s) Right EYE every 6 hours  senna 2 Tablet(s) Oral at bedtime  trimethoprim / sulfamethoxazole IVPB 240 milliGRAM(s) IV Intermittent every 24 hours      MEDICATIONS  (PRN):  acetaminophen     Tablet .. 650 milliGRAM(s) Oral every 6 hours PRN Temp greater or equal to 38C (100.4F), Mild Pain (1 - 3)  dextrose Oral Gel 15 Gram(s) Oral once PRN Blood Glucose LESS THAN 70 milliGRAM(s)/deciliter      Physical Exam    Neuro :  no focal deficits  Respiratory: CTA B/L  CV: RRR, S1S2, no murmurs,   Abdominal: Soft, NT, ND +BS,  Extremities: No edema, + peripheral pulses      ASSESSMENT      uncontrolled htn,   abnormal trop r/o acs,   poss 2nd to demand ischemia,    abd pain poss 2nd to constipation,    fever poss 2nd to line sepsis   bacteremia   pulmonary nodules,    bronchogenic cyst, or foregut duplication cyst,   hyperkalemia  h/o sinus node dysfunction,   chronic HFpEF,   aortic stenosis,   HTN,   HLD,   b/l carotid stenosis,   DM (not on meds),   ESRD on dialysis (Tue, Thu, Sat),   Gout,    glaucoma,   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  cx and sens fr hd cath site with Stenotrophomonas maltophilia noted above   blood cx cath neg noted above  id f/u    TTE with ejection fraction visually estimated at 50 to 55 %. mild (grade 1) left ventricular diastolic dysfunction.   No vegetations seen on this study, consider URVASHI for further evaluation if clinically indicated noted    URVASHI with No thrombus seen in the left atrial, left atrial appendage, right atrium.   Agitated saline injection was negative for intracardiac shunt.   No vegetations seen on this study noted    ir to remove the Perm-a-catheter today   cont IV Bactrim and Minocycline to cover Stenotrophomonas  Continue Ceftriaxone 2 g daily to cover Streptococcus  f/u Repeat Blood cultures X 2 to document clearing the blood stream  procalcitonin 0.6 noted    quantiferon tb gold test neg noted    pulm f/u    Follow up CT in 12 months  Nodify testing as OP.  pt on hd t,th,s   renal f/u   K improved after dialysis,   follow 2 gm K diet.   Malfunction catheter max  ml/minute 4/16/24,   after removal pt will need placement of new catheter next week  AVG placement after discharge at [facility].   hgba1c 6.4 noted    lispro ss   cont current meds         Patient is a 78y old  Male who presents with a chief complaint of Sepsis (18 Apr 2024 17:41)    pt seen in tele [ x ], reg med floor [   ], bed [x  ], chair at bedside [   ], a+o x3 [ x ], lethargic [  ],    nad [ x ]      Allergies    No Known Allergies        Vitals    T(F): 98.1 (04-19-24 @ 04:49), Max: 98.1 (04-18-24 @ 16:15)  HR: 52 (04-19-24 @ 04:49) (50 - 60)  BP: 142/71 (04-19-24 @ 04:49) (121/64 - 156/69)  RR: 18 (04-19-24 @ 04:49) (17 - 18)  SpO2: 97% (04-19-24 @ 04:49) (97% - 100%)  Wt(kg): --  CAPILLARY BLOOD GLUCOSE      POCT Blood Glucose.: 115 mg/dL (18 Apr 2024 21:10)      Labs                          10.7   7.72  )-----------( 151      ( 18 Apr 2024 10:00 )             32.1       04-18    135  |  105  |  76<H>  ----------------------------<  165<H>  4.6   |  22  |  5.36<H>    Ca    8.2<L>      18 Apr 2024 10:00  Phos  6.1     04-18  Mg     2.5     04-18    TPro  7.4  /  Alb  3.0<L>  /  TBili  0.3  /  DBili  x   /  AST  14  /  ALT  14  /  AlkPhos  144<H>  04-17            .Blood Blood-Peripheral  04-17 @ 10:15   No growth at 24 hours  --  --      .Blood Blood-Peripheral  04-17 @ 10:00   No growth at 24 hours  --  --      .Blood Dialysis Catheter  04-13 @ 17:00   No growth at 5 days  --  --      Cath Site Catheter Site  04-13 @ 09:39   Rare Stenotrophomonas maltophilia  --  Stenotrophomonas maltophilia      Clean Catch Clean Catch (Midstream)  04-12 @ 18:13   <10,000 CFU/mL Normal Urogenital Kavita  --  --      .Blood Blood-Peripheral  04-12 @ 18:00   Growth in anaerobic bottle: Streptococcus salivarius/vestibularis group  "Susceptibilities not performed"  Direct identification is available within approximately 3-5  hours either by Blood Panel Multiplexed PCR or Direct  MALDI-TOF. Details: [email or web address]  --  Blood Culture PCR      .Blood Blood-Peripheral  04-12 @ 17:50   Growth in aerobic and anaerobic bottles: Streptococcus  salivarius/vestibularis group  --  Streptococcus salivarius/vestibularis group          Radiology Results      Meds    MEDICATIONS  (STANDING):  atorvastatin 20 milliGRAM(s) Oral at bedtime  brimonidine 0.2% Ophthalmic Solution 1 Drop(s) Right EYE two times a day  carvedilol 12.5 milliGRAM(s) Oral every 12 hours  cefTRIAXone   IVPB 2000 milliGRAM(s) IV Intermittent every 24 hours  chlorhexidine 2% Cloths 1 Application(s) Topical <User Schedule>  ciprofloxacin  0.3% Ophthalmic Solution 1 Drop(s) Right EYE two times a day  dextrose 10% Bolus 125 milliLiter(s) IV Bolus once  dextrose 5%. 1000 milliLiter(s) (100 mL/Hr) IV Continuous <Continuous>  dextrose 5%. 1000 milliLiter(s) (50 mL/Hr) IV Continuous <Continuous>  dextrose 50% Injectable 25 Gram(s) IV Push once  dextrose 50% Injectable 12.5 Gram(s) IV Push once  dorzolamide 2% Ophthalmic Solution 1 Drop(s) Both EYES three times a day  glucagon  Injectable 1 milliGRAM(s) IntraMuscular once  hydrALAZINE 50 milliGRAM(s) Oral three times a day  insulin lispro (ADMELOG) corrective regimen sliding scale   SubCutaneous Before meals and at bedtime  ketorolac 0.5% Ophthalmic Solution 1 Drop(s) Right EYE two times a day  losartan 50 milliGRAM(s) Oral daily  minocycline IVPB 100 milliGRAM(s) IV Intermittent every 12 hours  minocycline IVPB      polyethylene glycol 3350 17 Gram(s) Oral daily  prednisoLONE acetate 1% Suspension 1 Drop(s) Right EYE every 6 hours  senna 2 Tablet(s) Oral at bedtime  trimethoprim / sulfamethoxazole IVPB 240 milliGRAM(s) IV Intermittent every 24 hours      MEDICATIONS  (PRN):  acetaminophen     Tablet .. 650 milliGRAM(s) Oral every 6 hours PRN Temp greater or equal to 38C (100.4F), Mild Pain (1 - 3)  dextrose Oral Gel 15 Gram(s) Oral once PRN Blood Glucose LESS THAN 70 milliGRAM(s)/deciliter      Physical Exam    Neuro :  no focal deficits  Respiratory: CTA B/L  CV: RRR, S1S2, no murmurs,   Abdominal: Soft, NT, ND +BS,  Extremities: No edema, + peripheral pulses      ASSESSMENT      uncontrolled htn,   abnormal trop r/o acs,   poss 2nd to demand ischemia,    abd pain poss 2nd to constipation,    fever poss 2nd to line sepsis   bacteremia   pulmonary nodules,    bronchogenic cyst, or foregut duplication cyst,   hyperkalemia  h/o sinus node dysfunction,   chronic HFpEF,   aortic stenosis,   HTN,   HLD,   b/l carotid stenosis,   DM (not on meds),   ESRD on dialysis (Tue, Thu, Sat),   Gout,    glaucoma,   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  cx and sens fr hd cath site with Stenotrophomonas maltophilia noted above   blood cx cath neg noted above  id f/u    TTE with ejection fraction visually estimated at 50 to 55 %. mild (grade 1) left ventricular diastolic dysfunction.   No vegetations seen on this study, consider URVASHI for further evaluation if clinically indicated noted    URVASHI with No thrombus seen in the left atrial, left atrial appendage, right atrium.   Agitated saline injection was negative for intracardiac shunt.   No vegetations seen on this study noted    s/p ir removal of Perm-a-catheter 4/18/24   cont IV Bactrim and Minocycline to cover Stenotrophomonas  Continue Ceftriaxone 2 g daily to cover Streptococcus  Repeat Blood cultures 4/17/24 neg noted above   procalcitonin 0.6 noted    quantiferon tb gold test neg noted    pulm f/u    Follow up CT in 12 months  Nodify testing as OP.  pt on hd t,th,s   renal f/u   K improved after dialysis,   follow 2 gm K diet.   Malfunction catheter max  ml/minute 4/16/24,   pt will need placement of new catheter next week  AVG placement after discharge at [facility].   hgba1c 6.4 noted    lispro ss   cont current meds

## 2024-04-19 NOTE — PROGRESS NOTE ADULT - ASSESSMENT
1. Abdominal pain   2. Constipation  3. Cholelithiasis  4. Anemia  5. No evidence of acute GI bleeding  6. Sepsis    Suggestions:    1. Protonix 40mg daily  2. Avoid NSAID  3. Daily stool softener  4. Monitor H/H  5. Transfuse PRBC as needed  6. Diet as tolerated  7. Continue antibiotics  8. DVT prophylaxis

## 2024-04-19 NOTE — PROGRESS NOTE ADULT - PROBLEM SELECTOR PLAN 1
H/o MSSA infection-Pt admitted to [facility] on 11/24/2023 and discharged on 12/08/23.  His permacath was exchanged on Friday 11/25/23. Treated w/ Cefazolin.   - P/w Sepsis & permacath possible source    - S/P (-) UA & RVP    - mrsa/mssa pcr negative   - 4/12 (+) Bld cx  - 4/13 HD Bld cx negative. 4/13 HD cath site bld cx (+) Stenotrophomonas  - 4/17 Bld cx negative (however this was done before Permacath removed)  - IR removed right chest permacath on 4/18  - Quant Tb negative  - S/p Vancomycin      - Currently on Ceftriaxone, Minocycline & Bactrim  - repeat blood cultures on 4/20 am  - ID-Dr. Cui following

## 2024-04-20 LAB
ANION GAP SERPL CALC-SCNC: 10 MMOL/L — SIGNIFICANT CHANGE UP (ref 5–17)
BUN SERPL-MCNC: 76 MG/DL — HIGH (ref 7–18)
CALCIUM SERPL-MCNC: 9.1 MG/DL — SIGNIFICANT CHANGE UP (ref 8.4–10.5)
CHLORIDE SERPL-SCNC: 104 MMOL/L — SIGNIFICANT CHANGE UP (ref 96–108)
CO2 SERPL-SCNC: 20 MMOL/L — LOW (ref 22–31)
CREAT SERPL-MCNC: 5.21 MG/DL — HIGH (ref 0.5–1.3)
EGFR: 11 ML/MIN/1.73M2 — LOW
GLUCOSE BLDC GLUCOMTR-MCNC: 101 MG/DL — HIGH (ref 70–99)
GLUCOSE BLDC GLUCOMTR-MCNC: 110 MG/DL — HIGH (ref 70–99)
GLUCOSE BLDC GLUCOMTR-MCNC: 120 MG/DL — HIGH (ref 70–99)
GLUCOSE BLDC GLUCOMTR-MCNC: 94 MG/DL — SIGNIFICANT CHANGE UP (ref 70–99)
GLUCOSE SERPL-MCNC: 87 MG/DL — SIGNIFICANT CHANGE UP (ref 70–99)
MAGNESIUM SERPL-MCNC: 2.4 MG/DL — SIGNIFICANT CHANGE UP (ref 1.6–2.6)
PHOSPHATE SERPL-MCNC: 5.8 MG/DL — HIGH (ref 2.5–4.5)
POTASSIUM SERPL-MCNC: 5 MMOL/L — SIGNIFICANT CHANGE UP (ref 3.5–5.3)
POTASSIUM SERPL-SCNC: 5 MMOL/L — SIGNIFICANT CHANGE UP (ref 3.5–5.3)
SODIUM SERPL-SCNC: 134 MMOL/L — LOW (ref 135–145)

## 2024-04-20 RX ORDER — FUROSEMIDE 40 MG
40 TABLET ORAL EVERY 12 HOURS
Refills: 0 | Status: COMPLETED | OUTPATIENT
Start: 2024-04-20 | End: 2024-04-21

## 2024-04-20 RX ADMIN — Medication 1 DROP(S): at 23:45

## 2024-04-20 RX ADMIN — DORZOLAMIDE HYDROCHLORIDE 1 DROP(S): 20 SOLUTION/ DROPS OPHTHALMIC at 21:25

## 2024-04-20 RX ADMIN — Medication 50 MILLIGRAM(S): at 22:59

## 2024-04-20 RX ADMIN — SEVELAMER CARBONATE 800 MILLIGRAM(S): 2400 POWDER, FOR SUSPENSION ORAL at 12:05

## 2024-04-20 RX ADMIN — Medication 50 MILLIGRAM(S): at 14:20

## 2024-04-20 RX ADMIN — BRIMONIDINE TARTRATE 1 DROP(S): 2 SOLUTION/ DROPS OPHTHALMIC at 17:25

## 2024-04-20 RX ADMIN — DORZOLAMIDE HYDROCHLORIDE 1 DROP(S): 20 SOLUTION/ DROPS OPHTHALMIC at 14:20

## 2024-04-20 RX ADMIN — Medication 1 DROP(S): at 06:46

## 2024-04-20 RX ADMIN — Medication 1 DROP(S): at 18:15

## 2024-04-20 RX ADMIN — SEVELAMER CARBONATE 800 MILLIGRAM(S): 2400 POWDER, FOR SUSPENSION ORAL at 08:22

## 2024-04-20 RX ADMIN — Medication 1 DROP(S): at 07:47

## 2024-04-20 RX ADMIN — POLYETHYLENE GLYCOL 3350 17 GRAM(S): 17 POWDER, FOR SOLUTION ORAL at 12:04

## 2024-04-20 RX ADMIN — BRIMONIDINE TARTRATE 1 DROP(S): 2 SOLUTION/ DROPS OPHTHALMIC at 06:22

## 2024-04-20 RX ADMIN — Medication 40 MILLIGRAM(S): at 17:25

## 2024-04-20 RX ADMIN — Medication 1 DROP(S): at 12:04

## 2024-04-20 RX ADMIN — SEVELAMER CARBONATE 800 MILLIGRAM(S): 2400 POWDER, FOR SUSPENSION ORAL at 18:15

## 2024-04-20 RX ADMIN — Medication 265 MILLIGRAM(S): at 16:45

## 2024-04-20 RX ADMIN — MINOCYCLINE HYDROCHLORIDE 100 MILLIGRAM(S): 45 TABLET, EXTENDED RELEASE ORAL at 18:14

## 2024-04-20 RX ADMIN — Medication 40 MILLIGRAM(S): at 08:21

## 2024-04-20 RX ADMIN — Medication 50 MILLIGRAM(S): at 06:21

## 2024-04-20 RX ADMIN — CHLORHEXIDINE GLUCONATE 1 APPLICATION(S): 213 SOLUTION TOPICAL at 06:46

## 2024-04-20 RX ADMIN — SENNA PLUS 2 TABLET(S): 8.6 TABLET ORAL at 21:23

## 2024-04-20 RX ADMIN — ATORVASTATIN CALCIUM 20 MILLIGRAM(S): 80 TABLET, FILM COATED ORAL at 21:23

## 2024-04-20 RX ADMIN — MINOCYCLINE HYDROCHLORIDE 100 MILLIGRAM(S): 45 TABLET, EXTENDED RELEASE ORAL at 06:31

## 2024-04-20 RX ADMIN — CARVEDILOL PHOSPHATE 12.5 MILLIGRAM(S): 80 CAPSULE, EXTENDED RELEASE ORAL at 06:21

## 2024-04-20 RX ADMIN — SODIUM ZIRCONIUM CYCLOSILICATE 10 GRAM(S): 10 POWDER, FOR SUSPENSION ORAL at 12:04

## 2024-04-20 RX ADMIN — CEFTRIAXONE 100 MILLIGRAM(S): 500 INJECTION, POWDER, FOR SOLUTION INTRAMUSCULAR; INTRAVENOUS at 18:15

## 2024-04-20 RX ADMIN — CARVEDILOL PHOSPHATE 12.5 MILLIGRAM(S): 80 CAPSULE, EXTENDED RELEASE ORAL at 17:25

## 2024-04-20 RX ADMIN — DORZOLAMIDE HYDROCHLORIDE 1 DROP(S): 20 SOLUTION/ DROPS OPHTHALMIC at 06:30

## 2024-04-20 RX ADMIN — Medication 1 DROP(S): at 18:16

## 2024-04-20 NOTE — PROGRESS NOTE ADULT - ASSESSMENT
ESRD - dialysis days are T-T-S. Post PC removal , monitor renal function daily and need for dialysis. Plan of new catheter placement as per ID.  DC Losartan , follow up with Lokelma 10 gm daily , 2 gm K diet and fluid restriction. Repeat lab in am.    Fever with bacteremia Streptococcus sp. (Not Grp A, B or S pneumoniae): Detec  Gram Stain:  catheter site blood cultures  are negative, Exit site culture positive for Culture - Other (04.13.24 @ 09:39)   Post PC removal 04/18 24. Last dialysis 04/18/24.  Minocycline: S  - Levofloxacin: S <=0.5  - Trimethoprim/Sulfamethoxazole: S <=0.5/9.5  Specimen Source: Cath Site Catheter Site  Culture Results:   Rare Stenotrophomonas maltophilia      Malfunction catheter max  ml/minute today.  Removal of PC by IR due to Stenotrophomonas maltophilia positive cultures .

## 2024-04-20 NOTE — DIETITIAN INITIAL EVALUATION ADULT - PERTINENT LABORATORY DATA
04-20    134<L>  |  104  |  76<H>  ----------------------------<  87  5.0   |  20<L>  |  5.21<H>    Ca    9.1      20 Apr 2024 06:05  Phos  5.8     04-20  Mg     2.4     04-20    POCT Blood Glucose.: 110 mg/dL (04-20-24 @ 11:25)  A1C with Estimated Average Glucose Result: 6.4 % (04-13-24 @ 05:00)  A1C with Estimated Average Glucose Result: 6.4 % (12-17-23 @ 05:59)

## 2024-04-20 NOTE — DIETITIAN INITIAL EVALUATION ADULT - OTHER INFO
Pt Visited. Pt is Alert and Oriented. D/W Son ( Rick ) at bedside. Pt is [place] Speaking. But able to make his needs known. Son assisted in Obtaining Information. Pt Lives with his son. Appetite is Good. NKFA. No chewing or swallowing Difficulty Reported. Per Pt h/O HD x ~ 2 Years. At home Pt has HHA  who cooks for him and he received Meal on wheels. Pt w Left eye Blindness.  Per Pt Ht 63 inches, Bed scale 180 lb. Per Son Pt is Not taking any meds for DM. A1c 6.4. . Meds Noted On Renvela.

## 2024-04-20 NOTE — DIETITIAN INITIAL EVALUATION ADULT - PROBLEM SELECTOR PLAN 1
Hx as above. Non specific symptoms. Hx of permacath infection in Dec 2023 and changed. High concerns for permacath as source of Sepsis   Temp 100.4F borderline for criteria, and tachypnea  WBC count 10.53  UA negative   RVP negative   Normal serum Lactate  F/U Blood and Urine cultures  F/U Procal  CT chest noted. No upper or lower respiratory symptoms  CTH noted  F/U Procal  s/p Zosyn in the ED  Will continue Zosyn and add Vancomycin for now until cultures are back  ***MORNING TEAM TO CONSULT VASCULAR AND/OR IR TO CHANGE PERMACATH***  ***Please draw cultures from permacath***  Tylenol PRN

## 2024-04-20 NOTE — DIETITIAN INITIAL EVALUATION ADULT - PROBLEM SELECTOR PLAN 4
CT Abdomen and Pelvis No Cont  showed No acute septic pathology. Scattered bilateral small pulmonary nodules measuring up to 7 mm in the right upper lobe are nonspecific and may represent infectious/inflammatory disease or metastatic disease. 3.1 x 2.1 cm cystic structure in the posterior mediastinum at the level of the leelee may represent a lymphatic structure, bronchogenic cyst, orforegut duplication cyst. No intra-abdominal collection.  ***MORNING TEAM TO CONSULT PULM***

## 2024-04-20 NOTE — PROGRESS NOTE ADULT - SUBJECTIVE AND OBJECTIVE BOX
Patient is seen and examined at the bed side, is afebrile. He is doing better, tolerating Abx well.       REVIEW OF SYSTEMS: All other review systems are negative      ALLERGIES: No Known Allergies      Vital Signs Last 24 Hrs  T(C): 36.7 (20 Apr 2024 21:03), Max: 36.8 (20 Apr 2024 04:49)  T(F): 98.1 (20 Apr 2024 21:03), Max: 98.2 (20 Apr 2024 04:49)  HR: 48 (20 Apr 2024 21:03) (48 - 60)  BP: 124/56 (20 Apr 2024 21:03) (124/56 - 165/76)  BP(mean): --  RR: 16 (20 Apr 2024 21:03) (16 - 18)  SpO2: 97% (20 Apr 2024 21:03) (97% - 99%)    Parameters below as of 20 Apr 2024 21:03  Patient On (Oxygen Delivery Method): room air        PHYSICAL EXAM:  GENERAL: Not in distress   CHEST/LUNG: Not using accessory muscles   HEART: s1 and s2 present  ABDOMEN:  Nontender and  Nondistended  EXTREMITIES: No pedal  edema  CNS: Awake and Alert      LABS: No new CBC                          10.7   7.72  )-----------( 151      ( 18 Apr 2024 10:00 )             32.1                           11.7   7.63  )-----------( 153      ( 17 Apr 2024 10:00 )             35.2       04-20    134<L>  |  104  |  76<H>  ----------------------------<  87  5.0   |  20<L>  |  5.21<H>    Ca    9.1      20 Apr 2024 06:05  Phos  5.8     04-20  Mg     2.4     04-20      04-18    135  |  105  |  76<H>  ----------------------------<  165<H>  4.6   |  22  |  5.36<H>    Ca    8.2<L>      18 Apr 2024 10:00  Phos  6.1     04-18  Mg     2.5     04-18    TPro  7.4  /  Alb  3.0<L>  /  TBili  0.3  /  DBili  x   /  AST  14  /  ALT  14  /  AlkPhos  144<H>  04-17    PT/INR - ( 13 Apr 2024 05:00 )   PT: 16.3 sec;   INR: 1.45 ratio      PTT - ( 13 Apr 2024 05:00 )  PTT:26.7 sec      CAPILLARY BLOOD GLUCOSE  POCT Blood Glucose.: 186 mg/dL (13 Apr 2024 11:30)  POCT Blood Glucose.: 121 mg/dL (13 Apr 2024 07:37)      < from: URVASHI W or WO Ultrasound Enhancing Agent (04.17.24 @ 07:41) >  CONCLUSIONS:      1. Left ventricular wall thickness is normal. Left ventricular systolic function is normal. There are no regional wall motion abnormalities seen.   2. Normal right ventricular cavity size, with normal wall thickness, and normal systolic function.   3. Normal left and right atrial size.   4. No thrombus seen in the left atrial, left atrial appendage, right atrium..   5. Trace tricuspid regurgitation. Pulmonary artery systolic pressure could not be estimated.   6. Mild mitral regurgitation.   7. Trace pulmonic regurgitation.   8. Agitated saline injection was negative for intracardiac shunt.   9. Left atrial appendage emptying velocites are normal.  10. Normal pulmonary venous flow.  11. Aortic root at the sinuses of Valsalva is normal in size, measuring 3.30 cm (indexed 1.79 cm/m²).  12. Mild aortic regurgitation.  13. No pericardial effusion seen.  14. No vegetations seen on this study.      < from: TTE W or WO Ultrasound Enhancing Agent (04.15.24 @ 12:57) >     CONCLUSIONS:      1. Left ventricular cavity is normal in size. Left ventricular systolic function is normal with an ejection fraction visually estimated at 50 to 55 %. There are no regional wall motion abnormalities seen.   2. There is mild (grade 1) left ventricular diastolic dysfunction.   3. Normal right ventricular cavity size, with normal wall thickness, and normal systolic function. Tricuspid annular plane systolic excursion (TAPSE) is 2.1 cm (normal >=1.7 cm).   4. Normal left and right atrial size.   5. Trace tricuspid regurgitation.   6. Compared to the transthoracic echocardiogram performed on 12/18/2023, there have been no significant interval changes.   7. Estimated pulmonary artery systolic pressure is 20 mmHg, consistent with normal pulmonary artery pressure.   8. Mild aortic regurgitation.   9. Mild pulmonic regurgitation.  10. There is calcification of the mitral valve annulus.  11. No pericardial effusion seen.  12. No vegetations seen on this study,consider URVASHI for further evaluation if clinically indicated.      MEDICATIONS  (STANDING):    atorvastatin 20 milliGRAM(s) Oral at bedtime  brimonidine 0.2% Ophthalmic Solution 1 Drop(s) Right EYE two times a day  carvedilol 12.5 milliGRAM(s) Oral every 12 hours  cefTRIAXone   IVPB 2000 milliGRAM(s) IV Intermittent every 24 hours  chlorhexidine 2% Cloths 1 Application(s) Topical <User Schedule>  ciprofloxacin  0.3% Ophthalmic Solution 1 Drop(s) Right EYE two times a day  dorzolamide 2% Ophthalmic Solution 1 Drop(s) Both EYES three times a day  furosemide   Injectable 40 milliGRAM(s) IV Push every 12 hours  glucagon  Injectable 1 milliGRAM(s) IntraMuscular once  hydrALAZINE 50 milliGRAM(s) Oral three times a day  insulin lispro (ADMELOG) corrective regimen sliding scale   SubCutaneous Before meals and at bedtime  ketorolac 0.5% Ophthalmic Solution 1 Drop(s) Right EYE two times a day  minocycline IVPB 100 milliGRAM(s) IV Intermittent every 12 hours  minocycline IVPB      polyethylene glycol 3350 17 Gram(s) Oral daily  prednisoLONE acetate 1% Suspension 1 Drop(s) Right EYE every 6 hours  senna 2 Tablet(s) Oral at bedtime  sevelamer carbonate 800 milliGRAM(s) Oral three times a day with meals  sodium zirconium cyclosilicate 10 Gram(s) Oral daily  trimethoprim / sulfamethoxazole IVPB 240 milliGRAM(s) IV Intermittent every 24 hours      RADIOLOGY & ADDITIONAL TESTS:    4/12/24 : CT Abdomen and Pelvis No Cont (04.12.24 @ 18:53) >  *  No acute septic pathology.  *  Scattered bilateral small pulmonary nodules measuring up to 7 mm in the right upper lobe are nonspecific and may represent   infectious/inflammatory disease or metastatic disease.  *  3.1 x 2.1 cm cystic structure in the posterior mediastinum at the level of the leelee may represent a lymphatic structure, bronchogenic cyst, or foregut duplication cyst.  *  No intra-abdominal collection.      4/12/24 : CT Chest No Cont (04.12.24 @ 18:53) LUNGS AND LARGE AIRWAYS: The central airways are patent. Scattered   bilateral small pulmonary nodules measuring up to 7 mm in the right upper  lobe are nonspecific. No acute consolidation.  PLEURA: Trace effusions.      MICROBIOLOGY DATA:    Culture - Other (04.13.24 @ 09:39)   - Minocycline: S  - Levofloxacin: S <=0.5  - Trimethoprim/Sulfamethoxazole: S <=0.5/9.5  Specimen Source: Cath Site Catheter Site  Culture Results:   Rare Stenotrophomonas maltophilia  Organism Identification: Stenotrophomonas maltophilia  Organism: Stenotrophomonas maltophilia  Organism: Stenotrophomonas maltophilia  Method Type: KB  Method Type: LAWRENCE    Culture - Blood (04.13.24 @ 17:00)   Specimen Source: .Blood Dialysis Catheter  Culture Results: No growth at 48 hours      Culture - Other (04.13.24 @ 09:39)   Specimen Source: Cath Site Catheter Site  Culture Results: No growth to date.      Culture - Blood (04.12.24 @ 17:50)   Gram Stain:   Growth in aerobic bottle: Gram positive cocci in pairs  Specimen Source: .Blood Blood-Peripheral  Culture Results:   Growth in aerobic bottle: Streptococcus salivarius/vestibularis group   Susceptibility to follow.      Culture - Blood (04.12.24 @ 18:00)   Gram Stain:   Growth in anaerobic bottle: Gram positive cocci in pairs  - Streptococcus sp. (Not Grp A, B or S pneumoniae): Detec  Specimen Source: .Blood Blood-Peripheral  Organism: Blood Culture PCR  Culture Results:   Growth in anaerobic bottle: Gram positive cocci in pairs   Direct identification is available within approximately 3-5   hours either by Blood Panel Multiplexed PCR or Direct   MALDI-TOF. Details: [email or web address]  Organism Identification: Blood Culture PCR  Method Type: PCR      Culture - Blood (04.12.24 @ 17:50)   Gram Stain:   Growth in aerobic bottle: Gram positive cocci in pairs  Specimen Source: .Blood Blood-Peripheral  Culture Results:   Growth in aerobic bottle: Gram positive cocci in pairs    Urine Microscopic-Add On (NC) (04.12.24 @ 18:13)   Red Blood Cell - Urine: 3 /HPF  White Blood Cell - Urine: 2 /HPF  Bacteria: Occasional /HPF    Respiratory Viral Panel with COVID-19 by OXANA (04.12.24 @ 17:50)   Rapid RVP Result: NotDetec  SARS-CoV-2: NotDetec:

## 2024-04-20 NOTE — PROGRESS NOTE ADULT - SUBJECTIVE AND OBJECTIVE BOX
ESRD  Post PC removal. Exit site infection.    PAST MEDICAL & SURGICAL HISTORY:  DM (diabetes mellitus)      HTN (hypertension)      Chronic kidney disease      HLD (hyperlipidemia)      Glaucoma      Gout      No significant past surgical history          No Known Allergies      MEDICATIONS  (STANDING):  atorvastatin 20 milliGRAM(s) Oral at bedtime  brimonidine 0.2% Ophthalmic Solution 1 Drop(s) Right EYE two times a day  carvedilol 12.5 milliGRAM(s) Oral every 12 hours  cefTRIAXone   IVPB 2000 milliGRAM(s) IV Intermittent every 24 hours  chlorhexidine 2% Cloths 1 Application(s) Topical <User Schedule>  ciprofloxacin  0.3% Ophthalmic Solution 1 Drop(s) Right EYE two times a day  dextrose 10% Bolus 125 milliLiter(s) IV Bolus once  dextrose 5%. 1000 milliLiter(s) (100 mL/Hr) IV Continuous <Continuous>  dextrose 5%. 1000 milliLiter(s) (50 mL/Hr) IV Continuous <Continuous>  dextrose 50% Injectable 25 Gram(s) IV Push once  dextrose 50% Injectable 12.5 Gram(s) IV Push once  dorzolamide 2% Ophthalmic Solution 1 Drop(s) Both EYES three times a day  glucagon  Injectable 1 milliGRAM(s) IntraMuscular once  hydrALAZINE 50 milliGRAM(s) Oral three times a day  insulin lispro (ADMELOG) corrective regimen sliding scale   SubCutaneous Before meals and at bedtime  ketorolac 0.5% Ophthalmic Solution 1 Drop(s) Right EYE two times a day  minocycline IVPB      minocycline IVPB 100 milliGRAM(s) IV Intermittent every 12 hours  polyethylene glycol 3350 17 Gram(s) Oral daily  prednisoLONE acetate 1% Suspension 1 Drop(s) Right EYE every 6 hours  senna 2 Tablet(s) Oral at bedtime  sevelamer carbonate 800 milliGRAM(s) Oral three times a day with meals  sodium zirconium cyclosilicate 10 Gram(s) Oral daily  trimethoprim / sulfamethoxazole IVPB 240 milliGRAM(s) IV Intermittent every 24 hours    MEDICATIONS  (PRN):  acetaminophen     Tablet .. 650 milliGRAM(s) Oral every 6 hours PRN Temp greater or equal to 38C (100.4F), Mild Pain (1 - 3)  dextrose Oral Gel 15 Gram(s) Oral once PRN Blood Glucose LESS THAN 70 milliGRAM(s)/deciliter                            10.7   7.72  )-----------( 151      ( 18 Apr 2024 10:00 )             32.1     04-20    134<L>  |  104  |  76<H>  ----------------------------<  87  5.0   |  20<L>  |  5.21<H>    Ca    9.1      20 Apr 2024 06:05  Phos  5.8     04-20  Mg     2.4     04-20      PT/INR - ( 18 Apr 2024 10:40 )   PT: 14.1 sec;   INR: 1.24 ratio                 REVIEW OF SYSTEMS:  General: no fever no chills, no weight loss.  EYES/ENT: No visual changes;  No vertigo, no headache.  NECK: No pain or stiffness  RESPIRATORY: No cough, wheezing, hemoptysis; No shortness of breath  CARDIOVASCULAR: No chest pain or palpitations. No Edema  GASTROINTESTINAL: No abdominal or epigastric pain. No nausea, vomiting. No diarrhea or constipation. No melena.  GENITOURINARY: No dysuria, frequency, foamy urine, urinary urgency, incontinence or hematuria  NEUROLOGICAL: No numbness or weakness, no tremor , no dizziness.   Muscle skeletal : no joint pain and no swelling of joints and limbs.  SKIN: No itching, burning, rashes.        VITALS:  T(F): 98.2 (04-20-24 @ 04:49), Max: 98.4 (04-19-24 @ 11:10)  HR: 56 (04-20-24 @ 04:49)  BP: 149/72 (04-20-24 @ 04:49)  RR: 18 (04-20-24 @ 04:49)  SpO2: 98% (04-20-24 @ 04:49)  Wt(kg): --    04-19 @ 07:01  -  04-20 @ 07:00  --------------------------------------------------------  IN: 0 mL / OUT: 1000 mL / NET: -1000 mL        PHYSICAL EXAM:  Constitutional: well developed, no diaphoresis, no distress.  Neck: No JVD, no carotid bruit, supple, no adenopathy  Respiratory: Good air entrance B/L, no wheezes, rales or rhonchi  Cardiovascular: S1, S2, RRR, no pericardial rub, no murmur  Abdomen: BS+, soft, no tenderness, no bruit  Pelvis: bladder nondistended  Extremities: No cyanosis or clubbing. No peripheral edema.   Pulses: All present  Neurological: A/O x 3, no focal deficits  Psychiatric: Normal mood, normal affect  Skin: No rashes  Vascular Access:     ESRD  Post PC removal. Exit site infection.    PAST MEDICAL & SURGICAL HISTORY:  DM (diabetes mellitus)      HTN (hypertension)      Chronic kidney disease      HLD (hyperlipidemia)      Glaucoma      Gout      No significant past surgical history          No Known Allergies      MEDICATIONS  (STANDING):  atorvastatin 20 milliGRAM(s) Oral at bedtime  brimonidine 0.2% Ophthalmic Solution 1 Drop(s) Right EYE two times a day  carvedilol 12.5 milliGRAM(s) Oral every 12 hours  cefTRIAXone   IVPB 2000 milliGRAM(s) IV Intermittent every 24 hours  chlorhexidine 2% Cloths 1 Application(s) Topical <User Schedule>  ciprofloxacin  0.3% Ophthalmic Solution 1 Drop(s) Right EYE two times a day  dextrose 10% Bolus 125 milliLiter(s) IV Bolus once  dextrose 5%. 1000 milliLiter(s) (100 mL/Hr) IV Continuous <Continuous>  dextrose 5%. 1000 milliLiter(s) (50 mL/Hr) IV Continuous <Continuous>  dextrose 50% Injectable 25 Gram(s) IV Push once  dextrose 50% Injectable 12.5 Gram(s) IV Push once  dorzolamide 2% Ophthalmic Solution 1 Drop(s) Both EYES three times a day  glucagon  Injectable 1 milliGRAM(s) IntraMuscular once  hydrALAZINE 50 milliGRAM(s) Oral three times a day  insulin lispro (ADMELOG) corrective regimen sliding scale   SubCutaneous Before meals and at bedtime  ketorolac 0.5% Ophthalmic Solution 1 Drop(s) Right EYE two times a day  minocycline IVPB      minocycline IVPB 100 milliGRAM(s) IV Intermittent every 12 hours  polyethylene glycol 3350 17 Gram(s) Oral daily  prednisoLONE acetate 1% Suspension 1 Drop(s) Right EYE every 6 hours  senna 2 Tablet(s) Oral at bedtime  sevelamer carbonate 800 milliGRAM(s) Oral three times a day with meals  sodium zirconium cyclosilicate 10 Gram(s) Oral daily  trimethoprim / sulfamethoxazole IVPB 240 milliGRAM(s) IV Intermittent every 24 hours    MEDICATIONS  (PRN):  acetaminophen     Tablet .. 650 milliGRAM(s) Oral every 6 hours PRN Temp greater or equal to 38C (100.4F), Mild Pain (1 - 3)  dextrose Oral Gel 15 Gram(s) Oral once PRN Blood Glucose LESS THAN 70 milliGRAM(s)/deciliter                            10.7   7.72  )-----------( 151      ( 18 Apr 2024 10:00 )             32.1     04-20    134<L>  |  104  |  76<H>  ----------------------------<  87  5.0   |  20<L>  |  5.21<H>    Ca    9.1      20 Apr 2024 06:05  Phos  5.8     04-20  Mg     2.4     04-20      PT/INR - ( 18 Apr 2024 10:40 )   PT: 14.1 sec;   INR: 1.24 ratio                 REVIEW OF SYSTEMS:  General: no fever no chills, no weight loss.    NECK: No pain or stiffness  RESPIRATORY: No cough, wheezing, hemoptysis; No shortness of breath  CARDIOVASCULAR: No chest pain or palpitations. No Edema  GASTROINTESTINAL: No abdominal or epigastric pain. No nausea, vomiting. No diarrhea or constipation. No melena.  GENITOURINARY: No dysuria, frequency, foamy urine, urinary urgency, incontinence or hematuria          VITALS:  T(F): 98.2 (04-20-24 @ 04:49), Max: 98.4 (04-19-24 @ 11:10)  HR: 56 (04-20-24 @ 04:49)  BP: 149/72 (04-20-24 @ 04:49)  RR: 18 (04-20-24 @ 04:49)  SpO2: 98% (04-20-24 @ 04:49)  Wt(kg): --    04-19 @ 07:01  -  04-20 @ 07:00  --------------------------------------------------------  IN: 0 mL / OUT: 1000 mL / NET: -1000 mL        PHYSICAL EXAM:  Constitutional: well developed, no diaphoresis, no distress.  Neck: No JVD, no carotid bruit.  Respiratory: Good air entrance B/L, no wheezes, rales or rhonchi  Cardiovascular: S1, S2, RRR, no pericardial rub, no murmur  Abdomen: BS+, soft, no tenderness, no bruit  Pelvis: bladder nondistended  Extremities: No cyanosis or clubbing. No peripheral edema.     Vascular Access: none at present  No tremor

## 2024-04-20 NOTE — PROGRESS NOTE ADULT - SUBJECTIVE AND OBJECTIVE BOX
Date of Service 04-20-24 @ 13:10    CHIEF COMPLAINT:Patient is a 78y old  Male who presents with a chief complaint of Sepsis.Pt appears comfortable.    	  REVIEW OF SYSTEMS:  CONSTITUTIONAL: No fever, weight loss, or fatigue  EYES: No eye pain, visual disturbances, or discharge  ENT:  No difficulty hearing, tinnitus, vertigo; No sinus or throat pain  NECK: No pain or stiffness  RESPIRATORY: No cough, wheezing, chills or hemoptysis; No Shortness of Breath  CARDIOVASCULAR: No chest pain, palpitations, passing out, dizziness, or leg swelling  GASTROINTESTINAL: No abdominal or epigastric pain. No nausea, vomiting, or hematemesis; No diarrhea or constipation. No melena or hematochezia.  GENITOURINARY: No dysuria, frequency, hematuria, or incontinence  NEUROLOGICAL: No headaches, memory loss, loss of strength, numbness, or tremors  SKIN: No itching, burning, rashes, or lesions   LYMPH Nodes: No enlarged glands  ENDOCRINE: No heat or cold intolerance; No hair loss  MUSCULOSKELETAL: No joint pain or swelling; No muscle, back, or extremity pain  PSYCHIATRIC: No depression, anxiety, mood swings, or difficulty sleeping  HEME/LYMPH: No easy bruising, or bleeding gums  ALLERGY AND IMMUNOLOGIC: No hives or eczema	    PHYSICAL EXAM:  T(C): 36.8 (04-20-24 @ 04:49), Max: 36.9 (04-19-24 @ 19:21)  HR: 56 (04-20-24 @ 04:49) (56 - 60)  BP: 149/72 (04-20-24 @ 04:49) (149/72 - 177/91)  RR: 18 (04-20-24 @ 04:49) (18 - 19)  SpO2: 98% (04-20-24 @ 04:49) (95% - 99%)  Wt(kg): --  I&O's Summary    19 Apr 2024 07:01  -  20 Apr 2024 07:00  --------------------------------------------------------  IN: 0 mL / OUT: 1000 mL / NET: -1000 mL        Appearance: Normal	  HEENT:   Normal oral mucosa, PERRL, EOMI	  Lymphatic: No lymphadenopathy  Cardiovascular: Normal S1 S2, No JVD, No murmurs, No edema  Respiratory: Lungs clear to auscultation	  Psychiatry: A & O x 3, Mood & affect appropriate  Gastrointestinal:  Soft, Non-tender, + BS	  Skin: No rashes, No ecchymoses, No cyanosis	  Neurologic: Non-focal  Extremities: Normal range of motion, No clubbing, cyanosis or edema  Vascular: Peripheral pulses palpable 2+ bilaterally    MEDICATIONS  (STANDING):  atorvastatin 20 milliGRAM(s) Oral at bedtime  brimonidine 0.2% Ophthalmic Solution 1 Drop(s) Right EYE two times a day  carvedilol 12.5 milliGRAM(s) Oral every 12 hours  cefTRIAXone   IVPB 2000 milliGRAM(s) IV Intermittent every 24 hours  chlorhexidine 2% Cloths 1 Application(s) Topical <User Schedule>  ciprofloxacin  0.3% Ophthalmic Solution 1 Drop(s) Right EYE two times a day  dextrose 10% Bolus 125 milliLiter(s) IV Bolus once  dextrose 5%. 1000 milliLiter(s) (50 mL/Hr) IV Continuous <Continuous>  dextrose 5%. 1000 milliLiter(s) (100 mL/Hr) IV Continuous <Continuous>  dextrose 50% Injectable 25 Gram(s) IV Push once  dextrose 50% Injectable 12.5 Gram(s) IV Push once  dorzolamide 2% Ophthalmic Solution 1 Drop(s) Both EYES three times a day  furosemide   Injectable 40 milliGRAM(s) IV Push every 12 hours  glucagon  Injectable 1 milliGRAM(s) IntraMuscular once  hydrALAZINE 50 milliGRAM(s) Oral three times a day  insulin lispro (ADMELOG) corrective regimen sliding scale   SubCutaneous Before meals and at bedtime  ketorolac 0.5% Ophthalmic Solution 1 Drop(s) Right EYE two times a day  minocycline IVPB 100 milliGRAM(s) IV Intermittent every 12 hours  minocycline IVPB      polyethylene glycol 3350 17 Gram(s) Oral daily  prednisoLONE acetate 1% Suspension 1 Drop(s) Right EYE every 6 hours  senna 2 Tablet(s) Oral at bedtime  sevelamer carbonate 800 milliGRAM(s) Oral three times a day with meals  sodium zirconium cyclosilicate 10 Gram(s) Oral daily  trimethoprim / sulfamethoxazole IVPB 240 milliGRAM(s) IV Intermittent every 24 hours        LABS:	 	                04-20    134<L>  |  104  |  76<H>  ----------------------------<  87  5.0   |  20<L>  |  5.21<H>    Ca    9.1      20 Apr 2024 06:05  Phos  5.8     04-20  Mg     2.4     04-20

## 2024-04-20 NOTE — DIETITIAN INITIAL EVALUATION ADULT - PROBLEM SELECTOR PLAN 7
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

## 2024-04-20 NOTE — PROGRESS NOTE ADULT - SUBJECTIVE AND OBJECTIVE BOX
[   ] ICU                                          [   ] CCU                                      [   ] Medical Floor    VITALS   Vital Signs Last 24 Hrs  T(C): 36.8 (04-20-24 @ 04:49), Max: 36.9 (04-19-24 @ 11:10)  T(F): 98.2 (04-20-24 @ 04:49), Max: 98.4 (04-19-24 @ 11:10)  HR: 56 (04-20-24 @ 04:49) (56 - 60)  BP: 149/72 (04-20-24 @ 04:49) (125/66 - 177/91)     RR: 18 (04-20-24 @ 04:49) (18 - 19)  SpO2: 98% (04-20-24 @ 04:49) (95% - 99%)        MEDICATIONS  (STANDING):  atorvastatin 20 milliGRAM(s) Oral at bedtime  brimonidine 0.2% Ophthalmic Solution 1 Drop(s) Right EYE two times a day  carvedilol 12.5 milliGRAM(s) Oral every 12 hours  cefTRIAXone   IVPB 2000 milliGRAM(s) IV Intermittent every 24 hours  chlorhexidine 2% Cloths 1 Application(s) Topical <User Schedule>  ciprofloxacin  0.3% Ophthalmic Solution 1 Drop(s) Right EYE two times a day  dextrose 10% Bolus 125 milliLiter(s) IV Bolus once  dextrose 5%. 1000 milliLiter(s) (100 mL/Hr) IV Continuous <Continuous>  dextrose 5%. 1000 milliLiter(s) (50 mL/Hr) IV Continuous <Continuous>  dextrose 50% Injectable 25 Gram(s) IV Push once  dextrose 50% Injectable 12.5 Gram(s) IV Push once  dorzolamide 2% Ophthalmic Solution 1 Drop(s) Both EYES three times a day  furosemide   Injectable 40 milliGRAM(s) IV Push every 12 hours  glucagon  Injectable 1 milliGRAM(s) IntraMuscular once  hydrALAZINE 50 milliGRAM(s) Oral three times a day  insulin lispro (ADMELOG) corrective regimen sliding scale   SubCutaneous Before meals and at bedtime  ketorolac 0.5% Ophthalmic Solution 1 Drop(s) Right EYE two times a day  minocycline IVPB      minocycline IVPB 100 milliGRAM(s) IV Intermittent every 12 hours  polyethylene glycol 3350 17 Gram(s) Oral daily  prednisoLONE acetate 1% Suspension 1 Drop(s) Right EYE every 6 hours  senna 2 Tablet(s) Oral at bedtime  sevelamer carbonate 800 milliGRAM(s) Oral three times a day with meals  sodium zirconium cyclosilicate 10 Gram(s) Oral daily  trimethoprim / sulfamethoxazole IVPB 240 milliGRAM(s) IV Intermittent every 24 hours    MEDICATIONS  (PRN):  acetaminophen     Tablet .. 650 milliGRAM(s) Oral every 6 hours PRN Temp greater or equal to 38C (100.4F), Mild Pain (1 - 3)  dextrose Oral Gel 15 Gram(s) Oral once PRN Blood Glucose LESS THAN 70 milliGRAM(s)/deciliter          04-20    134<L>  |  104  |  76<H>  ----------------------------<  87  5.0   |  20<L>  |  5.21<H>    Ca    9.1      20 Apr 2024 06:05  Phos  5.8     04-20  Mg     2.4     04-20        PT/INR - ( 18 Apr 2024 10:40 )   PT: 14.1 sec;   INR: 1.24 ratio          [   ] ICU                                          [   ] CCU                                      [ X  ] Medical Floor    Patient is a 78 year old male with abdominal pain. GI consulted to evaluate.        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 Patient also c/o constipation but denies nausea, vomiting, hematemesis, hematochezia, melena, fever, chest pain, SOB, cough, hematuria, or diarrhea.      Today patient appears comfortable. No new complaints reported, No abdominal pain, N/V, hematemesis, hematochezia, melena, fever, chills, chest pain, SOB, cough or diarrhea reported.         PAST MEDICAL HISTORY     DM (diabetes mellitus)    HTN (hypertension)     Diabetes mellitus    ESRD on HD     Hyperlipidemia     Glaucoma    Gout        PAST SURGICAL HISTORY    No significant past surgical history        Allergies    No Known Allergies    Intolerances  None       SOCIAL HISTORY  Advanced Directives:       [X  ] Full Code       [  ] DNR  Marital Status:         [  ] M      [X  ] S      [  ] D       [  ] W  Children:       [ X ] Yes      [  ] No  Occupation:        [  ] Employed       [ X ] Unemployed       [  ] Retired  Diet:       [ X ] Regular       [  ] PEG feeding          [  ] NG tube feeding  Drug Use:           [ X ] Patient denied          [  ] Yes  Alcohol:           [X  ] No             [  ] Yes (socially)         [  ] Yes (chronic)  Tobacco:           [  ] Yes           [ X ] No    FAMILY HISTORY  [ X ] Heart Disease            [ X ] Diabetes             [ X ] HTN             [  ] Colon Cancer             [  ] Stomach Cancer              [  ] Pancreatic Cancer          VITALS   Vital Signs Last 24 Hrs  T(C): 36.8 (04-20-24 @ 04:49), Max: 36.9 (04-19-24 @ 11:10)  T(F): 98.2 (04-20-24 @ 04:49), Max: 98.4 (04-19-24 @ 11:10)  HR: 56 (04-20-24 @ 04:49) (56 - 60)  BP: 149/72 (04-20-24 @ 04:49) (125/66 - 177/91)   RR: 18 (04-20-24 @ 04:49) (18 - 19)  SpO2: 98% (04-20-24 @ 04:49) (95% - 99%)        MEDICATIONS  (STANDING):  atorvastatin 20 milliGRAM(s) Oral at bedtime  brimonidine 0.2% Ophthalmic Solution 1 Drop(s) Right EYE two times a day  carvedilol 12.5 milliGRAM(s) Oral every 12 hours  cefTRIAXone   IVPB 2000 milliGRAM(s) IV Intermittent every 24 hours  chlorhexidine 2% Cloths 1 Application(s) Topical <User Schedule>  ciprofloxacin  0.3% Ophthalmic Solution 1 Drop(s) Right EYE two times a day  dextrose 10% Bolus 125 milliLiter(s) IV Bolus once  dextrose 5%. 1000 milliLiter(s) (100 mL/Hr) IV Continuous <Continuous>  dextrose 5%. 1000 milliLiter(s) (50 mL/Hr) IV Continuous <Continuous>  dextrose 50% Injectable 25 Gram(s) IV Push once  dextrose 50% Injectable 12.5 Gram(s) IV Push once  dorzolamide 2% Ophthalmic Solution 1 Drop(s) Both EYES three times a day  furosemide   Injectable 40 milliGRAM(s) IV Push every 12 hours  glucagon  Injectable 1 milliGRAM(s) IntraMuscular once  hydrALAZINE 50 milliGRAM(s) Oral three times a day  insulin lispro (ADMELOG) corrective regimen sliding scale   SubCutaneous Before meals and at bedtime  ketorolac 0.5% Ophthalmic Solution 1 Drop(s) Right EYE two times a day  minocycline IVPB      minocycline IVPB 100 milliGRAM(s) IV Intermittent every 12 hours  polyethylene glycol 3350 17 Gram(s) Oral daily  prednisoLONE acetate 1% Suspension 1 Drop(s) Right EYE every 6 hours  senna 2 Tablet(s) Oral at bedtime  sevelamer carbonate 800 milliGRAM(s) Oral three times a day with meals  sodium zirconium cyclosilicate 10 Gram(s) Oral daily  trimethoprim / sulfamethoxazole IVPB 240 milliGRAM(s) IV Intermittent every 24 hours    MEDICATIONS  (PRN):  acetaminophen     Tablet .. 650 milliGRAM(s) Oral every 6 hours PRN Temp greater or equal to 38C (100.4F), Mild Pain (1 - 3)  dextrose Oral Gel 15 Gram(s) Oral once PRN Blood Glucose LESS THAN 70 milliGRAM(s)/deciliter          04-20    134<L>  |  104  |  76<H>  ----------------------------<  87  5.0   |  20<L>  |  5.21<H>    Ca    9.1      20 Apr 2024 06:05  Phos  5.8     04-20  Mg     2.4     04-20        PT/INR - ( 18 Apr 2024 10:40 )   PT: 14.1 sec;   INR: 1.24 ratio

## 2024-04-20 NOTE — DIETITIAN INITIAL EVALUATION ADULT - PROBLEM SELECTOR PLAN 6
***MORNING TEAM TO CONFIRM [facility]***  Will start ISS  Finger stick before meal and bedtime   F/U HA1c  Diabetic diet.

## 2024-04-20 NOTE — PROGRESS NOTE ADULT - SUBJECTIVE AND OBJECTIVE BOX
Patient is a 78y old  Male who presents with a chief complaint of Sepsis (20 Apr 2024 07:41)  Awake, alert and comfortable in bed in [facility].    INTERVAL HPI/OVERNIGHT EVENTS:      VITAL SIGNS:  T(F): 98.2 (04-20-24 @ 04:49)  HR: 56 (04-20-24 @ 04:49)  BP: 149/72 (04-20-24 @ 04:49)  RR: 18 (04-20-24 @ 04:49)  SpO2: 98% (04-20-24 @ 04:49)  Wt(kg): --  I&O's Detail    19 Apr 2024 07:01  -  20 Apr 2024 07:00  --------------------------------------------------------  IN:  Total IN: 0 mL    OUT:    Voided (mL): 1000 mL  Total OUT: 1000 mL    Total NET: -1000 mL              REVIEW OF SYSTEMS:    CONSTITUTIONAL:  No fevers, chills, sweats    HEENT:  Eyes:  No diplopia or blurred vision. ENT:  No earache, sore throat or runny nose.    CARDIOVASCULAR:  No pressure, squeezing, tightness, or heaviness about the chest; no palpitations.    RESPIRATORY:  Per HPI    GASTROINTESTINAL:  No abdominal pain, nausea, vomiting or diarrhea.    GENITOURINARY:  No dysuria, frequency or urgency.    NEUROLOGIC:  No paresthesias, fasciculations, seizures or weakness.    PSYCHIATRIC:  No disorder of thought or mood.      PHYSICAL EXAM:    Constitutional: Well developed and nourished  Eyes:Perrla  ENMT: normal  Neck:supple  Respiratory: good air entry  Cardiovascular: S1 S2 regular  Gastrointestinal: Soft, Non tender  Extremities: No edema  Vascular:normal  Neurological:Awake, alert,Ox3  Musculoskeletal:Normal      MEDICATIONS  (STANDING):  atorvastatin 20 milliGRAM(s) Oral at bedtime  brimonidine 0.2% Ophthalmic Solution 1 Drop(s) Right EYE two times a day  carvedilol 12.5 milliGRAM(s) Oral every 12 hours  cefTRIAXone   IVPB 2000 milliGRAM(s) IV Intermittent every 24 hours  chlorhexidine 2% Cloths 1 Application(s) Topical <User Schedule>  ciprofloxacin  0.3% Ophthalmic Solution 1 Drop(s) Right EYE two times a day  dextrose 10% Bolus 125 milliLiter(s) IV Bolus once  dextrose 5%. 1000 milliLiter(s) (50 mL/Hr) IV Continuous <Continuous>  dextrose 5%. 1000 milliLiter(s) (100 mL/Hr) IV Continuous <Continuous>  dextrose 50% Injectable 25 Gram(s) IV Push once  dextrose 50% Injectable 12.5 Gram(s) IV Push once  dorzolamide 2% Ophthalmic Solution 1 Drop(s) Both EYES three times a day  furosemide   Injectable 40 milliGRAM(s) IV Push every 12 hours  glucagon  Injectable 1 milliGRAM(s) IntraMuscular once  hydrALAZINE 50 milliGRAM(s) Oral three times a day  insulin lispro (ADMELOG) corrective regimen sliding scale   SubCutaneous Before meals and at bedtime  ketorolac 0.5% Ophthalmic Solution 1 Drop(s) Right EYE two times a day  minocycline IVPB 100 milliGRAM(s) IV Intermittent every 12 hours  minocycline IVPB      polyethylene glycol 3350 17 Gram(s) Oral daily  prednisoLONE acetate 1% Suspension 1 Drop(s) Right EYE every 6 hours  senna 2 Tablet(s) Oral at bedtime  sevelamer carbonate 800 milliGRAM(s) Oral three times a day with meals  sodium zirconium cyclosilicate 10 Gram(s) Oral daily  trimethoprim / sulfamethoxazole IVPB 240 milliGRAM(s) IV Intermittent every 24 hours    MEDICATIONS  (PRN):  acetaminophen     Tablet .. 650 milliGRAM(s) Oral every 6 hours PRN Temp greater or equal to 38C (100.4F), Mild Pain (1 - 3)  dextrose Oral Gel 15 Gram(s) Oral once PRN Blood Glucose LESS THAN 70 milliGRAM(s)/deciliter      Allergies    No Known Allergies    Intolerances        LABS:                        10.7   7.72  )-----------( 151      ( 18 Apr 2024 10:00 )             32.1     04-20    134<L>  |  104  |  76<H>  ----------------------------<  87  5.0   |  20<L>  |  5.21<H>    Ca    9.1      20 Apr 2024 06:05  Phos  5.8     04-20  Mg     2.4     04-20      PT/INR - ( 18 Apr 2024 10:40 )   PT: 14.1 sec;   INR: 1.24 ratio           Urinalysis Basic - ( 20 Apr 2024 06:05 )    Color: x / Appearance: x / SG: x / pH: x  Gluc: 87 mg/dL / Ketone: x  / Bili: x / Urobili: x   Blood: x / Protein: x / Nitrite: x   Leuk Esterase: x / RBC: x / WBC x   Sq Epi: x / Non Sq Epi: x / Bacteria: x            CAPILLARY BLOOD GLUCOSE      POCT Blood Glucose.: 94 mg/dL (20 Apr 2024 07:52)  POCT Blood Glucose.: 95 mg/dL (19 Apr 2024 21:07)  POCT Blood Glucose.: 124 mg/dL (19 Apr 2024 16:38)  POCT Blood Glucose.: 111 mg/dL (19 Apr 2024 11:24)        RADIOLOGY & ADDITIONAL TESTS:    CXR:    Ct scan chest:    ekg;    echo: Patient is a 78y old  Male who presents with a chief complaint of Sepsis (20 Apr 2024 07:41)  Awake, alert and lying in bed in NAD. Doing well on RA and waiting for PC replacement.    INTERVAL HPI/OVERNIGHT EVENTS:      VITAL SIGNS:  T(F): 98.2 (04-20-24 @ 04:49)  HR: 56 (04-20-24 @ 04:49)  BP: 149/72 (04-20-24 @ 04:49)  RR: 18 (04-20-24 @ 04:49)  SpO2: 98% (04-20-24 @ 04:49)  Wt(kg): --  I&O's Detail    19 Apr 2024 07:01  -  20 Apr 2024 07:00  --------------------------------------------------------  IN:  Total IN: 0 mL    OUT:    Voided (mL): 1000 mL  Total OUT: 1000 mL    Total NET: -1000 mL              REVIEW OF SYSTEMS:    CONSTITUTIONAL:  No fevers, chills, sweats    HEENT:  Eyes:  No diplopia or blurred vision. ENT:  No earache, sore throat or runny nose.    CARDIOVASCULAR:  No pressure, squeezing, tightness, or heaviness about the chest; no palpitations.    RESPIRATORY:  Per HPI    GASTROINTESTINAL:  No abdominal pain, nausea, vomiting or diarrhea.    GENITOURINARY:  No dysuria, frequency or urgency.    NEUROLOGIC:  No paresthesias, fasciculations, seizures or weakness.    PSYCHIATRIC:  No disorder of thought or mood.      PHYSICAL EXAM:    Constitutional: Well developed and nourished  Eyes:Perrla  ENMT: normal  Neck:supple  Respiratory: good air entry  Cardiovascular: S1 S2 regular  Gastrointestinal: Soft, Non tender  Extremities: No edema  Vascular:normal  Neurological:Awake, alert,Ox3  Musculoskeletal:Normal      MEDICATIONS  (STANDING):  atorvastatin 20 milliGRAM(s) Oral at bedtime  brimonidine 0.2% Ophthalmic Solution 1 Drop(s) Right EYE two times a day  carvedilol 12.5 milliGRAM(s) Oral every 12 hours  cefTRIAXone   IVPB 2000 milliGRAM(s) IV Intermittent every 24 hours  chlorhexidine 2% Cloths 1 Application(s) Topical <User Schedule>  ciprofloxacin  0.3% Ophthalmic Solution 1 Drop(s) Right EYE two times a day  dextrose 10% Bolus 125 milliLiter(s) IV Bolus once  dextrose 5%. 1000 milliLiter(s) (50 mL/Hr) IV Continuous <Continuous>  dextrose 5%. 1000 milliLiter(s) (100 mL/Hr) IV Continuous <Continuous>  dextrose 50% Injectable 25 Gram(s) IV Push once  dextrose 50% Injectable 12.5 Gram(s) IV Push once  dorzolamide 2% Ophthalmic Solution 1 Drop(s) Both EYES three times a day  furosemide   Injectable 40 milliGRAM(s) IV Push every 12 hours  glucagon  Injectable 1 milliGRAM(s) IntraMuscular once  hydrALAZINE 50 milliGRAM(s) Oral three times a day  insulin lispro (ADMELOG) corrective regimen sliding scale   SubCutaneous Before meals and at bedtime  ketorolac 0.5% Ophthalmic Solution 1 Drop(s) Right EYE two times a day  minocycline IVPB 100 milliGRAM(s) IV Intermittent every 12 hours  minocycline IVPB      polyethylene glycol 3350 17 Gram(s) Oral daily  prednisoLONE acetate 1% Suspension 1 Drop(s) Right EYE every 6 hours  senna 2 Tablet(s) Oral at bedtime  sevelamer carbonate 800 milliGRAM(s) Oral three times a day with meals  sodium zirconium cyclosilicate 10 Gram(s) Oral daily  trimethoprim / sulfamethoxazole IVPB 240 milliGRAM(s) IV Intermittent every 24 hours    MEDICATIONS  (PRN):  acetaminophen     Tablet .. 650 milliGRAM(s) Oral every 6 hours PRN Temp greater or equal to 38C (100.4F), Mild Pain (1 - 3)  dextrose Oral Gel 15 Gram(s) Oral once PRN Blood Glucose LESS THAN 70 milliGRAM(s)/deciliter      Allergies    No Known Allergies    Intolerances        LABS:                        10.7   7.72  )-----------( 151      ( 18 Apr 2024 10:00 )             32.1     04-20    134<L>  |  104  |  76<H>  ----------------------------<  87  5.0   |  20<L>  |  5.21<H>    Ca    9.1      20 Apr 2024 06:05  Phos  5.8     04-20  Mg     2.4     04-20      PT/INR - ( 18 Apr 2024 10:40 )   PT: 14.1 sec;   INR: 1.24 ratio           Urinalysis Basic - ( 20 Apr 2024 06:05 )    Color: x / Appearance: x / SG: x / pH: x  Gluc: 87 mg/dL / Ketone: x  / Bili: x / Urobili: x   Blood: x / Protein: x / Nitrite: x   Leuk Esterase: x / RBC: x / WBC x   Sq Epi: x / Non Sq Epi: x / Bacteria: x            CAPILLARY BLOOD GLUCOSE      POCT Blood Glucose.: 94 mg/dL (20 Apr 2024 07:52)  POCT Blood Glucose.: 95 mg/dL (19 Apr 2024 21:07)  POCT Blood Glucose.: 124 mg/dL (19 Apr 2024 16:38)  POCT Blood Glucose.: 111 mg/dL (19 Apr 2024 11:24)        RADIOLOGY & ADDITIONAL TESTS:    CXR:    Ct scan chest:    ekg;    echo: Patient is a 78y old  Male who presents with a chief complaint of Sepsis (20 Apr 2024 07:41)  Awake, alert and lying in bed in NAD. Doing well on RA and waiting for PC replacement.    INTERVAL HPI/OVERNIGHT EVENTS:      VITAL SIGNS:  T(F): 98.2 (04-20-24 @ 04:49)  HR: 56 (04-20-24 @ 04:49)  BP: 149/72 (04-20-24 @ 04:49)  RR: 18 (04-20-24 @ 04:49)  SpO2: 98% (04-20-24 @ 04:49)  Wt(kg): --  I&O's Detail    19 Apr 2024 07:01  -  20 Apr 2024 07:00  --------------------------------------------------------  IN:  Total IN: 0 mL    OUT:    Voided (mL): 1000 mL  Total OUT: 1000 mL    Total NET: -1000 mL              REVIEW OF SYSTEMS:    CONSTITUTIONAL:  No fevers, chills, sweats    HEENT:  Eyes:  No diplopia or blurred vision. ENT:  No earache, sore throat or runny nose.    CARDIOVASCULAR:  No pressure, squeezing, tightness, or heaviness about the chest; no palpitations.    RESPIRATORY:  Per HPI    GASTROINTESTINAL:  No abdominal pain, nausea, vomiting or diarrhea.    GENITOURINARY:  No dysuria, frequency or urgency.    NEUROLOGIC:  No paresthesias, fasciculations, seizures or weakness.    PSYCHIATRIC:  No disorder of thought or mood.      PHYSICAL EXAM:    Constitutional: Well developed and nourished  Eyes:Perrla  ENMT: normal  Neck:supple  Respiratory: good air entry  Cardiovascular: S1 S2 regular  Gastrointestinal: Soft, Non tender  Extremities: No edema  Vascular:normal  Neurological:Awake, alert,Ox3  Musculoskeletal:Normal      MEDICATIONS  (STANDING):  atorvastatin 20 milliGRAM(s) Oral at bedtime  brimonidine 0.2% Ophthalmic Solution 1 Drop(s) Right EYE two times a day  carvedilol 12.5 milliGRAM(s) Oral every 12 hours  cefTRIAXone   IVPB 2000 milliGRAM(s) IV Intermittent every 24 hours  chlorhexidine 2% Cloths 1 Application(s) Topical <User Schedule>  ciprofloxacin  0.3% Ophthalmic Solution 1 Drop(s) Right EYE two times a day  dextrose 10% Bolus 125 milliLiter(s) IV Bolus once  dextrose 5%. 1000 milliLiter(s) (50 mL/Hr) IV Continuous <Continuous>  dextrose 5%. 1000 milliLiter(s) (100 mL/Hr) IV Continuous <Continuous>  dextrose 50% Injectable 25 Gram(s) IV Push once  dextrose 50% Injectable 12.5 Gram(s) IV Push once  dorzolamide 2% Ophthalmic Solution 1 Drop(s) Both EYES three times a day  furosemide   Injectable 40 milliGRAM(s) IV Push every 12 hours  glucagon  Injectable 1 milliGRAM(s) IntraMuscular once  hydrALAZINE 50 milliGRAM(s) Oral three times a day  insulin lispro (ADMELOG) corrective regimen sliding scale   SubCutaneous Before meals and at bedtime  ketorolac 0.5% Ophthalmic Solution 1 Drop(s) Right EYE two times a day  minocycline IVPB 100 milliGRAM(s) IV Intermittent every 12 hours  minocycline IVPB      polyethylene glycol 3350 17 Gram(s) Oral daily  prednisoLONE acetate 1% Suspension 1 Drop(s) Right EYE every 6 hours  senna 2 Tablet(s) Oral at bedtime  sevelamer carbonate 800 milliGRAM(s) Oral three times a day with meals  sodium zirconium cyclosilicate 10 Gram(s) Oral daily  trimethoprim / sulfamethoxazole IVPB 240 milliGRAM(s) IV Intermittent every 24 hours    MEDICATIONS  (PRN):  acetaminophen     Tablet .. 650 milliGRAM(s) Oral every 6 hours PRN Temp greater or equal to 38C (100.4F), Mild Pain (1 - 3)  dextrose Oral Gel 15 Gram(s) Oral once PRN Blood Glucose LESS THAN 70 milliGRAM(s)/deciliter      Allergies    No Known Allergies    Intolerances        LABS:                        10.7   7.72  )-----------( 151      ( 18 Apr 2024 10:00 )             32.1     04-20    134<L>  |  104  |  76<H>  ----------------------------<  87  5.0   |  20<L>  |  5.21<H>    Ca    9.1      20 Apr 2024 06:05  Phos  5.8     04-20  Mg     2.4     04-20      PT/INR - ( 18 Apr 2024 10:40 )   PT: 14.1 sec;   INR: 1.24 ratio           Urinalysis Basic - ( 20 Apr 2024 06:05 )    Color: x / Appearance: x / SG: x / pH: x  Gluc: 87 mg/dL / Ketone: x  / Bili: x / Urobili: x   Blood: x / Protein: x / Nitrite: x   Leuk Esterase: x / RBC: x / WBC x   Sq Epi: x / Non Sq Epi: x / Bacteria: x            CAPILLARY BLOOD GLUCOSE      POCT Blood Glucose.: 94 mg/dL (20 Apr 2024 07:52)  POCT Blood Glucose.: 95 mg/dL (19 Apr 2024 21:07)  POCT Blood Glucose.: 124 mg/dL (19 Apr 2024 16:38)  POCT Blood Glucose.: 111 mg/dL (19 Apr 2024 11:24)        RADIOLOGY & ADDITIONAL TESTS:    CXR:    Ct scan chest:  < from: Xray Chest 1 View- PORTABLE-Routine (Xray Chest 1 View- PORTABLE-Routine .) (04.16.24 @ 17:03) >  IMPRESSION:  Small left effusion. Catheter as noted.    < end of copied text >    ekg;    echo:

## 2024-04-20 NOTE — PROGRESS NOTE ADULT - SUBJECTIVE AND OBJECTIVE BOX
Patient is a 78y old  Male who presents with a chief complaint of Sepsis (19 Apr 2024 16:12)    pt seen in tele [ x ], reg med floor [   ], bed [x  ], chair at bedside [   ], a+o x3 [ x ], lethargic [  ],    nad [ x ]      Allergies    No Known Allergies        Vitals    T(F): 98.2 (04-20-24 @ 04:49), Max: 98.4 (04-19-24 @ 11:10)  HR: 56 (04-20-24 @ 04:49) (55 - 60)  BP: 149/72 (04-20-24 @ 04:49) (125/66 - 177/91)  RR: 18 (04-20-24 @ 04:49) (18 - 19)  SpO2: 98% (04-20-24 @ 04:49) (95% - 99%)  Wt(kg): --  CAPILLARY BLOOD GLUCOSE      POCT Blood Glucose.: 95 mg/dL (19 Apr 2024 21:07)      Labs                          10.7   7.72  )-----------( 151      ( 18 Apr 2024 10:00 )             32.1       04-18    135  |  105  |  76<H>  ----------------------------<  165<H>  4.6   |  22  |  5.36<H>    Ca    8.2<L>      18 Apr 2024 10:00  Phos  6.1     04-18  Mg     2.5     04-18              .Blood Blood-Peripheral  04-17 @ 10:15   No growth at 48 Hours  --  --      .Blood Blood-Peripheral  04-17 @ 10:00   No growth at 48 Hours  --  --      .Blood Dialysis Catheter  04-13 @ 17:00   No growth at 5 days  --  --      Cath Site Catheter Site  04-13 @ 09:39   Rare Stenotrophomonas maltophilia  --  Stenotrophomonas maltophilia      Clean Catch Clean Catch (Midstream)  04-12 @ 18:13   <10,000 CFU/mL Normal Urogenital Kavita  --  --      .Blood Blood-Peripheral  04-12 @ 18:00   Growth in anaerobic bottle: Streptococcus salivarius/vestibularis group  "Susceptibilities not performed"  Direct identification is available within approximately 3-5  hours either by Blood Panel Multiplexed PCR or Direct  MALDI-TOF. Details: [email or web address]  --  Blood Culture PCR      .Blood Blood-Peripheral  04-12 @ 17:50   Growth in aerobic and anaerobic bottles: Streptococcus  salivarius/vestibularis group  --  Streptococcus salivarius/vestibularis group          Radiology Results      Meds    MEDICATIONS  (STANDING):  atorvastatin 20 milliGRAM(s) Oral at bedtime  brimonidine 0.2% Ophthalmic Solution 1 Drop(s) Right EYE two times a day  carvedilol 12.5 milliGRAM(s) Oral every 12 hours  cefTRIAXone   IVPB 2000 milliGRAM(s) IV Intermittent every 24 hours  chlorhexidine 2% Cloths 1 Application(s) Topical <User Schedule>  ciprofloxacin  0.3% Ophthalmic Solution 1 Drop(s) Right EYE two times a day  dextrose 10% Bolus 125 milliLiter(s) IV Bolus once  dextrose 5%. 1000 milliLiter(s) (100 mL/Hr) IV Continuous <Continuous>  dextrose 5%. 1000 milliLiter(s) (50 mL/Hr) IV Continuous <Continuous>  dextrose 50% Injectable 25 Gram(s) IV Push once  dextrose 50% Injectable 12.5 Gram(s) IV Push once  dorzolamide 2% Ophthalmic Solution 1 Drop(s) Both EYES three times a day  glucagon  Injectable 1 milliGRAM(s) IntraMuscular once  hydrALAZINE 50 milliGRAM(s) Oral three times a day  insulin lispro (ADMELOG) corrective regimen sliding scale   SubCutaneous Before meals and at bedtime  ketorolac 0.5% Ophthalmic Solution 1 Drop(s) Right EYE two times a day  minocycline IVPB      minocycline IVPB 100 milliGRAM(s) IV Intermittent every 12 hours  polyethylene glycol 3350 17 Gram(s) Oral daily  prednisoLONE acetate 1% Suspension 1 Drop(s) Right EYE every 6 hours  senna 2 Tablet(s) Oral at bedtime  sevelamer carbonate 800 milliGRAM(s) Oral three times a day with meals  sodium zirconium cyclosilicate 10 Gram(s) Oral daily  trimethoprim / sulfamethoxazole IVPB 240 milliGRAM(s) IV Intermittent every 24 hours      MEDICATIONS  (PRN):  acetaminophen     Tablet .. 650 milliGRAM(s) Oral every 6 hours PRN Temp greater or equal to 38C (100.4F), Mild Pain (1 - 3)  dextrose Oral Gel 15 Gram(s) Oral once PRN Blood Glucose LESS THAN 70 milliGRAM(s)/deciliter      Physical Exam    Neuro :  no focal deficits  Respiratory: CTA B/L  CV: RRR, S1S2, no murmurs,   Abdominal: Soft, NT, ND +BS,  Extremities: No edema, + peripheral pulses      ASSESSMENT      uncontrolled htn,   abnormal trop r/o acs,   poss 2nd to demand ischemia,    abd pain poss 2nd to constipation,    fever poss 2nd to line sepsis   bacteremia   pulmonary nodules,    bronchogenic cyst, or foregut duplication cyst,   hyperkalemia  h/o sinus node dysfunction,   chronic HFpEF,   aortic stenosis,   HTN,   HLD,   b/l carotid stenosis,   DM (not on meds),   ESRD on dialysis (Tue, Thu, Sat),   Gout,    glaucoma,   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  cx and sens fr hd cath site with Stenotrophomonas maltophilia noted above   blood cx cath neg noted above  id f/u    TTE with ejection fraction visually estimated at 50 to 55 %. mild (grade 1) left ventricular diastolic dysfunction.   No vegetations seen on this study, consider URVASHI for further evaluation if clinically indicated noted    URVASHI with No thrombus seen in the left atrial, left atrial appendage, right atrium.   Agitated saline injection was negative for intracardiac shunt.   No vegetations seen on this study noted    s/p ir removal of Perm-a-catheter 4/18/24   cont IV Bactrim and Minocycline to cover Stenotrophomonas  Continue Ceftriaxone 2 g daily to cover Streptococcus  Repeat Blood cultures 4/17/24 neg noted above   procalcitonin 0.6 noted    quantiferon tb gold test neg noted    pulm f/u    Follow up CT in 12 months  Nodify testing as OP.  pt on hd t,th,s   renal f/u   K improved after dialysis,   follow 2 gm K diet.   Malfunction catheter max  ml/minute 4/16/24,   pt will need placement of new catheter next week  AVG placement after discharge at [facility].   hgba1c 6.4 noted    lispro ss   cont current meds       Patient is a 78y old  Male who presents with a chief complaint of Sepsis (19 Apr 2024 16:12)    pt seen in tele [ x ], reg med floor [   ], bed [x  ], chair at bedside [   ], a+o x3 [ x ], lethargic [  ],    nad [ x ]      Allergies    No Known Allergies        Vitals    T(F): 98.2 (04-20-24 @ 04:49), Max: 98.4 (04-19-24 @ 11:10)  HR: 56 (04-20-24 @ 04:49) (55 - 60)  BP: 149/72 (04-20-24 @ 04:49) (125/66 - 177/91)  RR: 18 (04-20-24 @ 04:49) (18 - 19)  SpO2: 98% (04-20-24 @ 04:49) (95% - 99%)  Wt(kg): --  CAPILLARY BLOOD GLUCOSE      POCT Blood Glucose.: 95 mg/dL (19 Apr 2024 21:07)      Labs                          10.7   7.72  )-----------( 151      ( 18 Apr 2024 10:00 )             32.1       04-18    135  |  105  |  76<H>  ----------------------------<  165<H>  4.6   |  22  |  5.36<H>    Ca    8.2<L>      18 Apr 2024 10:00  Phos  6.1     04-18  Mg     2.5     04-18              .Blood Blood-Peripheral  04-17 @ 10:15   No growth at 48 Hours  --  --      .Blood Blood-Peripheral  04-17 @ 10:00   No growth at 48 Hours  --  --      .Blood Dialysis Catheter  04-13 @ 17:00   No growth at 5 days  --  --      Cath Site Catheter Site  04-13 @ 09:39   Rare Stenotrophomonas maltophilia  --  Stenotrophomonas maltophilia      Clean Catch Clean Catch (Midstream)  04-12 @ 18:13   <10,000 CFU/mL Normal Urogenital Kavita  --  --      .Blood Blood-Peripheral  04-12 @ 18:00   Growth in anaerobic bottle: Streptococcus salivarius/vestibularis group  "Susceptibilities not performed"  Direct identification is available within approximately 3-5  hours either by Blood Panel Multiplexed PCR or Direct  MALDI-TOF. Details: [email or web address]  --  Blood Culture PCR      .Blood Blood-Peripheral  04-12 @ 17:50   Growth in aerobic and anaerobic bottles: Streptococcus  salivarius/vestibularis group  --  Streptococcus salivarius/vestibularis group          Radiology Results      Meds    MEDICATIONS  (STANDING):  atorvastatin 20 milliGRAM(s) Oral at bedtime  brimonidine 0.2% Ophthalmic Solution 1 Drop(s) Right EYE two times a day  carvedilol 12.5 milliGRAM(s) Oral every 12 hours  cefTRIAXone   IVPB 2000 milliGRAM(s) IV Intermittent every 24 hours  chlorhexidine 2% Cloths 1 Application(s) Topical <User Schedule>  ciprofloxacin  0.3% Ophthalmic Solution 1 Drop(s) Right EYE two times a day  dextrose 10% Bolus 125 milliLiter(s) IV Bolus once  dextrose 5%. 1000 milliLiter(s) (100 mL/Hr) IV Continuous <Continuous>  dextrose 5%. 1000 milliLiter(s) (50 mL/Hr) IV Continuous <Continuous>  dextrose 50% Injectable 25 Gram(s) IV Push once  dextrose 50% Injectable 12.5 Gram(s) IV Push once  dorzolamide 2% Ophthalmic Solution 1 Drop(s) Both EYES three times a day  glucagon  Injectable 1 milliGRAM(s) IntraMuscular once  hydrALAZINE 50 milliGRAM(s) Oral three times a day  insulin lispro (ADMELOG) corrective regimen sliding scale   SubCutaneous Before meals and at bedtime  ketorolac 0.5% Ophthalmic Solution 1 Drop(s) Right EYE two times a day  minocycline IVPB      minocycline IVPB 100 milliGRAM(s) IV Intermittent every 12 hours  polyethylene glycol 3350 17 Gram(s) Oral daily  prednisoLONE acetate 1% Suspension 1 Drop(s) Right EYE every 6 hours  senna 2 Tablet(s) Oral at bedtime  sevelamer carbonate 800 milliGRAM(s) Oral three times a day with meals  sodium zirconium cyclosilicate 10 Gram(s) Oral daily  trimethoprim / sulfamethoxazole IVPB 240 milliGRAM(s) IV Intermittent every 24 hours      MEDICATIONS  (PRN):  acetaminophen     Tablet .. 650 milliGRAM(s) Oral every 6 hours PRN Temp greater or equal to 38C (100.4F), Mild Pain (1 - 3)  dextrose Oral Gel 15 Gram(s) Oral once PRN Blood Glucose LESS THAN 70 milliGRAM(s)/deciliter      Physical Exam    Neuro :  no focal deficits  Respiratory: CTA B/L  CV: RRR, S1S2, no murmurs,   Abdominal: Soft, NT, ND +BS,  Extremities: No edema, + peripheral pulses      ASSESSMENT      uncontrolled htn,   abnormal trop r/o acs,   poss 2nd to demand ischemia,    abd pain poss 2nd to constipation,    fever poss 2nd to line sepsis   bacteremia   pulmonary nodules,    bronchogenic cyst, or foregut duplication cyst,   hyperkalemia  h/o sinus node dysfunction,   chronic HFpEF,   aortic stenosis,   HTN,   HLD,   b/l carotid stenosis,   DM (not on meds),   ESRD on dialysis (Tue, Thu, Sat),   Gout,    glaucoma,   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  cx and sens fr hd cath site with Stenotrophomonas maltophilia noted above   blood cx cath neg noted above  id f/u    TTE with ejection fraction visually estimated at 50 to 55 %. mild (grade 1) left ventricular diastolic dysfunction.   No vegetations seen on this study, consider URVASHI for further evaluation if clinically indicated noted    URVASHI with No thrombus seen in the left atrial, left atrial appendage, right atrium.   Agitated saline injection was negative for intracardiac shunt.   No vegetations seen on this study noted    s/p ir removal of Perm-a-catheter 4/18/24   cont IV Bactrim and Minocycline to cover Stenotrophomonas  Continue Ceftriaxone 2 g daily to cover Streptococcus  Repeat Blood cultures 4/17/24 neg noted above   procalcitonin 0.6 noted    quantiferon tb gold test neg noted    pulm f/u    Follow up CT in 12 months  Nodify testing as OP.  pt on hd t,th,s   renal f/u   K improved after dialysis,   follow 2 gm K diet.   Malfunction catheter max  ml/minute 4/16/24,   IR consulted, possible permacath placement on 4/22 if blood cultures negative from 4/20  next HD on 4/22.  AVG placement after discharge at [facility].   hgba1c 6.4 noted    lispro ss   cont current meds

## 2024-04-20 NOTE — DIETITIAN INITIAL EVALUATION ADULT - PERTINENT MEDS FT
MEDICATIONS  (STANDING):  atorvastatin 20 milliGRAM(s) Oral at bedtime  brimonidine 0.2% Ophthalmic Solution 1 Drop(s) Right EYE two times a day  carvedilol 12.5 milliGRAM(s) Oral every 12 hours  cefTRIAXone   IVPB 2000 milliGRAM(s) IV Intermittent every 24 hours  chlorhexidine 2% Cloths 1 Application(s) Topical <User Schedule>  ciprofloxacin  0.3% Ophthalmic Solution 1 Drop(s) Right EYE two times a day  dextrose 10% Bolus 125 milliLiter(s) IV Bolus once  dextrose 5%. 1000 milliLiter(s) (100 mL/Hr) IV Continuous <Continuous>  dextrose 5%. 1000 milliLiter(s) (50 mL/Hr) IV Continuous <Continuous>  dextrose 50% Injectable 25 Gram(s) IV Push once  dextrose 50% Injectable 12.5 Gram(s) IV Push once  dorzolamide 2% Ophthalmic Solution 1 Drop(s) Both EYES three times a day  furosemide   Injectable 40 milliGRAM(s) IV Push every 12 hours  glucagon  Injectable 1 milliGRAM(s) IntraMuscular once  hydrALAZINE 50 milliGRAM(s) Oral three times a day  insulin lispro (ADMELOG) corrective regimen sliding scale   SubCutaneous Before meals and at bedtime  ketorolac 0.5% Ophthalmic Solution 1 Drop(s) Right EYE two times a day  minocycline IVPB 100 milliGRAM(s) IV Intermittent every 12 hours  minocycline IVPB      polyethylene glycol 3350 17 Gram(s) Oral daily  prednisoLONE acetate 1% Suspension 1 Drop(s) Right EYE every 6 hours  senna 2 Tablet(s) Oral at bedtime  sevelamer carbonate 800 milliGRAM(s) Oral three times a day with meals  sodium zirconium cyclosilicate 10 Gram(s) Oral daily  trimethoprim / sulfamethoxazole IVPB 240 milliGRAM(s) IV Intermittent every 24 hours    MEDICATIONS  (PRN):  acetaminophen     Tablet .. 650 milliGRAM(s) Oral every 6 hours PRN Temp greater or equal to 38C (100.4F), Mild Pain (1 - 3)  dextrose Oral Gel 15 Gram(s) Oral once PRN Blood Glucose LESS THAN 70 milliGRAM(s)/deciliter

## 2024-04-20 NOTE — DIETITIAN INITIAL EVALUATION ADULT - PROBLEM SELECTOR PLAN 5
HTN urgency   Continue Losartan with parameters  Will start Labetalol   DASH/TLC diet  Adjust medications as needed to meet target.  ***MORNING TEAM TO CONFIRM MEDREC***

## 2024-04-20 NOTE — PROGRESS NOTE ADULT - ASSESSMENT
78 year old male, from [facility], living with son SUN (5x/week for 4h) with PMHx of HTN, HLD, DM (not on meds) ESRD on dialysis (Tue, Thu, Sat) and glaucoma, 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 out of the right eye was brought into the ED due to abdominal pain for the last 24 hours,abnormal ekg and elevated troponins,sepsis,strep bacteremia.  1.Strep bactermia,.Abx as per ID.[email or web address] no vegetation  2.ESRD-HD as per renal.  3.HTN-cont bp medication.  4.DM-Insulin.  5.Lipid d/o-statin.  6.PC to be placed next week.  7.GI and DVT prophylaxis.  .

## 2024-04-20 NOTE — PROGRESS NOTE ADULT - ASSESSMENT
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 Patient AAOx3 at bedside and mentioned having intermittent, mild, suprapubic discomfort associated w/ dysuria that has been going on for the last 48 hours. Patient was admitted to [facility] on 11/24/2023 and discharge on 12/08/23 where he was treated for sepsis. His perm cath was exchanged on 11/223. Found to have MSSA bacteremia and transitioned from Vancomycin to Cefazolin. 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 Underwent right Permcath placement by IR on 12/4/23. On admission, he found to have fever, tachypnea but negative Urine analysis and negative chest CT. He has started on Cefepime and IV  Vancomycin, and the ID consult requested to assist with further evaluation and antibiotic management.    # Sepsis ( Fever + tachypnea)- Suspected Line sepsis  # Streptococcal Bacteremia- 4/12/24- in the setting of Perm-a cath - the blood cx form catheter as of 4/13 NGTD- Renal team likes to keep the Perm-a-cath - TTE from 4/15 and URVASHI from 4/17 shows no vegetation  # Catheter site culture grew Stenotrophomonas - s/p removal of Perm-a-cath on 4/18/24    would recommend:    1. Follow up Repeat Blood cultures form 4/20/24  2. Continue IV Bactrim and Minocycline to cover Stenotrophomonas  3. Continue Ceftriaxone 2 g daily to cover Streptococcus  4. OOB to chair     Attending Attestation:    Spent more than 35 minutes on total encounter, more than 50 % of the visit was spent counseling and/or coordinating care by the Attending physician.

## 2024-04-21 LAB
ANION GAP SERPL CALC-SCNC: 10 MMOL/L — SIGNIFICANT CHANGE UP (ref 5–17)
BUN SERPL-MCNC: 94 MG/DL — HIGH (ref 7–18)
CALCIUM SERPL-MCNC: 9 MG/DL — SIGNIFICANT CHANGE UP (ref 8.4–10.5)
CHLORIDE SERPL-SCNC: 105 MMOL/L — SIGNIFICANT CHANGE UP (ref 96–108)
CO2 SERPL-SCNC: 20 MMOL/L — LOW (ref 22–31)
CREAT SERPL-MCNC: 6.13 MG/DL — HIGH (ref 0.5–1.3)
EGFR: 9 ML/MIN/1.73M2 — LOW
GLUCOSE BLDC GLUCOMTR-MCNC: 100 MG/DL — HIGH (ref 70–99)
GLUCOSE BLDC GLUCOMTR-MCNC: 101 MG/DL — HIGH (ref 70–99)
GLUCOSE BLDC GLUCOMTR-MCNC: 114 MG/DL — HIGH (ref 70–99)
GLUCOSE SERPL-MCNC: 88 MG/DL — SIGNIFICANT CHANGE UP (ref 70–99)
POTASSIUM SERPL-MCNC: 5.3 MMOL/L — SIGNIFICANT CHANGE UP (ref 3.5–5.3)
POTASSIUM SERPL-SCNC: 5.3 MMOL/L — SIGNIFICANT CHANGE UP (ref 3.5–5.3)
SODIUM SERPL-SCNC: 135 MMOL/L — SIGNIFICANT CHANGE UP (ref 135–145)

## 2024-04-21 RX ADMIN — BRIMONIDINE TARTRATE 1 DROP(S): 2 SOLUTION/ DROPS OPHTHALMIC at 18:13

## 2024-04-21 RX ADMIN — Medication 50 MILLIGRAM(S): at 21:26

## 2024-04-21 RX ADMIN — Medication 50 MILLIGRAM(S): at 06:50

## 2024-04-21 RX ADMIN — CARVEDILOL PHOSPHATE 12.5 MILLIGRAM(S): 80 CAPSULE, EXTENDED RELEASE ORAL at 06:50

## 2024-04-21 RX ADMIN — Medication 1 DROP(S): at 23:10

## 2024-04-21 RX ADMIN — CEFTRIAXONE 100 MILLIGRAM(S): 500 INJECTION, POWDER, FOR SOLUTION INTRAMUSCULAR; INTRAVENOUS at 18:16

## 2024-04-21 RX ADMIN — BRIMONIDINE TARTRATE 1 DROP(S): 2 SOLUTION/ DROPS OPHTHALMIC at 06:49

## 2024-04-21 RX ADMIN — Medication 40 MILLIGRAM(S): at 18:42

## 2024-04-21 RX ADMIN — Medication 1 DROP(S): at 12:48

## 2024-04-21 RX ADMIN — SEVELAMER CARBONATE 800 MILLIGRAM(S): 2400 POWDER, FOR SUSPENSION ORAL at 18:14

## 2024-04-21 RX ADMIN — DORZOLAMIDE HYDROCHLORIDE 1 DROP(S): 20 SOLUTION/ DROPS OPHTHALMIC at 14:12

## 2024-04-21 RX ADMIN — Medication 1 DROP(S): at 18:14

## 2024-04-21 RX ADMIN — Medication 1 DROP(S): at 06:49

## 2024-04-21 RX ADMIN — SODIUM ZIRCONIUM CYCLOSILICATE 10 GRAM(S): 10 POWDER, FOR SUSPENSION ORAL at 13:48

## 2024-04-21 RX ADMIN — Medication 1 DROP(S): at 18:13

## 2024-04-21 RX ADMIN — SEVELAMER CARBONATE 800 MILLIGRAM(S): 2400 POWDER, FOR SUSPENSION ORAL at 08:43

## 2024-04-21 RX ADMIN — Medication 50 MILLIGRAM(S): at 14:12

## 2024-04-21 RX ADMIN — Medication 265 MILLIGRAM(S): at 18:41

## 2024-04-21 RX ADMIN — CHLORHEXIDINE GLUCONATE 1 APPLICATION(S): 213 SOLUTION TOPICAL at 06:51

## 2024-04-21 RX ADMIN — SENNA PLUS 2 TABLET(S): 8.6 TABLET ORAL at 21:26

## 2024-04-21 RX ADMIN — ATORVASTATIN CALCIUM 20 MILLIGRAM(S): 80 TABLET, FILM COATED ORAL at 21:26

## 2024-04-21 RX ADMIN — MINOCYCLINE HYDROCHLORIDE 100 MILLIGRAM(S): 45 TABLET, EXTENDED RELEASE ORAL at 18:15

## 2024-04-21 RX ADMIN — Medication 1 DROP(S): at 08:43

## 2024-04-21 RX ADMIN — MINOCYCLINE HYDROCHLORIDE 100 MILLIGRAM(S): 45 TABLET, EXTENDED RELEASE ORAL at 06:49

## 2024-04-21 RX ADMIN — DORZOLAMIDE HYDROCHLORIDE 1 DROP(S): 20 SOLUTION/ DROPS OPHTHALMIC at 21:26

## 2024-04-21 RX ADMIN — Medication 1 DROP(S): at 06:50

## 2024-04-21 RX ADMIN — Medication 40 MILLIGRAM(S): at 06:51

## 2024-04-21 RX ADMIN — DORZOLAMIDE HYDROCHLORIDE 1 DROP(S): 20 SOLUTION/ DROPS OPHTHALMIC at 06:50

## 2024-04-21 RX ADMIN — SEVELAMER CARBONATE 800 MILLIGRAM(S): 2400 POWDER, FOR SUSPENSION ORAL at 12:48

## 2024-04-21 NOTE — PROGRESS NOTE ADULT - SUBJECTIVE AND OBJECTIVE BOX
Patient is a 78y old  Male who presents with a chief complaint of Sepsis (21 Apr 2024 06:03)    pt seen in tele [ x ], reg med floor [   ], bed [x  ], chair at bedside [   ], a+o x3 [ x ], lethargic [  ],    nad [ x ]      Allergies    No Known Allergies        Vitals    T(F): 97.9 (04-21-24 @ 04:45), Max: 98.2 (04-20-24 @ 14:09)  HR: 55 (04-21-24 @ 04:45) (48 - 60)  BP: 148/73 (04-21-24 @ 04:45) (124/56 - 152/79)  RR: 17 (04-21-24 @ 04:45) (16 - 18)  SpO2: 97% (04-21-24 @ 04:45) (97% - 98%)  Wt(kg): --  CAPILLARY BLOOD GLUCOSE      POCT Blood Glucose.: 101 mg/dL (20 Apr 2024 21:59)      Labs        04-20    134<L>  |  104  |  76<H>  ----------------------------<  87  5.0   |  20<L>  |  5.21<H>    Ca    9.1      20 Apr 2024 06:05  Phos  5.8     04-20  Mg     2.4     04-20              .Blood Blood-Peripheral  04-17 @ 10:15   No growth at 72 Hours  --  --      .Blood Blood-Peripheral  04-17 @ 10:00   No growth at 72 Hours  --  --      .Blood Dialysis Catheter  04-13 @ 17:00   No growth at 5 days  --  --      Cath Site Catheter Site  04-13 @ 09:39   Rare Stenotrophomonas maltophilia  --  Stenotrophomonas maltophilia      Clean Catch Clean Catch (Midstream)  04-12 @ 18:13   <10,000 CFU/mL Normal Urogenital Kavita  --  --      .Blood Blood-Peripheral  04-12 @ 18:00   Growth in anaerobic bottle: Streptococcus salivarius/vestibularis group  "Susceptibilities not performed"  Direct identification is available within approximately 3-5  hours either by Blood Panel Multiplexed PCR or Direct  MALDI-TOF. Details: [email or web address]  --  Blood Culture PCR      .Blood Blood-Peripheral  04-12 @ 17:50   Growth in aerobic and anaerobic bottles: Streptococcus  salivarius/vestibularis group  --  Streptococcus salivarius/vestibularis group          Radiology Results      Meds    MEDICATIONS  (STANDING):  atorvastatin 20 milliGRAM(s) Oral at bedtime  brimonidine 0.2% Ophthalmic Solution 1 Drop(s) Right EYE two times a day  carvedilol 12.5 milliGRAM(s) Oral every 12 hours  cefTRIAXone   IVPB 2000 milliGRAM(s) IV Intermittent every 24 hours  chlorhexidine 2% Cloths 1 Application(s) Topical <User Schedule>  ciprofloxacin  0.3% Ophthalmic Solution 1 Drop(s) Right EYE two times a day  dextrose 10% Bolus 125 milliLiter(s) IV Bolus once  dextrose 5%. 1000 milliLiter(s) (50 mL/Hr) IV Continuous <Continuous>  dextrose 5%. 1000 milliLiter(s) (100 mL/Hr) IV Continuous <Continuous>  dextrose 50% Injectable 25 Gram(s) IV Push once  dextrose 50% Injectable 12.5 Gram(s) IV Push once  dorzolamide 2% Ophthalmic Solution 1 Drop(s) Both EYES three times a day  furosemide   Injectable 40 milliGRAM(s) IV Push every 12 hours  glucagon  Injectable 1 milliGRAM(s) IntraMuscular once  hydrALAZINE 50 milliGRAM(s) Oral three times a day  insulin lispro (ADMELOG) corrective regimen sliding scale   SubCutaneous Before meals and at bedtime  ketorolac 0.5% Ophthalmic Solution 1 Drop(s) Right EYE two times a day  minocycline IVPB      minocycline IVPB 100 milliGRAM(s) IV Intermittent every 12 hours  polyethylene glycol 3350 17 Gram(s) Oral daily  prednisoLONE acetate 1% Suspension 1 Drop(s) Right EYE every 6 hours  senna 2 Tablet(s) Oral at bedtime  sevelamer carbonate 800 milliGRAM(s) Oral three times a day with meals  sodium zirconium cyclosilicate 10 Gram(s) Oral daily  trimethoprim / sulfamethoxazole IVPB 240 milliGRAM(s) IV Intermittent every 24 hours      MEDICATIONS  (PRN):  acetaminophen     Tablet .. 650 milliGRAM(s) Oral every 6 hours PRN Temp greater or equal to 38C (100.4F), Mild Pain (1 - 3)  dextrose Oral Gel 15 Gram(s) Oral once PRN Blood Glucose LESS THAN 70 milliGRAM(s)/deciliter      Physical Exam    Neuro :  no focal deficits  Respiratory: CTA B/L  CV: RRR, S1S2, no murmurs,   Abdominal: Soft, NT, ND +BS,  Extremities: No edema, + peripheral pulses      ASSESSMENT      uncontrolled htn,   abnormal trop r/o acs,   poss 2nd to demand ischemia,    abd pain poss 2nd to constipation,    fever poss 2nd to line sepsis   bacteremia   pulmonary nodules,    bronchogenic cyst, or foregut duplication cyst,   hyperkalemia  h/o sinus node dysfunction,   chronic HFpEF,   aortic stenosis,   HTN,   HLD,   b/l carotid stenosis,   DM (not on meds),   ESRD on dialysis (Tue, Thu, Sat),   Gout,    glaucoma,   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  cx and sens fr hd cath site with Stenotrophomonas maltophilia noted above   blood cx cath neg noted above  id f/u    TTE with ejection fraction visually estimated at 50 to 55 %. mild (grade 1) left ventricular diastolic dysfunction.   No vegetations seen on this study, consider URVASHI for further evaluation if clinically indicated noted    URVASHI with No thrombus seen in the left atrial, left atrial appendage, right atrium.   Agitated saline injection was negative for intracardiac shunt.   No vegetations seen on this study noted    s/p ir removal of Perm-a-catheter 4/18/24   cont IV Bactrim and Minocycline to cover Stenotrophomonas  Continue Ceftriaxone 2 g daily to cover Streptococcus  Repeat Blood cultures 4/17/24 neg noted above   procalcitonin 0.6 noted    quantiferon tb gold test neg noted    pulm f/u    Follow up CT in 12 months  Nodify testing as OP.  pt on hd t,th,s   renal f/u   K improved after dialysis,   follow 2 gm K diet.   Malfunction catheter max  ml/minute 4/16/24,   IR consulted, possible permacath placement on 4/22 if blood cultures negative from 4/20  next HD on 4/22.  AVG placement after discharge at [facility].   hgba1c 6.4 noted    lispro ss   cont current meds       Patient is a 78y old  Male who presents with a chief complaint of Sepsis (21 Apr 2024 06:03)    pt seen in tele [ x ], reg med floor [   ], bed [x  ], chair at bedside [   ], a+o x3 [ x ], lethargic [  ],    nad [ x ]      Allergies    No Known Allergies        Vitals    T(F): 97.9 (04-21-24 @ 04:45), Max: 98.2 (04-20-24 @ 14:09)  HR: 55 (04-21-24 @ 04:45) (48 - 60)  BP: 148/73 (04-21-24 @ 04:45) (124/56 - 152/79)  RR: 17 (04-21-24 @ 04:45) (16 - 18)  SpO2: 97% (04-21-24 @ 04:45) (97% - 98%)  Wt(kg): --  CAPILLARY BLOOD GLUCOSE      POCT Blood Glucose.: 101 mg/dL (20 Apr 2024 21:59)      Labs        04-20    134<L>  |  104  |  76<H>  ----------------------------<  87  5.0   |  20<L>  |  5.21<H>    Ca    9.1      20 Apr 2024 06:05  Phos  5.8     04-20  Mg     2.4     04-20              .Blood Blood-Peripheral  04-17 @ 10:15   No growth at 72 Hours  --  --      .Blood Blood-Peripheral  04-17 @ 10:00   No growth at 72 Hours  --  --      .Blood Dialysis Catheter  04-13 @ 17:00   No growth at 5 days  --  --      Cath Site Catheter Site  04-13 @ 09:39   Rare Stenotrophomonas maltophilia  --  Stenotrophomonas maltophilia      Clean Catch Clean Catch (Midstream)  04-12 @ 18:13   <10,000 CFU/mL Normal Urogenital Kavita  --  --      .Blood Blood-Peripheral  04-12 @ 18:00   Growth in anaerobic bottle: Streptococcus salivarius/vestibularis group  "Susceptibilities not performed"  Direct identification is available within approximately 3-5  hours either by Blood Panel Multiplexed PCR or Direct  MALDI-TOF. Details: [email or web address]  --  Blood Culture PCR      .Blood Blood-Peripheral  04-12 @ 17:50   Growth in aerobic and anaerobic bottles: Streptococcus  salivarius/vestibularis group  --  Streptococcus salivarius/vestibularis group          Radiology Results      Meds    MEDICATIONS  (STANDING):  atorvastatin 20 milliGRAM(s) Oral at bedtime  brimonidine 0.2% Ophthalmic Solution 1 Drop(s) Right EYE two times a day  carvedilol 12.5 milliGRAM(s) Oral every 12 hours  cefTRIAXone   IVPB 2000 milliGRAM(s) IV Intermittent every 24 hours  chlorhexidine 2% Cloths 1 Application(s) Topical <User Schedule>  ciprofloxacin  0.3% Ophthalmic Solution 1 Drop(s) Right EYE two times a day  dextrose 10% Bolus 125 milliLiter(s) IV Bolus once  dextrose 5%. 1000 milliLiter(s) (50 mL/Hr) IV Continuous <Continuous>  dextrose 5%. 1000 milliLiter(s) (100 mL/Hr) IV Continuous <Continuous>  dextrose 50% Injectable 25 Gram(s) IV Push once  dextrose 50% Injectable 12.5 Gram(s) IV Push once  dorzolamide 2% Ophthalmic Solution 1 Drop(s) Both EYES three times a day  furosemide   Injectable 40 milliGRAM(s) IV Push every 12 hours  glucagon  Injectable 1 milliGRAM(s) IntraMuscular once  hydrALAZINE 50 milliGRAM(s) Oral three times a day  insulin lispro (ADMELOG) corrective regimen sliding scale   SubCutaneous Before meals and at bedtime  ketorolac 0.5% Ophthalmic Solution 1 Drop(s) Right EYE two times a day  minocycline IVPB      minocycline IVPB 100 milliGRAM(s) IV Intermittent every 12 hours  polyethylene glycol 3350 17 Gram(s) Oral daily  prednisoLONE acetate 1% Suspension 1 Drop(s) Right EYE every 6 hours  senna 2 Tablet(s) Oral at bedtime  sevelamer carbonate 800 milliGRAM(s) Oral three times a day with meals  sodium zirconium cyclosilicate 10 Gram(s) Oral daily  trimethoprim / sulfamethoxazole IVPB 240 milliGRAM(s) IV Intermittent every 24 hours      MEDICATIONS  (PRN):  acetaminophen     Tablet .. 650 milliGRAM(s) Oral every 6 hours PRN Temp greater or equal to 38C (100.4F), Mild Pain (1 - 3)  dextrose Oral Gel 15 Gram(s) Oral once PRN Blood Glucose LESS THAN 70 milliGRAM(s)/deciliter      Physical Exam    Neuro :  no focal deficits  Respiratory: CTA B/L  CV: RRR, S1S2, no murmurs,   Abdominal: Soft, NT, ND +BS,  Extremities: No edema, + peripheral pulses      ASSESSMENT      uncontrolled htn,   abnormal trop r/o acs,   poss 2nd to demand ischemia,    abd pain poss 2nd to constipation,    fever poss 2nd to line sepsis   bacteremia   pulmonary nodules,    bronchogenic cyst, or foregut duplication cyst,   hyperkalemia  h/o sinus node dysfunction,   chronic HFpEF,   aortic stenosis,   HTN,   HLD,   b/l carotid stenosis,   DM (not on meds),   ESRD on dialysis (Tue, Thu, Sat),   Gout,    glaucoma,   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  cx and sens fr hd cath site with Stenotrophomonas maltophilia noted above   blood cx cath neg noted above  id f/u    TTE with ejection fraction visually estimated at 50 to 55 %. mild (grade 1) left ventricular diastolic dysfunction.   No vegetations seen on this study, consider URVASHI for further evaluation if clinically indicated noted    URVASHI with No thrombus seen in the left atrial, left atrial appendage, right atrium.   Agitated saline injection was negative for intracardiac shunt.   No vegetations seen on this study noted    s/p ir removal of Perm-a-catheter 4/18/24   cont IV Bactrim and Minocycline to cover Stenotrophomonas  Continue Ceftriaxone 2 g daily to cover Streptococcus  Repeat Blood cultures 4/17/24 neg noted above   Follow up Repeat Blood cultures form 4/20/24  procalcitonin 0.6 noted    quantiferon tb gold test neg noted    pulm f/u    Follow up CT in 12 months  Nodify testing as OP.  pt on hd t,th,s   renal f/u   K improved after dialysis,   follow 2 gm K diet.   Malfunction catheter max  ml/minute 4/16/24,   IR consulted, possible permacath placement on 4/22 if blood cultures negative from 4/20  next HD on 4/22.  AVG placement after discharge at [facility].   hgba1c 6.4 noted    lispro ss   cont current meds

## 2024-04-21 NOTE — PROGRESS NOTE ADULT - PROBLEM SELECTOR PLAN 2
Quantiferon TB Gold test  follow up CT chest in 12 months  Nodify testing as OP Quantiferon TB Gold test negative  follow up CT chest in 12 months  Nodify testing as OP

## 2024-04-21 NOTE — PROGRESS NOTE ADULT - SUBJECTIVE AND OBJECTIVE BOX
Problem List:  ESRD  Post PC removal.   PAST MEDICAL & SURGICAL HISTORY:  DM (diabetes mellitus)      HTN (hypertension)      Chronic kidney disease      HLD (hyperlipidemia)      Glaucoma      Gout      No significant past surgical history          No Known Allergies      MEDICATIONS  (STANDING):  atorvastatin 20 milliGRAM(s) Oral at bedtime  brimonidine 0.2% Ophthalmic Solution 1 Drop(s) Right EYE two times a day  carvedilol 12.5 milliGRAM(s) Oral every 12 hours  cefTRIAXone   IVPB 2000 milliGRAM(s) IV Intermittent every 24 hours  chlorhexidine 2% Cloths 1 Application(s) Topical <User Schedule>  ciprofloxacin  0.3% Ophthalmic Solution 1 Drop(s) Right EYE two times a day  dextrose 10% Bolus 125 milliLiter(s) IV Bolus once  dextrose 5%. 1000 milliLiter(s) (50 mL/Hr) IV Continuous <Continuous>  dextrose 5%. 1000 milliLiter(s) (100 mL/Hr) IV Continuous <Continuous>  dextrose 50% Injectable 12.5 Gram(s) IV Push once  dextrose 50% Injectable 25 Gram(s) IV Push once  dorzolamide 2% Ophthalmic Solution 1 Drop(s) Both EYES three times a day  furosemide   Injectable 40 milliGRAM(s) IV Push every 12 hours  glucagon  Injectable 1 milliGRAM(s) IntraMuscular once  hydrALAZINE 50 milliGRAM(s) Oral three times a day  insulin lispro (ADMELOG) corrective regimen sliding scale   SubCutaneous Before meals and at bedtime  ketorolac 0.5% Ophthalmic Solution 1 Drop(s) Right EYE two times a day  minocycline IVPB      minocycline IVPB 100 milliGRAM(s) IV Intermittent every 12 hours  polyethylene glycol 3350 17 Gram(s) Oral daily  prednisoLONE acetate 1% Suspension 1 Drop(s) Right EYE every 6 hours  senna 2 Tablet(s) Oral at bedtime  sevelamer carbonate 800 milliGRAM(s) Oral three times a day with meals  sodium zirconium cyclosilicate 10 Gram(s) Oral daily  trimethoprim / sulfamethoxazole IVPB 240 milliGRAM(s) IV Intermittent every 24 hours    MEDICATIONS  (PRN):  acetaminophen     Tablet .. 650 milliGRAM(s) Oral every 6 hours PRN Temp greater or equal to 38C (100.4F), Mild Pain (1 - 3)  dextrose Oral Gel 15 Gram(s) Oral once PRN Blood Glucose LESS THAN 70 milliGRAM(s)/deciliter        04-20    134<L>  |  104  |  76<H>  ----------------------------<  87  5.0   |  20<L>  |  5.21<H>    Ca    9.1      20 Apr 2024 06:05  Phos  5.8     04-20  Mg     2.4     04-20              REVIEW OF SYSTEMS:  General: no fever no chills, no weight loss.  EYES/ENT: No visual changes;  No vertigo, no headache.  NECK: No pain or stiffness  RESPIRATORY: No cough, wheezing, hemoptysis; No shortness of breath  CARDIOVASCULAR: No chest pain or palpitations. No Edema  GASTROINTESTINAL: No abdominal or epigastric pain. No nausea, vomiting. No diarrhea or constipation. No melena.  GENITOURINARY: No dysuria, frequency, foamy urine, urinary urgency, incontinence or hematuria  NEUROLOGICAL: No numbness or weakness, no tremor , no dizziness.           VITALS:  T(F): 97.9 (04-21-24 @ 04:45), Max: 98.2 (04-20-24 @ 14:09)  HR: 55 (04-21-24 @ 04:45)  BP: 148/73 (04-21-24 @ 04:45)  RR: 17 (04-21-24 @ 04:45)  SpO2: 97% (04-21-24 @ 04:45)  Wt(kg): --    04-19 @ 07:01  -  04-20 @ 07:00  --------------------------------------------------------  IN: 0 mL / OUT: 1000 mL / NET: -1000 mL        PHYSICAL EXAM:  Constitutional: well developed, no diaphoresis, no distress.  Neck: No JVD, no carotid bruit, supple.  Respiratory: Good air entrance B/L, no wheezes, rales or rhonchi  Cardiovascular: S1, S2, RRR, no pericardial rub, no murmur  Abdomen: BS+, soft, no tenderness, no bruit  Pelvis: bladder nondistended  Extremities: No cyanosis or clubbing. No peripheral edema.   Pulses: All present  Neurological: A/O x 3, no focal deficits  Psychiatric: Normal mood, normal affect       Problem List:  ESRD  Post PC removal.   PAST MEDICAL & SURGICAL HISTORY:  DM (diabetes mellitus)      HTN (hypertension)      Chronic kidney disease      HLD (hyperlipidemia)      Glaucoma      Gout      No significant past surgical history          No Known Allergies      MEDICATIONS  (STANDING):  atorvastatin 20 milliGRAM(s) Oral at bedtime  brimonidine 0.2% Ophthalmic Solution 1 Drop(s) Right EYE two times a day  carvedilol 12.5 milliGRAM(s) Oral every 12 hours  cefTRIAXone   IVPB 2000 milliGRAM(s) IV Intermittent every 24 hours  chlorhexidine 2% Cloths 1 Application(s) Topical <User Schedule>  ciprofloxacin  0.3% Ophthalmic Solution 1 Drop(s) Right EYE two times a day  dextrose 10% Bolus 125 milliLiter(s) IV Bolus once  dextrose 5%. 1000 milliLiter(s) (50 mL/Hr) IV Continuous <Continuous>  dextrose 5%. 1000 milliLiter(s) (100 mL/Hr) IV Continuous <Continuous>  dextrose 50% Injectable 12.5 Gram(s) IV Push once  dextrose 50% Injectable 25 Gram(s) IV Push once  dorzolamide 2% Ophthalmic Solution 1 Drop(s) Both EYES three times a day  furosemide   Injectable 40 milliGRAM(s) IV Push every 12 hours  glucagon  Injectable 1 milliGRAM(s) IntraMuscular once  hydrALAZINE 50 milliGRAM(s) Oral three times a day  insulin lispro (ADMELOG) corrective regimen sliding scale   SubCutaneous Before meals and at bedtime  ketorolac 0.5% Ophthalmic Solution 1 Drop(s) Right EYE two times a day  minocycline IVPB      minocycline IVPB 100 milliGRAM(s) IV Intermittent every 12 hours  polyethylene glycol 3350 17 Gram(s) Oral daily  prednisoLONE acetate 1% Suspension 1 Drop(s) Right EYE every 6 hours  senna 2 Tablet(s) Oral at bedtime  sevelamer carbonate 800 milliGRAM(s) Oral three times a day with meals  sodium zirconium cyclosilicate 10 Gram(s) Oral daily  trimethoprim / sulfamethoxazole IVPB 240 milliGRAM(s) IV Intermittent every 24 hours    MEDICATIONS  (PRN):  acetaminophen     Tablet .. 650 milliGRAM(s) Oral every 6 hours PRN Temp greater or equal to 38C (100.4F), Mild Pain (1 - 3)  dextrose Oral Gel 15 Gram(s) Oral once PRN Blood Glucose LESS THAN 70 milliGRAM(s)/deciliter        04-20    134<L>  |  104  |  76<H>  ----------------------------<  87  5.0   |  20<L>  |  5.21<H>    Ca    9.1      20 Apr 2024 06:05  Phos  5.8     04-20  Mg     2.4     04-20      Basic Metabolic Panel (04.21.24 @ 05:32)   Sodium: 135 mmol/L  Potassium: 5.3 mmol/L  Chloride: 105 mmol/L  Carbon Dioxide: 20 mmol/L  Anion Gap: 10 mmol/L  Blood Urea Nitrogen: 94 mg/dL  Creatinine: 6.13 mg/dL  Glucose: 88 mg/dL  Calcium: 9.0 mg/dL  eGFR: 9: The estimated glomerular filtration rate (eGFR) is calculated using the Basic Metabolic Panel (04.21.24 @ 05:32)         REVIEW OF SYSTEMS:  General: no fever no chills, no weight loss.  EYES/ENT: No visual changes;  No vertigo, no headache.  NECK: No pain or stiffness  RESPIRATORY: No cough, wheezing, hemoptysis; No shortness of breath  CARDIOVASCULAR: No chest pain or palpitations. No Edema  GASTROINTESTINAL: No abdominal or epigastric pain. No nausea, vomiting. No diarrhea or constipation. No melena.  GENITOURINARY: No dysuria, frequency, foamy urine, urinary urgency, incontinence or hematuria  NEUROLOGICAL: No numbness or weakness, no tremor , no dizziness.           VITALS:  T(F): 97.9 (04-21-24 @ 04:45), Max: 98.2 (04-20-24 @ 14:09)  HR: 55 (04-21-24 @ 04:45)  BP: 148/73 (04-21-24 @ 04:45)  RR: 17 (04-21-24 @ 04:45)  SpO2: 97% (04-21-24 @ 04:45)  Wt(kg): --    04-19 @ 07:01  -  04-20 @ 07:00  --------------------------------------------------------  IN: 0 mL / OUT: 1000 mL / NET: -1000 mL        PHYSICAL EXAM:  Constitutional: well developed, no diaphoresis, no distress.  Neck: No JVD, no carotid bruit, supple.  Respiratory: air entrance B/L, no wheezes, rales or rhonchi  Cardiovascular: S1, S2, RRR, no pericardial rub, no murmur  Abdomen: BS+, soft, no tenderness, no bruit  Pelvis: bladder nondistended  Extremities: No cyanosis or clubbing. No peripheral edema.   Pulses: All present  Neurological: A/O x 3, no focal deficits  Psychiatric: Normal mood, normal affect

## 2024-04-21 NOTE — PROGRESS NOTE ADULT - ASSESSMENT
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 Patient AAOx3 at bedside and mentioned having intermittent, mild, suprapubic discomfort associated w/ dysuria that has been going on for the last 48 hours. Patient was admitted to [facility] on 11/24/2023 and discharge on 12/08/23 where he was treated for sepsis. His perm cath was exchanged on 11/223. Found to have MSSA bacteremia and transitioned from Vancomycin to Cefazolin. 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 Underwent right Permcath placement by IR on 12/4/23. On admission, he found to have fever, tachypnea but negative Urine analysis and negative chest CT. He has started on Cefepime and IV  Vancomycin, and the ID consult requested to assist with further evaluation and antibiotic management.    # Sepsis ( Fever + tachypnea)- Suspected Line sepsis  # Streptococcal Bacteremia- 4/12/24- in the setting of Perm-a cath - the blood cx form catheter as of 4/13 NGTD- Renal team likes to keep the Perm-a-cath - TTE from 4/15 and URVASHI from 4/17 shows no vegetation  # Catheter site culture grew Stenotrophomonas - s/p removal of Perm-a-cath on 4/18/24    would recommend:    1. No ID contraindication to place a perm-a - cath in the setting of negative repeat Blood cultures  2. Continue IV Bactrim and Minocycline to cover Stenotrophomonas  3. Continue Ceftriaxone 2 g daily to cover Streptococcus  4. Need at least 4 weeks of Abx, may change Bactrim and Minocycline to oral on discharge     Attending Attestation:    Spent more than 35 minutes on total encounter, more than 50 % of the visit was spent counseling and/or coordinating care by the Attending physician.

## 2024-04-21 NOTE — PROGRESS NOTE ADULT - SUBJECTIVE AND OBJECTIVE BOX
[   ] ICU                                          [   ] CCU                                      [  X ] Medical Floor    Patient is a 78 year old male with abdominal pain. GI consulted to evaluate.        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 Patient also c/o constipation but denies nausea, vomiting, hematemesis, hematochezia, melena, fever, chest pain, SOB, cough, hematuria, or diarrhea.      Today patient appears comfortable. No new complaints reported, No abdominal pain, N/V, hematemesis, hematochezia, melena, fever, chills, chest pain, SOB, cough or diarrhea reported.         PAST MEDICAL HISTORY     DM (diabetes mellitus)    HTN (hypertension)     Diabetes mellitus    ESRD on HD     Hyperlipidemia     Glaucoma    Gout        PAST SURGICAL HISTORY    No significant past surgical history        Allergies    No Known Allergies    Intolerances  None       SOCIAL HISTORY  Advanced Directives:       [X  ] Full Code       [  ] DNR  Marital Status:         [  ] M      [X  ] S      [  ] D       [  ] W  Children:       [ X ] Yes      [  ] No  Occupation:        [  ] Employed       [ X ] Unemployed       [  ] Retired  Diet:       [ X ] Regular       [  ] PEG feeding          [  ] NG tube feeding  Drug Use:           [ X ] Patient denied          [  ] Yes  Alcohol:           [X  ] No             [  ] Yes (socially)         [  ] Yes (chronic)  Tobacco:           [  ] Yes           [ X ] No    FAMILY HISTORY  [ X ] Heart Disease            [ X ] Diabetes             [ X ] HTN             [  ] Colon Cancer             [  ] Stomach Cancer              [  ] Pancreatic Cancer        VITALS   Vital Signs Last 24 Hrs  T(C): 36.6 (04-21-24 @ 04:45), Max: 36.8 (04-20-24 @ 14:09)  T(F): 97.9 (04-21-24 @ 04:45), Max: 98.2 (04-20-24 @ 14:09)  HR: 55 (04-21-24 @ 04:45) (48 - 60)  BP: 148/73 (04-21-24 @ 04:45) (124/56 - 152/79)   RR: 17 (04-21-24 @ 04:45) (16 - 18)  SpO2: 97% (04-21-24 @ 04:45) (97% - 98%)        MEDICATIONS  (STANDING):  atorvastatin 20 milliGRAM(s) Oral at bedtime  brimonidine 0.2% Ophthalmic Solution 1 Drop(s) Right EYE two times a day  carvedilol 12.5 milliGRAM(s) Oral every 12 hours  cefTRIAXone   IVPB 2000 milliGRAM(s) IV Intermittent every 24 hours  chlorhexidine 2% Cloths 1 Application(s) Topical <User Schedule>  ciprofloxacin  0.3% Ophthalmic Solution 1 Drop(s) Right EYE two times a day  dextrose 10% Bolus 125 milliLiter(s) IV Bolus once  dextrose 5%. 1000 milliLiter(s) (50 mL/Hr) IV Continuous <Continuous>  dextrose 5%. 1000 milliLiter(s) (100 mL/Hr) IV Continuous <Continuous>  dextrose 50% Injectable 25 Gram(s) IV Push once  dextrose 50% Injectable 12.5 Gram(s) IV Push once  dorzolamide 2% Ophthalmic Solution 1 Drop(s) Both EYES three times a day  furosemide   Injectable 40 milliGRAM(s) IV Push every 12 hours  glucagon  Injectable 1 milliGRAM(s) IntraMuscular once  hydrALAZINE 50 milliGRAM(s) Oral three times a day  insulin lispro (ADMELOG) corrective regimen sliding scale   SubCutaneous Before meals and at bedtime  ketorolac 0.5% Ophthalmic Solution 1 Drop(s) Right EYE two times a day  minocycline IVPB 100 milliGRAM(s) IV Intermittent every 12 hours  minocycline IVPB      polyethylene glycol 3350 17 Gram(s) Oral daily  prednisoLONE acetate 1% Suspension 1 Drop(s) Right EYE every 6 hours  senna 2 Tablet(s) Oral at bedtime  sevelamer carbonate 800 milliGRAM(s) Oral three times a day with meals  sodium zirconium cyclosilicate 10 Gram(s) Oral daily  trimethoprim / sulfamethoxazole IVPB 240 milliGRAM(s) IV Intermittent every 24 hours    MEDICATIONS  (PRN):  acetaminophen     Tablet .. 650 milliGRAM(s) Oral every 6 hours PRN Temp greater or equal to 38C (100.4F), Mild Pain (1 - 3)  dextrose Oral Gel 15 Gram(s) Oral once PRN Blood Glucose LESS THAN 70 milliGRAM(s)/deciliter          04-21    135  |  105  |  94<H>  ----------------------------<  88  5.3   |  20<L>  |  6.13<H>    Ca    9.0      21 Apr 2024 05:32  Phos  5.8     04-20  Mg     2.4     04-20

## 2024-04-21 NOTE — PROGRESS NOTE ADULT - ASSESSMENT
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  1.Strep bactermia,.Abx as per ID.URVASHI no vegetation  2.ESRD-HD as per renal.  3.HTN-cont bp medication.  4.DM-Insulin.  5.Lipid d/o-statin.  6.PC to be placed by IR.  7.GI and DVT prophylaxis.  .

## 2024-04-21 NOTE — PROGRESS NOTE ADULT - SUBJECTIVE AND OBJECTIVE BOX
Patient is seen and examined at the bed side, is afebrile. The Blood cultures from 4/20/24 have NGTD.      REVIEW OF SYSTEMS: All other review systems are negative      ALLERGIES: No Known Allergies      Vital Signs Last 24 Hrs  T(C): 36.2 (21 Apr 2024 18:03), Max: 36.7 (20 Apr 2024 21:03)  T(F): 97.2 (21 Apr 2024 18:03), Max: 98.1 (20 Apr 2024 21:03)  HR: 57 (21 Apr 2024 18:03) (48 - 60)  BP: 147/75 (21 Apr 2024 18:03) (124/56 - 148/73)  BP(mean): --  RR: 17 (21 Apr 2024 18:03) (16 - 17)  SpO2: 98% (21 Apr 2024 18:03) (97% - 98%)    Parameters below as of 21 Apr 2024 18:03  Patient On (Oxygen Delivery Method): room air      PHYSICAL EXAM:  GENERAL: Not in distress   CHEST/LUNG: Not using accessory muscles   HEART: s1 and s2 present  ABDOMEN:  Nontender and  Nondistended  EXTREMITIES: No pedal  edema  CNS: Awake and Alert      LABS: No new CBC                          10.7   7.72  )-----------( 151      ( 18 Apr 2024 10:00 )             32.1                           11.7   7.63  )-----------( 153      ( 17 Apr 2024 10:00 )             35.2       04-20    134<L>  |  104  |  76<H>  ----------------------------<  87  5.0   |  20<L>  |  5.21<H>    Ca    9.1      20 Apr 2024 06:05  Phos  5.8     04-20  Mg     2.4     04-20      04-18    135  |  105  |  76<H>  ----------------------------<  165<H>  4.6   |  22  |  5.36<H>    Ca    8.2<L>      18 Apr 2024 10:00  Phos  6.1     04-18  Mg     2.5     04-18    TPro  7.4  /  Alb  3.0<L>  /  TBili  0.3  /  DBili  x   /  AST  14  /  ALT  14  /  AlkPhos  144<H>  04-17    PT/INR - ( 13 Apr 2024 05:00 )   PT: 16.3 sec;   INR: 1.45 ratio      PTT - ( 13 Apr 2024 05:00 )  PTT:26.7 sec      CAPILLARY BLOOD GLUCOSE  POCT Blood Glucose.: 186 mg/dL (13 Apr 2024 11:30)  POCT Blood Glucose.: 121 mg/dL (13 Apr 2024 07:37)      < from: URVASHI W or WO Ultrasound Enhancing Agent (04.17.24 @ 07:41) >  CONCLUSIONS:      1. Left ventricular wall thickness is normal. Left ventricular systolic function is normal. There are no regional wall motion abnormalities seen.   2. Normal right ventricular cavity size, with normal wall thickness, and normal systolic function.   3. Normal left and right atrial size.   4. No thrombus seen in the left atrial, left atrial appendage, right atrium..   5. Trace tricuspid regurgitation. Pulmonary artery systolic pressure could not be estimated.   6. Mild mitral regurgitation.   7. Trace pulmonic regurgitation.   8. Agitated saline injection was negative for intracardiac shunt.   9. Left atrial appendage emptying velocites are normal.  10. Normal pulmonary venous flow.  11. Aortic root at the sinuses of Valsalva is normal in size, measuring 3.30 cm (indexed 1.79 cm/m²).  12. Mild aortic regurgitation.  13. No pericardial effusion seen.  14. No vegetations seen on this study.      < from: TTE W or WO Ultrasound Enhancing Agent (04.15.24 @ 12:57) >     CONCLUSIONS:      1. Left ventricular cavity is normal in size. Left ventricular systolic function is normal with an ejection fraction visually estimated at 50 to 55 %. There are no regional wall motion abnormalities seen.   2. There is mild (grade 1) left ventricular diastolic dysfunction.   3. Normal right ventricular cavity size, with normal wall thickness, and normal systolic function. Tricuspid annular plane systolic excursion (TAPSE) is 2.1 cm (normal >=1.7 cm).   4. Normal left and right atrial size.   5. Trace tricuspid regurgitation.   6. Compared to the transthoracic echocardiogram performed on 12/18/2023, there have been no significant interval changes.   7. Estimated pulmonary artery systolic pressure is 20 mmHg, consistent with normal pulmonary artery pressure.   8. Mild aortic regurgitation.   9. Mild pulmonic regurgitation.  10. There is calcification of the mitral valve annulus.  11. No pericardial effusion seen.  12. No vegetations seen on this study,consider URVASHI for further evaluation if clinically indicated.      MEDICATIONS  (STANDING):    atorvastatin 20 milliGRAM(s) Oral at bedtime  brimonidine 0.2% Ophthalmic Solution 1 Drop(s) Right EYE two times a day  carvedilol 12.5 milliGRAM(s) Oral every 12 hours  cefTRIAXone   IVPB 2000 milliGRAM(s) IV Intermittent every 24 hours  chlorhexidine 2% Cloths 1 Application(s) Topical <User Schedule>  ciprofloxacin  0.3% Ophthalmic Solution 1 Drop(s) Right EYE two times a day  dorzolamide 2% Ophthalmic Solution 1 Drop(s) Both EYES three times a day  furosemide   Injectable 40 milliGRAM(s) IV Push every 12 hours  glucagon  Injectable 1 milliGRAM(s) IntraMuscular once  hydrALAZINE 50 milliGRAM(s) Oral three times a day  insulin lispro (ADMELOG) corrective regimen sliding scale   SubCutaneous Before meals and at bedtime  ketorolac 0.5% Ophthalmic Solution 1 Drop(s) Right EYE two times a day  minocycline IVPB      minocycline IVPB 100 milliGRAM(s) IV Intermittent every 12 hours  polyethylene glycol 3350 17 Gram(s) Oral daily  prednisoLONE acetate 1% Suspension 1 Drop(s) Right EYE every 6 hours  senna 2 Tablet(s) Oral at bedtime  sevelamer carbonate 800 milliGRAM(s) Oral three times a day with meals  sodium zirconium cyclosilicate 10 Gram(s) Oral daily  trimethoprim / sulfamethoxazole IVPB 240 milliGRAM(s) IV Intermittent every 24 hours        RADIOLOGY & ADDITIONAL TESTS:    4/12/24 : CT Abdomen and Pelvis No Cont (04.12.24 @ 18:53) >  *  No acute septic pathology.  *  Scattered bilateral small pulmonary nodules measuring up to 7 mm in the right upper lobe are nonspecific and may represent   infectious/inflammatory disease or metastatic disease.  *  3.1 x 2.1 cm cystic structure in the posterior mediastinum at the level of the leelee may represent a lymphatic structure, bronchogenic cyst, or foregut duplication cyst.  *  No intra-abdominal collection.      4/12/24 : CT Chest No Cont (04.12.24 @ 18:53) LUNGS AND LARGE AIRWAYS: The central airways are patent. Scattered   bilateral small pulmonary nodules measuring up to 7 mm in the right upper  lobe are nonspecific. No acute consolidation.  PLEURA: Trace effusions.      MICROBIOLOGY DATA:    Culture - Blood in AM (04.20.24 @ 06:07)   Specimen Source: .Blood Blood-Peripheral  Culture Results: No growth at 24 hours    Culture - Blood in AM (04.20.24 @ 06:05)   Specimen Source: .Blood Blood-Peripheral  Culture Results: No growth at 24 hours    Culture - Other (04.13.24 @ 09:39)   - Minocycline: S  - Levofloxacin: S <=0.5  - Trimethoprim/Sulfamethoxazole: S <=0.5/9.5  Specimen Source: Cath Site Catheter Site  Culture Results:   Rare Stenotrophomonas maltophilia  Organism Identification: Stenotrophomonas maltophilia  Organism: Stenotrophomonas maltophilia  Organism: Stenotrophomonas maltophilia  Method Type: KB  Method Type: LAWRENCE    Culture - Blood (04.13.24 @ 17:00)   Specimen Source: .Blood Dialysis Catheter  Culture Results: No growth at 48 hours      Culture - Other (04.13.24 @ 09:39)   Specimen Source: Cath Site Catheter Site  Culture Results: No growth to date.      Culture - Blood (04.12.24 @ 17:50)   Gram Stain:   Growth in aerobic bottle: Gram positive cocci in pairs  Specimen Source: .Blood Blood-Peripheral  Culture Results:   Growth in aerobic bottle: Streptococcus salivarius/vestibularis group   Susceptibility to follow.      Culture - Blood (04.12.24 @ 18:00)   Gram Stain:   Growth in anaerobic bottle: Gram positive cocci in pairs  - Streptococcus sp. (Not Grp A, B or S pneumoniae): Detec  Specimen Source: .Blood Blood-Peripheral  Organism: Blood Culture PCR  Culture Results:   Growth in anaerobic bottle: Gram positive cocci in pairs   Direct identification is available within approximately 3-5   hours either by Blood Panel Multiplexed PCR or Direct   MALDI-TOF. Details: [email or web address]  Organism Identification: Blood Culture PCR  Method Type: PCR      Culture - Blood (04.12.24 @ 17:50)   Gram Stain:   Growth in aerobic bottle: Gram positive cocci in pairs  Specimen Source: .Blood Blood-Peripheral  Culture Results:   Growth in aerobic bottle: Gram positive cocci in pairs    Urine Microscopic-Add On (NC) (04.12.24 @ 18:13)   Red Blood Cell - Urine: 3 /HPF  White Blood Cell - Urine: 2 /HPF  Bacteria: Occasional /HPF    Respiratory Viral Panel with COVID-19 by OXANA (04.12.24 @ 17:50)   Rapid RVP Result: NotDetec  SARS-CoV-2: NotDetec:

## 2024-04-21 NOTE — PROGRESS NOTE ADULT - ASSESSMENT
ESRD - dialysis days are T-T-S. Post PC removal , monitor renal function daily and need for dialysis. Plan of new catheter placement as per ID.  DC Losartan , follow up with Lokelma 10 gm daily , 2 gm K diet and fluid restriction. Repeat lab in am.    Fever with bacteremia Streptococcus sp. (Not Grp A, B or S pneumoniae): Detec  Gram Stain:  catheter site blood cultures  are negative, Exit site culture positive for Culture - Other (04.13.24 @ 09:39)   Post PC removal 04/18 24. Last dialysis 04/18/24.  Minocycline: S  - Levofloxacin: S <=0.5  - Trimethoprim/Sulfamethoxazole: S <=0.5/9.5  Specimen Source: Cath Site Catheter Site  Culture Results:   Rare Stenotrophomonas maltophilia      Malfunction catheter max  ml/minute today.  Removal of PC by IR due to Stenotrophomonas maltophilia positive cultures .        ESRD - dialysis days are T-T-S. Post PC removal , monitor renal function daily and need for dialysis. Plan of new catheter placement as per ID.  DC Losartan , follow up with Lokelma 10 gm daily , 2 gm K diet.  Mild hyperkalemia as above.     Fever with bacteremia Streptococcus sp. (Not Grp A, B or S pneumoniae): Detec  Gram Stain:  catheter site blood cultures  are negative, Exit site culture positive for Culture - Other (04.13.24 @ 09:39)   Removal of PC by IR due to Stenotrophomonas maltophilia positive cultures and non well function catheter.   Post PC removal 04/18 24. Last dialysis 04/18/24. Patient is stable , no need for emergency dialysis  Follow up with new tunneled PC placement by IR tomorrow.   Minocycline: S  - Levofloxacin: S <=0.5  - Trimethoprim/Sulfamethoxazole: S <=0.5/9.5  Specimen Source: Cath Site Catheter Site  Culture Results:   Rare Stenotrophomonas maltophilia

## 2024-04-21 NOTE — PROGRESS NOTE ADULT - SUBJECTIVE AND OBJECTIVE BOX
Patient is a 78y old  Male who presents with a chief complaint of Sepsis (21 Apr 2024 06:37)  Patient is awake, alert, lying in bed in NAD. Still waiting for PC replacement     INTERVAL HPI/OVERNIGHT EVENTS:      VITAL SIGNS:  T(F): 97.9 (04-21-24 @ 04:45)  HR: 55 (04-21-24 @ 04:45)  BP: 148/73 (04-21-24 @ 04:45)  RR: 17 (04-21-24 @ 04:45)  SpO2: 97% (04-21-24 @ 04:45)  Wt(kg): --  I&O's Detail    20 Apr 2024 07:01  -  21 Apr 2024 07:00  --------------------------------------------------------  IN:  Total IN: 0 mL    OUT:    Voided (mL): 1100 mL  Total OUT: 1100 mL    Total NET: -1100 mL              REVIEW OF SYSTEMS:    CONSTITUTIONAL:  No fevers, chills, sweats    HEENT:  Eyes:  No diplopia or blurred vision. ENT:  No earache, sore throat or runny nose.    CARDIOVASCULAR:  No pressure, squeezing, tightness, or heaviness about the chest; no palpitations.    RESPIRATORY:  Per HPI    GASTROINTESTINAL:  No abdominal pain, nausea, vomiting or diarrhea.    GENITOURINARY:  No dysuria, frequency or urgency.    NEUROLOGIC:  No paresthesias, fasciculations, seizures or weakness.    PSYCHIATRIC:  No disorder of thought or mood.      PHYSICAL EXAM:    Constitutional: Well developed and nourished  Eyes:Perrla  ENMT: normal  Neck:supple  Respiratory: good air entry  Cardiovascular: S1 S2 regular  Gastrointestinal: Soft, Non tender  Extremities: No edema  Vascular:normal  Neurological:Awake, alert,Ox3  Musculoskeletal:Normal      MEDICATIONS  (STANDING):  atorvastatin 20 milliGRAM(s) Oral at bedtime  brimonidine 0.2% Ophthalmic Solution 1 Drop(s) Right EYE two times a day  carvedilol 12.5 milliGRAM(s) Oral every 12 hours  cefTRIAXone   IVPB 2000 milliGRAM(s) IV Intermittent every 24 hours  chlorhexidine 2% Cloths 1 Application(s) Topical <User Schedule>  ciprofloxacin  0.3% Ophthalmic Solution 1 Drop(s) Right EYE two times a day  dextrose 10% Bolus 125 milliLiter(s) IV Bolus once  dextrose 5%. 1000 milliLiter(s) (50 mL/Hr) IV Continuous <Continuous>  dextrose 5%. 1000 milliLiter(s) (100 mL/Hr) IV Continuous <Continuous>  dextrose 50% Injectable 25 Gram(s) IV Push once  dextrose 50% Injectable 12.5 Gram(s) IV Push once  dorzolamide 2% Ophthalmic Solution 1 Drop(s) Both EYES three times a day  furosemide   Injectable 40 milliGRAM(s) IV Push every 12 hours  glucagon  Injectable 1 milliGRAM(s) IntraMuscular once  hydrALAZINE 50 milliGRAM(s) Oral three times a day  insulin lispro (ADMELOG) corrective regimen sliding scale   SubCutaneous Before meals and at bedtime  ketorolac 0.5% Ophthalmic Solution 1 Drop(s) Right EYE two times a day  minocycline IVPB      minocycline IVPB 100 milliGRAM(s) IV Intermittent every 12 hours  polyethylene glycol 3350 17 Gram(s) Oral daily  prednisoLONE acetate 1% Suspension 1 Drop(s) Right EYE every 6 hours  senna 2 Tablet(s) Oral at bedtime  sevelamer carbonate 800 milliGRAM(s) Oral three times a day with meals  sodium zirconium cyclosilicate 10 Gram(s) Oral daily  trimethoprim / sulfamethoxazole IVPB 240 milliGRAM(s) IV Intermittent every 24 hours    MEDICATIONS  (PRN):  acetaminophen     Tablet .. 650 milliGRAM(s) Oral every 6 hours PRN Temp greater or equal to 38C (100.4F), Mild Pain (1 - 3)  dextrose Oral Gel 15 Gram(s) Oral once PRN Blood Glucose LESS THAN 70 milliGRAM(s)/deciliter      Allergies    No Known Allergies    Intolerances        LABS:    04-21    135  |  105  |  94<H>  ----------------------------<  88  5.3   |  20<L>  |  6.13<H>    Ca    9.0      21 Apr 2024 05:32  Phos  5.8     04-20  Mg     2.4     04-20        Urinalysis Basic - ( 21 Apr 2024 05:32 )    Color: x / Appearance: x / SG: x / pH: x  Gluc: 88 mg/dL / Ketone: x  / Bili: x / Urobili: x   Blood: x / Protein: x / Nitrite: x   Leuk Esterase: x / RBC: x / WBC x   Sq Epi: x / Non Sq Epi: x / Bacteria: x            CAPILLARY BLOOD GLUCOSE      POCT Blood Glucose.: 101 mg/dL (20 Apr 2024 21:59)  POCT Blood Glucose.: 120 mg/dL (20 Apr 2024 17:00)  POCT Blood Glucose.: 110 mg/dL (20 Apr 2024 11:25)        RADIOLOGY & ADDITIONAL TESTS:    CXR:    Ct scan chest:    ekg;    echo: Patient is a 78y old  Male who presents with a chief complaint of Sepsis (21 Apr 2024 06:37)  Patient is awake, alert, lying in bed in NAD. Still waiting for PC replacement     INTERVAL HPI/OVERNIGHT EVENTS:      VITAL SIGNS:  T(F): 97.9 (04-21-24 @ 04:45)  HR: 55 (04-21-24 @ 04:45)  BP: 148/73 (04-21-24 @ 04:45)  RR: 17 (04-21-24 @ 04:45)  SpO2: 97% (04-21-24 @ 04:45)  Wt(kg): --  I&O's Detail    20 Apr 2024 07:01  -  21 Apr 2024 07:00  --------------------------------------------------------  IN:  Total IN: 0 mL    OUT:    Voided (mL): 1100 mL  Total OUT: 1100 mL    Total NET: -1100 mL              REVIEW OF SYSTEMS:    CONSTITUTIONAL:  No fevers, chills, sweats    HEENT:  Eyes:  No diplopia or blurred vision. ENT:  No earache, sore throat or runny nose.    CARDIOVASCULAR:  No pressure, squeezing, tightness, or heaviness about the chest; no palpitations.    RESPIRATORY:  Per HPI    GASTROINTESTINAL:  No abdominal pain, nausea, vomiting or diarrhea.    GENITOURINARY:  No dysuria, frequency or urgency.    NEUROLOGIC:  No paresthesias, fasciculations, seizures or weakness.    PSYCHIATRIC:  No disorder of thought or mood.      PHYSICAL EXAM:    Constitutional: Well developed and nourished  Eyes:Perrla  ENMT: normal  Neck:supple  Respiratory: good air entry  Cardiovascular: S1 S2 regular  Gastrointestinal: Soft, Non tender  Extremities: No edema  Vascular:normal  Neurological:Awake, alert,Ox3  Musculoskeletal:Normal      MEDICATIONS  (STANDING):  atorvastatin 20 milliGRAM(s) Oral at bedtime  brimonidine 0.2% Ophthalmic Solution 1 Drop(s) Right EYE two times a day  carvedilol 12.5 milliGRAM(s) Oral every 12 hours  cefTRIAXone   IVPB 2000 milliGRAM(s) IV Intermittent every 24 hours  chlorhexidine 2% Cloths 1 Application(s) Topical <User Schedule>  ciprofloxacin  0.3% Ophthalmic Solution 1 Drop(s) Right EYE two times a day  dextrose 10% Bolus 125 milliLiter(s) IV Bolus once  dextrose 5%. 1000 milliLiter(s) (50 mL/Hr) IV Continuous <Continuous>  dextrose 5%. 1000 milliLiter(s) (100 mL/Hr) IV Continuous <Continuous>  dextrose 50% Injectable 25 Gram(s) IV Push once  dextrose 50% Injectable 12.5 Gram(s) IV Push once  dorzolamide 2% Ophthalmic Solution 1 Drop(s) Both EYES three times a day  furosemide   Injectable 40 milliGRAM(s) IV Push every 12 hours  glucagon  Injectable 1 milliGRAM(s) IntraMuscular once  hydrALAZINE 50 milliGRAM(s) Oral three times a day  insulin lispro (ADMELOG) corrective regimen sliding scale   SubCutaneous Before meals and at bedtime  ketorolac 0.5% Ophthalmic Solution 1 Drop(s) Right EYE two times a day  minocycline IVPB      minocycline IVPB 100 milliGRAM(s) IV Intermittent every 12 hours  polyethylene glycol 3350 17 Gram(s) Oral daily  prednisoLONE acetate 1% Suspension 1 Drop(s) Right EYE every 6 hours  senna 2 Tablet(s) Oral at bedtime  sevelamer carbonate 800 milliGRAM(s) Oral three times a day with meals  sodium zirconium cyclosilicate 10 Gram(s) Oral daily  trimethoprim / sulfamethoxazole IVPB 240 milliGRAM(s) IV Intermittent every 24 hours    MEDICATIONS  (PRN):  acetaminophen     Tablet .. 650 milliGRAM(s) Oral every 6 hours PRN Temp greater or equal to 38C (100.4F), Mild Pain (1 - 3)  dextrose Oral Gel 15 Gram(s) Oral once PRN Blood Glucose LESS THAN 70 milliGRAM(s)/deciliter      Allergies    No Known Allergies    Intolerances        LABS:    04-21    135  |  105  |  94<H>  ----------------------------<  88  5.3   |  20<L>  |  6.13<H>    Ca    9.0      21 Apr 2024 05:32  Phos  5.8     04-20  Mg     2.4     04-20        Urinalysis Basic - ( 21 Apr 2024 05:32 )    Color: x / Appearance: x / SG: x / pH: x  Gluc: 88 mg/dL / Ketone: x  / Bili: x / Urobili: x   Blood: x / Protein: x / Nitrite: x   Leuk Esterase: x / RBC: x / WBC x   Sq Epi: x / Non Sq Epi: x / Bacteria: x    Quantiferon Plus TB (04.13.24 @ 09:15)   Quantiferon TB Plus: NEGATIVE: NEGATIVE: M. tuberculosis infection is NOT likely. No interferon-gamma   response to M. tuberculosis antigens was detected         CAPILLARY BLOOD GLUCOSE      POCT Blood Glucose.: 101 mg/dL (20 Apr 2024 21:59)  POCT Blood Glucose.: 120 mg/dL (20 Apr 2024 17:00)  POCT Blood Glucose.: 110 mg/dL (20 Apr 2024 11:25)        RADIOLOGY & ADDITIONAL TESTS:    CXR:    Ct scan chest:    ekg;    echo:

## 2024-04-21 NOTE — PROGRESS NOTE ADULT - SUBJECTIVE AND OBJECTIVE BOX
Date of Service 04-21-24 @ 10:38    CHIEF COMPLAINT:Patient is a 78y old  Male who presents with a chief complaint of Sepsis.Pt appears comfortable.    	  REVIEW OF SYSTEMS:  CONSTITUTIONAL: No fever, weight loss, or fatigue  EYES: No eye pain, visual disturbances, or discharge  ENT:  No difficulty hearing, tinnitus, vertigo; No sinus or throat pain  NECK: No pain or stiffness  RESPIRATORY: No cough, wheezing, chills or hemoptysis; No Shortness of Breath  CARDIOVASCULAR: No chest pain, palpitations, passing out, dizziness, or leg swelling  GASTROINTESTINAL: No abdominal or epigastric pain. No nausea, vomiting, or hematemesis; No diarrhea or constipation. No melena or hematochezia.  GENITOURINARY: No dysuria, frequency, hematuria, or incontinence  NEUROLOGICAL: No headaches, memory loss, loss of strength, numbness, or tremors  SKIN: No itching, burning, rashes, or lesions   LYMPH Nodes: No enlarged glands  ENDOCRINE: No heat or cold intolerance; No hair loss  MUSCULOSKELETAL: No joint pain or swelling; No muscle, back, or extremity pain  PSYCHIATRIC: No depression, anxiety, mood swings, or difficulty sleeping  HEME/LYMPH: No easy bruising, or bleeding gums  ALLERGY AND IMMUNOLOGIC: No hives or eczema	        PHYSICAL EXAM:  T(C): 36.6 (04-21-24 @ 04:45), Max: 36.8 (04-20-24 @ 14:09)  HR: 55 (04-21-24 @ 04:45) (48 - 60)  BP: 148/73 (04-21-24 @ 04:45) (124/56 - 152/79)  RR: 17 (04-21-24 @ 04:45) (16 - 18)  SpO2: 97% (04-21-24 @ 04:45) (97% - 98%)  Wt(kg): --  I&O's Summary    20 Apr 2024 07:01  -  21 Apr 2024 07:00  --------------------------------------------------------  IN: 0 mL / OUT: 1100 mL / NET: -1100 mL        Appearance: Normal	  HEENT:   Normal oral mucosa, PERRL, EOMI	  Lymphatic: No lymphadenopathy  Cardiovascular: Normal S1 S2, No JVD, No murmurs, No edema  Respiratory: Lungs clear to auscultation	  Psychiatry: A & O x 3, Mood & affect appropriate  Gastrointestinal:  Soft, Non-tender, + BS	  Skin: No rashes, No ecchymoses, No cyanosis	  Neurologic: Non-focal  Extremities: Normal range of motion, No clubbing, cyanosis or edema  Vascular: Peripheral pulses palpable 2+ bilaterally    MEDICATIONS  (STANDING):  atorvastatin 20 milliGRAM(s) Oral at bedtime  brimonidine 0.2% Ophthalmic Solution 1 Drop(s) Right EYE two times a day  carvedilol 12.5 milliGRAM(s) Oral every 12 hours  cefTRIAXone   IVPB 2000 milliGRAM(s) IV Intermittent every 24 hours  chlorhexidine 2% Cloths 1 Application(s) Topical <User Schedule>  ciprofloxacin  0.3% Ophthalmic Solution 1 Drop(s) Right EYE two times a day  dextrose 10% Bolus 125 milliLiter(s) IV Bolus once  dextrose 5%. 1000 milliLiter(s) (100 mL/Hr) IV Continuous <Continuous>  dextrose 5%. 1000 milliLiter(s) (50 mL/Hr) IV Continuous <Continuous>  dextrose 50% Injectable 25 Gram(s) IV Push once  dextrose 50% Injectable 12.5 Gram(s) IV Push once  dorzolamide 2% Ophthalmic Solution 1 Drop(s) Both EYES three times a day  furosemide   Injectable 40 milliGRAM(s) IV Push every 12 hours  glucagon  Injectable 1 milliGRAM(s) IntraMuscular once  hydrALAZINE 50 milliGRAM(s) Oral three times a day  insulin lispro (ADMELOG) corrective regimen sliding scale   SubCutaneous Before meals and at bedtime  ketorolac 0.5% Ophthalmic Solution 1 Drop(s) Right EYE two times a day  minocycline IVPB 100 milliGRAM(s) IV Intermittent every 12 hours  minocycline IVPB      polyethylene glycol 3350 17 Gram(s) Oral daily  prednisoLONE acetate 1% Suspension 1 Drop(s) Right EYE every 6 hours  senna 2 Tablet(s) Oral at bedtime  sevelamer carbonate 800 milliGRAM(s) Oral three times a day with meals  sodium zirconium cyclosilicate 10 Gram(s) Oral daily  trimethoprim / sulfamethoxazole IVPB 240 milliGRAM(s) IV Intermittent every 24 hours        	  LABS:	 	      04-21    135  |  105  |  94<H>  ----------------------------<  88  5.3   |  20<L>  |  6.13<H>    Ca    9.0      21 Apr 2024 05:32  Phos  5.8     04-20  Mg     2.4     04-20

## 2024-04-22 LAB
ALBUMIN SERPL ELPH-MCNC: 3.5 G/DL — SIGNIFICANT CHANGE UP (ref 3.5–5)
ALP SERPL-CCNC: 150 U/L — HIGH (ref 40–120)
ALT FLD-CCNC: 13 U/L DA — SIGNIFICANT CHANGE UP (ref 10–60)
ANION GAP SERPL CALC-SCNC: 14 MMOL/L — SIGNIFICANT CHANGE UP (ref 5–17)
AST SERPL-CCNC: 14 U/L — SIGNIFICANT CHANGE UP (ref 10–40)
BILIRUB SERPL-MCNC: 0.4 MG/DL — SIGNIFICANT CHANGE UP (ref 0.2–1.2)
BUN SERPL-MCNC: 109 MG/DL — HIGH (ref 7–18)
CALCIUM SERPL-MCNC: 8.9 MG/DL — SIGNIFICANT CHANGE UP (ref 8.4–10.5)
CHLORIDE SERPL-SCNC: 102 MMOL/L — SIGNIFICANT CHANGE UP (ref 96–108)
CO2 SERPL-SCNC: 18 MMOL/L — LOW (ref 22–31)
CREAT SERPL-MCNC: 6.81 MG/DL — HIGH (ref 0.5–1.3)
CULTURE RESULTS: SIGNIFICANT CHANGE UP
CULTURE RESULTS: SIGNIFICANT CHANGE UP
EGFR: 8 ML/MIN/1.73M2 — LOW
GLUCOSE BLDC GLUCOMTR-MCNC: 104 MG/DL — HIGH (ref 70–99)
GLUCOSE BLDC GLUCOMTR-MCNC: 132 MG/DL — HIGH (ref 70–99)
GLUCOSE BLDC GLUCOMTR-MCNC: 91 MG/DL — SIGNIFICANT CHANGE UP (ref 70–99)
GLUCOSE BLDC GLUCOMTR-MCNC: 93 MG/DL — SIGNIFICANT CHANGE UP (ref 70–99)
GLUCOSE SERPL-MCNC: 87 MG/DL — SIGNIFICANT CHANGE UP (ref 70–99)
MAGNESIUM SERPL-MCNC: 2.7 MG/DL — HIGH (ref 1.6–2.6)
PHOSPHATE SERPL-MCNC: 7.9 MG/DL — HIGH (ref 2.5–4.5)
POTASSIUM SERPL-MCNC: 5.4 MMOL/L — HIGH (ref 3.5–5.3)
POTASSIUM SERPL-SCNC: 5.4 MMOL/L — HIGH (ref 3.5–5.3)
PROT SERPL-MCNC: 7.9 G/DL — SIGNIFICANT CHANGE UP (ref 6–8.3)
SODIUM SERPL-SCNC: 134 MMOL/L — LOW (ref 135–145)
SPECIMEN SOURCE: SIGNIFICANT CHANGE UP
SPECIMEN SOURCE: SIGNIFICANT CHANGE UP

## 2024-04-22 RX ORDER — MINOCYCLINE HYDROCHLORIDE 45 MG/1
100 TABLET, EXTENDED RELEASE ORAL EVERY 12 HOURS
Refills: 0 | Status: DISCONTINUED | OUTPATIENT
Start: 2024-04-22 | End: 2024-04-26

## 2024-04-22 RX ORDER — FUROSEMIDE 40 MG
20 TABLET ORAL EVERY 12 HOURS
Refills: 0 | Status: DISCONTINUED | OUTPATIENT
Start: 2024-04-22 | End: 2024-04-23

## 2024-04-22 RX ADMIN — Medication 2 TABLET(S): at 17:51

## 2024-04-22 RX ADMIN — Medication 1 DROP(S): at 22:45

## 2024-04-22 RX ADMIN — ATORVASTATIN CALCIUM 20 MILLIGRAM(S): 80 TABLET, FILM COATED ORAL at 22:43

## 2024-04-22 RX ADMIN — DORZOLAMIDE HYDROCHLORIDE 1 DROP(S): 20 SOLUTION/ DROPS OPHTHALMIC at 22:43

## 2024-04-22 RX ADMIN — BRIMONIDINE TARTRATE 1 DROP(S): 2 SOLUTION/ DROPS OPHTHALMIC at 06:00

## 2024-04-22 RX ADMIN — BRIMONIDINE TARTRATE 1 DROP(S): 2 SOLUTION/ DROPS OPHTHALMIC at 18:40

## 2024-04-22 RX ADMIN — SEVELAMER CARBONATE 800 MILLIGRAM(S): 2400 POWDER, FOR SUSPENSION ORAL at 12:04

## 2024-04-22 RX ADMIN — DORZOLAMIDE HYDROCHLORIDE 1 DROP(S): 20 SOLUTION/ DROPS OPHTHALMIC at 06:00

## 2024-04-22 RX ADMIN — CARVEDILOL PHOSPHATE 12.5 MILLIGRAM(S): 80 CAPSULE, EXTENDED RELEASE ORAL at 06:03

## 2024-04-22 RX ADMIN — Medication 50 MILLIGRAM(S): at 06:03

## 2024-04-22 RX ADMIN — DORZOLAMIDE HYDROCHLORIDE 1 DROP(S): 20 SOLUTION/ DROPS OPHTHALMIC at 15:13

## 2024-04-22 RX ADMIN — Medication 1 DROP(S): at 06:00

## 2024-04-22 RX ADMIN — CARVEDILOL PHOSPHATE 12.5 MILLIGRAM(S): 80 CAPSULE, EXTENDED RELEASE ORAL at 17:51

## 2024-04-22 RX ADMIN — MINOCYCLINE HYDROCHLORIDE 100 MILLIGRAM(S): 45 TABLET, EXTENDED RELEASE ORAL at 06:00

## 2024-04-22 RX ADMIN — SEVELAMER CARBONATE 800 MILLIGRAM(S): 2400 POWDER, FOR SUSPENSION ORAL at 17:51

## 2024-04-22 RX ADMIN — Medication 50 MILLIGRAM(S): at 15:13

## 2024-04-22 RX ADMIN — Medication 1 DROP(S): at 17:52

## 2024-04-22 RX ADMIN — Medication 1 DROP(S): at 12:05

## 2024-04-22 RX ADMIN — Medication 1 DROP(S): at 18:40

## 2024-04-22 RX ADMIN — Medication 50 MILLIGRAM(S): at 22:43

## 2024-04-22 RX ADMIN — SENNA PLUS 2 TABLET(S): 8.6 TABLET ORAL at 22:44

## 2024-04-22 RX ADMIN — Medication 1 DROP(S): at 08:29

## 2024-04-22 RX ADMIN — MINOCYCLINE HYDROCHLORIDE 100 MILLIGRAM(S): 45 TABLET, EXTENDED RELEASE ORAL at 17:50

## 2024-04-22 RX ADMIN — CHLORHEXIDINE GLUCONATE 1 APPLICATION(S): 213 SOLUTION TOPICAL at 06:05

## 2024-04-22 RX ADMIN — Medication 1 DROP(S): at 17:51

## 2024-04-22 RX ADMIN — Medication 20 MILLIGRAM(S): at 17:50

## 2024-04-22 NOTE — PROGRESS NOTE ADULT - SUBJECTIVE AND OBJECTIVE BOX
[   ] ICU                                          [   ] CCU                                      [ X  ] Medical Floor    Patient is a 78 year old male with abdominal pain. GI consulted to evaluate.        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 Patient also c/o constipation but denies nausea, vomiting, hematemesis, hematochezia, melena, fever, chest pain, SOB, cough, hematuria, or diarrhea.      Today patient appears comfortable. No new complaints reported, No abdominal pain, N/V, hematemesis, hematochezia, melena, fever, chills, chest pain, SOB, cough or diarrhea reported.         PAST MEDICAL HISTORY     DM (diabetes mellitus)    HTN (hypertension)     Diabetes mellitus    ESRD on HD     Hyperlipidemia     Glaucoma    Gout        PAST SURGICAL HISTORY    No significant past surgical history        Allergies    No Known Allergies    Intolerances  None       SOCIAL HISTORY  Advanced Directives:       [X  ] Full Code       [  ] DNR  Marital Status:         [  ] M      [X  ] S      [  ] D       [  ] W  Children:       [ X ] Yes      [  ] No  Occupation:        [  ] Employed       [ X ] Unemployed       [  ] Retired  Diet:       [ X ] Regular       [  ] PEG feeding          [  ] NG tube feeding  Drug Use:           [ X ] Patient denied          [  ] Yes  Alcohol:           [X  ] No             [  ] Yes (socially)         [  ] Yes (chronic)  Tobacco:           [  ] Yes           [ X ] No    FAMILY HISTORY  [ X ] Heart Disease            [ X ] Diabetes             [ X ] HTN             [  ] Colon Cancer             [  ] Stomach Cancer              [  ] Pancreatic Cancer          VITALS   Vital Signs Last 24 Hrs  T(C): 36.6 (04-22-24 @ 05:17), Max: 36.6 (04-22-24 @ 05:17)  T(F): 97.9 (04-22-24 @ 05:17), Max: 97.9 (04-22-24 @ 05:17)  HR: 56 (04-22-24 @ 05:17) (51 - 58)  BP: 154/73 (04-22-24 @ 05:17) (131/85 - 154/73)   RR: 16 (04-22-24 @ 05:17) (16 - 17)  SpO2: 98% (04-22-24 @ 05:17) (98% - 99%)        MEDICATIONS  (STANDING):  atorvastatin 20 milliGRAM(s) Oral at bedtime  brimonidine 0.2% Ophthalmic Solution 1 Drop(s) Right EYE two times a day  carvedilol 12.5 milliGRAM(s) Oral every 12 hours  chlorhexidine 2% Cloths 1 Application(s) Topical <User Schedule>  ciprofloxacin  0.3% Ophthalmic Solution 1 Drop(s) Right EYE two times a day  dextrose 10% Bolus 125 milliLiter(s) IV Bolus once  dextrose 5%. 1000 milliLiter(s) (100 mL/Hr) IV Continuous <Continuous>  dextrose 5%. 1000 milliLiter(s) (50 mL/Hr) IV Continuous <Continuous>  dextrose 50% Injectable 25 Gram(s) IV Push once  dextrose 50% Injectable 12.5 Gram(s) IV Push once  dorzolamide 2% Ophthalmic Solution 1 Drop(s) Both EYES three times a day  glucagon  Injectable 1 milliGRAM(s) IntraMuscular once  hydrALAZINE 50 milliGRAM(s) Oral three times a day  insulin lispro (ADMELOG) corrective regimen sliding scale   SubCutaneous Before meals and at bedtime  ketorolac 0.5% Ophthalmic Solution 1 Drop(s) Right EYE two times a day  minocycline IVPB      minocycline IVPB 100 milliGRAM(s) IV Intermittent every 12 hours  polyethylene glycol 3350 17 Gram(s) Oral daily  prednisoLONE acetate 1% Suspension 1 Drop(s) Right EYE every 6 hours  senna 2 Tablet(s) Oral at bedtime  sevelamer carbonate 800 milliGRAM(s) Oral three times a day with meals  sodium zirconium cyclosilicate 10 Gram(s) Oral daily  trimethoprim / sulfamethoxazole IVPB 240 milliGRAM(s) IV Intermittent every 24 hours    MEDICATIONS  (PRN):  acetaminophen     Tablet .. 650 milliGRAM(s) Oral every 6 hours PRN Temp greater or equal to 38C (100.4F), Mild Pain (1 - 3)  dextrose Oral Gel 15 Gram(s) Oral once PRN Blood Glucose LESS THAN 70 milliGRAM(s)/deciliter          04-22    134<L>  |  102  |  109<H>  ----------------------------<  87  5.4<H>   |  18<L>  |  6.81<H>    Ca    8.9      22 Apr 2024 05:54  Phos  7.9     04-22  Mg     2.7     04-22    TPro  7.9  /  Alb  3.5  /  TBili  0.4  /  DBili  x   /  AST  14  /  ALT  13  /  AlkPhos  150<H>  04-22

## 2024-04-22 NOTE — PROGRESS NOTE ADULT - PROBLEM SELECTOR PLAN 2
-  HD (T/Th/S )  -  Right chest permacath removed by IR on 4/18,  pending replacement  -  S/p HD on 4/18  -  Avoid Nephrotoxic Meds/ Agents such as (NSAIDs, IV contrast, Aminoglycosides such as gentamicin, Gadolinium contrast, Phosphate containing enemas, etc..)  -  Monitor BMP, phos serum   -  CHG bathing daily  -  IR consulted, possible permacath placement on 4/22,  Discussed with IR,  if not today then on Wednesday 4/24  -  Nephro-Dr. Art following, next HD on 4/22, if patient has access

## 2024-04-22 NOTE — PROGRESS NOTE ADULT - PROBLEM SELECTOR PLAN 4
- S/p CT Chest/A/P showed scattered bilateral small pulmonary nodules measuring up to 7 mm in the right upper lobe are nonspecific and may represent infectious/inflammatory disease or metastatic disease. 3.1 x 2.1 cm cystic structure in the posterior mediastinum at the level of the leelee may represent a lymphatic structure, bronchogenic cyst, or foregut duplication cyst. No intra-abdominal collection.  - Pulmonary  -  Dr. Ghotra Recommends Follow up CT in 12 months

## 2024-04-22 NOTE — PROGRESS NOTE ADULT - ASSESSMENT
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 Patient AAOx3 at bedside and mentioned having intermittent, mild, suprapubic discomfort associated w/ dysuria that has been going on for the last 48 hours. Patient was admitted to [facility] on 11/24/2023 and discharge on 12/08/23 where he was treated for sepsis. His perm cath was exchanged on 11/223. Found to have MSSA bacteremia and transitioned from Vancomycin to Cefazolin. 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 Underwent right Permcath placement by IR on 12/4/23. On admission, he found to have fever, tachypnea but negative Urine analysis and negative chest CT. He has started on Cefepime and IV  Vancomycin, and the ID consult requested to assist with further evaluation and antibiotic management.    # Sepsis ( Fever + tachypnea)- Suspected Line sepsis  # Streptococcal Bacteremia- 4/12/24 - in the setting of Perm-a cath - the blood cx from catheter as of 4/13 NGTD- Repeat Blood cx from 4/17 and 4/20 have NGTD  - TTE from 4/15 and URVASHI from 4/17 shows no vegetation  # Catheter site culture grew Stenotrophomonas - s/p removal of Perm-a-cath on 4/18/24    would recommend:    1. OOB to chair  2. No ID contraindication to place a perm-a - cath in the setting of negative repeat Blood cultures  3. Continue  Bactrim and Minocycline to cover Stenotrophomonas  4. Continue Ceftriaxone 2 g daily to cover Streptococcus  5. Need at least 4 weeks of Abx, may change Bactrim and Minocycline to oral on discharge     d/w DR. Hutton and Kade , PHarm-D    Attending Attestation:    Spent more than 35 minutes on total encounter, more than 50 % of the visit was spent counseling and/or coordinating care by the Attending physician.

## 2024-04-22 NOTE — PROGRESS NOTE ADULT - SUBJECTIVE AND OBJECTIVE BOX
Patient is a 78y old  Male who presents with a chief complaint of Sepsis (22 Apr 2024 05:34)    pt seen in tele [ x ], reg med floor [   ], bed [x  ], chair at bedside [   ], a+o x3 [ x ], lethargic [  ],    nad [ x ]      Allergies    No Known Allergies        Vitals    T(F): 97.9 (04-22-24 @ 05:17), Max: 97.9 (04-22-24 @ 05:17)  HR: 56 (04-22-24 @ 05:17) (51 - 58)  BP: 154/73 (04-22-24 @ 05:17) (131/85 - 154/73)  RR: 16 (04-22-24 @ 05:17) (16 - 17)  SpO2: 98% (04-22-24 @ 05:17) (98% - 99%)  Wt(kg): --  CAPILLARY BLOOD GLUCOSE      POCT Blood Glucose.: 100 mg/dL (21 Apr 2024 22:08)      Labs        04-21    135  |  105  |  94<H>  ----------------------------<  88  5.3   |  20<L>  |  6.13<H>    Ca    9.0      21 Apr 2024 05:32              .Blood Blood-Peripheral  04-20 @ 06:07   No growth at 24 hours  --  --      .Blood Blood-Peripheral  04-20 @ 06:05   No growth at 24 hours  --  --      .Blood Blood-Peripheral  04-17 @ 10:15   No growth at 4 days  --  --      .Blood Blood-Peripheral  04-17 @ 10:00   No growth at 4 days  --  --      .Blood Dialysis Catheter  04-13 @ 17:00   No growth at 5 days  --  --      Cath Site Catheter Site  04-13 @ 09:39   Rare Stenotrophomonas maltophilia  --  Stenotrophomonas maltophilia      Clean Catch Clean Catch (Midstream)  04-12 @ 18:13   <10,000 CFU/mL Normal Urogenital Kavita  --  --      .Blood Blood-Peripheral  04-12 @ 18:00   Growth in anaerobic bottle: Streptococcus salivarius/vestibularis group  "Susceptibilities not performed"  Direct identification is available within approximately 3-5  hours either by Blood Panel Multiplexed PCR or Direct  MALDI-TOF. Details: [email or web address]  --  Blood Culture PCR      .Blood Blood-Peripheral  04-12 @ 17:50   Growth in aerobic and anaerobic bottles: Streptococcus  salivarius/vestibularis group  --  Streptococcus salivarius/vestibularis group          Radiology Results      Meds    MEDICATIONS  (STANDING):  atorvastatin 20 milliGRAM(s) Oral at bedtime  brimonidine 0.2% Ophthalmic Solution 1 Drop(s) Right EYE two times a day  carvedilol 12.5 milliGRAM(s) Oral every 12 hours  chlorhexidine 2% Cloths 1 Application(s) Topical <User Schedule>  ciprofloxacin  0.3% Ophthalmic Solution 1 Drop(s) Right EYE two times a day  dextrose 10% Bolus 125 milliLiter(s) IV Bolus once  dextrose 5%. 1000 milliLiter(s) (100 mL/Hr) IV Continuous <Continuous>  dextrose 5%. 1000 milliLiter(s) (50 mL/Hr) IV Continuous <Continuous>  dextrose 50% Injectable 25 Gram(s) IV Push once  dextrose 50% Injectable 12.5 Gram(s) IV Push once  dorzolamide 2% Ophthalmic Solution 1 Drop(s) Both EYES three times a day  glucagon  Injectable 1 milliGRAM(s) IntraMuscular once  hydrALAZINE 50 milliGRAM(s) Oral three times a day  insulin lispro (ADMELOG) corrective regimen sliding scale   SubCutaneous Before meals and at bedtime  ketorolac 0.5% Ophthalmic Solution 1 Drop(s) Right EYE two times a day  minocycline IVPB      minocycline IVPB 100 milliGRAM(s) IV Intermittent every 12 hours  polyethylene glycol 3350 17 Gram(s) Oral daily  prednisoLONE acetate 1% Suspension 1 Drop(s) Right EYE every 6 hours  senna 2 Tablet(s) Oral at bedtime  sevelamer carbonate 800 milliGRAM(s) Oral three times a day with meals  sodium zirconium cyclosilicate 10 Gram(s) Oral daily  trimethoprim / sulfamethoxazole IVPB 240 milliGRAM(s) IV Intermittent every 24 hours      MEDICATIONS  (PRN):  acetaminophen     Tablet .. 650 milliGRAM(s) Oral every 6 hours PRN Temp greater or equal to 38C (100.4F), Mild Pain (1 - 3)  dextrose Oral Gel 15 Gram(s) Oral once PRN Blood Glucose LESS THAN 70 milliGRAM(s)/deciliter      Physical Exam    Neuro :  no focal deficits  Respiratory: CTA B/L  CV: RRR, S1S2, no murmurs,   Abdominal: Soft, NT, ND +BS,  Extremities: No edema, + peripheral pulses      ASSESSMENT      uncontrolled htn,   abnormal trop r/o acs,   poss 2nd to demand ischemia,    abd pain poss 2nd to constipation,    fever poss 2nd to line sepsis   bacteremia   pulmonary nodules,    bronchogenic cyst, or foregut duplication cyst,   hyperkalemia  h/o sinus node dysfunction,   chronic HFpEF,   aortic stenosis,   HTN,   HLD,   b/l carotid stenosis,   DM (not on meds),   ESRD on dialysis (Tue, Thu, Sat),   Gout,    glaucoma,   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  cx and sens fr hd cath site with Stenotrophomonas maltophilia noted above   blood cx cath neg noted above  id f/u    TTE with ejection fraction visually estimated at 50 to 55 %. mild (grade 1) left ventricular diastolic dysfunction.   No vegetations seen on this study, consider URVASHI for further evaluation if clinically indicated noted    URVASHI with No thrombus seen in the left atrial, left atrial appendage, right atrium.   Agitated saline injection was negative for intracardiac shunt.   No vegetations seen on this study noted    s/p ir removal of Perm-a-catheter 4/18/24   cont IV Bactrim and Minocycline to cover Stenotrophomonas  Continue Ceftriaxone 2 g daily to cover Streptococcus  Repeat Blood cultures 4/17/24 neg noted above   Follow up Repeat Blood cultures form 4/20/24  procalcitonin 0.6 noted    quantiferon tb gold test neg noted    pulm f/u    Follow up CT in 12 months  Nodify testing as OP.  pt on hd t,th,s   renal f/u   K improved after dialysis,   follow 2 gm K diet.   Malfunction catheter max  ml/minute 4/16/24,   IR consulted, possible permacath placement on 4/22 if blood cultures negative from 4/20  next HD on 4/22.  AVG placement after discharge at [facility].   hgba1c 6.4 noted    lispro ss   cont current meds         Patient is a 78y old  Male who presents with a chief complaint of Sepsis (22 Apr 2024 05:34)    pt seen in tele [  ], reg med floor [  x ], bed [x  ], chair at bedside [   ], a+o x3 [ x ], lethargic [  ],    nad [ x ]      Allergies    No Known Allergies        Vitals    T(F): 97.9 (04-22-24 @ 05:17), Max: 97.9 (04-22-24 @ 05:17)  HR: 56 (04-22-24 @ 05:17) (51 - 58)  BP: 154/73 (04-22-24 @ 05:17) (131/85 - 154/73)  RR: 16 (04-22-24 @ 05:17) (16 - 17)  SpO2: 98% (04-22-24 @ 05:17) (98% - 99%)  Wt(kg): --  CAPILLARY BLOOD GLUCOSE      POCT Blood Glucose.: 100 mg/dL (21 Apr 2024 22:08)      Labs        04-21    135  |  105  |  94<H>  ----------------------------<  88  5.3   |  20<L>  |  6.13<H>    Ca    9.0      21 Apr 2024 05:32              .Blood Blood-Peripheral  04-20 @ 06:07   No growth at 24 hours  --  --      .Blood Blood-Peripheral  04-20 @ 06:05   No growth at 24 hours  --  --      .Blood Blood-Peripheral  04-17 @ 10:15   No growth at 4 days  --  --      .Blood Blood-Peripheral  04-17 @ 10:00   No growth at 4 days  --  --      .Blood Dialysis Catheter  04-13 @ 17:00   No growth at 5 days  --  --      Cath Site Catheter Site  04-13 @ 09:39   Rare Stenotrophomonas maltophilia  --  Stenotrophomonas maltophilia      Clean Catch Clean Catch (Midstream)  04-12 @ 18:13   <10,000 CFU/mL Normal Urogenital Kavita  --  --      .Blood Blood-Peripheral  04-12 @ 18:00   Growth in anaerobic bottle: Streptococcus salivarius/vestibularis group  "Susceptibilities not performed"  Direct identification is available within approximately 3-5  hours either by Blood Panel Multiplexed PCR or Direct  MALDI-TOF. Details: [email or web address]  --  Blood Culture PCR      .Blood Blood-Peripheral  04-12 @ 17:50   Growth in aerobic and anaerobic bottles: Streptococcus  salivarius/vestibularis group  --  Streptococcus salivarius/vestibularis group          Radiology Results      Meds    MEDICATIONS  (STANDING):  atorvastatin 20 milliGRAM(s) Oral at bedtime  brimonidine 0.2% Ophthalmic Solution 1 Drop(s) Right EYE two times a day  carvedilol 12.5 milliGRAM(s) Oral every 12 hours  chlorhexidine 2% Cloths 1 Application(s) Topical <User Schedule>  ciprofloxacin  0.3% Ophthalmic Solution 1 Drop(s) Right EYE two times a day  dextrose 10% Bolus 125 milliLiter(s) IV Bolus once  dextrose 5%. 1000 milliLiter(s) (100 mL/Hr) IV Continuous <Continuous>  dextrose 5%. 1000 milliLiter(s) (50 mL/Hr) IV Continuous <Continuous>  dextrose 50% Injectable 25 Gram(s) IV Push once  dextrose 50% Injectable 12.5 Gram(s) IV Push once  dorzolamide 2% Ophthalmic Solution 1 Drop(s) Both EYES three times a day  glucagon  Injectable 1 milliGRAM(s) IntraMuscular once  hydrALAZINE 50 milliGRAM(s) Oral three times a day  insulin lispro (ADMELOG) corrective regimen sliding scale   SubCutaneous Before meals and at bedtime  ketorolac 0.5% Ophthalmic Solution 1 Drop(s) Right EYE two times a day  minocycline IVPB      minocycline IVPB 100 milliGRAM(s) IV Intermittent every 12 hours  polyethylene glycol 3350 17 Gram(s) Oral daily  prednisoLONE acetate 1% Suspension 1 Drop(s) Right EYE every 6 hours  senna 2 Tablet(s) Oral at bedtime  sevelamer carbonate 800 milliGRAM(s) Oral three times a day with meals  sodium zirconium cyclosilicate 10 Gram(s) Oral daily  trimethoprim / sulfamethoxazole IVPB 240 milliGRAM(s) IV Intermittent every 24 hours      MEDICATIONS  (PRN):  acetaminophen     Tablet .. 650 milliGRAM(s) Oral every 6 hours PRN Temp greater or equal to 38C (100.4F), Mild Pain (1 - 3)  dextrose Oral Gel 15 Gram(s) Oral once PRN Blood Glucose LESS THAN 70 milliGRAM(s)/deciliter      Physical Exam    Neuro :  no focal deficits  Respiratory: CTA B/L  CV: RRR, S1S2, no murmurs,   Abdominal: Soft, NT, ND +BS,  Extremities: No edema, + peripheral pulses      ASSESSMENT      uncontrolled htn,   abnormal trop r/o acs,   poss 2nd to demand ischemia,    abd pain poss 2nd to constipation,    fever poss 2nd to line sepsis   bacteremia   pulmonary nodules,    bronchogenic cyst, or foregut duplication cyst,   hyperkalemia  h/o sinus node dysfunction,   chronic HFpEF,   aortic stenosis,   HTN,   HLD,   b/l carotid stenosis,   DM (not on meds),   ESRD on dialysis (Tue, Thu, Sat),   Gout,    glaucoma,   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  cx and sens fr hd cath site with Stenotrophomonas maltophilia noted above   blood cx cath neg noted above  id f/u    TTE with ejection fraction visually estimated at 50 to 55 %. mild (grade 1) left ventricular diastolic dysfunction.   No vegetations seen on this study, consider URVASHI for further evaluation if clinically indicated noted    URVASHI with No thrombus seen in the left atrial, left atrial appendage, right atrium.   Agitated saline injection was negative for intracardiac shunt.   No vegetations seen on this study noted    s/p ir removal of Perm-a-catheter 4/18/24   cont IV Bactrim and Minocycline to cover Stenotrophomonas  Continue Ceftriaxone 2 g daily to cover Streptococcus  Repeat Blood cultures 4/17/24 neg noted above   Repeat Blood cultures form 4/20/24 neg noted above  procalcitonin 0.6 noted    quantiferon tb gold test neg noted    pulm f/u    Follow up CT in 12 months  Nodify testing as OP.  pt on hd t,th,s   renal f/u   K improved after dialysis,   follow 2 gm K diet.   Malfunction catheter max  ml/minute 4/16/24,   IR consulted, possible permacath placement   next HD on 4/22.  AVG placement after discharge at [facility].   hgba1c 6.4 noted    lispro ss   cont current meds

## 2024-04-22 NOTE — PROGRESS NOTE ADULT - SUBJECTIVE AND OBJECTIVE BOX
Patient is a 78y old  Male who presents with a chief complaint of Sepsis (22 Apr 2024 10:47)  Awake, alert, comfortable in bed in NAD. Clinically stable. For new PC placement by IR today  INTERVAL HPI/OVERNIGHT EVENTS:      VITAL SIGNS:  T(F): 97.9 (04-22-24 @ 05:17)  HR: 56 (04-22-24 @ 05:17)  BP: 154/73 (04-22-24 @ 05:17)  RR: 16 (04-22-24 @ 05:17)  SpO2: 98% (04-22-24 @ 05:17)  Wt(kg): --  I&O's Detail    21 Apr 2024 07:01  -  22 Apr 2024 07:00  --------------------------------------------------------  IN:  Total IN: 0 mL    OUT:    Voided (mL): 1500 mL  Total OUT: 1500 mL    Total NET: -1500 mL              REVIEW OF SYSTEMS:    CONSTITUTIONAL:  No fevers, chills, sweats    HEENT:  Eyes:  No diplopia or blurred vision. ENT:  No earache, sore throat or runny nose.    CARDIOVASCULAR:  No pressure, squeezing, tightness, or heaviness about the chest; no palpitations.    RESPIRATORY:  Per HPI    GASTROINTESTINAL:  No abdominal pain, nausea, vomiting or diarrhea.    GENITOURINARY:  No dysuria, frequency or urgency.    NEUROLOGIC:  No paresthesias, fasciculations, seizures or weakness.    PSYCHIATRIC:  No disorder of thought or mood.      PHYSICAL EXAM:    Constitutional: Well developed and nourished  Eyes:Perrla  ENMT: normal  Neck:supple  Respiratory: good air entry  Cardiovascular: S1 S2 regular  Gastrointestinal: Soft, Non tender  Extremities: No edema  Vascular:normal  Neurological:Awake, alert,Ox3  Musculoskeletal:Normal      MEDICATIONS  (STANDING):  atorvastatin 20 milliGRAM(s) Oral at bedtime  brimonidine 0.2% Ophthalmic Solution 1 Drop(s) Right EYE two times a day  carvedilol 12.5 milliGRAM(s) Oral every 12 hours  chlorhexidine 2% Cloths 1 Application(s) Topical <User Schedule>  ciprofloxacin  0.3% Ophthalmic Solution 1 Drop(s) Right EYE two times a day  dextrose 10% Bolus 125 milliLiter(s) IV Bolus once  dextrose 5%. 1000 milliLiter(s) (100 mL/Hr) IV Continuous <Continuous>  dextrose 5%. 1000 milliLiter(s) (50 mL/Hr) IV Continuous <Continuous>  dextrose 50% Injectable 12.5 Gram(s) IV Push once  dextrose 50% Injectable 25 Gram(s) IV Push once  dorzolamide 2% Ophthalmic Solution 1 Drop(s) Both EYES three times a day  glucagon  Injectable 1 milliGRAM(s) IntraMuscular once  hydrALAZINE 50 milliGRAM(s) Oral three times a day  insulin lispro (ADMELOG) corrective regimen sliding scale   SubCutaneous Before meals and at bedtime  ketorolac 0.5% Ophthalmic Solution 1 Drop(s) Right EYE two times a day  minocycline IVPB 100 milliGRAM(s) IV Intermittent every 12 hours  minocycline IVPB      polyethylene glycol 3350 17 Gram(s) Oral daily  prednisoLONE acetate 1% Suspension 1 Drop(s) Right EYE every 6 hours  senna 2 Tablet(s) Oral at bedtime  sevelamer carbonate 800 milliGRAM(s) Oral three times a day with meals  sodium zirconium cyclosilicate 10 Gram(s) Oral daily  trimethoprim / sulfamethoxazole IVPB 240 milliGRAM(s) IV Intermittent every 24 hours    MEDICATIONS  (PRN):  acetaminophen     Tablet .. 650 milliGRAM(s) Oral every 6 hours PRN Temp greater or equal to 38C (100.4F), Mild Pain (1 - 3)  dextrose Oral Gel 15 Gram(s) Oral once PRN Blood Glucose LESS THAN 70 milliGRAM(s)/deciliter      Allergies    No Known Allergies    Intolerances        LABS:    04-22    134<L>  |  102  |  109<H>  ----------------------------<  87  5.4<H>   |  18<L>  |  6.81<H>    Ca    8.9      22 Apr 2024 05:54  Phos  7.9     04-22  Mg     2.7     04-22    TPro  7.9  /  Alb  3.5  /  TBili  0.4  /  DBili  x   /  AST  14  /  ALT  13  /  AlkPhos  150<H>  04-22      Urinalysis Basic - ( 22 Apr 2024 05:54 )    Color: x / Appearance: x / SG: x / pH: x  Gluc: 87 mg/dL / Ketone: x  / Bili: x / Urobili: x   Blood: x / Protein: x / Nitrite: x   Leuk Esterase: x / RBC: x / WBC x   Sq Epi: x / Non Sq Epi: x / Bacteria: x            CAPILLARY BLOOD GLUCOSE      POCT Blood Glucose.: 104 mg/dL (22 Apr 2024 07:55)  POCT Blood Glucose.: 100 mg/dL (21 Apr 2024 22:08)  POCT Blood Glucose.: 101 mg/dL (21 Apr 2024 16:59)  POCT Blood Glucose.: 114 mg/dL (21 Apr 2024 12:06)        RADIOLOGY & ADDITIONAL TESTS:    CXR:    Ct scan chest:    ekg;    echo:

## 2024-04-22 NOTE — PROGRESS NOTE ADULT - SUBJECTIVE AND OBJECTIVE BOX
Patient is seen and examined at the bed side, is afebrile. The Blood cultures from 4/20/24 have NGTD.      REVIEW OF SYSTEMS: All other review systems are negative      ALLERGIES: No Known Allergies      Vital Signs Last 24 Hrs  T(C): 36.4 (22 Apr 2024 14:15), Max: 36.6 (22 Apr 2024 05:17)  T(F): 97.5 (22 Apr 2024 14:15), Max: 97.9 (22 Apr 2024 05:17)  HR: 53 (22 Apr 2024 14:15) (51 - 57)  BP: 140/69 (22 Apr 2024 14:15) (131/85 - 154/73)  BP(mean): --  RR: 16 (22 Apr 2024 14:15) (16 - 17)  SpO2: 99% (22 Apr 2024 14:15) (98% - 99%)    Parameters below as of 22 Apr 2024 14:15  Patient On (Oxygen Delivery Method): room air      PHYSICAL EXAM:  GENERAL: Not in distress   CHEST/LUNG: Not using accessory muscles   HEART: s1 and s2 present  ABDOMEN:  Nontender and  Nondistended  EXTREMITIES: No pedal  edema  CNS: Awake and Alert      LABS: No new CBC                          10.7   7.72  )-----------( 151      ( 18 Apr 2024 10:00 )             32.1                           11.7   7.63  )-----------( 153      ( 17 Apr 2024 10:00 )             35.2       04-20    134<L>  |  104  |  76<H>  ----------------------------<  87  5.0   |  20<L>  |  5.21<H>    Ca    9.1      20 Apr 2024 06:05  Phos  5.8     04-20  Mg     2.4     04-20      04-18    135  |  105  |  76<H>  ----------------------------<  165<H>  4.6   |  22  |  5.36<H>    Ca    8.2<L>      18 Apr 2024 10:00  Phos  6.1     04-18  Mg     2.5     04-18    TPro  7.4  /  Alb  3.0<L>  /  TBili  0.3  /  DBili  x   /  AST  14  /  ALT  14  /  AlkPhos  144<H>  04-17    PT/INR - ( 13 Apr 2024 05:00 )   PT: 16.3 sec;   INR: 1.45 ratio      PTT - ( 13 Apr 2024 05:00 )  PTT:26.7 sec      CAPILLARY BLOOD GLUCOSE  POCT Blood Glucose.: 186 mg/dL (13 Apr 2024 11:30)  POCT Blood Glucose.: 121 mg/dL (13 Apr 2024 07:37)      < from: URVASHI W or WO Ultrasound Enhancing Agent (04.17.24 @ 07:41) >  CONCLUSIONS:      1. Left ventricular wall thickness is normal. Left ventricular systolic function is normal. There are no regional wall motion abnormalities seen.   2. Normal right ventricular cavity size, with normal wall thickness, and normal systolic function.   3. Normal left and right atrial size.   4. No thrombus seen in the left atrial, left atrial appendage, right atrium..   5. Trace tricuspid regurgitation. Pulmonary artery systolic pressure could not be estimated.   6. Mild mitral regurgitation.   7. Trace pulmonic regurgitation.   8. Agitated saline injection was negative for intracardiac shunt.   9. Left atrial appendage emptying velocites are normal.  10. Normal pulmonary venous flow.  11. Aortic root at the sinuses of Valsalva is normal in size, measuring 3.30 cm (indexed 1.79 cm/m²).  12. Mild aortic regurgitation.  13. No pericardial effusion seen.  14. No vegetations seen on this study.      < from: TTE W or WO Ultrasound Enhancing Agent (04.15.24 @ 12:57) >     CONCLUSIONS:      1. Left ventricular cavity is normal in size. Left ventricular systolic function is normal with an ejection fraction visually estimated at 50 to 55 %. There are no regional wall motion abnormalities seen.   2. There is mild (grade 1) left ventricular diastolic dysfunction.   3. Normal right ventricular cavity size, with normal wall thickness, and normal systolic function. Tricuspid annular plane systolic excursion (TAPSE) is 2.1 cm (normal >=1.7 cm).   4. Normal left and right atrial size.   5. Trace tricuspid regurgitation.   6. Compared to the transthoracic echocardiogram performed on 12/18/2023, there have been no significant interval changes.   7. Estimated pulmonary artery systolic pressure is 20 mmHg, consistent with normal pulmonary artery pressure.   8. Mild aortic regurgitation.   9. Mild pulmonic regurgitation.  10. There is calcification of the mitral valve annulus.  11. No pericardial effusion seen.  12. No vegetations seen on this study,consider URVASHI for further evaluation if clinically indicated.      MEDICATIONS  (STANDING):    atorvastatin 20 milliGRAM(s) Oral at bedtime  brimonidine 0.2% Ophthalmic Solution 1 Drop(s) Right EYE two times a day  carvedilol 12.5 milliGRAM(s) Oral every 12 hours  chlorhexidine 2% Cloths 1 Application(s) Topical <User Schedule>  ciprofloxacin  0.3% Ophthalmic Solution 1 Drop(s) Right EYE two times a day  dorzolamide 2% Ophthalmic Solution 1 Drop(s) Both EYES three times a day  furosemide   Injectable 20 milliGRAM(s) IV Push every 12 hours  glucagon  Injectable 1 milliGRAM(s) IntraMuscular once  hydrALAZINE 50 milliGRAM(s) Oral three times a day  insulin lispro (ADMELOG) corrective regimen sliding scale   SubCutaneous Before meals and at bedtime  ketorolac 0.5% Ophthalmic Solution 1 Drop(s) Right EYE two times a day  minocycline 100 milliGRAM(s) Oral every 12 hours  polyethylene glycol 3350 17 Gram(s) Oral daily  prednisoLONE acetate 1% Suspension 1 Drop(s) Right EYE every 6 hours  senna 2 Tablet(s) Oral at bedtime  sevelamer carbonate 800 milliGRAM(s) Oral three times a day with meals  sodium zirconium cyclosilicate 10 Gram(s) Oral daily  trimethoprim  160 mG/sulfamethoxazole 800 mG 2 Tablet(s) Oral every 24 hours      RADIOLOGY & ADDITIONAL TESTS:    4/12/24 : CT Abdomen and Pelvis No Cont (04.12.24 @ 18:53) >  *  No acute septic pathology.  *  Scattered bilateral small pulmonary nodules measuring up to 7 mm in the right upper lobe are nonspecific and may represent   infectious/inflammatory disease or metastatic disease.  *  3.1 x 2.1 cm cystic structure in the posterior mediastinum at the level of the leelee may represent a lymphatic structure, bronchogenic cyst, or foregut duplication cyst.  *  No intra-abdominal collection.      4/12/24 : CT Chest No Cont (04.12.24 @ 18:53) LUNGS AND LARGE AIRWAYS: The central airways are patent. Scattered   bilateral small pulmonary nodules measuring up to 7 mm in the right upper  lobe are nonspecific. No acute consolidation.  PLEURA: Trace effusions.      MICROBIOLOGY DATA:        Culture - Blood in AM (04.20.24 @ 06:07)   Specimen Source: .Blood Blood-Peripheral  Culture Results: No growth at 48 hours    Culture - Blood in AM (04.20.24 @ 06:05)   Specimen Source: .Blood Blood-Peripheral  Culture Results: No growth at 48 hours    Culture - Other (04.13.24 @ 09:39)   - Minocycline: S  - Levofloxacin: S <=0.5  - Trimethoprim/Sulfamethoxazole: S <=0.5/9.5  Specimen Source: Cath Site Catheter Site  Culture Results:   Rare Stenotrophomonas maltophilia  Organism Identification: Stenotrophomonas maltophilia  Organism: Stenotrophomonas maltophilia  Organism: Stenotrophomonas maltophilia  Method Type: KB  Method Type: LAWRENCE    Culture - Blood (04.13.24 @ 17:00)   Specimen Source: .Blood Dialysis Catheter  Culture Results: No growth at 4 days      Culture - Other (04.13.24 @ 09:39)   Specimen Source: Cath Site Catheter Site  Culture Results: No growth to date.      Culture - Blood (04.12.24 @ 17:50)   Gram Stain:   Growth in aerobic bottle: Gram positive cocci in pairs  Specimen Source: .Blood Blood-Peripheral  Culture Results:   Growth in aerobic bottle: Streptococcus salivarius/vestibularis group   Susceptibility to follow.      Culture - Blood (04.12.24 @ 18:00)   Gram Stain:   Growth in anaerobic bottle: Gram positive cocci in pairs  - Streptococcus sp. (Not Grp A, B or S pneumoniae): Detec  Specimen Source: .Blood Blood-Peripheral  Organism: Blood Culture PCR  Culture Results:   Growth in anaerobic bottle: Gram positive cocci in pairs   Direct identification is available within approximately 3-5   hours either by Blood Panel Multiplexed PCR or Direct   MALDI-TOF. Details: [email or web address]  Organism Identification: Blood Culture PCR  Method Type: PCR      Culture - Blood (04.12.24 @ 17:50)   Gram Stain:   Growth in aerobic bottle: Gram positive cocci in pairs  Specimen Source: .Blood Blood-Peripheral  Culture Results:   Growth in aerobic bottle: Gram positive cocci in pairs    Urine Microscopic-Add On (NC) (04.12.24 @ 18:13)   Red Blood Cell - Urine: 3 /HPF  White Blood Cell - Urine: 2 /HPF  Bacteria: Occasional /HPF    Respiratory Viral Panel with COVID-19 by OXANA (04.12.24 @ 17:50)   Rapid RVP Result: NotDetec  SARS-CoV-2: NotDetec:

## 2024-04-22 NOTE — PROGRESS NOTE ADULT - ASSESSMENT
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  1.Strep bactermia,.Abx as per ID.URVASHI no vegetation  2.ESRD-HD as per renal.  3.HTN-cont bp medication.  4.DM-Insulin.  5.Lipid d/o-statin.  6.PC to be placed by IR.  7.Hyperkalemia-lokelma.  8.GI and DVT prophylaxis.  .

## 2024-04-22 NOTE — PHARMACOTHERAPY INTERVENTION NOTE - NSPHARMCOMMASP
ASP - Dose optimization/Non-Renal dose adjustment
ASP - De-escalation
ASP - De-escalation
ASP - IV to PO

## 2024-04-22 NOTE — PROGRESS NOTE ADULT - ASSESSMENT
ESRD - dialysis days are T-T-S. Post PC removal , monitor renal function daily and need for dialysis. Plan of new catheter placement as per ID.  IR unable to place PC for dialysis and deferred procedure to 04/24/2024, continue to monitor need for  dialysis and placement of temporary catheter if need.   DC Losartan , follow up with Lokelma 10 gm daily , 2 gm K diet. Continue Diuretics.       Fever with bacteremia Streptococcus sp. (Not Grp A, B or S pneumoniae): Detec  Gram Stain:  catheter site blood cultures  are negative, Exit site culture positive for Culture - Other (04.13.24 @ 09:39)   Removal of PC by IR due to Stenotrophomonas maltophilia positive cultures and non well function catheter.   Post PC removal 04/18 24. Last dialysis 04/18/24.  As above.   Follow up with new tunneled PC placement by IR tomorrow.   Minocycline: S  - Levofloxacin: S <=0.5  - Trimethoprim/Sulfamethoxazole: S <=0.5/9.5  Specimen Source: Cath Site Catheter Site  Culture Results:   Rare Stenotrophomonas maltophilia

## 2024-04-22 NOTE — PROGRESS NOTE ADULT - PROBLEM SELECTOR PLAN 3
-   Patient presented with Elevated Troponin   -   S/p EKG   -   Likely ischemic demand in s/o sepsis  -   S/p Echo wnl  -   s/p URVASHI on 4/17 no vegetations   -   Telemetry discontinued  -   Cardiology Dr Jorge following

## 2024-04-22 NOTE — PROGRESS NOTE ADULT - SUBJECTIVE AND OBJECTIVE BOX
Problem List:  ESRD, last HD 04/18/24  Post PC removal for new PC by IR today .     PAST MEDICAL & SURGICAL HISTORY:  DM (diabetes mellitus)      HTN (hypertension)      Chronic kidney disease      HLD (hyperlipidemia)      Glaucoma      Gout      No significant past surgical history          No Known Allergies      MEDICATIONS  (STANDING):  atorvastatin 20 milliGRAM(s) Oral at bedtime  brimonidine 0.2% Ophthalmic Solution 1 Drop(s) Right EYE two times a day  carvedilol 12.5 milliGRAM(s) Oral every 12 hours  chlorhexidine 2% Cloths 1 Application(s) Topical <User Schedule>  ciprofloxacin  0.3% Ophthalmic Solution 1 Drop(s) Right EYE two times a day  dextrose 10% Bolus 125 milliLiter(s) IV Bolus once  dextrose 5%. 1000 milliLiter(s) (100 mL/Hr) IV Continuous <Continuous>  dextrose 5%. 1000 milliLiter(s) (50 mL/Hr) IV Continuous <Continuous>  dextrose 50% Injectable 25 Gram(s) IV Push once  dextrose 50% Injectable 12.5 Gram(s) IV Push once  dorzolamide 2% Ophthalmic Solution 1 Drop(s) Both EYES three times a day  glucagon  Injectable 1 milliGRAM(s) IntraMuscular once  hydrALAZINE 50 milliGRAM(s) Oral three times a day  insulin lispro (ADMELOG) corrective regimen sliding scale   SubCutaneous Before meals and at bedtime  ketorolac 0.5% Ophthalmic Solution 1 Drop(s) Right EYE two times a day  minocycline IVPB      minocycline IVPB 100 milliGRAM(s) IV Intermittent every 12 hours  polyethylene glycol 3350 17 Gram(s) Oral daily  prednisoLONE acetate 1% Suspension 1 Drop(s) Right EYE every 6 hours  senna 2 Tablet(s) Oral at bedtime  sevelamer carbonate 800 milliGRAM(s) Oral three times a day with meals  sodium zirconium cyclosilicate 10 Gram(s) Oral daily  trimethoprim / sulfamethoxazole IVPB 240 milliGRAM(s) IV Intermittent every 24 hours    MEDICATIONS  (PRN):  acetaminophen     Tablet .. 650 milliGRAM(s) Oral every 6 hours PRN Temp greater or equal to 38C (100.4F), Mild Pain (1 - 3)  dextrose Oral Gel 15 Gram(s) Oral once PRN Blood Glucose LESS THAN 70 milliGRAM(s)/deciliter        04-21    135  |  105  |  94<H>  ----------------------------<  88  5.3   |  20<L>  |  6.13<H>    Ca    9.0      21 Apr 2024 05:32  Phos  5.8     04-20  Mg     2.4     04-20              REVIEW OF SYSTEMS:  General: no fever no chills, no weight loss.    RESPIRATORY: No cough, wheezing, hemoptysis; No shortness of breath  CARDIOVASCULAR: No chest pain or palpitations. No Edema  GASTROINTESTINAL: No abdominal or epigastric pain. No nausea, vomiting. No diarrhea or constipation. No melena.  GENITOURINARY: No dysuria, frequency, foamy urine, urinary urgency, incontinence or hematuria  NEUROLOGICAL: No numbness or weakness, no tremor , no dizziness.   Muscle skeletal : no joint pain and no swelling of joints and limbs.  SKIN: No itching, burning, rashes.        VITALS:  T(F): 97.9 (04-22-24 @ 05:17), Max: 97.9 (04-22-24 @ 05:17)  HR: 56 (04-22-24 @ 05:17)  BP: 154/73 (04-22-24 @ 05:17)  RR: 16 (04-22-24 @ 05:17)  SpO2: 98% (04-22-24 @ 05:17)  Wt(kg): --    04-20 @ 07:01  -  04-21 @ 07:00  --------------------------------------------------------  IN: 0 mL / OUT: 1100 mL / NET: -1100 mL    04-21 @ 07:01  -  04-22 @ 05:35  --------------------------------------------------------  IN: 0 mL / OUT: 700 mL / NET: -700 mL        PHYSICAL EXAM:  Constitutional: well developed, no diaphoresis, no distress.  Neck: No JVD, no carotid bruit, supple, no adenopathy  Respiratory: Good air entrance B/L, no wheezes, rales or rhonchi  Cardiovascular: S1, S2, RRR, no pericardial rub, no murmur  Abdomen: BS+, soft, no tenderness, no bruit  Pelvis: bladder nondistended  Extremities: No cyanosis or clubbing. No peripheral edema.        Problem List:  ESRD, last HD 04/18/24  Post PC removal for new PC by IR today . IR was booked and unable to place PC today , deferred PC placement to 04/24/2024.      PAST MEDICAL & SURGICAL HISTORY:  DM (diabetes mellitus)      HTN (hypertension)      Chronic kidney disease      HLD (hyperlipidemia)      Glaucoma      Gout      No significant past surgical history          No Known Allergies      MEDICATIONS  (STANDING):  atorvastatin 20 milliGRAM(s) Oral at bedtime  brimonidine 0.2% Ophthalmic Solution 1 Drop(s) Right EYE two times a day  carvedilol 12.5 milliGRAM(s) Oral every 12 hours  chlorhexidine 2% Cloths 1 Application(s) Topical <User Schedule>  ciprofloxacin  0.3% Ophthalmic Solution 1 Drop(s) Right EYE two times a day  dextrose 10% Bolus 125 milliLiter(s) IV Bolus once  dextrose 5%. 1000 milliLiter(s) (100 mL/Hr) IV Continuous <Continuous>  dextrose 5%. 1000 milliLiter(s) (50 mL/Hr) IV Continuous <Continuous>  dextrose 50% Injectable 25 Gram(s) IV Push once  dextrose 50% Injectable 12.5 Gram(s) IV Push once  dorzolamide 2% Ophthalmic Solution 1 Drop(s) Both EYES three times a day  glucagon  Injectable 1 milliGRAM(s) IntraMuscular once  hydrALAZINE 50 milliGRAM(s) Oral three times a day  insulin lispro (ADMELOG) corrective regimen sliding scale   SubCutaneous Before meals and at bedtime  ketorolac 0.5% Ophthalmic Solution 1 Drop(s) Right EYE two times a day  minocycline IVPB      minocycline IVPB 100 milliGRAM(s) IV Intermittent every 12 hours  polyethylene glycol 3350 17 Gram(s) Oral daily  prednisoLONE acetate 1% Suspension 1 Drop(s) Right EYE every 6 hours  senna 2 Tablet(s) Oral at bedtime  sevelamer carbonate 800 milliGRAM(s) Oral three times a day with meals  sodium zirconium cyclosilicate 10 Gram(s) Oral daily  trimethoprim / sulfamethoxazole IVPB 240 milliGRAM(s) IV Intermittent every 24 hours    MEDICATIONS  (PRN):  acetaminophen     Tablet .. 650 milliGRAM(s) Oral every 6 hours PRN Temp greater or equal to 38C (100.4F), Mild Pain (1 - 3)  dextrose Oral Gel 15 Gram(s) Oral once PRN Blood Glucose LESS THAN 70 milliGRAM(s)/deciliter        04-21    135  |  105  |  94<H>  ----------------------------<  88  5.3   |  20<L>  |  6.13<H>    Ca    9.0      21 Apr 2024 05:32  Phos  5.8     04-20  Mg     2.4     04-20              REVIEW OF SYSTEMS:  General: no fever no chills, no weight loss.    RESPIRATORY: No cough, wheezing, hemoptysis; No shortness of breath  CARDIOVASCULAR: No chest pain or palpitations. No Edema  GASTROINTESTINAL: No abdominal or epigastric pain. No nausea, vomiting. No diarrhea or constipation. No melena.  GENITOURINARY: No dysuria, frequency, foamy urine, urinary urgency, incontinence or hematuria  NEUROLOGICAL: No numbness or weakness, no tremor , no dizziness.   Muscle skeletal : no joint pain and no swelling of joints and limbs.  SKIN: No itching, burning, rashes.        VITALS:  T(F): 97.9 (04-22-24 @ 05:17), Max: 97.9 (04-22-24 @ 05:17)  HR: 56 (04-22-24 @ 05:17)  BP: 154/73 (04-22-24 @ 05:17)  RR: 16 (04-22-24 @ 05:17)  SpO2: 98% (04-22-24 @ 05:17)  Wt(kg): --    04-20 @ 07:01  -  04-21 @ 07:00  --------------------------------------------------------  IN: 0 mL / OUT: 1100 mL / NET: -1100 mL    04-21 @ 07:01  -  04-22 @ 05:35  --------------------------------------------------------  IN: 0 mL / OUT: 700 mL / NET: -700 mL        PHYSICAL EXAM:  Constitutional: well developed, no diaphoresis, no distress.  Neck: No JVD, no carotid bruit, supple, no adenopathy  Respiratory: Good air entrance B/L, no wheezes, rales or rhonchi  Cardiovascular: S1, S2, RRR, no pericardial rub, no murmur  Abdomen: BS+, soft, no tenderness, no bruit  Pelvis: bladder nondistended  Extremities: No cyanosis or clubbing. No peripheral edema.

## 2024-04-22 NOTE — PROGRESS NOTE ADULT - SUBJECTIVE AND OBJECTIVE BOX
NP Note discussed with  Primary Attending      Patient is a 78y old  Male who presents with a chief complaint of Sepsis (19 Apr 2024 14:30)    OVERNIGHT EVENTS: no acute changes.      MEDICATIONS  (STANDING):  atorvastatin 20 milliGRAM(s) Oral at bedtime  brimonidine 0.2% Ophthalmic Solution 1 Drop(s) Right EYE two times a day  carvedilol 12.5 milliGRAM(s) Oral every 12 hours  chlorhexidine 2% Cloths 1 Application(s) Topical <User Schedule>  ciprofloxacin  0.3% Ophthalmic Solution 1 Drop(s) Right EYE two times a day  dextrose 10% Bolus 125 milliLiter(s) IV Bolus once  dextrose 5%. 1000 milliLiter(s) (50 mL/Hr) IV Continuous <Continuous>  dextrose 5%. 1000 milliLiter(s) (100 mL/Hr) IV Continuous <Continuous>  dextrose 50% Injectable 25 Gram(s) IV Push once  dextrose 50% Injectable 12.5 Gram(s) IV Push once  dorzolamide 2% Ophthalmic Solution 1 Drop(s) Both EYES three times a day  glucagon  Injectable 1 milliGRAM(s) IntraMuscular once  hydrALAZINE 50 milliGRAM(s) Oral three times a day  insulin lispro (ADMELOG) corrective regimen sliding scale   SubCutaneous Before meals and at bedtime  ketorolac 0.5% Ophthalmic Solution 1 Drop(s) Right EYE two times a day  minocycline 100 milliGRAM(s) Oral every 12 hours  polyethylene glycol 3350 17 Gram(s) Oral daily  prednisoLONE acetate 1% Suspension 1 Drop(s) Right EYE every 6 hours  senna 2 Tablet(s) Oral at bedtime  sevelamer carbonate 800 milliGRAM(s) Oral three times a day with meals  sodium zirconium cyclosilicate 10 Gram(s) Oral daily  trimethoprim  160 mG/sulfamethoxazole 800 mG 2 Tablet(s) Oral every 24 hours    MEDICATIONS  (PRN):  acetaminophen     Tablet .. 650 milliGRAM(s) Oral every 6 hours PRN Temp greater or equal to 38C (100.4F), Mild Pain (1 - 3)  dextrose Oral Gel 15 Gram(s) Oral once PRN Blood Glucose LESS THAN 70 milliGRAM(s)/deciliter      REVIEW OF SYSTEMS:  CONSTITUTIONAL: No fever, chills  ENMT:  No difficulty hearing, no change in vision  NECK: No pain or stiffness  RESPIRATORY: No cough, SOB  CARDIOVASCULAR: No chest pain, palpitations  GASTROINTESTINAL: No abdominal pain. No nausea, vomiting, or diarrhea  GENITOURINARY: No dysuria  NEUROLOGICAL: No HA  SKIN: No itching, burning, rashes, or lesions   LYMPH NODES: No enlarged glands  ENDOCRINE: No heat or cold intolerance; No hair loss  MUSCULOSKELETAL: No joint pain or swelling; No muscle, back, or extremity pain  PSYCHIATRIC: No depression, anxiety  HEME/LYMPH: No easy bruising, or bleeding gums      Vital Signs Last 24 Hrs  T(C): 36.6 (22 Apr 2024 05:17), Max: 36.6 (22 Apr 2024 05:17)  T(F): 97.9 (22 Apr 2024 05:17), Max: 97.9 (22 Apr 2024 05:17)  HR: 56 (22 Apr 2024 05:17) (51 - 58)  BP: 154/73 (22 Apr 2024 05:17) (131/85 - 154/73)  BP(mean): --  RR: 16 (22 Apr 2024 05:17) (16 - 17)  SpO2: 98% (22 Apr 2024 05:17) (98% - 99%)    Parameters below as of 22 Apr 2024 05:17  Patient On (Oxygen Delivery Method): room air      PHYSICAL EXAM:  GENERAL: No acute distress  EYES: clear conjunctiva  ENMT: Moist mucous membranes  NECK: Supple, No JVD, Normal thyroid  CHEST/LUNG: s/p Right Permacath removal, guaze in place, no bleeding or discharge. Clear to auscultation bilaterally; No rales, rhonchi, wheezing, or rubs  HEART: S1, S2, Regular rate and rhythm  ABDOMEN: Soft, Nontender, Nondistended; Bowel sounds present  NEURO: Alert & Oriented X3  EXTREMITIES: No LE edema, no calf tenderness  LYMPH: No lymphadenopathy noted  SKIN: No rashes or lesions    Consultant(s) Notes Reviewed:  [x ] YES  [ ] NO  Care Discussed with Consultants/Other Providers [ x] YES  [ ] NO    LABS:                       04-22    134<L>  |  102  |  109<H>  ----------------------------<  87  5.4<H>   |  18<L>  |  6.81<H>    Ca    8.9      22 Apr 2024 05:54  Phos  7.9     04-22  Mg     2.7     04-22    TPro  7.9  /  Alb  3.5  /  TBili  0.4  /  DBili  x   /  AST  14  /  ALT  13  /  AlkPhos  150<H>  04-22       CAPILLARY BLOOD GLUCOSE  POCT Blood Glucose.: 91 mg/dL (22 Apr 2024 12:11)  POCT Blood Glucose.: 104 mg/dL (22 Apr 2024 07:55)  POCT Blood Glucose.: 100 mg/dL (21 Apr 2024 22:08)  POCT Blood Glucose.: 101 mg/dL (21 Apr 2024 16:59)        Urinalysis Basic - ( 18 Apr 2024 10:00 )    Color: x / Appearance: x / SG: x / pH: x  Gluc: 165 mg/dL / Ketone: x  / Bili: x / Urobili: x   Blood: x / Protein: x / Nitrite: x   Leuk Esterase: x / RBC: x / WBC x   Sq Epi: x / Non Sq Epi: x / Bacteria: x        RADIOLOGY & ADDITIONAL TESTS:  < from: CT Chest No Cont (04.12.24 @ 18:53) >    ACC: [id number] EXAM:  CT ABDOMEN AND PELVIS   ORDERED BY: DEANGELO MOLINA     ACC: [id number] EXAM:  CT CHEST   ORDERED BY: DEANGELO MOLINA     PROCEDURE DATE:  04/12/2024          INTERPRETATION:  CLINICAL INFORMATION: Fever and altered mental statuson   hemodialysis, abdominal pain    COMPARISON: None.    CONTRAST/COMPLICATIONS:  IV Contrast: None  Oral Contrast: None  Complications: None    PROCEDURE:  CT of the Chest, Abdomen and Pelvis was performed.  Sagittal and coronal reformats were performed.    FINDINGS:  CHEST:  LUNGS AND LARGE AIRWAYS: The central airways are patent. Scattered   bilateral small pulmonary nodules measuring up to 7 mm in the right upper   lobe are nonspecific. No acute consolidation.  PLEURA: Trace effusions.  VESSELS: Aortic atherosclerosis without aneurysm. Right IJ dialysis line   tip in the right atrium.  HEART: Mild cardiomegaly. No pericardial effusion. Coronary, mitral   annular, and aortic valve calcification.  MEDIASTINUM AND CHRISTIANO: No adenopathy. 3.1 x2.1 cm cystic structure in the   posterior mediastinum at the level of the leelee may represent a   lymphatic structure, bronchogenic cyst, or foregut duplication cyst.  CHEST WALL AND LOWER NECK: No masses.    ABDOMEN AND PELVIS:  LIVER: Normal.  BILE DUCTS: Nondilated.  GALLBLADDER: Gallstones.  SPLEEN: Normal.  PANCREAS: Normal.  ADRENALS: Normal.  KIDNEYS/URETERS: No hydronephrosis or urinary tract calculi.    BLADDER: Normal.  REPRODUCTIVE ORGANS: Enlarged prostate.    BOWEL: No bowel-related abnormality. No bowel obstruction or bowel   inflammation. Normal appendix and ileocecal region. No evidence of active   colitis or diverticulitis.  PERITONEUM: No free air or ascites. No collection.  VESSELS: Aortoiliac atherosclerosis without aneurysm.  RETROPERITONEUM/LYMPH NODES: No adenopathy or hematoma.  ABDOMINAL WALL: Normal.  BONES: No aggressive lesion.    IMPRESSION:  *  No acute septic pathology.  *  Scattered bilateral small pulmonary nodules measuring up to 7 mm in   the right upper lobe are nonspecific and may represent   infectious/inflammatory disease or metastatic disease.  *  3.1 x 2.1 cm cystic structure in the posterior mediastinum at the   level of the leelee may represent a lymphatic structure, bronchogenic   cyst, orforegut duplication cyst.  *  No intra-abdominal collection.      --- End of Report ---            BAILEE WATTERS MD; Attending Radiologist  This document has been electronically signed. Apr 12 2024  7:53PM        Imaging Personally Reviewed:  [ ] YES  [ ] NO

## 2024-04-22 NOTE — PHARMACOTHERAPY INTERVENTION NOTE - COMMENTS
Recommended to discontinue vancomycin since patient is covered for Streptococcus bacteremia since patient is already covered with ceftriaxone as per ID. In 2/2 bottles.      Elisa Wheatley, PharmD   Clinical Pharmacy Specialist, Infectious Diseases  Tele-Antimicrobial Stewardship Program (Tele-ASP)  Tele-ASP Phone: [phone]  
Asked about Trimethoprim/Sulfamethoxazole dosing in the setting of Stenotrophomonas isolated from catheter.   Usual dosing would be 8-12 mg/kg/day (trimethoprim component) IV/PO in 2-3 divided doses, however given dialysis dependent, suggest to dose with 3 mg/kg once daily -> ~200 to 240 mg every day.    Suggest to dose after dialysis on dialysis days, otherwise would dose at usual scheduled time. Will need close monitoring of potassium given HD-dependent.
Given patient clinically improving and high-cost of IV minocycline, suggest to switch to oral minocycline and trimethoprim/sulfamethoxazole. 
Strep salivarius identified in blood cultures, covered by Ceftriaxone.    Consider discontinuation of vancomycin at this time.

## 2024-04-22 NOTE — PROGRESS NOTE ADULT - SUBJECTIVE AND OBJECTIVE BOX
Date of Service 04-22-24 @ 11:28    CHIEF COMPLAINT:Patient is a 78y old  Male who presents with a chief complaint of Sepsis.Pt appears comfortable.    	  REVIEW OF SYSTEMS:  CONSTITUTIONAL: No fever, weight loss, or fatigue  EYES: No eye pain, visual disturbances, or discharge  ENT:  No difficulty hearing, tinnitus, vertigo; No sinus or throat pain  NECK: No pain or stiffness  RESPIRATORY: No cough, wheezing, chills or hemoptysis; No Shortness of Breath  CARDIOVASCULAR: No chest pain, palpitations, passing out, dizziness, or leg swelling  GASTROINTESTINAL: No abdominal or epigastric pain. No nausea, vomiting, or hematemesis; No diarrhea or constipation. No melena or hematochezia.  GENITOURINARY: No dysuria, frequency, hematuria, or incontinence  NEUROLOGICAL: No headaches, memory loss, loss of strength, numbness, or tremors  SKIN: No itching, burning, rashes, or lesions   LYMPH Nodes: No enlarged glands  ENDOCRINE: No heat or cold intolerance; No hair loss  MUSCULOSKELETAL: No joint pain or swelling; No muscle, back, or extremity pain  PSYCHIATRIC: No depression, anxiety, mood swings, or difficulty sleeping  HEME/LYMPH: No easy bruising, or bleeding gums  ALLERGY AND IMMUNOLOGIC: No hives or eczema	        PHYSICAL EXAM:  T(C): 36.6 (04-22-24 @ 05:17), Max: 36.6 (04-22-24 @ 05:17)  HR: 56 (04-22-24 @ 05:17) (51 - 58)  BP: 154/73 (04-22-24 @ 05:17) (131/85 - 154/73)  RR: 16 (04-22-24 @ 05:17) (16 - 17)  SpO2: 98% (04-22-24 @ 05:17) (98% - 99%)  Wt(kg): --  I&O's Summary    21 Apr 2024 07:01  -  22 Apr 2024 07:00  --------------------------------------------------------  IN: 0 mL / OUT: 1500 mL / NET: -1500 mL        Appearance: Normal	  HEENT:   Normal oral mucosa, PERRL, EOMI	  Lymphatic: No lymphadenopathy  Cardiovascular: Normal S1 S2, No JVD, No murmurs, No edema  Respiratory: Lungs clear to auscultation	  Psychiatry: A & O x 3, Mood & affect appropriate  Gastrointestinal:  Soft, Non-tender, + BS	  Skin: No rashes, No ecchymoses, No cyanosis	  Neurologic: Non-focal  Extremities: Normal range of motion, No clubbing, cyanosis or edema  Vascular: Peripheral pulses palpable 2+ bilaterally    MEDICATIONS  (STANDING):  atorvastatin 20 milliGRAM(s) Oral at bedtime  brimonidine 0.2% Ophthalmic Solution 1 Drop(s) Right EYE two times a day  carvedilol 12.5 milliGRAM(s) Oral every 12 hours  chlorhexidine 2% Cloths 1 Application(s) Topical <User Schedule>  ciprofloxacin  0.3% Ophthalmic Solution 1 Drop(s) Right EYE two times a day  dextrose 10% Bolus 125 milliLiter(s) IV Bolus once  dextrose 5%. 1000 milliLiter(s) (100 mL/Hr) IV Continuous <Continuous>  dextrose 5%. 1000 milliLiter(s) (50 mL/Hr) IV Continuous <Continuous>  dextrose 50% Injectable 25 Gram(s) IV Push once  dextrose 50% Injectable 12.5 Gram(s) IV Push once  dorzolamide 2% Ophthalmic Solution 1 Drop(s) Both EYES three times a day  glucagon  Injectable 1 milliGRAM(s) IntraMuscular once  hydrALAZINE 50 milliGRAM(s) Oral three times a day  insulin lispro (ADMELOG) corrective regimen sliding scale   SubCutaneous Before meals and at bedtime  ketorolac 0.5% Ophthalmic Solution 1 Drop(s) Right EYE two times a day  minocycline IVPB 100 milliGRAM(s) IV Intermittent every 12 hours  minocycline IVPB      polyethylene glycol 3350 17 Gram(s) Oral daily  prednisoLONE acetate 1% Suspension 1 Drop(s) Right EYE every 6 hours  senna 2 Tablet(s) Oral at bedtime  sevelamer carbonate 800 milliGRAM(s) Oral three times a day with meals  sodium zirconium cyclosilicate 10 Gram(s) Oral daily  trimethoprim / sulfamethoxazole IVPB 240 milliGRAM(s) IV Intermittent every 24 hours      	  	  LABS:	 	          04-22    134<L>  |  102  |  109<H>  ----------------------------<  87  5.4<H>   |  18<L>  |  6.81<H>    Ca    8.9      22 Apr 2024 05:54  Phos  7.9     04-22  Mg     2.7     04-22    TPro  7.9  /  Alb  3.5  /  TBili  0.4  /  DBili  x   /  AST  14  /  ALT  13  /  AlkPhos  150<H>  04-22

## 2024-04-22 NOTE — PROGRESS NOTE ADULT - PROBLEM SELECTOR PLAN 1
-   Patient has history of  MSSA infection-Pt admitted to [facility] on 11/24/2023 and discharged on 12/08/23.  His permacath was exchanged on Friday 11/25/23. Treated w/ Cefazolin.   -   Presented with Sepsis & permacath possible source    -   RVP negative  -   UA negative    -   MRSA / MSSA  pcr negative   -   4/12 (+) Bld cx  -   4/13 HD Bld cx negative. 4/13 HD cath site bld cx (+) Stenotrophomonas  -   4/17 Bld cx negative (however this was done before Permacath removed)  -   IR removed right chest permacath on 4/18  -   Quant Tb negative  -   S/p Vancomycin      -   Currently on Ceftriaxone, Minocycline & Bactrim  -   Repeat blood cultures on 4/20 am  -   NGTD  -   ID-Dr. Cui following

## 2024-04-22 NOTE — PROGRESS NOTE ADULT - PROBLEM SELECTOR PLAN 5
-  BP stable   -  Presented with HTN urgency   -  Continue with Losartan, Hydralazine, & Coreg   -  Cardiology Dr Jorge following  -  BP goal <140/90, controlled

## 2024-04-23 LAB
ANION GAP SERPL CALC-SCNC: 11 MMOL/L — SIGNIFICANT CHANGE UP (ref 5–17)
ANION GAP SERPL CALC-SCNC: 11 MMOL/L — SIGNIFICANT CHANGE UP (ref 5–17)
BUN SERPL-MCNC: 121 MG/DL — HIGH (ref 7–18)
BUN SERPL-MCNC: 124 MG/DL — HIGH (ref 7–18)
CALCIUM SERPL-MCNC: 8.7 MG/DL — SIGNIFICANT CHANGE UP (ref 8.4–10.5)
CALCIUM SERPL-MCNC: 8.8 MG/DL — SIGNIFICANT CHANGE UP (ref 8.4–10.5)
CHLORIDE SERPL-SCNC: 103 MMOL/L — SIGNIFICANT CHANGE UP (ref 96–108)
CHLORIDE SERPL-SCNC: 104 MMOL/L — SIGNIFICANT CHANGE UP (ref 96–108)
CO2 SERPL-SCNC: 18 MMOL/L — LOW (ref 22–31)
CO2 SERPL-SCNC: 18 MMOL/L — LOW (ref 22–31)
CREAT SERPL-MCNC: 7.61 MG/DL — HIGH (ref 0.5–1.3)
CREAT SERPL-MCNC: 8 MG/DL — HIGH (ref 0.5–1.3)
EGFR: 6 ML/MIN/1.73M2 — LOW
EGFR: 7 ML/MIN/1.73M2 — LOW
GLUCOSE BLDC GLUCOMTR-MCNC: 104 MG/DL — HIGH (ref 70–99)
GLUCOSE BLDC GLUCOMTR-MCNC: 130 MG/DL — HIGH (ref 70–99)
GLUCOSE BLDC GLUCOMTR-MCNC: 132 MG/DL — HIGH (ref 70–99)
GLUCOSE BLDC GLUCOMTR-MCNC: 95 MG/DL — SIGNIFICANT CHANGE UP (ref 70–99)
GLUCOSE SERPL-MCNC: 133 MG/DL — HIGH (ref 70–99)
GLUCOSE SERPL-MCNC: 140 MG/DL — HIGH (ref 70–99)
POTASSIUM SERPL-MCNC: 5.5 MMOL/L — HIGH (ref 3.5–5.3)
POTASSIUM SERPL-MCNC: 5.8 MMOL/L — HIGH (ref 3.5–5.3)
POTASSIUM SERPL-SCNC: 5.5 MMOL/L — HIGH (ref 3.5–5.3)
POTASSIUM SERPL-SCNC: 5.8 MMOL/L — HIGH (ref 3.5–5.3)
SODIUM SERPL-SCNC: 132 MMOL/L — LOW (ref 135–145)
SODIUM SERPL-SCNC: 133 MMOL/L — LOW (ref 135–145)

## 2024-04-23 RX ORDER — SODIUM ZIRCONIUM CYCLOSILICATE 10 G/10G
10 POWDER, FOR SUSPENSION ORAL
Refills: 0 | Status: COMPLETED | OUTPATIENT
Start: 2024-04-23 | End: 2024-04-25

## 2024-04-23 RX ORDER — SODIUM ZIRCONIUM CYCLOSILICATE 10 G/10G
10 POWDER, FOR SUSPENSION ORAL ONCE
Refills: 0 | Status: COMPLETED | OUTPATIENT
Start: 2024-04-23 | End: 2024-04-23

## 2024-04-23 RX ORDER — FUROSEMIDE 40 MG
40 TABLET ORAL EVERY 12 HOURS
Refills: 0 | Status: COMPLETED | OUTPATIENT
Start: 2024-04-23 | End: 2024-04-24

## 2024-04-23 RX ADMIN — Medication 50 MILLIGRAM(S): at 21:55

## 2024-04-23 RX ADMIN — Medication 1 DROP(S): at 18:24

## 2024-04-23 RX ADMIN — SENNA PLUS 2 TABLET(S): 8.6 TABLET ORAL at 21:55

## 2024-04-23 RX ADMIN — Medication 20 MILLIGRAM(S): at 05:23

## 2024-04-23 RX ADMIN — Medication 50 MILLIGRAM(S): at 16:20

## 2024-04-23 RX ADMIN — Medication 1 DROP(S): at 05:26

## 2024-04-23 RX ADMIN — Medication 40 MILLIGRAM(S): at 14:53

## 2024-04-23 RX ADMIN — BRIMONIDINE TARTRATE 1 DROP(S): 2 SOLUTION/ DROPS OPHTHALMIC at 05:24

## 2024-04-23 RX ADMIN — MINOCYCLINE HYDROCHLORIDE 100 MILLIGRAM(S): 45 TABLET, EXTENDED RELEASE ORAL at 18:24

## 2024-04-23 RX ADMIN — DORZOLAMIDE HYDROCHLORIDE 1 DROP(S): 20 SOLUTION/ DROPS OPHTHALMIC at 14:03

## 2024-04-23 RX ADMIN — MINOCYCLINE HYDROCHLORIDE 100 MILLIGRAM(S): 45 TABLET, EXTENDED RELEASE ORAL at 05:23

## 2024-04-23 RX ADMIN — SEVELAMER CARBONATE 800 MILLIGRAM(S): 2400 POWDER, FOR SUSPENSION ORAL at 08:28

## 2024-04-23 RX ADMIN — SEVELAMER CARBONATE 800 MILLIGRAM(S): 2400 POWDER, FOR SUSPENSION ORAL at 18:23

## 2024-04-23 RX ADMIN — CARVEDILOL PHOSPHATE 12.5 MILLIGRAM(S): 80 CAPSULE, EXTENDED RELEASE ORAL at 18:23

## 2024-04-23 RX ADMIN — DORZOLAMIDE HYDROCHLORIDE 1 DROP(S): 20 SOLUTION/ DROPS OPHTHALMIC at 22:00

## 2024-04-23 RX ADMIN — Medication 1 DROP(S): at 05:24

## 2024-04-23 RX ADMIN — Medication 1 DROP(S): at 12:10

## 2024-04-23 RX ADMIN — BRIMONIDINE TARTRATE 1 DROP(S): 2 SOLUTION/ DROPS OPHTHALMIC at 19:27

## 2024-04-23 RX ADMIN — Medication 2 TABLET(S): at 18:23

## 2024-04-23 RX ADMIN — SEVELAMER CARBONATE 800 MILLIGRAM(S): 2400 POWDER, FOR SUSPENSION ORAL at 12:10

## 2024-04-23 RX ADMIN — DORZOLAMIDE HYDROCHLORIDE 1 DROP(S): 20 SOLUTION/ DROPS OPHTHALMIC at 05:24

## 2024-04-23 RX ADMIN — Medication 1 DROP(S): at 18:44

## 2024-04-23 RX ADMIN — SODIUM ZIRCONIUM CYCLOSILICATE 10 GRAM(S): 10 POWDER, FOR SUSPENSION ORAL at 12:10

## 2024-04-23 RX ADMIN — CHLORHEXIDINE GLUCONATE 1 APPLICATION(S): 213 SOLUTION TOPICAL at 06:56

## 2024-04-23 RX ADMIN — POLYETHYLENE GLYCOL 3350 17 GRAM(S): 17 POWDER, FOR SOLUTION ORAL at 12:10

## 2024-04-23 RX ADMIN — ATORVASTATIN CALCIUM 20 MILLIGRAM(S): 80 TABLET, FILM COATED ORAL at 21:55

## 2024-04-23 RX ADMIN — Medication 50 MILLIGRAM(S): at 05:23

## 2024-04-23 RX ADMIN — SODIUM ZIRCONIUM CYCLOSILICATE 10 GRAM(S): 10 POWDER, FOR SUSPENSION ORAL at 21:54

## 2024-04-23 RX ADMIN — SODIUM ZIRCONIUM CYCLOSILICATE 10 GRAM(S): 10 POWDER, FOR SUSPENSION ORAL at 14:03

## 2024-04-23 RX ADMIN — SODIUM ZIRCONIUM CYCLOSILICATE 10 GRAM(S): 10 POWDER, FOR SUSPENSION ORAL at 14:50

## 2024-04-23 NOTE — PROGRESS NOTE ADULT - ASSESSMENT
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  1.Strep bactermia,.Abx as per ID.URVASHI no vegetation  2.ESRD-HD as per renal.  3.HTN-cont bp medication.  4.DM-Insulin.  5.Lipid d/o-statin.  6.PC to be placed by IR in am.  7.Hyperkalemia-lokelma inc bid.  8.GI and DVT prophylaxis.  .

## 2024-04-23 NOTE — PROGRESS NOTE ADULT - SUBJECTIVE AND OBJECTIVE BOX
[   ] ICU                                          [   ] CCU                                      [ X  ] Medical Floor    Patient is a 78 year old male with abdominal pain. GI consulted to evaluate.        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 Patient also c/o constipation but denies nausea, vomiting, hematemesis, hematochezia, melena, fever, chest pain, SOB, cough, hematuria, or diarrhea.      Today patient appears comfortable. No new complaints reported, No abdominal pain, N/V, hematemesis, hematochezia, melena, fever, chills, chest pain, SOB, cough or diarrhea reported.         PAST MEDICAL HISTORY     DM (diabetes mellitus)    HTN (hypertension)     Diabetes mellitus    ESRD on HD     Hyperlipidemia     Glaucoma    Gout        PAST SURGICAL HISTORY    No significant past surgical history        Allergies    No Known Allergies    Intolerances  None       SOCIAL HISTORY  Advanced Directives:       [X  ] Full Code       [  ] DNR  Marital Status:         [  ] M      [X  ] S      [  ] D       [  ] W  Children:       [ X ] Yes      [  ] No  Occupation:        [  ] Employed       [ X ] Unemployed       [  ] Retired  Diet:       [ X ] Regular       [  ] PEG feeding          [  ] NG tube feeding  Drug Use:           [ X ] Patient denied          [  ] Yes  Alcohol:           [X  ] No             [  ] Yes (socially)         [  ] Yes (chronic)  Tobacco:           [  ] Yes           [ X ] No    FAMILY HISTORY  [ X ] Heart Disease            [ X ] Diabetes             [ X ] HTN             [  ] Colon Cancer             [  ] Stomach Cancer              [  ] Pancreatic Cancer        VITALS   Vital Signs Last 24 Hrs  T(C): 36.5 (23 Apr 2024 05:10), Max: 36.6 (22 Apr 2024 20:59)  T(F): 97.7 (23 Apr 2024 05:10), Max: 97.9 (22 Apr 2024 20:59)  HR: 52 (23 Apr 2024 05:10) (52 - 55)  BP: 140/70 (23 Apr 2024 05:10) (113/63 - 140/70)  BP(mean): 89 (23 Apr 2024 05:10) (89 - 89)  RR: 17 (23 Apr 2024 05:10) (16 - 17)  SpO2: 98% (23 Apr 2024 05:10) (98% - 99%)  Parameters below as of 23 Apr 2024 05:10  Patient On (Oxygen Delivery Method): room air        MEDICATIONS  (STANDING):  atorvastatin 20 milliGRAM(s) Oral at bedtime  brimonidine 0.2% Ophthalmic Solution 1 Drop(s) Right EYE two times a day  carvedilol 12.5 milliGRAM(s) Oral every 12 hours  chlorhexidine 2% Cloths 1 Application(s) Topical <User Schedule>  ciprofloxacin  0.3% Ophthalmic Solution 1 Drop(s) Right EYE two times a day  dextrose 10% Bolus 125 milliLiter(s) IV Bolus once  dextrose 5%. 1000 milliLiter(s) (50 mL/Hr) IV Continuous <Continuous>  dextrose 5%. 1000 milliLiter(s) (100 mL/Hr) IV Continuous <Continuous>  dextrose 50% Injectable 25 Gram(s) IV Push once  dextrose 50% Injectable 12.5 Gram(s) IV Push once  dorzolamide 2% Ophthalmic Solution 1 Drop(s) Both EYES three times a day  furosemide   Injectable 20 milliGRAM(s) IV Push every 12 hours  glucagon  Injectable 1 milliGRAM(s) IntraMuscular once  hydrALAZINE 50 milliGRAM(s) Oral three times a day  insulin lispro (ADMELOG) corrective regimen sliding scale   SubCutaneous Before meals and at bedtime  ketorolac 0.5% Ophthalmic Solution 1 Drop(s) Right EYE two times a day  minocycline 100 milliGRAM(s) Oral every 12 hours  polyethylene glycol 3350 17 Gram(s) Oral daily  prednisoLONE acetate 1% Suspension 1 Drop(s) Right EYE every 6 hours  senna 2 Tablet(s) Oral at bedtime  sevelamer carbonate 800 milliGRAM(s) Oral three times a day with meals  sodium zirconium cyclosilicate 10 Gram(s) Oral two times a day  trimethoprim  160 mG/sulfamethoxazole 800 mG 2 Tablet(s) Oral every 24 hours    MEDICATIONS  (PRN):  acetaminophen     Tablet .. 650 milliGRAM(s) Oral every 6 hours PRN Temp greater or equal to 38C (100.4F), Mild Pain (1 - 3)  dextrose Oral Gel 15 Gram(s) Oral once PRN Blood Glucose LESS THAN 70 milliGRAM(s)/deciliter          04-23    133<L>  |  104  |  121<H>  ----------------------------<  133<H>  5.8<H>   |  18<L>  |  7.61<H>    Ca    8.8      23 Apr 2024 09:16  Phos  7.9     04-22  Mg     2.7     04-22    TPro  7.9  /  Alb  3.5  /  TBili  0.4  /  DBili  x   /  AST  14  /  ALT  13  /  AlkPhos  150<H>  04-22

## 2024-04-23 NOTE — PROGRESS NOTE ADULT - PROBLEM SELECTOR PLAN 3
-   Patient presented with Elevated Troponin   -   Likely ischemic demand in s/o sepsis  -   S/p Echo wnl  -   s/p URVASHI on 4/17 no vegetations   -   Telemetry discontinued  -   Cardiology Dr Jorge following

## 2024-04-23 NOTE — PROGRESS NOTE ADULT - SUBJECTIVE AND OBJECTIVE BOX
Patient is a 78y old  Male who presents with a chief complaint of Sepsis (23 Apr 2024 06:42)  Awake, alert, comfortable in bed in NAD. Still awaiting for replacement of his PC    INTERVAL HPI/OVERNIGHT EVENTS:      VITAL SIGNS:  T(F): 97.7 (04-23-24 @ 05:10)  HR: 52 (04-23-24 @ 05:10)  BP: 140/70 (04-23-24 @ 05:10)  RR: 17 (04-23-24 @ 05:10)  SpO2: 98% (04-23-24 @ 05:10)  Wt(kg): --  I&O's Detail    22 Apr 2024 07:01  -  23 Apr 2024 07:00  --------------------------------------------------------  IN:  Total IN: 0 mL    OUT:    Voided (mL): 350 mL  Total OUT: 350 mL    Total NET: -350 mL              REVIEW OF SYSTEMS:    CONSTITUTIONAL:  No fevers, chills, sweats    HEENT:  Eyes:  No diplopia or blurred vision. ENT:  No earache, sore throat or runny nose.    CARDIOVASCULAR:  No pressure, squeezing, tightness, or heaviness about the chest; no palpitations.    RESPIRATORY:  Per HPI    GASTROINTESTINAL:  No abdominal pain, nausea, vomiting or diarrhea.    GENITOURINARY:  No dysuria, frequency or urgency.    NEUROLOGIC:  No paresthesias, fasciculations, seizures or weakness.    PSYCHIATRIC:  No disorder of thought or mood.      PHYSICAL EXAM:    Constitutional: Well developed and nourished  Eyes:Perrla  ENMT: normal  Neck:supple  Respiratory: good air entry  Cardiovascular: S1 S2 regular  Gastrointestinal: Soft, Non tender  Extremities: No edema  Vascular:normal  Neurological:Awake, alert,Ox3  Musculoskeletal:Normal      MEDICATIONS  (STANDING):  atorvastatin 20 milliGRAM(s) Oral at bedtime  brimonidine 0.2% Ophthalmic Solution 1 Drop(s) Right EYE two times a day  carvedilol 12.5 milliGRAM(s) Oral every 12 hours  chlorhexidine 2% Cloths 1 Application(s) Topical <User Schedule>  ciprofloxacin  0.3% Ophthalmic Solution 1 Drop(s) Right EYE two times a day  dextrose 10% Bolus 125 milliLiter(s) IV Bolus once  dextrose 5%. 1000 milliLiter(s) (50 mL/Hr) IV Continuous <Continuous>  dextrose 5%. 1000 milliLiter(s) (100 mL/Hr) IV Continuous <Continuous>  dextrose 50% Injectable 25 Gram(s) IV Push once  dextrose 50% Injectable 12.5 Gram(s) IV Push once  dorzolamide 2% Ophthalmic Solution 1 Drop(s) Both EYES three times a day  furosemide   Injectable 20 milliGRAM(s) IV Push every 12 hours  glucagon  Injectable 1 milliGRAM(s) IntraMuscular once  hydrALAZINE 50 milliGRAM(s) Oral three times a day  insulin lispro (ADMELOG) corrective regimen sliding scale   SubCutaneous Before meals and at bedtime  ketorolac 0.5% Ophthalmic Solution 1 Drop(s) Right EYE two times a day  minocycline 100 milliGRAM(s) Oral every 12 hours  polyethylene glycol 3350 17 Gram(s) Oral daily  prednisoLONE acetate 1% Suspension 1 Drop(s) Right EYE every 6 hours  senna 2 Tablet(s) Oral at bedtime  sevelamer carbonate 800 milliGRAM(s) Oral three times a day with meals  sodium zirconium cyclosilicate 10 Gram(s) Oral daily  trimethoprim  160 mG/sulfamethoxazole 800 mG 2 Tablet(s) Oral every 24 hours    MEDICATIONS  (PRN):  acetaminophen     Tablet .. 650 milliGRAM(s) Oral every 6 hours PRN Temp greater or equal to 38C (100.4F), Mild Pain (1 - 3)  dextrose Oral Gel 15 Gram(s) Oral once PRN Blood Glucose LESS THAN 70 milliGRAM(s)/deciliter      Allergies    No Known Allergies    Intolerances        LABS:    04-23    133<L>  |  104  |  121<H>  ----------------------------<  133<H>  5.8<H>   |  18<L>  |  7.61<H>    Ca    8.8      23 Apr 2024 09:16  Phos  7.9     04-22  Mg     2.7     04-22    TPro  7.9  /  Alb  3.5  /  TBili  0.4  /  DBili  x   /  AST  14  /  ALT  13  /  AlkPhos  150<H>  04-22      Urinalysis Basic - ( 23 Apr 2024 09:16 )    Color: x / Appearance: x / SG: x / pH: x  Gluc: 133 mg/dL / Ketone: x  / Bili: x / Urobili: x   Blood: x / Protein: x / Nitrite: x   Leuk Esterase: x / RBC: x / WBC x   Sq Epi: x / Non Sq Epi: x / Bacteria: x            CAPILLARY BLOOD GLUCOSE      POCT Blood Glucose.: 104 mg/dL (23 Apr 2024 07:49)  POCT Blood Glucose.: 132 mg/dL (22 Apr 2024 21:58)  POCT Blood Glucose.: 93 mg/dL (22 Apr 2024 17:09)  POCT Blood Glucose.: 91 mg/dL (22 Apr 2024 12:11)        RADIOLOGY & ADDITIONAL TESTS:    CXR:    Ct scan chest:    ekg;    echo:

## 2024-04-23 NOTE — PROGRESS NOTE ADULT - PROBLEM SELECTOR PLAN 1
-   Patient has history of  MSSA infection-Pt admitted to [facility] on 11/24/2023 and discharged on 12/08/23.  His permacath was exchanged on Friday 11/25/23. Treated w/ Cefazolin.   -   Presented with Sepsis & permacath possible source    -   RVP negative  -   UA negative    -   MRSA / MSSA  pcr negative   -   4/12 (+) Bld cx  -   4/13 HD Bld cx negative. 4/13 HD cath site bld cx (+) Stenotrophomonas  -   4/17 Bld cx negative (however this was done before Permacath removed)  -   IR removed right chest permacath on 4/18  -   Quant Tb negative  -   S/p Vancomycin      -   Currently on Ceftriaxone, Minocycline & Bactrim  -   Repeat blood cultures on 4/20 am  -   NGTD  -   ID-Dr. Cui following

## 2024-04-23 NOTE — PROGRESS NOTE ADULT - ASSESSMENT
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  Presented to the ED due to abdominal pain for the last 24 hours and associated w/ non specific symptoms.  Admitted for further management of Sepsis 2/2 Strep. Bacteremia due to infected permacath. ID. Dr. Cui following. Repeat blood cultures from 4/17 was negative. IR removed right chest permacath on 4/18. Will resend blood cultures on 4/20 am again. Discussed with Nephro Dr. Atr, plan for possible next HD on 4/22. Also coordinating with IR, plan for possible permacath placement on 4/22.

## 2024-04-23 NOTE — PROGRESS NOTE ADULT - PROBLEM SELECTOR PLAN 2
-  HD (T/Th/S )  -  Right chest permacath removed by IR on 4/18,  pending replacement  -  S/p HD on 4/18  -  Avoid Nephrotoxic Meds/ Agents such as (NSAIDs, IV contrast, Aminoglycosides such as gentamicin, Gadolinium contrast, Phosphate containing enemas, etc..)  -  Monitor BMP, phos serum   -  CHG bathing daily  -  IR consulted, Permacath placement tomorrow 4/24 in IR  -  Nephro-Dr. Art following, next HD on 4/22, if patient has access

## 2024-04-23 NOTE — PROGRESS NOTE ADULT - SUBJECTIVE AND OBJECTIVE BOX
Patient is seen and examined at the bed side, is afebrile. Patient for insertion of a tunneled dialysis catheter in IR tomorrow.       REVIEW OF SYSTEMS: All other review systems are negative      ALLERGIES: No Known Allergies      Vital Signs Last 24 Hrs  T(C): 36.6 (23 Apr 2024 14:54), Max: 36.6 (22 Apr 2024 20:59)  T(F): 97.9 (23 Apr 2024 14:54), Max: 97.9 (22 Apr 2024 20:59)  HR: 59 (23 Apr 2024 14:54) (52 - 59)  BP: 144/73 (23 Apr 2024 14:54) (113/63 - 144/73)  BP(mean): 89 (23 Apr 2024 05:10) (89 - 89)  RR: 18 (23 Apr 2024 14:54) (16 - 18)  SpO2: 97% (23 Apr 2024 14:54) (97% - 98%)    Parameters below as of 23 Apr 2024 14:54  Patient On (Oxygen Delivery Method): room air      PHYSICAL EXAM:  GENERAL: Not in distress   CHEST/LUNG: Not using accessory muscles   HEART: s1 and s2 present  ABDOMEN:  Nontender and  Nondistended  EXTREMITIES: No pedal  edema  CNS: Awake and Alert      LABS: No new CBC                          10.7   7.72  )-----------( 151      ( 18 Apr 2024 10:00 )             32.1                           11.7   7.63  )-----------( 153      ( 17 Apr 2024 10:00 )             35.2         04-23    133<L>  |  104  |  121<H>  ----------------------------<  133<H>  5.8<H>   |  18<L>  |  7.61<H>    Ca    8.8      23 Apr 2024 09:16  Phos  7.9     04-22  Mg     2.7     04-22    TPro  7.9  /  Alb  3.5  /  TBili  0.4  /  DBili  x   /  AST  14  /  ALT  13  /  AlkPhos  150<H>  04-22    04-20    134<L>  |  104  |  76<H>  ----------------------------<  87  5.0   |  20<L>  |  5.21<H>    Ca    9.1      20 Apr 2024 06:05  Phos  5.8     04-20  Mg     2.4     04-20    TPro  7.4  /  Alb  3.0<L>  /  TBili  0.3  /  DBili  x   /  AST  14  /  ALT  14  /  AlkPhos  144<H>  04-17    PT/INR - ( 13 Apr 2024 05:00 )   PT: 16.3 sec;   INR: 1.45 ratio      PTT - ( 13 Apr 2024 05:00 )  PTT:26.7 sec      CAPILLARY BLOOD GLUCOSE  POCT Blood Glucose.: 186 mg/dL (13 Apr 2024 11:30)  POCT Blood Glucose.: 121 mg/dL (13 Apr 2024 07:37)      < from: URVASHI W or WO Ultrasound Enhancing Agent (04.17.24 @ 07:41) >  CONCLUSIONS:      1. Left ventricular wall thickness is normal. Left ventricular systolic function is normal. There are no regional wall motion abnormalities seen.   2. Normal right ventricular cavity size, with normal wall thickness, and normal systolic function.   3. Normal left and right atrial size.   4. No thrombus seen in the left atrial, left atrial appendage, right atrium..   5. Trace tricuspid regurgitation. Pulmonary artery systolic pressure could not be estimated.   6. Mild mitral regurgitation.   7. Trace pulmonic regurgitation.   8. Agitated saline injection was negative for intracardiac shunt.   9. Left atrial appendage emptying velocites are normal.  10. Normal pulmonary venous flow.  11. Aortic root at the sinuses of Valsalva is normal in size, measuring 3.30 cm (indexed 1.79 cm/m²).  12. Mild aortic regurgitation.  13. No pericardial effusion seen.  14. No vegetations seen on this study.      < from: TTE W or WO Ultrasound Enhancing Agent (04.15.24 @ 12:57) >     CONCLUSIONS:      1. Left ventricular cavity is normal in size. Left ventricular systolic function is normal with an ejection fraction visually estimated at 50 to 55 %. There are no regional wall motion abnormalities seen.   2. There is mild (grade 1) left ventricular diastolic dysfunction.   3. Normal right ventricular cavity size, with normal wall thickness, and normal systolic function. Tricuspid annular plane systolic excursion (TAPSE) is 2.1 cm (normal >=1.7 cm).   4. Normal left and right atrial size.   5. Trace tricuspid regurgitation.   6. Compared to the transthoracic echocardiogram performed on 12/18/2023, there have been no significant interval changes.   7. Estimated pulmonary artery systolic pressure is 20 mmHg, consistent with normal pulmonary artery pressure.   8. Mild aortic regurgitation.   9. Mild pulmonic regurgitation.  10. There is calcification of the mitral valve annulus.  11. No pericardial effusion seen.  12. No vegetations seen on this study,consider URVASHI for further evaluation if clinically indicated.      MEDICATIONS  (STANDING):    atorvastatin 20 milliGRAM(s) Oral at bedtime  brimonidine 0.2% Ophthalmic Solution 1 Drop(s) Right EYE two times a day  carvedilol 12.5 milliGRAM(s) Oral every 12 hours  chlorhexidine 2% Cloths 1 Application(s) Topical <User Schedule>  ciprofloxacin  0.3% Ophthalmic Solution 1 Drop(s) Right EYE two times a day  dorzolamide 2% Ophthalmic Solution 1 Drop(s) Both EYES three times a day  furosemide   Injectable 40 milliGRAM(s) IV Push every 12 hours  glucagon  Injectable 1 milliGRAM(s) IntraMuscular once  hydrALAZINE 50 milliGRAM(s) Oral three times a day  insulin lispro (ADMELOG) corrective regimen sliding scale   SubCutaneous Before meals and at bedtime  ketorolac 0.5% Ophthalmic Solution 1 Drop(s) Right EYE two times a day  minocycline 100 milliGRAM(s) Oral every 12 hours  polyethylene glycol 3350 17 Gram(s) Oral daily  prednisoLONE acetate 1% Suspension 1 Drop(s) Right EYE every 6 hours  senna 2 Tablet(s) Oral at bedtime  sevelamer carbonate 800 milliGRAM(s) Oral three times a day with meals  sodium zirconium cyclosilicate 10 Gram(s) Oral two times a day  trimethoprim  160 mG/sulfamethoxazole 800 mG 2 Tablet(s) Oral every 24 hours      RADIOLOGY & ADDITIONAL TESTS:    4/12/24 : CT Abdomen and Pelvis No Cont (04.12.24 @ 18:53) >  *  No acute septic pathology.  *  Scattered bilateral small pulmonary nodules measuring up to 7 mm in the right upper lobe are nonspecific and may represent   infectious/inflammatory disease or metastatic disease.  *  3.1 x 2.1 cm cystic structure in the posterior mediastinum at the level of the leelee may represent a lymphatic structure, bronchogenic cyst, or foregut duplication cyst.  *  No intra-abdominal collection.      4/12/24 : CT Chest No Cont (04.12.24 @ 18:53) LUNGS AND LARGE AIRWAYS: The central airways are patent. Scattered   bilateral small pulmonary nodules measuring up to 7 mm in the right upper  lobe are nonspecific. No acute consolidation.  PLEURA: Trace effusions.      MICROBIOLOGY DATA:    Culture - Blood in AM (04.20.24 @ 06:07)   Specimen Source: .Blood Blood-Peripheral  Culture Results: No growth at 72 hours    Culture - Blood in AM (04.20.24 @ 06:05)   Specimen Source: .Blood Blood-Peripheral  Culture Results: No growth at 72 hours    Culture - Other (04.13.24 @ 09:39)   - Minocycline: S  - Levofloxacin: S <=0.5  - Trimethoprim/Sulfamethoxazole: S <=0.5/9.5  Specimen Source: Cath Site Catheter Site  Culture Results:   Rare Stenotrophomonas maltophilia  Organism Identification: Stenotrophomonas maltophilia  Organism: Stenotrophomonas maltophilia  Organism: Stenotrophomonas maltophilia  Method Type: KB  Method Type: LAWRENCE    Culture - Blood (04.13.24 @ 17:00)   Specimen Source: .Blood Dialysis Catheter  Culture Results: No growth at 5 days      Culture - Other (04.13.24 @ 09:39)   Specimen Source: Cath Site Catheter Site  Culture Results: No growth to date.      Culture - Blood (04.12.24 @ 17:50)   Gram Stain:   Growth in aerobic bottle: Gram positive cocci in pairs  Specimen Source: .Blood Blood-Peripheral  Culture Results:   Growth in aerobic bottle: Streptococcus salivarius/vestibularis group   Susceptibility to follow.      Culture - Blood (04.12.24 @ 18:00)   Gram Stain:   Growth in anaerobic bottle: Gram positive cocci in pairs  - Streptococcus sp. (Not Grp A, B or S pneumoniae): Detec  Specimen Source: .Blood Blood-Peripheral  Organism: Blood Culture PCR  Culture Results:   Growth in anaerobic bottle: Gram positive cocci in pairs   Direct identification is available within approximately 3-5   hours either by Blood Panel Multiplexed PCR or Direct   MALDI-TOF. Details: [email or web address]  Organism Identification: Blood Culture PCR  Method Type: PCR      Culture - Blood (04.12.24 @ 17:50)   Gram Stain:   Growth in aerobic bottle: Gram positive cocci in pairs  Specimen Source: .Blood Blood-Peripheral  Culture Results:   Growth in aerobic bottle: Gram positive cocci in pairs    Urine Microscopic-Add On (NC) (04.12.24 @ 18:13)   Red Blood Cell - Urine: 3 /HPF  White Blood Cell - Urine: 2 /HPF  Bacteria: Occasional /HPF    Respiratory Viral Panel with COVID-19 by OXANA (04.12.24 @ 17:50)   Rapid RVP Result: NotDetec  SARS-CoV-2: NotDetec:

## 2024-04-23 NOTE — PROGRESS NOTE ADULT - ASSESSMENT
ESRD - dialysis days are T-T-S. Post PC removal , monitor renal function daily and need for dialysis. Plan of new catheter placement as per ID.  IR unable to place PC for dialysis and deferred procedure to 04/24/2024, continue to monitor need for  dialysis and placement of temporary catheter if need. Patient refusing placement of femoral catheter.  Patient with no uremic symptoms at present.  Hyperkalemia, K 5.8 , add Lokelma 10 mg now and follow lab at 8 pm.   DC Losartan , follow up with Lokelma 10 gm daily , 2 gm K diet. Continue Diuretics.       Fever with bacteremia Streptococcus sp. (Not Grp A, B or S pneumoniae): Detec  Gram Stain:  catheter site blood cultures  are negative, Exit site culture positive for Culture - Other (04.13.24 @ 09:39)   Removal of PC by IR due to Stenotrophomonas maltophilia positive cultures and non well function catheter.   Post PC removal 04/18 24. Last dialysis 04/18/24.  As above.   Follow up with new tunneled PC placement by IR tomorrow.   Minocycline: S  - Levofloxacin: S <=0.5  - Trimethoprim/Sulfamethoxazole: S <=0.5/9.5  Specimen Source: Cath Site Catheter Site  Culture Results:   Rare Stenotrophomonas maltophilia

## 2024-04-23 NOTE — PROGRESS NOTE ADULT - SUBJECTIVE AND OBJECTIVE BOX
Patient for insertion of a tunneled dialysis catheter in IR tomorrow.  Please keep patient NPO, send early AM labs including CBC, BMP, and PT, INR / PTT.  Hold all anticoagulation, NSAIDS, and antiplatelet medication.  Please place an IR Procedure order.

## 2024-04-23 NOTE — PROGRESS NOTE ADULT - SUBJECTIVE AND OBJECTIVE BOX
Date of Service 04-23-24 @ 12:42    CHIEF COMPLAINT:Patient is a 78y old  Male who presents with a chief complaint of Sepsis.Pt appears comfortable.    	  REVIEW OF SYSTEMS:  CONSTITUTIONAL: No fever, weight loss, or fatigue  EYES: No eye pain, visual disturbances, or discharge  ENT:  No difficulty hearing, tinnitus, vertigo; No sinus or throat pain  NECK: No pain or stiffness  RESPIRATORY: No cough, wheezing, chills or hemoptysis; No Shortness of Breath  CARDIOVASCULAR: No chest pain, palpitations, passing out, dizziness, or leg swelling  GASTROINTESTINAL: No abdominal or epigastric pain. No nausea, vomiting, or hematemesis; No diarrhea or constipation. No melena or hematochezia.  GENITOURINARY: No dysuria, frequency, hematuria, or incontinence  NEUROLOGICAL: No headaches, memory loss, loss of strength, numbness, or tremors  SKIN: No itching, burning, rashes, or lesions   LYMPH Nodes: No enlarged glands  ENDOCRINE: No heat or cold intolerance; No hair loss  MUSCULOSKELETAL: No joint pain or swelling; No muscle, back, or extremity pain  PSYCHIATRIC: No depression, anxiety, mood swings, or difficulty sleeping  HEME/LYMPH: No easy bruising, or bleeding gums  ALLERGY AND IMMUNOLOGIC: No hives or eczema	      PHYSICAL EXAM:  T(C): 36.5 (04-23-24 @ 05:10), Max: 36.6 (04-22-24 @ 20:59)  HR: 52 (04-23-24 @ 05:10) (52 - 55)  BP: 140/70 (04-23-24 @ 05:10) (113/63 - 140/70)  RR: 17 (04-23-24 @ 05:10) (16 - 17)  SpO2: 98% (04-23-24 @ 05:10) (98% - 99%)  Wt(kg): --  I&O's Summary    22 Apr 2024 07:01  -  23 Apr 2024 07:00  --------------------------------------------------------  IN: 0 mL / OUT: 350 mL / NET: -350 mL        Appearance: Normal	  HEENT:   Normal oral mucosa, PERRL, EOMI	  Lymphatic: No lymphadenopathy  Cardiovascular: Normal S1 S2, No JVD, No murmurs, No edema  Respiratory: Lungs clear to auscultation	  Psychiatry: A & O x 3, Mood & affect appropriate  Gastrointestinal:  Soft, Non-tender, + BS	  Skin: No rashes, No ecchymoses, No cyanosis	  Neurologic: Non-focal  Extremities: Normal range of motion, No clubbing, cyanosis or edema  Vascular: Peripheral pulses palpable 2+ bilaterally    MEDICATIONS  (STANDING):  atorvastatin 20 milliGRAM(s) Oral at bedtime  brimonidine 0.2% Ophthalmic Solution 1 Drop(s) Right EYE two times a day  carvedilol 12.5 milliGRAM(s) Oral every 12 hours  chlorhexidine 2% Cloths 1 Application(s) Topical <User Schedule>  ciprofloxacin  0.3% Ophthalmic Solution 1 Drop(s) Right EYE two times a day  dextrose 10% Bolus 125 milliLiter(s) IV Bolus once  dextrose 5%. 1000 milliLiter(s) (50 mL/Hr) IV Continuous <Continuous>  dextrose 5%. 1000 milliLiter(s) (100 mL/Hr) IV Continuous <Continuous>  dextrose 50% Injectable 25 Gram(s) IV Push once  dextrose 50% Injectable 12.5 Gram(s) IV Push once  dorzolamide 2% Ophthalmic Solution 1 Drop(s) Both EYES three times a day  furosemide   Injectable 20 milliGRAM(s) IV Push every 12 hours  glucagon  Injectable 1 milliGRAM(s) IntraMuscular once  hydrALAZINE 50 milliGRAM(s) Oral three times a day  insulin lispro (ADMELOG) corrective regimen sliding scale   SubCutaneous Before meals and at bedtime  ketorolac 0.5% Ophthalmic Solution 1 Drop(s) Right EYE two times a day  minocycline 100 milliGRAM(s) Oral every 12 hours  polyethylene glycol 3350 17 Gram(s) Oral daily  prednisoLONE acetate 1% Suspension 1 Drop(s) Right EYE every 6 hours  senna 2 Tablet(s) Oral at bedtime  sevelamer carbonate 800 milliGRAM(s) Oral three times a day with meals  sodium zirconium cyclosilicate 10 Gram(s) Oral daily  trimethoprim  160 mG/sulfamethoxazole 800 mG 2 Tablet(s) Oral every 24 hours      	  	  LABS:	 	      04-23    133<L>  |  104  |  121<H>  ----------------------------<  133<H>  5.8<H>   |  18<L>  |  7.61<H>    Ca    8.8      23 Apr 2024 09:16  Phos  7.9     04-22  Mg     2.7     04-22    TPro  7.9  /  Alb  3.5  /  TBili  0.4  /  DBili  x   /  AST  14  /  ALT  13  /  AlkPhos  150<H>  04-22

## 2024-04-23 NOTE — PROGRESS NOTE ADULT - SUBJECTIVE AND OBJECTIVE BOX
NP Note discussed with  Primary Attending      Patient is a 78y old  Male who presents with a chief complaint of Sepsis (19 Apr 2024 14:30)    OVERNIGHT EVENTS: no acute changes.      MEDICATIONS  (STANDING):  atorvastatin 20 milliGRAM(s) Oral at bedtime  brimonidine 0.2% Ophthalmic Solution 1 Drop(s) Right EYE two times a day  carvedilol 12.5 milliGRAM(s) Oral every 12 hours  chlorhexidine 2% Cloths 1 Application(s) Topical <User Schedule>  ciprofloxacin  0.3% Ophthalmic Solution 1 Drop(s) Right EYE two times a day  dextrose 10% Bolus 125 milliLiter(s) IV Bolus once  dextrose 5%. 1000 milliLiter(s) (50 mL/Hr) IV Continuous <Continuous>  dextrose 5%. 1000 milliLiter(s) (100 mL/Hr) IV Continuous <Continuous>  dextrose 50% Injectable 25 Gram(s) IV Push once  dextrose 50% Injectable 12.5 Gram(s) IV Push once  dorzolamide 2% Ophthalmic Solution 1 Drop(s) Both EYES three times a day  glucagon  Injectable 1 milliGRAM(s) IntraMuscular once  hydrALAZINE 50 milliGRAM(s) Oral three times a day  insulin lispro (ADMELOG) corrective regimen sliding scale   SubCutaneous Before meals and at bedtime  ketorolac 0.5% Ophthalmic Solution 1 Drop(s) Right EYE two times a day  minocycline 100 milliGRAM(s) Oral every 12 hours  polyethylene glycol 3350 17 Gram(s) Oral daily  prednisoLONE acetate 1% Suspension 1 Drop(s) Right EYE every 6 hours  senna 2 Tablet(s) Oral at bedtime  sevelamer carbonate 800 milliGRAM(s) Oral three times a day with meals  sodium zirconium cyclosilicate 10 Gram(s) Oral daily  trimethoprim  160 mG/sulfamethoxazole 800 mG 2 Tablet(s) Oral every 24 hours    MEDICATIONS  (PRN):  acetaminophen     Tablet .. 650 milliGRAM(s) Oral every 6 hours PRN Temp greater or equal to 38C (100.4F), Mild Pain (1 - 3)  dextrose Oral Gel 15 Gram(s) Oral once PRN Blood Glucose LESS THAN 70 milliGRAM(s)/deciliter      REVIEW OF SYSTEMS:  CONSTITUTIONAL: No fever, chills  ENMT:  No difficulty hearing, no change in vision  NECK: No pain or stiffness  RESPIRATORY: No cough, SOB  CARDIOVASCULAR: No chest pain, palpitations  GASTROINTESTINAL: No abdominal pain. No nausea, vomiting, or diarrhea  GENITOURINARY: No dysuria  NEUROLOGICAL: No HA  SKIN: No itching, burning, rashes, or lesions   LYMPH NODES: No enlarged glands  ENDOCRINE: No heat or cold intolerance; No hair loss  MUSCULOSKELETAL: No joint pain or swelling; No muscle, back, or extremity pain  PSYCHIATRIC: No depression, anxiety  HEME/LYMPH: No easy bruising, or bleeding gums      Vital Signs Last 24 Hrs  T(C): 36.6 (23 Apr 2024 14:54), Max: 36.6 (22 Apr 2024 20:59)  T(F): 97.9 (23 Apr 2024 14:54), Max: 97.9 (22 Apr 2024 20:59)  HR: 59 (23 Apr 2024 14:54) (52 - 59)  BP: 144/73 (23 Apr 2024 14:54) (113/63 - 144/73)  BP(mean): 89 (23 Apr 2024 05:10) (89 - 89)  RR: 18 (23 Apr 2024 14:54) (16 - 18)  SpO2: 97% (23 Apr 2024 14:54) (97% - 98%)    Parameters below as of 23 Apr 2024 14:54  Patient On (Oxygen Delivery Method): room air        PHYSICAL EXAM:  GENERAL: No acute distress  EYES: clear conjunctiva  ENMT: Moist mucous membranes  NECK: Supple, No JVD, Normal thyroid  CHEST/LUNG:  Clear to auscultation bilaterally; No rales, rhonchi, wheezing.  HEART: S1, S2, Regular rate and rhythm  ABDOMEN: Soft, Nontender, Nondistended; Bowel sounds present  NEURO: Alert & Oriented X3  EXTREMITIES: No LE edema, no calf tenderness  LYMPH: No lymphadenopathy noted  SKIN: No rashes or lesions    Consultant(s) Notes Reviewed:  [x ] YES  [ ] NO  Care Discussed with Consultants/Other Providers [ x] YES  [ ] NO    LABS:              04-23    133<L>  |  104  |  121<H>  ----------------------------<  133<H>  5.8<H>   |  18<L>  |  7.61<H>    Ca    8.8      23 Apr 2024 09:16  Phos  7.9     04-22  Mg     2.7     04-22    TPro  7.9  /  Alb  3.5  /  TBili  0.4  /  DBili  x   /  AST  14  /  ALT  13  /  AlkPhos  150<H>  04-22                 POCT Blood Glucose.: 132 mg/dL (23 Apr 2024 11:17)          Urinalysis Basic - ( 18 Apr 2024 10:00 )    Color: x / Appearance: x / SG: x / pH: x  Gluc: 165 mg/dL / Ketone: x  / Bili: x / Urobili: x   Blood: x / Protein: x / Nitrite: x   Leuk Esterase: x / RBC: x / WBC x   Sq Epi: x / Non Sq Epi: x / Bacteria: x        RADIOLOGY & ADDITIONAL TESTS:  < from: CT Chest No Cont (04.12.24 @ 18:53) >    ACC: [id number] EXAM:  CT ABDOMEN AND PELVIS   ORDERED BY: DEANGELO MOLINA     ACC: [id number] EXAM:  CT CHEST   ORDERED BY: DEANGELO MOLINA     PROCEDURE DATE:  04/12/2024          INTERPRETATION:  CLINICAL INFORMATION: Fever and altered mental statuson   hemodialysis, abdominal pain    COMPARISON: None.    CONTRAST/COMPLICATIONS:  IV Contrast: None  Oral Contrast: None  Complications: None    PROCEDURE:  CT of the Chest, Abdomen and Pelvis was performed.  Sagittal and coronal reformats were performed.    FINDINGS:  CHEST:  LUNGS AND LARGE AIRWAYS: The central airways are patent. Scattered   bilateral small pulmonary nodules measuring up to 7 mm in the right upper   lobe are nonspecific. No acute consolidation.  PLEURA: Trace effusions.  VESSELS: Aortic atherosclerosis without aneurysm. Right IJ dialysis line   tip in the right atrium.  HEART: Mild cardiomegaly. No pericardial effusion. Coronary, mitral   annular, and aortic valve calcification.  MEDIASTINUM AND CHRISTIANO: No adenopathy. 3.1 x2.1 cm cystic structure in the   posterior mediastinum at the level of the leeele may represent a   lymphatic structure, bronchogenic cyst, or foregut duplication cyst.  CHEST WALL AND LOWER NECK: No masses.    ABDOMEN AND PELVIS:  LIVER: Normal.  BILE DUCTS: Nondilated.  GALLBLADDER: Gallstones.  SPLEEN: Normal.  PANCREAS: Normal.  ADRENALS: Normal.  KIDNEYS/URETERS: No hydronephrosis or urinary tract calculi.    BLADDER: Normal.  REPRODUCTIVE ORGANS: Enlarged prostate.    BOWEL: No bowel-related abnormality. No bowel obstruction or bowel   inflammation. Normal appendix and ileocecal region. No evidence of active   colitis or diverticulitis.  PERITONEUM: No free air or ascites. No collection.  VESSELS: Aortoiliac atherosclerosis without aneurysm.  RETROPERITONEUM/LYMPH NODES: No adenopathy or hematoma.  ABDOMINAL WALL: Normal.  BONES: No aggressive lesion.    IMPRESSION:  *  No acute septic pathology.  *  Scattered bilateral small pulmonary nodules measuring up to 7 mm in   the right upper lobe are nonspecific and may represent   infectious/inflammatory disease or metastatic disease.  *  3.1 x 2.1 cm cystic structure in the posterior mediastinum at the   level of the leelee may represent a lymphatic structure, bronchogenic   cyst, orforegut duplication cyst.  *  No intra-abdominal collection.      --- End of Report ---            BAILEE WATTERS MD; Attending Radiologist  This document has been electronically signed. Apr 12 2024  7:53PM        Imaging Personally Reviewed:  [ ] YES  [ ] NO

## 2024-04-23 NOTE — PROGRESS NOTE ADULT - SUBJECTIVE AND OBJECTIVE BOX
Problem List:    PAST MEDICAL & SURGICAL HISTORY:  DM (diabetes mellitus)      HTN (hypertension)      Chronic kidney disease      HLD (hyperlipidemia)      Glaucoma      Gout      No significant past surgical history          No Known Allergies      MEDICATIONS  (STANDING):  atorvastatin 20 milliGRAM(s) Oral at bedtime  brimonidine 0.2% Ophthalmic Solution 1 Drop(s) Right EYE two times a day  carvedilol 12.5 milliGRAM(s) Oral every 12 hours  chlorhexidine 2% Cloths 1 Application(s) Topical <User Schedule>  ciprofloxacin  0.3% Ophthalmic Solution 1 Drop(s) Right EYE two times a day  dextrose 10% Bolus 125 milliLiter(s) IV Bolus once  dextrose 5%. 1000 milliLiter(s) (50 mL/Hr) IV Continuous <Continuous>  dextrose 5%. 1000 milliLiter(s) (100 mL/Hr) IV Continuous <Continuous>  dextrose 50% Injectable 25 Gram(s) IV Push once  dextrose 50% Injectable 12.5 Gram(s) IV Push once  dorzolamide 2% Ophthalmic Solution 1 Drop(s) Both EYES three times a day  furosemide   Injectable 20 milliGRAM(s) IV Push every 12 hours  glucagon  Injectable 1 milliGRAM(s) IntraMuscular once  hydrALAZINE 50 milliGRAM(s) Oral three times a day  insulin lispro (ADMELOG) corrective regimen sliding scale   SubCutaneous Before meals and at bedtime  ketorolac 0.5% Ophthalmic Solution 1 Drop(s) Right EYE two times a day  minocycline 100 milliGRAM(s) Oral every 12 hours  polyethylene glycol 3350 17 Gram(s) Oral daily  prednisoLONE acetate 1% Suspension 1 Drop(s) Right EYE every 6 hours  senna 2 Tablet(s) Oral at bedtime  sevelamer carbonate 800 milliGRAM(s) Oral three times a day with meals  sodium zirconium cyclosilicate 10 Gram(s) Oral daily  trimethoprim  160 mG/sulfamethoxazole 800 mG 2 Tablet(s) Oral every 24 hours    MEDICATIONS  (PRN):  acetaminophen     Tablet .. 650 milliGRAM(s) Oral every 6 hours PRN Temp greater or equal to 38C (100.4F), Mild Pain (1 - 3)  dextrose Oral Gel 15 Gram(s) Oral once PRN Blood Glucose LESS THAN 70 milliGRAM(s)/deciliter        04-22    134<L>  |  102  |  109<H>  ----------------------------<  87  5.4<H>   |  18<L>  |  6.81<H>    Ca    8.9      22 Apr 2024 05:54  Phos  7.9     04-22  Mg     2.7     04-22    TPro  7.9  /  Alb  3.5  /  TBili  0.4  /  DBili  x   /  AST  14  /  ALT  13  /  AlkPhos  150<H>  04-22            REVIEW OF SYSTEMS:  General: no fever no chills, no weight loss.  EYES/ENT: No visual changes;  No vertigo, no headache.  NECK: No pain or stiffness  RESPIRATORY: No cough, wheezing, hemoptysis; No shortness of breath  CARDIOVASCULAR: No chest pain or palpitations. No Edema  GASTROINTESTINAL: No abdominal or epigastric pain. No nausea, vomiting. No diarrhea or constipation. No melena.  GENITOURINARY: No dysuria, frequency, foamy urine, urinary urgency, incontinence or hematuria  NEUROLOGICAL: No numbness or weakness, no tremor , no dizziness.   Muscle skeletal : no joint pain and no swelling of joints and limbs.  SKIN: No itching, burning, rashes.        VITALS:  T(F): 97.7 (04-23-24 @ 05:10), Max: 97.9 (04-22-24 @ 20:59)  HR: 52 (04-23-24 @ 05:10)  BP: 140/70 (04-23-24 @ 05:10)  RR: 17 (04-23-24 @ 05:10)  SpO2: 98% (04-23-24 @ 05:10)  Wt(kg): --    04-21 @ 07:01  -  04-22 @ 07:00  --------------------------------------------------------  IN: 0 mL / OUT: 1500 mL / NET: -1500 mL    04-22 @ 07:01  -  04-23 @ 05:48  --------------------------------------------------------  IN: 0 mL / OUT: 150 mL / NET: -150 mL        PHYSICAL EXAM:  Constitutional: well developed, no diaphoresis, no distress.  Neck: No JVD, no carotid bruit, supple, no adenopathy  Respiratory: Good air entrance B/L, no wheezes, rales or rhonchi  Cardiovascular: S1, S2, RRR, no pericardial rub, no murmur  Abdomen: BS+, soft, no tenderness, no bruit  Pelvis: bladder nondistended  Extremities: No cyanosis or clubbing. No peripheral edema.          PAST MEDICAL & SURGICAL HISTORY:  DM (diabetes mellitus)      HTN (hypertension)      Chronic kidney disease      HLD (hyperlipidemia)      Glaucoma      Gout      No significant past surgical history          No Known Allergies      MEDICATIONS  (STANDING):  atorvastatin 20 milliGRAM(s) Oral at bedtime  brimonidine 0.2% Ophthalmic Solution 1 Drop(s) Right EYE two times a day  carvedilol 12.5 milliGRAM(s) Oral every 12 hours  chlorhexidine 2% Cloths 1 Application(s) Topical <User Schedule>  ciprofloxacin  0.3% Ophthalmic Solution 1 Drop(s) Right EYE two times a day  dextrose 10% Bolus 125 milliLiter(s) IV Bolus once  dextrose 5%. 1000 milliLiter(s) (50 mL/Hr) IV Continuous <Continuous>  dextrose 5%. 1000 milliLiter(s) (100 mL/Hr) IV Continuous <Continuous>  dextrose 50% Injectable 25 Gram(s) IV Push once  dextrose 50% Injectable 12.5 Gram(s) IV Push once  dorzolamide 2% Ophthalmic Solution 1 Drop(s) Both EYES three times a day  furosemide   Injectable 20 milliGRAM(s) IV Push every 12 hours  glucagon  Injectable 1 milliGRAM(s) IntraMuscular once  hydrALAZINE 50 milliGRAM(s) Oral three times a day  insulin lispro (ADMELOG) corrective regimen sliding scale   SubCutaneous Before meals and at bedtime  ketorolac 0.5% Ophthalmic Solution 1 Drop(s) Right EYE two times a day  minocycline 100 milliGRAM(s) Oral every 12 hours  polyethylene glycol 3350 17 Gram(s) Oral daily  prednisoLONE acetate 1% Suspension 1 Drop(s) Right EYE every 6 hours  senna 2 Tablet(s) Oral at bedtime  sevelamer carbonate 800 milliGRAM(s) Oral three times a day with meals  sodium zirconium cyclosilicate 10 Gram(s) Oral daily  trimethoprim  160 mG/sulfamethoxazole 800 mG 2 Tablet(s) Oral every 24 hours    MEDICATIONS  (PRN):  acetaminophen     Tablet .. 650 milliGRAM(s) Oral every 6 hours PRN Temp greater or equal to 38C (100.4F), Mild Pain (1 - 3)  dextrose Oral Gel 15 Gram(s) Oral once PRN Blood Glucose LESS THAN 70 milliGRAM(s)/deciliter    Basic Metabolic Panel - STAT (04.23.24 @ 09:16)   Sodium: 133 mmol/L  Potassium: 5.8 mmol/L  Chloride: 104 mmol/L  Carbon Dioxide: 18 mmol/L  Anion Gap: 11 mmol/L  Blood Urea Nitrogen: 121 mg/dL  Creatinine: 7.61 mg/dL  Glucose: 133 mg/dL  Calcium: 8.8 mg/dL  eGFR: 7: The estimated glomerular filtration rate (eGFR) is calculated using the     04-22    134<L>  |  102  |  109<H>  ----------------------------<  87  5.4<H>   |  18<L>  |  6.81<H>    Ca    8.9      22 Apr 2024 05:54  Phos  7.9     04-22  Mg     2.7     04-22    TPro  7.9  /  Alb  3.5  /  TBili  0.4  /  DBili  x   /  AST  14  /  ALT  13  /  AlkPhos  150<H>  04-22            REVIEW OF SYSTEMS:  General: no fever no chills, no weight loss.  EYES/ENT: No visual changes;  No vertigo, no headache.  NECK: No pain or stiffness  RESPIRATORY: No cough, wheezing, hemoptysis; No shortness of breath  CARDIOVASCULAR: No chest pain or palpitations. No Edema  GASTROINTESTINAL: No abdominal or epigastric pain. No nausea, vomiting. No diarrhea or constipation. No melena.  GENITOURINARY: No dysuria, frequency, foamy urine, urinary urgency, incontinence or hematuria  No tremor.        VITALS:  T(F): 97.7 (04-23-24 @ 05:10), Max: 97.9 (04-22-24 @ 20:59)  HR: 52 (04-23-24 @ 05:10)  BP: 140/70 (04-23-24 @ 05:10)  RR: 17 (04-23-24 @ 05:10)  SpO2: 98% (04-23-24 @ 05:10)  Wt(kg): --    04-21 @ 07:01  -  04-22 @ 07:00  --------------------------------------------------------  IN: 0 mL / OUT: 1500 mL / NET: -1500 mL    04-22 @ 07:01 - 04-23 @ 05:48  --------------------------------------------------------  IN: 0 mL / OUT: 150 mL / NET: -150 mL        PHYSICAL EXAM:  Constitutional: well developed, no diaphoresis, no distress.  Neck: No JVD, no carotid bruit, supple, no adenopathy  Respiratory:  air entrance B/L, no wheezes, rales or rhonchi  Cardiovascular: S1, S2, RRR, no pericardial rub, no murmur  Abdomen: BS+, soft, no tenderness, no bruit  Pelvis: bladder nondistended  Extremities: No cyanosis or clubbing. No peripheral edema.   No tremor

## 2024-04-23 NOTE — PROGRESS NOTE ADULT - SUBJECTIVE AND OBJECTIVE BOX
Patient is a 78y old  Male who presents with a chief complaint of Sepsis (23 Apr 2024 05:48)    pt seen in tele [  ], reg med floor [  x ], bed [x  ], chair at bedside [   ], a+o x3 [ x ], lethargic [  ],    nad [ x ]        Allergies    No Known Allergies        Vitals    T(F): 97.7 (04-23-24 @ 05:10), Max: 97.9 (04-22-24 @ 20:59)  HR: 52 (04-23-24 @ 05:10) (52 - 55)  BP: 140/70 (04-23-24 @ 05:10) (113/63 - 140/70)  RR: 17 (04-23-24 @ 05:10) (16 - 17)  SpO2: 98% (04-23-24 @ 05:10) (98% - 99%)  Wt(kg): --  CAPILLARY BLOOD GLUCOSE      POCT Blood Glucose.: 132 mg/dL (22 Apr 2024 21:58)      Labs        04-22    134<L>  |  102  |  109<H>  ----------------------------<  87  5.4<H>   |  18<L>  |  6.81<H>    Ca    8.9      22 Apr 2024 05:54  Phos  7.9     04-22  Mg     2.7     04-22    TPro  7.9  /  Alb  3.5  /  TBili  0.4  /  DBili  x   /  AST  14  /  ALT  13  /  AlkPhos  150<H>  04-22            .Blood Blood-Peripheral  04-20 @ 06:07   No growth at 48 Hours  --  --      .Blood Blood-Peripheral  04-20 @ 06:05   No growth at 48 Hours  --  --      .Blood Blood-Peripheral  04-17 @ 10:15   No growth at 5 days  --  --      .Blood Blood-Peripheral  04-17 @ 10:00   No growth at 5 days  --  --      .Blood Dialysis Catheter  04-13 @ 17:00   No growth at 5 days  --  --      Cath Site Catheter Site  04-13 @ 09:39   Rare Stenotrophomonas maltophilia  --  Stenotrophomonas maltophilia      Clean Catch Clean Catch (Midstream)  04-12 @ 18:13   <10,000 CFU/mL Normal Urogenital Kavita  --  --      .Blood Blood-Peripheral  04-12 @ 18:00   Growth in anaerobic bottle: Streptococcus salivarius/vestibularis group  "Susceptibilities not performed"  Direct identification is available within approximately 3-5  hours either by Blood Panel Multiplexed PCR or Direct  MALDI-TOF. Details: [email or web address]  --  Blood Culture PCR      .Blood Blood-Peripheral  04-12 @ 17:50   Growth in aerobic and anaerobic bottles: Streptococcus  salivarius/vestibularis group  --  Streptococcus salivarius/vestibularis group          Radiology Results      Meds    MEDICATIONS  (STANDING):  atorvastatin 20 milliGRAM(s) Oral at bedtime  brimonidine 0.2% Ophthalmic Solution 1 Drop(s) Right EYE two times a day  carvedilol 12.5 milliGRAM(s) Oral every 12 hours  chlorhexidine 2% Cloths 1 Application(s) Topical <User Schedule>  ciprofloxacin  0.3% Ophthalmic Solution 1 Drop(s) Right EYE two times a day  dextrose 10% Bolus 125 milliLiter(s) IV Bolus once  dextrose 5%. 1000 milliLiter(s) (50 mL/Hr) IV Continuous <Continuous>  dextrose 5%. 1000 milliLiter(s) (100 mL/Hr) IV Continuous <Continuous>  dextrose 50% Injectable 25 Gram(s) IV Push once  dextrose 50% Injectable 12.5 Gram(s) IV Push once  dorzolamide 2% Ophthalmic Solution 1 Drop(s) Both EYES three times a day  furosemide   Injectable 20 milliGRAM(s) IV Push every 12 hours  glucagon  Injectable 1 milliGRAM(s) IntraMuscular once  hydrALAZINE 50 milliGRAM(s) Oral three times a day  insulin lispro (ADMELOG) corrective regimen sliding scale   SubCutaneous Before meals and at bedtime  ketorolac 0.5% Ophthalmic Solution 1 Drop(s) Right EYE two times a day  minocycline 100 milliGRAM(s) Oral every 12 hours  polyethylene glycol 3350 17 Gram(s) Oral daily  prednisoLONE acetate 1% Suspension 1 Drop(s) Right EYE every 6 hours  senna 2 Tablet(s) Oral at bedtime  sevelamer carbonate 800 milliGRAM(s) Oral three times a day with meals  sodium zirconium cyclosilicate 10 Gram(s) Oral daily  trimethoprim  160 mG/sulfamethoxazole 800 mG 2 Tablet(s) Oral every 24 hours      MEDICATIONS  (PRN):  acetaminophen     Tablet .. 650 milliGRAM(s) Oral every 6 hours PRN Temp greater or equal to 38C (100.4F), Mild Pain (1 - 3)  dextrose Oral Gel 15 Gram(s) Oral once PRN Blood Glucose LESS THAN 70 milliGRAM(s)/deciliter      Physical Exam    Neuro :  no focal deficits  Respiratory: CTA B/L  CV: RRR, S1S2, no murmurs,   Abdominal: Soft, NT, ND +BS,  Extremities: No edema, + peripheral pulses      ASSESSMENT      uncontrolled htn,   abnormal trop r/o acs,   poss 2nd to demand ischemia,    abd pain poss 2nd to constipation,    fever poss 2nd to line sepsis   bacteremia   pulmonary nodules,    bronchogenic cyst, or foregut duplication cyst,   hyperkalemia  h/o sinus node dysfunction,   chronic HFpEF,   aortic stenosis,   HTN,   HLD,   b/l carotid stenosis,   DM (not on meds),   ESRD on dialysis (Tue, Thu, Sat),   Gout,    glaucoma,   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  cx and sens fr hd cath site with Stenotrophomonas maltophilia noted above   blood cx cath neg noted above  id f/u    TTE with ejection fraction visually estimated at 50 to 55 %. mild (grade 1) left ventricular diastolic dysfunction.   No vegetations seen on this study, consider URVASHI for further evaluation if clinically indicated noted    URVASHI with No thrombus seen in the left atrial, left atrial appendage, right atrium.   Agitated saline injection was negative for intracardiac shunt.   No vegetations seen on this study noted    s/p ir removal of Perm-a-catheter 4/18/24   cont IV Bactrim and Minocycline to cover Stenotrophomonas  Continue Ceftriaxone 2 g daily to cover Streptococcus  Repeat Blood cultures 4/17/24 neg noted above   Repeat Blood cultures form 4/20/24 neg noted above  procalcitonin 0.6 noted    quantiferon tb gold test neg noted    pulm f/u    Follow up CT in 12 months  Nodify testing as OP.  pt on hd t,th,s   renal f/u   K improved after dialysis,   follow 2 gm K diet.   Malfunction catheter max  ml/minute 4/16/24,   IR consulted, possible permacath placement   next HD on 4/22.  AVG placement after discharge at [facility].   hgba1c 6.4 noted    lispro ss   cont current meds         Patient is a 78y old  Male who presents with a chief complaint of Sepsis (23 Apr 2024 05:48)    pt seen in tele [  ], reg med floor [  x ], bed [x  ], chair at bedside [   ], a+o x3 [ x ], lethargic [  ],    nad [ x ]        Allergies    No Known Allergies        Vitals    T(F): 97.7 (04-23-24 @ 05:10), Max: 97.9 (04-22-24 @ 20:59)  HR: 52 (04-23-24 @ 05:10) (52 - 55)  BP: 140/70 (04-23-24 @ 05:10) (113/63 - 140/70)  RR: 17 (04-23-24 @ 05:10) (16 - 17)  SpO2: 98% (04-23-24 @ 05:10) (98% - 99%)  Wt(kg): --  CAPILLARY BLOOD GLUCOSE      POCT Blood Glucose.: 132 mg/dL (22 Apr 2024 21:58)      Labs        04-22    134<L>  |  102  |  109<H>  ----------------------------<  87  5.4<H>   |  18<L>  |  6.81<H>    Ca    8.9      22 Apr 2024 05:54  Phos  7.9     04-22  Mg     2.7     04-22    TPro  7.9  /  Alb  3.5  /  TBili  0.4  /  DBili  x   /  AST  14  /  ALT  13  /  AlkPhos  150<H>  04-22            .Blood Blood-Peripheral  04-20 @ 06:07   No growth at 48 Hours  --  --      .Blood Blood-Peripheral  04-20 @ 06:05   No growth at 48 Hours  --  --      .Blood Blood-Peripheral  04-17 @ 10:15   No growth at 5 days  --  --      .Blood Blood-Peripheral  04-17 @ 10:00   No growth at 5 days  --  --      .Blood Dialysis Catheter  04-13 @ 17:00   No growth at 5 days  --  --      Cath Site Catheter Site  04-13 @ 09:39   Rare Stenotrophomonas maltophilia  --  Stenotrophomonas maltophilia      Clean Catch Clean Catch (Midstream)  04-12 @ 18:13   <10,000 CFU/mL Normal Urogenital Kavita  --  --      .Blood Blood-Peripheral  04-12 @ 18:00   Growth in anaerobic bottle: Streptococcus salivarius/vestibularis group  "Susceptibilities not performed"  Direct identification is available within approximately 3-5  hours either by Blood Panel Multiplexed PCR or Direct  MALDI-TOF. Details: [email or web address]  --  Blood Culture PCR      .Blood Blood-Peripheral  04-12 @ 17:50   Growth in aerobic and anaerobic bottles: Streptococcus  salivarius/vestibularis group  --  Streptococcus salivarius/vestibularis group          Radiology Results      Meds    MEDICATIONS  (STANDING):  atorvastatin 20 milliGRAM(s) Oral at bedtime  brimonidine 0.2% Ophthalmic Solution 1 Drop(s) Right EYE two times a day  carvedilol 12.5 milliGRAM(s) Oral every 12 hours  chlorhexidine 2% Cloths 1 Application(s) Topical <User Schedule>  ciprofloxacin  0.3% Ophthalmic Solution 1 Drop(s) Right EYE two times a day  dextrose 10% Bolus 125 milliLiter(s) IV Bolus once  dextrose 5%. 1000 milliLiter(s) (50 mL/Hr) IV Continuous <Continuous>  dextrose 5%. 1000 milliLiter(s) (100 mL/Hr) IV Continuous <Continuous>  dextrose 50% Injectable 25 Gram(s) IV Push once  dextrose 50% Injectable 12.5 Gram(s) IV Push once  dorzolamide 2% Ophthalmic Solution 1 Drop(s) Both EYES three times a day  furosemide   Injectable 20 milliGRAM(s) IV Push every 12 hours  glucagon  Injectable 1 milliGRAM(s) IntraMuscular once  hydrALAZINE 50 milliGRAM(s) Oral three times a day  insulin lispro (ADMELOG) corrective regimen sliding scale   SubCutaneous Before meals and at bedtime  ketorolac 0.5% Ophthalmic Solution 1 Drop(s) Right EYE two times a day  minocycline 100 milliGRAM(s) Oral every 12 hours  polyethylene glycol 3350 17 Gram(s) Oral daily  prednisoLONE acetate 1% Suspension 1 Drop(s) Right EYE every 6 hours  senna 2 Tablet(s) Oral at bedtime  sevelamer carbonate 800 milliGRAM(s) Oral three times a day with meals  sodium zirconium cyclosilicate 10 Gram(s) Oral daily  trimethoprim  160 mG/sulfamethoxazole 800 mG 2 Tablet(s) Oral every 24 hours      MEDICATIONS  (PRN):  acetaminophen     Tablet .. 650 milliGRAM(s) Oral every 6 hours PRN Temp greater or equal to 38C (100.4F), Mild Pain (1 - 3)  dextrose Oral Gel 15 Gram(s) Oral once PRN Blood Glucose LESS THAN 70 milliGRAM(s)/deciliter      Physical Exam    Neuro :  no focal deficits  Respiratory: CTA B/L  CV: RRR, S1S2, no murmurs,   Abdominal: Soft, NT, ND +BS,  Extremities: No edema, + peripheral pulses      ASSESSMENT      uncontrolled htn,   abnormal trop r/o acs,   poss 2nd to demand ischemia,    abd pain poss 2nd to constipation,    fever poss 2nd to line sepsis   bacteremia   pulmonary nodules,    bronchogenic cyst, or foregut duplication cyst,   hyperkalemia  h/o sinus node dysfunction,   chronic HFpEF,   aortic stenosis,   HTN,   HLD,   b/l carotid stenosis,   DM (not on meds),   ESRD on dialysis (Tue, Thu, Sat),   Gout,    glaucoma,   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  cx and sens fr hd cath site with Stenotrophomonas maltophilia noted above   blood cx cath neg noted above  id f/u    TTE with ejection fraction visually estimated at 50 to 55 %. mild (grade 1) left ventricular diastolic dysfunction.   No vegetations seen on this study, consider URVASHI for further evaluation if clinically indicated noted    URVASHI with No thrombus seen in the left atrial, left atrial appendage, right atrium.   Agitated saline injection was negative for intracardiac shunt.   No vegetations seen on this study noted    s/p ir removal of Perm-a-catheter 4/18/24   cont IV Bactrim and Minocycline to cover Stenotrophomonas  Continue Ceftriaxone 2 g daily to cover Streptococcus  Repeat Blood cultures 4/17/24 neg noted above   Repeat Blood cultures form 4/20/24 neg noted above  procalcitonin 0.6 noted    quantiferon tb gold test neg noted    pulm f/u    Follow up CT in 12 months  Nodify testing as OP.  pt on hd t,th,s   renal f/u   K improved after dialysis,   follow 2 gm K diet.   Malfunction catheter max  ml/minute 4/16/24,   IR for permacath placement in am  next HD on 4/24/24   f/u bmp   cont lokelma   AVG placement after discharge at [facility].   hgba1c 6.4 noted    lispro ss   cont current meds

## 2024-04-23 NOTE — PROGRESS NOTE ADULT - ASSESSMENT
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 Patient AAOx3 at bedside and mentioned having intermittent, mild, suprapubic discomfort associated w/ dysuria that has been going on for the last 48 hours. Patient was admitted to [facility] on 11/24/2023 and discharge on 12/08/23 where he was treated for sepsis. His perm cath was exchanged on 11/223. Found to have MSSA bacteremia and transitioned from Vancomycin to Cefazolin. 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 Underwent right Permcath placement by IR on 12/4/23. On admission, he found to have fever, tachypnea but negative Urine analysis and negative chest CT. He has started on Cefepime and IV  Vancomycin, and the ID consult requested to assist with further evaluation and antibiotic management.    # Sepsis ( Fever + tachypnea)- Suspected Line sepsis  # Streptococcal Bacteremia- 4/12/24 - in the setting of Perm-a cath - the blood cx from catheter as of 4/13 NGTD- Repeat Blood cx from 4/17 and 4/20 have NGTD  - TTE from 4/15 and URVASHI from 4/17 shows no vegetation  # Catheter site culture grew Stenotrophomonas - s/p removal of Perm-a-cath on 4/18/24    would recommend:    1. NPO after midnight for insertion of a tunneled dialysis catheter in IR tomorrow.   2. No ID contraindication to place a perm-a - cath in the setting of negative repeat Blood cultures  3. Continue  Bactrim and Minocycline to cover Stenotrophomonas  4. Continue Ceftriaxone 2 g daily to cover Streptococcus  5. Need at least 4 weeks of Abx    d/w Covering NP, Kettely     Attending Attestation:    Spent more than 35 minutes on total encounter, more than 50 % of the visit was spent counseling and/or coordinating care by the Attending physician.

## 2024-04-24 LAB
ANION GAP SERPL CALC-SCNC: 11 MMOL/L — SIGNIFICANT CHANGE UP (ref 5–17)
ANION GAP SERPL CALC-SCNC: 11 MMOL/L — SIGNIFICANT CHANGE UP (ref 5–17)
APTT BLD: 26.7 SEC — SIGNIFICANT CHANGE UP (ref 24.5–35.6)
BLD GP AB SCN SERPL QL: SIGNIFICANT CHANGE UP
BUN SERPL-MCNC: 125 MG/DL — HIGH (ref 7–18)
BUN SERPL-MCNC: 134 MG/DL — HIGH (ref 7–18)
CALCIUM SERPL-MCNC: 9.1 MG/DL — SIGNIFICANT CHANGE UP (ref 8.4–10.5)
CALCIUM SERPL-MCNC: 9.1 MG/DL — SIGNIFICANT CHANGE UP (ref 8.4–10.5)
CHLORIDE SERPL-SCNC: 104 MMOL/L — SIGNIFICANT CHANGE UP (ref 96–108)
CHLORIDE SERPL-SCNC: 105 MMOL/L — SIGNIFICANT CHANGE UP (ref 96–108)
CO2 SERPL-SCNC: 19 MMOL/L — LOW (ref 22–31)
CO2 SERPL-SCNC: 19 MMOL/L — LOW (ref 22–31)
CREAT SERPL-MCNC: 8.09 MG/DL — HIGH (ref 0.5–1.3)
CREAT SERPL-MCNC: 8.12 MG/DL — HIGH (ref 0.5–1.3)
EGFR: 6 ML/MIN/1.73M2 — LOW
EGFR: 6 ML/MIN/1.73M2 — LOW
GLUCOSE BLDC GLUCOMTR-MCNC: 100 MG/DL — HIGH (ref 70–99)
GLUCOSE BLDC GLUCOMTR-MCNC: 114 MG/DL — HIGH (ref 70–99)
GLUCOSE BLDC GLUCOMTR-MCNC: 122 MG/DL — HIGH (ref 70–99)
GLUCOSE BLDC GLUCOMTR-MCNC: 163 MG/DL — HIGH (ref 70–99)
GLUCOSE BLDC GLUCOMTR-MCNC: 92 MG/DL — SIGNIFICANT CHANGE UP (ref 70–99)
GLUCOSE BLDC GLUCOMTR-MCNC: 95 MG/DL — SIGNIFICANT CHANGE UP (ref 70–99)
GLUCOSE SERPL-MCNC: 106 MG/DL — HIGH (ref 70–99)
GLUCOSE SERPL-MCNC: 134 MG/DL — HIGH (ref 70–99)
HCT VFR BLD CALC: 38.3 % — LOW (ref 39–50)
HGB BLD-MCNC: 12.7 G/DL — LOW (ref 13–17)
INR BLD: 1.25 RATIO — HIGH (ref 0.85–1.18)
MCHC RBC-ENTMCNC: 32.4 PG — SIGNIFICANT CHANGE UP (ref 27–34)
MCHC RBC-ENTMCNC: 33.2 GM/DL — SIGNIFICANT CHANGE UP (ref 32–36)
MCV RBC AUTO: 97.7 FL — SIGNIFICANT CHANGE UP (ref 80–100)
NRBC # BLD: 0 /100 WBCS — SIGNIFICANT CHANGE UP (ref 0–0)
PLATELET # BLD AUTO: 216 K/UL — SIGNIFICANT CHANGE UP (ref 150–400)
POTASSIUM SERPL-MCNC: 4.8 MMOL/L — SIGNIFICANT CHANGE UP (ref 3.5–5.3)
POTASSIUM SERPL-MCNC: 6.3 MMOL/L — CRITICAL HIGH (ref 3.5–5.3)
POTASSIUM SERPL-SCNC: 4.8 MMOL/L — SIGNIFICANT CHANGE UP (ref 3.5–5.3)
POTASSIUM SERPL-SCNC: 6.3 MMOL/L — CRITICAL HIGH (ref 3.5–5.3)
PROTHROM AB SERPL-ACNC: 14.2 SEC — HIGH (ref 9.5–13)
RBC # BLD: 3.92 M/UL — LOW (ref 4.2–5.8)
RBC # FLD: 12.8 % — SIGNIFICANT CHANGE UP (ref 10.3–14.5)
SODIUM SERPL-SCNC: 134 MMOL/L — LOW (ref 135–145)
SODIUM SERPL-SCNC: 135 MMOL/L — SIGNIFICANT CHANGE UP (ref 135–145)
WBC # BLD: 6.52 K/UL — SIGNIFICANT CHANGE UP (ref 3.8–10.5)
WBC # FLD AUTO: 6.52 K/UL — SIGNIFICANT CHANGE UP (ref 3.8–10.5)

## 2024-04-24 PROCEDURE — 77001 FLUOROGUIDE FOR VEIN DEVICE: CPT | Mod: 26

## 2024-04-24 PROCEDURE — 76937 US GUIDE VASCULAR ACCESS: CPT | Mod: 26

## 2024-04-24 PROCEDURE — 36558 INSERT TUNNELED CV CATH: CPT | Mod: 78

## 2024-04-24 RX ORDER — CHLORHEXIDINE GLUCONATE 213 G/1000ML
1 SOLUTION TOPICAL DAILY
Refills: 0 | Status: DISCONTINUED | OUTPATIENT
Start: 2024-04-25 | End: 2024-04-26

## 2024-04-24 RX ORDER — DEXTROSE 50 % IN WATER 50 %
50 SYRINGE (ML) INTRAVENOUS ONCE
Refills: 0 | Status: COMPLETED | OUTPATIENT
Start: 2024-04-24 | End: 2024-04-24

## 2024-04-24 RX ORDER — INSULIN LISPRO 100/ML
5 VIAL (ML) SUBCUTANEOUS ONCE
Refills: 0 | Status: COMPLETED | OUTPATIENT
Start: 2024-04-24 | End: 2024-04-24

## 2024-04-24 RX ORDER — SODIUM ZIRCONIUM CYCLOSILICATE 10 G/10G
10 POWDER, FOR SUSPENSION ORAL ONCE
Refills: 0 | Status: COMPLETED | OUTPATIENT
Start: 2024-04-24 | End: 2024-04-24

## 2024-04-24 RX ORDER — CALCIUM GLUCONATE 100 MG/ML
1 VIAL (ML) INTRAVENOUS ONCE
Refills: 0 | Status: COMPLETED | OUTPATIENT
Start: 2024-04-24 | End: 2024-04-24

## 2024-04-24 RX ORDER — IPRATROPIUM/ALBUTEROL SULFATE 18-103MCG
3 AEROSOL WITH ADAPTER (GRAM) INHALATION ONCE
Refills: 0 | Status: COMPLETED | OUTPATIENT
Start: 2024-04-24 | End: 2024-04-24

## 2024-04-24 RX ADMIN — CHLORHEXIDINE GLUCONATE 1 APPLICATION(S): 213 SOLUTION TOPICAL at 06:39

## 2024-04-24 RX ADMIN — SEVELAMER CARBONATE 800 MILLIGRAM(S): 2400 POWDER, FOR SUSPENSION ORAL at 22:43

## 2024-04-24 RX ADMIN — Medication 40 MILLIGRAM(S): at 06:43

## 2024-04-24 RX ADMIN — CARVEDILOL PHOSPHATE 12.5 MILLIGRAM(S): 80 CAPSULE, EXTENDED RELEASE ORAL at 22:42

## 2024-04-24 RX ADMIN — Medication 3 MILLILITER(S): at 09:00

## 2024-04-24 RX ADMIN — Medication 1 DROP(S): at 06:40

## 2024-04-24 RX ADMIN — Medication 1 DROP(S): at 22:43

## 2024-04-24 RX ADMIN — Medication 1 DROP(S): at 13:27

## 2024-04-24 RX ADMIN — Medication 1 DROP(S): at 06:41

## 2024-04-24 RX ADMIN — Medication 5 UNIT(S): at 09:25

## 2024-04-24 RX ADMIN — DORZOLAMIDE HYDROCHLORIDE 1 DROP(S): 20 SOLUTION/ DROPS OPHTHALMIC at 22:43

## 2024-04-24 RX ADMIN — Medication 50 MILLIGRAM(S): at 22:43

## 2024-04-24 RX ADMIN — Medication 2 TABLET(S): at 22:42

## 2024-04-24 RX ADMIN — DORZOLAMIDE HYDROCHLORIDE 1 DROP(S): 20 SOLUTION/ DROPS OPHTHALMIC at 06:41

## 2024-04-24 RX ADMIN — MINOCYCLINE HYDROCHLORIDE 100 MILLIGRAM(S): 45 TABLET, EXTENDED RELEASE ORAL at 22:44

## 2024-04-24 RX ADMIN — ATORVASTATIN CALCIUM 20 MILLIGRAM(S): 80 TABLET, FILM COATED ORAL at 22:43

## 2024-04-24 RX ADMIN — Medication 40 MILLIGRAM(S): at 23:01

## 2024-04-24 RX ADMIN — Medication 1 DROP(S): at 22:44

## 2024-04-24 RX ADMIN — Medication 50 MILLILITER(S): at 09:25

## 2024-04-24 RX ADMIN — BRIMONIDINE TARTRATE 1 DROP(S): 2 SOLUTION/ DROPS OPHTHALMIC at 06:40

## 2024-04-24 RX ADMIN — SENNA PLUS 2 TABLET(S): 8.6 TABLET ORAL at 22:38

## 2024-04-24 RX ADMIN — SODIUM ZIRCONIUM CYCLOSILICATE 10 GRAM(S): 10 POWDER, FOR SUSPENSION ORAL at 09:24

## 2024-04-24 RX ADMIN — BRIMONIDINE TARTRATE 1 DROP(S): 2 SOLUTION/ DROPS OPHTHALMIC at 22:43

## 2024-04-24 RX ADMIN — Medication 100 GRAM(S): at 09:26

## 2024-04-24 NOTE — PROGRESS NOTE ADULT - SUBJECTIVE AND OBJECTIVE BOX
Problem List:  ESRD, post PC placement, await dialysis treatment.      PAST MEDICAL & SURGICAL HISTORY:  DM (diabetes mellitus)      HTN (hypertension)      Chronic kidney disease      HLD (hyperlipidemia)      Glaucoma      Gout      No significant past surgical history          No Known Allergies      MEDICATIONS  (STANDING):  atorvastatin 20 milliGRAM(s) Oral at bedtime  brimonidine 0.2% Ophthalmic Solution 1 Drop(s) Right EYE two times a day  carvedilol 12.5 milliGRAM(s) Oral every 12 hours  chlorhexidine 2% Cloths 1 Application(s) Topical <User Schedule>  ciprofloxacin  0.3% Ophthalmic Solution 1 Drop(s) Right EYE two times a day  dextrose 10% Bolus 125 milliLiter(s) IV Bolus once  dextrose 5%. 1000 milliLiter(s) (100 mL/Hr) IV Continuous <Continuous>  dextrose 5%. 1000 milliLiter(s) (50 mL/Hr) IV Continuous <Continuous>  dextrose 50% Injectable 25 Gram(s) IV Push once  dextrose 50% Injectable 12.5 Gram(s) IV Push once  dorzolamide 2% Ophthalmic Solution 1 Drop(s) Both EYES three times a day  furosemide   Injectable 40 milliGRAM(s) IV Push every 12 hours  glucagon  Injectable 1 milliGRAM(s) IntraMuscular once  hydrALAZINE 50 milliGRAM(s) Oral three times a day  insulin lispro (ADMELOG) corrective regimen sliding scale   SubCutaneous Before meals and at bedtime  ketorolac 0.5% Ophthalmic Solution 1 Drop(s) Right EYE two times a day  minocycline 100 milliGRAM(s) Oral every 12 hours  polyethylene glycol 3350 17 Gram(s) Oral daily  prednisoLONE acetate 1% Suspension 1 Drop(s) Right EYE every 6 hours  senna 2 Tablet(s) Oral at bedtime  sevelamer carbonate 800 milliGRAM(s) Oral three times a day with meals  sodium zirconium cyclosilicate 10 Gram(s) Oral two times a day  trimethoprim  160 mG/sulfamethoxazole 800 mG 2 Tablet(s) Oral every 24 hours    MEDICATIONS  (PRN):  acetaminophen     Tablet .. 650 milliGRAM(s) Oral every 6 hours PRN Temp greater or equal to 38C (100.4F), Mild Pain (1 - 3)  dextrose Oral Gel 15 Gram(s) Oral once PRN Blood Glucose LESS THAN 70 milliGRAM(s)/deciliter                            12.7   6.52  )-----------( 216      ( 24 Apr 2024 05:50 )             38.3     04-24    135  |  105  |  125<H>  ----------------------------<  134<H>  4.8   |  19<L>  |  8.09<H>    Ca    9.1      24 Apr 2024 11:35      PT/INR - ( 24 Apr 2024 05:50 )   PT: 14.2 sec;   INR: 1.25 ratio         PTT - ( 24 Apr 2024 05:50 )  PTT:26.7 sec        REVIEW OF SYSTEMS:  General: no fever no chills, no weight loss.    RESPIRATORY: No cough, wheezing, hemoptysis; No shortness of breath  CARDIOVASCULAR: No chest pain or palpitations. No Edema  GASTROINTESTINAL: No abdominal or epigastric pain. No nausea, vomiting. No diarrhea or constipation. No melena.  GENITOURINARY: No dysuria, frequency, foamy urine, urinary urgency, incontinence or hematuria  NEUROLOGICAL: No numbness or weakness, no tremor , no dizziness.   Muscle skeletal : no joint pain and no swelling of joints and limbs.          VITALS:  T(F): 97.2 (04-24-24 @ 14:29), Max: 98.2 (04-23-24 @ 20:33)  HR: 60 (04-24-24 @ 14:29)  BP: 160/82 (04-24-24 @ 14:29)  RR: 18 (04-24-24 @ 14:29)  SpO2: 99% (04-24-24 @ 14:29)  Wt(kg): --    04-23 @ 07:01  -  04-24 @ 07:00  --------------------------------------------------------  IN: 0 mL / OUT: 500 mL / NET: -500 mL        PHYSICAL EXAM:  Constitutional: no diaphoresis, no distress.  Neck: No JVD, no carotid bruit, supple, no adenopathy  Respiratory: Good air entrance B/L, no wheezes, rales or rhonchi  Cardiovascular: S1, S2, RRR, no pericardial rub, no murmur  Abdomen: BS+, soft, no tenderness, no bruit  Pelvis: bladder nondistended  no edema  Neurological: A/O x 3, no focal deficits  Psychiatric: Normal mood, normal affect  Skin: No rashes  Vascular Access:

## 2024-04-24 NOTE — PROGRESS NOTE ADULT - SUBJECTIVE AND OBJECTIVE BOX
[   ] ICU                                          [   ] CCU                                      [  X ] Medical Floor    Patient is a 78 year old male with abdominal pain. GI consulted to evaluate.        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 Patient also c/o constipation but denies nausea, vomiting, hematemesis, hematochezia, melena, fever, chest pain, SOB, cough, hematuria, or diarrhea.      Today patient appears comfortable. No new complaints reported, No abdominal pain, N/V, hematemesis, hematochezia, melena, fever, chills, chest pain, SOB, cough or diarrhea reported.         PAST MEDICAL HISTORY     DM (diabetes mellitus)    HTN (hypertension)     Diabetes mellitus    ESRD on HD     Hyperlipidemia     Glaucoma    Gout        PAST SURGICAL HISTORY    No significant past surgical history        Allergies    No Known Allergies    Intolerances  None       SOCIAL HISTORY  Advanced Directives:       [X  ] Full Code       [  ] DNR  Marital Status:         [  ] M      [X  ] S      [  ] D       [  ] W  Children:       [ X ] Yes      [  ] No  Occupation:        [  ] Employed       [ X ] Unemployed       [  ] Retired  Diet:       [ X ] Regular       [  ] PEG feeding          [  ] NG tube feeding  Drug Use:           [ X ] Patient denied          [  ] Yes  Alcohol:           [X  ] No             [  ] Yes (socially)         [  ] Yes (chronic)  Tobacco:           [  ] Yes           [ X ] No    FAMILY HISTORY  [ X ] Heart Disease            [ X ] Diabetes             [ X ] HTN             [  ] Colon Cancer             [  ] Stomach Cancer              [  ] Pancreatic Cancer        VITALS   Vital Signs Last 24 Hrs  T(C): 36.4 (04-24-24 @ 05:46), Max: 36.8 (04-23-24 @ 20:33)  T(F): 97.5 (04-24-24 @ 05:46), Max: 98.2 (04-23-24 @ 20:33)  HR: 56 (04-24-24 @ 06:10) (56 - 61)  BP: 147/67 (04-24-24 @ 06:10) (134/69 - 161/71)   RR: 18 (04-24-24 @ 06:10) (18 - 18)  SpO2: 94% (04-24-24 @ 06:10) (94% - 98%)        MEDICATIONS  (STANDING):  atorvastatin 20 milliGRAM(s) Oral at bedtime  brimonidine 0.2% Ophthalmic Solution 1 Drop(s) Right EYE two times a day  carvedilol 12.5 milliGRAM(s) Oral every 12 hours  chlorhexidine 2% Cloths 1 Application(s) Topical <User Schedule>  ciprofloxacin  0.3% Ophthalmic Solution 1 Drop(s) Right EYE two times a day  dextrose 10% Bolus 125 milliLiter(s) IV Bolus once  dextrose 5%. 1000 milliLiter(s) (50 mL/Hr) IV Continuous <Continuous>  dextrose 5%. 1000 milliLiter(s) (100 mL/Hr) IV Continuous <Continuous>  dextrose 50% Injectable 25 Gram(s) IV Push once  dextrose 50% Injectable 12.5 Gram(s) IV Push once  dorzolamide 2% Ophthalmic Solution 1 Drop(s) Both EYES three times a day  furosemide   Injectable 40 milliGRAM(s) IV Push every 12 hours  glucagon  Injectable 1 milliGRAM(s) IntraMuscular once  hydrALAZINE 50 milliGRAM(s) Oral three times a day  insulin lispro (ADMELOG) corrective regimen sliding scale   SubCutaneous Before meals and at bedtime  ketorolac 0.5% Ophthalmic Solution 1 Drop(s) Right EYE two times a day  minocycline 100 milliGRAM(s) Oral every 12 hours  polyethylene glycol 3350 17 Gram(s) Oral daily  prednisoLONE acetate 1% Suspension 1 Drop(s) Right EYE every 6 hours  senna 2 Tablet(s) Oral at bedtime  sevelamer carbonate 800 milliGRAM(s) Oral three times a day with meals  sodium zirconium cyclosilicate 10 Gram(s) Oral two times a day  trimethoprim  160 mG/sulfamethoxazole 800 mG 2 Tablet(s) Oral every 24 hours    MEDICATIONS  (PRN):  acetaminophen     Tablet .. 650 milliGRAM(s) Oral every 6 hours PRN Temp greater or equal to 38C (100.4F), Mild Pain (1 - 3)  dextrose Oral Gel 15 Gram(s) Oral once PRN Blood Glucose LESS THAN 70 milliGRAM(s)/deciliter                            12.7   6.52  )-----------( 216      ( 24 Apr 2024 05:50 )             38.3       04-24    134<L>  |  104  |  134<H>  ----------------------------<  106<H>  6.3<HH>   |  19<L>  |  8.12<H>    Ca    9.1      24 Apr 2024 05:50        PT/INR - ( 24 Apr 2024 05:50 )   PT: 14.2 sec;   INR: 1.25 ratio         PTT - ( 24 Apr 2024 05:50 )  PTT:26.7 sec

## 2024-04-24 NOTE — PROGRESS NOTE ADULT - PROBLEM SELECTOR PLAN 3
-   Patient presented with Elevated Troponin   -   Likely ischemic demand in s/o sepsis  -   S/p Echo wnl  -   s/p URVASHI on 4/17 no vegetations   -   Telemetry discontinued  -   Cardiology Dr Jorge following  -   Stable

## 2024-04-24 NOTE — PROGRESS NOTE ADULT - SUBJECTIVE AND OBJECTIVE BOX
K 6.3 , issues discussed with IR .  Patient wsa given Calcium Gluconate to follow with DW50% and 2 treatment of Albuterol  Repea K stat after that .  Upon repeat K result IR will decide placement of PC or temporary catheter. As discussed he will be the first case  on this AM.

## 2024-04-24 NOTE — PROGRESS NOTE ADULT - ASSESSMENT
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 Patient AAOx3 at bedside and mentioned having intermittent, mild, suprapubic discomfort associated w/ dysuria that has been going on for the last 48 hours. Patient was admitted to [facility] on 11/24/2023 and discharge on 12/08/23 where he was treated for sepsis. His perm cath was exchanged on 11/223. Found to have MSSA bacteremia and transitioned from Vancomycin to Cefazolin. 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 Underwent right Permcath placement by IR on 12/4/23. On admission, he found to have fever, tachypnea but negative Urine analysis and negative chest CT. He has started on Cefepime and IV  Vancomycin, and the ID consult requested to assist with further evaluation and antibiotic management.    # Sepsis ( Fever + tachypnea)- Suspected Line sepsis  # Streptococcal Bacteremia- 4/12/24 - in the setting of Perm-a cath - the blood cx from catheter as of 4/13 NGTD- Repeat Blood cx from 4/17 and 4/20 have NGTD  - TTE from 4/15 and URVASHI from 4/17 shows no vegetation  # Catheter site culture grew Stenotrophomonas - s/p removal of Perm-a-cath on 4/18/24    would recommend:    1. Post-procedure labs  2. Continue  Bactrim and Minocycline to cover Stenotrophomonas  3. Continue Ceftriaxone 2 g daily to cover Streptococcus  4. Need Antibiotics until 5/2/24    d/w Covering Araceli BENEDICT     Attending Attestation:    Spent more than 35 minutes on total encounter, more than 50 % of the visit was spent counseling and/or coordinating care by the Attending physician.

## 2024-04-24 NOTE — PROVIDER CONTACT NOTE (CRITICAL VALUE NOTIFICATION) - TEST AND RESULT REPORTED:
blood culture
blood culture from 4/12/24 at 17:50 came back positive for gram positive cocci in pairs in the aerobic bottle
blood culture
Potassium 6.3 not hemolyze
Troponin 125.3

## 2024-04-24 NOTE — PROGRESS NOTE ADULT - PROBLEM SELECTOR PLAN 2
-  HD (T/Th/S )  -  Right chest permacath removed by IR on 4/18,  pending replacement  -  S/p HD on 4/18  -  Avoid Nephrotoxic Meds/ Agents such as (NSAIDs, IV contrast, Aminoglycosides such as gentamicin, Gadolinium contrast, Phosphate containing enemas, etc..)  -  Monitor BMP, phos serum   -  CHG bathing daily  -  IR consulted, Permacath placement today 4/24 in IR  -  Nephro-Dr. Art following, next HD on 4/24, if patient has access -  HD (T/Th/S )  -  Right chest permacath removed by IR on 4/18,  pending replacement  -  S/p HD on 4/18  -  Avoid Nephrotoxic Meds/ Agents such as (NSAIDs, IV contrast, Aminoglycosides such as gentamicin, Gadolinium contrast, Phosphate containing enemas, etc..)  -  Monitor BMP, phos serum   -  Potassium 6.3,  treated with IV agents  -  Repeat BMP pending prior to catheter placement  -  CHG bathing daily  -  IR consulted, Permacath placement today 4/24 in IR  -  Nephro-Dr. Art following, next HD on 4/24, if patient has access

## 2024-04-24 NOTE — PROGRESS NOTE ADULT - ASSESSMENT
1. Abdominal pain improved  2. Constipation  3. Cholelithiasis (asymptomatic)  4. Anemia (H/H stable)  5. No evidence of acute GI bleeding  6. Sepsis    Suggestions:    1. Protonix 40mg daily  2. Avoid NSAID  3. Daily stool softener  4. Monitor H/H  5. Transfuse PRBC as needed  6. Diet as tolerated  7. Continue antibiotics  8. DVT prophylaxis

## 2024-04-24 NOTE — PROGRESS NOTE ADULT - SUBJECTIVE AND OBJECTIVE BOX
Patient is a 78y old  Male who presents with a chief complaint of Sepsis (23 Apr 2024 16:03)    pt seen in tele [  ], reg med floor [  x ], bed [x  ], chair at bedside [   ], a+o x3 [ x ], lethargic [  ],    nad [ x ]      Allergies    No Known Allergies        Vitals    T(F): 97.5 (04-24-24 @ 05:46), Max: 98.2 (04-23-24 @ 20:33)  HR: 56 (04-24-24 @ 06:10) (56 - 61)  BP: 147/67 (04-24-24 @ 06:10) (134/69 - 161/71)  RR: 18 (04-24-24 @ 06:10) (18 - 18)  SpO2: 94% (04-24-24 @ 06:10) (94% - 98%)  Wt(kg): --  CAPILLARY BLOOD GLUCOSE      POCT Blood Glucose.: 130 mg/dL (23 Apr 2024 21:46)      Labs                          12.7   6.52  )-----------( 216      ( 24 Apr 2024 05:50 )             38.3       04-23    132<L>  |  103  |  124<H>  ----------------------------<  140<H>  5.5<H>   |  18<L>  |  8.00<H>    Ca    8.7      23 Apr 2024 19:50              .Blood Blood-Peripheral  04-20 @ 06:07   No growth at 72 Hours  --  --      .Blood Blood-Peripheral  04-20 @ 06:05   No growth at 72 Hours  --  --      .Blood Blood-Peripheral  04-17 @ 10:15   No growth at 5 days  --  --      .Blood Blood-Peripheral  04-17 @ 10:00   No growth at 5 days  --  --      .Blood Dialysis Catheter  04-13 @ 17:00   No growth at 5 days  --  --      Cath Site Catheter Site  04-13 @ 09:39   Rare Stenotrophomonas maltophilia  --  Stenotrophomonas maltophilia      Clean Catch Clean Catch (Midstream)  04-12 @ 18:13   <10,000 CFU/mL Normal Urogenital Kavita  --  --      .Blood Blood-Peripheral  04-12 @ 18:00   Growth in anaerobic bottle: Streptococcus salivarius/vestibularis group  "Susceptibilities not performed"  Direct identification is available within approximately 3-5  hours either by Blood Panel Multiplexed PCR or Direct  MALDI-TOF. Details: [email or web address]  --  Blood Culture PCR      .Blood Blood-Peripheral  04-12 @ 17:50   Growth in aerobic and anaerobic bottles: Streptococcus  salivarius/vestibularis group  --  Streptococcus salivarius/vestibularis group          Radiology Results      Meds    MEDICATIONS  (STANDING):  atorvastatin 20 milliGRAM(s) Oral at bedtime  brimonidine 0.2% Ophthalmic Solution 1 Drop(s) Right EYE two times a day  carvedilol 12.5 milliGRAM(s) Oral every 12 hours  chlorhexidine 2% Cloths 1 Application(s) Topical <User Schedule>  ciprofloxacin  0.3% Ophthalmic Solution 1 Drop(s) Right EYE two times a day  dextrose 10% Bolus 125 milliLiter(s) IV Bolus once  dextrose 5%. 1000 milliLiter(s) (100 mL/Hr) IV Continuous <Continuous>  dextrose 5%. 1000 milliLiter(s) (50 mL/Hr) IV Continuous <Continuous>  dextrose 50% Injectable 25 Gram(s) IV Push once  dextrose 50% Injectable 12.5 Gram(s) IV Push once  dorzolamide 2% Ophthalmic Solution 1 Drop(s) Both EYES three times a day  furosemide   Injectable 40 milliGRAM(s) IV Push every 12 hours  glucagon  Injectable 1 milliGRAM(s) IntraMuscular once  hydrALAZINE 50 milliGRAM(s) Oral three times a day  insulin lispro (ADMELOG) corrective regimen sliding scale   SubCutaneous Before meals and at bedtime  ketorolac 0.5% Ophthalmic Solution 1 Drop(s) Right EYE two times a day  minocycline 100 milliGRAM(s) Oral every 12 hours  polyethylene glycol 3350 17 Gram(s) Oral daily  prednisoLONE acetate 1% Suspension 1 Drop(s) Right EYE every 6 hours  senna 2 Tablet(s) Oral at bedtime  sevelamer carbonate 800 milliGRAM(s) Oral three times a day with meals  sodium zirconium cyclosilicate 10 Gram(s) Oral two times a day  trimethoprim  160 mG/sulfamethoxazole 800 mG 2 Tablet(s) Oral every 24 hours      MEDICATIONS  (PRN):  acetaminophen     Tablet .. 650 milliGRAM(s) Oral every 6 hours PRN Temp greater or equal to 38C (100.4F), Mild Pain (1 - 3)  dextrose Oral Gel 15 Gram(s) Oral once PRN Blood Glucose LESS THAN 70 milliGRAM(s)/deciliter      Physical Exam    Neuro :  no focal deficits  Respiratory: CTA B/L  CV: RRR, S1S2, no murmurs,   Abdominal: Soft, NT, ND +BS,  Extremities: No edema, + peripheral pulses      ASSESSMENT      uncontrolled htn,   abnormal trop r/o acs,   poss 2nd to demand ischemia,    abd pain poss 2nd to constipation,    fever poss 2nd to line sepsis   bacteremia   pulmonary nodules,    bronchogenic cyst, or foregut duplication cyst,   hyperkalemia  h/o sinus node dysfunction,   chronic HFpEF,   aortic stenosis,   HTN,   HLD,   b/l carotid stenosis,   DM (not on meds),   ESRD on dialysis (Tue, Thu, Sat),   Gout,    glaucoma,   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  cx and sens fr hd cath site with Stenotrophomonas maltophilia noted above   blood cx cath neg noted above  id f/u    TTE with ejection fraction visually estimated at 50 to 55 %. mild (grade 1) left ventricular diastolic dysfunction.   No vegetations seen on this study, consider URVASHI for further evaluation if clinically indicated noted    URVASHI with No thrombus seen in the left atrial, left atrial appendage, right atrium.   Agitated saline injection was negative for intracardiac shunt.   No vegetations seen on this study noted    s/p ir removal of Perm-a-catheter 4/18/24   cont IV Bactrim and Minocycline to cover Stenotrophomonas  Continue Ceftriaxone 2 g daily to cover Streptococcus  Repeat Blood cultures 4/17/24 neg noted above   Repeat Blood cultures form 4/20/24 neg noted above  procalcitonin 0.6 noted    quantiferon tb gold test neg noted    pulm f/u    Follow up CT in 12 months  Nodify testing as OP.  pt on hd t,th,s   renal f/u   K improved after dialysis,   follow 2 gm K diet.   Malfunction catheter max  ml/minute 4/16/24,   IR for permacath placement in am  next HD on 4/24/24   f/u bmp   cont lokelma   AVG placement after discharge at [facility].   hgba1c 6.4 noted    lispro ss   cont current meds       Patient is a 78y old  Male who presents with a chief complaint of Sepsis (23 Apr 2024 16:03)    pt seen in tele [  ], reg med floor [  x ], bed [x  ], chair at bedside [   ], a+o x3 [ x ], lethargic [  ],    nad [ x ]      Allergies    No Known Allergies        Vitals    T(F): 97.5 (04-24-24 @ 05:46), Max: 98.2 (04-23-24 @ 20:33)  HR: 56 (04-24-24 @ 06:10) (56 - 61)  BP: 147/67 (04-24-24 @ 06:10) (134/69 - 161/71)  RR: 18 (04-24-24 @ 06:10) (18 - 18)  SpO2: 94% (04-24-24 @ 06:10) (94% - 98%)  Wt(kg): --  CAPILLARY BLOOD GLUCOSE      POCT Blood Glucose.: 130 mg/dL (23 Apr 2024 21:46)      Labs                          12.7   6.52  )-----------( 216      ( 24 Apr 2024 05:50 )             38.3       04-23    132<L>  |  103  |  124<H>  ----------------------------<  140<H>  5.5<H>   |  18<L>  |  8.00<H>    Ca    8.7      23 Apr 2024 19:50              .Blood Blood-Peripheral  04-20 @ 06:07   No growth at 72 Hours  --  --      .Blood Blood-Peripheral  04-20 @ 06:05   No growth at 72 Hours  --  --      .Blood Blood-Peripheral  04-17 @ 10:15   No growth at 5 days  --  --      .Blood Blood-Peripheral  04-17 @ 10:00   No growth at 5 days  --  --      .Blood Dialysis Catheter  04-13 @ 17:00   No growth at 5 days  --  --      Cath Site Catheter Site  04-13 @ 09:39   Rare Stenotrophomonas maltophilia  --  Stenotrophomonas maltophilia      Clean Catch Clean Catch (Midstream)  04-12 @ 18:13   <10,000 CFU/mL Normal Urogenital Kavita  --  --      .Blood Blood-Peripheral  04-12 @ 18:00   Growth in anaerobic bottle: Streptococcus salivarius/vestibularis group  "Susceptibilities not performed"  Direct identification is available within approximately 3-5  hours either by Blood Panel Multiplexed PCR or Direct  MALDI-TOF. Details: [email or web address]  --  Blood Culture PCR      .Blood Blood-Peripheral  04-12 @ 17:50   Growth in aerobic and anaerobic bottles: Streptococcus  salivarius/vestibularis group  --  Streptococcus salivarius/vestibularis group          Radiology Results      Meds    MEDICATIONS  (STANDING):  atorvastatin 20 milliGRAM(s) Oral at bedtime  brimonidine 0.2% Ophthalmic Solution 1 Drop(s) Right EYE two times a day  carvedilol 12.5 milliGRAM(s) Oral every 12 hours  chlorhexidine 2% Cloths 1 Application(s) Topical <User Schedule>  ciprofloxacin  0.3% Ophthalmic Solution 1 Drop(s) Right EYE two times a day  dextrose 10% Bolus 125 milliLiter(s) IV Bolus once  dextrose 5%. 1000 milliLiter(s) (100 mL/Hr) IV Continuous <Continuous>  dextrose 5%. 1000 milliLiter(s) (50 mL/Hr) IV Continuous <Continuous>  dextrose 50% Injectable 25 Gram(s) IV Push once  dextrose 50% Injectable 12.5 Gram(s) IV Push once  dorzolamide 2% Ophthalmic Solution 1 Drop(s) Both EYES three times a day  furosemide   Injectable 40 milliGRAM(s) IV Push every 12 hours  glucagon  Injectable 1 milliGRAM(s) IntraMuscular once  hydrALAZINE 50 milliGRAM(s) Oral three times a day  insulin lispro (ADMELOG) corrective regimen sliding scale   SubCutaneous Before meals and at bedtime  ketorolac 0.5% Ophthalmic Solution 1 Drop(s) Right EYE two times a day  minocycline 100 milliGRAM(s) Oral every 12 hours  polyethylene glycol 3350 17 Gram(s) Oral daily  prednisoLONE acetate 1% Suspension 1 Drop(s) Right EYE every 6 hours  senna 2 Tablet(s) Oral at bedtime  sevelamer carbonate 800 milliGRAM(s) Oral three times a day with meals  sodium zirconium cyclosilicate 10 Gram(s) Oral two times a day  trimethoprim  160 mG/sulfamethoxazole 800 mG 2 Tablet(s) Oral every 24 hours      MEDICATIONS  (PRN):  acetaminophen     Tablet .. 650 milliGRAM(s) Oral every 6 hours PRN Temp greater or equal to 38C (100.4F), Mild Pain (1 - 3)  dextrose Oral Gel 15 Gram(s) Oral once PRN Blood Glucose LESS THAN 70 milliGRAM(s)/deciliter      Physical Exam    Neuro :  no focal deficits  Respiratory: CTA B/L  CV: RRR, S1S2, no murmurs,   Abdominal: Soft, NT, ND +BS,  Extremities: No edema, + peripheral pulses      ASSESSMENT      uncontrolled htn,   abnormal trop r/o acs,   poss 2nd to demand ischemia,    abd pain poss 2nd to constipation,    fever poss 2nd to line sepsis   bacteremia   pulmonary nodules,    bronchogenic cyst, or foregut duplication cyst,   hyperkalemia  h/o sinus node dysfunction,   chronic HFpEF,   aortic stenosis,   HTN,   HLD,   b/l carotid stenosis,   DM (not on meds),   ESRD on dialysis (Tue, Thu, Sat),   Gout,    glaucoma,   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  cx and sens fr hd cath site with Stenotrophomonas maltophilia noted above   blood cx cath neg noted above  id f/u    TTE with ejection fraction visually estimated at 50 to 55 %. mild (grade 1) left ventricular diastolic dysfunction.   No vegetations seen on this study, consider URVASHI for further evaluation if clinically indicated noted    URVASHI with No thrombus seen in the left atrial, left atrial appendage, right atrium.   Agitated saline injection was negative for intracardiac shunt.   No vegetations seen on this study noted    s/p ir removal of Perm-a-catheter 4/18/24   cont IV Bactrim and Minocycline to cover Stenotrophomonas  Continue Ceftriaxone 2 g daily to cover Streptococcus   pt will Need at least 4 weeks of Abx, may change Bactrim and Minocycline to oral on discharge   Repeat Blood cultures 4/17/24 neg noted above   Repeat Blood cultures form 4/20/24 neg noted above  procalcitonin 0.6 noted    quantiferon tb gold test neg noted    pulm f/u    Follow up CT in 12 months  Nodify testing as OP.  pt on hd t,th,s   renal f/u   follow 2 gm K diet.   Malfunction catheter max  ml/minute 4/16/24,   IR for permacath placement followed by hd   next HD on 4/24/24   f/u bmp   lokelma 10g x1 stat, calcium 1 gram x 1stat,d50 x1 stat, lispro ins 5 units x1stat, duoneb x1 stat   AVG placement after discharge at [facility].   hgba1c 6.4 noted    lispro ss   cont current meds   d/c plan if stable post hd

## 2024-04-24 NOTE — PROGRESS NOTE ADULT - ASSESSMENT
78 year old male, from [facility], living with son SUN (5x/week for 4h) with PMHx of HTN, HLD, DM (not on meds) ESRD on dialysis (Tue, Thu, Sat) and glaucoma, 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 out of the right eye was brought into the ED due to abdominal pain for the last 24 hours,abnormal ekg and elevated troponins,sepsis,strep bacteremia.  1.Strep bactermia,.Abx as per ID.URVASHI no vegetation  2.ESRD-HD as per renal.  3.HTN-cont bp medication.  4.DM-Insulin.  5.Lipid d/o-statin.  6.PC to be placed by IR.  7.Hyperkalemia-lokelma ,IV Calcium.  8.GI and DVT prophylaxis.  .

## 2024-04-24 NOTE — PROGRESS NOTE ADULT - ASSESSMENT
ESRD - dialysis days are T-T-S. Post PC removal.  IR unable to place PC for dialysis and deferred procedure to 04/24/2024, continue to monitor need for  dialysis and placement of temporary catheter if need. Patient refusing placement of femoral catheter.  Patient with no uremic symptoms at present.  Hyperkalemia, TREATED IN AM , last K 4.8.  Post PC placement follow with dialysis.       Fever with bacteremia Streptococcus sp. (Not Grp A, B or S pneumoniae): Detec  Gram Stain:  catheter site blood cultures  are negative, Exit site culture positive for Culture - Other (04.13.24 @ 09:39)   Removal of PC by IR due to Stenotrophomonas maltophilia positive cultures and non well function catheter.   Post PC removal 04/18 24. Last dialysis 04/18/24.  As above.   Minocycline: S  - Levofloxacin: S <=0.5  - Trimethoprim/Sulfamethoxazole: S <=0.5/9.5  Specimen Source: Cath Site Catheter Site  Culture Results:   Rare Stenotrophomonas maltophilia

## 2024-04-24 NOTE — PROCEDURE NOTE - ADDITIONAL PROCEDURE DETAILS
- Scarred Right IJ vein.   - Patent Right EJ vein.  Tunneled catheter placed via right EJ vein. Catheter may be used for hemodialysis.   - Official report to follow.

## 2024-04-24 NOTE — PROGRESS NOTE ADULT - ASSESSMENT
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  Presented to the ED due to abdominal pain for the last 24 hours and associated w/ non specific symptoms.  Admitted for further management of Sepsis 2/2 Strep. Bacteremia due to infected permacath. ID. Dr. Cui following. Repeat blood cultures from 4/17 was negative. IR removed right chest permacath on 4/18. Will resend blood cultures on 4/20 am again. Discussed with Nephro Dr. Art,   New dialysis access today in IR

## 2024-04-24 NOTE — PROGRESS NOTE ADULT - SUBJECTIVE AND OBJECTIVE BOX
Date of Service 04-24-24 @ 11:49    CHIEF COMPLAINT:Patient is a 78y old  Male who presents with a chief complaint of Sepsis.Pt had epistaxis this am.    	  REVIEW OF SYSTEMS:  CONSTITUTIONAL: No fever, weight loss, or fatigue  EYES: No eye pain, visual disturbances, or discharge  ENT:  No difficulty hearing, tinnitus, vertigo; No sinus or throat pain  NECK: No pain or stiffness  RESPIRATORY: No cough, wheezing, chills or hemoptysis; No Shortness of Breath  CARDIOVASCULAR: No chest pain, palpitations, passing out, dizziness, or leg swelling  GASTROINTESTINAL: No abdominal or epigastric pain. No nausea, vomiting, or hematemesis; No diarrhea or constipation. No melena or hematochezia.  GENITOURINARY: No dysuria, frequency, hematuria, or incontinence  NEUROLOGICAL: No headaches, memory loss, loss of strength, numbness, or tremors  SKIN: No itching, burning, rashes, or lesions   LYMPH Nodes: No enlarged glands  ENDOCRINE: No heat or cold intolerance; No hair loss  MUSCULOSKELETAL: No joint pain or swelling; No muscle, back, or extremity pain  PSYCHIATRIC: No depression, anxiety, mood swings, or difficulty sleeping  HEME/LYMPH: No easy bruising, or bleeding gums  ALLERGY AND IMMUNOLOGIC: No hives or eczema	        PHYSICAL EXAM:  T(C): 36.4 (04-24-24 @ 05:46), Max: 36.8 (04-23-24 @ 20:33)  HR: 56 (04-24-24 @ 06:10) (56 - 61)  BP: 147/67 (04-24-24 @ 06:10) (134/69 - 161/71)  RR: 18 (04-24-24 @ 06:10) (18 - 18)  SpO2: 94% (04-24-24 @ 06:10) (94% - 98%)  Wt(kg): --  I&O's Summary    23 Apr 2024 07:01  -  24 Apr 2024 07:00  --------------------------------------------------------  IN: 0 mL / OUT: 500 mL / NET: -500 mL        Appearance: Normal	  HEENT:   Normal oral mucosa, PERRL, EOMI	  Lymphatic: No lymphadenopathy  Cardiovascular: Normal S1 S2, No JVD, No murmurs, No edema  Respiratory: Lungs clear to auscultation	  Psychiatry: A & O x 3, Mood & affect appropriate  Gastrointestinal:  Soft, Non-tender, + BS	  Skin: No rashes, No ecchymoses, No cyanosis	  Neurologic: Non-focal  Extremities: Normal range of motion, No clubbing, cyanosis or edema  Vascular: Peripheral pulses palpable 2+ bilaterally    MEDICATIONS  (STANDING):  atorvastatin 20 milliGRAM(s) Oral at bedtime  brimonidine 0.2% Ophthalmic Solution 1 Drop(s) Right EYE two times a day  carvedilol 12.5 milliGRAM(s) Oral every 12 hours  chlorhexidine 2% Cloths 1 Application(s) Topical <User Schedule>  ciprofloxacin  0.3% Ophthalmic Solution 1 Drop(s) Right EYE two times a day  dextrose 10% Bolus 125 milliLiter(s) IV Bolus once  dextrose 5%. 1000 milliLiter(s) (50 mL/Hr) IV Continuous <Continuous>  dextrose 5%. 1000 milliLiter(s) (100 mL/Hr) IV Continuous <Continuous>  dextrose 50% Injectable 25 Gram(s) IV Push once  dextrose 50% Injectable 12.5 Gram(s) IV Push once  dorzolamide 2% Ophthalmic Solution 1 Drop(s) Both EYES three times a day  furosemide   Injectable 40 milliGRAM(s) IV Push every 12 hours  glucagon  Injectable 1 milliGRAM(s) IntraMuscular once  hydrALAZINE 50 milliGRAM(s) Oral three times a day  insulin lispro (ADMELOG) corrective regimen sliding scale   SubCutaneous Before meals and at bedtime  ketorolac 0.5% Ophthalmic Solution 1 Drop(s) Right EYE two times a day  minocycline 100 milliGRAM(s) Oral every 12 hours  polyethylene glycol 3350 17 Gram(s) Oral daily  prednisoLONE acetate 1% Suspension 1 Drop(s) Right EYE every 6 hours  senna 2 Tablet(s) Oral at bedtime  sevelamer carbonate 800 milliGRAM(s) Oral three times a day with meals  sodium zirconium cyclosilicate 10 Gram(s) Oral two times a day  trimethoprim  160 mG/sulfamethoxazole 800 mG 2 Tablet(s) Oral every 24 hours    	  	  LABS:	 	                        12.7   6.52  )-----------( 216      ( 24 Apr 2024 05:50 )             38.3     04-24    134<L>  |  104  |  134<H>  ----------------------------<  106<H>  6.3<HH>   |  19<L>  |  8.12<H>    Ca    9.1      24 Apr 2024 05:50

## 2024-04-24 NOTE — PROGRESS NOTE ADULT - SUBJECTIVE AND OBJECTIVE BOX
Patient is a 78y old  Male who presents with a chief complaint of Sepsis (24 Apr 2024 08:38)  Awake, alert, comfortable in bed in NAD. For PC placement today    INTERVAL HPI/OVERNIGHT EVENTS:      VITAL SIGNS:  T(F): 97.5 (04-24-24 @ 05:46)  HR: 56 (04-24-24 @ 06:10)  BP: 147/67 (04-24-24 @ 06:10)  RR: 18 (04-24-24 @ 06:10)  SpO2: 94% (04-24-24 @ 06:10)  Wt(kg): --  I&O's Detail    23 Apr 2024 07:01  -  24 Apr 2024 07:00  --------------------------------------------------------  IN:  Total IN: 0 mL    OUT:    Voided (mL): 500 mL  Total OUT: 500 mL    Total NET: -500 mL              REVIEW OF SYSTEMS:    CONSTITUTIONAL:  No fevers, chills, sweats    HEENT:  Eyes:  No diplopia or blurred vision. ENT:  No earache, sore throat or runny nose.    CARDIOVASCULAR:  No pressure, squeezing, tightness, or heaviness about the chest; no palpitations.    RESPIRATORY:  Per HPI    GASTROINTESTINAL:  No abdominal pain, nausea, vomiting or diarrhea.    GENITOURINARY:  No dysuria, frequency or urgency.    NEUROLOGIC:  No paresthesias, fasciculations, seizures or weakness.    PSYCHIATRIC:  No disorder of thought or mood.      PHYSICAL EXAM:    Constitutional: Well developed and nourished  Eyes:Perrla  ENMT: normal  Neck:supple  Respiratory: good air entry  Cardiovascular: S1 S2 regular  Gastrointestinal: Soft, Non tender  Extremities: No edema  Vascular:normal  Neurological:Awake, alert,Ox3  Musculoskeletal:Normal      MEDICATIONS  (STANDING):  atorvastatin 20 milliGRAM(s) Oral at bedtime  brimonidine 0.2% Ophthalmic Solution 1 Drop(s) Right EYE two times a day  calcium gluconate IVPB 1 Gram(s) IV Intermittent once  carvedilol 12.5 milliGRAM(s) Oral every 12 hours  chlorhexidine 2% Cloths 1 Application(s) Topical <User Schedule>  ciprofloxacin  0.3% Ophthalmic Solution 1 Drop(s) Right EYE two times a day  dextrose 10% Bolus 125 milliLiter(s) IV Bolus once  dextrose 5%. 1000 milliLiter(s) (50 mL/Hr) IV Continuous <Continuous>  dextrose 5%. 1000 milliLiter(s) (100 mL/Hr) IV Continuous <Continuous>  dextrose 50% Injectable 25 Gram(s) IV Push once  dextrose 50% Injectable 12.5 Gram(s) IV Push once  dextrose 50% Injectable 50 milliLiter(s) IV Push once  dorzolamide 2% Ophthalmic Solution 1 Drop(s) Both EYES three times a day  furosemide   Injectable 40 milliGRAM(s) IV Push every 12 hours  glucagon  Injectable 1 milliGRAM(s) IntraMuscular once  hydrALAZINE 50 milliGRAM(s) Oral three times a day  insulin lispro (ADMELOG) corrective regimen sliding scale   SubCutaneous Before meals and at bedtime  insulin lispro Injectable (ADMELOG). 5 Unit(s) SubCutaneous once  ketorolac 0.5% Ophthalmic Solution 1 Drop(s) Right EYE two times a day  minocycline 100 milliGRAM(s) Oral every 12 hours  polyethylene glycol 3350 17 Gram(s) Oral daily  prednisoLONE acetate 1% Suspension 1 Drop(s) Right EYE every 6 hours  senna 2 Tablet(s) Oral at bedtime  sevelamer carbonate 800 milliGRAM(s) Oral three times a day with meals  sodium zirconium cyclosilicate 10 Gram(s) Oral two times a day  sodium zirconium cyclosilicate 10 Gram(s) Oral once  trimethoprim  160 mG/sulfamethoxazole 800 mG 2 Tablet(s) Oral every 24 hours    MEDICATIONS  (PRN):  acetaminophen     Tablet .. 650 milliGRAM(s) Oral every 6 hours PRN Temp greater or equal to 38C (100.4F), Mild Pain (1 - 3)  dextrose Oral Gel 15 Gram(s) Oral once PRN Blood Glucose LESS THAN 70 milliGRAM(s)/deciliter      Allergies    No Known Allergies    Intolerances        LABS:                        12.7   6.52  )-----------( 216      ( 24 Apr 2024 05:50 )             38.3     04-24    134<L>  |  104  |  134<H>  ----------------------------<  106<H>  6.3<HH>   |  19<L>  |  8.12<H>    Ca    9.1      24 Apr 2024 05:50      PT/INR - ( 24 Apr 2024 05:50 )   PT: 14.2 sec;   INR: 1.25 ratio         PTT - ( 24 Apr 2024 05:50 )  PTT:26.7 sec  Urinalysis Basic - ( 24 Apr 2024 05:50 )    Color: x / Appearance: x / SG: x / pH: x  Gluc: 106 mg/dL / Ketone: x  / Bili: x / Urobili: x   Blood: x / Protein: x / Nitrite: x   Leuk Esterase: x / RBC: x / WBC x   Sq Epi: x / Non Sq Epi: x / Bacteria: x            CAPILLARY BLOOD GLUCOSE      POCT Blood Glucose.: 92 mg/dL (24 Apr 2024 08:08)  POCT Blood Glucose.: 130 mg/dL (23 Apr 2024 21:46)  POCT Blood Glucose.: 95 mg/dL (23 Apr 2024 16:46)  POCT Blood Glucose.: 132 mg/dL (23 Apr 2024 11:17)        RADIOLOGY & ADDITIONAL TESTS:    CXR:    Ct scan chest:    ekg;    echo:

## 2024-04-24 NOTE — PROGRESS NOTE ADULT - SUBJECTIVE AND OBJECTIVE BOX
NP Note discussed with  Primary Attending      Patient is a 78y old  Male who presents with a chief complaint of Sepsis (19 Apr 2024 14:30)    OVERNIGHT EVENTS: no acute changes.      MEDICATIONS  (STANDING):  atorvastatin 20 milliGRAM(s) Oral at bedtime  brimonidine 0.2% Ophthalmic Solution 1 Drop(s) Right EYE two times a day  carvedilol 12.5 milliGRAM(s) Oral every 12 hours  chlorhexidine 2% Cloths 1 Application(s) Topical <User Schedule>  ciprofloxacin  0.3% Ophthalmic Solution 1 Drop(s) Right EYE two times a day  dextrose 10% Bolus 125 milliLiter(s) IV Bolus once  dextrose 5%. 1000 milliLiter(s) (100 mL/Hr) IV Continuous <Continuous>  dextrose 5%. 1000 milliLiter(s) (50 mL/Hr) IV Continuous <Continuous>  dextrose 50% Injectable 25 Gram(s) IV Push once  dextrose 50% Injectable 12.5 Gram(s) IV Push once  dorzolamide 2% Ophthalmic Solution 1 Drop(s) Both EYES three times a day  furosemide   Injectable 40 milliGRAM(s) IV Push every 12 hours  glucagon  Injectable 1 milliGRAM(s) IntraMuscular once  hydrALAZINE 50 milliGRAM(s) Oral three times a day  insulin lispro (ADMELOG) corrective regimen sliding scale   SubCutaneous Before meals and at bedtime  ketorolac 0.5% Ophthalmic Solution 1 Drop(s) Right EYE two times a day  minocycline 100 milliGRAM(s) Oral every 12 hours  polyethylene glycol 3350 17 Gram(s) Oral daily  prednisoLONE acetate 1% Suspension 1 Drop(s) Right EYE every 6 hours  senna 2 Tablet(s) Oral at bedtime  sevelamer carbonate 800 milliGRAM(s) Oral three times a day with meals  sodium zirconium cyclosilicate 10 Gram(s) Oral two times a day  trimethoprim  160 mG/sulfamethoxazole 800 mG 2 Tablet(s) Oral every 24 hours    MEDICATIONS  (PRN):  acetaminophen     Tablet .. 650 milliGRAM(s) Oral every 6 hours PRN Temp greater or equal to 38C (100.4F), Mild Pain (1 - 3)  dextrose Oral Gel 15 Gram(s) Oral once PRN Blood Glucose LESS THAN 70 milliGRAM(s)/deciliter      REVIEW OF SYSTEMS:  CONSTITUTIONAL: No fever, chills  ENMT:  No difficulty hearing, no change in vision  NECK: No pain or stiffness  RESPIRATORY: No cough, SOB  CARDIOVASCULAR: No chest pain, palpitations  GASTROINTESTINAL: No abdominal pain. No nausea, vomiting, or diarrhea  GENITOURINARY: No dysuria  NEUROLOGICAL: No HA  SKIN: No itching, burning, rashes, or lesions   LYMPH NODES: No enlarged glands  ENDOCRINE: No heat or cold intolerance; No hair loss  MUSCULOSKELETAL: No joint pain or swelling; No muscle, back, or extremity pain  PSYCHIATRIC: No depression, anxiety  HEME/LYMPH: No easy bruising, or bleeding gums      Vital Signs Last 24 Hrs  T(C): 36.4 (24 Apr 2024 05:46), Max: 36.8 (23 Apr 2024 20:33)  T(F): 97.5 (24 Apr 2024 05:46), Max: 98.2 (23 Apr 2024 20:33)  HR: 56 (24 Apr 2024 06:10) (56 - 61)  BP: 147/67 (24 Apr 2024 06:10) (134/69 - 161/71)  BP(mean): --  RR: 18 (24 Apr 2024 06:10) (18 - 18)  SpO2: 94% (24 Apr 2024 06:10) (94% - 98%)    Parameters below as of 24 Apr 2024 06:10  Patient On (Oxygen Delivery Method): room air          PHYSICAL EXAM:  GENERAL: No acute distress  EYES: clear conjunctiva  ENMT: Moist mucous membranes  NECK: Supple, No JVD, Normal thyroid  CHEST/LUNG:  Clear to auscultation bilaterally; No rales, rhonchi, wheezing.  HEART: S1, S2, Regular rate and rhythm  ABDOMEN: Soft, Nontender, Nondistended; Bowel sounds present  NEURO: Alert & Oriented X3  EXTREMITIES: No LE edema, no calf tenderness  LYMPH: No lymphadenopathy noted  SKIN: No rashes or lesions    Consultant(s) Notes Reviewed:  [x ] YES  [ ] NO  Care Discussed with Consultants/Other Providers [ x] YES  [ ] NO    LABS:                                    12.7   6.52  )-----------( 216      ( 24 Apr 2024 05:50 )             38.3       04-24    134<L>  |  104  |  134<H>  ----------------------------<  106<H>  6.3<HH>   |  19<L>  |  8.12<H>    Ca    9.1      24 Apr 2024 05:50       CAPILLARY BLOOD GLUCOSE  POCT Blood Glucose.: 92 mg/dL (24 Apr 2024 08:08)  POCT Blood Glucose.: 130 mg/dL (23 Apr 2024 21:46)  POCT Blood Glucose.: 95 mg/dL (23 Apr 2024 16:46)  POCT Blood Glucose.: 132 mg/dL (23 Apr 2024 11:17)          Urinalysis Basic - ( 18 Apr 2024 10:00 )    Color: x / Appearance: x / SG: x / pH: x  Gluc: 165 mg/dL / Ketone: x  / Bili: x / Urobili: x   Blood: x / Protein: x / Nitrite: x   Leuk Esterase: x / RBC: x / WBC x   Sq Epi: x / Non Sq Epi: x / Bacteria: x        RADIOLOGY & ADDITIONAL TESTS:  < from: CT Chest No Cont (04.12.24 @ 18:53) >    ACC: [id number] EXAM:  CT ABDOMEN AND PELVIS   ORDERED BY: DEANGELO MOLINA     ACC: [id number] EXAM:  CT CHEST   ORDERED BY: DEANGELO MOLINA     PROCEDURE DATE:  04/12/2024          INTERPRETATION:  CLINICAL INFORMATION: Fever and altered mental statuson   hemodialysis, abdominal pain    COMPARISON: None.    CONTRAST/COMPLICATIONS:  IV Contrast: None  Oral Contrast: None  Complications: None    PROCEDURE:  CT of the Chest, Abdomen and Pelvis was performed.  Sagittal and coronal reformats were performed.    FINDINGS:  CHEST:  LUNGS AND LARGE AIRWAYS: The central airways are patent. Scattered   bilateral small pulmonary nodules measuring up to 7 mm in the right upper   lobe are nonspecific. No acute consolidation.  PLEURA: Trace effusions.  VESSELS: Aortic atherosclerosis without aneurysm. Right IJ dialysis line   tip in the right atrium.  HEART: Mild cardiomegaly. No pericardial effusion. Coronary, mitral   annular, and aortic valve calcification.  MEDIASTINUM AND CHRISTIANO: No adenopathy. 3.1 x2.1 cm cystic structure in the   posterior mediastinum at the level of the leelee may represent a   lymphatic structure, bronchogenic cyst, or foregut duplication cyst.  CHEST WALL AND LOWER NECK: No masses.    ABDOMEN AND PELVIS:  LIVER: Normal.  BILE DUCTS: Nondilated.  GALLBLADDER: Gallstones.  SPLEEN: Normal.  PANCREAS: Normal.  ADRENALS: Normal.  KIDNEYS/URETERS: No hydronephrosis or urinary tract calculi.    BLADDER: Normal.  REPRODUCTIVE ORGANS: Enlarged prostate.    BOWEL: No bowel-related abnormality. No bowel obstruction or bowel   inflammation. Normal appendix and ileocecal region. No evidence of active   colitis or diverticulitis.  PERITONEUM: No free air or ascites. No collection.  VESSELS: Aortoiliac atherosclerosis without aneurysm.  RETROPERITONEUM/LYMPH NODES: No adenopathy or hematoma.  ABDOMINAL WALL: Normal.  BONES: No aggressive lesion.    IMPRESSION:  *  No acute septic pathology.  *  Scattered bilateral small pulmonary nodules measuring up to 7 mm in   the right upper lobe are nonspecific and may represent   infectious/inflammatory disease or metastatic disease.  *  3.1 x 2.1 cm cystic structure in the posterior mediastinum at the   level of the leelee may represent a lymphatic structure, bronchogenic   cyst, orforegut duplication cyst.  *  No intra-abdominal collection.      --- End of Report ---            BAILEE WATETRS MD; Attending Radiologist  This document has been electronically signed. Apr 12 2024  7:53PM        Imaging Personally Reviewed:  [ ] YES  [ ] NO   NP Note discussed with  Primary Attending      Patient is a 78y old  Male who presents with a chief complaint of Sepsis (19 Apr 2024 14:30)    OVERNIGHT EVENTS: no acute changes.      MEDICATIONS  (STANDING):  atorvastatin 20 milliGRAM(s) Oral at bedtime  brimonidine 0.2% Ophthalmic Solution 1 Drop(s) Right EYE two times a day  carvedilol 12.5 milliGRAM(s) Oral every 12 hours  chlorhexidine 2% Cloths 1 Application(s) Topical <User Schedule>  ciprofloxacin  0.3% Ophthalmic Solution 1 Drop(s) Right EYE two times a day  dextrose 10% Bolus 125 milliLiter(s) IV Bolus once  dextrose 5%. 1000 milliLiter(s) (100 mL/Hr) IV Continuous <Continuous>  dextrose 5%. 1000 milliLiter(s) (50 mL/Hr) IV Continuous <Continuous>  dextrose 50% Injectable 25 Gram(s) IV Push once  dextrose 50% Injectable 12.5 Gram(s) IV Push once  dorzolamide 2% Ophthalmic Solution 1 Drop(s) Both EYES three times a day  furosemide   Injectable 40 milliGRAM(s) IV Push every 12 hours  glucagon  Injectable 1 milliGRAM(s) IntraMuscular once  hydrALAZINE 50 milliGRAM(s) Oral three times a day  insulin lispro (ADMELOG) corrective regimen sliding scale   SubCutaneous Before meals and at bedtime  ketorolac 0.5% Ophthalmic Solution 1 Drop(s) Right EYE two times a day  minocycline 100 milliGRAM(s) Oral every 12 hours  polyethylene glycol 3350 17 Gram(s) Oral daily  prednisoLONE acetate 1% Suspension 1 Drop(s) Right EYE every 6 hours  senna 2 Tablet(s) Oral at bedtime  sevelamer carbonate 800 milliGRAM(s) Oral three times a day with meals  sodium zirconium cyclosilicate 10 Gram(s) Oral two times a day  trimethoprim  160 mG/sulfamethoxazole 800 mG 2 Tablet(s) Oral every 24 hours    MEDICATIONS  (PRN):  acetaminophen     Tablet .. 650 milliGRAM(s) Oral every 6 hours PRN Temp greater or equal to 38C (100.4F), Mild Pain (1 - 3)  dextrose Oral Gel 15 Gram(s) Oral once PRN Blood Glucose LESS THAN 70 milliGRAM(s)/deciliter      REVIEW OF SYSTEMS:  CONSTITUTIONAL: No fever, chills  ENMT:  No difficulty hearing, no change in vision  NECK: No pain or stiffness  RESPIRATORY: No cough, SOB  CARDIOVASCULAR: No chest pain, palpitations  GASTROINTESTINAL: No abdominal pain. No nausea, vomiting, or diarrhea  GENITOURINARY: No dysuria  NEUROLOGICAL: No HA  SKIN: No itching, burning, rashes, or lesions   LYMPH NODES: No enlarged glands  ENDOCRINE: No heat or cold intolerance; No hair loss  MUSCULOSKELETAL: No joint pain or swelling; No muscle, back, or extremity pain  PSYCHIATRIC: No depression, anxiety  HEME/LYMPH: No easy bruising, or bleeding gums      Vital Signs Last 24 Hrs  T(C): 36.4 (24 Apr 2024 05:46), Max: 36.8 (23 Apr 2024 20:33)  T(F): 97.5 (24 Apr 2024 05:46), Max: 98.2 (23 Apr 2024 20:33)  HR: 56 (24 Apr 2024 06:10) (56 - 61)  BP: 147/67 (24 Apr 2024 06:10) (134/69 - 161/71)  BP(mean): --  RR: 18 (24 Apr 2024 06:10) (18 - 18)  SpO2: 94% (24 Apr 2024 06:10) (94% - 98%)    Parameters below as of 24 Apr 2024 06:10  Patient On (Oxygen Delivery Method): room air              PHYSICAL EXAM:  GENERAL: No acute distress  EYES: clear conjunctiva  ENMT: Moist mucous membranes  NECK: Supple, No JVD, Normal thyroid  CHEST/LUNG:  Clear to auscultation bilaterally; No rales, wheezing or rhonchi  HEART: S1, S2, Regular rate and rhythm  ABDOMEN: Soft, Nontender, Nondistended; Bowel sounds present  NEURO: Alert & Oriented.  No deficits.  EXTREMITIES: Trace lower extremity edema  LYMPH: No lymphadenopathy noted  SKIN: No rashes or lesions    Consultant(s) Notes Reviewed:  [x ] YES  [ ] NO  Care Discussed with Consultants/Other Providers [ x] YES  [ ] NO    LABS:                                    12.7   6.52  )-----------( 216      ( 24 Apr 2024 05:50 )             38.3       04-24    134<L>  |  104  |  134<H>  ----------------------------<  106<H>  6.3<HH>   |  19<L>  |  8.12<H>    Ca    9.1      24 Apr 2024 05:50       CAPILLARY BLOOD GLUCOSE  POCT Blood Glucose.: 92 mg/dL (24 Apr 2024 08:08)  POCT Blood Glucose.: 130 mg/dL (23 Apr 2024 21:46)  POCT Blood Glucose.: 95 mg/dL (23 Apr 2024 16:46)  POCT Blood Glucose.: 132 mg/dL (23 Apr 2024 11:17)          Urinalysis Basic - ( 18 Apr 2024 10:00 )    Color: x / Appearance: x / SG: x / pH: x  Gluc: 165 mg/dL / Ketone: x  / Bili: x / Urobili: x   Blood: x / Protein: x / Nitrite: x   Leuk Esterase: x / RBC: x / WBC x   Sq Epi: x / Non Sq Epi: x / Bacteria: x        RADIOLOGY & ADDITIONAL TESTS:  < from: CT Chest No Cont (04.12.24 @ 18:53) >    ACC: [id number] EXAM:  CT ABDOMEN AND PELVIS   ORDERED BY: DEANGELO MOLINA     ACC: [id number] EXAM:  CT CHEST   ORDERED BY: DEANGELO MOLINA     PROCEDURE DATE:  04/12/2024          INTERPRETATION:  CLINICAL INFORMATION: Fever and altered mental statuson   hemodialysis, abdominal pain    COMPARISON: None.    CONTRAST/COMPLICATIONS:  IV Contrast: None  Oral Contrast: None  Complications: None    PROCEDURE:  CT of the Chest, Abdomen and Pelvis was performed.  Sagittal and coronal reformats were performed.    FINDINGS:  CHEST:  LUNGS AND LARGE AIRWAYS: The central airways are patent. Scattered   bilateral small pulmonary nodules measuring up to 7 mm in the right upper   lobe are nonspecific. No acute consolidation.  PLEURA: Trace effusions.  VESSELS: Aortic atherosclerosis without aneurysm. Right IJ dialysis line   tip in the right atrium.  HEART: Mild cardiomegaly. No pericardial effusion. Coronary, mitral   annular, and aortic valve calcification.  MEDIASTINUM AND CHRISTIANO: No adenopathy. 3.1 x2.1 cm cystic structure in the   posterior mediastinum at the level of the leelee may represent a   lymphatic structure, bronchogenic cyst, or foregut duplication cyst.  CHEST WALL AND LOWER NECK: No masses.    ABDOMEN AND PELVIS:  LIVER: Normal.  BILE DUCTS: Nondilated.  GALLBLADDER: Gallstones.  SPLEEN: Normal.  PANCREAS: Normal.  ADRENALS: Normal.  KIDNEYS/URETERS: No hydronephrosis or urinary tract calculi.    BLADDER: Normal.  REPRODUCTIVE ORGANS: Enlarged prostate.    BOWEL: No bowel-related abnormality. No bowel obstruction or bowel   inflammation. Normal appendix and ileocecal region. No evidence of active   colitis or diverticulitis.  PERITONEUM: No free air or ascites. No collection.  VESSELS: Aortoiliac atherosclerosis without aneurysm.  RETROPERITONEUM/LYMPH NODES: No adenopathy or hematoma.  ABDOMINAL WALL: Normal.  BONES: No aggressive lesion.    IMPRESSION:  *  No acute septic pathology.  *  Scattered bilateral small pulmonary nodules measuring up to 7 mm in   the right upper lobe are nonspecific and may represent   infectious/inflammatory disease or metastatic disease.  *  3.1 x 2.1 cm cystic structure in the posterior mediastinum at the   level of the leelee may represent a lymphatic structure, bronchogenic   cyst, orforegut duplication cyst.  *  No intra-abdominal collection.      --- End of Report ---            BAILEE WATTERS MD; Attending Radiologist  This document has been electronically signed. Apr 12 2024  7:53PM        Imaging Personally Reviewed:  [ ] YES  [ ] NO

## 2024-04-24 NOTE — PROGRESS NOTE ADULT - SUBJECTIVE AND OBJECTIVE BOX
Patient is seen and examined at the bed side, is afebrile. The events noted regarding elevated potassium. Patient is scheduled  for insertion of a tunneled dialysis catheter in IR today.      REVIEW OF SYSTEMS: All other review systems are negative      ALLERGIES: No Known Allergies      Vital Signs Last 24 Hrs  T(C): 36.2 (24 Apr 2024 14:29), Max: 36.8 (23 Apr 2024 20:33)  T(F): 97.2 (24 Apr 2024 14:29), Max: 98.2 (23 Apr 2024 20:33)  HR: 60 (24 Apr 2024 14:29) (56 - 61)  BP: 160/82 (24 Apr 2024 14:29) (134/69 - 161/71)  BP(mean): --  RR: 18 (24 Apr 2024 14:29) (18 - 18)  SpO2: 99% (24 Apr 2024 14:29) (94% - 99%)    Parameters below as of 24 Apr 2024 14:29  Patient On (Oxygen Delivery Method): room air      PHYSICAL EXAM:  GENERAL: Not in distress   CHEST/LUNG: Not using accessory muscles   HEART: s1 and s2 present  ABDOMEN:  Nontender and  Nondistended  EXTREMITIES: No pedal  edema  CNS: Awake and Alert      LABS:                         12.7   6.52  )-----------( 216      ( 24 Apr 2024 05:50 )             38.3                           10.7   7.72  )-----------( 151      ( 18 Apr 2024 10:00 )             32.1       04-24    135  |  105  |  125<H>  ----------------------------<  134<H>  4.8   |  19<L>  |  8.09<H>    Ca    9.1      24 Apr 2024 11:35      04-23    133<L>  |  104  |  121<H>  ----------------------------<  133<H>  5.8<H>   |  18<L>  |  7.61<H>    Ca    8.8      23 Apr 2024 09:16  Phos  7.9     04-22  Mg     2.7     04-22    TPro  7.9  /  Alb  3.5  /  TBili  0.4  /  DBili  x   /  AST  14  /  ALT  13  /  AlkPhos  150<H>  04-22    PT/INR - ( 13 Apr 2024 05:00 )   PT: 16.3 sec;   INR: 1.45 ratio      PTT - ( 13 Apr 2024 05:00 )  PTT:26.7 sec      CAPILLARY BLOOD GLUCOSE  POCT Blood Glucose.: 186 mg/dL (13 Apr 2024 11:30)  POCT Blood Glucose.: 121 mg/dL (13 Apr 2024 07:37)      < from: URVASHI W or WO Ultrasound Enhancing Agent (04.17.24 @ 07:41) >  CONCLUSIONS:      1. Left ventricular wall thickness is normal. Left ventricular systolic function is normal. There are no regional wall motion abnormalities seen.   2. Normal right ventricular cavity size, with normal wall thickness, and normal systolic function.   3. Normal left and right atrial size.   4. No thrombus seen in the left atrial, left atrial appendage, right atrium..   5. Trace tricuspid regurgitation. Pulmonary artery systolic pressure could not be estimated.   6. Mild mitral regurgitation.   7. Trace pulmonic regurgitation.   8. Agitated saline injection was negative for intracardiac shunt.   9. Left atrial appendage emptying velocites are normal.  10. Normal pulmonary venous flow.  11. Aortic root at the sinuses of Valsalva is normal in size, measuring 3.30 cm (indexed 1.79 cm/m²).  12. Mild aortic regurgitation.  13. No pericardial effusion seen.  14. No vegetations seen on this study.      < from: TTE W or WO Ultrasound Enhancing Agent (04.15.24 @ 12:57) >     CONCLUSIONS:      1. Left ventricular cavity is normal in size. Left ventricular systolic function is normal with an ejection fraction visually estimated at 50 to 55 %. There are no regional wall motion abnormalities seen.   2. There is mild (grade 1) left ventricular diastolic dysfunction.   3. Normal right ventricular cavity size, with normal wall thickness, and normal systolic function. Tricuspid annular plane systolic excursion (TAPSE) is 2.1 cm (normal >=1.7 cm).   4. Normal left and right atrial size.   5. Trace tricuspid regurgitation.   6. Compared to the transthoracic echocardiogram performed on 12/18/2023, there have been no significant interval changes.   7. Estimated pulmonary artery systolic pressure is 20 mmHg, consistent with normal pulmonary artery pressure.   8. Mild aortic regurgitation.   9. Mild pulmonic regurgitation.  10. There is calcification of the mitral valve annulus.  11. No pericardial effusion seen.  12. No vegetations seen on this study,consider URVASHI for further evaluation if clinically indicated.      MEDICATIONS  (STANDING):    atorvastatin 20 milliGRAM(s) Oral at bedtime  brimonidine 0.2% Ophthalmic Solution 1 Drop(s) Right EYE two times a day  carvedilol 12.5 milliGRAM(s) Oral every 12 hours  chlorhexidine 2% Cloths 1 Application(s) Topical <User Schedule>  ciprofloxacin  0.3% Ophthalmic Solution 1 Drop(s) Right EYE two times a day  dorzolamide 2% Ophthalmic Solution 1 Drop(s) Both EYES three times a day  furosemide   Injectable 40 milliGRAM(s) IV Push every 12 hours  glucagon  Injectable 1 milliGRAM(s) IntraMuscular once  hydrALAZINE 50 milliGRAM(s) Oral three times a day  insulin lispro (ADMELOG) corrective regimen sliding scale   SubCutaneous Before meals and at bedtime  ketorolac 0.5% Ophthalmic Solution 1 Drop(s) Right EYE two times a day  minocycline 100 milliGRAM(s) Oral every 12 hours  polyethylene glycol 3350 17 Gram(s) Oral daily  prednisoLONE acetate 1% Suspension 1 Drop(s) Right EYE every 6 hours  senna 2 Tablet(s) Oral at bedtime  sevelamer carbonate 800 milliGRAM(s) Oral three times a day with meals  sodium zirconium cyclosilicate 10 Gram(s) Oral two times a day  trimethoprim  160 mG/sulfamethoxazole 800 mG 2 Tablet(s) Oral every 24 hours    \  RADIOLOGY & ADDITIONAL TESTS:    4/12/24 : CT Abdomen and Pelvis No Cont (04.12.24 @ 18:53) >  *  No acute septic pathology.  *  Scattered bilateral small pulmonary nodules measuring up to 7 mm in the right upper lobe are nonspecific and may represent   infectious/inflammatory disease or metastatic disease.  *  3.1 x 2.1 cm cystic structure in the posterior mediastinum at the level of the leelee may represent a lymphatic structure, bronchogenic cyst, or foregut duplication cyst.  *  No intra-abdominal collection.      4/12/24 : CT Chest No Cont (04.12.24 @ 18:53) LUNGS AND LARGE AIRWAYS: The central airways are patent. Scattered   bilateral small pulmonary nodules measuring up to 7 mm in the right upper  lobe are nonspecific. No acute consolidation.  PLEURA: Trace effusions.      MICROBIOLOGY DATA:    Culture - Blood in AM (04.20.24 @ 06:07)   Specimen Source: .Blood Blood-Peripheral  Culture Results: No growth at 4 days    Culture - Blood in AM (04.20.24 @ 06:05)   Specimen Source: .Blood Blood-Peripheral  Culture Results: No growth at 4 days    Culture - Other (04.13.24 @ 09:39)   - Minocycline: S  - Levofloxacin: S <=0.5  - Trimethoprim/Sulfamethoxazole: S <=0.5/9.5  Specimen Source: Cath Site Catheter Site  Culture Results:   Rare Stenotrophomonas maltophilia  Organism Identification: Stenotrophomonas maltophilia  Organism: Stenotrophomonas maltophilia  Organism: Stenotrophomonas maltophilia  Method Type: KB  Method Type: LAWRENCE    Culture - Blood (04.13.24 @ 17:00)   Specimen Source: .Blood Dialysis Catheter  Culture Results: No growth at 5 days      Culture - Other (04.13.24 @ 09:39)   Specimen Source: Cath Site Catheter Site  Culture Results: No growth to date.      Culture - Blood (04.12.24 @ 17:50)   Gram Stain:   Growth in aerobic bottle: Gram positive cocci in pairs  Specimen Source: .Blood Blood-Peripheral  Culture Results:   Growth in aerobic bottle: Streptococcus salivarius/vestibularis group   Susceptibility to follow.      Culture - Blood (04.12.24 @ 18:00)   Gram Stain:   Growth in anaerobic bottle: Gram positive cocci in pairs  - Streptococcus sp. (Not Grp A, B or S pneumoniae): Detec  Specimen Source: .Blood Blood-Peripheral  Organism: Blood Culture PCR  Culture Results:   Growth in anaerobic bottle: Gram positive cocci in pairs   Direct identification is available within approximately 3-5   hours either by Blood Panel Multiplexed PCR or Direct   MALDI-TOF. Details: [email or web address]  Organism Identification: Blood Culture PCR  Method Type: PCR      Culture - Blood (04.12.24 @ 17:50)   Gram Stain:   Growth in aerobic bottle: Gram positive cocci in pairs  Specimen Source: .Blood Blood-Peripheral  Culture Results:   Growth in aerobic bottle: Gram positive cocci in pairs    Urine Microscopic-Add On (NC) (04.12.24 @ 18:13)   Red Blood Cell - Urine: 3 /HPF  White Blood Cell - Urine: 2 /HPF  Bacteria: Occasional /HPF    Respiratory Viral Panel with COVID-19 by OXANA (04.12.24 @ 17:50)   Rapid RVP Result: NotDetec  SARS-CoV-2: NotDetec:

## 2024-04-24 NOTE — PROGRESS NOTE ADULT - PROBLEM SELECTOR PLAN 1
-   Patient has history of  MSSA infection-  -   Pateint was admitted to [facility] on 11/24/2023 and discharged on 12/08/23.  His permacath was exchanged on Friday 11/25/23. Treated w/ Cefazolin.   -   Presented with Sepsis,  permacath possible source    -   RVP negative  -   UA negative    -   MRSA / MSSA  pcr negative   -   4/12 (+) Blood culture  -   4/13 HD Blood culture negative. 4/13 HD cath site bld cx (+) Stenotrophomonas  -   4/17 Bld cx negative (however this was done before Permacath removed)  -   IR removed right chest permacath on 4/18  -   Quant Tb negative  -   S/p Vancomycin      -   Currently on Ceftriaxone, Minocycline & Bactrim  -   Repeat blood cultures on 4/20 am  -   NGTD  -   ID-Dr. Cui following  -   Plan for replacement Permacath today

## 2024-04-25 LAB
ANION GAP SERPL CALC-SCNC: 10 MMOL/L — SIGNIFICANT CHANGE UP (ref 5–17)
BASE EXCESS BLDV CALC-SCNC: 2.5 MMOL/L — SIGNIFICANT CHANGE UP
BUN SERPL-MCNC: 79 MG/DL — HIGH (ref 7–18)
CALCIUM SERPL-MCNC: 9.4 MG/DL — SIGNIFICANT CHANGE UP (ref 8.4–10.5)
CHLORIDE SERPL-SCNC: 99 MMOL/L — SIGNIFICANT CHANGE UP (ref 96–108)
CO2 SERPL-SCNC: 26 MMOL/L — SIGNIFICANT CHANGE UP (ref 22–31)
CREAT SERPL-MCNC: 6.45 MG/DL — HIGH (ref 0.5–1.3)
CULTURE RESULTS: SIGNIFICANT CHANGE UP
CULTURE RESULTS: SIGNIFICANT CHANGE UP
EGFR: 8 ML/MIN/1.73M2 — LOW
GLUCOSE BLDC GLUCOMTR-MCNC: 105 MG/DL — HIGH (ref 70–99)
GLUCOSE BLDC GLUCOMTR-MCNC: 123 MG/DL — HIGH (ref 70–99)
GLUCOSE BLDC GLUCOMTR-MCNC: 134 MG/DL — HIGH (ref 70–99)
GLUCOSE BLDC GLUCOMTR-MCNC: 182 MG/DL — HIGH (ref 70–99)
GLUCOSE SERPL-MCNC: 145 MG/DL — HIGH (ref 70–99)
HCO3 BLDV-SCNC: 28 MMOL/L — SIGNIFICANT CHANGE UP (ref 22–29)
HCT VFR BLD CALC: 37.8 % — LOW (ref 39–50)
HGB BLD-MCNC: 13 G/DL — SIGNIFICANT CHANGE UP (ref 13–17)
MCHC RBC-ENTMCNC: 32.5 PG — SIGNIFICANT CHANGE UP (ref 27–34)
MCHC RBC-ENTMCNC: 34.4 GM/DL — SIGNIFICANT CHANGE UP (ref 32–36)
MCV RBC AUTO: 94.5 FL — SIGNIFICANT CHANGE UP (ref 80–100)
NRBC # BLD: 0 /100 WBCS — SIGNIFICANT CHANGE UP (ref 0–0)
PCO2 BLDV: 45 MMHG — SIGNIFICANT CHANGE UP (ref 42–55)
PH BLDV: 7.4 — SIGNIFICANT CHANGE UP (ref 7.32–7.43)
PLATELET # BLD AUTO: 177 K/UL — SIGNIFICANT CHANGE UP (ref 150–400)
PO2 BLDV: 34 MMHG — SIGNIFICANT CHANGE UP
POTASSIUM SERPL-MCNC: 4.8 MMOL/L — SIGNIFICANT CHANGE UP (ref 3.5–5.3)
POTASSIUM SERPL-SCNC: 4.8 MMOL/L — SIGNIFICANT CHANGE UP (ref 3.5–5.3)
RBC # BLD: 4 M/UL — LOW (ref 4.2–5.8)
RBC # FLD: 12.8 % — SIGNIFICANT CHANGE UP (ref 10.3–14.5)
SAO2 % BLDV: 63.1 % — SIGNIFICANT CHANGE UP
SODIUM SERPL-SCNC: 135 MMOL/L — SIGNIFICANT CHANGE UP (ref 135–145)
SPECIMEN SOURCE: SIGNIFICANT CHANGE UP
SPECIMEN SOURCE: SIGNIFICANT CHANGE UP
WBC # BLD: 6.54 K/UL — SIGNIFICANT CHANGE UP (ref 3.8–10.5)
WBC # FLD AUTO: 6.54 K/UL — SIGNIFICANT CHANGE UP (ref 3.8–10.5)

## 2024-04-25 RX ORDER — MINOCYCLINE HYDROCHLORIDE 45 MG/1
1 TABLET, EXTENDED RELEASE ORAL
Qty: 14 | Refills: 0
Start: 2024-04-25 | End: 2024-05-01

## 2024-04-25 RX ORDER — POLYETHYLENE GLYCOL 3350 17 G/17G
17 POWDER, FOR SOLUTION ORAL
Qty: 0 | Refills: 0 | DISCHARGE
Start: 2024-04-25

## 2024-04-25 RX ORDER — CEFTRIAXONE 500 MG/1
2 INJECTION, POWDER, FOR SOLUTION INTRAMUSCULAR; INTRAVENOUS
Qty: 28 | Refills: 0
Start: 2024-04-25 | End: 2024-05-08

## 2024-04-25 RX ORDER — CEFTRIAXONE 500 MG/1
2000 INJECTION, POWDER, FOR SOLUTION INTRAMUSCULAR; INTRAVENOUS EVERY 24 HOURS
Refills: 0 | Status: DISCONTINUED | OUTPATIENT
Start: 2024-04-25 | End: 2024-04-26

## 2024-04-25 RX ORDER — ACETAMINOPHEN 500 MG
2 TABLET ORAL
Qty: 0 | Refills: 0 | DISCHARGE
Start: 2024-04-25

## 2024-04-25 RX ORDER — SODIUM CHLORIDE 9 MG/ML
10 INJECTION INTRAMUSCULAR; INTRAVENOUS; SUBCUTANEOUS
Qty: 28 | Refills: 0
Start: 2024-04-25 | End: 2024-05-08

## 2024-04-25 RX ORDER — SODIUM CHLORIDE 9 MG/ML
1000 INJECTION INTRAMUSCULAR; INTRAVENOUS; SUBCUTANEOUS
Refills: 0 | Status: DISCONTINUED | OUTPATIENT
Start: 2024-04-25 | End: 2024-04-26

## 2024-04-25 RX ADMIN — Medication 50 MILLIGRAM(S): at 06:45

## 2024-04-25 RX ADMIN — SODIUM CHLORIDE 100 MILLILITER(S): 9 INJECTION INTRAMUSCULAR; INTRAVENOUS; SUBCUTANEOUS at 14:45

## 2024-04-25 RX ADMIN — Medication 2 TABLET(S): at 18:15

## 2024-04-25 RX ADMIN — Medication 650 MILLIGRAM(S): at 02:54

## 2024-04-25 RX ADMIN — Medication 1 DROP(S): at 18:18

## 2024-04-25 RX ADMIN — Medication 1 DROP(S): at 18:17

## 2024-04-25 RX ADMIN — MINOCYCLINE HYDROCHLORIDE 100 MILLIGRAM(S): 45 TABLET, EXTENDED RELEASE ORAL at 06:45

## 2024-04-25 RX ADMIN — BRIMONIDINE TARTRATE 1 DROP(S): 2 SOLUTION/ DROPS OPHTHALMIC at 06:46

## 2024-04-25 RX ADMIN — Medication 1 DROP(S): at 06:46

## 2024-04-25 RX ADMIN — SEVELAMER CARBONATE 800 MILLIGRAM(S): 2400 POWDER, FOR SUSPENSION ORAL at 18:16

## 2024-04-25 RX ADMIN — Medication 650 MILLIGRAM(S): at 17:10

## 2024-04-25 RX ADMIN — MINOCYCLINE HYDROCHLORIDE 100 MILLIGRAM(S): 45 TABLET, EXTENDED RELEASE ORAL at 18:16

## 2024-04-25 RX ADMIN — CEFTRIAXONE 100 MILLIGRAM(S): 500 INJECTION, POWDER, FOR SOLUTION INTRAMUSCULAR; INTRAVENOUS at 14:57

## 2024-04-25 RX ADMIN — Medication 1 DROP(S): at 06:47

## 2024-04-25 RX ADMIN — SODIUM ZIRCONIUM CYCLOSILICATE 10 GRAM(S): 10 POWDER, FOR SUSPENSION ORAL at 06:53

## 2024-04-25 RX ADMIN — Medication 1 DROP(S): at 02:30

## 2024-04-25 RX ADMIN — Medication 650 MILLIGRAM(S): at 22:00

## 2024-04-25 RX ADMIN — CHLORHEXIDINE GLUCONATE 1 APPLICATION(S): 213 SOLUTION TOPICAL at 06:38

## 2024-04-25 RX ADMIN — DORZOLAMIDE HYDROCHLORIDE 1 DROP(S): 20 SOLUTION/ DROPS OPHTHALMIC at 14:45

## 2024-04-25 RX ADMIN — Medication 50 MILLIGRAM(S): at 14:45

## 2024-04-25 RX ADMIN — CARVEDILOL PHOSPHATE 12.5 MILLIGRAM(S): 80 CAPSULE, EXTENDED RELEASE ORAL at 06:45

## 2024-04-25 RX ADMIN — Medication 650 MILLIGRAM(S): at 22:30

## 2024-04-25 RX ADMIN — Medication 650 MILLIGRAM(S): at 15:30

## 2024-04-25 RX ADMIN — Medication 650 MILLIGRAM(S): at 03:30

## 2024-04-25 RX ADMIN — BRIMONIDINE TARTRATE 1 DROP(S): 2 SOLUTION/ DROPS OPHTHALMIC at 18:18

## 2024-04-25 RX ADMIN — Medication 1 DROP(S): at 18:19

## 2024-04-25 RX ADMIN — Medication 1: at 17:40

## 2024-04-25 RX ADMIN — DORZOLAMIDE HYDROCHLORIDE 1 DROP(S): 20 SOLUTION/ DROPS OPHTHALMIC at 22:31

## 2024-04-25 RX ADMIN — DORZOLAMIDE HYDROCHLORIDE 1 DROP(S): 20 SOLUTION/ DROPS OPHTHALMIC at 06:46

## 2024-04-25 RX ADMIN — ATORVASTATIN CALCIUM 20 MILLIGRAM(S): 80 TABLET, FILM COATED ORAL at 22:30

## 2024-04-25 NOTE — PROGRESS NOTE ADULT - ASSESSMENT
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  1.Strep bactermia,.Abx as per ID.URVASHI no vegetation  2.ESRD-HD as per renal via new PC.  3.HTN-cont bp medication.  4.DM-Insulin.  5.Lipid d/o-statin.  6.Hyperkalemia-lokelma .  7.GI and DVT prophylaxis.

## 2024-04-25 NOTE — PROGRESS NOTE ADULT - PROBLEM SELECTOR PLAN 2
-  HD (T/Th/S )  -  Right chest permacath removed by IR on 4/18   -  S/p HD on 4/18  -  Avoid Nephrotoxic Meds/ Agents such as (NSAIDs, IV contrast, Aminoglycosides such as gentamicin, Gadolinium contrast, Phosphate containing enemas, etc..)  -  Monitor BMP, phos serum   -  Potassium 6.3,  treated with IV agents  -  Repeat BMP pending prior to catheter placement  -  CHG bathing daily  -  Permacath placement 4/24 R EJ   -had HD yesterday 4/24 and again today 4/25   -  Nephro-Dr. Art following,

## 2024-04-25 NOTE — PROGRESS NOTE ADULT - PROBLEM SELECTOR PLAN 1
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 4/13 HD cath site bld cx (+) Stenotrophomonas  -   4/17 Bld cx negative (however this was done before Permacath removed)  -   IR removed right chest permacath on 4/18  -   Quant Tb negative  -   Currently on Ceftriaxone, Minocycline & Bactrim  -   Repeat blood cultures on 4/20 am  -   NGTD  - R EJ permacath placed on 4/24   - will need c/w Ceftriaxone 2g until 5/9   -   ID-Dr. Cui following

## 2024-04-25 NOTE — PROGRESS NOTE ADULT - SUBJECTIVE AND OBJECTIVE BOX
Date of Service 04-25-24 @ 12:31    CHIEF COMPLAINT:Patient is a 78y old  Male who presents with a chief complaint of Sepsis .Pt appears comfortable.    	  REVIEW OF SYSTEMS:  CONSTITUTIONAL: No fever, weight loss, or fatigue  EYES: No eye pain, visual disturbances, or discharge  ENT:  No difficulty hearing, tinnitus, vertigo; No sinus or throat pain  NECK: No pain or stiffness  RESPIRATORY: No cough, wheezing, chills or hemoptysis; No Shortness of Breath  CARDIOVASCULAR: No chest pain, palpitations, passing out, dizziness, or leg swelling  GASTROINTESTINAL: No abdominal or epigastric pain. No nausea, vomiting, or hematemesis; No diarrhea or constipation. No melena or hematochezia.  GENITOURINARY: No dysuria, frequency, hematuria, or incontinence  NEUROLOGICAL: No headaches, memory loss, loss of strength, numbness, or tremors  SKIN: No itching, burning, rashes, or lesions   LYMPH Nodes: No enlarged glands  ENDOCRINE: No heat or cold intolerance; No hair loss  MUSCULOSKELETAL: No joint pain or swelling; No muscle, back, or extremity pain  PSYCHIATRIC: No depression, anxiety, mood swings, or difficulty sleeping  HEME/LYMPH: No easy bruising, or bleeding gums  ALLERGY AND IMMUNOLOGIC: No hives or eczema	      PHYSICAL EXAM:  T(C): 36.7 (04-25-24 @ 09:46), Max: 36.8 (04-25-24 @ 05:02)  HR: 58 (04-25-24 @ 09:46) (58 - 77)  BP: 110/61 (04-25-24 @ 09:46) (103/68 - 170/86)  RR: 16 (04-25-24 @ 09:46) (16 - 18)  SpO2: 98% (04-25-24 @ 09:46) (98% - 100%)  Wt(kg): --  I&O's Summary    24 Apr 2024 07:01  -  25 Apr 2024 07:00  --------------------------------------------------------  IN: 600 mL / OUT: 1100 mL / NET: -500 mL        Appearance: Normal	  HEENT:   Normal oral mucosa, PERRL, EOMI	  Lymphatic: No lymphadenopathy  Cardiovascular: Normal S1 S2, No JVD, No murmurs, No edema  Respiratory: Lungs clear to auscultation	  Psychiatry: A & O x 3, Mood & affect appropriate  Gastrointestinal:  Soft, Non-tender, + BS	  Skin: No rashes, No ecchymoses, No cyanosis	  Neurologic: Non-focal  Extremities: Normal range of motion, No clubbing, cyanosis or edema  Vascular: Peripheral pulses palpable 2+ bilaterally    MEDICATIONS  (STANDING):  atorvastatin 20 milliGRAM(s) Oral at bedtime  brimonidine 0.2% Ophthalmic Solution 1 Drop(s) Right EYE two times a day  carvedilol 12.5 milliGRAM(s) Oral every 12 hours  chlorhexidine 2% Cloths 1 Application(s) Topical daily  chlorhexidine 2% Cloths 1 Application(s) Topical <User Schedule>  ciprofloxacin  0.3% Ophthalmic Solution 1 Drop(s) Right EYE two times a day  dextrose 10% Bolus 125 milliLiter(s) IV Bolus once  dextrose 5%. 1000 milliLiter(s) (100 mL/Hr) IV Continuous <Continuous>  dextrose 5%. 1000 milliLiter(s) (50 mL/Hr) IV Continuous <Continuous>  dextrose 50% Injectable 25 Gram(s) IV Push once  dextrose 50% Injectable 12.5 Gram(s) IV Push once  dorzolamide 2% Ophthalmic Solution 1 Drop(s) Both EYES three times a day  glucagon  Injectable 1 milliGRAM(s) IntraMuscular once  hydrALAZINE 50 milliGRAM(s) Oral three times a day  insulin lispro (ADMELOG) corrective regimen sliding scale   SubCutaneous Before meals and at bedtime  ketorolac 0.5% Ophthalmic Solution 1 Drop(s) Right EYE two times a day  minocycline 100 milliGRAM(s) Oral every 12 hours  polyethylene glycol 3350 17 Gram(s) Oral daily  prednisoLONE acetate 1% Suspension 1 Drop(s) Right EYE every 6 hours  senna 2 Tablet(s) Oral at bedtime  sevelamer carbonate 800 milliGRAM(s) Oral three times a day with meals  sodium chloride 0.9%. 1000 milliLiter(s) (100 mL/Hr) IV Continuous <Continuous>  trimethoprim  160 mG/sulfamethoxazole 800 mG 2 Tablet(s) Oral every 24 hours        	  LABS:	 	                        13.0   6.54  )-----------( 177      ( 25 Apr 2024 07:00 )             37.8     04-25    135  |  99  |  79<H>  ----------------------------<  145<H>  4.8   |  26  |  6.45<H>    Ca    9.4      25 Apr 2024 07:00

## 2024-04-25 NOTE — PROGRESS NOTE ADULT - SUBJECTIVE AND OBJECTIVE BOX
Post PC placement , getting second dialysis today.  BP without fluid removal wsa 90 systolic in dialysis, IV NS started.     PAST MEDICAL & SURGICAL HISTORY:  DM (diabetes mellitus)      HTN (hypertension)      Chronic kidney disease      HLD (hyperlipidemia)      Glaucoma      Gout      No significant past surgical history          No Known Allergies      MEDICATIONS  (STANDING):  atorvastatin 20 milliGRAM(s) Oral at bedtime  brimonidine 0.2% Ophthalmic Solution 1 Drop(s) Right EYE two times a day  carvedilol 12.5 milliGRAM(s) Oral every 12 hours  cefTRIAXone   IVPB 2000 milliGRAM(s) IV Intermittent every 24 hours  chlorhexidine 2% Cloths 1 Application(s) Topical daily  chlorhexidine 2% Cloths 1 Application(s) Topical <User Schedule>  ciprofloxacin  0.3% Ophthalmic Solution 1 Drop(s) Right EYE two times a day  dextrose 10% Bolus 125 milliLiter(s) IV Bolus once  dextrose 5%. 1000 milliLiter(s) (100 mL/Hr) IV Continuous <Continuous>  dextrose 5%. 1000 milliLiter(s) (50 mL/Hr) IV Continuous <Continuous>  dextrose 50% Injectable 25 Gram(s) IV Push once  dextrose 50% Injectable 12.5 Gram(s) IV Push once  dorzolamide 2% Ophthalmic Solution 1 Drop(s) Both EYES three times a day  glucagon  Injectable 1 milliGRAM(s) IntraMuscular once  hydrALAZINE 50 milliGRAM(s) Oral three times a day  insulin lispro (ADMELOG) corrective regimen sliding scale   SubCutaneous Before meals and at bedtime  ketorolac 0.5% Ophthalmic Solution 1 Drop(s) Right EYE two times a day  minocycline 100 milliGRAM(s) Oral every 12 hours  polyethylene glycol 3350 17 Gram(s) Oral daily  prednisoLONE acetate 1% Suspension 1 Drop(s) Right EYE every 6 hours  senna 2 Tablet(s) Oral at bedtime  sevelamer carbonate 800 milliGRAM(s) Oral three times a day with meals  sodium chloride 0.9%. 1000 milliLiter(s) (100 mL/Hr) IV Continuous <Continuous>  trimethoprim  160 mG/sulfamethoxazole 800 mG 2 Tablet(s) Oral every 24 hours    MEDICATIONS  (PRN):  acetaminophen     Tablet .. 650 milliGRAM(s) Oral every 6 hours PRN Temp greater or equal to 38C (100.4F), Mild Pain (1 - 3)  dextrose Oral Gel 15 Gram(s) Oral once PRN Blood Glucose LESS THAN 70 milliGRAM(s)/deciliter                            13.0   6.54  )-----------( 177      ( 25 Apr 2024 07:00 )             37.8     04-25    135  |  99  |  79<H>  ----------------------------<  145<H>  4.8   |  26  |  6.45<H>    Ca    9.4      25 Apr 2024 07:00      PT/INR - ( 24 Apr 2024 05:50 )   PT: 14.2 sec;   INR: 1.25 ratio         PTT - ( 24 Apr 2024 05:50 )  PTT:26.7 sec          REVIEW OF SYSTEMS:  General: no fever no chills, no weight loss.    RESPIRATORY: No cough, wheezing, hemoptysis; No shortness of breath  CARDIOVASCULAR: No chest pain or palpitations. No Edema  GASTROINTESTINAL:  C/O EPIGASTRIC DISCOMFORT AND REDUCED PO INTAKE  GENITOURINARY: No dysuria, frequency, foamy urine, urinary urgency, incontinence or hematuria  NEUROLOGICAL: No numbness or weakness, no tremor , no dizziness.   Muscle skeletal : no joint pain and no swelling of joints and limbs.  SKIN: No itching, burning, rashes.        VITALS:  T(F): 97.5 (04-25-24 @ 14:04), Max: 98.2 (04-25-24 @ 05:02)  HR: 64 (04-25-24 @ 18:13)  BP: 109/64 (04-25-24 @ 18:13)  RR: 18 (04-25-24 @ 18:13)  SpO2: 97% (04-25-24 @ 18:13)  Wt(kg): --    04-24 @ 07:01  -  04-25 @ 07:00  --------------------------------------------------------  IN: 600 mL / OUT: 1100 mL / NET: -500 mL        PHYSICAL EXAM:  Constitutional: well developed, no diaphoresis, no distress.  Neck: No JVD, no carotid bruit, supple, no adenopathy  Respiratory: Good air entrance B/L, no wheezes, rales or rhonchi  Cardiovascular: S1, S2, RRR, no pericardial rub, no murmur  Abdomen: BS+, soft, no tenderness, no bruit  Pelvis: bladder nondistended  Extremities: No cyanosis or clubbing. No peripheral edema.     Vascular Access: Right PC.

## 2024-04-25 NOTE — PROGRESS NOTE ADULT - SUBJECTIVE AND OBJECTIVE BOX
Patient is a 78y old  Male who presents with a chief complaint of Sepsis (24 Apr 2024 17:12)    pt seen in tele [  ], reg med floor [  x ], bed [x  ], chair at bedside [   ], a+o x3 [ x ], lethargic [  ],    nad [ x ]        Allergies    No Known Allergies        Vitals    T(F): 98.2 (04-25-24 @ 05:02), Max: 98.2 (04-25-24 @ 05:02)  HR: 60 (04-25-24 @ 05:02) (59 - 77)  BP: 143/78 (04-25-24 @ 05:02) (103/68 - 170/86)  RR: 18 (04-25-24 @ 05:02) (17 - 18)  SpO2: 100% (04-25-24 @ 05:02) (99% - 100%)  Wt(kg): --  CAPILLARY BLOOD GLUCOSE      POCT Blood Glucose.: 163 mg/dL (24 Apr 2024 21:48)      Labs                          12.7   6.52  )-----------( 216      ( 24 Apr 2024 05:50 )             38.3       04-24    135  |  105  |  125<H>  ----------------------------<  134<H>  4.8   |  19<L>  |  8.09<H>    Ca    9.1      24 Apr 2024 11:35              .Blood Blood-Peripheral  04-20 @ 06:07   No growth at 4 days  --  --      .Blood Blood-Peripheral  04-20 @ 06:05   No growth at 4 days  --  --      .Blood Blood-Peripheral  04-17 @ 10:15   No growth at 5 days  --  --      .Blood Blood-Peripheral  04-17 @ 10:00   No growth at 5 days  --  --      .Blood Dialysis Catheter  04-13 @ 17:00   No growth at 5 days  --  --      Cath Site Catheter Site  04-13 @ 09:39   Rare Stenotrophomonas maltophilia  --  Stenotrophomonas maltophilia      Clean Catch Clean Catch (Midstream)  04-12 @ 18:13   <10,000 CFU/mL Normal Urogenital Kavita  --  --      .Blood Blood-Peripheral  04-12 @ 18:00   Growth in anaerobic bottle: Streptococcus salivarius/vestibularis group  "Susceptibilities not performed"  Direct identification is available within approximately 3-5  hours either by Blood Panel Multiplexed PCR or Direct  MALDI-TOF. Details: [email or web address]  --  Blood Culture PCR      .Blood Blood-Peripheral  04-12 @ 17:50   Growth in aerobic and anaerobic bottles: Streptococcus  salivarius/vestibularis group  --  Streptococcus salivarius/vestibularis group          Radiology Results      Meds    MEDICATIONS  (STANDING):  atorvastatin 20 milliGRAM(s) Oral at bedtime  brimonidine 0.2% Ophthalmic Solution 1 Drop(s) Right EYE two times a day  carvedilol 12.5 milliGRAM(s) Oral every 12 hours  chlorhexidine 2% Cloths 1 Application(s) Topical <User Schedule>  chlorhexidine 2% Cloths 1 Application(s) Topical daily  ciprofloxacin  0.3% Ophthalmic Solution 1 Drop(s) Right EYE two times a day  dextrose 10% Bolus 125 milliLiter(s) IV Bolus once  dextrose 5%. 1000 milliLiter(s) (100 mL/Hr) IV Continuous <Continuous>  dextrose 5%. 1000 milliLiter(s) (50 mL/Hr) IV Continuous <Continuous>  dextrose 50% Injectable 25 Gram(s) IV Push once  dextrose 50% Injectable 12.5 Gram(s) IV Push once  dorzolamide 2% Ophthalmic Solution 1 Drop(s) Both EYES three times a day  glucagon  Injectable 1 milliGRAM(s) IntraMuscular once  hydrALAZINE 50 milliGRAM(s) Oral three times a day  insulin lispro (ADMELOG) corrective regimen sliding scale   SubCutaneous Before meals and at bedtime  ketorolac 0.5% Ophthalmic Solution 1 Drop(s) Right EYE two times a day  minocycline 100 milliGRAM(s) Oral every 12 hours  polyethylene glycol 3350 17 Gram(s) Oral daily  prednisoLONE acetate 1% Suspension 1 Drop(s) Right EYE every 6 hours  senna 2 Tablet(s) Oral at bedtime  sevelamer carbonate 800 milliGRAM(s) Oral three times a day with meals  sodium zirconium cyclosilicate 10 Gram(s) Oral two times a day  trimethoprim  160 mG/sulfamethoxazole 800 mG 2 Tablet(s) Oral every 24 hours      MEDICATIONS  (PRN):  acetaminophen     Tablet .. 650 milliGRAM(s) Oral every 6 hours PRN Temp greater or equal to 38C (100.4F), Mild Pain (1 - 3)  dextrose Oral Gel 15 Gram(s) Oral once PRN Blood Glucose LESS THAN 70 milliGRAM(s)/deciliter      Physical Exam    Neuro :  no focal deficits  Respiratory: CTA B/L  CV: RRR, S1S2, no murmurs,   Abdominal: Soft, NT, ND +BS,  Extremities: No edema, + peripheral pulses      ASSESSMENT      uncontrolled htn,   abnormal trop r/o acs,   poss 2nd to demand ischemia,    abd pain poss 2nd to constipation,    fever poss 2nd to line sepsis   bacteremia   pulmonary nodules,    bronchogenic cyst, or foregut duplication cyst,   hyperkalemia  h/o sinus node dysfunction,   chronic HFpEF,   aortic stenosis,   HTN,   HLD,   b/l carotid stenosis,   DM (not on meds),   ESRD on dialysis (Tue, Thu, Sat),   Gout,    glaucoma,   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  cx and sens fr hd cath site with Stenotrophomonas maltophilia noted above   blood cx cath neg noted above  id f/u    TTE with ejection fraction visually estimated at 50 to 55 %. mild (grade 1) left ventricular diastolic dysfunction.   No vegetations seen on this study, consider URVASHI for further evaluation if clinically indicated noted    URVASHI with No thrombus seen in the left atrial, left atrial appendage, right atrium.   Agitated saline injection was negative for intracardiac shunt.   No vegetations seen on this study noted    s/p ir removal of Perm-a-catheter 4/18/24   cont IV Bactrim and Minocycline to cover Stenotrophomonas  Continue Ceftriaxone 2 g daily to cover Streptococcus   pt will Need at least 4 weeks of Abx, may change Bactrim and Minocycline to oral on discharge   Repeat Blood cultures 4/17/24 neg noted above   Repeat Blood cultures form 4/20/24 neg noted above  procalcitonin 0.6 noted    quantiferon tb gold test neg noted    pulm f/u    Follow up CT in 12 months  Nodify testing as OP.  pt on hd t,th,s   renal f/u   follow 2 gm K diet.   Malfunction catheter max  ml/minute 4/16/24,   IR for permacath placement followed by hd   next HD on 4/24/24   f/u bmp   lokelma 10g x1 stat, calcium 1 gram x 1stat,d50 x1 stat, lispro ins 5 units x1stat, duoneb x1 stat   AVG placement after discharge at [facility].   hgba1c 6.4 noted    lispro ss   cont current meds   d/c plan if stable post hd          Patient is a 78y old  Male who presents with a chief complaint of Sepsis (24 Apr 2024 17:12)    pt seen in tele [  ], reg med floor [  x ], bed [x  ], chair at bedside [   ], a+o x3 [ x ], lethargic [  ],    nad [ x ]        Allergies    No Known Allergies        Vitals    T(F): 98.2 (04-25-24 @ 05:02), Max: 98.2 (04-25-24 @ 05:02)  HR: 60 (04-25-24 @ 05:02) (59 - 77)  BP: 143/78 (04-25-24 @ 05:02) (103/68 - 170/86)  RR: 18 (04-25-24 @ 05:02) (17 - 18)  SpO2: 100% (04-25-24 @ 05:02) (99% - 100%)  Wt(kg): --  CAPILLARY BLOOD GLUCOSE      POCT Blood Glucose.: 163 mg/dL (24 Apr 2024 21:48)      Labs                          12.7   6.52  )-----------( 216      ( 24 Apr 2024 05:50 )             38.3       04-24    135  |  105  |  125<H>  ----------------------------<  134<H>  4.8   |  19<L>  |  8.09<H>    Ca    9.1      24 Apr 2024 11:35              .Blood Blood-Peripheral  04-20 @ 06:07   No growth at 4 days  --  --      .Blood Blood-Peripheral  04-20 @ 06:05   No growth at 4 days  --  --      .Blood Blood-Peripheral  04-17 @ 10:15   No growth at 5 days  --  --      .Blood Blood-Peripheral  04-17 @ 10:00   No growth at 5 days  --  --      .Blood Dialysis Catheter  04-13 @ 17:00   No growth at 5 days  --  --      Cath Site Catheter Site  04-13 @ 09:39   Rare Stenotrophomonas maltophilia  --  Stenotrophomonas maltophilia      Clean Catch Clean Catch (Midstream)  04-12 @ 18:13   <10,000 CFU/mL Normal Urogenital Kavita  --  --      .Blood Blood-Peripheral  04-12 @ 18:00   Growth in anaerobic bottle: Streptococcus salivarius/vestibularis group  "Susceptibilities not performed"  Direct identification is available within approximately 3-5  hours either by Blood Panel Multiplexed PCR or Direct  MALDI-TOF. Details: [email or web address]  --  Blood Culture PCR      .Blood Blood-Peripheral  04-12 @ 17:50   Growth in aerobic and anaerobic bottles: Streptococcus  salivarius/vestibularis group  --  Streptococcus salivarius/vestibularis group          Radiology Results      Meds    MEDICATIONS  (STANDING):  atorvastatin 20 milliGRAM(s) Oral at bedtime  brimonidine 0.2% Ophthalmic Solution 1 Drop(s) Right EYE two times a day  carvedilol 12.5 milliGRAM(s) Oral every 12 hours  chlorhexidine 2% Cloths 1 Application(s) Topical <User Schedule>  chlorhexidine 2% Cloths 1 Application(s) Topical daily  ciprofloxacin  0.3% Ophthalmic Solution 1 Drop(s) Right EYE two times a day  dextrose 10% Bolus 125 milliLiter(s) IV Bolus once  dextrose 5%. 1000 milliLiter(s) (100 mL/Hr) IV Continuous <Continuous>  dextrose 5%. 1000 milliLiter(s) (50 mL/Hr) IV Continuous <Continuous>  dextrose 50% Injectable 25 Gram(s) IV Push once  dextrose 50% Injectable 12.5 Gram(s) IV Push once  dorzolamide 2% Ophthalmic Solution 1 Drop(s) Both EYES three times a day  glucagon  Injectable 1 milliGRAM(s) IntraMuscular once  hydrALAZINE 50 milliGRAM(s) Oral three times a day  insulin lispro (ADMELOG) corrective regimen sliding scale   SubCutaneous Before meals and at bedtime  ketorolac 0.5% Ophthalmic Solution 1 Drop(s) Right EYE two times a day  minocycline 100 milliGRAM(s) Oral every 12 hours  polyethylene glycol 3350 17 Gram(s) Oral daily  prednisoLONE acetate 1% Suspension 1 Drop(s) Right EYE every 6 hours  senna 2 Tablet(s) Oral at bedtime  sevelamer carbonate 800 milliGRAM(s) Oral three times a day with meals  sodium zirconium cyclosilicate 10 Gram(s) Oral two times a day  trimethoprim  160 mG/sulfamethoxazole 800 mG 2 Tablet(s) Oral every 24 hours      MEDICATIONS  (PRN):  acetaminophen     Tablet .. 650 milliGRAM(s) Oral every 6 hours PRN Temp greater or equal to 38C (100.4F), Mild Pain (1 - 3)  dextrose Oral Gel 15 Gram(s) Oral once PRN Blood Glucose LESS THAN 70 milliGRAM(s)/deciliter      Physical Exam    Neuro :  no focal deficits  Respiratory: CTA B/L  CV: RRR, S1S2, no murmurs,   Abdominal: Soft, NT, ND +BS,  Extremities: No edema, + peripheral pulses      ASSESSMENT      uncontrolled htn,   abnormal trop r/o acs,   poss 2nd to demand ischemia,    abd pain poss 2nd to constipation,    fever poss 2nd to line sepsis   bacteremia   pulmonary nodules,    bronchogenic cyst, or foregut duplication cyst,   hyperkalemia  h/o sinus node dysfunction,   chronic HFpEF,   aortic stenosis,   HTN,   HLD,   b/l carotid stenosis,   DM (not on meds),   ESRD on dialysis (Tue, Thu, Sat),   Gout,    glaucoma,   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  cx and sens fr hd cath site with Stenotrophomonas maltophilia noted above   blood cx cath neg noted above  id f/u    TTE with ejection fraction visually estimated at 50 to 55 %. mild (grade 1) left ventricular diastolic dysfunction.   No vegetations seen on this study, consider URVASHI for further evaluation if clinically indicated noted    URVASHI with No thrombus seen in the left atrial, left atrial appendage, right atrium.   Agitated saline injection was negative for intracardiac shunt.   No vegetations seen on this study noted    s/p ir removal of Perm-a-catheter 4/18/24   Continue  Bactrim and Minocycline to cover Stenotrophomonas  Continue Ceftriaxone 2 g daily to cover Streptococcus  Need Antibiotics until 5/2/24  Repeat Blood cultures 4/17/24 neg noted above   Repeat Blood cultures form 4/20/24 neg noted above  procalcitonin 0.6 noted    quantiferon tb gold test neg noted    pulm f/u    Follow up CT in 12 months  Nodify testing as OP.  pt on hd t,th,s   renal f/u   follow 2 gm K diet.   Malfunction catheter max  ml/minute 4/16/24,   s/p IR right permacath placement    s/p HD on 4/24/24   pt to have rept hd today  f/u bmp   s/p lokelma 10g x1 stat, calcium 1 gram x 1stat,d50 x1 stat, lispro ins 5 units x1stat, duoneb x1 stat   AVG placement after discharge at [facility].   hgba1c 6.4 noted    lispro ss   cont current meds   d/c plan if stable post hd

## 2024-04-25 NOTE — PROGRESS NOTE ADULT - PROBLEM SELECTOR PLAN 8
- Pt from home w/ HHA  - f/u blood cultures   - ID recs  - Awaiting IR for Permacath site change (removal and new catheter site placement)
- Pt from home w/ HHA  - Awaiting IR for Permacth site change (removal and new catheter site placement)
-  Patient from home w/ HHA services  -  Next HD on 4/22  -  Needs another permcath to be placed, IR consulted possible 4/22   -  Blood cultures from 4/20  -  NGTD
-  Patient from home w/ HHA services  -  Next HD on 4/25  - will need IV ceftriaxone 5/9, scripts printed and given to cm   -Extended dwell to be placed today   - SW following for reinstatement of hd
- Pt from home w/ HHA  - next HD on 4/22  - needs another permcath to be placed, IR consulted possible 4/22   - f/u blood cultures 4/20
-  Patient from home w/ HHA services  -  Next HD on 4/24  -  Needs another permcath to be placed, IR consulted    Plan for discharge back home once new dialysis catheter is in place and outpatient dialysis is re-instated.
-  Patient from home w/ HHA services  -  Next HD on 4/22  -  Needs another permcath to be placed, IR consulted possible 4/22   -  Blood cultures from 4/20  -  NGTD

## 2024-04-25 NOTE — PROGRESS NOTE ADULT - SUBJECTIVE AND OBJECTIVE BOX
Patient is a 78y old  Male who presents with a chief complaint of Sepsis (25 Apr 2024 06:34)  Awake, alert, comfortable in bed in NAD. S/p PC replacement    INTERVAL HPI/OVERNIGHT EVENTS:      VITAL SIGNS:  T(F): 98.2 (04-25-24 @ 05:02)  HR: 60 (04-25-24 @ 05:02)  BP: 143/78 (04-25-24 @ 05:02)  RR: 18 (04-25-24 @ 05:02)  SpO2: 100% (04-25-24 @ 05:02)  Wt(kg): --  I&O's Detail    24 Apr 2024 07:01  -  25 Apr 2024 07:00  --------------------------------------------------------  IN:    Other (mL): 600 mL  Total IN: 600 mL    OUT:    Other (mL): 1100 mL  Total OUT: 1100 mL    Total NET: -500 mL              REVIEW OF SYSTEMS:    CONSTITUTIONAL:  No fevers, chills, sweats    HEENT:  Eyes:  No diplopia or blurred vision. ENT:  No earache, sore throat or runny nose.    CARDIOVASCULAR:  No pressure, squeezing, tightness, or heaviness about the chest; no palpitations.    RESPIRATORY:  Per HPI    GASTROINTESTINAL:  No abdominal pain, nausea, vomiting or diarrhea.    GENITOURINARY:  No dysuria, frequency or urgency.    NEUROLOGIC:  No paresthesias, fasciculations, seizures or weakness.    PSYCHIATRIC:  No disorder of thought or mood.      PHYSICAL EXAM:    Constitutional: Well developed and nourished  Eyes:Perrla  ENMT: normal  Neck:supple  Respiratory: good air entry  Cardiovascular: S1 S2 regular  Gastrointestinal: Soft, Non tender  Extremities: No edema  Vascular:normal  Neurological:Awake, alert,Ox3  Musculoskeletal:Normal      MEDICATIONS  (STANDING):  atorvastatin 20 milliGRAM(s) Oral at bedtime  brimonidine 0.2% Ophthalmic Solution 1 Drop(s) Right EYE two times a day  carvedilol 12.5 milliGRAM(s) Oral every 12 hours  chlorhexidine 2% Cloths 1 Application(s) Topical daily  chlorhexidine 2% Cloths 1 Application(s) Topical <User Schedule>  ciprofloxacin  0.3% Ophthalmic Solution 1 Drop(s) Right EYE two times a day  dextrose 10% Bolus 125 milliLiter(s) IV Bolus once  dextrose 5%. 1000 milliLiter(s) (50 mL/Hr) IV Continuous <Continuous>  dextrose 5%. 1000 milliLiter(s) (100 mL/Hr) IV Continuous <Continuous>  dextrose 50% Injectable 25 Gram(s) IV Push once  dextrose 50% Injectable 12.5 Gram(s) IV Push once  dorzolamide 2% Ophthalmic Solution 1 Drop(s) Both EYES three times a day  glucagon  Injectable 1 milliGRAM(s) IntraMuscular once  hydrALAZINE 50 milliGRAM(s) Oral three times a day  insulin lispro (ADMELOG) corrective regimen sliding scale   SubCutaneous Before meals and at bedtime  ketorolac 0.5% Ophthalmic Solution 1 Drop(s) Right EYE two times a day  minocycline 100 milliGRAM(s) Oral every 12 hours  polyethylene glycol 3350 17 Gram(s) Oral daily  prednisoLONE acetate 1% Suspension 1 Drop(s) Right EYE every 6 hours  senna 2 Tablet(s) Oral at bedtime  sevelamer carbonate 800 milliGRAM(s) Oral three times a day with meals  trimethoprim  160 mG/sulfamethoxazole 800 mG 2 Tablet(s) Oral every 24 hours    MEDICATIONS  (PRN):  acetaminophen     Tablet .. 650 milliGRAM(s) Oral every 6 hours PRN Temp greater or equal to 38C (100.4F), Mild Pain (1 - 3)  dextrose Oral Gel 15 Gram(s) Oral once PRN Blood Glucose LESS THAN 70 milliGRAM(s)/deciliter      Allergies    No Known Allergies    Intolerances        LABS:                        13.0   6.54  )-----------( 177      ( 25 Apr 2024 07:00 )             37.8     04-25    135  |  99  |  79<H>  ----------------------------<  145<H>  4.8   |  26  |  6.45<H>    Ca    9.4      25 Apr 2024 07:00      PT/INR - ( 24 Apr 2024 05:50 )   PT: 14.2 sec;   INR: 1.25 ratio         PTT - ( 24 Apr 2024 05:50 )  PTT:26.7 sec  Urinalysis Basic - ( 25 Apr 2024 07:00 )    Color: x / Appearance: x / SG: x / pH: x  Gluc: 145 mg/dL / Ketone: x  / Bili: x / Urobili: x   Blood: x / Protein: x / Nitrite: x   Leuk Esterase: x / RBC: x / WBC x   Sq Epi: x / Non Sq Epi: x / Bacteria: x            CAPILLARY BLOOD GLUCOSE      POCT Blood Glucose.: 123 mg/dL (25 Apr 2024 07:41)  POCT Blood Glucose.: 163 mg/dL (24 Apr 2024 21:48)  POCT Blood Glucose.: 100 mg/dL (24 Apr 2024 17:00)  POCT Blood Glucose.: 122 mg/dL (24 Apr 2024 11:50)        RADIOLOGY & ADDITIONAL TESTS:  < from: IR Procedure. (04.24.24 @ 15:36) >  IMPRESSION:  SUCCESSFUL ULTRASOUND AND FLUOROSCOPY GUIDED PLACEMENT OF TUNNELED   DIALYSIS CATHETER VIA RIGHT EXTERNAL JUGULAR VEIN, AS DESCRIBED ABOVE.   TIP OF CATHETER IS AT DISTAL SVC-RIGHT ATRIAL JUNCTION.    < end of copied text >    CXR:    Ct scan chest:    ekg;    echo:

## 2024-04-25 NOTE — PROGRESS NOTE ADULT - PROBLEM SELECTOR PLAN 2
OK, called pt. She said [facility] did not have the [employer] brand after all.     Wants to trry only a 30 day supply through our pharmacy. Sent.    Quantiferon TB Gold test negative  follow up CT chest in 12 months  Nodify testing as OP

## 2024-04-25 NOTE — PROGRESS NOTE ADULT - SUBJECTIVE AND OBJECTIVE BOX
Patient is seen and examined at the bed side, is afebrile. He is s/p placement a tunneled dialysis catheter by IR on 4/24/24, tolerated the procedure well.      REVIEW OF SYSTEMS: All other review systems are negative      ALLERGIES: No Known Allergies      Vital Signs Last 24 Hrs  T(C): 36.4 (25 Apr 2024 14:04), Max: 36.8 (25 Apr 2024 05:02)  T(F): 97.5 (25 Apr 2024 14:04), Max: 98.2 (25 Apr 2024 05:02)  HR: 62 (25 Apr 2024 14:04) (58 - 77)  BP: 138/87 (25 Apr 2024 14:04) (103/68 - 170/86)  BP(mean): --  RR: 18 (25 Apr 2024 14:04) (16 - 18)  SpO2: 100% (25 Apr 2024 14:04) (98% - 100%)    Parameters below as of 25 Apr 2024 14:04  Patient On (Oxygen Delivery Method): room air        PHYSICAL EXAM:  GENERAL: Not in distress   CHEST/LUNG: Not using accessory muscles   HEART: s1 and s2 present  ABDOMEN:  Nontender and  Nondistended  EXTREMITIES: No pedal  edema  CNS: Awake and Alert      LABS:                         13.0   6.54  )-----------( 177      ( 25 Apr 2024 07:00 )             37.8                           12.7   6.52  )-----------( 216      ( 24 Apr 2024 05:50 )             38.3           04-25    135  |  99  |  79<H>  ----------------------------<  145<H>  4.8   |  26  |  6.45<H>    Ca    9.4      25 Apr 2024 07:00      04-24    135  |  105  |  125<H>  ----------------------------<  134<H>  4.8   |  19<L>  |  8.09<H>    Ca    9.1      24 Apr 2024 11:35      TPro  7.9  /  Alb  3.5  /  TBili  0.4  /  DBili  x   /  AST  14  /  ALT  13  /  AlkPhos  150<H>  04-22    PT/INR - ( 13 Apr 2024 05:00 )   PT: 16.3 sec;   INR: 1.45 ratio      PTT - ( 13 Apr 2024 05:00 )  PTT:26.7 sec      CAPILLARY BLOOD GLUCOSE  POCT Blood Glucose.: 186 mg/dL (13 Apr 2024 11:30)  POCT Blood Glucose.: 121 mg/dL (13 Apr 2024 07:37)      < from: URVASHI W or WO Ultrasound Enhancing Agent (04.17.24 @ 07:41) >  CONCLUSIONS:      1. Left ventricular wall thickness is normal. Left ventricular systolic function is normal. There are no regional wall motion abnormalities seen.   2. Normal right ventricular cavity size, with normal wall thickness, and normal systolic function.   3. Normal left and right atrial size.   4. No thrombus seen in the left atrial, left atrial appendage, right atrium..   5. Trace tricuspid regurgitation. Pulmonary artery systolic pressure could not be estimated.   6. Mild mitral regurgitation.   7. Trace pulmonic regurgitation.   8. Agitated saline injection was negative for intracardiac shunt.   9. Left atrial appendage emptying velocites are normal.  10. Normal pulmonary venous flow.  11. Aortic root at the sinuses of Valsalva is normal in size, measuring 3.30 cm (indexed 1.79 cm/m²).  12. Mild aortic regurgitation.  13. No pericardial effusion seen.  14. No vegetations seen on this study.      < from: TTE W or WO Ultrasound Enhancing Agent (04.15.24 @ 12:57) >     CONCLUSIONS:      1. Left ventricular cavity is normal in size. Left ventricular systolic function is normal with an ejection fraction visually estimated at 50 to 55 %. There are no regional wall motion abnormalities seen.   2. There is mild (grade 1) left ventricular diastolic dysfunction.   3. Normal right ventricular cavity size, with normal wall thickness, and normal systolic function. Tricuspid annular plane systolic excursion (TAPSE) is 2.1 cm (normal >=1.7 cm).   4. Normal left and right atrial size.   5. Trace tricuspid regurgitation.   6. Compared to the transthoracic echocardiogram performed on 12/18/2023, there have been no significant interval changes.   7. Estimated pulmonary artery systolic pressure is 20 mmHg, consistent with normal pulmonary artery pressure.   8. Mild aortic regurgitation.   9. Mild pulmonic regurgitation.  10. There is calcification of the mitral valve annulus.  11. No pericardial effusion seen.  12. No vegetations seen on this study,consider URVASHI for further evaluation if clinically indicated.      MEDICATIONS  (STANDING):    atorvastatin 20 milliGRAM(s) Oral at bedtime  brimonidine 0.2% Ophthalmic Solution 1 Drop(s) Right EYE two times a day  carvedilol 12.5 milliGRAM(s) Oral every 12 hours  cefTRIAXone   IVPB 2000 milliGRAM(s) IV Intermittent every 24 hours  chlorhexidine 2% Cloths 1 Application(s) Topical daily  chlorhexidine 2% Cloths 1 Application(s) Topical <User Schedule>  ciprofloxacin  0.3% Ophthalmic Solution 1 Drop(s) Right EYE two times a day  dorzolamide 2% Ophthalmic Solution 1 Drop(s) Both EYES three times a day  glucagon  Injectable 1 milliGRAM(s) IntraMuscular once  hydrALAZINE 50 milliGRAM(s) Oral three times a day  insulin lispro (ADMELOG) corrective regimen sliding scale   SubCutaneous Before meals and at bedtime  ketorolac 0.5% Ophthalmic Solution 1 Drop(s) Right EYE two times a day  minocycline 100 milliGRAM(s) Oral every 12 hours  polyethylene glycol 3350 17 Gram(s) Oral daily  prednisoLONE acetate 1% Suspension 1 Drop(s) Right EYE every 6 hours  senna 2 Tablet(s) Oral at bedtime  sevelamer carbonate 800 milliGRAM(s) Oral three times a day with meals  sodium chloride 0.9%. 1000 milliLiter(s) (100 mL/Hr) IV Continuous <Continuous>  trimethoprim  160 mG/sulfamethoxazole 800 mG 2 Tablet(s) Oral every 24 hours      \  RADIOLOGY & ADDITIONAL TESTS:    4/12/24 : CT Abdomen and Pelvis No Cont (04.12.24 @ 18:53) >  *  No acute septic pathology.  *  Scattered bilateral small pulmonary nodules measuring up to 7 mm in the right upper lobe are nonspecific and may represent   infectious/inflammatory disease or metastatic disease.  *  3.1 x 2.1 cm cystic structure in the posterior mediastinum at the level of the leelee may represent a lymphatic structure, bronchogenic cyst, or foregut duplication cyst.  *  No intra-abdominal collection.      4/12/24 : CT Chest No Cont (04.12.24 @ 18:53) LUNGS AND LARGE AIRWAYS: The central airways are patent. Scattered   bilateral small pulmonary nodules measuring up to 7 mm in the right upper  lobe are nonspecific. No acute consolidation.  PLEURA: Trace effusions.      MICROBIOLOGY DATA:    Culture - Blood in AM (04.20.24 @ 06:07)   Specimen Source: .Blood Blood-Peripheral  Culture Results: No growth at 5 days    Culture - Blood in AM (04.20.24 @ 06:05)   Specimen Source: .Blood Blood-Peripheral  Culture Results: No growth at 5 days    Culture - Other (04.13.24 @ 09:39)   - Minocycline: S  - Levofloxacin: S <=0.5  - Trimethoprim/Sulfamethoxazole: S <=0.5/9.5  Specimen Source: Cath Site Catheter Site  Culture Results:   Rare Stenotrophomonas maltophilia  Organism Identification: Stenotrophomonas maltophilia  Organism: Stenotrophomonas maltophilia  Organism: Stenotrophomonas maltophilia  Method Type: KB  Method Type: LAWRENCE    Culture - Blood (04.13.24 @ 17:00)   Specimen Source: .Blood Dialysis Catheter  Culture Results: No growth at 5 days      Culture - Other (04.13.24 @ 09:39)   Specimen Source: Cath Site Catheter Site  Culture Results: No growth to date.      Culture - Blood (04.12.24 @ 17:50)   Gram Stain:   Growth in aerobic bottle: Gram positive cocci in pairs  Specimen Source: .Blood Blood-Peripheral  Culture Results:   Growth in aerobic bottle: Streptococcus salivarius/vestibularis group   Susceptibility to follow.      Culture - Blood (04.12.24 @ 18:00)   Gram Stain:   Growth in anaerobic bottle: Gram positive cocci in pairs  - Streptococcus sp. (Not Grp A, B or S pneumoniae): Detec  Specimen Source: .Blood Blood-Peripheral  Organism: Blood Culture PCR  Culture Results:   Growth in anaerobic bottle: Gram positive cocci in pairs   Direct identification is available within approximately 3-5   hours either by Blood Panel Multiplexed PCR or Direct   MALDI-TOF. Details: [email or web address]  Organism Identification: Blood Culture PCR  Method Type: PCR      Culture - Blood (04.12.24 @ 17:50)   Gram Stain:   Growth in aerobic bottle: Gram positive cocci in pairs  Specimen Source: .Blood Blood-Peripheral  Culture Results:   Growth in aerobic bottle: Gram positive cocci in pairs    Urine Microscopic-Add On (NC) (04.12.24 @ 18:13)   Red Blood Cell - Urine: 3 /HPF  White Blood Cell - Urine: 2 /HPF  Bacteria: Occasional /HPF    Respiratory Viral Panel with COVID-19 by OXANA (04.12.24 @ 17:50)   Rapid RVP Result: NotDetec  SARS-CoV-2: NotDetec:

## 2024-04-25 NOTE — PROGRESS NOTE ADULT - ASSESSMENT
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 Patient AAOx3 at bedside and mentioned having intermittent, mild, suprapubic discomfort associated w/ dysuria that has been going on for the last 48 hours. Patient was admitted to [facility] on 11/24/2023 and discharge on 12/08/23 where he was treated for sepsis. His perm cath was exchanged on 11/223. Found to have MSSA bacteremia and transitioned from Vancomycin to Cefazolin. 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 Underwent right Permcath placement by IR on 12/4/23. On admission, he found to have fever, tachypnea but negative Urine analysis and negative chest CT. He has started on Cefepime and IV  Vancomycin, and the ID consult requested to assist with further evaluation and antibiotic management.    # Sepsis ( Fever + tachypnea)- Suspected Line sepsis  # Streptococcal Bacteremia- 4/12/24 - in the setting of Perm-a cath - the blood cx from catheter as of 4/13 NGTD- Repeat Blood cx from 4/17 and 4/20 have NGTD  - TTE from 4/15 and URVASHI from 4/17 shows no vegetation  # Catheter site culture grew Stenotrophomonas - s/p removal of Perm-a-cath on 4/18/24    would recommend:    1. OOB to chair  2. Continue  Bactrim and Minocycline to cover Stenotrophomonas until 5/2/24  3. Continue Ceftriaxone 2 g daily to cover Streptococcus until 5/9/24  4. Monitor CBC, BMP, ESR and CRP weekly until 5/9/24    d/w Covering Anthony BENEDICT    Attending Attestation:    Spent more than 35 minutes on total encounter, more than 50 % of the visit was spent counseling and/or coordinating care by the Attending physician.                        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 Patient AAOx3 at bedside and mentioned having intermittent, mild, suprapubic discomfort associated w/ dysuria that has been going on for the last 48 hours. Patient was admitted to [facility] on 11/24/2023 and discharge on 12/08/23 where he was treated for sepsis. His perm cath was exchanged on 11/223. Found to have MSSA bacteremia and transitioned from Vancomycin to Cefazolin. 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 Underwent right Permcath placement by IR on 12/4/23. On admission, he found to have fever, tachypnea but negative Urine analysis and negative chest CT. He has started on Cefepime and IV  Vancomycin, and the ID consult requested to assist with further evaluation and antibiotic management.    # Sepsis ( Fever + tachypnea)- Suspected Line sepsis  # Streptococcal Bacteremia- 4/12/24 - in the setting of Perm-a cath - the blood cx from catheter as of 4/13 NGTD- Repeat Blood cx from 4/17 and 4/20 have NGTD  - TTE from 4/15 and URVASHI from 4/17 shows no vegetation  # Catheter site culture grew Stenotrophomonas - s/p removal of Perm-a-cath on 4/18/24    would recommend:    1. Pain management as needed  2. Continue  Bactrim and Minocycline to cover Stenotrophomonas until 5/2/24  3. Continue Ceftriaxone 2 g daily to cover Streptococcus until 5/9/24  4. Monitor CBC, BMP, ESR and CRP weekly until 5/9/24  5. OOB to chair    d/w Covering NPAnthony    Attending Attestation:    Spent more than 35 minutes on total encounter, more than 50 % of the visit was spent counseling and/or coordinating care by the Attending physician.

## 2024-04-25 NOTE — PROGRESS NOTE ADULT - ASSESSMENT
ESRD - dialysis days are T-T-S. Post PC removal.  Post PC placement and dialysis yesterday and second treatment today  Hypotension, start NS IV Fluids. DC hydralazine. Monitor bP.    Fever with bacteremia Streptococcus sp. (Not Grp A, B or S pneumoniae): Detec  Gram Stain:  catheter site blood cultures  are negative, Exit site culture positive for Culture - Other (04.13.24 @ 09:39)   Removal of PC by IR due to Stenotrophomonas maltophilia positive cultures and non well function catheter.   minocycline: S  - Levofloxacin: S <=0.5  - Trimethoprim/Sulfamethoxazole: S <=0.5/9.5  Specimen Source: Cath Site Catheter Site  Culture Results:   Rare Stenotrophomonas maltophilia

## 2024-04-25 NOTE — PROGRESS NOTE ADULT - ASSESSMENT
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  Presented to the ED due to abdominal pain for the last 24 hours and associated w/ non specific symptoms.  Admitted for further management of Sepsis 2/2 Strep. Bacteremia due to infected permacath. ID. Dr. Cui following. Repeat blood cultures from 4/17 was negative. IR removed right chest permacath on 4/18.   repeat blood culture on 4/20 NGTD. had right ej tunneled catheter placed on 4/24. per ID, will need to continue IV ceftriaxone 2gram qd until 5/9. Extended dwell to be placed. cm following for safe d/c planning.

## 2024-04-25 NOTE — PROGRESS NOTE ADULT - SUBJECTIVE AND OBJECTIVE BOX
[   ] ICU                                          [   ] CCU                                      [  X ] Medical Floor    Patient is a 78 year old male with abdominal pain. GI consulted to evaluate.        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 Patient also c/o constipation but denies nausea, vomiting, hematemesis, hematochezia, melena, fever, chest pain, SOB, cough, hematuria, or diarrhea.      Today patient appears comfortable. No new complaints reported, No abdominal pain, N/V, hematemesis, hematochezia, melena, fever, chills, chest pain, SOB, cough or diarrhea reported.         PAST MEDICAL HISTORY     DM (diabetes mellitus)    HTN (hypertension)     Diabetes mellitus    ESRD on HD     Hyperlipidemia     Glaucoma    Gout        PAST SURGICAL HISTORY    No significant past surgical history        Allergies    No Known Allergies    Intolerances  None       SOCIAL HISTORY  Advanced Directives:       [X  ] Full Code       [  ] DNR  Marital Status:         [  ] M      [X  ] S      [  ] D       [  ] W  Children:       [ X ] Yes      [  ] No  Occupation:        [  ] Employed       [ X ] Unemployed       [  ] Retired  Diet:       [ X ] Regular       [  ] PEG feeding          [  ] NG tube feeding  Drug Use:           [ X ] Patient denied          [  ] Yes  Alcohol:           [X  ] No             [  ] Yes (socially)         [  ] Yes (chronic)  Tobacco:           [  ] Yes           [ X ] No    FAMILY HISTORY  [ X ] Heart Disease            [ X ] Diabetes             [ X ] HTN             [  ] Colon Cancer             [  ] Stomach Cancer              [  ] Pancreatic Cancer        VITALS   Vital Signs Last 24 Hrs  T(C): 36.7 (04-25-24 @ 09:46), Max: 36.8 (04-25-24 @ 05:02)  T(F): 98 (04-25-24 @ 09:46), Max: 98.2 (04-25-24 @ 05:02)  HR: 58 (04-25-24 @ 09:46) (58 - 77)  BP: 110/61 (04-25-24 @ 09:46) (103/68 - 170/86)   RR: 16 (04-25-24 @ 09:46) (16 - 18)  SpO2: 98% (04-25-24 @ 09:46) (98% - 100%)        MEDICATIONS  (STANDING):  atorvastatin 20 milliGRAM(s) Oral at bedtime  brimonidine 0.2% Ophthalmic Solution 1 Drop(s) Right EYE two times a day  carvedilol 12.5 milliGRAM(s) Oral every 12 hours  chlorhexidine 2% Cloths 1 Application(s) Topical daily  chlorhexidine 2% Cloths 1 Application(s) Topical <User Schedule>  ciprofloxacin  0.3% Ophthalmic Solution 1 Drop(s) Right EYE two times a day  dextrose 10% Bolus 125 milliLiter(s) IV Bolus once  dextrose 5%. 1000 milliLiter(s) (100 mL/Hr) IV Continuous <Continuous>  dextrose 5%. 1000 milliLiter(s) (50 mL/Hr) IV Continuous <Continuous>  dextrose 50% Injectable 25 Gram(s) IV Push once  dextrose 50% Injectable 12.5 Gram(s) IV Push once  dorzolamide 2% Ophthalmic Solution 1 Drop(s) Both EYES three times a day  glucagon  Injectable 1 milliGRAM(s) IntraMuscular once  hydrALAZINE 50 milliGRAM(s) Oral three times a day  insulin lispro (ADMELOG) corrective regimen sliding scale   SubCutaneous Before meals and at bedtime  ketorolac 0.5% Ophthalmic Solution 1 Drop(s) Right EYE two times a day  minocycline 100 milliGRAM(s) Oral every 12 hours  polyethylene glycol 3350 17 Gram(s) Oral daily  prednisoLONE acetate 1% Suspension 1 Drop(s) Right EYE every 6 hours  senna 2 Tablet(s) Oral at bedtime  sevelamer carbonate 800 milliGRAM(s) Oral three times a day with meals  trimethoprim  160 mG/sulfamethoxazole 800 mG 2 Tablet(s) Oral every 24 hours    MEDICATIONS  (PRN):  acetaminophen     Tablet .. 650 milliGRAM(s) Oral every 6 hours PRN Temp greater or equal to 38C (100.4F), Mild Pain (1 - 3)  dextrose Oral Gel 15 Gram(s) Oral once PRN Blood Glucose LESS THAN 70 milliGRAM(s)/deciliter                            13.0   6.54  )-----------( 177      ( 25 Apr 2024 07:00 )             37.8       04-25    135  |  99  |  79<H>  ----------------------------<  145<H>  4.8   |  26  |  6.45<H>    Ca    9.4      25 Apr 2024 07:00        PT/INR - ( 24 Apr 2024 05:50 )   PT: 14.2 sec;   INR: 1.25 ratio         PTT - ( 24 Apr 2024 05:50 )  PTT:26.7 sec

## 2024-04-25 NOTE — PROGRESS NOTE ADULT - NUTRITIONAL ASSESSMENT
Diet, NPO after Midnight:      NPO Start Date: 23-Apr-2024,   NPO Start Time: 23:59 (04-23-24 @ 07:36) [Active]  Diet, Regular:   Consistent Carbohydrate {No Snacks}  DASH/TLC {Sodium & Cholesterol Restricted}  1000mL Fluid Restriction ([id number])  For patients receiving Renal Replacement - No Protein Restr, No Conc K, No Conc Phos, Low Sodium (RENAL)  No Concentrated Potassium  Low Sodium  No Concentrated Phosphorus (04-22-24 @ 15:25) [Active]
Diet, NPO after Midnight:      NPO Start Date: 23-Apr-2024,   NPO Start Time: 23:59 (04-23-24 @ 07:36) [Active]  Diet, Regular:   Consistent Carbohydrate {No Snacks}  DASH/TLC {Sodium & Cholesterol Restricted}  1000mL Fluid Restriction ([id number])  For patients receiving Renal Replacement - No Protein Restr, No Conc K, No Conc Phos, Low Sodium (RENAL)  No Concentrated Potassium  Low Sodium  No Concentrated Phosphorus (04-22-24 @ 15:25) [Active]
Diet, MUNDO (04-22-24 @ 07:40) [Active]
Diet, NPO after Midnight:      NPO Start Date: 23-Apr-2024,   NPO Start Time: 23:59 (04-23-24 @ 07:36) [Active]  Diet, Regular:   Consistent Carbohydrate {No Snacks}  DASH/TLC {Sodium & Cholesterol Restricted}  1000mL Fluid Restriction ([id number])  For patients receiving Renal Replacement - No Protein Restr, No Conc K, No Conc Phos, Low Sodium (RENAL)  No Concentrated Potassium  Low Sodium  No Concentrated Phosphorus (04-22-24 @ 15:25) [Active]

## 2024-04-25 NOTE — PROGRESS NOTE ADULT - SUBJECTIVE AND OBJECTIVE BOX
NP Note discussed with  Primary Attending    Patient is a 78y old  Male who presents with a chief complaint of Sepsis (25 Apr 2024 13:47)      INTERVAL HPI/OVERNIGHT EVENTS: no new complaints    MEDICATIONS  (STANDING):  atorvastatin 20 milliGRAM(s) Oral at bedtime  brimonidine 0.2% Ophthalmic Solution 1 Drop(s) Right EYE two times a day  carvedilol 12.5 milliGRAM(s) Oral every 12 hours  cefTRIAXone   IVPB 2000 milliGRAM(s) IV Intermittent every 24 hours  chlorhexidine 2% Cloths 1 Application(s) Topical daily  chlorhexidine 2% Cloths 1 Application(s) Topical <User Schedule>  ciprofloxacin  0.3% Ophthalmic Solution 1 Drop(s) Right EYE two times a day  dextrose 10% Bolus 125 milliLiter(s) IV Bolus once  dextrose 5%. 1000 milliLiter(s) (100 mL/Hr) IV Continuous <Continuous>  dextrose 5%. 1000 milliLiter(s) (50 mL/Hr) IV Continuous <Continuous>  dextrose 50% Injectable 25 Gram(s) IV Push once  dextrose 50% Injectable 12.5 Gram(s) IV Push once  dorzolamide 2% Ophthalmic Solution 1 Drop(s) Both EYES three times a day  glucagon  Injectable 1 milliGRAM(s) IntraMuscular once  hydrALAZINE 50 milliGRAM(s) Oral three times a day  insulin lispro (ADMELOG) corrective regimen sliding scale   SubCutaneous Before meals and at bedtime  ketorolac 0.5% Ophthalmic Solution 1 Drop(s) Right EYE two times a day  minocycline 100 milliGRAM(s) Oral every 12 hours  polyethylene glycol 3350 17 Gram(s) Oral daily  prednisoLONE acetate 1% Suspension 1 Drop(s) Right EYE every 6 hours  senna 2 Tablet(s) Oral at bedtime  sevelamer carbonate 800 milliGRAM(s) Oral three times a day with meals  sodium chloride 0.9%. 1000 milliLiter(s) (100 mL/Hr) IV Continuous <Continuous>  trimethoprim  160 mG/sulfamethoxazole 800 mG 2 Tablet(s) Oral every 24 hours    MEDICATIONS  (PRN):  acetaminophen     Tablet .. 650 milliGRAM(s) Oral every 6 hours PRN Temp greater or equal to 38C (100.4F), Mild Pain (1 - 3)  dextrose Oral Gel 15 Gram(s) Oral once PRN Blood Glucose LESS THAN 70 milliGRAM(s)/deciliter      __________________________________________________  REVIEW OF SYSTEMS:    CONSTITUTIONAL: No fever,   EYES: no acute visual disturbances  NECK: No pain or stiffness  RESPIRATORY: No cough; No shortness of breath  CARDIOVASCULAR: No chest pain, no palpitations  GASTROINTESTINAL: No pain. No nausea or vomiting; No diarrhea   NEUROLOGICAL: No headache or numbness, no tremors  MUSCULOSKELETAL: No joint pain, no muscle pain  GENITOURINARY: no dysuria, no frequency, no hesitancy  PSYCHIATRY: no depression , no anxiety  ALL OTHER  ROS negative        Vital Signs Last 24 Hrs  T(C): 36.4 (25 Apr 2024 14:04), Max: 36.8 (25 Apr 2024 05:02)  T(F): 97.5 (25 Apr 2024 14:04), Max: 98.2 (25 Apr 2024 05:02)  HR: 62 (25 Apr 2024 14:04) (53 - 77)  BP: 138/87 (25 Apr 2024 14:04) (103/68 - 170/86)  BP(mean): --  RR: 18 (25 Apr 2024 14:04) (16 - 18)  SpO2: 100% (25 Apr 2024 14:04) (98% - 100%)    Parameters below as of 25 Apr 2024 14:04  Patient On (Oxygen Delivery Method): room air        ________________________________________________  PHYSICAL EXAM:  GENERAL: NAD.   HEENT: Normocephalic;  conjunctivae and sclerae clear; moist mucous membranes;   NECK : supple. Right EJ tunnelled catheter   CHEST/LUNG: Clear to auscultation bilaterally with good air entry   HEART: S1 S2  regular; no murmurs, gallops or rubs  ABDOMEN: Soft, Nontender, Nondistended; Bowel sounds present  EXTREMITIES: no cyanosis; no edema; no calf tenderness  SKIN: warm and dry; no rash  NERVOUS SYSTEM:  Awake and alert; Oriented  to place, person and time ; no new deficits    _________________________________________________  LABS:                        13.0   6.54  )-----------( 177      ( 25 Apr 2024 07:00 )             37.8     04-25    135  |  99  |  79<H>  ----------------------------<  145<H>  4.8   |  26  |  6.45<H>    Ca    9.4      25 Apr 2024 07:00      PT/INR - ( 24 Apr 2024 05:50 )   PT: 14.2 sec;   INR: 1.25 ratio         PTT - ( 24 Apr 2024 05:50 )  PTT:26.7 sec  Urinalysis Basic - ( 25 Apr 2024 07:00 )    Color: x / Appearance: x / SG: x / pH: x  Gluc: 145 mg/dL / Ketone: x  / Bili: x / Urobili: x   Blood: x / Protein: x / Nitrite: x   Leuk Esterase: x / RBC: x / WBC x   Sq Epi: x / Non Sq Epi: x / Bacteria: x      CAPILLARY BLOOD GLUCOSE      POCT Blood Glucose.: 134 mg/dL (25 Apr 2024 13:58)  POCT Blood Glucose.: 123 mg/dL (25 Apr 2024 07:41)  POCT Blood Glucose.: 163 mg/dL (24 Apr 2024 21:48)  POCT Blood Glucose.: 100 mg/dL (24 Apr 2024 17:00)    RADIOLOGY & ADDITIONAL TESTS:    Imaging  Reviewed:  YES    Consultant(s) Notes Reviewed:   YES      Plan of care was discussed with patient; all questions and concerns were addressed

## 2024-04-26 ENCOUNTER — TRANSCRIPTION ENCOUNTER (OUTPATIENT)
Age: 79
End: 2024-04-26

## 2024-04-26 VITALS
RESPIRATION RATE: 18 BRPM | DIASTOLIC BLOOD PRESSURE: 74 MMHG | TEMPERATURE: 99 F | HEART RATE: 61 BPM | OXYGEN SATURATION: 96 % | SYSTOLIC BLOOD PRESSURE: 142 MMHG

## 2024-04-26 DIAGNOSIS — R10.13 EPIGASTRIC PAIN: ICD-10-CM

## 2024-04-26 LAB
GLUCOSE BLDC GLUCOMTR-MCNC: 102 MG/DL — HIGH (ref 70–99)
GLUCOSE BLDC GLUCOMTR-MCNC: 134 MG/DL — HIGH (ref 70–99)

## 2024-04-26 PROCEDURE — 87640 STAPH A DNA AMP PROBE: CPT

## 2024-04-26 PROCEDURE — 74176 CT ABD & PELVIS W/O CONTRAST: CPT | Mod: MC

## 2024-04-26 PROCEDURE — 83735 ASSAY OF MAGNESIUM: CPT

## 2024-04-26 PROCEDURE — 76937 US GUIDE VASCULAR ACCESS: CPT

## 2024-04-26 PROCEDURE — 96365 THER/PROPH/DIAG IV INF INIT: CPT

## 2024-04-26 PROCEDURE — 85025 COMPLETE CBC W/AUTO DIFF WBC: CPT

## 2024-04-26 PROCEDURE — 36415 COLL VENOUS BLD VENIPUNCTURE: CPT

## 2024-04-26 PROCEDURE — 81001 URINALYSIS AUTO W/SCOPE: CPT

## 2024-04-26 PROCEDURE — C1750: CPT

## 2024-04-26 PROCEDURE — 36558 INSERT TUNNELED CV CATH: CPT

## 2024-04-26 PROCEDURE — 86850 RBC ANTIBODY SCREEN: CPT

## 2024-04-26 PROCEDURE — 85730 THROMBOPLASTIN TIME PARTIAL: CPT

## 2024-04-26 PROCEDURE — 93005 ELECTROCARDIOGRAM TRACING: CPT

## 2024-04-26 PROCEDURE — 86704 HEP B CORE ANTIBODY TOTAL: CPT

## 2024-04-26 PROCEDURE — 36589 REMOVAL TUNNELED CV CATH: CPT

## 2024-04-26 PROCEDURE — 86901 BLOOD TYPING SEROLOGIC RH(D): CPT

## 2024-04-26 PROCEDURE — 82553 CREATINE MB FRACTION: CPT

## 2024-04-26 PROCEDURE — 87641 MR-STAPH DNA AMP PROBE: CPT

## 2024-04-26 PROCEDURE — 96375 TX/PRO/DX INJ NEW DRUG ADDON: CPT

## 2024-04-26 PROCEDURE — C1769: CPT

## 2024-04-26 PROCEDURE — 80048 BASIC METABOLIC PNL TOTAL CA: CPT

## 2024-04-26 PROCEDURE — 93320 DOPPLER ECHO COMPLETE: CPT

## 2024-04-26 PROCEDURE — 87150 DNA/RNA AMPLIFIED PROBE: CPT

## 2024-04-26 PROCEDURE — 99285 EMERGENCY DEPT VISIT HI MDM: CPT | Mod: 25

## 2024-04-26 PROCEDURE — 87086 URINE CULTURE/COLONY COUNT: CPT

## 2024-04-26 PROCEDURE — 87040 BLOOD CULTURE FOR BACTERIA: CPT

## 2024-04-26 PROCEDURE — 77001 FLUOROGUIDE FOR VEIN DEVICE: CPT

## 2024-04-26 PROCEDURE — 96366 THER/PROPH/DIAG IV INF ADDON: CPT

## 2024-04-26 PROCEDURE — 83970 ASSAY OF PARATHORMONE: CPT

## 2024-04-26 PROCEDURE — 71250 CT THORAX DX C-: CPT | Mod: MC

## 2024-04-26 PROCEDURE — 86706 HEP B SURFACE ANTIBODY: CPT

## 2024-04-26 PROCEDURE — 83540 ASSAY OF IRON: CPT

## 2024-04-26 PROCEDURE — 83036 HEMOGLOBIN GLYCOSYLATED A1C: CPT

## 2024-04-26 PROCEDURE — 96367 TX/PROPH/DG ADDL SEQ IV INF: CPT

## 2024-04-26 PROCEDURE — 87340 HEPATITIS B SURFACE AG IA: CPT

## 2024-04-26 PROCEDURE — 86803 HEPATITIS C AB TEST: CPT

## 2024-04-26 PROCEDURE — 93312 ECHO TRANSESOPHAGEAL: CPT

## 2024-04-26 PROCEDURE — 82962 GLUCOSE BLOOD TEST: CPT

## 2024-04-26 PROCEDURE — 71045 X-RAY EXAM CHEST 1 VIEW: CPT

## 2024-04-26 PROCEDURE — 83605 ASSAY OF LACTIC ACID: CPT

## 2024-04-26 PROCEDURE — 80053 COMPREHEN METABOLIC PANEL: CPT

## 2024-04-26 PROCEDURE — 86900 BLOOD TYPING SEROLOGIC ABO: CPT

## 2024-04-26 PROCEDURE — 94640 AIRWAY INHALATION TREATMENT: CPT

## 2024-04-26 PROCEDURE — 82550 ASSAY OF CK (CPK): CPT

## 2024-04-26 PROCEDURE — 87070 CULTURE OTHR SPECIMN AEROBIC: CPT

## 2024-04-26 PROCEDURE — 87184 SC STD DISK METHOD PER PLATE: CPT

## 2024-04-26 PROCEDURE — 85610 PROTHROMBIN TIME: CPT

## 2024-04-26 PROCEDURE — 83690 ASSAY OF LIPASE: CPT

## 2024-04-26 PROCEDURE — 87186 SC STD MICRODIL/AGAR DIL: CPT

## 2024-04-26 PROCEDURE — 85027 COMPLETE CBC AUTOMATED: CPT

## 2024-04-26 PROCEDURE — 84145 PROCALCITONIN (PCT): CPT

## 2024-04-26 PROCEDURE — 87077 CULTURE AEROBIC IDENTIFY: CPT

## 2024-04-26 PROCEDURE — 93306 TTE W/DOPPLER COMPLETE: CPT

## 2024-04-26 PROCEDURE — 83550 IRON BINDING TEST: CPT

## 2024-04-26 PROCEDURE — 82803 BLOOD GASES ANY COMBINATION: CPT

## 2024-04-26 PROCEDURE — 0225U NFCT DS DNA&RNA 21 SARSCOV2: CPT

## 2024-04-26 PROCEDURE — 86480 TB TEST CELL IMMUN MEASURE: CPT

## 2024-04-26 PROCEDURE — 99261: CPT

## 2024-04-26 PROCEDURE — 93325 DOPPLER ECHO COLOR FLOW MAPG: CPT

## 2024-04-26 PROCEDURE — 84484 ASSAY OF TROPONIN QUANT: CPT

## 2024-04-26 PROCEDURE — 80202 ASSAY OF VANCOMYCIN: CPT

## 2024-04-26 PROCEDURE — 82310 ASSAY OF CALCIUM: CPT

## 2024-04-26 PROCEDURE — 84100 ASSAY OF PHOSPHORUS: CPT

## 2024-04-26 RX ORDER — ONDANSETRON 8 MG/1
4 TABLET, FILM COATED ORAL ONCE
Refills: 0 | Status: COMPLETED | OUTPATIENT
Start: 2024-04-26 | End: 2024-04-26

## 2024-04-26 RX ORDER — FAMOTIDINE 10 MG/ML
1 INJECTION INTRAVENOUS
Qty: 0 | Refills: 0 | DISCHARGE
Start: 2024-04-26

## 2024-04-26 RX ORDER — CARVEDILOL PHOSPHATE 80 MG/1
1 CAPSULE, EXTENDED RELEASE ORAL
Qty: 0 | Refills: 0 | DISCHARGE
Start: 2024-04-26

## 2024-04-26 RX ORDER — LOSARTAN POTASSIUM 100 MG/1
1 TABLET, FILM COATED ORAL
Refills: 0 | DISCHARGE

## 2024-04-26 RX ORDER — FAMOTIDINE 10 MG/ML
20 INJECTION INTRAVENOUS DAILY
Refills: 0 | Status: DISCONTINUED | OUTPATIENT
Start: 2024-04-26 | End: 2024-04-26

## 2024-04-26 RX ORDER — SEVELAMER CARBONATE 2400 MG/1
1 POWDER, FOR SUSPENSION ORAL
Qty: 0 | Refills: 0 | DISCHARGE
Start: 2024-04-26

## 2024-04-26 RX ADMIN — ONDANSETRON 4 MILLIGRAM(S): 8 TABLET, FILM COATED ORAL at 09:29

## 2024-04-26 RX ADMIN — FAMOTIDINE 20 MILLIGRAM(S): 10 INJECTION INTRAVENOUS at 09:29

## 2024-04-26 RX ADMIN — SEVELAMER CARBONATE 800 MILLIGRAM(S): 2400 POWDER, FOR SUSPENSION ORAL at 12:02

## 2024-04-26 RX ADMIN — POLYETHYLENE GLYCOL 3350 17 GRAM(S): 17 POWDER, FOR SOLUTION ORAL at 12:03

## 2024-04-26 RX ADMIN — CHLORHEXIDINE GLUCONATE 1 APPLICATION(S): 213 SOLUTION TOPICAL at 05:42

## 2024-04-26 RX ADMIN — Medication 1 DROP(S): at 06:10

## 2024-04-26 RX ADMIN — Medication 1 DROP(S): at 06:11

## 2024-04-26 RX ADMIN — MINOCYCLINE HYDROCHLORIDE 100 MILLIGRAM(S): 45 TABLET, EXTENDED RELEASE ORAL at 06:10

## 2024-04-26 RX ADMIN — BRIMONIDINE TARTRATE 1 DROP(S): 2 SOLUTION/ DROPS OPHTHALMIC at 06:12

## 2024-04-26 RX ADMIN — SEVELAMER CARBONATE 800 MILLIGRAM(S): 2400 POWDER, FOR SUSPENSION ORAL at 08:07

## 2024-04-26 RX ADMIN — DORZOLAMIDE HYDROCHLORIDE 1 DROP(S): 20 SOLUTION/ DROPS OPHTHALMIC at 06:10

## 2024-04-26 RX ADMIN — CARVEDILOL PHOSPHATE 12.5 MILLIGRAM(S): 80 CAPSULE, EXTENDED RELEASE ORAL at 06:10

## 2024-04-26 NOTE — PROGRESS NOTE ADULT - ASSESSMENT
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  1.Strep bactermia,.Abx as per ID.URVASHI no vegetation  2.ESRD-HD as per renal via new PC.  3.HTN-cont bp medication.  4.DM-Insulin.  5.Lipid d/o-statin.  6.Epigastric pain-PPI.  7.GI and DVT prophylaxis.

## 2024-04-26 NOTE — PROGRESS NOTE ADULT - NEGATIVE GENERAL GENITOURINARY SYMPTOMS
no hematuria/no renal colic/no flank pain L/no flank pain R/no incontinence

## 2024-04-26 NOTE — PROGRESS NOTE ADULT - SKIN
warm and dry/no rashes/no ulcers
warm and dry/no rashes
warm and dry/no rashes/no ulcers
warm and dry/no rashes
warm and dry/no rashes
warm and dry/pale/no rashes
warm and dry/no rashes
warm and dry/no rashes/no ulcers
warm and dry/no rashes
warm and dry/no rashes

## 2024-04-26 NOTE — PROGRESS NOTE ADULT - SUBJECTIVE AND OBJECTIVE BOX
Date of Service 04-26-24 @ 13:03    CHIEF COMPLAINT:Patient is a 78y old  Male who presents with a chief complaint of Sepsis.Pt appears comfortable,+ epigastric pain.    	  REVIEW OF SYSTEMS:  CONSTITUTIONAL: No fever, weight loss, or fatigue  EYES: No eye pain, visual disturbances, or discharge  ENT:  No difficulty hearing, tinnitus, vertigo; No sinus or throat pain  NECK: No pain or stiffness  RESPIRATORY: No cough, wheezing, chills or hemoptysis; No Shortness of Breath  CARDIOVASCULAR: No chest pain, palpitations, passing out, dizziness, or leg swelling  GASTROINTESTINAL: No abdominal + epigastric pain. No nausea, vomiting, or hematemesis; No diarrhea or constipation. No melena or hematochezia.  GENITOURINARY: No dysuria, frequency, hematuria, or incontinence  NEUROLOGICAL: No headaches, memory loss, loss of strength, numbness, or tremors  SKIN: No itching, burning, rashes, or lesions   LYMPH Nodes: No enlarged glands  ENDOCRINE: No heat or cold intolerance; No hair loss  MUSCULOSKELETAL: No joint pain or swelling; No muscle, back, or extremity pain  PSYCHIATRIC: No depression, anxiety, mood swings, or difficulty sleeping  HEME/LYMPH: No easy bruising, or bleeding gums  ALLERGY AND IMMUNOLOGIC: No hives or eczema	        PHYSICAL EXAM:  T(C): 37 (04-26-24 @ 04:55), Max: 37.1 (04-25-24 @ 21:06)  HR: 61 (04-26-24 @ 04:55) (61 - 64)  BP: 142/74 (04-26-24 @ 04:55) (109/64 - 142/74)  RR: 18 (04-26-24 @ 04:55) (18 - 18)  SpO2: 96% (04-26-24 @ 04:55) (96% - 100%)  Wt(kg): --  I&O's Summary      Appearance: Normal	  HEENT:   Normal oral mucosa, PERRL, EOMI	  Lymphatic: No lymphadenopathy  Cardiovascular: Normal S1 S2, No JVD, No murmurs, No edema  Respiratory: Lungs clear to auscultation	  Psychiatry: A & O x 3, Mood & affect appropriate  Gastrointestinal:  Soft, Non-tender, + BS	  Skin: No rashes, No ecchymoses, No cyanosis	  Neurologic: Non-focal  Extremities: Normal range of motion, No clubbing, cyanosis or edema  Vascular: Peripheral pulses palpable 2+ bilaterally    MEDICATIONS  (STANDING):  atorvastatin 20 milliGRAM(s) Oral at bedtime  brimonidine 0.2% Ophthalmic Solution 1 Drop(s) Right EYE two times a day  carvedilol 12.5 milliGRAM(s) Oral every 12 hours  cefTRIAXone   IVPB 2000 milliGRAM(s) IV Intermittent every 24 hours  chlorhexidine 2% Cloths 1 Application(s) Topical daily  chlorhexidine 2% Cloths 1 Application(s) Topical <User Schedule>  ciprofloxacin  0.3% Ophthalmic Solution 1 Drop(s) Right EYE two times a day  dextrose 10% Bolus 125 milliLiter(s) IV Bolus once  dextrose 5%. 1000 milliLiter(s) (50 mL/Hr) IV Continuous <Continuous>  dextrose 5%. 1000 milliLiter(s) (100 mL/Hr) IV Continuous <Continuous>  dextrose 50% Injectable 25 Gram(s) IV Push once  dextrose 50% Injectable 12.5 Gram(s) IV Push once  dorzolamide 2% Ophthalmic Solution 1 Drop(s) Both EYES three times a day  famotidine    Tablet 20 milliGRAM(s) Oral daily  glucagon  Injectable 1 milliGRAM(s) IntraMuscular once  insulin lispro (ADMELOG) corrective regimen sliding scale   SubCutaneous Before meals and at bedtime  ketorolac 0.5% Ophthalmic Solution 1 Drop(s) Right EYE two times a day  minocycline 100 milliGRAM(s) Oral every 12 hours  polyethylene glycol 3350 17 Gram(s) Oral daily  senna 2 Tablet(s) Oral at bedtime  sevelamer carbonate 800 milliGRAM(s) Oral three times a day with meals  sodium chloride 0.9%. 1000 milliLiter(s) (100 mL/Hr) IV Continuous <Continuous>  trimethoprim  160 mG/sulfamethoxazole 800 mG 2 Tablet(s) Oral every 24 hours      	  	  LABS:	 	                        13.0   6.54  )-----------( 177      ( 25 Apr 2024 07:00 )             37.8     04-25    135  |  99  |  79<H>  ----------------------------<  145<H>  4.8   |  26  |  6.45<H>    Ca    9.4      25 Apr 2024 07:00

## 2024-04-26 NOTE — PROGRESS NOTE ADULT - TONSILS
no redness/no swelling/no discharge
no redness/no swelling/no discharge
no redness/no swelling
normal/no redness/no swelling/no discharge
no redness/no swelling/no discharge
no redness/no swelling/no discharge
normal/no redness/no swelling/no discharge
no redness/no swelling/no discharge
no redness/no swelling/no discharge
no redness/no swelling
no redness/no swelling/no discharge
no redness/no swelling
no redness/no swelling

## 2024-04-26 NOTE — PROGRESS NOTE ADULT - NEGATIVE NEUROLOGICAL SYMPTOMS
no focal seizures/no syncope/no tremors/no vertigo/no loss of sensation/no headache

## 2024-04-26 NOTE — PROGRESS NOTE ADULT - NEGATIVE GENERAL SYMPTOMS
no fever/no sweating/no polyphagia/no polyuria/no polydipsia

## 2024-04-26 NOTE — DISCHARGE NOTE NURSING/CASE MANAGEMENT/SOCIAL WORK - PATIENT PORTAL LINK FT
You can access the FollowMyHealth Patient Portal offered by [facility] by registering at the following website: [email or web address]. By joining [facility]’s FollowMyHealth portal, you will also be able to view your health information using other applications (apps) compatible with our system.

## 2024-04-26 NOTE — PROGRESS NOTE ADULT - PROBLEM SELECTOR PROBLEM 7
Prophylactic measure
Epigastric pain

## 2024-04-26 NOTE — PROGRESS NOTE ADULT - GEN GEN HX ROS MEA POS PC
chills/anorexia

## 2024-04-26 NOTE — PROGRESS NOTE ADULT - PROBLEM SELECTOR PROBLEM 1
Sepsis
ESRD on dialysis
Sepsis
ESRD on dialysis
Sepsis
ESRD on dialysis
Sepsis
ESRD on dialysis
ESRD on dialysis
Sepsis

## 2024-04-26 NOTE — PROGRESS NOTE ADULT - PROBLEM SELECTOR PROBLEM 6
DM (diabetes mellitus)
Sepsis
Sepsis
DM (diabetes mellitus)
Sepsis
DM (diabetes mellitus)
Sepsis
DM (diabetes mellitus)
Sepsis
DM (diabetes mellitus)
Sepsis
DM (diabetes mellitus)
DM (diabetes mellitus)
Sepsis

## 2024-04-26 NOTE — PROGRESS NOTE ADULT - SUBJECTIVE AND OBJECTIVE BOX
[   ] ICU                                          [   ] CCU                                      [ X  ] Medical Floor    Patient is a 78 year old male with abdominal pain. GI consulted to evaluate.        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 Patient also c/o constipation but denies nausea, vomiting, hematemesis, hematochezia, melena, fever, chest pain, SOB, cough, hematuria, or diarrhea.      Today patient appears comfortable. No new complaints reported, No abdominal pain, N/V, hematemesis, hematochezia, melena, fever, chills, chest pain, SOB, cough or diarrhea reported.         PAST MEDICAL HISTORY     DM (diabetes mellitus)    HTN (hypertension)     Diabetes mellitus    ESRD on HD     Hyperlipidemia     Glaucoma    Gout        PAST SURGICAL HISTORY    No significant past surgical history        Allergies    No Known Allergies    Intolerances  None       SOCIAL HISTORY  Advanced Directives:       [X  ] Full Code       [  ] DNR  Marital Status:         [  ] M      [X  ] S      [  ] D       [  ] W  Children:       [ X ] Yes      [  ] No  Occupation:        [  ] Employed       [ X ] Unemployed       [  ] Retired  Diet:       [ X ] Regular       [  ] PEG feeding          [  ] NG tube feeding  Drug Use:           [ X ] Patient denied          [  ] Yes  Alcohol:           [X  ] No             [  ] Yes (socially)         [  ] Yes (chronic)  Tobacco:           [  ] Yes           [ X ] No    FAMILY HISTORY  [ X ] Heart Disease            [ X ] Diabetes             [ X ] HTN             [  ] Colon Cancer             [  ] Stomach Cancer              [  ] Pancreatic Cancer        VITALS   Vital Signs Last 24 Hrs  T(C): 37 (04-26-24 @ 04:55), Max: 37.1 (04-25-24 @ 21:06)  T(F): 98.6 (04-26-24 @ 04:55), Max: 98.8 (04-25-24 @ 21:06)  HR: 61 (04-26-24 @ 04:55) (61 - 64)  BP: 142/74 (04-26-24 @ 04:55) (109/64 - 142/74)   RR: 18 (04-26-24 @ 04:55) (18 - 18)  SpO2: 96% (04-26-24 @ 04:55) (96% - 100%)        MEDICATIONS  (STANDING):  atorvastatin 20 milliGRAM(s) Oral at bedtime  brimonidine 0.2% Ophthalmic Solution 1 Drop(s) Right EYE two times a day  carvedilol 12.5 milliGRAM(s) Oral every 12 hours  cefTRIAXone   IVPB 2000 milliGRAM(s) IV Intermittent every 24 hours  chlorhexidine 2% Cloths 1 Application(s) Topical daily  chlorhexidine 2% Cloths 1 Application(s) Topical <User Schedule>  ciprofloxacin  0.3% Ophthalmic Solution 1 Drop(s) Right EYE two times a day  dextrose 10% Bolus 125 milliLiter(s) IV Bolus once  dextrose 5%. 1000 milliLiter(s) (100 mL/Hr) IV Continuous <Continuous>  dextrose 5%. 1000 milliLiter(s) (50 mL/Hr) IV Continuous <Continuous>  dextrose 50% Injectable 25 Gram(s) IV Push once  dextrose 50% Injectable 12.5 Gram(s) IV Push once  dorzolamide 2% Ophthalmic Solution 1 Drop(s) Both EYES three times a day  famotidine    Tablet 20 milliGRAM(s) Oral daily  glucagon  Injectable 1 milliGRAM(s) IntraMuscular once  insulin lispro (ADMELOG) corrective regimen sliding scale   SubCutaneous Before meals and at bedtime  ketorolac 0.5% Ophthalmic Solution 1 Drop(s) Right EYE two times a day  minocycline 100 milliGRAM(s) Oral every 12 hours  polyethylene glycol 3350 17 Gram(s) Oral daily  senna 2 Tablet(s) Oral at bedtime  sevelamer carbonate 800 milliGRAM(s) Oral three times a day with meals  sodium chloride 0.9%. 1000 milliLiter(s) (100 mL/Hr) IV Continuous <Continuous>  trimethoprim  160 mG/sulfamethoxazole 800 mG 2 Tablet(s) Oral every 24 hours    MEDICATIONS  (PRN):  acetaminophen     Tablet .. 650 milliGRAM(s) Oral every 6 hours PRN Temp greater or equal to 38C (100.4F), Mild Pain (1 - 3)  dextrose Oral Gel 15 Gram(s) Oral once PRN Blood Glucose LESS THAN 70 milliGRAM(s)/deciliter                            13.0   6.54  )-----------( 177      ( 25 Apr 2024 07:00 )             37.8       04-25    135  |  99  |  79<H>  ----------------------------<  145<H>  4.8   |  26  |  6.45<H>    Ca    9.4      25 Apr 2024 07:00

## 2024-04-26 NOTE — PROGRESS NOTE ADULT - NEGATIVE PSYCHIATRIC SYMPTOMS
[Normal] : no CVA tenderness, no spinal tenderness
no suicidal ideation/no depression/no anxiety/no insomnia/no memory loss/no paranoia/no mood swings/no agitation

## 2024-04-26 NOTE — PROGRESS NOTE ADULT - PHARYNX
no redness/no discharge
normal/no redness/no discharge/no peritonsillar abscess
no redness/no discharge
normal/no redness/no discharge/no peritonsillar abscess
normal/no redness/no discharge/no peritonsillar abscess
no redness/no discharge

## 2024-04-26 NOTE — PROGRESS NOTE ADULT - REASON FOR ADMISSION
Sepsis

## 2024-04-26 NOTE — PROGRESS NOTE ADULT - SUBJECTIVE AND OBJECTIVE BOX
Patient is a 78y old  Male who presents with a chief complaint of Sepsis (26 Apr 2024 06:41)  Awake, alert, comfortable in bed in NAD. Complaining of nausea and epigastric discomfort    INTERVAL HPI/OVERNIGHT EVENTS:      VITAL SIGNS:  T(F): 98.6 (04-26-24 @ 04:55)  HR: 61 (04-26-24 @ 04:55)  BP: 142/74 (04-26-24 @ 04:55)  RR: 18 (04-26-24 @ 04:55)  SpO2: 96% (04-26-24 @ 04:55)  Wt(kg): --  I&O's Detail          REVIEW OF SYSTEMS:    CONSTITUTIONAL:  No fevers, chills, sweats    HEENT:  Eyes:  No diplopia or blurred vision. ENT:  No earache, sore throat or runny nose.    CARDIOVASCULAR:  No pressure, squeezing, tightness, or heaviness about the chest; no palpitations.    RESPIRATORY:  Per HPI    GASTROINTESTINAL:  + abdominal pain,+nausea,  no vomiting or diarrhea.    GENITOURINARY:  No dysuria, frequency or urgency.    NEUROLOGIC:  No paresthesias, fasciculations, seizures or weakness.    PSYCHIATRIC:  No disorder of thought or mood.      PHYSICAL EXAM:    Constitutional: Well developed and nourished  Eyes:Perrla  ENMT: normal  Neck:supple  Respiratory: good air entry  Cardiovascular: S1 S2 regular  Gastrointestinal: Soft, Non tender  Extremities: No edema  Vascular:normal  Neurological:Awake, alert,Ox3  Musculoskeletal:Normal      MEDICATIONS  (STANDING):  atorvastatin 20 milliGRAM(s) Oral at bedtime  brimonidine 0.2% Ophthalmic Solution 1 Drop(s) Right EYE two times a day  carvedilol 12.5 milliGRAM(s) Oral every 12 hours  cefTRIAXone   IVPB 2000 milliGRAM(s) IV Intermittent every 24 hours  chlorhexidine 2% Cloths 1 Application(s) Topical daily  chlorhexidine 2% Cloths 1 Application(s) Topical <User Schedule>  ciprofloxacin  0.3% Ophthalmic Solution 1 Drop(s) Right EYE two times a day  dextrose 10% Bolus 125 milliLiter(s) IV Bolus once  dextrose 5%. 1000 milliLiter(s) (50 mL/Hr) IV Continuous <Continuous>  dextrose 5%. 1000 milliLiter(s) (100 mL/Hr) IV Continuous <Continuous>  dextrose 50% Injectable 12.5 Gram(s) IV Push once  dextrose 50% Injectable 25 Gram(s) IV Push once  dorzolamide 2% Ophthalmic Solution 1 Drop(s) Both EYES three times a day  famotidine    Tablet 20 milliGRAM(s) Oral daily  glucagon  Injectable 1 milliGRAM(s) IntraMuscular once  insulin lispro (ADMELOG) corrective regimen sliding scale   SubCutaneous Before meals and at bedtime  ketorolac 0.5% Ophthalmic Solution 1 Drop(s) Right EYE two times a day  minocycline 100 milliGRAM(s) Oral every 12 hours  polyethylene glycol 3350 17 Gram(s) Oral daily  senna 2 Tablet(s) Oral at bedtime  sevelamer carbonate 800 milliGRAM(s) Oral three times a day with meals  sodium chloride 0.9%. 1000 milliLiter(s) (100 mL/Hr) IV Continuous <Continuous>  trimethoprim  160 mG/sulfamethoxazole 800 mG 2 Tablet(s) Oral every 24 hours    MEDICATIONS  (PRN):  acetaminophen     Tablet .. 650 milliGRAM(s) Oral every 6 hours PRN Temp greater or equal to 38C (100.4F), Mild Pain (1 - 3)  dextrose Oral Gel 15 Gram(s) Oral once PRN Blood Glucose LESS THAN 70 milliGRAM(s)/deciliter      Allergies    No Known Allergies    Intolerances        LABS:                        13.0   6.54  )-----------( 177      ( 25 Apr 2024 07:00 )             37.8     04-25    135  |  99  |  79<H>  ----------------------------<  145<H>  4.8   |  26  |  6.45<H>    Ca    9.4      25 Apr 2024 07:00        Urinalysis Basic - ( 25 Apr 2024 07:00 )    Color: x / Appearance: x / SG: x / pH: x  Gluc: 145 mg/dL / Ketone: x  / Bili: x / Urobili: x   Blood: x / Protein: x / Nitrite: x   Leuk Esterase: x / RBC: x / WBC x   Sq Epi: x / Non Sq Epi: x / Bacteria: x            CAPILLARY BLOOD GLUCOSE      POCT Blood Glucose.: 102 mg/dL (26 Apr 2024 07:56)  POCT Blood Glucose.: 105 mg/dL (25 Apr 2024 21:58)  POCT Blood Glucose.: 182 mg/dL (25 Apr 2024 17:01)  POCT Blood Glucose.: 134 mg/dL (25 Apr 2024 13:58)        RADIOLOGY & ADDITIONAL TESTS:    CXR:    Ct scan chest:    ekg;    echo:

## 2024-04-26 NOTE — PROGRESS NOTE ADULT - ASSESSMENT
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 Patient AAOx3 at bedside and mentioned having intermittent, mild, suprapubic discomfort associated w/ dysuria that has been going on for the last 48 hours. Patient was admitted to [facility] on 11/24/2023 and discharge on 12/08/23 where he was treated for sepsis. His perm cath was exchanged on 11/223. Found to have MSSA bacteremia and transitioned from Vancomycin to Cefazolin. 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 Underwent right Permcath placement by IR on 12/4/23. On admission, he found to have fever, tachypnea but negative Urine analysis and negative chest CT. He has started on Cefepime and IV  Vancomycin, and the ID consult requested to assist with further evaluation and antibiotic management.    # Sepsis ( Fever + tachypnea)- Suspected Line sepsis  # Streptococcal Bacteremia- 4/12/24 - in the setting of Perm-a cath - the blood cx from catheter as of 4/13 NGTD- Repeat Blood cx from 4/17 and 4/20 have NGTD  - TTE from 4/15 and URVASHI from 4/17 shows no vegetation  # Catheter site culture grew Stenotrophomonas - s/p removal of Perm-a-cath on 4/18/24    would recommend:    1. OOB  to chair   2. Continue  Bactrim and Minocycline to cover Stenotrophomonas until 5/2/24  3. Continue Ceftriaxone 2 g daily to cover Streptococcus until 5/9/24  4. Monitor CBC, BMP, ESR and CRP weekly until 5/9/24    d/w Covering Anthony BENEDICT    Attending Attestation:    Spent more than 35 minutes on total encounter, more than 50 % of the visit was spent counseling and/or coordinating care by the Attending physician.

## 2024-04-26 NOTE — PROGRESS NOTE ADULT - NEGATIVE GASTROINTESTINAL SYMPTOMS
no nausea/no vomiting/no diarrhea/no melena/no hematochezia/no steatorrhea/no jaundice/no hiccoughs

## 2024-04-26 NOTE — PROGRESS NOTE ADULT - CONSTITUTIONAL
no distress/cachectic

## 2024-04-26 NOTE — PROGRESS NOTE ADULT - NECK
normal/supple/symmetric/no tracheal deviation/no thyromegaly/no palpable masses
supple/symmetric/no tracheal deviation
normal/supple/symmetric/no tracheal deviation/no thyromegaly/no palpable masses
supple/symmetric/no tracheal deviation

## 2024-04-26 NOTE — PROGRESS NOTE ADULT - PROBLEM SELECTOR PLAN 6
- A1c=9.2, poorly controlled   - C/w HSS
cultures noted  antibiotics  ID follow up  Echo showed no vegetations  PC replaced
cultures noted  antibiotics  ID follow up  Echo showed no vegetations  PC removed
check pending cultures  antibiotics  ID follow up  Will need PC exchange by IR  Echo showed no vegetations
cultures noted  antibiotics  ID follow up  Echo showed no vegetations  PC replaced
-  HgbA1c 6.4 on admission  -  Finger sticks AC and HS  -  Sliding scale as ordered  -  Consistent CHO diet
cultures noted  antibiotics  ID follow up  Will need PC exchange by IR  Echo showed no vegetations
cultures noted  antibiotics  ID follow up  Echo showed no vegetations  PC removed
-  HgbA1c 6.4 on admission  -  Finger sticks AC and HS  -  Sliding scale as ordered  -  Consistent CHO diet
cultures noted  antibiotics  ID follow up  Echo showed no vegetations  PC removed
-  HgbA1c 6.4 on admission  -  Finger sticks AC and HS  -  Sliding scale as ordered  -  Consistent CHO diet
cultures noted  antibiotics  ID follow up  Echo showed no vegetations  PC removed
cultures noted  antibiotics  ID follow up  Echo showed no vegetations  PC removed
- A1c=9.2, poorly controlled   - C/w HSS
cultures noted  antibiotics  ID follow up  Echo showed no vegetations  PC removed
-  HgbA1c 6.4 on admission  -  Finger sticks AC and HS  -  Sliding scale as ordered  -  Consistent CHO diet
- a1c 6.4%  - C/w ISS tid before meals and at bedtime  - glucose controlled
- A1c=9.2, poorly controlled   - C/w HSS
- a1c 6.4%  - C/w ISS tid before meals and at bedtime  - glucose controlled

## 2024-04-26 NOTE — PROGRESS NOTE ADULT - PROBLEM/PLAN-5
DISPLAY PLAN FREE TEXT
Private car

## 2024-04-26 NOTE — PROGRESS NOTE ADULT - GIT GEN HX ROS MEA POS PC
constipation/abdominal pain

## 2024-04-26 NOTE — PROGRESS NOTE ADULT - PROBLEM SELECTOR PROBLEM 2
Lung nodules
ESRD on dialysis
Elevated troponin level
Lung nodules
Lung nodules
Elevated troponin level
ESRD on dialysis
Lung nodules
ESRD on dialysis
Lung nodules
ESRD on dialysis
Lung nodules
Lung nodules
Elevated troponin level
Elevated troponin level
ESRD on dialysis

## 2024-04-26 NOTE — PROGRESS NOTE ADULT - EYES
PERRL/EOMI/non icteric sclera
PERRL/EOMI/conjunctiva clear/non icteric sclera

## 2024-04-26 NOTE — PROGRESS NOTE ADULT - NOSE
no discharge/no deviation

## 2024-04-26 NOTE — PROGRESS NOTE ADULT - SUBJECTIVE AND OBJECTIVE BOX
Patient is a 78y old  Male who presents with a chief complaint of Sepsis (25 Apr 2024 18:14)    pt seen in tele [  ], reg med floor [  x ], bed [x  ], chair at bedside [   ], a+o x3 [ x ], lethargic [  ],    nad [ x ]      Allergies    No Known Allergies        Vitals    T(F): 98.6 (04-26-24 @ 04:55), Max: 98.8 (04-25-24 @ 21:06)  HR: 61 (04-26-24 @ 04:55) (53 - 64)  BP: 142/74 (04-26-24 @ 04:55) (109/64 - 142/74)  RR: 18 (04-26-24 @ 04:55) (16 - 18)  SpO2: 96% (04-26-24 @ 04:55) (96% - 100%)  Wt(kg): --  CAPILLARY BLOOD GLUCOSE      POCT Blood Glucose.: 105 mg/dL (25 Apr 2024 21:58)      Labs                          13.0   6.54  )-----------( 177      ( 25 Apr 2024 07:00 )             37.8       04-25    135  |  99  |  79<H>  ----------------------------<  145<H>  4.8   |  26  |  6.45<H>    Ca    9.4      25 Apr 2024 07:00              .Blood Blood-Peripheral  04-20 @ 06:07   No growth at 5 days  --  --      .Blood Blood-Peripheral  04-20 @ 06:05   No growth at 5 days  --  --      .Blood Blood-Peripheral  04-17 @ 10:15   No growth at 5 days  --  --      .Blood Blood-Peripheral  04-17 @ 10:00   No growth at 5 days  --  --      .Blood Dialysis Catheter  04-13 @ 17:00   No growth at 5 days  --  --      Cath Site Catheter Site  04-13 @ 09:39   Rare Stenotrophomonas maltophilia  --  Stenotrophomonas maltophilia      Clean Catch Clean Catch (Midstream)  04-12 @ 18:13   <10,000 CFU/mL Normal Urogenital Kavita  --  --      .Blood Blood-Peripheral  04-12 @ 18:00   Growth in anaerobic bottle: Streptococcus salivarius/vestibularis group  "Susceptibilities not performed"  Direct identification is available within approximately 3-5  hours either by Blood Panel Multiplexed PCR or Direct  MALDI-TOF. Details: [email or web address]  --  Blood Culture PCR      .Blood Blood-Peripheral  04-12 @ 17:50   Growth in aerobic and anaerobic bottles: Streptococcus  salivarius/vestibularis group  --  Streptococcus salivarius/vestibularis group          Radiology Results      Meds    MEDICATIONS  (STANDING):  atorvastatin 20 milliGRAM(s) Oral at bedtime  brimonidine 0.2% Ophthalmic Solution 1 Drop(s) Right EYE two times a day  carvedilol 12.5 milliGRAM(s) Oral every 12 hours  cefTRIAXone   IVPB 2000 milliGRAM(s) IV Intermittent every 24 hours  chlorhexidine 2% Cloths 1 Application(s) Topical <User Schedule>  chlorhexidine 2% Cloths 1 Application(s) Topical daily  ciprofloxacin  0.3% Ophthalmic Solution 1 Drop(s) Right EYE two times a day  dextrose 10% Bolus 125 milliLiter(s) IV Bolus once  dextrose 5%. 1000 milliLiter(s) (50 mL/Hr) IV Continuous <Continuous>  dextrose 5%. 1000 milliLiter(s) (100 mL/Hr) IV Continuous <Continuous>  dextrose 50% Injectable 25 Gram(s) IV Push once  dextrose 50% Injectable 12.5 Gram(s) IV Push once  dorzolamide 2% Ophthalmic Solution 1 Drop(s) Both EYES three times a day  glucagon  Injectable 1 milliGRAM(s) IntraMuscular once  insulin lispro (ADMELOG) corrective regimen sliding scale   SubCutaneous Before meals and at bedtime  ketorolac 0.5% Ophthalmic Solution 1 Drop(s) Right EYE two times a day  minocycline 100 milliGRAM(s) Oral every 12 hours  polyethylene glycol 3350 17 Gram(s) Oral daily  senna 2 Tablet(s) Oral at bedtime  sevelamer carbonate 800 milliGRAM(s) Oral three times a day with meals  sodium chloride 0.9%. 1000 milliLiter(s) (100 mL/Hr) IV Continuous <Continuous>  trimethoprim  160 mG/sulfamethoxazole 800 mG 2 Tablet(s) Oral every 24 hours      MEDICATIONS  (PRN):  acetaminophen     Tablet .. 650 milliGRAM(s) Oral every 6 hours PRN Temp greater or equal to 38C (100.4F), Mild Pain (1 - 3)  dextrose Oral Gel 15 Gram(s) Oral once PRN Blood Glucose LESS THAN 70 milliGRAM(s)/deciliter      Physical Exam    Neuro :  no focal deficits  Respiratory: CTA B/L  CV: RRR, S1S2, no murmurs,   Abdominal: Soft, NT, ND +BS,  Extremities: No edema, + peripheral pulses      ASSESSMENT      uncontrolled htn,   abnormal trop r/o acs,   poss 2nd to demand ischemia,    abd pain poss 2nd to constipation,    fever poss 2nd to line sepsis   bacteremia   pulmonary nodules,    bronchogenic cyst, or foregut duplication cyst,   hyperkalemia  h/o sinus node dysfunction,   chronic HFpEF,   aortic stenosis,   HTN,   HLD,   b/l carotid stenosis,   DM (not on meds),   ESRD on dialysis (Tue, Thu, Sat),   Gout,    glaucoma,   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  cx and sens fr hd cath site with Stenotrophomonas maltophilia noted above   blood cx cath neg noted above  id f/u    TTE with ejection fraction visually estimated at 50 to 55 %. mild (grade 1) left ventricular diastolic dysfunction.   No vegetations seen on this study, consider URVASHI for further evaluation if clinically indicated noted    URVASHI with No thrombus seen in the left atrial, left atrial appendage, right atrium.   Agitated saline injection was negative for intracardiac shunt.   No vegetations seen on this study noted    s/p ir removal of Perm-a-catheter 4/18/24   Continue  Bactrim and Minocycline to cover Stenotrophomonas  Continue Ceftriaxone 2 g daily to cover Streptococcus  Need Antibiotics until 5/2/24  Repeat Blood cultures 4/17/24 neg noted above   Repeat Blood cultures form 4/20/24 neg noted above  procalcitonin 0.6 noted    quantiferon tb gold test neg noted    pulm f/u    Follow up CT in 12 months  Nodify testing as OP.  pt on hd t,th,s   renal f/u   follow 2 gm K diet.   Malfunction catheter max  ml/minute 4/16/24,   s/p IR right permacath placement    s/p HD on 4/24/24   pt to have rept hd today  f/u bmp   s/p lokelma 10g x1 stat, calcium 1 gram x 1stat,d50 x1 stat, lispro ins 5 units x1stat, duoneb x1 stat   AVG placement after discharge at [facility].   hgba1c 6.4 noted    lispro ss   cont current meds   d/c plan if stable post hd          Patient is a 78y old  Male who presents with a chief complaint of Sepsis (25 Apr 2024 18:14)    pt seen in tele [  ], reg med floor [  x ], bed [x  ], chair at bedside [   ], a+o x3 [ x ], lethargic [  ],    nad [ x ]      Allergies    No Known Allergies        Vitals    T(F): 98.6 (04-26-24 @ 04:55), Max: 98.8 (04-25-24 @ 21:06)  HR: 61 (04-26-24 @ 04:55) (53 - 64)  BP: 142/74 (04-26-24 @ 04:55) (109/64 - 142/74)  RR: 18 (04-26-24 @ 04:55) (16 - 18)  SpO2: 96% (04-26-24 @ 04:55) (96% - 100%)  Wt(kg): --  CAPILLARY BLOOD GLUCOSE      POCT Blood Glucose.: 105 mg/dL (25 Apr 2024 21:58)      Labs                          13.0   6.54  )-----------( 177      ( 25 Apr 2024 07:00 )             37.8       04-25    135  |  99  |  79<H>  ----------------------------<  145<H>  4.8   |  26  |  6.45<H>    Ca    9.4      25 Apr 2024 07:00              .Blood Blood-Peripheral  04-20 @ 06:07   No growth at 5 days  --  --      .Blood Blood-Peripheral  04-20 @ 06:05   No growth at 5 days  --  --      .Blood Blood-Peripheral  04-17 @ 10:15   No growth at 5 days  --  --      .Blood Blood-Peripheral  04-17 @ 10:00   No growth at 5 days  --  --      .Blood Dialysis Catheter  04-13 @ 17:00   No growth at 5 days  --  --      Cath Site Catheter Site  04-13 @ 09:39   Rare Stenotrophomonas maltophilia  --  Stenotrophomonas maltophilia      Clean Catch Clean Catch (Midstream)  04-12 @ 18:13   <10,000 CFU/mL Normal Urogenital Kavita  --  --      .Blood Blood-Peripheral  04-12 @ 18:00   Growth in anaerobic bottle: Streptococcus salivarius/vestibularis group  "Susceptibilities not performed"  Direct identification is available within approximately 3-5  hours either by Blood Panel Multiplexed PCR or Direct  MALDI-TOF. Details: [email or web address]  --  Blood Culture PCR      .Blood Blood-Peripheral  04-12 @ 17:50   Growth in aerobic and anaerobic bottles: Streptococcus  salivarius/vestibularis group  --  Streptococcus salivarius/vestibularis group          Radiology Results      Meds    MEDICATIONS  (STANDING):  atorvastatin 20 milliGRAM(s) Oral at bedtime  brimonidine 0.2% Ophthalmic Solution 1 Drop(s) Right EYE two times a day  carvedilol 12.5 milliGRAM(s) Oral every 12 hours  cefTRIAXone   IVPB 2000 milliGRAM(s) IV Intermittent every 24 hours  chlorhexidine 2% Cloths 1 Application(s) Topical <User Schedule>  chlorhexidine 2% Cloths 1 Application(s) Topical daily  ciprofloxacin  0.3% Ophthalmic Solution 1 Drop(s) Right EYE two times a day  dextrose 10% Bolus 125 milliLiter(s) IV Bolus once  dextrose 5%. 1000 milliLiter(s) (50 mL/Hr) IV Continuous <Continuous>  dextrose 5%. 1000 milliLiter(s) (100 mL/Hr) IV Continuous <Continuous>  dextrose 50% Injectable 25 Gram(s) IV Push once  dextrose 50% Injectable 12.5 Gram(s) IV Push once  dorzolamide 2% Ophthalmic Solution 1 Drop(s) Both EYES three times a day  glucagon  Injectable 1 milliGRAM(s) IntraMuscular once  insulin lispro (ADMELOG) corrective regimen sliding scale   SubCutaneous Before meals and at bedtime  ketorolac 0.5% Ophthalmic Solution 1 Drop(s) Right EYE two times a day  minocycline 100 milliGRAM(s) Oral every 12 hours  polyethylene glycol 3350 17 Gram(s) Oral daily  senna 2 Tablet(s) Oral at bedtime  sevelamer carbonate 800 milliGRAM(s) Oral three times a day with meals  sodium chloride 0.9%. 1000 milliLiter(s) (100 mL/Hr) IV Continuous <Continuous>  trimethoprim  160 mG/sulfamethoxazole 800 mG 2 Tablet(s) Oral every 24 hours      MEDICATIONS  (PRN):  acetaminophen     Tablet .. 650 milliGRAM(s) Oral every 6 hours PRN Temp greater or equal to 38C (100.4F), Mild Pain (1 - 3)  dextrose Oral Gel 15 Gram(s) Oral once PRN Blood Glucose LESS THAN 70 milliGRAM(s)/deciliter      Physical Exam    Neuro :  no focal deficits  Respiratory: CTA B/L  CV: RRR, S1S2, no murmurs,   Abdominal: Soft, NT, ND +BS,  Extremities: No edema, + peripheral pulses, lue mid line      ASSESSMENT      uncontrolled htn,   abnormal trop r/o acs,   poss 2nd to demand ischemia,    abd pain poss 2nd to constipation,    fever poss 2nd to line sepsis   bacteremia   pulmonary nodules,    bronchogenic cyst, or foregut duplication cyst,   hyperkalemia  h/o sinus node dysfunction,   chronic HFpEF,   aortic stenosis,   HTN,   HLD,   b/l carotid stenosis,   DM (not on meds),   ESRD on dialysis (Tue, Thu, Sat),   Gout,    glaucoma,   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  cx and sens fr hd cath site with Stenotrophomonas maltophilia noted above   blood cx cath neg noted above  id f/u    TTE with ejection fraction visually estimated at 50 to 55 %. mild (grade 1) left ventricular diastolic dysfunction.   No vegetations seen on this study, consider URVASHI for further evaluation if clinically indicated noted    URVASHI with No thrombus seen in the left atrial, left atrial appendage, right atrium.   Agitated saline injection was negative for intracardiac shunt.   No vegetations seen on this study noted    s/p ir removal of Perm-a-catheter 4/18/24   Repeat Blood cultures 4/17/24 neg noted above   Repeat Blood cultures form 4/20/24 neg noted above  procalcitonin 0.6 noted    Continue  Bactrim and Minocycline to cover Stenotrophomonas until 5/2/24  Continue Ceftriaxone 2 g daily to cover Streptococcus until 5/9/24  Monitor CBC, BMP, ESR and CRP weekly until 5/9/24  s/p mid line placement left; brachial vein 4/25/24   Catheter may remain in place up to 29 days.  quantiferon tb gold test neg noted    pulm f/u    Follow up CT in 12 months  Nodify testing as OP.  pt on hd t,th,s   renal f/u   follow 2 gm K diet.   Malfunction catheter max  ml/minute 4/16/24,   s/p IR right permacath placement    s/p HD on 4/24/24   s/p rept hd 4/25/24   f/u bmp   s/p lokelma 10g x1 stat, calcium 1 gram x 1stat,d50 x1 stat, lispro ins 5 units x1stat, duoneb x1 stat   AVG placement after discharge at [facility].   hgba1c 6.4 noted    lispro ss   cont current meds   d/c plan pending arrangement for home abx infusion

## 2024-04-26 NOTE — PROGRESS NOTE ADULT - PROBLEM SELECTOR PROBLEM 5
HTN (hypertension)
Elevated troponin level
HTN (hypertension)
HTN (hypertension)
Elevated troponin level
HTN (hypertension)

## 2024-04-26 NOTE — PROGRESS NOTE ADULT - PROVIDER SPECIALTY LIST ADULT
Cardiology
Infectious Disease
Internal Medicine
Internal Medicine
Nephrology
Gastroenterology
Infectious Disease
Internal Medicine
Intervent Radiology
Nephrology
Pulmonology
Cardiology
Internal Medicine
Nephrology
Pulmonology
Cardiology
Internal Medicine
Nephrology
Nephrology
Gastroenterology
Pulmonology
Pulmonology
Gastroenterology
Pulmonology
Internal Medicine
Pulmonology
Gastroenterology
Internal Medicine
Pulmonology
Gastroenterology
Pulmonology
Internal Medicine
Gastroenterology
Internal Medicine
Gastroenterology

## 2024-04-26 NOTE — DISCHARGE NOTE NURSING/CASE MANAGEMENT/SOCIAL WORK - NSDCVIVACCINE_GEN_ALL_CORE_FT
Td (adult) preservative free; 15-Apr-2016 13:19; Lorraine Arriola (RN); [employer]; [id number]; IntraMuscular; Deltoid Right.; 0.5 milliLiter(s); VIS (VIS Published: 10-Apr-2016, VIS Presented: 15-Apr-2016);

## 2024-04-26 NOTE — PROGRESS NOTE ADULT - SUBJECTIVE AND OBJECTIVE BOX
Patient is seen and examined at the bed side, is afebrile. He is doing better, the discharge plan noted.       REVIEW OF SYSTEMS: All other review systems are negative      ALLERGIES: No Known Allergies      Vital Signs Last 24 Hrs  T(C): 37 (26 Apr 2024 04:55), Max: 37.1 (25 Apr 2024 21:06)  T(F): 98.6 (26 Apr 2024 04:55), Max: 98.8 (25 Apr 2024 21:06)  HR: 61 (26 Apr 2024 04:55) (61 - 64)  BP: 142/74 (26 Apr 2024 04:55) (109/64 - 142/74)  BP(mean): --  RR: 18 (26 Apr 2024 04:55) (18 - 18)  SpO2: 96% (26 Apr 2024 04:55) (96% - 97%)    Parameters below as of 26 Apr 2024 04:55  Patient On (Oxygen Delivery Method): room air      PHYSICAL EXAM:  GENERAL: Not in distress   CHEST/LUNG: Not using accessory muscles   HEART: s1 and s2 present  ABDOMEN:  Nontender and  Nondistended  EXTREMITIES: No pedal  edema  CNS: Awake and Alert      LABS: No new Labs                           13.0   6.54  )-----------( 177      ( 25 Apr 2024 07:00 )             37.8                         12.7   6.52  )-----------( 216      ( 24 Apr 2024 05:50 )             38.3         04-25    135  |  99  |  79<H>  ----------------------------<  145<H>  4.8   |  26  |  6.45<H>    Ca    9.4      25 Apr 2024 07:00      04-24    135  |  105  |  125<H>  ----------------------------<  134<H>  4.8   |  19<L>  |  8.09<H>    Ca    9.1      24 Apr 2024 11:35      TPro  7.9  /  Alb  3.5  /  TBili  0.4  /  DBili  x   /  AST  14  /  ALT  13  /  AlkPhos  150<H>  04-22    PT/INR - ( 13 Apr 2024 05:00 )   PT: 16.3 sec;   INR: 1.45 ratio      PTT - ( 13 Apr 2024 05:00 )  PTT:26.7 sec      CAPILLARY BLOOD GLUCOSE  POCT Blood Glucose.: 186 mg/dL (13 Apr 2024 11:30)  POCT Blood Glucose.: 121 mg/dL (13 Apr 2024 07:37)      < from: URVASHI W or WO Ultrasound Enhancing Agent (04.17.24 @ 07:41) >  CONCLUSIONS:      1. Left ventricular wall thickness is normal. Left ventricular systolic function is normal. There are no regional wall motion abnormalities seen.   2. Normal right ventricular cavity size, with normal wall thickness, and normal systolic function.   3. Normal left and right atrial size.   4. No thrombus seen in the left atrial, left atrial appendage, right atrium..   5. Trace tricuspid regurgitation. Pulmonary artery systolic pressure could not be estimated.   6. Mild mitral regurgitation.   7. Trace pulmonic regurgitation.   8. Agitated saline injection was negative for intracardiac shunt.   9. Left atrial appendage emptying velocites are normal.  10. Normal pulmonary venous flow.  11. Aortic root at the sinuses of Valsalva is normal in size, measuring 3.30 cm (indexed 1.79 cm/m²).  12. Mild aortic regurgitation.  13. No pericardial effusion seen.  14. No vegetations seen on this study.      < from: TTE W or WO Ultrasound Enhancing Agent (04.15.24 @ 12:57) >     CONCLUSIONS:      1. Left ventricular cavity is normal in size. Left ventricular systolic function is normal with an ejection fraction visually estimated at 50 to 55 %. There are no regional wall motion abnormalities seen.   2. There is mild (grade 1) left ventricular diastolic dysfunction.   3. Normal right ventricular cavity size, with normal wall thickness, and normal systolic function. Tricuspid annular plane systolic excursion (TAPSE) is 2.1 cm (normal >=1.7 cm).   4. Normal left and right atrial size.   5. Trace tricuspid regurgitation.   6. Compared to the transthoracic echocardiogram performed on 12/18/2023, there have been no significant interval changes.   7. Estimated pulmonary artery systolic pressure is 20 mmHg, consistent with normal pulmonary artery pressure.   8. Mild aortic regurgitation.   9. Mild pulmonic regurgitation.  10. There is calcification of the mitral valve annulus.  11. No pericardial effusion seen.  12. No vegetations seen on this study,consider URVASHI for further evaluation if clinically indicated.      MEDICATIONS  (STANDING):    atorvastatin 20 milliGRAM(s) Oral at bedtime  brimonidine 0.2% Ophthalmic Solution 1 Drop(s) Right EYE two times a day  carvedilol 12.5 milliGRAM(s) Oral every 12 hours  cefTRIAXone   IVPB 2000 milliGRAM(s) IV Intermittent every 24 hours  chlorhexidine 2% Cloths 1 Application(s) Topical daily  chlorhexidine 2% Cloths 1 Application(s) Topical <User Schedule>  ciprofloxacin  0.3% Ophthalmic Solution 1 Drop(s) Right EYE two times a day  dorzolamide 2% Ophthalmic Solution 1 Drop(s) Both EYES three times a day  famotidine    Tablet 20 milliGRAM(s) Oral daily  glucagon  Injectable 1 milliGRAM(s) IntraMuscular once  insulin lispro (ADMELOG) corrective regimen sliding scale   SubCutaneous Before meals and at bedtime  ketorolac 0.5% Ophthalmic Solution 1 Drop(s) Right EYE two times a day  minocycline 100 milliGRAM(s) Oral every 12 hours  polyethylene glycol 3350 17 Gram(s) Oral daily  senna 2 Tablet(s) Oral at bedtime  sevelamer carbonate 800 milliGRAM(s) Oral three times a day with meals  sodium chloride 0.9%. 1000 milliLiter(s) (100 mL/Hr) IV Continuous <Continuous>  trimethoprim  160 mG/sulfamethoxazole 800 mG 2 Tablet(s) Oral every 24 hours        RADIOLOGY & ADDITIONAL TESTS:    4/12/24 : CT Abdomen and Pelvis No Cont (04.12.24 @ 18:53) >  *  No acute septic pathology.  *  Scattered bilateral small pulmonary nodules measuring up to 7 mm in the right upper lobe are nonspecific and may represent   infectious/inflammatory disease or metastatic disease.  *  3.1 x 2.1 cm cystic structure in the posterior mediastinum at the level of the leelee may represent a lymphatic structure, bronchogenic cyst, or foregut duplication cyst.  *  No intra-abdominal collection.      4/12/24 : CT Chest No Cont (04.12.24 @ 18:53) LUNGS AND LARGE AIRWAYS: The central airways are patent. Scattered   bilateral small pulmonary nodules measuring up to 7 mm in the right upper  lobe are nonspecific. No acute consolidation.  PLEURA: Trace effusions.      MICROBIOLOGY DATA:    Culture - Blood in AM (04.20.24 @ 06:07)   Specimen Source: .Blood Blood-Peripheral  Culture Results: No growth at 5 days    Culture - Blood in AM (04.20.24 @ 06:05)   Specimen Source: .Blood Blood-Peripheral  Culture Results: No growth at 5 days    Culture - Other (04.13.24 @ 09:39)   - Minocycline: S  - Levofloxacin: S <=0.5  - Trimethoprim/Sulfamethoxazole: S <=0.5/9.5  Specimen Source: Cath Site Catheter Site  Culture Results:   Rare Stenotrophomonas maltophilia  Organism Identification: Stenotrophomonas maltophilia  Organism: Stenotrophomonas maltophilia  Organism: Stenotrophomonas maltophilia  Method Type: KB  Method Type: LAWRENCE    Culture - Blood (04.13.24 @ 17:00)   Specimen Source: .Blood Dialysis Catheter  Culture Results: No growth at 5 days      Culture - Other (04.13.24 @ 09:39)   Specimen Source: Cath Site Catheter Site  Culture Results: No growth to date.      Culture - Blood (04.12.24 @ 17:50)   Gram Stain:   Growth in aerobic bottle: Gram positive cocci in pairs  Specimen Source: .Blood Blood-Peripheral  Culture Results:   Growth in aerobic bottle: Streptococcus salivarius/vestibularis group   Susceptibility to follow.      Culture - Blood (04.12.24 @ 18:00)   Gram Stain:   Growth in anaerobic bottle: Gram positive cocci in pairs  - Streptococcus sp. (Not Grp A, B or S pneumoniae): Detec  Specimen Source: .Blood Blood-Peripheral  Organism: Blood Culture PCR  Culture Results:   Growth in anaerobic bottle: Gram positive cocci in pairs   Direct identification is available within approximately 3-5   hours either by Blood Panel Multiplexed PCR or Direct   MALDI-TOF. Details: [email or web address]  Organism Identification: Blood Culture PCR  Method Type: PCR      Culture - Blood (04.12.24 @ 17:50)   Gram Stain:   Growth in aerobic bottle: Gram positive cocci in pairs  Specimen Source: .Blood Blood-Peripheral  Culture Results:   Growth in aerobic bottle: Gram positive cocci in pairs    Urine Microscopic-Add On (NC) (04.12.24 @ 18:13)   Red Blood Cell - Urine: 3 /HPF  White Blood Cell - Urine: 2 /HPF  Bacteria: Occasional /HPF    Respiratory Viral Panel with COVID-19 by OXANA (04.12.24 @ 17:50)   Rapid RVP Result: NotDetec  SARS-CoV-2: NotDetec:

## 2024-04-26 NOTE — PROGRESS NOTE ADULT - MOUTH
normal mouth and gums/moist

## 2024-04-26 NOTE — PROGRESS NOTE ADULT - NEGATIVE CARDIOVASCULAR SYMPTOMS
no chest pain/no palpitations/no orthopnea/no peripheral edema

## 2024-04-26 NOTE — PROGRESS NOTE ADULT - RESPIRATORY
clear to auscultation bilaterally/no wheezes/no rales/no rhonchi/no respiratory distress/airway patent/breath sounds equal/good air movement/respirations non-labored

## 2024-05-24 NOTE — PROGRESS NOTE ADULT - LYMPHATIC
No lymphadedenopathy
on the discharge service for the patient. I have reviewed and made amendments to the documentation where necessary.
No lymphadedenopathy

## 2024-06-04 NOTE — CONSULT NOTE ADULT - HEMATOLOGY/LYMPHATICS
details…
decreased cathi/decreased velocity of limb motion/decreased step length/decreased weight-shifting ability

## 2024-06-14 ENCOUNTER — EMERGENCY (EMERGENCY)
Facility: HOSPITAL | Age: 79
LOS: 1 days | Discharge: HOME | End: 2024-06-14
Attending: EMERGENCY MEDICINE
Payer: MEDICARE

## 2024-06-14 VITALS
RESPIRATION RATE: 20 BRPM | HEART RATE: 90 BPM | DIASTOLIC BLOOD PRESSURE: 99 MMHG | OXYGEN SATURATION: 95 % | TEMPERATURE: 99 F | WEIGHT: 181.66 LBS | SYSTOLIC BLOOD PRESSURE: 200 MMHG | HEIGHT: 63 IN

## 2024-06-14 LAB
ALBUMIN SERPL ELPH-MCNC: 3.4 G/DL — LOW (ref 3.5–5)
ALP SERPL-CCNC: 172 U/L — HIGH (ref 40–120)
ALT FLD-CCNC: 102 U/L DA — HIGH (ref 10–60)
ANION GAP SERPL CALC-SCNC: 6 MMOL/L — SIGNIFICANT CHANGE UP (ref 5–17)
APPEARANCE UR: CLEAR — SIGNIFICANT CHANGE UP
AST SERPL-CCNC: 147 U/L — HIGH (ref 10–40)
BASOPHILS # BLD AUTO: 0.01 K/UL — SIGNIFICANT CHANGE UP (ref 0–0.2)
BASOPHILS NFR BLD AUTO: 0.1 % — SIGNIFICANT CHANGE UP (ref 0–2)
BILIRUB SERPL-MCNC: 1.2 MG/DL — SIGNIFICANT CHANGE UP (ref 0.2–1.2)
BILIRUB UR-MCNC: NEGATIVE — SIGNIFICANT CHANGE UP
BUN SERPL-MCNC: 42 MG/DL — HIGH (ref 7–18)
CALCIUM SERPL-MCNC: 8.9 MG/DL — SIGNIFICANT CHANGE UP (ref 8.4–10.5)
CHLORIDE SERPL-SCNC: 104 MMOL/L — SIGNIFICANT CHANGE UP (ref 96–108)
CO2 SERPL-SCNC: 25 MMOL/L — SIGNIFICANT CHANGE UP (ref 22–31)
COLOR SPEC: YELLOW — SIGNIFICANT CHANGE UP
CREAT SERPL-MCNC: 5 MG/DL — HIGH (ref 0.5–1.3)
DIFF PNL FLD: NEGATIVE — SIGNIFICANT CHANGE UP
EGFR: 11 ML/MIN/1.73M2 — LOW
EOSINOPHIL # BLD AUTO: 0.96 K/UL — HIGH (ref 0–0.5)
EOSINOPHIL NFR BLD AUTO: 12.5 % — HIGH (ref 0–6)
GLUCOSE SERPL-MCNC: 137 MG/DL — HIGH (ref 70–99)
GLUCOSE UR QL: NEGATIVE MG/DL — SIGNIFICANT CHANGE UP
HCT VFR BLD CALC: 39 % — SIGNIFICANT CHANGE UP (ref 39–50)
HGB BLD-MCNC: 13.2 G/DL — SIGNIFICANT CHANGE UP (ref 13–17)
IMM GRANULOCYTES NFR BLD AUTO: 0.4 % — SIGNIFICANT CHANGE UP (ref 0–0.9)
KETONES UR-MCNC: NEGATIVE MG/DL — SIGNIFICANT CHANGE UP
LACTATE SERPL-SCNC: 1.2 MMOL/L — SIGNIFICANT CHANGE UP (ref 0.7–2)
LEUKOCYTE ESTERASE UR-ACNC: NEGATIVE — SIGNIFICANT CHANGE UP
LIDOCAIN IGE QN: 324 U/L — HIGH (ref 13–75)
LYMPHOCYTES # BLD AUTO: 0.97 K/UL — LOW (ref 1–3.3)
LYMPHOCYTES # BLD AUTO: 12.6 % — LOW (ref 13–44)
MCHC RBC-ENTMCNC: 33.8 GM/DL — SIGNIFICANT CHANGE UP (ref 32–36)
MCHC RBC-ENTMCNC: 34.2 PG — HIGH (ref 27–34)
MCV RBC AUTO: 101 FL — HIGH (ref 80–100)
MONOCYTES # BLD AUTO: 0.76 K/UL — SIGNIFICANT CHANGE UP (ref 0–0.9)
MONOCYTES NFR BLD AUTO: 9.9 % — SIGNIFICANT CHANGE UP (ref 2–14)
NEUTROPHILS # BLD AUTO: 4.97 K/UL — SIGNIFICANT CHANGE UP (ref 1.8–7.4)
NEUTROPHILS NFR BLD AUTO: 64.5 % — SIGNIFICANT CHANGE UP (ref 43–77)
NITRITE UR-MCNC: NEGATIVE — SIGNIFICANT CHANGE UP
NRBC # BLD: 0 /100 WBCS — SIGNIFICANT CHANGE UP (ref 0–0)
PH UR: 8.5 (ref 5–8)
PLATELET # BLD AUTO: 103 K/UL — LOW (ref 150–400)
POTASSIUM SERPL-MCNC: 4.6 MMOL/L — SIGNIFICANT CHANGE UP (ref 3.5–5.3)
POTASSIUM SERPL-SCNC: 4.6 MMOL/L — SIGNIFICANT CHANGE UP (ref 3.5–5.3)
PROT SERPL-MCNC: 7.9 G/DL — SIGNIFICANT CHANGE UP (ref 6–8.3)
PROT UR-MCNC: 300 MG/DL
RBC # BLD: 3.86 M/UL — LOW (ref 4.2–5.8)
RBC # FLD: 14.3 % — SIGNIFICANT CHANGE UP (ref 10.3–14.5)
SODIUM SERPL-SCNC: 135 MMOL/L — SIGNIFICANT CHANGE UP (ref 135–145)
SP GR SPEC: 1.01 — SIGNIFICANT CHANGE UP (ref 1–1.03)
UROBILINOGEN FLD QL: 1 MG/DL — SIGNIFICANT CHANGE UP (ref 0.2–1)
WBC # BLD: 7.7 K/UL — SIGNIFICANT CHANGE UP (ref 3.8–10.5)
WBC # FLD AUTO: 7.7 K/UL — SIGNIFICANT CHANGE UP (ref 3.8–10.5)

## 2024-06-14 PROCEDURE — 96374 THER/PROPH/DIAG INJ IV PUSH: CPT

## 2024-06-14 PROCEDURE — 99284 EMERGENCY DEPT VISIT MOD MDM: CPT | Mod: 25

## 2024-06-14 PROCEDURE — 83605 ASSAY OF LACTIC ACID: CPT

## 2024-06-14 PROCEDURE — 80053 COMPREHEN METABOLIC PANEL: CPT

## 2024-06-14 PROCEDURE — 83690 ASSAY OF LIPASE: CPT

## 2024-06-14 PROCEDURE — 85025 COMPLETE CBC W/AUTO DIFF WBC: CPT

## 2024-06-14 PROCEDURE — 81001 URINALYSIS AUTO W/SCOPE: CPT

## 2024-06-14 PROCEDURE — 99285 EMERGENCY DEPT VISIT HI MDM: CPT

## 2024-06-14 PROCEDURE — 36415 COLL VENOUS BLD VENIPUNCTURE: CPT

## 2024-06-14 PROCEDURE — 96375 TX/PRO/DX INJ NEW DRUG ADDON: CPT

## 2024-06-14 RX ORDER — MORPHINE SULFATE 50 MG/1
2 CAPSULE, EXTENDED RELEASE ORAL ONCE
Refills: 0 | Status: DISCONTINUED | OUTPATIENT
Start: 2024-06-14 | End: 2024-06-14

## 2024-06-14 RX ORDER — FAMOTIDINE 10 MG/ML
20 INJECTION INTRAVENOUS ONCE
Refills: 0 | Status: COMPLETED | OUTPATIENT
Start: 2024-06-14 | End: 2024-06-14

## 2024-06-14 RX ADMIN — MORPHINE SULFATE 2 MILLIGRAM(S): 50 CAPSULE, EXTENDED RELEASE ORAL at 23:27

## 2024-06-14 RX ADMIN — MORPHINE SULFATE 2 MILLIGRAM(S): 50 CAPSULE, EXTENDED RELEASE ORAL at 22:27

## 2024-06-14 RX ADMIN — MORPHINE SULFATE 2 MILLIGRAM(S): 50 CAPSULE, EXTENDED RELEASE ORAL at 23:23

## 2024-06-14 RX ADMIN — FAMOTIDINE 20 MILLIGRAM(S): 10 INJECTION INTRAVENOUS at 22:27

## 2024-06-14 NOTE — ED PROVIDER NOTE - OBJECTIVE STATEMENT
78-year-old man, history of end-stage renal disease on dialysis, Tuesday, Thursday, Saturday, hypertension, diabetes, complains of left upper quadrant abdominal pain and vomiting since this morning.  He does make urine and has no dysuria urinary frequency or hematuria.  Denies any fever/chills/diarrhea/constipation.  Denies chest pain or shortness of breath.  His last dialysis was on Thursday, June 13June 13, 2024.  He has a port in his right chest that was used.

## 2024-06-14 NOTE — ED PROVIDER NOTE - PATIENT PORTAL LINK FT
[FreeTextEntry1] : This 84 yo female with Parkinsons and hx of vaginal atrophy, and CHAVEZ, OAB predominant presents for f/u.  She was last treated with bladder botox in April and has had elevated PVR's, gross hematuria and 2 confirmed UTI's since. \par 6/2: PVR 200cc\par 6/8: PVR 100cc, +UTI\par 6/23:  cc\par \par Her most recent UTI was 8/3 and it was treated with nitrofurantoin. \par Today she states that she is feeling better than she has in a long time.  Denies further gross hematuria or dysuria, fever, chills, flank pain.  Denies cloudy or malodorous urine.  +subjective complete bladder emptying.  \par   \par \par She was not able to urinate on arrival to office, nor again on commode in exam room\par PVR via cath was 100cc\par Urine appeared cloudy and was sent for UA/CS\par \par Discussed r/b of vaginal estrogen use including its role in UTI prevention\par She has an appointment with her cardiologist in 2 weeks from now and will consult with him before rx\par If okay, will rx estring as pt will have difficulty with application of cream \par \par \par FU in 8 weeks, or sooner if needed.  Instructed to call office if any questions or concerns and she verbalizes understanding\par \par \par \par 
You can access the FollowMyHealth Patient Portal offered by [facility] by registering at the following website: [email or web address]. By joining [email or web address] FollowMyHealth portal, you will also be able to view your health information using other applications (apps) compatible with our system.

## 2024-06-14 NOTE — ED ADULT NURSE NOTE - OBJECTIVE STATEMENT
Patient presented to the ED with c/o Left upper abd. pain. Denies any c/o nausea, vomiting, diarrhea. Dialysis days Tues, Thurs, Sat, PermCath to right chest wall.

## 2024-06-14 NOTE — ED PROVIDER NOTE - PROGRESS NOTE DETAILS
Pt's son, Rick--[phone]. Pt feeling better on repeat assessments.  Unable to reach any family members to pick him up.  Therefore will arrange for ambulance to pick him up to bring him back to his home.  Return precautions given.

## 2024-06-14 NOTE — ED PROVIDER NOTE - NSFOLLOWUPINSTRUCTIONS_ED_ALL_ED_FT
[place]    Dolor abdominal en los adultos  Abdominal Pain, Adult  A health care provider talking to a person during a medical exam.  El dolor en el abdomen (dolor abdominal) puede tener muchas causas. En la mayoría de los casos, mejora sin tratamiento o al recibir tratamiento en el hogar. Whit en algunos casos, puede ser grave.    El médico le hará preguntas sobre shadi antecedentes médicos y le hará un examen físico para tratar de comprender la causa del dolor.    Siga estas instrucciones en comer casa:  Medicamentos    [place] los medicamentos de venta leo y los recetados solamente sita se lo haya indicado el médico.  No tome medicamentos que lo ayuden a defecar (laxantes), katerina que el médico se lo indique.  Instrucciones generales    Controle comer afección para detectar cualquier cambio.  Beber suficiente líquido para mantener el pis (orina) de color amarillo pálido.  Comuníquese con un médico si:  El dolor cambia, empeora o dura más de lo previsto.  Tiene cólicos o distensión abdominal intensos, o vomita.  El dolor empeora con las comidas, después de comer o con determinados alimentos.  Está estreñido o tiene diarrea evgeny más de 2 o 3 días.  No tiene apetito o baja de peso sin proponérselo.  Tiene signos de deshidratación. Pueden incluir:  Orina oscura, muy escasa o falta de orina.  Labios agrietados o boca seca.  Somnolencia o debilidad.  Tiene dolor al hacer pis (orinar) o al defecar.  El dolor abdominal lo despierta de noche.  Observa farhan en la orina.  Tiene fiebre.  Solicite ayuda de inmediato si:  No puede dejar de vomitar.  El dolor solo se localiza en rudolph parte del abdomen. Si se localiza en la ben derecha, posiblemente podría tratarse de apendicitis.  Produce materia fecal (heces) con farhan, de color al, o con aspecto alquitranado.  Tiene dificultad para respirar.  Tiene dolor en el pecho.  Estos síntomas pueden indicar rudolph emergencia. Solicite ayuda de inmediato. Llame al 911.  No espere a ghislaine si los síntomas desaparecen.  No conduzca por shadi propios medios hasta el hospital.  Esta información no tiene sita fin reemplazar el consejo del médico. Asegúrese de hacerle al médico cualquier pregunta que tenga.    Document Revised: 01/27/2024 Document Reviewed: 01/27/2024  [employer] Patient Education © 2024 [employer]  [employer] logo  Terms and Conditions  Privacy Policy  Editorial Policy  All content on this site: Copyright © 2024 Elsevier, its licensors, and contributors. All rights are reserved, including those for text and data mining, AI training, and similar technologies. For all open access content, the Creative Commons licensing terms apply.  Cookies are used by this site. To decline or learn more, visit our Cookies page.  [employer]

## 2024-06-14 NOTE — ED ADULT NURSE NOTE - NSFALLRISKINTERV_ED_ALL_ED

## 2024-06-14 NOTE — ED ADULT NURSE NOTE - HOW PATIENT ADDRESSED, PROFILE
Call [phone] for Dr. Tobin Sevilla to set an appointment regarding vision changes.      PRESCRIPTION REFILLS    ? FOR REFILL REQUESTS INCLUDING CONTROLLED SUBSTANCES  Please contact your pharmacy at least three (3) business days before your medication runs out.   The pharmacy will then send us a request for your refill.  Please allow 24-48 hours for the refill to be processed.       ? FOR CONTROLLED SUBSTANCE REFILL REQUESTS   Please call the clinic Monday through Friday, from 8:00am - 5:00pm to speak with a Patient .      LAB AND X-RAY HOURS FOR [address] [place] [address]  Monday - Friday 7 am - 4:30 pm      TEST RESULTS  If your physician has ordered additional laboratory or radiology testing as part of your ongoing plan of care, notification of the test results will be communicated in a timely manner once reviewed by your provider.   -  If your results are normal, you will receive a letter in the mail.   -  If there are any irregularities, you will receive a phone call from our office within 5 - 7 business days.   -  If your results are critical and require more immediate intervention, you will be contacted promptly.       YOUR OPINION MATTERS  You may be receiving a patient satisfaction survey in the mail. We would appreciate it if you could please take the time to complete, as your feedback is very important to us. We strive to make your experience exceptional and your comments help us with that goal. We look forward to hearing from you. Feedback is anonymous unless you choose otherwise.    Patient Education     How to Quit Smoking  Smoking is a hard habit to break. About 50% of all people who have ever smoked have been able to quit. Most people who still smoke want to quit. Here are some of the best ways to stop smoking.     Keep in mind the health benefits of quitting  The health benefits of quitting start right away. They keep improving the longer you go without smoking. Knowing this can  help inspire you to stay on track. These benefits occur at any age. If you are 17 or 70, quitting is a good choice. Some of the health benefits after your last cigarette include:   · After 20 minutes: Your blood pressure and pulse return to normal.  · After 8 hours: Your oxygen levels return to normal.  · After 2 days: Your ability to smell and taste start to improve as damaged nerves regrow.  · After 2 to 3 weeks: Your circulation and lung function improve.  · After 1 to 9 months: Your coughing, congestion, and shortness of breath decrease. Your tiredness decreases.  · After 1 year: Your risk of heart attack decreases by 50%.  · After 5 years: Your risk of lung cancer decreases by 50%. Your risk of stroke becomes the same as a nonsmoker’s.  Go cold turkey  Most former smokers quit cold turkey. This means stopping all at once. Trying to cut back slowly often doesn't work as well. This may be because it continues the habit of smoking. Also you may inhale more smoke while smoking fewer cigarettes. This leads to the same amount of nicotine in your body.   Get support  Support programs can be a big help, especially for heavy smokers. These groups offer lectures, ways to change behavior, and peer support. Here are some ways to find a support program:   · Free national quitline 800-QUIT-NOW (717-705-0382)  · Hospital quit-smoking programs  · American Lung Association 863-055-7400  · American Cancer Society 529-434-5202  Support at home is important too. Family and friends can offer praise and reassurance. If the smoker in your life finds it hard to quit, encourage them to keep trying.   Try over-the-counter medicine  Nicotine replacement therapy may make it easier to quit. Some aids are available without a prescription. These include a nicotine patch, gum, and lozenges. But it's best to use these under the care of your healthcare provider. The skin patch gives a steady supply of nicotine. Nicotine gum and lozenges  give short-time doses of low levels of nicotine. Both methods reduce the craving for cigarettes. If you have nausea, vomiting, dizziness, weakness, or a fast heartbeat, stop using these products. See your provider.   Ask about prescription medicine  After reviewing your smoking patterns and past attempts to quit, your healthcare provider may offer a prescription medicine such as bupropion, varenicline, a nicotine inhaler, or nasal spray. Each has advantages and side effects. Your provider can review these with you.   Keep trying  Most smokers make many attempts at quitting before they succeed. It’s important not to give up.   To learn more  For more on how to quit smoking, try these resources:   · www.cdc.gov/tobacco/quit_smoking/ 800-QUIT-NOW (184-687-1334)  · www.smokefree.gov 877-44U-QUIT (711-466-5705)  · www.lung.org/stop-smoking/ 800-LUNGUSA (219-968-6226)  Harriet last reviewed this educational content on 12/1/2019  © 7878-2419 [employer]. All rights reserved. This information is not intended as a substitute for professional medical care. Always follow your healthcare professional's instructions.            Mr Ojeda

## 2024-06-14 NOTE — ED PROVIDER NOTE - CLINICAL SUMMARY MEDICAL DECISION MAKING FREE TEXT BOX
78-year-old man, history of end-stage renal disease on dialysis, Tuesday, Thursday, Saturday, hypertension, diabetes, complains of left upper quadrant abdominal pain and vomiting since this morning.  He does make urine and has no dysuria urinary frequency or hematuria.  Denies any fever/chills/diarrhea/constipation--  Labs, symptomatic medications, reassess; abdomen is soft and nontender, no indication for abdominal imaging at this time.

## 2024-06-15 VITALS
DIASTOLIC BLOOD PRESSURE: 77 MMHG | SYSTOLIC BLOOD PRESSURE: 155 MMHG | OXYGEN SATURATION: 96 % | TEMPERATURE: 99 F | HEART RATE: 73 BPM | RESPIRATION RATE: 19 BRPM

## 2024-06-15 RX ADMIN — MORPHINE SULFATE 2 MILLIGRAM(S): 50 CAPSULE, EXTENDED RELEASE ORAL at 02:27

## 2024-09-03 ENCOUNTER — INPATIENT (INPATIENT)
Facility: HOSPITAL | Age: 79
LOS: 2 days | Discharge: HOME | DRG: 392 | End: 2024-09-06
Attending: INTERNAL MEDICINE | Admitting: INTERNAL MEDICINE
Payer: MEDICARE

## 2024-09-03 VITALS
TEMPERATURE: 99 F | RESPIRATION RATE: 18 BRPM | OXYGEN SATURATION: 93 % | SYSTOLIC BLOOD PRESSURE: 173 MMHG | HEIGHT: 67 IN | HEART RATE: 93 BPM | WEIGHT: 182.1 LBS | DIASTOLIC BLOOD PRESSURE: 90 MMHG

## 2024-09-03 DIAGNOSIS — I10 ESSENTIAL (PRIMARY) HYPERTENSION: ICD-10-CM

## 2024-09-03 DIAGNOSIS — N18.6 END STAGE RENAL DISEASE: ICD-10-CM

## 2024-09-03 DIAGNOSIS — E78.5 HYPERLIPIDEMIA, UNSPECIFIED: ICD-10-CM

## 2024-09-03 DIAGNOSIS — Z29.9 ENCOUNTER FOR PROPHYLACTIC MEASURES, UNSPECIFIED: ICD-10-CM

## 2024-09-03 DIAGNOSIS — R10.9 UNSPECIFIED ABDOMINAL PAIN: ICD-10-CM

## 2024-09-03 LAB
ALBUMIN SERPL ELPH-MCNC: 3.5 G/DL — SIGNIFICANT CHANGE UP (ref 3.5–5)
ALP SERPL-CCNC: 135 U/L — HIGH (ref 40–120)
ALT FLD-CCNC: 20 U/L DA — SIGNIFICANT CHANGE UP (ref 10–60)
ANION GAP SERPL CALC-SCNC: 8 MMOL/L — SIGNIFICANT CHANGE UP (ref 5–17)
AST SERPL-CCNC: 20 U/L — SIGNIFICANT CHANGE UP (ref 10–40)
BASOPHILS # BLD AUTO: 0.03 K/UL — SIGNIFICANT CHANGE UP (ref 0–0.2)
BASOPHILS NFR BLD AUTO: 0.2 % — SIGNIFICANT CHANGE UP (ref 0–2)
BILIRUB SERPL-MCNC: 0.9 MG/DL — SIGNIFICANT CHANGE UP (ref 0.2–1.2)
BUN SERPL-MCNC: 45 MG/DL — HIGH (ref 7–18)
CALCIUM SERPL-MCNC: 9.1 MG/DL — SIGNIFICANT CHANGE UP (ref 8.4–10.5)
CHLORIDE SERPL-SCNC: 104 MMOL/L — SIGNIFICANT CHANGE UP (ref 96–108)
CO2 SERPL-SCNC: 23 MMOL/L — SIGNIFICANT CHANGE UP (ref 22–31)
CREAT SERPL-MCNC: 5.32 MG/DL — HIGH (ref 0.5–1.3)
EGFR: 10 ML/MIN/1.73M2 — LOW
EOSINOPHIL # BLD AUTO: 0.91 K/UL — HIGH (ref 0–0.5)
EOSINOPHIL NFR BLD AUTO: 6.7 % — HIGH (ref 0–6)
GLUCOSE SERPL-MCNC: 187 MG/DL — HIGH (ref 70–99)
HCT VFR BLD CALC: 29.8 % — LOW (ref 39–50)
HGB BLD-MCNC: 10.2 G/DL — LOW (ref 13–17)
IMM GRANULOCYTES NFR BLD AUTO: 0.7 % — SIGNIFICANT CHANGE UP (ref 0–0.9)
LIDOCAIN IGE QN: 57 U/L — SIGNIFICANT CHANGE UP (ref 13–75)
LYMPHOCYTES # BLD AUTO: 1.03 K/UL — SIGNIFICANT CHANGE UP (ref 1–3.3)
LYMPHOCYTES # BLD AUTO: 7.6 % — LOW (ref 13–44)
MCHC RBC-ENTMCNC: 32.6 PG — SIGNIFICANT CHANGE UP (ref 27–34)
MCHC RBC-ENTMCNC: 34.2 GM/DL — SIGNIFICANT CHANGE UP (ref 32–36)
MCV RBC AUTO: 95.2 FL — SIGNIFICANT CHANGE UP (ref 80–100)
MONOCYTES # BLD AUTO: 1.04 K/UL — HIGH (ref 0–0.9)
MONOCYTES NFR BLD AUTO: 7.7 % — SIGNIFICANT CHANGE UP (ref 2–14)
NEUTROPHILS # BLD AUTO: 10.48 K/UL — HIGH (ref 1.8–7.4)
NEUTROPHILS NFR BLD AUTO: 77.1 % — HIGH (ref 43–77)
NRBC # BLD: 0 /100 WBCS — SIGNIFICANT CHANGE UP (ref 0–0)
PLATELET # BLD AUTO: 123 K/UL — LOW (ref 150–400)
POTASSIUM SERPL-MCNC: 4.9 MMOL/L — SIGNIFICANT CHANGE UP (ref 3.5–5.3)
POTASSIUM SERPL-SCNC: 4.9 MMOL/L — SIGNIFICANT CHANGE UP (ref 3.5–5.3)
PROT SERPL-MCNC: 8 G/DL — SIGNIFICANT CHANGE UP (ref 6–8.3)
RBC # BLD: 3.13 M/UL — LOW (ref 4.2–5.8)
RBC # FLD: 12.1 % — SIGNIFICANT CHANGE UP (ref 10.3–14.5)
SODIUM SERPL-SCNC: 135 MMOL/L — SIGNIFICANT CHANGE UP (ref 135–145)
WBC # BLD: 13.59 K/UL — HIGH (ref 3.8–10.5)
WBC # FLD AUTO: 13.59 K/UL — HIGH (ref 3.8–10.5)

## 2024-09-03 PROCEDURE — 74177 CT ABD & PELVIS W/CONTRAST: CPT | Mod: 26,MC

## 2024-09-03 PROCEDURE — 99285 EMERGENCY DEPT VISIT HI MDM: CPT

## 2024-09-03 PROCEDURE — 76705 ECHO EXAM OF ABDOMEN: CPT | Mod: 26

## 2024-09-03 RX ORDER — PANTOPRAZOLE SODIUM 40 MG
40 TABLET, DELAYED RELEASE (ENTERIC COATED) ORAL DAILY
Refills: 0 | Status: DISCONTINUED | OUTPATIENT
Start: 2024-09-03 | End: 2024-09-04

## 2024-09-03 RX ORDER — FAMOTIDINE 10 MG/ML
20 INJECTION INTRAVENOUS ONCE
Refills: 0 | Status: COMPLETED | OUTPATIENT
Start: 2024-09-03 | End: 2024-09-03

## 2024-09-03 RX ORDER — LOSARTAN POTASSIUM 50 MG/1
1 TABLET ORAL
Refills: 0 | DISCHARGE

## 2024-09-03 RX ORDER — MAGNESIUM, ALUMINUM HYDROXIDE 200-225/5
30 SUSPENSION, ORAL (FINAL DOSE FORM) ORAL ONCE
Refills: 0 | Status: COMPLETED | OUTPATIENT
Start: 2024-09-03 | End: 2024-09-03

## 2024-09-03 RX ORDER — ASPIRIN 81 MG
81 TABLET, DELAYED RELEASE (ENTERIC COATED) ORAL DAILY
Refills: 0 | Status: DISCONTINUED | OUTPATIENT
Start: 2024-09-03 | End: 2024-09-06

## 2024-09-03 RX ORDER — ACETAMINOPHEN 325 MG/1
650 TABLET ORAL EVERY 6 HOURS
Refills: 0 | Status: DISCONTINUED | OUTPATIENT
Start: 2024-09-03 | End: 2024-09-06

## 2024-09-03 RX ORDER — LOSARTAN POTASSIUM 50 MG/1
50 TABLET ORAL DAILY
Refills: 0 | Status: DISCONTINUED | OUTPATIENT
Start: 2024-09-03 | End: 2024-09-06

## 2024-09-03 RX ORDER — ACETAMINOPHEN 325 MG/1
1000 TABLET ORAL ONCE
Refills: 0 | Status: COMPLETED | OUTPATIENT
Start: 2024-09-03 | End: 2024-09-03

## 2024-09-03 RX ADMIN — ACETAMINOPHEN 400 MILLIGRAM(S): 325 TABLET ORAL at 22:45

## 2024-09-03 RX ADMIN — Medication 40 MILLIGRAM(S): at 14:31

## 2024-09-03 RX ADMIN — Medication 2 MILLIGRAM(S): at 13:40

## 2024-09-03 RX ADMIN — Medication 30 MILLILITER(S): at 10:17

## 2024-09-03 RX ADMIN — FAMOTIDINE 20 MILLIGRAM(S): 10 INJECTION INTRAVENOUS at 10:16

## 2024-09-03 RX ADMIN — Medication 2 MILLIGRAM(S): at 13:10

## 2024-09-03 NOTE — H&P ADULT - NSHPPHYSICALEXAM_GEN_ALL_CORE
T(C): 37.2 (09-03-24 @ 11:20), Max: 37.2 (09-03-24 @ 07:56)  HR: 98 (09-03-24 @ 11:20) (93 - 98)  BP: 157/88 (09-03-24 @ 11:20) (157/88 - 173/90)  RR: 20 (09-03-24 @ 11:20) (18 - 20)  SpO2: 93% (09-03-24 @ 11:20) (93% - 93%)    CONSTITUTIONAL: fatigue and lethargic   EYES: PERRLA and symmetric, EOMI, No conjunctival or scleral injection, non-icteric  RESP: No respiratory distress, no use of accessory muscles; CTA b/l, no WRR  CV: RRR, +S1S2, no MRG; no JVD; no peripheral edema  GI:  distended abdomen with no tenderness to palpation   LYMPH: No cervical LAD or tenderness; no axillary LAD or tenderness; no inguinal LAD or tenderness  SKIN: No rashes or ulcers noted; no subcutaneous nodules or induration palpable  NEURO: CN II-XII intact; normal reflexes in upper and lower extremities, sensation intact in upper and lower extremities b/l to light touch   PSYCH:  A+O x 1,

## 2024-09-03 NOTE — ED PROVIDER NOTE - OBJECTIVE STATEMENT
79-year-old male on dialysis Tuesday Thursday Saturday presents with abdominal pain and vomiting since overnight.  In dialysis patient received about 10 minutes but then was complaining too much pain and was sent to the ED.  Patient denies any chest pain or shortness of breath no fever no chills.  Poor historian.

## 2024-09-03 NOTE — H&P ADULT - ASSESSMENT
79-year-old male, AAOx1, From home  on dialysis T/Th/ Sa  presents with abdominal pain and vomiting since  yesterday. Reports constant midgastric abdominal pain that is non radiating. CT A/P  Pancolonic diverticulosis without diverticulitis. Hepatic and pacreatic U/S: Cholelithiasis . WBC 13. Patient is being admitted for intractable abdominal pain likely from infection.

## 2024-09-03 NOTE — ED PROVIDER NOTE - PHYSICAL EXAMINATION
Epigastric tenderness palpation abdomen is soft heart regular lungs clear no peripheral edema.  Patient is sleepy but answering appropriately

## 2024-09-03 NOTE — H&P ADULT - PROBLEM SELECTOR PLAN 1
p/w with intractable abdominal pain , with one episode vomiting  AMS, AAOx1   unable to tolerate oral intake   wbc 13.59   possible infection  last time in April he had similar sx and had a positive blood culture.  Dialyze catheter was changed as possible source of infection  c/w Tylenol as needed   c/w ppi    f/u bcx   f/u ucx   c/w NPO till  patient is more awake and alert   ID consulted  Nephro consulted  Gi consulted p/w with intractable abdominal pain , with one episode vomiting  AMS, AAOx1   unable to tolerate oral intake   wbc 13.59   possible infection  last time in April he had similar sx and had a positive blood culture.  Dialyze catheter was changed as possible source of infection'  CT A/P  Pancolonic diverticulosis without diverticulitis.  Hepatic and pacreatic U/S: Cholelithiasis   c/w Tylenol as needed   c/w ppi    f/u bcx   f/u ucx   c/w NPO till  patient is more awake and alert   ID consulted  Nephro consulted  Gi consulted

## 2024-09-03 NOTE — PATIENT PROFILE ADULT - FALL HARM RISK - HARM RISK INTERVENTIONS

## 2024-09-03 NOTE — ED ADULT TRIAGE NOTE - CHIEF COMPLAINT QUOTE
Upper abd pain since last night ,sent from Dialysis center ,had only 10 min of  dialysis treatment ,normally gets 3.5 hrs on T-T-S

## 2024-09-03 NOTE — ED PROVIDER NOTE - CLINICAL SUMMARY MEDICAL DECISION MAKING FREE TEXT BOX
patient with abdominal pain and vomiting for 1 day.  Will do CAT scan.  Case discussed with Dr. Art.  Uriel to do contrast for the CT scan and will dialyze patient thereafter.

## 2024-09-03 NOTE — H&P ADULT - PROBLEM SELECTOR PLAN 2
hx of ESRD T/TH, Sa  was unable to complete dialysis in center  appears fluid overloaded on exam  will be dialyzed today   likely will need change in catheter due to possible source of infection  once stabilized would consult vascula for fistula  nephro Dr. Dolan following

## 2024-09-03 NOTE — ED ADULT NURSE NOTE - NSFALLRISKINTERV_ED_ALL_ED

## 2024-09-03 NOTE — ED ADULT NURSE NOTE - ED STAT RN HANDOFF DETAILS
Pt transported to Dialysis, Left ED in no distress,SpO2 96% on O2 @ 2L/min via NC, Report given to INDIO Cota in Dialysis. Pt transported to Dialysis, Left ED in no distress,SpO2 96% on O2 @ 4L/min via NC, Report given to INDIO Cota in Dialysis.

## 2024-09-03 NOTE — H&P ADULT - ATTENDING COMMENTS
79-year-old male, AAOx1, From home  on dialysis T/Th/ Sa  presents with abdominal pain and vomiting since  yesterday. Reports constant midgastric abdominal pain that is non radiating. Patient is a poor historian. Unable to obtain further information from him. He was very fatigue and appeared fluid overloaded on exam.     To note: patient was here in April for similar sx and was found ot have bacteremia     < from: CT Abdomen and Pelvis w/ IV Cont (09.03.24 @ 12:04) >    FINDINGS:  LOWER CHEST: Trace bilateral pleural effusions with underlying passive   atelectasis. Aortic and coronary atherosclerosis.    LIVER: Within normal limits.  BILE DUCTS: Normal caliber.  GALLBLADDER: Cholelithiasis.  SPLEEN: Within normal limits.  PANCREAS: Within normal limits.  ADRENALS: Within normal limits.  KIDNEYS/URETERS: Atrophic kidneys. No hydronephrosis. Symmetrical   nephrograms.    BLADDER: Minimally distended.  REPRODUCTIVE ORGANS: The enlarged prostate.    BOWEL: No bowel obstruction. Appendix is normal.. 2.5 cm periampullary   duodenal diverticula. Pain colonic diverticulosis without diverticulitis.  PERITONEUM/RETROPERITONEUM: Within normal limits.  VESSELS: Atherosclerotic changes. Patent celiac artery and SMA. MIKAYLA is   not well visualized. Patent portal, hepatic, splenic and superior   mesenteric veins.  LYMPH NODES: No lymphadenopathy.  ABDOMINAL WALL: Within normal limits.  BONES: Moderate degenerative changes.    IMPRESSION:  Pancolonic diverticulosis without diverticulitis.    Cholelithiasis.    Trace bilateral pleural effusions with underlying passive atelectasis.    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

## 2024-09-03 NOTE — H&P ADULT - HISTORY OF PRESENT ILLNESS
79-year-old male, AAOx1, From home  on dialysis T/Th/ Sa  presents with abdominal pain and vomiting since  yesterday. Reports constant midgastric abdominal pain that is non radiating. Patient is a poor historian. Unable to obtain further information from him. He was very fatigue and appeared fluid overloaded on exam.     To note: patient was here in April for similar sx and was found ot have bacteremia

## 2024-09-03 NOTE — CONSULT NOTE ADULT - ASSESSMENT
Patient is a 79y old  Male who is on dialysis, and has recent h/o of Bacteremia, now presents to the ER for evaluation of abdominal pain and vomiting since overnight.  In dialysis patient received about 10 minutes but then was complaining too much pain and was sent to the ER. On admission, he found to have no fever but tachycardia, Leukocytosis and Altered mental status. The ID consult requested to assist with further evaluation of SIRS and antibiotic management.    # SIRS ( Tachycardia + Leukocytosis )  # Altered mental status  # Streptococcus salivarius/vestibularis group and Stenotrophomonas Bacteremia    would recommend:    1. Please obtain Blood cultures X 2  2. Start on  Ceftriaxone 2 g daily after collect Blood cultures    will follow the patient with you and make further recommendation based on the clinical course and Lab results  Thank you for the opportunity to participate in Mr. VENCES's care    Attending Attestation:    Spent more than 65 minutes on total encounter, more than 50 % of the visit was spent counseling and/or coordinating care by the Attending physician.     Patient is a 79y old  Male who is on dialysis, and has recent h/o of Bacteremia, now presents to the ER for evaluation of abdominal pain and vomiting since overnight.  In dialysis patient received about 10 minutes but then was complaining too much pain and was sent to the ER. On admission, he found to have no fever but tachycardia, Leukocytosis and Altered mental status. The ID consult requested to assist with further evaluation of SIRS and antibiotic management.    # SIRS ( Tachycardia + Leukocytosis )  # Altered mental status  # Recent H/O  Streptococcus salivarius/vestibularis group and Stenotrophomonas Bacteremia    would recommend:    1. Please obtain Blood cultures X 2  2. Start on  Ceftriaxone 2 g daily after collect Blood cultures  3. Aspiration precaution  4. Pain management as needed    d/w Dr. Hutton , and Dr. Art     will follow the patient with you and make further recommendation based on the clinical course and Lab results  Thank you for the opportunity to participate in Mr. VENCES's care    Attending Attestation:    Spent more than 65 minutes on total encounter, more than 50 % of the visit was spent counseling and/or coordinating care by the Attending physician.

## 2024-09-03 NOTE — PROGRESS NOTE ADULT - SUBJECTIVE AND OBJECTIVE BOX
Chief complain/HPI  79 years old with ESRD getting dialysis via left permanente catheter. He cam to the ER for abdominal pain that started in am after breakfast.  He went for his dialysis treatment in am, dialysis was stopped after one hour due to abdominal pain. His pain is in Epigastric area. CT showed Diverticulosis in colon area and one in duodenum area.  No c/o fever and chills.  As per his son patient was on and off on Apixaban since 12/23 , cause unknown  Last admission 04/2024 came to the ER for abdominal pain and was found to have bacteremia.     PAST MEDICAL & SURGICAL HISTORY:  HTN (hypertension)      Chronic kidney disease      HLD (hyperlipidemia)      No significant past surgical history          Home Medications Reviewed    Hospital Medications:   MEDICATIONS  (STANDING):  aspirin  chewable 81 milliGRAM(s) Oral daily  atorvastatin 20 milliGRAM(s) Oral at bedtime  losartan 50 milliGRAM(s) Oral daily  pantoprazole  Injectable 40 milliGRAM(s) IV Push daily    MEDICATIONS  (PRN):  acetaminophen     Tablet .. 650 milliGRAM(s) Oral every 6 hours PRN Temp greater or equal to 38C (100.4F), Mild Pain (1 - 3)      Allergies    No Known Allergies    Intolerances                              10.2   13.59 )-----------( 123      ( 03 Sep 2024 08:43 )             29.8     09-03    135  |  104  |  45<H>  ----------------------------<  187<H>  4.9   |  23  |  5.32<H>    Ca    9.1      03 Sep 2024 08:43    TPro  8.0  /  Alb  3.5  /  TBili  0.9  /  DBili  x   /  AST  20  /  ALT  20  /  AlkPhos  135<H>  09-03              RADIOLOGY & ADDITIONAL STUDIES:    SOCIAL HISTORY: Denies ETOh,Smoking,     FAMILY HISTORY:      REVIEW OF SYSTEMS:  CONSTITUTIONAL: No malaise, No fatigue, No fevers or chills, well developed, no diaphoresis    RESPIRATORY: No cough, wheezing, hemoptysis; No shortness of breath  CARDIOVASCULAR: No chest pain or palpitations. No edema  GASTROINTESTINAL: c/o epigastric pain.   GENITOURINARY: No dysuria, frequency, foamy urine, urinary urgency, incontinence or hematuria  NEUROLOGICAL: No numbness or weakness, No tremor  SKIN: No itching, burning, rashes, or lesions       VITALS:  Vital Signs Last 24 Hrs  T(C): 37.2 (03 Sep 2024 11:20), Max: 37.2 (03 Sep 2024 07:56)  T(F): 99 (03 Sep 2024 11:20), Max: 99 (03 Sep 2024 11:20)  HR: 98 (03 Sep 2024 11:20) (93 - 98)  BP: 157/88 (03 Sep 2024 11:20) (157/88 - 173/90)  BP(mean): --  RR: 20 (03 Sep 2024 11:20) (18 - 20)  SpO2: 93% (03 Sep 2024 11:20) (93% - 93%)    Parameters below as of 03 Sep 2024 11:20  Patient On (Oxygen Delivery Method): nasal cannula  O2 Flow (L/min): 4      Height (cm): 170.2 (09-03 @ 07:56)  Weight (kg): 82.6 (09-03 @ 07:56)  BMI (kg/m2): 28.5 (09-03 @ 07:56)  BSA (m2): 1.94 (09-03 @ 07:56)    PHYSICAL EXAM:  Constitutional: NAD, post pain medications  Neck: No JVD  Respiratory:  air entrance B/L, no wheezes, rales or rhonchi  Cardiovascular: S1, S2, RRR, no pericardial rub, no murmur  Gastrointestinal: BS+, soft, epigastric  tenderness without rebound. , no distension, no bruit  Pelvis: bladder non-distended, no CVA tenderness  Extremities: edema plus 2.   Neurological: sleeping after pain medication , respond to certain questions                      Noted.   Chief complain/HPI  79 years old with ESRD getting dialysis via left permanente catheter. He cam to the ER for abdominal pain that started in am after breakfast.  He went for his dialysis treatment in am, dialysis was stopped after one hour due to abdominal pain. His pain is in Epigastric area. CT showed Diverticulosis in colon area and one in duodenum area.  No c/o fever and chills.  As per his son patient was on and off on Apixaban since 12/23 , cause unknown  Last admission 04/2024 came to the ER for abdominal pain and was found to have bacteremia.     PAST MEDICAL & SURGICAL HISTORY:  HTN (hypertension)      Chronic kidney disease      HLD (hyperlipidemia)      No significant past surgical history          Home Medications Reviewed    Hospital Medications:   MEDICATIONS  (STANDING):  aspirin  chewable 81 milliGRAM(s) Oral daily  atorvastatin 20 milliGRAM(s) Oral at bedtime  losartan 50 milliGRAM(s) Oral daily  pantoprazole  Injectable 40 milliGRAM(s) IV Push daily    MEDICATIONS  (PRN):  acetaminophen     Tablet .. 650 milliGRAM(s) Oral every 6 hours PRN Temp greater or equal to 38C (100.4F), Mild Pain (1 - 3)      Allergies    No Known Allergies    Intolerances                              10.2   13.59 )-----------( 123      ( 03 Sep 2024 08:43 )             29.8     09-03    135  |  104  |  45<H>  ----------------------------<  187<H>  4.9   |  23  |  5.32<H>    Ca    9.1      03 Sep 2024 08:43    TPro  8.0  /  Alb  3.5  /  TBili  0.9  /  DBili  x   /  AST  20  /  ALT  20  /  AlkPhos  135<H>  09-03              RADIOLOGY & ADDITIONAL STUDIES:    SOCIAL HISTORY: Denies ETOh,Smoking,     FAMILY HISTORY:      REVIEW OF SYSTEMS:  CONSTITUTIONAL: No malaise, No fatigue, No fevers or chills, well developed, no diaphoresis    RESPIRATORY: No cough, wheezing, hemoptysis; No shortness of breath  CARDIOVASCULAR: No chest pain or palpitations. No edema  GASTROINTESTINAL: c/o epigastric pain.   GENITOURINARY: No dysuria, frequency, foamy urine, urinary urgency, incontinence or hematuria  NEUROLOGICAL: No numbness or weakness, No tremor  SKIN: No itching, burning, rashes, or lesions       VITALS:  Vital Signs Last 24 Hrs  T(C): 37.2 (03 Sep 2024 11:20), Max: 37.2 (03 Sep 2024 07:56)  T(F): 99 (03 Sep 2024 11:20), Max: 99 (03 Sep 2024 11:20)  HR: 98 (03 Sep 2024 11:20) (93 - 98)  BP: 157/88 (03 Sep 2024 11:20) (157/88 - 173/90)  BP(mean): --  RR: 20 (03 Sep 2024 11:20) (18 - 20)  SpO2: 93% (03 Sep 2024 11:20) (93% - 93%)    Parameters below as of 03 Sep 2024 11:20  Patient On (Oxygen Delivery Method): nasal cannula  O2 Flow (L/min): 4      Height (cm): 170.2 (09-03 @ 07:56)  Weight (kg): 82.6 (09-03 @ 07:56)  BMI (kg/m2): 28.5 (09-03 @ 07:56)  BSA (m2): 1.94 (09-03 @ 07:56)    PHYSICAL EXAM:  Constitutional: NAD, post pain medications  Neck: No JVD  Respiratory:  air entrance B/L, no wheezes, rales or rhonchi  Cardiovascular: S1, S2, RRR, no pericardial rub, no murmur  Gastrointestinal: BS+, soft, epigastric  tenderness without rebound. , no distension, no bruit  Pelvis: bladder non-distended, no CVA tenderness  Extremities: edema plus 2.   Neurological: sleeping after pain medication , respond to certain questions   Right Tunneled catheter with no erythema and no discharge.                      Consult called by Dr Hutton. 09/03/2023  1 pm   Chief complain/HPI  79 years old with ESRD getting dialysis via left permanente catheter. He cam to the ER for abdominal pain that started in am after breakfast.  He went for his dialysis treatment in am, dialysis was stopped after one hour due to abdominal pain. His pain is in Epigastric area. CT showed Diverticulosis in colon area and one in duodenum area.  No c/o fever and chills.  As per his son patient was on and off on Apixaban since 12/23 , cause unknown  Last admission 04/2024 came to the ER for abdominal pain and was found to have bacteremia.     PAST MEDICAL & SURGICAL HISTORY:  HTN (hypertension)      Chronic kidney disease      HLD (hyperlipidemia)      No significant past surgical history          Home Medications Reviewed    Hospital Medications:   MEDICATIONS  (STANDING):  aspirin  chewable 81 milliGRAM(s) Oral daily  atorvastatin 20 milliGRAM(s) Oral at bedtime  losartan 50 milliGRAM(s) Oral daily  pantoprazole  Injectable 40 milliGRAM(s) IV Push daily    MEDICATIONS  (PRN):  acetaminophen     Tablet .. 650 milliGRAM(s) Oral every 6 hours PRN Temp greater or equal to 38C (100.4F), Mild Pain (1 - 3)      Allergies    No Known Allergies    Intolerances                              10.2   13.59 )-----------( 123      ( 03 Sep 2024 08:43 )             29.8     09-03    135  |  104  |  45<H>  ----------------------------<  187<H>  4.9   |  23  |  5.32<H>    Ca    9.1      03 Sep 2024 08:43    TPro  8.0  /  Alb  3.5  /  TBili  0.9  /  DBili  x   /  AST  20  /  ALT  20  /  AlkPhos  135<H>  09-03              RADIOLOGY & ADDITIONAL STUDIES:    SOCIAL HISTORY: Denies ETOh,Smoking,     FAMILY HISTORY:      REVIEW OF SYSTEMS:  CONSTITUTIONAL: No malaise, No fatigue, No fevers or chills, well developed, no diaphoresis    RESPIRATORY: No cough, wheezing, hemoptysis; No shortness of breath  CARDIOVASCULAR: No chest pain or palpitations. No edema  GASTROINTESTINAL: c/o epigastric pain.   GENITOURINARY: No dysuria, frequency, foamy urine, urinary urgency, incontinence or hematuria  NEUROLOGICAL: No numbness or weakness, No tremor  SKIN: No itching, burning, rashes, or lesions       VITALS:  Vital Signs Last 24 Hrs  T(C): 37.2 (03 Sep 2024 11:20), Max: 37.2 (03 Sep 2024 07:56)  T(F): 99 (03 Sep 2024 11:20), Max: 99 (03 Sep 2024 11:20)  HR: 98 (03 Sep 2024 11:20) (93 - 98)  BP: 157/88 (03 Sep 2024 11:20) (157/88 - 173/90)  BP(mean): --  RR: 20 (03 Sep 2024 11:20) (18 - 20)  SpO2: 93% (03 Sep 2024 11:20) (93% - 93%)    Parameters below as of 03 Sep 2024 11:20  Patient On (Oxygen Delivery Method): nasal cannula  O2 Flow (L/min): 4      Height (cm): 170.2 (09-03 @ 07:56)  Weight (kg): 82.6 (09-03 @ 07:56)  BMI (kg/m2): 28.5 (09-03 @ 07:56)  BSA (m2): 1.94 (09-03 @ 07:56)    PHYSICAL EXAM:  Constitutional: NAD, post pain medications  Neck: No JVD  Respiratory:  air entrance B/L, no wheezes, rales or rhonchi  Cardiovascular: S1, S2, RRR, no pericardial rub, no murmur  Gastrointestinal: BS+, soft, epigastric  tenderness without rebound. , no distension, no bruit  Pelvis: bladder non-distended, no CVA tenderness  Extremities: edema plus 2.   Neurological: sleeping after pain medication , respond to certain questions   Right Tunneled catheter with no erythema and no discharge.

## 2024-09-03 NOTE — ED ADULT TRIAGE NOTE - WEIGHT METHOD
stated Hemigard Intro: Due to skin fragility and wound tension, it was decided to use HEMIGARD adhesive retention suture devices to permit a linear closure. The skin was cleaned and dried for a 6cm distance away from the wound. Excessive hair, if present, was removed to allow for adhesion.

## 2024-09-04 DIAGNOSIS — H34.8112 CENTRAL RETINAL VEIN OCCLUSION, RIGHT EYE, STABLE: ICD-10-CM

## 2024-09-04 DIAGNOSIS — J98.11 ATELECTASIS: ICD-10-CM

## 2024-09-04 DIAGNOSIS — J90 PLEURAL EFFUSION, NOT ELSEWHERE CLASSIFIED: ICD-10-CM

## 2024-09-04 DIAGNOSIS — K80.20 CALCULUS OF GALLBLADDER WITHOUT CHOLECYSTITIS WITHOUT OBSTRUCTION: ICD-10-CM

## 2024-09-04 LAB
ALBUMIN SERPL ELPH-MCNC: 3.3 G/DL — LOW (ref 3.5–5)
ALP SERPL-CCNC: 116 U/L — SIGNIFICANT CHANGE UP (ref 40–120)
ALT FLD-CCNC: 23 U/L DA — SIGNIFICANT CHANGE UP (ref 10–60)
ANION GAP SERPL CALC-SCNC: 8 MMOL/L — SIGNIFICANT CHANGE UP (ref 5–17)
AST SERPL-CCNC: 31 U/L — SIGNIFICANT CHANGE UP (ref 10–40)
BASOPHILS # BLD AUTO: 0.02 K/UL — SIGNIFICANT CHANGE UP (ref 0–0.2)
BASOPHILS NFR BLD AUTO: 0.2 % — SIGNIFICANT CHANGE UP (ref 0–2)
BILIRUB SERPL-MCNC: 1.4 MG/DL — HIGH (ref 0.2–1.2)
BUN SERPL-MCNC: 38 MG/DL — HIGH (ref 7–18)
CALCIUM SERPL-MCNC: 9.1 MG/DL — SIGNIFICANT CHANGE UP (ref 8.4–10.5)
CALCIUM SERPL-MCNC: 9.3 MG/DL — SIGNIFICANT CHANGE UP (ref 8.4–10.5)
CHLORIDE SERPL-SCNC: 97 MMOL/L — SIGNIFICANT CHANGE UP (ref 96–108)
CO2 SERPL-SCNC: 28 MMOL/L — SIGNIFICANT CHANGE UP (ref 22–31)
CREAT SERPL-MCNC: 5.1 MG/DL — HIGH (ref 0.5–1.3)
EGFR: 11 ML/MIN/1.73M2 — LOW
EOSINOPHIL # BLD AUTO: 0.02 K/UL — SIGNIFICANT CHANGE UP (ref 0–0.5)
EOSINOPHIL NFR BLD AUTO: 0.2 % — SIGNIFICANT CHANGE UP (ref 0–6)
GLUCOSE SERPL-MCNC: 154 MG/DL — HIGH (ref 70–99)
HCT VFR BLD CALC: 31.5 % — LOW (ref 39–50)
HGB BLD-MCNC: 10.8 G/DL — LOW (ref 13–17)
IMM GRANULOCYTES NFR BLD AUTO: 1.1 % — HIGH (ref 0–0.9)
LYMPHOCYTES # BLD AUTO: 1.11 K/UL — SIGNIFICANT CHANGE UP (ref 1–3.3)
LYMPHOCYTES # BLD AUTO: 9.1 % — LOW (ref 13–44)
MAGNESIUM SERPL-MCNC: 2.3 MG/DL — SIGNIFICANT CHANGE UP (ref 1.6–2.6)
MCHC RBC-ENTMCNC: 32.1 PG — SIGNIFICANT CHANGE UP (ref 27–34)
MCHC RBC-ENTMCNC: 34.3 GM/DL — SIGNIFICANT CHANGE UP (ref 32–36)
MCV RBC AUTO: 93.8 FL — SIGNIFICANT CHANGE UP (ref 80–100)
MONOCYTES # BLD AUTO: 0.82 K/UL — SIGNIFICANT CHANGE UP (ref 0–0.9)
MONOCYTES NFR BLD AUTO: 6.7 % — SIGNIFICANT CHANGE UP (ref 2–14)
MRSA PCR RESULT.: SIGNIFICANT CHANGE UP
NEUTROPHILS # BLD AUTO: 10.13 K/UL — HIGH (ref 1.8–7.4)
NEUTROPHILS NFR BLD AUTO: 82.7 % — HIGH (ref 43–77)
NRBC # BLD: 0 /100 WBCS — SIGNIFICANT CHANGE UP (ref 0–0)
PHOSPHATE SERPL-MCNC: 3.9 MG/DL — SIGNIFICANT CHANGE UP (ref 2.5–4.5)
PLATELET # BLD AUTO: 126 K/UL — LOW (ref 150–400)
POTASSIUM SERPL-MCNC: 4.9 MMOL/L — SIGNIFICANT CHANGE UP (ref 3.5–5.3)
POTASSIUM SERPL-SCNC: 4.9 MMOL/L — SIGNIFICANT CHANGE UP (ref 3.5–5.3)
PROT SERPL-MCNC: 8.1 G/DL — SIGNIFICANT CHANGE UP (ref 6–8.3)
PTH-INTACT FLD-MCNC: 389 PG/ML — HIGH (ref 15–65)
RBC # BLD: 3.36 M/UL — LOW (ref 4.2–5.8)
RBC # FLD: 12.5 % — SIGNIFICANT CHANGE UP (ref 10.3–14.5)
S AUREUS DNA NOSE QL NAA+PROBE: SIGNIFICANT CHANGE UP
SODIUM SERPL-SCNC: 133 MMOL/L — LOW (ref 135–145)
WBC # BLD: 12.23 K/UL — HIGH (ref 3.8–10.5)
WBC # FLD AUTO: 12.23 K/UL — HIGH (ref 3.8–10.5)

## 2024-09-04 RX ORDER — CHLORHEXIDINE GLUCONATE 40 MG/ML
1 SOLUTION TOPICAL
Refills: 0 | Status: DISCONTINUED | OUTPATIENT
Start: 2024-09-04 | End: 2024-09-06

## 2024-09-04 RX ORDER — PANTOPRAZOLE SODIUM 40 MG
40 TABLET, DELAYED RELEASE (ENTERIC COATED) ORAL
Refills: 0 | Status: DISCONTINUED | OUTPATIENT
Start: 2024-09-04 | End: 2024-09-06

## 2024-09-04 RX ADMIN — Medication 20 MILLIGRAM(S): at 05:17

## 2024-09-04 RX ADMIN — Medication 40 MILLIGRAM(S): at 11:13

## 2024-09-04 RX ADMIN — LOSARTAN POTASSIUM 50 MILLIGRAM(S): 50 TABLET ORAL at 05:17

## 2024-09-04 RX ADMIN — Medication 20 MILLIGRAM(S): at 21:48

## 2024-09-04 RX ADMIN — Medication 81 MILLIGRAM(S): at 11:14

## 2024-09-04 RX ADMIN — Medication 2000 MILLIGRAM(S): at 11:13

## 2024-09-04 RX ADMIN — ACETAMINOPHEN 1000 MILLIGRAM(S): 325 TABLET ORAL at 03:21

## 2024-09-04 NOTE — CONSULT NOTE ADULT - ASSESSMENT
1. Abdominal pain  2. Gall stone  3. R/o biliary colic  4. R/o Peptic ulcer disease  5. GERD  6. Anemia (etiology multifactorial)  7. No evidence of acute GI bleeding    Suggestions:    1. Protonix 40mg daily  2. Avoid NSAID  3. Surgical evaluation  4. Diet as tolerated  5. Monitor H/H  6. Transfuse PRBC as needed  7. Continue antibiotics  8. DVT prophylaxis

## 2024-09-04 NOTE — PROGRESS NOTE ADULT - PROBLEM SELECTOR PLAN 2
hx of ESRD T/TH, Sa  was unable to complete dialysis in center  appears fluid overloaded on exam  will be dialyzed today   likely will need change in catheter due to possible source of infection  once stabilized would consult vascula for fistula  nephro Dr. Dolan following hx of ESRD T/TH/Sa  was unable to complete dialysis in center  s/p dialysis on 9/3  likely will need change in catheter due to possible source of infection    Nephro Dr. Art following  Vascular Dr. Riojas consulted for fistula

## 2024-09-04 NOTE — PROGRESS NOTE ADULT - SUBJECTIVE AND OBJECTIVE BOX
Patient is a 79y old  Male who presents with a chief complaint of abdominal pain (03 Sep 2024 14:27)    pt seen in icu [  ], reg med floor [   ], bed [  ], chair at bedside [   ], a+o x3 [  ], lethargic [  ],  nad [  ]    smalls [  ], ngt [  ], peg [  ], et tube [  ], cent line [  ], picc line [  ]        Allergies    No Known Allergies        Vitals    T(F): 97.5 (09-04-24 @ 04:49), Max: 99.3 (09-03-24 @ 23:08)  HR: 77 (09-04-24 @ 04:49) (77 - 98)  BP: 106/61 (09-04-24 @ 04:49) (106/61 - 173/90)  RR: 18 (09-04-24 @ 04:49) (18 - 20)  SpO2: 96% (09-04-24 @ 04:49) (93% - 100%)  Wt(kg): --  CAPILLARY BLOOD GLUCOSE      POCT Blood Glucose.: 120 mg/dL (03 Sep 2024 08:17)      Labs                          10.2   13.59 )-----------( 123      ( 03 Sep 2024 08:43 )             29.8       09-03    135  |  104  |  45<H>  ----------------------------<  187<H>  4.9   |  23  |  5.32<H>    Ca    9.1      03 Sep 2024 08:43    TPro  8.0  /  Alb  3.5  /  TBili  0.9  /  DBili  x   /  AST  20  /  ALT  20  /  AlkPhos  135<H>  09-03                Radiology Results      Meds    MEDICATIONS  (STANDING):  aspirin  chewable 81 milliGRAM(s) Oral daily  atorvastatin 20 milliGRAM(s) Oral at bedtime  losartan 50 milliGRAM(s) Oral daily  pantoprazole  Injectable 40 milliGRAM(s) IV Push daily      MEDICATIONS  (PRN):  acetaminophen     Tablet .. 650 milliGRAM(s) Oral every 6 hours PRN Temp greater or equal to 38C (100.4F), Mild Pain (1 - 3)      Physical Exam    Neuro :  no focal deficits  Respiratory: CTA B/L  CV: RRR, S1S2, no murmurs,   Abdominal: Soft, NT, ND +BS,  Extremities: No edema, + peripheral pulses    ASSESSMENT    ams 2nd to metabolic encephalopathy,   r/o infectious process,   intractable abd pain,   duodenal diverticula,   pleural eff,   cholelithiasis,   h/o sinus node dysfunction,   chronic HFpEF, aortic stenosis,   HTN,   HLD,   b/l carotid stenosis,   DM (not on meds),   ESRD on dialysis (Tue, Thu, Sat),   Alcohol dependence,    Gout,    glaucoma,   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     Patient is a 79y old  Male who presents with a chief complaint of abdominal pain (03 Sep 2024 14:27)    pt seen in icu [  ], reg med floor [ x  ], bed [ x ], chair at bedside [   ], a+o x3 [  ], lethargic [  ],  nad [x  ]       Allergies    No Known Allergies        Vitals    T(F): 97.5 (09-04-24 @ 04:49), Max: 99.3 (09-03-24 @ 23:08)  HR: 77 (09-04-24 @ 04:49) (77 - 98)  BP: 106/61 (09-04-24 @ 04:49) (106/61 - 173/90)  RR: 18 (09-04-24 @ 04:49) (18 - 20)  SpO2: 96% (09-04-24 @ 04:49) (93% - 100%)  Wt(kg): --  CAPILLARY BLOOD GLUCOSE      POCT Blood Glucose.: 120 mg/dL (03 Sep 2024 08:17)      Labs                          10.2   13.59 )-----------( 123      ( 03 Sep 2024 08:43 )             29.8       09-03    135  |  104  |  45<H>  ----------------------------<  187<H>  4.9   |  23  |  5.32<H>    Ca    9.1      03 Sep 2024 08:43    TPro  8.0  /  Alb  3.5  /  TBili  0.9  /  DBili  x   /  AST  20  /  ALT  20  /  AlkPhos  135<H>  09-03                Radiology Results      Meds    MEDICATIONS  (STANDING):  aspirin  chewable 81 milliGRAM(s) Oral daily  atorvastatin 20 milliGRAM(s) Oral at bedtime  losartan 50 milliGRAM(s) Oral daily  pantoprazole  Injectable 40 milliGRAM(s) IV Push daily      MEDICATIONS  (PRN):  acetaminophen     Tablet .. 650 milliGRAM(s) Oral every 6 hours PRN Temp greater or equal to 38C (100.4F), Mild Pain (1 - 3)      Physical Exam    Neuro :  no focal deficits  Respiratory: CTA B/L  CV: RRR, S1S2, no murmurs,   Abdominal: Soft, NT, ND +BS,  Extremities: No edema, + peripheral pulses    ASSESSMENT    ams 2nd to metabolic encephalopathy,   sirs    intractable abd pain,   duodenal diverticula,   pleural eff,   cholelithiasis,   h/o sinus node dysfunction,   chronic HFpEF,   aortic stenosis,   HTN,   HLD,   b/l carotid stenosis,   DM (not on meds),   ESRD on dialysis (Tue, Thu, Sat),   Alcohol dependence,    Gout,    glaucoma,   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  cont current meds      Patient is a 79y old  Male who presents with a chief complaint of abdominal pain (03 Sep 2024 14:27)    pt seen in icu [  ], reg med floor [ x  ], bed [ x ], chair at bedside [   ], a+o x3 [ x ], lethargic [  ],  nad [x  ]       Allergies    No Known Allergies        Vitals    T(F): 97.5 (09-04-24 @ 04:49), Max: 99.3 (09-03-24 @ 23:08)  HR: 77 (09-04-24 @ 04:49) (77 - 98)  BP: 106/61 (09-04-24 @ 04:49) (106/61 - 173/90)  RR: 18 (09-04-24 @ 04:49) (18 - 20)  SpO2: 96% (09-04-24 @ 04:49) (93% - 100%)  Wt(kg): --  CAPILLARY BLOOD GLUCOSE      POCT Blood Glucose.: 120 mg/dL (03 Sep 2024 08:17)      Labs                          10.2   13.59 )-----------( 123      ( 03 Sep 2024 08:43 )             29.8       09-03    135  |  104  |  45<H>  ----------------------------<  187<H>  4.9   |  23  |  5.32<H>    Ca    9.1      03 Sep 2024 08:43    TPro  8.0  /  Alb  3.5  /  TBili  0.9  /  DBili  x   /  AST  20  /  ALT  20  /  AlkPhos  135<H>  09-03                Radiology Results      Meds    MEDICATIONS  (STANDING):  aspirin  chewable 81 milliGRAM(s) Oral daily  atorvastatin 20 milliGRAM(s) Oral at bedtime  losartan 50 milliGRAM(s) Oral daily  pantoprazole  Injectable 40 milliGRAM(s) IV Push daily      MEDICATIONS  (PRN):  acetaminophen     Tablet .. 650 milliGRAM(s) Oral every 6 hours PRN Temp greater or equal to 38C (100.4F), Mild Pain (1 - 3)      Physical Exam    Neuro :  no focal deficits  Respiratory: CTA B/L  CV: RRR, S1S2, no murmurs,   Abdominal: Soft, epigastric tender to deep palp, ND +BS,  Extremities: No edema, + peripheral pulses        ASSESSMENT    ams 2nd to metabolic encephalopathy,   sirs    intractable abd pain,   duodenal diverticula,   pleural eff,   cholelithiasis,   h/o sinus node dysfunction,   chronic HFpEF,   aortic stenosis,   HTN,   HLD,   b/l carotid stenosis,   DM (not on meds),   ESRD on dialysis (Tue, Thu, Sat),   Alcohol dependence,    Gout,    glaucoma,   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  cont current meds

## 2024-09-04 NOTE — PROGRESS NOTE ADULT - PROBLEM SELECTOR PLAN 1
p/w with intractable abdominal pain , with one episode vomiting  AMS, AAOx1   unable to tolerate oral intake   wbc 13.59   possible infection  last time in April he had similar sx and had a positive blood culture.  Dialyze catheter was changed as possible source of infection'  CT A/P  Pancolonic diverticulosis without diverticulitis.  Hepatic and pacreatic U/S: Cholelithiasis   c/w Tylenol as needed   c/w ppi    f/u bcx   f/u ucx   c/w NPO till  patient is more awake and alert   ID consulted  Nephro consulted  Gi consulted p/w with intractable abdominal pain , with one episode vomiting  AMS, AAOx1   wbc 13.59   last time in April he had similar sx and had a positive blood culture.  Dialyze catheter was changed as possible source of infection  CT A/P  Pancolonic diverticulosis without diverticulitis.  Hepatic and pacreatic U/S: Cholelithiasis   c/w Tylenol 650 q6 as needed   c/w pantoprazole 40 before breakfast  f/u bcx   f/u ucx     ID Dr. Cui following  Nephro Dr. Art following  GI Dr. Crandall following  Advanced diet as patient is more alert today

## 2024-09-04 NOTE — CONSULT NOTE ADULT - ASSESSMENT
79y.o. Male with ESRD on HD    -Poss L AVF creation when Bcx neg and pt cleared  -Medical optimization  -con't LUE precaution  -HD via PC  -LUE vein mapping

## 2024-09-04 NOTE — PROGRESS NOTE ADULT - SUBJECTIVE AND OBJECTIVE BOX
PGY-1 Progress Note discussed with attending    PLEASE MS TEAMS AUTHOR TILL 5:00 PM    PLEASE CONTACT ON CALL TEAM:  - On Call Team (Please refer to Darrick) FROM 5:00 PM - 8:30PM  - Nightfloat Team FROM 8:30 -7:30 AM      INTERVAL HPI/OVERNIGHT EVENTS: No acute events overnight.    SUBJECTIVE: Patient seen and examined at bedside this morning.    ---------------------------    REVIEW OF SYSTEMS:  CONSTITUTIONAL: No fever, weight loss, or fatigue  RESPIRATORY: No cough, wheezing, chills or hemoptysis; No shortness of breath  CARDIOVASCULAR: No chest pain, palpitations, dizziness, or leg swelling  GASTROINTESTINAL: No abdominal pain. No nausea, vomiting, or hematemesis; No diarrhea or constipation. No melena or hematochezia.  GENITOURINARY: No dysuria or hematuria, urinary frequency  NEUROLOGICAL: No headaches, memory loss, loss of strength, numbness, or tremors  SKIN: No itching, burning, rashes, or lesions     MEDICATIONS  (STANDING):  aspirin  chewable 81 milliGRAM(s) Oral daily  atorvastatin 20 milliGRAM(s) Oral at bedtime  losartan 50 milliGRAM(s) Oral daily  pantoprazole  Injectable 40 milliGRAM(s) IV Push daily    MEDICATIONS  (PRN):  acetaminophen     Tablet .. 650 milliGRAM(s) Oral every 6 hours PRN Temp greater or equal to 38C (100.4F), Mild Pain (1 - 3)      Vital Signs Last 24 Hrs  T(C): 36.4 (04 Sep 2024 04:49), Max: 37.4 (03 Sep 2024 23:08)  T(F): 97.5 (04 Sep 2024 04:49), Max: 99.3 (03 Sep 2024 23:08)  HR: 77 (04 Sep 2024 04:49) (77 - 98)  BP: 106/61 (04 Sep 2024 04:49) (106/61 - 173/90)  BP(mean): --  RR: 18 (04 Sep 2024 04:49) (18 - 20)  SpO2: 96% (04 Sep 2024 04:49) (93% - 100%)    Parameters below as of 04 Sep 2024 04:49  Patient On (Oxygen Delivery Method): nasal cannula  O2 Flow (L/min): 4      -----------------------------    PHYSICAL EXAMINATION:  GENERAL: NAD, lying in bed  HEAD:  Atraumatic, Normocephalic  EYES:  conjunctiva and sclera clear  NECK: Supple, No JVD  CHEST/LUNG: Clear to auscultation bilaterally; No rales, rhonchi, wheezing, or rubs  HEART: Regular rate and rhythm; No murmurs, rubs, or gallops  ABDOMEN: Soft, Nontender, Nondistended; Bowel sounds present, no pain or masses on palpation  NERVOUS SYSTEM:  Alert & Oriented X3  : voiding well  EXTREMITIES:  2+ Peripheral Pulses, No clubbing, cyanosis, or edema  SKIN: warm dry                          10.2   13.59 )-----------( 123      ( 03 Sep 2024 08:43 )             29.8     09-03    135  |  104  |  45<H>  ----------------------------<  187<H>  4.9   |  23  |  5.32<H>    Ca    9.1      03 Sep 2024 08:43    TPro  8.0  /  Alb  3.5  /  TBili  0.9  /  DBili  x   /  AST  20  /  ALT  20  /  AlkPhos  135<H>  09-03    LIVER FUNCTIONS - ( 03 Sep 2024 08:43 )  Alb: 3.5 g/dL / Pro: 8.0 g/dL / ALK PHOS: 135 U/L / ALT: 20 U/L DA / AST: 20 U/L / GGT: x                   I&O's Summary    03 Sep 2024 07:01  -  04 Sep 2024 07:00  --------------------------------------------------------  IN: 600 mL / OUT: 3030 mL / NET: -2430 mL            CAPILLARY BLOOD GLUCOSE      RADIOLOGY & ADDITIONAL TESTS:                   PGY-1 Progress Note discussed with attending    PLEASE MS TEAMS AUTHOR TILL 5:00 PM    PLEASE CONTACT ON CALL TEAM:  - On Call Team (Please refer to Darrick) FROM 5:00 PM - 8:30PM  - Nightfloat Team FROM 8:30 -7:30 AM      INTERVAL HPI/OVERNIGHT EVENTS: No acute events overnight.    SUBJECTIVE: Patient seen and examined at bedside this morning. AAOx2, sleepy this morning but arousable to voice. Denies any acute complaints. Denies any abdominal pain.    ---------------------------    REVIEW OF SYSTEMS:  CONSTITUTIONAL: No fever, weight loss, or fatigue  RESPIRATORY: No cough, wheezing, chills or hemoptysis; No shortness of breath  CARDIOVASCULAR: No chest pain, palpitations, dizziness, or leg swelling  GASTROINTESTINAL: No abdominal pain. No nausea, vomiting, or hematemesis; No diarrhea or constipation. No melena or hematochezia.  GENITOURINARY: No dysuria or hematuria, urinary frequency  NEUROLOGICAL: No headaches, memory loss, loss of strength, numbness, or tremors  SKIN: No itching, burning, rashes, or lesions     MEDICATIONS  (STANDING):  aspirin  chewable 81 milliGRAM(s) Oral daily  atorvastatin 20 milliGRAM(s) Oral at bedtime  losartan 50 milliGRAM(s) Oral daily  pantoprazole  Injectable 40 milliGRAM(s) IV Push daily    MEDICATIONS  (PRN):  acetaminophen     Tablet .. 650 milliGRAM(s) Oral every 6 hours PRN Temp greater or equal to 38C (100.4F), Mild Pain (1 - 3)      Vital Signs Last 24 Hrs  T(C): 36.4 (04 Sep 2024 04:49), Max: 37.4 (03 Sep 2024 23:08)  T(F): 97.5 (04 Sep 2024 04:49), Max: 99.3 (03 Sep 2024 23:08)  HR: 77 (04 Sep 2024 04:49) (77 - 98)  BP: 106/61 (04 Sep 2024 04:49) (106/61 - 173/90)  BP(mean): --  RR: 18 (04 Sep 2024 04:49) (18 - 20)  SpO2: 96% (04 Sep 2024 04:49) (93% - 100%)    Parameters below as of 04 Sep 2024 04:49  Patient On (Oxygen Delivery Method): nasal cannula  O2 Flow (L/min): 4      -----------------------------    PHYSICAL EXAMINATION:  GENERAL: NAD, lying in bed  HEAD:  Atraumatic, Normocephalic  EYES:  conjunctiva and sclera clear  NECK: Supple, No JVD  CHEST/LUNG: Clear to auscultation bilaterally; No rales, rhonchi, wheezing, or rubs  HEART: Regular rate and rhythm; No murmurs, rubs, or gallops  ABDOMEN: Soft, Nontender, Nondistended; Bowel sounds present, no pain or masses on palpation  NERVOUS SYSTEM:  Alert & Oriented X3  EXTREMITIES:  2+ Peripheral Pulses, No clubbing, cyanosis, or edema  SKIN: warm dry                          10.2   13.59 )-----------( 123      ( 03 Sep 2024 08:43 )             29.8     09-03    135  |  104  |  45<H>  ----------------------------<  187<H>  4.9   |  23  |  5.32<H>    Ca    9.1      03 Sep 2024 08:43    TPro  8.0  /  Alb  3.5  /  TBili  0.9  /  DBili  x   /  AST  20  /  ALT  20  /  AlkPhos  135<H>  09-03    LIVER FUNCTIONS - ( 03 Sep 2024 08:43 )  Alb: 3.5 g/dL / Pro: 8.0 g/dL / ALK PHOS: 135 U/L / ALT: 20 U/L DA / AST: 20 U/L / GGT: x                   I&O's Summary    03 Sep 2024 07:01  -  04 Sep 2024 07:00  --------------------------------------------------------  IN: 600 mL / OUT: 3030 mL / NET: -2430 mL            CAPILLARY BLOOD GLUCOSE      RADIOLOGY & ADDITIONAL TESTS:

## 2024-09-04 NOTE — CONSULT NOTE ADULT - LYMPHATIC
31 y.o  GA 39w5d here to LND for SROM at 0400.     0700- Bedside report received from Caryn NANCE. Pt breathing through ctx with FOB at bedside. Pt requesting epidural.     715- Dr. Stewart to bedside for placement of epidural.     1335- SVE by Dr. Gibbs, pt has left lip/rim, orders to reduce epidural, permission from Dr. Stewart to decrease rate from 12 to 6 ml/hr.     1435-  by Dr. Gibbs of viable female, no name decided yet, apgars 8/9, skin-to-skin with mom. No tears or complications.     1720- Pt transferred to PPU, report given to GOYO Ramirezi.        No lymphadedenopathy

## 2024-09-04 NOTE — PROGRESS NOTE ADULT - SUBJECTIVE AND OBJECTIVE BOX
Problem List:  ESRD  Chief complain/HPI  79 years old with ESRD getting dialysis via left permanente catheter. He cam to the ER for abdominal pain that started in am after breakfast.  He went for his dialysis treatment in am, dialysis was stopped after one hour due to abdominal pain. His pain is in Epigastric area. CT showed Diverticulosis in colon area and one in duodenum area.  No c/o fever and chills.  As per his son patient was on and off on Apixaban since 12/23 , cause unknown  Last admission 04/2024 came to the ER for abdominal pain and was found to have bacteremia.     At present no c/o abdominal pain , no vomit and no nausea.     PAST MEDICAL & SURGICAL HISTORY:  HTN (hypertension)      Chronic kidney disease      HLD (hyperlipidemia)      No significant past surgical history          No Known Allergies      MEDICATIONS  (STANDING):  aspirin  chewable 81 milliGRAM(s) Oral daily  atorvastatin 20 milliGRAM(s) Oral at bedtime  cefTRIAXone   IVPB      chlorhexidine 2% Cloths 1 Application(s) Topical <User Schedule>  losartan 50 milliGRAM(s) Oral daily  pantoprazole    Tablet 40 milliGRAM(s) Oral before breakfast    MEDICATIONS  (PRN):  acetaminophen     Tablet .. 650 milliGRAM(s) Oral every 6 hours PRN Temp greater or equal to 38C (100.4F), Mild Pain (1 - 3)                            10.8   12.23 )-----------( 126      ( 04 Sep 2024 06:34 )             31.5     09-04    133<L>  |  97  |  38<H>  ----------------------------<  154<H>  4.9   |  28  |  5.10<H>    Ca    9.3      04 Sep 2024 06:34  Phos  3.9     09-04  Mg     2.3     09-04    TPro  8.1  /  Alb  3.3<L>  /  TBili  1.4<H>  /  DBili  x   /  AST  31  /  ALT  23  /  AlkPhos  116  09-04            REVIEW OF SYSTEMS:  General: no fever no chills, no weight loss.  EYES/ENT: No visual changes;  No vertigo, no headache.  NECK: No pain or stiffness  RESPIRATORY: No cough, wheezing, hemoptysis; No shortness of breath  CARDIOVASCULAR: No chest pain or palpitations. No Edema  GASTROINTESTINAL: No abdominal or epigastric pain. No nausea, vomiting. No diarrhea or constipation. No melena. Appetite is good.   GENITOURINARY: No dysuria, oliguria.   NEUROLOGICAL: No numbness or weakness, no tremor , no dizziness.   Muscle skeletal : no joint pain and no swelling of joints and limbs.  SKIN: No itching, burning, rashes.        VITALS:  T(F): 98.1 (09-04-24 @ 14:11), Max: 99.3 (09-03-24 @ 23:08)  HR: 77 (09-04-24 @ 14:11)  BP: 85/53 (09-04-24 @ 14:11)  RR: 16 (09-04-24 @ 14:11)  SpO2: 100% (09-04-24 @ 14:11)  Wt(kg): --    09-03 @ 07:01  -  09-04 @ 07:00  --------------------------------------------------------  IN: 600 mL / OUT: 3030 mL / NET: -2430 mL        PHYSICAL EXAM:  Constitutional: well developed, no diaphoresis, no distress.  Neck: No JVD, no carotid bruit, supple, no adenopathy  Respiratory:  air entrance B/L, no wheezes, rales or rhonchi  Cardiovascular: S1, S2, RRR, no pericardial rub, no murmur  Abdomen: BS+, soft, no tenderness, no bruit  Pelvis: bladder nondistended  Extremities: No cyanosis or clubbing. No peripheral edema.     Vascular Access: right Tunneled catheter with no discharge and no erythema

## 2024-09-04 NOTE — PROGRESS NOTE ADULT - SUBJECTIVE AND OBJECTIVE BOX
Patient is seen and examined at the bed side, is afebrile. He is doing better, the mental status back to baseline. The Blood cultures are in process.       REVIEW OF SYSTEMS: All other review systems are negative      ALLERGIES: No Known Allergies      Vital Signs Last 24 Hrs  T(C): 36.7 (04 Sep 2024 14:11), Max: 37.4 (03 Sep 2024 23:08)  T(F): 98.1 (04 Sep 2024 14:11), Max: 99.3 (03 Sep 2024 23:08)  HR: 76 (04 Sep 2024 15:51) (76 - 91)  BP: 97/57 (04 Sep 2024 15:51) (85/53 - 134/67)  BP(mean): 90 (04 Sep 2024 15:51) (90 - 90)  RR: 18 (04 Sep 2024 15:51) (16 - 20)  SpO2: 97% (04 Sep 2024 15:51) (96% - 100%)    Parameters below as of 04 Sep 2024 15:51  Patient On (Oxygen Delivery Method): nasal cannula  O2 Flow (L/min): 4      PHYSICAL EXAM:  GENERAL: Not in distress , on oxygen via NC  CHEST/LUNG:  Not using accessory muscles  HEART: s1 and s2 present  ABDOMEN:  Nontender and  Nondistended  EXTREMITIES: No pedal  edema  CNS: Awake and Alert        LABS:                        10.8   12.23 )-----------( 126      ( 04 Sep 2024 06:34 )             31.5                           10.2   13.59 )-----------( 123      ( 03 Sep 2024 08:43 )             29.8         09-04    133<L>  |  97  |  38<H>  ----------------------------<  154<H>  4.9   |  28  |  5.10<H>    Ca    9.3      04 Sep 2024 06:34  Phos  3.9     09-04  Mg     2.3     09-04    TPro  8.1  /  Alb  3.3<L>  /  TBili  1.4<H>  /  DBili  x   /  AST  31  /  ALT  23  /  AlkPhos  116  09-04    09-03    135  |  104  |  45<H>  ----------------------------<  187<H>  4.9   |  23  |  5.32<H>    Ca    9.1      03 Sep 2024 08:43    TPro  8.0  /  Alb  3.5  /  TBili  0.9  /  DBili  x   /  AST  20  /  ALT  20  /  AlkPhos  135<H>  09-03        CAPILLARY BLOOD GLUCOSE  POCT Blood Glucose.: 120 mg/dL (03 Sep 2024 08:17)        MEDICATIONS  (STANDING):    aspirin  chewable 81 milliGRAM(s) Oral daily  atorvastatin 20 milliGRAM(s) Oral at bedtime  cefTRIAXone   IVPB      chlorhexidine 2% Cloths 1 Application(s) Topical <User Schedule>  losartan 50 milliGRAM(s) Oral daily  pantoprazole    Tablet 40 milliGRAM(s) Oral before breakfast        RADIOLOGY & ADDITIONAL TESTS:    9/3/24: CT Abdomen and Pelvis w/ IV Cont (09.03.24 @ 12:04) Pancolonic diverticulosis without diverticulitis.    Cholelithiasis.  Trace bilateral pleural effusions with underlying passive atelectasis.

## 2024-09-05 LAB
ALBUMIN SERPL ELPH-MCNC: 2.7 G/DL — LOW (ref 3.5–5)
ALP SERPL-CCNC: 88 U/L — SIGNIFICANT CHANGE UP (ref 40–120)
ALT FLD-CCNC: 17 U/L DA — SIGNIFICANT CHANGE UP (ref 10–60)
ANION GAP SERPL CALC-SCNC: 10 MMOL/L — SIGNIFICANT CHANGE UP (ref 5–17)
AST SERPL-CCNC: 19 U/L — SIGNIFICANT CHANGE UP (ref 10–40)
BASOPHILS # BLD AUTO: 0.01 K/UL — SIGNIFICANT CHANGE UP (ref 0–0.2)
BASOPHILS NFR BLD AUTO: 0.1 % — SIGNIFICANT CHANGE UP (ref 0–2)
BILIRUB SERPL-MCNC: 0.7 MG/DL — SIGNIFICANT CHANGE UP (ref 0.2–1.2)
BUN SERPL-MCNC: 62 MG/DL — HIGH (ref 7–18)
CALCIUM SERPL-MCNC: 9.1 MG/DL — SIGNIFICANT CHANGE UP (ref 8.4–10.5)
CHLORIDE SERPL-SCNC: 97 MMOL/L — SIGNIFICANT CHANGE UP (ref 96–108)
CO2 SERPL-SCNC: 26 MMOL/L — SIGNIFICANT CHANGE UP (ref 22–31)
CREAT SERPL-MCNC: 6.9 MG/DL — HIGH (ref 0.5–1.3)
EGFR: 8 ML/MIN/1.73M2 — LOW
EOSINOPHIL # BLD AUTO: 1.17 K/UL — HIGH (ref 0–0.5)
EOSINOPHIL NFR BLD AUTO: 14.4 % — HIGH (ref 0–6)
GLUCOSE SERPL-MCNC: 174 MG/DL — HIGH (ref 70–99)
HAV IGM SER-ACNC: SIGNIFICANT CHANGE UP
HBV CORE IGM SER-ACNC: SIGNIFICANT CHANGE UP
HBV SURFACE AG SER-ACNC: SIGNIFICANT CHANGE UP
HCT VFR BLD CALC: 27.6 % — LOW (ref 39–50)
HCV AB S/CO SERPL IA: 0.13 S/CO — SIGNIFICANT CHANGE UP (ref 0–0.99)
HCV AB SERPL-IMP: SIGNIFICANT CHANGE UP
HGB BLD-MCNC: 9.4 G/DL — LOW (ref 13–17)
IMM GRANULOCYTES NFR BLD AUTO: 0.6 % — SIGNIFICANT CHANGE UP (ref 0–0.9)
LYMPHOCYTES # BLD AUTO: 1.19 K/UL — SIGNIFICANT CHANGE UP (ref 1–3.3)
LYMPHOCYTES # BLD AUTO: 14.7 % — SIGNIFICANT CHANGE UP (ref 13–44)
MAGNESIUM SERPL-MCNC: 2.5 MG/DL — SIGNIFICANT CHANGE UP (ref 1.6–2.6)
MCHC RBC-ENTMCNC: 32.4 PG — SIGNIFICANT CHANGE UP (ref 27–34)
MCHC RBC-ENTMCNC: 34.1 GM/DL — SIGNIFICANT CHANGE UP (ref 32–36)
MCV RBC AUTO: 95.2 FL — SIGNIFICANT CHANGE UP (ref 80–100)
MONOCYTES # BLD AUTO: 0.77 K/UL — SIGNIFICANT CHANGE UP (ref 0–0.9)
MONOCYTES NFR BLD AUTO: 9.5 % — SIGNIFICANT CHANGE UP (ref 2–14)
NEUTROPHILS # BLD AUTO: 4.91 K/UL — SIGNIFICANT CHANGE UP (ref 1.8–7.4)
NEUTROPHILS NFR BLD AUTO: 60.7 % — SIGNIFICANT CHANGE UP (ref 43–77)
NRBC # BLD: 0 /100 WBCS — SIGNIFICANT CHANGE UP (ref 0–0)
PHOSPHATE SERPL-MCNC: 3.2 MG/DL — SIGNIFICANT CHANGE UP (ref 2.5–4.5)
PLATELET # BLD AUTO: 112 K/UL — LOW (ref 150–400)
POTASSIUM SERPL-MCNC: 4.4 MMOL/L — SIGNIFICANT CHANGE UP (ref 3.5–5.3)
POTASSIUM SERPL-SCNC: 4.4 MMOL/L — SIGNIFICANT CHANGE UP (ref 3.5–5.3)
PROT SERPL-MCNC: 7.2 G/DL — SIGNIFICANT CHANGE UP (ref 6–8.3)
RBC # BLD: 2.9 M/UL — LOW (ref 4.2–5.8)
RBC # FLD: 12.5 % — SIGNIFICANT CHANGE UP (ref 10.3–14.5)
SODIUM SERPL-SCNC: 133 MMOL/L — LOW (ref 135–145)
WBC # BLD: 8.1 K/UL — SIGNIFICANT CHANGE UP (ref 3.8–10.5)
WBC # FLD AUTO: 8.1 K/UL — SIGNIFICANT CHANGE UP (ref 3.8–10.5)

## 2024-09-05 RX ADMIN — LOSARTAN POTASSIUM 50 MILLIGRAM(S): 50 TABLET ORAL at 05:49

## 2024-09-05 RX ADMIN — CHLORHEXIDINE GLUCONATE 1 APPLICATION(S): 40 SOLUTION TOPICAL at 05:48

## 2024-09-05 RX ADMIN — Medication 100 MILLIGRAM(S): at 14:35

## 2024-09-05 RX ADMIN — Medication 40 MILLIGRAM(S): at 05:49

## 2024-09-05 RX ADMIN — Medication 20 MILLIGRAM(S): at 21:11

## 2024-09-05 NOTE — PROGRESS NOTE ADULT - SUBJECTIVE AND OBJECTIVE BOX
[   ] ICU                                          [   ] CCU                                      [ X  ] Medical Floor    Patient is a 79 year old male with abdominal pain. GI consulted to evaluate.         79-year-old male, from home with past medical history significant for DM, Hyperlipidemia, HTN, Glaucoma, Gout, ESRD on HD presented to the emergency room with 2 days history of constant, non radiating epigastric abdominal pain associated with nausea but no vomiting. Patient can not provide good history. No hematemesis, hematochezia, melena, fever, chills, chest pain, SOB, cough, hematuria, dysuria or diarrhea reported.     Patient appears comfortable. No new complaints reported, No abdominal pain, N/V, hematemesis, hematochezia, melena, fever, chills, chest pain, SOB, cough or diarrhea reported.             PAST MEDICAL HISTORY     DM (diabetes mellitus)    HTN (hypertension)     Hyperlipidemia    Glaucoma    Gout        PAST SURGICAL HISTORY  No significant past surgical history        Allergies    No Known Allergies    Intolerances  None         MEDICATIONS  (STANDING):  aspirin  chewable 81 milliGRAM(s) Oral daily  atorvastatin 20 milliGRAM(s) Oral at bedtime  cefTRIAXone   IVPB      losartan 50 milliGRAM(s) Oral daily  pantoprazole  Injectable 40 milliGRAM(s) IV Push daily    MEDICATIONS  (PRN):  acetaminophen     Tablet .. 650 milliGRAM(s) Oral every 6 hours PRN Temp greater or equal to 38C (100.4F), Mild Pain (1 - 3)      SOCIAL HISTORY  Advanced Directives:       [ X ] Full Code       [  ] DNR  Marital Status:         [  ] M      [ X ] S      [  ] D       [  ] W  Children:       [ X ] Yes      [  ] No  Occupation:        [  ] Employed       [ X ] Unemployed       [  ] Retired  Diet:       [X  ] Regular       [  ] PEG feeding          [  ] NG tube feeding  Drug Use:           [ X ] No               [  ] Yes  Alcohol:           [X  ] No             [  ] Yes (socially)         [  ] Yes (chronic)  Tobacco:           [  ] Yes           [ X ] No      FAMILY HISTORY  [ X ] Heart Disease            [ X ] Diabetes             [X  ] HTN             [  ] Colon Cancer             [  ] Stomach Cancer              [  ] Pancreatic Cancer        VITALS   Vital Signs Last 24 Hrs  T(C): 36.9 (09-05-24 @ 05:16), Max: 36.9 (09-04-24 @ 21:32)  T(F): 98.5 (09-05-24 @ 05:16), Max: 98.5 (09-05-24 @ 05:16)  HR: 71 (09-05-24 @ 05:16) (67 - 77)  BP: 121/75 (09-05-24 @ 05:16) (85/53 - 128/87)  BP(mean): 88 (09-05-24 @ 05:16) (88 - 90)  RR: 18 (09-05-24 @ 05:16) (16 - 18)  SpO2: 96% (09-05-24 @ 05:16) (96% - 100%)      MEDICATIONS  (STANDING):  aspirin  chewable 81 milliGRAM(s) Oral daily  atorvastatin 20 milliGRAM(s) Oral at bedtime  cefTRIAXone   IVPB      cefTRIAXone   IVPB 2000 milliGRAM(s) IV Intermittent every 24 hours  chlorhexidine 2% Cloths 1 Application(s) Topical <User Schedule>  losartan 50 milliGRAM(s) Oral daily  pantoprazole    Tablet 40 milliGRAM(s) Oral before breakfast    MEDICATIONS  (PRN):  acetaminophen     Tablet .. 650 milliGRAM(s) Oral every 6 hours PRN Temp greater or equal to 38C (100.4F), Mild Pain (1 - 3)                            9.4    8.10  )-----------( 112      ( 05 Sep 2024 10:55 )             27.6       09-05    133<L>  |  97  |  62<H>  ----------------------------<  174<H>  4.4   |  26  |  6.90<H>    Ca    9.1      05 Sep 2024 10:55  Phos  3.2     09-05  Mg     2.5     09-05    TPro  7.2  /  Alb  2.7<L>  /  TBili  0.7  /  DBili  x   /  AST  19  /  ALT  17  /  AlkPhos  88  09-05

## 2024-09-05 NOTE — PROGRESS NOTE ADULT - PROBLEM SELECTOR PLAN 2
hx of ESRD T/TH/Sa  was unable to complete dialysis in center  s/p dialysis on 9/3  likely will need change in catheter due to possible source of infection    Nephro Dr. Art following  Vascular Dr. Riojas consulted for fistula hx of ESRD T/TH/Sa  was unable to complete dialysis in center  s/p dialysis on 9/3  likely will need change in catheter due to possible source of infection    Nephro Dr. Art following  Vascular Dr. Riojas consulted for fistula: once BCx cleared    - LUE vein mapping today

## 2024-09-05 NOTE — PROGRESS NOTE ADULT - SUBJECTIVE AND OBJECTIVE BOX
Patient is a 79y old  Male who presents with a chief complaint of abdominal pain (04 Sep 2024 18:31)    pt seen in icu [  ], reg med floor [ x  ], bed [ x ], chair at bedside [   ], a+o x3 [ x ], lethargic [  ],    nad [x  ]         Allergies    No Known Allergies        Vitals    T(F): 98.5 (09-05-24 @ 05:16), Max: 98.5 (09-05-24 @ 05:16)  HR: 71 (09-05-24 @ 05:16) (67 - 77)  BP: 121/75 (09-05-24 @ 05:16) (85/53 - 128/87)  RR: 18 (09-05-24 @ 05:16) (16 - 18)  SpO2: 96% (09-05-24 @ 05:16) (96% - 100%)  Wt(kg): --  CAPILLARY BLOOD GLUCOSE      POCT Blood Glucose.: 120 mg/dL (03 Sep 2024 08:17)      Labs                          10.8   12.23 )-----------( 126      ( 04 Sep 2024 06:34 )             31.5       09-04    133<L>  |  97  |  38<H>  ----------------------------<  154<H>  4.9   |  28  |  5.10<H>    Ca    9.3      04 Sep 2024 06:34  Phos  3.9     09-04  Mg     2.3     09-04    TPro  8.1  /  Alb  3.3<L>  /  TBili  1.4<H>  /  DBili  x   /  AST  31  /  ALT  23  /  AlkPhos  116  09-04                Radiology Results      Meds    MEDICATIONS  (STANDING):  aspirin  chewable 81 milliGRAM(s) Oral daily  atorvastatin 20 milliGRAM(s) Oral at bedtime  cefTRIAXone   IVPB      cefTRIAXone   IVPB 2000 milliGRAM(s) IV Intermittent every 24 hours  chlorhexidine 2% Cloths 1 Application(s) Topical <User Schedule>  losartan 50 milliGRAM(s) Oral daily  pantoprazole    Tablet 40 milliGRAM(s) Oral before breakfast      MEDICATIONS  (PRN):  acetaminophen     Tablet .. 650 milliGRAM(s) Oral every 6 hours PRN Temp greater or equal to 38C (100.4F), Mild Pain (1 - 3)      Physical Exam    Neuro :  no focal deficits  Respiratory: CTA B/L  CV: RRR, S1S2, no murmurs,   Abdominal: Soft, epigastric tender to deep palp, ND +BS,  Extremities: No edema, + peripheral pulses        ASSESSMENT    ams 2nd to metabolic encephalopathy,   sirs    intractable abd pain,   duodenal diverticula,   pleural eff,   cholelithiasis,   h/o sinus node dysfunction,   chronic HFpEF,   aortic stenosis,   HTN,   HLD,   b/l carotid stenosis,   DM (not on meds),   ESRD on dialysis (Tue, Thu, Sat),   Alcohol dependence,    Gout,    glaucoma,   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  cont current meds        Patient is a 79y old  Male who presents with a chief complaint of abdominal pain (04 Sep 2024 18:31)    pt seen in icu [  ], reg med floor [ x  ], bed [ x ], chair at bedside [   ], a+o x3 [ x ], lethargic [  ],    nad [x  ]         Allergies    No Known Allergies        Vitals    T(F): 98.5 (09-05-24 @ 05:16), Max: 98.5 (09-05-24 @ 05:16)  HR: 71 (09-05-24 @ 05:16) (67 - 77)  BP: 121/75 (09-05-24 @ 05:16) (85/53 - 128/87)  RR: 18 (09-05-24 @ 05:16) (16 - 18)  SpO2: 96% (09-05-24 @ 05:16) (96% - 100%)  Wt(kg): --  CAPILLARY BLOOD GLUCOSE      POCT Blood Glucose.: 120 mg/dL (03 Sep 2024 08:17)      Labs                          10.8   12.23 )-----------( 126      ( 04 Sep 2024 06:34 )             31.5       09-04    133<L>  |  97  |  38<H>  ----------------------------<  154<H>  4.9   |  28  |  5.10<H>    Ca    9.3      04 Sep 2024 06:34  Phos  3.9     09-04  Mg     2.3     09-04    TPro  8.1  /  Alb  3.3<L>  /  TBili  1.4<H>  /  DBili  x   /  AST  31  /  ALT  23  /  AlkPhos  116  09-04                Radiology Results      Meds    MEDICATIONS  (STANDING):  aspirin  chewable 81 milliGRAM(s) Oral daily  atorvastatin 20 milliGRAM(s) Oral at bedtime  cefTRIAXone   IVPB      cefTRIAXone   IVPB 2000 milliGRAM(s) IV Intermittent every 24 hours  chlorhexidine 2% Cloths 1 Application(s) Topical <User Schedule>  losartan 50 milliGRAM(s) Oral daily  pantoprazole    Tablet 40 milliGRAM(s) Oral before breakfast      MEDICATIONS  (PRN):  acetaminophen     Tablet .. 650 milliGRAM(s) Oral every 6 hours PRN Temp greater or equal to 38C (100.4F), Mild Pain (1 - 3)      Physical Exam    Neuro :  no focal deficits  Respiratory: CTA B/L  CV: RRR, S1S2, no murmurs,   Abdominal: Soft, epigastric tender to deep palp, ND +BS,  Extremities: No edema, + peripheral pulses        ASSESSMENT    ams 2nd to metabolic encephalopathy,   sirs    intractable abd pain,   duodenal diverticula,   pleural eff,   cholelithiasis,   h/o sinus node dysfunction,   chronic HFpEF,   aortic stenosis,   HTN,   HLD,   b/l carotid stenosis,   DM (not on meds),   ESRD on dialysis (Tue, Thu, Sat),   Alcohol dependence,    Gout,    glaucoma,   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 out of the right eye  thrombosis ( on eliquis 12/ 2023)        PLAN    f/u ua  f/u blood and ucx  id f/u   cont Ceftriaxone 2 g daily    gi f/u   Protonix 40mg daily  Avoid NSAID  Surgical evaluation  Diet as tolerated  Monitor H/H  Transfuse PRBC as needed  cont pain control  pulm f/u   Pleural effusion likely secondary to fluid overload  cardio cons  cont cardiac meds  s/p hd 9/3/24  renal f/u    cont hd as per schedule tu, th, sat   vascular f/u   -Poss L AVF creation when Bcx neg and pt cleared  -Medical optimization  -con't LUE precaution  -HD via PC  -LUE vein mapping  cont current meds

## 2024-09-05 NOTE — CONSULT NOTE ADULT - ASSESSMENT
79-year-old male, AAOx1, From home  on dialysis T/Th/ Sa,HTN,Lipid d/o  presents with abdominal pain and vomiting,LBBB(old).  1.Abd pain-GI eval.  2.ESRD-HD as per renal.  3.HTN-cozaar.  4.ABX as per ID.  5.Lipid d/o-statin.  6.Abnormal EKG-Echocardiogram as above.  7.GI and DVT prophylaxis.

## 2024-09-05 NOTE — PROGRESS NOTE ADULT - SUBJECTIVE AND OBJECTIVE BOX
Patient is seen and examined at the bed side, is afebrile. He is doing better, no new events.  The Blood cultures from 9/4 have  NGTD.       REVIEW OF SYSTEMS: All other review systems are negative      ALLERGIES: No Known Allergies      Vital Signs Last 24 Hrs  T(C): 36.5 (05 Sep 2024 14:35), Max: 37.1 (05 Sep 2024 10:58)  T(F): 97.7 (05 Sep 2024 14:35), Max: 98.8 (05 Sep 2024 10:58)  HR: 80 (05 Sep 2024 14:35) (67 - 81)  BP: 99/59 (05 Sep 2024 14:35) (99/59 - 128/87)  BP(mean): 88 (05 Sep 2024 05:16) (88 - 88)  RR: 18 (05 Sep 2024 14:35) (17 - 18)  SpO2: 100% (05 Sep 2024 14:35) (96% - 100%)    Parameters below as of 05 Sep 2024 14:35  Patient On (Oxygen Delivery Method): room air        PHYSICAL EXAM:  GENERAL: Not in distress , on oxygen via NC  CHEST/LUNG:  Not using accessory muscles  HEART: s1 and s2 present  ABDOMEN:  Nontender and  Nondistended  EXTREMITIES: No pedal  edema  CNS: Awake and Alert        LABS:                        9.4    8.10  )-----------( 112      ( 05 Sep 2024 10:55 )             27.6                           10.8   12.23 )-----------( 126      ( 04 Sep 2024 06:34 )             31.5             09-05    133<L>  |  97  |  62<H>  ----------------------------<  174<H>  4.4   |  26  |  6.90<H>    Ca    9.1      05 Sep 2024 10:55  Phos  3.2     09-05  Mg     2.5     09-05    TPro  7.2  /  Alb  2.7<L>  /  TBili  0.7  /  DBili  x   /  AST  19  /  ALT  17  /  AlkPhos  88  09-05    09-04    133<L>  |  97  |  38<H>  ----------------------------<  154<H>  4.9   |  28  |  5.10<H>    Ca    9.3      04 Sep 2024 06:34  Phos  3.9     09-04  Mg     2.3     09-04    TPro  8.1  /  Alb  3.3<L>  /  TBili  1.4<H>  /  DBili  x   /  AST  31  /  ALT  23  /  AlkPhos  116  09-04        MEDICATIONS  (STANDING):    aspirin  chewable 81 milliGRAM(s) Oral daily  atorvastatin 20 milliGRAM(s) Oral at bedtime  cefTRIAXone   IVPB      cefTRIAXone   IVPB 2000 milliGRAM(s) IV Intermittent every 24 hours  chlorhexidine 2% Cloths 1 Application(s) Topical <User Schedule>  losartan 50 milliGRAM(s) Oral daily  pantoprazole    Tablet 40 milliGRAM(s) Oral before breakfast      RADIOLOGY & ADDITIONAL TESTS:    9/3/24: CT Abdomen and Pelvis w/ IV Cont (09.03.24 @ 12:04) Pancolonic diverticulosis without diverticulitis.    Cholelithiasis.  Trace bilateral pleural effusions with underlying passive atelectasis.      MICROBIOLOGY DATA:    MRSA/MSSA PCR (09.04.24 @ 11:54)   MRSA PCR Result.: NotDetec    Culture - Blood (09.04.24 @ 06:49)   Specimen Source: .Blood Dialysis Catheter  Culture Results: No growth at 24 hours    Culture - Blood (09.04.24 @ 06:39)   Specimen Source: .Blood Dialysis Catheter  Culture Results: No growth at 24 hours

## 2024-09-05 NOTE — PROGRESS NOTE ADULT - SUBJECTIVE AND OBJECTIVE BOX
Problem List:  ESRD  Chief complain/HPI  79 years old with ESRD getting dialysis via left permanente catheter. He cam to the ER for abdominal pain that started in am after breakfast.  He went for his dialysis treatment in am, dialysis was stopped after one hour due to abdominal pain. His pain is in Epigastric area. CT showed Diverticulosis in colon area and one in duodenum area.  No c/o fever and chills.  As per his son patient was on and off on Apixaban since 12/23 , cause unknown  Last admission 04/2024 came to the ER for abdominal pain and was found to have bacteremia.     At present no c/o abdominal pain , no vomit and no nausea.     Urinalysis with Rflx Culture (06.14.24 @ 21:47)   Urine Appearance: Clear  Color: Yellow  Specific Gravity: 1.015  pH Urine: 8.5  Protein, Urine: 300 mg/dL  Glucose Qualitative, Urine: Negative mg/dL  Ketone - Urine: Negative mg/dL  Blood, Urine: Negative  Bilirubin: Negative  Urobilinogen: 1.0 mg/dL  Leukocyte Esterase Concentration: Negative  Nitrite: Negative    PAST MEDICAL & SURGICAL HISTORY:  HTN (hypertension)      Chronic kidney disease      HLD (hyperlipidemia)      No significant past surgical history          No Known Allergies      MEDICATIONS  (STANDING):  aspirin  chewable 81 milliGRAM(s) Oral daily  atorvastatin 20 milliGRAM(s) Oral at bedtime  cefTRIAXone   IVPB      cefTRIAXone   IVPB 2000 milliGRAM(s) IV Intermittent every 24 hours  chlorhexidine 2% Cloths 1 Application(s) Topical <User Schedule>  losartan 50 milliGRAM(s) Oral daily  pantoprazole    Tablet 40 milliGRAM(s) Oral before breakfast    MEDICATIONS  (PRN):  acetaminophen     Tablet .. 650 milliGRAM(s) Oral every 6 hours PRN Temp greater or equal to 38C (100.4F), Mild Pain (1 - 3)                            10.8   12.23 )-----------( 126      ( 04 Sep 2024 06:34 )             31.5     09-04    133<L>  |  97  |  38<H>  ----------------------------<  154<H>  4.9   |  28  |  5.10<H>    Ca    9.3      04 Sep 2024 06:34  Phos  3.9     09-04  Mg     2.3     09-04    TPro  8.1  /  Alb  3.3<L>  /  TBili  1.4<H>  /  DBili  x   /  AST  31  /  ALT  23  /  AlkPhos  116  09-04            REVIEW OF SYSTEMS:  General: no fever no chills, no weight loss.  EYES/ENT: No visual changes;  No vertigo, no headache.    RESPIRATORY: No cough, wheezing, hemoptysis; No shortness of breath  CARDIOVASCULAR: No chest pain or palpitations. No Edema  GASTROINTESTINAL: No abdominal or epigastric pain. No nausea, vomiting. No diarrhea or constipation. No melena.  GENITOURINARY: No dysuria, frequency, foamy urine, urinary urgency, incontinence or hematuria  NEUROLOGICAL: No numbness or weakness, no tremor , no dizziness.   Muscle skeletal : no joint pain and no swelling of joints and limbs.  SKIN: No itching, burning, rashes.        VITALS:  T(F): 98.5 (09-05-24 @ 05:16), Max: 98.5 (09-05-24 @ 05:16)  HR: 71 (09-05-24 @ 05:16)  BP: 121/75 (09-05-24 @ 05:16)  RR: 18 (09-05-24 @ 05:16)  SpO2: 96% (09-05-24 @ 05:16)  Wt(kg): --    09-04 @ 07:01  -  09-05 @ 07:00  --------------------------------------------------------  IN: 0 mL / OUT: 100 mL / NET: -100 mL        PHYSICAL EXAM:  Constitutional: well developed, no diaphoresis, no distress.  Neck: No JVD, no carotid bruit, supple, no adenopathy  Respiratory: air entrance B/L, no wheezes, rales or rhonchi  Cardiovascular: S1, S2, RRR, no pericardial rub, no murmur  Abdomen: BS+, soft, no tenderness, no bruit  Pelvis: bladder nondistended  Extremities: No cyanosis or clubbing. No peripheral edema.     Vascular Access: right PC with no erythema and no discharge.

## 2024-09-05 NOTE — PROGRESS NOTE ADULT - SUBJECTIVE AND OBJECTIVE BOX
PGY-1 Progress Note discussed with attending    PLEASE MS TEAMS AUTHOR TILL 5:00 PM    PLEASE CONTACT ON CALL TEAM:  - On Call Team (Please refer to Darrick) FROM 5:00 PM - 8:30PM  - Nightfloat Team FROM 8:30 -7:30 AM      INTERVAL HPI/OVERNIGHT EVENTS: No acute events overnight.    SUBJECTIVE: Patient seen and examined at bedside this morning.    ---------------------------    REVIEW OF SYSTEMS:  CONSTITUTIONAL: No fever, weight loss, or fatigue  RESPIRATORY: No cough, wheezing, chills or hemoptysis; No shortness of breath  CARDIOVASCULAR: No chest pain, palpitations, dizziness, or leg swelling  GASTROINTESTINAL: No abdominal pain. No nausea, vomiting, or hematemesis; No diarrhea or constipation. No melena or hematochezia.  GENITOURINARY: No dysuria or hematuria, urinary frequency  NEUROLOGICAL: No headaches, memory loss, loss of strength, numbness, or tremors  SKIN: No itching, burning, rashes, or lesions     MEDICATIONS  (STANDING):  aspirin  chewable 81 milliGRAM(s) Oral daily  atorvastatin 20 milliGRAM(s) Oral at bedtime  cefTRIAXone   IVPB      cefTRIAXone   IVPB 2000 milliGRAM(s) IV Intermittent every 24 hours  chlorhexidine 2% Cloths 1 Application(s) Topical <User Schedule>  losartan 50 milliGRAM(s) Oral daily  pantoprazole    Tablet 40 milliGRAM(s) Oral before breakfast    MEDICATIONS  (PRN):  acetaminophen     Tablet .. 650 milliGRAM(s) Oral every 6 hours PRN Temp greater or equal to 38C (100.4F), Mild Pain (1 - 3)      Vital Signs Last 24 Hrs  T(C): 36.9 (05 Sep 2024 05:16), Max: 36.9 (04 Sep 2024 21:32)  T(F): 98.5 (05 Sep 2024 05:16), Max: 98.5 (05 Sep 2024 05:16)  HR: 71 (05 Sep 2024 05:16) (67 - 77)  BP: 121/75 (05 Sep 2024 05:16) (85/53 - 128/87)  BP(mean): 88 (05 Sep 2024 05:16) (88 - 90)  RR: 18 (05 Sep 2024 05:16) (16 - 18)  SpO2: 96% (05 Sep 2024 05:16) (96% - 100%)    Parameters below as of 05 Sep 2024 05:16  Patient On (Oxygen Delivery Method): nasal cannula  O2 Flow (L/min): 4      -----------------------------    PHYSICAL EXAMINATION:  GENERAL: NAD, lying in bed  HEAD:  Atraumatic, Normocephalic  EYES:  conjunctiva and sclera clear  NECK: Supple, No JVD  CHEST/LUNG: Clear to auscultation bilaterally; No rales, rhonchi, wheezing, or rubs  HEART: Regular rate and rhythm; No murmurs, rubs, or gallops  ABDOMEN: Soft, Nontender, Nondistended; Bowel sounds present, no pain or masses on palpation  NERVOUS SYSTEM:  Alert & Oriented X3  : voiding well  EXTREMITIES:  2+ Peripheral Pulses, No clubbing, cyanosis, or edema  SKIN: warm dry                          10.8   12.23 )-----------( 126      ( 04 Sep 2024 06:34 )             31.5     09-04    133<L>  |  97  |  38<H>  ----------------------------<  154<H>  4.9   |  28  |  5.10<H>    Ca    9.3      04 Sep 2024 06:34  Phos  3.9     09-04  Mg     2.3     09-04    TPro  8.1  /  Alb  3.3<L>  /  TBili  1.4<H>  /  DBili  x   /  AST  31  /  ALT  23  /  AlkPhos  116  09-04    LIVER FUNCTIONS - ( 04 Sep 2024 06:34 )  Alb: 3.3 g/dL / Pro: 8.1 g/dL / ALK PHOS: 116 U/L / ALT: 23 U/L DA / AST: 31 U/L / GGT: x                   I&O's Summary    04 Sep 2024 07:01  -  05 Sep 2024 07:00  --------------------------------------------------------  IN: 0 mL / OUT: 100 mL / NET: -100 mL            CAPILLARY BLOOD GLUCOSE      RADIOLOGY & ADDITIONAL TESTS:                   PGY-1 Progress Note discussed with attending    PLEASE MS TEAMS AUTHOR TILL 5:00 PM    PLEASE CONTACT ON CALL TEAM:  - On Call Team (Please refer to Darrick) FROM 5:00 PM - 8:30PM  - Nightfloat Team FROM 8:30 -7:30 AM      INTERVAL HPI/OVERNIGHT EVENTS: No acute events overnight. Seen by vascular surgery, plan for AVF once BCx clears and for LUE vein mapping.    SUBJECTIVE: Patient seen and examined at bedside this morning. Denies any new complaints or abdominal pain. AAOx2. Plan for LUE vein mapping today.    ---------------------------    REVIEW OF SYSTEMS:  CONSTITUTIONAL: No fever, weight loss, or fatigue  RESPIRATORY: No cough, wheezing, chills or hemoptysis; No shortness of breath  CARDIOVASCULAR: No chest pain, palpitations, dizziness, or leg swelling  GASTROINTESTINAL: No abdominal pain. No nausea, vomiting, or hematemesis; No diarrhea or constipation. No melena or hematochezia.  GENITOURINARY: No dysuria or hematuria, urinary frequency  NEUROLOGICAL: No headaches, memory loss, loss of strength, numbness, or tremors  SKIN: No itching, burning, rashes, or lesions     MEDICATIONS  (STANDING):  aspirin  chewable 81 milliGRAM(s) Oral daily  atorvastatin 20 milliGRAM(s) Oral at bedtime  cefTRIAXone   IVPB      cefTRIAXone   IVPB 2000 milliGRAM(s) IV Intermittent every 24 hours  chlorhexidine 2% Cloths 1 Application(s) Topical <User Schedule>  losartan 50 milliGRAM(s) Oral daily  pantoprazole    Tablet 40 milliGRAM(s) Oral before breakfast    MEDICATIONS  (PRN):  acetaminophen     Tablet .. 650 milliGRAM(s) Oral every 6 hours PRN Temp greater or equal to 38C (100.4F), Mild Pain (1 - 3)      Vital Signs Last 24 Hrs  T(C): 36.9 (05 Sep 2024 05:16), Max: 36.9 (04 Sep 2024 21:32)  T(F): 98.5 (05 Sep 2024 05:16), Max: 98.5 (05 Sep 2024 05:16)  HR: 71 (05 Sep 2024 05:16) (67 - 77)  BP: 121/75 (05 Sep 2024 05:16) (85/53 - 128/87)  BP(mean): 88 (05 Sep 2024 05:16) (88 - 90)  RR: 18 (05 Sep 2024 05:16) (16 - 18)  SpO2: 96% (05 Sep 2024 05:16) (96% - 100%)    Parameters below as of 05 Sep 2024 05:16  Patient On (Oxygen Delivery Method): nasal cannula  O2 Flow (L/min): 4      -----------------------------    PHYSICAL EXAMINATION:  GENERAL: NAD, lying in bed  HEAD:  Atraumatic, Normocephalic  EYES:  conjunctiva and sclera clear  NECK: Supple, No JVD  CHEST/LUNG: Clear to auscultation bilaterally; No rales, rhonchi, wheezing, or rubs  HEART: Regular rate and rhythm; No murmurs, rubs, or gallops  ABDOMEN: Soft, Nontender, Nondistended; Bowel sounds present, no pain or masses on palpation  NERVOUS SYSTEM:  Alert & Oriented X2  EXTREMITIES:  2+ Peripheral Pulses, No clubbing, cyanosis, or edema  SKIN: warm dry                          10.8   12.23 )-----------( 126      ( 04 Sep 2024 06:34 )             31.5     09-04    133<L>  |  97  |  38<H>  ----------------------------<  154<H>  4.9   |  28  |  5.10<H>    Ca    9.3      04 Sep 2024 06:34  Phos  3.9     09-04  Mg     2.3     09-04    TPro  8.1  /  Alb  3.3<L>  /  TBili  1.4<H>  /  DBili  x   /  AST  31  /  ALT  23  /  AlkPhos  116  09-04    LIVER FUNCTIONS - ( 04 Sep 2024 06:34 )  Alb: 3.3 g/dL / Pro: 8.1 g/dL / ALK PHOS: 116 U/L / ALT: 23 U/L DA / AST: 31 U/L / GGT: x                   I&O's Summary    04 Sep 2024 07:01  -  05 Sep 2024 07:00  --------------------------------------------------------  IN: 0 mL / OUT: 100 mL / NET: -100 mL            CAPILLARY BLOOD GLUCOSE      RADIOLOGY & ADDITIONAL TESTS:

## 2024-09-05 NOTE — PROGRESS NOTE ADULT - SUBJECTIVE AND OBJECTIVE BOX
Patient is a 79y old  Male who presents with a chief complaint of abdominal pain (05 Sep 2024 09:54)  Awake, alert, comfortable in bed in NAD. Denies abdominal pain    INTERVAL HPI/OVERNIGHT EVENTS:      VITAL SIGNS:  T(F): 98.5 (09-05-24 @ 05:16)  HR: 71 (09-05-24 @ 05:16)  BP: 121/75 (09-05-24 @ 05:16)  RR: 18 (09-05-24 @ 05:16)  SpO2: 96% (09-05-24 @ 05:16)  Wt(kg): --  I&O's Detail    04 Sep 2024 07:01  -  05 Sep 2024 07:00  --------------------------------------------------------  IN:  Total IN: 0 mL    OUT:    Voided (mL): 100 mL  Total OUT: 100 mL    Total NET: -100 mL              REVIEW OF SYSTEMS:    CONSTITUTIONAL:  No fevers, chills, sweats    HEENT:  Eyes:  No diplopia or blurred vision. ENT:  No earache, sore throat or runny nose.    CARDIOVASCULAR:  No pressure, squeezing, tightness, or heaviness about the chest; no palpitations.    RESPIRATORY:  Per HPI    GASTROINTESTINAL:  No abdominal pain, nausea, vomiting or diarrhea.    GENITOURINARY:  No dysuria, frequency or urgency.    NEUROLOGIC:  No paresthesias, fasciculations, seizures or weakness.    PSYCHIATRIC:  No disorder of thought or mood.      PHYSICAL EXAM:    Constitutional: Well developed and nourished  Eyes:Perrla  ENMT: normal  Neck:supple  Respiratory: good air entry  Cardiovascular: S1 S2 regular  Gastrointestinal: Soft, Non tender  Extremities: No edema  Vascular:normal  Neurological:Awake, alert,Ox3  Musculoskeletal:Normal      MEDICATIONS  (STANDING):  aspirin  chewable 81 milliGRAM(s) Oral daily  atorvastatin 20 milliGRAM(s) Oral at bedtime  cefTRIAXone   IVPB 2000 milliGRAM(s) IV Intermittent every 24 hours  cefTRIAXone   IVPB      chlorhexidine 2% Cloths 1 Application(s) Topical <User Schedule>  losartan 50 milliGRAM(s) Oral daily  pantoprazole    Tablet 40 milliGRAM(s) Oral before breakfast    MEDICATIONS  (PRN):  acetaminophen     Tablet .. 650 milliGRAM(s) Oral every 6 hours PRN Temp greater or equal to 38C (100.4F), Mild Pain (1 - 3)      Allergies    No Known Allergies    Intolerances        LABS:                        10.8   12.23 )-----------( 126      ( 04 Sep 2024 06:34 )             31.5     09-04    133<L>  |  97  |  38<H>  ----------------------------<  154<H>  4.9   |  28  |  5.10<H>    Ca    9.3      04 Sep 2024 06:34  Phos  3.9     09-04  Mg     2.3     09-04    TPro  8.1  /  Alb  3.3<L>  /  TBili  1.4<H>  /  DBili  x   /  AST  31  /  ALT  23  /  AlkPhos  116  09-04      Urinalysis Basic - ( 04 Sep 2024 06:34 )    Color: x / Appearance: x / SG: x / pH: x  Gluc: 154 mg/dL / Ketone: x  / Bili: x / Urobili: x   Blood: x / Protein: x / Nitrite: x   Leuk Esterase: x / RBC: x / WBC x   Sq Epi: x / Non Sq Epi: x / Bacteria: x            CAPILLARY BLOOD GLUCOSE            RADIOLOGY & ADDITIONAL TESTS:    CXR:    Ct scan chest:    ekg;    echo:

## 2024-09-05 NOTE — CONSULT NOTE ADULT - SUBJECTIVE AND OBJECTIVE BOX
Patient is a 79y old  Male who is on dialysis, and has recent h/o of Bacteremia, now presents to the ER for evaluation of abdominal pain and vomiting since overnight.  In dialysis patient received about 10 minutes but then was complaining too much pain and was sent to the ER. On admission, he found to have no fever but tachycardia, Leukocytosis and Altered mental status. The ID consult requested to assist with further evaluation of SIRS and antibiotic management.      REVIEW OF SYSTEMS: Unable to obtain due to mental status unless mentioned in HPI      PAST MEDICAL & SURGICAL HISTORY:  HTN (hypertension)  Chronic kidney disease  HLD (hyperlipidemia)  No significant past surgical history      SOCIAL HISTORY  Alcohol: Does not drink  Tobacco: Does not smoke  Illicit substance use: None      FAMILY HISTORY: Non contributory to the present illness      ALLERGIES: No Known Allergies      Vital Signs Last 24 Hrs  T(C): 37.2 (03 Sep 2024 11:20), Max: 37.2 (03 Sep 2024 07:56)  T(F): 99 (03 Sep 2024 11:20), Max: 99 (03 Sep 2024 11:20)  HR: 98 (03 Sep 2024 11:20) (93 - 98)  BP: 157/88 (03 Sep 2024 11:20) (157/88 - 173/90)  BP(mean): --  RR: 20 (03 Sep 2024 11:20) (18 - 20)  SpO2: 93% (03 Sep 2024 11:20) (93% - 93%)    Parameters below as of 03 Sep 2024 11:20  Patient On (Oxygen Delivery Method): nasal cannula  O2 Flow (L/min): 4      PHYSICAL EXAM:  GENERAL: Not in distress   CHEST/LUNG:  Not using accessory muscles  HEART: s1 and s2 present  ABDOMEN:  Nontender and  Nondistended  EXTREMITIES: No pedal  edema  CNS: Awake and Alert      LABS:                        10.2   13.59 )-----------( 123      ( 03 Sep 2024 08:43 )             29.8       09-03    135  |  104  |  45<H>  ----------------------------<  187<H>  4.9   |  23  |  5.32<H>    Ca    9.1      03 Sep 2024 08:43    TPro  8.0  /  Alb  3.5  /  TBili  0.9  /  DBili  x   /  AST  20  /  ALT  20  /  AlkPhos  135<H>  09-03        CAPILLARY BLOOD GLUCOSE  POCT Blood Glucose.: 120 mg/dL (03 Sep 2024 08:17)        MEDICATIONS  (STANDING):    aspirin  chewable 81 milliGRAM(s) Oral daily  atorvastatin 20 milliGRAM(s) Oral at bedtime  losartan 50 milliGRAM(s) Oral daily  pantoprazole  Injectable 40 milliGRAM(s) IV Push daily    MEDICATIONS  (PRN):  acetaminophen     Tablet .. 650 milliGRAM(s) Oral every 6 hours PRN Temp greater or equal to 38C (100.4F), Mild Pain (1 - 3)      RADIOLOGY & ADDITIONAL TESTS:    9/3/24: CT Abdomen and Pelvis w/ IV Cont (09.03.24 @ 12:04) Pancolonic diverticulosis without diverticulitis.    Cholelithiasis.  Trace bilateral pleural effusions with underlying passive atelectasis.              
Date of Service  09-05-24 @ 12:22    CHIEF COMPLAINT:Patient is a 79y old  Male who presents with a chief complaint of abdominal pain       HPI:  79-year-old male, AAOx1, From home  on dialysis T/Th/ Sa,HTN,Lipid d/o  presents with abdominal pain and vomiting since  yesterday. Reports constant midgastric abdominal pain that is non radiating. Patient is a poor historian. Unable to obtain further information from him. He was very fatigue and appeared fluid overloaded on exam.     To note: patient was here in April for similar sx and was found to have bacteremia            (03 Sep 2024 13:59)      PAST MEDICAL & SURGICAL HISTORY:  HTN (hypertension)      Chronic kidney disease      HLD (hyperlipidemia)            MEDICATIONS  (STANDING):  aspirin  chewable 81 milliGRAM(s) Oral daily  atorvastatin 20 milliGRAM(s) Oral at bedtime  cefTRIAXone   IVPB      cefTRIAXone   IVPB 2000 milliGRAM(s) IV Intermittent every 24 hours  chlorhexidine 2% Cloths 1 Application(s) Topical <User Schedule>  losartan 50 milliGRAM(s) Oral daily  pantoprazole    Tablet 40 milliGRAM(s) Oral before breakfast    MEDICATIONS  (PRN):  acetaminophen     Tablet .. 650 milliGRAM(s) Oral every 6 hours PRN Temp greater or equal to 38C (100.4F), Mild Pain (1 - 3)      FAMILY HISTORY:No hx of CAD      SOCIAL HISTORY:    [x ] Non-smoker    [ x] Alcohol-denies    Allergies    No Known Allergies    Intolerances    	    REVIEW OF SYSTEMS:  CONSTITUTIONAL: No fever, weight loss, or fatigue  EYES: No eye pain, visual disturbances, or discharge  ENT:  No difficulty hearing, tinnitus, vertigo; No sinus or throat pain  NECK: No pain or stiffness  RESPIRATORY: No cough, wheezing, chills or hemoptysis; No Shortness of Breath  CARDIOVASCULAR: No chest pain, palpitations, passing out, dizziness, or leg swelling  GASTROINTESTINAL: + abdominal or epigastric pain. No nausea, vomiting, or hematemesis; No diarrhea or constipation. No melena or hematochezia.  GENITOURINARY: No dysuria, frequency, hematuria, or incontinence  NEUROLOGICAL: No headaches, memory loss, loss of strength, numbness, or tremors  SKIN: No itching, burning, rashes, or lesions   LYMPH Nodes: No enlarged glands  ENDOCRINE: No heat or cold intolerance; No hair loss  MUSCULOSKELETAL: No joint pain or swelling; No muscle, back, or extremity pain  PSYCHIATRIC: No depression, anxiety, mood swings, or difficulty sleeping  HEME/LYMPH: No easy bruising, or bleeding gums  ALLERGY AND IMMUNOLOGIC: No hives or eczema	        PHYSICAL EXAM:  T(C): 36.9 (09-05-24 @ 05:16), Max: 36.9 (09-04-24 @ 21:32)  HR: 71 (09-05-24 @ 05:16) (67 - 77)  BP: 121/75 (09-05-24 @ 05:16) (85/53 - 128/87)  RR: 18 (09-05-24 @ 05:16) (16 - 18)  SpO2: 96% (09-05-24 @ 05:16) (96% - 100%)  Wt(kg): --  I&O's Summary    04 Sep 2024 07:01  -  05 Sep 2024 07:00  --------------------------------------------------------  IN: 0 mL / OUT: 100 mL / NET: -100 mL        Appearance: Normal	  HEENT:   Normal oral mucosa, PERRL, EOMI	  Lymphatic: No lymphadenopathy  Cardiovascular: Normal S1 S2, No JVD, No murmurs, No edema  Respiratory: Lungs clear to auscultation	  Psychiatry: A & O x 3, Mood & affect appropriate  Gastrointestinal:  Soft, Non-tender, + BS	  Skin: No rashes, No ecchymoses, No cyanosis	  Neurologic: Non-focal  Extremities: Normal range of motion, No clubbing, cyanosis or edema  Vascular: Peripheral pulses palpable 2+ bilaterally    	    ECG:  	nsr,first degree av block,lbbb    LABS:	 	                            9.4    8.10  )-----------( 112      ( 05 Sep 2024 10:55 )             27.6     09-05    133<L>  |  97  |  62<H>  ----------------------------<  174<H>  4.4   |  26  |  6.90<H>    Ca    9.1      05 Sep 2024 10:55  Phos  3.2     09-05  Mg     2.5     09-05    TPro  7.2  /  Alb  2.7<L>  /  TBili  0.7  /  DBili  x   /  AST  19  /  ALT  17  /  AlkPhos  88  09-05        < from: CT Abdomen and Pelvis w/ IV Cont (09.03.24 @ 12:04) >  ACC: [id number] EXAM:  CT ABDOMEN AND PELVIS IC   ORDERED BY: UMM CURIEL     PROCEDURE DATE:  09/03/2024          INTERPRETATION:  CLINICAL INFORMATION: 79 years  Male with abdominal   pain. Vomiting. On dialysis.    COMPARISON: None.    CONTRAST/COMPLICATIONS:  IV Contrast: Omnipaque 350  90 cc administered   10 cc discarded  Oral Contrast: NONE  Complications: None reported at time of study completion    PROCEDURE:  CT of the Abdomen and Pelvis was performed.  Sagittal and coronal reformats were performed.    LIMITATION: Absence of enteric contrast limits evaluation of the GI   tract. Streak artifact from metallic device lateral to the right chest   wall. Motion artifact.    FINDINGS:  LOWER CHEST: Trace bilateral pleural effusions with underlying passive   atelectasis. Aortic and coronary atherosclerosis.    LIVER: Within normal limits.  BILE DUCTS: Normal caliber.  GALLBLADDER: Cholelithiasis.  SPLEEN: Within normal limits.  PANCREAS: Within normal limits.  ADRENALS: Within normal limits.  KIDNEYS/URETERS: Atrophic kidneys. No hydronephrosis. Symmetrical   nephrograms.    BLADDER: Minimally distended.  REPRODUCTIVE ORGANS: The enlarged prostate.    BOWEL: No bowel obstruction. Appendix is normal.. 2.5 cm periampullary   duodenal diverticula. Pain colonic diverticulosis without diverticulitis.  PERITONEUM/RETROPERITONEUM: Within normal limits.  VESSELS: Atherosclerotic changes. Patent celiac artery and SMA. MIKAYLA is   not well visualized. Patent portal, hepatic, splenic and superior   mesenteric veins.  LYMPH NODES: No lymphadenopathy.  ABDOMINAL WALL: Within normal limits.  BONES: Moderate degenerative changes.    IMPRESSION:  Pancolonic diverticulosis without diverticulitis.    Cholelithiasis.    Trace bilateral pleural effusions with underlying passive atelectasis.        --- End of Report ---            BEL MCHUGH MD; Attending Radiologist  This document has been electronically signed. Sep  3 2024 12:38PM    < end of copied text >  < from: US Hepatic & Pancreatic (09.03.24 @ 10:50) >  ACC: [id number] EXAM:  US LIVER AND PANCREAS   ORDERED BY: UMM CURIEL     PROCEDURE DATE:  09/03/2024          INTERPRETATION:  CLINICAL INFORMATION: Upper abdominal pain    COMPARISON: Abdominal ultrasound dated 5/13/2022. Abdominal CT dated   4/12/2024.    TECHNIQUE: Sonography of the right upper quadrant.    FINDINGS:    Liver: Within normal limits.  Bile ducts: Normal caliber. Common bile duct measures 6 mm in caliber   which is within normal limits..  Gallbladder: Cholelithiasis is again noted without evidence of thickened   gallbladder wall, pericholecystic fluid, or ultrasonic Chu's sign.  Pancreas: Visualized portions are within normal limits.  Right kidney: 7.7 cm which is atrophic. No hydronephrosis.  Ascites: None.  IVC: Visualized portions are within normal limits.    IMPRESSION:    Cholelithiasis is again noted without evidence of thickened gallbladder   wall, pericholecystic fluid, or ultrasonic Chu's sign.    Atrophic right kidney.    --- End of Report ---            YASMIN BLISS MD; Attending Radiologist  This document has been electronically signed. Sep  3 2024 11:05AM    < end of copied text >  Culture - Blood (09.04.24 @ 06:49)   Specimen Source: .Blood Dialysis Catheter  Culture Results:   No growth at 24 hoursCulture - Blood (09.04.24 @ 06:39)   Specimen Source: .Blood Dialysis Catheter  Culture Results:   No growth at 24 hours< from: TTE W or WO Ultrasound Enhancing Agent (04.15.24 @ 12:57) >  CONCLUSIONS:      1. Left ventricular cavity is normal in size. Left ventricular systolic function is normal with an ejection fraction visually estimated at 50 to 55 %. There are no regional wall motion abnormalities seen.   2. There is mild (grade 1) left ventricular diastolic dysfunction.   3. Normal right ventricular cavity size, with normal wall thickness, and normal systolic function. Tricuspid annular plane systolic excursion (TAPSE) is 2.1 cm (normal >=1.7 cm).   4. Normal left and right atrial size.   5. Trace tricuspid regurgitation.   6. Compared to the transthoracic echocardiogram performed on 12/18/2023, there have been no significant interval changes.   7. Estimated pulmonary artery systolic pressure is 20 mmHg, consistent with normal pulmonary artery pressure.   8. Mild aortic regurgitation.   9. Mild pulmonic regurgitation.  10. There is calcification of the mitral valve annulus.  11. No pericardial effusion seen.  12. No vegetations seen on this study,consider URVASHI for further evaluation if clinically indicated.      < end of copied text >  
[  ] STAT REQUEST              [ X ] ROUTINE REQUEST    Patient is a 79 year old male with abdominal pain. GI consulted to evaluate.        HPI:  79-year-old male, from home with past medical history significant for DM, Hyperlipidemia, HTN, Glaucoma, Gout, ESRD on HD presented to the emergency room with 2 days history of constant, non radiating epigastric abdominal pain associated with nausea but no vomiting. Patient can not provide good history. No hematemesis, hematochezia, melena, fever, chills, chest pain, SOB, cough, hematuria, dysuria or diarrhea reported.          PAIN MANAGEMENT:  Pain Scale:                 ?/10  Pain Location:  Epigastric abdominal pain         PAST MEDICAL HISTORY     DM (diabetes mellitus)    HTN (hypertension)     Hyperlipidemia    Glaucoma    Gout        PAST SURGICAL HISTORY  No significant past surgical history        Allergies    No Known Allergies    Intolerances  None         MEDICATIONS  (STANDING):  aspirin  chewable 81 milliGRAM(s) Oral daily  atorvastatin 20 milliGRAM(s) Oral at bedtime  cefTRIAXone   IVPB      losartan 50 milliGRAM(s) Oral daily  pantoprazole  Injectable 40 milliGRAM(s) IV Push daily    MEDICATIONS  (PRN):  acetaminophen     Tablet .. 650 milliGRAM(s) Oral every 6 hours PRN Temp greater or equal to 38C (100.4F), Mild Pain (1 - 3)      SOCIAL HISTORY  Advanced Directives:       [ X ] Full Code       [  ] DNR  Marital Status:         [  ] M      [ X ] S      [  ] D       [  ] W  Children:       [ X ] Yes      [  ] No  Occupation:        [  ] Employed       [ X ] Unemployed       [  ] Retired  Diet:       [X  ] Regular       [  ] PEG feeding          [  ] NG tube feeding  Drug Use:           [ X ] No               [  ] Yes  Alcohol:           [X  ] No             [  ] Yes (socially)         [  ] Yes (chronic)  Tobacco:           [  ] Yes           [ X ] No      FAMILY HISTORY  [ X ] Heart Disease            [ X ] Diabetes             [X  ] HTN             [  ] Colon Cancer             [  ] Stomach Cancer              [  ] Pancreatic Cancer      VITAL SIGNS   Vital Signs Last 24 Hrs  T(C): 36.4 (04 Sep 2024 04:49), Max: 37.4 (03 Sep 2024 23:08)  T(F): 97.5 (04 Sep 2024 04:49), Max: 99.3 (03 Sep 2024 23:08)  HR: 77 (04 Sep 2024 04:49) (77 - 97)  BP: 106/61 (04 Sep 2024 04:49) (106/61 - 164/89)   RR: 18 (04 Sep 2024 04:49) (18 - 20)  SpO2: 96% (04 Sep 2024 04:49) (96% - 100%)  Parameters below as of 04 Sep 2024 04:49  Patient On (Oxygen Delivery Method): nasal cannula  O2 Flow (L/min): 4      CBC Full  -  ( 04 Sep 2024 06:34 )  WBC Count : 12.23 K/uL  RBC Count : 3.36 M/uL  Hemoglobin : 10.8 g/dL  Hematocrit : 31.5 %  Platelet Count - Automated : 126 K/uL  Mean Cell Volume : 93.8 fl  Mean Cell Hemoglobin : 32.1 pg  Mean Cell Hemoglobin Concentration : 34.3 gm/dL  Auto Neutrophil # : 10.13 K/uL  Auto Lymphocyte # : 1.11 K/uL  Auto Monocyte # : 0.82 K/uL  Auto Eosinophil # : 0.02 K/uL  Auto Basophil # : 0.02 K/uL  Auto Neutrophil % : 82.7 %  Auto Lymphocyte % : 9.1 %  Auto Monocyte % : 6.7 %  Auto Eosinophil % : 0.2 %  Auto Basophil % : 0.2 %      09-04    133<L>  |  97  |  38<H>  ----------------------------<  154<H>  4.9   |  28  |  5.10<H>    Ca    9.3      04 Sep 2024 06:34  Phos  3.9     09-04  Mg     2.3     09-04    TPro  8.1  /  Alb  3.3<L>  /  TBili  1.4<H>  /  DBili  x   /  AST  31  /  ALT  23  /  AlkPhos  116  09-04        Iron with Total Binding Capacity (04.13.24 @ 17:00)   Iron - Total Binding Capacity.: 153 ug/dL  % Saturation, Iron: 16 %  Iron Total: 25 ug/dL  Unsaturated Iron Binding Capacity: 128 ug/dL    Urinalysis with Rflx Culture (06.14.24 @ 21:47)   Urine Appearance: Clear  Color: Yellow  Specific Gravity: 1.015  pH Urine: 8.5  Protein, Urine: 300 mg/dL  Glucose Qualitative, Urine: Negative mg/dL  Ketone - Urine: Negative mg/dL  Blood, Urine: Negative  Bilirubin: Negative  Urobilinogen: 1.0 mg/dL  Leukocyte Esterase Concentration: Negative  Nitrite: Negative      ECG (09.03.24 @ 08:13) >    Ventricular Rate 96 BPM    Atrial Rate 96 BPM    P-R Interval 226 ms    QRS Duration 144 ms    Q-T Interval 368 ms    QTC Calculation(Bazett) 464 ms    P Axis 59 degrees    R Axis -36 degrees    T Axis 78 degrees    Diagnosis Line Sinus rhythm [id number] degree A-V block  Left axis deviation  Left bundle branch block  Abnormal ECG       RADIOLOGY/IMAGING                    ACC: [id number] EXAM:  CT ABDOMEN AND PELVIS IC   ORDERED BY: UMM CURIEL     PROCEDURE DATE:  09/03/2024          INTERPRETATION:  CLINICAL INFORMATION: 79 years  Male with abdominal   pain. Vomiting. On dialysis.    COMPARISON: None.    CONTRAST/COMPLICATIONS:  IV Contrast: Omnipaque 350  90 cc administered   10 cc discarded  Oral Contrast: NONE  Complications: None reported at time of study completion    PROCEDURE:  CT of the Abdomen and Pelvis was performed.  Sagittal and coronal reformats were performed.    LIMITATION: Absence of enteric contrast limits evaluation of the GI   tract. Streak artifact from metallic device lateral to the right chest   wall. Motion artifact.    FINDINGS:  LOWER CHEST: Trace bilateral pleural effusions with underlying passive   atelectasis. Aortic and coronary atherosclerosis.    LIVER: Within normal limits.  BILE DUCTS: Normal caliber.  GALLBLADDER: Cholelithiasis.  SPLEEN: Within normal limits.  PANCREAS: Within normal limits.  ADRENALS: Within normal limits.  KIDNEYS/URETERS: Atrophic kidneys. No hydronephrosis. Symmetrical   nephrograms.    BLADDER: Minimally distended.  REPRODUCTIVE ORGANS: The enlarged prostate.    BOWEL: No bowel obstruction. Appendix is normal.. 2.5 cm periampullary   duodenal diverticula. Pain colonic diverticulosis without diverticulitis.  PERITONEUM/RETROPERITONEUM: Within normal limits.  VESSELS: Atherosclerotic changes. Patent celiac artery and SMA. MIKAYLA is   not well visualized. Patent portal, hepatic, splenic and superior   mesenteric veins.  LYMPH NODES: No lymphadenopathy.  ABDOMINAL WALL: Within normal limits.  BONES: Moderate degenerative changes.    IMPRESSION:  Pancolonic diverticulosis without diverticulitis.    Cholelithiasis.    Trace bilateral pleural effusions with underlying passive atelectasis.          ACC: [id number] EXAM:  US LIVER AND PANCREAS   ORDERED BY: UMM CURIEL     PROCEDURE DATE:  09/03/2024          INTERPRETATION:  CLINICAL INFORMATION: Upper abdominal pain    COMPARISON: Abdominal ultrasound dated 5/13/2022. Abdominal CT dated   4/12/2024.    TECHNIQUE: Sonography of the right upper quadrant.    FINDINGS:    Liver: Within normal limits.  Bile ducts: Normal caliber. Common bile duct measures 6 mm in caliber   which is within normal limits..  Gallbladder: Cholelithiasis is again noted without evidence of thickened   gallbladder wall, pericholecystic fluid, or ultrasonic Chu's sign.  Pancreas: Visualized portions are within normal limits.  Right kidney: 7.7 cm which is atrophic. No hydronephrosis.  Ascites: None.  IVC: Visualized portions are within normal limits.    IMPRESSION:    Cholelithiasis is again noted without evidence of thickened gallbladder   wall, pericholecystic fluid, or ultrasonic Chu's sign.    Atrophic right kidney.    
Patient is a 79y old  Male who presents with a chief complaint of abdominal pain (04 Sep 2024 15:00)      HPI:  79-year-old male, AAOx1, From home  on dialysis T/Th/ Sa  presents with abdominal pain and vomiting since  yesterday. Reports constant midgastric abdominal pain that is non radiating. Patient is a poor historian. Unable to obtain further information from him. He was very fatigue and appeared fluid overloaded on exam.     Called see and eval 79y.o. Male w/PMH significant for ESRD on HD via PC for AVF creation. Pt admitted for abd pain. Pt was tachycardic with leukocytosis, meeting SIRS criteria per primary team and ID. Currently pt resting comfortably, on reg diet. Denies abd pain. Pt is on LUE precaution.     PAST MEDICAL & SURGICAL HISTORY:  HTN (hypertension)      Chronic kidney disease      HLD (hyperlipidemia)      No significant past surgical history          MEDICATIONS  (STANDING):  aspirin  chewable 81 milliGRAM(s) Oral daily  atorvastatin 20 milliGRAM(s) Oral at bedtime  cefTRIAXone   IVPB      chlorhexidine 2% Cloths 1 Application(s) Topical <User Schedule>  losartan 50 milliGRAM(s) Oral daily  pantoprazole    Tablet 40 milliGRAM(s) Oral before breakfast    MEDICATIONS  (PRN):  acetaminophen     Tablet .. 650 milliGRAM(s) Oral every 6 hours PRN Temp greater or equal to 38C (100.4F), Mild Pain (1 - 3)      Allergies    No Known Allergies    Intolerances    Vital Signs Last 24 Hrs  T(C): 36.7 (04 Sep 2024 14:11), Max: 37.4 (03 Sep 2024 23:08)  T(F): 98.1 (04 Sep 2024 14:11), Max: 99.3 (03 Sep 2024 23:08)  HR: 76 (04 Sep 2024 15:51) (76 - 91)  BP: 97/57 (04 Sep 2024 15:51) (85/53 - 134/67)  BP(mean): 90 (04 Sep 2024 15:51) (90 - 90)  RR: 18 (04 Sep 2024 15:51) (16 - 20)  SpO2: 97% (04 Sep 2024 15:51) (96% - 100%)    Parameters below as of 04 Sep 2024 15:51  Patient On (Oxygen Delivery Method): nasal cannula  O2 Flow (L/min): 4    Physical:  Gen: Awake, alert. NAD  Chest: PC in place.    I&O's Detail    03 Sep 2024 07:01  -  04 Sep 2024 07:00  --------------------------------------------------------  IN:    Other (mL): 600 mL  Total IN: 600 mL    OUT:    Other (mL): 3000 mL    Voided (mL): 30 mL  Total OUT: 3030 mL    Total NET: -2430 mL    LABS:                        10.8   12.23 )-----------( 126      ( 04 Sep 2024 06:34 )             31.5              09-04    133<L>  |  97  |  38<H>  ----------------------------<  154<H>  4.9   |  28  |  5.10<H>    Ca    9.3      04 Sep 2024 06:34  Phos  3.9     09-04  Mg     2.3     09-04    TPro  8.1  /  Alb  3.3<L>  /  TBili  1.4<H>  /  DBili  x   /  AST  31  /  ALT  23  /  AlkPhos  116  09-04              Urinalysis Basic - ( 04 Sep 2024 06:34 )    Color: x / Appearance: x / SG: x / pH: x  Gluc: 154 mg/dL / Ketone: x  / Bili: x / Urobili: x   Blood: x / Protein: x / Nitrite: x   Leuk Esterase: x / RBC: x / WBC x   Sq Epi: x / Non Sq Epi: x / Bacteria: x    
PULMONARY CONSULT NOTE      PEMA VENCES  MRN-[id number]      History of Present Illness:  Reason for Admission: abdominal pain  History of Present Illness:   79-year-old male, AAOx1, From home  on dialysis T/Th/ Sa  presents with abdominal pain and vomiting since  yesterday. Reports constant midgastric abdominal pain that is non radiating. Patient is a poor historian. Unable to obtain further information from him. He was very fatigue and appeared fluid overloaded on exam.     To note: patient was here in April for similar sx and was found ot have bacteremia         HISTORY OF PRESENT ILLNESS: As Above. Awake, alert, comfortable in bed with oxygen supp via NC in NAD    MEDICATIONS  (STANDING):  aspirin  chewable 81 milliGRAM(s) Oral daily  atorvastatin 20 milliGRAM(s) Oral at bedtime  cefTRIAXone   IVPB 2000 milliGRAM(s) IV Intermittent once  cefTRIAXone   IVPB      losartan 50 milliGRAM(s) Oral daily  pantoprazole  Injectable 40 milliGRAM(s) IV Push daily      MEDICATIONS  (PRN):  acetaminophen     Tablet .. 650 milliGRAM(s) Oral every 6 hours PRN Temp greater or equal to 38C (100.4F), Mild Pain (1 - 3)      Allergies    No Known Allergies    Intolerances        PAST MEDICAL & SURGICAL HISTORY:  HTN (hypertension)      Chronic kidney disease      HLD (hyperlipidemia)      No significant past surgical history          FAMILY HISTORY:      SOCIAL HISTORY  Smoking History:     REVIEW OF SYSTEMS:    CONSTITUTIONAL:  No fevers, chills, sweats    HEENT:  Eyes:  No diplopia or blurred vision. ENT:  No earache, sore throat or runny nose.    CARDIOVASCULAR:  No pressure, squeezing, tightness, or heaviness about the chest; no palpitations.    RESPIRATORY:  Per HPI    GASTROINTESTINAL:  + abdominal pain,  no nausea, vomiting or diarrhea.    GENITOURINARY:  No dysuria, frequency or urgency.    NEUROLOGIC:  No paresthesias, fasciculations, seizures or weakness.    PSYCHIATRIC:  No disorder of thought or mood.    Vital Signs Last 24 Hrs  T(C): 36.4 (04 Sep 2024 04:49), Max: 37.4 (03 Sep 2024 23:08)  T(F): 97.5 (04 Sep 2024 04:49), Max: 99.3 (03 Sep 2024 23:08)  HR: 77 (04 Sep 2024 04:49) (77 - 98)  BP: 106/61 (04 Sep 2024 04:49) (106/61 - 164/89)  BP(mean): --  RR: 18 (04 Sep 2024 04:49) (18 - 20)  SpO2: 96% (04 Sep 2024 04:49) (93% - 100%)    Parameters below as of 04 Sep 2024 04:49  Patient On (Oxygen Delivery Method): nasal cannula  O2 Flow (L/min): 4    I&O's Detail    03 Sep 2024 07:01  -  04 Sep 2024 07:00  --------------------------------------------------------  IN:    Other (mL): 600 mL  Total IN: 600 mL    OUT:    Other (mL): 3000 mL    Voided (mL): 30 mL  Total OUT: 3030 mL    Total NET: -2430 mL          PHYSICAL EXAMINATION:    GENERAL: The patient is a well-developed, well-nourished _____in no apparent distress.     HEENT: Head is normocephalic and atraumatic. Extraocular muscles are intact. Mucous membranes are moist.     NECK: Supple.     LUNGS: Clear to auscultation without wheezing, rales, or rhonchi. Respirations unlabored    HEART: Regular rate and rhythm without murmur.    ABDOMEN: Soft, mildly tender, and nondistended.  No hepatosplenomegaly is noted.    EXTREMITIES: Without any cyanosis, clubbing, rash, lesions + edema.    NEUROLOGIC: Grossly intact.      LABS:                        10.8   12.23 )-----------( 126      ( 04 Sep 2024 06:34 )             31.5     09-04    133<L>  |  97  |  38<H>  ----------------------------<  154<H>  4.9   |  28  |  5.10<H>    Ca    9.3      04 Sep 2024 06:34  Phos  3.9     09-04  Mg     2.3     09-04    TPro  8.1  /  Alb  3.3<L>  /  TBili  1.4<H>  /  DBili  x   /  AST  31  /  ALT  23  /  AlkPhos  116  09-04      Urinalysis Basic - ( 04 Sep 2024 06:34 )    Color: x / Appearance: x / SG: x / pH: x  Gluc: 154 mg/dL / Ketone: x  / Bili: x / Urobili: x   Blood: x / Protein: x / Nitrite: x   Leuk Esterase: x / RBC: x / WBC x   Sq Epi: x / Non Sq Epi: x / Bacteria: x                      MICROBIOLOGY:    RADIOLOGY & ADDITIONAL STUDIES:  < from: CT Abdomen and Pelvis w/ IV Cont (09.03.24 @ 12:04) >  IMPRESSION:  Pancolonic diverticulosis without diverticulitis.    Cholelithiasis.    Trace bilateral pleural effusions with underlying passive atelectasis.      < end of copied text >  < from: US Hepatic & Pancreatic (09.03.24 @ 10:50) >  IMPRESSION:    Cholelithiasis is again noted without evidence of thickened gallbladder   wall, pericholecystic fluid, or ultrasonic Chu's sign.    Atrophic right kidney.      < end of copied text >    CXR:    Ct scan chest;    ekg;    echo:

## 2024-09-05 NOTE — PROGRESS NOTE ADULT - PROBLEM SELECTOR PLAN 1
p/w with intractable abdominal pain , with one episode vomiting  AMS, AAOx1   wbc 13.59   last time in April he had similar sx and had a positive blood culture.  Dialyze catheter was changed as possible source of infection  CT A/P  Pancolonic diverticulosis without diverticulitis.  Hepatic and pacreatic U/S: Cholelithiasis   c/w Tylenol 650 q6 as needed   c/w pantoprazole 40 before breakfast  f/u bcx   f/u ucx     ID Dr. Cui following  Nephro Dr. Art following  GI Dr. Crandall following  Advanced diet as patient is more alert today p/w with intractable abdominal pain , with one episode vomiting  AMS, AAOx1   wbc 13.59   last time in April he had similar sx and had a positive blood culture.  Dialyze catheter was changed as possible source of infection  CT A/P  Pancolonic diverticulosis without diverticulitis.  Hepatic and pacreatic U/S: Cholelithiasis   c/w Tylenol 650 q6 as needed   c/w pantoprazole 40 before breakfast  f/u bcx: NGTD 24h  f/u ucx     ID Dr. Cui following  Nephro Dr. Art following  GI Dr. Crandall following

## 2024-09-06 ENCOUNTER — TRANSCRIPTION ENCOUNTER (OUTPATIENT)
Age: 79
End: 2024-09-06

## 2024-09-06 VITALS
DIASTOLIC BLOOD PRESSURE: 69 MMHG | HEART RATE: 72 BPM | SYSTOLIC BLOOD PRESSURE: 111 MMHG | TEMPERATURE: 98 F | OXYGEN SATURATION: 97 % | RESPIRATION RATE: 17 BRPM

## 2024-09-06 LAB
ALBUMIN SERPL ELPH-MCNC: 2.9 G/DL — LOW (ref 3.5–5)
ALP SERPL-CCNC: 84 U/L — SIGNIFICANT CHANGE UP (ref 40–120)
ALT FLD-CCNC: 17 U/L DA — SIGNIFICANT CHANGE UP (ref 10–60)
ANION GAP SERPL CALC-SCNC: 9 MMOL/L — SIGNIFICANT CHANGE UP (ref 5–17)
AST SERPL-CCNC: 19 U/L — SIGNIFICANT CHANGE UP (ref 10–40)
BASOPHILS # BLD AUTO: 0.02 K/UL — SIGNIFICANT CHANGE UP (ref 0–0.2)
BASOPHILS NFR BLD AUTO: 0.3 % — SIGNIFICANT CHANGE UP (ref 0–2)
BILIRUB SERPL-MCNC: 0.5 MG/DL — SIGNIFICANT CHANGE UP (ref 0.2–1.2)
BUN SERPL-MCNC: 48 MG/DL — HIGH (ref 7–18)
CALCIUM SERPL-MCNC: 9.1 MG/DL — SIGNIFICANT CHANGE UP (ref 8.4–10.5)
CHLORIDE SERPL-SCNC: 99 MMOL/L — SIGNIFICANT CHANGE UP (ref 96–108)
CO2 SERPL-SCNC: 26 MMOL/L — SIGNIFICANT CHANGE UP (ref 22–31)
CREAT SERPL-MCNC: 5.56 MG/DL — HIGH (ref 0.5–1.3)
EGFR: 10 ML/MIN/1.73M2 — LOW
EOSINOPHIL # BLD AUTO: 1.35 K/UL — HIGH (ref 0–0.5)
EOSINOPHIL NFR BLD AUTO: 19.5 % — HIGH (ref 0–6)
GLUCOSE SERPL-MCNC: 145 MG/DL — HIGH (ref 70–99)
HCT VFR BLD CALC: 29.4 % — LOW (ref 39–50)
HGB BLD-MCNC: 10 G/DL — LOW (ref 13–17)
IMM GRANULOCYTES NFR BLD AUTO: 0.7 % — SIGNIFICANT CHANGE UP (ref 0–0.9)
LYMPHOCYTES # BLD AUTO: 1.54 K/UL — SIGNIFICANT CHANGE UP (ref 1–3.3)
LYMPHOCYTES # BLD AUTO: 22.3 % — SIGNIFICANT CHANGE UP (ref 13–44)
MAGNESIUM SERPL-MCNC: 2.5 MG/DL — SIGNIFICANT CHANGE UP (ref 1.6–2.6)
MCHC RBC-ENTMCNC: 32.3 PG — SIGNIFICANT CHANGE UP (ref 27–34)
MCHC RBC-ENTMCNC: 34 GM/DL — SIGNIFICANT CHANGE UP (ref 32–36)
MCV RBC AUTO: 94.8 FL — SIGNIFICANT CHANGE UP (ref 80–100)
MONOCYTES # BLD AUTO: 0.82 K/UL — SIGNIFICANT CHANGE UP (ref 0–0.9)
MONOCYTES NFR BLD AUTO: 11.8 % — SIGNIFICANT CHANGE UP (ref 2–14)
NEUTROPHILS # BLD AUTO: 3.14 K/UL — SIGNIFICANT CHANGE UP (ref 1.8–7.4)
NEUTROPHILS NFR BLD AUTO: 45.4 % — SIGNIFICANT CHANGE UP (ref 43–77)
NRBC # BLD: 0 /100 WBCS — SIGNIFICANT CHANGE UP (ref 0–0)
PHOSPHATE SERPL-MCNC: 2.9 MG/DL — SIGNIFICANT CHANGE UP (ref 2.5–4.5)
PLATELET # BLD AUTO: 123 K/UL — LOW (ref 150–400)
POTASSIUM SERPL-MCNC: 4.2 MMOL/L — SIGNIFICANT CHANGE UP (ref 3.5–5.3)
POTASSIUM SERPL-SCNC: 4.2 MMOL/L — SIGNIFICANT CHANGE UP (ref 3.5–5.3)
PROCALCITONIN SERPL-MCNC: 10.9 NG/ML — HIGH (ref 0.02–0.1)
PROT SERPL-MCNC: 7.5 G/DL — SIGNIFICANT CHANGE UP (ref 6–8.3)
RBC # BLD: 3.1 M/UL — LOW (ref 4.2–5.8)
RBC # FLD: 12.6 % — SIGNIFICANT CHANGE UP (ref 10.3–14.5)
SODIUM SERPL-SCNC: 134 MMOL/L — LOW (ref 135–145)
WBC # BLD: 6.92 K/UL — SIGNIFICANT CHANGE UP (ref 3.8–10.5)
WBC # FLD AUTO: 6.92 K/UL — SIGNIFICANT CHANGE UP (ref 3.8–10.5)

## 2024-09-06 PROCEDURE — 93005 ELECTROCARDIOGRAM TRACING: CPT

## 2024-09-06 PROCEDURE — 84100 ASSAY OF PHOSPHORUS: CPT

## 2024-09-06 PROCEDURE — 83970 ASSAY OF PARATHORMONE: CPT

## 2024-09-06 PROCEDURE — 82962 GLUCOSE BLOOD TEST: CPT

## 2024-09-06 PROCEDURE — 36415 COLL VENOUS BLD VENIPUNCTURE: CPT

## 2024-09-06 PROCEDURE — 83735 ASSAY OF MAGNESIUM: CPT

## 2024-09-06 PROCEDURE — 96374 THER/PROPH/DIAG INJ IV PUSH: CPT

## 2024-09-06 PROCEDURE — 80074 ACUTE HEPATITIS PANEL: CPT

## 2024-09-06 PROCEDURE — 82310 ASSAY OF CALCIUM: CPT

## 2024-09-06 PROCEDURE — 85025 COMPLETE CBC W/AUTO DIFF WBC: CPT

## 2024-09-06 PROCEDURE — 74177 CT ABD & PELVIS W/CONTRAST: CPT | Mod: MC

## 2024-09-06 PROCEDURE — 87040 BLOOD CULTURE FOR BACTERIA: CPT

## 2024-09-06 PROCEDURE — 84145 PROCALCITONIN (PCT): CPT

## 2024-09-06 PROCEDURE — 76705 ECHO EXAM OF ABDOMEN: CPT

## 2024-09-06 PROCEDURE — 80053 COMPREHEN METABOLIC PANEL: CPT

## 2024-09-06 PROCEDURE — 99261: CPT

## 2024-09-06 PROCEDURE — 87641 MR-STAPH DNA AMP PROBE: CPT

## 2024-09-06 PROCEDURE — 87640 STAPH A DNA AMP PROBE: CPT

## 2024-09-06 PROCEDURE — 99285 EMERGENCY DEPT VISIT HI MDM: CPT

## 2024-09-06 PROCEDURE — 83690 ASSAY OF LIPASE: CPT

## 2024-09-06 RX ORDER — AMOXICILLIN AND CLAVULANATE POTASSIUM 250; 125 MG/1; MG/1
1 TABLET, FILM COATED ORAL
Qty: 10 | Refills: 0
Start: 2024-09-06 | End: 2024-09-10

## 2024-09-06 RX ADMIN — CHLORHEXIDINE GLUCONATE 1 APPLICATION(S): 40 SOLUTION TOPICAL at 05:14

## 2024-09-06 RX ADMIN — Medication 100 MILLIGRAM(S): at 08:30

## 2024-09-06 RX ADMIN — Medication 40 MILLIGRAM(S): at 05:14

## 2024-09-06 RX ADMIN — Medication 81 MILLIGRAM(S): at 11:25

## 2024-09-06 RX ADMIN — LOSARTAN POTASSIUM 50 MILLIGRAM(S): 50 TABLET ORAL at 05:14

## 2024-09-06 NOTE — PROGRESS NOTE ADULT - SUBJECTIVE AND OBJECTIVE BOX
[   ] ICU                                          [   ] CCU                                      [ X ] Medical Floor    Patient is a 79 year old male with abdominal pain. GI consulted to evaluate.         79-year-old male, from home with past medical history significant for DM, Hyperlipidemia, HTN, Glaucoma, Gout, ESRD on HD presented to the emergency room with 2 days history of constant, non radiating epigastric abdominal pain associated with nausea but no vomiting. Patient can not provide good history. No hematemesis, hematochezia, melena, fever, chills, chest pain, SOB, cough, hematuria, dysuria or diarrhea reported.     Patient appears comfortable. No new complaints reported, No abdominal pain, N/V, hematemesis, hematochezia, melena, fever, chills, chest pain, SOB, cough or diarrhea reported.             PAST MEDICAL HISTORY     DM (diabetes mellitus)    HTN (hypertension)     Hyperlipidemia    Glaucoma    Gout        PAST SURGICAL HISTORY  No significant past surgical history        Allergies    No Known Allergies    Intolerances  None         MEDICATIONS  (STANDING):  aspirin  chewable 81 milliGRAM(s) Oral daily  atorvastatin 20 milliGRAM(s) Oral at bedtime  cefTRIAXone   IVPB      losartan 50 milliGRAM(s) Oral daily  pantoprazole  Injectable 40 milliGRAM(s) IV Push daily    MEDICATIONS  (PRN):  acetaminophen     Tablet .. 650 milliGRAM(s) Oral every 6 hours PRN Temp greater or equal to 38C (100.4F), Mild Pain (1 - 3)      SOCIAL HISTORY  Advanced Directives:       [ X ] Full Code       [  ] DNR  Marital Status:         [  ] M      [ X ] S      [  ] D       [  ] W  Children:       [ X ] Yes      [  ] No  Occupation:        [  ] Employed       [ X ] Unemployed       [  ] Retired  Diet:       [X  ] Regular       [  ] PEG feeding          [  ] NG tube feeding  Drug Use:           [ X ] No               [  ] Yes  Alcohol:           [X  ] No             [  ] Yes (socially)         [  ] Yes (chronic)  Tobacco:           [  ] Yes           [ X ] No      FAMILY HISTORY  [ X ] Heart Disease            [ X ] Diabetes             [X  ] HTN             [  ] Colon Cancer             [  ] Stomach Cancer              [  ] Pancreatic Cancer          VITALS   Vital Signs Last 24 Hrs  T(C): 36.5 (09-06-24 @ 13:38), Max: 36.8 (09-05-24 @ 21:20)  T(F): 97.7 (09-06-24 @ 13:38), Max: 98.2 (09-05-24 @ 21:20)  HR: 72 (09-06-24 @ 13:38) (72 - 80)  BP: 111/69 (09-06-24 @ 13:38) (99/59 - 122/76)  BP(mean): 83 (09-06-24 @ 13:38) (83 - 83)  RR: 17 (09-06-24 @ 13:38) (17 - 18)  SpO2: 97% (09-06-24 @ 13:38) (97% - 100%)      MEDICATIONS  (STANDING):  aspirin  chewable 81 milliGRAM(s) Oral daily  atorvastatin 20 milliGRAM(s) Oral at bedtime  cefTRIAXone   IVPB      cefTRIAXone   IVPB 2000 milliGRAM(s) IV Intermittent every 24 hours  chlorhexidine 2% Cloths 1 Application(s) Topical <User Schedule>  losartan 50 milliGRAM(s) Oral daily  pantoprazole    Tablet 40 milliGRAM(s) Oral before breakfast    MEDICATIONS  (PRN):  acetaminophen     Tablet .. 650 milliGRAM(s) Oral every 6 hours PRN Temp greater or equal to 38C (100.4F), Mild Pain (1 - 3)                            10.0   6.92  )-----------( 123      ( 06 Sep 2024 07:30 )             29.4       09-06    134<L>  |  99  |  48<H>  ----------------------------<  145<H>  4.2   |  26  |  5.56<H>    Ca    9.1      06 Sep 2024 07:30  Phos  2.9     09-06  Mg     2.5     09-06    TPro  7.5  /  Alb  2.9<L>  /  TBili  0.5  /  DBili  x   /  AST  19  /  ALT  17  /  AlkPhos  84  09-06

## 2024-09-06 NOTE — DISCHARGE NOTE NURSING/CASE MANAGEMENT/SOCIAL WORK - NSDCVIVACCINE_GEN_ALL_CORE_FT
Td (adult) preservative free; 15-Apr-2016 13:19; Lorraine Arriola (RN); [employer]; [id number]; IntraMuscular; Deltoid Right.; 0.5 milliLiter(s); VIS (VIS Published: 10-Apr-2016, VIS Presented: 15-Apr-2016);

## 2024-09-06 NOTE — PROGRESS NOTE ADULT - CARDIOVASCULAR
regular rate and rhythm/S1 S2 present/no gallops/no rub/no murmur/no JVD/normal PMI/no pedal edema
regular rate and rhythm/S1 S2 present/no gallops/no rub/no murmur/no JVD/normal PMI/no pedal edema

## 2024-09-06 NOTE — PROGRESS NOTE ADULT - SUBJECTIVE AND OBJECTIVE BOX
PGY-1 Progress Note discussed with attending    PLEASE MS TEAMS AUTHOR TILL 5:00 PM    PLEASE CONTACT ON CALL TEAM:  - On Call Team (Please refer to Darrick) FROM 5:00 PM - 8:30PM  - Nightfloat Team FROM 8:30 -7:30 AM      INTERVAL HPI/OVERNIGHT EVENTS: No acute events overnight.    SUBJECTIVE: Patient seen and examined at bedside this morning.    ---------------------------    REVIEW OF SYSTEMS:  CONSTITUTIONAL: No fever, weight loss, or fatigue  RESPIRATORY: No cough, wheezing, chills or hemoptysis; No shortness of breath  CARDIOVASCULAR: No chest pain, palpitations, dizziness, or leg swelling  GASTROINTESTINAL: No abdominal pain. No nausea, vomiting, or hematemesis; No diarrhea or constipation. No melena or hematochezia.  GENITOURINARY: No dysuria or hematuria, urinary frequency  NEUROLOGICAL: No headaches, memory loss, loss of strength, numbness, or tremors  SKIN: No itching, burning, rashes, or lesions     MEDICATIONS  (STANDING):  aspirin  chewable 81 milliGRAM(s) Oral daily  atorvastatin 20 milliGRAM(s) Oral at bedtime  cefTRIAXone   IVPB      cefTRIAXone   IVPB 2000 milliGRAM(s) IV Intermittent every 24 hours  chlorhexidine 2% Cloths 1 Application(s) Topical <User Schedule>  losartan 50 milliGRAM(s) Oral daily  pantoprazole    Tablet 40 milliGRAM(s) Oral before breakfast    MEDICATIONS  (PRN):  acetaminophen     Tablet .. 650 milliGRAM(s) Oral every 6 hours PRN Temp greater or equal to 38C (100.4F), Mild Pain (1 - 3)      Vital Signs Last 24 Hrs  T(C): 36.7 (06 Sep 2024 05:33), Max: 37.1 (05 Sep 2024 10:58)  T(F): 98.1 (06 Sep 2024 05:33), Max: 98.8 (05 Sep 2024 10:58)  HR: 74 (06 Sep 2024 05:33) (74 - 81)  BP: 122/76 (06 Sep 2024 05:33) (99/59 - 122/76)  BP(mean): --  RR: 18 (06 Sep 2024 05:33) (17 - 18)  SpO2: 97% (06 Sep 2024 05:33) (97% - 100%)    Parameters below as of 06 Sep 2024 05:33  Patient On (Oxygen Delivery Method): room air        -----------------------------    PHYSICAL EXAMINATION:  GENERAL: NAD, lying in bed  HEAD:  Atraumatic, Normocephalic  EYES:  conjunctiva and sclera clear  NECK: Supple, No JVD  CHEST/LUNG: Clear to auscultation bilaterally; No rales, rhonchi, wheezing, or rubs  HEART: Regular rate and rhythm; No murmurs, rubs, or gallops  ABDOMEN: Soft, Nontender, Nondistended; Bowel sounds present, no pain or masses on palpation  NERVOUS SYSTEM:  Alert & Oriented X3  : voiding well  EXTREMITIES:  2+ Peripheral Pulses, No clubbing, cyanosis, or edema  SKIN: warm dry                          9.4    8.10  )-----------( 112      ( 05 Sep 2024 10:55 )             27.6     09-05    133<L>  |  97  |  62<H>  ----------------------------<  174<H>  4.4   |  26  |  6.90<H>    Ca    9.1      05 Sep 2024 10:55  Phos  3.2     09-05  Mg     2.5     09-05    TPro  7.2  /  Alb  2.7<L>  /  TBili  0.7  /  DBili  x   /  AST  19  /  ALT  17  /  AlkPhos  88  09-05    LIVER FUNCTIONS - ( 05 Sep 2024 10:55 )  Alb: 2.7 g/dL / Pro: 7.2 g/dL / ALK PHOS: 88 U/L / ALT: 17 U/L DA / AST: 19 U/L / GGT: x                   I&O's Summary    05 Sep 2024 07:01  -  06 Sep 2024 07:00  --------------------------------------------------------  IN: 0 mL / OUT: 200 mL / NET: -200 mL          Culture - Blood (collected 04 Sep 2024 06:49)  Source: .Blood Dialysis Catheter  Preliminary Report (05 Sep 2024 12:01):    No growth at 24 hours    Culture - Blood (collected 04 Sep 2024 06:39)  Source: .Blood Dialysis Catheter  Preliminary Report (05 Sep 2024 12:01):    No growth at 24 hours        CAPILLARY BLOOD GLUCOSE      RADIOLOGY & ADDITIONAL TESTS:

## 2024-09-06 NOTE — DISCHARGE NOTE PROVIDER - HOSPITAL COURSE
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 Pt presents with abdominal pain and 1 episode of vomiting since  yesterday. Reports constant midgastric abdominal pain that is non radiating. Patient is a poor historian. Unable to obtain further information from him. He was very fatigue and appeared fluid overloaded on exam. To note: patient was here in April for similar sx and was found ot have bacteremia. In the ED, VS were as follows: bp 173/90, HR 93, RR 18, SpO2 93% on NC 4L. Labs significant for leukocytosis 13.59. CT abdomen/pelvis showed pancolonic diverticulosis without diverticulitis. Trace bilateral pleural effusions with underlying passive atelectasis. Hepatic and pacreatic U/S showed cholelithiasis. Patient admitted for acute metabolic encephalopathy and intractable abdominal pain concerning for infectious process.    ID consulted, patient started on ceftriaxone 2g. BCx negative, Pulmonology consulted. Nephrology consulted, patient underwent dialysis as scheduled on 9/3. Dialysis catheter changed due to possible source of infection. Vascular consulted for fistula creation, LUE vein mapping done, recommended following up outpatient for permanent access creation. During admission patient's abdominal pain improved and mental status returned to baseline.

## 2024-09-06 NOTE — PROGRESS NOTE ADULT - GASTROINTESTINAL
soft/nontender/nondistended/normal active bowel sounds/no guarding/no rigidity/no organomegaly
soft/nontender/nondistended/normal active bowel sounds/no guarding/no rigidity/no organomegaly

## 2024-09-06 NOTE — PROGRESS NOTE ADULT - SUBJECTIVE AND OBJECTIVE BOX
Date of Service 09-06-24 @ 10:25    CHIEF COMPLAINT:Patient is a 79y old  Male who presents with a chief complaint of abdominal pain .Pt appears comfortable.    	  REVIEW OF SYSTEMS:  CONSTITUTIONAL: No fever, weight loss, or fatigue  EYES: No eye pain, visual disturbances, or discharge  ENT:  No difficulty hearing, tinnitus, vertigo; No sinus or throat pain  NECK: No pain or stiffness  RESPIRATORY: No cough, wheezing, chills or hemoptysis; No Shortness of Breath  CARDIOVASCULAR: No chest pain, palpitations, passing out, dizziness, or leg swelling  GASTROINTESTINAL: No abdominal or epigastric pain. No nausea, vomiting, or hematemesis; No diarrhea or constipation. No melena or hematochezia.  GENITOURINARY: No dysuria, frequency, hematuria, or incontinence  NEUROLOGICAL: No headaches, memory loss, loss of strength, numbness, or tremors  SKIN: No itching, burning, rashes, or lesions   LYMPH Nodes: No enlarged glands  ENDOCRINE: No heat or cold intolerance; No hair loss  MUSCULOSKELETAL: No joint pain or swelling; No muscle, back, or extremity pain  PSYCHIATRIC: No depression, anxiety, mood swings, or difficulty sleeping  HEME/LYMPH: No easy bruising, or bleeding gums  ALLERGY AND IMMUNOLOGIC: No hives or eczema	      PHYSICAL EXAM:  T(C): 36.7 (09-06-24 @ 05:33), Max: 37.1 (09-05-24 @ 10:58)  HR: 74 (09-06-24 @ 05:33) (74 - 81)  BP: 122/76 (09-06-24 @ 05:33) (99/59 - 122/76)  RR: 18 (09-06-24 @ 05:33) (17 - 18)  SpO2: 97% (09-06-24 @ 05:33) (97% - 100%)  Wt(kg): --  I&O's Summary    05 Sep 2024 07:01  -  06 Sep 2024 07:00  --------------------------------------------------------  IN: 0 mL / OUT: 200 mL / NET: -200 mL        Appearance: Normal	  HEENT:   Normal oral mucosa, PERRL, EOMI	  Lymphatic: No lymphadenopathy  Cardiovascular: Normal S1 S2, No JVD, No murmurs, No edema  Respiratory: Lungs clear to auscultation	  Psychiatry: A & O x 3, Mood & affect appropriate  Gastrointestinal:  Soft, Non-tender, + BS	  Skin: No rashes, No ecchymoses, No cyanosis	  Neurologic: Non-focal  Extremities: Normal range of motion, No clubbing, cyanosis or edema  Vascular: Peripheral pulses palpable 2+ bilaterally    MEDICATIONS  (STANDING):  aspirin  chewable 81 milliGRAM(s) Oral daily  atorvastatin 20 milliGRAM(s) Oral at bedtime  cefTRIAXone   IVPB      cefTRIAXone   IVPB 2000 milliGRAM(s) IV Intermittent every 24 hours  chlorhexidine 2% Cloths 1 Application(s) Topical <User Schedule>  losartan 50 milliGRAM(s) Oral daily  pantoprazole    Tablet 40 milliGRAM(s) Oral before breakfast      LABS:	 	                       10.0   6.92  )-----------( 123      ( 06 Sep 2024 07:30 )             29.4     09-06    134<L>  |  99  |  48<H>  ----------------------------<  145<H>  4.2   |  26  |  5.56<H>    Ca    9.1      06 Sep 2024 07:30  Phos  2.9     09-06  Mg     2.5     09-06    TPro  7.5  /  Alb  2.9<L>  /  TBili  0.5  /  DBili  x   /  AST  19  /  ALT  17  /  AlkPhos  84  09-06    Blood cx x2-

## 2024-09-06 NOTE — PROGRESS NOTE ADULT - PROBLEM SELECTOR PLAN 1
p/w with intractable abdominal pain , with one episode vomiting  AMS, AAOx1   wbc 13.59   last time in April he had similar sx and had a positive blood culture.  Dialyze catheter was changed as possible source of infection  CT A/P  Pancolonic diverticulosis without diverticulitis.  Hepatic and pacreatic U/S: Cholelithiasis   c/w Tylenol 650 q6 as needed   c/w pantoprazole 40 before breakfast  f/u bcx: NGTD 24h  f/u ucx     ID Dr. Cui following  Nephro Dr. Art following  GI Dr. Crandall following

## 2024-09-06 NOTE — DISCHARGE NOTE PROVIDER - NSDCCAREPROVSEEN_GEN_ALL_CORE_FT
Basilia, Mohsena F Fisher, Fabian Crandall, Zeyad Ghotra, Corky Keene, Valarie Jorge, Yaritza Hutton, Todd Sherman, Inge

## 2024-09-06 NOTE — PROGRESS NOTE ADULT - SUBJECTIVE AND OBJECTIVE BOX
Problem List:  ESRD  Chief complain/HPI  79 years old with ESRD getting dialysis via left permanente catheter. He cam to the ER for abdominal pain that started in am after breakfast.  He went for his dialysis treatment in am, dialysis was stopped after one hour due to abdominal pain. His pain is in Epigastric area. CT showed Diverticulosis in colon area and one in duodenum area.  No c/o fever and chills.  As per his son patient was on and off on Apixaban since 12/23 , cause unknown  Last admission 04/2024 came to the ER for abdominal pain and was found to have bacteremia.     At present no c/o abdominal pain , no vomit and no nausea.   Appetite is good  Specimen Source: .Blood Dialysis Catheter (09.04.24 @ 06:49)       PAST MEDICAL & SURGICAL HISTORY:  HTN (hypertension)      Chronic kidney disease      HLD (hyperlipidemia)      No significant past surgical history          No Known Allergies      MEDICATIONS  (STANDING):  aspirin  chewable 81 milliGRAM(s) Oral daily  atorvastatin 20 milliGRAM(s) Oral at bedtime  cefTRIAXone   IVPB      cefTRIAXone   IVPB 2000 milliGRAM(s) IV Intermittent every 24 hours  chlorhexidine 2% Cloths 1 Application(s) Topical <User Schedule>  losartan 50 milliGRAM(s) Oral daily  pantoprazole    Tablet 40 milliGRAM(s) Oral before breakfast    MEDICATIONS  (PRN):  acetaminophen     Tablet .. 650 milliGRAM(s) Oral every 6 hours PRN Temp greater or equal to 38C (100.4F), Mild Pain (1 - 3)                            10.0   6.92  )-----------( 123      ( 06 Sep 2024 07:30 )             29.4     09-06    134<L>  |  99  |  48<H>  ----------------------------<  145<H>  4.2   |  26  |  5.56<H>    Ca    9.1      06 Sep 2024 07:30  Phos  2.9     09-06  Mg     2.5     09-06    TPro  7.5  /  Alb  2.9<L>  /  TBili  0.5  /  DBili  x   /  AST  19  /  ALT  17  /  AlkPhos  84  09-06            REVIEW OF SYSTEMS:  General: no fever no chills, no weight loss.    RESPIRATORY: No cough, wheezing, hemoptysis; No shortness of breath  CARDIOVASCULAR: No chest pain or palpitations. No Edema  GASTROINTESTINAL: No abdominal or epigastric pain. No nausea, vomiting. No diarrhea or constipation. No melena.  GENITOURINARY: No dysuria, reduced urine output.  NEUROLOGICAL: No numbness or weakness, no tremor , no dizziness.         VITALS:  T(F): 98.1 (09-06-24 @ 05:33), Max: 98.2 (09-05-24 @ 21:20)  HR: 74 (09-06-24 @ 05:33)  BP: 122/76 (09-06-24 @ 05:33)  RR: 18 (09-06-24 @ 05:33)  SpO2: 97% (09-06-24 @ 05:33)  Wt(kg): --    09-05 @ 07:01  -  09-06 @ 07:00  --------------------------------------------------------  IN: 0 mL / OUT: 200 mL / NET: -200 mL        PHYSICAL EXAM:  Constitutional: well developed, no diaphoresis, no distress.  Neck: No JVD, no carotid bruit, supple, no adenopathy  Respiratory:  air entrance B/L, no wheezes, rales or rhonchi  Cardiovascular: S1, S2, RRR, no pericardial rub, no murmur  Abdomen: BS+, soft, no tenderness, no bruit  Pelvis: bladder nondistended  Extremities: No cyanosis or clubbing. No peripheral edema.     Vascular Access: right tunneled catheter with no erythena and no discharge.

## 2024-09-06 NOTE — DISCHARGE NOTE NURSING/CASE MANAGEMENT/SOCIAL WORK - NSDCPEFALRISK_GEN_ALL_CORE
For information on Fall & Injury Prevention, visit: [email or web address] OR  [email or web address] OR  https://www.cdc.gov/steadi/patient.html

## 2024-09-06 NOTE — PROGRESS NOTE ADULT - PROBLEM SELECTOR PROBLEM 1
Intractable abdominal pain

## 2024-09-06 NOTE — DISCHARGE NOTE PROVIDER - CARE PROVIDERS DIRECT ADDRESSES
,[email or web address]_Unknown,[email or web address]

## 2024-09-06 NOTE — PROGRESS NOTE ADULT - ASSESSMENT
1. Abdominal pain  2. Gall stone  3. R/o biliary colic  4. R/o Peptic ulcer disease  5. GERD  6. Anemia (etiology multifactorial)  7. No evidence of acute GI bleeding    Suggestions:    1. Protonix 40mg daily  2. Avoid NSAID  3. Surgical evaluation  4. Diet as tolerated  5. Monitor H/H  6. Transfuse PRBC as needed  7. Continue antibiotics  8. DVT prophylaxis 
79-year-old male, AAOx1, From home  on dialysis T/Th/ Sa,HTN,Lipid d/o  presents with abdominal pain and vomiting,LBBB(old).  1.Abd pain-GI-f/u  2.ESRD-HD as per renal.  3.HTN-cozaar.  4.ABX as per ID.  5.Lipid d/o-statin.  6.Abnormal EKG-Echocardiogram with nl fx..  7.GI and DVT prophylaxis.
ESRD due to hypertension admitted for abdominal pain, un clear etiology at this time  Possible PUD , gastro esophagitis    Elevated WBC , r/o sepsis. BC 2 times in dialysis. Urine C&S. ID was called.   CHF , fluid retention , follow with dialysis and fluid removal during treatment    Patient on AC , etiology not clear, follow up need for AC as per PMD  
ESRD due to hypertension admitted for abdominal pain, un clear etiology at this time  Possible PUD , gastro esophagitis. Epigastric pain resolved     Elevated WBC , r/o sepsis. BC 2 times in dialysis. Urine C&S. ID was called. Follow BC   CHF , fluid retention , follow with dialysis and fluid removal during treatment.               
  Patient is a 79y old  Male who is on dialysis, and has recent h/o of Bacteremia, now presents to the ER for evaluation of abdominal pain and vomiting since overnight.  In dialysis patient received about 10 minutes but then was complaining too much pain and was sent to the ER. On admission, he found to have no fever but tachycardia, Leukocytosis and Altered mental status. The ID consult requested to assist with further evaluation of SIRS and antibiotic management.    # SIRS ( Tachycardia + Leukocytosis )  # Altered mental status - resolved  # Recent H/O  Streptococcus salivarius/vestibularis group and Stenotrophomonas Bacteremia    would recommend:    1. Follow up Blood cultures, are in process  2. Continue Ceftriaxone 2 g daily until work up is done  3. Aspiration precaution  4. Pain management as needed    d/w patient and Family at the bed side    Attending Attestation:    Spent more than 45 minutes on total encounter, more than 50 % of the visit was spent counseling and/or coordinating care by the Attending physician.    
79-year-old male, AAOx1, From home  on dialysis T/Th/ Sa  presents with abdominal pain and vomiting since  yesterday. Reports constant midgastric abdominal pain that is non radiating. CT A/P  Pancolonic diverticulosis without diverticulitis. Hepatic and pacreatic U/S: Cholelithiasis . WBC 13. Patient is being admitted for intractable abdominal pain likely from infection. 
79y.o. Male with ESRD on HD    -HD via PC  -Likely outpt f/u with Dr. Riojas for permanent access creation  -Should pt need hospitalization for other medical issues until next week, may optimize for OR next week  -Contents of this note signed out to covering PA [id number] 
  Patient is a 79y old  Male who is on dialysis, and has recent h/o of Bacteremia, now presents to the ER for evaluation of abdominal pain and vomiting since overnight.  In dialysis patient received about 10 minutes but then was complaining too much pain and was sent to the ER. On admission, he found to have no fever but tachycardia, Leukocytosis and Altered mental status. The ID consult requested to assist with further evaluation of SIRS and antibiotic management.    # SIRS ( Tachycardia + Leukocytosis )- Blood cultures have NGTD  # Altered mental status - resolved  # Recent H/O  Streptococcus salivarius/vestibularis group and Stenotrophomonas Bacteremia    would recommend:    1. Follow up final Blood cultures, have NGTD  2. Continue Ceftriaxone 2 g daily until work up is done  3. Aspiration precaution  4. Procalcitonin level    d/w patient and Family at the bed side    Attending Attestation:    Spent more than 35 minutes on total encounter, more than 50 % of the visit was spent counseling and/or coordinating care by the Attending physician.  
ESRD due to hypertension admitted for abdominal pain, un clear etiology at this time  Possible PUD , gastro esophagitis. Epigastric pain resolved     Elevated WBC , r/o sepsis. BC 2 times in dialysis. Urine C&S. ID was called. Follow BC   CHF , fluid retention , follow with dialysis and fluid removal during treatment.     Patient on AC , etiology not clear, follow up need for AC as per PMD      
ESRD due to hypertension. BFR , dialysis  and AP were adequate on 09/05/24 treatment.    admitted for abdominal pain, un clear etiology at this time not clear.  Possible PUD , gastro esophagitis. Epigastric pain resolved , issue is followed with PMD     Elevated WBC , r/o sepsis. BC 2 times in dialysis. Urine C&S. ID was called. 24 hours BC negative.     Fluid status improved   Anemia - ESRD restart AVNI .   Phosphate level normal no need for binders at this time.               
Patient is a 79y old  Male who is on dialysis, and has recent h/o of Bacteremia, now presents to the ER for evaluation of abdominal pain and vomiting since overnight.  In dialysis patient received about 10 minutes but then was complaining too much pain and was sent to the ER. On admission, he found to have no fever but tachycardia, Leukocytosis and Altered mental status. The ID consult requested to assist with further evaluation of SIRS and antibiotic management.    # SIRS ( Tachycardia + Leukocytosis )- Blood cultures FORM 9/4/24 have NGTD  # Altered mental status - resolved  # Recent H/O  Streptococcus salivarius/vestibularis group and Stenotrophomonas Bacteremia    would recommend:    1. May change Ceftriaxone to oral Augmentin 875mg daily on discharge to continue until 9/11/24  2. Aspiration precaution  3. OOB to chair    d/w DR. Hutton and covering House staff    Attending Attestation:    Spent more than 35 minutes on total encounter, more than 50 % of the visit was spent counseling and/or coordinating care by the Attending physician.
79-year-old male, AAOx1, From home  on dialysis T/Th/ Sa  presents with abdominal pain and vomiting since  yesterday. Reports constant midgastric abdominal pain that is non radiating. CT A/P  Pancolonic diverticulosis without diverticulitis. Hepatic and pacreatic U/S: Cholelithiasis . WBC 13. Patient is being admitted for intractable abdominal pain likely from infection. 
1. Abdominal pain  2. Gall stone  3. R/o biliary colic  4. R/o Peptic ulcer disease  5. GERD  6. Anemia (etiology multifactorial)  7. No evidence of acute GI bleeding    Suggestions:    1. Protonix 40mg daily  2. Avoid NSAID  3. Surgical evaluation  4. Diet as tolerated  5. Monitor H/H  6. Transfuse PRBC as needed  7. Continue antibiotics  8. DVT prophylaxis 
79-year-old male, AAOx1, From home  on dialysis T/Th/ Sa  presents with abdominal pain and vomiting since  yesterday. Reports constant midgastric abdominal pain that is non radiating. CT A/P  Pancolonic diverticulosis without diverticulitis. Hepatic and pacreatic U/S: Cholelithiasis . WBC 13. Patient is being admitted for intractable abdominal pain likely from infection.

## 2024-09-06 NOTE — PROGRESS NOTE ADULT - SUBJECTIVE AND OBJECTIVE BOX
Patient is a 79y old  Male who presents with a chief complaint of abdominal pain (05 Sep 2024 15:00)    pt seen in icu [  ], reg med floor [ x  ], bed [ x ], chair at bedside [   ], a+o x3 [ x ], lethargic [  ],    nad [x  ]         Allergies    No Known Allergies        Vitals    T(F): 98.1 (09-06-24 @ 05:33), Max: 98.8 (09-05-24 @ 10:58)  HR: 74 (09-06-24 @ 05:33) (74 - 81)  BP: 122/76 (09-06-24 @ 05:33) (99/59 - 122/76)  RR: 18 (09-06-24 @ 05:33) (17 - 18)  SpO2: 97% (09-06-24 @ 05:33) (97% - 100%)  Wt(kg): --  CAPILLARY BLOOD GLUCOSE          Labs                          9.4    8.10  )-----------( 112      ( 05 Sep 2024 10:55 )             27.6       09-05    133<L>  |  97  |  62<H>  ----------------------------<  174<H>  4.4   |  26  |  6.90<H>    Ca    9.1      05 Sep 2024 10:55  Phos  3.2     09-05  Mg     2.5     09-05    TPro  7.2  /  Alb  2.7<L>  /  TBili  0.7  /  DBili  x   /  AST  19  /  ALT  17  /  AlkPhos  88  09-05            .Blood Dialysis Catheter  09-04 @ 06:49   No growth at 24 hours  --  --      .Blood Dialysis Catheter  09-04 @ 06:39   No growth at 24 hours  --  --          Radiology Results      Meds    MEDICATIONS  (STANDING):  aspirin  chewable 81 milliGRAM(s) Oral daily  atorvastatin 20 milliGRAM(s) Oral at bedtime  cefTRIAXone   IVPB      cefTRIAXone   IVPB 2000 milliGRAM(s) IV Intermittent every 24 hours  chlorhexidine 2% Cloths 1 Application(s) Topical <User Schedule>  losartan 50 milliGRAM(s) Oral daily  pantoprazole    Tablet 40 milliGRAM(s) Oral before breakfast      MEDICATIONS  (PRN):  acetaminophen     Tablet .. 650 milliGRAM(s) Oral every 6 hours PRN Temp greater or equal to 38C (100.4F), Mild Pain (1 - 3)      Physical Exam    Neuro :  no focal deficits  Respiratory: CTA B/L  CV: RRR, S1S2, no murmurs,   Abdominal: Soft, epigastric tender to deep palp, ND +BS,  Extremities: No edema, + peripheral pulses        ASSESSMENT    ams 2nd to metabolic encephalopathy,   sirs    intractable abd pain,   duodenal diverticula,   pleural eff,   cholelithiasis,   h/o sinus node dysfunction,   chronic HFpEF,   aortic stenosis,   HTN,   HLD,   b/l carotid stenosis,   DM (not on meds),   ESRD on dialysis (Tue, Thu, Sat),   Alcohol dependence,    Gout,    glaucoma,   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 out of the right eye  thrombosis ( on eliquis 12/ 2023)        PLAN    f/u ua  f/u blood and ucx  id f/u   cont Ceftriaxone 2 g daily    gi f/u   Protonix 40mg daily  Avoid NSAID  Surgical evaluation  Diet as tolerated  Monitor H/H  Transfuse PRBC as needed  cont pain control  pulm f/u   Pleural effusion likely secondary to fluid overload  cardio cons  cont cardiac meds  s/p hd 9/3/24  renal f/u    cont hd as per schedule tu, th, sat   vascular f/u   -Poss L AVF creation when Bcx neg and pt cleared  -Medical optimization  -con't LUE precaution  -HD via PC  -LUE vein mapping  cont current meds        Patient is a 79y old  Male who presents with a chief complaint of abdominal pain (05 Sep 2024 15:00)    pt seen in icu [  ], reg med floor [ x  ], bed [ x ], chair at bedside [   ], a+o x3 [ x ], lethargic [  ],    nad [x  ]         Allergies    No Known Allergies        Vitals    T(F): 98.1 (09-06-24 @ 05:33), Max: 98.8 (09-05-24 @ 10:58)  HR: 74 (09-06-24 @ 05:33) (74 - 81)  BP: 122/76 (09-06-24 @ 05:33) (99/59 - 122/76)  RR: 18 (09-06-24 @ 05:33) (17 - 18)  SpO2: 97% (09-06-24 @ 05:33) (97% - 100%)  Wt(kg): --  CAPILLARY BLOOD GLUCOSE          Labs                          9.4    8.10  )-----------( 112      ( 05 Sep 2024 10:55 )             27.6       09-05    133<L>  |  97  |  62<H>  ----------------------------<  174<H>  4.4   |  26  |  6.90<H>    Ca    9.1      05 Sep 2024 10:55  Phos  3.2     09-05  Mg     2.5     09-05    TPro  7.2  /  Alb  2.7<L>  /  TBili  0.7  /  DBili  x   /  AST  19  /  ALT  17  /  AlkPhos  88  09-05            .Blood Dialysis Catheter  09-04 @ 06:49   No growth at 24 hours  --  --      .Blood Dialysis Catheter  09-04 @ 06:39   No growth at 24 hours  --  --          Radiology Results      Meds    MEDICATIONS  (STANDING):  aspirin  chewable 81 milliGRAM(s) Oral daily  atorvastatin 20 milliGRAM(s) Oral at bedtime  cefTRIAXone   IVPB      cefTRIAXone   IVPB 2000 milliGRAM(s) IV Intermittent every 24 hours  chlorhexidine 2% Cloths 1 Application(s) Topical <User Schedule>  losartan 50 milliGRAM(s) Oral daily  pantoprazole    Tablet 40 milliGRAM(s) Oral before breakfast      MEDICATIONS  (PRN):  acetaminophen     Tablet .. 650 milliGRAM(s) Oral every 6 hours PRN Temp greater or equal to 38C (100.4F), Mild Pain (1 - 3)      Physical Exam    Neuro :  no focal deficits  Respiratory: CTA B/L  CV: RRR, S1S2, no murmurs,   Abdominal: Soft, epigastric tender to deep palp, ND +BS,  Extremities: No edema, + peripheral pulses        ASSESSMENT    ams 2nd to metabolic encephalopathy,   sirs    intractable abd pain,   duodenal diverticula,   pleural eff,   cholelithiasis,   h/o sinus node dysfunction,   chronic HFpEF,   aortic stenosis,   HTN,   HLD,   b/l carotid stenosis,   DM (not on meds),   ESRD on dialysis (Tue, Thu, Sat),   Alcohol dependence,    Gout,    glaucoma,   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  cardio f/u   LBBB on ekg (old).  cont cozaar  cont statin   s/p hd 9/3/24 with fluid removal during treatment.   renal f/u    cont hd as per schedule tu, th, sat   vascular f/u   -Poss L AVF creation when Bcx neg and pt cleared  -Medical optimization  -con't LUE precaution  -HD via PC  -LUE vein mapping  cont current meds   d/c plan pend vascular and id recs

## 2024-09-06 NOTE — PROGRESS NOTE ADULT - PROVIDER SPECIALTY LIST ADULT
Infectious Disease
Infectious Disease
Nephrology
Vascular Surgery
Infectious Disease
Internal Medicine
Nephrology
Nephrology
Pulmonology
Pulmonology
Cardiology
Nephrology
Gastroenterology
Gastroenterology
Internal Medicine
Internal Medicine

## 2024-09-06 NOTE — DISCHARGE NOTE PROVIDER - CARE PROVIDER_API CALL
Mayank Browning  Internal Medicine  [address] [place] [id number]  Phone: [phone]  Fax: [phone]  Follow Up Time: 1 week    Saul Riojas  Vascular Surgery  [address] [place] [address], [address]  [place] [address]  Phone: [phone]  Fax: [phone]  Follow Up Time: 1 week

## 2024-09-06 NOTE — PROGRESS NOTE ADULT - SUBJECTIVE AND OBJECTIVE BOX
INTERVAL HPI/OVERNIGHT EVENTS:  Pt resting comfortably. No acute complaints.   Tolerating reg diet.     MEDICATIONS  (STANDING):  aspirin  chewable 81 milliGRAM(s) Oral daily  atorvastatin 20 milliGRAM(s) Oral at bedtime  cefTRIAXone   IVPB 2000 milliGRAM(s) IV Intermittent every 24 hours  cefTRIAXone   IVPB      chlorhexidine 2% Cloths 1 Application(s) Topical <User Schedule>  losartan 50 milliGRAM(s) Oral daily  pantoprazole    Tablet 40 milliGRAM(s) Oral before breakfast    MEDICATIONS  (PRN):  acetaminophen     Tablet .. 650 milliGRAM(s) Oral every 6 hours PRN Temp greater or equal to 38C (100.4F), Mild Pain (1 - 3)    Vital Signs Last 24 Hrs  T(C): 36.7 (06 Sep 2024 05:33), Max: 37.1 (05 Sep 2024 10:58)  T(F): 98.1 (06 Sep 2024 05:33), Max: 98.8 (05 Sep 2024 10:58)  HR: 74 (06 Sep 2024 05:33) (74 - 81)  BP: 122/76 (06 Sep 2024 05:33) (99/59 - 122/76)  BP(mean): --  RR: 18 (06 Sep 2024 05:33) (17 - 18)  SpO2: 97% (06 Sep 2024 05:33) (97% - 100%)    Parameters below as of 06 Sep 2024 05:33  Patient On (Oxygen Delivery Method): room air    Physical:  General: A&Ox3. NAD.  Abdomen: Soft nondistended, nontender.  LUE: NVI, precaution band in place.    I&O's Detail    05 Sep 2024 07:01  -  06 Sep 2024 07:00  --------------------------------------------------------  IN:  Total IN: 0 mL    OUT:    Voided (mL): 200 mL  Total OUT: 200 mL    Total NET: -200 mL    LABS:                        10.0   6.92  )-----------( 123      ( 06 Sep 2024 07:30 )             29.4             09-06    134<L>  |  99  |  48<H>  ----------------------------<  145<H>  4.2   |  26  |  5.56<H>    Ca    9.1      06 Sep 2024 07:30  Phos  2.9     09-06  Mg     2.5     09-06    TPro  7.5  /  Alb  2.9<L>  /  TBili  0.5  /  DBili  x   /  AST  19  /  ALT  17  /  AlkPhos  84  09-06

## 2024-09-06 NOTE — DISCHARGE NOTE PROVIDER - NSDCMRMEDTOKEN_GEN_ALL_CORE_FT
acetaminophen 325 mg oral tablet: 2 tab(s) orally every 6 hours As needed Temp greater or equal to 38C (100.4F), Mild Pain (1 - 3)  aspirin 81 mg oral tablet, chewable: 1 tab(s) orally once a day  Combigan 0.2%-0.5% ophthalmic solution: 1 drop(s) to each affected eye 2 times a day  home  Lipitor 20 mg oral tablet: 1 orally once a day (at bedtime)  losartan 50 mg oral tablet: 1 tab(s) orally once a day  multivitamin: 1 tab(s) orally once a day  pantoprazole 20 mg oral delayed release tablet: 1 tab(s) orally once a day before breakfast  Vitamin D3 1250 mcg (50,000 intl units) oral capsule: 1 cap(s) orally once a week   acetaminophen 325 mg oral tablet: 2 tab(s) orally every 6 hours As needed Temp greater or equal to 38C (100.4F), Mild Pain (1 - 3)  amoxicillin-clavulanate 875 mg-125 mg oral tablet: 1 tab(s) orally 2 times a day  aspirin 81 mg oral tablet, chewable: 1 tab(s) orally once a day  Combigan 0.2%-0.5% ophthalmic solution: 1 drop(s) to each affected eye 2 times a day  home  Lipitor 20 mg oral tablet: 1 orally once a day (at bedtime)  losartan 50 mg oral tablet: 1 tab(s) orally once a day  multivitamin: 1 tab(s) orally once a day  pantoprazole 20 mg oral delayed release tablet: 1 tab(s) orally once a day before breakfast  Vitamin D3 1250 mcg (50,000 intl units) oral capsule: 1 cap(s) orally once a week

## 2024-09-06 NOTE — PROGRESS NOTE ADULT - RESPIRATORY
clear to auscultation bilaterally/no wheezes/no rales/no rhonchi/no respiratory distress/no use of accessory muscles/no subcutaneous emphysema/airway patent/breath sounds equal/good air movement/respirations non-labored
clear to auscultation bilaterally/no wheezes/no rales/no rhonchi/no respiratory distress/no use of accessory muscles/no subcutaneous emphysema/airway patent/breath sounds equal/good air movement/respirations non-labored

## 2024-09-06 NOTE — PROGRESS NOTE ADULT - SUBJECTIVE AND OBJECTIVE BOX
Patient is seen and examined at the bed side, is afebrile. No new events. The Blood cultures from 9/4 remains negative to date.       REVIEW OF SYSTEMS: All other review systems are negative      ALLERGIES: No Known Allergies      Vital Signs Last 24 Hrs  T(C): 36.5 (06 Sep 2024 13:38), Max: 36.8 (05 Sep 2024 21:20)  T(F): 97.7 (06 Sep 2024 13:38), Max: 98.2 (05 Sep 2024 21:20)  HR: 72 (06 Sep 2024 13:38) (72 - 75)  BP: 111/69 (06 Sep 2024 13:38) (101/60 - 122/76)  BP(mean): 83 (06 Sep 2024 13:38) (83 - 83)  RR: 17 (06 Sep 2024 13:38) (17 - 18)  SpO2: 97% (06 Sep 2024 13:38) (97% - 99%)    Parameters below as of 06 Sep 2024 13:38  Patient On (Oxygen Delivery Method): room air      PHYSICAL EXAM:  GENERAL: Not in distress , on oxygen via NC  CHEST/LUNG:  Not using accessory muscles  HEART: s1 and s2 present  ABDOMEN:  Nontender and  Nondistended  EXTREMITIES: No pedal  edema  CNS: Awake and Alert        LABS:                        10.0   6.92  )-----------( 123      ( 06 Sep 2024 07:30 )             29.4                           9.4    8.10  )-----------( 112      ( 05 Sep 2024 10:55 )             27.6             09-06    134<L>  |  99  |  48<H>  ----------------------------<  145<H>  4.2   |  26  |  5.56<H>    Ca    9.1      06 Sep 2024 07:30  Phos  2.9     09-06  Mg     2.5     09-06    TPro  7.5  /  Alb  2.9<L>  /  TBili  0.5  /  DBili  x   /  AST  19  /  ALT  17  /  AlkPhos  84  09-06    09-05    133<L>  |  97  |  62<H>  ----------------------------<  174<H>  4.4   |  26  |  6.90<H>    Ca    9.1      05 Sep 2024 10:55  Phos  3.2     09-05  Mg     2.5     09-05    TPro  7.2  /  Alb  2.7<L>  /  TBili  0.7  /  DBili  x   /  AST  19  /  ALT  17  /  AlkPhos  88  09-05    Procalcitonin (09.06.24 @ 07:30) : 10.90    MEDICATIONS  (STANDING):    aspirin  chewable 81 milliGRAM(s) Oral daily  atorvastatin 20 milliGRAM(s) Oral at bedtime  cefTRIAXone   IVPB      cefTRIAXone   IVPB 2000 milliGRAM(s) IV Intermittent every 24 hours  chlorhexidine 2% Cloths 1 Application(s) Topical <User Schedule>  losartan 50 milliGRAM(s) Oral daily  pantoprazole    Tablet 40 milliGRAM(s) Oral before breakfast      RADIOLOGY & ADDITIONAL TESTS:    9/3/24: CT Abdomen and Pelvis w/ IV Cont (09.03.24 @ 12:04) Pancolonic diverticulosis without diverticulitis.    Cholelithiasis.  Trace bilateral pleural effusions with underlying passive atelectasis.      MICROBIOLOGY DATA:    MRSA/MSSA PCR (09.04.24 @ 11:54)   MRSA PCR Result.: NotDetec    Culture - Blood (09.04.24 @ 06:49)   Specimen Source: .Blood Dialysis Catheter  Culture Results: No growth at 48 hours    Culture - Blood (09.04.24 @ 06:39)   Specimen Source: .Blood Dialysis Catheter  Culture Results: No growth at 48 hours

## 2024-09-06 NOTE — DISCHARGE NOTE PROVIDER - NSDCCPCAREPLAN_GEN_ALL_CORE_FT
PRINCIPAL DISCHARGE DIAGNOSIS  Diagnosis: Intractable abdominal pain  Assessment and Plan of Treatment: You presented with severe abdominal pain and vomiting. CT abdomen and pelvis was done which showed pancolonic diverticulosis without diverticulitis. Ultrasound of the liver and pancreas showed gallbladder stones. You had a high white blood cell count which decreased and normalized during your admission. Blood cultures were drawn and were negative. You were started on IV antibiotics. Your pain resolved during your admission.  You will complete a 5 day course of Augmentin on discharge.  Please follow up with your PCP 1-2 weeks upon discharge.      SECONDARY DISCHARGE DIAGNOSES  Diagnosis: ESRD on dialysis  Assessment and Plan of Treatment: You have a history of end stage renal disease on dialysis Tuesday, Thursday, and Saturday. Your dialysis catheter was changed during your admission as we were concerned it was a potential source of infection. You were seen by vascular surgery Dr. Riojas. Please follow up with him upon discharge for permanent dialysis access creation.    Diagnosis: HTN (hypertension)  Assessment and Plan of Treatment: You have a history of hypertension on losartan. We continued your home medication during your admission. Please continue your home medication and follow up with your PCP within 1-2 weeks of discharge.    Diagnosis: HLD (hyperlipidemia)  Assessment and Plan of Treatment: You have a history of hyperlipidemia on atorvastatin. We continued your home medication during your admission. Please continue your home medication and follow up with your PCP within 1-2 weeks of discharge.

## 2024-09-06 NOTE — PROGRESS NOTE ADULT - PROBLEM SELECTOR PLAN 3
hx of htn   c/w home losartan
incentive spirometry  Bronchodilators prn  chest pT
hx of htn   c/w home losartan
incentive spirometry  Bronchodilators prn  chest pT
hx of htn   c/w home losartan

## 2024-09-06 NOTE — DISCHARGE NOTE NURSING/CASE MANAGEMENT/SOCIAL WORK - PATIENT PORTAL LINK FT
You can access the FollowMyHealth Patient Portal offered by [facility] by registering at the following website: [email or web address]. By joining [facility]’s FollowMyHealth portal, you will also be able to view your health information using other applications (apps) compatible with our system.

## 2024-09-06 NOTE — PROGRESS NOTE ADULT - SUBJECTIVE AND OBJECTIVE BOX
Patient is a 79y old  Male who presents with a chief complaint of abdominal pain (06 Sep 2024 11:04)  Asleep, laying in bed in NAD. Doing well on RA    INTERVAL HPI/OVERNIGHT EVENTS:      VITAL SIGNS:  T(F): 98.1 (09-06-24 @ 05:33)  HR: 74 (09-06-24 @ 05:33)  BP: 122/76 (09-06-24 @ 05:33)  RR: 18 (09-06-24 @ 05:33)  SpO2: 97% (09-06-24 @ 05:33)  Wt(kg): --  I&O's Detail    05 Sep 2024 07:01  -  06 Sep 2024 07:00  --------------------------------------------------------  IN:  Total IN: 0 mL    OUT:    Voided (mL): 200 mL  Total OUT: 200 mL    Total NET: -200 mL              REVIEW OF SYSTEMS:    CONSTITUTIONAL:  No fevers, chills, sweats    HEENT:  Eyes:  No diplopia or blurred vision. ENT:  No earache, sore throat or runny nose.    CARDIOVASCULAR:  No pressure, squeezing, tightness, or heaviness about the chest; no palpitations.    RESPIRATORY:  Per HPI    GASTROINTESTINAL:  No abdominal pain, nausea, vomiting or diarrhea.    GENITOURINARY:  No dysuria, frequency or urgency.    NEUROLOGIC:  No paresthesias, fasciculations, seizures or weakness.    PSYCHIATRIC:  No disorder of thought or mood.      PHYSICAL EXAM:    Constitutional: Well developed and nourished  Eyes:Perrla  ENMT: normal  Neck:supple  Respiratory: good air entry  Cardiovascular: S1 S2 regular  Gastrointestinal: Soft, Non tender  Extremities: No edema  Vascular:normal  Neurological:Awake, alert,Ox3  Musculoskeletal:Normal      MEDICATIONS  (STANDING):  aspirin  chewable 81 milliGRAM(s) Oral daily  atorvastatin 20 milliGRAM(s) Oral at bedtime  cefTRIAXone   IVPB      cefTRIAXone   IVPB 2000 milliGRAM(s) IV Intermittent every 24 hours  chlorhexidine 2% Cloths 1 Application(s) Topical <User Schedule>  losartan 50 milliGRAM(s) Oral daily  pantoprazole    Tablet 40 milliGRAM(s) Oral before breakfast    MEDICATIONS  (PRN):  acetaminophen     Tablet .. 650 milliGRAM(s) Oral every 6 hours PRN Temp greater or equal to 38C (100.4F), Mild Pain (1 - 3)      Allergies    No Known Allergies    Intolerances        LABS:                        10.0   6.92  )-----------( 123      ( 06 Sep 2024 07:30 )             29.4     09-06    134<L>  |  99  |  48<H>  ----------------------------<  145<H>  4.2   |  26  |  5.56<H>    Ca    9.1      06 Sep 2024 07:30  Phos  2.9     09-06  Mg     2.5     09-06    TPro  7.5  /  Alb  2.9<L>  /  TBili  0.5  /  DBili  x   /  AST  19  /  ALT  17  /  AlkPhos  84  09-06      Urinalysis Basic - ( 06 Sep 2024 07:30 )    Color: x / Appearance: x / SG: x / pH: x  Gluc: 145 mg/dL / Ketone: x  / Bili: x / Urobili: x   Blood: x / Protein: x / Nitrite: x   Leuk Esterase: x / RBC: x / WBC x   Sq Epi: x / Non Sq Epi: x / Bacteria: x    Culture - Blood (09.04.24 @ 06:49)   Specimen Source: .Blood Dialysis Catheter  Culture Results:   No growth at 48 HoursCulture - Blood (09.04.24 @ 06:39)   Specimen Source: .Blood Dialysis Catheter  Culture Results:   No growth at 48 Hours        CAPILLARY BLOOD GLUCOSE            RADIOLOGY & ADDITIONAL TESTS:    CXR:    Ct scan chest:    ekg;    echo:

## 2024-09-06 NOTE — PROGRESS NOTE ADULT - NEUROLOGICAL
sensation intact/responds to pain/responds to verbal commands/no spontaneous movement
sensation intact/responds to pain/responds to verbal commands/no spontaneous movement

## 2024-09-06 NOTE — PROGRESS NOTE ADULT - PROBLEM SELECTOR PLAN 2
hx of ESRD T/TH/Sa  was unable to complete dialysis in center  s/p dialysis on 9/3  likely will need change in catheter due to possible source of infection    Nephro Dr. Art following  Vascular Dr. Riojas consulted for fistula: once BCx cleared    - LUE vein mapping today

## 2024-09-06 NOTE — DISCHARGE NOTE PROVIDER - PROVIDER TOKENS
PROVIDER:[TOKEN:[[id number]],FOLLOWUP:[1 week]],PROVIDER:[TOKEN:[[id number]],FOLLOWUP:[1 week]]

## 2024-09-09 LAB
CULTURE RESULTS: SIGNIFICANT CHANGE UP
CULTURE RESULTS: SIGNIFICANT CHANGE UP
SPECIMEN SOURCE: SIGNIFICANT CHANGE UP
SPECIMEN SOURCE: SIGNIFICANT CHANGE UP

## 2024-11-30 ENCOUNTER — EMERGENCY (EMERGENCY)
Facility: HOSPITAL | Age: 79
LOS: 1 days | Discharge: HOME | End: 2024-11-30
Attending: STUDENT IN AN ORGANIZED HEALTH CARE EDUCATION/TRAINING PROGRAM
Payer: MEDICARE

## 2024-11-30 VITALS
SYSTOLIC BLOOD PRESSURE: 182 MMHG | WEIGHT: 199.96 LBS | TEMPERATURE: 98 F | OXYGEN SATURATION: 97 % | HEART RATE: 109 BPM | DIASTOLIC BLOOD PRESSURE: 104 MMHG | HEIGHT: 70 IN | RESPIRATION RATE: 18 BRPM

## 2024-11-30 VITALS
OXYGEN SATURATION: 98 % | HEART RATE: 89 BPM | TEMPERATURE: 98 F | DIASTOLIC BLOOD PRESSURE: 97 MMHG | SYSTOLIC BLOOD PRESSURE: 165 MMHG | RESPIRATION RATE: 16 BRPM

## 2024-11-30 LAB
ALBUMIN SERPL ELPH-MCNC: 3.5 G/DL — SIGNIFICANT CHANGE UP (ref 3.5–5)
ALP SERPL-CCNC: 112 U/L — SIGNIFICANT CHANGE UP (ref 40–120)
ALT FLD-CCNC: 13 U/L DA — SIGNIFICANT CHANGE UP (ref 10–60)
ANION GAP SERPL CALC-SCNC: 9 MMOL/L — SIGNIFICANT CHANGE UP (ref 5–17)
AST SERPL-CCNC: 20 U/L — SIGNIFICANT CHANGE UP (ref 10–40)
BASE EXCESS BLDV CALC-SCNC: -1.2 MMOL/L — SIGNIFICANT CHANGE UP
BASOPHILS # BLD AUTO: 0.02 K/UL — SIGNIFICANT CHANGE UP (ref 0–0.2)
BASOPHILS NFR BLD AUTO: 0.2 % — SIGNIFICANT CHANGE UP (ref 0–2)
BILIRUB DIRECT SERPL-MCNC: 0.1 MG/DL — SIGNIFICANT CHANGE UP (ref 0–0.3)
BILIRUB SERPL-MCNC: 0.5 MG/DL — SIGNIFICANT CHANGE UP (ref 0.2–1.2)
BLD GP AB SCN SERPL QL: SIGNIFICANT CHANGE UP
BLOOD GAS COMMENTS, VENOUS: SIGNIFICANT CHANGE UP
BUN SERPL-MCNC: 62 MG/DL — HIGH (ref 7–18)
CALCIUM SERPL-MCNC: 8.8 MG/DL — SIGNIFICANT CHANGE UP (ref 8.4–10.5)
CHLORIDE SERPL-SCNC: 104 MMOL/L — SIGNIFICANT CHANGE UP (ref 96–108)
CO2 SERPL-SCNC: 21 MMOL/L — LOW (ref 22–31)
CREAT SERPL-MCNC: 6.58 MG/DL — HIGH (ref 0.5–1.3)
EGFR: 8 ML/MIN/1.73M2 — LOW
EOSINOPHIL # BLD AUTO: 0.82 K/UL — HIGH (ref 0–0.5)
EOSINOPHIL NFR BLD AUTO: 7.9 % — HIGH (ref 0–6)
GLUCOSE SERPL-MCNC: 188 MG/DL — HIGH (ref 70–99)
HCO3 BLDV-SCNC: 24 MMOL/L — SIGNIFICANT CHANGE UP (ref 22–29)
HCT VFR BLD CALC: 37.5 % — LOW (ref 39–50)
HGB BLD-MCNC: 13 G/DL — SIGNIFICANT CHANGE UP (ref 13–17)
HOROWITZ INDEX BLDV+IHG-RTO: SIGNIFICANT CHANGE UP
IMM GRANULOCYTES NFR BLD AUTO: 0.5 % — SIGNIFICANT CHANGE UP (ref 0–0.9)
LACTATE SERPL-SCNC: 1.7 MMOL/L — SIGNIFICANT CHANGE UP (ref 0.7–2)
LIDOCAIN IGE QN: 63 U/L — SIGNIFICANT CHANGE UP (ref 13–75)
LYMPHOCYTES # BLD AUTO: 0.55 K/UL — LOW (ref 1–3.3)
LYMPHOCYTES # BLD AUTO: 5.3 % — LOW (ref 13–44)
MCHC RBC-ENTMCNC: 34 PG — SIGNIFICANT CHANGE UP (ref 27–34)
MCHC RBC-ENTMCNC: 34.7 G/DL — SIGNIFICANT CHANGE UP (ref 32–36)
MCV RBC AUTO: 98.2 FL — SIGNIFICANT CHANGE UP (ref 80–100)
MONOCYTES # BLD AUTO: 0.63 K/UL — SIGNIFICANT CHANGE UP (ref 0–0.9)
MONOCYTES NFR BLD AUTO: 6.1 % — SIGNIFICANT CHANGE UP (ref 2–14)
NEUTROPHILS # BLD AUTO: 8.31 K/UL — HIGH (ref 1.8–7.4)
NEUTROPHILS NFR BLD AUTO: 80 % — HIGH (ref 43–77)
NRBC # BLD: 0 /100 WBCS — SIGNIFICANT CHANGE UP (ref 0–0)
PCO2 BLDV: 39 MMHG — LOW (ref 42–55)
PH BLDV: 7.39 — SIGNIFICANT CHANGE UP (ref 7.32–7.43)
PLATELET # BLD AUTO: 147 K/UL — LOW (ref 150–400)
PO2 BLDV: 24 MMHG — SIGNIFICANT CHANGE UP
POTASSIUM SERPL-MCNC: 5 MMOL/L — SIGNIFICANT CHANGE UP (ref 3.5–5.3)
POTASSIUM SERPL-SCNC: 5 MMOL/L — SIGNIFICANT CHANGE UP (ref 3.5–5.3)
PROT SERPL-MCNC: 8.8 G/DL — HIGH (ref 6–8.3)
RBC # BLD: 3.82 M/UL — LOW (ref 4.2–5.8)
RBC # FLD: 12.5 % — SIGNIFICANT CHANGE UP (ref 10.3–14.5)
SAO2 % BLDV: 39.8 % — SIGNIFICANT CHANGE UP
SODIUM SERPL-SCNC: 134 MMOL/L — LOW (ref 135–145)
TROPONIN I, HIGH SENSITIVITY RESULT: 44.9 NG/L — SIGNIFICANT CHANGE UP
WBC # BLD: 10.38 K/UL — SIGNIFICANT CHANGE UP (ref 3.8–10.5)
WBC # FLD AUTO: 10.38 K/UL — SIGNIFICANT CHANGE UP (ref 3.8–10.5)

## 2024-11-30 PROCEDURE — 83690 ASSAY OF LIPASE: CPT

## 2024-11-30 PROCEDURE — 82803 BLOOD GASES ANY COMBINATION: CPT

## 2024-11-30 PROCEDURE — 84484 ASSAY OF TROPONIN QUANT: CPT

## 2024-11-30 PROCEDURE — 86900 BLOOD TYPING SEROLOGIC ABO: CPT

## 2024-11-30 PROCEDURE — 99284 EMERGENCY DEPT VISIT MOD MDM: CPT | Mod: 25

## 2024-11-30 PROCEDURE — 99285 EMERGENCY DEPT VISIT HI MDM: CPT

## 2024-11-30 PROCEDURE — 36415 COLL VENOUS BLD VENIPUNCTURE: CPT

## 2024-11-30 PROCEDURE — 96374 THER/PROPH/DIAG INJ IV PUSH: CPT | Mod: XU

## 2024-11-30 PROCEDURE — 83605 ASSAY OF LACTIC ACID: CPT

## 2024-11-30 PROCEDURE — 86850 RBC ANTIBODY SCREEN: CPT

## 2024-11-30 PROCEDURE — 86901 BLOOD TYPING SEROLOGIC RH(D): CPT

## 2024-11-30 PROCEDURE — 71260 CT THORAX DX C+: CPT | Mod: MC

## 2024-11-30 PROCEDURE — 85025 COMPLETE CBC W/AUTO DIFF WBC: CPT

## 2024-11-30 PROCEDURE — 96375 TX/PRO/DX INJ NEW DRUG ADDON: CPT | Mod: XU

## 2024-11-30 PROCEDURE — 74177 CT ABD & PELVIS W/CONTRAST: CPT | Mod: MC

## 2024-11-30 PROCEDURE — 71260 CT THORAX DX C+: CPT | Mod: 26,MC

## 2024-11-30 PROCEDURE — 82248 BILIRUBIN DIRECT: CPT

## 2024-11-30 PROCEDURE — 80053 COMPREHEN METABOLIC PANEL: CPT

## 2024-11-30 PROCEDURE — 87040 BLOOD CULTURE FOR BACTERIA: CPT

## 2024-11-30 PROCEDURE — 74177 CT ABD & PELVIS W/CONTRAST: CPT | Mod: 26,MC

## 2024-11-30 RX ORDER — SODIUM CHLORIDE 9 MG/ML
1000 INJECTION, SOLUTION INTRAMUSCULAR; INTRAVENOUS; SUBCUTANEOUS ONCE
Refills: 0 | Status: DISCONTINUED | OUTPATIENT
Start: 2024-11-30 | End: 2024-11-30

## 2024-11-30 RX ORDER — ONDANSETRON HYDROCHLORIDE 4 MG/1
4 TABLET, FILM COATED ORAL ONCE
Refills: 0 | Status: COMPLETED | OUTPATIENT
Start: 2024-11-30 | End: 2024-11-30

## 2024-11-30 RX ORDER — MAGNESIUM, ALUMINUM HYDROXIDE 200-225/5
30 SUSPENSION, ORAL (FINAL DOSE FORM) ORAL ONCE
Refills: 0 | Status: COMPLETED | OUTPATIENT
Start: 2024-11-30 | End: 2024-11-30

## 2024-11-30 RX ORDER — FAMOTIDINE 20 MG/1
20 TABLET, FILM COATED ORAL ONCE
Refills: 0 | Status: COMPLETED | OUTPATIENT
Start: 2024-11-30 | End: 2024-11-30

## 2024-11-30 RX ADMIN — Medication 4 MILLIGRAM(S): at 04:09

## 2024-11-30 RX ADMIN — ONDANSETRON HYDROCHLORIDE 4 MILLIGRAM(S): 4 TABLET, FILM COATED ORAL at 04:09

## 2024-11-30 RX ADMIN — Medication 30 MILLILITER(S): at 04:08

## 2024-11-30 RX ADMIN — FAMOTIDINE 20 MILLIGRAM(S): 20 TABLET, FILM COATED ORAL at 04:09

## 2024-11-30 NOTE — ED PROVIDER NOTE - NSFOLLOWUPINSTRUCTIONS_ED_ALL_ED_FT
Hoy lo evaluaron en el Departamento de Emergencias por dolor epigástrico, que probablemente se deba a rudolph irritación del revestimiento del estómago. Ana Maria síntomas mejoraron con medicamentos en el Departamento de Emergencias. Puede angela Mylanta, que está disponible sin receta, para ayudar a controlar ana maria síntomas. Evite los alimentos picantes o ácidos.    Por favor, consulte con comer médico de atención primaria dentro de los dos dean.    Regrese al Departamento de Emergencias si experimenta dificultad para respirar, dolor abdominal o torácico que empeora o no se controla, dolor de hector, mareos, sensación de desmayo, náuseas, vómitos, vómitos o heces con farhan, heces negras y alquitranadas o cualquier otro síntoma preocupante.    [place] por elegirnos para comer atención.

## 2024-11-30 NOTE — ED PROVIDER NOTE - PATIENT PORTAL LINK FT
You can access the FollowMyHealth Patient Portal offered by [facility] by registering at the following website: [email or web address]. By joining [employer]’s FollowMyHealth portal, you will also be able to view your health information using other applications (apps) compatible with our system.

## 2024-11-30 NOTE — ED PROVIDER NOTE - OBJECTIVE STATEMENT
79-year-old male, PMHX Obesity, ESRD, Dialysis T/T/S, presents with abdominal pain and vomiting since yesterday.  Patient reports abdominal pain in the middle upper portion of his abdomen.  Reports that he was here before for similar symptoms.  EMS stated family reported the patient has recently had his dialysis catheter changed and last time he felt similar it was the port causing infection that spread.  Patient at the bedside reports burning pain and nausea.  Repeatedly groaning and follows commands but limited history due to pain and overall poor historian.      GENERAL APPEARANCE:  AAOx3, generally well-appearing, no acute distress. Appears stated age.  HEENT:  NCAT. Moist mucous membranes. EOMI, clear conjunctiva, no slceral icterus, oropharynx clear.  NECK:  Supple without lymphadenopathy. No JVD.  No neck stiffness or restricted ROM.  HEART:  Normal heart rate and regular rhythm. No murmur.   LUNGS:  CTAB, moving air well. No crackles or wheezes are heard.  ABDOMEN:  +Diffusely tender abdomen. Non-distended.   CHEST/BACK: Chest wall non-tender. No CVAT, or midline cervical/thoracic/lumbar tenderness to palpation. No obvious deformity of chest wall or back.  EXTREMITIES:  Without cyanosis, clubbing or edema. Pulse intact x 4. FROM x4. Compartments soft in all extremities.  NEUROLOGICAL:  Grossly non-focal. Alert and oriented, moving all 4 extremities. CN II-XII grossly intact. Observed to ambulate with normal gait.  SKIN:  Warm and dry without any rash.  PSYCH: Calm, cooperative. Normal mood and affect. Demonstrates proper insight and judgement.

## 2024-11-30 NOTE — ED ADULT NURSE NOTE - NSFALLRISKINTERV_ED_ALL_ED

## 2024-11-30 NOTE — ED ADULT NURSE NOTE - NSFALLRISK_ED_ALL_ED
Group Therapy Note    Date: 7/19/2024    Group Start Time: 10:40 AM  Group End Time: 11:30 AM  Group Topic: Cognitive Skills    [facility] MSW        Group Therapy Note    Writer facilitated group and started by introducing isolation as group topic. Patient requested to process and engage in a discussion about change. Patients processed fear around change, group dynamics, and stereotypes the entire group.       Attendees: 7          Patient's Goal: to process emotions, to engage with peers      Notes:  Patient appeared tearful all of group and expressed feeling anxious about new people coming and old people leaving. Pt actively listened to feedback and tried to be receptive to it but shared it was really hard. Patient encouraged to practicing grounding skills and was receptive to it. Patient appeared to regulate a little more by the end of group.    Status After Intervention:  Unchanged    Participation Level: Minimal    Participation Quality: Lethargic      Speech:  normal      Thought Process/Content: Logical      Affective Functioning: Congruent      Mood: anxious      Level of consciousness:  Preoccupied      Response to Learning: Progressing to goal      Endings: None Reported    Modes of Intervention: Support, Socialization, and Exploration      Discipline Responsible: /Counselor      Signature:  DANIEL Mccarty     No

## 2024-11-30 NOTE — ED ADULT TRIAGE NOTE - CHIEF COMPLAINT QUOTE
[facility]    reports that   Pt c/o EPIGASTRIC pain  with Non bloody  Vomiting started 1900 pm yesterday .

## 2024-12-03 NOTE — CONSULT NOTE ADULT - PROBLEM/RECOMMENDATION-4
DISPLAY PLAN FREE TEXT [facility] provides services at a reduced cost to those who are determined to be eligible through [facility]’s financial assistance program. Information regarding [facility]’s financial assistance program can be found by going to [email or web address] or by calling [phone].

## 2025-01-02 ENCOUNTER — EMERGENCY (EMERGENCY)
Facility: HOSPITAL | Age: 80
LOS: 1 days | Discharge: HOME | End: 2025-01-02
Attending: EMERGENCY MEDICINE
Payer: MEDICARE

## 2025-01-02 VITALS
TEMPERATURE: 98 F | SYSTOLIC BLOOD PRESSURE: 153 MMHG | OXYGEN SATURATION: 98 % | HEART RATE: 73 BPM | DIASTOLIC BLOOD PRESSURE: 76 MMHG | RESPIRATION RATE: 18 BRPM

## 2025-01-02 VITALS
TEMPERATURE: 97 F | WEIGHT: 193.35 LBS | SYSTOLIC BLOOD PRESSURE: 148 MMHG | HEIGHT: 70 IN | HEART RATE: 93 BPM | RESPIRATION RATE: 18 BRPM | DIASTOLIC BLOOD PRESSURE: 74 MMHG | OXYGEN SATURATION: 98 %

## 2025-01-02 LAB
ANION GAP SERPL CALC-SCNC: 5 MMOL/L — SIGNIFICANT CHANGE UP (ref 5–17)
APTT BLD: 20.8 SEC — LOW (ref 24.5–35.6)
BASOPHILS # BLD AUTO: 0.03 K/UL — SIGNIFICANT CHANGE UP (ref 0–0.2)
BASOPHILS NFR BLD AUTO: 0.5 % — SIGNIFICANT CHANGE UP (ref 0–2)
BLD GP AB SCN SERPL QL: SIGNIFICANT CHANGE UP
BUN SERPL-MCNC: 19 MG/DL — HIGH (ref 7–18)
CALCIUM SERPL-MCNC: 9.1 MG/DL — SIGNIFICANT CHANGE UP (ref 8.4–10.5)
CHLORIDE SERPL-SCNC: 98 MMOL/L — SIGNIFICANT CHANGE UP (ref 96–108)
CO2 SERPL-SCNC: 29 MMOL/L — SIGNIFICANT CHANGE UP (ref 22–31)
CREAT SERPL-MCNC: 3.31 MG/DL — HIGH (ref 0.5–1.3)
EGFR: 18 ML/MIN/1.73M2 — LOW
EOSINOPHIL # BLD AUTO: 1.01 K/UL — HIGH (ref 0–0.5)
EOSINOPHIL NFR BLD AUTO: 17.9 % — HIGH (ref 0–6)
GLUCOSE SERPL-MCNC: 264 MG/DL — HIGH (ref 70–99)
HCT VFR BLD CALC: 30.2 % — LOW (ref 39–50)
HGB BLD-MCNC: 10.6 G/DL — LOW (ref 13–17)
IMM GRANULOCYTES NFR BLD AUTO: 0.5 % — SIGNIFICANT CHANGE UP (ref 0–0.9)
INR BLD: 1.28 RATIO — HIGH (ref 0.85–1.16)
LYMPHOCYTES # BLD AUTO: 1.07 K/UL — SIGNIFICANT CHANGE UP (ref 1–3.3)
LYMPHOCYTES # BLD AUTO: 18.9 % — SIGNIFICANT CHANGE UP (ref 13–44)
MCHC RBC-ENTMCNC: 34 PG — SIGNIFICANT CHANGE UP (ref 27–34)
MCHC RBC-ENTMCNC: 35.1 G/DL — SIGNIFICANT CHANGE UP (ref 32–36)
MCV RBC AUTO: 96.8 FL — SIGNIFICANT CHANGE UP (ref 80–100)
MONOCYTES # BLD AUTO: 0.6 K/UL — SIGNIFICANT CHANGE UP (ref 0–0.9)
MONOCYTES NFR BLD AUTO: 10.6 % — SIGNIFICANT CHANGE UP (ref 2–14)
NEUTROPHILS # BLD AUTO: 2.91 K/UL — SIGNIFICANT CHANGE UP (ref 1.8–7.4)
NEUTROPHILS NFR BLD AUTO: 51.6 % — SIGNIFICANT CHANGE UP (ref 43–77)
NRBC # BLD: 0 /100 WBCS — SIGNIFICANT CHANGE UP (ref 0–0)
PLATELET # BLD AUTO: 128 K/UL — LOW (ref 150–400)
POTASSIUM SERPL-MCNC: 3.7 MMOL/L — SIGNIFICANT CHANGE UP (ref 3.5–5.3)
POTASSIUM SERPL-SCNC: 3.7 MMOL/L — SIGNIFICANT CHANGE UP (ref 3.5–5.3)
PROTHROM AB SERPL-ACNC: 14.9 SEC — HIGH (ref 9.9–13.4)
RBC # BLD: 3.12 M/UL — LOW (ref 4.2–5.8)
RBC # FLD: 12.3 % — SIGNIFICANT CHANGE UP (ref 10.3–14.5)
SODIUM SERPL-SCNC: 132 MMOL/L — LOW (ref 135–145)
WBC # BLD: 5.65 K/UL — SIGNIFICANT CHANGE UP (ref 3.8–10.5)
WBC # FLD AUTO: 5.65 K/UL — SIGNIFICANT CHANGE UP (ref 3.8–10.5)

## 2025-01-02 PROCEDURE — 85730 THROMBOPLASTIN TIME PARTIAL: CPT

## 2025-01-02 PROCEDURE — 80048 BASIC METABOLIC PNL TOTAL CA: CPT

## 2025-01-02 PROCEDURE — 85025 COMPLETE CBC W/AUTO DIFF WBC: CPT

## 2025-01-02 PROCEDURE — 85610 PROTHROMBIN TIME: CPT

## 2025-01-02 PROCEDURE — 96374 THER/PROPH/DIAG INJ IV PUSH: CPT | Mod: XU

## 2025-01-02 PROCEDURE — 96375 TX/PRO/DX INJ NEW DRUG ADDON: CPT | Mod: XU

## 2025-01-02 PROCEDURE — 93010 ELECTROCARDIOGRAM REPORT: CPT

## 2025-01-02 PROCEDURE — 86900 BLOOD TYPING SEROLOGIC ABO: CPT

## 2025-01-02 PROCEDURE — 36415 COLL VENOUS BLD VENIPUNCTURE: CPT

## 2025-01-02 PROCEDURE — 70491 CT SOFT TISSUE NECK W/DYE: CPT | Mod: 26,MC

## 2025-01-02 PROCEDURE — 86901 BLOOD TYPING SEROLOGIC RH(D): CPT

## 2025-01-02 PROCEDURE — 99285 EMERGENCY DEPT VISIT HI MDM: CPT | Mod: 25

## 2025-01-02 PROCEDURE — 86850 RBC ANTIBODY SCREEN: CPT

## 2025-01-02 PROCEDURE — 70491 CT SOFT TISSUE NECK W/DYE: CPT | Mod: MC

## 2025-01-02 PROCEDURE — 82962 GLUCOSE BLOOD TEST: CPT

## 2025-01-02 PROCEDURE — 93005 ELECTROCARDIOGRAM TRACING: CPT

## 2025-01-02 PROCEDURE — 99285 EMERGENCY DEPT VISIT HI MDM: CPT

## 2025-01-02 RX ORDER — AMOXICILLIN/POTASSIUM CLAV 875-125 MG
875 TABLET ORAL
Qty: 14 | Refills: 0
Start: 2025-01-02 | End: 2025-01-08

## 2025-01-02 RX ORDER — DEXAMETHASONE SODIUM PHOSPHATE 4 MG/ML
6 VIAL (ML) INJECTION ONCE
Refills: 0 | Status: COMPLETED | OUTPATIENT
Start: 2025-01-02 | End: 2025-01-02

## 2025-01-02 RX ORDER — AMPICILLIN SODIUM AND SULBACTAM SODIUM 100; 50 MG/ML; MG/ML
3 INJECTION, POWDER, FOR SOLUTION INTRAVENOUS ONCE
Refills: 0 | Status: COMPLETED | OUTPATIENT
Start: 2025-01-02 | End: 2025-01-02

## 2025-01-02 RX ADMIN — Medication 6 MILLIGRAM(S): at 18:56

## 2025-01-02 RX ADMIN — AMPICILLIN SODIUM AND SULBACTAM SODIUM 200 GRAM(S): 100; 50 INJECTION, POWDER, FOR SOLUTION INTRAVENOUS at 18:56

## 2025-01-02 NOTE — ED PROVIDER NOTE - OBJECTIVE STATEMENT
79-year-old man with a past medical history of diet-controlled diabetes, end-stage renal disease on dialysis, hypertension and hyperlipidemia presenting with swelling underneath his jaw that has been gradually progressive for the past 4 days.  Reports it began more mild initially and the swelling has been slowly worsening over the past few days.  He is not having any trouble swallowing or difficulty breathing.  He has never had similar episodes before.  He was able to get a full session of dialysis today.

## 2025-01-02 NOTE — ED PROVIDER NOTE - NSFOLLOWUPINSTRUCTIONS_ED_ALL_ED_FT
sialogogues, substances that increase saliva production:  Lemon    Lemon juice, lemon candy, and lemon slices are all sialogogues.     Lollipops      Parotitis is inflammation of one or both of the parotid glands. These glands produce saliva. They are found on each side of the face, below and in front of the earlobes. The saliva that they produce comes out of tiny openings (ducts) inside the cheeks.    Parotitis may cause sudden swelling and pain (acute parotitis). It can also cause repeated episodes of swelling and pain or continued swelling that may or may not be painful (chronic parotitis).    What are the causes?  This condition may be caused by:  Infections from bacteria.  Infections from viruses, such as mumps or HIV.  Blockage (obstruction) of saliva flow through the parotid glands. This can be from a stone, scar tissue, or a tumor.  Diseases that cause your body's defense system (immune system) to attack healthy cells in your salivary glands. These are called autoimmune diseases.  What increases the risk?  You are more likely to develop this condition if:  You are 50 years old or older.  You do not drink enough fluids (are dehydrated).  You drink too much alcohol.  You have:  A dry mouth.  Diabetes.  Gout.  A long-term illness.  You do not take good care of your mouth and teeth (poor oral hygiene).  You have had radiation treatments to the head and neck.  You take certain medicines.  What are the signs or symptoms?  Symptoms of this condition depend on the cause. Symptoms may include:  Swelling under and in front of the ear. This may get worse after eating.  Pain and tenderness over the parotid gland. This may get worse after eating.  Redness and warmth of the skin over the parotid gland.  Fever or chills.  Pus coming from the ducts inside the mouth.  Dry mouth.  A bad taste in the mouth.  How is this diagnosed?  This condition may be diagnosed based on:  Your medical history.  A physical exam.  Tests to find the cause of the parotitis. These may include:  Doing blood tests to check for an autoimmune disease or infections from a virus.  Taking a fluid sample from the parotid gland and testing it for infection.  Injecting the ducts of the parotid gland with a dye and then taking X-rays (sialogram).  Having other imaging tests of the gland, such as X-rays, ultrasound, MRI, or CT scan.  Checking the opening of the gland for a stone or obstruction.  Placing a needle into the gland to remove tissue for a biopsy (fine needle aspiration).  How is this treated?  Treatment for this condition depends on the cause. Treatment may include:  Antibiotic medicine for a bacterial infection.  NSAIDs, such as ibuprofen, to treat pain and swelling.  Drinking more fluids.  Removing a stone or obstruction.  Treating an underlying disease that is causing parotitis.  Surgery to drain an infection, remove a growth, or remove the whole gland (parotidectomy).  Treatment may not be needed if parotid swelling goes away with home care.    Follow these instructions at home:  Medicines    A prescription pill bottle with an example of a pill.  Take over-the-counter and prescription medicines only as told by your health care provider.  If you were prescribed an antibiotic medicine, take it as told by your health care provider. Do not stop taking the antibiotic even if you start to feel better.  Managing pain and swelling    If directed, apply heat to the affected area as often as told by your health care provider. Use the heat source that your health care provider recommends, such as a moist heat pack or a heating pad.  Place a towel between your skin and the heat source.  Leave the heat on for 20–30 minutes.  Remove the heat if your skin turns bright red. This is especially important if you are unable to feel pain, heat, or cold. You have a greater risk of getting burned.  Gargle with a mixture of salt and water 3–4 times a day or as needed. To make salt water, completely dissolve ½–1 tsp (3–6 g) of salt in 1 cup (237 mL) of warm water.  Gently massage the parotid glands as told by your health care provider.  General instructions    Using a toothbrush to brush the front and back sides of the teeth.  Drink enough fluid to keep your urine pale yellow.  Keep your mouth clean and moist.  Try sucking on sour candy. This may help to make your mouth less dry by stimulating the flow of saliva.  Maintain good oral health.  Brush your teeth at least two times a day.  Floss your teeth every day.  See your dentist regularly.  Do not use any products that contain nicotine or tobacco. These products include cigarettes, chewing tobacco, and vaping devices, such as e-cigarettes. If you need help quitting, ask your health care provider.  Do not drink alcohol.  Keep all follow-up visits. This is important.  Contact a health care provider if:  You have a fever or chills.  You have new symptoms.  Your symptoms get worse.  Your symptoms do not improve with treatment.  Get help right away if:  You have difficulty breathing or swallowing because of the swollen gland.  These symptoms may represent a serious problem that is an emergency. Do not wait to see if the symptoms will go away. Get medical help right away. Call your local emergency services (911 in the [place] Do not drive yourself to the hospital.    Summary  Parotitis is inflammation of one or both of the parotid glands.  Symptoms include pain and swelling under and in front of the ear. They may also include a fever and a bad taste in your mouth.  This condition may be treated with antibiotics, NSAIDs, increasing fluids, or surgery.  In some cases, parotitis may go away on its own without treatment.  You should drink plenty of fluids, maintain good oral health, and do not use products that contain nicotine or tobacco.  This information is not intended to replace advice given to you by your health care provider. Make sure you discuss any questions you have with your health care provider.    Document Revised: 04/29/2022 Document Reviewed: 04/29/2022  Elsevier Patient Education © 2024 [employer]

## 2025-01-02 NOTE — ED ADULT NURSE NOTE - OBJECTIVE STATEMENT
patient s/o neck pain since last night - applied icy hot patch. patient started having swelling under the neck. per son, "maybe its anallergic reaction." SOB on exertion. 100% RA on bedrest. s/p dialysis Tue, Thurs, Sat. central line at right neck and left FA AV fistula noted.

## 2025-01-02 NOTE — ED PROVIDER NOTE - PROGRESS NOTE DETAILS
CT showing Inflammation surrounding the left parotid gland suggestive of   parotiditis. No abscess identified.    -There is retropharyngeal edema which could be secondary to extension of   left parotid inflammation, but spinal process cannot be ruled out.   Recommend MRI neck with IV contrast for further evaluation. Case discussed with ENT .  Recommend 1 dose of IV antibiotics here in ED and patient can be discharged with oral antibiotics.

## 2025-01-02 NOTE — ED PROVIDER NOTE - NSFOLLOWUPCLINICS_GEN_ALL_ED_FT
[facility] - ENT  Otolaryngology (ENT)  [address] [place] [address]  Phone: [phone]  Fax:

## 2025-01-02 NOTE — ED ADULT NURSE NOTE - NSFALLHARMRISKINTERV_ED_ALL_ED

## 2025-01-02 NOTE — ED PROVIDER NOTE - CLINICAL SUMMARY MEDICAL DECISION MAKING FREE TEXT BOX
Patient presenting with submental edema and erythema.  Given the gradual presentation my highest suspicion is for sialoadenitis however given his risk factors will obtain labs and imaging to rule out Speedy's angina.

## 2025-01-02 NOTE — ED PROVIDER NOTE - PHYSICAL EXAMINATION
Exam:  General: Patient well appearing, vital signs within normal limits  HEENT: airway patent with moist mucous membranes, submental edema/erythema (soft/non brawny), no significant dental disease noted, no sublingual tenderness/edema intraorally  Skin: warm, well perfused  Psych: normal mood and affect

## 2025-01-02 NOTE — ED PROVIDER NOTE - PATIENT PORTAL LINK FT
You can access the FollowMyHealth Patient Portal offered by [facility] by registering at the following website: [email or web address]. By joining [email or web address]’s FollowMyHealth portal, you will also be able to view your health information using other applications (apps) compatible with our system.

## 2025-02-15 NOTE — H&P CARDIOLOGY - HISTORY OF PRESENT ILLNESS
Discharge instructions reviewed with patient/responsible adult. All home medications have been reviewed, questions answered and patient verbalized understanding.  Discharge instructions signed and copies given. Patient discharged ambulatory to home with belongings.   Post-procedure education reviewed with patient and visitor, including resuming of medication regimen, and they verbalized understanding.   75 year old male PMH of DM (a1C 5.3 Jun 2021), HTN, CKD III (baseline 1.5), HFrEF (40-45% june 2021), HLD, gout and glaucoma with left eye blindness presented to [facility] ED on 6/2 c/o exertional SOB 3 weeks PTA, found in, acute sytolic HF with runs of NSVT . CXR showing small left pleural effusion and mild opacities in right lung base- treated with IV lasix. Seen by renal- Renal US: Unremarkable renal ultrasound, now holding diuretic with fluid restrictions  Negative VQ scan. Trop x3: negative. TTE with EF 40-45%. Nuclear stress test was completed on 6/7/21 with abnormal results (see Below). Transferred to [facility] for cardiac angiogram for further ischemic evaluation. Upon arrival patient appears comfortable denies any reportable symptom.     OF note: Heme/ onc following patient at [facility]. M spike, 0.5 gm      NUCLEAR FINDINGS:  Review of raw data shows: Diaphragmatic artifact.  The left ventricle was mildly dilated LV at baseline.  There are small, severe defects in distal inferior &  inferoapical walls that are fixed consistent with  diaphragmatic attenuation artifact.         None 75 year old male PMH of DM (a1C 5.3 Jun 2021), HTN, CKD III (baseline 1.5), HFrEF (40-45% june 2021), HLD, gout and glaucoma with left eye blindness presented to [facility] ED on 6/2 c/o exertional SOB 3 weeks PTA, found in, acute sytolic HF with runs of NSVT. CXR showing small left pleural effusion and mild opacities in right lung base- treated with IV lasix. Seen by renal- Renal US: Unremarkable renal ultrasound, now holding diuretic with fluid restrictions  Negative VQ scan. Trop x3: negative.  ECG: bradycardia with new LBBB. TTE with EF 40-45%. Nuclear stress test was completed on 6/7/21 with abnormal results (see Below). Transferred to [facility] for cardiac angiogram for further ischemic evaluation. Upon arrival patient appears comfortable denies any reportable symptom.     OF note: Heme/ onc following patient at [facility]. M spike, 0.5 gm      NUCLEAR FINDINGS:  Review of raw data shows: Diaphragmatic artifact.  The left ventricle was mildly dilated LV at baseline.  There are small, severe defects in distal inferior &  inferoapical walls that are fixed consistent with  diaphragmatic attenuation artifact.         75 year old Japanese speaking male (translaor ID [id number]) PMH of DM (a1C 5.3 Jun 2021), HTN, CKD III (baseline 1.5), HFrEF (40-45% june 2021), HLD, gout and glaucoma with left eye blindness presented to [facility] ED on 6/2 c/o exertional SOB 3 weeks PTA, found in, acute sytolic HF with runs of NSVT. CXR showing small left pleural effusion and mild opacities in right lung base- treated with IV lasix. Seen by renal- Renal US: Unremarkable renal ultrasound, now holding diuretic with fluid restrictions  Negative VQ scan. Trop x3: negative.  ECG: bradycardia with new LBBB. TTE with EF 40-45%. Nuclear stress test was completed on 6/7/21 with abnormal results (see Below). Transferred to [facility] for cardiac angiogram for further ischemic evaluation. Upon arrival patient appears comfortable denies any reportable symptom.     OF note: Heme/ onc following patient at [facility]. M spike, 0.5 gm      NUCLEAR FINDINGS:  Review of raw data shows: Diaphragmatic artifact.  The left ventricle was mildly dilated LV at baseline.  There are small, severe defects in distal inferior &  inferoapical walls that are fixed consistent with  diaphragmatic attenuation artifact.

## 2025-04-23 ENCOUNTER — EMERGENCY (EMERGENCY)
Facility: HOSPITAL | Age: 80
LOS: 1 days | End: 2025-04-23
Attending: EMERGENCY MEDICINE
Payer: MEDICARE

## 2025-04-23 VITALS
RESPIRATION RATE: 18 BRPM | HEIGHT: 63 IN | SYSTOLIC BLOOD PRESSURE: 162 MMHG | OXYGEN SATURATION: 95 % | WEIGHT: 195.99 LBS | TEMPERATURE: 98 F | DIASTOLIC BLOOD PRESSURE: 77 MMHG | HEART RATE: 77 BPM

## 2025-04-23 LAB
ALBUMIN SERPL ELPH-MCNC: 3.3 G/DL — LOW (ref 3.5–5)
ALP SERPL-CCNC: 95 U/L — SIGNIFICANT CHANGE UP (ref 40–120)
ALT FLD-CCNC: 9 U/L DA — LOW (ref 10–60)
ANION GAP SERPL CALC-SCNC: 5 MMOL/L — SIGNIFICANT CHANGE UP (ref 5–17)
APTT BLD: 21.6 SEC — LOW (ref 26.1–36.8)
AST SERPL-CCNC: 12 U/L — SIGNIFICANT CHANGE UP (ref 10–40)
BASE EXCESS BLDV CALC-SCNC: 8.5 MMOL/L — SIGNIFICANT CHANGE UP
BASOPHILS # BLD AUTO: 0.02 K/UL — SIGNIFICANT CHANGE UP (ref 0–0.2)
BASOPHILS NFR BLD AUTO: 0.3 % — SIGNIFICANT CHANGE UP (ref 0–2)
BILIRUB SERPL-MCNC: 0.8 MG/DL — SIGNIFICANT CHANGE UP (ref 0.2–1.2)
BUN SERPL-MCNC: 31 MG/DL — HIGH (ref 7–18)
CALCIUM SERPL-MCNC: 9.3 MG/DL — SIGNIFICANT CHANGE UP (ref 8.4–10.5)
CHLORIDE SERPL-SCNC: 97 MMOL/L — SIGNIFICANT CHANGE UP (ref 96–108)
CO2 SERPL-SCNC: 29 MMOL/L — SIGNIFICANT CHANGE UP (ref 22–31)
CREAT SERPL-MCNC: 6.15 MG/DL — HIGH (ref 0.5–1.3)
EGFR: 9 ML/MIN/1.73M2 — LOW
EGFR: 9 ML/MIN/1.73M2 — LOW
EOSINOPHIL # BLD AUTO: 0.49 K/UL — SIGNIFICANT CHANGE UP (ref 0–0.5)
EOSINOPHIL NFR BLD AUTO: 6.5 % — HIGH (ref 0–6)
GAS PNL BLDV: 134 MMOL/L — LOW (ref 136–145)
GAS PNL BLDV: SIGNIFICANT CHANGE UP
GLUCOSE BLDV-MCNC: 218 MG/DL — HIGH (ref 70–99)
GLUCOSE SERPL-MCNC: 205 MG/DL — HIGH (ref 70–99)
HCO3 BLDV-SCNC: 32 MMOL/L — HIGH (ref 22–29)
HCT VFR BLD CALC: 28.8 % — LOW (ref 39–50)
HGB BLD-MCNC: 9.7 G/DL — LOW (ref 13–17)
HOROWITZ INDEX BLDV+IHG-RTO: 21 — SIGNIFICANT CHANGE UP
IMM GRANULOCYTES NFR BLD AUTO: 0.4 % — SIGNIFICANT CHANGE UP (ref 0–0.9)
INR BLD: 1.22 RATIO — HIGH (ref 0.85–1.16)
LACTATE BLDV-MCNC: 2.2 MMOL/L — HIGH (ref 0.5–2)
LACTATE SERPL-SCNC: 2.1 MMOL/L — HIGH (ref 0.7–2)
LIDOCAIN IGE QN: 41 U/L — SIGNIFICANT CHANGE UP (ref 13–75)
LYMPHOCYTES # BLD AUTO: 1.18 K/UL — SIGNIFICANT CHANGE UP (ref 1–3.3)
LYMPHOCYTES # BLD AUTO: 15.6 % — SIGNIFICANT CHANGE UP (ref 13–44)
MAGNESIUM SERPL-MCNC: 2.4 MG/DL — SIGNIFICANT CHANGE UP (ref 1.6–2.6)
MCHC RBC-ENTMCNC: 32.7 PG — SIGNIFICANT CHANGE UP (ref 27–34)
MCHC RBC-ENTMCNC: 33.7 G/DL — SIGNIFICANT CHANGE UP (ref 32–36)
MCV RBC AUTO: 97 FL — SIGNIFICANT CHANGE UP (ref 80–100)
MONOCYTES # BLD AUTO: 0.87 K/UL — SIGNIFICANT CHANGE UP (ref 0–0.9)
MONOCYTES NFR BLD AUTO: 11.5 % — SIGNIFICANT CHANGE UP (ref 2–14)
NEUTROPHILS # BLD AUTO: 4.95 K/UL — SIGNIFICANT CHANGE UP (ref 1.8–7.4)
NEUTROPHILS NFR BLD AUTO: 65.7 % — SIGNIFICANT CHANGE UP (ref 43–77)
NRBC BLD AUTO-RTO: 0 /100 WBCS — SIGNIFICANT CHANGE UP (ref 0–0)
PCO2 BLDV: 38 MMHG — LOW (ref 42–55)
PH BLDV: 7.53 — HIGH (ref 7.32–7.43)
PHOSPHATE SERPL-MCNC: 1.6 MG/DL — LOW (ref 2.5–4.5)
PLATELET # BLD AUTO: 187 K/UL — SIGNIFICANT CHANGE UP (ref 150–400)
PO2 BLDV: 19 MMHG — SIGNIFICANT CHANGE UP
POTASSIUM BLDV-SCNC: 5.1 MMOL/L — SIGNIFICANT CHANGE UP (ref 3.5–5.1)
POTASSIUM SERPL-MCNC: 4.8 MMOL/L — SIGNIFICANT CHANGE UP (ref 3.5–5.3)
POTASSIUM SERPL-SCNC: 4.8 MMOL/L — SIGNIFICANT CHANGE UP (ref 3.5–5.3)
PROT SERPL-MCNC: 8.2 G/DL — SIGNIFICANT CHANGE UP (ref 6–8.3)
PROTHROM AB SERPL-ACNC: 14.1 SEC — HIGH (ref 9.9–13.4)
RBC # BLD: 2.97 M/UL — LOW (ref 4.2–5.8)
RBC # FLD: 12.7 % — SIGNIFICANT CHANGE UP (ref 10.3–14.5)
SAO2 % BLDV: 26.7 % — SIGNIFICANT CHANGE UP
SODIUM SERPL-SCNC: 131 MMOL/L — LOW (ref 135–145)
TROPONIN I, HIGH SENSITIVITY RESULT: 27.9 NG/L — SIGNIFICANT CHANGE UP
WBC # BLD: 7.54 K/UL — SIGNIFICANT CHANGE UP (ref 3.8–10.5)
WBC # FLD AUTO: 7.54 K/UL — SIGNIFICANT CHANGE UP (ref 3.8–10.5)

## 2025-04-23 PROCEDURE — 99285 EMERGENCY DEPT VISIT HI MDM: CPT

## 2025-04-23 PROCEDURE — 74176 CT ABD & PELVIS W/O CONTRAST: CPT | Mod: 26

## 2025-04-23 RX ORDER — MAGNESIUM, ALUMINUM HYDROXIDE 200-200 MG
30 TABLET,CHEWABLE ORAL ONCE
Refills: 0 | Status: COMPLETED | OUTPATIENT
Start: 2025-04-23 | End: 2025-04-23

## 2025-04-23 RX ORDER — SOD PHOS DI, MONO/K PHOS MONO 250 MG
1 TABLET ORAL ONCE
Refills: 0 | Status: COMPLETED | OUTPATIENT
Start: 2025-04-23 | End: 2025-04-23

## 2025-04-23 RX ORDER — SUCRALFATE 1 G
1 TABLET ORAL ONCE
Refills: 0 | Status: COMPLETED | OUTPATIENT
Start: 2025-04-23 | End: 2025-04-23

## 2025-04-23 RX ADMIN — Medication 30 MILLILITER(S): at 21:26

## 2025-04-23 RX ADMIN — Medication 20 MILLIGRAM(S): at 21:26

## 2025-04-23 RX ADMIN — Medication 1 GRAM(S): at 21:26

## 2025-04-23 RX ADMIN — Medication 4 MILLIGRAM(S): at 21:25

## 2025-04-24 VITALS
RESPIRATION RATE: 18 BRPM | SYSTOLIC BLOOD PRESSURE: 165 MMHG | DIASTOLIC BLOOD PRESSURE: 82 MMHG | HEART RATE: 75 BPM | TEMPERATURE: 98 F | OXYGEN SATURATION: 99 %

## 2025-04-24 LAB
APPEARANCE UR: CLEAR — SIGNIFICANT CHANGE UP
BACTERIA # UR AUTO: ABNORMAL /HPF
BILIRUB UR-MCNC: NEGATIVE — SIGNIFICANT CHANGE UP
COLOR SPEC: YELLOW — SIGNIFICANT CHANGE UP
DIFF PNL FLD: ABNORMAL
EPI CELLS # UR: PRESENT
GLUCOSE UR QL: NEGATIVE MG/DL — SIGNIFICANT CHANGE UP
KETONES UR-MCNC: NEGATIVE MG/DL — SIGNIFICANT CHANGE UP
LEUKOCYTE ESTERASE UR-ACNC: NEGATIVE — SIGNIFICANT CHANGE UP
NITRITE UR-MCNC: NEGATIVE — SIGNIFICANT CHANGE UP
PH UR: >=9 (ref 5–8)
PROT UR-MCNC: 300 MG/DL
RBC CASTS # UR COMP ASSIST: ABNORMAL /HPF
SP GR SPEC: 1.01 — SIGNIFICANT CHANGE UP (ref 1–1.03)
UROBILINOGEN FLD QL: 0.2 MG/DL — SIGNIFICANT CHANGE UP (ref 0.2–1)
WBC UR QL: 2 /HPF — SIGNIFICANT CHANGE UP (ref 0–5)

## 2025-04-24 PROCEDURE — 83690 ASSAY OF LIPASE: CPT

## 2025-04-24 PROCEDURE — 85025 COMPLETE CBC W/AUTO DIFF WBC: CPT

## 2025-04-24 PROCEDURE — 76705 ECHO EXAM OF ABDOMEN: CPT

## 2025-04-24 PROCEDURE — 93005 ELECTROCARDIOGRAM TRACING: CPT

## 2025-04-24 PROCEDURE — 80053 COMPREHEN METABOLIC PANEL: CPT

## 2025-04-24 PROCEDURE — 96374 THER/PROPH/DIAG INJ IV PUSH: CPT

## 2025-04-24 PROCEDURE — 81001 URINALYSIS AUTO W/SCOPE: CPT

## 2025-04-24 PROCEDURE — 83735 ASSAY OF MAGNESIUM: CPT

## 2025-04-24 PROCEDURE — 83605 ASSAY OF LACTIC ACID: CPT

## 2025-04-24 PROCEDURE — 84100 ASSAY OF PHOSPHORUS: CPT

## 2025-04-24 PROCEDURE — 82803 BLOOD GASES ANY COMBINATION: CPT

## 2025-04-24 PROCEDURE — 87186 SC STD MICRODIL/AGAR DIL: CPT

## 2025-04-24 PROCEDURE — 87040 BLOOD CULTURE FOR BACTERIA: CPT

## 2025-04-24 PROCEDURE — 84295 ASSAY OF SERUM SODIUM: CPT

## 2025-04-24 PROCEDURE — 36415 COLL VENOUS BLD VENIPUNCTURE: CPT

## 2025-04-24 PROCEDURE — 85730 THROMBOPLASTIN TIME PARTIAL: CPT

## 2025-04-24 PROCEDURE — 87077 CULTURE AEROBIC IDENTIFY: CPT

## 2025-04-24 PROCEDURE — 84484 ASSAY OF TROPONIN QUANT: CPT

## 2025-04-24 PROCEDURE — 99285 EMERGENCY DEPT VISIT HI MDM: CPT | Mod: 25

## 2025-04-24 PROCEDURE — 87086 URINE CULTURE/COLONY COUNT: CPT

## 2025-04-24 PROCEDURE — 96375 TX/PRO/DX INJ NEW DRUG ADDON: CPT

## 2025-04-24 PROCEDURE — 76705 ECHO EXAM OF ABDOMEN: CPT | Mod: 26

## 2025-04-24 PROCEDURE — 84132 ASSAY OF SERUM POTASSIUM: CPT

## 2025-04-24 PROCEDURE — 74176 CT ABD & PELVIS W/O CONTRAST: CPT | Mod: MC

## 2025-04-24 PROCEDURE — 85610 PROTHROMBIN TIME: CPT

## 2025-04-24 PROCEDURE — 82962 GLUCOSE BLOOD TEST: CPT

## 2025-04-24 PROCEDURE — 82947 ASSAY GLUCOSE BLOOD QUANT: CPT

## 2025-04-24 RX ADMIN — Medication 250 MILLILITER(S): at 00:15

## 2025-04-24 RX ADMIN — Medication 1 PACKET(S): at 00:14

## 2025-04-27 LAB
-  AMPICILLIN: SIGNIFICANT CHANGE UP
-  CIPROFLOXACIN: SIGNIFICANT CHANGE UP
-  LEVOFLOXACIN: SIGNIFICANT CHANGE UP
-  NITROFURANTOIN: SIGNIFICANT CHANGE UP
-  TETRACYCLINE: SIGNIFICANT CHANGE UP
-  VANCOMYCIN: SIGNIFICANT CHANGE UP
CULTURE RESULTS: ABNORMAL
METHOD TYPE: SIGNIFICANT CHANGE UP
ORGANISM # SPEC MICROSCOPIC CNT: ABNORMAL
ORGANISM # SPEC MICROSCOPIC CNT: ABNORMAL
SPECIMEN SOURCE: SIGNIFICANT CHANGE UP

## 2025-08-10 ENCOUNTER — INPATIENT (INPATIENT)
Facility: HOSPITAL | Age: 80
LOS: 4 days | Discharge: HOME | DRG: 392 | End: 2025-08-15
Attending: STUDENT IN AN ORGANIZED HEALTH CARE EDUCATION/TRAINING PROGRAM | Admitting: STUDENT IN AN ORGANIZED HEALTH CARE EDUCATION/TRAINING PROGRAM
Payer: MEDICARE

## 2025-08-10 VITALS
TEMPERATURE: 101 F | HEART RATE: 85 BPM | SYSTOLIC BLOOD PRESSURE: 172 MMHG | DIASTOLIC BLOOD PRESSURE: 71 MMHG | RESPIRATION RATE: 18 BRPM | HEIGHT: 65 IN | OXYGEN SATURATION: 95 % | WEIGHT: 199.96 LBS

## 2025-08-10 LAB
ALBUMIN SERPL ELPH-MCNC: 3.3 G/DL — LOW (ref 3.5–5)
ALP SERPL-CCNC: 194 U/L — HIGH (ref 40–120)
ALT FLD-CCNC: 270 U/L DA — HIGH (ref 10–60)
ANION GAP SERPL CALC-SCNC: 3 MMOL/L — LOW (ref 5–17)
APPEARANCE UR: CLEAR — SIGNIFICANT CHANGE UP
AST SERPL-CCNC: 416 U/L — HIGH (ref 10–40)
BACTERIA # UR AUTO: ABNORMAL /HPF
BASOPHILS # BLD AUTO: 0.01 K/UL — SIGNIFICANT CHANGE UP (ref 0–0.2)
BASOPHILS NFR BLD AUTO: 0.1 % — SIGNIFICANT CHANGE UP (ref 0–2)
BILIRUB SERPL-MCNC: 3.3 MG/DL — HIGH (ref 0.2–1.2)
BILIRUB UR-MCNC: NEGATIVE — SIGNIFICANT CHANGE UP
BUN SERPL-MCNC: 27 MG/DL — HIGH (ref 7–18)
CALCIUM SERPL-MCNC: 9.6 MG/DL — SIGNIFICANT CHANGE UP (ref 8.4–10.5)
CHLORIDE SERPL-SCNC: 94 MMOL/L — LOW (ref 96–108)
CO2 SERPL-SCNC: 34 MMOL/L — HIGH (ref 22–31)
COLOR SPEC: YELLOW — SIGNIFICANT CHANGE UP
CREAT SERPL-MCNC: 5.18 MG/DL — HIGH (ref 0.5–1.3)
DIFF PNL FLD: NEGATIVE — SIGNIFICANT CHANGE UP
EGFR: 11 ML/MIN/1.73M2 — LOW
EGFR: 11 ML/MIN/1.73M2 — LOW
EOSINOPHIL # BLD AUTO: 0.01 K/UL — SIGNIFICANT CHANGE UP (ref 0–0.5)
EOSINOPHIL NFR BLD AUTO: 0.1 % — SIGNIFICANT CHANGE UP (ref 0–6)
EPI CELLS # UR: PRESENT
FLUAV AG NPH QL: SIGNIFICANT CHANGE UP
FLUBV AG NPH QL: SIGNIFICANT CHANGE UP
GLUCOSE SERPL-MCNC: 196 MG/DL — HIGH (ref 70–99)
GLUCOSE UR QL: NEGATIVE MG/DL — SIGNIFICANT CHANGE UP
HCT VFR BLD CALC: 32.9 % — LOW (ref 39–50)
HGB BLD-MCNC: 11.4 G/DL — LOW (ref 13–17)
HYALINE CASTS # UR AUTO: PRESENT
IMM GRANULOCYTES NFR BLD AUTO: 0.4 % — SIGNIFICANT CHANGE UP (ref 0–0.9)
KETONES UR QL: NEGATIVE MG/DL — SIGNIFICANT CHANGE UP
LACTATE SERPL-SCNC: 1.9 MMOL/L — SIGNIFICANT CHANGE UP (ref 0.7–2)
LEUKOCYTE ESTERASE UR-ACNC: NEGATIVE — SIGNIFICANT CHANGE UP
LIDOCAIN IGE QN: 72 U/L — SIGNIFICANT CHANGE UP (ref 13–75)
LYMPHOCYTES # BLD AUTO: 0.51 K/UL — LOW (ref 1–3.3)
LYMPHOCYTES # BLD AUTO: 6.8 % — LOW (ref 13–44)
MCHC RBC-ENTMCNC: 32.6 PG — SIGNIFICANT CHANGE UP (ref 27–34)
MCHC RBC-ENTMCNC: 34.7 G/DL — SIGNIFICANT CHANGE UP (ref 32–36)
MCV RBC AUTO: 94 FL — SIGNIFICANT CHANGE UP (ref 80–100)
MONOCYTES # BLD AUTO: 0.55 K/UL — SIGNIFICANT CHANGE UP (ref 0–0.9)
MONOCYTES NFR BLD AUTO: 7.3 % — SIGNIFICANT CHANGE UP (ref 2–14)
NEUTROPHILS # BLD AUTO: 6.44 K/UL — SIGNIFICANT CHANGE UP (ref 1.8–7.4)
NEUTROPHILS NFR BLD AUTO: 85.3 % — HIGH (ref 43–77)
NITRITE UR-MCNC: NEGATIVE — SIGNIFICANT CHANGE UP
NRBC BLD AUTO-RTO: 0 /100 WBCS — SIGNIFICANT CHANGE UP (ref 0–0)
PH UR: >=9 (ref 5–8)
PLATELET # BLD AUTO: 128 K/UL — LOW (ref 150–400)
POTASSIUM SERPL-MCNC: 4.2 MMOL/L — SIGNIFICANT CHANGE UP (ref 3.5–5.3)
POTASSIUM SERPL-SCNC: 4.2 MMOL/L — SIGNIFICANT CHANGE UP (ref 3.5–5.3)
PROT SERPL-MCNC: 8.6 G/DL — HIGH (ref 6–8.3)
PROT UR-MCNC: 300 MG/DL
RBC # BLD: 3.5 M/UL — LOW (ref 4.2–5.8)
RBC # FLD: 13.1 % — SIGNIFICANT CHANGE UP (ref 10.3–14.5)
RBC CASTS # UR COMP ASSIST: 4 /HPF — SIGNIFICANT CHANGE UP (ref 0–4)
RSV RNA NPH QL NAA+NON-PROBE: SIGNIFICANT CHANGE UP
SARS-COV-2 RNA SPEC QL NAA+PROBE: SIGNIFICANT CHANGE UP
SODIUM SERPL-SCNC: 131 MMOL/L — LOW (ref 135–145)
SOURCE RESPIRATORY: SIGNIFICANT CHANGE UP
SP GR SPEC: 1.01 — SIGNIFICANT CHANGE UP (ref 1–1.03)
UROBILINOGEN FLD QL: 0.2 MG/DL — SIGNIFICANT CHANGE UP (ref 0.2–1)
WBC # BLD: 7.55 K/UL — SIGNIFICANT CHANGE UP (ref 3.8–10.5)
WBC # FLD AUTO: 7.55 K/UL — SIGNIFICANT CHANGE UP (ref 3.8–10.5)
WBC UR QL: 2 /HPF — SIGNIFICANT CHANGE UP (ref 0–5)

## 2025-08-10 PROCEDURE — 74176 CT ABD & PELVIS W/O CONTRAST: CPT | Mod: 26

## 2025-08-10 PROCEDURE — 99285 EMERGENCY DEPT VISIT HI MDM: CPT

## 2025-08-10 PROCEDURE — 71045 X-RAY EXAM CHEST 1 VIEW: CPT | Mod: 26

## 2025-08-10 RX ORDER — ACETAMINOPHEN 500 MG/5ML
1000 LIQUID (ML) ORAL ONCE
Refills: 0 | Status: COMPLETED | OUTPATIENT
Start: 2025-08-10 | End: 2025-08-10

## 2025-08-10 RX ORDER — ONDANSETRON HCL/PF 4 MG/2 ML
4 VIAL (ML) INJECTION ONCE
Refills: 0 | Status: COMPLETED | OUTPATIENT
Start: 2025-08-10 | End: 2025-08-10

## 2025-08-10 RX ADMIN — Medication 20 MILLIGRAM(S): at 21:50

## 2025-08-10 RX ADMIN — Medication 400 MILLIGRAM(S): at 21:50

## 2025-08-10 RX ADMIN — Medication 4 MILLIGRAM(S): at 21:50

## 2025-08-10 RX ADMIN — Medication 1000 MILLIGRAM(S): at 22:50

## 2025-08-11 DIAGNOSIS — R10.9 UNSPECIFIED ABDOMINAL PAIN: ICD-10-CM

## 2025-08-11 DIAGNOSIS — J18.9 PNEUMONIA, UNSPECIFIED ORGANISM: ICD-10-CM

## 2025-08-11 DIAGNOSIS — I10 ESSENTIAL (PRIMARY) HYPERTENSION: ICD-10-CM

## 2025-08-11 DIAGNOSIS — N18.6 END STAGE RENAL DISEASE: ICD-10-CM

## 2025-08-11 DIAGNOSIS — E11.9 TYPE 2 DIABETES MELLITUS WITHOUT COMPLICATIONS: ICD-10-CM

## 2025-08-11 DIAGNOSIS — J90 PLEURAL EFFUSION, NOT ELSEWHERE CLASSIFIED: ICD-10-CM

## 2025-08-11 DIAGNOSIS — K80.20 CALCULUS OF GALLBLADDER WITHOUT CHOLECYSTITIS WITHOUT OBSTRUCTION: ICD-10-CM

## 2025-08-11 LAB
ALBUMIN SERPL ELPH-MCNC: 2.8 G/DL — LOW (ref 3.5–5)
ALP SERPL-CCNC: 156 U/L — HIGH (ref 40–120)
ALT FLD-CCNC: 184 U/L DA — HIGH (ref 10–60)
ANION GAP SERPL CALC-SCNC: 4 MMOL/L — LOW (ref 5–17)
AST SERPL-CCNC: 165 U/L — HIGH (ref 10–40)
BILIRUB SERPL-MCNC: 4.5 MG/DL — HIGH (ref 0.2–1.2)
BUN SERPL-MCNC: 39 MG/DL — HIGH (ref 7–18)
CALCIUM SERPL-MCNC: 9.2 MG/DL — SIGNIFICANT CHANGE UP (ref 8.4–10.5)
CHLORIDE SERPL-SCNC: 96 MMOL/L — SIGNIFICANT CHANGE UP (ref 96–108)
CO2 SERPL-SCNC: 29 MMOL/L — SIGNIFICANT CHANGE UP (ref 22–31)
CREAT SERPL-MCNC: 5.94 MG/DL — HIGH (ref 0.5–1.3)
EGFR: 9 ML/MIN/1.73M2 — LOW
EGFR: 9 ML/MIN/1.73M2 — LOW
GLUCOSE BLDC GLUCOMTR-MCNC: 137 MG/DL — HIGH (ref 70–99)
GLUCOSE SERPL-MCNC: 145 MG/DL — HIGH (ref 70–99)
POTASSIUM SERPL-MCNC: 4.5 MMOL/L — SIGNIFICANT CHANGE UP (ref 3.5–5.3)
POTASSIUM SERPL-SCNC: 4.5 MMOL/L — SIGNIFICANT CHANGE UP (ref 3.5–5.3)
PROT SERPL-MCNC: 7.6 G/DL — SIGNIFICANT CHANGE UP (ref 6–8.3)
SODIUM SERPL-SCNC: 129 MMOL/L — LOW (ref 135–145)

## 2025-08-11 PROCEDURE — 71045 X-RAY EXAM CHEST 1 VIEW: CPT | Mod: 26

## 2025-08-11 PROCEDURE — 81001 URINALYSIS AUTO W/SCOPE: CPT

## 2025-08-11 PROCEDURE — 71045 X-RAY EXAM CHEST 1 VIEW: CPT

## 2025-08-11 PROCEDURE — 36415 COLL VENOUS BLD VENIPUNCTURE: CPT

## 2025-08-11 PROCEDURE — 76705 ECHO EXAM OF ABDOMEN: CPT | Mod: 26

## 2025-08-11 PROCEDURE — 82962 GLUCOSE BLOOD TEST: CPT

## 2025-08-11 PROCEDURE — 76705 ECHO EXAM OF ABDOMEN: CPT

## 2025-08-11 PROCEDURE — 83605 ASSAY OF LACTIC ACID: CPT

## 2025-08-11 PROCEDURE — 80053 COMPREHEN METABOLIC PANEL: CPT

## 2025-08-11 PROCEDURE — 85025 COMPLETE CBC W/AUTO DIFF WBC: CPT

## 2025-08-11 PROCEDURE — 87637 SARSCOV2&INF A&B&RSV AMP PRB: CPT

## 2025-08-11 PROCEDURE — 83690 ASSAY OF LIPASE: CPT

## 2025-08-11 PROCEDURE — 74176 CT ABD & PELVIS W/O CONTRAST: CPT

## 2025-08-11 RX ORDER — CEFTRIAXONE 500 MG/1
2000 INJECTION, POWDER, FOR SOLUTION INTRAMUSCULAR; INTRAVENOUS ONCE
Refills: 0 | Status: COMPLETED | OUTPATIENT
Start: 2025-08-11 | End: 2025-08-11

## 2025-08-11 RX ORDER — INSULIN LISPRO 100 U/ML
INJECTION, SOLUTION INTRAVENOUS; SUBCUTANEOUS EVERY 6 HOURS
Refills: 0 | Status: DISCONTINUED | OUTPATIENT
Start: 2025-08-11 | End: 2025-08-12

## 2025-08-11 RX ORDER — SODIUM CHLORIDE 9 G/1000ML
1000 INJECTION, SOLUTION INTRAVENOUS
Refills: 0 | Status: DISCONTINUED | OUTPATIENT
Start: 2025-08-11 | End: 2025-08-12

## 2025-08-11 RX ORDER — DEXTROSE 50 % IN WATER 50 %
25 SYRINGE (ML) INTRAVENOUS ONCE
Refills: 0 | Status: DISCONTINUED | OUTPATIENT
Start: 2025-08-11 | End: 2025-08-12

## 2025-08-11 RX ORDER — HYDROMORPHONE/SOD CHLOR,ISO/PF 2 MG/10 ML
0.2 SYRINGE (ML) INJECTION EVERY 6 HOURS
Refills: 0 | Status: DISCONTINUED | OUTPATIENT
Start: 2025-08-11 | End: 2025-08-12

## 2025-08-11 RX ORDER — METRONIDAZOLE 250 MG
500 TABLET ORAL ONCE
Refills: 0 | Status: COMPLETED | OUTPATIENT
Start: 2025-08-11 | End: 2025-08-11

## 2025-08-11 RX ORDER — ATORVASTATIN CALCIUM 80 MG/1
20 TABLET, FILM COATED ORAL AT BEDTIME
Refills: 0 | Status: DISCONTINUED | OUTPATIENT
Start: 2025-08-11 | End: 2025-08-12

## 2025-08-11 RX ORDER — HYDROMORPHONE/SOD CHLOR,ISO/PF 2 MG/10 ML
0.5 SYRINGE (ML) INJECTION EVERY 6 HOURS
Refills: 0 | Status: DISCONTINUED | OUTPATIENT
Start: 2025-08-11 | End: 2025-08-12

## 2025-08-11 RX ORDER — ONDANSETRON HCL/PF 4 MG/2 ML
4 VIAL (ML) INJECTION EVERY 6 HOURS
Refills: 0 | Status: DISCONTINUED | OUTPATIENT
Start: 2025-08-11 | End: 2025-08-12

## 2025-08-11 RX ORDER — LOSARTAN POTASSIUM 100 MG/1
50 TABLET, FILM COATED ORAL DAILY
Refills: 0 | Status: DISCONTINUED | OUTPATIENT
Start: 2025-08-11 | End: 2025-08-11

## 2025-08-11 RX ORDER — SODIUM CHLORIDE 9 G/1000ML
1000 INJECTION, SOLUTION INTRAVENOUS
Refills: 0 | Status: DISCONTINUED | OUTPATIENT
Start: 2025-08-11 | End: 2025-08-11

## 2025-08-11 RX ORDER — HEPARIN SODIUM 1000 [USP'U]/ML
5000 INJECTION INTRAVENOUS; SUBCUTANEOUS EVERY 12 HOURS
Refills: 0 | Status: DISCONTINUED | OUTPATIENT
Start: 2025-08-11 | End: 2025-08-12

## 2025-08-11 RX ORDER — GLUCAGON 3 MG/1
1 POWDER NASAL ONCE
Refills: 0 | Status: DISCONTINUED | OUTPATIENT
Start: 2025-08-11 | End: 2025-08-12

## 2025-08-11 RX ORDER — LOSARTAN POTASSIUM 100 MG/1
50 TABLET, FILM COATED ORAL DAILY
Refills: 0 | Status: DISCONTINUED | OUTPATIENT
Start: 2025-08-11 | End: 2025-08-12

## 2025-08-11 RX ORDER — ACETAMINOPHEN 500 MG/5ML
1000 LIQUID (ML) ORAL EVERY 6 HOURS
Refills: 0 | Status: COMPLETED | OUTPATIENT
Start: 2025-08-11 | End: 2025-08-12

## 2025-08-11 RX ORDER — DEXTROSE 50 % IN WATER 50 %
12.5 SYRINGE (ML) INTRAVENOUS ONCE
Refills: 0 | Status: DISCONTINUED | OUTPATIENT
Start: 2025-08-11 | End: 2025-08-12

## 2025-08-11 RX ORDER — DEXTROSE 50 % IN WATER 50 %
15 SYRINGE (ML) INTRAVENOUS ONCE
Refills: 0 | Status: DISCONTINUED | OUTPATIENT
Start: 2025-08-11 | End: 2025-08-12

## 2025-08-11 RX ORDER — CEFTRIAXONE 500 MG/1
INJECTION, POWDER, FOR SOLUTION INTRAMUSCULAR; INTRAVENOUS
Refills: 0 | Status: COMPLETED | OUTPATIENT
Start: 2025-08-11 | End: 2025-08-11

## 2025-08-11 RX ORDER — METRONIDAZOLE 250 MG
TABLET ORAL
Refills: 0 | Status: DISCONTINUED | OUTPATIENT
Start: 2025-08-11 | End: 2025-08-12

## 2025-08-11 RX ORDER — METRONIDAZOLE 250 MG
500 TABLET ORAL EVERY 8 HOURS
Refills: 0 | Status: DISCONTINUED | OUTPATIENT
Start: 2025-08-11 | End: 2025-08-12

## 2025-08-11 RX ADMIN — SODIUM CHLORIDE 50 MILLILITER(S): 9 INJECTION, SOLUTION INTRAVENOUS at 21:24

## 2025-08-11 RX ADMIN — Medication 400 MILLIGRAM(S): at 11:25

## 2025-08-11 RX ADMIN — HEPARIN SODIUM 5000 UNIT(S): 1000 INJECTION INTRAVENOUS; SUBCUTANEOUS at 17:13

## 2025-08-11 RX ADMIN — Medication 400 MILLIGRAM(S): at 05:52

## 2025-08-11 RX ADMIN — HEPARIN SODIUM 5000 UNIT(S): 1000 INJECTION INTRAVENOUS; SUBCUTANEOUS at 05:52

## 2025-08-11 RX ADMIN — Medication 0.5 MILLIGRAM(S): at 10:05

## 2025-08-11 RX ADMIN — ATORVASTATIN CALCIUM 20 MILLIGRAM(S): 80 TABLET, FILM COATED ORAL at 21:18

## 2025-08-11 RX ADMIN — Medication 1000 MILLIGRAM(S): at 12:05

## 2025-08-11 RX ADMIN — CEFTRIAXONE 100 MILLIGRAM(S): 500 INJECTION, POWDER, FOR SOLUTION INTRAMUSCULAR; INTRAVENOUS at 16:13

## 2025-08-11 RX ADMIN — Medication 0.2 MILLIGRAM(S): at 16:45

## 2025-08-11 RX ADMIN — Medication 100 MILLIGRAM(S): at 21:18

## 2025-08-11 RX ADMIN — Medication 0.5 MILLIGRAM(S): at 17:13

## 2025-08-11 RX ADMIN — Medication 0.5 MILLIGRAM(S): at 09:25

## 2025-08-11 RX ADMIN — Medication 1000 MILLIGRAM(S): at 17:45

## 2025-08-11 RX ADMIN — CEFTRIAXONE 100 MILLIGRAM(S): 500 INJECTION, POWDER, FOR SOLUTION INTRAMUSCULAR; INTRAVENOUS at 04:23

## 2025-08-11 RX ADMIN — SODIUM CHLORIDE 70 MILLILITER(S): 9 INJECTION, SOLUTION INTRAVENOUS at 09:25

## 2025-08-11 RX ADMIN — Medication 400 MILLIGRAM(S): at 17:12

## 2025-08-11 RX ADMIN — Medication 0.5 MILLIGRAM(S): at 17:45

## 2025-08-11 RX ADMIN — Medication 0.2 MILLIGRAM(S): at 16:12

## 2025-08-11 RX ADMIN — Medication 100 MILLIGRAM(S): at 13:39

## 2025-08-11 RX ADMIN — Medication 100 MILLIGRAM(S): at 04:23

## 2025-08-12 ENCOUNTER — RESULT REVIEW (OUTPATIENT)
Age: 80
End: 2025-08-12

## 2025-08-12 DIAGNOSIS — R79.89 OTHER SPECIFIED ABNORMAL FINDINGS OF BLOOD CHEMISTRY: ICD-10-CM

## 2025-08-12 DIAGNOSIS — R07.9 CHEST PAIN, UNSPECIFIED: ICD-10-CM

## 2025-08-12 DIAGNOSIS — I50.30 UNSPECIFIED DIASTOLIC (CONGESTIVE) HEART FAILURE: ICD-10-CM

## 2025-08-12 LAB
A1C WITH ESTIMATED AVERAGE GLUCOSE RESULT: 7.7 % — HIGH (ref 4–5.6)
ALBUMIN SERPL ELPH-MCNC: 2.7 G/DL — LOW (ref 3.5–5)
ALP SERPL-CCNC: 151 U/L — HIGH (ref 40–120)
ALT FLD-CCNC: 122 U/L DA — HIGH (ref 10–60)
ANION GAP SERPL CALC-SCNC: 13 MMOL/L — SIGNIFICANT CHANGE UP (ref 5–17)
ANION GAP SERPL CALC-SCNC: 8 MMOL/L — SIGNIFICANT CHANGE UP (ref 5–17)
APTT BLD: 31.9 SEC — SIGNIFICANT CHANGE UP (ref 26.1–36.8)
AST SERPL-CCNC: 84 U/L — HIGH (ref 10–40)
BASE EXCESS BLDV CALC-SCNC: -2.6 MMOL/L — SIGNIFICANT CHANGE UP
BILIRUB DIRECT SERPL-MCNC: 2.4 MG/DL — HIGH (ref 0–0.3)
BILIRUB INDIRECT FLD-MCNC: 0.9 MG/DL — SIGNIFICANT CHANGE UP (ref 0.2–1)
BILIRUB SERPL-MCNC: 3.3 MG/DL — HIGH (ref 0.2–1.2)
BLD GP AB SCN SERPL QL: SIGNIFICANT CHANGE UP
BLOOD GAS COMMENTS, VENOUS: SIGNIFICANT CHANGE UP
BUN SERPL-MCNC: 48 MG/DL — HIGH (ref 7–18)
BUN SERPL-MCNC: 56 MG/DL — HIGH (ref 7–18)
CA-I SERPL-SCNC: SIGNIFICANT CHANGE UP MMOL/L (ref 1.15–1.33)
CALCIUM SERPL-MCNC: 9 MG/DL — SIGNIFICANT CHANGE UP (ref 8.4–10.5)
CALCIUM SERPL-MCNC: 9.6 MG/DL — SIGNIFICANT CHANGE UP (ref 8.4–10.5)
CHLORIDE SERPL-SCNC: 95 MMOL/L — LOW (ref 96–108)
CHLORIDE SERPL-SCNC: 96 MMOL/L — SIGNIFICANT CHANGE UP (ref 96–108)
CO2 SERPL-SCNC: 22 MMOL/L — SIGNIFICANT CHANGE UP (ref 22–31)
CO2 SERPL-SCNC: 25 MMOL/L — SIGNIFICANT CHANGE UP (ref 22–31)
CREAT SERPL-MCNC: 6.53 MG/DL — HIGH (ref 0.5–1.3)
CREAT SERPL-MCNC: 6.74 MG/DL — HIGH (ref 0.5–1.3)
EGFR: 8 ML/MIN/1.73M2 — LOW
ESTIMATED AVERAGE GLUCOSE: 174 MG/DL — HIGH (ref 68–114)
GAS PNL BLDV: 129 MMOL/L — LOW (ref 136–145)
GAS PNL BLDV: SIGNIFICANT CHANGE UP
GLUCOSE BLDC GLUCOMTR-MCNC: 104 MG/DL — HIGH (ref 70–99)
GLUCOSE BLDC GLUCOMTR-MCNC: 132 MG/DL — HIGH (ref 70–99)
GLUCOSE BLDC GLUCOMTR-MCNC: 139 MG/DL — HIGH (ref 70–99)
GLUCOSE BLDV-MCNC: 144 MG/DL — HIGH (ref 70–99)
GLUCOSE SERPL-MCNC: 100 MG/DL — HIGH (ref 70–99)
GLUCOSE SERPL-MCNC: 144 MG/DL — HIGH (ref 70–99)
HCO3 BLDV-SCNC: 23 MMOL/L — SIGNIFICANT CHANGE UP (ref 22–29)
HCT VFR BLD CALC: 30.8 % — LOW (ref 39–50)
HGB BLD-MCNC: 10.1 G/DL — LOW (ref 13–17)
INR BLD: 1.53 RATIO — HIGH (ref 0.85–1.16)
LACTATE BLDV-MCNC: 1.3 MMOL/L — SIGNIFICANT CHANGE UP (ref 0.5–2)
MAGNESIUM SERPL-MCNC: 2.7 MG/DL — HIGH (ref 1.6–2.6)
MCHC RBC-ENTMCNC: 32.3 PG — SIGNIFICANT CHANGE UP (ref 27–34)
MCHC RBC-ENTMCNC: 32.8 G/DL — SIGNIFICANT CHANGE UP (ref 32–36)
MCV RBC AUTO: 98.4 FL — SIGNIFICANT CHANGE UP (ref 80–100)
NRBC BLD AUTO-RTO: 0 /100 WBCS — SIGNIFICANT CHANGE UP (ref 0–0)
PCO2 BLDV: 43 MMHG — SIGNIFICANT CHANGE UP (ref 42–55)
PH BLDV: 7.34 — SIGNIFICANT CHANGE UP (ref 7.32–7.43)
PHOSPHATE SERPL-MCNC: 3.3 MG/DL — SIGNIFICANT CHANGE UP (ref 2.5–4.5)
PLATELET # BLD AUTO: 91 K/UL — LOW (ref 150–400)
PO2 BLDV: 108 MMHG — SIGNIFICANT CHANGE UP
POTASSIUM BLDV-SCNC: 4.6 MMOL/L — SIGNIFICANT CHANGE UP (ref 3.5–5.1)
POTASSIUM SERPL-MCNC: 4.2 MMOL/L — SIGNIFICANT CHANGE UP (ref 3.5–5.3)
POTASSIUM SERPL-MCNC: 4.3 MMOL/L — SIGNIFICANT CHANGE UP (ref 3.5–5.3)
POTASSIUM SERPL-SCNC: 4.2 MMOL/L — SIGNIFICANT CHANGE UP (ref 3.5–5.3)
POTASSIUM SERPL-SCNC: 4.3 MMOL/L — SIGNIFICANT CHANGE UP (ref 3.5–5.3)
PROT SERPL-MCNC: 7.4 G/DL — SIGNIFICANT CHANGE UP (ref 6–8.3)
PROTHROM AB SERPL-ACNC: 17.8 SEC — HIGH (ref 9.9–13.4)
RBC # BLD: 3.13 M/UL — LOW (ref 4.2–5.8)
RBC # FLD: 13.6 % — SIGNIFICANT CHANGE UP (ref 10.3–14.5)
SAO2 % BLDV: 97.8 % — SIGNIFICANT CHANGE UP
SODIUM SERPL-SCNC: 129 MMOL/L — LOW (ref 135–145)
SODIUM SERPL-SCNC: 130 MMOL/L — LOW (ref 135–145)
TROPONIN I, HIGH SENSITIVITY RESULT: 108.4 NG/L — HIGH
TROPONIN I, HIGH SENSITIVITY RESULT: 133.9 NG/L — HIGH
WBC # BLD: 5.27 K/UL — SIGNIFICANT CHANGE UP (ref 3.8–10.5)
WBC # FLD AUTO: 5.27 K/UL — SIGNIFICANT CHANGE UP (ref 3.8–10.5)

## 2025-08-12 PROCEDURE — 86850 RBC ANTIBODY SCREEN: CPT

## 2025-08-12 PROCEDURE — 85610 PROTHROMBIN TIME: CPT

## 2025-08-12 PROCEDURE — 86900 BLOOD TYPING SEROLOGIC ABO: CPT

## 2025-08-12 PROCEDURE — 74176 CT ABD & PELVIS W/O CONTRAST: CPT

## 2025-08-12 PROCEDURE — 82803 BLOOD GASES ANY COMBINATION: CPT

## 2025-08-12 PROCEDURE — 80053 COMPREHEN METABOLIC PANEL: CPT

## 2025-08-12 PROCEDURE — 99223 1ST HOSP IP/OBS HIGH 75: CPT

## 2025-08-12 PROCEDURE — 85730 THROMBOPLASTIN TIME PARTIAL: CPT

## 2025-08-12 PROCEDURE — 83735 ASSAY OF MAGNESIUM: CPT

## 2025-08-12 PROCEDURE — 82330 ASSAY OF CALCIUM: CPT

## 2025-08-12 PROCEDURE — 76705 ECHO EXAM OF ABDOMEN: CPT

## 2025-08-12 PROCEDURE — 85027 COMPLETE CBC AUTOMATED: CPT

## 2025-08-12 PROCEDURE — 81001 URINALYSIS AUTO W/SCOPE: CPT

## 2025-08-12 PROCEDURE — 71045 X-RAY EXAM CHEST 1 VIEW: CPT

## 2025-08-12 PROCEDURE — 83690 ASSAY OF LIPASE: CPT

## 2025-08-12 PROCEDURE — 84484 ASSAY OF TROPONIN QUANT: CPT

## 2025-08-12 PROCEDURE — 82962 GLUCOSE BLOOD TEST: CPT

## 2025-08-12 PROCEDURE — 86901 BLOOD TYPING SEROLOGIC RH(D): CPT

## 2025-08-12 PROCEDURE — 36415 COLL VENOUS BLD VENIPUNCTURE: CPT

## 2025-08-12 PROCEDURE — 84132 ASSAY OF SERUM POTASSIUM: CPT

## 2025-08-12 PROCEDURE — 83605 ASSAY OF LACTIC ACID: CPT

## 2025-08-12 PROCEDURE — 82947 ASSAY GLUCOSE BLOOD QUANT: CPT

## 2025-08-12 PROCEDURE — 88304 TISSUE EXAM BY PATHOLOGIST: CPT | Mod: 26

## 2025-08-12 PROCEDURE — 80076 HEPATIC FUNCTION PANEL: CPT

## 2025-08-12 PROCEDURE — 84295 ASSAY OF SERUM SODIUM: CPT

## 2025-08-12 PROCEDURE — 87637 SARSCOV2&INF A&B&RSV AMP PRB: CPT

## 2025-08-12 PROCEDURE — 83036 HEMOGLOBIN GLYCOSYLATED A1C: CPT

## 2025-08-12 PROCEDURE — 85025 COMPLETE CBC W/AUTO DIFF WBC: CPT

## 2025-08-12 PROCEDURE — 84100 ASSAY OF PHOSPHORUS: CPT

## 2025-08-12 PROCEDURE — 80048 BASIC METABOLIC PNL TOTAL CA: CPT

## 2025-08-12 PROCEDURE — 76000 FLUOROSCOPY <1 HR PHYS/QHP: CPT

## 2025-08-12 DEVICE — ARISTA 3GR: Type: IMPLANTABLE DEVICE | Status: FUNCTIONAL

## 2025-08-12 DEVICE — LIGATING CLIPS WECK HEMOLOK POLYMER LARGE (PURPLE) 6: Type: IMPLANTABLE DEVICE | Status: FUNCTIONAL

## 2025-08-12 DEVICE — IMPLANTABLE DEVICE: Type: IMPLANTABLE DEVICE | Status: FUNCTIONAL

## 2025-08-12 RX ORDER — CEFTRIAXONE 500 MG/1
2000 INJECTION, POWDER, FOR SOLUTION INTRAMUSCULAR; INTRAVENOUS EVERY 24 HOURS
Refills: 0 | Status: DISCONTINUED | OUTPATIENT
Start: 2025-08-12 | End: 2025-08-15

## 2025-08-12 RX ORDER — OXYCODONE HYDROCHLORIDE 30 MG/1
5 TABLET ORAL EVERY 6 HOURS
Refills: 0 | Status: DISCONTINUED | OUTPATIENT
Start: 2025-08-12 | End: 2025-08-15

## 2025-08-12 RX ORDER — HYDROMORPHONE/SOD CHLOR,ISO/PF 2 MG/10 ML
0.2 SYRINGE (ML) INJECTION
Refills: 0 | Status: DISCONTINUED | OUTPATIENT
Start: 2025-08-12 | End: 2025-08-12

## 2025-08-12 RX ORDER — ONDANSETRON HCL/PF 4 MG/2 ML
4 VIAL (ML) INJECTION EVERY 4 HOURS
Refills: 0 | Status: DISCONTINUED | OUTPATIENT
Start: 2025-08-12 | End: 2025-08-15

## 2025-08-12 RX ORDER — METRONIDAZOLE 250 MG
500 TABLET ORAL EVERY 8 HOURS
Refills: 0 | Status: DISCONTINUED | OUTPATIENT
Start: 2025-08-12 | End: 2025-08-15

## 2025-08-12 RX ORDER — SODIUM CHLORIDE 9 G/1000ML
1000 INJECTION, SOLUTION INTRAVENOUS
Refills: 0 | Status: DISCONTINUED | OUTPATIENT
Start: 2025-08-12 | End: 2025-08-12

## 2025-08-12 RX ORDER — ACETAMINOPHEN 500 MG/5ML
650 LIQUID (ML) ORAL EVERY 6 HOURS
Refills: 0 | Status: DISCONTINUED | OUTPATIENT
Start: 2025-08-12 | End: 2025-08-15

## 2025-08-12 RX ORDER — FENTANYL CITRATE-0.9 % NACL/PF 100MCG/2ML
25 SYRINGE (ML) INTRAVENOUS
Refills: 0 | Status: DISCONTINUED | OUTPATIENT
Start: 2025-08-12 | End: 2025-08-12

## 2025-08-12 RX ORDER — ONDANSETRON HCL/PF 4 MG/2 ML
4 VIAL (ML) INJECTION ONCE
Refills: 0 | Status: DISCONTINUED | OUTPATIENT
Start: 2025-08-12 | End: 2025-08-12

## 2025-08-12 RX ADMIN — Medication 0.2 MILLIGRAM(S): at 18:08

## 2025-08-12 RX ADMIN — OXYCODONE HYDROCHLORIDE 5 MILLIGRAM(S): 30 TABLET ORAL at 21:32

## 2025-08-12 RX ADMIN — Medication 0.2 MILLIGRAM(S): at 16:46

## 2025-08-12 RX ADMIN — Medication 0.5 MILLIGRAM(S): at 09:05

## 2025-08-12 RX ADMIN — Medication 400 MILLIGRAM(S): at 00:47

## 2025-08-12 RX ADMIN — CEFTRIAXONE 100 MILLIGRAM(S): 500 INJECTION, POWDER, FOR SOLUTION INTRAMUSCULAR; INTRAVENOUS at 23:04

## 2025-08-12 RX ADMIN — Medication 0.2 MILLIGRAM(S): at 16:59

## 2025-08-12 RX ADMIN — LOSARTAN POTASSIUM 50 MILLIGRAM(S): 100 TABLET, FILM COATED ORAL at 05:38

## 2025-08-12 RX ADMIN — Medication 100 MILLIGRAM(S): at 21:32

## 2025-08-12 RX ADMIN — Medication 100 MILLIGRAM(S): at 05:39

## 2025-08-12 RX ADMIN — Medication 1000 MILLIGRAM(S): at 01:47

## 2025-08-12 RX ADMIN — Medication 0.2 MILLIGRAM(S): at 17:27

## 2025-08-12 RX ADMIN — Medication 0.5 MILLIGRAM(S): at 10:05

## 2025-08-12 RX ADMIN — Medication 0.2 MILLIGRAM(S): at 17:12

## 2025-08-12 RX ADMIN — Medication 0.2 MILLIGRAM(S): at 17:53

## 2025-08-12 RX ADMIN — OXYCODONE HYDROCHLORIDE 5 MILLIGRAM(S): 30 TABLET ORAL at 22:32

## 2025-08-13 ENCOUNTER — RESULT REVIEW (OUTPATIENT)
Age: 80
End: 2025-08-13

## 2025-08-13 LAB
ALBUMIN SERPL ELPH-MCNC: 2.4 G/DL — LOW (ref 3.5–5)
ALP SERPL-CCNC: 127 U/L — HIGH (ref 40–120)
ALT FLD-CCNC: 97 U/L DA — HIGH (ref 10–60)
ANION GAP SERPL CALC-SCNC: 11 MMOL/L — SIGNIFICANT CHANGE UP (ref 5–17)
AST SERPL-CCNC: 100 U/L — HIGH (ref 10–40)
BILIRUB SERPL-MCNC: 2.1 MG/DL — HIGH (ref 0.2–1.2)
BUN SERPL-MCNC: 68 MG/DL — HIGH (ref 7–18)
CALCIUM SERPL-MCNC: 8.7 MG/DL — SIGNIFICANT CHANGE UP (ref 8.4–10.5)
CHLORIDE SERPL-SCNC: 94 MMOL/L — LOW (ref 96–108)
CO2 SERPL-SCNC: 25 MMOL/L — SIGNIFICANT CHANGE UP (ref 22–31)
CREAT SERPL-MCNC: 7.69 MG/DL — HIGH (ref 0.5–1.3)
EGFR: 7 ML/MIN/1.73M2 — LOW
EGFR: 7 ML/MIN/1.73M2 — LOW
GLUCOSE SERPL-MCNC: 208 MG/DL — HIGH (ref 70–99)
HCT VFR BLD CALC: 26.9 % — LOW (ref 39–50)
HGB BLD-MCNC: 9.2 G/DL — LOW (ref 13–17)
MCHC RBC-ENTMCNC: 32.1 PG — SIGNIFICANT CHANGE UP (ref 27–34)
MCHC RBC-ENTMCNC: 34.2 G/DL — SIGNIFICANT CHANGE UP (ref 32–36)
MCV RBC AUTO: 93.7 FL — SIGNIFICANT CHANGE UP (ref 80–100)
NRBC BLD AUTO-RTO: 0 /100 WBCS — SIGNIFICANT CHANGE UP (ref 0–0)
PLATELET # BLD AUTO: 110 K/UL — LOW (ref 150–400)
POTASSIUM SERPL-MCNC: 4.7 MMOL/L — SIGNIFICANT CHANGE UP (ref 3.5–5.3)
POTASSIUM SERPL-SCNC: 4.7 MMOL/L — SIGNIFICANT CHANGE UP (ref 3.5–5.3)
PROT SERPL-MCNC: 7.3 G/DL — SIGNIFICANT CHANGE UP (ref 6–8.3)
RBC # BLD: 2.87 M/UL — LOW (ref 4.2–5.8)
RBC # FLD: 13.7 % — SIGNIFICANT CHANGE UP (ref 10.3–14.5)
SODIUM SERPL-SCNC: 130 MMOL/L — LOW (ref 135–145)
TROPONIN I, HIGH SENSITIVITY RESULT: 98.9 NG/L — HIGH
WBC # BLD: 10 K/UL — SIGNIFICANT CHANGE UP (ref 3.8–10.5)
WBC # FLD AUTO: 10 K/UL — SIGNIFICANT CHANGE UP (ref 3.8–10.5)

## 2025-08-13 PROCEDURE — 99233 SBSQ HOSP IP/OBS HIGH 50: CPT | Mod: FS

## 2025-08-13 RX ORDER — CALCIUM CARBONATE 750 MG/1
1 TABLET ORAL ONCE
Refills: 0 | Status: DISCONTINUED | OUTPATIENT
Start: 2025-08-13 | End: 2025-08-15

## 2025-08-13 RX ORDER — MELATONIN 5 MG
3 TABLET ORAL AT BEDTIME
Refills: 0 | Status: DISCONTINUED | OUTPATIENT
Start: 2025-08-13 | End: 2025-08-15

## 2025-08-13 RX ORDER — OXYCODONE HYDROCHLORIDE 30 MG/1
2.5 TABLET ORAL ONCE
Refills: 0 | Status: DISCONTINUED | OUTPATIENT
Start: 2025-08-13 | End: 2025-08-15

## 2025-08-13 RX ORDER — HEPARIN SODIUM 1000 [USP'U]/ML
5000 INJECTION INTRAVENOUS; SUBCUTANEOUS EVERY 8 HOURS
Refills: 0 | Status: DISCONTINUED | OUTPATIENT
Start: 2025-08-13 | End: 2025-08-15

## 2025-08-13 RX ADMIN — Medication 100 MILLIGRAM(S): at 22:29

## 2025-08-13 RX ADMIN — Medication 100 MILLIGRAM(S): at 14:18

## 2025-08-13 RX ADMIN — OXYCODONE HYDROCHLORIDE 5 MILLIGRAM(S): 30 TABLET ORAL at 15:00

## 2025-08-13 RX ADMIN — HEPARIN SODIUM 5000 UNIT(S): 1000 INJECTION INTRAVENOUS; SUBCUTANEOUS at 22:01

## 2025-08-13 RX ADMIN — CEFTRIAXONE 100 MILLIGRAM(S): 500 INJECTION, POWDER, FOR SOLUTION INTRAMUSCULAR; INTRAVENOUS at 22:27

## 2025-08-13 RX ADMIN — Medication 100 MILLIGRAM(S): at 05:18

## 2025-08-13 RX ADMIN — OXYCODONE HYDROCHLORIDE 5 MILLIGRAM(S): 30 TABLET ORAL at 14:18

## 2025-08-14 LAB
ALBUMIN SERPL ELPH-MCNC: 2.2 G/DL — LOW (ref 3.5–5)
ALP SERPL-CCNC: 118 U/L — SIGNIFICANT CHANGE UP (ref 40–120)
ALT FLD-CCNC: 64 U/L DA — HIGH (ref 10–60)
ANION GAP SERPL CALC-SCNC: 6 MMOL/L — SIGNIFICANT CHANGE UP (ref 5–17)
AST SERPL-CCNC: 60 U/L — HIGH (ref 10–40)
BASOPHILS # BLD AUTO: 0.01 K/UL — SIGNIFICANT CHANGE UP (ref 0–0.2)
BASOPHILS NFR BLD AUTO: 0.1 % — SIGNIFICANT CHANGE UP (ref 0–2)
BILIRUB SERPL-MCNC: 1.2 MG/DL — SIGNIFICANT CHANGE UP (ref 0.2–1.2)
BUN SERPL-MCNC: 60 MG/DL — HIGH (ref 7–18)
CALCIUM SERPL-MCNC: 8.6 MG/DL — SIGNIFICANT CHANGE UP (ref 8.4–10.5)
CHLORIDE SERPL-SCNC: 97 MMOL/L — SIGNIFICANT CHANGE UP (ref 96–108)
CO2 SERPL-SCNC: 28 MMOL/L — SIGNIFICANT CHANGE UP (ref 22–31)
CREAT SERPL-MCNC: 5.93 MG/DL — HIGH (ref 0.5–1.3)
EGFR: 9 ML/MIN/1.73M2 — LOW
EGFR: 9 ML/MIN/1.73M2 — LOW
EOSINOPHIL # BLD AUTO: 0.08 K/UL — SIGNIFICANT CHANGE UP (ref 0–0.5)
EOSINOPHIL NFR BLD AUTO: 1 % — SIGNIFICANT CHANGE UP (ref 0–6)
GLUCOSE SERPL-MCNC: 145 MG/DL — HIGH (ref 70–99)
HBV SURFACE AG SER-ACNC: SIGNIFICANT CHANGE UP
HCT VFR BLD CALC: 26.9 % — LOW (ref 39–50)
HGB BLD-MCNC: 9 G/DL — LOW (ref 13–17)
IMM GRANULOCYTES NFR BLD AUTO: 0.9 % — SIGNIFICANT CHANGE UP (ref 0–0.9)
LYMPHOCYTES # BLD AUTO: 0.93 K/UL — LOW (ref 1–3.3)
LYMPHOCYTES # BLD AUTO: 11.3 % — LOW (ref 13–44)
MAGNESIUM SERPL-MCNC: 2.7 MG/DL — HIGH (ref 1.6–2.6)
MCHC RBC-ENTMCNC: 31.7 PG — SIGNIFICANT CHANGE UP (ref 27–34)
MCHC RBC-ENTMCNC: 33.5 G/DL — SIGNIFICANT CHANGE UP (ref 32–36)
MCV RBC AUTO: 94.7 FL — SIGNIFICANT CHANGE UP (ref 80–100)
MONOCYTES # BLD AUTO: 1.13 K/UL — HIGH (ref 0–0.9)
MONOCYTES NFR BLD AUTO: 13.8 % — SIGNIFICANT CHANGE UP (ref 2–14)
NEUTROPHILS # BLD AUTO: 5.99 K/UL — SIGNIFICANT CHANGE UP (ref 1.8–7.4)
NEUTROPHILS NFR BLD AUTO: 72.9 % — SIGNIFICANT CHANGE UP (ref 43–77)
NRBC BLD AUTO-RTO: 0 /100 WBCS — SIGNIFICANT CHANGE UP (ref 0–0)
PHOSPHATE SERPL-MCNC: 3.2 MG/DL — SIGNIFICANT CHANGE UP (ref 2.5–4.5)
PLATELET # BLD AUTO: 114 K/UL — LOW (ref 150–400)
POTASSIUM SERPL-MCNC: 4.1 MMOL/L — SIGNIFICANT CHANGE UP (ref 3.5–5.3)
POTASSIUM SERPL-SCNC: 4.1 MMOL/L — SIGNIFICANT CHANGE UP (ref 3.5–5.3)
PROT SERPL-MCNC: 7 G/DL — SIGNIFICANT CHANGE UP (ref 6–8.3)
RBC # BLD: 2.84 M/UL — LOW (ref 4.2–5.8)
RBC # FLD: 13.8 % — SIGNIFICANT CHANGE UP (ref 10.3–14.5)
SODIUM SERPL-SCNC: 131 MMOL/L — LOW (ref 135–145)
WBC # BLD: 8.21 K/UL — SIGNIFICANT CHANGE UP (ref 3.8–10.5)
WBC # FLD AUTO: 8.21 K/UL — SIGNIFICANT CHANGE UP (ref 3.8–10.5)

## 2025-08-14 RX ADMIN — HEPARIN SODIUM 5000 UNIT(S): 1000 INJECTION INTRAVENOUS; SUBCUTANEOUS at 21:14

## 2025-08-14 RX ADMIN — OXYCODONE HYDROCHLORIDE 5 MILLIGRAM(S): 30 TABLET ORAL at 09:36

## 2025-08-14 RX ADMIN — CEFTRIAXONE 100 MILLIGRAM(S): 500 INJECTION, POWDER, FOR SOLUTION INTRAMUSCULAR; INTRAVENOUS at 23:25

## 2025-08-14 RX ADMIN — OXYCODONE HYDROCHLORIDE 5 MILLIGRAM(S): 30 TABLET ORAL at 07:40

## 2025-08-14 RX ADMIN — Medication 100 MILLIGRAM(S): at 21:14

## 2025-08-14 RX ADMIN — HEPARIN SODIUM 5000 UNIT(S): 1000 INJECTION INTRAVENOUS; SUBCUTANEOUS at 05:25

## 2025-08-14 RX ADMIN — Medication 100 MILLIGRAM(S): at 15:38

## 2025-08-14 RX ADMIN — HEPARIN SODIUM 5000 UNIT(S): 1000 INJECTION INTRAVENOUS; SUBCUTANEOUS at 15:38

## 2025-08-14 RX ADMIN — Medication 100 MILLIGRAM(S): at 05:27

## 2025-08-15 ENCOUNTER — TRANSCRIPTION ENCOUNTER (OUTPATIENT)
Age: 80
End: 2025-08-15

## 2025-08-15 VITALS
DIASTOLIC BLOOD PRESSURE: 73 MMHG | SYSTOLIC BLOOD PRESSURE: 160 MMHG | RESPIRATION RATE: 18 BRPM | HEART RATE: 78 BPM | OXYGEN SATURATION: 94 % | TEMPERATURE: 98 F

## 2025-08-15 DIAGNOSIS — I27.20 PULMONARY HYPERTENSION, UNSPECIFIED: ICD-10-CM

## 2025-08-15 PROCEDURE — 96375 TX/PRO/DX INJ NEW DRUG ADDON: CPT

## 2025-08-15 PROCEDURE — 83690 ASSAY OF LIPASE: CPT

## 2025-08-15 PROCEDURE — 84132 ASSAY OF SERUM POTASSIUM: CPT

## 2025-08-15 PROCEDURE — 86850 RBC ANTIBODY SCREEN: CPT

## 2025-08-15 PROCEDURE — 99285 EMERGENCY DEPT VISIT HI MDM: CPT | Mod: 25

## 2025-08-15 PROCEDURE — 93005 ELECTROCARDIOGRAM TRACING: CPT

## 2025-08-15 PROCEDURE — 82962 GLUCOSE BLOOD TEST: CPT

## 2025-08-15 PROCEDURE — 81001 URINALYSIS AUTO W/SCOPE: CPT

## 2025-08-15 PROCEDURE — C9399: CPT

## 2025-08-15 PROCEDURE — 71045 X-RAY EXAM CHEST 1 VIEW: CPT

## 2025-08-15 PROCEDURE — 84484 ASSAY OF TROPONIN QUANT: CPT

## 2025-08-15 PROCEDURE — 76705 ECHO EXAM OF ABDOMEN: CPT

## 2025-08-15 PROCEDURE — 80076 HEPATIC FUNCTION PANEL: CPT

## 2025-08-15 PROCEDURE — 36415 COLL VENOUS BLD VENIPUNCTURE: CPT

## 2025-08-15 PROCEDURE — 84100 ASSAY OF PHOSPHORUS: CPT

## 2025-08-15 PROCEDURE — 99261: CPT

## 2025-08-15 PROCEDURE — 87340 HEPATITIS B SURFACE AG IA: CPT

## 2025-08-15 PROCEDURE — 82947 ASSAY GLUCOSE BLOOD QUANT: CPT

## 2025-08-15 PROCEDURE — 85610 PROTHROMBIN TIME: CPT

## 2025-08-15 PROCEDURE — 83605 ASSAY OF LACTIC ACID: CPT

## 2025-08-15 PROCEDURE — C1889: CPT

## 2025-08-15 PROCEDURE — 82803 BLOOD GASES ANY COMBINATION: CPT

## 2025-08-15 PROCEDURE — 87637 SARSCOV2&INF A&B&RSV AMP PRB: CPT

## 2025-08-15 PROCEDURE — C8929: CPT

## 2025-08-15 PROCEDURE — 76000 FLUOROSCOPY <1 HR PHYS/QHP: CPT

## 2025-08-15 PROCEDURE — 82330 ASSAY OF CALCIUM: CPT

## 2025-08-15 PROCEDURE — 74176 CT ABD & PELVIS W/O CONTRAST: CPT

## 2025-08-15 PROCEDURE — 80048 BASIC METABOLIC PNL TOTAL CA: CPT

## 2025-08-15 PROCEDURE — 96374 THER/PROPH/DIAG INJ IV PUSH: CPT

## 2025-08-15 PROCEDURE — 85730 THROMBOPLASTIN TIME PARTIAL: CPT

## 2025-08-15 PROCEDURE — 84295 ASSAY OF SERUM SODIUM: CPT

## 2025-08-15 PROCEDURE — 86900 BLOOD TYPING SEROLOGIC ABO: CPT

## 2025-08-15 PROCEDURE — 83735 ASSAY OF MAGNESIUM: CPT

## 2025-08-15 PROCEDURE — 96372 THER/PROPH/DIAG INJ SC/IM: CPT | Mod: XU

## 2025-08-15 PROCEDURE — 85027 COMPLETE CBC AUTOMATED: CPT

## 2025-08-15 PROCEDURE — 83036 HEMOGLOBIN GLYCOSYLATED A1C: CPT

## 2025-08-15 PROCEDURE — 80053 COMPREHEN METABOLIC PANEL: CPT

## 2025-08-15 PROCEDURE — 85025 COMPLETE CBC W/AUTO DIFF WBC: CPT

## 2025-08-15 PROCEDURE — 86901 BLOOD TYPING SEROLOGIC RH(D): CPT

## 2025-08-15 PROCEDURE — S2900: CPT

## 2025-08-15 PROCEDURE — 88304 TISSUE EXAM BY PATHOLOGIST: CPT

## 2025-08-15 PROCEDURE — 96376 TX/PRO/DX INJ SAME DRUG ADON: CPT

## 2025-08-15 RX ADMIN — OXYCODONE HYDROCHLORIDE 5 MILLIGRAM(S): 30 TABLET ORAL at 00:00

## 2025-08-15 RX ADMIN — HEPARIN SODIUM 5000 UNIT(S): 1000 INJECTION INTRAVENOUS; SUBCUTANEOUS at 05:13

## 2025-08-15 RX ADMIN — Medication 100 MILLIGRAM(S): at 05:11

## 2025-08-19 LAB — SURGICAL PATHOLOGY STUDY: SIGNIFICANT CHANGE UP

## (undated) DEVICE — NDL INJ SCLERO INTERJECT 23G

## (undated) DEVICE — TUBING CANNULA SALTER LABS NASAL ADULT 7FT

## (undated) DEVICE — DRAPE LIGHT HANDLE COVER (BLUE)

## (undated) DEVICE — NDL HYPO SAFE 22G X 1.5" (BLACK)

## (undated) DEVICE — SNARE LOOP POLY DISP 30MM LOOP

## (undated) DEVICE — ENDOCATCH 5MM INZII

## (undated) DEVICE — DRSG STERISTRIPS 0.5 X 4"

## (undated) DEVICE — RETRIEVER ROTH NET PLATINUM-UNIVERSAL

## (undated) DEVICE — XI ARM CLIP APPLIER LARGE

## (undated) DEVICE — DRSG GAUZE 4X4"

## (undated) DEVICE — TUBE RECTAL 24FR

## (undated) DEVICE — SUT HISTOACRYL BLUE

## (undated) DEVICE — PACK ROBOTIC

## (undated) DEVICE — D HELP - CLEARVIEW CLEARIFY SYSTEM

## (undated) DEVICE — TUBING TRUWAVE PRESSURE MALE/FEMALE 72"

## (undated) DEVICE — XI OBTURATOR OPTICAL BLADELESS 8MM

## (undated) DEVICE — SUT VICRYL 0 27" UR-6

## (undated) DEVICE — Device

## (undated) DEVICE — WRAP COMPRESSION CALF MED

## (undated) DEVICE — TUBING STRYKEFLOW II SUCTION / IRRIGATOR

## (undated) DEVICE — XI ARM FORCEP CADIERE 8MM

## (undated) DEVICE — BITE BLOCK MOUTHPCW/STRAP

## (undated) DEVICE — FOR-ESU VALLEYLAB T7E15009DX: Type: DURABLE MEDICAL EQUIPMENT

## (undated) DEVICE — DRAPE XL SHEET 77X98"

## (undated) DEVICE — SOL INJ NS 0.9% 500ML 1-PORT

## (undated) DEVICE — MASK OXYGEN PANORAMIC

## (undated) DEVICE — ENDOCATCH 10MM

## (undated) DEVICE — VALVE ENDOSCOPE DEFENDO SINGLE USE

## (undated) DEVICE — TUBING ENDO EXT OLYMPUS 160 24HR USE

## (undated) DEVICE — XI ARM FORCEP PROGRASP 8MM

## (undated) DEVICE — GLV 8.5 PROTEXIS (BLUE)

## (undated) DEVICE — FORCEP RADIAL JAW 4 W NDL 2.2MM 2.8MM 240CM ORANGE DISP

## (undated) DEVICE — CLAMP BX HOT RAD JAW 3

## (undated) DEVICE — TUBING IV SET GRAVITY 3Y 100" MACRO

## (undated) DEVICE — ELCTR LAPAROSCOPIC MONOPOLAR CORD

## (undated) DEVICE — XI ARM PERMANENT CAUTERY HOOK

## (undated) DEVICE — SOLIDIFIER ISOLYZER 2000 CC

## (undated) DEVICE — BITE BLOCK SCOPE SAVER 20X27MM ADULT GREEN

## (undated) DEVICE — SENSOR O2 FINGER ADULT 24/CA

## (undated) DEVICE — TUBING STRYKER PNEUMOCLEAR SMOKE EVACUATION HIGH FLOW

## (undated) DEVICE — TUBING MEDI-VAC W MAXIGRIP CONNECTORS 1/4"X6'

## (undated) DEVICE — XI ARM FORCEP FENESTRATED BIPOLAR 8MM

## (undated) DEVICE — SUT MONOCRYL 4-0 27" PS-2 UNDYED

## (undated) DEVICE — SYR LUER LOK 50CC

## (undated) DEVICE — XI ARM SCISSOR ROUND TIP 8MM

## (undated) DEVICE — DRAPE TOWEL BLUE STICKY

## (undated) DEVICE — XI CANNULA SEAL 5MM - 8 MM

## (undated) DEVICE — LUBE JELLY FOILPACK 36GM STERILE

## (undated) DEVICE — CATH ELCTR GLIDE PRB 7FR